# Patient Record
Sex: FEMALE | Race: BLACK OR AFRICAN AMERICAN | NOT HISPANIC OR LATINO | Employment: OTHER | URBAN - METROPOLITAN AREA
[De-identification: names, ages, dates, MRNs, and addresses within clinical notes are randomized per-mention and may not be internally consistent; named-entity substitution may affect disease eponyms.]

---

## 2018-02-02 ENCOUNTER — TELEPHONE (OUTPATIENT)
Dept: MEDICAL GROUP | Facility: LAB | Age: 68
End: 2018-02-02

## 2018-02-06 ENCOUNTER — OFFICE VISIT (OUTPATIENT)
Dept: MEDICAL GROUP | Facility: LAB | Age: 68
End: 2018-02-06
Payer: MEDICARE

## 2018-02-06 VITALS
WEIGHT: 190.2 LBS | RESPIRATION RATE: 14 BRPM | HEIGHT: 64 IN | TEMPERATURE: 98.2 F | HEART RATE: 90 BPM | OXYGEN SATURATION: 96 % | DIASTOLIC BLOOD PRESSURE: 86 MMHG | BODY MASS INDEX: 32.47 KG/M2 | SYSTOLIC BLOOD PRESSURE: 132 MMHG

## 2018-02-06 DIAGNOSIS — H53.9 VISION ABNORMALITIES: ICD-10-CM

## 2018-02-06 DIAGNOSIS — Z12.11 COLON CANCER SCREENING: ICD-10-CM

## 2018-02-06 DIAGNOSIS — E55.9 VITAMIN D DEFICIENCY: ICD-10-CM

## 2018-02-06 DIAGNOSIS — Z00.00 ANNUAL PHYSICAL EXAM: ICD-10-CM

## 2018-02-06 DIAGNOSIS — D64.89 ANEMIA DUE TO OTHER CAUSE, NOT CLASSIFIED: ICD-10-CM

## 2018-02-06 DIAGNOSIS — I69.359 CVA, OLD, HEMIPARESIS (HCC): ICD-10-CM

## 2018-02-06 DIAGNOSIS — N95.0 POST-MENOPAUSE BLEEDING: ICD-10-CM

## 2018-02-06 DIAGNOSIS — Z12.31 ENCOUNTER FOR SCREENING MAMMOGRAM FOR BREAST CANCER: ICD-10-CM

## 2018-02-06 DIAGNOSIS — E66.9 OBESITY (BMI 30-39.9): ICD-10-CM

## 2018-02-06 DIAGNOSIS — Z78.0 MENOPAUSE: ICD-10-CM

## 2018-02-06 DIAGNOSIS — I10 ESSENTIAL HYPERTENSION: ICD-10-CM

## 2018-02-06 PROBLEM — D64.9 ANEMIA: Status: ACTIVE | Noted: 2018-02-06

## 2018-02-06 PROCEDURE — 99204 OFFICE O/P NEW MOD 45 MIN: CPT | Performed by: INTERNAL MEDICINE

## 2018-02-06 RX ORDER — ATORVASTATIN CALCIUM 40 MG/1
40 TABLET, FILM COATED ORAL EVERY EVENING
Qty: 90 TAB | Refills: 2 | Status: SHIPPED | OUTPATIENT
Start: 2018-02-06 | End: 2018-05-07

## 2018-02-06 RX ORDER — FERROUS SULFATE 325(65) MG
325 TABLET ORAL DAILY
COMMUNITY
End: 2018-02-14

## 2018-02-06 RX ORDER — METOPROLOL SUCCINATE 100 MG/1
100 TABLET, EXTENDED RELEASE ORAL DAILY
Qty: 90 TAB | Refills: 2 | Status: SHIPPED | OUTPATIENT
Start: 2018-02-06 | End: 2018-06-07 | Stop reason: SDUPTHER

## 2018-02-06 RX ORDER — AMLODIPINE BESYLATE 10 MG/1
5 TABLET ORAL DAILY
Qty: 90 TAB | Refills: 2 | Status: SHIPPED | OUTPATIENT
Start: 2018-02-06 | End: 2018-05-07

## 2018-02-06 RX ORDER — VALSARTAN 160 MG/1
160 TABLET ORAL 2 TIMES DAILY
Qty: 180 TAB | Refills: 2 | Status: SHIPPED | OUTPATIENT
Start: 2018-02-06 | End: 2018-06-07 | Stop reason: SDUPTHER

## 2018-02-06 ASSESSMENT — PATIENT HEALTH QUESTIONNAIRE - PHQ9: CLINICAL INTERPRETATION OF PHQ2 SCORE: 0

## 2018-02-06 NOTE — LETTER
[facility]  Aliya Condon M.D.  [address]  [place] [address]  Fax: [phone]   Authorization for Release/Disclosure of   Protected Health Information   Name: JAIRO RIVAS : 1950 SSN: [id number]   Address: [address] [place] [address]  [place] [address] Phone:    [phone] (home)    I authorize the entity listed below to release/disclose the PHI below to:   [facility]/Aliya Condon M.D. and Aliya Condon M.D.   Provider or Entity Name:     Address   City, State, Zip   Phone:      Fax:     Reason for request: continuity of care   Information to be released:    [  ] LAST COLONOSCOPY,  including any PATH REPORT and follow-up  [  ] LAST FIT/COLOGUARD RESULT [  ] LAST DEXA  [  ] LAST MAMMOGRAM  [  ] LAST PAP  [  ] LAST LABS [  ] RETINA EXAM REPORT  [  ] IMMUNIZATION RECORDS  [  ] Release all info      [  ] Check here and initial the line next to each item to release ALL health information INCLUDING  _____ Care and treatment for drug and / or alcohol abuse  _____ HIV testing, infection status, or AIDS  _____ Genetic Testing    DATES OF SERVICE OR TIME PERIOD TO BE DISCLOSED: _____________  I understand and acknowledge that:  * This Authorization may be revoked at any time by you in writing, except if your health information has already been used or disclosed.  * Your health information that will be used or disclosed as a result of you signing this authorization could be re-disclosed by the recipient. If this occurs, your re-disclosed health information may no longer be protected by State or Federal laws.  * You may refuse to sign this Authorization. Your refusal will not affect your ability to obtain treatment.  * This Authorization becomes effective upon signing and will  on (date) __________.      If no date is indicated, this Authorization will  one (1) year from the signature date.    Name: Jairo Rivas    Signature:   Date:     2018       PLEASE FAX  REQUESTED RECORDS BACK TO: [phone]

## 2018-02-06 NOTE — ASSESSMENT & PLAN NOTE
This is a new problem to me. This patient reported that she has been taking ergocalciferol 50,000 international units weekly for 4 years! She is currently taking vitamin D3 over-the-counter supplements. We will be checking her vitamin D level.

## 2018-02-06 NOTE — ASSESSMENT & PLAN NOTE
This is a pleasant 77-year-old lady who is here today to establish care with a new primary care provider in [place]. She has been living in [place] and has left the last one year and [place] with her son. She now moved to [place] with her daughter.  She is not up-to-date on her most of her health maintenance exams.  Mammograms: Normal before. Due for one. [facility].  Colon cancer screening: Never had colonoscopy. Willing to do FIT testing.  Osteoporosis screening:  Never had one. Willing to have one.  Immunizations: Declined all vaccines.

## 2018-02-06 NOTE — ASSESSMENT & PLAN NOTE
Patient has hypertension for a long time. He has been on amlodipine 5 mg and metoprolol  mg once daily and valsartan 160 mg bid. He stated that recently she was tried to taper down on metoprolol down to 50 mg bid started to have problems with high blood pressure have to go back on 100 mg. She has been followed by a cardiologist in [place] and she will bring her records for her next visit. For hypertension. Pt reports compliance with medication. BP is at goal per [facility] 8 critieria today. Patient is taking his medication regularly and  is  taking baby aspirin. Patient is monitoring his BP at home and it has been quite fluctuating 130-150s/80s but may go up to 160s at a time.     She reported symptoms  Of low BP: light-headed occasionally but no tunnel-vision, unusual fatigue. Denies symptoms high BP: pounding headache, visual changes, palpitations, flushed face. Denies medicine side effects: unusual fatigue, slow heartbeat, foot/leg swelling, cough.

## 2018-02-06 NOTE — ASSESSMENT & PLAN NOTE
This is a new problem to me. This patient complained about the long-term right eye vision issues. She also noted a mole that is growing and getting near her cornea area. She would like to establish with an ophthalmologist in [place].

## 2018-02-06 NOTE — ASSESSMENT & PLAN NOTE
This is a chronic uncontrolled problem.   This patient reported that she was diagnosed with iron deficiency anemia due to chronic blood loss from her possible menopausal bleeding and she is currently taking iron supplements.

## 2018-02-06 NOTE — ASSESSMENT & PLAN NOTE
This is a chronic uncontrolled problem.   This patient has a BMI of 32 and it is difficult for her to lose weight because she is not able to exercise with her right-sided hemiparesis. We will discuss about different medications.

## 2018-02-06 NOTE — PROGRESS NOTES
Chief Complaint   Patient presents with   • Referral Needed     Eye Care, eye pressure and sometimes blurry   • Medication Refill   • Orders Needed     mammo       Subjective:     History of Present Illness: Patient is a 67 y.o. female. This pleasant patient is here today to establish care with a new primary care provider and address medical problems listed below. Was accompanied by her daughter to this visit. Recently moved to [facility].    CVA, old, hemiparesis (CMS-HCC)  This is a new problem to me. This patient had an ischemic stroke in 2010 when she was in [place] that resulted in a right sided residual weakness as well as dysarthria. She was evaluated locally [place] and she was told that she has an ischemic stroke. She lived in [place] after that and she had a cardiology workup and stated that she will bring her records to her next visit. She believes that it was no etiology identified for her stroke. She was advised to stay on simvastatin therapy. She is to be on a combination of Plavix and aspirin therapy but has recently been downgraded to aspirin only due to bleeding problems.   She still has residual right-sided weakness, she does need help with most of her ADLs including dressing, feeding herself and bathing. He lives with her daughter who takes care of her an accompanying her to her visit today. They are interested in establishing with local physical therapist in [place] to improve her strength. She denied any problems with swallowing.      Essential hypertension  Patient has hypertension for a long time. He has been on amlodipine 5 mg and metoprolol  mg once daily and valsartan 160 mg bid. He stated that recently she was tried to taper down on metoprolol down to 50 mg bid started to have problems with high blood pressure have to go back on 100 mg. She has been followed by a cardiologist in [place] and she will bring her records for her next visit. For hypertension. Pt reports compliance with  medication. BP is at goal per [facility] 8 critieria today. Patient is taking his medication regularly and  is  taking baby aspirin. Patient is monitoring his BP at home and it has been quite fluctuating 130-150s/80s but may go up to 160s at a time.     She reported symptoms  Of low BP: light-headed occasionally but no tunnel-vision, unusual fatigue. Denies symptoms high BP: pounding headache, visual changes, palpitations, flushed face. Denies medicine side effects: unusual fatigue, slow heartbeat, foot/leg swelling, cough.      Annual physical exam  This is a pleasant 77-year-old lady who is here today to establish care with a new primary care provider in [place]. She has been living in [place] and has left the last one year and [place] with her son. She now moved to [place] with her daughter.  She is not up-to-date on her most of her health maintenance exams.  Mammograms: Normal before. Due for one. PAP smears: Normal end of 2017 in [facility].  Colon cancer screening: Never had colonoscopy. Willing to do FIT testing.  Osteoporosis screening:  Never had one. Willing to have one.  Immunizations: Declined all vaccines.    Vision abnormalities  This is a new problem to me. This patient complained about the long-term right eye vision issues. She also noted a mole that is growing and getting near her cornea area. She would like to establish with an ophthalmologist in [place].    Vitamin D deficiency  This is a new problem to me. This patient reported that she has been taking ergocalciferol 50,000 international units weekly for 4 years! She is currently taking vitamin D3 over-the-counter supplements. We will be checking her vitamin D level.    Post-menopause bleeding  This is a new problem to me. This has been uncontrolled. This patient went into menopause in 2010 around the time of her stroke and she started to have erratic vaginal bleeding starting in 2012. She continued to have irregular continuous bleeding and she was evaluated by a  gynecologist in [place]. She stated that there was a plan for a D&C but she ended up moving to [place]. She'll like to establish with a local gynecologist. She also has iron deficiency anemia secondary to that.    Anemia  This is a chronic uncontrolled problem.   This patient reported that she was diagnosed with iron deficiency anemia due to chronic blood loss from her possible menopausal bleeding and she is currently taking iron supplements.    Obesity (BMI 30-39.9)  This is a chronic uncontrolled problem.   This patient has a BMI of 32 and it is difficult for her to lose weight because she is not able to exercise with her right-sided hemiparesis. We will discuss about different medications.      Allergies: Penicillins; Hydrocodone; Levaquin; and Tramadol    Current Outpatient Prescriptions Ordered in [facility]   Medication Sig Dispense Refill   • Cholecalciferol (VITAMIN D3 PO) Take  by mouth.     • Coenzyme Q10 (CO Q 10 PO) Take  by mouth.     • Multiple Vitamin (ONE-A-DAY 55 PLUS PO) Take  by mouth.     • ferrous sulfate 325 (65 Fe) MG tablet Take 325 mg by mouth every day.     • amLODIPine (NORVASC) 10 MG Tab Take 0.5 Tabs by mouth every day for 90 days. 90 Tab 2   • metoprolol SR (TOPROL XL) 100 MG TABLET SR 24 HR Take 1 Tab by mouth every day for 90 days. 90 Tab 2   • valsartan (DIOVAN) 160 MG Tab Take 1 Tab by mouth 2 times a day for 90 days. 180 Tab 2   • atorvastatin (LIPITOR) 40 MG Tab Take 1 Tab by mouth every evening for 90 days. 90 Tab 2   • aspirin (ASA) 81 MG Chew Tab chewable tablet Take 81 mg by mouth every day.       No current [facility]-ordered facility-administered medications on file.        Past Medical History:   Diagnosis Date   • Anemia 2/6/2018    Iron def, post-menopausal bleeding.   • CVA, old, hemiparesis (CMS-HCC) 2/6/2018    Residual left sided weakness   • Essential hypertension 2/6/2018    Well controlled on amlodipine 10 mg and metoprolol 100 mg bid.   • Post-menopause bleeding 2/6/2018     "Menopause in 2010 and bleeding 2012 started to have irregular continuous bleeding.    • Vision abnormalities 2/6/2018   • Vitamin D deficiency 2/6/2018    Taking 50,000 IU once weekly for 4 yrs.       History reviewed. No pertinent surgical history.    Social History   Substance Use Topics   • Smoking status: Never Smoker   • Smokeless tobacco: Never Used   • Alcohol use No       Family History   Problem Relation Age of Onset   • Hypertension Mother    • No Known Problems Father    • No Known Problems Brother        ROS:     - Constitutional: Negative for fever, chills, unexpected weight change, and fatigue/generalized weakness.     - HEENT: Negative for headaches, vision changes, hearing changes, ear pain, ear discharge, sinus congestion, sore throat, and neck pain.      - Respiratory: Negative for cough, sputum production, dyspnea and wheezing.    - Cardiovascular: Negative for chest pain, palpitations, orthopnea, and bilateral lower extremity edema.     - Gastrointestinal: Negative for heartburn, nausea, vomiting, abdominal pain, hematochezia, melena, diarrhea, constipation, and greasy/foul-smelling stools.     - Genitourinary: Negative for dysuria, polyuria, hematuria, pyuria, urinary urgency, and urinary incontinence.    - Musculoskeletal: Negative for myalgias, back pain, and joint pain.     - Skin: Negative for rash, itching, cyanotic skin color change.     - Neurological: Negative for dizziness, tingling, tremors, positive for right-sided hemiparesis and dysarthria.  - Endo/Heme/Allergies: Does not bruise/bleed easily. Positive for postmenopausal bleeding.    - Psychiatric/Behavioral: Negative for depression, suicidal/homicidal ideation and memory loss.        - NOTE: All other systems reviewed and are negative, except as in HPI.       Physical Exam:     Blood pressure 132/86, pulse 90, temperature 36.8 °C (98.2 °F), resp. rate 14, height 1.626 m (5' 4\"), weight 86.3 kg (190 lb 3.2 oz), SpO2 96 %, not " currently breastfeeding. Body mass index is 32.65 kg/m².   General: Normal appearing. No distress.  HEENT: Normocephalic. Eyes conjunctiva clear lids without ptosis, pupils equal and reactive to light accommodation, ears normal shape and contour, canals are clear bilaterally, tympanic membranes are benign,  oropharynx is without erythema, edema or exudates.    Right eye exam pigmented spot on the right upper sclera approaching her cornea  Neck: Supple without JVD or bruit. Thyroid is not enlarged.  Pulmonary: Clear to ausculation.  Normal effort. No rales, ronchi, or wheezing.  Cardiovascular: Regular rate and rhythm without murmur. Radial pulses are intact, regular and symmetrical bilaterally.  Abdomen: Soft, nontender, nondistended. Normal bowel sounds. Liver and spleen are not palpable.  Neurologic: Dysarthria. Right upper and lower extremity weakness with strength 2/5. Hyperreflexia. Uses walker, abnormal gait..  Lymph: No cervical, occipital or supraclavicular lymph nodes are palpable  Skin: Warm and dry.  No obvious lesions.  Musculoskeletal: No extremity cyanosis, clubbing, or edema.  Psych: Normal mood and affect. Alert and oriented x3. Judgment and insight is normal.    Data:   Requested previous cardiology and gynecologist record. Requested previous mammograms and Pap smears.    Assessment and Plan:     1. Annual physical exam  As discussed in history of present illness, this patient never had a colonoscopy but is willing to start annual for testing. We ordered a new mammogram. Ordered a new DEXA scan. We'll obtain her Pap smear records. She declined all the vaccinations.  - COMP METABOLIC PANEL; Future    2. Vision abnormalities  New uncontrolled. Rt sided impaired vision.   - REFERRAL TO OPHTHALMOLOGY    3. CVA, old, hemiparesis (CMS-HCC)  New to me. Chronic uncontrolled. S/P ischemic CVA 2010 with residual rt sided weakness. Unable to perform ADLs.  Currently on simvastatin 20 mg, well tolerated,  upgraded to a higher intensity statin with atorvastatin 40 mg. May consider going up to 80 in the future.  Used to be on a Plavix and aspirin recently downgraded to aspirin only due to bleeding problems. Continue on aspirin only.  Referral to physical therapy today for evaluation improvement of muscle strength.  Like to obtain her cardiology workup to see if she has a history of A. fib or not.  - REFERRAL TO PHYSICAL THERAPY Reason for Therapy: Eval/Treat/Report  - atorvastatin (LIPITOR) 40 MG Tab; Take 1 Tab by mouth every evening for 90 days.  Dispense: 90 Tab; Refill: 2    4. Essential hypertension  New to me. Chronic uncontrolled. Reported fluctuation of her blood pressure at home. She is currently on amlodipine 5 mg, metoprolol  mg daily, valsartan 160 mg twice a day. Not quite sure why she requires a high dose beta blockers unless she has a cardiac history. She used to see a cardiologist but she is not quite sure about her cardiology history.  She seems to have fluctuation of her blood pressure at home, I will have her maintain a blood pressure log for the next month with her blood pressure and heart rate readings. I will also obtain her previous cardiology notes and reevaluate her blood pressure in one month. May consider increasing amlodipine and cutting back on the Toprol if appropriate.  - amLODIPine (NORVASC) 10 MG Tab; Take 0.5 Tabs by mouth every day for 90 days.  Dispense: 90 Tab; Refill: 2  - metoprolol SR (TOPROL XL) 100 MG TABLET SR 24 HR; Take 1 Tab by mouth every day for 90 days.  Dispense: 90 Tab; Refill: 2  - valsartan (DIOVAN) 160 MG Tab; Take 1 Tab by mouth 2 times a day for 90 days.  Dispense: 180 Tab; Refill: 2    5. Vitamin D deficiency  News to me. Chronic uncontrolled. She stated that she finished a high dose vitamin D replacement and she is currently on over-the-counter dose. She believes that she took the high-dose replacement for about 4 years. I will be checking her vitamin D  level.  - VITAMIN D,25 HYDROXY; Future    6. Post-menopause bleeding  New to me. Chronic uncontrolled. When into menopause in 2010 and has been experiencing continuous irregular possibly menopausal bleeding since 2012. Her previous gynecologist was planning for a D&C she left [place] before that procedure was done. I will refer her to a local gynecologist for evaluation.  - REFERRAL TO GYNECOLOGY    7. Anemia due to other cause, not classified  New to me. Chronic and uncontrolled. This is likely to be secondary to her chronic blood loss from postmenopausal bleeding. I will be checking her CBC and iron panel. She continued to take over-the-counter iron supplements.  - CBC WITHOUT DIFFERENTIAL; Future  - IRON/TOTAL IRON BIND; Future  - FERRITIN; Future    8. Encounter for screening mammogram for breast cancer  She reported having a normal mammogram about a year or 2 ago. She is due for repeat mammogram.    9. Colon cancer screening  This patient never had a colon cancer screening and she is refusing colonoscopy. She agreed to proceed with annual fit testing.  - OCCULT BLOOD FECES IMMUNOASSAY (FIT); Future    10. Obesity (BMI 30-39.9)  This is a chronic uncontrolled problem.   Difficult for her to exercise due to her right-sided hemiparesis. We discussed the importance of healthy diet.  - Patient identified as having weight management issue.  Appropriate orders and counseling given.    11. Menopause  This patient never had DEXA scan before. We'll proceed with one.  - DS-BONE DENSITY STUDY (DEXA); Future      Health Maintenance:       Health Maintenance Due   Topic   • Annual Wellness Visit . Needs to be scheduled   • PAP SMEAR . Obtain records   • MAMMOGRAM . Ordered   • COLON CANCER SCREENING ANNUAL FIT . Ordered   • BONE DENSITY . Ordered       Follow Up:      Return in about 4 weeks (around 3/6/2018) for BP logs, Anemia discuss labs.    Please note that this dictation was created using voice recognition software. I  have made every reasonable attempt to correct obvious errors, but I expect that there are errors of grammar and possibly content that I did not discover before finalizing the note.    Signed by: Aliya Condon M.D.

## 2018-02-06 NOTE — ASSESSMENT & PLAN NOTE
This is a new problem to me. This patient had an ischemic stroke in 2010 when she was in [place] that resulted in a right sided residual weakness as well as dysarthria. She was evaluated [place] in [place] and she was told that she has an ischemic stroke. She lived in [place] after that and she had a cardiology workup and stated that she will bring her records to her next visit. She believes that it was no etiology identified for her stroke. She was advised to stay on simvastatin therapy. She is to be on a combination of Plavix and aspirin therapy but has recently been downgraded to aspirin only due to bleeding problems.   She still has residual right-sided weakness, she does need help with most of her ADLs including dressing, feeding herself and bathing. He lives with her daughter who takes care of her an accompanying her to her visit today. They are interested in establishing with local physical therapist in [place] to improve her strength. She denied any problems with swallowing.

## 2018-02-07 ENCOUNTER — TELEPHONE (OUTPATIENT)
Dept: MEDICAL GROUP | Facility: LAB | Age: 68
End: 2018-02-07

## 2018-02-14 DIAGNOSIS — N95.0 POST-MENOPAUSE BLEEDING: ICD-10-CM

## 2018-02-14 RX ORDER — FERROUS SULFATE 325(65) MG
325 TABLET ORAL DAILY
Qty: 30 TAB | Refills: 11 | Status: SHIPPED | OUTPATIENT
Start: 2018-02-14 | End: 2018-03-20 | Stop reason: SDUPTHER

## 2018-02-24 ENCOUNTER — HOSPITAL ENCOUNTER (OUTPATIENT)
Dept: LAB | Facility: MEDICAL CENTER | Age: 68
End: 2018-02-24
Attending: INTERNAL MEDICINE
Payer: MEDICARE

## 2018-02-24 DIAGNOSIS — E55.9 VITAMIN D DEFICIENCY: ICD-10-CM

## 2018-02-24 DIAGNOSIS — D64.89 ANEMIA DUE TO OTHER CAUSE, NOT CLASSIFIED: ICD-10-CM

## 2018-02-24 DIAGNOSIS — Z00.00 ANNUAL PHYSICAL EXAM: ICD-10-CM

## 2018-02-24 LAB
25(OH)D3 SERPL-MCNC: 37 NG/ML (ref 30–100)
ALBUMIN SERPL BCP-MCNC: 4.1 G/DL (ref 3.2–4.9)
ALBUMIN/GLOB SERPL: 1.2 G/DL
ALP SERPL-CCNC: 79 U/L (ref 30–99)
ALT SERPL-CCNC: 18 U/L (ref 2–50)
ANION GAP SERPL CALC-SCNC: 5 MMOL/L (ref 0–11.9)
AST SERPL-CCNC: 22 U/L (ref 12–45)
BILIRUB SERPL-MCNC: 0.4 MG/DL (ref 0.1–1.5)
BUN SERPL-MCNC: 13 MG/DL (ref 8–22)
CALCIUM SERPL-MCNC: 9.6 MG/DL (ref 8.5–10.5)
CHLORIDE SERPL-SCNC: 104 MMOL/L (ref 96–112)
CO2 SERPL-SCNC: 28 MMOL/L (ref 20–33)
CREAT SERPL-MCNC: 0.85 MG/DL (ref 0.5–1.4)
ERYTHROCYTE [DISTWIDTH] IN BLOOD BY AUTOMATED COUNT: 42.9 FL (ref 35.9–50)
FERRITIN SERPL-MCNC: 10.7 NG/ML (ref 10–291)
GLOBULIN SER CALC-MCNC: 3.3 G/DL (ref 1.9–3.5)
GLUCOSE SERPL-MCNC: 97 MG/DL (ref 65–99)
HCT VFR BLD AUTO: 37 % (ref 37–47)
HGB BLD-MCNC: 11.7 G/DL (ref 12–16)
IRON SATN MFR SERPL: 9 % (ref 15–55)
IRON SERPL-MCNC: 35 UG/DL (ref 40–170)
MCH RBC QN AUTO: 30.1 PG (ref 27–33)
MCHC RBC AUTO-ENTMCNC: 31.6 G/DL (ref 33.6–35)
MCV RBC AUTO: 95.1 FL (ref 81.4–97.8)
PLATELET # BLD AUTO: 304 K/UL (ref 164–446)
PMV BLD AUTO: 10.9 FL (ref 9–12.9)
POTASSIUM SERPL-SCNC: 3.7 MMOL/L (ref 3.6–5.5)
PROT SERPL-MCNC: 7.4 G/DL (ref 6–8.2)
RBC # BLD AUTO: 3.89 M/UL (ref 4.2–5.4)
SODIUM SERPL-SCNC: 137 MMOL/L (ref 135–145)
TIBC SERPL-MCNC: 393 UG/DL (ref 250–450)
WBC # BLD AUTO: 5.6 K/UL (ref 4.8–10.8)

## 2018-02-24 PROCEDURE — 36415 COLL VENOUS BLD VENIPUNCTURE: CPT

## 2018-02-24 PROCEDURE — 83540 ASSAY OF IRON: CPT | Mod: GA

## 2018-02-24 PROCEDURE — 85027 COMPLETE CBC AUTOMATED: CPT

## 2018-02-24 PROCEDURE — 82728 ASSAY OF FERRITIN: CPT | Mod: GA

## 2018-02-24 PROCEDURE — 82306 VITAMIN D 25 HYDROXY: CPT

## 2018-02-24 PROCEDURE — 80053 COMPREHEN METABOLIC PANEL: CPT

## 2018-02-24 PROCEDURE — 83550 IRON BINDING TEST: CPT | Mod: GA

## 2018-03-06 ENCOUNTER — OFFICE VISIT (OUTPATIENT)
Dept: URGENT CARE | Facility: CLINIC | Age: 68
End: 2018-03-06
Payer: MEDICARE

## 2018-03-06 ENCOUNTER — HOSPITAL ENCOUNTER (OUTPATIENT)
Facility: MEDICAL CENTER | Age: 68
End: 2018-03-06
Attending: EMERGENCY MEDICINE
Payer: MEDICARE

## 2018-03-06 VITALS
WEIGHT: 190 LBS | HEIGHT: 64 IN | HEART RATE: 84 BPM | TEMPERATURE: 98.2 F | OXYGEN SATURATION: 97 % | RESPIRATION RATE: 14 BRPM | BODY MASS INDEX: 32.44 KG/M2 | DIASTOLIC BLOOD PRESSURE: 90 MMHG | SYSTOLIC BLOOD PRESSURE: 128 MMHG

## 2018-03-06 DIAGNOSIS — R10.816 EPIGASTRIC ABDOMINAL TENDERNESS WITHOUT REBOUND TENDERNESS: ICD-10-CM

## 2018-03-06 DIAGNOSIS — R10.33 PERIUMBILICAL ABDOMINAL PAIN: ICD-10-CM

## 2018-03-06 LAB
APPEARANCE UR: CLEAR
APPEARANCE UR: CLEAR
BACTERIA #/AREA URNS HPF: NEGATIVE /HPF
BILIRUB UR QL STRIP.AUTO: NEGATIVE
BILIRUB UR STRIP-MCNC: NORMAL MG/DL
COLOR UR AUTO: YELLOW
COLOR UR: YELLOW
CULTURE IF INDICATED INDCX: YES UA CULTURE
EPI CELLS #/AREA URNS HPF: NORMAL /HPF
GLUCOSE UR STRIP.AUTO-MCNC: NEGATIVE MG/DL
GLUCOSE UR STRIP.AUTO-MCNC: NORMAL MG/DL
HYALINE CASTS #/AREA URNS LPF: NORMAL /LPF
KETONES UR STRIP.AUTO-MCNC: NEGATIVE MG/DL
KETONES UR STRIP.AUTO-MCNC: NORMAL MG/DL
LEUKOCYTE ESTERASE UR QL STRIP.AUTO: ABNORMAL
LEUKOCYTE ESTERASE UR QL STRIP.AUTO: NORMAL
MICRO URNS: ABNORMAL
NITRITE UR QL STRIP.AUTO: NEGATIVE
NITRITE UR QL STRIP.AUTO: NORMAL
PH UR STRIP.AUTO: 6 [PH]
PH UR STRIP.AUTO: 6 [PH] (ref 5–8)
PROT UR QL STRIP: NEGATIVE MG/DL
PROT UR QL STRIP: NORMAL MG/DL
RBC # URNS HPF: NORMAL /HPF
RBC UR QL AUTO: ABNORMAL
RBC UR QL AUTO: NORMAL
SP GR UR STRIP.AUTO: 1
SP GR UR STRIP.AUTO: 1.01
UROBILINOGEN UR STRIP-MCNC: NORMAL MG/DL
UROBILINOGEN UR STRIP.AUTO-MCNC: 0.2 MG/DL
WBC #/AREA URNS HPF: NORMAL /HPF

## 2018-03-06 PROCEDURE — 99203 OFFICE O/P NEW LOW 30 MIN: CPT | Mod: 25 | Performed by: EMERGENCY MEDICINE

## 2018-03-06 PROCEDURE — 87086 URINE CULTURE/COLONY COUNT: CPT

## 2018-03-06 PROCEDURE — 99000 SPECIMEN HANDLING OFFICE-LAB: CPT | Performed by: EMERGENCY MEDICINE

## 2018-03-06 PROCEDURE — 81001 URINALYSIS AUTO W/SCOPE: CPT

## 2018-03-06 PROCEDURE — 81002 URINALYSIS NONAUTO W/O SCOPE: CPT | Performed by: EMERGENCY MEDICINE

## 2018-03-06 ASSESSMENT — ENCOUNTER SYMPTOMS
PALPITATIONS: 0
VOMITING: 0
ANOREXIA: 0
CONSTIPATION: 0
BLOOD IN STOOL: 0
DIAPHORESIS: 0
HEMATOCHEZIA: 0
FEVER: 0
BELCHING: 1
SHORTNESS OF BREATH: 0
DIARRHEA: 0
NAUSEA: 1
DIZZINESS: 0
COUGH: 0
FLANK PAIN: 0
CHILLS: 0
ABDOMINAL PAIN: 1

## 2018-03-07 ENCOUNTER — HOSPITAL ENCOUNTER (OUTPATIENT)
Dept: RADIOLOGY | Facility: MEDICAL CENTER | Age: 68
End: 2018-03-07
Attending: INTERNAL MEDICINE
Payer: MEDICARE

## 2018-03-07 DIAGNOSIS — Z12.31 ENCOUNTER FOR SCREENING MAMMOGRAM FOR BREAST CANCER: ICD-10-CM

## 2018-03-07 DIAGNOSIS — Z78.0 MENOPAUSE: ICD-10-CM

## 2018-03-07 PROCEDURE — 77067 SCR MAMMO BI INCL CAD: CPT

## 2018-03-07 PROCEDURE — 77080 DXA BONE DENSITY AXIAL: CPT

## 2018-03-07 NOTE — PROGRESS NOTES
Subjective:      Jairo Rivas is a 67 y.o. female who presents with Abdominal Pain            Abdominal Pain   This is a new problem. Episode onset: 3 weeks. The onset quality is undetermined. The problem occurs intermittently. The problem has been waxing and waning. The pain is located in the periumbilical region. Pain severity now: unchanged. The abdominal pain does not radiate. Associated symptoms include belching and nausea. Pertinent negatives include no anorexia, constipation, diarrhea, dysuria, fever, frequency, hematochezia, hematuria, melena or vomiting. Nothing aggravates the pain. The pain is relieved by bowel movements and eating. She has tried acetaminophen for the symptoms. The treatment provided no relief. There is no history of abdominal surgery.   No trauma.  PCP apparently unavailable today.    Review of Systems   Constitutional: Negative for chills, diaphoresis and fever.   Respiratory: Negative for cough and shortness of breath.    Cardiovascular: Negative for chest pain and palpitations.   Gastrointestinal: Positive for abdominal pain and nausea. Negative for anorexia, blood in stool, constipation, diarrhea, hematochezia, melena and vomiting.   Genitourinary: Negative for dysuria, flank pain, frequency, hematuria and urgency.        No vaginal discharge or bleeding.   Skin: Negative for rash.   Neurological: Negative for dizziness.     PMH:  has a past medical history of Anemia (2/6/2018); CVA, old, hemiparesis (CMS-HCC) (2/6/2018); Essential hypertension (2/6/2018); Post-menopause bleeding (2/6/2018); Vision abnormalities (2/6/2018); and Vitamin D deficiency (2/6/2018).  MEDS:   Current Outpatient Prescriptions:   •  Cholecalciferol (VITAMIN D3 PO), Take  by mouth., Disp: , Rfl:   •  Coenzyme Q10 (CO Q 10 PO), Take  by mouth., Disp: , Rfl:   •  aspirin (ASA) 81 MG Chew Tab chewable tablet, Take 81 mg by mouth every day., Disp: , Rfl:   •  ferrous sulfate 325 (65 Fe) MG tablet, Take 1  "Tab by mouth every day for 30 days., Disp: 30 Tab, Rfl: 11  •  Multiple Vitamin (ONE-A-DAY 55 PLUS PO), Take  by mouth., Disp: , Rfl:   •  amLODIPine (NORVASC) 10 MG Tab, Take 0.5 Tabs by mouth every day for 90 days., Disp: 90 Tab, Rfl: 2  •  metoprolol SR (TOPROL XL) 100 MG TABLET SR 24 HR, Take 1 Tab by mouth every day for 90 days., Disp: 90 Tab, Rfl: 2  •  valsartan (DIOVAN) 160 MG Tab, Take 1 Tab by mouth 2 times a day for 90 days., Disp: 180 Tab, Rfl: 2  •  atorvastatin (LIPITOR) 40 MG Tab, Take 1 Tab by mouth every evening for 90 days., Disp: 90 Tab, Rfl: 2  ALLERGIES:   Allergies   Allergen Reactions   • Penicillins Itching     Makes her feel weak    • Hydrocodone Itching     Swelling of the tongue  Face swelling   • Levaquin Itching     Swelling of the tongue  Facial swelling   • Tramadol Hives     Swelling     SURGHX: History reviewed. No pertinent surgical history.  SOCHX:  reports that she has never smoked. She has never used smokeless tobacco. She reports that she does not drink alcohol or use drugs.  FH: family history includes Hypertension in her mother; No Known Problems in her brother and father.       Objective:     /90   Pulse 84   Temp 36.8 °C (98.2 °F)   Resp 14   Ht 1.626 m (5' 4\")   Wt 86.2 kg (190 lb)   SpO2 97%   BMI 32.61 kg/m²      Physical Exam   Constitutional: She is oriented to person, place, and time. She appears well-developed and well-nourished. She is cooperative.  Non-toxic appearance. She does not appear ill. No distress.   HENT:   Head: Normocephalic.   Mouth/Throat: Mucous membranes are normal.   Eyes: Conjunctivae are normal. No scleral icterus.   Neck: Phonation normal. Neck supple.   Cardiovascular: Normal rate, regular rhythm and normal heart sounds.    No murmur heard.  Pulmonary/Chest: Effort normal and breath sounds normal.   Abdominal: Soft. Bowel sounds are normal. She exhibits no distension. There is no hepatosplenomegaly. There is tenderness in the " epigastric area. There is no rigidity, no rebound, no guarding, no CVA tenderness, no tenderness at McBurney's point and negative Thorpe's sign. No hernia.   Neurological: She is alert and oriented to person, place, and time.   Skin: Skin is warm and dry. No rash noted.   Psychiatric: She has a normal mood and affect.               Assessment/Plan:     1. Periumbilical abdominal pain  OTC H2 blocker  Positive LE, blood- POCT Urinalysis  - URINALYSIS,CULTURE IF INDICATED; Future    2. Epigastric abdominal tenderness without rebound tenderness  Advised need for PCP follow up ASAP; ED if any worsening.  - CBC WITH DIFFERENTIAL; Future  - COMP METABOLIC PANEL; Future  - LIPASE; Future  - H. PYLORI BREATH TEST  - US-GALLBLADDER; Future

## 2018-03-07 NOTE — PATIENT INSTRUCTIONS
Use over-the-counter famotidine (Pepcid AC), ranitidine (Zantac) or cimetidine (Tagamet) as needed for relief of symptoms; follow the package instructions for dosing.  You should contact a primary care provider for follow-up as soon as available.  Go to the nearest hospital Emergency Department, or call 911, if any worsening condition.  Abdominal Pain, Adult  Many things can cause belly (abdominal) pain. Most times, belly pain is not dangerous. Many cases of belly pain can be watched and treated at home. Sometimes belly pain is serious, though. Your doctor will try to find the cause of your belly pain.  Follow these instructions at home:  · Take over-the-counter and prescription medicines only as told by your doctor. Do not take medicines that help you poop (laxatives) unless told to by your doctor.  · Drink enough fluid to keep your pee (urine) clear or pale yellow.  · Watch your belly pain for any changes.  · Keep all follow-up visits as told by your doctor. This is important.  Contact a doctor if:  · Your belly pain changes or gets worse.  · You are not hungry, or you lose weight without trying.  · You are having trouble pooping (constipated) or have watery poop (diarrhea) for more than 2-3 days.  · You have pain when you pee or poop.  · Your belly pain wakes you up at night.  · Your pain gets worse with meals, after eating, or with certain foods.  · You are throwing up and cannot keep anything down.  · You have a fever.  Get help right away if:  · Your pain does not go away as soon as your doctor says it should.  · You cannot stop throwing up.  · Your pain is only in areas of your belly, such as the right side or the left lower part of the belly.  · You have bloody or black poop, or poop that looks like tar.  · You have very bad pain, cramping, or bloating in your belly.  · You have signs of not having enough fluid or water in your body (dehydration), such as:  ¨ Dark pee, very little pee, or no pee.  ¨ Cracked  lips.  ¨ Dry mouth.  ¨ Sunken eyes.  ¨ Sleepiness.  ¨ Weakness.  This information is not intended to replace advice given to you by your health care provider. Make sure you discuss any questions you have with your health care provider.  Document Released: 06/05/2009 Document Revised: 07/07/2017 Document Reviewed: 05/31/2017  [employer] Interactive Patient Education © 2017 [employer]

## 2018-03-09 LAB
BACTERIA UR CULT: NORMAL
SIGNIFICANT IND 70042: NORMAL
SITE SITE: NORMAL
SOURCE SOURCE: NORMAL

## 2018-03-12 ENCOUNTER — PHYSICAL THERAPY (OUTPATIENT)
Dept: PHYSICAL THERAPY | Facility: REHABILITATION | Age: 68
End: 2018-03-12
Attending: INTERNAL MEDICINE
Payer: MEDICARE

## 2018-03-12 DIAGNOSIS — I69.351 HEMIPLEGIA AND HEMIPARESIS FOLLOWING CEREBRAL INFARCTION AFFECTING RIGHT DOMINANT SIDE (HCC): ICD-10-CM

## 2018-03-12 PROCEDURE — 97110 THERAPEUTIC EXERCISES: CPT

## 2018-03-12 PROCEDURE — G8980 MOBILITY D/C STATUS: HCPCS | Mod: CJ

## 2018-03-12 PROCEDURE — G8979 MOBILITY GOAL STATUS: HCPCS | Mod: CJ

## 2018-03-12 PROCEDURE — 97163 PT EVAL HIGH COMPLEX 45 MIN: CPT

## 2018-03-12 PROCEDURE — G8978 MOBILITY CURRENT STATUS: HCPCS | Mod: CK

## 2018-03-12 ASSESSMENT — ENCOUNTER SYMPTOMS
PAIN SCALE AT LOWEST: 0
EXACERBATED BY: STAIR CLIMBING
ALLEVIATING FACTORS: LYING DOWN
EXACERBATED BY: DRESSING
PAIN SCALE: 7
QUALITY: ACHING
PAIN TIMING: WHEN ACTIVE
EXACERBATED BY: WALKING
PAIN LOCATION: POSTERIOR R KNEE
ALLEVIATING FACTORS: HEAT APPLICATION
EXACERBATED BY: SITTING
PAIN SCALE AT HIGHEST: 8
PAIN TIMING: ON AWAKENING

## 2018-03-12 NOTE — OP THERAPY EVALUATION
Outpatient Physical Therapy  INITIAL NEUROLOGICAL EVALUATION    [facility] Physical Therapy [address]  [address]  [place] [address]  Phone:  [phone]  Fax:  [phone]    Date of Evaluation: 2018    Patient: Jairo Rivas  YOB: 1950  MRN: [id number]     Referring Provider: Aliya Condon M.D.  [id number] [facility] [address]  [place] [address]   Referring Diagnosis CVA, old, hemiparesis (CMS-HCC) [I69.359]     Time Calculation  Start time: 1500  Stop time: 1600 Time Calculation (min): 60 minutes     Physical Therapy Occurrence Codes    Date of onset of impairment:  12/15/10   Date physical therapy care plan established or reviewed:  3/12/18          Chief Complaint: Weakness and Knee Pain    Visit Diagnoses     ICD-10-CM   1. 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 Pt rec'd inpatient and outpatient PT for the first few years. Pt reports significant pain and weakness R knee progressing over the last 6 months. R knee katalina occasionally. Also w/onset of bilateral ankle/foot as well as R FT numbness.   Prior level of function:  Independent w/ADLs and working () prior to CVA, Post-CVA requires min-mod A w/dressing, bathing, meal prep  Sleep disturbance:  Non-restful sleep  Pain:     Current pain ratin    At best pain ratin    At worst pain ratin    Location:  Posterior R knee    Quality:  Aching    Pain timing:  On awakening and when active    Relieving factors:  Lying down and heat application    Aggravating factors:  Sitting, walking, stair climbing and dressing  Social Support:     Lives in:  Apartment (2nd floor)  Hand dominance:  Right      Past Medical History:   Diagnosis Date   • Anemia 2018    Iron def, post-menopausal bleeding.   • CVA, old, hemiparesis  (CMS-[facility]) 2/6/2018    Residual left sided weakness   • Essential hypertension 2/6/2018    Well controlled on amlodipine 10 mg and metoprolol 100 mg bid.   • Post-menopause bleeding 2/6/2018    Menopause in 2010 and bleeding 2012 started to have irregular continuous bleeding.    • Vision abnormalities 2/6/2018   • Vitamin D deficiency 2/6/2018    Taking 50,000 IU once weekly for 4 yrs.     No past surgical history on file.  Social History   Substance Use Topics   • Smoking status: Never Smoker   • Smokeless tobacco: Never Used   • Alcohol use No     Family and Occupational History     Social History   • Marital status: Single     Spouse name: N/A   • Number of children: N/A   • Years of education: N/A       Objective:   Active Range of Motion:   Upper extremity (left):     All left upper extremity active range of motion: All within functional limits  Upper extremity (right):     Shoulder flexion: Below functional limits (20 degrees)    Shoulder abduction: Below functional limits (20 degrees)    Shoulder external rotation: Below functional limits (-40 degrees)    Shoulder internal rotation: Within functional limits    Elbow flexion: Below functional limits (100 degrees)    Elbow extension: Below functional limits (-60 degrees)  Lower extremity (left):     All left lower extremity active range of motion: All within functional limits  Lower extremity (right):     Hip flexion: Below functional limits (90 degrees)    Knee flexion: Below functional limits (90 degrees)    Knee extension: Below functional limits (-30 degrees)      Strength:     Left upper extremity strength within functional limits.      Left lower extremity strength within functional limits.    Upper extremity strength (right):    Shoulder flexion: 2-    Shoulder abduction: 2-    Shoulder external rotation: 0    Shoulder internal rotation: 3+    Elbow flexion: 2-    Elbow extension: 2-  Lower extremity (right):     Hip flexion: 2+    Hip abduction: 3-     Knee flexion: 3+    Knee extension: 3+    Ankle dorsiflexion: 2+        Therapeutic Exercises (CPT 38262):     1. HEP, Seated march, LAQ, heel raises, toe raises, x5 every hour      Time-based treatments/modalities:  Therapeutic exercise minutes (CPT 04034): 10 minutes       Assessment, Response and Plan:   Assessment details:  67 yr old female w/declining function and increased pain related to hx of CVA in 2010 resulting in R hemiparesis. Pt will benefit from skilled PT to address the following goals.  Goals:   Short Term Goals:   Pt able to dress w/less than 50% assistance  Pt able to perform sit to stand w/50% decreased c/o knee pain  Pt ambulating > 200 ft w/SPC daily w/SBA  Short term goal time span:  2-4 weeks      Long Term Goals:    Pt able to dress w/less than 75% assistance  Pt able to perform sit to stand w/75% decreased c/o knee pain  Pt ambulating > 400 ft w/SPC daily w/SBA  Pt performing daily HEP  Long term goal time span:  2-4 months    Plan:   Therapy options:  Physical therapy treatment to continue  Planned therapy interventions:  Gait Training (CPT 02369), Manual Therapy (CPT 75051), Neuromuscular Re-education (CPT 69851), Therapeutic Activities (CPT 75220) and Therapeutic Exercise (CPT 45785)  Frequency:  2x week  Duration in weeks:  12  Plan details:  Cont skilled PT to address decreased AROM, limited functional mobility, and increase independence w/ADLs.      Functional Limitation G-Codes and Severity Modifiers      Current: Mobility: Current (): CK   Goal: Mobility: Goal (): CJ       Referring provider co-signature:  I have reviewed this plan of care and my co-signature certifies the need for services.    Certification Dates:   From 3/12/18  To 6/12/18    Physician Signature: ________________________________ Date: ______________

## 2018-03-13 DIAGNOSIS — I69.359 CVA, OLD, HEMIPARESIS (HCC): ICD-10-CM

## 2018-03-14 ENCOUNTER — APPOINTMENT (OUTPATIENT)
Dept: PHYSICAL THERAPY | Facility: REHABILITATION | Age: 68
End: 2018-03-14
Attending: INTERNAL MEDICINE
Payer: MEDICARE

## 2018-03-15 ENCOUNTER — PHYSICAL THERAPY (OUTPATIENT)
Dept: PHYSICAL THERAPY | Facility: REHABILITATION | Age: 68
End: 2018-03-15
Attending: INTERNAL MEDICINE
Payer: MEDICARE

## 2018-03-15 DIAGNOSIS — I69.351 HEMIPLEGIA AND HEMIPARESIS FOLLOWING CEREBRAL INFARCTION AFFECTING RIGHT DOMINANT SIDE (HCC): ICD-10-CM

## 2018-03-15 PROCEDURE — 97110 THERAPEUTIC EXERCISES: CPT

## 2018-03-16 NOTE — OP THERAPY DAILY TREATMENT
Outpatient Physical Therapy  DAILY TREATMENT     [facility] Physical [address].  [address]  [place] [address]  Phone:  [phone]  Fax:  [phone]    Date: 03/15/2018    Patient: Jairo Rivas  YOB: 1950  MRN: [id number]     Time Calculation  Start time: 1600  Stop time: 1630 Time Calculation (min): 30 minutes     Chief Complaint: Weakness; Difficulty Walking; and Shoulder Pain    Visit #: 2    SUBJECTIVE:  Pt cont to report increased pain throughout R shoulder    OBJECTIVE:    Therapeutic Exercises (CPT 40617):     1. Nu Step, x10 min, w/R UE strapped    2. Seated midline posture re-ed    3. Ball curls, x30      Time-based treatments/modalities:  Therapeutic exercise minutes (CPT 06796): 30 minutes       Pain rating before treatment: 8  Pain rating after treatment: 8    ASSESSMENT:   Pt able to perform ball curls independently overpowering tone.    PLAN/RECOMMENDATIONS:   Plan for treatment: therapy treatment to continue next visit.  Planned interventions for next visit: gait training (CPT 83197), manual therapy (CPT 96705), neuromuscular re-education (CPT 97789), therapeutic activities (CPT 88683) and therapeutic exercise (CPT 59977). Progressing w/LE functional mobility and balance. Pt scheduled w/physiatrist to address spasticity.

## 2018-03-19 ENCOUNTER — PHYSICAL THERAPY (OUTPATIENT)
Dept: PHYSICAL THERAPY | Facility: REHABILITATION | Age: 68
End: 2018-03-19
Attending: INTERNAL MEDICINE
Payer: MEDICARE

## 2018-03-19 DIAGNOSIS — I69.351 HEMIPLEGIA AND HEMIPARESIS FOLLOWING CEREBRAL INFARCTION AFFECTING RIGHT DOMINANT SIDE (HCC): ICD-10-CM

## 2018-03-19 PROCEDURE — 97110 THERAPEUTIC EXERCISES: CPT

## 2018-03-19 PROCEDURE — 97140 MANUAL THERAPY 1/> REGIONS: CPT

## 2018-03-19 NOTE — OP THERAPY DAILY TREATMENT
Outpatient Physical Therapy  DAILY TREATMENT     [facility] Physical Therapy [address].  [address]  [place] [address]  Phone:  [phone]  Fax:  [phone]    Date: 03/19/2018    Patient: Jairo Rivas  YOB: 1950  MRN: [id number]     Time Calculation  Start time: 1530  Stop time: 1600 Time Calculation (min): 30 minutes     Chief Complaint: Weakness and Shoulder Pain    Visit #: 3    SUBJECTIVE:  Pt cont to report tightness and pain in R shoulder and difficulty using R leg functionally.    OBJECTIVE:    Therapeutic Exercises (CPT 30778):     1. Nu Step, x10 min, w/R UE strapped    2. Bridging, x10 , w/mild A for proper LE alignment    3. Supine abd , x10     Therapeutic Treatments and Modalities:     1. Manual Therapy (CPT 56546), R heel slide w/AA to reach EOR, PNF hip flex/add    Time-based treatments/modalities:  Manual therapy minutes (CPT 28335): 10 minutes  Therapeutic exercise minutes (CPT 44491): 20 minutes       Pain rating before treatment: 8  Pain rating after treatment: 8    ASSESSMENT:   Pt req'd minimum A to manage ther ex, R elbow tone greatest UE limiter.     PLAN/RECOMMENDATIONS:   Plan for treatment: therapy treatment to continue next visit.  Planned interventions for next visit: gait training (CPT 14544), manual therapy (CPT 41259), neuromuscular re-education (CPT 51970), therapeutic activities (CPT 24850) and therapeutic exercise (CPT 30273). Progressing w/LE functional mobility and balance, adding standing toe taps, wt shift, 1/4 squats, lunge w/R foot on stool.

## 2018-03-20 ENCOUNTER — HOSPITAL ENCOUNTER (OUTPATIENT)
Facility: MEDICAL CENTER | Age: 68
End: 2018-03-20
Attending: INTERNAL MEDICINE
Payer: MEDICARE

## 2018-03-20 ENCOUNTER — HOSPITAL ENCOUNTER (OUTPATIENT)
Dept: RADIOLOGY | Facility: MEDICAL CENTER | Age: 68
End: 2018-03-20
Attending: EMERGENCY MEDICINE
Payer: MEDICARE

## 2018-03-20 ENCOUNTER — OFFICE VISIT (OUTPATIENT)
Dept: MEDICAL GROUP | Facility: LAB | Age: 68
End: 2018-03-20
Payer: MEDICARE

## 2018-03-20 VITALS
HEART RATE: 90 BPM | OXYGEN SATURATION: 96 % | HEIGHT: 64 IN | TEMPERATURE: 98.3 F | BODY MASS INDEX: 32.44 KG/M2 | SYSTOLIC BLOOD PRESSURE: 122 MMHG | DIASTOLIC BLOOD PRESSURE: 78 MMHG | RESPIRATION RATE: 12 BRPM | WEIGHT: 190 LBS

## 2018-03-20 DIAGNOSIS — K29.70 HELICOBACTER PYLORI GASTRITIS: ICD-10-CM

## 2018-03-20 DIAGNOSIS — D50.0 IRON DEFICIENCY ANEMIA DUE TO CHRONIC BLOOD LOSS: ICD-10-CM

## 2018-03-20 DIAGNOSIS — N95.0 POST-MENOPAUSE BLEEDING: ICD-10-CM

## 2018-03-20 DIAGNOSIS — B96.81 HELICOBACTER PYLORI GASTRITIS: ICD-10-CM

## 2018-03-20 DIAGNOSIS — R10.816 EPIGASTRIC ABDOMINAL TENDERNESS WITHOUT REBOUND TENDERNESS: ICD-10-CM

## 2018-03-20 DIAGNOSIS — Z12.31 ENCOUNTER FOR SCREENING MAMMOGRAM FOR BREAST CANCER: ICD-10-CM

## 2018-03-20 LAB — HEMOCCULT STL QL IA: NEGATIVE

## 2018-03-20 PROCEDURE — 99214 OFFICE O/P EST MOD 30 MIN: CPT | Performed by: NURSE PRACTITIONER

## 2018-03-20 PROCEDURE — 82274 ASSAY TEST FOR BLOOD FECAL: CPT

## 2018-03-20 PROCEDURE — 76705 ECHO EXAM OF ABDOMEN: CPT

## 2018-03-20 RX ORDER — METRONIDAZOLE 500 MG/1
500 TABLET ORAL 3 TIMES DAILY
Qty: 30 TAB | Refills: 0 | Status: SHIPPED | OUTPATIENT
Start: 2018-03-20 | End: 2018-03-30

## 2018-03-20 RX ORDER — CLARITHROMYCIN 500 MG/1
500 TABLET, COATED ORAL 2 TIMES DAILY
Qty: 20 TAB | Refills: 0 | Status: SHIPPED | OUTPATIENT
Start: 2018-03-20 | End: 2018-03-30

## 2018-03-20 RX ORDER — OMEPRAZOLE 20 MG/1
20 CAPSULE, DELAYED RELEASE ORAL 2 TIMES DAILY
Qty: 20 CAP | Refills: 0 | Status: SHIPPED | OUTPATIENT
Start: 2018-03-20 | End: 2018-03-30

## 2018-03-20 RX ORDER — FERROUS SULFATE 325(65) MG
325 TABLET ORAL DAILY
Qty: 30 TAB | Refills: 11
Start: 2018-03-20 | End: 2018-04-19

## 2018-03-20 NOTE — PROGRESS NOTES
Chief Complaint   Patient presents with   • Hypertension     F/V on HTN, lab results    • Abdominal Pain     F/V on abdominal pain, pt went to , US results        HPI  67-year-old established female here with complaint of one month of upper abdominal pain - new issue to me today, previously seen at urgent care for this.  No vomiting but has occasional nausea.  Eating helps the pain.  Pain is an aching sensation.  Hx of h.pylori years ago in [place] and this feels very similar.  No constipation, diarrhea, black or bloody stools.  Not taking nsaids but takes occasional tylenol for HA.  No hx of DM.  Has taken pepcid AC x 2 weeks and stomach is still causing her pain although she feels slight improvement.  Also added probiotic a few weeks ago and this helps too.      #2-HTN:  Taking amlodipine, metoprolol and valsartan daily - no issues with these medications.  Denies having any problems with chest pain or trouble breathing.    #3-Iron deficiency:  This was appreciated on lab work in February. Started iron once daily one week ago and is not having any trouble with this. She is tired frequently. She does not feel short of breath at rest.     #4-Post menopausal bleeding:  Present on and off x the past 3-4 years.  Saw gyn in [place] regarding this and had a uterine biopsy, told she has benign fibroids.  Also told that her uterine lining is thick - this was a gyn in [place] over a year ago but she just moved here to [place] and has not est care with gyn here in [place].        Labs:   Component      Latest Ref Rng & Units 2/24/2018 2/24/2018 2/24/2018 2/24/2018           7:26 AM  7:26 AM  7:26 AM  7:26 AM   WBC      4.8 - 10.8 K/uL 5.6      RBC      4.20 - 5.40 M/uL 3.89 (L)      Hemoglobin      12.0 - 16.0 g/dL 11.7 (L)      Hematocrit      37.0 - 47.0 % 37.0      MCV      81.4 - 97.8 fL 95.1      MCH      27.0 - 33.0 pg 30.1      MCHC      33.6 - 35.0 g/dL 31.6 (L)      RDW      35.9 - 50.0 fL 42.9      Platelet Count      164 -  "446 K/uL 304      MPV      9.0 - 12.9 fL 10.9      Iron      40 - 170 ug/dL   35 (L)    Total Iron Binding      250 - 450 ug/dL   393    % Saturation      15 - 55 %   9 (L)    GFR If African American      >60 mL/min/1.73 m 2    >60   GFR If Non African American      >60 mL/min/1.73 m 2    >60   25-Hydroxy   Vitamin D 25      30 - 100 ng/mL       Ferritin      10.0 - 291.0 ng/mL         25-Hydroxy   Vitamin D 25      30 - 100 ng/mL 37    Ferritin      10.0 - 291.0 ng/mL  10.7     Component      Latest Ref Rng & Units 2/24/2018           7:26 AM   Sodium      135 - 145 mmol/L 137   Potassium      3.6 - 5.5 mmol/L 3.7   Chloride      96 - 112 mmol/L 104   Co2      20 - 33 mmol/L 28   Anion Gap      0.0 - 11.9 5.0   Glucose      65 - 99 mg/dL 97   Bun      8 - 22 mg/dL 13   Creatinine      0.50 - 1.40 mg/dL 0.85   Calcium      8.5 - 10.5 mg/dL 9.6   AST(SGOT)      12 - 45 U/L 22   ALT(SGPT)      2 - 50 U/L 18   Alkaline Phosphatase      30 - 99 U/L 79   Total Bilirubin      0.1 - 1.5 mg/dL 0.4   Albumin      3.2 - 4.9 g/dL 4.1   Total Protein      6.0 - 8.2 g/dL 7.4   Globulin      1.9 - 3.5 g/dL 3.3   A-G Ratio      g/dL 1.2     Past medical, surgical, family, and social history is reviewed and updated in Epic chart by me today.   Medications and allergies reviewed and updated in Epic chart by me today.     ROS:   As documented in history of present illness above    Exam:  Blood pressure 122/78, pulse 90, temperature 36.8 °C (98.3 °F), resp. rate 12, height 1.626 m (5' 4\"), weight 86.2 kg (190 lb), SpO2 96 %.  Constitutional: Alert, no distress, plus 3 vital signs  Skin:  Warm, dry, no rashes invisible areas  Eye: Equal, round and reactive, conjunctiva clear  ENMT: Lips without lesions  Neck: Trachea midline, no masses, no thyromegaly  Respiratory: Unlabored respiration, lungs clear to auscultation.  Cardiovascular: Normal rate.  Abdomen: Soft with epigastric tenderness. No masses felt. No suprapubic or lower abdominal " tenderness.  Psych: Alert, pleasant, well-groomed, normal affect    A/P:  1. Post-menopause bleeding  -She'll continue with iron replacement and if tolerated well, encouraged her to try taking 2 iron pills per day. She was referred to gynecology to establish care and to take care of her postmenopausal bleeding.  - ferrous sulfate 325 (65 Fe) MG tablet; Take 1 Tab by mouth every day for 30 days.  Dispense: 30 Tab; Refill: 11  - REFERRAL TO GYNECOLOGY    2. Iron deficiency anemia due to chronic blood loss  -As above. Recommend a recheck of her iron studies in 4 weeks. Discussed not taking iron with calcium.  - IRON/TOTAL IRON BIND; Future  - FERRITIN; Future    3. Helicobacter pylori gastritis  -Suspect that she does have H. pylori. She has been on Pepcid as of yesterday and we are unable to do her H. pylori breath test. She prefers treatment and if symptoms do not improve she is agreeable to seeing gastroenterology. She is not taking NSAIDs. I encouraged her to avoid spicy foods. If she begins to have black or bloody stools she is agreeable to going to the emergency department.  - omeprazole (PRILOSEC) 20 MG delayed-release capsule; Take 1 Cap by mouth 2 times a day for 10 days.  Dispense: 20 Cap; Refill: 0  - clarithromycin (BIAXIN) 500 MG Tab; Take 1 Tab by mouth 2 times a day for 10 days.  Dispense: 20 Tab; Refill: 0  - metroNIDAZOLE (FLAGYL) 500 MG Tab; Take 1 Tab by mouth 3 times a day for 10 days.  Dispense: 30 Tab; Refill: 0    Discussed potential side effects of all above medication. She was encouraged to stop her atorvastatin while taking clarithromycin.

## 2018-03-20 NOTE — LETTER
March 20, 2018       Patient: Jairo Rivas   YOB: 1950   Date of Visit: 3/20/2018         To Whom It May Concern:    It is my medical opinion that Jairo Rivas needs a caretaker - her daughter Carolyn is her primary caretaker.  Carolyn may need to miss work as needed for medical appointments, problems and physical therapy sessions.      If you have any questions or concerns, please don't hesitate to call [phone]          Sincerely,          TERESA Lyons.  Aliya Condon MD

## 2018-03-21 ENCOUNTER — APPOINTMENT (OUTPATIENT)
Dept: PHYSICAL THERAPY | Facility: REHABILITATION | Age: 68
End: 2018-03-21
Attending: INTERNAL MEDICINE
Payer: MEDICARE

## 2018-03-21 ENCOUNTER — TELEPHONE (OUTPATIENT)
Dept: URGENT CARE | Facility: CLINIC | Age: 68
End: 2018-03-21

## 2018-03-22 ENCOUNTER — APPOINTMENT (OUTPATIENT)
Dept: PHYSICAL THERAPY | Facility: REHABILITATION | Age: 68
End: 2018-03-22
Attending: INTERNAL MEDICINE
Payer: MEDICARE

## 2018-03-26 ENCOUNTER — APPOINTMENT (OUTPATIENT)
Dept: PHYSICAL THERAPY | Facility: REHABILITATION | Age: 68
End: 2018-03-26
Attending: INTERNAL MEDICINE
Payer: MEDICARE

## 2018-03-26 ENCOUNTER — OFFICE VISIT (OUTPATIENT)
Dept: PHYSICAL MEDICINE AND REHAB | Facility: REHABILITATION | Age: 68
End: 2018-03-26
Payer: MEDICARE

## 2018-03-26 VITALS
TEMPERATURE: 98.2 F | BODY MASS INDEX: 32.44 KG/M2 | DIASTOLIC BLOOD PRESSURE: 70 MMHG | WEIGHT: 190 LBS | HEART RATE: 83 BPM | HEIGHT: 64 IN | OXYGEN SATURATION: 96 % | SYSTOLIC BLOOD PRESSURE: 117 MMHG

## 2018-03-26 DIAGNOSIS — I69.359 CVA, OLD, HEMIPARESIS (HCC): ICD-10-CM

## 2018-03-26 DIAGNOSIS — R25.2 SPASTICITY: ICD-10-CM

## 2018-03-26 PROCEDURE — 99205 OFFICE O/P NEW HI 60 MIN: CPT | Performed by: PHYSICAL MEDICINE & REHABILITATION

## 2018-03-26 RX ORDER — TIZANIDINE HYDROCHLORIDE 2 MG/1
CAPSULE, GELATIN COATED ORAL
Qty: 60 CAP | Refills: 0 | Status: SHIPPED | OUTPATIENT
Start: 2018-03-26 | End: 2018-06-07

## 2018-03-26 NOTE — PROGRESS NOTES
REHABILITATION MEDICINE CLINIC NEW PATIENT NOTE  3/26/2018      HPI: Jairo Rivas is a 67 y.o. female who presents as a new patient to clinic, with a history of left sided brain stroke with residual right sided spasticity and hemiparesis.    Patient had left sided brain stroke on [place] in 2010 while visiting family in [facility]. She was initially flaccid on the right side, with abnormal sensation, facial droop, and swallowing issues. She did some rehab in [facility], then returned to [place] in March of 2011. She had resolution of her swallowing issues, and had return of strength to her right leg more than arm.     She just moved to [place] from [place] in Novemeber 2017 and has been establishing care here. She reports she was tried on baclofen pretty soon after her stroke for spasticity. She is unsure of her dose but thinks it was up to 20 mg several times a day. The medication was discontinued as her doctor thought it was causing leg and arm swelling, which has now resolved. She stopped the baclofen 8 months ago. She cannot remember being tried on any other medications for her spasticity. She also complains of pain in the right shoulder and right knee. She does think her mobility has worsened since stopping the baclofen. She denies any falls. She walks with a cane. She has to go up a flight of stairs to their apartment, and uses the hand rails. She has heard of Botox and is reluctant because she read about the possible side effects. 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 She is no longer driving. She gets help from her daughter for her ADLs and iADLs.    Please see intake form completed by patient and scanned into the computer.    Outpatient clinic notes from her PCP noted, where she is being treated for possible H Pylori infection in her stomach. Blood pressure controlled with oral medications. She's been referred to GYN for post-menopausal bleeding and iron  "deficiency anemia.     ROS:  no F/C, N/V, HA, has \"floaters\" in her vision, no dizziness, CP/SOB, constipation, diarrhea, dysuria/frequency/urgency, bowel/bladder incontinence, calf pain/swelling, swelling, anxiety/depression/mood changes.    PMH:  Past Medical History:   Diagnosis Date   • Anemia 2/6/2018    Iron def, post-menopausal bleeding.   • CVA, old, hemiparesis (CMS-HCC) 2/6/2018    Residual left sided weakness   • Essential hypertension 2/6/2018    Well controlled on amlodipine 10 mg and metoprolol 100 mg bid.   • Post-menopause bleeding 2/6/2018    Menopause in 2010 and bleeding 2012 started to have irregular continuous bleeding.    • Vision abnormalities 2/6/2018   • Vitamin D deficiency 2/6/2018    Taking 50,000 IU once weekly for 4 yrs.       PSH:  History reviewed. No pertinent surgical history.    Medications:  Current Outpatient Prescriptions   Medication   • tizanidine (ZANAFLEX) 2 MG capsule   • ferrous sulfate 325 (65 Fe) MG tablet   • omeprazole (PRILOSEC) 20 MG delayed-release capsule   • clarithromycin (BIAXIN) 500 MG Tab   • Coenzyme Q10 (CO Q 10 PO)   • Multiple Vitamin (ONE-A-DAY 55 PLUS PO)   • amLODIPine (NORVASC) 10 MG Tab   • metoprolol SR (TOPROL XL) 100 MG TABLET SR 24 HR   • valsartan (DIOVAN) 160 MG Tab   • atorvastatin (LIPITOR) 40 MG Tab   • aspirin (ASA) 81 MG Chew Tab chewable tablet   • metroNIDAZOLE (FLAGYL) 500 MG Tab   • Cholecalciferol (VITAMIN D3 PO)     No current facility-administered medications for this visit.        Allergies:  Allergies   Allergen Reactions   • Penicillins Itching     Makes her feel weak    • Hydrocodone Itching     Swelling of the tongue  Face swelling   • Levaquin Itching     Swelling of the tongue  Facial swelling   • Tramadol Hives     Swelling       Social Hx:  PLOF: Independent prior to her stroke in 2010, was working as a . , has 7 children.   CLOF:gets assistance with ADLs and iADLs, ambulates with cane  No tobacco, " "alcohol, or drugs    Physical Exam:  Vitals: Blood pressure 117/70, pulse 83, temperature 36.8 °C (98.2 °F), height 1.626 m (5' 4\"), weight 86.2 kg (190 lb), SpO2 96 %.  Gen: alert, no apparent distress  CV: regular rate and rhythm, no murmurs, no peripheral edema  Resp: clear to ascultation bilaterally, normal respiratory effort  GI: soft, non-tender abdomen, bowel sounds present    Neuro:  Mental status:  A&Ox4 (person, place, date, situation) answers questions appropriately follows commands  Speech: fluent, no aphasia or dysarthria    CRANIAL NERVES:  2,3: visual acuity grossly intact, PERRL  3,4,6: EOMI bilaterally, no nystagmus or diplopia  5: sensation intact to light touch bilaterally and symmetric  7: no facial asymmetry  8: hearing grossly intact  9,10: symmetric palate elevation  11: SCM/Trapezius strength 5/5 bilaterally  12: tongue protrudes midline    Motor:  Exam limited by severe spasticity on the right side    Shoulder flexors:  Right -  2/5, Left -  5/5  Elbow flexors:  Right -  2/5, Left -  5/5  Wrist extensors:  Right -  0/5, Left -  5/5  Elbow extensors:  Right -  1/5, Left -  5/5  Finger flexors:  Right -  2/5, Left -  5/5  Finger abductors:  Right -  0/5, Left -  5/5  Hip flexors:  Right -  4/5, Left -  5/5  Knee ext:  Right -  4/5, Left -  5/5  Dorsiflexors:  Right -  2/5, Left -  5/5  EHL:  Right -  3/5, Left -  5/5  Plantar flexors:  Right -  4/5, Left -  5/5     Gait: ambulates with right arm in flexion, closed fist, right ankle with inversion, right knee with genu recurvatum    Sensory:   intact to light touch through out  Negative double simultaneous touch bilaterally UE and LE    DTRs: 3+ in right UE and LE including +pectoralis, _ crossed adductor, 2+ on left UE and LE  No clonus, clawed fingers so unable to test Caldera's, ticklish unable to test Babinski    Tone: MAS 3-4 RUE at shoulder, elbow, wrist, fingers, MAS 2-3 right knee, ankle    ASSESSMENT/PLAN:    67 yr old with history of " left brain stroke in 2010 with resultant right sided hemiparesis and severe spasticity.     Meds for spasticity: failed baclofen due to edema. Start trial of Zanaflex, nightly for one week, then BID. Side effects of sedation, drowsiness, dizziness, discussed. Follow up in one month. If unsuccessful can also consider Valium. Already discussed toxin injection, patient hesitant due to concerns about side effects. We discussed the pros/cons of using toxin to control her spasticity. Family has already discussed with patient as well.    Therapies: continue PT and OT. May benefit from casting or orthotics, but will trial medications first.    Follow Up: 4 weeks    Vianney Au MD  Physical Medicine and Rehabilitation    Total time:  62 minutes.  I spent greater than 50% of the time for direct face to face patient care, counseling, and coordination on this date, including clinic time with review of records/pertinent lab data and studies, as well as discussing with patient and her family diagnostic evaluation/work up, planned therapeutic interventions, and future disposition of care, as per the subjective and the assessment and plan as noted above.

## 2018-03-28 ENCOUNTER — APPOINTMENT (OUTPATIENT)
Dept: PHYSICAL THERAPY | Facility: REHABILITATION | Age: 68
End: 2018-03-28
Attending: INTERNAL MEDICINE
Payer: MEDICARE

## 2018-03-29 ENCOUNTER — TELEPHONE (OUTPATIENT)
Dept: MEDICAL GROUP | Facility: LAB | Age: 68
End: 2018-03-29

## 2018-04-09 ENCOUNTER — APPOINTMENT (OUTPATIENT)
Dept: PHYSICAL THERAPY | Facility: REHABILITATION | Age: 68
End: 2018-04-09
Attending: INTERNAL MEDICINE
Payer: MEDICARE

## 2018-04-16 ENCOUNTER — APPOINTMENT (OUTPATIENT)
Dept: PHYSICAL THERAPY | Facility: REHABILITATION | Age: 68
End: 2018-04-16
Attending: INTERNAL MEDICINE
Payer: MEDICARE

## 2018-04-16 ENCOUNTER — APPOINTMENT (OUTPATIENT)
Dept: OCCUPATIONAL THERAPY | Facility: REHABILITATION | Age: 68
End: 2018-04-16
Payer: MEDICARE

## 2018-04-17 ENCOUNTER — TELEPHONE (OUTPATIENT)
Dept: MEDICAL GROUP | Facility: LAB | Age: 68
End: 2018-04-17

## 2018-04-17 NOTE — TELEPHONE ENCOUNTER
Faxed FMLA paperwork per patient to [employer], with confirmation fax, left copy at  for patient to picked up, to be scanned into EPIC

## 2018-04-19 ENCOUNTER — APPOINTMENT (OUTPATIENT)
Dept: PHYSICAL THERAPY | Facility: REHABILITATION | Age: 68
End: 2018-04-19
Attending: INTERNAL MEDICINE
Payer: MEDICARE

## 2018-04-23 ENCOUNTER — APPOINTMENT (OUTPATIENT)
Dept: PHYSICAL THERAPY | Facility: REHABILITATION | Age: 68
End: 2018-04-23
Attending: INTERNAL MEDICINE
Payer: MEDICARE

## 2018-04-26 ENCOUNTER — APPOINTMENT (OUTPATIENT)
Dept: PHYSICAL THERAPY | Facility: REHABILITATION | Age: 68
End: 2018-04-26
Attending: INTERNAL MEDICINE
Payer: MEDICARE

## 2018-04-30 ENCOUNTER — APPOINTMENT (OUTPATIENT)
Dept: PHYSICAL MEDICINE AND REHAB | Facility: REHABILITATION | Age: 68
End: 2018-04-30
Payer: MEDICARE

## 2018-05-09 ENCOUNTER — TELEPHONE (OUTPATIENT)
Dept: MEDICAL GROUP | Facility: LAB | Age: 68
End: 2018-05-09

## 2018-05-09 DIAGNOSIS — H53.9 VISION ABNORMALITIES: ICD-10-CM

## 2018-05-09 NOTE — TELEPHONE ENCOUNTER
From: Jairo Wylie Evelyn  Sent: 5/9/2018 1:14 PM PDT  Subject: Referral Request    Jairo Rivas would like to request a referral.  Reason: Ophthalmology   Requested provider: Kathya Scanlon  Comment:  Ma'am,   This is Carolyn rivas , Jairo's daughter . I am requesting a referral for ophthalmology, we need a facility that takes Medicare . Our last appointment was cancelled because of her insurance. Thank you for your help in advance.       - Carolyn Rivas   [phone]

## 2018-05-09 NOTE — TELEPHONE ENCOUNTER
----- Message from Jairo Rivas sent at 5/9/2018  1:18 PM PDT -----  Regarding: Prescription Question  Contact: [phone]   I am sending a duplicate message as i am not sure it went through.    Ma'am, This is Carolyn Rivas, consulate Evelyn's daughter.  We are requesting and ophthalmology referral; The last facility we were referred to cancelled her appointment due to her insurance. Thank you for your help in advance.      Carolyn Evelyn  [phone]

## 2018-05-11 ENCOUNTER — TELEPHONE (OUTPATIENT)
Dept: ENDOCRINOLOGY | Facility: MEDICAL CENTER | Age: 68
End: 2018-05-11

## 2018-05-11 NOTE — TELEPHONE ENCOUNTER
Called [facility] pharmacy and had prescription of Tizanidine 2MG tablets refilled, and requested 2 refill per Dr. Au.

## 2018-05-14 ENCOUNTER — TELEPHONE (OUTPATIENT)
Dept: PHYSICAL MEDICINE AND REHAB | Facility: REHABILITATION | Age: 68
End: 2018-05-14

## 2018-05-14 NOTE — TELEPHONE ENCOUNTER
----- Message from Lenora Palmer, Med Ass't sent at 5/14/2018  7:30 AM PDT -----      ----- Message -----  From: Vianney Au M.D.  Sent: 5/11/2018   8:22 AM  To: Lenora Palmer, Med Ass't, #    Can one of you please call this patient's pharmacy with a refill for tizanidine 2 mg BID, 2 more refills.    I am unable to send this myself electronically as there is an EPIC issue.    Thanks so much.

## 2018-05-31 ENCOUNTER — APPOINTMENT (OUTPATIENT)
Dept: MEDICAL GROUP | Facility: LAB | Age: 68
End: 2018-05-31
Payer: MEDICARE

## 2018-06-07 ENCOUNTER — OFFICE VISIT (OUTPATIENT)
Dept: MEDICAL GROUP | Facility: LAB | Age: 68
End: 2018-06-07
Payer: MEDICARE

## 2018-06-07 VITALS
TEMPERATURE: 98.2 F | WEIGHT: 188 LBS | RESPIRATION RATE: 12 BRPM | OXYGEN SATURATION: 96 % | HEIGHT: 64 IN | BODY MASS INDEX: 32.1 KG/M2 | HEART RATE: 85 BPM | SYSTOLIC BLOOD PRESSURE: 130 MMHG | DIASTOLIC BLOOD PRESSURE: 82 MMHG

## 2018-06-07 DIAGNOSIS — M62.838 MUSCLE SPASTICITY: ICD-10-CM

## 2018-06-07 DIAGNOSIS — I10 ESSENTIAL HYPERTENSION: ICD-10-CM

## 2018-06-07 DIAGNOSIS — D64.89 ANEMIA DUE TO OTHER CAUSE, NOT CLASSIFIED: ICD-10-CM

## 2018-06-07 DIAGNOSIS — I69.951 HEMIPARESIS OF RIGHT DOMINANT SIDE AS LATE EFFECT OF CEREBROVASCULAR DISEASE, UNSPECIFIED CEREBROVASCULAR DISEASE TYPE (HCC): ICD-10-CM

## 2018-06-07 DIAGNOSIS — B37.2 CANDIDAL INTERTRIGO: ICD-10-CM

## 2018-06-07 DIAGNOSIS — I69.359 CVA, OLD, HEMIPARESIS (HCC): ICD-10-CM

## 2018-06-07 PROCEDURE — 99214 OFFICE O/P EST MOD 30 MIN: CPT | Performed by: NURSE PRACTITIONER

## 2018-06-07 RX ORDER — METOPROLOL SUCCINATE 100 MG/1
100 TABLET, EXTENDED RELEASE ORAL DAILY
Qty: 90 TAB | Refills: 2
Start: 2018-06-07 | End: 2018-09-05

## 2018-06-07 RX ORDER — BACLOFEN 10 MG/1
10 TABLET ORAL 3 TIMES DAILY PRN
Qty: 90 TAB | Refills: 5 | Status: SHIPPED | OUTPATIENT
Start: 2018-06-07 | End: 2019-03-15 | Stop reason: SDUPTHER

## 2018-06-07 RX ORDER — NYSTATIN 100000 [USP'U]/G
POWDER TOPICAL
Qty: 60 G | Refills: 4 | Status: SHIPPED | OUTPATIENT
Start: 2018-06-07 | End: 2018-10-02

## 2018-06-07 RX ORDER — VALSARTAN 160 MG/1
160 TABLET ORAL 2 TIMES DAILY
Qty: 180 TAB | Refills: 2
Start: 2018-06-07 | End: 2018-07-16 | Stop reason: RX

## 2018-06-07 RX ORDER — FERROUS SULFATE 325(65) MG
325 TABLET ORAL DAILY
Qty: 30 TAB | Refills: 11 | Status: SHIPPED | OUTPATIENT
Start: 2018-06-07 | End: 2019-09-13 | Stop reason: SDUPTHER

## 2018-06-07 NOTE — PROGRESS NOTES
"Chief Complaint   Patient presents with   • Extremity Weakness     pt states she has general weakness    • Pain     pt states she is also having pain that radiates from the right side of her head down her whole body, onset 5 days       HPI  Consulate is a 67-year-old established female here with complaint of worsening right-sided body pain, weakness and elevated blood pressure.  Current blood pressure medication includes amlodipine 5 mg at night, metoprolol  mg AM, Diovan 160 mg twice daily.  BP readings at home have been \"high,\" lately - monitoring 2-3 x daily at home - 163/99 - 125/82.    Hx of stroke 2010.  She has been experiecing pain along most of right side of body x the past 8 years - intensity of pain is dramtically worsening x the past 1-2 weeks.   States baclofen use to really help, this was changed to tizanidine and tizanidine made her feel that she would fall down.  She would like to go back to baclofen.  She also feels that she needs some sort of support to her right upper extremity.  She denies any recent falls or injuries.  She has good support at home through family and a friend that comes by 3-4 times per week.  She uses a walker at all times.  Denies any problems with her bowels or bladder.  No nausea or vomiting.  Abdominal pain resolved with H pylori treatment at the end of March.  Denies chest pain or trouble breathing.    Also requesting a refill of nystatin powder, this is helpful when she gets recurrent yeast infections below her breast tissue.    Vaginal bleeding: This is been a chronic issue for her over the past year.  She has had this investigated by gynecology and uterine biopsy came back negative.  Her bleeding has dramatically slowed down over the past few months but continues.  She takes iron to replace chronic anemia of blood loss and needs a refill.  Denies constipation associated with taking her iron pills.    Past medical, surgical, family, and social history is reviewed " "and updated in Epic chart by me today.   Medications and allergies reviewed and updated in Epic chart by me today.     ROS:   As documented in history of present illness above    Exam:  Blood pressure 130/82, pulse 85, temperature 36.8 °C (98.2 °F), resp. rate 12, height 1.626 m (5' 4\"), weight 85.3 kg (188 lb), SpO2 96 %.  Constitutional: Alert, no distress, plus 3 vital signs  Skin:  Warm, dry, no rashes invisible areas  Eye: Equal, round and reactive, conjunctiva clear  ENMT: Lips without lesions.  Neck: Trachea midline  Respiratory: Unlabored respiration, lungs clear to auscultation, no wheezes, no rhonchi  Cardiovascular: Normal rate and rhythm  Neuro/musculoskeletal: She has right-sided hemiparesis, mainly upper extremity, with chronically clenched right hand.  Psych: Alert, pleasant, well-groomed, normal affect    A/P:  1. Essential hypertension  -Stable.  She will continue on metoprolol daily, valsartan twice daily and amlodipine in the morning.  Suspect her intermittent elevated blood pressures are related to discomfort and this was discussed with her which she agrees with.  Hopefully when baclofen is added back on her pain level will go down dramatically and blood pressure will respond accordingly.  She is willing to contact us if her blood pressure is consistently greater than 140/90 over the next few weeks.  - metoprolol SR (TOPROL XL) 100 MG TABLET SR 24 HR; Take 1 Tab by mouth every day for 90 days.  Dispense: 90 Tab; Refill: 2  - valsartan (DIOVAN) 160 MG Tab; Take 1 Tab by mouth 2 times a day for 90 days.  Dispense: 180 Tab; Refill: 2    2. Muscle spasticity  -Restarted baclofen, she tells me that she does not think baclofen was the cause of swelling when it was discontinued by her previous physician in [place].  If she begins to swell with reinitiation of baclofen she will immediately discontinue and notify us  - baclofen (LIORESAL) 10 MG Tab; Take 1 Tab by mouth 3 times a day as needed.  Dispense: " 90 Tab; Refill: 5    3. CVA, old, hemiparesis (HCC)  -Prescription was written for the patient to take to  orthotics for a right upper extremity support    4. Anemia of chronic blood loss  -Refilled iron.  Discussed stool softeners along with iron and avoiding taking iron with calcium.  - ferrous sulfate 325 (65 Fe) MG tablet; Take 1 Tab by mouth every day for 30 days.  Dispense: 30 Tab; Refill: 11    5. Hemiparesis of right dominant side as late effect of cerebrovascular disease, unspecified cerebrovascular disease type (HCC)  - NON SPECIFIED; Right arm support / right hand support.  Dx:  s/p CVA with hemiparesis  Dispense: 1 Each; Refill: 0    6. Candidal intertrigo  -Refilled her nystatin.  - nystatin (MYCOSTATIN) powder; AAA up to twice daily.  Dispense: 60 g; Refill: 4

## 2018-07-16 ENCOUNTER — TELEPHONE (OUTPATIENT)
Dept: MEDICAL GROUP | Facility: LAB | Age: 68
End: 2018-07-16

## 2018-07-16 DIAGNOSIS — I10 ESSENTIAL HYPERTENSION: ICD-10-CM

## 2018-07-16 RX ORDER — LOSARTAN POTASSIUM 100 MG/1
100 TABLET ORAL DAILY
Qty: 30 TAB | Refills: 6 | Status: SHIPPED | OUTPATIENT
Start: 2018-07-16 | End: 2018-10-02 | Stop reason: SDUPTHER

## 2018-07-16 NOTE — TELEPHONE ENCOUNTER
[facility]- valsartan (DIOVAN) 160 MG Tab  This medication is unavailable due to nationwide recall. Please change to another ARB

## 2018-07-30 ENCOUNTER — HOSPITAL ENCOUNTER (OUTPATIENT)
Dept: LAB | Facility: MEDICAL CENTER | Age: 68
End: 2018-07-30
Attending: INTERNAL MEDICINE
Payer: MEDICARE

## 2018-07-30 ENCOUNTER — OFFICE VISIT (OUTPATIENT)
Dept: MEDICAL GROUP | Facility: LAB | Age: 68
End: 2018-07-30
Payer: MEDICARE

## 2018-07-30 VITALS
SYSTOLIC BLOOD PRESSURE: 152 MMHG | TEMPERATURE: 98.5 F | DIASTOLIC BLOOD PRESSURE: 84 MMHG | RESPIRATION RATE: 16 BRPM | HEIGHT: 64 IN | HEART RATE: 88 BPM | OXYGEN SATURATION: 97 % | WEIGHT: 185 LBS | BODY MASS INDEX: 31.58 KG/M2

## 2018-07-30 DIAGNOSIS — E55.9 VITAMIN D DEFICIENCY: ICD-10-CM

## 2018-07-30 DIAGNOSIS — N95.0 POST-MENOPAUSE BLEEDING: ICD-10-CM

## 2018-07-30 DIAGNOSIS — I10 ESSENTIAL HYPERTENSION: ICD-10-CM

## 2018-07-30 DIAGNOSIS — D50.0 IRON DEFICIENCY ANEMIA DUE TO CHRONIC BLOOD LOSS: ICD-10-CM

## 2018-07-30 DIAGNOSIS — I69.359 CVA, OLD, HEMIPARESIS (HCC): ICD-10-CM

## 2018-07-30 DIAGNOSIS — D64.89 ANEMIA DUE TO OTHER CAUSE, NOT CLASSIFIED: ICD-10-CM

## 2018-07-30 PROBLEM — D50.9 IRON DEFICIENCY ANEMIA: Status: ACTIVE | Noted: 2018-02-06

## 2018-07-30 PROBLEM — I69.951 HEMIPARESIS OF RIGHT DOMINANT SIDE AS LATE EFFECT OF CEREBROVASCULAR DISEASE (HCC): Status: RESOLVED | Noted: 2018-06-07 | Resolved: 2018-07-30

## 2018-07-30 LAB
25(OH)D3 SERPL-MCNC: 49 NG/ML (ref 30–100)
ALBUMIN SERPL BCP-MCNC: 4.4 G/DL (ref 3.2–4.9)
ALBUMIN/GLOB SERPL: 1.4 G/DL
ALP SERPL-CCNC: 88 U/L (ref 30–99)
ALT SERPL-CCNC: 23 U/L (ref 2–50)
ANION GAP SERPL CALC-SCNC: 10 MMOL/L (ref 0–11.9)
AST SERPL-CCNC: 23 U/L (ref 12–45)
BASOPHILS # BLD AUTO: 0.7 % (ref 0–1.8)
BASOPHILS # BLD: 0.04 K/UL (ref 0–0.12)
BILIRUB SERPL-MCNC: 0.4 MG/DL (ref 0.1–1.5)
BUN SERPL-MCNC: 11 MG/DL (ref 8–22)
CALCIUM SERPL-MCNC: 9.9 MG/DL (ref 8.5–10.5)
CHLORIDE SERPL-SCNC: 104 MMOL/L (ref 96–112)
CO2 SERPL-SCNC: 26 MMOL/L (ref 20–33)
CREAT SERPL-MCNC: 0.75 MG/DL (ref 0.5–1.4)
EOSINOPHIL # BLD AUTO: 0.06 K/UL (ref 0–0.51)
EOSINOPHIL NFR BLD: 1 % (ref 0–6.9)
ERYTHROCYTE [DISTWIDTH] IN BLOOD BY AUTOMATED COUNT: 45.9 FL (ref 35.9–50)
FERRITIN SERPL-MCNC: 33 NG/ML (ref 10–291)
GLOBULIN SER CALC-MCNC: 3.2 G/DL (ref 1.9–3.5)
GLUCOSE SERPL-MCNC: 87 MG/DL (ref 65–99)
HCT VFR BLD AUTO: 40.1 % (ref 37–47)
HGB BLD-MCNC: 13.1 G/DL (ref 12–16)
IMM GRANULOCYTES # BLD AUTO: 0.01 K/UL (ref 0–0.11)
IMM GRANULOCYTES NFR BLD AUTO: 0.2 % (ref 0–0.9)
IRON SATN MFR SERPL: 12 % (ref 15–55)
IRON SERPL-MCNC: 44 UG/DL (ref 40–170)
LYMPHOCYTES # BLD AUTO: 2.24 K/UL (ref 1–4.8)
LYMPHOCYTES NFR BLD: 39.1 % (ref 22–41)
MCH RBC QN AUTO: 31 PG (ref 27–33)
MCHC RBC AUTO-ENTMCNC: 32.7 G/DL (ref 33.6–35)
MCV RBC AUTO: 95 FL (ref 81.4–97.8)
MONOCYTES # BLD AUTO: 0.43 K/UL (ref 0–0.85)
MONOCYTES NFR BLD AUTO: 7.5 % (ref 0–13.4)
NEUTROPHILS # BLD AUTO: 2.95 K/UL (ref 2–7.15)
NEUTROPHILS NFR BLD: 51.5 % (ref 44–72)
NRBC # BLD AUTO: 0 K/UL
NRBC BLD-RTO: 0 /100 WBC
PLATELET # BLD AUTO: 294 K/UL (ref 164–446)
PMV BLD AUTO: 10.7 FL (ref 9–12.9)
POTASSIUM SERPL-SCNC: 3.5 MMOL/L (ref 3.6–5.5)
PROT SERPL-MCNC: 7.6 G/DL (ref 6–8.2)
RBC # BLD AUTO: 4.22 M/UL (ref 4.2–5.4)
SODIUM SERPL-SCNC: 140 MMOL/L (ref 135–145)
TIBC SERPL-MCNC: 356 UG/DL (ref 250–450)
WBC # BLD AUTO: 5.7 K/UL (ref 4.8–10.8)

## 2018-07-30 PROCEDURE — 83540 ASSAY OF IRON: CPT

## 2018-07-30 PROCEDURE — 85025 COMPLETE CBC W/AUTO DIFF WBC: CPT

## 2018-07-30 PROCEDURE — 82306 VITAMIN D 25 HYDROXY: CPT

## 2018-07-30 PROCEDURE — 82728 ASSAY OF FERRITIN: CPT

## 2018-07-30 PROCEDURE — 80053 COMPREHEN METABOLIC PANEL: CPT

## 2018-07-30 PROCEDURE — 99214 OFFICE O/P EST MOD 30 MIN: CPT | Performed by: INTERNAL MEDICINE

## 2018-07-30 PROCEDURE — 83550 IRON BINDING TEST: CPT

## 2018-07-30 PROCEDURE — 36415 COLL VENOUS BLD VENIPUNCTURE: CPT

## 2018-07-30 RX ORDER — AMLODIPINE BESYLATE 5 MG/1
TABLET ORAL
COMMUNITY
End: 2018-10-02

## 2018-07-30 RX ORDER — DOCUSATE SODIUM 100 MG/1
100 CAPSULE, LIQUID FILLED ORAL 2 TIMES DAILY
Qty: 60 CAP | Refills: 6 | Status: SHIPPED | OUTPATIENT
Start: 2018-07-30 | End: 2019-08-08 | Stop reason: SDUPTHER

## 2018-07-30 RX ORDER — LANOLIN ALCOHOL/MO/W.PET/CERES
325 CREAM (GRAM) TOPICAL
Qty: 90 TAB | Refills: 11 | Status: SHIPPED | OUTPATIENT
Start: 2018-07-30 | End: 2018-08-29

## 2018-07-30 NOTE — ASSESSMENT & PLAN NOTE
This is a chronic controlled problem.   Her blood pressure continued to be well controlled on amlodipine 5 mg and metoprolol 100 mg.  Valsartan was recently switched to losartan 100 mg to used to product recall.  She continue to monitor her blood pressure at home, she brought her blood pressure logs and its average is 110/140s/60s-70s.  Denied any symptoms of high blood pressure such as headache, chest pain or lower extremity edema.  Denies any dizziness but she had fatigue that she attributed to her anemia.

## 2018-07-30 NOTE — ASSESSMENT & PLAN NOTE
This is a chronic controlled problem.   She has a residual right-sided weakness status post stroke in 2010.  She continued to be on aspirin and statin, no new symptoms.  Done with physical therapy in [place] and now mobilizing with a walker.  She was seen by Kathya Scanlon couple of months ago and she was complaining about spasticity.  Kathya restarted her baclofen that she was on couple of years ago.  Today, she stated that baclofen helped with her specificity pain tremendously.  No side effects.  She would like to continue on it.

## 2018-07-30 NOTE — ASSESSMENT & PLAN NOTE
This is a chronic uncontrolled problem.   This patient has history of chronic anemia due to chronic blood loss from postmenopausal bleeding.  She was getting worked up for past menopausal bleeding in [place] before she was throwing up with her care with interrupted.  She continued to have symptoms of fatigue, that is mainly exertional, she feels fatigued especially after lunch time or if she walks for long distance.  She denied dizziness or passing out at this time.  Her most recent hemoglobin was 11 in January 2018, she has lost to follow-up on her repeated labs those were ordered.  She stated that she stopped taking iron pills for the last 2 weeks because of constipation but she has not tried taking any stool softeners.  I referred her to gynecology in January which has not made an appointment, they recently called her and she made an appointment for tomorrow.  Denied any GI blood loss at this time.  Occult blood testing was negative.

## 2018-07-31 NOTE — PROGRESS NOTES
Chief Complaint   Patient presents with   • Fatigue            Subjective:     HPI:   Jairo presents today with the following.    Iron deficiency anemia  This is a chronic uncontrolled problem.   This patient has history of chronic anemia due to chronic blood loss from postmenopausal bleeding.  She was getting worked up for past menopausal bleeding in [place] before she was throwing up with her care with interrupted.  She continued to have symptoms of fatigue, that is mainly exertional, she feels fatigued especially after lunch time or if she walks for long distance.  She denied dizziness or passing out at this time.  Her most recent hemoglobin was 11 in January 2018, she has lost to follow-up on her repeated labs those were ordered.  She stated that she stopped taking iron pills for the last 2 weeks because of constipation but she has not tried taking any stool softeners.  I referred her to gynecology in January which has not made an appointment, they recently called her and she made an appointment for tomorrow.  Denied any GI blood loss at this time.  Occult blood testing was negative.    CVA, old, hemiparesis (HCC)  This is a chronic controlled problem.   She has a residual right-sided weakness status post stroke in 2010.  She continued to be on aspirin and statin, no new symptoms.  Done with physical therapy in [place] and now mobilizing with a walker.  She was seen by Kathya Scanlon couple of months ago and she was complaining about spasticity.  Kathya restarted her baclofen that she was on couple of years ago.  Today, she stated that baclofen helped with her specificity pain tremendously.  No side effects.  She would like to continue on it.          Essential hypertension  This is a chronic controlled problem.   Her blood pressure continued to be well controlled on amlodipine 5 mg and metoprolol 100 mg.  Valsartan was recently switched to losartan 100 mg to used to product recall.  She continue  to monitor her blood pressure at home, she brought her blood pressure logs and its average is 110/140s/60s-70s.  Denied any symptoms of high blood pressure such as headache, chest pain or lower extremity edema.  Denies any dizziness but she had fatigue that she attributed to her anemia.      Patient Active Problem List    Diagnosis Date Noted   • Annual physical exam 02/06/2018   • Vision abnormalities 02/06/2018   • CVA, old, hemiparesis (HCC) 02/06/2018   • Essential hypertension 02/06/2018   • Vitamin D deficiency 02/06/2018   • Post-menopause bleeding 02/06/2018   • Iron deficiency anemia 02/06/2018   • Obesity (BMI 30-39.9) 02/06/2018       Current Outpatient Prescriptions   Medication Sig Dispense Refill   • amLODIPine (NORVASC) 5 MG Tab Take  by mouth.     • ferrous sulfate 325 (65 Fe) MG EC tablet Take 1 Tab by mouth 3 times a day, with meals for 30 days. 90 Tab 11   • docusate sodium (COLACE) 100 MG Cap Take 1 Cap by mouth 2 times a day for 30 days. 60 Cap 6   • losartan (COZAAR) 100 MG Tab Take 1 Tab by mouth every day. 30 Tab 6   • metoprolol SR (TOPROL XL) 100 MG TABLET SR 24 HR Take 1 Tab by mouth every day for 90 days. 90 Tab 2   • baclofen (LIORESAL) 10 MG Tab Take 1 Tab by mouth 3 times a day as needed. 90 Tab 5   • Cholecalciferol (VITAMIN D3 PO) Take  by mouth.     • Coenzyme Q10 (CO Q 10 PO) Take  by mouth.     • Multiple Vitamin (ONE-A-DAY 55 PLUS PO) Take  by mouth.     • aspirin (ASA) 81 MG Chew Tab chewable tablet Take 81 mg by mouth every day.     • NON SPECIFIED Right arm support / right hand support.  Dx:  s/p CVA with hemiparesis 1 Each 0   • nystatin (MYCOSTATIN) powder AAA up to twice daily. 60 g 4     No current facility-administered medications for this visit.        Allergies as of 07/30/2018 - Reviewed 07/30/2018   Allergen Reaction Noted   • Penicillins Itching 02/06/2018   • Hydrocodone Itching 12/18/2016   • Levaquin Itching 12/18/2016   • Tramadol Hives 12/18/2016        Past  "Medical History:   Diagnosis Date   • Anemia 2/6/2018    Iron def, post-menopausal bleeding.   • CVA, old, hemiparesis (HCC) 2/6/2018    Residual left sided weakness   • Essential hypertension 2/6/2018    Well controlled on amlodipine 10 mg and metoprolol 100 mg bid.   • Post-menopause bleeding 2/6/2018    Menopause in 2010 and bleeding 2012 started to have irregular continuous bleeding.    • Vision abnormalities 2/6/2018   • Vitamin D deficiency 2/6/2018    Taking 50,000 IU once weekly for 4 yrs.       History reviewed. No pertinent surgical history.    Social History   Substance Use Topics   • Smoking status: Never Smoker   • Smokeless tobacco: Never Used   • Alcohol use No       Family History   Problem Relation Age of Onset   • Hypertension Mother    • No Known Problems Father    • No Known Problems Brother        ROS:     - Constitutional: + Fatigue. Negative for fever, chills, unexpected weight change.    - HEENT: Negative for headaches, vision changes, hearing changes, ear pain, ear discharge, sinus congestion, or sore throat.     - Respiratory: Negative for cough, sputum production, dyspnea and wheezing.    - Cardiovascular: Negative for chest pain or palpitations.      - Gastrointestinal: Negative for heartburn, nausea, vomiting, abdominal pain, diarrhea or constipation.     - Genitourinary: Negative for dysuria, polyuria or urinary urgency.    - Musculoskeletal: Negative for myalgias, back pain, and joint pain.     - Skin: Negative for rash, itching, cyanotic skin color change.     - Psychiatric/Behavioral: Negative for depression or suicidal/homicidal ideation.       Physical Exam:     Blood pressure 152/84, pulse 88, temperature 36.9 °C (98.5 °F), resp. rate 16, height 1.626 m (5' 4\"), weight 83.9 kg (185 lb), SpO2 97 %. Body mass index is 31.76 kg/m².   Gen:         Alert and oriented, No apparent distress.  Neck:        No Lymphadenopathy or Bruits.  Lungs:     Clear to auscultation bilaterally  CV:    "       Regular rate and rhythm. No murmurs, rubs or gallops.               Ext:          No clubbing, cyanosis, edema.    Data:     LABS: 2017: Results reviewed and discussed with the patient, questions answered.      Assessment and Plan:     67 y.o. female with the following issues.    1. CVA, old, hemiparesis (HCC)  Chronic controlled.   She needs to be ambulatory with a walker, especially since the pain is doing better with baclofen.  - COMP METABOLIC PANEL; Future    2. Essential hypertension  Chronic controlled.  Continue on amlodipine 5 mg, losartan 100 mg metoprolol 100.  Home blood pressure log was reviewed and at goal.  - COMP METABOLIC PANEL; Future    3. Post-menopause bleeding  Chronic uncontrolled.  Continue to have heavy postmenopausal bleeding with secondary iron deficiency anemia as discussed below.  I placed a GYN referral in January 2018, she lost to follow-up and referral was replaced again by Kathya and now scheduled for tomorrow.  Discussed with her it is important to keep this appointment for formal evaluation, she may or may not need hysterectomy.      4. Iron deficiency anemia due to chronic blood loss  Chronic uncontrolled.  This is secondary to #3, fecal occult blood testing was negative.  Most recent iron study was indicative of iron deficiency in January 2018.  I placed her on iron replacement but she stopped taking it about a few weeks ago because of constipation.  I encouraged her to restart 3 times a day as below with adjunct stool softener on scheduled basis as below.  - CBC WITH DIFFERENTIAL; Future  - IRON/TOTAL IRON BIND; Future  - FERRITIN; Future  - ferrous sulfate 325 (65 Fe) MG EC tablet; Take 1 Tab by mouth 3 times a day, with meals for 30 days.  Dispense: 90 Tab; Refill: 11  - docusate sodium (COLACE) 100 MG Cap; Take 1 Cap by mouth 2 times a day for 30 days.  Dispense: 60 Cap; Refill: 6      5. Vitamin D deficiency  Chronic controlled.  Finished treatment with high-dose  replacement, switch to over-the-counter cholecalciferol 1000 IUs daily.  We will recheck her levels.  - VITAMIN D,25 HYDROXY; Future       Health Maintenance:   Needs to be discussed.    Health Maintenance Due   Topic   • Annual Wellness Visit    • PAP SMEAR    • IMM ZOSTER VACCINES (1 of 2)       Follow Up:      Return in about 2 months (around 9/30/2018) for Iron Def Anemia.      Please note that this dictation was created using voice recognition software. I have made every reasonable attempt to correct obvious errors, but I expect that there are errors of grammar and possibly content that I did not discover before finalizing the note.    Signed by: Aliya Condon M.D.

## 2018-10-02 ENCOUNTER — OFFICE VISIT (OUTPATIENT)
Dept: MEDICAL GROUP | Facility: LAB | Age: 68
End: 2018-10-02
Payer: MEDICARE

## 2018-10-02 ENCOUNTER — HOSPITAL ENCOUNTER (OUTPATIENT)
Dept: LAB | Facility: MEDICAL CENTER | Age: 68
End: 2018-10-02
Attending: INTERNAL MEDICINE
Payer: MEDICARE

## 2018-10-02 VITALS
BODY MASS INDEX: 31.89 KG/M2 | TEMPERATURE: 98.1 F | RESPIRATION RATE: 18 BRPM | DIASTOLIC BLOOD PRESSURE: 86 MMHG | HEIGHT: 64 IN | OXYGEN SATURATION: 97 % | SYSTOLIC BLOOD PRESSURE: 126 MMHG | HEART RATE: 96 BPM | WEIGHT: 186.8 LBS

## 2018-10-02 DIAGNOSIS — N95.0 POST-MENOPAUSE BLEEDING: ICD-10-CM

## 2018-10-02 DIAGNOSIS — D50.0 IRON DEFICIENCY ANEMIA DUE TO CHRONIC BLOOD LOSS: ICD-10-CM

## 2018-10-02 DIAGNOSIS — I10 ESSENTIAL HYPERTENSION: ICD-10-CM

## 2018-10-02 DIAGNOSIS — E55.9 VITAMIN D DEFICIENCY: ICD-10-CM

## 2018-10-02 DIAGNOSIS — E87.6 HYPOKALEMIA: ICD-10-CM

## 2018-10-02 LAB
ALBUMIN SERPL BCP-MCNC: 4.2 G/DL (ref 3.2–4.9)
ALBUMIN/GLOB SERPL: 1.2 G/DL
ALP SERPL-CCNC: 86 U/L (ref 30–99)
ALT SERPL-CCNC: 85 U/L (ref 2–50)
ANION GAP SERPL CALC-SCNC: 9 MMOL/L (ref 0–11.9)
AST SERPL-CCNC: 44 U/L (ref 12–45)
BASOPHILS # BLD AUTO: 0.8 % (ref 0–1.8)
BASOPHILS # BLD: 0.06 K/UL (ref 0–0.12)
BILIRUB SERPL-MCNC: 0.3 MG/DL (ref 0.1–1.5)
BUN SERPL-MCNC: 8 MG/DL (ref 8–22)
CALCIUM SERPL-MCNC: 9.7 MG/DL (ref 8.5–10.5)
CHLORIDE SERPL-SCNC: 107 MMOL/L (ref 96–112)
CO2 SERPL-SCNC: 24 MMOL/L (ref 20–33)
CREAT SERPL-MCNC: 0.87 MG/DL (ref 0.5–1.4)
EOSINOPHIL # BLD AUTO: 0.07 K/UL (ref 0–0.51)
EOSINOPHIL NFR BLD: 0.9 % (ref 0–6.9)
ERYTHROCYTE [DISTWIDTH] IN BLOOD BY AUTOMATED COUNT: 50.4 FL (ref 35.9–50)
GLOBULIN SER CALC-MCNC: 3.6 G/DL (ref 1.9–3.5)
GLUCOSE SERPL-MCNC: 77 MG/DL (ref 65–99)
HCT VFR BLD AUTO: 34.4 % (ref 37–47)
HGB BLD-MCNC: 11.1 G/DL (ref 12–16)
IMM GRANULOCYTES # BLD AUTO: 0.02 K/UL (ref 0–0.11)
IMM GRANULOCYTES NFR BLD AUTO: 0.3 % (ref 0–0.9)
IRON SATN MFR SERPL: 7 % (ref 15–55)
IRON SERPL-MCNC: 29 UG/DL (ref 40–170)
LYMPHOCYTES # BLD AUTO: 1.9 K/UL (ref 1–4.8)
LYMPHOCYTES NFR BLD: 24.8 % (ref 22–41)
MCH RBC QN AUTO: 31.3 PG (ref 27–33)
MCHC RBC AUTO-ENTMCNC: 32.3 G/DL (ref 33.6–35)
MCV RBC AUTO: 96.9 FL (ref 81.4–97.8)
MONOCYTES # BLD AUTO: 0.64 K/UL (ref 0–0.85)
MONOCYTES NFR BLD AUTO: 8.4 % (ref 0–13.4)
NEUTROPHILS # BLD AUTO: 4.96 K/UL (ref 2–7.15)
NEUTROPHILS NFR BLD: 64.8 % (ref 44–72)
NRBC # BLD AUTO: 0 K/UL
NRBC BLD-RTO: 0 /100 WBC
PLATELET # BLD AUTO: 449 K/UL (ref 164–446)
PMV BLD AUTO: 10.9 FL (ref 9–12.9)
POTASSIUM SERPL-SCNC: 3.6 MMOL/L (ref 3.6–5.5)
PROT SERPL-MCNC: 7.8 G/DL (ref 6–8.2)
RBC # BLD AUTO: 3.55 M/UL (ref 4.2–5.4)
SODIUM SERPL-SCNC: 140 MMOL/L (ref 135–145)
TIBC SERPL-MCNC: 392 UG/DL (ref 250–450)
WBC # BLD AUTO: 7.7 K/UL (ref 4.8–10.8)

## 2018-10-02 PROCEDURE — 85025 COMPLETE CBC W/AUTO DIFF WBC: CPT

## 2018-10-02 PROCEDURE — 80053 COMPREHEN METABOLIC PANEL: CPT

## 2018-10-02 PROCEDURE — 83550 IRON BINDING TEST: CPT

## 2018-10-02 PROCEDURE — 36415 COLL VENOUS BLD VENIPUNCTURE: CPT

## 2018-10-02 PROCEDURE — 82607 VITAMIN B-12: CPT

## 2018-10-02 PROCEDURE — 99214 OFFICE O/P EST MOD 30 MIN: CPT | Performed by: INTERNAL MEDICINE

## 2018-10-02 PROCEDURE — 82728 ASSAY OF FERRITIN: CPT

## 2018-10-02 PROCEDURE — 83540 ASSAY OF IRON: CPT

## 2018-10-02 RX ORDER — FERROUS SULFATE 325(65) MG
325 TABLET ORAL 2 TIMES DAILY
Qty: 30 TAB | Refills: 0 | Status: SHIPPED | OUTPATIENT
Start: 2018-10-02 | End: 2018-11-01

## 2018-10-02 RX ORDER — LOSARTAN POTASSIUM 100 MG/1
100 TABLET ORAL DAILY
Qty: 90 TAB | Refills: 2 | Status: SHIPPED | OUTPATIENT
Start: 2018-10-02 | End: 2019-04-22 | Stop reason: SDUPTHER

## 2018-10-02 RX ORDER — AMLODIPINE BESYLATE 5 MG/1
5 TABLET ORAL DAILY
Qty: 90 TAB | Refills: 2 | Status: SHIPPED | OUTPATIENT
Start: 2018-10-02 | End: 2018-12-31

## 2018-10-02 NOTE — PROGRESS NOTES
Chief Complaint   Patient presents with   • Results     Lab results   • Anemia     and low iron       Subjective:     HPI:   Jairo presents today with the following.    Post-menopause bleeding  This is a chronic controlled problem.   Bleeding in 2012 with irregular continuous bleeding.  She has a secondary iron deficiency anemia.  She is established with a local gynecologist Dr. Dunn in August 2018.  She told me that she has an endometrial biopsy which is not aware of the results.  She has a pending ultrasound that should be scheduled soon.  She was a started on hormones or birth control pills that she could not recall the name.  She stated that her bleeding has improved since she started taking this medication for the last month.         Iron deficiency anemia  This is a chronic controlled problem.   This is likely secondary to postmenopausal bleeding.  She establish with a gynecologist, discussed separately.  She continued to take oral iron with 325 mg daily.  She sometimes take 1-2 pills/day.  Has not started taking as needed.  Nurse for constipation.  Denies any GI bleed.  Fit test has been negative.          Hypokalemia  This is a new problem.  Her potassium level was around 3.5 in July 2018.  Not medication induced, she is not currently on any diuretics medications.  She is not taking over-the-counter potassium supplements and has not got about increasing her potassium intake.  I will recheck her level.          Essential hypertension  This is a chronic controlled problem.   Her blood pressure continued to be well controlled on amlodipine 5 mg, metoprolol 100 mg twice daily and losartan 100 mg.  She is not currently monitoring her blood pressure at home but she denied any symptoms of high or low blood pressures such as headache, chest pain, lower extremity edema.  Her office reading was within normal limits.      Vitamin D deficiency  This is a chronic controlled problem.   Finished high-dose  replacement, her most recent level was 49 in July 2018.  Switch to over-the-counter vitamin D with 2000 international units daily.      Patient Active Problem List    Diagnosis Date Noted   • Hypokalemia 10/02/2018   • Annual physical exam 02/06/2018   • Vision abnormalities 02/06/2018   • CVA, old, hemiparesis (HCC) 02/06/2018   • Essential hypertension 02/06/2018   • Vitamin D deficiency 02/06/2018   • Post-menopause bleeding 02/06/2018   • Iron deficiency anemia 02/06/2018   • Obesity (BMI 30-39.9) 02/06/2018       Current Outpatient Prescriptions   Medication Sig Dispense Refill   • ferrous sulfate 325 (65 Fe) MG tablet Take 1 Tab by mouth 2 Times a Day for 30 days. 30 Tab 0   • amLODIPine (NORVASC) 5 MG Tab Take 1 Tab by mouth every day for 90 days. 90 Tab 2   • losartan (COZAAR) 100 MG Tab Take 1 Tab by mouth every day for 90 days. 90 Tab 2   • baclofen (LIORESAL) 10 MG Tab Take 1 Tab by mouth 3 times a day as needed. 90 Tab 5   • Cholecalciferol (VITAMIN D3 PO) Take  by mouth.     • Coenzyme Q10 (CO Q 10 PO) Take  by mouth.     • Multiple Vitamin (ONE-A-DAY 55 PLUS PO) Take  by mouth.     • aspirin (ASA) 81 MG Chew Tab chewable tablet Take 81 mg by mouth every day.       No current facility-administered medications for this visit.        Allergies as of 10/02/2018 - Reviewed 10/02/2018   Allergen Reaction Noted   • Penicillins Itching 02/06/2018   • Hydrocodone Itching 12/18/2016   • Levaquin Itching 12/18/2016   • Tramadol Hives 12/18/2016        Past Medical History:   Diagnosis Date   • Anemia 2/6/2018    Iron def, post-menopausal bleeding.   • CVA, old, hemiparesis (HCC) 2/6/2018    Residual left sided weakness   • Essential hypertension 2/6/2018    Well controlled on amlodipine 10 mg and metoprolol 100 mg bid.   • Post-menopause bleeding 2/6/2018    Menopause in 2010 and bleeding 2012 started to have irregular continuous bleeding.    • Vision abnormalities 2/6/2018   • Vitamin D deficiency 2/6/2018     "Taking 50,000 IU once weekly for 4 yrs.       No past surgical history on file.    Social History   Substance Use Topics   • Smoking status: Never Smoker   • Smokeless tobacco: Never Used   • Alcohol use No       Family History   Problem Relation Age of Onset   • Hypertension Mother    • No Known Problems Father    • No Known Problems Brother        ROS:     - Constitutional: Negative for fever, chills, unexpected weight change, and fatigue/generalized weakness.     - HEENT: Negative for headaches, vision changes, hearing changes, ear pain, ear discharge, sinus congestion, or sore throat.     - Respiratory: Negative for cough, sputum production, dyspnea and wheezing.    - Cardiovascular: Negative for chest pain or palpitations.      - Gastrointestinal: Negative for heartburn, nausea, vomiting, abdominal pain, diarrhea or constipation.     - Genitourinary: Negative for dysuria, polyuria or urinary urgency.    - Musculoskeletal: Negative for myalgias, back pain, and joint pain.     - Skin: Negative for rash, itching, cyanotic skin color change.     -  Neuro:      Rt sided hemiparesis and dysphasia.         - Psychiatric/Behavioral: Negative for depression or suicidal/homicidal ideation.       Physical Exam:     Blood pressure 126/86, pulse 96, temperature 36.7 °C (98.1 °F), temperature source Temporal, resp. rate 18, height 1.626 m (5' 4\"), weight 84.7 kg (186 lb 12.8 oz), SpO2 97 %, not currently breastfeeding. Body mass index is 32.06 kg/m².   Gen:         Alert and oriented, No apparent distress.  Neck:        No Lymphadenopathy or Bruits.  Lungs:     Clear to auscultation bilaterally  CV:          Regular rate and rhythm. No murmurs, rubs or gallops.      Neuro:      Rt sided hemiparesis and dysphasia.         Ext:          No clubbing, cyanosis, edema.    Data:     LABS: July 2018: Results reviewed and discussed with the patient, questions answered.      Assessment and Plan:     68 y.o. female with the following " issues.    1. Iron deficiency anemia due to chronic blood loss  This is a chronic controlled problem.   Secondary to postmenopausal bleeding discussed under #2.  Started to improve with oral iron supplements.  Most recent iron saturation was a still low in July.  Continue on iron supplements 325 mg twice daily with a stool softeners.  We will continue to monitor hemoglobin and iron.  Patient would like to have her B12 checked as well.  - ferrous sulfate 325 (65 Fe) MG tablet; Take 1 Tab by mouth 2 Times a Day for 30 days.  Dispense: 30 Tab; Refill: 0  - CBC WITH DIFFERENTIAL; Future  - VITAMIN B12; Future  - IRON/TOTAL IRON BIND; Future  - FERRITIN; Future    2. Post-menopause bleeding  This is a chronic uncontrolled problem.   Recently established with Dr. Dunn gynecologist in August.  Undergoing endometrial biopsy/cytology workup.  We will be obtaining her records.  Per patient she did well with birth control or hormones for the last month.    3. Hypokalemia  This is a new problem.  Likely secondary to a low intake.  Not on medications.  Will recheck her level.  - COMP METABOLIC PANEL; Future    4. Vitamin D deficiency  This is a chronic controlled problem.   Improved after high-dose replacement.  Continue with over-the-counter vitamin D 2000 IUs daily.    5. Essential hypertension  This is a chronic controlled problem.   Continue on current medications.  Proceed with a refill.  - amLODIPine (NORVASC) 5 MG Tab; Take 1 Tab by mouth every day for 90 days.  Dispense: 90 Tab; Refill: 2  - losartan (COZAAR) 100 MG Tab; Take 1 Tab by mouth every day for 90 days.  Dispense: 90 Tab; Refill: 2           Health Maintenance:   Completed    Health Maintenance Due   Topic   • Annual Wellness Visit , scheduled.    • PAP SMEAR . GYN   • IMM ZOSTER VACCINES (1 of 2), Unavailable        Follow Up:      Return in about 2 months (around 12/2/2018) for AWV.      Please note that this dictation was created using voice recognition  software. I have made every reasonable attempt to correct obvious errors, but I expect that there are errors of grammar and possibly content that I did not discover before finalizing the note.    Signed by: Aliya Condon M.D.

## 2018-10-02 NOTE — ASSESSMENT & PLAN NOTE
This is a chronic controlled problem.   Her blood pressure continued to be well controlled on amlodipine 5 mg, metoprolol 100 mg twice daily and losartan 100 mg.  She is not currently monitoring her blood pressure at home but she denied any symptoms of high or low blood pressures such as headache, chest pain, lower extremity edema.  Her office reading was within normal limits.

## 2018-10-02 NOTE — ASSESSMENT & PLAN NOTE
This is a new problem.  Her potassium level was around 3.5 in July 2018.  Not medication induced, she is not currently on any diuretics medications.  She is not taking over-the-counter potassium supplements and has not got about increasing her potassium intake.  I will recheck her level.

## 2018-10-02 NOTE — ASSESSMENT & PLAN NOTE
This is a chronic controlled problem.   Bleeding in 2012 with irregular continuous bleeding.  She has a secondary iron deficiency anemia.  She is established with a local gynecologist Dr. Dunn in August 2018.  She told me that she has an endometrial biopsy which is not aware of the results.  She has a pending ultrasound that should be scheduled soon.  She was a started on hormones or birth control pills that she could not recall the name.  She stated that her bleeding has improved since she started taking this medication for the last month.

## 2018-10-02 NOTE — ASSESSMENT & PLAN NOTE
This is a chronic controlled problem.   Finished high-dose replacement, her most recent level was 49 in July 2018.  Switch to over-the-counter vitamin D with 2000 international units daily.

## 2018-10-02 NOTE — ASSESSMENT & PLAN NOTE
This is a chronic controlled problem.   This is likely secondary to postmenopausal bleeding.  She establish with a gynecologist, discussed separately.  She continued to take oral iron with 325 mg daily.  She sometimes take 1-2 pills/day.  Has not started taking as needed.  Nurse for constipation.  Denies any GI bleed.  Fit test has been negative.

## 2018-10-03 LAB
FERRITIN SERPL-MCNC: 18.5 NG/ML (ref 10–291)
VIT B12 SERPL-MCNC: 937 PG/ML (ref 211–911)

## 2018-10-08 ENCOUNTER — TELEPHONE (OUTPATIENT)
Dept: MEDICAL GROUP | Facility: LAB | Age: 68
End: 2018-10-08

## 2018-10-08 NOTE — TELEPHONE ENCOUNTER
Phone Number Called: [phone] (home)     Message: I left a voicemail to please check Mychart regarding your labs results and if she can call us back regarding scheduling her an appointment to discuss lab results.    Left Message for patient to call back: yes

## 2018-10-08 NOTE — TELEPHONE ENCOUNTER
----- Message from Aliya Condon M.D. sent at 10/5/2018  6:25 PM PDT -----  Hello Consolate,  I reviewed the results of your recent labs. Your vitamin B12 came back high normal.   You still have a very low iron level. I want you to take your iron pills 3 times daily.  Also, you have a liver enzyme that is slightly elevated. We will need to meet again in the office to discuss this.  I will have someone call you from my office to make an appointment.     Thank you,  Aliya Condon M.D.

## 2018-10-12 ENCOUNTER — TELEPHONE (OUTPATIENT)
Dept: PHYSICAL THERAPY | Facility: REHABILITATION | Age: 68
End: 2018-10-12

## 2018-10-12 NOTE — LETTER
October 12, 2018    Aliya Condon M.D.  [address] [facility]   [place] [address]      Re:  Jairo Rivas          Dear Dr. Condon,    It was a pleasure seeing your patient, Jairo Rivas, on 10/12/2018.     Please find enclosed a copy of this visit encounter which includes the history I obtained from Ms. Rivas, my physical examination findings, my impression and recommendations.      Once again, it was a pleasure participating in your patient's care.  Please feel free to contact me if you have any questions or if I can be of any further assistance to your patients.        Sincerely,          Zunilda Duran, PT, DPT    No Recipients                       Outpatient Physical Therapy  DISCHARGE SUMMARY NOTE      [facility] [address]  [address]  [place] [address]  Phone:  [phone]  Fax:  [phone]    Date of Visit: 10/12/2018    Patient: Jairo Rivas  YOB: 1950  MRN: [id number]     Referring Provider: Aliya Condon M.D.   Referring Diagnosis CVA, old, hemiparesis (CMS-HCC) [I69.359]     Physical Therapy Occurrence Codes    Date of onset of impairment:  12/15/10   Date physical therapy care plan established or reviewed:  3/12/18            Your patient is being discharged from Physical Therapy with the following comments:   · Goals partially met  · Patient has failed to schedule or reschedule follow-up visits    Comments:  Pt seen only for 2 visits and has not returned. Assisted pt in addressing spasticity w/physiatrist.        Recommendations:  D/C    Zunilda Duran, PT, DPT    Date: 10/12/2018

## 2018-10-12 NOTE — OP THERAPY DISCHARGE SUMMARY
Outpatient Physical Therapy  DISCHARGE SUMMARY NOTE      [facility] [address]  [address]  [place] [address]  Phone:  [phone]  Fax:  [phone]    Date of Visit: 10/12/2018    Patient: Jairo Rivas  YOB: 1950  MRN: [id number]     Referring Provider: Aliya Condon M.D.   Referring Diagnosis CVA, old, hemiparesis (CMS-[facility]) [I69.359]     Physical Therapy Occurrence Codes    Date of onset of impairment:  12/15/10   Date physical therapy care plan established or reviewed:  3/12/18            Your patient is being discharged from Physical Therapy with the following comments:   · Goals partially met  · Patient has failed to schedule or reschedule follow-up visits    Comments:  Pt seen only for 2 visits and has not returned. Assisted pt in addressing spasticity w/physiatrist.        Recommendations:  D/C    Zunilda Duran, PT, DPT    Date: 10/12/2018

## 2018-11-27 ENCOUNTER — TELEPHONE (OUTPATIENT)
Dept: MEDICAL GROUP | Facility: LAB | Age: 68
End: 2018-11-27

## 2018-11-27 NOTE — TELEPHONE ENCOUNTER
PVP WITH OUTREACH  Future Appointments       Provider Department Center    12/4/2018 11:00 AM Aliya Condon M.D.; [facility]  [facility]           ANNUAL WELLNESS VISIT PRE-VISIT PLANNING     1.  Immunizations were updated in Epic using WebIZ?: No WebIZ record       •  WebIZ Recommendations:        •  Is patient due for Tdap? NO       •  Is patient due for Shingles? NO     2.  Specialty Comments was updated with diagnosis information provided by [facility]: N\A [facility]

## 2018-12-04 ENCOUNTER — OFFICE VISIT (OUTPATIENT)
Dept: MEDICAL GROUP | Facility: LAB | Age: 68
End: 2018-12-04
Payer: MEDICARE

## 2018-12-04 VITALS
WEIGHT: 190.8 LBS | HEART RATE: 74 BPM | DIASTOLIC BLOOD PRESSURE: 80 MMHG | RESPIRATION RATE: 14 BRPM | SYSTOLIC BLOOD PRESSURE: 128 MMHG | HEIGHT: 64 IN | BODY MASS INDEX: 32.58 KG/M2 | TEMPERATURE: 97.4 F | OXYGEN SATURATION: 96 %

## 2018-12-04 DIAGNOSIS — E55.9 VITAMIN D DEFICIENCY: ICD-10-CM

## 2018-12-04 DIAGNOSIS — E66.9 OBESITY (BMI 30-39.9): ICD-10-CM

## 2018-12-04 DIAGNOSIS — H53.9 VISION ABNORMALITIES: ICD-10-CM

## 2018-12-04 DIAGNOSIS — E87.6 HYPOKALEMIA: ICD-10-CM

## 2018-12-04 DIAGNOSIS — I69.359 CVA, OLD, HEMIPARESIS (HCC): ICD-10-CM

## 2018-12-04 DIAGNOSIS — I10 ESSENTIAL HYPERTENSION: ICD-10-CM

## 2018-12-04 DIAGNOSIS — Z00.00 ANNUAL PHYSICAL EXAM: ICD-10-CM

## 2018-12-04 DIAGNOSIS — N95.0 POST-MENOPAUSE BLEEDING: ICD-10-CM

## 2018-12-04 DIAGNOSIS — D50.0 IRON DEFICIENCY ANEMIA DUE TO CHRONIC BLOOD LOSS: ICD-10-CM

## 2018-12-04 PROCEDURE — G0438 PPPS, INITIAL VISIT: HCPCS | Performed by: INTERNAL MEDICINE

## 2018-12-04 RX ORDER — FERROUS SULFATE 325(65) MG
325 TABLET ORAL DAILY
Qty: 30 TAB | Refills: 0
Start: 2018-12-04 | End: 2021-02-18 | Stop reason: SDUPTHER

## 2018-12-04 RX ORDER — METOPROLOL TARTRATE 100 MG/1
100 TABLET ORAL 2 TIMES DAILY
COMMUNITY
End: 2018-12-04

## 2018-12-04 RX ORDER — ATORVASTATIN CALCIUM 20 MG/1
20 TABLET, FILM COATED ORAL DAILY
Qty: 30 TAB | Refills: 0 | Status: SHIPPED | OUTPATIENT
Start: 2018-12-04 | End: 2019-02-17 | Stop reason: SDUPTHER

## 2018-12-04 RX ORDER — METOPROLOL SUCCINATE 100 MG/1
100 TABLET, EXTENDED RELEASE ORAL DAILY
Qty: 90 TAB | Refills: 2 | Status: SHIPPED | OUTPATIENT
Start: 2018-12-04 | End: 2019-05-06 | Stop reason: SDUPTHER

## 2018-12-04 ASSESSMENT — ACTIVITIES OF DAILY LIVING (ADL): BATHING_REQUIRES_ASSISTANCE: 1

## 2018-12-04 ASSESSMENT — ENCOUNTER SYMPTOMS: GENERAL WELL-BEING: GOOD

## 2018-12-04 ASSESSMENT — PATIENT HEALTH QUESTIONNAIRE - PHQ9: CLINICAL INTERPRETATION OF PHQ2 SCORE: 0

## 2018-12-04 NOTE — PROGRESS NOTES
Chief Complaint   Patient presents with   • Annual Wellness Visit         HPI:  Jairo is a 68 y.o. here for Medicare Annual Wellness Visit    - Iron deficiency anemia: Has been followed by Dr. Chicas for post-menopausal bleeding.  Told me that her ultrasound showed a thickening of her endometrial lining.  She was given an oral medication trial and should be following after [place], they may consider hysterectomy if no improvement of her symptoms.  She cannot remember the name of the medication she was given.  -Hypertension: She has been taking metoprolol Exar 100 once daily, losartan 100 and amlodipine 5 mg.  Blood pressure at home has been stable.  She would like to have a refill for her metoprolol today.  -CVA with right-sided hemiparesis: Continue to do okay, residual right-sided weakness and she needs some extra help at home.  She is currently living with her daughter who helps her with her ADLs/IADLs.  She is currently taking aspirin and atorvastatin, well-tolerated.  She would like me to give her a letter stating that she lives with her daughter who is taking care of her to help with her immigration status.    Patient Active Problem List    Diagnosis Date Noted   • Annual physical exam 02/06/2018   • Vision abnormalities 02/06/2018   • CVA, old, hemiparesis (HCC) 02/06/2018   • Essential hypertension 02/06/2018   • Vitamin D deficiency 02/06/2018   • Post-menopause bleeding 02/06/2018   • Iron deficiency anemia 02/06/2018   • Obesity (BMI 30-39.9) 02/06/2018       Current Outpatient Prescriptions   Medication Sig Dispense Refill   • ferrous sulfate 325 (65 Fe) MG tablet Take 1 Tab by mouth every day. 30 Tab 0   • atorvastatin (LIPITOR) 20 MG Tab Take 1 Tab by mouth every day. 30 Tab 0   • metoprolol SR (TOPROL XL) 100 MG TABLET SR 24 HR Take 1 Tab by mouth every day for 90 days. 90 Tab 2   • amLODIPine (NORVASC) 5 MG Tab Take 1 Tab by mouth every day for 90 days. 90 Tab 2   • losartan (COZAAR) 100 MG Tab  Take 1 Tab by mouth every day for 90 days. 90 Tab 2   • baclofen (LIORESAL) 10 MG Tab Take 1 Tab by mouth 3 times a day as needed. 90 Tab 5   • Cholecalciferol (VITAMIN D3 PO) Take  by mouth.     • Coenzyme Q10 (CO Q 10 PO) Take  by mouth.     • Multiple Vitamin (ONE-A-DAY 55 PLUS PO) Take  by mouth.     • aspirin (ASA) 81 MG Chew Tab chewable tablet Take 81 mg by mouth every day.       No current facility-administered medications for this visit.         Patient is taking medications as noted in medication list.  Current supplements as per medication list.     Allergies: Penicillins; Hydrocodone; Levaquin; and Tramadol    Current social contact/activities: shopping, listen to music     Is patient current with immunizations? No, due for SHINGRIX (Shingles). Patient is interested in receiving NONE today.    She  reports that she has never smoked. She has never used smokeless tobacco. She reports that she does not drink alcohol or use drugs.  Counseling given: Yes        DPA/Advanced directive: Patient has Advanced Directive, but it is not on file. Instructed to bring in a copy to scan into their chart.    ROS:    Gait: Uses a walker/use a can   Ostomy: No   Other tubes: No   Amputations: No   Chronic oxygen use No   Last eye exam 2018   Wears hearing aids: No   : Reports urinary leakage during the last 6 months that has not interfered at all with their daily activities or sleep.    Depression Screening    Little interest or pleasure in doing things?  0 - not at all  Feeling down, depressed, or hopeless? 0 - not at all  Patient Health Questionnaire Score: 0    If depressive symptoms identified deferred to follow up visit unless specifically addressed in assessment and plan.    Interpretation of PHQ-9 Total Score   Score Severity   1-4 No Depression   5-9 Mild Depression   10-14 Moderate Depression   15-19 Moderately Severe Depression   20-27 Severe Depression    Screening for Cognitive Impairment    Three Minute  Recall (leader, season, table)  1/3    Leonides clock face with all 12 numbers and set the hands to show 10 past 11.  Yes 5/5  If cognitive concerns identified, deferred for follow up unless specifically addressed in assessment and plan.    Fall Risk Assessment    Has the patient had two or more falls in the last year or any fall with injury in the last year?  No  If fall risk identified, deferred for follow up unless specifically addressed in assessment and plan.    Safety Assessment    Throw rugs on floor.  No  Handrails on all stairs.  Yes  Good lighting in all hallways.  Yes  Difficulty hearing.  No  Patient counseled about all safety risks that were identified.    Functional Assessment ADLs    Are there any barriers preventing you from cooking for yourself or meeting nutritional needs?  No. Has someone helps with cooking.  Are there any barriers preventing you from driving safely or obtaining transportation?  No. Has a caregiver for transportation.  Are there any barriers preventing you from using a telephone or calling for help?  No.    Are there any barriers preventing you from shopping?  No.    Are there any barriers preventing you from taking care of your own finances?  No.    Are there any barriers preventing you from managing your medications?  No.    Are there any barriers preventing you from showering, bathing or dressing yourself?  Yes. Right hand and right leg.  Are you currently engaging in any exercise or physical activity?  Yes.  Limited rides stationary bike.  What is your perception of your health?  Good.    Health Maintenance Summary                Annual Wellness Visit Overdue 1950     PAP SMEAR Overdue 8/26/1973     IMM ZOSTER VACCINES Overdue 8/26/2002     IMM DTaP/Tdap/Td Vaccine Postponed 2/3/2020 Originally 8/26/1971. Patient Refused    IMM PNEUMOCOCCAL 65+ (ADULT) LOW/MEDIUM RISK SERIES Postponed 2/4/2020 Originally 8/26/2017. Patient Refused    IMM INFLUENZA Postponed 2/10/2020  "Originally 9/1/2018. Patient Refused    MAMMOGRAM Next Due 3/7/2019      Done 3/7/2018 MA-MAMMO SCREENING BILAT W/TOMOSYNTHESIS W/CAD    COLON CANCER SCREENING ANNUAL FIT Next Due 3/20/2019      Done 3/20/2018 OCCULT BLOOD FECES IMMUNOASSAY    BONE DENSITY Next Due 3/7/2023      Done 3/7/2018 DS-BONE DENSITY STUDY (DEXA)          Patient Care Team:  Aliya Condon M.D. as PCP - General (Internal Medicine)    Social History   Substance Use Topics   • Smoking status: Never Smoker   • Smokeless tobacco: Never Used   • Alcohol use No     Family History   Problem Relation Age of Onset   • Hypertension Mother    • No Known Problems Father    • No Known Problems Brother      She  has a past medical history of Anemia (2/6/2018); CVA, old, hemiparesis (HCC) (2/6/2018); Essential hypertension (2/6/2018); Post-menopause bleeding (2/6/2018); Vision abnormalities (2/6/2018); and Vitamin D deficiency (2/6/2018).   History reviewed. No pertinent surgical history.        Exam:     Blood pressure 128/80, pulse 74, temperature 36.3 °C (97.4 °F), temperature source Temporal, resp. rate 14, height 1.626 m (5' 4\"), weight 86.5 kg (190 lb 12.8 oz), SpO2 96 %, not currently breastfeeding. Body mass index is 32.75 kg/m².    Hearing good.    Dentition fair  Alert, oriented in no acute distress.  Eye contact is good, speech goal directed, affect calm      Assessment and Plan. The following treatment and monitoring plan is recommended:      1. Annual physical exam  Mammograms: Normal 3/2018.  PAP smears: [facility] end of 2017 in [facility]. Pending records.   Colon cancer screening: Never had colonoscopy, declined. Neg FIT test 3/2018 alternative.  Osteoporosis screening:  Never had one. Willing to have one.  Immunizations: Declining all vaccinations.  - Initial Wellness Visit - Includes PPPS ()    2. CVA, old, hemiparesis (HCC)  This is a chronic controlled problem.   Continue on aspirin and atorvastatin for secondary prophylaxis.  Then was " physical therapy, patient needs some help with ADLs/IADLs her daughter at home.  She uses baclofen for occasional spasticity.  - atorvastatin (LIPITOR) 20 MG Tab; Take 1 Tab by mouth every day.  Dispense: 30 Tab; Refill: 0  - Initial Wellness Visit - Includes PPPS ()    3. Vitamin D deficiency  This is a chronic controlled problem.   With over-the-counter supplements with 2000 IUs daily.  No need for high dose prescription.  - Initial Wellness Visit - Includes PPPS ()    4. Hypokalemia  This is a chronic controlled problem.   Resolved on repeat lab.  - Initial Wellness Visit - Includes PPPS ()    5. Iron deficiency anemia due to chronic blood loss  This is a chronic uncontrolled problem.   Discussed her most recent lab from October, recommended continuing on iron supplements.  She will follow-up with Dr. Chicas after [place] for plan for postmenopausal bleeding.  Most recent with testing was negative.  - ferrous sulfate 325 (65 Fe) MG tablet; Take 1 Tab by mouth every day.  Dispense: 30 Tab; Refill: 0  - Initial Wellness Visit - Includes PPPS ()    6. Obesity (BMI 30-39.9)  This is a chronic controlled problem.   Discussed healthy lifestyle modifications.  - Initial Wellness Visit - Includes PPPS ()    7. Post-menopause bleeding  This is a chronic uncontrolled problem.   As discussed in HPI and under #5.  Follow-up GYN.  - Initial Wellness Visit - Includes PPPS ()    8. Essential hypertension  This is a chronic controlled problem.   Continue on metoprolol 100, losartan 100 and amlodipine 5 mg.  - metoprolol SR (TOPROL XL) 100 MG TABLET SR 24 HR; Take 1 Tab by mouth every day for 90 days.  Dispense: 90 Tab; Refill: 2  - Initial Wellness Visit - Includes PPPS ()    9. Vision abnormalities  This is a chronic controlled problem.   Has a benign mole, recommended annual follow-up with her eye doctor.  - Initial Wellness Visit - Includes PPPS ()      Services suggested: No services  needed at this time  Health Care Screening recommendations as per orders if indicated.  Referrals offered: PT/OT/Nutrition counseling/Behavioral Health/Smoking cessation as per orders if indicated.    Discussion today about general wellness and lifestyle habits:    · Prevent falls and reduce trip hazards; Cautioned about securing or removing rugs.  · Have a working fire alarm and carbon monoxide detector;   · Engage in regular physical activity and social activities       Follow-up: Return in about 5 months (around 4/25/2019) for Chronic problems/re-est.

## 2019-02-17 DIAGNOSIS — I69.359 CVA, OLD, HEMIPARESIS (HCC): ICD-10-CM

## 2019-02-19 RX ORDER — ATORVASTATIN CALCIUM 20 MG/1
TABLET, FILM COATED ORAL
Qty: 30 TAB | Refills: 9 | Status: SHIPPED | OUTPATIENT
Start: 2019-02-19 | End: 2019-04-02 | Stop reason: SDUPTHER

## 2019-02-19 NOTE — TELEPHONE ENCOUNTER
Was the patient seen in the last year in this department? Yes 12/04/18    Does patient have an active prescription for medications requested? No     Received Request Via: Pharmacy

## 2019-04-02 DIAGNOSIS — I69.359 CVA, OLD, HEMIPARESIS (HCC): ICD-10-CM

## 2019-04-02 RX ORDER — ATORVASTATIN CALCIUM 20 MG/1
20 TABLET, FILM COATED ORAL
Qty: 100 TAB | Refills: 0 | Status: ON HOLD | OUTPATIENT
Start: 2019-04-02 | End: 2019-06-13

## 2019-04-02 NOTE — TELEPHONE ENCOUNTER
Was the patient seen in the last year in this department? Yes 12-4-2018    Does patient have an active prescription for medications requested? No     Received Request Via: Pharmacy

## 2019-04-22 DIAGNOSIS — I10 ESSENTIAL HYPERTENSION: ICD-10-CM

## 2019-04-22 RX ORDER — LOSARTAN POTASSIUM 100 MG/1
100 TABLET ORAL DAILY
Qty: 100 TAB | Refills: 2 | Status: SHIPPED | OUTPATIENT
Start: 2019-04-22 | End: 2019-07-21

## 2019-04-22 NOTE — TELEPHONE ENCOUNTER
Was the patient seen in the last year in this department? Yes  12/4/18  Does patient have an active prescription for medications requested? No     Received Request Via: Pharmacy 100 day

## 2019-05-02 ENCOUNTER — TELEPHONE (OUTPATIENT)
Dept: MEDICAL GROUP | Facility: LAB | Age: 69
End: 2019-05-02

## 2019-05-02 NOTE — TELEPHONE ENCOUNTER
ESTABLISHED PATIENT PRE-VISIT PLANNING     Patient was NOT contacted to complete PVP.     Note: Patient will not be contacted if there is no indication to call.     1.  Reviewed notes from the last few office visits within the medical group: Yes    2.  If any orders were placed at last visit or intended to be done for this visit (i.e. 6 mos follow-up), do we have Results/Consult Notes?        •  Labs - Labs ordered, completed on 10/2/18 and results are in chart.       •  Imaging - Imaging was not ordered at last office visit.       •  Referrals - Referral ordered, patient was seen and consult notes are in chart. Care Teams updated  YES.    3. Is this appointment scheduled as a Hospital Follow-Up? No    4.  Immunizations were updated in Epic using WebIZ?: No WebIZ record       •  Web Iz Recommendations:     5.  Patient is due for the following Health Maintenance Topics:   Health Maintenance Due   Topic Date Due   • PAP SMEAR  08/26/1973   • IMM ZOSTER VACCINES (1 of 2) 08/26/2002   • MAMMOGRAM  03/07/2019   • COLON CANCER SCREENING ANNUAL FIT  03/20/2019           6. Orders for overdue Health Maintenance topics pended in Pre-Charting? N\A    7.  AHA (MDX) form printed for Provider? YES    8.  Patient was NOT informed to arrive 15 min prior to their scheduled appointment and bring in their medication bottles.

## 2019-05-06 ENCOUNTER — OFFICE VISIT (OUTPATIENT)
Dept: MEDICAL GROUP | Facility: LAB | Age: 69
End: 2019-05-06
Payer: MEDICARE

## 2019-05-06 VITALS
OXYGEN SATURATION: 98 % | BODY MASS INDEX: 32.1 KG/M2 | HEART RATE: 90 BPM | WEIGHT: 188 LBS | SYSTOLIC BLOOD PRESSURE: 138 MMHG | TEMPERATURE: 98.1 F | DIASTOLIC BLOOD PRESSURE: 76 MMHG | HEIGHT: 64 IN | RESPIRATION RATE: 14 BRPM

## 2019-05-06 DIAGNOSIS — K14.6 TONGUE BURNING SENSATION: ICD-10-CM

## 2019-05-06 DIAGNOSIS — I69.359 CVA, OLD, HEMIPARESIS (HCC): ICD-10-CM

## 2019-05-06 DIAGNOSIS — G89.29 CHRONIC PAIN OF RIGHT UPPER EXTREMITY: ICD-10-CM

## 2019-05-06 DIAGNOSIS — R29.898 WEAKNESS OF BOTH LOWER EXTREMITIES: ICD-10-CM

## 2019-05-06 DIAGNOSIS — E55.9 VITAMIN D DEFICIENCY: ICD-10-CM

## 2019-05-06 DIAGNOSIS — R42 DIZZINESS: ICD-10-CM

## 2019-05-06 DIAGNOSIS — E78.49 OTHER HYPERLIPIDEMIA: ICD-10-CM

## 2019-05-06 DIAGNOSIS — M79.601 PARESTHESIA AND PAIN OF BOTH UPPER EXTREMITIES: ICD-10-CM

## 2019-05-06 DIAGNOSIS — R20.2 PARESTHESIA AND PAIN OF BOTH UPPER EXTREMITIES: ICD-10-CM

## 2019-05-06 DIAGNOSIS — M79.602 PARESTHESIA AND PAIN OF BOTH UPPER EXTREMITIES: ICD-10-CM

## 2019-05-06 DIAGNOSIS — M79.601 CHRONIC PAIN OF RIGHT UPPER EXTREMITY: ICD-10-CM

## 2019-05-06 DIAGNOSIS — I10 ESSENTIAL HYPERTENSION: ICD-10-CM

## 2019-05-06 PROBLEM — Z00.00 ANNUAL PHYSICAL EXAM: Status: RESOLVED | Noted: 2018-02-06 | Resolved: 2019-05-06

## 2019-05-06 PROCEDURE — 99214 OFFICE O/P EST MOD 30 MIN: CPT | Performed by: NURSE PRACTITIONER

## 2019-05-06 RX ORDER — METOPROLOL SUCCINATE 100 MG/1
100 TABLET, EXTENDED RELEASE ORAL DAILY
Qty: 90 TAB | Refills: 2
Start: 2019-05-06 | End: 2019-08-05 | Stop reason: SDUPTHER

## 2019-05-06 RX ORDER — AMLODIPINE BESYLATE 5 MG/1
5 TABLET ORAL DAILY
Qty: 30 TAB | Refills: 5 | Status: ON HOLD
Start: 2019-05-06 | End: 2019-06-19

## 2019-05-06 RX ORDER — AMITRIPTYLINE HYDROCHLORIDE 25 MG/1
25 TABLET, FILM COATED ORAL EVERY EVENING
Qty: 30 TAB | Refills: 5 | Status: SHIPPED | OUTPATIENT
Start: 2019-05-06 | End: 2019-09-04 | Stop reason: SDUPTHER

## 2019-05-06 NOTE — PROGRESS NOTES
Chief Complaint   Patient presents with   • Blood Sugar Problem   • Pain     neck and back   • Dizziness       HPI  68-year-old established female here with a list of complaints and concerns, most of which applied to right-sided body discomfort, difficulty with numbness and tingling of her upper and lower extremities, weakness, dizziness and tongue discomfort.  Past medical history of hypertension, CVA and vitamin D deficiency.  She lives with her daughter who is here with her today.      #1-tingling and numbness sensation to both arms and legs for the past month - same, not worsening over the past month.   - symptoms are worse at night.  Feet feel cold frequently.  Also getting cramps in feet and calves x one month.  No other associated symptoms.    #2-burping more than normal - nonpainful and more so in mid afternoon and not always related to food.  Water can help.  No heart burn.  Denies n/v.      #3-requesting heavy metal lab work: She is also worried about heavy metals - tastes metallic taste and feels icy feeling to tongue - 6 mo. tongue often burns.  Drinks bottled water.  House is from the 1980's.  Not working.  Eats seafood but not daily.  Tongue feels swollen and burns but not everyday.      #4-leg swelling  - new issue x one month.  Walks around the house for exercise.  Feeling dizzy easily.  Hx of CVA 2010.  Occasional SOB with lying flat.  Denies any previous diagnoses of congestive heart failure that she is aware of.  Has an established diagnosis of hypertension, takes amlodipine, metoprolol and losartan.  Blood pressure has been very well controlled.    Only OTC is Vit D and iron (one per day)     Past medical, surgical, family, and social history is reviewed and updated in Epic chart by me today.   Medications and allergies reviewed and updated in Epic chart by me today.     ROS:   Positive difficulty falling and staying asleep, chronic.    Exam:  /76 (BP Location: Left arm, Patient Position:  "Sitting, BP Cuff Size: Large adult)   Pulse 90   Temp 36.7 °C (98.1 °F)   Resp 14   Ht 1.626 m (5' 4\")   Wt 85.3 kg (188 lb)   SpO2 98%   Constitutional: Alert, no distress, plus 3 vital signs  Skin:  Warm, dry, no rashes invisible areas  Eye: Equal, round and reactive, conjunctiva clear  ENMT: Lips without lesions, good dentition, her tongue is without a white coating or any lesions.  Unable to scrape a coating off of her tongue with tongue blade.  No other oral abnormalities such as ulcers or growths.  Neck: Trachea midline.  Respiratory: Unlabored respiration, lungs clear to auscultation, no wheezes, no rhonchi  Cardiovascular: Normal rate and rhythm.  No pitting edema to her lower extremities appreciated today.  She does have full sensation with soft touch of both lower extremities.  Neuro exam: Chronic hemiparesis of the right side.  Unable to extend her right arm.  Needs assistance in moving her right leg. Alert and oriented x 3. EOMs intact, no tongue deviation, PERRL, symmetric palate.  She walks with the assistance of a walker.    Psych: Alert, pleasant, well-groomed, normal affect    Assessment / Plan / Medical Decision makin. Essential hypertension  -Blood pressure is stable.  Continue current medications.  Recommend updated lab work.  I would like for her to have an echocardiogram, her daughter would like for her to wait on the results of lab work prior to having an echocardiogram.  I will follow-up with this patient and her daughter via telephone when labs return, then order echocardiogram if they are agreeable.  - metoprolol SR (TOPROL XL) 100 MG TABLET SR 24 HR; Take 1 Tab by mouth every day for 90 days.  Dispense: 90 Tab; Refill: 2  - amLODIPine (NORVASC) 5 MG Tab; Take 1 Tab by mouth every day.  Dispense: 30 Tab; Refill: 5  - HEAVY METALS BLOOD; Future  - Comp Metabolic Panel; Future  - CBC WITH DIFFERENTIAL; Future  - TSH; Future  - FREE THYROXINE; Future    2. Tongue burning " sensation  -No sign of thrush.  No recent antibiotics or steroids.  Recommend the above lab work to include a B12.  Trial of amitriptyline at night which will help with her sleep, right-sided pain and possibly tongue burning.  - amitriptyline (ELAVIL) 25 MG Tab; Take 1 Tab by mouth every evening.  Dispense: 30 Tab; Refill: 5    3. CVA, old, hemiparesis (HCC)  -Suspect that her pain and stiffness would benefit from home physical therapy/range of motion exercises.  She does not drive and has difficulty finding rides anywhere.  Requested home health physical therapy for range of motion of her right side.  - REFERRAL TO HOME HEALTH    4. Weakness of both lower extremities  - REFERRAL TO HOME HEALTH    5. Dizziness  -Recommend updated lab work, increased physical activity through physical therapy and possibly an echocardiogram.  Vital signs are stable today.  She is alert and oriented x3.  Encouraged her to go from sitting to standing slowly.  - REFERRAL TO HOME HEALTH    6. Chronic pain of right upper extremity  - REFERRAL TO HOME HEALTH    7. Paresthesia and pain of both upper extremities  - HEAVY METALS BLOOD; Future  - VITAMIN B12; Future  - amitriptyline (ELAVIL) 25 MG Tab; Take 1 Tab by mouth every evening.  Dispense: 30 Tab; Refill: 5    8. Other hyperlipidemia  - Lipid Profile; Future    9. Vitamin D deficiency  - VITAMIN D,25 HYDROXY; Future      Recommend the patient follow-up here in 6 weeks.  I will follow-up with her and her family after labs return and to see how she is doing on amitriptyline.  In terms of burping, we discussed this is a byproduct of food intake.  Discussed the avoidance of gas producing foods such as beans, onions, garlic, essentially a FODMAP diet if the burping bothers her.

## 2019-05-07 ENCOUNTER — HOME HEALTH ADMISSION (OUTPATIENT)
Dept: HOME HEALTH SERVICES | Facility: HOME HEALTHCARE | Age: 69
End: 2019-05-07
Payer: MEDICARE

## 2019-05-13 ENCOUNTER — HOME CARE VISIT (OUTPATIENT)
Dept: HOME HEALTH SERVICES | Facility: HOME HEALTHCARE | Age: 69
End: 2019-05-13
Payer: MEDICARE

## 2019-05-13 VITALS
BODY MASS INDEX: 32.1 KG/M2 | HEIGHT: 64 IN | HEART RATE: 87 BPM | SYSTOLIC BLOOD PRESSURE: 130 MMHG | TEMPERATURE: 98.7 F | WEIGHT: 188 LBS | OXYGEN SATURATION: 98 % | DIASTOLIC BLOOD PRESSURE: 78 MMHG | RESPIRATION RATE: 16 BRPM

## 2019-05-13 PROCEDURE — G0151 HHCP-SERV OF PT,EA 15 MIN: HCPCS

## 2019-05-13 PROCEDURE — G0493 RN CARE EA 15 MIN HH/HOSPICE: HCPCS

## 2019-05-13 PROCEDURE — 665001 SOC-HOME HEALTH

## 2019-05-13 ASSESSMENT — ACTIVITIES OF DAILY LIVING (ADL)
OASIS_M1830: 03
TRANSPORTATION COMMENTS: FATIGUES EASILY

## 2019-05-13 ASSESSMENT — ENCOUNTER SYMPTOMS
SHORTNESS OF BREATH: T
VOMITING: DENIES

## 2019-05-13 ASSESSMENT — PATIENT HEALTH QUESTIONNAIRE - PHQ9
5. POOR APPETITE OR OVEREATING: 0 - NOT AT ALL
1. LITTLE INTEREST OR PLEASURE IN DOING THINGS: 01
2. FEELING DOWN, DEPRESSED, IRRITABLE, OR HOPELESS: 01

## 2019-05-14 VITALS
OXYGEN SATURATION: 98 % | TEMPERATURE: 98.7 F | DIASTOLIC BLOOD PRESSURE: 78 MMHG | HEART RATE: 87 BPM | SYSTOLIC BLOOD PRESSURE: 130 MMHG

## 2019-05-14 ASSESSMENT — ACTIVITIES OF DAILY LIVING (ADL)
MEAL_PREP_ASSISTANCE: 6
GROOMING_ASSISTANCE: 5
TELEPHONE_ASSISTANCE: 0
BATHING_ASSISTANCE: 5
EATING_ASSISTANCE: 5
DRESSING_LB_ASSISTANCE: 5
TOILETING_ASSISTANCE: 0
SHOPPING_ASSISTANCE: 6
DRESSING_UB_ASSISTANCE: 5
GROOMING_ASSISTANCE: 0
HOUSEKEEPING_ASSISTANCE: 6
TRANSPORTATION_ASSISTANCE: 6
LAUNDRY_ASSISTANCE: 6
ORAL_CARE_ASSISTANCE: 0

## 2019-05-15 ENCOUNTER — HOSPITAL ENCOUNTER (OUTPATIENT)
Dept: LAB | Facility: MEDICAL CENTER | Age: 69
End: 2019-05-15
Attending: NURSE PRACTITIONER
Payer: MEDICARE

## 2019-05-15 DIAGNOSIS — E78.49 OTHER HYPERLIPIDEMIA: ICD-10-CM

## 2019-05-15 DIAGNOSIS — I10 ESSENTIAL HYPERTENSION: ICD-10-CM

## 2019-05-15 DIAGNOSIS — M79.602 PARESTHESIA AND PAIN OF BOTH UPPER EXTREMITIES: ICD-10-CM

## 2019-05-15 DIAGNOSIS — E55.9 VITAMIN D DEFICIENCY: ICD-10-CM

## 2019-05-15 DIAGNOSIS — M79.601 PARESTHESIA AND PAIN OF BOTH UPPER EXTREMITIES: ICD-10-CM

## 2019-05-15 DIAGNOSIS — R20.2 PARESTHESIA AND PAIN OF BOTH UPPER EXTREMITIES: ICD-10-CM

## 2019-05-15 LAB
25(OH)D3 SERPL-MCNC: 41 NG/ML (ref 30–100)
ALBUMIN SERPL BCP-MCNC: 4.3 G/DL (ref 3.2–4.9)
ALBUMIN/GLOB SERPL: 1.3 G/DL
ALP SERPL-CCNC: 86 U/L (ref 30–99)
ALT SERPL-CCNC: 20 U/L (ref 2–50)
ANION GAP SERPL CALC-SCNC: 7 MMOL/L (ref 0–11.9)
AST SERPL-CCNC: 29 U/L (ref 12–45)
BILIRUB SERPL-MCNC: 0.6 MG/DL (ref 0.1–1.5)
BUN SERPL-MCNC: 10 MG/DL (ref 8–22)
CALCIUM SERPL-MCNC: 9.5 MG/DL (ref 8.5–10.5)
CHLORIDE SERPL-SCNC: 109 MMOL/L (ref 96–112)
CHOLEST SERPL-MCNC: 154 MG/DL (ref 100–199)
CO2 SERPL-SCNC: 27 MMOL/L (ref 20–33)
CREAT SERPL-MCNC: 0.83 MG/DL (ref 0.5–1.4)
FASTING STATUS PATIENT QL REPORTED: NORMAL
GLOBULIN SER CALC-MCNC: 3.4 G/DL (ref 1.9–3.5)
GLUCOSE SERPL-MCNC: 85 MG/DL (ref 65–99)
HDLC SERPL-MCNC: 55 MG/DL
LDLC SERPL CALC-MCNC: 86 MG/DL
POTASSIUM SERPL-SCNC: 3.6 MMOL/L (ref 3.6–5.5)
PROT SERPL-MCNC: 7.7 G/DL (ref 6–8.2)
SODIUM SERPL-SCNC: 143 MMOL/L (ref 135–145)
T4 FREE SERPL-MCNC: 0.92 NG/DL (ref 0.53–1.43)
TRIGL SERPL-MCNC: 66 MG/DL (ref 0–149)
TSH SERPL DL<=0.005 MIU/L-ACNC: 1.23 UIU/ML (ref 0.38–5.33)
VIT B12 SERPL-MCNC: 714 PG/ML (ref 211–911)

## 2019-05-15 PROCEDURE — 82306 VITAMIN D 25 HYDROXY: CPT

## 2019-05-15 PROCEDURE — 80053 COMPREHEN METABOLIC PANEL: CPT

## 2019-05-15 PROCEDURE — 82300 ASSAY OF CADMIUM: CPT

## 2019-05-15 PROCEDURE — 84443 ASSAY THYROID STIM HORMONE: CPT

## 2019-05-15 PROCEDURE — 83655 ASSAY OF LEAD: CPT

## 2019-05-15 PROCEDURE — 82607 VITAMIN B-12: CPT

## 2019-05-15 PROCEDURE — 36415 COLL VENOUS BLD VENIPUNCTURE: CPT

## 2019-05-15 PROCEDURE — 83825 ASSAY OF MERCURY: CPT

## 2019-05-15 PROCEDURE — 82175 ASSAY OF ARSENIC: CPT

## 2019-05-15 PROCEDURE — 80061 LIPID PANEL: CPT

## 2019-05-15 PROCEDURE — 84439 ASSAY OF FREE THYROXINE: CPT

## 2019-05-15 ASSESSMENT — ENCOUNTER SYMPTOMS
DEBILITATING PAIN: 1
POOR JUDGMENT: 1

## 2019-05-16 ENCOUNTER — HOME CARE VISIT (OUTPATIENT)
Dept: HOME HEALTH SERVICES | Facility: HOME HEALTHCARE | Age: 69
End: 2019-05-16
Payer: MEDICARE

## 2019-05-16 VITALS
HEART RATE: 77 BPM | DIASTOLIC BLOOD PRESSURE: 70 MMHG | RESPIRATION RATE: 17 BRPM | TEMPERATURE: 98.2 F | OXYGEN SATURATION: 96 % | SYSTOLIC BLOOD PRESSURE: 120 MMHG

## 2019-05-16 LAB
ARSENIC BLD-MCNC: <10 UG/L (ref 0–12)
CADMIUM BLD-MCNC: <1 UG/L (ref 0–5)
LEAD BLDV-MCNC: <2 UG/DL (ref 0–4.9)
MERCURY BLD-MCNC: <2.5 UG/L (ref 0–10)

## 2019-05-16 PROCEDURE — G0299 HHS/HOSPICE OF RN EA 15 MIN: HCPCS

## 2019-05-20 ENCOUNTER — HOME CARE VISIT (OUTPATIENT)
Dept: HOME HEALTH SERVICES | Facility: HOME HEALTHCARE | Age: 69
End: 2019-05-20
Payer: MEDICARE

## 2019-05-21 ENCOUNTER — HOME CARE VISIT (OUTPATIENT)
Dept: HOME HEALTH SERVICES | Facility: HOME HEALTHCARE | Age: 69
End: 2019-05-21
Payer: MEDICARE

## 2019-05-21 ENCOUNTER — TELEPHONE (OUTPATIENT)
Dept: VASCULAR LAB | Facility: MEDICAL CENTER | Age: 69
End: 2019-05-21

## 2019-05-21 ASSESSMENT — PATIENT HEALTH QUESTIONNAIRE - PHQ9
SUM OF ALL RESPONSES TO PHQ QUESTIONS 1-9: 4
CLINICAL INTERPRETATION OF PHQ2 SCORE: 2

## 2019-05-21 NOTE — TELEPHONE ENCOUNTER
Medications reviewed  No clinically significant interactions          Bree Garcia, Clinical Pharmacist

## 2019-05-22 ENCOUNTER — HOME CARE VISIT (OUTPATIENT)
Dept: HOME HEALTH SERVICES | Facility: HOME HEALTHCARE | Age: 69
End: 2019-05-22
Payer: MEDICARE

## 2019-05-22 PROCEDURE — G0180 MD CERTIFICATION HHA PATIENT: HCPCS | Performed by: FAMILY MEDICINE

## 2019-05-24 ENCOUNTER — HOME CARE VISIT (OUTPATIENT)
Dept: HOME HEALTH SERVICES | Facility: HOME HEALTHCARE | Age: 69
End: 2019-05-24
Payer: MEDICARE

## 2019-05-27 ENCOUNTER — HOME CARE VISIT (OUTPATIENT)
Dept: HOME HEALTH SERVICES | Facility: HOME HEALTHCARE | Age: 69
End: 2019-05-27
Payer: MEDICARE

## 2019-05-29 ENCOUNTER — HOME CARE VISIT (OUTPATIENT)
Dept: HOME HEALTH SERVICES | Facility: HOME HEALTHCARE | Age: 69
End: 2019-05-29
Payer: MEDICARE

## 2019-05-30 ENCOUNTER — HOME CARE VISIT (OUTPATIENT)
Dept: HOME HEALTH SERVICES | Facility: HOME HEALTHCARE | Age: 69
End: 2019-05-30
Payer: MEDICARE

## 2019-06-01 ENCOUNTER — HOSPITAL ENCOUNTER (OUTPATIENT)
Dept: LAB | Facility: MEDICAL CENTER | Age: 69
End: 2019-06-01
Attending: NURSE PRACTITIONER
Payer: MEDICARE

## 2019-06-01 LAB
BASOPHILS # BLD AUTO: 0.7 % (ref 0–1.8)
BASOPHILS # BLD: 0.04 K/UL (ref 0–0.12)
EOSINOPHIL # BLD AUTO: 0.06 K/UL (ref 0–0.51)
EOSINOPHIL NFR BLD: 1.1 % (ref 0–6.9)
ERYTHROCYTE [DISTWIDTH] IN BLOOD BY AUTOMATED COUNT: 48.5 FL (ref 35.9–50)
HCT VFR BLD AUTO: 39.6 % (ref 37–47)
HGB BLD-MCNC: 12.6 G/DL (ref 12–16)
IMM GRANULOCYTES # BLD AUTO: 0.01 K/UL (ref 0–0.11)
IMM GRANULOCYTES NFR BLD AUTO: 0.2 % (ref 0–0.9)
LYMPHOCYTES # BLD AUTO: 1.73 K/UL (ref 1–4.8)
LYMPHOCYTES NFR BLD: 32.1 % (ref 22–41)
MCH RBC QN AUTO: 31.5 PG (ref 27–33)
MCHC RBC AUTO-ENTMCNC: 31.8 G/DL (ref 33.6–35)
MCV RBC AUTO: 99 FL (ref 81.4–97.8)
MONOCYTES # BLD AUTO: 0.35 K/UL (ref 0–0.85)
MONOCYTES NFR BLD AUTO: 6.5 % (ref 0–13.4)
NEUTROPHILS # BLD AUTO: 3.2 K/UL (ref 2–7.15)
NEUTROPHILS NFR BLD: 59.4 % (ref 44–72)
NRBC # BLD AUTO: 0 K/UL
NRBC BLD-RTO: 0 /100 WBC
PLATELET # BLD AUTO: 332 K/UL (ref 164–446)
PMV BLD AUTO: 11 FL (ref 9–12.9)
RBC # BLD AUTO: 4 M/UL (ref 4.2–5.4)
WBC # BLD AUTO: 5.4 K/UL (ref 4.8–10.8)

## 2019-06-01 PROCEDURE — 85025 COMPLETE CBC W/AUTO DIFF WBC: CPT

## 2019-06-03 ENCOUNTER — HOME CARE VISIT (OUTPATIENT)
Dept: HOME HEALTH SERVICES | Facility: HOME HEALTHCARE | Age: 69
End: 2019-06-03
Payer: MEDICARE

## 2019-06-05 ENCOUNTER — HOME CARE VISIT (OUTPATIENT)
Dept: HOME HEALTH SERVICES | Facility: HOME HEALTHCARE | Age: 69
End: 2019-06-05
Payer: MEDICARE

## 2019-06-06 ENCOUNTER — TELEPHONE (OUTPATIENT)
Dept: MEDICAL GROUP | Facility: LAB | Age: 69
End: 2019-06-06

## 2019-06-06 NOTE — TELEPHONE ENCOUNTER
ESTABLISHED PATIENT PRE-VISIT PLANNING     Patient was NOT contacted to complete PVP.     Note: Patient will not be contacted if there is no indication to call.     1.  Reviewed notes from the last few office visits within the medical group: Yes    2.  If any orders were placed at last visit or intended to be done for this visit (i.e. 6 mos follow-up), do we have Results/Consult Notes?        •  Labs - Labs ordered, completed on 5/15/19 and results are in chart.   Note: If patient appointment is for lab review and patient did not complete labs, check with provider if OK to reschedule patient until labs completed.       •  Imaging - Imaging was not ordered at last office visit.       •  Referrals - Referral ordered, patient has NOT been seen.    3. Is this appointment scheduled as a Hospital Follow-Up? No    4.  Immunizations were updated in Epic using WebIZ?: No WebIZ record    5.  Patient is due for the following Health Maintenance Topics:   Health Maintenance Due   Topic Date Due   • HEPATITIS C SCREENING  1950   • PAP SMEAR  08/26/1973   • IMM ZOSTER VACCINES (1 of 2) 08/26/2002   • MAMMOGRAM  03/07/2019   • COLON CANCER SCREENING ANNUAL FIT  03/20/2019           6. Orders for overdue Health Maintenance topics pended in Pre-Charting? N\A    7.  AHA (MDX) form printed for Provider? YES    8.  Patient was NOT informed to arrive 15 min prior to their scheduled appointment and bring in their medication bottles.

## 2019-06-10 ENCOUNTER — HOME CARE VISIT (OUTPATIENT)
Dept: HOME HEALTH SERVICES | Facility: HOME HEALTHCARE | Age: 69
End: 2019-06-10
Payer: MEDICARE

## 2019-06-10 ASSESSMENT — ACTIVITIES OF DAILY LIVING (ADL)
OASIS_M1830: 03
HOME_HEALTH_OASIS: 01

## 2019-06-10 ASSESSMENT — PATIENT HEALTH QUESTIONNAIRE - PHQ9: CLINICAL INTERPRETATION OF PHQ2 SCORE: 0

## 2019-06-11 ENCOUNTER — APPOINTMENT (OUTPATIENT)
Dept: RADIOLOGY | Facility: MEDICAL CENTER | Age: 69
End: 2019-06-11
Attending: EMERGENCY MEDICINE
Payer: MEDICARE

## 2019-06-11 ENCOUNTER — HOSPITAL ENCOUNTER (OUTPATIENT)
Facility: MEDICAL CENTER | Age: 69
End: 2019-06-13
Attending: EMERGENCY MEDICINE | Admitting: HOSPITALIST
Payer: MEDICARE

## 2019-06-11 DIAGNOSIS — R55 SYNCOPE, UNSPECIFIED SYNCOPE TYPE: ICD-10-CM

## 2019-06-11 DIAGNOSIS — I69.359 CVA, OLD, HEMIPARESIS (HCC): ICD-10-CM

## 2019-06-11 DIAGNOSIS — D64.9 ANEMIA, UNSPECIFIED TYPE: ICD-10-CM

## 2019-06-11 DIAGNOSIS — N93.8 DYSFUNCTIONAL UTERINE BLEEDING: ICD-10-CM

## 2019-06-11 LAB
ABO + RH BLD: NORMAL
ABO GROUP BLD: NORMAL
ALBUMIN SERPL BCP-MCNC: 3.7 G/DL (ref 3.2–4.9)
ALBUMIN/GLOB SERPL: 1.2 G/DL
ALP SERPL-CCNC: 69 U/L (ref 30–99)
ALT SERPL-CCNC: 19 U/L (ref 2–50)
ANION GAP SERPL CALC-SCNC: 8 MMOL/L (ref 0–11.9)
APTT PPP: 22.5 SEC (ref 24.7–36)
AST SERPL-CCNC: 21 U/L (ref 12–45)
BASOPHILS # BLD AUTO: 0.3 % (ref 0–1.8)
BASOPHILS # BLD: 0.03 K/UL (ref 0–0.12)
BILIRUB SERPL-MCNC: 0.5 MG/DL (ref 0.1–1.5)
BLD GP AB SCN SERPL QL: NORMAL
BUN SERPL-MCNC: 9 MG/DL (ref 8–22)
CALCIUM SERPL-MCNC: 8.9 MG/DL (ref 8.5–10.5)
CHLORIDE SERPL-SCNC: 109 MMOL/L (ref 96–112)
CO2 SERPL-SCNC: 25 MMOL/L (ref 20–33)
CREAT SERPL-MCNC: 0.82 MG/DL (ref 0.5–1.4)
EOSINOPHIL # BLD AUTO: 0.01 K/UL (ref 0–0.51)
EOSINOPHIL NFR BLD: 0.1 % (ref 0–6.9)
ERYTHROCYTE [DISTWIDTH] IN BLOOD BY AUTOMATED COUNT: 49.2 FL (ref 35.9–50)
GLOBULIN SER CALC-MCNC: 3 G/DL (ref 1.9–3.5)
GLUCOSE SERPL-MCNC: 117 MG/DL (ref 65–99)
HCT VFR BLD AUTO: 29.9 % (ref 37–47)
HGB BLD-MCNC: 9.3 G/DL (ref 12–16)
IMM GRANULOCYTES # BLD AUTO: 0.03 K/UL (ref 0–0.11)
IMM GRANULOCYTES NFR BLD AUTO: 0.3 % (ref 0–0.9)
INR PPP: 0.98 (ref 0.87–1.13)
LYMPHOCYTES # BLD AUTO: 1.36 K/UL (ref 1–4.8)
LYMPHOCYTES NFR BLD: 14.3 % (ref 22–41)
MCH RBC QN AUTO: 31.5 PG (ref 27–33)
MCHC RBC AUTO-ENTMCNC: 31.1 G/DL (ref 33.6–35)
MCV RBC AUTO: 101.4 FL (ref 81.4–97.8)
MONOCYTES # BLD AUTO: 0.38 K/UL (ref 0–0.85)
MONOCYTES NFR BLD AUTO: 4 % (ref 0–13.4)
NEUTROPHILS # BLD AUTO: 7.69 K/UL (ref 2–7.15)
NEUTROPHILS NFR BLD: 81 % (ref 44–72)
NRBC # BLD AUTO: 0 K/UL
NRBC BLD-RTO: 0 /100 WBC
PLATELET # BLD AUTO: 261 K/UL (ref 164–446)
PMV BLD AUTO: 10.5 FL (ref 9–12.9)
POTASSIUM SERPL-SCNC: 3.2 MMOL/L (ref 3.6–5.5)
PROT SERPL-MCNC: 6.7 G/DL (ref 6–8.2)
PROTHROMBIN TIME: 13.2 SEC (ref 12–14.6)
RBC # BLD AUTO: 2.95 M/UL (ref 4.2–5.4)
RH BLD: NORMAL
SODIUM SERPL-SCNC: 142 MMOL/L (ref 135–145)
TROPONIN I SERPL-MCNC: <0.01 NG/ML (ref 0–0.04)
WBC # BLD AUTO: 9.5 K/UL (ref 4.8–10.8)

## 2019-06-11 PROCEDURE — 99220 PR INITIAL OBSERVATION CARE,LEVL III: CPT | Mod: GC | Performed by: HOSPITALIST

## 2019-06-11 PROCEDURE — G0378 HOSPITAL OBSERVATION PER HR: HCPCS

## 2019-06-11 PROCEDURE — 86900 BLOOD TYPING SEROLOGIC ABO: CPT

## 2019-06-11 PROCEDURE — 93005 ELECTROCARDIOGRAM TRACING: CPT | Performed by: EMERGENCY MEDICINE

## 2019-06-11 PROCEDURE — 93005 ELECTROCARDIOGRAM TRACING: CPT

## 2019-06-11 PROCEDURE — 86901 BLOOD TYPING SEROLOGIC RH(D): CPT

## 2019-06-11 PROCEDURE — 85610 PROTHROMBIN TIME: CPT

## 2019-06-11 PROCEDURE — 86850 RBC ANTIBODY SCREEN: CPT

## 2019-06-11 PROCEDURE — 80053 COMPREHEN METABOLIC PANEL: CPT

## 2019-06-11 PROCEDURE — 84484 ASSAY OF TROPONIN QUANT: CPT

## 2019-06-11 PROCEDURE — 85730 THROMBOPLASTIN TIME PARTIAL: CPT

## 2019-06-11 PROCEDURE — 36415 COLL VENOUS BLD VENIPUNCTURE: CPT

## 2019-06-11 PROCEDURE — 99285 EMERGENCY DEPT VISIT HI MDM: CPT

## 2019-06-11 PROCEDURE — 85025 COMPLETE CBC W/AUTO DIFF WBC: CPT

## 2019-06-11 PROCEDURE — 700105 HCHG RX REV CODE 258: Performed by: STUDENT IN AN ORGANIZED HEALTH CARE EDUCATION/TRAINING PROGRAM

## 2019-06-11 PROCEDURE — 94760 N-INVAS EAR/PLS OXIMETRY 1: CPT

## 2019-06-11 PROCEDURE — 76856 US EXAM PELVIC COMPLETE: CPT

## 2019-06-11 RX ORDER — POLYETHYLENE GLYCOL 3350 17 G/17G
1 POWDER, FOR SOLUTION ORAL
Status: DISCONTINUED | OUTPATIENT
Start: 2019-06-11 | End: 2019-06-13 | Stop reason: HOSPADM

## 2019-06-11 RX ORDER — ACETAMINOPHEN 325 MG/1
650 TABLET ORAL EVERY 6 HOURS PRN
Status: DISCONTINUED | OUTPATIENT
Start: 2019-06-11 | End: 2019-06-13 | Stop reason: HOSPADM

## 2019-06-11 RX ORDER — AMOXICILLIN 250 MG
2 CAPSULE ORAL 2 TIMES DAILY
Status: DISCONTINUED | OUTPATIENT
Start: 2019-06-11 | End: 2019-06-13 | Stop reason: HOSPADM

## 2019-06-11 RX ORDER — BISACODYL 10 MG
10 SUPPOSITORY, RECTAL RECTAL
Status: DISCONTINUED | OUTPATIENT
Start: 2019-06-11 | End: 2019-06-13 | Stop reason: HOSPADM

## 2019-06-11 RX ORDER — ONDANSETRON 4 MG/1
4 TABLET, ORALLY DISINTEGRATING ORAL EVERY 4 HOURS PRN
Status: DISCONTINUED | OUTPATIENT
Start: 2019-06-11 | End: 2019-06-13 | Stop reason: HOSPADM

## 2019-06-11 RX ORDER — ONDANSETRON 2 MG/ML
4 INJECTION INTRAMUSCULAR; INTRAVENOUS EVERY 4 HOURS PRN
Status: DISCONTINUED | OUTPATIENT
Start: 2019-06-11 | End: 2019-06-13 | Stop reason: HOSPADM

## 2019-06-11 RX ORDER — LABETALOL HYDROCHLORIDE 5 MG/ML
10 INJECTION, SOLUTION INTRAVENOUS EVERY 4 HOURS PRN
Status: DISCONTINUED | OUTPATIENT
Start: 2019-06-11 | End: 2019-06-13 | Stop reason: HOSPADM

## 2019-06-11 RX ORDER — SODIUM CHLORIDE 9 MG/ML
INJECTION, SOLUTION INTRAVENOUS CONTINUOUS
Status: DISCONTINUED | OUTPATIENT
Start: 2019-06-11 | End: 2019-06-13 | Stop reason: HOSPADM

## 2019-06-11 RX ADMIN — SODIUM CHLORIDE: 9 INJECTION, SOLUTION INTRAVENOUS at 23:45

## 2019-06-12 PROBLEM — D62 ACUTE ON CHRONIC BLOOD LOSS ANEMIA: Status: ACTIVE | Noted: 2019-06-12

## 2019-06-12 LAB
ALBUMIN SERPL BCP-MCNC: 3.1 G/DL (ref 3.2–4.9)
ALBUMIN/GLOB SERPL: 1.2 G/DL
ALP SERPL-CCNC: 58 U/L (ref 30–99)
ALT SERPL-CCNC: 14 U/L (ref 2–50)
ANION GAP SERPL CALC-SCNC: 6 MMOL/L (ref 0–11.9)
AST SERPL-CCNC: 16 U/L (ref 12–45)
BASOPHILS # BLD AUTO: 0.4 % (ref 0–1.8)
BASOPHILS # BLD AUTO: 0.5 % (ref 0–1.8)
BASOPHILS # BLD: 0.03 K/UL (ref 0–0.12)
BASOPHILS # BLD: 0.04 K/UL (ref 0–0.12)
BILIRUB SERPL-MCNC: 0.5 MG/DL (ref 0.1–1.5)
BUN SERPL-MCNC: 8 MG/DL (ref 8–22)
CALCIUM SERPL-MCNC: 8.6 MG/DL (ref 8.5–10.5)
CHLORIDE SERPL-SCNC: 111 MMOL/L (ref 96–112)
CO2 SERPL-SCNC: 26 MMOL/L (ref 20–33)
CREAT SERPL-MCNC: 0.78 MG/DL (ref 0.5–1.4)
EKG IMPRESSION: NORMAL
EOSINOPHIL # BLD AUTO: 0.02 K/UL (ref 0–0.51)
EOSINOPHIL # BLD AUTO: 0.02 K/UL (ref 0–0.51)
EOSINOPHIL NFR BLD: 0.3 % (ref 0–6.9)
EOSINOPHIL NFR BLD: 0.3 % (ref 0–6.9)
ERYTHROCYTE [DISTWIDTH] IN BLOOD BY AUTOMATED COUNT: 48.9 FL (ref 35.9–50)
ERYTHROCYTE [DISTWIDTH] IN BLOOD BY AUTOMATED COUNT: 49.2 FL (ref 35.9–50)
FERRITIN SERPL-MCNC: 18.5 NG/ML (ref 10–291)
FOLATE SERPL-MCNC: >23.8 NG/ML
GLOBULIN SER CALC-MCNC: 2.5 G/DL (ref 1.9–3.5)
GLUCOSE SERPL-MCNC: 142 MG/DL (ref 65–99)
HCT VFR BLD AUTO: 24.3 % (ref 37–47)
HCT VFR BLD AUTO: 25 % (ref 37–47)
HCT VFR BLD AUTO: 30.8 % (ref 37–47)
HEMOCCULT STL QL: NEGATIVE
HGB BLD-MCNC: 7.8 G/DL (ref 12–16)
HGB BLD-MCNC: 8.1 G/DL (ref 12–16)
HGB BLD-MCNC: 9.6 G/DL (ref 12–16)
HGB RETIC QN AUTO: 33 PG/CELL (ref 29–35)
IMM GRANULOCYTES # BLD AUTO: 0.01 K/UL (ref 0–0.11)
IMM GRANULOCYTES # BLD AUTO: 0.03 K/UL (ref 0–0.11)
IMM GRANULOCYTES NFR BLD AUTO: 0.1 % (ref 0–0.9)
IMM GRANULOCYTES NFR BLD AUTO: 0.4 % (ref 0–0.9)
IMM RETICS NFR: 22.9 % (ref 9.3–17.4)
IRON SATN MFR SERPL: 13 % (ref 15–55)
IRON SERPL-MCNC: 35 UG/DL (ref 40–170)
LYMPHOCYTES # BLD AUTO: 1.61 K/UL (ref 1–4.8)
LYMPHOCYTES # BLD AUTO: 1.7 K/UL (ref 1–4.8)
LYMPHOCYTES NFR BLD: 21.6 % (ref 22–41)
LYMPHOCYTES NFR BLD: 25.3 % (ref 22–41)
MAGNESIUM SERPL-MCNC: 1.8 MG/DL (ref 1.5–2.5)
MCH RBC QN AUTO: 31.4 PG (ref 27–33)
MCH RBC QN AUTO: 33.1 PG (ref 27–33)
MCHC RBC AUTO-ENTMCNC: 31.2 G/DL (ref 33.6–35)
MCHC RBC AUTO-ENTMCNC: 32.4 G/DL (ref 33.6–35)
MCV RBC AUTO: 100.7 FL (ref 81.4–97.8)
MCV RBC AUTO: 102 FL (ref 81.4–97.8)
MONOCYTES # BLD AUTO: 0.35 K/UL (ref 0–0.85)
MONOCYTES # BLD AUTO: 0.42 K/UL (ref 0–0.85)
MONOCYTES NFR BLD AUTO: 4.7 % (ref 0–13.4)
MONOCYTES NFR BLD AUTO: 6.3 % (ref 0–13.4)
NEUTROPHILS # BLD AUTO: 4.53 K/UL (ref 2–7.15)
NEUTROPHILS # BLD AUTO: 5.4 K/UL (ref 2–7.15)
NEUTROPHILS NFR BLD: 67.6 % (ref 44–72)
NEUTROPHILS NFR BLD: 72.5 % (ref 44–72)
NRBC # BLD AUTO: 0 K/UL
NRBC # BLD AUTO: 0 K/UL
NRBC BLD-RTO: 0 /100 WBC
NRBC BLD-RTO: 0 /100 WBC
PLATELET # BLD AUTO: 214 K/UL (ref 164–446)
PLATELET # BLD AUTO: 264 K/UL (ref 164–446)
PMV BLD AUTO: 10.9 FL (ref 9–12.9)
PMV BLD AUTO: 10.9 FL (ref 9–12.9)
POTASSIUM SERPL-SCNC: 3.4 MMOL/L (ref 3.6–5.5)
PROT SERPL-MCNC: 5.6 G/DL (ref 6–8.2)
RBC # BLD AUTO: 2.45 M/UL (ref 4.2–5.4)
RBC # BLD AUTO: 3.06 M/UL (ref 4.2–5.4)
RETICS # AUTO: 0.08 M/UL (ref 0.04–0.06)
RETICS/RBC NFR: 3.4 % (ref 0.8–2.1)
SODIUM SERPL-SCNC: 143 MMOL/L (ref 135–145)
TIBC SERPL-MCNC: 272 UG/DL (ref 250–450)
WBC # BLD AUTO: 6.7 K/UL (ref 4.8–10.8)
WBC # BLD AUTO: 7.5 K/UL (ref 4.8–10.8)

## 2019-06-12 PROCEDURE — A9270 NON-COVERED ITEM OR SERVICE: HCPCS | Performed by: STUDENT IN AN ORGANIZED HEALTH CARE EDUCATION/TRAINING PROGRAM

## 2019-06-12 PROCEDURE — 700102 HCHG RX REV CODE 250 W/ 637 OVERRIDE(OP): Performed by: STUDENT IN AN ORGANIZED HEALTH CARE EDUCATION/TRAINING PROGRAM

## 2019-06-12 PROCEDURE — 85025 COMPLETE CBC W/AUTO DIFF WBC: CPT | Mod: 91

## 2019-06-12 PROCEDURE — 700102 HCHG RX REV CODE 250 W/ 637 OVERRIDE(OP): Performed by: EMERGENCY MEDICINE

## 2019-06-12 PROCEDURE — 700105 HCHG RX REV CODE 258: Performed by: STUDENT IN AN ORGANIZED HEALTH CARE EDUCATION/TRAINING PROGRAM

## 2019-06-12 PROCEDURE — 85018 HEMOGLOBIN: CPT | Mod: XU

## 2019-06-12 PROCEDURE — 99225 PR SUBSEQUENT OBSERVATION CARE,LEVEL II: CPT | Mod: GC | Performed by: HOSPITALIST

## 2019-06-12 PROCEDURE — 85046 RETICYTE/HGB CONCENTRATE: CPT

## 2019-06-12 PROCEDURE — 36415 COLL VENOUS BLD VENIPUNCTURE: CPT

## 2019-06-12 PROCEDURE — G0378 HOSPITAL OBSERVATION PER HR: HCPCS

## 2019-06-12 PROCEDURE — 85014 HEMATOCRIT: CPT

## 2019-06-12 PROCEDURE — 80053 COMPREHEN METABOLIC PANEL: CPT

## 2019-06-12 PROCEDURE — 82728 ASSAY OF FERRITIN: CPT

## 2019-06-12 PROCEDURE — A9270 NON-COVERED ITEM OR SERVICE: HCPCS | Performed by: EMERGENCY MEDICINE

## 2019-06-12 PROCEDURE — 83735 ASSAY OF MAGNESIUM: CPT

## 2019-06-12 PROCEDURE — 82272 OCCULT BLD FECES 1-3 TESTS: CPT

## 2019-06-12 PROCEDURE — 83550 IRON BINDING TEST: CPT

## 2019-06-12 PROCEDURE — 83540 ASSAY OF IRON: CPT

## 2019-06-12 PROCEDURE — 82746 ASSAY OF FOLIC ACID SERUM: CPT

## 2019-06-12 RX ORDER — METOPROLOL SUCCINATE 50 MG/1
100 TABLET, EXTENDED RELEASE ORAL DAILY
Status: DISCONTINUED | OUTPATIENT
Start: 2019-06-12 | End: 2019-06-13 | Stop reason: HOSPADM

## 2019-06-12 RX ORDER — FERROUS SULFATE 325(65) MG
325 TABLET ORAL DAILY
Status: DISCONTINUED | OUTPATIENT
Start: 2019-06-12 | End: 2019-06-13 | Stop reason: HOSPADM

## 2019-06-12 RX ORDER — ATORVASTATIN CALCIUM 20 MG/1
20 TABLET, FILM COATED ORAL
Status: DISCONTINUED | OUTPATIENT
Start: 2019-06-12 | End: 2019-06-12

## 2019-06-12 RX ORDER — LOSARTAN POTASSIUM 25 MG/1
TABLET ORAL
Status: DISCONTINUED
Start: 2019-06-12 | End: 2019-06-12

## 2019-06-12 RX ORDER — LOSARTAN POTASSIUM 50 MG/1
TABLET ORAL
Status: ACTIVE
Start: 2019-06-12 | End: 2019-06-12

## 2019-06-12 RX ORDER — FERROUS SULFATE 325(65) MG
TABLET ORAL
Status: ACTIVE
Start: 2019-06-12 | End: 2019-06-12

## 2019-06-12 RX ORDER — AMLODIPINE BESYLATE 5 MG/1
TABLET ORAL
Status: ACTIVE
Start: 2019-06-12 | End: 2019-06-12

## 2019-06-12 RX ORDER — LOSARTAN POTASSIUM 50 MG/1
100 TABLET ORAL DAILY
Status: DISCONTINUED | OUTPATIENT
Start: 2019-06-12 | End: 2019-06-13 | Stop reason: HOSPADM

## 2019-06-12 RX ORDER — ATORVASTATIN CALCIUM 40 MG/1
40 TABLET, FILM COATED ORAL
Status: DISCONTINUED | OUTPATIENT
Start: 2019-06-12 | End: 2019-06-13 | Stop reason: HOSPADM

## 2019-06-12 RX ORDER — AMLODIPINE BESYLATE 5 MG/1
5 TABLET ORAL DAILY
Status: DISCONTINUED | OUTPATIENT
Start: 2019-06-12 | End: 2019-06-13 | Stop reason: HOSPADM

## 2019-06-12 RX ORDER — BACLOFEN 10 MG/1
10 TABLET ORAL 3 TIMES DAILY
COMMUNITY
End: 2019-09-03 | Stop reason: SDUPTHER

## 2019-06-12 RX ORDER — ASPIRIN 81 MG/1
81 TABLET, CHEWABLE ORAL DAILY
Status: DISCONTINUED | OUTPATIENT
Start: 2019-06-12 | End: 2019-06-12

## 2019-06-12 RX ORDER — POTASSIUM CHLORIDE 20 MEQ/1
40 TABLET, EXTENDED RELEASE ORAL DAILY
Status: DISCONTINUED | OUTPATIENT
Start: 2019-06-12 | End: 2019-06-13 | Stop reason: HOSPADM

## 2019-06-12 RX ORDER — BACLOFEN 10 MG/1
10 TABLET ORAL 3 TIMES DAILY PRN
Status: DISCONTINUED | OUTPATIENT
Start: 2019-06-12 | End: 2019-06-13 | Stop reason: HOSPADM

## 2019-06-12 RX ORDER — METOPROLOL SUCCINATE 50 MG/1
TABLET, EXTENDED RELEASE ORAL
Status: ACTIVE
Start: 2019-06-12 | End: 2019-06-12

## 2019-06-12 RX ORDER — AMITRIPTYLINE HYDROCHLORIDE 10 MG/1
25 TABLET, FILM COATED ORAL EVERY EVENING
Status: DISCONTINUED | OUTPATIENT
Start: 2019-06-12 | End: 2019-06-13 | Stop reason: HOSPADM

## 2019-06-12 RX ORDER — VITAMIN B COMPLEX
100 TABLET ORAL EVERY EVENING
COMMUNITY

## 2019-06-12 RX ADMIN — NORETHINDRONE ACETATE 5 MG: 5 TABLET ORAL at 00:59

## 2019-06-12 RX ADMIN — FERROUS SULFATE TAB 325 MG (65 MG ELEMENTAL FE) 325 MG: 325 (65 FE) TAB at 08:11

## 2019-06-12 RX ADMIN — BACLOFEN 10 MG: 10 TABLET ORAL at 13:34

## 2019-06-12 RX ADMIN — SENNOSIDES,DOCUSATE SODIUM 2 TABLET: 8.6; 5 TABLET, FILM COATED ORAL at 20:37

## 2019-06-12 RX ADMIN — LOSARTAN POTASSIUM 100 MG: 50 TABLET ORAL at 13:33

## 2019-06-12 RX ADMIN — BACLOFEN 10 MG: 10 TABLET ORAL at 08:14

## 2019-06-12 RX ADMIN — POTASSIUM CHLORIDE 40 MEQ: 1500 TABLET, EXTENDED RELEASE ORAL at 20:37

## 2019-06-12 RX ADMIN — POTASSIUM CHLORIDE 40 MEQ: 1500 TABLET, EXTENDED RELEASE ORAL at 00:59

## 2019-06-12 RX ADMIN — METOPROLOL SUCCINATE 100 MG: 50 TABLET, EXTENDED RELEASE ORAL at 08:08

## 2019-06-12 RX ADMIN — SODIUM CHLORIDE: 9 INJECTION, SOLUTION INTRAVENOUS at 12:16

## 2019-06-12 RX ADMIN — ATORVASTATIN CALCIUM 40 MG: 40 TABLET, FILM COATED ORAL at 20:36

## 2019-06-12 RX ADMIN — AMLODIPINE BESYLATE 5 MG: 5 TABLET ORAL at 20:36

## 2019-06-12 RX ADMIN — NORETHINDRONE ACETATE 5 MG: 5 TABLET ORAL at 17:48

## 2019-06-12 ASSESSMENT — COGNITIVE AND FUNCTIONAL STATUS - GENERAL
MOBILITY SCORE: 15
PERSONAL GROOMING: A LITTLE
MOVING TO AND FROM BED TO CHAIR: A LITTLE
MOVING FROM LYING ON BACK TO SITTING ON SIDE OF FLAT BED: A LOT
DAILY ACTIVITIY SCORE: 14
DRESSING REGULAR UPPER BODY CLOTHING: A LOT
WALKING IN HOSPITAL ROOM: A LOT
TOILETING: A LOT
EATING MEALS: A LITTLE
DRESSING REGULAR LOWER BODY CLOTHING: A LOT
CLIMB 3 TO 5 STEPS WITH RAILING: A LOT
SUGGESTED CMS G CODE MODIFIER DAILY ACTIVITY: CK
STANDING UP FROM CHAIR USING ARMS: A LITTLE
TURNING FROM BACK TO SIDE WHILE IN FLAT BAD: A LITTLE
SUGGESTED CMS G CODE MODIFIER MOBILITY: CK
HELP NEEDED FOR BATHING: A LOT

## 2019-06-12 ASSESSMENT — ENCOUNTER SYMPTOMS
DIZZINESS: 1
SHORTNESS OF BREATH: 1
FEVER: 0
ABDOMINAL PAIN: 1
TINGLING: 0
PALPITATIONS: 1
NAUSEA: 0
NECK PAIN: 0
CONSTIPATION: 0
FLANK PAIN: 0
WHEEZING: 0
HEADACHES: 0
HEMOPTYSIS: 0
CHILLS: 0
WEIGHT LOSS: 0
DIARRHEA: 0
BRUISES/BLEEDS EASILY: 0
WEAKNESS: 1
DOUBLE VISION: 0
SPUTUM PRODUCTION: 0
HEADACHES: 1
BLURRED VISION: 0
VOMITING: 0
COUGH: 0
BLOOD IN STOOL: 0
ORTHOPNEA: 0
PND: 0
DEPRESSION: 0
MYALGIAS: 0
PALPITATIONS: 0
SPEECH CHANGE: 0
HEARTBURN: 0
NERVOUS/ANXIOUS: 0

## 2019-06-12 ASSESSMENT — PATIENT HEALTH QUESTIONNAIRE - PHQ9
2. FEELING DOWN, DEPRESSED, IRRITABLE, OR HOPELESS: NOT AT ALL
SUM OF ALL RESPONSES TO PHQ9 QUESTIONS 1 AND 2: 0
1. LITTLE INTEREST OR PLEASURE IN DOING THINGS: NOT AT ALL

## 2019-06-12 ASSESSMENT — LIFESTYLE VARIABLES: ALCOHOL_USE: NO

## 2019-06-12 NOTE — PROGRESS NOTES
"       Internal Medicine Interval Note  Note Author: Emmett Ramos M.D.     Name Jairo Rivas 1950   Age/Sex 68 y.o. female   MRN [id number]   Code Status Full Code     After 5PM or if no immediate response to page, please call for cross-coverage  Attending/Team: Dr. Fernandes/Juaquin See Patient List for primary contact information  Call [phone] to page    1st Call - Day Intern (R1):   Dr. Ramos 2nd Call - Day Sr. Resident (R2/R3):   Dr. Finn         Reason for interval visit  (Principal Problem)     Vaginal bleeding and dizziness significantly worsened in the last 3 days    Interval Problem Daily Status Update  (24 hours, problem oriented, brief subjective history, new lab/imaging data pertinent to that problem)   Patient was seen resting comfortably in bed with daughter at bedside.  She continues to have dizziness.    -Pelvic ultrasound showed \" thickened endometrial stripe, given age concern for endometrial pathology.  No introitus or simply will be recommended to exclude endometrial carcinoma.\"  -Gynecology consulted by EDP.  Appreciate recommendations  - EDP spoke with Dr. Chicas who recommended dose of progesterone.  Notes suggest Dr. Chicas will see the patient today      Review of Systems   Constitutional: Positive for malaise/fatigue. Negative for chills and fever.   HENT: Negative for hearing loss and tinnitus.    Eyes: Negative for blurred vision and double vision.   Respiratory: Negative for cough and hemoptysis.    Cardiovascular: Positive for palpitations. Negative for chest pain and orthopnea.   Gastrointestinal: Negative for heartburn and nausea.   Genitourinary: Negative for dysuria and urgency.   Musculoskeletal: Negative for myalgias and neck pain.   Neurological: Positive for dizziness, weakness and headaches.   Psychiatric/Behavioral: Negative for depression and suicidal ideas.       Disposition/Barriers to discharge:   Vaginal " bleeding    Consultants/Specialty  OB/GYN    PCP: ZULEYKA Lyons      Quality Measures  Quality-Core Measures   Reviewed items::  Medications reviewed, Radiology images reviewed, Labs reviewed and EKG reviewed  Bonilla catheter::  No Bonilla  DVT prophylaxis - mechanical:  SCDs          Physical Exam       Vitals:    19 2330 19 2350 19 0413 19 0800   BP:  134/74 127/71 136/80   Pulse:  84 90 94   Resp:  18 18 18   Temp:  36.5 °C (97.7 °F) 36.4 °C (97.5 °F) 36.9 °C (98.5 °F)   TempSrc:  Temporal Temporal Temporal   SpO2: 94% 94% 91% 92%   Weight:  84.7 kg (186 lb 11.7 oz)     Height:         Body mass index is 32.05 kg/m². Weight: 84.7 kg (186 lb 11.7 oz)  Oxygen Therapy:  Pulse Oximetry: 92 %, O2 (LPM): 0, O2 Delivery: None (Room Air)    Physical Exam   Constitutional: She is oriented to person, place, and time and well-developed, well-nourished, and in no distress.   HENT:   Head: Normocephalic and atraumatic.   Eyes: Pupils are equal, round, and reactive to light. EOM are normal.   Neck: Normal range of motion. Neck supple. No thyromegaly present.   Cardiovascular: Normal rate, regular rhythm, normal heart sounds and intact distal pulses.    No murmur heard.  Pulmonary/Chest: Effort normal and breath sounds normal. No respiratory distress.   Abdominal: Soft. Bowel sounds are normal. She exhibits no distension.   Musculoskeletal: Normal range of motion. She exhibits no edema.   3/5 right upper and lower extremity weakness  Right arm/hand contracted   Neurological: She is alert and oriented to person, place, and time. No cranial nerve deficit.   Skin: Skin is warm and dry. She is not diaphoretic.   Psychiatric: Mood, memory, affect and judgment normal.             Assessment/Plan     * Post-menopause bleeding- (present on admission)   Assessment & Plan    Patient is a 68-year-old  female admitted for vaginal bleeding status post menopause that has gotten significantly worse  "within the last 3 days  - She has been having intermittent irregular menstruation for the last 7 years, however, it has gotten much worse recently  -Patient was evaluated by Dr. Chicas sometime in August 2018.  Unclear about the recommendations as the patient does not know.  -Pelvic ultrasound showed \" thickened endometrial stripe, given age concern for endometrial pathology.  No introitus or simply will be recommended to exclude endometrial carcinoma.\"  -Patient's hemoglobin improved to 9.6 from 8.1 overnight    Plan  Gynecology consulted.  Appreciate recommendations  EDP spoke with Dr. Chicas who recommended dose of progesterone  Will get in touch with Dr. Chicas for further recommendations             Acute on chronic blood loss anemia (HCC)   Assessment & Plan    Worsening vaginal bleeding for the last 3 days.  Soaking up to 8 pads per day  Continues to feel dizzy     .7 with normal B12 levels and TSH  Pending folate levels  Iron panel consistent with blood loss/iron deficiency anemia  No indication for transfusion at this time  Continue ferrous sulfate     Hypokalemia- (present on admission)   Assessment & Plan    Replete as necessary     Essential hypertension- (present on admission)   Assessment & Plan    BP well controlled  On home amlodipine, losartan and metoprolol  Continue home meds     CVA, old, hemiparesis (HCC)- (present on admission)   Assessment & Plan    Stable  3/right upper and lower extremity weakness  Right arm/hand contracted         "

## 2019-06-12 NOTE — ED TRIAGE NOTES
"Pt arrived via EMS stating she had a syncopal episode today. Pt relays she has vaginal bleeding x1 week and has seen ob/gyn for this issue. Patient is to get an ablasion in the future for post menopausal bleeding. Pt states she felt a \"gush\" of blood today, then passed out. Daughter caught patient and no injuries were relayed. Pt c/o intermittent abd cramping. Pt has hx of anemia. Pt has hx of stroke with right side defect.   Pt was given 500cc normal saline and 4mg zofran IV.   Pt is alert, oriented x3. Daughter is at bedside.   "

## 2019-06-12 NOTE — PROGRESS NOTES
Received bedside report from night nurse.  Pt is resting and does not appear to be in distress.  Call light within reach.  Fall precautions in place.

## 2019-06-12 NOTE — ASSESSMENT & PLAN NOTE
Worsening vaginal bleeding for the last 3 days.  Soaking up to 8 pads per day  Continues to feel dizzy     .7 with normal B12 levels and TSH  Pending folate levels  Iron panel consistent with blood loss/iron deficiency anemia  No indication for transfusion at this time  Continue ferrous sulfate

## 2019-06-12 NOTE — PROGRESS NOTES
Assumed care of patient from ED. Pt arrived to the unit via gurney escorted by on ZOLL. VSS. Assessment complete. A&O x 3. No signs of distress noted at this time. Slight expressive apashia noted as well as R sided deficits from past CVA. Lower R side droop and R arm contracture present. Tele monitor in place, monitor room notified. SR 83 on monitor. Pt denies pain. Pt is oriented to the unit, call light, phone system. Daughter at bedside. Fall precautions in place and appropriate signs in place. Call light within reach. Bed is locked and in the lowest position. Pt is educated regarding fall precautions and importance of calling the stuff for assistance. Pt denies any additional needs at this time. Will continue to monitor.

## 2019-06-12 NOTE — PROGRESS NOTES
2 RN skin check complete.   Devices in place N/A.  Skin assessed under devices: N/A, CDI.  Confirmed pressure ulcers found on N/A.  New potential pressure ulcers noted on N/A. Wound consult placed N/A.  The following interventions in place: frequent repositioning, pillows used for support, washcloths used in R hand contracture. BL feet dry, pink, intact.

## 2019-06-12 NOTE — SENIOR ADMIT NOTE
SENIOR ADMIT NOTE:    Ms. Rivas is a 68F with PMHx of CVA with R-sided chronic deficits, well-controlled HTN, abnormal post-menopausal vaginal bleeding. 1 year PTA she saw Gynecology for this latter problem and was told she had a thickened endometrium; she is unsure whether she had a bx and has not had f/u since. Over the last week she has experienced heavy bleeding, soaking through 6-8 pads per day up until the last 3 days, when this has increased to 8-10 pads/day. Bleeding is accompanied by lower abdominal pain; some SOB while lying down, not with exertion; and an episode of presyncope while going to the bathroom. She reports chills but denies fever, n/v, SOB, CP, or other sources of bleeding including hematemesis, hemoptysis, BRBPR, and melena.     P/E: notable for middle-aged woman in NAD lying in hospital bed with LLQ/RLQ tenderness without rebound/guarding.    A/P:   # Abnormal vaginal post-menopausal bleeding  # Presyncope   -presyncope likely secondary to progressive anemia, though we should attempt to r/o other causes   -admitted to tele; placed RN order for orthostatics. Fall risk precautions. Cont pulse ox, O2 PRN.   -trended H&H overnight: Hgb decreased from 9.3 in ED to 8.1 on recheck. Ordered repeat H&H for 0900 (6 hours after last); day team to f/u   -hold DVT ppx and ASA; SCDs instead   -gentle IVF   -COD/ABO and FOBT ordered   -day team to continue trending H&H and transfuse for Hgb <7   -day team to consider Gyn consult; pt likely requires endometrial bx if none prior, and may need ablation if bleeding proceeds unabated   -not holding anti-hypertensives for now as patient is maintaining pressures well; however, day team to closely monitor and hold amlodipine, metoprolol, losartan if BPs begin dropping

## 2019-06-12 NOTE — ASSESSMENT & PLAN NOTE
"Patient is a 68-year-old  female admitted for vaginal bleeding status post menopause that has gotten significantly worse within the last 3 days  - She has been having intermittent irregular menstruation for the last 7 years, however, it has gotten much worse recently  -Patient was evaluated by Dr. Chicas sometime in 2018.  Unclear about the recommendations as the patient does not know.  -Pelvic ultrasound showed \" thickened endometrial stripe, given age concern for endometrial pathology.  No introitus or simply will be recommended to exclude endometrial carcinoma.\"  -Patient's hemoglobin improved to 9.6 from 8.1 overnight    Plan  Gynecology consulted.  Appreciate recommendations  EDP spoke with Dr. Chicas who recommended dose of progesterone  Will get in touch with Dr. Chicas for further recommendations          "

## 2019-06-12 NOTE — PROGRESS NOTES
Patient is not bleeding very much now after taking the Aygestin last night, but still feels a little weak. HGB=9.5 today. U/S today shows the uterus to be enlarged 12x7x6 cm and the lining is just over 1 cm. Patient needs to have a hysteroscopy D&C to determine path. May need to consider hysterectomy as well. Discussed with the patient's daughter and they would like to do the hysteroscopy as an outpatient. I will have Holly in my office schedule her for this. The patient should go home on Aygestin 5mg q d #30 RF= x1 year. If she goes off of this, she will bleed again. I think the patient should be able to go home tomorrow as long as she is feeling better and not bleeding. She can followup as an outpatient with me. Office phone is [phone].

## 2019-06-12 NOTE — CARE PLAN
Problem: Knowledge Deficit  Goal: Knowledge of disease process/condition, treatment plan, diagnostic tests, and medications will improve    Intervention: Assess knowledge level of disease process/condition, treatment plan, diagnostic tests, and medications  RN will assess knowledge of disease process and provide education as appropriate.

## 2019-06-12 NOTE — H&P
Internal Medicine Admitting History and Physical    Note Author: Lu Benjamin M.D.       Name Jairo Rivas     1950   Age/Sex 68 y.o. female   MRN [id number]   Code Status Full     After 5PM or if no immediate response to page, please call for cross-coverage  Attending/Team: Dr. Fernandes/Juaquin See Patient List for primary contact information  Call [phone] to page    1st Call - Day Intern (R1):   Dr. Emmett Ramos  2nd Call - Day Sr. Resident (R2/R3):   Dr. Finn     Chief Complaint:   Increased vaginal bleeding     HPI:  Jairo Rivas is a 68-year-old female with a past medical history of stroke with residual right-sided weakness, hypertension, dyslipidemia, chronic blood loss anemia secondary to vaginal bleeding who presents with increased vaginal bleeding and dizziness over the last few days.  Patient states that she has had on and off bleeding over the last year with increased vaginal bleeding over the last week consuming about 6 pads/day, with increased vaginal bleeding over the last 3 days to consume about 8-10 pads per day.  Patient also states she is more dizzy even while lying down, shortness of breath not on exertion but while lying down as well, fatigue, lower abdominal pressure.  Denies chest pain, fever/chills, flank pain, urinary symptoms, hematochezia/melena, bleeding from other sites, pica.  Denies any trauma to the area, is on aspirin for previous stroke indications, no other anticoagulant use.  She also states that she was seen by a gynecologist over a year ago who at the time told her that her uterine lining was very thick and recommended some kind of sampling however she is on sure if she received this procedure or not.  She has since been lost to follow-up.  She recently reestablished with a PCP but has not seen a gynecologist since last year.    Emergency room, patient was hemodynamically stable, CBC showed WBC: 9.5, H&H 9.3/29.9, Na: 142, K: 3.2,  creatinine 0.82, troponin negative, EKG shows sinus rhythm.  Transabdominal pelvic ultrasound also showed thickened endometrial stripe.  Patient was given norethindrone, and admitted for observation.  Review of Systems   Constitutional: Positive for malaise/fatigue. Negative for chills, fever and weight loss.   HENT: Negative for hearing loss and tinnitus.    Eyes: Negative for blurred vision and double vision.   Respiratory: Positive for shortness of breath. Negative for cough, hemoptysis, sputum production and wheezing.    Cardiovascular: Negative for chest pain, palpitations, orthopnea, leg swelling and PND.   Gastrointestinal: Positive for abdominal pain. Negative for blood in stool, constipation, diarrhea, heartburn, melena, nausea and vomiting.   Genitourinary: Negative for dysuria, flank pain, frequency, hematuria and urgency.   Musculoskeletal: Negative for joint pain and myalgias.   Skin: Negative for itching and rash.   Neurological: Positive for dizziness and weakness. Negative for tingling, speech change and headaches.   Endo/Heme/Allergies: Negative for environmental allergies. Does not bruise/bleed easily.   Psychiatric/Behavioral: Negative for depression. The patient is not nervous/anxious.          Past Medical History (Chronic medical problem, known complications and current treatment)    Past Medical History:   Diagnosis Date   • Anemia 2/6/2018    Iron def, post-menopausal bleeding.   • CVA, old, hemiparesis (HCC) 2/6/2018    Residual left sided weakness   • Essential hypertension 2/6/2018    Well controlled on amlodipine 10 mg and metoprolol 100 mg bid.   • Post-menopause bleeding 2/6/2018    Menopause in 2010 and bleeding 2012 started to have irregular continuous bleeding.    • Stroke (HCC)    • Vision abnormalities 2/6/2018   • Vitamin D deficiency 2/6/2018    Taking 50,000 IU once weekly for 4 yrs.     Past Surgical History:  History reviewed. No pertinent surgical history.    Current  Outpatient Medications:  Home Medications     Reviewed by Kavita Hatfield (Pharmacy Tech) on 06/12/19 at 0002  Med List Status: Complete   Medication Last Dose Status   acetaminophen (TYLENOL) 500 MG Tab 6/11/2019 Active   amitriptyline (ELAVIL) 25 MG Tab 6/10/2019 Active   amLODIPine (NORVASC) 5 MG Tab 6/11/2019 Active   aspirin (ASA) 81 MG Chew Tab chewable tablet 6/11/2019 Active   atorvastatin (LIPITOR) 20 MG Tab 6/11/2019 Active   B Complex-C (SUPER B COMPLEX PO) 6/11/2019 Active   baclofen (LIORESAL) 10 MG Tab 6/11/2019 Active   Cholecalciferol (VITAMIN D3 PO) 6/10/2019 Active   Coenzyme Q10 (COQ10) 100 MG Cap 6/11/2019 Active   ferrous sulfate 325 (65 Fe) MG tablet 6/11/2019 Active   losartan (COZAAR) 100 MG Tab 6/11/2019 Active   metoprolol SR (TOPROL XL) 100 MG TABLET SR 24 HR 6/11/2019 Active   multivitamin (THERAGRAN) Tab 6/11/2019 Active                Medication Allergy/Sensitivities:  Allergies   Allergen Reactions   • Penicillins Itching     Makes her feel weak    • Hydrocodone Itching     Swelling of the tongue  Face swelling   • Levaquin Itching     Swelling of the tongue  Facial swelling   • Tramadol Hives     Swelling         Family History (mandatory)   Family History   Problem Relation Age of Onset   • Hypertension Mother    • No Known Problems Father    • No Known Problems Brother        Social History (mandatory)   Social History     Social History   • Marital status: Single     Spouse name: N/A   • Number of children: N/A   • Years of education: N/A     Occupational History   • Not on file.     Social History Main Topics   • Smoking status: Never Smoker   • Smokeless tobacco: Never Used   • Alcohol use No   • Drug use: No   • Sexual activity: No      Comment: , have 7 kids.     Other Topics Concern   •  Service No   • Blood Transfusions No   • Caffeine Concern No   • Occupational Exposure No   • Hobby Hazards No   • Sleep Concern Yes   • Stress Concern No   • Weight Concern  "Yes   • Special Diet No   • Back Care Yes   • Exercise Yes   • Bike Helmet No     Does Not Ride Bike   • Seat Belt Yes   • Self-Exams Yes     Social History Narrative   • No narrative on file     Living situation: Lives with daughter and grandkids in [place]   PCP : ZULEYKA Lyons    Physical Exam     Vitals:    06/11/19 2130 06/11/19 2230 06/11/19 2330 06/11/19 2350   BP:    134/74   Pulse:    84   Resp:    18   Temp:    36.5 °C (97.7 °F)   TempSrc:    Temporal   SpO2: 94% 95% 94% 94%   Weight:    84.7 kg (186 lb 11.7 oz)   Height:         Body mass index is 32.05 kg/m².  /74   Pulse 84   Temp 36.5 °C (97.7 °F) (Temporal)   Resp 18   Ht 1.626 m (5' 4\")   Wt 84.7 kg (186 lb 11.7 oz)   SpO2 94%   Breastfeeding? No   BMI 32.05 kg/m²   O2 therapy: Pulse Oximetry: 94 %, O2 Delivery: None (Room Air)    Physical Exam   Constitutional: She is oriented to person, place, and time and well-developed, well-nourished, and in no distress. No distress.   HENT:   Head: Normocephalic.   Mouth/Throat: Oropharynx is clear and moist. No oropharyngeal exudate.   Eyes: Pupils are equal, round, and reactive to light.   Pale palpebral conjunctiva   Neck: Normal range of motion. No JVD present.   Cardiovascular: Normal rate, regular rhythm, normal heart sounds and intact distal pulses.  Exam reveals no gallop and no friction rub.    No murmur heard.  Pulmonary/Chest: Effort normal and breath sounds normal. No respiratory distress. She has no wheezes. She has no rales.   Abdominal: Soft. Bowel sounds are normal. She exhibits no distension. There is no tenderness. There is no rebound and no guarding.   Musculoskeletal: Normal range of motion. She exhibits no edema.   Contracture of the right upper extremity, 2/5 motor strength  Left lower extremity: 3/5 motor strength   Lymphadenopathy:     She has no cervical adenopathy.   Neurological: She is alert and oriented to person, place, and time. No cranial nerve deficit. "   Skin: Skin is warm and dry. She is not diaphoretic.   Pale nailbeds   Psychiatric: Mood, memory, affect and judgment normal.   Vitals reviewed.    Data Review       Old Records Request:   Deferred  Current Records review/summary: Completed    Lab Data Review:  Recent Results (from the past 24 hour(s))   EKG    Collection Time: 19  7:10 PM   Result Value Ref Range    Report       [facility] Emergency Dept.    Test Date:  2019  Pt Name:    FABI BEASLEY            Department: ER  MRN:        [id number]                      Room:       [facility]  Gender:     Female                       Technician: [id number]  :        1950                   Requested By:ER TRIAGE PROTOCOL  Order #:    [id number]                    Reading MD: ROB ALEXANDRE MD    Measurements  Intervals                                Axis  Rate:       79                           P:          67  HI:         192                          QRS:        62  QRSD:       74                           T:          39  QT:         360  QTc:        413    Interpretive Statements  SINUS RHYTHM  No previous ECG available for comparison  No evidence of ischemia or arrhythmia  Electronically Signed On 2019 2:27:51 PDT by ROB ALEXANDRE MD     COMP METABOLIC PANEL    Collection Time: 19  7:39 PM   Result Value Ref Range    Sodium 142 135 - 145 mmol/L    Potassium 3.2 (L) 3.6 - 5.5 mmol/L    Chloride 109 96 - 112 mmol/L    Co2 25 20 - 33 mmol/L    Anion Gap 8.0 0.0 - 11.9    Glucose 117 (H) 65 - 99 mg/dL    Bun 9 8 - 22 mg/dL    Creatinine 0.82 0.50 - 1.40 mg/dL    Calcium 8.9 8.5 - 10.5 mg/dL    AST(SGOT) 21 12 - 45 U/L    ALT(SGPT) 19 2 - 50 U/L    Alkaline Phosphatase 69 30 - 99 U/L    Total Bilirubin 0.5 0.1 - 1.5 mg/dL    Albumin 3.7 3.2 - 4.9 g/dL    Total Protein 6.7 6.0 - 8.2 g/dL    Globulin 3.0 1.9 - 3.5 g/dL    A-G Ratio 1.2 g/dL   TROPONIN    Collection Time: 19  7:39 PM   Result Value Ref Range     Troponin I <0.01 0.00 - 0.04 ng/mL   COD (ADULT)    Collection Time: 06/11/19  7:39 PM   Result Value Ref Range    ABO Grouping Only AB     Rh Grouping Only POS     Antibody Screen-Cod NEG    ESTIMATED GFR    Collection Time: 06/11/19  7:39 PM   Result Value Ref Range    GFR If African American >60 >60 mL/min/1.73 m 2    GFR If Non African American >60 >60 mL/min/1.73 m 2   PROTHROMBIN TIME (INR)    Collection Time: 06/11/19  8:13 PM   Result Value Ref Range    PT 13.2 12.0 - 14.6 sec    INR 0.98 0.87 - 1.13   APTT    Collection Time: 06/11/19  8:13 PM   Result Value Ref Range    APTT 22.5 (L) 24.7 - 36.0 sec   ABO Rh Confirm    Collection Time: 06/11/19  8:13 PM   Result Value Ref Range    ABO Rh Confirm AB POS    CBC WITH DIFFERENTIAL    Collection Time: 06/11/19  8:13 PM   Result Value Ref Range    WBC 9.5 4.8 - 10.8 K/uL    RBC 2.95 (L) 4.20 - 5.40 M/uL    Hemoglobin 9.3 (L) 12.0 - 16.0 g/dL    Hematocrit 29.9 (L) 37.0 - 47.0 %    .4 (H) 81.4 - 97.8 fL    MCH 31.5 27.0 - 33.0 pg    MCHC 31.1 (L) 33.6 - 35.0 g/dL    RDW 49.2 35.9 - 50.0 fL    Platelet Count 261 164 - 446 K/uL    MPV 10.5 9.0 - 12.9 fL    Neutrophils-Polys 81.00 (H) 44.00 - 72.00 %    Lymphocytes 14.30 (L) 22.00 - 41.00 %    Monocytes 4.00 0.00 - 13.40 %    Eosinophils 0.10 0.00 - 6.90 %    Basophils 0.30 0.00 - 1.80 %    Immature Granulocytes 0.30 0.00 - 0.90 %    Nucleated RBC 0.00 /100 WBC    Neutrophils (Absolute) 7.69 (H) 2.00 - 7.15 K/uL    Lymphs (Absolute) 1.36 1.00 - 4.80 K/uL    Monos (Absolute) 0.38 0.00 - 0.85 K/uL    Eos (Absolute) 0.01 0.00 - 0.51 K/uL    Baso (Absolute) 0.03 0.00 - 0.12 K/uL    Immature Granulocytes (abs) 0.03 0.00 - 0.11 K/uL    NRBC (Absolute) 0.00 K/uL     Imaging/Procedures Review:    Independant Imaging Review: Completed     US-PELVIC COMPLETE (TRANSABDOMINAL/TRANSVAGINAL) (COMBO)   Final Result         1.  Heterogeneous uterine lesion, likely fibroid.   2.  Thickened endometrial stripe, given age  concerning for endometrial pathology. Endometrial tissue sampling would be recommended to exclude endometrial carcinoma.   3.  Small free fluid collection in the pelvis.   4.  Nabothian cysts        EKG:   EKG Independent Review: Completed  QTc:413, HR: 80, Normal Sinus Rhythm, no ST/T changes    Records reviewed and summarized in current documentation :  Yes         Assessment/Plan     * Post-menopause bleeding- (present on admission)   Assessment & Plan    Patient unsure when menopause was but according to previous notes 2010, after her stroke.  Then in 2012 started having irregular bleeding with secondary deficiency anemia.  Dr. Chicas of gynecology saw her in August 2018 with a thickened endometrial lining, given a trial of medication.  Patient lost to follow-up, she was unsure if biopsy had been done at the time.  Secondary to thickened endometrium, possible endometrial cancer, uterine fibroid  -Increased heavy bleeding in the last week  -Ultrasound during this admission showed: Thickened endometrial stripe  -Hold pharmacologic DVT prophylaxis for now, SCDs  -Will need endometrial biopsy     Acute on chronic blood loss anemia (HCC)   Assessment & Plan    Secondary to chronic vaginal bleeding, recent increase in vaginal bleeding  -Recent B12 and folate levels adequate  -We will recheck iron levels, will obviously be low  -Primary team to initiate iron therapy if warranted  -Monitor CBC, transfuse if < 7, or <8 with symptoms/signs of end-organ damage     Hypokalemia- (present on admission)   Assessment & Plan    Replete as necessary     Essential hypertension- (present on admission)   Assessment & Plan    BP well controlled  -Continue home medications     CVA, old, hemiparesis (HCC)- (present on admission)   Assessment & Plan    Stable       Anticipated Hospital stay: Observation admit    Quality Measures  Quality-Core Measures   Reviewed items::  Labs reviewed, Medications reviewed, Radiology images reviewed and  EKG reviewed  Bonilla catheter::  No Bonilla  DVT prophylaxis pharmacological::  Contraindicated - High bleeding risk  DVT prophylaxis - mechanical:  SCDs  Ulcer Prophylaxis::  Not indicated    PCP: ZULEYKA Lyons

## 2019-06-12 NOTE — ED PROVIDER NOTES
"ED Provider Note    CHIEF COMPLAINT  Chief Complaint   Patient presents with   • Syncope       HPI  Jairo Rivas is a 68 y.o. female who presents after syncopal event.  She was walking to the bathroom, became lightheaded and called to her daughter.  Her daughter was able to catch her fall.  The daughter believes her mother was unconscious for approximately 10 minutes.  She states she has been looking more pale.  Patient states she went through menopause approximately 5 years ago when she had had a stroke.  In the past several months has developed a vaginal bleeding, seen at the office of Dr. Chicas according to the patient's daughter.  Patient in the past 3 days is developed more heavy vaginal bleeding, soaking 8 pads daily.  Daughter states they were told that there may need to be a uterine ablation.  She does not take any medication to stop the bleeding at this time, no hormonal replacement.  Patient takes daily aspirin since her stroke    REVIEW OF SYSTEMS    Constitutional: Weakness  Respiratory: No shortness of breath  Cardiac: Syncope today.  Denies chest pain  Gastrointestinal: No abdominal pain  Musculoskeletal: No acute neck or back pain  Neurologic: Previous stroke, right arm weakness, she is able to walk but states \"the right leg does not work well\"  Genitourinary: Vaginal bleeding       All other systems are negative.       PAST MEDICAL HISTORY  Past Medical History:   Diagnosis Date   • Anemia 2/6/2018    Iron def, post-menopausal bleeding.   • CVA, old, hemiparesis (HCC) 2/6/2018    Residual left sided weakness   • Essential hypertension 2/6/2018    Well controlled on amlodipine 10 mg and metoprolol 100 mg bid.   • Post-menopause bleeding 2/6/2018    Menopause in 2010 and bleeding 2012 started to have irregular continuous bleeding.    • Stroke (HCC)    • Vision abnormalities 2/6/2018   • Vitamin D deficiency 2/6/2018    Taking 50,000 IU once weekly for 4 yrs.       FAMILY HISTORY  Family " History   Problem Relation Age of Onset   • Hypertension Mother    • No Known Problems Father    • No Known Problems Brother        SOCIAL HISTORY  Social History     Social History   • Marital status: Single     Spouse name: N/A   • Number of children: N/A   • Years of education: N/A     Social History Main Topics   • Smoking status: Never Smoker   • Smokeless tobacco: Never Used   • Alcohol use No   • Drug use: No   • Sexual activity: No      Comment: , have 7 kids.     Other Topics Concern   •  Service No   • Blood Transfusions No   • Caffeine Concern No   • Occupational Exposure No   • Hobby Hazards No   • Sleep Concern Yes   • Stress Concern No   • Weight Concern Yes   • Special Diet No   • Back Care Yes   • Exercise Yes   • Bike Helmet No     Does Not Ride Bike   • Seat Belt Yes   • Self-Exams Yes     Social History Narrative   • No narrative on file       SURGICAL HISTORY  History reviewed. No pertinent surgical history.    CURRENT MEDICATIONS  Home Medications     Reviewed by Kavita Hatfield (Pharmacy Tech) on 06/12/19 at 0002  Med List Status: Complete   Medication Last Dose Status   acetaminophen (TYLENOL) 500 MG Tab 6/11/2019 Active   amitriptyline (ELAVIL) 25 MG Tab 6/10/2019 Active   amLODIPine (NORVASC) 5 MG Tab 6/11/2019 Active   aspirin (ASA) 81 MG Chew Tab chewable tablet 6/11/2019 Active   atorvastatin (LIPITOR) 20 MG Tab 6/11/2019 Active   B Complex-C (SUPER B COMPLEX PO) 6/11/2019 Active   baclofen (LIORESAL) 10 MG Tab 6/11/2019 Active   Cholecalciferol (VITAMIN D3 PO) 6/10/2019 Active   Coenzyme Q10 (COQ10) 100 MG Cap 6/11/2019 Active   ferrous sulfate 325 (65 Fe) MG tablet 6/11/2019 Active   losartan (COZAAR) 100 MG Tab 6/11/2019 Active   metoprolol SR (TOPROL XL) 100 MG TABLET SR 24 HR 6/11/2019 Active   multivitamin (THERAGRAN) Tab 6/11/2019 Active                ALLERGIES  Allergies   Allergen Reactions   • Penicillins Itching     Makes her feel weak    • Hydrocodone  "Itching     Swelling of the tongue  Face swelling   • Levaquin Itching     Swelling of the tongue  Facial swelling   • Tramadol Hives     Swelling       PHYSICAL EXAM  VITAL SIGNS: /74   Pulse 84   Temp 36.5 °C (97.7 °F) (Temporal)   Resp 18   Ht 1.626 m (5' 4\")   Wt 84.7 kg (186 lb 11.7 oz)   SpO2 94%   Breastfeeding? No   BMI 32.05 kg/m²   Constitutional:  Non-toxic appearance.   HENT: No facial swelling  Eyes: Anicteric, no conjunctivitis.   Pale conjunctiva  Cardiovascular: Normal heart rate, Normal rhythm  Pulmonary:  No wheezing, No rales.   Gastrointestinal: Soft, No tenderness, No masses  Skin: Warm, Dry, No cyanosis.  Genitourinary: Deferred  Neurologic: Speech is clear, follows commands, facial expression is symmetrical.  Weakness right arm, weakness right leg  Psychiatric: Affect normal,  Mood normal.  Patient is calm and cooperative  Musculoskeletal: Contracture right arm.    EKG/Labs  Results for orders placed or performed during the hospital encounter of 06/11/19   COMP METABOLIC PANEL   Result Value Ref Range    Sodium 142 135 - 145 mmol/L    Potassium 3.2 (L) 3.6 - 5.5 mmol/L    Chloride 109 96 - 112 mmol/L    Co2 25 20 - 33 mmol/L    Anion Gap 8.0 0.0 - 11.9    Glucose 117 (H) 65 - 99 mg/dL    Bun 9 8 - 22 mg/dL    Creatinine 0.82 0.50 - 1.40 mg/dL    Calcium 8.9 8.5 - 10.5 mg/dL    AST(SGOT) 21 12 - 45 U/L    ALT(SGPT) 19 2 - 50 U/L    Alkaline Phosphatase 69 30 - 99 U/L    Total Bilirubin 0.5 0.1 - 1.5 mg/dL    Albumin 3.7 3.2 - 4.9 g/dL    Total Protein 6.7 6.0 - 8.2 g/dL    Globulin 3.0 1.9 - 3.5 g/dL    A-G Ratio 1.2 g/dL   TROPONIN   Result Value Ref Range    Troponin I <0.01 0.00 - 0.04 ng/mL   PROTHROMBIN TIME (INR)   Result Value Ref Range    PT 13.2 12.0 - 14.6 sec    INR 0.98 0.87 - 1.13   APTT   Result Value Ref Range    APTT 22.5 (L) 24.7 - 36.0 sec   COD (ADULT)   Result Value Ref Range    ABO Grouping Only AB     Rh Grouping Only POS     Antibody Screen-Cod NEG    ABO Rh " Confirm   Result Value Ref Range    ABO Rh Confirm AB POS    ESTIMATED GFR   Result Value Ref Range    GFR If African American >60 >60 mL/min/1.73 m 2    GFR If Non African American >60 >60 mL/min/1.73 m 2   CBC WITH DIFFERENTIAL   Result Value Ref Range    WBC 9.5 4.8 - 10.8 K/uL    RBC 2.95 (L) 4.20 - 5.40 M/uL    Hemoglobin 9.3 (L) 12.0 - 16.0 g/dL    Hematocrit 29.9 (L) 37.0 - 47.0 %    .4 (H) 81.4 - 97.8 fL    MCH 31.5 27.0 - 33.0 pg    MCHC 31.1 (L) 33.6 - 35.0 g/dL    RDW 49.2 35.9 - 50.0 fL    Platelet Count 261 164 - 446 K/uL    MPV 10.5 9.0 - 12.9 fL    Neutrophils-Polys 81.00 (H) 44.00 - 72.00 %    Lymphocytes 14.30 (L) 22.00 - 41.00 %    Monocytes 4.00 0.00 - 13.40 %    Eosinophils 0.10 0.00 - 6.90 %    Basophils 0.30 0.00 - 1.80 %    Immature Granulocytes 0.30 0.00 - 0.90 %    Nucleated RBC 0.00 /100 WBC    Neutrophils (Absolute) 7.69 (H) 2.00 - 7.15 K/uL    Lymphs (Absolute) 1.36 1.00 - 4.80 K/uL    Monos (Absolute) 0.38 0.00 - 0.85 K/uL    Eos (Absolute) 0.01 0.00 - 0.51 K/uL    Baso (Absolute) 0.03 0.00 - 0.12 K/uL    Immature Granulocytes (abs) 0.03 0.00 - 0.11 K/uL    NRBC (Absolute) 0.00 K/uL   EKG   Result Value Ref Range    Report       [facility] Emergency Dept.    Test Date:  2019  Pt Name:    FABI BEASLEY            Department: ER  MRN:        [id number]                      Room:       T717  Gender:     Female                       Technician: [id number]  :        1950                   Requested By:ER TRIAGE PROTOCOL  Order #:    [id number]                    Reading MD: ROB ALEXANDRE MD    Measurements  Intervals                                Axis  Rate:       79                           P:          67  OH:         192                          QRS:        62  QRSD:       74                           T:          39  QT:         360  QTc:        413    Interpretive Statements  SINUS RHYTHM  No previous ECG available for comparison  No evidence of  ischemia or arrhythmia  Electronically Signed On 6- 2:27:51 PDT by EVERT ALEXANDRE MD           RADIOLOGY/PROCEDURES  US-PELVIC COMPLETE (TRANSABDOMINAL/TRANSVAGINAL) (COMBO)   Final Result         1.  Heterogeneous uterine lesion, likely fibroid.   2.  Thickened endometrial stripe, given age concerning for endometrial pathology. Endometrial tissue sampling would be recommended to exclude endometrial carcinoma.   3.  Small free fluid collection in the pelvis.   4.  Nabothian cysts            COURSE & MEDICAL DECISION MAKING  Pertinent Labs & Imaging studies reviewed. (See chart for details)  Patient has dropped her hemoglobin 3 points from previous value of 12.6 on June 1.  Ultrasound demonstrates possible fibroid, thickened endometrial stripe.  I discussed the case with Dr. Chicas who has recommended progesterone daily, states she we will follow the patient up.  Given the significant drop of hemoglobin, symptomatic weakness and syncopal event today, patient is admitted to medicine for ongoing work-up and stabilization.    FINAL IMPRESSION     1. Syncope, unspecified syncope type    2. Dysfunctional uterine bleeding    3. Anemia, unspecified type                    Electronically signed by: Evert Alexandre, 6/12/2019 2:27 AM

## 2019-06-12 NOTE — PROGRESS NOTES
Pt A&Ox3. Dizziness at times.  Right arm contracted. RSW. RFD.  Pills whole.  Up 1-2PA to bathroom.  BM this am.  Guaic stool sample negative.  Gyno consult today.  VSS.

## 2019-06-12 NOTE — DISCHARGE SUMMARY
Internal Medicine Discharge Summary  Note Author: Emmett Ramos M.D.       Name Jairo Rivas 1950   Age/Sex 68 y.o. female   MRN [id number]         Admit Date:  2019       Discharge Date:   2019    Service:   [facility] Internal Medicine Purple team  Attending Physician(s):   Dr. Fernandes  Senior Resident(s):   Dr. Finn  Alphonso Resident(s):   Dr. Ramos  PCP: ZULEYKA Lyons      Primary Diagnosis:   Vaginal bleeding    Secondary Diagnoses:                Principal Problem:    Post-menopause bleeding POA: Yes      Overview: Menopause in  and bleeding  started to have irregular continuous       bleeding.       Secondary iron def anemia.      Seen by Dr. Chicas Aug 2018. Thick uterine lining on US.      Trial of medication therapy, if no improvement will go back to surgery.   Active Problems:    Acute on chronic blood loss anemia (HCC) POA: Unknown    Hypokalemia POA: Yes      Overview: Not medication induced. Taking OTC K supplements.     CVA, old, hemiparesis (HCC) POA: Yes      Overview: Residual right sided weakness s/p stroke .      Used to be on Plavix, down graded to aspirin only because of bleeding.      Simvastatin 20 mg daily.      Done with PT in [place].       Spasticity on Baclofen.     Essential hypertension POA: Yes      Overview: Well controlled on amlodipine 5 mg and metoprolol 100 mg bid. Valsartan       160 mg switched to losartan 100 mg 2/2 nation wide shortage.  Resolved Problems:    * No resolved hospital problems. *      Hospital Summary (Brief Narrative):       Patient is a 68-year-old female admitted for worsening vaginal bleeding 3 days prior to admission.  Past medical history significant for CVA in 2010 with subsequent right sided hemiplegia, iron deficiency anemia and hypertension.  EDP consulted Dr. Chicas(gynecology) who recommended initiation of norethindrone.  Patient's bleeding improved. Spoke with Dr. Chicas.  Recommended discharging patient with norethindrone. Planning on dilation and curettage with potential hysterectomy in the outpatient setting. Her office will call patient to set up appointment. Hemoglobin stabilized. Patient was ambulate without significant limitations. Back to baseline. Presenting symptoms of dizziness/lightheaded subsided.     Consultants:     GYN    Procedures:        None    Imaging/ Testing:      US-PELVIC COMPLETE (TRANSABDOMINAL/TRANSVAGINAL) (COMBO)   Final Result         1.  Heterogeneous uterine lesion, likely fibroid.   2.  Thickened endometrial stripe, given age concerning for endometrial pathology. Endometrial tissue sampling would be recommended to exclude endometrial carcinoma.   3.  Small free fluid collection in the pelvis.   4.  Nabothian cysts            Discharge Medications:         Medication Reconciliation: Completed       Medication List      START taking these medications      Instructions   ascorbic acid 500 MG tablet  Start taking on:  6/14/2019  Commonly known as:  VITAMIN C   Take 1 Tab by mouth every day.  Dose:  500 mg     norethindrone 5 MG tablet  Start taking on:  6/14/2019  Commonly known as:  AYGESTIN   Take 1 Tab by mouth every day.  Dose:  5 mg        CHANGE how you take these medications      Instructions   atorvastatin 20 MG Tabs  What changed:  how much to take  Commonly known as:  LIPITOR   Doctor's comments:  I69.359  Take 2 Tabs by mouth every day. I69.359  Dose:  40 mg        CONTINUE taking these medications      Instructions   acetaminophen 500 MG Tabs  Commonly known as:  TYLENOL   Take 500-1,000 mg by mouth every 6 hours as needed for Moderate Pain.  Dose:  500-1000 mg     amitriptyline 25 MG Tabs  Commonly known as:  ELAVIL   Take 1 Tab by mouth every evening.  Dose:  25 mg     amLODIPine 5 MG Tabs  Commonly known as:  NORVASC   Take 1 Tab by mouth every day.  Dose:  5 mg     aspirin 81 MG Chew chewable tablet  Commonly known as:  ASA   Take 81 mg by  mouth every day.  Dose:  81 mg     baclofen 10 MG Tabs  Commonly known as:  LIORESAL   Take 10 mg by mouth 3 times a day as needed.  Dose:  10 mg     CoQ10 100 MG Caps   Take 100 mg by mouth every day.  Dose:  100 mg     ferrous sulfate 325 (65 Fe) MG tablet   Take 1 Tab by mouth every day.  Dose:  325 mg     losartan 100 MG Tabs  Commonly known as:  COZAAR   Take 1 Tab by mouth every day for 90 days.  Dose:  100 mg     metoprolol  MG Tb24  Commonly known as:  TOPROL XL   Take 1 Tab by mouth every day for 90 days.  Dose:  100 mg     multivitamin Tabs   Take 1 Tab by mouth every day.  Dose:  1 Tab     SUPER B COMPLEX PO   Take 1 Dose by mouth every day at 6 PM.  Dose:  1 Dose     VITAMIN D3 PO   Take 2,000 Units by mouth every morning.  Dose:  2000 Units            Can use .DISCHARGEMEDSLIST if going to another facility         Disposition:   Home    Diet:   Healthy    Activity:   As tolerated    Instructions:      Please follow up with Dr. Chicas  Please follow up with PCP   The patient was instructed to return to the ER in the event of worsening symptoms. I have counseled the patient on the importance of compliance and the patient has agreed to proceed with all medical recommendations and follow up plan indicated above.   The patient understands that all medications come with benefits and risks. Risks may include permanent injury or death and these risks can be minimized with close reassessment and monitoring.        Primary Care Provider:    Needs to Establish  Discharge summary faxed to primary care provider:  Deferred  Copy of discharge summary given to the patient: Deferred      Follow up appointment details :      Future Appointments  Date Time Provider Department Center   6/17/2019 7:00 AM ZULEYKA Lyons [facility] None     No follow-up provider specified.      Pending Studies:        None    Time spent on discharge day patient visit, preparing discharge paperwork and arranging for patient follow  up.    Summary of follow up issues:   Please follow up with Dr. Chicas for D&C and potential hysterectomy     Discharge Time (Minutes) :    35 Minutes  Hospital Course Type: Observation Stay      Condition on Discharge    ______________________________________________________________________    Interval history/exam for day of discharge:     Pt was seen resting comfortably. She reports being to back to baseline    Physical Exam   Constitutional: She appears well-developed and well-nourished.   HENT:   Head: Normocephalic and atraumatic.   Mouth/Throat: No oropharyngeal exudate.   Eyes: Pupils are equal, round, and reactive to light. EOM are normal. Right eye exhibits no discharge.   Neck: Normal range of motion. No tracheal deviation present.   Cardiovascular: Normal rate and normal heart sounds.    No murmur heard.  Pulmonary/Chest: Effort normal and breath sounds normal. No respiratory distress.   Abdominal: Soft. Bowel sounds are normal. She exhibits no distension. There is no tenderness. There is no guarding.   Musculoskeletal: She exhibits no edema or tenderness.   3/5 right upper and lower extremity weakness  Right arm/hand contracted    Neurological: She is alert. No cranial nerve deficit.   Skin: Skin is warm and dry.   Psychiatric: She has a normal mood and affect. Her behavior is normal. Thought content normal.       Most Recent Labs:    Lab Results   Component Value Date/Time    WBC 6.1 06/13/2019 01:59 AM    RBC 2.54 (L) 06/13/2019 01:59 AM    HEMOGLOBIN 8.1 (L) 06/13/2019 01:59 AM    HEMATOCRIT 26.4 (L) 06/13/2019 01:59 AM    .9 (H) 06/13/2019 01:59 AM    MCH 31.9 06/13/2019 01:59 AM    MCHC 30.7 (L) 06/13/2019 01:59 AM    MPV 10.6 06/13/2019 01:59 AM    NEUTSPOLYS 72.50 (H) 06/12/2019 07:50 AM    LYMPHOCYTES 21.60 (L) 06/12/2019 07:50 AM    MONOCYTES 4.70 06/12/2019 07:50 AM    EOSINOPHILS 0.30 06/12/2019 07:50 AM    BASOPHILS 0.50 06/12/2019 07:50 AM      Lab Results   Component Value Date/Time     SODIUM 146 (H) 06/13/2019 01:59 AM    POTASSIUM 3.9 06/13/2019 01:59 AM    CHLORIDE 109 06/13/2019 01:59 AM    CO2 25 06/13/2019 01:59 AM    GLUCOSE 94 06/13/2019 01:59 AM    BUN 10 06/13/2019 01:59 AM    CREATININE 0.74 06/13/2019 01:59 AM      Lab Results   Component Value Date/Time    ALTSGPT 14 06/12/2019 03:16 AM    ASTSGOT 16 06/12/2019 03:16 AM    ALKPHOSPHAT 58 06/12/2019 03:16 AM    TBILIRUBIN 0.5 06/12/2019 03:16 AM    ALBUMIN 3.1 (L) 06/12/2019 03:16 AM    GLOBULIN 2.5 06/12/2019 03:16 AM    INR 0.98 06/11/2019 08:13 PM     Lab Results   Component Value Date/Time    PROTHROMBTM 13.2 06/11/2019 08:13 PM    INR 0.98 06/11/2019 08:13 PM

## 2019-06-12 NOTE — ED NOTES
Med rec updated and complete . Allergies reviewed. No oral antibiotic use in last 30 days at home.

## 2019-06-13 ENCOUNTER — PATIENT OUTREACH (OUTPATIENT)
Dept: HEALTH INFORMATION MANAGEMENT | Facility: OTHER | Age: 69
End: 2019-06-13

## 2019-06-13 VITALS
BODY MASS INDEX: 33.65 KG/M2 | WEIGHT: 197.09 LBS | HEART RATE: 87 BPM | TEMPERATURE: 98.5 F | DIASTOLIC BLOOD PRESSURE: 70 MMHG | HEIGHT: 64 IN | RESPIRATION RATE: 1 BRPM | SYSTOLIC BLOOD PRESSURE: 118 MMHG | OXYGEN SATURATION: 94 %

## 2019-06-13 LAB
ANION GAP SERPL CALC-SCNC: 12 MMOL/L (ref 0–11.9)
BUN SERPL-MCNC: 10 MG/DL (ref 8–22)
CALCIUM SERPL-MCNC: 8.6 MG/DL (ref 8.5–10.5)
CHLORIDE SERPL-SCNC: 109 MMOL/L (ref 96–112)
CO2 SERPL-SCNC: 25 MMOL/L (ref 20–33)
CREAT SERPL-MCNC: 0.74 MG/DL (ref 0.5–1.4)
ERYTHROCYTE [DISTWIDTH] IN BLOOD BY AUTOMATED COUNT: 51.4 FL (ref 35.9–50)
GLUCOSE SERPL-MCNC: 94 MG/DL (ref 65–99)
HCT VFR BLD AUTO: 26.4 % (ref 37–47)
HGB BLD-MCNC: 8.1 G/DL (ref 12–16)
MCH RBC QN AUTO: 31.9 PG (ref 27–33)
MCHC RBC AUTO-ENTMCNC: 30.7 G/DL (ref 33.6–35)
MCV RBC AUTO: 103.9 FL (ref 81.4–97.8)
PLATELET # BLD AUTO: 216 K/UL (ref 164–446)
PMV BLD AUTO: 10.6 FL (ref 9–12.9)
POTASSIUM SERPL-SCNC: 3.9 MMOL/L (ref 3.6–5.5)
RBC # BLD AUTO: 2.54 M/UL (ref 4.2–5.4)
SODIUM SERPL-SCNC: 146 MMOL/L (ref 135–145)
WBC # BLD AUTO: 6.1 K/UL (ref 4.8–10.8)

## 2019-06-13 PROCEDURE — 36415 COLL VENOUS BLD VENIPUNCTURE: CPT

## 2019-06-13 PROCEDURE — A9270 NON-COVERED ITEM OR SERVICE: HCPCS | Performed by: STUDENT IN AN ORGANIZED HEALTH CARE EDUCATION/TRAINING PROGRAM

## 2019-06-13 PROCEDURE — 80048 BASIC METABOLIC PNL TOTAL CA: CPT

## 2019-06-13 PROCEDURE — 700102 HCHG RX REV CODE 250 W/ 637 OVERRIDE(OP): Performed by: STUDENT IN AN ORGANIZED HEALTH CARE EDUCATION/TRAINING PROGRAM

## 2019-06-13 PROCEDURE — G0378 HOSPITAL OBSERVATION PER HR: HCPCS

## 2019-06-13 PROCEDURE — 85027 COMPLETE CBC AUTOMATED: CPT

## 2019-06-13 PROCEDURE — 700105 HCHG RX REV CODE 258: Performed by: STUDENT IN AN ORGANIZED HEALTH CARE EDUCATION/TRAINING PROGRAM

## 2019-06-13 PROCEDURE — 99217 PR OBSERVATION CARE DISCHARGE: CPT | Mod: GC | Performed by: HOSPITALIST

## 2019-06-13 RX ORDER — ASCORBIC ACID 500 MG
500 TABLET ORAL DAILY
Qty: 30 TAB | Refills: 3 | Status: ON HOLD | OUTPATIENT
Start: 2019-06-14 | End: 2019-06-19

## 2019-06-13 RX ORDER — ASCORBIC ACID 500 MG
500 TABLET ORAL DAILY
Status: DISCONTINUED | OUTPATIENT
Start: 2019-06-13 | End: 2019-06-13 | Stop reason: HOSPADM

## 2019-06-13 RX ORDER — ATORVASTATIN CALCIUM 20 MG/1
40 TABLET, FILM COATED ORAL
Qty: 100 TAB | Refills: 0 | Status: ON HOLD | OUTPATIENT
Start: 2019-06-13 | End: 2019-06-19

## 2019-06-13 RX ADMIN — OXYCODONE HYDROCHLORIDE AND ACETAMINOPHEN 500 MG: 500 TABLET ORAL at 11:23

## 2019-06-13 RX ADMIN — LOSARTAN POTASSIUM 100 MG: 50 TABLET ORAL at 13:39

## 2019-06-13 RX ADMIN — BACLOFEN 10 MG: 10 TABLET ORAL at 08:26

## 2019-06-13 RX ADMIN — METOPROLOL SUCCINATE 100 MG: 50 TABLET, EXTENDED RELEASE ORAL at 08:14

## 2019-06-13 RX ADMIN — NORETHINDRONE ACETATE 5 MG: 5 TABLET ORAL at 08:13

## 2019-06-13 RX ADMIN — ACETAMINOPHEN 650 MG: 325 TABLET, FILM COATED ORAL at 11:23

## 2019-06-13 RX ADMIN — FERROUS SULFATE TAB 325 MG (65 MG ELEMENTAL FE) 325 MG: 325 (65 FE) TAB at 08:13

## 2019-06-13 RX ADMIN — SODIUM CHLORIDE: 9 INJECTION, SOLUTION INTRAVENOUS at 00:29

## 2019-06-13 NOTE — PROGRESS NOTES
Pt resting comfortably, seen by MD. Dr Ramos updated that pt's daughter will be available after 4pm today to help transfer pt to home upon discharge.

## 2019-06-13 NOTE — DISCHARGE INSTRUCTIONS
Norethindrone acetate (hormone replacement)  What is this medicine?  NORETHINDRONE ACETATE (nor eth IN drone AS e sim) is a female hormone. This medicine is used to treat endometriosis, uterine bleeding caused by abnormal hormone levels, and secondary amenorrhea. Secondary amenorrhea is when a woman stops getting menstrual periods due to low levels of certain female hormones.  This medicine may be used for other purposes; ask your health care provider or pharmacist if you have questions.  COMMON BRAND NAME(S): Aygestin  What should I tell my health care provider before I take this medicine?  They need to know if you have any of these conditions:  -blood vessel disease or blood clots  -breast, cervical, or vaginal cancer  -diabetes  -heart disease  -kidney disease  -liver disease  -mental depression  -migraine  -seizures  -stroke  -vaginal bleeding  -an unusual or allergic reaction to norethindrone, other medicines, foods, dyes, or preservatives  -pregnant or trying to get pregnant  -breast-feeding  How should I use this medicine?  Take this medicine by mouth with a glass of water. You may take this medicine with or without food. Follow the directions on the prescription label. Take this medicine at the same time each day. Do not take your medicine more often than directed.  A patient information sheet will be given with each prescription and refill. Read this sheet carefully each time. The sheet may change frequently.  Talk to your pediatrician regarding the use of this medicine in children. Special care may be needed.  Overdosage: If you think you have taken too much of this medicine contact a poison control center or emergency room at once.  NOTE: This medicine is only for you. Do not share this medicine with others.  What if I miss a dose?  If you miss a dose, take it as soon as you can. If it is almost time for your next dose, take only that dose. Do not take double or extra doses.  What may interact with this  medicine?  Do not take this medicine with any of the following medications:  -amprenavir or fosamprenavir  -bosentan  This medicine may also interact with the following medications:  -antibiotics or medicines for infections, especially rifampin, rifabutin, rifapentine, and griseofulvin, and possibly penicillins or tetracyclines  -aprepitant  -barbiturate medicines, such as phenobarbital  -carbamazepine  -felbamate  -modafinil  -oxcarbazepine  -phenytoin  -ritonavir or other medicines for HIV infection or AIDS  -Mark's wort  -topiramate  This list may not describe all possible interactions. Give your health care provider a list of all the medicines, herbs, non-prescription drugs, or dietary supplements you use. Also tell them if you smoke, drink alcohol, or use illegal drugs. Some items may interact with your medicine.  What should I watch for while using this medicine?  Visit your doctor or health care professional for regular checks on your progress. You will need a regular breast and pelvic exam and Pap smear while on this medicine.  If you have any reason to think you are pregnant, stop taking this medicine right away and contact your doctor or health care professional.  If you are taking this medicine for hormone related problems, it may take several cycles of use to see improvement in your condition.  What side effects may I notice from receiving this medicine?  Side effects that you should report to your doctor or health care professional as soon as possible:  -breast tenderness or discharge  -pain in the abdomen, chest, groin or leg  -severe headache  -skin rash, itching, or hives  -sudden shortness of breath  -unusually weak or tired  -vision or speech problems  -yellowing of skin or eyes  Side effects that usually do not require medical attention (report to your doctor or health care professional if they continue or are bothersome):  -changes in sexual desire  -change in menstrual flow  -facial hair  growth  -fluid retention and swelling  -headache  -irritability  -nausea  -weight gain or loss  This list may not describe all possible side effects. Call your doctor for medical advice about side effects. You may report side effects to FDA at 9-088-EWA-4676.  Where should I keep my medicine?  Keep out of the reach of children.  Store at room temperature between 15 and 30 degrees C (59 and 86 degrees F). Throw away any unused medicine after the expiration date.  NOTE: This sheet is a summary. It may not cover all possible information. If you have questions about this medicine, talk to your doctor, pharmacist, or health care provider.  © 2018 [employer]/Gold Standard (2009-07-13 14:38:36)      Anemia, Nonspecific  Anemia is a condition in which the concentration of red blood cells or hemoglobin in the blood is below normal. Hemoglobin is a substance in red blood cells that carries oxygen to the tissues of the body. Anemia results in not enough oxygen reaching these tissues.  What are the causes?  Common causes of anemia include:  · Excessive bleeding. Bleeding may be internal or external. This includes excessive bleeding from periods (in women) or from the intestine.  · Poor nutrition.  · Chronic kidney, thyroid, and liver disease.  · Bone marrow disorders that decrease red blood cell production.  · Cancer and treatments for cancer.  · HIV, AIDS, and their treatments.  · Spleen problems that increase red blood cell destruction.  · Blood disorders.  · Excess destruction of red blood cells due to infection, medicines, and autoimmune disorders.  What are the signs or symptoms?  · Minor weakness.  · Dizziness.  · Headache.  · Palpitations.  · Shortness of breath, especially with exercise.  · Paleness.  · Cold sensitivity.  · Indigestion.  · Nausea.  · Difficulty sleeping.  · Difficulty concentrating.  Symptoms may occur suddenly or they may develop slowly.  How is this diagnosed?  Additional blood tests are often needed.  These help your health care provider determine the best treatment. Your health care provider will check your stool for blood and look for other causes of blood loss.  How is this treated?  Treatment varies depending on the cause of the anemia. Treatment can include:  · Supplements of iron, vitamin B12, or folic acid.  · Hormone medicines.  · A blood transfusion. This may be needed if blood loss is severe.  · Hospitalization. This may be needed if there is significant continual blood loss.  · Dietary changes.  · Spleen removal.  Follow these instructions at home:  Keep all follow-up appointments. It often takes many weeks to correct anemia, and having your health care provider check on your condition and your response to treatment is very important.  Get help right away if:  · You develop extreme weakness, shortness of breath, or chest pain.  · You become dizzy or have trouble concentrating.  · You develop heavy vaginal bleeding.  · You develop a rash.  · You have bloody or black, tarry stools.  · You faint.  · You vomit up blood.  · You vomit repeatedly.  · You have abdominal pain.  · You have a fever or persistent symptoms for more than 2-3 days.  · You have a fever and your symptoms suddenly get worse.  · You are dehydrated.  This information is not intended to replace advice given to you by your health care provider. Make sure you discuss any questions you have with your health care provider.  Document Released: 01/25/2006 Document Revised: 05/31/2017 Document Reviewed: 06/13/2014  [employer] Patient Education © 2017 [employer]      Discharge Instructions    Discharged to home by car with relative. Discharged via wheelchair, hospital escort: Yes.  Special equipment needed: Walker    Be sure to schedule a follow-up appointment with your primary care doctor or any specialists as instructed.     Discharge Plan:   Pneumococcal Vaccine Administered/Refused: Not given - Patient refused pneumococcal  vaccine  Influenza Vaccine Indication: Patient Refuses    I understand that a diet low in cholesterol, fat, and sodium is recommended for good health. Unless I have been given specific instructions below for another diet, I accept this instruction as my diet prescription.   Other diet: Regular    Special Instructions: None    · Is patient discharged on Warfarin / Coumadin?   No     Depression / Suicide Risk    As you are discharged from this [facility] facility, it is important to learn how to keep safe from harming yourself.    Recognize the warning signs:  · Abrupt changes in personality, positive or negative- including increase in energy   · Giving away possessions  · Change in eating patterns- significant weight changes-  positive or negative  · Change in sleeping patterns- unable to sleep or sleeping all the time   · Unwillingness or inability to communicate  · Depression  · Unusual sadness, discouragement and loneliness  · Talk of wanting to die  · Neglect of personal appearance   · Rebelliousness- reckless behavior  · Withdrawal from people/activities they love  · Confusion- inability to concentrate     If you or a loved one observes any of these behaviors or has concerns about self-harm, here's what you can do:  · Talk about it- your feelings and reasons for harming yourself  · Remove any means that you might use to hurt yourself (examples: pills, rope, extension cords, firearm)  · Get professional help from the community (Mental Health, Substance Abuse, psychological counseling)  · Do not be alone:Call your Safe Contact- someone whom you trust who will be there for you.  · Call your local CRISIS HOTLINE 867-9106 or 971-848-1590  · Call your local Children's Mobile Crisis Response Team [place] (793) 055-6842 or www.Groopt  · Call the toll free National Suicide Prevention Hotlines   · National Suicide Prevention Lifeline 365-057-IFEP (6009)  · National Hope Line Network 800-SUICIDE  (915-2823)

## 2019-06-13 NOTE — PROGRESS NOTES
Bedside report received from day RN. Assumed care of pt at 1900. Pt is on RA . Pt has tele box in place. No s/s of pain or discomfort. Educated to call light. Bed is in low and locked position. Will continue to monitor.

## 2019-06-14 ENCOUNTER — PATIENT OUTREACH (OUTPATIENT)
Dept: HEALTH INFORMATION MANAGEMENT | Facility: OTHER | Age: 69
End: 2019-06-14

## 2019-06-17 ENCOUNTER — OFFICE VISIT (OUTPATIENT)
Dept: MEDICAL GROUP | Facility: LAB | Age: 69
End: 2019-06-17
Payer: MEDICARE

## 2019-06-17 VITALS
HEART RATE: 110 BPM | OXYGEN SATURATION: 96 % | TEMPERATURE: 98.7 F | HEIGHT: 64 IN | DIASTOLIC BLOOD PRESSURE: 68 MMHG | SYSTOLIC BLOOD PRESSURE: 122 MMHG | WEIGHT: 187 LBS | RESPIRATION RATE: 16 BRPM | BODY MASS INDEX: 31.92 KG/M2

## 2019-06-17 DIAGNOSIS — D62 ACUTE ON CHRONIC BLOOD LOSS ANEMIA: ICD-10-CM

## 2019-06-17 DIAGNOSIS — I69.359 CVA, OLD, HEMIPARESIS (HCC): ICD-10-CM

## 2019-06-17 DIAGNOSIS — D50.0 IRON DEFICIENCY ANEMIA DUE TO CHRONIC BLOOD LOSS: ICD-10-CM

## 2019-06-17 DIAGNOSIS — N95.0 POST-MENOPAUSE BLEEDING: ICD-10-CM

## 2019-06-17 PROCEDURE — 99214 OFFICE O/P EST MOD 30 MIN: CPT | Performed by: NURSE PRACTITIONER

## 2019-06-17 NOTE — PROGRESS NOTES
Hospital f/u    HPI  68-year-old established female here for hospital follow-up.  Presented to the hospital b/c of two weeks of heavy vaginal bleeding.  GYN was consulted and norethindrone was initiated.  An outpatient hysteroscopy is planned for pathology.  No vaginal bleeding in several days.  She is feeling very weak and occasionally short of breath.  Also complains of lower abdominal cramping, describes it as similar to menstrual period discomfort.  She is taking 1-2 iron pills per day and denies constipation or GI upset related to this.  Appetite is been good.  Denies problems with bowels or bladder.  No nausea or vomiting.  Fortunately she has an appointment tomorrow with Dr. Chicas.  She lives with her daughter who is taking good care of her.  She does have HTN and hx of cva  - BP has been well controlled.      US:   1.  Heterogeneous uterine lesion, likely fibroid.  2.  Thickened endometrial stripe, given age concerning for endometrial pathology. Endometrial tissue sampling would be recommended to exclude endometrial carcinoma.  3.  Small free fluid collection in the pelvis.  4.  Nabothian cysts    Cbc:  Component      Latest Ref Rng & Units 6/12/2019 6/12/2019 6/12/2019 6/12/2019           3:16 AM  3:16 AM  3:16 AM  3:16 AM   RBC      4.20 - 5.40 M/uL 2.45 (L)      Hemoglobin      12.0 - 16.0 g/dL 8.1 (L)      Hematocrit      37.0 - 47.0 % 25.0 (L)      MCV      81.4 - 97.8 fL 102.0 (H)        Component      Latest Ref Rng & Units 6/12/2019 6/12/2019 6/12/2019 6/12/2019           3:16 AM  3:16 AM  3:16 AM  3:16 AM   RBC      4.20 - 5.40 M/uL       Hemoglobin      12.0 - 16.0 g/dL       Hematocrit      37.0 - 47.0 %       MCV      81.4 - 97.8 fL         Component      Latest Ref Rng & Units 6/12/2019 6/12/2019 6/12/2019 6/13/2019           7:50 AM  8:45 AM  7:54 PM  1:59 AM   RBC      4.20 - 5.40 M/uL 3.06 (L)   2.54 (L)   Hemoglobin      12.0 - 16.0 g/dL 9.6 (L)  7.8 (L) 8.1 (L)   Hematocrit      37.0 -  "47.0 % 30.8 (L)  24.3 (L) 26.4 (L)   MCV      81.4 - 97.8 fL 100.7 (H)   103.9 (H)       Past medical, surgical, family, and social history is reviewed and updated in Epic chart by me today.   Medications and allergies reviewed and updated in Epic chart by me today.     ROS:   Denies n/v/d or constipation.  Denies weight loss or poor appetite.  Denies lower extremity pain.  Sleeping fine.     Exam:  /68 (BP Location: Left arm, Patient Position: Sitting, BP Cuff Size: Large adult)   Pulse (!) 110   Temp 37.1 °C (98.7 °F)   Resp 16   Ht 1.626 m (5' 4\")   Wt 84.8 kg (187 lb)   SpO2 96%   Constitutional: Alert, no distress, plus 3 vital signs  Skin:  Warm, dry, no rashes invisible areas  Eye: Equal, round and reactive, conjunctiva clear  ENMT: Lips without lesions  Respiratory: Unlabored respiration, lungs clear to auscultation, no wheezes, no rhonchi  Cardiovascular: Normal rate and rhythm  M/s:  Contracted right hand from previous stroke.  Radial pulses +2 bilaterally.  No wounds to right hand.  Psych: Alert, pleasant, well-groomed, normal affect    Assessment / Plan / Medical Decision makin. Post-menopause bleeding  -Fortunately has an appointment with GYN tomorrow.  Not actively bleeding.  Tells me that her pain is not significantly keeping her awake or preventing her from eating.  Has a good caretaker through her daughter.    2. Acute on chronic blood loss anemia (HCC)  -Doing well with iron therapy.  We did discuss that this should start to gradually improve after GYN procedure and she verbalized understanding.  Discussed the prevention of constipation while taking iron therapy.    3. Iron deficiency anemia due to chronic blood loss  -as above    4. CVA, old, hemiparesis (HCC)  -She did request a splint for her contracted right hand and we will contact  orthotics to see if they can supply this for the patient.      "

## 2019-06-18 ENCOUNTER — TELEPHONE (OUTPATIENT)
Dept: MEDICAL GROUP | Facility: LAB | Age: 69
End: 2019-06-18

## 2019-06-18 NOTE — TELEPHONE ENCOUNTER
FAXED [facility] FOR JEWELS BEASLEY [phone]/ pt daughter notified ready for

## 2019-06-19 ENCOUNTER — ANESTHESIA EVENT (OUTPATIENT)
Dept: SURGERY | Facility: MEDICAL CENTER | Age: 69
End: 2019-06-19
Payer: MEDICARE

## 2019-06-19 ENCOUNTER — HOSPITAL ENCOUNTER (OUTPATIENT)
Facility: MEDICAL CENTER | Age: 69
End: 2019-06-19
Attending: OBSTETRICS & GYNECOLOGY | Admitting: OBSTETRICS & GYNECOLOGY
Payer: MEDICARE

## 2019-06-19 ENCOUNTER — ANESTHESIA (OUTPATIENT)
Dept: SURGERY | Facility: MEDICAL CENTER | Age: 69
End: 2019-06-19
Payer: MEDICARE

## 2019-06-19 VITALS
TEMPERATURE: 97.5 F | RESPIRATION RATE: 16 BRPM | HEIGHT: 64 IN | WEIGHT: 185.85 LBS | SYSTOLIC BLOOD PRESSURE: 146 MMHG | DIASTOLIC BLOOD PRESSURE: 79 MMHG | BODY MASS INDEX: 31.73 KG/M2 | OXYGEN SATURATION: 95 % | HEART RATE: 84 BPM

## 2019-06-19 LAB — PATHOLOGY CONSULT NOTE: NORMAL

## 2019-06-19 PROCEDURE — 160036 HCHG PACU - EA ADDL 30 MINS PHASE I: Performed by: OBSTETRICS & GYNECOLOGY

## 2019-06-19 PROCEDURE — 160025 RECOVERY II MINUTES (STATS): Performed by: OBSTETRICS & GYNECOLOGY

## 2019-06-19 PROCEDURE — 160035 HCHG PACU - 1ST 60 MINS PHASE I: Performed by: OBSTETRICS & GYNECOLOGY

## 2019-06-19 PROCEDURE — 160041 HCHG SURGERY MINUTES - EA ADDL 1 MIN LEVEL 4: Performed by: OBSTETRICS & GYNECOLOGY

## 2019-06-19 PROCEDURE — 88305 TISSUE EXAM BY PATHOLOGIST: CPT

## 2019-06-19 PROCEDURE — 700101 HCHG RX REV CODE 250: Performed by: ANESTHESIOLOGY

## 2019-06-19 PROCEDURE — 700101 HCHG RX REV CODE 250: Performed by: OBSTETRICS & GYNECOLOGY

## 2019-06-19 PROCEDURE — A9270 NON-COVERED ITEM OR SERVICE: HCPCS | Performed by: ANESTHESIOLOGY

## 2019-06-19 PROCEDURE — 501838 HCHG SUTURE GENERAL: Performed by: OBSTETRICS & GYNECOLOGY

## 2019-06-19 PROCEDURE — 160048 HCHG OR STATISTICAL LEVEL 1-5: Performed by: OBSTETRICS & GYNECOLOGY

## 2019-06-19 PROCEDURE — 700105 HCHG RX REV CODE 258: Performed by: OBSTETRICS & GYNECOLOGY

## 2019-06-19 PROCEDURE — 160046 HCHG PACU - 1ST 60 MINS PHASE II: Performed by: OBSTETRICS & GYNECOLOGY

## 2019-06-19 PROCEDURE — 160002 HCHG RECOVERY MINUTES (STAT): Performed by: OBSTETRICS & GYNECOLOGY

## 2019-06-19 PROCEDURE — 160009 HCHG ANES TIME/MIN: Performed by: OBSTETRICS & GYNECOLOGY

## 2019-06-19 PROCEDURE — 160029 HCHG SURGERY MINUTES - 1ST 30 MINS LEVEL 4: Performed by: OBSTETRICS & GYNECOLOGY

## 2019-06-19 PROCEDURE — A6404 STERILE GAUZE > 48 SQ IN: HCPCS | Performed by: OBSTETRICS & GYNECOLOGY

## 2019-06-19 PROCEDURE — 700102 HCHG RX REV CODE 250 W/ 637 OVERRIDE(OP): Performed by: ANESTHESIOLOGY

## 2019-06-19 PROCEDURE — 700111 HCHG RX REV CODE 636 W/ 250 OVERRIDE (IP): Performed by: ANESTHESIOLOGY

## 2019-06-19 PROCEDURE — 502587 HCHG PACK, D&C: Performed by: OBSTETRICS & GYNECOLOGY

## 2019-06-19 RX ORDER — HYDROMORPHONE HYDROCHLORIDE 1 MG/ML
0.2 INJECTION, SOLUTION INTRAMUSCULAR; INTRAVENOUS; SUBCUTANEOUS
Status: DISCONTINUED | OUTPATIENT
Start: 2019-06-19 | End: 2019-06-19

## 2019-06-19 RX ORDER — ACETAMINOPHEN 160 MG
2000 TABLET,DISINTEGRATING ORAL DAILY
COMMUNITY

## 2019-06-19 RX ORDER — HALOPERIDOL 5 MG/ML
0.5 INJECTION INTRAMUSCULAR
Status: DISCONTINUED | OUTPATIENT
Start: 2019-06-19 | End: 2019-06-19 | Stop reason: HOSPADM

## 2019-06-19 RX ORDER — CEFAZOLIN SODIUM 1 G/3ML
INJECTION, POWDER, FOR SOLUTION INTRAMUSCULAR; INTRAVENOUS PRN
Status: DISCONTINUED | OUTPATIENT
Start: 2019-06-19 | End: 2019-06-19 | Stop reason: SURG

## 2019-06-19 RX ORDER — ONDANSETRON 2 MG/ML
4 INJECTION INTRAMUSCULAR; INTRAVENOUS
Status: DISCONTINUED | OUTPATIENT
Start: 2019-06-19 | End: 2019-06-19 | Stop reason: HOSPADM

## 2019-06-19 RX ORDER — HYDROMORPHONE HYDROCHLORIDE 1 MG/ML
0.4 INJECTION, SOLUTION INTRAMUSCULAR; INTRAVENOUS; SUBCUTANEOUS
Status: DISCONTINUED | OUTPATIENT
Start: 2019-06-19 | End: 2019-06-19

## 2019-06-19 RX ORDER — HYDROMORPHONE HYDROCHLORIDE 1 MG/ML
0.1 INJECTION, SOLUTION INTRAMUSCULAR; INTRAVENOUS; SUBCUTANEOUS
Status: DISCONTINUED | OUTPATIENT
Start: 2019-06-19 | End: 2019-06-19

## 2019-06-19 RX ORDER — AMLODIPINE BESYLATE 5 MG/1
5 TABLET ORAL
COMMUNITY
End: 2019-07-09 | Stop reason: SDUPTHER

## 2019-06-19 RX ORDER — OXYCODONE HCL 5 MG/5 ML
5 SOLUTION, ORAL ORAL
Status: DISCONTINUED | OUTPATIENT
Start: 2019-06-19 | End: 2019-06-19

## 2019-06-19 RX ORDER — ATORVASTATIN CALCIUM 20 MG/1
20 TABLET, FILM COATED ORAL EVERY EVENING
COMMUNITY
End: 2019-11-01 | Stop reason: SDUPTHER

## 2019-06-19 RX ORDER — ONDANSETRON 2 MG/ML
INJECTION INTRAMUSCULAR; INTRAVENOUS PRN
Status: DISCONTINUED | OUTPATIENT
Start: 2019-06-19 | End: 2019-06-19 | Stop reason: SURG

## 2019-06-19 RX ORDER — OXYCODONE HCL 5 MG/5 ML
10 SOLUTION, ORAL ORAL
Status: DISCONTINUED | OUTPATIENT
Start: 2019-06-19 | End: 2019-06-19

## 2019-06-19 RX ORDER — ASCORBIC ACID 500 MG
500 TABLET ORAL EVERY EVENING
COMMUNITY

## 2019-06-19 RX ORDER — ACETAMINOPHEN 500 MG
1000 TABLET ORAL ONCE
Status: COMPLETED | OUTPATIENT
Start: 2019-06-19 | End: 2019-06-19

## 2019-06-19 RX ORDER — SODIUM CHLORIDE, SODIUM GLUCONATE, SODIUM ACETATE, POTASSIUM CHLORIDE AND MAGNESIUM CHLORIDE 526; 502; 368; 37; 30 MG/100ML; MG/100ML; MG/100ML; MG/100ML; MG/100ML
INJECTION, SOLUTION INTRAVENOUS CONTINUOUS
Status: DISCONTINUED | OUTPATIENT
Start: 2019-06-19 | End: 2019-06-19 | Stop reason: HOSPADM

## 2019-06-19 RX ORDER — SODIUM CHLORIDE, SODIUM LACTATE, POTASSIUM CHLORIDE, CALCIUM CHLORIDE 600; 310; 30; 20 MG/100ML; MG/100ML; MG/100ML; MG/100ML
INJECTION, SOLUTION INTRAVENOUS CONTINUOUS
Status: DISCONTINUED | OUTPATIENT
Start: 2019-06-19 | End: 2019-06-19 | Stop reason: HOSPADM

## 2019-06-19 RX ORDER — HYDRALAZINE HYDROCHLORIDE 20 MG/ML
5 INJECTION INTRAMUSCULAR; INTRAVENOUS
Status: DISCONTINUED | OUTPATIENT
Start: 2019-06-19 | End: 2019-06-19 | Stop reason: HOSPADM

## 2019-06-19 RX ORDER — DEXAMETHASONE SODIUM PHOSPHATE 4 MG/ML
INJECTION, SOLUTION INTRA-ARTICULAR; INTRALESIONAL; INTRAMUSCULAR; INTRAVENOUS; SOFT TISSUE PRN
Status: DISCONTINUED | OUTPATIENT
Start: 2019-06-19 | End: 2019-06-19 | Stop reason: SURG

## 2019-06-19 RX ORDER — METOPROLOL TARTRATE 1 MG/ML
1 INJECTION, SOLUTION INTRAVENOUS
Status: DISCONTINUED | OUTPATIENT
Start: 2019-06-19 | End: 2019-06-19 | Stop reason: HOSPADM

## 2019-06-19 RX ORDER — LABETALOL HYDROCHLORIDE 5 MG/ML
5 INJECTION, SOLUTION INTRAVENOUS
Status: DISCONTINUED | OUTPATIENT
Start: 2019-06-19 | End: 2019-06-19 | Stop reason: HOSPADM

## 2019-06-19 RX ORDER — KETOROLAC TROMETHAMINE 30 MG/ML
INJECTION, SOLUTION INTRAMUSCULAR; INTRAVENOUS PRN
Status: DISCONTINUED | OUTPATIENT
Start: 2019-06-19 | End: 2019-06-19 | Stop reason: SURG

## 2019-06-19 RX ORDER — BUPIVACAINE HYDROCHLORIDE AND EPINEPHRINE 2.5; 5 MG/ML; UG/ML
INJECTION, SOLUTION EPIDURAL; INFILTRATION; INTRACAUDAL; PERINEURAL
Status: DISCONTINUED | OUTPATIENT
Start: 2019-06-19 | End: 2019-06-19 | Stop reason: HOSPADM

## 2019-06-19 RX ADMIN — PROPOFOL 140 MG: 10 INJECTION, EMULSION INTRAVENOUS at 14:53

## 2019-06-19 RX ADMIN — LIDOCAINE HYDROCHLORIDE 0.5 ML: 10 INJECTION, SOLUTION EPIDURAL; INFILTRATION; INTRACAUDAL at 13:46

## 2019-06-19 RX ADMIN — SUGAMMADEX 40 MG: 100 INJECTION, SOLUTION INTRAVENOUS at 15:08

## 2019-06-19 RX ADMIN — FENTANYL CITRATE 100 MCG: 50 INJECTION, SOLUTION INTRAMUSCULAR; INTRAVENOUS at 14:52

## 2019-06-19 RX ADMIN — SODIUM CHLORIDE, POTASSIUM CHLORIDE, SODIUM LACTATE AND CALCIUM CHLORIDE 1000 ML: 600; 310; 30; 20 INJECTION, SOLUTION INTRAVENOUS at 13:47

## 2019-06-19 RX ADMIN — DEXAMETHASONE SODIUM PHOSPHATE 8 MG: 4 INJECTION, SOLUTION INTRA-ARTICULAR; INTRALESIONAL; INTRAMUSCULAR; INTRAVENOUS; SOFT TISSUE at 14:55

## 2019-06-19 RX ADMIN — CEFAZOLIN 2 G: 330 INJECTION, POWDER, FOR SOLUTION INTRAMUSCULAR; INTRAVENOUS at 14:55

## 2019-06-19 RX ADMIN — ONDANSETRON 4 MG: 2 INJECTION INTRAMUSCULAR; INTRAVENOUS at 15:11

## 2019-06-19 RX ADMIN — ROCURONIUM BROMIDE 20 MG: 10 INJECTION, SOLUTION INTRAVENOUS at 14:53

## 2019-06-19 RX ADMIN — ACETAMINOPHEN 1000 MG: 500 TABLET ORAL at 14:00

## 2019-06-19 RX ADMIN — KETOROLAC TROMETHAMINE 30 MG: 30 INJECTION, SOLUTION INTRAMUSCULAR at 15:14

## 2019-06-19 ASSESSMENT — PAIN SCALES - GENERAL: PAIN_LEVEL: 3

## 2019-06-19 NOTE — OP REPORT
DATE OF SERVICE:  06/19/2019    PREOPERATIVE DIAGNOSES:  Enlarged uterus, postmenopausal bleeding, anemia and   thickened endometrium.    POSTOPERATIVE DIAGNOSES:  Enlarged uterus, postmenopausal bleeding, anemia and   thickened endometrium with probable cancer.    PROCEDURE:  Hysteroscopy, dilation and curettage.    SURGEON:  Anel Chicas MD    ASSISTANT:  Idris Stroud RN    ANESTHESIOLOGIST:  Raad Alvarado MD    ANESTHESIA:  General LMA.    ESTIMATED BLOOD LOSS:  Minimal.    FINDINGS AT THE TIME OF SURGERY:  Exam under anesthesia revealed an enlarged   approximately 16-week size uterus, which was quite large and globular and   sounds to approximately 14 cm.  On D and C, there was lots of irregular tissue   suspicious for cancer.  There was a dark bleb on the ectocervix, which was   removed and sent to pathology.    COMPLICATIONS:  None.    TECHNIQUE:  The patient was taken to the operating room where general   anesthesia was placed.  She was then placed in the [facility] in excellent   positioning, prepped and draped in the normal sterile fashion.  A Graves   speculum was then introduced and 0.25% Marcaine with epinephrine was then   injected into the anterior lip of the cervix and grasped with a single tooth   tenaculum.  The cervix was then dilated to 8 mm and the diagnostic scope was   placed.  The above findings were noted.  The diagnostic scope was removed.  A   fractional dilation and curettage was then performed using Kevorkian curette   for the cervix and a curved sharp curette for the endometrium.  Lots of tissue   was retrieved and this is suspicious for cancer, not a lot of bleeding and   that bleb on the outside of the cervix was grasped with a sponge forceps and   then cauterized off.  I sent this to pathology as well.  The patient tolerated   the procedure very well.  The instruments were removed from the vagina.    Silver nitrate was placed at the tenaculum sites and at the  ectocervix.  She   was taken to recovery room in stable condition.  I did discuss with her   daughter the findings.       ____________________________________     MD VADIM Oakes / DYLAN    DD:  06/19/2019 15:19:57  DT:  06/19/2019 16:02:03    D#:  [id number]  Job#:  [id number]

## 2019-06-19 NOTE — PROGRESS NOTES
Med rec updated and complete  Allergies reviewed  Interviewed pt with daughter at bedside with permission from pt.  Pt reports no antibiotics in the last 2 weeks

## 2019-06-19 NOTE — ANESTHESIA PREPROCEDURE EVALUATION
Relevant Problems   (+) CVA, old, hemiparesis (HCC)   (+) Essential hypertension   (+) Iron deficiency anemia   (+) Obesity (BMI 30-39.9)   (+) Post-menopause bleeding       Physical Exam    Airway   Mallampati: I  TM distance: >3 FB  Neck ROM: full       Cardiovascular - normal exam  Rhythm: regular  Rate: normal  (-) murmur     Dental - normal exam         Pulmonary - normal exam  Breath sounds clear to auscultation     Abdominal    Neurological - normal exam    Comments: A&Ox4. Right sided weakness from previous CVA.             Anesthesia Plan    ASA 2       Plan - general       Airway plan will be ETT      Plan Factors:   Instructed to abstain from smoking on day of procedure: non smoker.  Patient did not smoke on day of procedure.    Induction: intravenous    Postoperative Plan: Postoperative administration of opioids is intended.    Pertinent diagnostic labs and testing reviewed    Informed Consent:    Anesthetic plan and risks discussed with patient.    Use of blood products discussed with: patient whom consented to blood products.

## 2019-06-19 NOTE — H&P
DATE OF OPERATION:  2019    CHIEF COMPLAINT:  1.  Postmenopausal bleeding.  2.  Anemia.  3.  Fatigue.  4.  Enlarged uterus.    HISTORY OF PRESENT ILLNESS:  The patient is a 68-year-old G8, P7, who was   initially seen in our office over a year ago for a well-woman exam.  The   patient at that time had a history of anemia, was not complaining of bleeding.    She then presented to the emergency room and was admitted to the hospitalist   service for anemia, enlarged uterus and postmenopausal bleeding.  When I saw   the patient, her bleeding had stopped.  She was placed on Aygestin and she was   still feeling quite weak from the anemia.  I offered to do hysteroscopy, D   and C to further evaluate the lining of her uterus, but she wanted to wait and   do it as an outpatient.  She was seen in the office on 2019 for a   preoperative evaluation for hysteroscopy, D and C.  The patient is still   feeling weak, but not bleeding on the Aygestin, and is willing to go forward   with the hysteroscopy, D and C.  Risks, benefits and alternatives of the   procedure were discussed with the patient in detail and she agrees to proceed.    PAST MEDICAL HISTORY:  Anemia, kidney stones, arthritis, high blood pressure,   stroke, migraine headaches, depression, and anxiety.    PAST SURGICAL HISTORY:  None.    MENSTRUAL HISTORY:  The patient underwent menarche at age 15 and menopause at   age 52.    PAST OBSTETRICAL HISTORY:  She has had 8 pregnancies, 7 babies born alive, 1   miscarriage.  No  sections.  The last delivery was in .    MEDICATIONS:  Metoprolol, aspirin, losartan, atorvastatin, amlodipine, and   Aygestin.    FAMILY HISTORY:  Noncontributory.    SOCIAL HISTORY:  The patient does not smoke, drink or do drugs.    ALLERGIES:  TO PENICILLIN, LISINOPRIL, HYDROCODONE, TRAMADOL, AND QUININE.    REVIEW OF SYSTEMS:  Noncontributory.    PHYSICAL EXAMINATION:  VITAL SIGNS:  The patient's blood pressure is 150/76,  repeat was 149/82,   weight is 191, and height is 5 feet 4 inches.  HEENT:  Within normal limits.  NECK:  Supple, without lymphadenopathy or thyromegaly.  CARDIOVASCULAR:  Regular rate and rhythm.  LUNGS:  Clear to auscultation.  BACK:  No CVA tenderness.  ABDOMEN:  Soft and nontender, and nondistended.  No masses are palpable.  PELVIC:  EGBUS appears normal.  Vagina appears normal.  Cervix appears normal.    Bimanual exam reveals an enlarged approximately 15-week size uterus, which   is globular in contour and her cervix is open.  It is tender to palpation.  No   adnexal masses are appreciated.  EXTREMITIES:  Benign.    ASSESSMENT:  1.  This is a 68-year-old  AB 1, postmenopausal female.  2.  Postmenopausal bleeding causing anemia.  3.  Fatigue.  4.  Enlarged uterus measuring 14x7x7 cm.  5.  High blood pressure.  6.  High cholesterol.  7.  Patient does not want a hysterectomy.    PLAN:  The patient is scheduled for hysteroscopy, dilation and curettage.    Risks, benefits, and alternatives of the procedure were discussed with the   patient in detail and she agrees to proceed.  Preoperative labs will be   obtained.  Preoperative antibiotics will be administered.  If she has any   problems or questions, she can contact my office.       ____________________________________     MD VADIM Oakes / DYLAN    DD:  2019 22:32:58  DT:  2019 23:02:44    D#:  [id number]  Job#:  [id number]

## 2019-06-19 NOTE — ANESTHESIA PROCEDURE NOTES
Airway  Date/Time: 6/19/2019 2:54 PM  Performed by: VIVIANA PAUL  Authorized by: VIVIANA PAUL     Location:  OR  Urgency:  Elective  Difficult Airway: No    Indications for Airway Management:  Anesthesia  Spontaneous Ventilation: absent    Sedation Level:  Deep  Preoxygenated: Yes    Patient Position:  Sniffing  Mask Difficulty Assessment:  0 - not attempted  Final Airway Type:  Endotracheal airway  Final Endotracheal Airway:  ETT  Cuffed: Yes    Technique Used for Successful ETT Placement:  Direct laryngoscopy  Devices/Methods Used in Placement:  Intubating stylet  Insertion Site:  Oral  Blade Type:  Richa  Laryngoscope Blade/Videolaryngoscope Blade Size:  3  ETT Size (mm):  7.0  Measured from:  Lips  ETT to Lips (cm):  22  Placement Verified by: capnometry    Cormack-Lehane Classification:  Grade I - full view of glottis  Number of Attempts at Approach:  1  Ventilation Between Attempts:  None  Number of Other Approaches Attempted:  0

## 2019-06-19 NOTE — ANESTHESIA POSTPROCEDURE EVALUATION
Patient: Jairo Rivas    Procedure Summary     Date:  06/19/19 Room / Location:  [place] [address] / SURGERY [facility]    Anesthesia Start:  1446 Anesthesia Stop:  1525    Procedures:       HYSTEROSCOPY, DIAGNOSTIC      DILATION AND CURETTAGE Diagnosis:  (ABDOMINAL UTERINE BLEEDING)    Surgeon:  Anel Chicas M.D. Responsible Provider:  Raad Alvarado M.D.    Anesthesia Type:  general ASA Status:  2          Final Anesthesia Type: general  Last vitals  BP   Blood Pressure : 146/79, NIBP: 127/71    Temp   36.7 °C (98 °F)    Pulse   Pulse: 85, Heart Rate (Monitored): 83   Resp   (!) 22    SpO2   100 %      Anesthesia Post Evaluation    Patient location during evaluation: PACU  Patient participation: complete - patient participated  Level of consciousness: awake and alert, sleepy but conscious and responsive to verbal stimuli  Pain score: 3    Airway patency: patent  Anesthetic complications: no  Cardiovascular status: hemodynamically stable  Respiratory status: acceptable  Hydration status: euvolemic    PONV: none           Nurse Pain Score: 3 (NPRS)

## 2019-06-19 NOTE — ANESTHESIA TIME REPORT
Anesthesia Start and Stop Event Times     Date Time Event    6/19/2019 1446 Anesthesia Start     1525 Anesthesia Stop        Responsible Staff  06/19/19    Name Role Begin End    Raad Alvarado M.D. Anesth 1446 1525        Preop Diagnosis (Free Text):  Pre-op Diagnosis     ABDOMINAL UTERINE BLEEDING        Preop Diagnosis (Codes):  Diagnosis Information     Diagnosis Code(s):         Post op Diagnosis  Abnormal uterine bleeding      Premium Reason  A. 3PM - 7AM    Comments:

## 2019-06-19 NOTE — OR NURSING
Pt arrived in Phase 2, will keep pt in bed, pt uses walker for transfers at home, awake, complains of aching back pain, minimal bloody vaginal drainage present on anna pad, VS stable, drinking sips of water, waiitng for daughter william arrive with personal belongings.

## 2019-06-19 NOTE — DISCHARGE INSTRUCTIONS
ACTIVITY: Rest and take it easy for the first 24 hours.  A responsible adult is recommended to remain with you during that time.  It is normal to feel sleepy.  We encourage you to not do anything that requires balance, judgment or coordination.    MILD FLU-LIKE SYMPTOMS ARE NORMAL. YOU MAY EXPERIENCE GENERALIZED MUSCLE ACHES, THROAT IRRITATION, HEADACHE AND/OR SOME NAUSEA.    FOR 24 HOURS DO NOT:  Drive, operate machinery or run household appliances.  Drink beer or alcoholic beverages.   Make important decisions or sign legal documents.    SPECIAL INSTRUCTIONS: Follow and sprecial Instructions from your MD, nothing in your vagina until approved by your MD    DIET: To avoid nausea, slowly advance diet as tolerated, avoiding spicy or greasy foods for the first day.  Add more substantial food to your diet according to your physician's instructions.  INCREASE FLUIDS AND FIBER TO AVOID CONSTIPATION.    SURGICAL DRESSING/BATHING: you may shower, no baths, hot tubs or swimming pools until OK by your MD    FOLLOW-UP APPOINTMENT:  A follow-up appointment should be arranged with your doctor in 1-2 weeks; call to schedule.    You should CALL YOUR PHYSICIAN if you develop:  Fever greater than 101 degrees F.  Pain not relieved by medication, or persistent nausea or vomiting.  Excessive bleeding (blood soaking through dressing) or unexpected drainage from the wound.  Extreme redness or swelling around the incision site, drainage of pus or foul smelling drainage.  Inability to urinate or empty your bladder within 8 hours.  Problems with breathing or chest pain.    You should call 911 if you develop problems with breathing or chest pain.  If you are unable to contact your doctor or surgical center, you should go to the nearest emergency room or urgent care center.  Physician's telephone #: [phone]    If any questions arise, call your doctor.  If your doctor is not available, please feel free to call the Surgical Center at  [phone].  The Center is open Monday through Friday from 7AM to 7PM.  You can also call the HEALTH HOTLINE open 24 hours/day, 7 days/week and speak to a nurse at [phone], or toll free at [phone].    A registered nurse may call you a few days after your surgery to see how you are doing after your procedure.    MEDICATIONS: Resume taking daily medication.  Take prescribed pain medication with food.  If no medication is prescribed, you may take non-aspirin pain medication if needed.  PAIN MEDICATION CAN BE VERY CONSTIPATING.  Take a stool softener or laxative such as senokot, pericolace, or milk of magnesia if needed.    Prescription given for none.  Last pain medication given at Tylenol at 2:00pm today.    If your physician has prescribed pain medication that includes Acetaminophen (Tylenol), do not take additional Acetaminophen (Tylenol) while taking the prescribed medication.    Depression / Suicide Risk    As you are discharged from this [facility] facility, it is important to learn how to keep safe from harming yourself.    Recognize the warning signs:  · Abrupt changes in personality, positive or negative- including increase in energy   · Giving away possessions  · Change in eating patterns- significant weight changes-  positive or negative  · Change in sleeping patterns- unable to sleep or sleeping all the time   · Unwillingness or inability to communicate  · Depression  · Unusual sadness, discouragement and loneliness  · Talk of wanting to die  · Neglect of personal appearance   · Rebelliousness- reckless behavior  · Withdrawal from people/activities they love  · Confusion- inability to concentrate     If you or a loved one observes any of these behaviors or has concerns about self-harm, here's what you can do:  · Talk about it- your feelings and reasons for harming yourself  · Remove any means that you might use to hurt yourself (examples: pills, rope, extension cords, firearm)  · Get  professional help from the community (Mental Health, Substance Abuse, psychological counseling)  · Do not be alone:Call your Safe Contact- someone whom you trust who will be there for you.  · Call your local CRISIS HOTLINE 964-0023 or 091-611-2867  · Call your local Children's Mobile Crisis Response Team [place] (872) 041-6334 or www.TalkTo  · Call the toll free National Suicide Prevention Hotlines   · National Suicide Prevention Lifeline 858-272-YELV (7859)  · National Hope Line Network 800-SUICIDE (627-3055)

## 2019-06-19 NOTE — OR NURSING
Pre-op complete. POC reviewed with pt and family. Call light within reach, educated to call for assistance if needed. Tylenol given with sip of water per Dr. Alvarado's order.

## 2019-06-19 NOTE — OR SURGEON
Immediate Post OP Note    PreOp Diagnosis: Enlarged uterus PMB, anemia, thickened endometrium    PostOp Diagnosis: Same with probable cancer    Procedure(s):  HYSTEROSCOPY, DIAGNOSTIC  DILATION AND CURETTAGE    Surgeon(s):  Anel Chicas M.D.    Anesthesiologist/Type of Anesthesia:  Anesthesiologist: Raad Alvarado M.D./* No anesthesia type entered *    Surgical Staff:  * No surgical staff found *    Specimens removed if any:  ECC, EMC    Estimated Blood Loss: min    Findings: Uterus is 16 weeks size and globular, sounds to 14 cm, lots of irregular tissue suspicious for cancer, dark bleb on the ectocervix removed and sent to path    Complications: None        6/19/2019 3:14 PM Anel Chicas M.D.

## 2019-06-19 NOTE — PROGRESS NOTES
Patient is AO x 4, RASS -1, and denies pain/discomfort/nausea.  PIV verified and safety protocols in place.  Perineal pad replaced after transfer from OR - no additional bleeding observed.  POC reviewed and daughter Maria G updated. VS stable and spO2 91-94% on RA.  Patient is tolerating PO fluids and had 4 oz of juice in PACU.

## 2019-06-20 ENCOUNTER — TELEPHONE (OUTPATIENT)
Dept: MEDICAL GROUP | Facility: LAB | Age: 69
End: 2019-06-20

## 2019-06-20 ENCOUNTER — PATIENT OUTREACH (OUTPATIENT)
Dept: HEALTH INFORMATION MANAGEMENT | Facility: OTHER | Age: 69
End: 2019-06-20

## 2019-06-20 DIAGNOSIS — D62 ACUTE ON CHRONIC BLOOD LOSS ANEMIA: ICD-10-CM

## 2019-06-20 DIAGNOSIS — I69.359 CVA, OLD, HEMIPARESIS (HCC): ICD-10-CM

## 2019-06-20 DIAGNOSIS — Z09 S/P GYNECOLOGICAL SURGERY, FOLLOW-UP EXAM: ICD-10-CM

## 2019-06-20 NOTE — OR NURSING
Pt dressed with assistance of nursing staff, transferred to w/c at bedside, d/c instructions reviewed with daughter, she verbalized understanding of instructions, PIV reoved, tip intact, discharged via w/c to home with daughter.

## 2019-06-20 NOTE — TELEPHONE ENCOUNTER
1. Caller Name: [facility]                                         Call Back Number: [phone]      Patient approves a detailed voicemail message: yes    DME ORDERS   BEDSIDE COMMODE  4 WHEEL WALKER WITH SEAT  FAX TO [facility]  [phone]

## 2019-07-09 RX ORDER — AMLODIPINE BESYLATE 5 MG/1
5 TABLET ORAL
Qty: 90 TAB | Refills: 2 | Status: SHIPPED | OUTPATIENT
Start: 2019-07-09 | End: 2019-10-10 | Stop reason: SDUPTHER

## 2019-07-09 NOTE — TELEPHONE ENCOUNTER
Was the patient seen in the last year in this department? Yes lov 6/17/19    Does patient have an active prescription for medications requested? No     Received Request Via: Pharmacy

## 2019-07-13 ENCOUNTER — PATIENT OUTREACH (OUTPATIENT)
Dept: HEALTH INFORMATION MANAGEMENT | Facility: OTHER | Age: 69
End: 2019-07-13

## 2019-07-17 ENCOUNTER — HOSPITAL ENCOUNTER (OUTPATIENT)
Dept: RADIOLOGY | Facility: MEDICAL CENTER | Age: 69
End: 2019-07-17
Attending: SPECIALIST
Payer: MEDICARE

## 2019-07-24 ENCOUNTER — HOSPITAL ENCOUNTER (OUTPATIENT)
Dept: LAB | Facility: MEDICAL CENTER | Age: 69
End: 2019-07-24
Attending: SPECIALIST
Payer: MEDICARE

## 2019-07-24 LAB
ANION GAP SERPL CALC-SCNC: 8 MMOL/L (ref 0–11.9)
BUN SERPL-MCNC: 7 MG/DL (ref 8–22)
CALCIUM SERPL-MCNC: 9.2 MG/DL (ref 8.5–10.5)
CHLORIDE SERPL-SCNC: 108 MMOL/L (ref 96–112)
CO2 SERPL-SCNC: 26 MMOL/L (ref 20–33)
CREAT SERPL-MCNC: 0.94 MG/DL (ref 0.5–1.4)
GLUCOSE SERPL-MCNC: 92 MG/DL (ref 65–99)
POTASSIUM SERPL-SCNC: 3.6 MMOL/L (ref 3.6–5.5)
SODIUM SERPL-SCNC: 142 MMOL/L (ref 135–145)

## 2019-07-24 PROCEDURE — 80048 BASIC METABOLIC PNL TOTAL CA: CPT

## 2019-07-24 PROCEDURE — 36415 COLL VENOUS BLD VENIPUNCTURE: CPT

## 2019-07-25 NOTE — PROGRESS NOTES
A 68-year-old female was an emergent admission to [facility] from 6/11/2019 to 6/13/2019 to treat Postmenopausal bleeding. IHD visited the patient bedside. The patient was discharged Home. The patient was not under clinical case management.     The Patient was discharged with the following medications: Atorvastatin, Norethindrone, and Ascorbic Acid. The patient successfully filled all medications.     The patient was ordered to follow-up with PCP,  and Specialist.  The patient was scheduled to follow-up with:     1) 6/17/2019 @ 7:00 Kathya Scanlon, general/family practice - CONFIRMED AS KEPT     2) 6/18/2019 @ 12:30 Anel Chicas gynecological oncology - CONFIRMED AS KEPT     3) 6/19/2019 @ 12:00 Anel Chicas gynecological oncology - CONFIRMED AS KEPT  The patient has no future appointments scheduled.    IHD identified that the patient had Support Services & Groups barriers. Information Provided to Patient for Personal Care Assistance with [facility] in Service  for future assistance. IHD identified that the patient had Healthcare Access barriers. Coordinated Services for DME, walker and bedside commode was coordinated with [facility] which was delivered on 6/25/19.     IHD followed the patient for a total of 30 days and Patient became non-responsive to IHD outreaches. Patient was discharged with a high lace score  , therefore, a PPS screening was conducted where the patient was scored at a 70%.

## 2019-07-26 ENCOUNTER — HOSPITAL ENCOUNTER (OUTPATIENT)
Dept: RADIOLOGY | Facility: MEDICAL CENTER | Age: 69
End: 2019-07-26
Attending: SPECIALIST
Payer: MEDICARE

## 2019-07-26 DIAGNOSIS — C54.1 MALIGNANT NEOPLASM OF ENDOMETRIUM (HCC): ICD-10-CM

## 2019-07-26 PROCEDURE — 700117 HCHG RX CONTRAST REV CODE 255: Performed by: SPECIALIST

## 2019-07-26 PROCEDURE — 74177 CT ABD & PELVIS W/CONTRAST: CPT

## 2019-07-26 RX ADMIN — IOHEXOL 100 ML: 350 INJECTION, SOLUTION INTRAVENOUS at 12:30

## 2019-07-26 RX ADMIN — IOHEXOL 25 ML: 240 INJECTION, SOLUTION INTRATHECAL; INTRAVASCULAR; INTRAVENOUS; ORAL at 12:30

## 2019-07-31 ENCOUNTER — HOSPITAL ENCOUNTER (OUTPATIENT)
Dept: RADIOLOGY | Facility: MEDICAL CENTER | Age: 69
End: 2019-07-31
Attending: SPECIALIST
Payer: MEDICARE

## 2019-07-31 DIAGNOSIS — Z01.812 PRE-OPERATIVE LABORATORY EXAMINATION: ICD-10-CM

## 2019-07-31 DIAGNOSIS — Z01.818 PRE-OP EXAM: ICD-10-CM

## 2019-07-31 LAB
ABO GROUP BLD: NORMAL
ALBUMIN SERPL BCP-MCNC: 3.8 G/DL (ref 3.2–4.9)
ALBUMIN/GLOB SERPL: 1.3 G/DL
ALP SERPL-CCNC: 54 U/L (ref 30–99)
ALT SERPL-CCNC: 27 U/L (ref 2–50)
ANION GAP SERPL CALC-SCNC: 10 MMOL/L (ref 0–11.9)
APTT PPP: 23.4 SEC (ref 24.7–36)
AST SERPL-CCNC: 26 U/L (ref 12–45)
BASOPHILS # BLD AUTO: 0.7 % (ref 0–1.8)
BASOPHILS # BLD: 0.04 K/UL (ref 0–0.12)
BILIRUB SERPL-MCNC: 0.3 MG/DL (ref 0.1–1.5)
BLD GP AB SCN SERPL QL: NORMAL
BUN SERPL-MCNC: 7 MG/DL (ref 8–22)
CALCIUM SERPL-MCNC: 9.2 MG/DL (ref 8.5–10.5)
CANCER AG125 SERPL-ACNC: 84.9 U/ML (ref 0–35)
CHLORIDE SERPL-SCNC: 107 MMOL/L (ref 96–112)
CO2 SERPL-SCNC: 20 MMOL/L (ref 20–33)
CREAT SERPL-MCNC: 0.84 MG/DL (ref 0.5–1.4)
EOSINOPHIL # BLD AUTO: 0.22 K/UL (ref 0–0.51)
EOSINOPHIL NFR BLD: 4.1 % (ref 0–6.9)
ERYTHROCYTE [DISTWIDTH] IN BLOOD BY AUTOMATED COUNT: 50.9 FL (ref 35.9–50)
GLOBULIN SER CALC-MCNC: 3 G/DL (ref 1.9–3.5)
GLUCOSE SERPL-MCNC: 119 MG/DL (ref 65–99)
HCT VFR BLD AUTO: 37.2 % (ref 37–47)
HGB BLD-MCNC: 11.4 G/DL (ref 12–16)
IMM GRANULOCYTES # BLD AUTO: 0.01 K/UL (ref 0–0.11)
IMM GRANULOCYTES NFR BLD AUTO: 0.2 % (ref 0–0.9)
INR PPP: 0.97 (ref 0.87–1.13)
LYMPHOCYTES # BLD AUTO: 1.62 K/UL (ref 1–4.8)
LYMPHOCYTES NFR BLD: 30.2 % (ref 22–41)
MCH RBC QN AUTO: 30.1 PG (ref 27–33)
MCHC RBC AUTO-ENTMCNC: 30.6 G/DL (ref 33.6–35)
MCV RBC AUTO: 98.2 FL (ref 81.4–97.8)
MONOCYTES # BLD AUTO: 0.55 K/UL (ref 0–0.85)
MONOCYTES NFR BLD AUTO: 10.3 % (ref 0–13.4)
NEUTROPHILS # BLD AUTO: 2.92 K/UL (ref 2–7.15)
NEUTROPHILS NFR BLD: 54.5 % (ref 44–72)
NRBC # BLD AUTO: 0 K/UL
NRBC BLD-RTO: 0 /100 WBC
PLATELET # BLD AUTO: 366 K/UL (ref 164–446)
PMV BLD AUTO: 10.5 FL (ref 9–12.9)
POTASSIUM SERPL-SCNC: 3.6 MMOL/L (ref 3.6–5.5)
PROT SERPL-MCNC: 6.8 G/DL (ref 6–8.2)
PROTHROMBIN TIME: 13 SEC (ref 12–14.6)
RBC # BLD AUTO: 3.79 M/UL (ref 4.2–5.4)
RH BLD: NORMAL
SODIUM SERPL-SCNC: 137 MMOL/L (ref 135–145)
WBC # BLD AUTO: 5.4 K/UL (ref 4.8–10.8)

## 2019-07-31 PROCEDURE — 80053 COMPREHEN METABOLIC PANEL: CPT

## 2019-07-31 PROCEDURE — 86901 BLOOD TYPING SEROLOGIC RH(D): CPT

## 2019-07-31 PROCEDURE — 85730 THROMBOPLASTIN TIME PARTIAL: CPT

## 2019-07-31 PROCEDURE — 86304 IMMUNOASSAY TUMOR CA 125: CPT

## 2019-07-31 PROCEDURE — 71045 X-RAY EXAM CHEST 1 VIEW: CPT

## 2019-07-31 PROCEDURE — 85610 PROTHROMBIN TIME: CPT

## 2019-07-31 PROCEDURE — 36415 COLL VENOUS BLD VENIPUNCTURE: CPT

## 2019-07-31 PROCEDURE — 85025 COMPLETE CBC W/AUTO DIFF WBC: CPT

## 2019-07-31 PROCEDURE — 86900 BLOOD TYPING SEROLOGIC ABO: CPT

## 2019-07-31 PROCEDURE — 86850 RBC ANTIBODY SCREEN: CPT

## 2019-07-31 RX ORDER — LOSARTAN POTASSIUM 100 MG/1
100 TABLET ORAL DAILY
COMMUNITY
End: 2020-05-11

## 2019-08-05 DIAGNOSIS — I10 ESSENTIAL HYPERTENSION: ICD-10-CM

## 2019-08-05 RX ORDER — METOPROLOL SUCCINATE 100 MG/1
100 TABLET, EXTENDED RELEASE ORAL DAILY
Qty: 90 TAB | Refills: 2 | Status: SHIPPED | OUTPATIENT
Start: 2019-08-05 | End: 2019-08-12 | Stop reason: SDUPTHER

## 2019-08-08 ENCOUNTER — HOSPITAL ENCOUNTER (OUTPATIENT)
Facility: MEDICAL CENTER | Age: 69
End: 2019-08-08
Attending: SPECIALIST | Admitting: SPECIALIST
Payer: MEDICARE

## 2019-08-08 ENCOUNTER — ANESTHESIA EVENT (OUTPATIENT)
Dept: SURGERY | Facility: MEDICAL CENTER | Age: 69
End: 2019-08-08
Payer: MEDICARE

## 2019-08-08 ENCOUNTER — APPOINTMENT (OUTPATIENT)
Dept: RADIOLOGY | Facility: MEDICAL CENTER | Age: 69
End: 2019-08-08
Attending: SPECIALIST
Payer: MEDICARE

## 2019-08-08 ENCOUNTER — ANESTHESIA (OUTPATIENT)
Dept: SURGERY | Facility: MEDICAL CENTER | Age: 69
End: 2019-08-08
Payer: MEDICARE

## 2019-08-08 VITALS
HEIGHT: 65 IN | RESPIRATION RATE: 18 BRPM | WEIGHT: 188.05 LBS | DIASTOLIC BLOOD PRESSURE: 75 MMHG | HEART RATE: 86 BPM | BODY MASS INDEX: 31.33 KG/M2 | TEMPERATURE: 97.6 F | OXYGEN SATURATION: 95 % | SYSTOLIC BLOOD PRESSURE: 126 MMHG

## 2019-08-08 DIAGNOSIS — G89.18 POST-OP PAIN: ICD-10-CM

## 2019-08-08 DIAGNOSIS — D50.0 IRON DEFICIENCY ANEMIA DUE TO CHRONIC BLOOD LOSS: ICD-10-CM

## 2019-08-08 LAB
ABO GROUP BLD: NORMAL
BLD GP AB SCN SERPL QL: NORMAL
RH BLD: NORMAL

## 2019-08-08 PROCEDURE — 88331 PATH CONSLTJ SURG 1 BLK 1SPC: CPT

## 2019-08-08 PROCEDURE — 160048 HCHG OR STATISTICAL LEVEL 1-5: Performed by: SPECIALIST

## 2019-08-08 PROCEDURE — 160046 HCHG PACU - 1ST 60 MINS PHASE II: Performed by: SPECIALIST

## 2019-08-08 PROCEDURE — 500854 HCHG NEEDLE, INSUFFLATION FOR STEP: Performed by: SPECIALIST

## 2019-08-08 PROCEDURE — 501582 HCHG TROCAR, THRD BLADED: Performed by: SPECIALIST

## 2019-08-08 PROCEDURE — 501838 HCHG SUTURE GENERAL: Performed by: SPECIALIST

## 2019-08-08 PROCEDURE — 700105 HCHG RX REV CODE 258: Performed by: SPECIALIST

## 2019-08-08 PROCEDURE — 86900 BLOOD TYPING SEROLOGIC ABO: CPT

## 2019-08-08 PROCEDURE — 502779 HCHG SUTURE, QUILL: Performed by: SPECIALIST

## 2019-08-08 PROCEDURE — 160035 HCHG PACU - 1ST 60 MINS PHASE I: Performed by: SPECIALIST

## 2019-08-08 PROCEDURE — 88307 TISSUE EXAM BY PATHOLOGIST: CPT

## 2019-08-08 PROCEDURE — 86850 RBC ANTIBODY SCREEN: CPT

## 2019-08-08 PROCEDURE — 700104 HCHG RX REV CODE 254: Performed by: SPECIALIST

## 2019-08-08 PROCEDURE — 74018 RADEX ABDOMEN 1 VIEW: CPT

## 2019-08-08 PROCEDURE — 700101 HCHG RX REV CODE 250: Performed by: SPECIALIST

## 2019-08-08 PROCEDURE — 88342 IMHCHEM/IMCYTCHM 1ST ANTB: CPT

## 2019-08-08 PROCEDURE — 88341 IMHCHEM/IMCYTCHM EA ADD ANTB: CPT

## 2019-08-08 PROCEDURE — 160031 HCHG SURGERY MINUTES - 1ST 30 MINS LEVEL 5: Performed by: SPECIALIST

## 2019-08-08 PROCEDURE — C1769 GUIDE WIRE: HCPCS | Performed by: SPECIALIST

## 2019-08-08 PROCEDURE — 700111 HCHG RX REV CODE 636 W/ 250 OVERRIDE (IP): Performed by: ANESTHESIOLOGY

## 2019-08-08 PROCEDURE — C2617 STENT, NON-COR, TEM W/O DEL: HCPCS | Performed by: SPECIALIST

## 2019-08-08 PROCEDURE — 160042 HCHG SURGERY MINUTES - EA ADDL 1 MIN LEVEL 5: Performed by: SPECIALIST

## 2019-08-08 PROCEDURE — 502714 HCHG ROBOTIC SURGERY SERVICES: Performed by: SPECIALIST

## 2019-08-08 PROCEDURE — 160036 HCHG PACU - EA ADDL 30 MINS PHASE I: Performed by: SPECIALIST

## 2019-08-08 PROCEDURE — 502240 HCHG MISC OR SUPPLY RC 0272: Performed by: SPECIALIST

## 2019-08-08 PROCEDURE — 500864 HCHG NEEDLE, SPINAL 18G: Performed by: SPECIALIST

## 2019-08-08 PROCEDURE — 86901 BLOOD TYPING SEROLOGIC RH(D): CPT

## 2019-08-08 PROCEDURE — 700101 HCHG RX REV CODE 250: Performed by: ANESTHESIOLOGY

## 2019-08-08 PROCEDURE — A4338 INDWELLING CATHETER LATEX: HCPCS | Performed by: SPECIALIST

## 2019-08-08 PROCEDURE — 160009 HCHG ANES TIME/MIN: Performed by: SPECIALIST

## 2019-08-08 PROCEDURE — 700102 HCHG RX REV CODE 250 W/ 637 OVERRIDE(OP): Performed by: ANESTHESIOLOGY

## 2019-08-08 PROCEDURE — 501399 HCHG SPECIMAN BAG, ENDO CATC: Performed by: SPECIALIST

## 2019-08-08 PROCEDURE — 88360 TUMOR IMMUNOHISTOCHEM/MANUAL: CPT

## 2019-08-08 PROCEDURE — 501330 HCHG SET, CYSTO IRRIG TUBING: Performed by: SPECIALIST

## 2019-08-08 PROCEDURE — 160025 RECOVERY II MINUTES (STATS): Performed by: SPECIALIST

## 2019-08-08 PROCEDURE — 88305 TISSUE EXAM BY PATHOLOGIST: CPT

## 2019-08-08 PROCEDURE — 88309 TISSUE EXAM BY PATHOLOGIST: CPT

## 2019-08-08 PROCEDURE — 160002 HCHG RECOVERY MINUTES (STAT): Performed by: SPECIALIST

## 2019-08-08 PROCEDURE — A9270 NON-COVERED ITEM OR SERVICE: HCPCS | Performed by: ANESTHESIOLOGY

## 2019-08-08 DEVICE — STENT UROLOGICAL POLARIS 6X22  ULTRA: Type: IMPLANTABLE DEVICE | Status: FUNCTIONAL

## 2019-08-08 RX ORDER — LIDOCAINE HYDROCHLORIDE 20 MG/ML
INJECTION, SOLUTION EPIDURAL; INFILTRATION; INTRACAUDAL; PERINEURAL PRN
Status: DISCONTINUED | OUTPATIENT
Start: 2019-08-08 | End: 2019-08-08 | Stop reason: SURG

## 2019-08-08 RX ORDER — SODIUM CHLORIDE, SODIUM LACTATE, POTASSIUM CHLORIDE, CALCIUM CHLORIDE 600; 310; 30; 20 MG/100ML; MG/100ML; MG/100ML; MG/100ML
INJECTION, SOLUTION INTRAVENOUS CONTINUOUS
Status: DISCONTINUED | OUTPATIENT
Start: 2019-08-08 | End: 2019-08-08 | Stop reason: HOSPADM

## 2019-08-08 RX ORDER — DIPHENHYDRAMINE HYDROCHLORIDE 50 MG/ML
12.5 INJECTION INTRAMUSCULAR; INTRAVENOUS
Status: DISCONTINUED | OUTPATIENT
Start: 2019-08-08 | End: 2019-08-08 | Stop reason: HOSPADM

## 2019-08-08 RX ORDER — MEPERIDINE HYDROCHLORIDE 25 MG/ML
12.5 INJECTION INTRAMUSCULAR; INTRAVENOUS; SUBCUTANEOUS
Status: DISCONTINUED | OUTPATIENT
Start: 2019-08-08 | End: 2019-08-08 | Stop reason: HOSPADM

## 2019-08-08 RX ORDER — OXYCODONE HYDROCHLORIDE AND ACETAMINOPHEN 5; 325 MG/1; MG/1
1 TABLET ORAL EVERY 6 HOURS PRN
Qty: 28 TAB | Refills: 0 | Status: SHIPPED | OUTPATIENT
Start: 2019-08-08 | End: 2019-08-15

## 2019-08-08 RX ORDER — ONDANSETRON 2 MG/ML
4 INJECTION INTRAMUSCULAR; INTRAVENOUS
Status: DISCONTINUED | OUTPATIENT
Start: 2019-08-08 | End: 2019-08-08 | Stop reason: HOSPADM

## 2019-08-08 RX ORDER — INDOCYANINE GREEN AND WATER 25 MG
KIT INJECTION
Status: DISCONTINUED | OUTPATIENT
Start: 2019-08-08 | End: 2019-08-08 | Stop reason: HOSPADM

## 2019-08-08 RX ORDER — OXYCODONE HCL 5 MG/5 ML
5 SOLUTION, ORAL ORAL
Status: COMPLETED | OUTPATIENT
Start: 2019-08-08 | End: 2019-08-08

## 2019-08-08 RX ORDER — HYDROMORPHONE HYDROCHLORIDE 1 MG/ML
0.1 INJECTION, SOLUTION INTRAMUSCULAR; INTRAVENOUS; SUBCUTANEOUS
Status: DISCONTINUED | OUTPATIENT
Start: 2019-08-08 | End: 2019-08-08 | Stop reason: HOSPADM

## 2019-08-08 RX ORDER — HYDROMORPHONE HYDROCHLORIDE 1 MG/ML
0.2 INJECTION, SOLUTION INTRAMUSCULAR; INTRAVENOUS; SUBCUTANEOUS
Status: DISCONTINUED | OUTPATIENT
Start: 2019-08-08 | End: 2019-08-08 | Stop reason: HOSPADM

## 2019-08-08 RX ORDER — ONDANSETRON 2 MG/ML
INJECTION INTRAMUSCULAR; INTRAVENOUS PRN
Status: DISCONTINUED | OUTPATIENT
Start: 2019-08-08 | End: 2019-08-08 | Stop reason: SURG

## 2019-08-08 RX ORDER — DOCUSATE SODIUM 100 MG/1
100 CAPSULE, LIQUID FILLED ORAL 2 TIMES DAILY
Qty: 60 CAP | Refills: 6 | Status: SHIPPED | OUTPATIENT
Start: 2019-08-08 | End: 2019-09-07

## 2019-08-08 RX ORDER — KETOROLAC TROMETHAMINE 30 MG/ML
INJECTION, SOLUTION INTRAMUSCULAR; INTRAVENOUS PRN
Status: DISCONTINUED | OUTPATIENT
Start: 2019-08-08 | End: 2019-08-08 | Stop reason: SURG

## 2019-08-08 RX ORDER — ONDANSETRON 4 MG/1
4 TABLET, FILM COATED ORAL EVERY 6 HOURS PRN
Qty: 30 TAB | Refills: 0 | Status: SHIPPED | OUTPATIENT
Start: 2019-08-08 | End: 2019-10-02 | Stop reason: SDUPTHER

## 2019-08-08 RX ORDER — SODIUM CHLORIDE, SODIUM LACTATE, POTASSIUM CHLORIDE, CALCIUM CHLORIDE 600; 310; 30; 20 MG/100ML; MG/100ML; MG/100ML; MG/100ML
INJECTION, SOLUTION INTRAVENOUS
Status: COMPLETED | OUTPATIENT
Start: 2019-08-08 | End: 2019-08-08

## 2019-08-08 RX ORDER — HYDRALAZINE HYDROCHLORIDE 20 MG/ML
5 INJECTION INTRAMUSCULAR; INTRAVENOUS
Status: DISCONTINUED | OUTPATIENT
Start: 2019-08-08 | End: 2019-08-08 | Stop reason: HOSPADM

## 2019-08-08 RX ORDER — LABETALOL HYDROCHLORIDE 5 MG/ML
5 INJECTION, SOLUTION INTRAVENOUS
Status: DISCONTINUED | OUTPATIENT
Start: 2019-08-08 | End: 2019-08-08 | Stop reason: HOSPADM

## 2019-08-08 RX ORDER — OXYCODONE HCL 5 MG/5 ML
10 SOLUTION, ORAL ORAL
Status: COMPLETED | OUTPATIENT
Start: 2019-08-08 | End: 2019-08-08

## 2019-08-08 RX ORDER — HALOPERIDOL 5 MG/ML
1 INJECTION INTRAMUSCULAR
Status: DISCONTINUED | OUTPATIENT
Start: 2019-08-08 | End: 2019-08-08 | Stop reason: HOSPADM

## 2019-08-08 RX ORDER — MIDAZOLAM HYDROCHLORIDE 1 MG/ML
1 INJECTION INTRAMUSCULAR; INTRAVENOUS
Status: DISCONTINUED | OUTPATIENT
Start: 2019-08-08 | End: 2019-08-08 | Stop reason: HOSPADM

## 2019-08-08 RX ORDER — BUPIVACAINE HYDROCHLORIDE AND EPINEPHRINE 2.5; 5 MG/ML; UG/ML
INJECTION, SOLUTION EPIDURAL; INFILTRATION; INTRACAUDAL; PERINEURAL
Status: DISCONTINUED | OUTPATIENT
Start: 2019-08-08 | End: 2019-08-08 | Stop reason: HOSPADM

## 2019-08-08 RX ORDER — HYDROMORPHONE HYDROCHLORIDE 1 MG/ML
0.4 INJECTION, SOLUTION INTRAMUSCULAR; INTRAVENOUS; SUBCUTANEOUS
Status: DISCONTINUED | OUTPATIENT
Start: 2019-08-08 | End: 2019-08-08 | Stop reason: HOSPADM

## 2019-08-08 RX ORDER — PHENYLEPHRINE HYDROCHLORIDE 10 MG/ML
INJECTION, SOLUTION INTRAMUSCULAR; INTRAVENOUS; SUBCUTANEOUS PRN
Status: DISCONTINUED | OUTPATIENT
Start: 2019-08-08 | End: 2019-08-08 | Stop reason: SURG

## 2019-08-08 RX ORDER — DEXAMETHASONE SODIUM PHOSPHATE 4 MG/ML
INJECTION, SOLUTION INTRA-ARTICULAR; INTRALESIONAL; INTRAMUSCULAR; INTRAVENOUS; SOFT TISSUE PRN
Status: DISCONTINUED | OUTPATIENT
Start: 2019-08-08 | End: 2019-08-08 | Stop reason: SURG

## 2019-08-08 RX ORDER — MAGNESIUM HYDROXIDE 1200 MG/15ML
LIQUID ORAL
Status: DISCONTINUED | OUTPATIENT
Start: 2019-08-08 | End: 2019-08-08 | Stop reason: HOSPADM

## 2019-08-08 RX ADMIN — ONDANSETRON 4 MG: 2 INJECTION INTRAMUSCULAR; INTRAVENOUS at 18:39

## 2019-08-08 RX ADMIN — ROCURONIUM BROMIDE 50 MG: 10 INJECTION, SOLUTION INTRAVENOUS at 15:56

## 2019-08-08 RX ADMIN — ROCURONIUM BROMIDE 20 MG: 10 INJECTION, SOLUTION INTRAVENOUS at 17:20

## 2019-08-08 RX ADMIN — DEXAMETHASONE SODIUM PHOSPHATE 4 MG: 4 INJECTION, SOLUTION INTRA-ARTICULAR; INTRALESIONAL; INTRAMUSCULAR; INTRAVENOUS; SOFT TISSUE at 16:01

## 2019-08-08 RX ADMIN — PROPOFOL 200 MG: 10 INJECTION, EMULSION INTRAVENOUS at 15:56

## 2019-08-08 RX ADMIN — FENTANYL CITRATE 50 MCG: 50 INJECTION, SOLUTION INTRAMUSCULAR; INTRAVENOUS at 15:52

## 2019-08-08 RX ADMIN — FENTANYL CITRATE 50 MCG: 50 INJECTION, SOLUTION INTRAMUSCULAR; INTRAVENOUS at 18:42

## 2019-08-08 RX ADMIN — SODIUM CHLORIDE, POTASSIUM CHLORIDE, SODIUM LACTATE AND CALCIUM CHLORIDE: 600; 310; 30; 20 INJECTION, SOLUTION INTRAVENOUS at 12:17

## 2019-08-08 RX ADMIN — SUGAMMADEX 200 MG: 100 INJECTION, SOLUTION INTRAVENOUS at 18:41

## 2019-08-08 RX ADMIN — FENTANYL CITRATE 50 MCG: 50 INJECTION, SOLUTION INTRAMUSCULAR; INTRAVENOUS at 16:32

## 2019-08-08 RX ADMIN — CEFOTETAN DISODIUM 2 G: 2 INJECTION, POWDER, FOR SOLUTION INTRAMUSCULAR; INTRAVENOUS at 16:17

## 2019-08-08 RX ADMIN — KETOROLAC TROMETHAMINE 30 MG: 30 INJECTION, SOLUTION INTRAMUSCULAR at 18:39

## 2019-08-08 RX ADMIN — LIDOCAINE HYDROCHLORIDE 5 ML: 20 INJECTION, SOLUTION EPIDURAL; INFILTRATION; INTRACAUDAL at 15:56

## 2019-08-08 RX ADMIN — OXYCODONE HYDROCHLORIDE 5 MG: 5 SOLUTION ORAL at 20:35

## 2019-08-08 RX ADMIN — PHENYLEPHRINE HYDROCHLORIDE 100 MCG: 10 INJECTION INTRAVENOUS at 16:07

## 2019-08-08 RX ADMIN — MIDAZOLAM HYDROCHLORIDE 2 MG: 1 INJECTION, SOLUTION INTRAMUSCULAR; INTRAVENOUS at 15:49

## 2019-08-08 ASSESSMENT — PAIN SCALES - GENERAL: PAIN_LEVEL: 0

## 2019-08-08 NOTE — ANESTHESIA PROCEDURE NOTES
Airway  Date/Time: 8/8/2019 3:57 PM  Performed by: Hany Bennett M.D.  Authorized by: Hany Bennett M.D.     Location:  OR  Urgency:  Elective  Indications for Airway Management:  Anesthesia  Spontaneous Ventilation: absent    Sedation Level:  Deep  Preoxygenated: Yes    Patient Position:  Sniffing  Final Airway Type:  Endotracheal airway  Final Endotracheal Airway:  ETT  Cuffed: Yes    Technique Used for Successful ETT Placement:  Direct laryngoscopy  Insertion Site:  Oral  Blade Type:  Rihca  Laryngoscope Blade/Videolaryngoscope Blade Size:  3  ETT Size (mm):  7.5  Measured from:  Lips  ETT to Lips (cm):  22  Placement Verified by: auscultation and capnometry    Cormack-Lehane Classification:  Grade IIa - partial view of glottis  Number of Attempts at Approach:  1

## 2019-08-08 NOTE — PROGRESS NOTES
Patient Pre op checklist done with family at bedside. They were also notified of delay estimated about 2 hour. Call light within reach and education provided on fall risk. No complaints at this time.

## 2019-08-08 NOTE — ANESTHESIA PREPROCEDURE EVALUATION
Relevant Problems   CARDIAC   (+) Essential hypertension      Other   (+) Acute on chronic blood loss anemia   (+) CVA, old, hemiparesis (HCC)   (+) Obesity (BMI 30-39.9)   (+) Vision abnormalities   endometrial cancer    Physical Exam    Airway   Mallampati: II  TM distance: >3 FB  Neck ROM: full       Cardiovascular - normal exam  Rhythm: regular  Rate: normal  (-) murmur     Dental - normal exam         Pulmonary - normal exam  Breath sounds clear to auscultation     Abdominal    Neurological - normal exam                 Anesthesia Plan    ASA 3 (see relevant problem list)       Plan - general       Airway plan will be ETT        Induction: intravenous    Postoperative Plan: Postoperative administration of opioids is intended.    Pertinent diagnostic labs and testing reviewed    Informed Consent:    Anesthetic plan and risks discussed with patient.    Use of blood products discussed with: patient whom consented to blood products.

## 2019-08-08 NOTE — TELEPHONE ENCOUNTER
Was the patient seen in the last year in this department? Yes  5/6/19  Does patient have an active prescription for medications requested? No     Received Request Via: Pharmacy

## 2019-08-09 LAB — PATHOLOGY CONSULT NOTE: NORMAL

## 2019-08-09 NOTE — OR NURSING
Pt bladder scan 20 mls per report from PACU RNJasiel. Pt attempted to void, unsuccessful. Educated pt and family on importance of voiding within 8 hrs, they verbalized understanding.    Discharge information reviewed with patient and responsible adult. No questions or concerns at this time.     IV discontinued.     See vital sign flowsheets  for discharge details.

## 2019-08-09 NOTE — OR NURSING
Assumed care of patient at approx 2020.  Patient alert and oriented x 4. See flowsheets for VS.  Pain is rated 0/10.     Call light and personal belongings within reach. Gurney in lowest position. Monitor alarms set appropriately.    Dressing is CDI . See flowsheets for detailed wound documentation.

## 2019-08-09 NOTE — ANESTHESIA TIME REPORT
Anesthesia Start and Stop Event Times     Date Time Event    8/8/2019 1533 Ready for Procedure     1546 Anesthesia Start     1907 Anesthesia Stop        Responsible Staff  08/08/19    Name Role Begin End    Hany Bennett M.D. Anesth 1546 1907        Preop Diagnosis (Free Text):  Pre-op Diagnosis     ENDOMETRIAL CANCER         Preop Diagnosis (Codes):    Post op Diagnosis  Endometrial cancer (HCC)      Premium Reason  A. 3PM - 7AM    Comments:

## 2019-08-09 NOTE — DISCHARGE INSTRUCTIONS
ACTIVITY: Rest and take it easy for the first 24 hours.  A responsible adult is recommended to remain with you during that time.  It is normal to feel sleepy.  We encourage you to not do anything that requires balance, judgment or coordination.    MILD FLU-LIKE SYMPTOMS ARE NORMAL. YOU MAY EXPERIENCE GENERALIZED MUSCLE ACHES, THROAT IRRITATION, HEADACHE AND/OR SOME NAUSEA.    FOR 24 HOURS DO NOT:  Drive, operate machinery or run household appliances.  Drink beer or alcoholic beverages.   Make important decisions or sign legal documents.    SPECIAL INSTRUCTIONS:   1. No heavy lifting greater than 10 pounds for minimum 6 weeks.  2. Nothing in vagina (ie no tampons, douching, intercourse) for minimum of 6 weeks.  3. No driving while taking narcotics.   4. Return to our office as directed and call to confirm appointment.  Call our office [phone] if you develop any fevers, chills, nausea/vomiting, heavy vaginal bleeding, or redness, tenderness, and/or drainage from your wound, if you have persistent watery discharge while ambulating or stool draining from the vagina.  5. You have had a left ureteral stent placed, please make sure that you have an appointment for a stent removal in six weeks in the office after surgery. Please drink lots of fluids. You may have some blood in the urine and discomfort from your stents. Please call [phone] if you have any questions or concerns.  6. Showering is ok after shower make sure wound is dry.   7. You may keep the wound dressing and change everyday. After 2 weeks from surgery you may keep the wound dressing off.   8. You may have vaginal spotting or light bleeding which is normal.  9. If you have not had a bowel movement for 2 days, please take over the counter Milk of Magnesium, 1 tablespoon every 4 hours. After 4 doses and if you still have not had a bowel movement, please call your doctor.   10. You may eat soft diet, such as soup, liquid, for day #1 and if tolerating you  may resume your regular diet.    DIET: To avoid nausea, slowly advance diet as tolerated, avoiding spicy or greasy foods for the first day.  Add more substantial food to your diet according to your physician's instructions. INCREASE FLUIDS AND FIBER TO AVOID CONSTIPATION.    FOLLOW-UP APPOINTMENT:  A follow-up appointment should be arranged with your doctor; call to schedule.    You should CALL YOUR PHYSICIAN if you develop:  Fever greater than 101 degrees F.  Pain not relieved by medication, or persistent nausea or vomiting.  Excessive bleeding (blood soaking through dressing) or unexpected drainage from the wound.  Extreme redness or swelling around the incision site, drainage of pus or foul smelling drainage.  Inability to urinate or empty your bladder within 8 hours.  Problems with breathing or chest pain.    You should call 911 if you develop problems with breathing or chest pain.  If you are unable to contact your doctor or surgical center, you should go to the nearest emergency room or urgent care center.  Physician's telephone #: Dr. Garcia [phone]    If any questions arise, call your doctor.  If your doctor is not available, please feel free to call the Surgical Center at [phone].  The Center is open Monday through Friday from 7AM to 7PM.  You can also call the [facility] HOTLINE open 24 hours/day, 7 days/week and speak to a nurse at [phone], or toll free at [phone].    A registered nurse may call you a few days after your surgery to see how you are doing after your procedure.    MEDICATIONS: Resume taking daily medication.  Take prescribed pain medication with food.  If no medication is prescribed, you may take non-aspirin pain medication if needed.  PAIN MEDICATION CAN BE VERY CONSTIPATING.  Take a stool softener or laxative such as senokot, pericolace, or milk of magnesia if needed.    Prescription given for colace (stool softener), zofran (for nausea), percocet (pain medication).  Last  pain medication given: none given in recovery.    If your physician has prescribed pain medication that includes Acetaminophen (Tylenol), do not take additional Acetaminophen (Tylenol) while taking the prescribed medication.    Depression / Suicide Risk    As you are discharged from this [facility] facility, it is important to learn how to keep safe from harming yourself.    Recognize the warning signs:  · Abrupt changes in personality, positive or negative- including increase in energy   · Giving away possessions  · Change in eating patterns- significant weight changes-  positive or negative  · Change in sleeping patterns- unable to sleep or sleeping all the time   · Unwillingness or inability to communicate  · Depression  · Unusual sadness, discouragement and loneliness  · Talk of wanting to die  · Neglect of personal appearance   · Rebelliousness- reckless behavior  · Withdrawal from people/activities they love  · Confusion- inability to concentrate     If you or a loved one observes any of these behaviors or has concerns about self-harm, here's what you can do:  · Talk about it- your feelings and reasons for harming yourself  · Remove any means that you might use to hurt yourself (examples: pills, rope, extension cords, firearm)  · Get professional help from the community (Mental Health, Substance Abuse, psychological counseling)  · Do not be alone:Call your Safe Contact- someone whom you trust who will be there for you.  · Call your local CRISIS HOTLINE 431-7191 or 578-956-3097  · Call your local Children's Mobile Crisis Response Team [place] (158) 250-7392 or www.[employer]Tree  · Call the toll free National Suicide Prevention Hotlines   · National Suicide Prevention Lifeline 353-996-ZGDP (4592)  · National Hope Line Network 800-SUICIDE (113-3818)

## 2019-08-09 NOTE — ANESTHESIA QCDR
2019 [facility] Clinical Data Registry (for Quality Improvement)     Postoperative nausea/vomiting risk protocol (Adult = 18 yrs and Pediatric 3-17 yrs)- (430 and 463)  General inhalation anesthetic (NOT TIVA) with PONV risk factors: Yes  Provision of anti-emetic therapy with at least 2 different classes of agents: Yes   Patient DID NOT receive anti-emetic therapy and reason is documented in Medical Record:  N/A    Multimodal Pain Management- (AQI59)  Patient undergoing Elective Surgery (i.e. Outpatient, or ASC, or Prescheduled Surgery prior to Hospital Admission): Yes  Use of Multimodal Pain Management, two or more drugs and/or interventions, NOT including systemic opioids: Yes   Exception: Documented allergy to multiple classes of analgesics:  N/A    PACU assessment of acute postoperative pain prior to Anesthesia Care End- Applies to Patients Age = 18- (ABG7)  Initial PACU pain score is which of the following: < 7/10  Patient unable to report pain score: N/A    Post-anesthetic transfer of care checklist/protocol to PACU/ICU- (426 and 427)  Upon conclusion of case, patient transferred to which of the following locations: PACU/Non-ICU  Use of transfer checklist/protocol: Yes  Exclusion: Service Performed in Patient Hospital Room (and thus did not require transfer): N/A    PACU Reintubation- (AQI31)  General anesthesia requiring endotracheal intubation (ETT) along with subsequent extubation in OR or PACU: Yes  Required reintubation in the PACU: No   Extubation was a planned trial documented in the medical record prior to removal of the original airway device:  N/A    Unplanned admission to ICU related to anesthesia service up through end of PACU care- (MD51)  Unplanned admission to ICU (not initially anticipated at anesthesia start time): No

## 2019-08-09 NOTE — OR NURSING
Pt AA/Ox4. VSS. Dressing to abdomen, CDI. Pt denies pain at this time. Ice pack applied to abdomen. Pt denies being nauseated. Pt has hx of right hemiparesis. Pt needs two to three person assistance for transfers.     Report given to JOB Wong. Pt's daughters and son updated.    Pt via linnea was transferred to PACU2 at 2013.

## 2019-08-09 NOTE — OR SURGEON
Immediate Post OP Note    PreOp Diagnosis: Grade 1 endometrial HILDA, fibroid uterus, 14 gestational week size uterus, hydroureter     PostOp Diagnosis: As above    Procedure(s):  HYSTERECTOMY, ROBOT-ASSISTED, USING DA RILEY XI - Wound Class: Clean Contaminated  SALPINGO-OOPHORECTOMY - Wound Class: Clean Contaminated  BIOPSY, LYMPH NODE, SENTINEL - Wound Class: Clean Contaminated  CYSTOSCOPY, WITH URETERAL STENT INSERTION - Wound Class: Clean Contaminated    Surgeon(s):  Jann Garcia M.D.    Anesthesiologist/Type of Anesthesia:  Anesthesiologist: Hany Bennett M.D./General    Surgical Staff:  Assistant: PANCHITO Juarez  Circulator: Jonelle Mistry R.N.  Relief Circulator: Uri Koch R.N.  Scrub Person: Aracelis Schroeder; Miriam [place]    Specimens removed if any:  ID Type Source Tests Collected by Time Destination   A : left external iliac sentinal node Tissue Lymph Node PATHOLOGY SPECIMEN Jann Garcia M.D. 8/8/2019  4:41 PM    B : left parametrial tissue Other Other PATHOLOGY SPECIMEN Jann Garcia M.D. 8/8/2019  5:57 PM    C : uterus, cervix, bilateral tubes and ovaries Tissue Uterus PATHOLOGY SPECIMEN Jann Garcia M.D. 8/8/2019  6:32 PM    D : cul-de-sac nodule Other Other PATHOLOGY SPECIMEN Jann Garcia M.D. 8/8/2019  6:34 PM        Estimated Blood Loss: 50mL    Findings: Normal external genitalia/vagina/cervix and bilateral tubes/ovaries. Uterus 14 weeks gestation in size. Irregular contour of uterus with tumor extension into left parametrium engulfing the ureter at the ureter tunnel with identified hydroureter. [place] LNs identified and excised. [id number] cul-de-sac nodule identified and also excised. Transected ureter repaired with primary anastomosis and stent placement.    Complications: None      8/8/2019 7:05 PM Jann Garcia M.D.

## 2019-08-09 NOTE — ANESTHESIA POSTPROCEDURE EVALUATION
Patient: Jairo Rivas    Procedure Summary     Date:  08/08/19 Room / Location:  [place] [address] / SURGERY [facility]    Anesthesia Start:  1546 Anesthesia Stop:  1907    Procedures:       HYSTERECTOMY, ROBOT-ASSISTED, USING DA RILEY XI (N/A Abdomen)      SALPINGO-OOPHORECTOMY (Bilateral Abdomen)      BIOPSY, LYMPH NODE, SENTINEL (Abdomen)      CYSTOSCOPY, WITH URETERAL STENT INSERTION (Left Ureter) Diagnosis:  (ENDOMETRIAL CANCER)    Surgeon:  Jann Garcia M.D. Responsible Provider:  Hany Bennett M.D.    Anesthesia Type:  general ASA Status:  3          Final Anesthesia Type: general  Last vitals  BP   Blood Pressure : 126/75, NIBP: 143/81    Temp   36.4 °C (97.6 °F)    Pulse   Pulse: 86   Resp   18    SpO2   95 %      Anesthesia Post Evaluation    Patient location during evaluation: PACU  Patient participation: complete - patient participated  Level of consciousness: awake and alert  Pain score: 0    Airway patency: patent  Anesthetic complications: no  Cardiovascular status: hemodynamically stable  Respiratory status: acceptable  Hydration status: euvolemic    PONV: none           Nurse Pain Score: 0 (NPRS)

## 2019-08-12 DIAGNOSIS — I10 ESSENTIAL HYPERTENSION: ICD-10-CM

## 2019-08-12 RX ORDER — METOPROLOL SUCCINATE 100 MG/1
100 TABLET, EXTENDED RELEASE ORAL DAILY
Qty: 90 TAB | Refills: 2 | Status: SHIPPED | OUTPATIENT
Start: 2019-08-12 | End: 2019-11-10

## 2019-08-12 NOTE — TELEPHONE ENCOUNTER
Was the patient seen in the last year in this department? Yes 6/17/2019    Does patient have an active prescription for medications requested? Yes    Received Request Via: Pharmacy     [facility] is requesting Rx.

## 2019-08-28 ENCOUNTER — HOSPITAL ENCOUNTER (OUTPATIENT)
Dept: LAB | Facility: MEDICAL CENTER | Age: 69
End: 2019-08-28
Attending: NURSE PRACTITIONER
Payer: MEDICARE

## 2019-08-28 LAB
ALBUMIN SERPL BCP-MCNC: 3.9 G/DL (ref 3.2–4.9)
ALBUMIN/GLOB SERPL: 1.2 G/DL
ALP SERPL-CCNC: 51 U/L (ref 30–99)
ALT SERPL-CCNC: 132 U/L (ref 2–50)
ANION GAP SERPL CALC-SCNC: 9 MMOL/L (ref 0–11.9)
AST SERPL-CCNC: 60 U/L (ref 12–45)
BASOPHILS # BLD AUTO: 1 % (ref 0–1.8)
BASOPHILS # BLD: 0.05 K/UL (ref 0–0.12)
BILIRUB SERPL-MCNC: 0.3 MG/DL (ref 0.1–1.5)
BUN SERPL-MCNC: 9 MG/DL (ref 8–22)
CALCIUM SERPL-MCNC: 9.3 MG/DL (ref 8.5–10.5)
CHLORIDE SERPL-SCNC: 107 MMOL/L (ref 96–112)
CO2 SERPL-SCNC: 25 MMOL/L (ref 20–33)
CREAT SERPL-MCNC: 0.82 MG/DL (ref 0.5–1.4)
EOSINOPHIL # BLD AUTO: 0.15 K/UL (ref 0–0.51)
EOSINOPHIL NFR BLD: 3 % (ref 0–6.9)
ERYTHROCYTE [DISTWIDTH] IN BLOOD BY AUTOMATED COUNT: 48.8 FL (ref 35.9–50)
GLOBULIN SER CALC-MCNC: 3.3 G/DL (ref 1.9–3.5)
GLUCOSE SERPL-MCNC: 96 MG/DL (ref 65–99)
HCT VFR BLD AUTO: 38.1 % (ref 37–47)
HGB BLD-MCNC: 11.5 G/DL (ref 12–16)
IMM GRANULOCYTES # BLD AUTO: 0.01 K/UL (ref 0–0.11)
IMM GRANULOCYTES NFR BLD AUTO: 0.2 % (ref 0–0.9)
LYMPHOCYTES # BLD AUTO: 1.45 K/UL (ref 1–4.8)
LYMPHOCYTES NFR BLD: 28.7 % (ref 22–41)
MCH RBC QN AUTO: 28.2 PG (ref 27–33)
MCHC RBC AUTO-ENTMCNC: 30.2 G/DL (ref 33.6–35)
MCV RBC AUTO: 93.4 FL (ref 81.4–97.8)
MONOCYTES # BLD AUTO: 0.42 K/UL (ref 0–0.85)
MONOCYTES NFR BLD AUTO: 8.3 % (ref 0–13.4)
NEUTROPHILS # BLD AUTO: 2.97 K/UL (ref 2–7.15)
NEUTROPHILS NFR BLD: 58.8 % (ref 44–72)
NRBC # BLD AUTO: 0 K/UL
NRBC BLD-RTO: 0 /100 WBC
PLATELET # BLD AUTO: 452 K/UL (ref 164–446)
PMV BLD AUTO: 10.6 FL (ref 9–12.9)
POTASSIUM SERPL-SCNC: 3.3 MMOL/L (ref 3.6–5.5)
PROT SERPL-MCNC: 7.2 G/DL (ref 6–8.2)
RBC # BLD AUTO: 4.08 M/UL (ref 4.2–5.4)
SODIUM SERPL-SCNC: 141 MMOL/L (ref 135–145)
WBC # BLD AUTO: 5.1 K/UL (ref 4.8–10.8)

## 2019-08-28 PROCEDURE — 80053 COMPREHEN METABOLIC PANEL: CPT

## 2019-08-28 PROCEDURE — 36415 COLL VENOUS BLD VENIPUNCTURE: CPT

## 2019-08-28 PROCEDURE — 85025 COMPLETE CBC W/AUTO DIFF WBC: CPT

## 2019-08-30 ENCOUNTER — PATIENT OUTREACH (OUTPATIENT)
Dept: OTHER | Facility: MEDICAL CENTER | Age: 69
End: 2019-08-30

## 2019-08-30 NOTE — PROGRESS NOTES
Pt was given cancer diagnosis by Dr. Garcia's team and will be referring her to radiation oncology.  Telephone call to discuss navigation and barriers to care.  No answer, left message requesting a return call.

## 2019-09-03 RX ORDER — BACLOFEN 10 MG/1
10 TABLET ORAL 3 TIMES DAILY
Qty: 90 TAB | Refills: 0 | Status: SHIPPED | OUTPATIENT
Start: 2019-09-03 | End: 2019-10-15 | Stop reason: SDUPTHER

## 2019-09-04 ENCOUNTER — HOSPITAL ENCOUNTER (OUTPATIENT)
Dept: RADIOLOGY | Facility: MEDICAL CENTER | Age: 69
End: 2019-09-04
Attending: SPECIALIST
Payer: MEDICARE

## 2019-09-04 DIAGNOSIS — R60.0 LOCALIZED EDEMA: ICD-10-CM

## 2019-09-04 DIAGNOSIS — R20.2 PARESTHESIA AND PAIN OF BOTH UPPER EXTREMITIES: ICD-10-CM

## 2019-09-04 DIAGNOSIS — M79.601 PARESTHESIA AND PAIN OF BOTH UPPER EXTREMITIES: ICD-10-CM

## 2019-09-04 DIAGNOSIS — M79.602 PARESTHESIA AND PAIN OF BOTH UPPER EXTREMITIES: ICD-10-CM

## 2019-09-04 DIAGNOSIS — K14.6 TONGUE BURNING SENSATION: ICD-10-CM

## 2019-09-04 PROCEDURE — 93971 EXTREMITY STUDY: CPT | Mod: RT

## 2019-09-04 PROCEDURE — 93971 EXTREMITY STUDY: CPT | Mod: 26 | Performed by: INTERNAL MEDICINE

## 2019-09-04 RX ORDER — AMITRIPTYLINE HYDROCHLORIDE 25 MG/1
TABLET, FILM COATED ORAL
Qty: 90 TAB | Refills: 1 | Status: SHIPPED | OUTPATIENT
Start: 2019-09-04 | End: 2020-03-11 | Stop reason: SDUPTHER

## 2019-09-04 NOTE — TELEPHONE ENCOUNTER
Was the patient seen in the last year in this department? Yes  6/17/19  Does patient have an active prescription for medications requested? No     Received Request Via: Pharmacy

## 2019-09-13 ENCOUNTER — HOSPITAL ENCOUNTER (OUTPATIENT)
Dept: RADIATION ONCOLOGY | Facility: MEDICAL CENTER | Age: 69
End: 2019-09-30
Attending: RADIOLOGY
Payer: MEDICARE

## 2019-09-13 ENCOUNTER — HOSPITAL ENCOUNTER (OUTPATIENT)
Dept: RADIOLOGY | Facility: MEDICAL CENTER | Age: 69
End: 2019-09-13
Attending: SPECIALIST
Payer: MEDICARE

## 2019-09-13 ENCOUNTER — PATIENT OUTREACH (OUTPATIENT)
Dept: OTHER | Facility: MEDICAL CENTER | Age: 69
End: 2019-09-13

## 2019-09-13 ENCOUNTER — HOSPITAL ENCOUNTER (OUTPATIENT)
Dept: LAB | Facility: MEDICAL CENTER | Age: 69
End: 2019-09-13
Attending: SPECIALIST
Payer: MEDICARE

## 2019-09-13 VITALS
DIASTOLIC BLOOD PRESSURE: 80 MMHG | HEIGHT: 64 IN | HEART RATE: 82 BPM | TEMPERATURE: 98.4 F | WEIGHT: 188 LBS | BODY MASS INDEX: 32.1 KG/M2 | SYSTOLIC BLOOD PRESSURE: 148 MMHG

## 2019-09-13 DIAGNOSIS — D64.89 ANEMIA DUE TO OTHER CAUSE, NOT CLASSIFIED: ICD-10-CM

## 2019-09-13 DIAGNOSIS — C54.1 ENDOMETRIAL CANCER (HCC): ICD-10-CM

## 2019-09-13 DIAGNOSIS — R60.0 LOCALIZED EDEMA: ICD-10-CM

## 2019-09-13 LAB
ALBUMIN SERPL BCP-MCNC: 4 G/DL (ref 3.2–4.9)
ALBUMIN/GLOB SERPL: 1.1 G/DL
ALP SERPL-CCNC: 60 U/L (ref 30–99)
ALT SERPL-CCNC: 94 U/L (ref 2–50)
ANION GAP SERPL CALC-SCNC: 10 MMOL/L (ref 0–11.9)
AST SERPL-CCNC: 41 U/L (ref 12–45)
BASOPHILS # BLD AUTO: 1.2 % (ref 0–1.8)
BASOPHILS # BLD: 0.06 K/UL (ref 0–0.12)
BILIRUB SERPL-MCNC: 0.4 MG/DL (ref 0.1–1.5)
BUN SERPL-MCNC: 10 MG/DL (ref 8–22)
BURR CELLS BLD QL SMEAR: NORMAL
CALCIUM SERPL-MCNC: 9.9 MG/DL (ref 8.5–10.5)
CANCER AG125 SERPL-ACNC: 8 U/ML (ref 0–35)
CHLORIDE SERPL-SCNC: 105 MMOL/L (ref 96–112)
CO2 SERPL-SCNC: 25 MMOL/L (ref 20–33)
COMMENT 1642: NORMAL
CREAT SERPL-MCNC: 0.91 MG/DL (ref 0.5–1.4)
EOSINOPHIL # BLD AUTO: 0.11 K/UL (ref 0–0.51)
EOSINOPHIL NFR BLD: 2.1 % (ref 0–6.9)
ERYTHROCYTE [DISTWIDTH] IN BLOOD BY AUTOMATED COUNT: 53 FL (ref 35.9–50)
GLOBULIN SER CALC-MCNC: 3.5 G/DL (ref 1.9–3.5)
GLUCOSE SERPL-MCNC: 88 MG/DL (ref 65–99)
HCT VFR BLD AUTO: 42.2 % (ref 37–47)
HGB BLD-MCNC: 12.6 G/DL (ref 12–16)
IMM GRANULOCYTES # BLD AUTO: 0.01 K/UL (ref 0–0.11)
IMM GRANULOCYTES NFR BLD AUTO: 0.2 % (ref 0–0.9)
LYMPHOCYTES # BLD AUTO: 1.52 K/UL (ref 1–4.8)
LYMPHOCYTES NFR BLD: 29.2 % (ref 22–41)
MCH RBC QN AUTO: 28.3 PG (ref 27–33)
MCHC RBC AUTO-ENTMCNC: 29.9 G/DL (ref 33.6–35)
MCV RBC AUTO: 94.6 FL (ref 81.4–97.8)
MONOCYTES # BLD AUTO: 0.53 K/UL (ref 0–0.85)
MONOCYTES NFR BLD AUTO: 10.2 % (ref 0–13.4)
MORPHOLOGY BLD-IMP: NORMAL
NEUTROPHILS # BLD AUTO: 2.98 K/UL (ref 2–7.15)
NEUTROPHILS NFR BLD: 57.1 % (ref 44–72)
NRBC # BLD AUTO: 0 K/UL
NRBC BLD-RTO: 0 /100 WBC
OVALOCYTES BLD QL SMEAR: NORMAL
PLATELET # BLD AUTO: 315 K/UL (ref 164–446)
PLATELET BLD QL SMEAR: NORMAL
PMV BLD AUTO: 11.5 FL (ref 9–12.9)
POIKILOCYTOSIS BLD QL SMEAR: NORMAL
POTASSIUM SERPL-SCNC: 3.8 MMOL/L (ref 3.6–5.5)
PROT SERPL-MCNC: 7.5 G/DL (ref 6–8.2)
RBC # BLD AUTO: 4.46 M/UL (ref 4.2–5.4)
RBC BLD AUTO: PRESENT
SODIUM SERPL-SCNC: 140 MMOL/L (ref 135–145)
WBC # BLD AUTO: 5.2 K/UL (ref 4.8–10.8)

## 2019-09-13 PROCEDURE — 99214 OFFICE O/P EST MOD 30 MIN: CPT | Mod: 25 | Performed by: RADIOLOGY

## 2019-09-13 PROCEDURE — 49411 INS MARK ABD/PEL FOR RT PERQ: CPT | Performed by: RADIOLOGY

## 2019-09-13 PROCEDURE — 99205 OFFICE O/P NEW HI 60 MIN: CPT | Mod: 25 | Performed by: RADIOLOGY

## 2019-09-13 PROCEDURE — 85025 COMPLETE CBC W/AUTO DIFF WBC: CPT

## 2019-09-13 PROCEDURE — 36415 COLL VENOUS BLD VENIPUNCTURE: CPT

## 2019-09-13 PROCEDURE — A4648 IMPLANTABLE TISSUE MARKER: HCPCS | Performed by: RADIOLOGY

## 2019-09-13 PROCEDURE — 80053 COMPREHEN METABOLIC PANEL: CPT

## 2019-09-13 PROCEDURE — 93971 EXTREMITY STUDY: CPT | Mod: RT

## 2019-09-13 PROCEDURE — 93971 EXTREMITY STUDY: CPT | Mod: 26 | Performed by: INTERNAL MEDICINE

## 2019-09-13 PROCEDURE — 86304 IMMUNOASSAY TUMOR CA 125: CPT

## 2019-09-13 RX ORDER — FERROUS SULFATE 325(65) MG
325 TABLET ORAL DAILY
Qty: 90 TAB | Refills: 3 | Status: ON HOLD | OUTPATIENT
Start: 2019-09-13 | End: 2019-10-03

## 2019-09-13 ASSESSMENT — PAIN SCALES - GENERAL: PAINLEVEL: 2=MINIMAL-SLIGHT

## 2019-09-13 NOTE — CONSULTS
RADIATION ONCOLOGY CONSULT    DATE OF SERVICE: 9/13/2019    IDENTIFICATION: A 69 y.o. female with  Visit Diagnoses     ICD-10-CM   1. Endometrial cancer (HCC) C54.1      Endometrial cancer (HCC)  Staging form: Corpus Uteri - Carcinoma and Carcinosarcoma, AJCC 8th Edition  - Pathologic stage from 9/13/2019: FIGO Stage IIIB (pT3b, pN0, cM0) - Signed by Jayant EDOUARD M.D. on 9/13/2019  Histologic grade (G): G2  Histologic grading system: 3 grade system  Lymph-vascular invasion (LVI): LVI not present (absent)/not identified  Residual tumor (R): R1 - Microscopic  Histopathologic type: Endometrioid adenocarcinoma, NOS  Diagnostic confirmation: Positive histology  Specimen type: Excision  Staged by: Managing physician     She is here at the kind request of Dr. Garcia to discuss combined modality therapy.    HISTORY OF PRESENT ILLNESS: 69-year-old G7, P7 Ab1 black female originally from [facility].  Been living in the [place] greater than [place] years.  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  Right upper extremity is flexed at 90 degrees and she has only residual weak  strength..      She presented with heavy postmenopausal bleeding.  She had transvaginal ultrasound that demonstrated a thickened endometrial stripe of 1.2 cm.  She was evaluated by Dr. Chicas and underwent hysteroscopy and D&C 6/19/2019 that demonstrated grade 1 endometrioid adenocarcinoma.    She was referred to Dr. Garcia and underwent robotic assisted hysterectomy.  [place] node sampling bilateral salpingo-oophorectomy, cystoscopy with left ureteral stent insertion.    Pathology demonstrated extensive grade 2 endometrioid adenocarcinoma involving the uterus tumor discontinuously invaded the full-thickness of the myometrium extending into the parametria and adnexa on the left.  There was also endometrioid adenocarcinoma involving a cul-de-sac nodule measuring 1.8 cm.  The left  parametrial margin was positive.  One sentinel lymph node in the left external iliac region was negative.  Mismatch repair proteins showed non-expression of MLH1 and PMS 2.  Methylation was noted ruling out Hubbard syndrome.    Postoperatively she reports some mild pelvic discomfort.  She reports normal urinary and bowel function.  She denies fevers or night sweats.  She denies vaginal spotting.    PAST MEDICAL HISTORY:   Past Medical History:   Diagnosis Date   • Anemia 2/6/2018    Iron def, post-menopausal bleeding.   • Cancer (HCC) 2019    uterine   • CVA, old, hemiparesis (HCC) 2/6/2018    Residual left sided weakness   • Essential hypertension 2/6/2018    Well controlled on amlodipine 10 mg and metoprolol 100 mg bid.   • Hyperlipidemia    • Post-menopause bleeding 2/6/2018    Menopause in 2010 and bleeding 2012 started to have irregular continuous bleeding.    • Stroke ([facility])     right sided weakness   • Vision abnormalities 2/6/2018   • Vitamin D deficiency 2/6/2018    Taking 50,000 IU once weekly for 4 yrs.       PAST SURGICAL HISTORY:  Past Surgical History:   Procedure Laterality Date   • PB CYSTOSCOPY,INSERT URETERAL STENT Left 8/8/2019    Procedure: CYSTOSCOPY, WITH URETERAL STENT INSERTION;  Surgeon: Jann Garcia M.D.;  Location: [facility];  Service: Gynecology   • HYSTERECTOMY ROBOTIC XI N/A 8/8/2019    Procedure: HYSTERECTOMY, ROBOT-ASSISTED, USING DA RILEY XI;  Surgeon: Jann Garcia M.D.;  Location: [facility];  Service: Gynecology   • SALPINGO OOPHORECTOMY Bilateral 8/8/2019    Procedure: SALPINGO-OOPHORECTOMY;  Surgeon: Jann Garcia M.D.;  Location: [facility];  Service: Gynecology   • NODE BIOPSY SENTINEL  8/8/2019    Procedure: BIOPSY, LYMPH NODE, SENTINEL;  Surgeon: Jann Garcia M.D.;  Location: [facility];  Service: Gynecology   • PB HYSTEROSCOPY,DX,SEP PROC  6/19/2019    Procedure: HYSTEROSCOPY, DIAGNOSTIC;  Surgeon: Anel Chicas M.D.;   Location: [facility];  Service: Gynecology   • DILATION AND CURETTAGE  6/19/2019    Procedure: DILATION AND CURETTAGE;  Surgeon: Anel Chicas M.D.;  Location: SURGERY [facility];  Service: Gynecology       CURRENT MEDICATIONS:  Current Outpatient Medications   Medication Sig Dispense Refill   • amitriptyline (ELAVIL) 25 MG Tab TAKE 1 TABLET BY MOUTH EVERY DAY IN THE EVENING 90 Tab 1   • baclofen (LIORESAL) 10 MG Tab Take 1 Tab by mouth 3 times a day. Indications: Muscle Spasticity 90 Tab 0   • metoprolol SR (TOPROL XL) 100 MG TABLET SR 24 HR Take 1 Tab by mouth every day for 90 days. 90 Tab 2   • ondansetron (ZOFRAN) 4 MG Tab tablet Take 1 Tab by mouth every 6 hours as needed. 30 Tab 0   • losartan (COZAAR) 100 MG Tab Take 100 mg by mouth every day.     • amLODIPine (NORVASC) 5 MG Tab Take 1 Tab by mouth every bedtime. 90 Tab 2   • ascorbic acid (ASCORBIC ACID) 500 MG Tab Take 500 mg by mouth every evening.     • atorvastatin (LIPITOR) 20 MG Tab Take 20 mg by mouth every evening.     • Cholecalciferol (VITAMIN D3) 2000 UNIT Cap Take 2,000 Units by mouth every day.     • Coenzyme Q10 (COQ10) 100 MG Cap Take 100 mg by mouth every evening.     • multivitamin (THERAGRAN) Tab Take 1 Tab by mouth every day.     • acetaminophen (TYLENOL) 500 MG Tab Take 1,000 mg by mouth every 6 hours as needed for Moderate Pain.     • B Complex-C (SUPER B COMPLEX PO) Take 1 Tab by mouth every evening.     • ferrous sulfate 325 (65 Fe) MG tablet Take 1 Tab by mouth every day. 30 Tab 0   • aspirin (ASA) 81 MG Chew Tab chewable tablet Take 81 mg by mouth every day.     • ferrous sulfate 325 (65 Fe) MG tablet TAKE 1 TAB BY MOUTH EVERY DAY FOR 30 DAYS. 90 Tab 3     No current facility-administered medications for this encounter.        ALLERGIES:    Penicillins; Gluten meal; Hydrocodone; Iodine; Levaquin; and Tramadol    FAMILY HISTORY:    No family cancer history.     SOCIAL HISTORY:     reports that she has never  "smoked. She has never used smokeless tobacco. She reports that she does not drink alcohol or use drugs.   Patient is divoced, lives in [place] with her daughter and has 7 children. Patient is retired (used to work in a lab)    REVIEW OF SYSTEMS:  Review of systems for today's date of service was reviewed and uploaded into the electronic medical record.     PAIN SCALE: 0-10  Pain Assessement: 2/10  Pain Location, Orientation and Scale: rt side pelvic pain  What makes the pain better: tylenol prn  What makes the pain worse: nothing    GYNECOLOGICAL STATUS:  : 7, Para: 7 and Number of Interrupted Pregnancies: 0    HORMONE USE:  Contraceptive hormone use 0 years and Post-menopause use 0 years    PHYSICAL EXAM:   PERFORMANCE STATUS:  70, Cares for self; unable to carry on normal activity or to do active work (ECOG equivalent 1)  /80   Pulse 82   Temp 36.9 °C (98.4 °F)   Ht 1.626 m (5' 4\")   Wt 85.3 kg (188 lb)   BMI 32.27 kg/m²   Physical Exam   Constitutional: She is oriented to person, place, and time. She appears well-developed and well-nourished. No distress.   HENT:   Head: Normocephalic.   Eyes: Pupils are equal, round, and reactive to light. Conjunctivae and EOM are normal.   Neck: Normal range of motion. Neck supple.   Cardiovascular: Normal rate, regular rhythm and normal heart sounds.   Pulmonary/Chest: Effort normal and breath sounds normal.   Abdominal: Soft. Bowel sounds are normal.   Genitourinary:   Genitourinary Comments: Normal introitus vaginal mucosa normal appearing.  Vaginal cuff is intact.  Mild postoperative changes at the vaginal cuff.  3 gold fiducial markers inserted into the vaginal cuff to aid in  the placement of radiotherapy fields.   Musculoskeletal: Normal range of motion. She exhibits no edema, tenderness or deformity.   Lymphadenopathy:     She has no cervical adenopathy.   Neurological: She is alert and oriented to person, place, and time. No cranial nerve deficit. " Coordination normal.   Skin: Skin is warm and dry. No erythema.   Psychiatric: She has a normal mood and affect. Her behavior is normal. Judgment and thought content normal.   Nursing note and vitals reviewed.      LABORATORY DATA:   Lab Results   Component Value Date/Time    WBC 5.1 08/28/2019 1522    WBC 5.4 07/31/2019 1525    WBC 6.1 06/13/2019 0159    HEMOGLOBIN 11.5 (L) 08/28/2019 1522    HEMOGLOBIN 11.4 (L) 07/31/2019 1525    HEMOGLOBIN 8.1 (L) 06/13/2019 0159    HEMATOCRIT 38.1 08/28/2019 1522    HEMATOCRIT 37.2 07/31/2019 1525    HEMATOCRIT 26.4 (L) 06/13/2019 0159    MCV 93.4 08/28/2019 1522    MCV 98.2 (H) 07/31/2019 1525    .9 (H) 06/13/2019 0159    PLATELETCT 452 (H) 08/28/2019 1522    PLATELETCT 366 07/31/2019 1525    PLATELETCT 216 06/13/2019 0159    NEUTS 2.97 08/28/2019 1522    NEUTS 2.92 07/31/2019 1525    NEUTS 5.40 06/12/2019 0750      Lab Results   Component Value Date/Time    SODIUM 141 08/28/2019 1522    SODIUM 137 07/31/2019 1525    SODIUM 142 07/24/2019 0657    POTASSIUM 3.3 (L) 08/28/2019 1522    POTASSIUM 3.6 07/31/2019 1525    POTASSIUM 3.6 07/24/2019 0657    BUN 9 08/28/2019 1522    BUN 7 (L) 07/31/2019 1525    BUN 7 (L) 07/24/2019 0657    CREATININE 0.82 08/28/2019 1522    CREATININE 0.84 07/31/2019 1525    CREATININE 0.94 07/24/2019 0657    CALCIUM 9.3 08/28/2019 1522    CALCIUM 9.2 07/31/2019 1525    CALCIUM 9.2 07/24/2019 0657    ALBUMIN 3.9 08/28/2019 1522    ALBUMIN 3.8 07/31/2019 1525    ALBUMIN 3.1 (L) 06/12/2019 0316    ASTSGOT 60 (H) 08/28/2019 1522    ASTSGOT 26 07/31/2019 1525    ASTSGOT 16 06/12/2019 0316    ALKPHOSPHAT 51 08/28/2019 1522    ALKPHOSPHAT 54 07/31/2019 1525    ALKPHOSPHAT 58 06/12/2019 0316    IFNOTAFR >60 08/28/2019 1522    IFNOTAFR >60 07/31/2019 1525    IFNOTAFR 59 (A) 07/24/2019 0657       RADIOLOGY DATA:  Us-extremity Venous Lower Unilat Right    Result Date: 9/4/2019   Vascular Laboratory  CONCLUSIONS  No prior study is available for comparison.   Normal right lower extremity superficial and deep venous examination.  FABI BEASLEY  Exam Date:     2019 09:15  Room #:     Out Patient  Priority:     Routine  Ht (in):             Wt (lb):  Ordering Physician:        COREEN RODRIGUEZ  Referring Physician:       [id number] Atkins  Sonographer:               Keisha James RVT, RDCS  Study Type:                Complete Unilateral  Technical Quality:         Good  Age:    69    Gender:     F  MRN:    [id number]  :    1950      BSA:  Indications:     Edema, Pain in Limb  CPT Codes:       18547  ICD Codes:       782.3  729.5  History:         Intermittent bilateral foot edema, worse on the right;                   patient also complains of right lower extremity pain x 1                   month  Limitations:  PROCEDURES:  Right lower extremity venous duplex imaging.  The following venous structures were evaluated: common femoral, profunda  femoral, greater saphenous, femoral, popliteal , peroneal and posterior  tibial veins.  Serial compression, augmentation maneuvers,  color and spectral Doppler  flow evaluations were performed.  FINDINGS:  Right lower extremity -  Complete color filling and compressibility with normal venous flow dynamics  including spontaneous flow, response to augmentation maneuvers, and  respiratory phasicity.  No superficial or deep venous thrombosis.  Flow was evaluated in the contralateral common femoral vein and normal  venous flow dynamics including spontaneous flow, respiratory phasic  variation and augmentation were demonstrated.  Pb Nam MD  (Electronically Signed)  Final Date:      2019                   17:16         IMPRESSION:    A 69 y.o. with  Visit Diagnoses     ICD-10-CM   1. Endometrial cancer (HCC) C54.1     Endometrial cancer (HCC)  Staging form: Corpus Uteri - Carcinoma and Carcinosarcoma, AJCC 8th Edition  - Pathologic stage from 2019: FIGO Stage IIIB (pT3b,  pN0, cM0) - Signed by Jayant EDOUARD M.D. on 9/13/2019  Histologic grade (G): G2  Histologic grading system: 3 grade system  Lymph-vascular invasion (LVI): LVI not present (absent)/not identified  Residual tumor (R): R1 - Microscopic  Histopathologic type: Endometrioid adenocarcinoma, NOS  Diagnostic confirmation: Positive histology  Specimen type: Excision  Staged by: Managing physician        RECOMMENDATIONS:   Reviewed with patient imaging and pathology findings.  Her imaging studies are somewhat worrisome because there is evidence of small pelvic as well as para-aortic lymph nodes.  It is unclear whether these harbor disease.  I did recommend PET CT scan to better evaluate the small lymph nodes.    Her current path indicates that she has stage IIIb disease.  I reviewed with her the PORT 3 data which showed an improvement in progression free survival and significant decrease in local regional disease recurrence with use of combined modality therapy consisting of cisplatin week 1 and 4 with concurrent radiotherapy delivering 4860 cGy.  She will receive further chemotherapy outback.    The technical aspects benefits risks associate with pelvic radiotherapy were reviewed with the patient.  She understands and would like to proceed.  She will return for simulation on September 20.  She will have PET/CT staging on September 19.  Anticipate start of therapy concurrently with chemotherapy on September 30.  We will coordinate with Dr. Garcia.    Thank you for the opportunity to participate in her care.  If any questions or comments, please do not hesitate in calling.    Orders Placed This Encounter   • [id number] BASE TO MID-THIGH

## 2019-09-13 NOTE — NON-PROVIDER
"Patient was seen today in clinic with Dr. Live for consultation.  Vitals signs and weight were obtained and pain assessment was completed.  Allergies and medications were reviewed with the patient.  Review of systems completed.     Vitals/Pain:  Vitals:    09/13/19 1012   BP: 148/80   Pulse: 82   Temp: 36.9 °C (98.4 °F)   Weight: 85.3 kg (188 lb)   Height: 1.626 m (5' 4\")   Pain Score: 2=Minimal-Slight        Allergies:   Penicillins; Gluten meal; Hydrocodone; Iodine; Levaquin; and Tramadol    Current Medications:  Current Outpatient Medications   Medication Sig Dispense Refill   • amitriptyline (ELAVIL) 25 MG Tab TAKE 1 TABLET BY MOUTH EVERY DAY IN THE EVENING 90 Tab 1   • baclofen (LIORESAL) 10 MG Tab Take 1 Tab by mouth 3 times a day. Indications: Muscle Spasticity 90 Tab 0   • metoprolol SR (TOPROL XL) 100 MG TABLET SR 24 HR Take 1 Tab by mouth every day for 90 days. 90 Tab 2   • ondansetron (ZOFRAN) 4 MG Tab tablet Take 1 Tab by mouth every 6 hours as needed. 30 Tab 0   • losartan (COZAAR) 100 MG Tab Take 100 mg by mouth every day.     • amLODIPine (NORVASC) 5 MG Tab Take 1 Tab by mouth every bedtime. 90 Tab 2   • ascorbic acid (ASCORBIC ACID) 500 MG Tab Take 500 mg by mouth every evening.     • atorvastatin (LIPITOR) 20 MG Tab Take 20 mg by mouth every evening.     • Cholecalciferol (VITAMIN D3) 2000 UNIT Cap Take 2,000 Units by mouth every day.     • Coenzyme Q10 (COQ10) 100 MG Cap Take 100 mg by mouth every evening.     • multivitamin (THERAGRAN) Tab Take 1 Tab by mouth every day.     • acetaminophen (TYLENOL) 500 MG Tab Take 1,000 mg by mouth every 6 hours as needed for Moderate Pain.     • B Complex-C (SUPER B COMPLEX PO) Take 1 Tab by mouth every evening.     • ferrous sulfate 325 (65 Fe) MG tablet Take 1 Tab by mouth every day. 30 Tab 0   • aspirin (ASA) 81 MG Chew Tab chewable tablet Take 81 mg by mouth every day.       No current facility-administered medications for this encounter.  "         PCP:  Capo Anand R.N.

## 2019-09-13 NOTE — PROGRESS NOTES
Met with pt and her daughter in radiation today.  Introduced myself and the NN role and provided contact information. Support and services offered. Pt expressed she is a little scared. Emotional support provided. Daughter stated her mother was seen by Dr. Garcia yesterday and he ordered an ultrasound, but his office scheduled the appointment for two weeks out.  Daughter was concerned because Dr. Garcia stated he wanted to make sure she didn't have a blood clot in her leg.  Notified JOB Wilson at Dr. Garcia's office who agreed that the scan should be done sooner and that the pt could call and reschedule the appointment herself.  Contacted imaging scheduling and changed appointment to today at 1415. Printed appointment information and provided directions to the daughter.  Enquired about the daughter caring for her mother and working.  Daughter stated she is working from home and will need to take FMLA time when necessary.  Encouraged her to obtain FMLA paperwork soon, to expedite the process.  She stated she would work on it.  Encouraged to call with questions or needs.

## 2019-09-13 NOTE — PROCEDURES
Date of Service:  09/13/19    Referring Physician: Jann Garcia M.D.    DIAGNOSIS:    Endometrial cancer (HCC)  Staging form: Corpus Uteri - Carcinoma and Carcinosarcoma, AJCC 8th Edition  - Pathologic stage from 9/13/2019: FIGO Stage IIIB (pT3b, pN0, cM0) - Signed by Jayant EDOUARD M.D. on 9/13/2019  Histologic grade (G): G2  Histologic grading system: 3 grade system  Lymph-vascular invasion (LVI): LVI not present (absent)/not identified  Residual tumor (R): R1 - Microscopic  Histopathologic type: Endometrioid adenocarcinoma, NOS  Diagnostic confirmation: Positive histology  Specimen type: Excision  Staged by: Managing physician       PROCEDURE: Placement of Gold fiducial marker    Patient placed in stirrups and speculum exam performed to visualize cervix/vaginal cuff.  One pack of three preloaded gold fiducial markers was opened. Three preloaded gold marker needles were modified with sterile technique to create flange 0.5cm below tip with sterile micro pore tape that was wrapped around needle multiple time.    Needle inserted into cervix/cuff right, middle, and left side with entry controlled by flange.  Gold markers inserted uneventfully.

## 2019-09-17 ENCOUNTER — HOSPITAL ENCOUNTER (OUTPATIENT)
Facility: MEDICAL CENTER | Age: 69
End: 2019-09-17
Attending: SPECIALIST | Admitting: SPECIALIST
Payer: MEDICARE

## 2019-09-17 DIAGNOSIS — Z51.11 ADMISSION FOR CHEMOTHERAPY: ICD-10-CM

## 2019-09-17 RX ORDER — SODIUM CHLORIDE 9 MG/ML
2000 INJECTION, SOLUTION INTRAVENOUS ONCE
Status: CANCELLED | OUTPATIENT
Start: 2019-10-03

## 2019-09-17 RX ORDER — 0.9 % SODIUM CHLORIDE 0.9 %
VIAL (ML) INJECTION PRN
Status: CANCELLED | OUTPATIENT
Start: 2019-10-03

## 2019-09-17 RX ORDER — PROCHLORPERAZINE MALEATE 10 MG
10 TABLET ORAL EVERY 6 HOURS PRN
Status: CANCELLED | OUTPATIENT
Start: 2019-10-03

## 2019-09-17 RX ORDER — 0.9 % SODIUM CHLORIDE 0.9 %
5 VIAL (ML) INJECTION PRN
Status: CANCELLED | OUTPATIENT
Start: 2019-10-03

## 2019-09-17 RX ORDER — 0.9 % SODIUM CHLORIDE 0.9 %
VIAL (ML) INJECTION PRN
Status: CANCELLED | OUTPATIENT
Start: 2019-09-29

## 2019-09-17 RX ORDER — ONDANSETRON 8 MG/1
8 TABLET, ORALLY DISINTEGRATING ORAL PRN
Status: CANCELLED | OUTPATIENT
Start: 2019-10-03

## 2019-09-17 RX ORDER — ONDANSETRON 2 MG/ML
4 INJECTION INTRAMUSCULAR; INTRAVENOUS PRN
Status: CANCELLED | OUTPATIENT
Start: 2019-10-03

## 2019-09-17 RX ORDER — 0.9 % SODIUM CHLORIDE 0.9 %
5 VIAL (ML) INJECTION PRN
Status: CANCELLED | OUTPATIENT
Start: 2019-09-29

## 2019-09-17 RX ORDER — SODIUM CHLORIDE 9 MG/ML
INJECTION, SOLUTION INTRAVENOUS CONTINUOUS
Status: CANCELLED | OUTPATIENT
Start: 2019-10-03

## 2019-09-17 NOTE — PROGRESS NOTES
Future direct admit for 9/30/19 @ 0900 by Dr. Garcia for Chemotherapy/Endometrial Cancer.  ADT signed & held; needs to be released upon patient arrival.  Written orders received & scanned to Media tab.

## 2019-09-18 ENCOUNTER — HOSPITAL ENCOUNTER (OUTPATIENT)
Dept: RADIOLOGY | Facility: MEDICAL CENTER | Age: 69
End: 2019-09-18
Attending: RADIOLOGY
Payer: MEDICARE

## 2019-09-18 DIAGNOSIS — C54.1 ENDOMETRIAL CANCER (HCC): ICD-10-CM

## 2019-09-18 PROCEDURE — A9552 F18 FDG: HCPCS

## 2019-09-20 ENCOUNTER — HOSPITAL ENCOUNTER (OUTPATIENT)
Dept: RADIATION ONCOLOGY | Facility: MEDICAL CENTER | Age: 69
End: 2019-09-20

## 2019-09-20 DIAGNOSIS — C54.1 ENDOMETRIAL CANCER (HCC): ICD-10-CM

## 2019-09-20 PROCEDURE — 77290 THER RAD SIMULAJ FIELD CPLX: CPT | Performed by: RADIOLOGY

## 2019-09-20 PROCEDURE — 77263 THER RADIOLOGY TX PLNG CPLX: CPT | Performed by: RADIOLOGY

## 2019-09-20 PROCEDURE — 77334 RADIATION TREATMENT AID(S): CPT | Performed by: RADIOLOGY

## 2019-09-20 PROCEDURE — 77334 RADIATION TREATMENT AID(S): CPT | Mod: 26 | Performed by: RADIOLOGY

## 2019-09-20 PROCEDURE — 77470 SPECIAL RADIATION TREATMENT: CPT | Performed by: RADIOLOGY

## 2019-09-20 NOTE — CT SIMULATION
Treatment Planning CT Simulation      Order Questions     Question Answer Comment    Is this for a new course of treatment? Yes     Is this an Addendum? No     Implanted Device/Pacemaker No     Simulation Status Initial     Planned Start Date 9/30/2019     Treatment Site Pelvis     Laterality None     Treatment Technique IMRT     Treatment Pattern/Frequency Daily     Concurrent Chemotherapy Yes     Ordering Provider COREEN RODRIGUEZ     CT Technique 3D     Slice Thickness 3mm     Scan Extent Pelvis PA&Pelvis    Treatment Device(s) Vac Ru     Patient Attire Gown     Patient Position Supine     Bladder Full     Treatment Image Guidance CBCT     Frequency (CBCT) Daily     Image Guidance Match PTV - Soft Tissue      Bone     Other Orders Special Tx Procedure

## 2019-09-20 NOTE — RADIATION PLANNING NOTES
Date of Service:  09/20/19    Referring Physician: No ref. provider found    DIAGNOSIS:    Endometrial cancer (HCC)  Staging form: Corpus Uteri - Carcinoma and Carcinosarcoma, AJCC 8th Edition  - Pathologic stage from 9/13/2019: FIGO Stage IIIB (pT3b, pN0, cM0) - Signed by Jayant EDOUARD M.D. on 9/13/2019  Histologic grade (G): G2  Histologic grading system: 3 grade system  Lymph-vascular invasion (LVI): LVI not present (absent)/not identified  Residual tumor (R): R1 - Microscopic  Histopathologic type: Endometrioid adenocarcinoma, NOS  Diagnostic confirmation: Positive histology  Specimen type: Excision  Staged by: Managing physician       TYPE OF SIMULATION: Pelvis    GOAL OF TREATMENT:   [x] Curative  [] Palliative  [] Oligometastatic    CONTRAST:    [] IV Contrast  [x] Oral Contrast  [] Rectal  [] Urethral              POSITION:    [x]  Supine  [] Prone     COMPLEX:  [] Complex Blocking   [x]Arcs  [] Custom Blocks  [] >3 Sites    PROCEDURE: Patient positioned on CT table in Vac-Ru immobilization device. CT acquired thorough the entire volume of interest.  Images reviewed and exported to treatment planning system.    I have personally reviewed the relevant data, performed the target localization, and determined all relevant factors for this patient’s simulation.

## 2019-09-20 NOTE — RADIATION PLANNING NOTES
Clinical Treatment Planning Note    Date of Service: 09/20/19    DIAGNOSIS:    Endometrial cancer (HCC)  Staging form: Corpus Uteri - Carcinoma and Carcinosarcoma, AJCC 8th Edition  - Pathologic stage from 9/13/2019: FIGO Stage IIIB (pT3b, pN0, cM0) - Signed by Jayant EDOUARD M.D. on 9/13/2019  Histologic grade (G): G2  Histologic grading system: 3 grade system  Lymph-vascular invasion (LVI): LVI not present (absent)/not identified  Residual tumor (R): R1 - Microscopic  Histopathologic type: Endometrioid adenocarcinoma, NOS  Diagnostic confirmation: Positive histology  Specimen type: Excision  Staged by: Managing physician         IMAGING REVIEWED:    [x] CT     [] MRI     [] PET/CT     [] BONE SCAN     [] MAMMO     [] OTHER      TREATMENT INTENT:    []  CONTROL     [x] CURATIVE     [] MAINTENANCE     []  PALLIATIVE      []  SUPPORTIVE     []  PROPHYLACTIC     [] BENIGN     []  CONSOLIDATIVE      [] DEFINITIVE   []  OLOGIMETASTATIC      LINE OF TREATMENT:    [x] ADJUVANT   [] DEFINITIVE   [] NEOADJUVANT   [] RE-TREATMENT      TECHNIQUE PLANNED:    [x] IMRT   [] 3D   [] SBRT   [] SRS/SRT   [] HDR   [] ELECTRON       IMRT JUSTIFICATION:    []   An immediately adjacent area has been previously irradiated and abutting portals must be established with high precision.    []  Dose escalation is planned to deliver radiation doses in excess of those commonly utilized for similar tumors with conventional treatment.    []  The target volume is concave or convex, and the critical normal tissues are within or around that convexity or concavity.    [x]  The target volume is in close proximity to critical structures that must be protected.    [x]  The volume of interest must be covered with narrow margins to adequately protect  immediately adjacent structures.    FIELDS & BLOCKING:    [] COMPLEX BLOCKS     []  = 3 TX AREAS     [x]  ARCS     []  CUSTOM SHEILD        []  SIMPLE BLOCK      CHEMOTHERAPY:    []  CONCURRENT     []   INDUCTION     [] SEQUENTIAL     []  <30 DAYS FROM XRT      NOTES:    OAR CONSTRAINTS: (GUIDELINES ONLY NOT ABSOLUTE)   Target Prescribed Coverage   PTV 95% of PTV covered by 100% (cGy) of RX Dose     PTV 99% of PTV covered by 93% (cGy) of RX Dose       HAYDEN Goal   Rectum/Sigmoid V40Gy < 60%   Bladder V45Gy < 35%   R Femoral Head V30Gy < 15%   L Femoral Head V30Gy < 15%   Bowel (small & large) V40Gy < 30%   Individual Small Bowel Loops V15Gy < 120cc   Entire Cavity Small Bowel V45Gy < 195cc   *RTOG 0921, QUANTEC

## 2019-09-24 NOTE — OP REPORT
DATE OF SERVICE:  08/08/2019    PREOPERATIVE DIAGNOSES:  1.  Grade I endometrial adenocarcinoma.  2.  A 14-week size leiomyomatous uterus.  3.  Left hydroureter.    POSTOPERATIVE DIAGNOSES:  1.  Stage III endometrial cancer.  2.  A 14-week size leiomyomatous uterus.    PROCEDURES PERFORMED:  1.  [place] lymph node injection.  2.  Left external iliac sentinel lymph node sampling.  3.  Robotic hysterectomy with bilateral salpingo-oophorectomy with left   ureterolysis.  4.  Cystoscopy with left double-J ureteral stent placement.    SURGEON:  Jann Garcia MD    ASSISTANT:  Anel Fernández PA-C    ANESTHESIA:  General.    ANESTHESIOLOGIST:  Hany Bennett MD    ESTIMATED BLOOD LOSS:  50 mL    FLUIDS:  Per Dr. Bennett.    URINE OUTPUT:  As per Dr. Bennett.    COMPLICATIONS:  None.    COUNTS:  Final sponge and needle counts correct.    INDICATIONS FOR SURGERY:  The patient is a very pleasant 69-year-old    American female who is referred to me because of an enlarged uterus with grade   I endometrial adenocarcinoma.  The patient was seen by me for consultation.    The patient was advised to undergo definitive surgical therapy.  Risks,   benefits, and rationale for procedures were reviewed with the patient in   detail.  The patient is understanding of these risks and wished to proceed   with the surgery as planned.    The patient was noted to have locally advanced cancer at the time of surgery   that was not expected.  The tumor had extended out to the left parametria   engulfing the left ureter, thus necessitating a left ureterolysis and   placement of a left ureteral stent.  We did not do a full surgical staging,   mainly because of the locally advanced disease.  The patient will need primary   concurrent chemoradiation therapy along with systemic chemotherapy.    INTRAOPERATIVE FINDINGS:  1.  No evidence of ascites.  2.  Normal right and left diaphragm.  Liver capsule smooth.  Stomach appeared   grossly  normal.  Abdominoperitoneal surfaces were unremarkable.  Omentum   appeared grossly normal.  3.  In the pelvis, uterus was quite enlarged.  There appears to be tumor   extension out to the left parametria engulfing the left ureter.  There was no   seeding along the bladder, pelvic, and cul-de-sac peritoneum.  The sigmoid   colon was attached to this left parametrial complex along where the tumor   extension.  There was a cul-de-sac nodule, which was ultimately biopsied.    [place] lymph node mapping revealed the left external iliac sentinel lymph   node to map.  We did not harvest the right external iliac lymph node, as this   did not map.    PROCEDURE NOTE:  The patient was given IV antibiotics prior to procedure.  The   patient was prepped and draped and placed in modified dorsal lithotomy   position.  We initially began with a sentinel lymph node injection.    A heavy weighted speculum was placed in the posterior vagina.  Villalobos retractor   was placed in the anterior vagina.  I then injected 2 mL of indocyanine green   at 3 and 9 o'clock position.  Following completion of this, we then proceeded   on with the robotic portion of the procedure.    A 1 cm supraumbilical incision was made.  A Veress needle was inserted without   difficulty.  Pneumoperitoneum was achieved to the abdominal pressure of 15   mmHg.  A 12 mm trocar was inserted without difficulty.  After completion of   this, a 12 mm trocar was placed in the left lower quadrant two fingerbreadths   medial to the anterior superior iliac spine under direct laparoscopic   visualization.  After completion of this, a laparoscope was then placed in the   left lower quadrant port to assist in the placement of the remainder of the   da Dyana ports.  Two 8 mm ports were placed in the right upper quadrant 8 cm   apart while one 8 mm port was placed in the left upper quadrant 8 cm apart.    After completion of this, the patient was placed in steep Trendelenburg    position.  The robotic system was then docked and after docking the robotic   system, the instrumentation was inserted under direct laparoscopic   visualization to insure that there was no injury to the abdominal contents.    Once this was completed, the robotic camera was then docked.  We then   proceeded with our da Dyana portion of the procedure.    Initial focus was turned towards the sentinel lymph node mapping.    The left and right posterior broad leaf ligament was incised.  Under FireFly,   sentinel lymph node was identified.  We noted the external iliac lymph node   and left external sentinel lymph node.  This was skeletonized, isolated, and   resected.  This was then placed in an Endobag and removed.  The right did not   map.  Thus, we proceeded on with the hysterectomy.    At this point in time, we biopsied the cul-de-sac nodule biopsy.  In addition,   after opening up the right and left posterior broad leaf ligament, the ureter   was identified.  The ovarian vessels were skeletonized and isolated.  Bipolar   cautery was used to cauterize the ovarian vessels and subsequently divided.    The medial leaf of the peritoneum was incised down to the level of the   uterosacral ligament.  The right ureter was noted.  The left ureter, however,   ureterolysis had to be undertaken because of the adhesions and tumor   extension.  On the left side, the left ureter was dissected off the medial   leaf of the peritoneum attachment.  The Okabayashi space was developed.  I   then skeletonized the left hypogastric anterior division, skeletonizing the   superior vesicle artery along with the left uterine artery and deep uterine   artery and vein.  After skeletonizing this, the ureter was dissected off the   medial leaf of the peritoneal attachment.  The course of the ureter was   followed into the ureteric tunnel.  The uterine artery and vein were   subsequently skeletonized and isolated.  Hem-o-Ru clips were applied  lateral   to the ureter.  The course of the ureter was followed into the anterior   ureterovesical ligament.  The anterior uterovesical ligament was skeletonized   and isolated and subsequently divided.  This afforded me to dissect the left   ureter laterally.  The deep uterine artery and vein were subsequently taken   down with the LigaSure lateral to where the ureter course was.  The bladder   peritoneum was subsequently taken down.  On the right side, the right ovarian   vessel was skeletonized and isolated.  Bipolar cautery was used to cauterize   the right ovarian vessels.  The right medial leaf of the peritoneum was   incised down to level of the uterosacral ligament.  The ureter was dissected   laterally.  The uterine artery and vein was cauterized juxtaposition to the   fundus of the uterus and subsequently taken down.  The uterosacral ligaments   were then independently transected.  I then dissected the bladder away from   the paracervical fascia.  After assuring that the bladder was well away from   the cervix, an anterior colpotomy was made.  Vagina was circumferentially   incised to complete the hysterectomy with BSO.  The specimen was removed   through the vagina without any difficulty.  The vaginal cuff was then closed   with a 0 Quill PDS suture in a 2-layer standard closure fashion.  After   completion of this, we then copiously irrigated pelvis with water, hemostasis   established.  Once this was established, we counted for sponges, needles, and   instrument counts.  Once this was accounted for, I elected to place a left   ureteral stent, mainly because of the extensive ureteral dissection that was   performed on the left side.    At this point in time, the 30-degree cystoscope was inserted transurethrally.    With the robotic camera monitoring intraperitoneally and concurrently with   the cystoscope camera, a left ureteral guidewire was placed without   perforating the ureter.  The 6x22 double-J  ureteral stent was placed in good   position.  After completion of this, we then withdrew the cystoscope.  The   bladder was emptied.  After assuring that the final sponge and needle counts   were correct intraperitoneally, we irrigated pelvis with water.  Pelvic   washings were obtained.  We then counted for sponges, needles, and instrument   counts.  Once this was accounted for, robotic instrumentation was removed.    Robotic system was then de-docked.  Pneumoperitoneum was allowed to escape   through the 8 mm port.  Subcutaneous fat was copiously irrigated with water.    The skin was reapproximated with 3-0 Monocryl sutures.    The patient tolerated the procedure well without any difficulties and was   subsequently transferred to the PACU in stable condition, extubated.       ____________________________________     MD CORNELIA CLOUD / DYLAN    DD:  09/24/2019 13:03:15  DT:  09/24/2019 14:58:12    D#:  [id number]  Job#:  [id number]

## 2019-09-25 PROCEDURE — 77338 DESIGN MLC DEVICE FOR IMRT: CPT | Performed by: RADIOLOGY

## 2019-09-25 PROCEDURE — 77338 DESIGN MLC DEVICE FOR IMRT: CPT | Mod: 26 | Performed by: RADIOLOGY

## 2019-09-25 PROCEDURE — 77301 RADIOTHERAPY DOSE PLAN IMRT: CPT | Performed by: RADIOLOGY

## 2019-09-25 PROCEDURE — 77301 RADIOTHERAPY DOSE PLAN IMRT: CPT | Mod: 26 | Performed by: RADIOLOGY

## 2019-09-25 PROCEDURE — 77300 RADIATION THERAPY DOSE PLAN: CPT | Performed by: RADIOLOGY

## 2019-09-25 PROCEDURE — 77300 RADIATION THERAPY DOSE PLAN: CPT | Mod: 26 | Performed by: RADIOLOGY

## 2019-09-27 ENCOUNTER — HOSPITAL ENCOUNTER (OUTPATIENT)
Dept: RADIOLOGY | Facility: MEDICAL CENTER | Age: 69
End: 2019-09-27
Attending: SPECIALIST
Payer: MEDICARE

## 2019-09-27 DIAGNOSIS — C54.1 MALIGNANT NEOPLASM OF ENDOMETRIUM (HCC): ICD-10-CM

## 2019-09-27 PROCEDURE — 74177 CT ABD & PELVIS W/CONTRAST: CPT

## 2019-09-27 PROCEDURE — 700117 HCHG RX CONTRAST REV CODE 255: Performed by: SPECIALIST

## 2019-09-27 RX ADMIN — IOHEXOL 100 ML: 350 INJECTION, SOLUTION INTRAVENOUS at 15:20

## 2019-09-27 RX ADMIN — IOHEXOL 25 ML: 240 INJECTION, SOLUTION INTRATHECAL; INTRAVASCULAR; INTRAVENOUS; ORAL at 15:20

## 2019-09-30 ENCOUNTER — HOSPITAL ENCOUNTER (OUTPATIENT)
Dept: RADIATION ONCOLOGY | Facility: MEDICAL CENTER | Age: 69
End: 2019-09-30

## 2019-09-30 LAB
CHEMOTHERAPY INFUSION START DATE: NORMAL
CHEMOTHERAPY RECORDS: 1.8
CHEMOTHERAPY RECORDS: 4860
CHEMOTHERAPY RECORDS: NORMAL
CHEMOTHERAPY RX CANCER: NORMAL
RAD ONC ARIA COURSE TREATMENT ELAPSED DAYS: NORMAL
RAD ONC ARIA PLAN TREATMENT DATES: NORMAL
RAD ONC ARIA REFERENCE POINT DOSAGE GIVEN TO DATE: 1.8
RAD ONC ARIA REFERENCE POINT DOSAGE GIVEN TO DATE: 1.82
RAD ONC ARIA REFERENCE POINT ID: NORMAL
RAD ONC ARIA REFERENCE POINT ID: NORMAL
RAD ONC ARIA REFERENCE POINT SESSION DOSAGE GIVEN: 1.8
RAD ONC ARIA REFERENCE POINT SESSION DOSAGE GIVEN: 1.82

## 2019-09-30 PROCEDURE — 77280 THER RAD SIMULAJ FIELD SMPL: CPT | Performed by: RADIOLOGY

## 2019-09-30 PROCEDURE — 77386 HCHG IMRT DELIVERY COMPLEX: CPT | Performed by: RADIOLOGY

## 2019-10-01 ENCOUNTER — HOSPITAL ENCOUNTER (OUTPATIENT)
Dept: LAB | Facility: MEDICAL CENTER | Age: 69
End: 2019-10-01
Attending: SPECIALIST
Payer: MEDICARE

## 2019-10-01 ENCOUNTER — HOSPITAL ENCOUNTER (OUTPATIENT)
Dept: RADIATION ONCOLOGY | Facility: MEDICAL CENTER | Age: 69
End: 2019-10-31
Attending: RADIOLOGY
Payer: MEDICARE

## 2019-10-01 ENCOUNTER — HOSPITAL ENCOUNTER (OUTPATIENT)
Dept: RADIATION ONCOLOGY | Facility: MEDICAL CENTER | Age: 69
End: 2019-10-01

## 2019-10-01 LAB
ALBUMIN SERPL BCP-MCNC: 4.3 G/DL (ref 3.2–4.9)
ALBUMIN/GLOB SERPL: 1.3 G/DL
ALP SERPL-CCNC: 78 U/L (ref 30–99)
ALT SERPL-CCNC: 26 U/L (ref 2–50)
ANION GAP SERPL CALC-SCNC: 7 MMOL/L (ref 0–11.9)
AST SERPL-CCNC: 26 U/L (ref 12–45)
BASOPHILS # BLD AUTO: 0.7 % (ref 0–1.8)
BASOPHILS # BLD: 0.03 K/UL (ref 0–0.12)
BILIRUB SERPL-MCNC: 0.3 MG/DL (ref 0.1–1.5)
BUN SERPL-MCNC: 8 MG/DL (ref 8–22)
CALCIUM SERPL-MCNC: 9.5 MG/DL (ref 8.5–10.5)
CHEMOTHERAPY INFUSION START DATE: NORMAL
CHEMOTHERAPY RECORDS: 1.8
CHEMOTHERAPY RECORDS: 4860
CHEMOTHERAPY RECORDS: NORMAL
CHEMOTHERAPY RX CANCER: NORMAL
CHLORIDE SERPL-SCNC: 106 MMOL/L (ref 96–112)
CO2 SERPL-SCNC: 27 MMOL/L (ref 20–33)
CREAT SERPL-MCNC: 0.79 MG/DL (ref 0.5–1.4)
EOSINOPHIL # BLD AUTO: 0.02 K/UL (ref 0–0.51)
EOSINOPHIL NFR BLD: 0.4 % (ref 0–6.9)
ERYTHROCYTE [DISTWIDTH] IN BLOOD BY AUTOMATED COUNT: 51.8 FL (ref 35.9–50)
GLOBULIN SER CALC-MCNC: 3.3 G/DL (ref 1.9–3.5)
GLUCOSE SERPL-MCNC: 118 MG/DL (ref 65–99)
HCT VFR BLD AUTO: 43.1 % (ref 37–47)
HGB BLD-MCNC: 13.1 G/DL (ref 12–16)
IMM GRANULOCYTES # BLD AUTO: 0.01 K/UL (ref 0–0.11)
IMM GRANULOCYTES NFR BLD AUTO: 0.2 % (ref 0–0.9)
LYMPHOCYTES # BLD AUTO: 1.08 K/UL (ref 1–4.8)
LYMPHOCYTES NFR BLD: 24.3 % (ref 22–41)
MAGNESIUM SERPL-MCNC: 2.1 MG/DL (ref 1.5–2.5)
MCH RBC QN AUTO: 28.6 PG (ref 27–33)
MCHC RBC AUTO-ENTMCNC: 30.4 G/DL (ref 33.6–35)
MCV RBC AUTO: 94.1 FL (ref 81.4–97.8)
MONOCYTES # BLD AUTO: 0.37 K/UL (ref 0–0.85)
MONOCYTES NFR BLD AUTO: 8.3 % (ref 0–13.4)
NEUTROPHILS # BLD AUTO: 2.94 K/UL (ref 2–7.15)
NEUTROPHILS NFR BLD: 66.1 % (ref 44–72)
NRBC # BLD AUTO: 0 K/UL
NRBC BLD-RTO: 0 /100 WBC
PLATELET # BLD AUTO: 335 K/UL (ref 164–446)
PMV BLD AUTO: 11.3 FL (ref 9–12.9)
POTASSIUM SERPL-SCNC: 3.6 MMOL/L (ref 3.6–5.5)
PROT SERPL-MCNC: 7.6 G/DL (ref 6–8.2)
RAD ONC ARIA COURSE TREATMENT ELAPSED DAYS: NORMAL
RAD ONC ARIA PLAN TREATMENT DATES: NORMAL
RAD ONC ARIA REFERENCE POINT DOSAGE GIVEN TO DATE: 3.6
RAD ONC ARIA REFERENCE POINT DOSAGE GIVEN TO DATE: 3.63
RAD ONC ARIA REFERENCE POINT ID: NORMAL
RAD ONC ARIA REFERENCE POINT ID: NORMAL
RAD ONC ARIA REFERENCE POINT SESSION DOSAGE GIVEN: 1.8
RAD ONC ARIA REFERENCE POINT SESSION DOSAGE GIVEN: 1.82
RBC # BLD AUTO: 4.58 M/UL (ref 4.2–5.4)
SODIUM SERPL-SCNC: 140 MMOL/L (ref 135–145)
WBC # BLD AUTO: 4.5 K/UL (ref 4.8–10.8)

## 2019-10-01 PROCEDURE — 77014 PR CT GUIDANCE PLACEMENT RAD THERAPY FIELDS: CPT | Mod: 26 | Performed by: RADIOLOGY

## 2019-10-01 PROCEDURE — 36415 COLL VENOUS BLD VENIPUNCTURE: CPT

## 2019-10-01 PROCEDURE — 77386 HCHG IMRT DELIVERY COMPLEX: CPT | Performed by: RADIOLOGY

## 2019-10-01 PROCEDURE — 80053 COMPREHEN METABOLIC PANEL: CPT

## 2019-10-01 PROCEDURE — 83735 ASSAY OF MAGNESIUM: CPT

## 2019-10-01 PROCEDURE — 85025 COMPLETE CBC W/AUTO DIFF WBC: CPT

## 2019-10-02 VITALS
DIASTOLIC BLOOD PRESSURE: 83 MMHG | TEMPERATURE: 98.8 F | HEART RATE: 96 BPM | OXYGEN SATURATION: 96 % | SYSTOLIC BLOOD PRESSURE: 163 MMHG

## 2019-10-02 DIAGNOSIS — R11.0 NAUSEA: ICD-10-CM

## 2019-10-02 DIAGNOSIS — C54.1 ENDOMETRIAL CANCER (HCC): ICD-10-CM

## 2019-10-02 LAB
CHEMOTHERAPY INFUSION START DATE: NORMAL
CHEMOTHERAPY RECORDS: 1.8
CHEMOTHERAPY RECORDS: 4860
CHEMOTHERAPY RECORDS: NORMAL
CHEMOTHERAPY RX CANCER: NORMAL
RAD ONC ARIA COURSE TREATMENT ELAPSED DAYS: NORMAL
RAD ONC ARIA PLAN TREATMENT DATES: NORMAL
RAD ONC ARIA REFERENCE POINT DOSAGE GIVEN TO DATE: 5.4
RAD ONC ARIA REFERENCE POINT DOSAGE GIVEN TO DATE: 5.45
RAD ONC ARIA REFERENCE POINT ID: NORMAL
RAD ONC ARIA REFERENCE POINT ID: NORMAL
RAD ONC ARIA REFERENCE POINT SESSION DOSAGE GIVEN: 1.8
RAD ONC ARIA REFERENCE POINT SESSION DOSAGE GIVEN: 1.82

## 2019-10-02 PROCEDURE — 77386 HCHG IMRT DELIVERY COMPLEX: CPT | Performed by: RADIOLOGY

## 2019-10-02 PROCEDURE — 77014 PR CT GUIDANCE PLACEMENT RAD THERAPY FIELDS: CPT | Mod: 26 | Performed by: RADIOLOGY

## 2019-10-02 PROCEDURE — 77336 RADIATION PHYSICS CONSULT: CPT | Performed by: RADIOLOGY

## 2019-10-02 RX ORDER — ONDANSETRON 4 MG/1
4 TABLET, FILM COATED ORAL EVERY 6 HOURS PRN
Qty: 30 TAB | Refills: 0 | Status: SHIPPED | OUTPATIENT
Start: 2019-10-02 | End: 2019-10-09

## 2019-10-02 ASSESSMENT — PAIN SCALES - GENERAL: PAINLEVEL: 8=MODERATE-SEVERE PAIN

## 2019-10-02 NOTE — PROGRESS NOTES
ON TREATMENT  NOTE  RADIATION ONCOLOGY DEPARTMENT    Patient name:  Jairo Rivas    Primary Physician:  ZULEYKA Lyons MRN: [id number]  CSN: [id number]   Referring physician:  Jann Garcia M.D. : 1950, 69 y.o.     ENCOUNTER DATE:  10/02/19    DIAGNOSIS:  Visit Diagnoses     ICD-10-CM   1. Nausea R11.0   2. Endometrial cancer (HCC) C54.1     Endometrial cancer (HCC)  Staging form: Corpus Uteri - Carcinoma and Carcinosarcoma, AJCC 8th Edition  - Pathologic stage from 2019: FIGO Stage IIIB (pT3b, pN0, cM0) - Signed by Jayant EDOUARD M.D. on 2019  Histologic grade (G): G2  Histologic grading system: 3 grade system  Lymph-vascular invasion (LVI): LVI not present (absent)/not identified  Residual tumor (R): R1 - Microscopic  Histopathologic type: Endometrioid adenocarcinoma, NOS  Diagnostic confirmation: Positive histology  Specimen type: Excision  Staged by: Managing physician      TREATMENT SUMMARY:  Radiation Therapy Episodes     Radiation Therapy: IMRT (2019)      Radiation Treatments     Plan Most Recent Treatment Date Elapsed Course Treatment Days Fractions Treated Prescribed Fraction Dose (cGy) Prescribed Total Dose (cGy)    Pelvis 10/2/2019 2 @ [id number] 3 of 27 180 4,860       Reference Point Most Recent Treatment Date Elapsed Course Treatment Days Most Recent Session Dose (cGy) Total Dose (cGy)    Pelvis 10/2/2019 2 @ [id number] 180 540    PelvisCP 10/2/2019 2 @ [id number] 182 545          SUBJECTIVE:  Complains of nausea and fatigue. Nausea started about 5 days ago. No vomiting.       VITAL SIGNS:  KPS: 50, Requires considerable assistance and frequent medical care (ECOG equivalent 2)  Encounter Vitals  Temperature: 37.1 °C (98.8 °F)  Blood Pressure : (!) 163/83  Pulse: 96  Pulse Oximetry: 96 %  Pain Score: 8=Moderate-Severe Pain  Pain Assessment 10/2/2019 2019   Pain Score 8 2   Pain Loc Generalized Pelvic   What increases pain? Nothing -    What decreases pain? Tylenol -   Some recent data might be hidden          PHYSICAL EXAM:  Physical Exam   Constitutional: She is oriented to person, place, and time. She appears well-developed and well-nourished.   HENT:   Head: Normocephalic.   Neurological: She is alert and oriented to person, place, and time.   Skin: Skin is warm and dry. No erythema.   Psychiatric: She has a normal mood and affect. Her behavior is normal. Judgment and thought content normal.   Nursing note and vitals reviewed.       Toxicity Assessment 10/2/2019   Toxicity Assessment Female Pelvis   Fatigue (lethargy, malaise, asthenia) Increased fatigue over baseline, but not altering normal activities   Radiation Dermatitis None   Anorexia Loss of appetite   Colitis None   Constipation Requiring stool softener or dietary modification   Dehydration None   Diarrhea w/o Colostomy None   Flatulence None   Nausea Able to eat   Proctitis None   Vomiting None   RT - Pain due to RT None   Tumor Pain (onset or exacerbation of tumor pain due to treatment) None   Dysuria (painful urination) None   Urinary Frequency Normal   Urinary Urgency None   Bladder Spasms Absent   Incontinence None   Urinary Retention Normal   Vaginitis (not due to infection) None   Vaginal Bleeding None   Vaginal Dryness Normal       CURRENT MEDICATIONS:    Current Outpatient Medications:   •  ondansetron (ZOFRAN) 4 MG Tab tablet, Take 1 Tab by mouth every 6 hours as needed for up to 7 days., Disp: 30 Tab, Rfl: 0  •  ferrous sulfate 325 (65 Fe) MG tablet, TAKE 1 TAB BY MOUTH EVERY DAY FOR 30 DAYS., Disp: 90 Tab, Rfl: 3  •  amitriptyline (ELAVIL) 25 MG Tab, TAKE 1 TABLET BY MOUTH EVERY DAY IN THE EVENING, Disp: 90 Tab, Rfl: 1  •  baclofen (LIORESAL) 10 MG Tab, Take 1 Tab by mouth 3 times a day. Indications: Muscle Spasticity, Disp: 90 Tab, Rfl: 0  •  metoprolol SR (TOPROL XL) 100 MG TABLET SR 24 HR, Take 1 Tab by mouth every day for 90 days., Disp: 90 Tab, Rfl: 2  •  losartan  (COZAAR) 100 MG Tab, Take 100 mg by mouth every day., Disp: , Rfl:   •  amLODIPine (NORVASC) 5 MG Tab, Take 1 Tab by mouth every bedtime., Disp: 90 Tab, Rfl: 2  •  ascorbic acid (ASCORBIC ACID) 500 MG Tab, Take 500 mg by mouth every evening., Disp: , Rfl:   •  atorvastatin (LIPITOR) 20 MG Tab, Take 20 mg by mouth every evening., Disp: , Rfl:   •  Cholecalciferol (VITAMIN D3) 2000 UNIT Cap, Take 2,000 Units by mouth every day., Disp: , Rfl:   •  Coenzyme Q10 (COQ10) 100 MG Cap, Take 100 mg by mouth every evening., Disp: , Rfl:   •  multivitamin (THERAGRAN) Tab, Take 1 Tab by mouth every day., Disp: , Rfl:   •  acetaminophen (TYLENOL) 500 MG Tab, Take 1,000 mg by mouth every 6 hours as needed for Moderate Pain., Disp: , Rfl:   •  B Complex-C (SUPER B COMPLEX PO), Take 1 Tab by mouth every evening., Disp: , Rfl:   •  ferrous sulfate 325 (65 Fe) MG tablet, Take 1 Tab by mouth every day., Disp: 30 Tab, Rfl: 0  •  aspirin (ASA) 81 MG Chew Tab chewable tablet, Take 81 mg by mouth every day., Disp: , Rfl:     LABORATORY DATA:   Lab Results   Component Value Date/Time    SODIUM 140 10/01/2019 01:35 PM    POTASSIUM 3.6 10/01/2019 01:35 PM    CHLORIDE 106 10/01/2019 01:35 PM    CO2 27 10/01/2019 01:35 PM    GLUCOSE 118 (H) 10/01/2019 01:35 PM    BUN 8 10/01/2019 01:35 PM    CREATININE 0.79 10/01/2019 01:35 PM       Lab Results   Component Value Date/Time    WBC 4.5 (L) 10/01/2019 01:35 PM    HEMOGLOBIN 13.1 10/01/2019 01:35 PM    HEMATOCRIT 43.1 10/01/2019 01:35 PM    MCV 94.1 10/01/2019 01:35 PM    PLATELETCT 335 10/01/2019 01:35 PM        RADIOLOGY DATA:  Ct-abdomen-pelvis With    Result Date: 9/30/2019 9/27/2019 3:18 PM HISTORY/REASON FOR EXAM:  Endometrial carcinoma restaging TECHNIQUE/EXAM DESCRIPTION: CT of the abdomen and pelvis with contrast. Contrast-enhanced helical scanning was obtained from the diaphragmatic domes through the pubic symphysis following the bolus administration of 100 mL of nonionic contrast  (Omnipaque 350) without complication. Low dose optimization technique was utilized for this CT exam including automated exposure control and adjustment of the mA and/or kV according to patient size. COMPARISON: September 18 PET/CT. FINDINGS: The patient was unable to tolerate scanning with arms above the scan and this causes some artifact over the abdomen/pelvis The visualized lung bases show no consolidation. CT Abdomen: Ureters are mildly dilated left greater than the right. No calculus is seen. No bladder mass is noted. There is mild fat stranding the pelvis adjacent to the distal ureters but no abrupt caliber change is seen. No asymmetric enhancement of the kidneys. Hepatic too small to characterize hypodensities in both lobes No lymphadenopathy The liver, spleen, pancreas, gallbladder, adrenal glands enhance normally. There is no abnormal bowel dilatation or inflammatory change. There is no aneurysmal change of the aorta. CT Pelvis: No adenopathy is detected Uterus and ovaries are surgically absent. There is some operative bed fat stranding but it is mild. No fluid collection is seen. Bladder contours are within normal limits No osteoblastic or lytic metastasis is seen throughout the study There is no free air or free fluid.     1.  Mild ureteral dilatation is nonspecific and could be due to highly hydrated state of patient. Partial obstruction is not excluded and correlation with ultrasound may prove helpful to evaluate ureteral jets. 2.  Hysterectomy, and oophorectomies and there is no evidence of metastasis 3.  Far too small to characterize hepatic hypodensities statistically most likely represent cysts          Na-uapxc-sncso Base To Mid-thigh    Result Date: 9/18/2019 9/18/2019 7:44 AM HISTORY/REASON FOR EXAM:  Endometrial cancer status post hysterectomy, staging TECHNIQUE/EXAM DESCRIPTION AND NUMBER OF VIEWS: PET body imaging. Initially, 14 mCi F-18 FDG was administered intravenously under standardized  conditions. Approximately 45 minutes after FDG administration, the patient was placed in the supine position on the PET CT table. Blood glucose level was 91 mg/dL. Low dose spiral CT imaging was performed from the skull base to the mid thighs. PET imaging was then performed from the skull base to the mid thighs. CT images, PET images, and PET/CT fused images were reviewed on a PACS 3D workstation. The limited non-contrast CT data are used primarily for attenuation correction and anatomic correlation.  Evaluation of solid organs and bowel are especially limited utilizing this technique. A low dose CT was obtained of the same area without IV contrast for attenuation correction and coregistration, not for a diagnostic scan. COMPARISON: CT dated 7/26/2019 FINDINGS: VISUALIZED BRAIN: Normal metabolic activity. HEAD AND NECK: Hypodense thyroid nodule centered the thyroid isthmus measures 2.9 cm. It is minimally hypermetabolic, with an SUV max of 3.45. CHEST: Lungs show no hypermetabolic pulmonary nodules. There is no hypermetabolic hilar, mediastinal, or axillary lymphadenopathy. Coronary calcifications. ABDOMEN AND PELVIS: There is normal, uniform metabolic activity in the liver, spleen, adrenal glands, kidneys. There is no hypermetabolic mesenteric, retroperitoneal, iliac, or inguinal lymphadenopathy. Nonspecific physiologic activity in the colon and intestine is noted. Atherosclerosis. Hysterectomy. Moderate hypermetabolism at the vaginal cuff, with an SUV max of 8.86. VISUALIZED MUSCULOSKELETAL SYSTEM: No hypermetabolic osseous lesions.     1. Prior hysterectomy with hypermetabolism of the vaginal cuff, which may be postoperative in nature. 2. No FDG avid lymphadenopathy or metastatic disease. 3. Incidental 2.9 cm mildly hypermetabolic nodule in the thyroid isthmus. It may represent thyroid malignancy. Recommend further evaluation with thyroid ultrasound.      IMPRESSION:  Cancer Staging  Endometrial cancer  (HCC)  Staging form: Corpus Uteri - Carcinoma and Carcinosarcoma, AJCC 8th Edition  - Pathologic stage from 9/13/2019: FIGO Stage IIIB (pT3b, pN0, cM0) - Signed by Jayant EDOUARD M.D. on 9/13/2019      PLAN:  No change in treatment plan    Disposition:  Treatment plan reviewed. Questions answered. Continue therapy outlined.     Jayant EDOUARD M.D.    Orders Placed This Encounter   • Rad Onc Aria Session Summary   • ondansetron (ZOFRAN) 4 MG Tab tablet

## 2019-10-03 ENCOUNTER — HOSPITAL ENCOUNTER (OUTPATIENT)
Dept: RADIATION ONCOLOGY | Facility: MEDICAL CENTER | Age: 69
End: 2019-10-03

## 2019-10-03 ENCOUNTER — HOSPITAL ENCOUNTER (OUTPATIENT)
Facility: MEDICAL CENTER | Age: 69
End: 2019-10-04
Attending: SPECIALIST | Admitting: SPECIALIST
Payer: MEDICARE

## 2019-10-03 ENCOUNTER — APPOINTMENT (OUTPATIENT)
Dept: RADIOLOGY | Facility: MEDICAL CENTER | Age: 69
End: 2019-10-03
Attending: SPECIALIST
Payer: MEDICARE

## 2019-10-03 DIAGNOSIS — C54.1 ENDOMETRIAL CANCER (HCC): ICD-10-CM

## 2019-10-03 DIAGNOSIS — Z51.11 ADMISSION FOR CHEMOTHERAPY: ICD-10-CM

## 2019-10-03 LAB
CHEMOTHERAPY INFUSION START DATE: NORMAL
CHEMOTHERAPY RECORDS: 1.8
CHEMOTHERAPY RECORDS: 4860
CHEMOTHERAPY RECORDS: NORMAL
CHEMOTHERAPY RX CANCER: NORMAL
RAD ONC ARIA COURSE TREATMENT ELAPSED DAYS: NORMAL
RAD ONC ARIA PLAN TREATMENT DATES: NORMAL
RAD ONC ARIA REFERENCE POINT DOSAGE GIVEN TO DATE: 7.2
RAD ONC ARIA REFERENCE POINT DOSAGE GIVEN TO DATE: 7.27
RAD ONC ARIA REFERENCE POINT ID: NORMAL
RAD ONC ARIA REFERENCE POINT ID: NORMAL
RAD ONC ARIA REFERENCE POINT SESSION DOSAGE GIVEN: 1.8
RAD ONC ARIA REFERENCE POINT SESSION DOSAGE GIVEN: 1.82

## 2019-10-03 PROCEDURE — 700111 HCHG RX REV CODE 636 W/ 250 OVERRIDE (IP): Performed by: SPECIALIST

## 2019-10-03 PROCEDURE — 700105 HCHG RX REV CODE 258: Performed by: SPECIALIST

## 2019-10-03 PROCEDURE — 96376 TX/PRO/DX INJ SAME DRUG ADON: CPT

## 2019-10-03 PROCEDURE — 96413 CHEMO IV INFUSION 1 HR: CPT

## 2019-10-03 PROCEDURE — 700102 HCHG RX REV CODE 250 W/ 637 OVERRIDE(OP): Performed by: SPECIALIST

## 2019-10-03 PROCEDURE — 96375 TX/PRO/DX INJ NEW DRUG ADDON: CPT

## 2019-10-03 PROCEDURE — 96367 TX/PROPH/DG ADDL SEQ IV INF: CPT

## 2019-10-03 PROCEDURE — 77386 HCHG IMRT DELIVERY COMPLEX: CPT | Performed by: RADIOLOGY

## 2019-10-03 PROCEDURE — A9270 NON-COVERED ITEM OR SERVICE: HCPCS | Performed by: SPECIALIST

## 2019-10-03 PROCEDURE — 77014 PR CT GUIDANCE PLACEMENT RAD THERAPY FIELDS: CPT | Mod: 26 | Performed by: RADIOLOGY

## 2019-10-03 PROCEDURE — G0378 HOSPITAL OBSERVATION PER HR: HCPCS

## 2019-10-03 RX ORDER — ONDANSETRON 4 MG/1
4 TABLET, ORALLY DISINTEGRATING ORAL EVERY 6 HOURS PRN
Status: DISCONTINUED | OUTPATIENT
Start: 2019-10-03 | End: 2019-10-04

## 2019-10-03 RX ORDER — BACLOFEN 10 MG/1
10 TABLET ORAL 3 TIMES DAILY
Status: DISCONTINUED | OUTPATIENT
Start: 2019-10-03 | End: 2019-10-04 | Stop reason: HOSPADM

## 2019-10-03 RX ORDER — DEXAMETHASONE SODIUM PHOSPHATE 4 MG/ML
12 INJECTION, SOLUTION INTRA-ARTICULAR; INTRALESIONAL; INTRAMUSCULAR; INTRAVENOUS; SOFT TISSUE ONCE
Status: COMPLETED | OUTPATIENT
Start: 2019-10-03 | End: 2019-10-03

## 2019-10-03 RX ORDER — METOPROLOL SUCCINATE 50 MG/1
100 TABLET, EXTENDED RELEASE ORAL DAILY
Status: DISCONTINUED | OUTPATIENT
Start: 2019-10-04 | End: 2019-10-04 | Stop reason: HOSPADM

## 2019-10-03 RX ORDER — ASCORBIC ACID 500 MG
500 TABLET ORAL EVERY EVENING
Status: DISCONTINUED | OUTPATIENT
Start: 2019-10-03 | End: 2019-10-04 | Stop reason: HOSPADM

## 2019-10-03 RX ORDER — AMOXICILLIN 250 MG
1 CAPSULE ORAL 2 TIMES DAILY PRN
COMMUNITY
End: 2023-08-24

## 2019-10-03 RX ORDER — ATORVASTATIN CALCIUM 20 MG/1
20 TABLET, FILM COATED ORAL EVERY EVENING
Status: DISCONTINUED | OUTPATIENT
Start: 2019-10-03 | End: 2019-10-04 | Stop reason: HOSPADM

## 2019-10-03 RX ORDER — DEXAMETHASONE SODIUM PHOSPHATE 4 MG/ML
12 INJECTION, SOLUTION INTRA-ARTICULAR; INTRALESIONAL; INTRAMUSCULAR; INTRAVENOUS; SOFT TISSUE ONCE
Status: DISCONTINUED | OUTPATIENT
Start: 2019-10-03 | End: 2019-10-03

## 2019-10-03 RX ORDER — AMOXICILLIN 250 MG
1 CAPSULE ORAL 2 TIMES DAILY
Status: DISCONTINUED | OUTPATIENT
Start: 2019-10-03 | End: 2019-10-04 | Stop reason: HOSPADM

## 2019-10-03 RX ORDER — AMITRIPTYLINE HYDROCHLORIDE 25 MG/1
25 TABLET, FILM COATED ORAL
Status: DISCONTINUED | OUTPATIENT
Start: 2019-10-03 | End: 2019-10-04 | Stop reason: HOSPADM

## 2019-10-03 RX ORDER — ASPIRIN 81 MG/1
81 TABLET, CHEWABLE ORAL DAILY
Status: DISCONTINUED | OUTPATIENT
Start: 2019-10-03 | End: 2019-10-04 | Stop reason: HOSPADM

## 2019-10-03 RX ORDER — LOSARTAN POTASSIUM 50 MG/1
100 TABLET ORAL DAILY
Status: DISCONTINUED | OUTPATIENT
Start: 2019-10-03 | End: 2019-10-04 | Stop reason: HOSPADM

## 2019-10-03 RX ORDER — AMLODIPINE BESYLATE 5 MG/1
5 TABLET ORAL
Status: DISCONTINUED | OUTPATIENT
Start: 2019-10-03 | End: 2019-10-04 | Stop reason: HOSPADM

## 2019-10-03 RX ORDER — SODIUM CHLORIDE 9 MG/ML
2000 INJECTION, SOLUTION INTRAVENOUS ONCE
Status: COMPLETED | OUTPATIENT
Start: 2019-10-03 | End: 2019-10-03

## 2019-10-03 RX ORDER — ACETAMINOPHEN 500 MG
1000 TABLET ORAL EVERY 6 HOURS PRN
Status: DISCONTINUED | OUTPATIENT
Start: 2019-10-03 | End: 2019-10-04 | Stop reason: HOSPADM

## 2019-10-03 RX ORDER — ONDANSETRON 4 MG/1
4 TABLET, FILM COATED ORAL EVERY 6 HOURS PRN
Status: DISCONTINUED | OUTPATIENT
Start: 2019-10-03 | End: 2019-10-03

## 2019-10-03 RX ADMIN — BACLOFEN 10 MG: 10 TABLET ORAL at 13:35

## 2019-10-03 RX ADMIN — AMLODIPINE BESYLATE 5 MG: 5 TABLET ORAL at 20:38

## 2019-10-03 RX ADMIN — LOSARTAN POTASSIUM 100 MG: 50 TABLET ORAL at 13:35

## 2019-10-03 RX ADMIN — BACLOFEN 10 MG: 10 TABLET ORAL at 17:38

## 2019-10-03 RX ADMIN — CISPLATIN 57 MG: 1 INJECTION, SOLUTION INTRAVENOUS at 17:22

## 2019-10-03 RX ADMIN — AMITRIPTYLINE HYDROCHLORIDE 25 MG: 25 TABLET, FILM COATED ORAL at 13:36

## 2019-10-03 RX ADMIN — OXYCODONE HYDROCHLORIDE AND ACETAMINOPHEN 500 MG: 500 TABLET ORAL at 17:38

## 2019-10-03 RX ADMIN — ACETAMINOPHEN 1000 MG: 500 TABLET ORAL at 20:43

## 2019-10-03 RX ADMIN — SODIUM CHLORIDE 2000 ML: 9 INJECTION, SOLUTION INTRAVENOUS at 13:29

## 2019-10-03 RX ADMIN — MANNITOL: 12.5 INJECTION, SOLUTION INTRAVENOUS at 23:00

## 2019-10-03 RX ADMIN — ONDANSETRON HYDROCHLORIDE 16 MG: 2 SOLUTION INTRAMUSCULAR; INTRAVENOUS at 15:49

## 2019-10-03 RX ADMIN — ATORVASTATIN CALCIUM 20 MG: 20 TABLET, FILM COATED ORAL at 17:38

## 2019-10-03 RX ADMIN — SODIUM CHLORIDE 150 MG: 9 INJECTION, SOLUTION INTRAVENOUS at 16:27

## 2019-10-03 RX ADMIN — DEXAMETHASONE SODIUM PHOSPHATE 12 MG: 4 INJECTION, SOLUTION INTRA-ARTICULAR; INTRALESIONAL; INTRAMUSCULAR; INTRAVENOUS; SOFT TISSUE at 15:49

## 2019-10-03 RX ADMIN — MANNITOL: 12.5 INJECTION, SOLUTION INTRAVENOUS at 18:55

## 2019-10-03 ASSESSMENT — COPD QUESTIONNAIRES
COPD SCREENING SCORE: 2
DO YOU EVER COUGH UP ANY MUCUS OR PHLEGM?: NO/ONLY WITH OCCASIONAL COLDS OR INFECTIONS
HAVE YOU SMOKED AT LEAST 100 CIGARETTES IN YOUR ENTIRE LIFE: NO/DON'T KNOW
IN THE PAST 12 MONTHS DO YOU DO LESS THAN YOU USED TO BECAUSE OF YOUR BREATHING PROBLEMS: DISAGREE/UNSURE
DURING THE PAST 4 WEEKS HOW MUCH DID YOU FEEL SHORT OF BREATH: NONE/LITTLE OF THE TIME

## 2019-10-03 ASSESSMENT — LIFESTYLE VARIABLES
HAVE YOU EVER FELT YOU SHOULD CUT DOWN ON YOUR DRINKING: NO
TOTAL SCORE: 0
CONSUMPTION TOTAL: INCOMPLETE
HAVE PEOPLE ANNOYED YOU BY CRITICIZING YOUR DRINKING: NO
EVER FELT BAD OR GUILTY ABOUT YOUR DRINKING: NO
ALCOHOL_USE: NO
EVER_SMOKED: NEVER
TOTAL SCORE: 0
EVER HAD A DRINK FIRST THING IN THE MORNING TO STEADY YOUR NERVES TO GET RID OF A HANGOVER: NO
TOTAL SCORE: 0

## 2019-10-03 ASSESSMENT — PATIENT HEALTH QUESTIONNAIRE - PHQ9
6. FEELING BAD ABOUT YOURSELF - OR THAT YOU ARE A FAILURE OR HAVE LET YOURSELF OR YOUR FAMILY DOWN: NOT AL ALL
5. POOR APPETITE OR OVEREATING: SEVERAL DAYS
SUM OF ALL RESPONSES TO PHQ9 QUESTIONS 1 AND 2: 2
8. MOVING OR SPEAKING SO SLOWLY THAT OTHER PEOPLE COULD HAVE NOTICED. OR THE OPPOSITE, BEING SO FIGETY OR RESTLESS THAT YOU HAVE BEEN MOVING AROUND A LOT MORE THAN USUAL: NOT AT ALL
SUM OF ALL RESPONSES TO PHQ QUESTIONS 1-9: 5
7. TROUBLE CONCENTRATING ON THINGS, SUCH AS READING THE NEWSPAPER OR WATCHING TELEVISION: NOT AT ALL
3. TROUBLE FALLING OR STAYING ASLEEP OR SLEEPING TOO MUCH: SEVERAL DAYS
4. FEELING TIRED OR HAVING LITTLE ENERGY: SEVERAL DAYS
1. LITTLE INTEREST OR PLEASURE IN DOING THINGS: SEVERAL DAYS
9. THOUGHTS THAT YOU WOULD BE BETTER OFF DEAD, OR OF HURTING YOURSELF: NOT AT ALL
2. FEELING DOWN, DEPRESSED, IRRITABLE, OR HOPELESS: SEVERAL DAYS

## 2019-10-03 NOTE — PROGRESS NOTES
Patient admitted to floor from Dr. Garcia office for start of chemotherapy. Assumed care. Pt is A&Ox4. Pt denies pain at this time. Pt was updated on the plan of care for the day. All questions answered.   Sabina Fernández with Dr. Garcia notified of admit and home medications ordered. Patient ordered a regular diet.   Pt has call light within reach and bed is in lowest position.  All fall precautions in place. Pt had no other needs at this time, hourly rounding in place.

## 2019-10-03 NOTE — PROGRESS NOTES
"Patient Name: Jairo Rivas   Dx: Endometrial Cancer          Protocol: CISplatin/XRT followed by CARBOplatin/PACLItaxel      *Dosing Reference*  CISplatin 50 mg/m2 on Day 1   -10/3/19 Dose reduced to 30 mg/m2 for age per TREVON GERMAN.   Repeat Q21 days x 2 cycles in combination with XRT  ~followed by~   CARBOplatin AUC 5 IV over 30 min on Day  1   PACLItaxel 175 mg/m2 IV over 3 hours on Day 1   Repeat 27 days x 4 cycles   Joe Santiago S, et al. Adjuvant chemoradiotherapy versus radiotherapy alone for women with high-risk endometrial cancer (PORTEC-3): final results of an international, open-label, multicentre, randomised, phase 3 trial. Lancet Oncol. 2018 Mar;19(3):295-309.     Allergies:  Penicillins; Gluten meal; Hydrocodone; Iodine; Levaquin; and Tramadol     /72   Pulse 89   Temp 36.8 °C (98.2 °F) (Temporal)   Resp 17   Ht 1.626 m (5' 4\")   Wt 83.7 kg (184 lb 8.4 oz)   SpO2 97%   BMI 31.67 kg/m²  Body surface area is 1.94 meters squared.    Labs 10/1/19:  ANC~ 2940 Plt = 335k   Hgb = 13.1     SCr = 0.79mg/dL CrCl ~ 91 mL/min   AST/ALT/AP = WNL TBili = 0.3  Mag = 2.1  K+ = 3.6      Drug Order   (Drug name, dose, route, IV Fluid & volume, frequency, number of doses) Cycle: 1      Previous treatment: N/A     Medication = CISplatin  Base Dose= 30 mg/m2  Calc Dose: Base Dose x 1.94 m2 = 58.2 mg  Final Dose = 57 mg   Route = IV  Fluid & Volume =  mL  Admin Duration = Over 60 min           < 10 % difference, ok to treat with final written dose       By my signature below, I confirm this process was performed independently with the BSA and all final chemotherapy dosing calculations congruent. I have reviewed the above chemotherapy order and that my calculation of the final dose and BSA (when applicable) corroborate those calculations of the  pharmacist. Discrepancies of 5% or greater in the written dose have been addressed and documented within the EPIC Progress " notes.    Signature: Anusha Mercer, PharmD, BCOP

## 2019-10-03 NOTE — PROGRESS NOTES
"Pharmacy Chemotherapy Verification  Patient Name: Jairo Rivas  Dx: Endometrial CA  Cycle: 1 (Previous treatment = NA)  Regimen and Dosing Reference  CISplatin 50 mg/m2 IV on Day 1   -Dose reduced to 30 mg/m2 for age and anticipated tolerance  Q21 days x 2 cycles with XRT  Followed by   CARBOplatin AUC 5 IV over 30 minutes on Day 1  PACLitaxel 175 mg/m2 IV over 3 hours on Day 1  Q28d x 4 cycles  emma CRUZ et al. Adjuvant chemoradiotherapy versus radiotherapy alone for women with high-risk endometrial cancer (PORTEC-3): final results of an international, open-label, multicentre, randomised, phase 3 trial. Lancet Oncol. 2018 Mar;19(3):295-309.    Allergies:Penicillins; Gluten meal; Hydrocodone; Iodine; Levaquin; and Tramadol  /72   Pulse 89   Temp 36.8 °C (98.2 °F) (Temporal)   Resp 17   Ht 1.626 m (5' 4\")   Wt 83.7 kg (184 lb 8.4 oz)   SpO2 97%   BMI 31.67 kg/m²   Body surface area is 1.94 meters squared.    Labs 10/1/19  ANC 2940 Hgb 13.1 Plt 335k  SCr 0.79 CrCl 91.2 mL/min   AST/ALT/AP = 26/26/78 Tbili = 0.3  Mg = 2.1 K = 3.6    CISplatin 30 mg/m2 x 1.94 m2 = 58.2 mg   <10% difference, OK to treat with final dose = 57 mg IV    Chiquita Oliveros, PharmD, BCOP      "

## 2019-10-03 NOTE — PROGRESS NOTES
Chemotherapy Verification - SECONDARY RN       Height = 162.6cm  Weight = 83.7kg  BSA = 1.94m^2       Medication: Cisplatin  Dose: 30mg/m^2  Calculated Dose: 58.2mg (ordered dose: 57mg)                            (In mg/m2, AUC, mg/kg)    I confirm that this process was performed independently.

## 2019-10-03 NOTE — PROGRESS NOTES
Patient transported to our department via RN for radiation therapy treatment number 4 of 27 to her pelvis.

## 2019-10-04 ENCOUNTER — HOSPITAL ENCOUNTER (OUTPATIENT)
Dept: RADIATION ONCOLOGY | Facility: MEDICAL CENTER | Age: 69
End: 2019-10-04

## 2019-10-04 VITALS
HEART RATE: 88 BPM | RESPIRATION RATE: 16 BRPM | BODY MASS INDEX: 31.5 KG/M2 | SYSTOLIC BLOOD PRESSURE: 133 MMHG | TEMPERATURE: 98.4 F | OXYGEN SATURATION: 91 % | HEIGHT: 64 IN | DIASTOLIC BLOOD PRESSURE: 66 MMHG | WEIGHT: 184.53 LBS

## 2019-10-04 LAB
BASOPHILS # BLD AUTO: 0 % (ref 0–1.8)
BASOPHILS # BLD: 0 K/UL (ref 0–0.12)
CANCER AG125 SERPL-ACNC: 6.2 U/ML (ref 0–35)
CHEMOTHERAPY INFUSION START DATE: NORMAL
CHEMOTHERAPY RECORDS: 1.8
CHEMOTHERAPY RECORDS: 4860
CHEMOTHERAPY RECORDS: NORMAL
CHEMOTHERAPY RX CANCER: NORMAL
EOSINOPHIL # BLD AUTO: 0 K/UL (ref 0–0.51)
EOSINOPHIL NFR BLD: 0 % (ref 0–6.9)
ERYTHROCYTE [DISTWIDTH] IN BLOOD BY AUTOMATED COUNT: 50.2 FL (ref 35.9–50)
HCT VFR BLD AUTO: 39.9 % (ref 37–47)
HGB BLD-MCNC: 12.6 G/DL (ref 12–16)
IMM GRANULOCYTES # BLD AUTO: 0.02 K/UL (ref 0–0.11)
IMM GRANULOCYTES NFR BLD AUTO: 0.4 % (ref 0–0.9)
LYMPHOCYTES # BLD AUTO: 0.42 K/UL (ref 1–4.8)
LYMPHOCYTES NFR BLD: 8.6 % (ref 22–41)
MCH RBC QN AUTO: 29.6 PG (ref 27–33)
MCHC RBC AUTO-ENTMCNC: 31.6 G/DL (ref 33.6–35)
MCV RBC AUTO: 93.7 FL (ref 81.4–97.8)
MONOCYTES # BLD AUTO: 0.02 K/UL (ref 0–0.85)
MONOCYTES NFR BLD AUTO: 0.4 % (ref 0–13.4)
NEUTROPHILS # BLD AUTO: 4.43 K/UL (ref 2–7.15)
NEUTROPHILS NFR BLD: 90.6 % (ref 44–72)
NRBC # BLD AUTO: 0 K/UL
NRBC BLD-RTO: 0 /100 WBC
PLATELET # BLD AUTO: 262 K/UL (ref 164–446)
PMV BLD AUTO: 10.5 FL (ref 9–12.9)
RAD ONC ARIA COURSE TREATMENT ELAPSED DAYS: NORMAL
RAD ONC ARIA PLAN TREATMENT DATES: NORMAL
RAD ONC ARIA REFERENCE POINT DOSAGE GIVEN TO DATE: 9
RAD ONC ARIA REFERENCE POINT DOSAGE GIVEN TO DATE: 9.09
RAD ONC ARIA REFERENCE POINT ID: NORMAL
RAD ONC ARIA REFERENCE POINT ID: NORMAL
RAD ONC ARIA REFERENCE POINT SESSION DOSAGE GIVEN: 1.8
RAD ONC ARIA REFERENCE POINT SESSION DOSAGE GIVEN: 1.82
RBC # BLD AUTO: 4.26 M/UL (ref 4.2–5.4)
WBC # BLD AUTO: 4.9 K/UL (ref 4.8–10.8)

## 2019-10-04 PROCEDURE — 77014 PR CT GUIDANCE PLACEMENT RAD THERAPY FIELDS: CPT | Mod: 26 | Performed by: RADIOLOGY

## 2019-10-04 PROCEDURE — 85025 COMPLETE CBC W/AUTO DIFF WBC: CPT

## 2019-10-04 PROCEDURE — 700102 HCHG RX REV CODE 250 W/ 637 OVERRIDE(OP): Performed by: SPECIALIST

## 2019-10-04 PROCEDURE — A9270 NON-COVERED ITEM OR SERVICE: HCPCS | Performed by: SPECIALIST

## 2019-10-04 PROCEDURE — 77427 RADIATION TX MANAGEMENT X5: CPT | Performed by: RADIOLOGY

## 2019-10-04 PROCEDURE — 86304 IMMUNOASSAY TUMOR CA 125: CPT

## 2019-10-04 PROCEDURE — 700105 HCHG RX REV CODE 258: Performed by: SPECIALIST

## 2019-10-04 PROCEDURE — 77386 HCHG IMRT DELIVERY COMPLEX: CPT | Performed by: RADIOLOGY

## 2019-10-04 PROCEDURE — 36415 COLL VENOUS BLD VENIPUNCTURE: CPT

## 2019-10-04 PROCEDURE — G0378 HOSPITAL OBSERVATION PER HR: HCPCS

## 2019-10-04 PROCEDURE — 700111 HCHG RX REV CODE 636 W/ 250 OVERRIDE (IP): Performed by: SPECIALIST

## 2019-10-04 RX ORDER — SODIUM CHLORIDE 9 MG/ML
INJECTION, SOLUTION INTRAVENOUS CONTINUOUS
Status: DISCONTINUED | OUTPATIENT
Start: 2019-10-04 | End: 2019-10-04 | Stop reason: HOSPADM

## 2019-10-04 RX ORDER — DEXAMETHASONE 4 MG/1
4 TABLET ORAL EVERY 6 HOURS
Qty: 30 TAB | Refills: 0 | Status: SHIPPED | OUTPATIENT
Start: 2019-10-04 | End: 2020-08-17

## 2019-10-04 RX ORDER — DEXAMETHASONE 4 MG/1
4 TABLET ORAL EVERY 6 HOURS
Qty: 10 TAB | Refills: 0 | Status: ON HOLD | OUTPATIENT
Start: 2019-10-04 | End: 2019-10-22

## 2019-10-04 RX ORDER — ONDANSETRON 4 MG/1
4 TABLET, FILM COATED ORAL EVERY 6 HOURS PRN
Qty: 30 TAB | Refills: 0 | Status: ON HOLD | OUTPATIENT
Start: 2019-10-04 | End: 2019-10-22 | Stop reason: SDUPTHER

## 2019-10-04 RX ORDER — DEXAMETHASONE 4 MG/1
4 TABLET ORAL EVERY 6 HOURS
Status: DISCONTINUED | OUTPATIENT
Start: 2019-10-04 | End: 2019-10-04 | Stop reason: HOSPADM

## 2019-10-04 RX ORDER — ONDANSETRON 4 MG/1
4 TABLET, ORALLY DISINTEGRATING ORAL EVERY 6 HOURS
Status: DISCONTINUED | OUTPATIENT
Start: 2019-10-04 | End: 2019-10-04 | Stop reason: HOSPADM

## 2019-10-04 RX ORDER — ONDANSETRON 2 MG/ML
4 INJECTION INTRAMUSCULAR; INTRAVENOUS PRN
Status: COMPLETED | OUTPATIENT
Start: 2019-10-04 | End: 2019-10-04

## 2019-10-04 RX ORDER — PROCHLORPERAZINE MALEATE 10 MG
10 TABLET ORAL EVERY 6 HOURS PRN
Status: DISCONTINUED | OUTPATIENT
Start: 2019-10-04 | End: 2019-10-04 | Stop reason: HOSPADM

## 2019-10-04 RX ADMIN — BACLOFEN 10 MG: 10 TABLET ORAL at 12:10

## 2019-10-04 RX ADMIN — DEXAMETHASONE 4 MG: 4 TABLET ORAL at 14:01

## 2019-10-04 RX ADMIN — ASPIRIN 81 MG 81 MG: 81 TABLET ORAL at 06:05

## 2019-10-04 RX ADMIN — ACETAMINOPHEN 1000 MG: 500 TABLET ORAL at 14:07

## 2019-10-04 RX ADMIN — LOSARTAN POTASSIUM 100 MG: 50 TABLET ORAL at 12:10

## 2019-10-04 RX ADMIN — ONDANSETRON 4 MG: 2 INJECTION INTRAMUSCULAR; INTRAVENOUS at 00:46

## 2019-10-04 RX ADMIN — ONDANSETRON 4 MG: 4 TABLET, ORALLY DISINTEGRATING ORAL at 12:11

## 2019-10-04 RX ADMIN — BACLOFEN 10 MG: 10 TABLET ORAL at 06:05

## 2019-10-04 RX ADMIN — SODIUM CHLORIDE: 9 INJECTION, SOLUTION INTRAVENOUS at 03:03

## 2019-10-04 RX ADMIN — AMITRIPTYLINE HYDROCHLORIDE 25 MG: 25 TABLET, FILM COATED ORAL at 14:02

## 2019-10-04 RX ADMIN — METOPROLOL SUCCINATE 100 MG: 50 TABLET, EXTENDED RELEASE ORAL at 06:05

## 2019-10-04 RX ADMIN — SENNOSIDES, DOCUSATE SODIUM 1 TABLET: 50; 8.6 TABLET, FILM COATED ORAL at 08:47

## 2019-10-04 ASSESSMENT — ENCOUNTER SYMPTOMS
VOMITING: 0
NAUSEA: 1

## 2019-10-04 NOTE — H&P
DATE OF ADMISSION:  10/03/2019    ADMITTING DIAGNOSIS:  Patient presents on day #4 for cisplatinum for PORTEC-3   protocol.    HISTORY OF PRESENT ILLNESS:  The patient is a pleasant 69-year-old  8,   para 7, AB1 -American female whose last menstrual period was unknown.    She has a past medical history significant for history of CVA with subsequent   right-sided hemiplegia, iron deficiency anemia, and hypertension.  She   presented to ER on 2019 secondary to 3 days of heavy postmenopausal   bleeding.  She was admitted for anemia with a hemoglobin of 8 to 9 during her   hospital stay.  She did not require blood transfusion.  Transvaginal   ultrasound completed at [facility] during her stay showed an enlarged uterus   measuring 6.2x12.1x7.7 cm with a heterogeneous lesion measuring 1.4x0.8 cm and   endometrial stripe of 1.2 cm.  She was seen by Dr. Dunn and was taken to   surgery on 2019 for hysteroscopy and D and C.  Intraoperatively, she was   found to have a 16-week size uterus.  Final pathology results showed   fragments of grade I endometrioid adenocarcinoma and complex hyperplasia with   atypia.  Based on these findings, she was referred to our office for further   evaluation.    She was seen for consultation on 07/10/2019.  She subsequently underwent a   sentinel lymph node injection, robotic sentinel lymph node dissection, robotic   hysterectomy, bilateral salpingo-oophorectomy and cystoscopy with left   ureteral stent insertion.  Pathology result showed stage III B endometrial   cancer, ER is negative and CA is positive.  Hyperventilation is positive.  Her    was noted to be 84.9 on 2019.  It had normalized to 8.0 on   2019.    She was seen on 9/10 and underwent stent removal and Dr. Garcia discussed with   her the role of PORTEC-3 regimen and she was referred to Dr. Live,   Radiation oncology.  She started her treatment for radiation on 10/30.  She   presents today for  day #4 for cisplatinum with a dose cisplatinum at AUC at 30   mg/m2, decreased for age and anticipated tolerance.  She continues to have   chronic right-sided pain following her stroke.  She experiences some nausea   and fatigue.    REVIEW OF SYSTEMS:  SKIN:  The patient denies any rash, skin ulcers, or skin problems.  NEUROLOGY:  Patient reports numbness of fingers and toes secondary to history   of stroke.  Denies seizures or fainting.  PSYCHIATRIC:  Denies any depression or psychiatric problems.  ENDOCRINE:  The patient denies diabetes, thyroid, or hot flashes.  GENITOURINARY:  Denies blood in the urine, pain with urination, vaginal   discharge, or vaginal bleeding.  HEMATOLOGIC AND LYMPHATIC:  Denies any bruising easily or bleeding gums.  ENT:  Denies mouth ulcers or cold symptoms.  CARDIOVASCULAR:  Denies chest pain or shortness of breath.  RESPIRATORY:  Denies any shortness of breath at rest or any wheezing.  GASTROINTESTINAL:  Reports some nausea.  Denies vomiting, diarrhea, or bloody   stools.  MUSCULOSKELETAL:  Reports muscle weakness, arthritis, or painful joints.  CONSTITUTIONAL:  Reports fatigue.  Denies weight change or change of vision.    MEDICATIONS:  Aspirin 81 mg once daily, iron 1 tab p.o. daily, losartan 100 mg   1 tab p.o. daily, amitriptyline 25 mg 1 tab p.o. daily, acetaminophen 500 mg   2 tabs p.o. p.r.n., CoQ10 100 mg 1 tab p.o. daily, Baclofen 10 mg 1 tab p.o.   daily, vitamin D3 one tab p.o. daily, atorvastatin 20 mg 1 tab p.o. daily,   amlodipine 5 mg 1 tab p.o. daily.    ALLERGIES:  PENICILLIN, LEVAQUIN, HYDROCODONE, TRAMADOL ALL CAUSED HIVES.    PAST MEDICAL HISTORY:  Significant for CVA, hypertension, anemia, and   depression.    PAST SURGICAL HISTORY:  Significant for the above stated procedure as well as   also history of D and C.    FAMILY HISTORY:  Negative for cancer.    SOCIAL HISTORY:  , not a smoker, not a drinker, and denies any illicit   drug use.    PHYSICAL  EXAMINATION:  VITAL SIGNS:  Please see chart.  GENERAL:  Alert and oriented individual, in no apparent distress.  LUNGS:  Clear.  HEENT:  Within normal limits.  No scleral icterus.  No pallor conjunctivae.  NECK:  No supraclavicular or cervical lymphadenopathy detected.  LUNGS:  Clear to auscultation bilaterally.  No murmurs, rubs, or wheezes.  CARDIOVASCULAR:  Regular rate and rhythm.  No murmurs, clicks, or gallops.  ABDOMEN:  Soft, nontender, nondistended.  No palpable mass. No   hepatosplenomegaly.  No inguinal lymphadenopathy.  PELVIS:  Deferred.  EXTREMITIES:  Nontender, nonerythematous.  NEUROLOGIC:  Grossly intact.    LABORATORY DATA:  WBC is 4.9, hemoglobin 12.6, hematocrit 39.9, platelets 262.    Sodium 140, potassium 3.6, chloride 106, CO2 of 27, BUN 8, creatinine 0.79,   glucose 118.    ASSESSMENT AND PLAN:  1.  Stage IIIB grade I endometrial cancer status post robotic hysterectomy,   bilateral salpingo-oophorectomy, left ureteral stent placement on 08/08/2019,   status post stent removal on 09/30/2019, status post initiation of radiation   therapy on 09/30.  Plan is to admit the patient for 23-hour observation to   give cisplatinum at 30 mg/m2 following PORTEC-3 protocol.  We discussed   benefits, risks, and rationale for chemotherapy.  The patient would concur   with radiation.  The patient elected to proceed.  2.  History of cerebrovascular accident.  3.  History of hypertension.  4.  History of hypercholesterolemia.    PLAN:  Plan is to admit the patient for the above intended reasons.  We will   also treat any symptoms she may have including but not limited to nausea or   pain.  Dr. Garcia has created this plan.    Thank you for the opportunity for allowing us to participate in her care.  If   we could be of any further assistance, please do not hesitate to contact us.    Sincerely,       ____________________________________     MAXI GAFFNEY / DYLAN    DD:  10/04/2019 09:39:09  DT:   10/04/2019 13:09:27    D#:  [id number]  Job#:  [id number]

## 2019-10-04 NOTE — CARE PLAN
Problem: Safety  Goal: Will remain free from injury  Outcome: PROGRESSING AS EXPECTED  Note:   Patient educated about calling for assistance, bed in low locked position, belongings and call light with in reach.      Problem: Infection  Goal: Will remain free from infection  Outcome: PROGRESSING AS EXPECTED

## 2019-10-04 NOTE — DISCHARGE INSTRUCTIONS
Discharge Instructions    Discharged to home by car with relative. Discharged via wheelchair, hospital escort: Yes.  Special equipment needed: Not Applicable    Be sure to schedule a follow-up appointment with your primary care doctor or any specialists as instructed.     Discharge Plan:   Diet Plan: Discussed  Activity Level: Discussed  Confirmed Follow up Appointment: Patient to Call and Schedule Appointment  Confirmed Symptoms Management: Discussed  Medication Reconciliation Updated: Yes  Influenza Vaccine Indication: Patient Refuses    I understand that a diet low in cholesterol, fat, and sodium is recommended for good health. Unless I have been given specific instructions below for another diet, I accept this instruction as my diet prescription.   Other diet: regular    Special Instructions: None    · Is patient discharged on Warfarin / Coumadin?   No     Depression / Suicide Risk    As [facility] are [facility], it is important to learn how to keep safe from harming yourself.    Recognize the warning signs:  · Abrupt changes in personality, positive or negative- including increase in energy   · Giving away possessions  · Change in eating patterns- significant weight changes-  positive or negative  · Change in sleeping patterns- unable to sleep or sleeping all the time   · Unwillingness or inability to communicate  · Depression  · Unusual sadness, discouragement and loneliness  · Talk of wanting to die  · Neglect of personal appearance   · Rebelliousness- reckless behavior  · Withdrawal from people/activities they love  · Confusion- inability to concentrate     If you or a loved one observes any of these behaviors or has concerns about self-harm, here's what you can do:  · Talk about it- your feelings and reasons for harming yourself  · Remove any means that you might use to hurt yourself (examples: pills, rope, extension cords, firearm)  · Get professional help from the community  (Mental Health, Substance Abuse, psychological counseling)  · Do not be alone:Call your Safe Contact- someone whom you trust who will be there for you.  · Call your local CRISIS HOTLINE 029-8483 or 878-416-6288  · Call your local Children's Mobile Crisis Response Team [place] (214) 692-4119 or www.HealthFleet.com  · Call the toll free National Suicide Prevention Hotlines   · National Suicide Prevention Lifeline 275-643-EXAA (6183)  · National Hope Line Network 800-SUICIDE (380-0850)      1. Patient to take Dexamethasone 4 mg every 3 hours and alternate with Zofran 4 mg every 3 hours, for example take Dexamethasone 4 mg @ 08:00 and then Zofran 4 mg @ 11:00 am then dexamethasone 4 mg  @ 14:00 then Zofran @ 17:00 etc.  You do not need to get up in the middle of the night to take these meds unless you are nauseous.  Take these mediations from day #2- Day #5 post chemotherapy. If you still have nausea and vomiting despite taking this medications please notify us.   2. Return on 10/21 @ 8 AM for second cycle of chemotherapy here at [facility]   3. Call [phone] if any questions or concerns      Chemotherapy  Introduction  Chemotherapy is the use of medicines to stop or slow the growth of cancer cells. Depending on the type and stage of your cancer, you may have chemotherapy to:  · Cure your cancer.  · Slow the progression of your cancer.  · Ease your cancer symptoms.  · Improve the benefits of radiation treatment.  · Shrink a tumor before surgery.  · Rid the body of cancer cells that remain after a tumor is surgically removed.  How is chemotherapy given?  Chemotherapy may be given:  · By mouth in liquid or pill form.  · Through a thin tube that is inserted into a vein or artery.  · By getting a shot.  · By rubbing a cream or ointment on your skin.  · Through liquids that are placed directly into various areas of the body, such as the abdomen, chest, or bladder.  How often is chemotherapy  given?  Chemotherapy may be given continuously over time, or it may be given in cycles. For example, you may take the medicine for one week out of every month.  For how long will I need chemotherapy treatments?  The length of treatment depends on many factors, including:  · The type of cancer.  · Whether the cancer has spread.  · How you respond to the chemotherapy.  · Whether you develop side effects.  Some types of chemotherapy medicine are given only one time. Others are given for months, years, or for life.  What safety precautions must I take while on chemotherapy?  Chemotherapy medicines are very strong. They will be in all of your bodily fluids, including your urine, stool, saliva, sweat, tears, vaginal secretions, and semen. You must carefully follow some safety precautions to prevent harm to others while you are using these medicines. Here are some recommended precautions:  · Make sure that people who help care for you wear disposable gloves if they are going to come into contact with any of your bodily fluids. Women who are pregnant or breastfeeding should not handle any of your bodily fluids.  · Wash any clothes, towels, and linens that may have your bodily fluids on them twice in a washing machine using very hot water.  · Dispose of adult diapers, tampons, and sanitary napkins by first sealing them in a plastic bag.  · Use a condom when having sex for at least 2 weeks after receiving your chemotherapy.  · Do not share beverages or food.  · Keep your chemotherapy medicines in their original bottles. Keep them in a high, safe location, away from children. Do not expose them to heat or moisture. Do not put them in containers with other types of medicines.  · Dispose of all wrappers for your chemotherapy medicines by sealing them in a separate plastic bag.  · Do not throw away extra medicine, and do not flush it down the toilet. Take medicine that you are not going to use to your health care provider's  office where it can be disposed of properly.  · Follow your health care provider’s directions for the proper disposal of needles, IV tubing, and other medical supplies that have come into contact with your chemotherapy medicines.  · If you are issued a hazardous waste container, make sure you understand the directions for using it.  · Wash your hands thoroughly with warm water and soap after using the bathroom. Dry your hands with disposable paper towels.  · When using the toilet:  ¨ Flush it twice after each use, including after vomiting.  ¨ Close the lid of the toilet prior to flushing. This helps to avoid splashing.  ¨ Both men and women should sit to use the toilet. This helps avoid splashing.  What are the side effects of chemotherapy?  Side effects depend on a variety of factors, including:  · The specific type of chemotherapy medicine used.  · The dosage.  · How long the medicine is used for.  · Your overall health.  Some of the side effects you may experience include:  · Fatigue and decreased energy.  · Decreased appetite.  · Changes in your sense of smell or taste.  · Nausea.  · Vomiting.  · Constipation or diarrhea.  · Hair loss.  · Increased susceptibility to infection.  · Easy bleeding.  · Mouth sores.  · Burning or tingling in the hands or feet.  · Memory problems.  This information is not intended to replace advice given to you by your health care provider. Make sure you discuss any questions you have with your health care provider.  Document Released: 10/14/2008 Document Revised: 07/07/2017 Document Reviewed: 05/26/2015  © 2017 [employer]vier

## 2019-10-04 NOTE — PROGRESS NOTES
Gyn Oncology Progress Note               Author: Anel Fernández Date & Time created: 10/4/2019  10:05 AM     Interval History:  Has some nausea. Has not had a bm during hospitalization. Just took her stool softener    Review of Systems:  Review of Systems   Gastrointestinal: Positive for nausea. Negative for vomiting.       Physical Exam:  Physical Exam   Abdominal: Soft. There is no tenderness.   Musculoskeletal: She exhibits no edema or tenderness.   Skin: She is not diaphoretic.       Labs:          Recent Labs     10/01/19  1335   SODIUM 140   POTASSIUM 3.6   CHLORIDE 106   CO2 27   BUN 8   CREATININE 0.79   MAGNESIUM 2.1   CALCIUM 9.5     Recent Labs     10/01/19  1335   ALTSGPT 26   ASTSGOT 26   ALKPHOSPHAT 78   TBILIRUBIN 0.3   GLUCOSE 118*     Recent Labs     10/01/19  1335 10/04/19  0633   RBC 4.58 4.26   HEMOGLOBIN 13.1 12.6   HEMATOCRIT 43.1 39.9   PLATELETCT 335 262     Recent Labs     10/01/19  1335 10/04/19  0633   WBC 4.5* 4.9   NEUTSPOLYS 66.10 90.60*   LYMPHOCYTES 24.30 8.60*   MONOCYTES 8.30 0.40   EOSINOPHILS 0.40 0.00   BASOPHILS 0.70 0.00   ASTSGOT 26  --    ALTSGPT 26  --    ALKPHOSPHAT 78  --    TBILIRUBIN 0.3  --      Recent Labs     10/01/19  1335   SODIUM 140   POTASSIUM 3.6   CHLORIDE 106   CO2 27   GLUCOSE 118*   BUN 8   CREATININE 0.79   CALCIUM 9.5     Hemodynamics:  Temp (24hrs), Av.8 °C (98.2 °F), Min:36.4 °C (97.6 °F), Max:37 °C (98.6 °F)  Temperature: 36.9 °C (98.4 °F)  Pulse  Av.3  Min: 85  Max: 90   Blood Pressure : 133/66     Respiratory:    Respiration: 16, Pulse Oximetry: 91 %           Fluids:    Intake/Output Summary (Last 24 hours) at 10/4/2019 1005  Last data filed at 10/4/2019 0845  Gross per 24 hour   Intake 3073.33 ml   Output --   Net 3073.33 ml        GI/Nutrition:  Orders Placed This Encounter   Procedures   • Diet Order Regular     Standing Status:   Standing     Number of Occurrences:   1     Order Specific Question:   Diet:     Answer:   Regular [1]      Medical Decision Making, by Problem:  There are no active hospital problems to display for this patient.      Plan:  1. Stage IIIB1 endometrial cancer s/p cycle # 1 day # 4 Cisplatin 30 mg/m2 following Portec 3 protocol. Tolerated treatment well. Discussed antiemetic regimen with patient. Will plan for her to return to hospital on 10/21. Discharge instructions d/w pt. She is to continue with radiation.   2. Nausea - will start Zofran Dex comb  3. Constipation - MOM prn  4. Hx of CVA   5. Hypertension -on home meds  6. Hypercholesterolemia - on home meds     Case d/w team    Quality-Core Measures

## 2019-10-04 NOTE — CARE PLAN
Problem: Safety  Goal: Will remain free from falls  Outcome: PROGRESSING AS EXPECTED  Note:   Patient has remained free from falls when ambulating often to the bedside commode and bathroom. Fall precautions in place.     Problem: Knowledge Deficit  Goal: Knowledge of disease process/condition, treatment plan, diagnostic tests, and medications will improve  Outcome: PROGRESSING AS EXPECTED  Note:   Patient is knowledgeable of the disease process and asks appropriate questions during teaching moments.

## 2019-10-04 NOTE — PROGRESS NOTES
Assumed care of pt @0700. Bedside report received. Pt AOX 4. Pt complains about pain ( R lag and hand), declines pain med. Pt complains about nausea. Zofran and Decadron ordered. Dc home today. PIV patent infusing fluids. R side weakness. Last Bm 2 days ago. Senokot given. Pt refuses MOM at this time. Pt up with 1 p assist. Fall precaution in place. POC discussed with pt, all questions answered at this time. Pt makes needs known, call light within reach, hourly rounding in place.

## 2019-10-04 NOTE — PROGRESS NOTES
Chemotherapy Verification - PRIMARY RN  Cycle 1      Height = 162.6cm  Weight = 83.7kg  BSA = 1.94m2      Medication: Cisplatin  Dose: 30mg/m2  Calculated Dose: 58.2mg   (Ordered dose 57mg)                             (In mg/m2, AUC, mg/kg)           I confirm this process was performed independently with the BSA and all final chemotherapy dosing calculations congruent.  Any discrepancies of 10% or greater have been addressed with the chemotherapy pharmacist. The resolution of the discrepancy has been documented in the EPIC progress notes.

## 2019-10-04 NOTE — PROGRESS NOTES
Pt discharged to home with daughter. PIV removed, discharge instructions, prescriptions given( how to take meds, when to call MD).

## 2019-10-04 NOTE — PROGRESS NOTES
Cisplatin chemo teaching done with patient including side effects and when to call the doctor. Daughter also given the teaching when here from work.

## 2019-10-04 NOTE — DISCHARGE SUMMARY
DATE OF ADMISSION:  10/03/2019    DATE OF DISCHARGE:  10/04/2019    ADMITTING DIAGNOSES:  1.  Stage IIIB1 endometrial cancer, currently undergoing PORTEC-3 protocol,   being admitted for day #4 of 23-hour of cisplatin to be given at 30 mg/m2.  2.  Stage IIIB1 endometrial cancer, currently undergoing radiation therapy.  3.  History of cerebrovascular accident.  4.  Hypercholesterolemia.  5.  Hypertension.    DISCHARGE DIAGNOSES:  1.  Status post cycle #1, day #4 of cisplatin at 30 mg/m2 following PORTEC-3   protocol for endometrial cancer.  2.  Stage IIIB1 endometrial cancer.  3.  History of stroke.  4.  History of hypercholesterolemia.  5.  History of hypertension.    THERAPEUTIC INTERVENTIONS:  IV chemotherapy with concurrent radiation therapy.    LABORATORY DATA:  WBC is 4.9, hemoglobin 12.6, hematocrit 39.9, platelets 262.    HOSPITAL COURSE:  The patient was admitted to the hospital for the above   intended reasons.  She underwent her 2 liters prehydration followed by   cisplatin 30 mg/m2 followed by post-hydration.  The patient tolerated   chemotherapy well without any sequelae.  She also continued to undergo   concurrent radiation therapy during this time.  We have reviewed with her the   antiemetic regimen for her chemotherapy.  She is aware if she has any   questions to notify our office.    DISCHARGE INSTRUCTIONS:  1.  To continue with chemotherapy orders as instructed.  2.  The patient is to take Zofran and dexamethasone alternating q. 3 hours,   and if nausea unrelieved with these medications, she is to call [phone].  3.  To return to our office.  4.  Call [phone] if any questions or concerns.    ACTIVITY:  As tolerable.    DIET:  As tolerable.    DISCHARGE MEDICATIONS:  The patient was given prescription for Zofran 4 mg 1   tab p.o. q. 6 hours p.r.n. nausea, #30, no refills as well as also   dexamethasone 4 mg 1 tab p.o. q. 6 hours p.r.n. nausea, alternating stagger q.   3 hours with Zofran, #30, no  marta.       ____________________________________     MAXI GAFFNEY    DD:  10/04/2019 09:42:59  DT:  10/04/2019 13:08:52    D#:  [id number]  Job#:  [id number]

## 2019-10-04 NOTE — CARE PLAN
Problem: Communication  Goal: The ability to communicate needs accurately and effectively will improve  Outcome: PROGRESSING AS EXPECTED  Intervention: Educate patient and significant other/support system about the plan of care, procedures, treatments, medications and allow for questions  Note:   Pt updated about POC- radiation today at 14:20 and DC home home after.      Problem: Safety  Goal: Will remain free from falls  Outcome: PROGRESSING AS EXPECTED  Intervention: Implement fall precautions  Flowsheets  Taken 10/4/2019 0845  Environmental Precautions: Personal Belongings, Wastebasket, Call Bell etc. in Easy Reach;Treaded Slipper Socks on Patient;Transferred to Stronger Side;Report Given to Other Health Care Providers Regarding Fall Risk;Communication Sign for Patients & Families;Bed in Low Position;Mobility Assessed & Appropriate Sign Placed  Taken 10/4/2019 1007  Chair/Bed Strip Alarm: Yes - Alarm On  Note:   Bed alarm on, A&O x4, slipper socks, bed locked and in low position, call light and personal belongings with reach, report given to CNA, appropriate signs outside door, hourly rounding in place.

## 2019-10-04 NOTE — PROGRESS NOTES
Received report and assumed care of patient at 1900. Reviewed chart, meds, and orders. Patient is A&Ox4 and able to make needs known. Patient has right sided weakness from CVA, up with 1-2 assist. Bedside commode in room. Patient experienced some nausea this evening, Zofran administered per MAR with a positive response. Patient received Cisplatin and Mannitol this evening per MD order. All needs met at this time, will continue to monitor.

## 2019-10-07 ENCOUNTER — PATIENT OUTREACH (OUTPATIENT)
Dept: HEALTH INFORMATION MANAGEMENT | Facility: OTHER | Age: 69
End: 2019-10-07

## 2019-10-07 ENCOUNTER — HOSPITAL ENCOUNTER (OUTPATIENT)
Dept: RADIATION ONCOLOGY | Facility: MEDICAL CENTER | Age: 69
End: 2019-10-07

## 2019-10-07 LAB
CHEMOTHERAPY INFUSION START DATE: NORMAL
CHEMOTHERAPY RECORDS: 1.8
CHEMOTHERAPY RECORDS: 4860
CHEMOTHERAPY RECORDS: NORMAL
CHEMOTHERAPY RX CANCER: NORMAL
RAD ONC ARIA COURSE TREATMENT ELAPSED DAYS: NORMAL
RAD ONC ARIA PLAN TREATMENT DATES: NORMAL
RAD ONC ARIA REFERENCE POINT DOSAGE GIVEN TO DATE: 10.8
RAD ONC ARIA REFERENCE POINT DOSAGE GIVEN TO DATE: 10.9
RAD ONC ARIA REFERENCE POINT ID: NORMAL
RAD ONC ARIA REFERENCE POINT ID: NORMAL
RAD ONC ARIA REFERENCE POINT SESSION DOSAGE GIVEN: 1.8
RAD ONC ARIA REFERENCE POINT SESSION DOSAGE GIVEN: 1.82

## 2019-10-07 PROCEDURE — 77386 HCHG IMRT DELIVERY COMPLEX: CPT | Performed by: RADIOLOGY

## 2019-10-07 PROCEDURE — 77014 PR CT GUIDANCE PLACEMENT RAD THERAPY FIELDS: CPT | Mod: 26 | Performed by: RADIOLOGY

## 2019-10-08 ENCOUNTER — PATIENT OUTREACH (OUTPATIENT)
Dept: HEALTH INFORMATION MANAGEMENT | Facility: OTHER | Age: 69
End: 2019-10-08

## 2019-10-08 ENCOUNTER — HOSPITAL ENCOUNTER (OUTPATIENT)
Dept: RADIATION ONCOLOGY | Facility: MEDICAL CENTER | Age: 69
End: 2019-10-08

## 2019-10-08 LAB
CHEMOTHERAPY INFUSION START DATE: NORMAL
CHEMOTHERAPY RECORDS: 1.8
CHEMOTHERAPY RECORDS: 4860
CHEMOTHERAPY RECORDS: NORMAL
CHEMOTHERAPY RX CANCER: NORMAL
RAD ONC ARIA COURSE TREATMENT ELAPSED DAYS: NORMAL
RAD ONC ARIA PLAN TREATMENT DATES: NORMAL
RAD ONC ARIA REFERENCE POINT DOSAGE GIVEN TO DATE: 12.6
RAD ONC ARIA REFERENCE POINT DOSAGE GIVEN TO DATE: 12.72
RAD ONC ARIA REFERENCE POINT ID: NORMAL
RAD ONC ARIA REFERENCE POINT ID: NORMAL
RAD ONC ARIA REFERENCE POINT SESSION DOSAGE GIVEN: 1.8
RAD ONC ARIA REFERENCE POINT SESSION DOSAGE GIVEN: 1.82

## 2019-10-08 PROCEDURE — 77386 HCHG IMRT DELIVERY COMPLEX: CPT | Performed by: RADIOLOGY

## 2019-10-08 PROCEDURE — 77014 PR CT GUIDANCE PLACEMENT RAD THERAPY FIELDS: CPT | Mod: 26 | Performed by: RADIOLOGY

## 2019-10-09 ENCOUNTER — HOSPITAL ENCOUNTER (OUTPATIENT)
Dept: RADIOLOGY | Facility: MEDICAL CENTER | Age: 69
End: 2019-10-09
Attending: RADIOLOGY
Payer: MEDICARE

## 2019-10-09 VITALS
HEART RATE: 77 BPM | BODY MASS INDEX: 32.17 KG/M2 | SYSTOLIC BLOOD PRESSURE: 151 MMHG | OXYGEN SATURATION: 97 % | TEMPERATURE: 97.5 F | DIASTOLIC BLOOD PRESSURE: 90 MMHG | WEIGHT: 187.4 LBS

## 2019-10-09 DIAGNOSIS — R07.81 PLEURITIC PAIN: ICD-10-CM

## 2019-10-09 DIAGNOSIS — C54.1 ENDOMETRIAL CANCER (HCC): ICD-10-CM

## 2019-10-09 LAB
CHEMOTHERAPY INFUSION START DATE: NORMAL
CHEMOTHERAPY RECORDS: 1.8
CHEMOTHERAPY RECORDS: 4860
CHEMOTHERAPY RECORDS: NORMAL
CHEMOTHERAPY RX CANCER: NORMAL
RAD ONC ARIA COURSE TREATMENT ELAPSED DAYS: NORMAL
RAD ONC ARIA PLAN TREATMENT DATES: NORMAL
RAD ONC ARIA REFERENCE POINT DOSAGE GIVEN TO DATE: 14.4
RAD ONC ARIA REFERENCE POINT DOSAGE GIVEN TO DATE: 14.54
RAD ONC ARIA REFERENCE POINT ID: NORMAL
RAD ONC ARIA REFERENCE POINT ID: NORMAL
RAD ONC ARIA REFERENCE POINT SESSION DOSAGE GIVEN: 1.8
RAD ONC ARIA REFERENCE POINT SESSION DOSAGE GIVEN: 1.82

## 2019-10-09 PROCEDURE — 77014 PR CT GUIDANCE PLACEMENT RAD THERAPY FIELDS: CPT | Mod: 26 | Performed by: RADIOLOGY

## 2019-10-09 PROCEDURE — 77336 RADIATION PHYSICS CONSULT: CPT | Performed by: RADIOLOGY

## 2019-10-09 PROCEDURE — 77386 HCHG IMRT DELIVERY COMPLEX: CPT | Performed by: RADIOLOGY

## 2019-10-09 PROCEDURE — 71046 X-RAY EXAM CHEST 2 VIEWS: CPT

## 2019-10-09 ASSESSMENT — PAIN SCALES - GENERAL: PAINLEVEL: 8=MODERATE-SEVERE PAIN

## 2019-10-09 NOTE — PROGRESS NOTES
ON TREATMENT  NOTE  RADIATION ONCOLOGY DEPARTMENT    Patient name:  Jairo Rivas    Primary Physician:  ZULEYKA Lyons MRN: [id number]  [facility]: [id number]   Referring physician:  Jann Garcia M.D. : 1950, 69 y.o.     ENCOUNTER DATE:  10/09/19    DIAGNOSIS:  Visit Diagnoses     ICD-10-CM   1. Endometrial cancer (HCC) C54.1   2. Pleuritic pain R07.81     Endometrial cancer (HCC)  Staging form: Corpus Uteri - Carcinoma and Carcinosarcoma, AJCC 8th Edition  - Pathologic stage from 2019: FIGO Stage IIIB (pT3b, pN0, cM0) - Signed by Jayant EDOUARD M.D. on 2019  Histologic grade (G): G2  Histologic grading system: 3 grade system  Lymph-vascular invasion (LVI): LVI not present (absent)/not identified  Residual tumor (R): R1 - Microscopic  Histopathologic type: Endometrioid adenocarcinoma, NOS  Diagnostic confirmation: Positive histology  Specimen type: Excision  Staged by: Managing physician      TREATMENT SUMMARY:  Radiation Therapy Episodes     Radiation Therapy: IMRT (2019)      Radiation Treatments     Plan Most Recent Treatment Date Elapsed Course Treatment Days Fractions Treated Prescribed Fraction Dose (cGy) Prescribed Total Dose (cGy)    Pelvis 10/9/2019 9 @ [id number] 8 of 27 180 4,860       Reference Point Most Recent Treatment Date Elapsed Course Treatment Days Most Recent Session Dose (cGy) Total Dose (cGy)    Pelvis 10/9/2019 9 @ [id number] 180 1,440    PelvisCP 10/9/2019 9 @ [id number] 182 1,454          SUBJECTIVE:  Fatigue, dizziness, feel like she is falling, pleuritic chest pain on right. Some dyspnea. Occasional cough.       VITAL SIGNS:  KPS: 60, Requires occasional assistance, but is able to care for most of his personal needs (ECOG equivalent 2)  Encounter Vitals  Temperature: 36.4 °C (97.5 °F)  Blood Pressure : 151/90  Pulse: 77  Pulse Oximetry: 97 %  Weight: 85 kg (187 lb 6.4 oz)  Pain Score: 8=Moderate-Severe Pain  Pain Assessment 10/9/2019  10/2/2019   Pain Assessment Acute Pain -   Pain Score 8 8   Pain Loc Back Generalized   What increases pain? upper back pain - nothing makes it worse Nothing   What decreases pain? tylenol Tylenol   Some recent data might be hidden          PHYSICAL EXAM:  Physical Exam   Constitutional: She is oriented to person, place, and time. She appears well-developed and well-nourished.   HENT:   Head: Normocephalic.   Cardiovascular: Normal heart sounds. Tachycardia present.   Pulmonary/Chest: Effort normal and breath sounds normal.   Neurological: She is alert and oriented to person, place, and time.   Skin: Skin is warm and dry. No erythema.   Psychiatric: She has a normal mood and affect. Her behavior is normal. Judgment and thought content normal.   Nursing note and vitals reviewed.       Toxicity Assessment 10/9/2019 10/2/2019   Toxicity Assessment Female Pelvis Female Pelvis   Fatigue (lethargy, malaise, asthenia) Increased fatigue over baseline, but not altering normal activities Increased fatigue over baseline, but not altering normal activities   Radiation Dermatitis None None   Anorexia Loss of appetite Loss of appetite   Colitis - None   Constipation - Requiring stool softener or dietary modification   Dehydration None None   Diarrhea w/o Colostomy None None   Flatulence None None   Nausea Able to eat Able to eat   Proctitis - None   Vomiting None None   RT - Pain due to RT None None   Tumor Pain (onset or exacerbation of tumor pain due to treatment) None None   Dysuria (painful urination) None None   Urinary Frequency Increase in frequency up to 2x normal Normal   Urinary Urgency Present None   Bladder Spasms - Absent   Incontinence - None   Urinary Retention - Normal   Vaginitis (not due to infection) - None   Vaginal Bleeding - None   Vaginal Dryness - Normal       CURRENT MEDICATIONS:    Current Outpatient Medications:   •  dexamethasone (DECADRON) 4 MG Tab, Take 1 Tab by mouth every 6 hours., Disp: 30 Tab,  Rfl: 0  •  ondansetron (ZOFRAN) 4 MG Tab tablet, Take 1 Tab by mouth every 6 hours as needed. Alternate q 3 h with Dex four four days after chemotherapy and longer if needed, Disp: 30 Tab, Rfl: 0  •  dexamethasone (DECADRON) 4 MG Tab, Take 1 Tab by mouth every 6 hours., Disp: 10 Tab, Rfl: 0  •  senna-docusate (PERICOLACE OR SENOKOT S) 8.6-50 MG Tab, Take 1 Tab by mouth 2 times a day., Disp: , Rfl:   •  ondansetron (ZOFRAN) 4 MG Tab tablet, Take 1 Tab by mouth every 6 hours as needed for up to 7 days., Disp: 30 Tab, Rfl: 0  •  amitriptyline (ELAVIL) 25 MG Tab, TAKE 1 TABLET BY MOUTH EVERY DAY IN THE EVENING, Disp: 90 Tab, Rfl: 1  •  baclofen (LIORESAL) 10 MG Tab, Take 1 Tab by mouth 3 times a day. Indications: Muscle Spasticity, Disp: 90 Tab, Rfl: 0  •  metoprolol SR (TOPROL XL) 100 MG TABLET SR 24 HR, Take 1 Tab by mouth every day for 90 days., Disp: 90 Tab, Rfl: 2  •  losartan (COZAAR) 100 MG Tab, Take 100 mg by mouth every day., Disp: , Rfl:   •  amLODIPine (NORVASC) 5 MG Tab, Take 1 Tab by mouth every bedtime., Disp: 90 Tab, Rfl: 2  •  ascorbic acid (ASCORBIC ACID) 500 MG Tab, Take 500 mg by mouth every evening., Disp: , Rfl:   •  atorvastatin (LIPITOR) 20 MG Tab, Take 20 mg by mouth every evening., Disp: , Rfl:   •  Cholecalciferol (VITAMIN D3) 2000 UNIT Cap, Take 2,000 Units by mouth every day., Disp: , Rfl:   •  Coenzyme Q10 (COQ10) 100 MG Cap, Take 100 mg by mouth every evening., Disp: , Rfl:   •  multivitamin (THERAGRAN) Tab, Take 1 Tab by mouth every day., Disp: , Rfl:   •  acetaminophen (TYLENOL) 500 MG Tab, Take 1,000 mg by mouth every 6 hours as needed for Moderate Pain., Disp: , Rfl:   •  B Complex-C (SUPER B COMPLEX PO), Take 1 Tab by mouth every evening., Disp: , Rfl:   •  ferrous sulfate 325 (65 Fe) MG tablet, Take 1 Tab by mouth every day., Disp: 30 Tab, Rfl: 0  •  aspirin (ASA) 81 MG Chew Tab chewable tablet, Take 81 mg by mouth every day., Disp: , Rfl:     LABORATORY DATA:   Lab Results    Component Value Date/Time    SODIUM 140 10/01/2019 01:35 PM    POTASSIUM 3.6 10/01/2019 01:35 PM    CHLORIDE 106 10/01/2019 01:35 PM    CO2 27 10/01/2019 01:35 PM    GLUCOSE 118 (H) 10/01/2019 01:35 PM    BUN 8 10/01/2019 01:35 PM    CREATININE 0.79 10/01/2019 01:35 PM       Lab Results   Component Value Date/Time    WBC 4.9 10/04/2019 06:33 AM    HEMOGLOBIN 12.6 10/04/2019 06:33 AM    HEMATOCRIT 39.9 10/04/2019 06:33 AM    MCV 93.7 10/04/2019 06:33 AM    PLATELETCT 262 10/04/2019 06:33 AM        RADIOLOGY DATA:  Ct-abdomen-pelvis With    Result Date: 9/30/2019 9/27/2019 3:18 PM HISTORY/REASON FOR EXAM:  Endometrial carcinoma restaging TECHNIQUE/EXAM DESCRIPTION: CT of the abdomen and pelvis with contrast. Contrast-enhanced helical scanning was obtained from the diaphragmatic domes through the pubic symphysis following the bolus administration of 100 mL of nonionic contrast (Omnipaque 350) without complication. Low dose optimization technique was utilized for this CT exam including automated exposure control and adjustment of the mA and/or kV according to patient size. COMPARISON: September 18 PET/CT. FINDINGS: The patient was unable to tolerate scanning with arms above the scan and this causes some artifact over the abdomen/pelvis The visualized lung bases show no consolidation. CT Abdomen: Ureters are mildly dilated left greater than the right. No calculus is seen. No bladder mass is noted. There is mild fat stranding the pelvis adjacent to the distal ureters but no abrupt caliber change is seen. No asymmetric enhancement of the kidneys. Hepatic too small to characterize hypodensities in both lobes No lymphadenopathy The liver, spleen, pancreas, gallbladder, adrenal glands enhance normally. There is no abnormal bowel dilatation or inflammatory change. There is no aneurysmal change of the aorta. CT Pelvis: No adenopathy is detected Uterus and ovaries are surgically absent. There is some operative bed fat  stranding but it is mild. No fluid collection is seen. Bladder contours are within normal limits No osteoblastic or lytic metastasis is seen throughout the study There is no free air or free fluid.     1.  Mild ureteral dilatation is nonspecific and could be due to highly hydrated state of patient. Partial obstruction is not excluded and correlation with ultrasound may prove helpful to evaluate ureteral jets. 2.  Hysterectomy, and oophorectomies and there is no evidence of metastasis 3.  Far too small to characterize hepatic hypodensities statistically most likely represent cysts    Ir-us Guided Piv    Result Date: 10/3/2019  EXAMINATION:                                                                    HISTORY/REASON FOR EXAM:  Ultrasound Guided PIV.  TECHNIQUE/EXAM DESCRIPTION AND NUMBER OF VIEWS:  Peripheral IV insertion with ultrasound guidance.  The procedure was prepared using maximal sterile barrier technique including sterile gown, mask, cap, and donning of sterile gloves following appropriate hand hygiene and/or sterile scrub. Patient skin site was prepped with 2% Chlorhexidine solution.   FINDINGS: Peripheral IV insertion with Ultrasound Guidance was performed by qualified imaging nursing staff without the assistance of a Radiologist.      Ultrasound-guided PERIPHERAL IV INSERTION performed by qualified nursing staff as above.     Us-extremity Venous Lower Unilat Right    Result Date: 9/13/2019   Vascular Laboratory  CONCLUSIONS  No prior study available for comparison.  Right lower extremity venous duplex imaging with serial compressions and  color and spectral Doppler flow evaluations.  No superficial or deep venous thrombosis.  FABI BEASLEY  Exam Date:     09/13/2019 14:15  Room #:     Out Patient  Priority:     Routine  Ht (in):             Wt (lb):  Ordering Physician:        COREEN RODRIGUEZ  Referring Physician:       [id number] Atkins  Sonographer:               ALISTAIR Cassidy,                              [facility]  Study Type:                Complete Unilateral  Technical Quality:         Adequate  Age:    69    Gender:     F  MRN:    [id number]  :    1950      BSA:  Indications:     Localized edema, Localized swelling, mass and lump,                   unspecified lower limb  CPT Codes:       76135  ICD Codes:       R60.0  R22.40  History:         Edema and swelling of bilateral lower extremities.  Limitations:     Pain, body habitus  PROCEDURES:  Right lower extremity venous duplex imaging with serial compressions and  color and spectral Doppler flow evaluations.  The following venous structures were evaluated: common femoral, profunda  femoral, greater saphenous, femoral, popliteal , peroneal and posterior  tibial veins.  FINDINGS:  Right lower extremity -.  Complete color filling and compressibility with normal venous flow dynamics  including spontaneous flow, response to augmentation maneuvers, and  respiratory phasicity.  No superficial or deep venous thrombosis.  Flow was evaluated in the contralateral common femoral vein and normal  venous flow dynamics including spontaneous flow, respiratory phasic  variation and augmentation were demonstrated.  Harris Yuan  (Electronically Signed)  Final Date:      2019                   16:31    On-gygrg-qzgdk Base To Mid-thigh    Result Date: 2019 7:44 AM HISTORY/REASON FOR EXAM:  Endometrial cancer status post hysterectomy, staging TECHNIQUE/EXAM DESCRIPTION AND NUMBER OF VIEWS: PET body imaging. Initially, 14 mCi F-18 FDG was administered intravenously under standardized conditions. Approximately 45 minutes after FDG administration, the patient was placed in the supine position on the PET CT table. Blood glucose level was 91 mg/dL. Low dose spiral CT imaging was performed from the skull base to the mid thighs. PET imaging was then performed from the skull base to the mid thighs. CT images, PET images, and PET/CT fused images  were reviewed on a PACS 3D workstation. The limited non-contrast CT data are used primarily for attenuation correction and anatomic correlation.  Evaluation of solid organs and bowel are especially limited utilizing this technique. A low dose CT was obtained of the same area without IV contrast for attenuation correction and coregistration, not for a diagnostic scan. COMPARISON: CT dated 7/26/2019 FINDINGS: VISUALIZED BRAIN: Normal metabolic activity. HEAD AND NECK: Hypodense thyroid nodule centered the thyroid isthmus measures 2.9 cm. It is minimally hypermetabolic, with an SUV max of 3.45. CHEST: Lungs show no hypermetabolic pulmonary nodules. There is no hypermetabolic hilar, mediastinal, or axillary lymphadenopathy. Coronary calcifications. ABDOMEN AND PELVIS: There is normal, uniform metabolic activity in the liver, spleen, adrenal glands, kidneys. There is no hypermetabolic mesenteric, retroperitoneal, iliac, or inguinal lymphadenopathy. Nonspecific physiologic activity in the colon and intestine is noted. Atherosclerosis. Hysterectomy. Moderate hypermetabolism at the vaginal cuff, with an SUV max of 8.86. VISUALIZED MUSCULOSKELETAL SYSTEM: No hypermetabolic osseous lesions.     1. Prior hysterectomy with hypermetabolism of the vaginal cuff, which may be postoperative in nature. 2. No FDG avid lymphadenopathy or metastatic disease. 3. Incidental 2.9 cm mildly hypermetabolic nodule in the thyroid isthmus. It may represent thyroid malignancy. Recommend further evaluation with thyroid ultrasound.      IMPRESSION:  Cancer Staging  Endometrial cancer (HCC)  Staging form: Corpus Uteri - Carcinoma and Carcinosarcoma, AJCC 8th Edition  - Pathologic stage from 9/13/2019: FIGO Stage IIIB (pT3b, pN0, cM0) - Signed by Jayant EDOUARD M.D. on 9/13/2019      PLAN:  Symptomatic - prescription given and/or additional testing required   Check CXR    Disposition:  Treatment plan reviewed. Questions answered. Continue  therapy outlined.     Jayant EDOUARD M.D.    Orders Placed This Encounter   • Rad Onc Aria Session Summary   • DX-CHEST-2 VIEWS

## 2019-10-10 ENCOUNTER — OFFICE VISIT (OUTPATIENT)
Dept: MEDICAL GROUP | Facility: LAB | Age: 69
End: 2019-10-10
Payer: MEDICARE

## 2019-10-10 ENCOUNTER — HOSPITAL ENCOUNTER (OUTPATIENT)
Dept: RADIATION ONCOLOGY | Facility: MEDICAL CENTER | Age: 69
End: 2019-10-10

## 2019-10-10 VITALS
RESPIRATION RATE: 16 BRPM | SYSTOLIC BLOOD PRESSURE: 140 MMHG | BODY MASS INDEX: 31.41 KG/M2 | HEIGHT: 64 IN | HEART RATE: 88 BPM | DIASTOLIC BLOOD PRESSURE: 72 MMHG | OXYGEN SATURATION: 98 % | TEMPERATURE: 97.8 F | WEIGHT: 184 LBS

## 2019-10-10 DIAGNOSIS — G89.3 CHRONIC PAIN DUE TO NEOPLASM: ICD-10-CM

## 2019-10-10 DIAGNOSIS — R53.1 GENERAL WEAKNESS: ICD-10-CM

## 2019-10-10 DIAGNOSIS — C54.1 ENDOMETRIAL CANCER (HCC): ICD-10-CM

## 2019-10-10 DIAGNOSIS — E04.1 THYROID NODULE: ICD-10-CM

## 2019-10-10 DIAGNOSIS — I69.359 CVA, OLD, HEMIPARESIS (HCC): ICD-10-CM

## 2019-10-10 DIAGNOSIS — I10 ESSENTIAL HYPERTENSION: ICD-10-CM

## 2019-10-10 LAB
CHEMOTHERAPY INFUSION START DATE: NORMAL
CHEMOTHERAPY RECORDS: 1.8
CHEMOTHERAPY RECORDS: 4860
CHEMOTHERAPY RECORDS: NORMAL
CHEMOTHERAPY RX CANCER: NORMAL
RAD ONC ARIA COURSE TREATMENT ELAPSED DAYS: NORMAL
RAD ONC ARIA PLAN TREATMENT DATES: NORMAL
RAD ONC ARIA REFERENCE POINT DOSAGE GIVEN TO DATE: 16.2
RAD ONC ARIA REFERENCE POINT DOSAGE GIVEN TO DATE: 16.35
RAD ONC ARIA REFERENCE POINT ID: NORMAL
RAD ONC ARIA REFERENCE POINT ID: NORMAL
RAD ONC ARIA REFERENCE POINT SESSION DOSAGE GIVEN: 1.8
RAD ONC ARIA REFERENCE POINT SESSION DOSAGE GIVEN: 1.82

## 2019-10-10 PROCEDURE — 99214 OFFICE O/P EST MOD 30 MIN: CPT | Performed by: NURSE PRACTITIONER

## 2019-10-10 PROCEDURE — 77386 HCHG IMRT DELIVERY COMPLEX: CPT | Performed by: RADIOLOGY

## 2019-10-10 PROCEDURE — 77014 PR CT GUIDANCE PLACEMENT RAD THERAPY FIELDS: CPT | Mod: 26 | Performed by: RADIOLOGY

## 2019-10-10 RX ORDER — AMLODIPINE BESYLATE 5 MG/1
7.5 TABLET ORAL
Qty: 135 TAB | Refills: 2 | Status: SHIPPED | OUTPATIENT
Start: 2019-10-10 | End: 2020-10-05

## 2019-10-10 NOTE — ASSESSMENT & PLAN NOTE
Chronic issue.  BP has been high around 150/70-80 at visits during radiation / Dr. Garcia.  Home BP readings 140/70's.  Occasional trouble breathing at rest.  No ankle swelling.

## 2019-10-10 NOTE — LETTER
BLOOD PRESSURE LOG FOR: Jairo Rivas    DATE/TIME  AM WEIGHT BLOOD  PRESSURE PULSE TIME  PM BLOOD  PRESSURE   PULSE                                                                                                                                                                                                                                        Current Blood Pressure Medications: (dose and frequency)    MEDICATION DOSE FREQUENCY                         Please yovana any medication change (increase/decrease/new med) with a *.

## 2019-10-10 NOTE — PROGRESS NOTES
"Chief Complaint   Patient presents with   • Hypertension   • Pain       HPI   69-year-old established female here with her daughter.  She is here with concern regarding elevated blood pressures and chronic pain.  Unfortunately she is going through radiation and chemo for endometrial cancer.  She tells me that her whole body hurts.  She is allergic to hydrocodone, tramadol and cannot tolerate Percocet.  She does not want to take morphine.  Her daughter would like to have a discussion regarding medical marijuana.    Essential hypertension  Chronic issue.  History of CVA.  BP has been high around 150/70-80 at visits during radiation / Dr. Garcia.  Home BP readings 140/70's.  Occasional trouble breathing at rest.  No ankle swelling.  She is taking 100 mg of metoprolol, 100 mg of losartan and 5 mg of amlodipine daily.  She denies chest pain.    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  She would also like to see physical therapy for proper placement of a splint on the right side and improvement in her generalized weakness.      Past medical, surgical, family, and social history is reviewed and updated in Epic chart by me today.   Medications and allergies reviewed and updated in Epic chart by me today.     ROS:   As documented in history of present illness above    Exam:  /72 (BP Location: Left arm, Patient Position: Sitting, BP Cuff Size: Large adult)   Pulse 88   Temp 36.6 °C (97.8 °F)   Resp 16   Ht 1.626 m (5' 4\")   Wt 83.5 kg (184 lb)   SpO2 98%   Constitutional: Elderly female, seated comfortably on the exam table.  Alert, no distress, plus 3 vital signs  Skin:  Warm, dry, no rashes invisible areas  Eye: Equal, round and reactive, conjunctiva clear  ENMT: Lips without lesions, good dentition, oropharynx clear    Neck: Trachea midline  Respiratory: Unlabored " respiration, lungs clear to auscultation, no wheezes, no rhonchi  Cardiovascular: Normal rate and rhythm.  Musculoskeletal: Heema paresis of her right upper and lower extremity, chronic contraction of her right upper extremity.  Psych: Alert, pleasant, well-groomed, normal affect    Assessment / Plan / Medical Decision makin. Essential hypertension  -Increased amlodipine to 7.5 mg daily.  Continue metoprolol and losartan at 100 mg daily.  Her daughter will email me a blood pressure log within a week.    2. Thyroid nodule  -2.9 cm thyroid nodule was seen on PET scan.  Recommend soft tissue ultrasound of the neck for further investigation.  - US-SOFT TISSUES OF HEAD - NECK; Future    3. Chronic pain due to neoplasm  -She is unable to take any prescription opiate therapy for her chronic pain.  I encouraged her to try acupuncture.  She declined a trial of gabapentin, stating it made her hallucinate in the past.  We discussed topical creams, ointments and patches.  Briefly discussed legal medical marijuana, which she is very opposed to.  We discussed CBD oils.  Her daughter will follow-up with me via email in the next few weeks if none of this is helpful and we could consult a pain management specialist.  - REFERRAL FOR ACUPUNCTURE    4. General weakness  - REFERRAL TO PHYSICAL THERAPY Reason for Therapy: Eval/Treat/Report    5. CVA, old, hemiparesis (HCC)  - REFERRAL TO PHYSICAL THERAPY Reason for Therapy: Eval/Treat/Report    6. Endometrial cancer (HCC)  -followed by oncology and radiation oncology.

## 2019-10-11 ENCOUNTER — HOSPITAL ENCOUNTER (OUTPATIENT)
Dept: RADIATION ONCOLOGY | Facility: MEDICAL CENTER | Age: 69
End: 2019-10-11

## 2019-10-11 LAB
CHEMOTHERAPY INFUSION START DATE: NORMAL
CHEMOTHERAPY RECORDS: 1.8
CHEMOTHERAPY RECORDS: 4860
CHEMOTHERAPY RECORDS: NORMAL
CHEMOTHERAPY RX CANCER: NORMAL
RAD ONC ARIA COURSE TREATMENT ELAPSED DAYS: NORMAL
RAD ONC ARIA PLAN TREATMENT DATES: NORMAL
RAD ONC ARIA REFERENCE POINT DOSAGE GIVEN TO DATE: 18
RAD ONC ARIA REFERENCE POINT DOSAGE GIVEN TO DATE: 18.17
RAD ONC ARIA REFERENCE POINT ID: NORMAL
RAD ONC ARIA REFERENCE POINT ID: NORMAL
RAD ONC ARIA REFERENCE POINT SESSION DOSAGE GIVEN: 1.8
RAD ONC ARIA REFERENCE POINT SESSION DOSAGE GIVEN: 1.82

## 2019-10-11 PROCEDURE — 77427 RADIATION TX MANAGEMENT X5: CPT | Performed by: RADIOLOGY

## 2019-10-11 PROCEDURE — 77014 PR CT GUIDANCE PLACEMENT RAD THERAPY FIELDS: CPT | Mod: 26 | Performed by: RADIOLOGY

## 2019-10-11 PROCEDURE — 77386 HCHG IMRT DELIVERY COMPLEX: CPT | Performed by: RADIOLOGY

## 2019-10-14 ENCOUNTER — HOSPITAL ENCOUNTER (OUTPATIENT)
Dept: RADIATION ONCOLOGY | Facility: MEDICAL CENTER | Age: 69
End: 2019-10-14

## 2019-10-14 LAB
CHEMOTHERAPY INFUSION START DATE: NORMAL
CHEMOTHERAPY RECORDS: 1.8
CHEMOTHERAPY RECORDS: 4860
CHEMOTHERAPY RECORDS: NORMAL
CHEMOTHERAPY RX CANCER: NORMAL
RAD ONC ARIA COURSE TREATMENT ELAPSED DAYS: NORMAL
RAD ONC ARIA PLAN TREATMENT DATES: NORMAL
RAD ONC ARIA REFERENCE POINT DOSAGE GIVEN TO DATE: 19.8
RAD ONC ARIA REFERENCE POINT DOSAGE GIVEN TO DATE: 19.99
RAD ONC ARIA REFERENCE POINT ID: NORMAL
RAD ONC ARIA REFERENCE POINT ID: NORMAL
RAD ONC ARIA REFERENCE POINT SESSION DOSAGE GIVEN: 1.8
RAD ONC ARIA REFERENCE POINT SESSION DOSAGE GIVEN: 1.82

## 2019-10-14 PROCEDURE — 77386 HCHG IMRT DELIVERY COMPLEX: CPT | Performed by: RADIOLOGY

## 2019-10-14 PROCEDURE — 77014 PR CT GUIDANCE PLACEMENT RAD THERAPY FIELDS: CPT | Mod: 26 | Performed by: RADIOLOGY

## 2019-10-15 ENCOUNTER — HOSPITAL ENCOUNTER (OUTPATIENT)
Dept: RADIATION ONCOLOGY | Facility: MEDICAL CENTER | Age: 69
End: 2019-10-15
Payer: MEDICARE

## 2019-10-15 LAB
CHEMOTHERAPY INFUSION START DATE: NORMAL
CHEMOTHERAPY RECORDS: 1.8
CHEMOTHERAPY RECORDS: 4860
CHEMOTHERAPY RECORDS: NORMAL
CHEMOTHERAPY RX CANCER: NORMAL
RAD ONC ARIA COURSE TREATMENT ELAPSED DAYS: NORMAL
RAD ONC ARIA PLAN TREATMENT DATES: NORMAL
RAD ONC ARIA REFERENCE POINT DOSAGE GIVEN TO DATE: 21.6
RAD ONC ARIA REFERENCE POINT DOSAGE GIVEN TO DATE: 21.81
RAD ONC ARIA REFERENCE POINT ID: NORMAL
RAD ONC ARIA REFERENCE POINT ID: NORMAL
RAD ONC ARIA REFERENCE POINT SESSION DOSAGE GIVEN: 1.8
RAD ONC ARIA REFERENCE POINT SESSION DOSAGE GIVEN: 1.82

## 2019-10-15 PROCEDURE — 77386 HCHG IMRT DELIVERY COMPLEX: CPT | Performed by: RADIOLOGY

## 2019-10-15 PROCEDURE — 77014 PR CT GUIDANCE PLACEMENT RAD THERAPY FIELDS: CPT | Mod: 26 | Performed by: RADIOLOGY

## 2019-10-16 ENCOUNTER — HOSPITAL ENCOUNTER (OUTPATIENT)
Dept: RADIOLOGY | Facility: MEDICAL CENTER | Age: 69
End: 2019-10-16
Attending: NURSE PRACTITIONER
Payer: MEDICARE

## 2019-10-16 VITALS
TEMPERATURE: 98.6 F | OXYGEN SATURATION: 97 % | WEIGHT: 186.6 LBS | SYSTOLIC BLOOD PRESSURE: 161 MMHG | BODY MASS INDEX: 32.03 KG/M2 | HEART RATE: 89 BPM | DIASTOLIC BLOOD PRESSURE: 97 MMHG

## 2019-10-16 DIAGNOSIS — E04.1 THYROID NODULE: ICD-10-CM

## 2019-10-16 DIAGNOSIS — C54.1 ENDOMETRIAL CANCER (HCC): ICD-10-CM

## 2019-10-16 LAB
CHEMOTHERAPY INFUSION START DATE: NORMAL
CHEMOTHERAPY RECORDS: 1.8
CHEMOTHERAPY RECORDS: 4860
CHEMOTHERAPY RECORDS: NORMAL
CHEMOTHERAPY RX CANCER: NORMAL
DATE 1ST CHEMO CANCER: NORMAL
RAD ONC ARIA COURSE LAST TREATMENT DATE: NORMAL
RAD ONC ARIA COURSE TREATMENT ELAPSED DAYS: NORMAL
RAD ONC ARIA REFERENCE POINT DOSAGE GIVEN TO DATE: 23.4
RAD ONC ARIA REFERENCE POINT DOSAGE GIVEN TO DATE: 23.62
RAD ONC ARIA REFERENCE POINT ID: NORMAL
RAD ONC ARIA REFERENCE POINT ID: NORMAL
RAD ONC ARIA REFERENCE POINT SESSION DOSAGE GIVEN: 1.8
RAD ONC ARIA REFERENCE POINT SESSION DOSAGE GIVEN: 1.82

## 2019-10-16 PROCEDURE — 77386 HCHG IMRT DELIVERY COMPLEX: CPT | Performed by: RADIOLOGY

## 2019-10-16 PROCEDURE — 76536 US EXAM OF HEAD AND NECK: CPT

## 2019-10-16 PROCEDURE — 77336 RADIATION PHYSICS CONSULT: CPT | Performed by: RADIOLOGY

## 2019-10-16 PROCEDURE — 77014 PR CT GUIDANCE PLACEMENT RAD THERAPY FIELDS: CPT | Mod: 26 | Performed by: RADIOLOGY

## 2019-10-16 RX ORDER — BACLOFEN 10 MG/1
10 TABLET ORAL 3 TIMES DAILY
Qty: 90 TAB | Refills: 0 | Status: SHIPPED | OUTPATIENT
Start: 2019-10-16 | End: 2019-11-08 | Stop reason: SDUPTHER

## 2019-10-16 ASSESSMENT — PAIN SCALES - GENERAL: PAINLEVEL: 7=MODERATE-SEVERE PAIN

## 2019-10-16 NOTE — PROGRESS NOTES
ON TREATMENT  NOTE  RADIATION ONCOLOGY DEPARTMENT    Patient name:  Jairo Rivas    Primary Physician:  ZULEYKA Lyons MRN: [id number]  CSN: [id number]   Referring physician:  Jann Garcia M.D. : 1950, 69 y.o.     ENCOUNTER DATE:  10/16/19    DIAGNOSIS:  Visit Diagnoses     ICD-10-CM   1. Endometrial cancer (HCC) C54.1     Endometrial cancer (HCC)  Staging form: Corpus Uteri - Carcinoma and Carcinosarcoma, AJCC 8th Edition  - Pathologic stage from 2019: FIGO Stage IIIB (pT3b, pN0, cM0) - Signed by Jayant EDOUARD M.D. on 2019  Histologic grade (G): G2  Histologic grading system: 3 grade system  Lymph-vascular invasion (LVI): LVI not present (absent)/not identified  Residual tumor (R): R1 - Microscopic  Histopathologic type: Endometrioid adenocarcinoma, NOS  Diagnostic confirmation: Positive histology  Specimen type: Excision  Staged by: Managing physician      TREATMENT SUMMARY:  Radiation Therapy Episodes     Radiation Therapy: IMRT (2019)      Radiation Treatments     Plan Most Recent Treatment Date Elapsed Course Treatment Days Fractions Treated Prescribed Fraction Dose (cGy) Prescribed Total Dose (cGy)    Pelvis 10/16/2019 16 @ [id number] 13  180 4,860       Reference Point Most Recent Treatment Date Elapsed Course Treatment Days Most Recent Session Dose (cGy) Total Dose (cGy)    Pelvis 10/16/2019 16 @ [id number] 180 2,340    PelvisCP 10/16/2019 16 @ [id number] 182 2,362          SUBJECTIVE:  Multiple aches and pain. Nonspecific. No diarrhea.      VITAL SIGNS:  KPS: 90, Able to carry on normal activity; minor signs or symptoms of disease (ECOG equivalent 0)  Encounter Vitals  Temperature: 37 °C (98.6 °F)  Blood Pressure : (!) 161/97  Pulse: 89  Pulse Oximetry: 97 %  Weight: 84.6 kg (186 lb 9.6 oz)  Pain Score: 7=Moderate-Severe Pain  Pain Assessment 10/16/2019 10/9/2019   Pain Assessment - Acute Pain   Pain Score 7 8   Pain Loc Generalized Back    What increases pain? - upper back pain - nothing makes it worse   What decreases pain? - tylenol   Some recent data might be hidden          PHYSICAL EXAM:  Physical Exam   Constitutional: She is oriented to person, place, and time. She appears well-developed and well-nourished.   HENT:   Head: Normocephalic.   Neurological: She is alert and oriented to person, place, and time.   Skin: Skin is warm and dry. No erythema.   Psychiatric: She has a normal mood and affect. Her behavior is normal. Judgment and thought content normal.   Nursing note and vitals reviewed.       Toxicity Assessment 10/16/2019 10/9/2019 10/2/2019   Toxicity Assessment Female Pelvis Female Pelvis Female Pelvis   Fatigue (lethargy, malaise, asthenia) Moderate (e.g., decrease in performance status by 1 ECOG level or 20% Karnofsky) or causing difficulty performing some activities Increased fatigue over baseline, but not altering normal activities Increased fatigue over baseline, but not altering normal activities   Radiation Dermatitis None None None   Anorexia Loss of appetite Loss of appetite Loss of appetite   Colitis - - None   Constipation Requiring stool softener or dietary modification - Requiring stool softener or dietary modification   Dehydration None None None   Diarrhea w/o Colostomy Increase of <4 stools/day over pre-treatment None None   Flatulence - None None   Nausea Able to eat Able to eat Able to eat   Proctitis - - None   Vomiting None None None   RT - Pain due to RT Moderate pain, pain or analgesics interfering with function, but not interfering with activities of daily living None None   Tumor Pain (onset or exacerbation of tumor pain due to treatment) None None None   Dysuria (painful urination) None None None   Urinary Frequency Normal Increase in frequency up to 2x normal Normal   Urinary Urgency None Present None   Bladder Spasms Absent - Absent   Incontinence None - None   Urinary Retention Normal - Normal   Vaginitis  (not due to infection) - - None   Vaginal Bleeding None - None   Vaginal Dryness - - Normal       CURRENT MEDICATIONS:    Current Outpatient Medications:   •  baclofen (LIORESAL) 10 MG Tab, Take 1 Tab by mouth 3 times a day. Indications: Muscle Spasticity, Disp: 90 Tab, Rfl: 0  •  amLODIPine (NORVASC) 5 MG Tab, Take 1.5 Tabs by mouth every bedtime., Disp: 135 Tab, Rfl: 2  •  dexamethasone (DECADRON) 4 MG Tab, Take 1 Tab by mouth every 6 hours., Disp: 30 Tab, Rfl: 0  •  ondansetron (ZOFRAN) 4 MG Tab tablet, Take 1 Tab by mouth every 6 hours as needed. Alternate q 3 h with Dex four four days after chemotherapy and longer if needed, Disp: 30 Tab, Rfl: 0  •  dexamethasone (DECADRON) 4 MG Tab, Take 1 Tab by mouth every 6 hours., Disp: 10 Tab, Rfl: 0  •  senna-docusate (PERICOLACE OR SENOKOT S) 8.6-50 MG Tab, Take 1 Tab by mouth 2 times a day., Disp: , Rfl:   •  amitriptyline (ELAVIL) 25 MG Tab, TAKE 1 TABLET BY MOUTH EVERY DAY IN THE EVENING, Disp: 90 Tab, Rfl: 1  •  metoprolol SR (TOPROL XL) 100 MG TABLET SR 24 HR, Take 1 Tab by mouth every day for 90 days., Disp: 90 Tab, Rfl: 2  •  losartan (COZAAR) 100 MG Tab, Take 100 mg by mouth every day., Disp: , Rfl:   •  ascorbic acid (ASCORBIC ACID) 500 MG Tab, Take 500 mg by mouth every evening., Disp: , Rfl:   •  atorvastatin (LIPITOR) 20 MG Tab, Take 20 mg by mouth every evening., Disp: , Rfl:   •  Cholecalciferol (VITAMIN D3) 2000 UNIT Cap, Take 2,000 Units by mouth every day., Disp: , Rfl:   •  Coenzyme Q10 (COQ10) 100 MG Cap, Take 100 mg by mouth every evening., Disp: , Rfl:   •  multivitamin (THERAGRAN) Tab, Take 1 Tab by mouth every day., Disp: , Rfl:   •  acetaminophen (TYLENOL) 500 MG Tab, Take 1,000 mg by mouth every 6 hours as needed for Moderate Pain., Disp: , Rfl:   •  B Complex-C (SUPER B COMPLEX PO), Take 1 Tab by mouth every evening., Disp: , Rfl:   •  ferrous sulfate 325 (65 Fe) MG tablet, Take 1 Tab by mouth every day., Disp: 30 Tab, Rfl: 0  •  aspirin (ASA)  81 MG Chew Tab chewable tablet, Take 81 mg by mouth every day., Disp: , Rfl:     LABORATORY DATA:   Lab Results   Component Value Date/Time    SODIUM 140 10/01/2019 01:35 PM    POTASSIUM 3.6 10/01/2019 01:35 PM    CHLORIDE 106 10/01/2019 01:35 PM    CO2 27 10/01/2019 01:35 PM    GLUCOSE 118 (H) 10/01/2019 01:35 PM    BUN 8 10/01/2019 01:35 PM    CREATININE 0.79 10/01/2019 01:35 PM       Lab Results   Component Value Date/Time    WBC 4.9 10/04/2019 06:33 AM    HEMOGLOBIN 12.6 10/04/2019 06:33 AM    HEMATOCRIT 39.9 10/04/2019 06:33 AM    MCV 93.7 10/04/2019 06:33 AM    PLATELETCT 262 10/04/2019 06:33 AM        RADIOLOGY DATA:  Ct-abdomen-pelvis With    Result Date: 9/30/2019 9/27/2019 3:18 PM HISTORY/REASON FOR EXAM:  Endometrial carcinoma restaging TECHNIQUE/EXAM DESCRIPTION: CT of the abdomen and pelvis with contrast. Contrast-enhanced helical scanning was obtained from the diaphragmatic domes through the pubic symphysis following the bolus administration of 100 mL of nonionic contrast (Omnipaque 350) without complication. Low dose optimization technique was utilized for this CT exam including automated exposure control and adjustment of the mA and/or kV according to patient size. COMPARISON: September 18 PET/CT. FINDINGS: The patient was unable to tolerate scanning with arms above the scan and this causes some artifact over the abdomen/pelvis The visualized lung bases show no consolidation. CT Abdomen: Ureters are mildly dilated left greater than the right. No calculus is seen. No bladder mass is noted. There is mild fat stranding the pelvis adjacent to the distal ureters but no abrupt caliber change is seen. No asymmetric enhancement of the kidneys. Hepatic too small to characterize hypodensities in both lobes No lymphadenopathy The liver, spleen, pancreas, gallbladder, adrenal glands enhance normally. There is no abnormal bowel dilatation or inflammatory change. There is no aneurysmal change of the aorta. CT  Pelvis: No adenopathy is detected Uterus and ovaries are surgically absent. There is some operative bed fat stranding but it is mild. No fluid collection is seen. Bladder contours are within normal limits No osteoblastic or lytic metastasis is seen throughout the study There is no free air or free fluid.     1.  Mild ureteral dilatation is nonspecific and could be due to highly hydrated state of patient. Partial obstruction is not excluded and correlation with ultrasound may prove helpful to evaluate ureteral jets. 2.  Hysterectomy, and oophorectomies and there is no evidence of metastasis 3.  Far too small to characterize hepatic hypodensities statistically most likely represent cysts    Dx-chest-2 Views    Result Date: 10/10/2019  10/9/2019 5:08 PM HISTORY/REASON FOR EXAM:  Chest Pain; right sided pleuritic chest pain. TECHNIQUE/EXAM DESCRIPTION AND NUMBER OF VIEWS: Two views of the chest. COMPARISON:  7/31/2019 FINDINGS: The heart is normal in size. No pulmonary infiltrates or consolidations are noted. No pleural effusions are appreciated. There is a mild thoracic dextroscoliosis. Spurs are present in the midthoracic spine.     No active disease.    Ir-us Guided Piv    Result Date: 10/3/2019  EXAMINATION:                                                                    HISTORY/REASON FOR EXAM:  Ultrasound Guided PIV.  TECHNIQUE/EXAM DESCRIPTION AND NUMBER OF VIEWS:  Peripheral IV insertion with ultrasound guidance.  The procedure was prepared using maximal sterile barrier technique including sterile gown, mask, cap, and donning of sterile gloves following appropriate hand hygiene and/or sterile scrub. Patient skin site was prepped with 2% Chlorhexidine solution.   FINDINGS: Peripheral IV insertion with Ultrasound Guidance was performed by qualified imaging nursing staff without the assistance of a Radiologist.      Ultrasound-guided PERIPHERAL IV INSERTION performed by qualified nursing staff as above.      [id number] Base To Mid-thigh    Result Date: 9/18/2019 9/18/2019 7:44 AM HISTORY/REASON FOR EXAM:  Endometrial cancer status post hysterectomy, staging TECHNIQUE/EXAM DESCRIPTION AND NUMBER OF VIEWS: PET body imaging. Initially, 14 mCi F-18 FDG was administered intravenously under standardized conditions. Approximately 45 minutes after FDG administration, the patient was placed in the supine position on the PET CT table. Blood glucose level was 91 mg/dL. Low dose spiral CT imaging was performed from the skull base to the mid thighs. PET imaging was then performed from the skull base to the mid thighs. CT images, PET images, and PET/CT fused images were reviewed on a PACS 3D workstation. The limited non-contrast CT data are used primarily for attenuation correction and anatomic correlation.  Evaluation of solid organs and bowel are especially limited utilizing this technique. A low dose CT was obtained of the same area without IV contrast for attenuation correction and coregistration, not for a diagnostic scan. COMPARISON: CT dated 7/26/2019 FINDINGS: VISUALIZED BRAIN: Normal metabolic activity. HEAD AND NECK: Hypodense thyroid nodule centered the thyroid isthmus measures 2.9 cm. It is minimally hypermetabolic, with an SUV max of 3.45. CHEST: Lungs show no hypermetabolic pulmonary nodules. There is no hypermetabolic hilar, mediastinal, or axillary lymphadenopathy. Coronary calcifications. ABDOMEN AND PELVIS: There is normal, uniform metabolic activity in the liver, spleen, adrenal glands, kidneys. There is no hypermetabolic mesenteric, retroperitoneal, iliac, or inguinal lymphadenopathy. Nonspecific physiologic activity in the colon and intestine is noted. Atherosclerosis. Hysterectomy. Moderate hypermetabolism at the vaginal cuff, with an SUV max of 8.86. VISUALIZED MUSCULOSKELETAL SYSTEM: No hypermetabolic osseous lesions.     1. Prior hysterectomy with hypermetabolism of the vaginal cuff, which may be  postoperative in nature. 2. No FDG avid lymphadenopathy or metastatic disease. 3. Incidental 2.9 cm mildly hypermetabolic nodule in the thyroid isthmus. It may represent thyroid malignancy. Recommend further evaluation with thyroid ultrasound.      IMPRESSION:  Cancer Staging  Endometrial cancer (HCC)  Staging form: Corpus Uteri - Carcinoma and Carcinosarcoma, AJCC 8th Edition  - Pathologic stage from 9/13/2019: FIGO Stage IIIB (pT3b, pN0, cM0) - Signed by Jayant EDOUARD M.D. on 9/13/2019      PLAN:  No change in treatment plan    Disposition:  Treatment plan reviewed. Questions answered. Continue therapy outlined.     Jayant EDOUARD M.D.    Orders Placed This Encounter   • Rad Onc Aria Session Summary

## 2019-10-16 NOTE — TELEPHONE ENCOUNTER
Was the patient seen in the last year in this department? Yes  10/10/19  Does patient have an active prescription for medications requested? No     Received Request Via: Pharmacy

## 2019-10-17 ENCOUNTER — HOSPITAL ENCOUNTER (OUTPATIENT)
Dept: RADIATION ONCOLOGY | Facility: MEDICAL CENTER | Age: 69
End: 2019-10-17

## 2019-10-17 LAB
CHEMOTHERAPY INFUSION START DATE: NORMAL
CHEMOTHERAPY RECORDS: 1.8
CHEMOTHERAPY RECORDS: 4860
CHEMOTHERAPY RECORDS: NORMAL
CHEMOTHERAPY RX CANCER: NORMAL
DATE 1ST CHEMO CANCER: NORMAL
RAD ONC ARIA COURSE LAST TREATMENT DATE: NORMAL
RAD ONC ARIA COURSE TREATMENT ELAPSED DAYS: NORMAL
RAD ONC ARIA REFERENCE POINT DOSAGE GIVEN TO DATE: 25.2
RAD ONC ARIA REFERENCE POINT DOSAGE GIVEN TO DATE: 25.44
RAD ONC ARIA REFERENCE POINT ID: NORMAL
RAD ONC ARIA REFERENCE POINT ID: NORMAL
RAD ONC ARIA REFERENCE POINT SESSION DOSAGE GIVEN: 1.8
RAD ONC ARIA REFERENCE POINT SESSION DOSAGE GIVEN: 1.82

## 2019-10-17 PROCEDURE — 77014 PR CT GUIDANCE PLACEMENT RAD THERAPY FIELDS: CPT | Mod: 26 | Performed by: RADIOLOGY

## 2019-10-17 PROCEDURE — 77386 HCHG IMRT DELIVERY COMPLEX: CPT | Performed by: RADIOLOGY

## 2019-10-18 ENCOUNTER — HOSPITAL ENCOUNTER (OUTPATIENT)
Dept: RADIATION ONCOLOGY | Facility: MEDICAL CENTER | Age: 69
End: 2019-10-18

## 2019-10-18 DIAGNOSIS — E04.1 THYROID NODULE: ICD-10-CM

## 2019-10-18 LAB
CHEMOTHERAPY INFUSION START DATE: NORMAL
CHEMOTHERAPY RECORDS: 1.8
CHEMOTHERAPY RECORDS: 4860
CHEMOTHERAPY RECORDS: NORMAL
CHEMOTHERAPY RX CANCER: NORMAL
DATE 1ST CHEMO CANCER: NORMAL
RAD ONC ARIA COURSE LAST TREATMENT DATE: NORMAL
RAD ONC ARIA COURSE TREATMENT ELAPSED DAYS: NORMAL
RAD ONC ARIA REFERENCE POINT DOSAGE GIVEN TO DATE: 27
RAD ONC ARIA REFERENCE POINT DOSAGE GIVEN TO DATE: 27.26
RAD ONC ARIA REFERENCE POINT ID: NORMAL
RAD ONC ARIA REFERENCE POINT ID: NORMAL
RAD ONC ARIA REFERENCE POINT SESSION DOSAGE GIVEN: 1.8
RAD ONC ARIA REFERENCE POINT SESSION DOSAGE GIVEN: 1.82

## 2019-10-18 PROCEDURE — 77386 HCHG IMRT DELIVERY COMPLEX: CPT | Performed by: RADIOLOGY

## 2019-10-18 PROCEDURE — 77427 RADIATION TX MANAGEMENT X5: CPT | Performed by: RADIOLOGY

## 2019-10-18 PROCEDURE — 77014 PR CT GUIDANCE PLACEMENT RAD THERAPY FIELDS: CPT | Mod: 26 | Performed by: RADIOLOGY

## 2019-10-18 RX ORDER — 0.9 % SODIUM CHLORIDE 0.9 %
VIAL (ML) INJECTION PRN
Status: CANCELLED | OUTPATIENT
Start: 2019-10-20

## 2019-10-18 RX ORDER — 0.9 % SODIUM CHLORIDE 0.9 %
5 VIAL (ML) INJECTION PRN
Status: CANCELLED | OUTPATIENT
Start: 2019-10-20

## 2019-10-18 RX ORDER — 0.9 % SODIUM CHLORIDE 0.9 %
VIAL (ML) INJECTION PRN
Status: CANCELLED | OUTPATIENT
Start: 2019-10-21

## 2019-10-18 RX ORDER — SODIUM CHLORIDE 9 MG/ML
2000 INJECTION, SOLUTION INTRAVENOUS ONCE
Status: CANCELLED | OUTPATIENT
Start: 2019-10-21

## 2019-10-18 RX ORDER — 0.9 % SODIUM CHLORIDE 0.9 %
5 VIAL (ML) INJECTION PRN
Status: CANCELLED | OUTPATIENT
Start: 2019-10-21

## 2019-10-18 RX ORDER — PROCHLORPERAZINE MALEATE 10 MG
10 TABLET ORAL EVERY 6 HOURS PRN
Status: CANCELLED | OUTPATIENT
Start: 2019-10-21

## 2019-10-18 RX ORDER — ONDANSETRON 2 MG/ML
4 INJECTION INTRAMUSCULAR; INTRAVENOUS PRN
Status: CANCELLED | OUTPATIENT
Start: 2019-10-21

## 2019-10-18 RX ORDER — SODIUM CHLORIDE 9 MG/ML
INJECTION, SOLUTION INTRAVENOUS CONTINUOUS
Status: CANCELLED | OUTPATIENT
Start: 2019-10-21

## 2019-10-18 RX ORDER — ONDANSETRON 8 MG/1
8 TABLET, ORALLY DISINTEGRATING ORAL PRN
Status: CANCELLED | OUTPATIENT
Start: 2019-10-21

## 2019-10-21 ENCOUNTER — HOSPITAL ENCOUNTER (OUTPATIENT)
Facility: MEDICAL CENTER | Age: 69
End: 2019-10-21
Admitting: SPECIALIST
Payer: MEDICARE

## 2019-10-21 ENCOUNTER — HOSPITAL ENCOUNTER (OUTPATIENT)
Facility: MEDICAL CENTER | Age: 69
End: 2019-10-22
Attending: SPECIALIST | Admitting: SPECIALIST
Payer: MEDICARE

## 2019-10-21 ENCOUNTER — APPOINTMENT (OUTPATIENT)
Dept: RADIOLOGY | Facility: MEDICAL CENTER | Age: 69
End: 2019-10-21
Attending: SPECIALIST
Payer: MEDICARE

## 2019-10-21 ENCOUNTER — HOSPITAL ENCOUNTER (OUTPATIENT)
Dept: RADIATION ONCOLOGY | Facility: MEDICAL CENTER | Age: 69
End: 2019-10-21

## 2019-10-21 DIAGNOSIS — C54.1 ENDOMETRIAL CANCER (HCC): ICD-10-CM

## 2019-10-21 DIAGNOSIS — Z51.11 ADMISSION FOR CHEMOTHERAPY: ICD-10-CM

## 2019-10-21 LAB
ALBUMIN SERPL BCP-MCNC: 3.7 G/DL (ref 3.2–4.9)
ALBUMIN/GLOB SERPL: 1.2 G/DL
ALP SERPL-CCNC: 70 U/L (ref 30–99)
ALT SERPL-CCNC: 34 U/L (ref 2–50)
ANION GAP SERPL CALC-SCNC: 6 MMOL/L (ref 0–11.9)
AST SERPL-CCNC: 21 U/L (ref 12–45)
BASOPHILS # BLD AUTO: 0.7 % (ref 0–1.8)
BASOPHILS # BLD: 0.02 K/UL (ref 0–0.12)
BILIRUB SERPL-MCNC: 0.3 MG/DL (ref 0.1–1.5)
BUN SERPL-MCNC: 11 MG/DL (ref 8–22)
CALCIUM SERPL-MCNC: 9.2 MG/DL (ref 8.5–10.5)
CANCER AG125 SERPL-ACNC: 5.2 U/ML (ref 0–35)
CHEMOTHERAPY INFUSION START DATE: NORMAL
CHEMOTHERAPY RECORDS: 1.8
CHEMOTHERAPY RECORDS: 4860
CHEMOTHERAPY RECORDS: NORMAL
CHEMOTHERAPY RX CANCER: NORMAL
CHLORIDE SERPL-SCNC: 108 MMOL/L (ref 96–112)
CO2 SERPL-SCNC: 30 MMOL/L (ref 20–33)
CREAT SERPL-MCNC: 0.69 MG/DL (ref 0.5–1.4)
DATE 1ST CHEMO CANCER: NORMAL
EOSINOPHIL # BLD AUTO: 0.1 K/UL (ref 0–0.51)
EOSINOPHIL NFR BLD: 3.5 % (ref 0–6.9)
ERYTHROCYTE [DISTWIDTH] IN BLOOD BY AUTOMATED COUNT: 53.6 FL (ref 35.9–50)
GLOBULIN SER CALC-MCNC: 3 G/DL (ref 1.9–3.5)
GLUCOSE SERPL-MCNC: 96 MG/DL (ref 65–99)
HCT VFR BLD AUTO: 38 % (ref 37–47)
HGB BLD-MCNC: 11.9 G/DL (ref 12–16)
IMM GRANULOCYTES # BLD AUTO: 0.01 K/UL (ref 0–0.11)
IMM GRANULOCYTES NFR BLD AUTO: 0.4 % (ref 0–0.9)
LYMPHOCYTES # BLD AUTO: 0.28 K/UL (ref 1–4.8)
LYMPHOCYTES NFR BLD: 9.8 % (ref 22–41)
MAGNESIUM SERPL-MCNC: 1.8 MG/DL (ref 1.5–2.5)
MCH RBC QN AUTO: 29.5 PG (ref 27–33)
MCHC RBC AUTO-ENTMCNC: 31.3 G/DL (ref 33.6–35)
MCV RBC AUTO: 94.3 FL (ref 81.4–97.8)
MONOCYTES # BLD AUTO: 0.3 K/UL (ref 0–0.85)
MONOCYTES NFR BLD AUTO: 10.5 % (ref 0–13.4)
NEUTROPHILS # BLD AUTO: 2.14 K/UL (ref 2–7.15)
NEUTROPHILS NFR BLD: 75.1 % (ref 44–72)
NRBC # BLD AUTO: 0 K/UL
NRBC BLD-RTO: 0 /100 WBC
PLATELET # BLD AUTO: 162 K/UL (ref 164–446)
PMV BLD AUTO: 10.2 FL (ref 9–12.9)
POTASSIUM SERPL-SCNC: 3.3 MMOL/L (ref 3.6–5.5)
PROT SERPL-MCNC: 6.7 G/DL (ref 6–8.2)
RAD ONC ARIA COURSE LAST TREATMENT DATE: NORMAL
RAD ONC ARIA COURSE TREATMENT ELAPSED DAYS: NORMAL
RAD ONC ARIA REFERENCE POINT DOSAGE GIVEN TO DATE: 28.8
RAD ONC ARIA REFERENCE POINT DOSAGE GIVEN TO DATE: 29.08
RAD ONC ARIA REFERENCE POINT ID: NORMAL
RAD ONC ARIA REFERENCE POINT ID: NORMAL
RAD ONC ARIA REFERENCE POINT SESSION DOSAGE GIVEN: 1.8
RAD ONC ARIA REFERENCE POINT SESSION DOSAGE GIVEN: 1.82
RBC # BLD AUTO: 4.03 M/UL (ref 4.2–5.4)
SODIUM SERPL-SCNC: 144 MMOL/L (ref 135–145)
WBC # BLD AUTO: 2.9 K/UL (ref 4.8–10.8)

## 2019-10-21 PROCEDURE — 96367 TX/PROPH/DG ADDL SEQ IV INF: CPT

## 2019-10-21 PROCEDURE — 36415 COLL VENOUS BLD VENIPUNCTURE: CPT

## 2019-10-21 PROCEDURE — 96366 THER/PROPH/DIAG IV INF ADDON: CPT

## 2019-10-21 PROCEDURE — 700102 HCHG RX REV CODE 250 W/ 637 OVERRIDE(OP): Mod: JG | Performed by: NURSE PRACTITIONER

## 2019-10-21 PROCEDURE — 700102 HCHG RX REV CODE 250 W/ 637 OVERRIDE(OP): Performed by: SPECIALIST

## 2019-10-21 PROCEDURE — A9270 NON-COVERED ITEM OR SERVICE: HCPCS | Performed by: SPECIALIST

## 2019-10-21 PROCEDURE — 83735 ASSAY OF MAGNESIUM: CPT

## 2019-10-21 PROCEDURE — 80053 COMPREHEN METABOLIC PANEL: CPT

## 2019-10-21 PROCEDURE — 96375 TX/PRO/DX INJ NEW DRUG ADDON: CPT

## 2019-10-21 PROCEDURE — 700105 HCHG RX REV CODE 258: Performed by: SPECIALIST

## 2019-10-21 PROCEDURE — 77386 HCHG IMRT DELIVERY COMPLEX: CPT | Performed by: RADIOLOGY

## 2019-10-21 PROCEDURE — A9270 NON-COVERED ITEM OR SERVICE: HCPCS | Mod: JG | Performed by: NURSE PRACTITIONER

## 2019-10-21 PROCEDURE — 96413 CHEMO IV INFUSION 1 HR: CPT

## 2019-10-21 PROCEDURE — G0378 HOSPITAL OBSERVATION PER HR: HCPCS

## 2019-10-21 PROCEDURE — 700111 HCHG RX REV CODE 636 W/ 250 OVERRIDE (IP): Performed by: SPECIALIST

## 2019-10-21 PROCEDURE — 86304 IMMUNOASSAY TUMOR CA 125: CPT

## 2019-10-21 PROCEDURE — 85025 COMPLETE CBC W/AUTO DIFF WBC: CPT

## 2019-10-21 PROCEDURE — 77014 PR CT GUIDANCE PLACEMENT RAD THERAPY FIELDS: CPT | Mod: 26 | Performed by: RADIOLOGY

## 2019-10-21 RX ORDER — SODIUM CHLORIDE 9 MG/ML
2000 INJECTION, SOLUTION INTRAVENOUS ONCE
Status: COMPLETED | OUTPATIENT
Start: 2019-10-21 | End: 2019-10-21

## 2019-10-21 RX ORDER — ACETAMINOPHEN 325 MG/1
325 TABLET ORAL EVERY 6 HOURS PRN
Status: DISCONTINUED | OUTPATIENT
Start: 2019-10-21 | End: 2019-10-22 | Stop reason: HOSPADM

## 2019-10-21 RX ORDER — ATORVASTATIN CALCIUM 20 MG/1
20 TABLET, FILM COATED ORAL EVERY EVENING
Status: DISCONTINUED | OUTPATIENT
Start: 2019-10-21 | End: 2019-10-22 | Stop reason: HOSPADM

## 2019-10-21 RX ORDER — PROCHLORPERAZINE MALEATE 10 MG
10 TABLET ORAL EVERY 6 HOURS PRN
Status: DISCONTINUED | OUTPATIENT
Start: 2019-10-21 | End: 2019-10-22 | Stop reason: HOSPADM

## 2019-10-21 RX ORDER — METOPROLOL SUCCINATE 50 MG/1
100 TABLET, EXTENDED RELEASE ORAL DAILY
Status: DISCONTINUED | OUTPATIENT
Start: 2019-10-22 | End: 2019-10-22 | Stop reason: HOSPADM

## 2019-10-21 RX ORDER — ASPIRIN 81 MG/1
81 TABLET, CHEWABLE ORAL DAILY
Status: DISCONTINUED | OUTPATIENT
Start: 2019-10-22 | End: 2019-10-22 | Stop reason: HOSPADM

## 2019-10-21 RX ORDER — AMITRIPTYLINE HYDROCHLORIDE 25 MG/1
25 TABLET, FILM COATED ORAL EVERY EVENING
Status: DISCONTINUED | OUTPATIENT
Start: 2019-10-21 | End: 2019-10-22 | Stop reason: HOSPADM

## 2019-10-21 RX ORDER — LOSARTAN POTASSIUM 50 MG/1
100 TABLET ORAL
Status: DISCONTINUED | OUTPATIENT
Start: 2019-10-21 | End: 2019-10-22 | Stop reason: HOSPADM

## 2019-10-21 RX ORDER — BACLOFEN 10 MG/1
10 TABLET ORAL DAILY
Status: DISCONTINUED | OUTPATIENT
Start: 2019-10-21 | End: 2019-10-21

## 2019-10-21 RX ORDER — AMLODIPINE BESYLATE 5 MG/1
5 TABLET ORAL
Status: DISCONTINUED | OUTPATIENT
Start: 2019-10-21 | End: 2019-10-21

## 2019-10-21 RX ORDER — BACLOFEN 10 MG/1
10 TABLET ORAL 3 TIMES DAILY
Status: DISCONTINUED | OUTPATIENT
Start: 2019-10-21 | End: 2019-10-22 | Stop reason: HOSPADM

## 2019-10-21 RX ORDER — AMITRIPTYLINE HYDROCHLORIDE 25 MG/1
25 TABLET, FILM COATED ORAL
Status: DISCONTINUED | OUTPATIENT
Start: 2019-10-21 | End: 2019-10-21

## 2019-10-21 RX ORDER — DEXAMETHASONE SODIUM PHOSPHATE 4 MG/ML
12 INJECTION, SOLUTION INTRA-ARTICULAR; INTRALESIONAL; INTRAMUSCULAR; INTRAVENOUS; SOFT TISSUE ONCE
Status: COMPLETED | OUTPATIENT
Start: 2019-10-21 | End: 2019-10-21

## 2019-10-21 RX ORDER — POTASSIUM CHLORIDE 20 MEQ/1
40 TABLET, EXTENDED RELEASE ORAL DAILY
Status: DISCONTINUED | OUTPATIENT
Start: 2019-10-21 | End: 2019-10-22 | Stop reason: HOSPADM

## 2019-10-21 RX ADMIN — CISPLATIN 57 MG: 1 INJECTION, SOLUTION INTRAVENOUS at 18:09

## 2019-10-21 RX ADMIN — DEXAMETHASONE SODIUM PHOSPHATE 12 MG: 4 INJECTION, SOLUTION INTRA-ARTICULAR; INTRALESIONAL; INTRAMUSCULAR; INTRAVENOUS; SOFT TISSUE at 17:23

## 2019-10-21 RX ADMIN — ONDANSETRON HYDROCHLORIDE 16 MG: 2 SOLUTION INTRAMUSCULAR; INTRAVENOUS at 16:39

## 2019-10-21 RX ADMIN — SODIUM CHLORIDE 150 MG: 9 INJECTION, SOLUTION INTRAVENOUS at 17:23

## 2019-10-21 RX ADMIN — BACLOFEN 10 MG: 10 TABLET ORAL at 17:26

## 2019-10-21 RX ADMIN — ATORVASTATIN CALCIUM 20 MG: 20 TABLET, FILM COATED ORAL at 17:26

## 2019-10-21 RX ADMIN — LOSARTAN POTASSIUM 100 MG: 50 TABLET ORAL at 11:52

## 2019-10-21 RX ADMIN — SODIUM CHLORIDE 2000 ML: 9 INJECTION, SOLUTION INTRAVENOUS at 13:24

## 2019-10-21 RX ADMIN — AMITRIPTYLINE HYDROCHLORIDE 25 MG: 25 TABLET, FILM COATED ORAL at 17:26

## 2019-10-21 RX ADMIN — POTASSIUM CHLORIDE 40 MEQ: 1500 TABLET, EXTENDED RELEASE ORAL at 16:39

## 2019-10-21 RX ADMIN — MANNITOL: 12.5 INJECTION, SOLUTION INTRAVENOUS at 19:42

## 2019-10-21 RX ADMIN — ACETAMINOPHEN 325 MG: 325 TABLET, FILM COATED ORAL at 11:51

## 2019-10-21 RX ADMIN — MANNITOL: 12.5 INJECTION, SOLUTION INTRAVENOUS at 23:59

## 2019-10-21 RX ADMIN — BACLOFEN 10 MG: 10 TABLET ORAL at 11:51

## 2019-10-21 RX ADMIN — AMLODIPINE BESYLATE 7.5 MG: 5 TABLET ORAL at 19:41

## 2019-10-21 ASSESSMENT — PATIENT HEALTH QUESTIONNAIRE - PHQ9
2. FEELING DOWN, DEPRESSED, IRRITABLE, OR HOPELESS: NOT AT ALL
6. FEELING BAD ABOUT YOURSELF - OR THAT YOU ARE A FAILURE OR HAVE LET YOURSELF OR YOUR FAMILY DOWN: NOT AL ALL
5. POOR APPETITE OR OVEREATING: NOT AT ALL
1. LITTLE INTEREST OR PLEASURE IN DOING THINGS: NOT AT ALL
7. TROUBLE CONCENTRATING ON THINGS, SUCH AS READING THE NEWSPAPER OR WATCHING TELEVISION: NOT AT ALL
3. TROUBLE FALLING OR STAYING ASLEEP OR SLEEPING TOO MUCH: NOT AT ALL
9. THOUGHTS THAT YOU WOULD BE BETTER OFF DEAD, OR OF HURTING YOURSELF: NOT AT ALL
SUM OF ALL RESPONSES TO PHQ9 QUESTIONS 1 AND 2: 0
4. FEELING TIRED OR HAVING LITTLE ENERGY: NOT AT ALL
SUM OF ALL RESPONSES TO PHQ QUESTIONS 1-9: 0
8. MOVING OR SPEAKING SO SLOWLY THAT OTHER PEOPLE COULD HAVE NOTICED. OR THE OPPOSITE, BEING SO FIGETY OR RESTLESS THAT YOU HAVE BEEN MOVING AROUND A LOT MORE THAN USUAL: NOT AT ALL

## 2019-10-21 ASSESSMENT — COGNITIVE AND FUNCTIONAL STATUS - GENERAL
STANDING UP FROM CHAIR USING ARMS: A LITTLE
CLIMB 3 TO 5 STEPS WITH RAILING: A LITTLE
DRESSING REGULAR LOWER BODY CLOTHING: A LITTLE
MOVING FROM LYING ON BACK TO SITTING ON SIDE OF FLAT BED: A LITTLE
SUGGESTED CMS G CODE MODIFIER MOBILITY: CK
TOILETING: A LITTLE
PERSONAL GROOMING: A LITTLE
TURNING FROM BACK TO SIDE WHILE IN FLAT BAD: A LITTLE
SUGGESTED CMS G CODE MODIFIER DAILY ACTIVITY: CK
MOVING TO AND FROM BED TO CHAIR: A LITTLE
HELP NEEDED FOR BATHING: A LITTLE
EATING MEALS: A LITTLE
WALKING IN HOSPITAL ROOM: A LITTLE
MOBILITY SCORE: 18
DAILY ACTIVITIY SCORE: 18
DRESSING REGULAR UPPER BODY CLOTHING: A LITTLE

## 2019-10-21 ASSESSMENT — LIFESTYLE VARIABLES
HOW MANY TIMES IN THE PAST YEAR HAVE YOU HAD 5 OR MORE DRINKS IN A DAY: 0
HAVE PEOPLE ANNOYED YOU BY CRITICIZING YOUR DRINKING: NO
TOTAL SCORE: 0
EVER HAD A DRINK FIRST THING IN THE MORNING TO STEADY YOUR NERVES TO GET RID OF A HANGOVER: NO
TOTAL SCORE: 0
EVER FELT BAD OR GUILTY ABOUT YOUR DRINKING: NO
TOTAL SCORE: 0
ON A TYPICAL DAY WHEN YOU DRINK ALCOHOL HOW MANY DRINKS DO YOU HAVE: 0
HAVE YOU EVER FELT YOU SHOULD CUT DOWN ON YOUR DRINKING: NO
CONSUMPTION TOTAL: NEGATIVE
ALCOHOL_USE: NO
AVERAGE NUMBER OF DAYS PER WEEK YOU HAVE A DRINK CONTAINING ALCOHOL: 0
EVER_SMOKED: NEVER

## 2019-10-21 NOTE — PROGRESS NOTES
Pt AOX 4. Pt rates pain 2/10. Tylenol given. Pt is direct admit for chemotherapy. Pt awaiting placement US guided PIV. Radiation today. R sided weakness after stroke. 2 RN's skin check done. Skin intact. Last Bm today. Pt up with 1 p assist. Bed alarm on. Waffle overlay in place. Fall precaution in place. POC discussed with pt, all questions answered at this time. Pt makes needs known, call light within reach, hourly rounding in place.

## 2019-10-21 NOTE — PROGRESS NOTES
Patient transported to our department for radiation therapy treatment number 16 of 27 to her pelvis. We will treat her again tomorrow.

## 2019-10-21 NOTE — PROGRESS NOTES
Chemotherapy Verification - SECONDARY RN  Cycle 2 Day 1       Height = 162.6 cm  Weight = 84.4 kg  BSA = 1.95 m2       Medication: Cisplatin  Dose: 30 mg/m2  Calculated Dose: 58.5 mg (57 mg ordered)                             (In mg/m2, AUC, mg/kg)       I confirm that this process was performed independently.

## 2019-10-21 NOTE — PROGRESS NOTES
Chemotherapy Verification - PRIMARY RN    Cycle 2 Day 1    Height = 162.6 cm  Weight = 84.4 kg  BSA = 1.95 m2       Medication: Cisplatin  Dose: 30 mg/m2  Calculated Dose: 58.5 mg Ordered dose: 57 mg < 10 % difference                             (In mg/m2, AUC, mg/kg)           I confirm this process was performed independently with the BSA and all final chemotherapy dosing calculations congruent.  Any discrepancies of 10% or greater have been addressed with the chemotherapy pharmacist. The resolution of the discrepancy has been documented in the EPIC progress notes.

## 2019-10-21 NOTE — H&P
CHIEF COMPLAINT:  Mrs. Rivas presents for inpatient chemotherapy    HISTORY:  Mrs. Rivas is a very pleasant 69 year old [id number]  female whose LMP was unknown. She has a past medical history significant for CVA with subsequent right sided hemiplegia, iron deficiency anemia, and HTN. She presented to the ER in 2019 secondary to 3 days of heavy PMB. She was admitted for anemia with hemoglobin of 8-9 during her hospital stay. She did not require blood transfusion. TVUS completed at [facility] during her stay showed enlarged uterus measuring 6.2 x 12.1 x 7.7 cm with a heterogeneous lesion measuring 1.4 x 0.8 cm and endometrial stripe of 1.2 cm. She was seen by Dr. Chicas, and was taken to surgery on 19 for hysteroscopy and D&C. Intraoperatively, she was found to have a 16 week size uterus. Final pathology showed fragments of FIGO grade 1 endometrioid bob and ROXANA. Based on these findings, she was referred to us for further evaluation.     She was seen for consultation on 7/10/19. She subsequently underwent a sentinel lymph node injection, robotic sentinel lymph node dissection, robotic hysterectomy with bilateral salpingo-oophorectomy and cystoscopy with left ureteral stent insertion. Pathology results showed stage IIIB endometrial cancer. ER is negative and [place] is positive. Hypermethylation is positive. Her CA-125 was 84.9 on 19.     It was recommended that she undergo PORTEC 3 regimen of concurrent chemotherapy/radiation. She began radiation on 19 and was admitted for cisplatin on 10/03/19. Her CT scan from 10/01/19 showed no evidence of metastatic disease.     She was seen and examined at bedside. She tolerated cycle #1 well. Her nausea was well controlled with antinausea regimen. She denies any shortness of breath, tinnitus, vaginal bleeding, problems with bowel or bladder, or any new numbness or tingling. She did have some left sided abdominal pain that quickly resolved. She reports  chronic right-sided pain and numbness of foot following her stroke.    ROS:  Skin: Patient denies rashes, skin ulcers and skin problems.   Neurology: Patient reports numbness of fingers or toes; denies seizures and fainting.   Psychiatry: Patient denies depression and psychiatric problems. Endocrinology: Patient denies diabetes, thyroid problems and hot flashes.   Genitourinary: Patient denies blood in urine, pain with urination, vaginal discharge, vaginal bleeding, painful intercourse and loss of urine; especially w/coughing.   Hematology/Lymphatic: Patient denies bruising easily and bleeding gums.   ENT: Patient denies mouth ulcers and cold symptoms (cough, runny nose, sore throat).   Cardiovascular: Patient denies chest pain, shortness of breath while lying flat and shortness of breath while walking or climbing stairs. Respiratory: Patient denies shortness of breath at rest and any wheezing (difficulty breathing).   Gastrointestinal: Patient reports nausea; denies vomiting, diarrhea and bloody stools.   Musculoskeletal: Patient reports muscle weakness and arthritis/painful joints.   Constitutional: Patient reports fatigue; denies weight change and change in vision.    MEDS/ALLERGIES:  MEDICATIONS: Aspirin 81 mg tablet 1 Tablet PO once a day Iron 100 250 mg-100 mg tablet 1 Tablet PO once a day losartan 100 mg tablet 1 Tablet PO once a day amitriptyline 25 mg tablet 1 Tablet PO once a day Acetaminophen 500 mg tablet 2 Tablet PO PRN Co Q-10 100 mg capsule 1 Caplet PO once a day Baclofen 10 mg tablet 1 Tablet PO once a day Norethindrone 5 mg tablet 1 Tablet PO once a day Vitamin D3 2000 intl units tablet 1 Tablet PO once a day atorvastatin 20 mg tablet 1 Tablet PO once a day Ascorbic Acid 500 mg tablet 1 Tablet PO once a day AmLODIPine 5 mg tablet 1 Tablet PO once a day    ALLERGIES: penicillin -- HIVES Levaquin -- HIVES HYDROcodone -- HIVES traMADol -- HIVES    PROBLEM LIST:   07/08/2019 I63.9(10) Cerebral  infarction, unspecified   07/08/2019 C54.1(10) Malignant neoplasm of endometrium   07/08/2019 N85.00(10) Endometrial hyperplasia, unspecified 07/08/2019 D64.9(10) Anemia, unspecified   07/08/2019 M13.80(10) Other specified arthritis, unspecified site 07/08/2019 R03.0(10) Elevated blood-pressure reading, w/o diagnosis of htn   07/08/2019 G43.909(10) Migraine, unsp, not intractable, without status migrainosus   07/08/2019 F32.9(10) Major depressive disorder, single episode, unspecified   07/08/2019 F41.9(10) Anxiety disorder, unspecified     SURGICAL HISTORY:  08/08/2019 TLH W/T/O UTERUS OVER 250 G, ROBOTIC LSC HYS/BSO OVER 250 G   08/08/2019 LAPAROSCOPY LYMPH NODE BIOP   08/08/2019 IO MAP OF SENT LYMPH NODE [Bilateral] 08/08/2019 CYSTOSCOPY AND TREATMENT   08/08/2019 FUSION OF URETERS (lsc)   06/19/2019 D&C    FAMILY HISTORY:  Positive For Hypertension Negative For Cancer    SOCIAL HISTORY:  She is . Denies any alcohol, tobacco, or recreational drug use.        EXAM:  VITALS: temp 36.9, /85, HR 94, Resp. 18, pulse ox 95% RA  GENERAL - Well developed, well nourished female in no obvious acute distress. Wheelchair bound.   HEENT - Within normal limits, no sclera icterus, no pallor of conjunctiva.   NECK - Supple, no thyroid masses noted, no supraclavicular or cervical adenopathy.   SKIN - No lesions, rashes, or tenting.   CARDIAC - regular rate and rhythm, no murmur, clicks, or gallops. LUNGS - Clear to auscultation bilaterally, no rales, rhonci, or wheezes. ABDOMEN - soft, non-tender, non-distended, no palpable masses, no hepatosplenomegaly, no inguinal lymphadenopathy.   PELVIS - Deferred.   HERNIAS - No evidence of hernias.   EXTREMITIES - No clubbing, cyanosis, edema, nontender, 2+ pedal pulses.  MUSCLE - normal tone.   NEURO - grossly intact.    ASSESSMENT:    1. Stage IIIB grade 1 endometrial cancer s/p RSLND/RH/BSO/Left ureteral stent placement on 8/8/19 CA-125 84.9   2. History of CVA, stable  3.  History of HTN, stable, continue home meds  4. History of anxiety, stable    PLAN:    Patient to be admitted today for Day #22 Cisplatin. Lab work reviewed. Plan to proceed with chemotherapy today. Home medications have all been continued as necessary.     Thank you again for the opportunity for allowing me to participate in her care, if you should have any questions or if I could be of any help to you in the future, please do not hesitate to call. Sincerely yours, Jann Garcia M.D.

## 2019-10-21 NOTE — PROGRESS NOTES
"Pharmacy Chemotherapy Verification:     Patient Name: Jairo Rivas   Dx: Endometrial Cancer          Protocol: CISplatin/XRT followed by CARBOplatin/PACLItaxel        *Dosing Reference*  CISplatin 50 mg/m2 on Day 1    -10/3/19 Dose reduced to 30 mg/m2 for age per TREVON GERMAN.   Repeat Q21 days x 2 cycles in combination with XRT  ~followed by~   CARBOplatin AUC 5 IV over 30 min on Day  1   PACLItaxel 175 mg/m2 IV over 3 hours on Day 1   Repeat 27 days x 4 cycles   Joe SMITH, et al. Adjuvant chemoradiotherapy versus radiotherapy alone for women with high-risk endometrial cancer (PORTEC-3): final results of an international, open-label, multicentre, randomised, phase 3 trial. Lancet Oncol. 2018 Mar;19(3):295-309.     Allergies:  Penicillins; Gluten meal; Hydrocodone; Iodine; Levaquin; and Tramadol       /85   Pulse 94   Temp 36.9 °C (98.4 °F) (Temporal)   Resp 18   Ht 1.626 m (5' 4\")   Wt 84.4 kg (186 lb 1.1 oz)   SpO2 95%   BMI 31.94 kg/m²  Body surface area is 1.95 meters squared.    Labs 10/21/19:  ANC~ 2100 Plt = 162k   Hgb = 11.9 SCr = 0.69mg/dL CrCl ~ 104 mL/min (min SCr 0.7 mg/dL) LFTs/Tbili = WNL/0.3  Mag = 1.8  K+ = 3.3 (replacement per MD)      Drug Order   (Drug name, dose, route, IV Fluid & volume, frequency, number of doses) Cycle 2      Previous treatment: C1 = 10/03/19     Medication = CISplatin (Platinol)  Base Dose= 30 mg/m2  Calc Dose: Base Dose x 1.94 m2 = 58.2 mg  Final Dose = 57 mg   Route = IV  Fluid & Volume =  mL  Admin Duration = Over 60 min           < 10 % difference, ok to treat with final dose       By my signature below, I confirm this process was performed independently with the BSA and all final chemotherapy dosing calculations congruent. I have reviewed the above chemotherapy order and that my calculation of the final dose and BSA (when applicable) corroborate those calculations of the  pharmacist. Discrepancies of 10% or greater in the " written dose have been addressed and documented within the EPIC Progress notes.    Erick Corona, PharmD, BCOP

## 2019-10-21 NOTE — PROGRESS NOTES
"Pharmacy Chemotherapy Verification    Patient Name: Jairo Rivas  Dx: Endometrial CA    Cycle: 2   Previous treatment = C1 = 10/3/19    Regimen and Dosing Reference  CISplatin 50 mg/m2 IV on Day 1   -Dose reduced to 30 mg/m2 for age and anticipated tolerance  Q21 days x 2 cycles with XRT  Followed by   CARBOplatin AUC 5 IV over 30 minutes on Day 1  PACLitaxel 175 mg/m2 IV over 3 hours on Day 1  Q28d x 4 cycles  emma CRUZ et al. Adjuvant chemoradiotherapy versus radiotherapy alone for women with high-risk endometrial cancer (PORTEC-3): final results of an international, open-label, multicentre, randomised, phase 3 trial. Lancet Oncol. 2018 Mar;19(3):295-309.    Allergies:Penicillins; Gluten meal; Hydrocodone; Iodine; Levaquin; and Tramadol  /85   Pulse 94   Temp 36.9 °C (98.4 °F) (Temporal)   Resp 18   Ht 1.626 m (5' 4\")   Wt 84.4 kg (186 lb 1.1 oz)   SpO2 95%   BMI 31.94 kg/m²   Body surface area is 1.95 meters squared.    All lab results 10/21/19 within treatment parameters. RN to call MD for potassium replacement orders.      CISplatin 30 mg/m2 x 1.95m2 = 58.5mg   <10% difference, OK to treat with final dose = 57 mg IV    RACHEL Downs Pharm.D.      "

## 2019-10-22 ENCOUNTER — HOSPITAL ENCOUNTER (OUTPATIENT)
Dept: RADIATION ONCOLOGY | Facility: MEDICAL CENTER | Age: 69
End: 2019-10-22

## 2019-10-22 VITALS
RESPIRATION RATE: 20 BRPM | SYSTOLIC BLOOD PRESSURE: 141 MMHG | OXYGEN SATURATION: 93 % | BODY MASS INDEX: 31.77 KG/M2 | HEART RATE: 93 BPM | DIASTOLIC BLOOD PRESSURE: 77 MMHG | WEIGHT: 186.07 LBS | HEIGHT: 64 IN | TEMPERATURE: 98 F

## 2019-10-22 LAB
ANION GAP SERPL CALC-SCNC: 9 MMOL/L (ref 0–11.9)
BASOPHILS # BLD AUTO: 0.2 % (ref 0–1.8)
BASOPHILS # BLD: 0.01 K/UL (ref 0–0.12)
BUN SERPL-MCNC: 11 MG/DL (ref 8–22)
CALCIUM SERPL-MCNC: 8.6 MG/DL (ref 8.5–10.5)
CHEMOTHERAPY INFUSION START DATE: NORMAL
CHEMOTHERAPY RECORDS: 1.8
CHEMOTHERAPY RECORDS: 4860
CHEMOTHERAPY RECORDS: NORMAL
CHEMOTHERAPY RX CANCER: NORMAL
CHLORIDE SERPL-SCNC: 110 MMOL/L (ref 96–112)
CO2 SERPL-SCNC: 22 MMOL/L (ref 20–33)
CREAT SERPL-MCNC: 0.66 MG/DL (ref 0.5–1.4)
DATE 1ST CHEMO CANCER: NORMAL
EOSINOPHIL # BLD AUTO: 0 K/UL (ref 0–0.51)
EOSINOPHIL NFR BLD: 0 % (ref 0–6.9)
ERYTHROCYTE [DISTWIDTH] IN BLOOD BY AUTOMATED COUNT: 53.6 FL (ref 35.9–50)
GLUCOSE SERPL-MCNC: 120 MG/DL (ref 65–99)
HCT VFR BLD AUTO: 36.1 % (ref 37–47)
HGB BLD-MCNC: 11.8 G/DL (ref 12–16)
IMM GRANULOCYTES # BLD AUTO: 0.04 K/UL (ref 0–0.11)
IMM GRANULOCYTES NFR BLD AUTO: 0.7 % (ref 0–0.9)
LYMPHOCYTES # BLD AUTO: 0.11 K/UL (ref 1–4.8)
LYMPHOCYTES NFR BLD: 2 % (ref 22–41)
MCH RBC QN AUTO: 30.3 PG (ref 27–33)
MCHC RBC AUTO-ENTMCNC: 32.7 G/DL (ref 33.6–35)
MCV RBC AUTO: 92.8 FL (ref 81.4–97.8)
MONOCYTES # BLD AUTO: 0.2 K/UL (ref 0–0.85)
MONOCYTES NFR BLD AUTO: 3.6 % (ref 0–13.4)
NEUTROPHILS # BLD AUTO: 5.25 K/UL (ref 2–7.15)
NEUTROPHILS NFR BLD: 93.5 % (ref 44–72)
NRBC # BLD AUTO: 0 K/UL
NRBC BLD-RTO: 0 /100 WBC
PLATELET # BLD AUTO: 163 K/UL (ref 164–446)
PMV BLD AUTO: 9.7 FL (ref 9–12.9)
POTASSIUM SERPL-SCNC: 3.6 MMOL/L (ref 3.6–5.5)
RAD ONC ARIA COURSE LAST TREATMENT DATE: NORMAL
RAD ONC ARIA COURSE TREATMENT ELAPSED DAYS: NORMAL
RAD ONC ARIA REFERENCE POINT DOSAGE GIVEN TO DATE: 30.6
RAD ONC ARIA REFERENCE POINT DOSAGE GIVEN TO DATE: 30.89
RAD ONC ARIA REFERENCE POINT ID: NORMAL
RAD ONC ARIA REFERENCE POINT ID: NORMAL
RAD ONC ARIA REFERENCE POINT SESSION DOSAGE GIVEN: 1.8
RAD ONC ARIA REFERENCE POINT SESSION DOSAGE GIVEN: 1.82
RBC # BLD AUTO: 3.89 M/UL (ref 4.2–5.4)
SODIUM SERPL-SCNC: 141 MMOL/L (ref 135–145)
WBC # BLD AUTO: 5.6 K/UL (ref 4.8–10.8)

## 2019-10-22 PROCEDURE — 80048 BASIC METABOLIC PNL TOTAL CA: CPT

## 2019-10-22 PROCEDURE — 700102 HCHG RX REV CODE 250 W/ 637 OVERRIDE(OP): Performed by: SPECIALIST

## 2019-10-22 PROCEDURE — A9270 NON-COVERED ITEM OR SERVICE: HCPCS | Performed by: SPECIALIST

## 2019-10-22 PROCEDURE — A9270 NON-COVERED ITEM OR SERVICE: HCPCS | Mod: JG | Performed by: NURSE PRACTITIONER

## 2019-10-22 PROCEDURE — G0378 HOSPITAL OBSERVATION PER HR: HCPCS

## 2019-10-22 PROCEDURE — 96366 THER/PROPH/DIAG IV INF ADDON: CPT

## 2019-10-22 PROCEDURE — 77386 HCHG IMRT DELIVERY COMPLEX: CPT | Performed by: RADIOLOGY

## 2019-10-22 PROCEDURE — 85025 COMPLETE CBC W/AUTO DIFF WBC: CPT

## 2019-10-22 PROCEDURE — 700102 HCHG RX REV CODE 250 W/ 637 OVERRIDE(OP): Mod: JG | Performed by: NURSE PRACTITIONER

## 2019-10-22 PROCEDURE — 77014 PR CT GUIDANCE PLACEMENT RAD THERAPY FIELDS: CPT | Mod: 26 | Performed by: RADIOLOGY

## 2019-10-22 PROCEDURE — 36415 COLL VENOUS BLD VENIPUNCTURE: CPT

## 2019-10-22 RX ORDER — ONDANSETRON 4 MG/1
4 TABLET, FILM COATED ORAL EVERY 6 HOURS PRN
Qty: 30 TAB | Refills: 0 | Status: SHIPPED | OUTPATIENT
Start: 2019-10-22 | End: 2021-07-29

## 2019-10-22 RX ADMIN — ASPIRIN 81 MG 81 MG: 81 TABLET ORAL at 05:50

## 2019-10-22 RX ADMIN — POTASSIUM CHLORIDE 40 MEQ: 1500 TABLET, EXTENDED RELEASE ORAL at 12:11

## 2019-10-22 RX ADMIN — ACETAMINOPHEN 325 MG: 325 TABLET, FILM COATED ORAL at 00:03

## 2019-10-22 RX ADMIN — PROCHLORPERAZINE MALEATE 10 MG: 10 TABLET ORAL at 05:50

## 2019-10-22 RX ADMIN — BACLOFEN 10 MG: 10 TABLET ORAL at 12:11

## 2019-10-22 RX ADMIN — METOPROLOL SUCCINATE 100 MG: 50 TABLET, EXTENDED RELEASE ORAL at 05:44

## 2019-10-22 RX ADMIN — ACETAMINOPHEN 325 MG: 325 TABLET, FILM COATED ORAL at 08:26

## 2019-10-22 RX ADMIN — BACLOFEN 10 MG: 10 TABLET ORAL at 05:45

## 2019-10-22 ASSESSMENT — ENCOUNTER SYMPTOMS
NAUSEA: 0
FEVER: 0
VOMITING: 0
COUGH: 0
CHILLS: 0
SHORTNESS OF BREATH: 0
ABDOMINAL PAIN: 0

## 2019-10-22 NOTE — DISCHARGE INSTRUCTIONS
Discharge Instructions    Discharged to home by car with relative. Discharged via wheelchair, hospital escort: Yes.  Special equipment needed: Not Applicable    Be sure to schedule a follow-up appointment with your primary care doctor or any specialists as instructed.     Discharge Plan:   Diet Plan: Discussed  Activity Level: Discussed  Confirmed Follow up Appointment: Patient to Call and Schedule Appointment  Confirmed Symptoms Management: Discussed  Medication Reconciliation Updated: Yes  Influenza Vaccine Indication: Patient Refuses    I understand that a diet low in cholesterol, fat, and sodium is recommended for good health. Unless I have been given specific instructions below for another diet, I accept this instruction as my diet prescription.   Other diet: regular    Special Instructions: None    · Is patient discharged on Warfarin / Coumadin?   No     Depression / Suicide Risk    As [facility] are [facility], it is important to learn how to keep safe from harming yourself.    Recognize the warning signs:  · Abrupt changes in personality, positive or negative- including increase in energy   · Giving away possessions  · Change in eating patterns- significant weight changes-  positive or negative  · Change in sleeping patterns- unable to sleep or sleeping all the time   · Unwillingness or inability to communicate  · Depression  · Unusual sadness, discouragement and loneliness  · Talk of wanting to die  · Neglect of personal appearance   · Rebelliousness- reckless behavior  · Withdrawal from people/activities they love  · Confusion- inability to concentrate     If you or a loved one observes any of these behaviors or has concerns about self-harm, here's what you can do:  · Talk about it- your feelings and reasons for harming yourself  · Remove any means that you might use to hurt yourself (examples: pills, rope, extension cords, firearm)  · Get professional help from the community  (Mental Health, Substance Abuse, psychological counseling)  · Do not be alone:Call your Safe Contact- someone whom you trust who will be there for you.  · Call your local CRISIS HOTLINE 964-3181 or 591-071-7954  · Call your local Children's Mobile Crisis Response Team [place] (513) 377-9895 or www.[place]  · Call the toll free National Suicide Prevention Hotlines   · National Suicide Prevention Lifeline 460-568-UTLX (7361)  · National Hope Line Network 800-SUICIDE (374-9238)    1. Patient to take Dexamethasone 4 mg every 3 hours and alternate with Zofran 4 mg every 3 hours, for example take Dexamethasone 4 mg @ 08:00 and then Zofran 4 mg @ 11:00 am then dexamethasone 4 mg  @ 14:00 then Zofran @ 17:00 etc.  You do not need to get up in the middle of the night to take these meds unless you are nauseous.  Take these mediations from day #2- Day #5 post chemotherapy. If you still have nausea and vomiting despite taking this medications please notify us.   2. Return to Dr. Garcia office on 11/19/19 at 8 am for treatment planning of systemic chemotherapy      Infection Control in the Home  If you have an infection or you are taking care of someone who has an infection, it is important to know how to keep the infection from spreading. Follow these guidelines to help stop the spread of infection, and talk to your health care provider.  HOW ARE INFECTIONS SPREAD?  In order for an infection to spread, the following must be present:  · A germ. This may be a virus, bacteria, fungus, or parasite.  · A place for the germ to live. This may be:  ¨ On or in a person, animal, plant, or food.  ¨ In soil or water.  ¨ On surfaces, such as a door handle.  · A susceptible host. This is a person or animal who does not have resistance (immunity) to the germ.  · A way for the germ to enter the host. This may occur by:  ¨ Direct contact. This may happen by making contact--such as shaking hands or hugging--with an infected person or  animal. Some germs can also travel through the air and spread to you if an infected person coughs or sneezes on you or near you.  ¨ Indirect contact. This is when the germ enters the host through contact with an infected object. Examples include eating contaminated food, drinking contaminated water, or touching a contaminated surface with your hands and then touching your face, nose, or mouth soon after that.  HOW CAN I HELP TO PREVENT INFECTION FROM SPREADING?  There are several things that you can do to help prevent infection from spreading.  Hand Washing  It is very important to wash your hands correctly, following these steps:  2. Wet your hands with clean, running water.  3. Apply soap to your hands. Liquid soap is better than bar soap.  4. Rub your hands together quickly to create lather.  5. Keep rubbing your hands together for at least 20 seconds. Thoroughly scrub all parts of your hands, including under your fingernails and between your fingers.  6. Rinse your hands with clean, running water until all of the soap is gone.  7. Dry your hands with an air dryer or a clean paper or cloth towel, or let your hands air-dry. Do not use your clothing or a soiled towel to dry your hands.  8. If you are in a public restroom, use your towel to turn off the water faucet and to open the bathroom door.  Make sure to wash your hands:  · Before:  ¨ Visiting a baby or anyone with a weakened or lowered defense (immune) system.  ¨ Putting in and taking out any contact lenses.  · After:  ¨ Working or playing outside.  ¨ Touching an animal or its toys or leash.  ¨ Handling livestock.  ¨ Using the bathroom or helping a child or adult to use the bathroom.  ¨ Using household  or toxic chemicals.  ¨ Touching or taking out the garbage.  ¨ Touching anything dirty around your home.  ¨ Handling soiled clothes or rags.  ¨ Taking care of a sick child. This includes touching used tissues, toys, and clothes.  ¨ Sneezing, coughing,  or blowing your nose.  ¨ Using public transportation.  ¨ Shaking hands.  ¨ Using a phone, including your mobile phone.  ¨ Touching money.  · Before and after:  ¨ Preparing food.  ¨ Preparing a bottle for a baby.  ¨ Feeding a baby or a young child.  ¨ Eating.  ¨ Visiting or taking care of someone who is sick.  ¨ Changing a diaper.  ¨ Changing a bandage (dressing) or taking care of an injury or wound.  ¨ Giving or taking medicine.  Taking Care of Your Home  · Make sure that you have enough cleaning supplies at all times. These include:  ¨ Disinfectants.  ¨ Reusable cleaning cloths. Wash these after each use.  ¨ Paper towels.  ¨ Utility gloves. Replace your gloves if they are cracked or torn or if they start to peel.  · Use bleach safely. Never mix it with other cleaning products, especially those that contain ammonia. This mixture can create a dangerous gas that may be deadly.  · Take care of your cleaning supplies. Toilet brushes, mops, and sponges can breed germs. Soak them in bleach and water for 5 minutes after each use.  · Do not pour used mop water down the sink. Pour it down the toilet instead.  · Maintain proper ventilation in your home.  · If you have a pet, ensure that your pet stays clean. Do not let people with weak immune systems touch bird droppings, fish tank water, or a litter box.  ¨ If you have a cat, be sure to change the litter every day.  · In the bathroom, make sure you:  ¨ Provide liquid soap.  ¨ Change towels and washcloths frequently. Avoid sharing towels and washcloths.  ¨ Change toothbrushes often and store them separately in a clean, dry place.  ¨ Disinfect the toilet.  ¨ Clean the tub, shower, and sink with standard cleaning products.    ¨ Mop the floor with a standard .  ¨ Do not share personal items, such as razors, toothbrushes, drinking glasses, deodorant, valenzuela, brushes, towels, and washcloths.    · In the kitchen, make sure you:  ¨ Store food carefully.  ¨ Refrigerate  leftovers promptly in covered containers.  ¨ Throw out stale or spoiled food.  ¨ Clean the inside of your refrigerator each week.  ¨ Keep your refrigerator set at 40°F (4°C) or less, and set your freezer at 0°F (-18°C) or less.  ¨ Thaw foods in the refrigerator or microwave, not at room temperature.  ¨ Serve foods at the proper temperature. Do not eat raw meat. Make sure it is cooked to the appropriate temperature. Cook eggs until they are firm.  ¨ Wash fruits and vegetables under running water.  ¨ Use separate cutting boards, plates, and utensils for raw foods and cooked foods.  ¨ Keep work surfaces clean.  ¨ Use a clean spoon each time you sample food while cooking.  ¨ Wash your dishes in hot, soapy water. Air-dry your dishes or use a .  ¨ Do not share forks, cups, or spoons during meals.  · Wear gloves if laundry is visibly soiled.  · Change linens each week or whenever they are soiled.  · Do not shake soiled linens. Doing that may send germs into the air. Put dressings, sanitary or incontinence pads, diapers, and gloves in plastic garbage bags for disposal.     This information is not intended to replace advice given to you by your health care provider. Make sure you discuss any questions you have with your health care provider.     Document Released: 09/26/2009 Document Revised: 01/08/2016 Document Reviewed: 08/20/2015  [employer] Interactive Patient Education ©2016 [employer]        Chemotherapy  Introduction  Chemotherapy is the use of medicines to stop or slow the growth of cancer cells. Depending on the type and stage of your cancer, you may have chemotherapy to:  · Cure your cancer.  · Slow the progression of your cancer.  · Ease your cancer symptoms.  · Improve the benefits of radiation treatment.  · Shrink a tumor before surgery.  · Rid the body of cancer cells that remain after a tumor is surgically removed.  How is chemotherapy given?  Chemotherapy may be given:  · By mouth in liquid or pill  form.  · Through a thin tube that is inserted into a vein or artery.  · By getting a shot.  · By rubbing a cream or ointment on your skin.  · Through liquids that are placed directly into various areas of the body, such as the abdomen, chest, or bladder.  How often is chemotherapy given?  Chemotherapy may be given continuously over time, or it may be given in cycles. For example, you may take the medicine for one week out of every month.  For how long will I need chemotherapy treatments?  The length of treatment depends on many factors, including:  · The type of cancer.  · Whether the cancer has spread.  · How you respond to the chemotherapy.  · Whether you develop side effects.  Some types of chemotherapy medicine are given only one time. Others are given for months, years, or for life.  What safety precautions must I take while on chemotherapy?  Chemotherapy medicines are very strong. They will be in all of your bodily fluids, including your urine, stool, saliva, sweat, tears, vaginal secretions, and semen. You must carefully follow some safety precautions to prevent harm to others while you are using these medicines. Here are some recommended precautions:  · Make sure that people who help care for you wear disposable gloves if they are going to come into contact with any of your bodily fluids. Women who are pregnant or breastfeeding should not handle any of your bodily fluids.  · Wash any clothes, towels, and linens that may have your bodily fluids on them twice in a washing machine using very hot water.  · Dispose of adult diapers, tampons, and sanitary napkins by first sealing them in a plastic bag.  · Use a condom when having sex for at least 2 weeks after receiving your chemotherapy.  · Do not share beverages or food.  · Keep your chemotherapy medicines in their original bottles. Keep them in a high, safe location, away from children. Do not expose them to heat or moisture. Do not put them in containers with  other types of medicines.  · Dispose of all wrappers for your chemotherapy medicines by sealing them in a separate plastic bag.  · Do not throw away extra medicine, and do not flush it down the toilet. Take medicine that you are not going to use to your health care provider's office where it can be disposed of properly.  · Follow your health care provider’s directions for the proper disposal of needles, IV tubing, and other medical supplies that have come into contact with your chemotherapy medicines.  · If you are issued a hazardous waste container, make sure you understand the directions for using it.  · Wash your hands thoroughly with warm water and soap after using the bathroom. Dry your hands with disposable paper towels.  · When using the toilet:  ¨ Flush it twice after each use, including after vomiting.  ¨ Close the lid of the toilet prior to flushing. This helps to avoid splashing.  ¨ Both men and women should sit to use the toilet. This helps avoid splashing.  What are the side effects of chemotherapy?  Side effects depend on a variety of factors, including:  · The specific type of chemotherapy medicine used.  · The dosage.  · How long the medicine is used for.  · Your overall health.  Some of the side effects you may experience include:  · Fatigue and decreased energy.  · Decreased appetite.  · Changes in your sense of smell or taste.  · Nausea.  · Vomiting.  · Constipation or diarrhea.  · Hair loss.  · Increased susceptibility to infection.  · Easy bleeding.  · Mouth sores.  · Burning or tingling in the hands or feet.  · Memory problems.  This information is not intended to replace advice given to you by your health care provider. Make sure you discuss any questions you have with your health care provider.  Document Released: 10/14/2008 Document Revised: 07/07/2017 Document Reviewed: 05/26/2015  © 2017 [employer]

## 2019-10-22 NOTE — PROGRESS NOTES
GYN/Oncology Progress Note               Author: Ivy SUBRAMANIAN Ivan Date & Time created: 10/22/2019  9:13 AM     Interval History:  Patient denies any N/V, feeling a little weak and having baseline pain on right side (stroke affected side)    Review of Systems:  Review of Systems   Constitutional: Negative for chills and fever.   Respiratory: Negative for cough and shortness of breath.    Cardiovascular: Negative for chest pain and leg swelling.   Gastrointestinal: Negative for abdominal pain, nausea and vomiting.   Genitourinary: Negative for dysuria, frequency and urgency.       Physical Exam:  Physical Exam   Constitutional: She is oriented to person, place, and time. She appears well-developed and well-nourished. No distress.   Pulmonary/Chest: Effort normal.   Abdominal: Soft.   Musculoskeletal: She exhibits no edema.   Neurological: She is alert and oriented to person, place, and time.   Baseline right sided weakness     Skin: Skin is warm and dry.       Labs:          Recent Labs     10/21/19  1021   SODIUM 144   POTASSIUM 3.3*   CHLORIDE 108   CO2 30   BUN 11   CREATININE 0.69   MAGNESIUM 1.8   CALCIUM 9.2     Recent Labs     10/21/19  1021   ALTSGPT 34   ASTSGOT 21   ALKPHOSPHAT 70   TBILIRUBIN 0.3   GLUCOSE 96     Recent Labs     10/21/19  1021   RBC 4.03*   HEMOGLOBIN 11.9*   HEMATOCRIT 38.0   PLATELETCT 162*     Recent Labs     10/21/19  1021   WBC 2.9*   NEUTSPOLYS 75.10*   LYMPHOCYTES 9.80*   MONOCYTES 10.50   EOSINOPHILS 3.50   BASOPHILS 0.70   ASTSGOT 21   ALTSGPT 34   ALKPHOSPHAT 70   TBILIRUBIN 0.3     Recent Labs     10/21/19  1021   SODIUM 144   POTASSIUM 3.3*   CHLORIDE 108   CO2 30   GLUCOSE 96   BUN 11   CREATININE 0.69   CALCIUM 9.2     Hemodynamics:  Temp (24hrs), Av.8 °C (98.2 °F), Min:36.5 °C (97.7 °F), Max:37.2 °C (98.9 °F)  Temperature: 36.7 °C (98 °F)  Pulse  Av.3  Min: 85  Max: 98   Blood Pressure : 141/77     Respiratory:    Respiration: 20, Pulse Oximetry: 93 %            Fluids:    Intake/Output Summary (Last 24 hours) at 10/22/2019 0913  Last data filed at 10/22/2019 0825  Gross per 24 hour   Intake 2960 ml   Output 3630 ml   Net -670 ml     Weight: 84.4 kg (186 lb 1.1 oz)  GI/Nutrition:  Orders Placed This Encounter   Procedures   • Diet Order Regular     Standing Status:   Standing     Number of Occurrences:   1     Order Specific Question:   Diet:     Answer:   Regular [1]     Medical Decision Making, by Problem:  There are no active hospital problems to display for this patient.      Plan:  HD #2    1. Stage IIIB grade 1 endometrial cancer s/p RSLND/RH/BSO/Left ureteral stent placement on 8/8/19 CA-125 84.9,  now 5.2, currently undergoing concurrent chemo/radiation. S/p day #22 cisplatin, tolerated treatment well.  2. Hypokalemia- K 3.3, replaced with oral, repeat 3.6, resolved  3. History of CVA, stable  4. History of HTN, stable, continue home meds  5. History of anxiety, stable  6. Discharge planning- d/c home today. RTO 11/19 for treatment discussion of systemic chemotherapy    Case discussed with Dr. Leos     Quality-Core Measures

## 2019-10-22 NOTE — PROGRESS NOTES
Patient was transported to our department for radiation therapy treatment number 17 of 27 to her pelvis.

## 2019-10-22 NOTE — PROGRESS NOTES
Pt discharged home with son and daughter. Discharged instructions given( chemo precautions, when to call, next Md michel), PIV removed.

## 2019-10-22 NOTE — PROGRESS NOTES
Pt complaint about being dizzy after administration of Decadron and walk to the bathroom. /72, HR 96, 96% on RA. NPTiffany  notified. Per Ivy ok to proceed with Cisplatin if pt BP down and pt not feeling dizzy. Pt /72 and pt not dizzy before administration of Cisplatin.

## 2019-10-22 NOTE — PROGRESS NOTES
Pt A&Ox4. Up to the commode with assistance of one. Pt son at bedside. Denies any pain or nausea. Blood pressure stable. No dizziness. Right sided weakness with numbness to toes on right foot. Finished cisplatin infusion, tolerated well. Mannitol infusing per MAR, left PIV patent. Pt calls appropriately for needs. All needs met at this moment.

## 2019-10-22 NOTE — CARE PLAN
Problem: Communication  Goal: The ability to communicate needs accurately and effectively will improve  Outcome: PROGRESSING AS EXPECTED  Intervention: Educate patient and significant other/support system about the plan of care, procedures, treatments, medications and allow for questions  Note:   Pt updated about POC- DC home today after radiation.      Problem: Safety  Goal: Will remain free from falls  Outcome: PROGRESSING AS EXPECTED  Intervention: Implement fall precautions  Flowsheets  Taken 10/22/2019 0825  Environmental Precautions: Treaded Slipper Socks on Patient;Personal Belongings, Wastebasket, Call Bell etc. in Easy Reach;Transferred to Stronger Side;Report Given to Other Health Care Providers Regarding Fall Risk;Bed in Low Position;Mobility Assessed & Appropriate Sign Placed;Communication Sign for Patients & Families  Taken 10/22/2019 0902  Chair/Bed Strip Alarm: Yes - Alarm On  Note:   Bed alarm on, A&O x4, slipper socks, bed locked and in low position, call light and personal belongings with reach, report given to CNA, appropriate signs outside door, hourly rounding in place.       Problem: Pain Management  Goal: Pain level will decrease to patient's comfort goal  Outcome: PROGRESSING AS EXPECTED  Intervention: Follow pain managment plan developed in collaboration with patient and Interdisciplinary Team  Note:   Pt rates pain 2/10. Tylenol given.

## 2019-10-22 NOTE — PROGRESS NOTES
Assumed care of pt @0700. Bedside report received. Pt AOX 4. Pt rates pain 2/10. Tylenol given. Pt denies nausea and SOB. Dc home today. PIV patent SL. R sided weakness. Frequent urination. Last BM 10/21. Pt up with 1 p assist. Fall precaution in place. POC discussed with pt, all questions answered at this time. Pt makes needs known, call light within reach, hourly rounding in place.

## 2019-10-23 ENCOUNTER — HOSPITAL ENCOUNTER (OUTPATIENT)
Dept: RADIATION ONCOLOGY | Facility: MEDICAL CENTER | Age: 69
End: 2019-10-23

## 2019-10-23 VITALS
DIASTOLIC BLOOD PRESSURE: 85 MMHG | BODY MASS INDEX: 33.04 KG/M2 | OXYGEN SATURATION: 94 % | WEIGHT: 192.5 LBS | HEART RATE: 83 BPM | SYSTOLIC BLOOD PRESSURE: 151 MMHG | TEMPERATURE: 97.9 F

## 2019-10-23 DIAGNOSIS — C54.1 ENDOMETRIAL CANCER (HCC): ICD-10-CM

## 2019-10-23 LAB
CHEMOTHERAPY INFUSION START DATE: NORMAL
CHEMOTHERAPY RECORDS: 1.8
CHEMOTHERAPY RECORDS: 4860
CHEMOTHERAPY RECORDS: NORMAL
CHEMOTHERAPY RX CANCER: NORMAL
DATE 1ST CHEMO CANCER: NORMAL
RAD ONC ARIA COURSE LAST TREATMENT DATE: NORMAL
RAD ONC ARIA COURSE TREATMENT ELAPSED DAYS: NORMAL
RAD ONC ARIA REFERENCE POINT DOSAGE GIVEN TO DATE: 32.4
RAD ONC ARIA REFERENCE POINT DOSAGE GIVEN TO DATE: 32.71
RAD ONC ARIA REFERENCE POINT ID: NORMAL
RAD ONC ARIA REFERENCE POINT ID: NORMAL
RAD ONC ARIA REFERENCE POINT SESSION DOSAGE GIVEN: 1.8
RAD ONC ARIA REFERENCE POINT SESSION DOSAGE GIVEN: 1.82

## 2019-10-23 PROCEDURE — 77014 PR CT GUIDANCE PLACEMENT RAD THERAPY FIELDS: CPT | Mod: 26 | Performed by: RADIOLOGY

## 2019-10-23 PROCEDURE — 77386 HCHG IMRT DELIVERY COMPLEX: CPT | Performed by: RADIOLOGY

## 2019-10-23 PROCEDURE — 77336 RADIATION PHYSICS CONSULT: CPT | Performed by: RADIOLOGY

## 2019-10-23 ASSESSMENT — PAIN SCALES - GENERAL: PAINLEVEL: 7=MODERATE-SEVERE PAIN

## 2019-10-23 NOTE — PROGRESS NOTES
ON TREATMENT  NOTE  RADIATION ONCOLOGY DEPARTMENT    Patient name:  Jairo Rivas    Primary Physician:  ZULEYKA Lyons MRN: [id number]  CSN: [id number]   Referring physician:  Jann Garcia M.D. : 1950, 69 y.o.     ENCOUNTER DATE:  10/23/19    DIAGNOSIS:  Visit Diagnoses     ICD-10-CM   1. Endometrial cancer (HCC) C54.1     Endometrial cancer (HCC)  Staging form: Corpus Uteri - Carcinoma and Carcinosarcoma, AJCC 8th Edition  - Pathologic stage from 2019: FIGO Stage IIIB (pT3b, pN0, cM0) - Signed by Jayant EDOUARD M.D. on 2019  Histologic grade (G): G2  Histologic grading system: 3 grade system  Lymph-vascular invasion (LVI): LVI not present (absent)/not identified  Residual tumor (R): R1 - Microscopic  Histopathologic type: Endometrioid adenocarcinoma, NOS  Diagnostic confirmation: Positive histology  Specimen type: Excision  Staged by: Managing physician      TREATMENT SUMMARY:  Radiation Therapy Episodes     Radiation Therapy: IMRT (2019)      Radiation Treatments     Plan Most Recent Treatment Date Elapsed Course Treatment Days Fractions Treated Prescribed Fraction Dose (cGy) Prescribed Total Dose (cGy)    Pelvis 10/23/2019 23 @ [id number] 18  180 4,860       Reference Point Most Recent Treatment Date Elapsed Course Treatment Days Most Recent Session Dose (cGy) Total Dose (cGy)    Pelvis 10/23/2019 23 @ [id number] 180 3,240    PelvisCP 10/23/2019 23 @ [id number] 182 3,271          SUBJECTIVE:  Fatigue, No diarrhea. Primary adjusted HTN med.      VITAL SIGNS:  KPS: 60, Requires occasional assistance, but is able to care for most of his personal needs (ECOG equivalent 2)  Encounter Vitals  Temperature: 36.6 °C (97.9 °F)  Blood Pressure : 151/85  Pulse: 83  Pulse Oximetry: 94 %  Weight: 87.3 kg (192 lb 8 oz)  Pain Score: 7=Moderate-Severe Pain  Pain Assessment 10/23/2019 10/16/2019 10/9/2019   Pain Assessment - - Acute Pain   Pain Score 7 7 8   Pain  Loc Generalized Generalized Back   What increases pain? - - upper back pain - nothing makes it worse   What decreases pain? - - tylenol   Some recent data might be hidden          PHYSICAL EXAM:  Physical Exam     Toxicity Assessment 10/23/2019 10/16/2019 10/9/2019 10/2/2019   Toxicity Assessment Female Pelvis Female Pelvis Female Pelvis Female Pelvis   Fatigue (lethargy, malaise, asthenia) Severe (e.g., decrease in performance status by 2 or more ECOG levels or 40% Karnofsky) or loss of ability to perform some activities Moderate (e.g., decrease in performance status by 1 ECOG level or 20% Karnofsky) or causing difficulty performing some activities Increased fatigue over baseline, but not altering normal activities Increased fatigue over baseline, but not altering normal activities   Radiation Dermatitis None None None None   Anorexia Oral intake significantly decreased Loss of appetite Loss of appetite Loss of appetite   Colitis None - - None   Constipation Requiring stool softener or dietary modification Requiring stool softener or dietary modification - Requiring stool softener or dietary modification   Dehydration None None None None   Diarrhea w/o Colostomy None Increase of <4 stools/day over pre-treatment None None   Flatulence - - None None   Nausea Oral intake significantly decreased Able to eat Able to eat Able to eat   Proctitis None - - None   Vomiting None None None None   RT - Pain due to RT None Moderate pain, pain or analgesics interfering with function, but not interfering with activities of daily living None None   Tumor Pain (onset or exacerbation of tumor pain due to treatment) None None None None   Dysuria (painful urination) None None None None   Urinary Frequency Increase in frequency up to 2x normal Normal Increase in frequency up to 2x normal Normal   Urinary Urgency None None Present None   Bladder Spasms Absent Absent - Absent   Incontinence None None - None   Urinary Retention Normal  Normal - Normal   Vaginitis (not due to infection) None - - None   Vaginal Bleeding None None - None   Vaginal Dryness - - - Normal       CURRENT MEDICATIONS:    Current Outpatient Medications:   •  ondansetron (ZOFRAN) 4 MG Tab tablet, Take 1 Tab by mouth every 6 hours as needed. Alternate q 3 h with Dex four four days after chemotherapy and longer if needed, Disp: 30 Tab, Rfl: 0  •  baclofen (LIORESAL) 10 MG Tab, Take 1 Tab by mouth 3 times a day. Indications: Muscle Spasticity, Disp: 90 Tab, Rfl: 0  •  amLODIPine (NORVASC) 5 MG Tab, Take 1.5 Tabs by mouth every bedtime., Disp: 135 Tab, Rfl: 2  •  dexamethasone (DECADRON) 4 MG Tab, Take 1 Tab by mouth every 6 hours., Disp: 30 Tab, Rfl: 0  •  senna-docusate (PERICOLACE OR SENOKOT S) 8.6-50 MG Tab, Take 1 Tab by mouth 2 times a day., Disp: , Rfl:   •  amitriptyline (ELAVIL) 25 MG Tab, TAKE 1 TABLET BY MOUTH EVERY DAY IN THE EVENING, Disp: 90 Tab, Rfl: 1  •  metoprolol SR (TOPROL XL) 100 MG TABLET SR 24 HR, Take 1 Tab by mouth every day for 90 days., Disp: 90 Tab, Rfl: 2  •  losartan (COZAAR) 100 MG Tab, Take 100 mg by mouth every day., Disp: , Rfl:   •  ascorbic acid (ASCORBIC ACID) 500 MG Tab, Take 500 mg by mouth every evening., Disp: , Rfl:   •  atorvastatin (LIPITOR) 20 MG Tab, Take 20 mg by mouth every evening., Disp: , Rfl:   •  Cholecalciferol (VITAMIN D3) 2000 UNIT Cap, Take 2,000 Units by mouth every day., Disp: , Rfl:   •  Coenzyme Q10 (COQ10) 100 MG Cap, Take 100 mg by mouth every evening., Disp: , Rfl:   •  multivitamin (THERAGRAN) Tab, Take 1 Tab by mouth every day., Disp: , Rfl:   •  acetaminophen (TYLENOL) 500 MG Tab, Take 1,000 mg by mouth every 6 hours as needed for Moderate Pain., Disp: , Rfl:   •  B Complex-C (SUPER B COMPLEX PO), Take 1 Tab by mouth every evening., Disp: , Rfl:   •  ferrous sulfate 325 (65 Fe) MG tablet, Take 1 Tab by mouth every day., Disp: 30 Tab, Rfl: 0  •  aspirin (ASA) 81 MG Chew Tab chewable tablet, Take 81 mg by mouth  every day., Disp: , Rfl:     LABORATORY DATA:   Lab Results   Component Value Date/Time    SODIUM 141 10/22/2019 11:23 AM    POTASSIUM 3.6 10/22/2019 11:23 AM    CHLORIDE 110 10/22/2019 11:23 AM    CO2 22 10/22/2019 11:23 AM    GLUCOSE 120 (H) 10/22/2019 11:23 AM    BUN 11 10/22/2019 11:23 AM    CREATININE 0.66 10/22/2019 11:23 AM       Lab Results   Component Value Date/Time    WBC 5.6 10/22/2019 11:23 AM    HEMOGLOBIN 11.8 (L) 10/22/2019 11:23 AM    HEMATOCRIT 36.1 (L) 10/22/2019 11:23 AM    MCV 92.8 10/22/2019 11:23 AM    PLATELETCT 163 (L) 10/22/2019 11:23 AM        RADIOLOGY DATA:  Ct-abdomen-pelvis With    Result Date: 9/30/2019 9/27/2019 3:18 PM HISTORY/REASON FOR EXAM:  Endometrial carcinoma restaging TECHNIQUE/EXAM DESCRIPTION: CT of the abdomen and pelvis with contrast. Contrast-enhanced helical scanning was obtained from the diaphragmatic domes through the pubic symphysis following the bolus administration of 100 mL of nonionic contrast (Omnipaque 350) without complication. Low dose optimization technique was utilized for this CT exam including automated exposure control and adjustment of the mA and/or kV according to patient size. COMPARISON: September 18 PET/CT. FINDINGS: The patient was unable to tolerate scanning with arms above the scan and this causes some artifact over the abdomen/pelvis The visualized lung bases show no consolidation. CT Abdomen: Ureters are mildly dilated left greater than the right. No calculus is seen. No bladder mass is noted. There is mild fat stranding the pelvis adjacent to the distal ureters but no abrupt caliber change is seen. No asymmetric enhancement of the kidneys. Hepatic too small to characterize hypodensities in both lobes No lymphadenopathy The liver, spleen, pancreas, gallbladder, adrenal glands enhance normally. There is no abnormal bowel dilatation or inflammatory change. There is no aneurysmal change of the aorta. CT Pelvis: No adenopathy is detected Uterus  and ovaries are surgically absent. There is some operative bed fat stranding but it is mild. No fluid collection is seen. Bladder contours are within normal limits No osteoblastic or lytic metastasis is seen throughout the study There is no free air or free fluid.     1.  Mild ureteral dilatation is nonspecific and could be due to highly hydrated state of patient. Partial obstruction is not excluded and correlation with ultrasound may prove helpful to evaluate ureteral jets. 2.  Hysterectomy, and oophorectomies and there is no evidence of metastasis 3.  Far too small to characterize hepatic hypodensities statistically most likely represent cysts    Dx-chest-2 Views    Result Date: 10/10/2019  10/9/2019 5:08 PM HISTORY/REASON FOR EXAM:  Chest Pain; right sided pleuritic chest pain. TECHNIQUE/EXAM DESCRIPTION AND NUMBER OF VIEWS: Two views of the chest. COMPARISON:  7/31/2019 FINDINGS: The heart is normal in size. No pulmonary infiltrates or consolidations are noted. No pleural effusions are appreciated. There is a mild thoracic dextroscoliosis. Spurs are present in the midthoracic spine.     No active disease.    Ir-us Guided Piv    Result Date: 10/21/2019  EXAMINATION:                                                                    HISTORY/REASON FOR EXAM:  Ultrasound Guided PIV.  TECHNIQUE/EXAM DESCRIPTION AND NUMBER OF VIEWS:  Peripheral IV insertion with ultrasound guidance.  The procedure was prepared using maximal sterile barrier technique including sterile gown, mask, cap, and donning of sterile gloves following appropriate hand hygiene and/or sterile scrub. Patient skin site was prepped with 2% Chlorhexidine solution.   FINDINGS: Peripheral IV insertion with Ultrasound Guidance was performed by qualified imaging nursing staff without the assistance of a Radiologist.      Ultrasound-guided PERIPHERAL IV INSERTION performed by qualified nursing staff as above.     Ir-us Guided Piv    Result Date:  10/3/2019  EXAMINATION:                                                                    HISTORY/REASON FOR EXAM:  Ultrasound Guided PIV.  TECHNIQUE/EXAM DESCRIPTION AND NUMBER OF VIEWS:  Peripheral IV insertion with ultrasound guidance.  The procedure was prepared using maximal sterile barrier technique including sterile gown, mask, cap, and donning of sterile gloves following appropriate hand hygiene and/or sterile scrub. Patient skin site was prepped with 2% Chlorhexidine solution.   FINDINGS: Peripheral IV insertion with Ultrasound Guidance was performed by qualified imaging nursing staff without the assistance of a Radiologist.      Ultrasound-guided PERIPHERAL IV INSERTION performed by qualified nursing staff as above.     Us-soft Tissues Of Head - Neck    Result Date: 10/17/2019  10/16/2019 4:48 PM HISTORY/REASON FOR EXAM:  Evaluate 2.9 cm thyroid nodule seen on PET scan TECHNIQUE/EXAM DESCRIPTION: Ultrasound of the soft tissues of the head and neck. COMPARISON:  PET CT 9/18/2019 FINDINGS: The thyroid gland is heterogeneous. Vascularity is normal. The right lobe of the thyroid gland measures 2.17 cm x 3.98 cm x 1.37 cm. The left lobe of the thyroid gland measures 1.50 cm x 5.43 cm x 1.41 cm. The isthmus measures 0.60 cm. Nodules >= 1cm: 6 - 10 Nodule #1 Location: Right inferior Size:  1.72 x 1.4 x 1.24 cm Composition:  Solid-2 Echogenicity:  Isoechoic-1 Shape:  Wider than tall-0 Margins:  Smooth-0 Echogenic Foci:  Microscopic-3 ACR TIRADS points/category:  6 - TR4 - Moderately Suspicious Nodule #2 Location:  Right  mid Size:  1.0 x 0.83 x 0.98 cm Composition:  Solid-2 Echogenicity:  Isoechoic-1 Shape:  Taller than wide-3 Margins:  Smooth-0 Echogenic Foci:  Microscopic-3 ACR TIRADS points/category:  9 - TR5 - Highly Suspicious Nodule #3 Location:  Right  mid Size:  1.14 x 1.26 x 1.06 cm Composition:  Solid-2 Echogenicity:  Isoechoic-1 Shape:  Wider than tall-0 Margins:  Smooth-0 Echogenic Foci:  None-0 ACR  TIRADS points/category:  3 - TR3 - Mildly Suspicious Nodule #4 Location:  Left  inferior Size:  2.52 x 2.43 x 2.54 cm Composition:  Solid-2 Echogenicity:  Hypoechoic-2 Shape:  Taller than wide-3 Margins:  Ill defined-0 Echogenic Foci:  None-0 ACR TIRADS points/category:  7 - TR5 - Highly Suspicious     Multinodular goiter. Largest nodule is in the inferior left lobe of the thyroid measuring 2.5 cm. Ultrasound-guided FNA is recommended ACR TI-RADS Recommendations TR5 - FNA recommended Recommendations based on the American College of Radiology Thyroid imaging, reporting and Data System (TI-RADS) 2017. INTERPRETING LOCATION: [address] [place] [address] [place], [id number]      IMPRESSION:  Cancer Staging  Endometrial cancer (HCC)  Staging form: Corpus Uteri - Carcinoma and Carcinosarcoma, AJCC 8th Edition  - Pathologic stage from 9/13/2019: FIGO Stage IIIB (pT3b, pN0, cM0) - Signed by Jayant EDOUARD M.D. on 9/13/2019      PLAN:  No change in treatment plan    Disposition:  Treatment plan reviewed. Questions answered. Continue therapy outlined.     Jayant EDOUARD M.D.    No orders of the defined types were placed in this encounter.

## 2019-10-23 NOTE — DISCHARGE SUMMARY
DATE OF ADMISSION:  10/21/2019    DATE OF DISCHARGE:  10/22/2019    ADMITTING DIAGNOSES:  1.  Stage IIIB grade I endometrial cancer.  2.  History of cerebrovascular accident.  3.  History of hypertension.  4.  History of anxiety.    DISCHARGE DIAGNOSES:  1.  Stage IIIB grade I endometrial cancer, currently undergoing concurrent   chemoradiation status post day #22 of inpatient cisplatin.  2.  Hypokalemia, resolved.  3.  History of cerebrovascular accident.  4.  History of hypertension.  5.  History of anxiety.    LABORATORY DATA:  White blood cell 5.6, hemoglobin 11.8, hematocrit 36.1, and   platelets 163.  Sodium 141, potassium 3.6, chloride 110, CO2 of 22, glucose   120, BUN 11, and creatinine 0.66.    THERAPEUTIC INTERVENTIONS:  IV chemotherapy of cisplatin.    HOSPITAL COURSE:  This patient was admitted on the above date for inpatient   chemotherapy due to multiple comorbidities.  She was admitted for pre- and   post-fluid hydration.  She tolerated treatment well.  She is to be discharged   home with close outpatient followup.    DISCHARGE MEDICATIONS:  1.  Okay to resume all home medications.  2.  Refill of Zofran 4 mg 1 tab p.o. q. 6 hours alternating with dexamethasone   was sent to pharmacy.    ACTIVITY:  As tolerated.    DIET:  As tolerated.       ____________________________________     JANEEN Hensley / DYLAN    DD:  10/22/2019 16:53:06  DT:  10/22/2019 17:23:17    D#:  [id number]  Job#:  [id number]

## 2019-10-24 ENCOUNTER — HOSPITAL ENCOUNTER (OUTPATIENT)
Dept: RADIATION ONCOLOGY | Facility: MEDICAL CENTER | Age: 69
End: 2019-10-24

## 2019-10-24 LAB
CHEMOTHERAPY INFUSION START DATE: NORMAL
CHEMOTHERAPY RECORDS: 1.8
CHEMOTHERAPY RECORDS: 4860
CHEMOTHERAPY RECORDS: NORMAL
CHEMOTHERAPY RX CANCER: NORMAL
DATE 1ST CHEMO CANCER: NORMAL
RAD ONC ARIA COURSE LAST TREATMENT DATE: NORMAL
RAD ONC ARIA COURSE TREATMENT ELAPSED DAYS: NORMAL
RAD ONC ARIA REFERENCE POINT DOSAGE GIVEN TO DATE: 34.2
RAD ONC ARIA REFERENCE POINT DOSAGE GIVEN TO DATE: 34.53
RAD ONC ARIA REFERENCE POINT ID: NORMAL
RAD ONC ARIA REFERENCE POINT ID: NORMAL
RAD ONC ARIA REFERENCE POINT SESSION DOSAGE GIVEN: 1.8
RAD ONC ARIA REFERENCE POINT SESSION DOSAGE GIVEN: 1.82

## 2019-10-24 PROCEDURE — 77386 HCHG IMRT DELIVERY COMPLEX: CPT | Performed by: RADIOLOGY

## 2019-10-24 PROCEDURE — 77014 PR CT GUIDANCE PLACEMENT RAD THERAPY FIELDS: CPT | Mod: 26 | Performed by: RADIOLOGY

## 2019-10-28 ENCOUNTER — HOSPITAL ENCOUNTER (OUTPATIENT)
Dept: RADIATION ONCOLOGY | Facility: MEDICAL CENTER | Age: 69
End: 2019-10-28

## 2019-10-28 LAB
CHEMOTHERAPY INFUSION START DATE: NORMAL
CHEMOTHERAPY RECORDS: 1.8
CHEMOTHERAPY RECORDS: 4860
CHEMOTHERAPY RECORDS: NORMAL
CHEMOTHERAPY RX CANCER: NORMAL
DATE 1ST CHEMO CANCER: NORMAL
RAD ONC ARIA COURSE LAST TREATMENT DATE: NORMAL
RAD ONC ARIA COURSE TREATMENT ELAPSED DAYS: NORMAL
RAD ONC ARIA REFERENCE POINT DOSAGE GIVEN TO DATE: 36
RAD ONC ARIA REFERENCE POINT DOSAGE GIVEN TO DATE: 36.34
RAD ONC ARIA REFERENCE POINT ID: NORMAL
RAD ONC ARIA REFERENCE POINT ID: NORMAL
RAD ONC ARIA REFERENCE POINT SESSION DOSAGE GIVEN: 1.8
RAD ONC ARIA REFERENCE POINT SESSION DOSAGE GIVEN: 1.82

## 2019-10-28 PROCEDURE — 77014 PR CT GUIDANCE PLACEMENT RAD THERAPY FIELDS: CPT | Mod: 26 | Performed by: RADIOLOGY

## 2019-10-28 PROCEDURE — 77386 HCHG IMRT DELIVERY COMPLEX: CPT | Performed by: RADIOLOGY

## 2019-10-28 PROCEDURE — 77427 RADIATION TX MANAGEMENT X5: CPT | Performed by: RADIOLOGY

## 2019-10-29 ENCOUNTER — HOSPITAL ENCOUNTER (OUTPATIENT)
Dept: RADIATION ONCOLOGY | Facility: MEDICAL CENTER | Age: 69
End: 2019-10-29

## 2019-10-29 LAB
CHEMOTHERAPY INFUSION START DATE: NORMAL
CHEMOTHERAPY RECORDS: 1.8
CHEMOTHERAPY RECORDS: 4860
CHEMOTHERAPY RECORDS: NORMAL
CHEMOTHERAPY RX CANCER: NORMAL
DATE 1ST CHEMO CANCER: NORMAL
RAD ONC ARIA COURSE LAST TREATMENT DATE: NORMAL
RAD ONC ARIA COURSE TREATMENT ELAPSED DAYS: NORMAL
RAD ONC ARIA REFERENCE POINT DOSAGE GIVEN TO DATE: 37.8
RAD ONC ARIA REFERENCE POINT DOSAGE GIVEN TO DATE: 38.16
RAD ONC ARIA REFERENCE POINT ID: NORMAL
RAD ONC ARIA REFERENCE POINT ID: NORMAL
RAD ONC ARIA REFERENCE POINT SESSION DOSAGE GIVEN: 1.8
RAD ONC ARIA REFERENCE POINT SESSION DOSAGE GIVEN: 1.82

## 2019-10-29 PROCEDURE — 77386 HCHG IMRT DELIVERY COMPLEX: CPT | Performed by: RADIOLOGY

## 2019-10-29 PROCEDURE — 77014 PR CT GUIDANCE PLACEMENT RAD THERAPY FIELDS: CPT | Mod: 26 | Performed by: RADIOLOGY

## 2019-10-30 ENCOUNTER — APPOINTMENT (OUTPATIENT)
Dept: RADIOLOGY | Facility: MEDICAL CENTER | Age: 69
End: 2019-10-30
Attending: EMERGENCY MEDICINE
Payer: MEDICARE

## 2019-10-30 ENCOUNTER — HOSPITAL ENCOUNTER (EMERGENCY)
Facility: MEDICAL CENTER | Age: 69
End: 2019-10-30
Attending: EMERGENCY MEDICINE
Payer: MEDICARE

## 2019-10-30 VITALS
HEIGHT: 64 IN | DIASTOLIC BLOOD PRESSURE: 84 MMHG | TEMPERATURE: 97.5 F | BODY MASS INDEX: 31.93 KG/M2 | SYSTOLIC BLOOD PRESSURE: 144 MMHG | HEART RATE: 86 BPM | OXYGEN SATURATION: 96 % | RESPIRATION RATE: 22 BRPM

## 2019-10-30 VITALS
BODY MASS INDEX: 31.93 KG/M2 | WEIGHT: 186 LBS | TEMPERATURE: 98 F | SYSTOLIC BLOOD PRESSURE: 149 MMHG | DIASTOLIC BLOOD PRESSURE: 83 MMHG | HEART RATE: 95 BPM | OXYGEN SATURATION: 97 %

## 2019-10-30 DIAGNOSIS — R07.89 CHEST WALL PAIN: ICD-10-CM

## 2019-10-30 DIAGNOSIS — C54.1 ENDOMETRIAL CANCER (HCC): ICD-10-CM

## 2019-10-30 LAB
ALBUMIN SERPL BCP-MCNC: 3.9 G/DL (ref 3.2–4.9)
ALBUMIN/GLOB SERPL: 1.3 G/DL
ALP SERPL-CCNC: 86 U/L (ref 30–99)
ALT SERPL-CCNC: 24 U/L (ref 2–50)
ANION GAP SERPL CALC-SCNC: 9 MMOL/L (ref 0–11.9)
AST SERPL-CCNC: 22 U/L (ref 12–45)
BASOPHILS # BLD AUTO: 0.4 % (ref 0–1.8)
BASOPHILS # BLD: 0.01 K/UL (ref 0–0.12)
BILIRUB SERPL-MCNC: 0.3 MG/DL (ref 0.1–1.5)
BUN SERPL-MCNC: 8 MG/DL (ref 8–22)
CALCIUM SERPL-MCNC: 9.2 MG/DL (ref 8.5–10.5)
CHEMOTHERAPY INFUSION START DATE: NORMAL
CHEMOTHERAPY RECORDS: 1.8
CHEMOTHERAPY RECORDS: 4860
CHEMOTHERAPY RECORDS: NORMAL
CHEMOTHERAPY RX CANCER: NORMAL
CHLORIDE SERPL-SCNC: 104 MMOL/L (ref 96–112)
CO2 SERPL-SCNC: 28 MMOL/L (ref 20–33)
CREAT SERPL-MCNC: 0.82 MG/DL (ref 0.5–1.4)
DATE 1ST CHEMO CANCER: NORMAL
EKG IMPRESSION: NORMAL
EOSINOPHIL # BLD AUTO: 0.03 K/UL (ref 0–0.51)
EOSINOPHIL NFR BLD: 1.3 % (ref 0–6.9)
ERYTHROCYTE [DISTWIDTH] IN BLOOD BY AUTOMATED COUNT: 55.2 FL (ref 35.9–50)
GLOBULIN SER CALC-MCNC: 3.1 G/DL (ref 1.9–3.5)
GLUCOSE SERPL-MCNC: 99 MG/DL (ref 65–99)
HCT VFR BLD AUTO: 38.1 % (ref 37–47)
HGB BLD-MCNC: 12.7 G/DL (ref 12–16)
IMM GRANULOCYTES # BLD AUTO: 0.02 K/UL (ref 0–0.11)
IMM GRANULOCYTES NFR BLD AUTO: 0.9 % (ref 0–0.9)
LYMPHOCYTES # BLD AUTO: 0.22 K/UL (ref 1–4.8)
LYMPHOCYTES NFR BLD: 9.4 % (ref 22–41)
MCH RBC QN AUTO: 31.1 PG (ref 27–33)
MCHC RBC AUTO-ENTMCNC: 33.3 G/DL (ref 33.6–35)
MCV RBC AUTO: 93.2 FL (ref 81.4–97.8)
MONOCYTES # BLD AUTO: 0.42 K/UL (ref 0–0.85)
MONOCYTES NFR BLD AUTO: 18 % (ref 0–13.4)
NEUTROPHILS # BLD AUTO: 1.63 K/UL (ref 2–7.15)
NEUTROPHILS NFR BLD: 70 % (ref 44–72)
NRBC # BLD AUTO: 0 K/UL
NRBC BLD-RTO: 0 /100 WBC
PLATELET # BLD AUTO: 210 K/UL (ref 164–446)
PMV BLD AUTO: 9.7 FL (ref 9–12.9)
POTASSIUM SERPL-SCNC: 3.2 MMOL/L (ref 3.6–5.5)
PROT SERPL-MCNC: 7 G/DL (ref 6–8.2)
RAD ONC ARIA COURSE LAST TREATMENT DATE: NORMAL
RAD ONC ARIA COURSE TREATMENT ELAPSED DAYS: NORMAL
RAD ONC ARIA REFERENCE POINT DOSAGE GIVEN TO DATE: 39.6
RAD ONC ARIA REFERENCE POINT DOSAGE GIVEN TO DATE: 39.98
RAD ONC ARIA REFERENCE POINT ID: NORMAL
RAD ONC ARIA REFERENCE POINT ID: NORMAL
RAD ONC ARIA REFERENCE POINT SESSION DOSAGE GIVEN: 1.8
RAD ONC ARIA REFERENCE POINT SESSION DOSAGE GIVEN: 1.82
RBC # BLD AUTO: 4.09 M/UL (ref 4.2–5.4)
SODIUM SERPL-SCNC: 141 MMOL/L (ref 135–145)
TROPONIN T SERPL-MCNC: 13 NG/L (ref 6–19)
WBC # BLD AUTO: 2.3 K/UL (ref 4.8–10.8)

## 2019-10-30 PROCEDURE — 93005 ELECTROCARDIOGRAM TRACING: CPT

## 2019-10-30 PROCEDURE — 36415 COLL VENOUS BLD VENIPUNCTURE: CPT

## 2019-10-30 PROCEDURE — 85025 COMPLETE CBC W/AUTO DIFF WBC: CPT

## 2019-10-30 PROCEDURE — 84484 ASSAY OF TROPONIN QUANT: CPT

## 2019-10-30 PROCEDURE — 700117 HCHG RX CONTRAST REV CODE 255: Performed by: EMERGENCY MEDICINE

## 2019-10-30 PROCEDURE — 700111 HCHG RX REV CODE 636 W/ 250 OVERRIDE (IP): Performed by: EMERGENCY MEDICINE

## 2019-10-30 PROCEDURE — 93005 ELECTROCARDIOGRAM TRACING: CPT | Performed by: EMERGENCY MEDICINE

## 2019-10-30 PROCEDURE — 71045 X-RAY EXAM CHEST 1 VIEW: CPT

## 2019-10-30 PROCEDURE — 71275 CT ANGIOGRAPHY CHEST: CPT

## 2019-10-30 PROCEDURE — 80053 COMPREHEN METABOLIC PANEL: CPT

## 2019-10-30 PROCEDURE — 99285 EMERGENCY DEPT VISIT HI MDM: CPT

## 2019-10-30 PROCEDURE — 96374 THER/PROPH/DIAG INJ IV PUSH: CPT | Mod: XU

## 2019-10-30 PROCEDURE — 77386 HCHG IMRT DELIVERY COMPLEX: CPT | Performed by: RADIOLOGY

## 2019-10-30 PROCEDURE — 77014 PR CT GUIDANCE PLACEMENT RAD THERAPY FIELDS: CPT | Mod: 26 | Performed by: RADIOLOGY

## 2019-10-30 RX ORDER — KETOROLAC TROMETHAMINE 30 MG/ML
15 INJECTION, SOLUTION INTRAMUSCULAR; INTRAVENOUS ONCE
Status: COMPLETED | OUTPATIENT
Start: 2019-10-30 | End: 2019-10-30

## 2019-10-30 RX ADMIN — IOHEXOL 50 ML: 350 INJECTION, SOLUTION INTRAVENOUS at 18:36

## 2019-10-30 RX ADMIN — KETOROLAC TROMETHAMINE 15 MG: 30 INJECTION, SOLUTION INTRAMUSCULAR; INTRAVENOUS at 18:42

## 2019-10-30 ASSESSMENT — PAIN SCALES - GENERAL: PAINLEVEL: 6=MODERATE PAIN

## 2019-10-30 ASSESSMENT — LIFESTYLE VARIABLES: DO YOU DRINK ALCOHOL: NO

## 2019-10-30 NOTE — ED TRIAGE NOTES
Amb to triage w/ c/o substernal chest pain, increasing w/ palpation.  States pain radiates to her back.  Pt c/o a dry cough & mild sob.  Pt has a hx of endometrial CA, last chemo was 10/3.  Mask applied. No distress noted at this time.

## 2019-10-30 NOTE — PROGRESS NOTES
ON TREATMENT  NOTE  RADIATION ONCOLOGY DEPARTMENT    Patient name:  Jairo Rivas    Primary Physician:  ZULEYKA Lyons MRN: [id number]  CSN: [id number]   Referring physician:  Jann Garcia M.D. : 1950, 69 y.o.     ENCOUNTER DATE:  10/30/19    DIAGNOSIS:  Visit Diagnoses     ICD-10-CM   1. Endometrial cancer (HCC) C54.1     Endometrial cancer (HCC)  Staging form: Corpus Uteri - Carcinoma and Carcinosarcoma, AJCC 8th Edition  - Pathologic stage from 2019: FIGO Stage IIIB (pT3b, pN0, cM0) - Signed by Jayant EDOUARD M.D. on 2019  Histologic grade (G): G2  Histologic grading system: 3 grade system  Lymph-vascular invasion (LVI): LVI not present (absent)/not identified  Residual tumor (R): R1 - Microscopic  Histopathologic type: Endometrioid adenocarcinoma, NOS  Diagnostic confirmation: Positive histology  Specimen type: Excision  Staged by: Managing physician      TREATMENT SUMMARY:  Radiation Therapy Episodes     Radiation Therapy: IMRT (2019)      Radiation Treatments     Plan Most Recent Treatment Date Elapsed Course Treatment Days Fractions Treated Prescribed Fraction Dose (cGy) Prescribed Total Dose (cGy)    Pelvis 10/30/2019 30 @ [id number] 22 of 27 180 4,860       Reference Point Most Recent Treatment Date Elapsed Course Treatment Days Most Recent Session Dose (cGy) Total Dose (cGy)    Pelvis 10/30/2019 30 @ [id number] 180 3,960    PelvisCP 10/30/2019 30 @ [id number] 182 3,998          SUBJECTIVE:  C/o chest pain and dysnea. Intermittent over 3 days. Some nausea and diaphoresis with exertion. Nausea present prior to chest pain could be related to XRT pelvis.      VITAL SIGNS:  KPS: 60, Requires occasional assistance, but is able to care for most of his personal needs (ECOG equivalent 2)  Encounter Vitals  Temperature: 36.7 °C (98 °F)  Blood Pressure : 149/83  Pulse: 95  Pulse Oximetry: 97 %  Weight: 84.4 kg (186 lb)  Pain Score: 6=Moderate Pain  Pain  Assessment 10/30/2019 10/23/2019 10/16/2019   Pain Score 6 7 7   Pain Loc Generalized Generalized Generalized   Some recent data might be hidden          PHYSICAL EXAM:  Physical Exam   Constitutional: She is oriented to person, place, and time. She appears well-developed and well-nourished.   HENT:   Head: Normocephalic.   Cardiovascular: Normal rate.   Pulmonary/Chest: Effort normal.   Neurological: She is alert and oriented to person, place, and time.   Skin: Skin is warm and dry. No erythema.   Psychiatric: She has a normal mood and affect. Her behavior is normal. Judgment and thought content normal.   Nursing note and vitals reviewed.       Toxicity Assessment 10/30/2019 10/23/2019 10/16/2019 10/9/2019 10/2/2019   Toxicity Assessment Female Pelvis Female Pelvis Female Pelvis Female Pelvis Female Pelvis   Fatigue (lethargy, malaise, asthenia) Severe (e.g., decrease in performance status by 2 or more ECOG levels or 40% Karn[place]sky) or loss of ability to perform some activities Severe (e.g., decrease in performance status by 2 or more ECOG levels or 40% Karn[place]sky) or loss of ability to perform some activities Moderate (e.g., decrease in performance status by 1 ECOG level or 20% Karn[place]sky) or causing difficulty performing some activities Increased fatigue over baseline, but not altering normal activities Increased fatigue over baseline, but not altering normal activities   Radiation Dermatitis Faint erythema or dry desquamation None None None None   Anorexia Loss of appetite Oral intake significantly decreased Loss of appetite Loss of appetite Loss of appetite   Colitis None None - - None   Constipation None Requiring stool softener or dietary modification Requiring stool softener or dietary modification - Requiring stool softener or dietary modification   Dehydration None None None None None   Diarrhea w/o Colostomy Increase of <4 stools/day over pre-treatment None Increase of <4 stools/day over pre-treatment None  None   Flatulence - - - None None   Nausea Able to eat Oral intake significantly decreased Able to eat Able to eat Able to eat   Proctitis None None - - None   Vomiting None None None None None   RT - Pain due to RT Mild pain not interfering with function None Moderate pain, pain or analgesics interfering with function, but not interfering with activities of daily living None None   Tumor Pain (onset or exacerbation of tumor pain due to treatment) Mild pain not interfering with function None None None None   Dysuria (painful urination) Mild symptoms requiring no intervention None None None None   Urinary Frequency Normal Increase in frequency up to 2x normal Normal Increase in frequency up to 2x normal Normal   Urinary Urgency None None None Present None   Bladder Spasms - Absent Absent - Absent   Incontinence None None None - None   Urinary Retention Normal Normal Normal - Normal   Vaginitis (not due to infection) - None - - None   Vaginal Bleeding None None None - None   Vaginal Dryness - - - - Normal       CURRENT MEDICATIONS:    Current Outpatient Medications:   •  ondansetron (ZOFRAN) 4 MG Tab tablet, Take 1 Tab by mouth every 6 hours as needed. Alternate q 3 h with Dex four four days after chemotherapy and longer if needed, Disp: 30 Tab, Rfl: 0  •  baclofen (LIORESAL) 10 MG Tab, Take 1 Tab by mouth 3 times a day. Indications: Muscle Spasticity, Disp: 90 Tab, Rfl: 0  •  amLODIPine (NORVASC) 5 MG Tab, Take 1.5 Tabs by mouth every bedtime., Disp: 135 Tab, Rfl: 2  •  dexamethasone (DECADRON) 4 MG Tab, Take 1 Tab by mouth every 6 hours., Disp: 30 Tab, Rfl: 0  •  senna-docusate (PERICOLACE OR SENOKOT S) 8.6-50 MG Tab, Take 1 Tab by mouth 2 times a day., Disp: , Rfl:   •  amitriptyline (ELAVIL) 25 MG Tab, TAKE 1 TABLET BY MOUTH EVERY DAY IN THE EVENING, Disp: 90 Tab, Rfl: 1  •  metoprolol SR (TOPROL XL) 100 MG TABLET SR 24 HR, Take 1 Tab by mouth every day for 90 days., Disp: 90 Tab, Rfl: 2  •  losartan (COZAAR) 100  MG Tab, Take 100 mg by mouth every day., Disp: , Rfl:   •  ascorbic acid (ASCORBIC ACID) 500 MG Tab, Take 500 mg by mouth every evening., Disp: , Rfl:   •  atorvastatin (LIPITOR) 20 MG Tab, Take 20 mg by mouth every evening., Disp: , Rfl:   •  Cholecalciferol (VITAMIN D3) 2000 UNIT Cap, Take 2,000 Units by mouth every day., Disp: , Rfl:   •  Coenzyme Q10 (COQ10) 100 MG Cap, Take 100 mg by mouth every evening., Disp: , Rfl:   •  multivitamin (THERAGRAN) Tab, Take 1 Tab by mouth every day., Disp: , Rfl:   •  acetaminophen (TYLENOL) 500 MG Tab, Take 1,000 mg by mouth every 6 hours as needed for Moderate Pain., Disp: , Rfl:   •  B Complex-C (SUPER B COMPLEX PO), Take 1 Tab by mouth every evening., Disp: , Rfl:   •  ferrous sulfate 325 (65 Fe) MG tablet, Take 1 Tab by mouth every day., Disp: 30 Tab, Rfl: 0  •  aspirin (ASA) 81 MG Chew Tab chewable tablet, Take 81 mg by mouth every day., Disp: , Rfl:     LABORATORY DATA:   Lab Results   Component Value Date/Time    SODIUM 141 10/22/2019 11:23 AM    POTASSIUM 3.6 10/22/2019 11:23 AM    CHLORIDE 110 10/22/2019 11:23 AM    CO2 22 10/22/2019 11:23 AM    GLUCOSE 120 (H) 10/22/2019 11:23 AM    BUN 11 10/22/2019 11:23 AM    CREATININE 0.66 10/22/2019 11:23 AM       Lab Results   Component Value Date/Time    WBC 5.6 10/22/2019 11:23 AM    HEMOGLOBIN 11.8 (L) 10/22/2019 11:23 AM    HEMATOCRIT 36.1 (L) 10/22/2019 11:23 AM    MCV 92.8 10/22/2019 11:23 AM    PLATELETCT 163 (L) 10/22/2019 11:23 AM        RADIOLOGY DATA:  Dx-chest-2 Views    Result Date: 10/10/2019  10/9/2019 5:08 PM HISTORY/REASON FOR EXAM:  Chest Pain; right sided pleuritic chest pain. TECHNIQUE/EXAM DESCRIPTION AND NUMBER OF VIEWS: Two views of the chest. COMPARISON:  7/31/2019 FINDINGS: The heart is normal in size. No pulmonary infiltrates or consolidations are noted. No pleural effusions are appreciated. There is a mild thoracic dextroscoliosis. Spurs are present in the midthoracic spine.     No active  disease.    Ir-us Guided Piv    Result Date: 10/21/2019  EXAMINATION:                                                                    HISTORY/REASON FOR EXAM:  Ultrasound Guided PIV.  TECHNIQUE/EXAM DESCRIPTION AND NUMBER OF VIEWS:  Peripheral IV insertion with ultrasound guidance.  The procedure was prepared using maximal sterile barrier technique including sterile gown, mask, cap, and donning of sterile gloves following appropriate hand hygiene and/or sterile scrub. Patient skin site was prepped with 2% Chlorhexidine solution.   FINDINGS: Peripheral IV insertion with Ultrasound Guidance was performed by qualified imaging nursing staff without the assistance of a Radiologist.      Ultrasound-guided PERIPHERAL IV INSERTION performed by qualified nursing staff as above.     Ir-us Guided Piv    Result Date: 10/3/2019  EXAMINATION:                                                                    HISTORY/REASON FOR EXAM:  Ultrasound Guided PIV.  TECHNIQUE/EXAM DESCRIPTION AND NUMBER OF VIEWS:  Peripheral IV insertion with ultrasound guidance.  The procedure was prepared using maximal sterile barrier technique including sterile gown, mask, cap, and donning of sterile gloves following appropriate hand hygiene and/or sterile scrub. Patient skin site was prepped with 2% Chlorhexidine solution.   FINDINGS: Peripheral IV insertion with Ultrasound Guidance was performed by qualified imaging nursing staff without the assistance of a Radiologist.      Ultrasound-guided PERIPHERAL IV INSERTION performed by qualified nursing staff as above.     Us-soft Tissues Of Head - Neck    Result Date: 10/17/2019  10/16/2019 4:48 PM HISTORY/REASON FOR EXAM:  Evaluate 2.9 cm thyroid nodule seen on PET scan TECHNIQUE/EXAM DESCRIPTION: Ultrasound of the soft tissues of the head and neck. COMPARISON:  PET CT 9/18/2019 FINDINGS: The thyroid gland is heterogeneous. Vascularity is normal. The right lobe of the thyroid gland measures 2.17 cm x  3.98 cm x 1.37 cm. The left lobe of the thyroid gland measures 1.50 cm x 5.43 cm x 1.41 cm. The isthmus measures 0.60 cm. Nodules >= 1cm: 6 - 10 Nodule #1 Location: Right inferior Size:  1.72 x 1.4 x 1.24 cm Composition:  Solid-2 Echogenicity:  Isoechoic-1 Shape:  Wider than tall-0 Margins:  Smooth-0 Echogenic Foci:  Microscopic-3 ACR TIRADS points/category:  6 - TR4 - Moderately Suspicious Nodule #2 Location:  Right  mid Size:  1.0 x 0.83 x 0.98 cm Composition:  Solid-2 Echogenicity:  Isoechoic-1 Shape:  Taller than wide-3 Margins:  Smooth-0 Echogenic Foci:  Microscopic-3 ACR TIRADS points/category:  9 - TR5 - Highly Suspicious Nodule #3 Location:  Right  mid Size:  1.14 x 1.26 x 1.06 cm Composition:  Solid-2 Echogenicity:  Isoechoic-1 Shape:  Wider than tall-0 Margins:  Smooth-0 Echogenic Foci:  None-0 ACR TIRADS points/category:  3 - TR3 - Mildly Suspicious Nodule #4 Location:  Left  inferior Size:  2.52 x 2.43 x 2.54 cm Composition:  Solid-2 Echogenicity:  Hypoechoic-2 Shape:  Taller than wide-3 Margins:  Ill defined-0 Echogenic Foci:  None-0 ACR TIRADS points/category:  7 - TR5 - Highly Suspicious     Multinodular goiter. Largest nodule is in the inferior left lobe of the thyroid measuring 2.5 cm. Ultrasound-guided FNA is recommended ACR TI-RADS Recommendations TR5 - FNA recommended Recommendations based on the American College of Radiology Thyroid imaging, reporting and Data System (TI-RADS) 2017. INTERPRETING LOCATION: [address] [place] [address], [id number]      IMPRESSION:  Cancer Staging  Endometrial cancer (HCC)  Staging form: Corpus Uteri - Carcinoma and Carcinosarcoma, AJCC 8th Edition  - Pathologic stage from 9/13/2019: FIGO Stage IIIB (pT3b, pN0, cM0) - Signed by Jayant EDOUARD M.D. on 9/13/2019      PLAN:  Symptomatic - prescription given and/or additional testing required     Referral to ER. Nurse to take patient directly.    Disposition:  Treatment plan reviewed. Questions answered. Continue therapy  outlined.     Jayant EDOUARD M.D.    Orders Placed This Encounter   • Rad Onc Aria Session Summary

## 2019-10-31 ENCOUNTER — HOSPITAL ENCOUNTER (OUTPATIENT)
Dept: RADIATION ONCOLOGY | Facility: MEDICAL CENTER | Age: 69
End: 2019-10-31

## 2019-10-31 DIAGNOSIS — R06.02 SOB (SHORTNESS OF BREATH): ICD-10-CM

## 2019-10-31 DIAGNOSIS — I10 ESSENTIAL HYPERTENSION: ICD-10-CM

## 2019-10-31 LAB
CHEMOTHERAPY INFUSION START DATE: NORMAL
CHEMOTHERAPY RECORDS: 1.8
CHEMOTHERAPY RECORDS: 4860
CHEMOTHERAPY RECORDS: NORMAL
CHEMOTHERAPY RX CANCER: NORMAL
DATE 1ST CHEMO CANCER: NORMAL
RAD ONC ARIA COURSE LAST TREATMENT DATE: NORMAL
RAD ONC ARIA COURSE TREATMENT ELAPSED DAYS: NORMAL
RAD ONC ARIA REFERENCE POINT DOSAGE GIVEN TO DATE: 41.4
RAD ONC ARIA REFERENCE POINT DOSAGE GIVEN TO DATE: 41.8
RAD ONC ARIA REFERENCE POINT ID: NORMAL
RAD ONC ARIA REFERENCE POINT ID: NORMAL
RAD ONC ARIA REFERENCE POINT SESSION DOSAGE GIVEN: 1.8
RAD ONC ARIA REFERENCE POINT SESSION DOSAGE GIVEN: 1.82

## 2019-10-31 PROCEDURE — 77386 HCHG IMRT DELIVERY COMPLEX: CPT | Performed by: RADIOLOGY

## 2019-10-31 PROCEDURE — 77336 RADIATION PHYSICS CONSULT: CPT | Performed by: RADIOLOGY

## 2019-10-31 PROCEDURE — 77014 PR CT GUIDANCE PLACEMENT RAD THERAPY FIELDS: CPT | Mod: 26 | Performed by: RADIOLOGY

## 2019-10-31 NOTE — ED PROVIDER NOTES
ED Provider Note    Scribed for Jesus Manuel Schmitt M.D. by Yves Rodgers. 10/30/2019  5:32 PM    Primary care provider: ZULEYKA Lyons  Means of arrival: Walk in   History obtained from: Patient   History limited by: None    CHIEF COMPLAINT  Chief Complaint   Patient presents with   • Chest Pain   • Cough   • Shortness of Breath       HPI  Jairo Rivas is a 69 y.o. female who presents to the Emergency Department for evaluation of substernal chest pain onset 3 day ago. Patient reports that her chest pain increases palpation and is intermittent. She adds her pain radiates to her right back. She denies any injury. Patient states her chest pain exacerbates while laying down. Presents associated symptoms of subjective fever, medial abdominal pain, non productive cough, bilateral leg pain, and shortness of breath. Denies pain with inspiration. Patient has taken Tylenol for mild alleviation for chest pain. Last chemotherapy was 10/03/19 due to history of endometrial cancer.     REVIEW OF SYSTEMS  Pertinent positives include substernal chest pain,  medial abdominal pain, bilateral leg pain,right sided back pain,non productive cough, shortness of breath, and subjective fever . Pertinent negatives include no pain with inspiration.  All other systems reviewed and negative.    PAST MEDICAL HISTORY   has a past medical history of Anemia (2/6/2018), Cancer (HCC) (2019), CVA, old, hemiparesis (HCC) (2/6/2018), Essential hypertension (2/6/2018), Hyperlipidemia, Post-menopause bleeding (2/6/2018), Stroke (HCC), Vision abnormalities (2/6/2018), and Vitamin D deficiency (2/6/2018).    SURGICAL HISTORY   has a past surgical history that includes hysteroscopy,dx,sep proc (6/19/2019); dilation and curettage (6/19/2019); cystoscopy,insert ureteral stent (Left, 8/8/2019); hysterectomy robotic xi (N/A, 8/8/2019); salpingo oophorectomy (Bilateral, 8/8/2019); and node biopsy sentinel (8/8/2019).    SOCIAL  HISTORY  Social History     Tobacco Use   • Smoking status: Never Smoker   • Smokeless tobacco: Never Used   Substance Use Topics   • Alcohol use: No   • Drug use: No      Social History     Substance and Sexual Activity   Drug Use No       FAMILY HISTORY  Family History   Problem Relation Age of Onset   • Hypertension Mother    • No Known Problems Father    • No Known Problems Brother        CURRENT MEDICATIONS  No current facility-administered medications on file prior to encounter.      Current Outpatient Medications on File Prior to Encounter   Medication Sig Dispense Refill   • ondansetron (ZOFRAN) 4 MG Tab tablet Take 1 Tab by mouth every 6 hours as needed. Alternate q 3 h with Dex four four days after chemotherapy and longer if needed 30 Tab 0   • baclofen (LIORESAL) 10 MG Tab Take 1 Tab by mouth 3 times a day. Indications: Muscle Spasticity 90 Tab 0   • amLODIPine (NORVASC) 5 MG Tab Take 1.5 Tabs by mouth every bedtime. 135 Tab 2   • dexamethasone (DECADRON) 4 MG Tab Take 1 Tab by mouth every 6 hours. 30 Tab 0   • senna-docusate (PERICOLACE OR SENOKOT S) 8.6-50 MG Tab Take 1 Tab by mouth 2 times a day.     • amitriptyline (ELAVIL) 25 MG Tab TAKE 1 TABLET BY MOUTH EVERY DAY IN THE EVENING 90 Tab 1   • metoprolol SR (TOPROL XL) 100 MG TABLET SR 24 HR Take 1 Tab by mouth every day for 90 days. 90 Tab 2   • losartan (COZAAR) 100 MG Tab Take 100 mg by mouth every day.     • ascorbic acid (ASCORBIC ACID) 500 MG Tab Take 500 mg by mouth every evening.     • atorvastatin (LIPITOR) 20 MG Tab Take 20 mg by mouth every evening.     • Cholecalciferol (VITAMIN D3) 2000 UNIT Cap Take 2,000 Units by mouth every day.     • Coenzyme Q10 (COQ10) 100 MG Cap Take 100 mg by mouth every evening.     • multivitamin (THERAGRAN) Tab Take 1 Tab by mouth every day.     • acetaminophen (TYLENOL) 500 MG Tab Take 1,000 mg by mouth every 6 hours as needed for Moderate Pain.     • B Complex-C (SUPER B COMPLEX PO) Take 1 Tab by mouth every  "evening.     • ferrous sulfate 325 (65 Fe) MG tablet Take 1 Tab by mouth every day. 30 Tab 0   • aspirin (ASA) 81 MG Chew Tab chewable tablet Take 81 mg by mouth every day.           ALLERGIES  Allergies   Allergen Reactions   • Penicillins Itching     Makes her feel weak    • Gluten Meal    • Hydrocodone Itching     Swelling of the tongue  Face swelling   • Iodine      Weak and dizzy   • Levaquin Itching     Swelling of the tongue  Facial swelling   • Tramadol Hives     Swelling       PHYSICAL EXAM  VITAL SIGNS: /85   Pulse 83   Temp 36.4 °C (97.5 °F) (Temporal)   Resp 14   Ht 1.626 m (5' 4\")   SpO2 97%   BMI 31.93 kg/m²     Constitutional: Mild distress  HENT: Normocephalic, Atraumatic, Bilateral external ears normal, Oropharynx moist, No oral exudates.   Eyes: PERRLA, EOMI, Conjunctiva normal, No discharge.   Neck: No tenderness, Supple, No stridor.   Lymphatic: No lymphadenopathy noted.   Cardiovascular: Normal heart rate, Normal rhythm. Mild tenderness to palpation to the chest wall   Thorax & Lungs: Clear to auscultation bilaterally, No respiratory distress, No wheezing, No crackles.   Abdomen: Soft, No tenderness, No masses, No pulsatile masses.   Skin: Warm, Dry, No erythema, No rash.   Extremities:, No edema No cyanosis.   Musculoskeletal: No tenderness to palpation or major deformities noted.  Intact distal pulses  Neurologic: Awake, alert. Moves all extremities spontaneously.  Psychiatric: Affect normal, Judgment normal, Mood normal.     LABS  Results for orders placed or performed during the hospital encounter of 10/30/19   CBC WITH DIFFERENTIAL   Result Value Ref Range    WBC 2.3 (LL) 4.8 - 10.8 K/uL    RBC 4.09 (L) 4.20 - 5.40 M/uL    Hemoglobin 12.7 12.0 - 16.0 g/dL    Hematocrit 38.1 37.0 - 47.0 %    MCV 93.2 81.4 - 97.8 fL    MCH 31.1 27.0 - 33.0 pg    MCHC 33.3 (L) 33.6 - 35.0 g/dL    RDW 55.2 (H) 35.9 - 50.0 fL    Platelet Count 210 164 - 446 K/uL    MPV 9.7 9.0 - 12.9 fL    " Neutrophils-Polys 70.00 44.00 - 72.00 %    Lymphocytes 9.40 (L) 22.00 - 41.00 %    Monocytes 18.00 (H) 0.00 - 13.40 %    Eosinophils 1.30 0.00 - 6.90 %    Basophils 0.40 0.00 - 1.80 %    Immature Granulocytes 0.90 0.00 - 0.90 %    Nucleated RBC 0.00 /100 WBC    Neutrophils (Absolute) 1.63 (L) 2.00 - 7.15 K/uL    Lymphs (Absolute) 0.22 (L) 1.00 - 4.80 K/uL    Monos (Absolute) 0.42 0.00 - 0.85 K/uL    Eos (Absolute) 0.03 0.00 - 0.51 K/uL    Baso (Absolute) 0.01 0.00 - 0.12 K/uL    Immature Granulocytes (abs) 0.02 0.00 - 0.11 K/uL    NRBC (Absolute) 0.00 K/uL   COMP METABOLIC PANEL   Result Value Ref Range    Sodium 141 135 - 145 mmol/L    Potassium 3.2 (L) 3.6 - 5.5 mmol/L    Chloride 104 96 - 112 mmol/L    Co2 28 20 - 33 mmol/L    Anion Gap 9.0 0.0 - 11.9    Glucose 99 65 - 99 mg/dL    Bun 8 8 - 22 mg/dL    Creatinine 0.82 0.50 - 1.40 mg/dL    Calcium 9.2 8.5 - 10.5 mg/dL    AST(SGOT) 22 12 - 45 U/L    ALT(SGPT) 24 2 - 50 U/L    Alkaline Phosphatase 86 30 - 99 U/L    Total Bilirubin 0.3 0.1 - 1.5 mg/dL    Albumin 3.9 3.2 - 4.9 g/dL    Total Protein 7.0 6.0 - 8.2 g/dL    Globulin 3.1 1.9 - 3.5 g/dL    A-G Ratio 1.3 g/dL   Troponin   Result Value Ref Range    Troponin T 13 6 - 19 ng/L   ESTIMATED GFR   Result Value Ref Range    GFR If African American >60 >60 mL/min/1.73 m 2    GFR If Non African American >60 >60 mL/min/1.73 m 2   EKG (NOW)   Result Value Ref Range    Report       [facility] Emergency Dept.    Test Date:  2019-10-30  Pt Name:    FABI BEASLEY            Department: ER  MRN:        [id number]                      Room:  Gender:     Female                       Technician: [id number]  :        1950                   Requested By:ER TRIAGE PROTOCOL  Order #:    [id number]                    Reading MD: COSMO RASHID MD    Measurements  Intervals                                Axis  Rate:       92                           P:          58  MN:         220                           QRS:        57  QRSD:       78                           T:          25  QT:         344  QTc:        426    Interpretive Statements  SINUS RHYTHM  FIRST DEGREE AV BLOCK  Compared to ECG 06/11/2019 19:10:20  First degree AV block now present  Electronically Signed On 10- 19:06:04 PDT by COSMO RASHID MD       All labs reviewed by me.    RADIOLOGY  CT-CTA CHEST PULMONARY ARTERY W/ RECONS   Final Result      No evidence of pulmonary embolus.      Dependent atelectasis.            DX-CHEST-PORTABLE (1 VIEW)   Final Result      No acute cardiac or pulmonary abnormalities are identified.        The radiologist's interpretation of all radiological studies have been reviewed by me.      COURSE & MEDICAL DECISION MAKING  Pertinent Labs & Imaging studies reviewed. (See chart for details)    I reviewed the patient's medical records which showed patient was discharged 8 days ago. She was admitted the 21st of October and discharged the 22nd She was diagnosed with endometrial cancer. Admitted for chemotherapy.    5:32 PM - Patient seen and examined at bedside. Patient will be treated with Toradol 15 mg. Ordered CT-CTA Chest Pulmonary Artery, DX-chest, estimated GFR, CBC with diff., CMP, troponin, and EKG to evaluate her symptoms. The differential diagnoses include but are not limited to: Chest wall pain, PE, ACS    7:06 PM - Re-examined; The patient is resting in bed. I discussed her above findings and plans for discharge with prescription for Tylenol and Ibuprofen. She was given a referral to PCP and instructed to return to the ED if her symptoms worsen. Patient understands and agrees.    Decision Making:  Patient with chest wall pain, recent chemotherapy.  The patient does have a high risk for pulmonary embolism, has sharp pleuritic pain, troponins negative, EKG is unremarkable.  I do not believe this represents ACS, got a CT PE study, this was negative for pulmonary embolism.  I believe the patient to be  discharged home, she will take anti-inflammatories for her chest pain, she will follow-up with a primary care physician, return with any other concerns.  Otherwise I do not see any pneumonia any significant neutropenia.       The patient will return for new or worsening symptoms and is stable at the time of discharge.    The patient is referred to a primary physician for blood pressure management, diabetic screening, and for all other preventative health concerns.      DISPOSITION:  Patient will be discharged home in stable condition.    FOLLOW UP:  [facility], Emergency Dept  [address] [place] [id number] [phone]        OUTPATIENT MEDICATIONS:  Discharge Medication List as of 10/30/2019  7:23 PM          FINAL IMPRESSION  1. Chest wall pain          IYves (Scribe), am scribing for, and in the presence of, Jesus Manuel Schmitt M.D..    Electronically signed by: Yves Rodgers (Rezae), 10/30/2019    IJesus Manuel M.D. personally performed the services described in this documentation, as scribed by Yves Rodgers in my presence, and it is both accurate and complete.    C    The note accurately reflects work and decisions made by me.  Jesus Manuel Schmitt  10/30/2019  9:47 PM

## 2019-11-01 ENCOUNTER — HOSPITAL ENCOUNTER (OUTPATIENT)
Dept: RADIATION ONCOLOGY | Facility: MEDICAL CENTER | Age: 69
End: 2019-11-30
Attending: RADIOLOGY
Payer: MEDICARE

## 2019-11-01 ENCOUNTER — HOSPITAL ENCOUNTER (OUTPATIENT)
Dept: RADIATION ONCOLOGY | Facility: MEDICAL CENTER | Age: 69
End: 2019-11-01

## 2019-11-01 LAB
CHEMOTHERAPY INFUSION START DATE: NORMAL
CHEMOTHERAPY RECORDS: 1.8
CHEMOTHERAPY RECORDS: 4860
CHEMOTHERAPY RECORDS: NORMAL
CHEMOTHERAPY RX CANCER: NORMAL
DATE 1ST CHEMO CANCER: NORMAL
RAD ONC ARIA COURSE LAST TREATMENT DATE: NORMAL
RAD ONC ARIA COURSE TREATMENT ELAPSED DAYS: NORMAL
RAD ONC ARIA REFERENCE POINT DOSAGE GIVEN TO DATE: 43.2
RAD ONC ARIA REFERENCE POINT DOSAGE GIVEN TO DATE: 43.61
RAD ONC ARIA REFERENCE POINT ID: NORMAL
RAD ONC ARIA REFERENCE POINT ID: NORMAL
RAD ONC ARIA REFERENCE POINT SESSION DOSAGE GIVEN: 1.8
RAD ONC ARIA REFERENCE POINT SESSION DOSAGE GIVEN: 1.82

## 2019-11-01 PROCEDURE — 77014 PR CT GUIDANCE PLACEMENT RAD THERAPY FIELDS: CPT | Mod: 26 | Performed by: RADIOLOGY

## 2019-11-01 PROCEDURE — 77386 HCHG IMRT DELIVERY COMPLEX: CPT | Performed by: RADIOLOGY

## 2019-11-02 NOTE — TELEPHONE ENCOUNTER
Was the patient seen in the last year in this department? Yes 10-    Does patient have an active prescription for medications requested? No     Received Request Via: Pharmacy

## 2019-11-03 ENCOUNTER — PATIENT OUTREACH (OUTPATIENT)
Dept: HEALTH INFORMATION MANAGEMENT | Facility: OTHER | Age: 69
End: 2019-11-03

## 2019-11-03 RX ORDER — ATORVASTATIN CALCIUM 20 MG/1
20 TABLET, FILM COATED ORAL EVERY EVENING
Qty: 100 TAB | Refills: 4 | Status: SHIPPED | OUTPATIENT
Start: 2019-11-03 | End: 2020-12-18 | Stop reason: SDUPTHER

## 2019-11-04 ENCOUNTER — HOSPITAL ENCOUNTER (OUTPATIENT)
Dept: RADIATION ONCOLOGY | Facility: MEDICAL CENTER | Age: 69
End: 2019-11-04

## 2019-11-04 LAB
CHEMOTHERAPY INFUSION START DATE: NORMAL
CHEMOTHERAPY RECORDS: 1.8
CHEMOTHERAPY RECORDS: 4860
CHEMOTHERAPY RECORDS: NORMAL
CHEMOTHERAPY RX CANCER: NORMAL
DATE 1ST CHEMO CANCER: NORMAL
RAD ONC ARIA COURSE LAST TREATMENT DATE: NORMAL
RAD ONC ARIA COURSE TREATMENT ELAPSED DAYS: NORMAL
RAD ONC ARIA REFERENCE POINT DOSAGE GIVEN TO DATE: 45
RAD ONC ARIA REFERENCE POINT DOSAGE GIVEN TO DATE: 45.43
RAD ONC ARIA REFERENCE POINT ID: NORMAL
RAD ONC ARIA REFERENCE POINT ID: NORMAL
RAD ONC ARIA REFERENCE POINT SESSION DOSAGE GIVEN: 1.8
RAD ONC ARIA REFERENCE POINT SESSION DOSAGE GIVEN: 1.82

## 2019-11-04 PROCEDURE — 77427 RADIATION TX MANAGEMENT X5: CPT | Performed by: RADIOLOGY

## 2019-11-04 PROCEDURE — 77014 PR CT GUIDANCE PLACEMENT RAD THERAPY FIELDS: CPT | Mod: 26 | Performed by: RADIOLOGY

## 2019-11-04 PROCEDURE — 77386 HCHG IMRT DELIVERY COMPLEX: CPT | Performed by: RADIOLOGY

## 2019-11-05 ENCOUNTER — HOSPITAL ENCOUNTER (OUTPATIENT)
Dept: RADIATION ONCOLOGY | Facility: MEDICAL CENTER | Age: 69
End: 2019-11-05

## 2019-11-05 LAB
CHEMOTHERAPY INFUSION START DATE: NORMAL
CHEMOTHERAPY RECORDS: 1.8
CHEMOTHERAPY RECORDS: 4860
CHEMOTHERAPY RECORDS: NORMAL
CHEMOTHERAPY RX CANCER: NORMAL
DATE 1ST CHEMO CANCER: NORMAL
RAD ONC ARIA COURSE LAST TREATMENT DATE: NORMAL
RAD ONC ARIA COURSE TREATMENT ELAPSED DAYS: NORMAL
RAD ONC ARIA REFERENCE POINT DOSAGE GIVEN TO DATE: 46.8
RAD ONC ARIA REFERENCE POINT DOSAGE GIVEN TO DATE: 47.25
RAD ONC ARIA REFERENCE POINT ID: NORMAL
RAD ONC ARIA REFERENCE POINT ID: NORMAL
RAD ONC ARIA REFERENCE POINT SESSION DOSAGE GIVEN: 1.8
RAD ONC ARIA REFERENCE POINT SESSION DOSAGE GIVEN: 1.82

## 2019-11-05 PROCEDURE — 77014 PR CT GUIDANCE PLACEMENT RAD THERAPY FIELDS: CPT | Mod: 26 | Performed by: RADIOLOGY

## 2019-11-05 PROCEDURE — 77386 HCHG IMRT DELIVERY COMPLEX: CPT | Performed by: RADIOLOGY

## 2019-11-06 VITALS
TEMPERATURE: 98.3 F | OXYGEN SATURATION: 97 % | HEART RATE: 86 BPM | BODY MASS INDEX: 31.88 KG/M2 | WEIGHT: 185.7 LBS | DIASTOLIC BLOOD PRESSURE: 94 MMHG | SYSTOLIC BLOOD PRESSURE: 128 MMHG

## 2019-11-06 DIAGNOSIS — C54.1 ENDOMETRIAL CANCER (HCC): ICD-10-CM

## 2019-11-06 LAB
CHEMOTHERAPY INFUSION START DATE: NORMAL
CHEMOTHERAPY RECORDS: 1.8
CHEMOTHERAPY RECORDS: 4860
CHEMOTHERAPY RECORDS: NORMAL
CHEMOTHERAPY RX CANCER: NORMAL
DATE 1ST CHEMO CANCER: NORMAL
RAD ONC ARIA COURSE LAST TREATMENT DATE: NORMAL
RAD ONC ARIA COURSE TREATMENT ELAPSED DAYS: NORMAL
RAD ONC ARIA REFERENCE POINT DOSAGE GIVEN TO DATE: 48.6
RAD ONC ARIA REFERENCE POINT DOSAGE GIVEN TO DATE: 49.06
RAD ONC ARIA REFERENCE POINT ID: NORMAL
RAD ONC ARIA REFERENCE POINT ID: NORMAL
RAD ONC ARIA REFERENCE POINT SESSION DOSAGE GIVEN: 1.8
RAD ONC ARIA REFERENCE POINT SESSION DOSAGE GIVEN: 1.82

## 2019-11-06 PROCEDURE — 77014 PR CT GUIDANCE PLACEMENT RAD THERAPY FIELDS: CPT | Mod: 26 | Performed by: RADIOLOGY

## 2019-11-06 PROCEDURE — 77386 HCHG IMRT DELIVERY COMPLEX: CPT | Performed by: RADIOLOGY

## 2019-11-06 ASSESSMENT — PAIN SCALES - GENERAL: PAINLEVEL: 6=MODERATE PAIN

## 2019-11-06 NOTE — ON TREATMENT VISIT
ON TREATMENT  NOTE  RADIATION ONCOLOGY DEPARTMENT    Patient name:  Jairo Rivas    Primary Physician:  ZULEYKA Lyons MRN: [id number]  CSN: [id number]   Referring physician:  Jann Garcia M.D. : 1950, 69 y.o.     ENCOUNTER DATE:  19    DIAGNOSIS:  Visit Diagnoses     ICD-10-CM   1. Endometrial cancer (HCC) C54.1     Endometrial cancer (HCC)  Staging form: Corpus Uteri - Carcinoma and Carcinosarcoma, AJCC 8th Edition  - Pathologic stage from 2019: FIGO Stage IIIB (pT3b, pN0, cM0) - Signed by Jayant EDOUARD M.D. on 2019  Histologic grade (G): G2  Histologic grading system: 3 grade system  Lymph-vascular invasion (LVI): LVI not present (absent)/not identified  Residual tumor (R): R1 - Microscopic  Histopathologic type: Endometrioid adenocarcinoma, NOS  Diagnostic confirmation: Positive histology  Specimen type: Excision  Staged by: Managing physician      TREATMENT SUMMARY:  [facility] Treatment Information        Some values may be hidden. Unless noted otherwise, only the newest values recorded on each date are displayed.         [facility] Treatment Summary 10/30/19 10/31/19 11/1/19 11/4/19 11/5/19 11/6/19   Course First Treatment Date 2019   Course Last Treatment Date 10/30/2019 10/31/2019 2019 2019 2019 2019   Pelvis Plan from Course C1_Pelvis   Fraction 22 of 27 23 of 27 24 of 27 25 of 27 26 of 27 27 of    Elapsed Course Days 30 @ [id number] 31 @ [id number] 32 @ [id number] 35 @ [id number] 36 @ [id number] 37 @ [id number]   Prescribed Fraction Dose 180 cGy 180 cGy 180 cGy 180 cGy 180 cGy 180 cGy   Prescribed Total Dose 4,860 cGy 4,860 cGy 4,860 cGy 4,860 cGy 4,860 cGy 4,860 cGy   Pelvis Reference Point from Course C1_Pelvis   Elapsed Course Days 30 @ [id number] 31 @ [id number] 32 @ [id number] 35 @ [id number] 36 @ [id number] 37 @ [id number]   Session Dose 180 cGy  180 cGy 180 cGy 180 cGy 180 cGy 180 cGy   Total Dose 3,960 cGy 4,140 cGy 4,320 cGy 4,500 cGy 4,680 cGy 4,860 cGy   PelvisCP Reference Point from Course C1_Pelvis   Elapsed Course Days 30 @ [id number] 31 @ [id number] 32 @ [id number] 35 @ [id number] 36 @ [id number] 37 @ [id number]   Session Dose 182 cGy 182 cGy 182 cGy 182 cGy 182 cGy 182 cGy   Total Dose 3,998 cGy 4,180 cGy 4,361 cGy 4,543 cGy 4,725 cGy 4,906 cGy             SUBJECTIVE:  Generalized discomfort abdomen at robotic port sites. Mild nausea.      VITAL SIGNS:  KPS: 60, Requires occasional assistance, but is able to care for most of his personal needs (ECOG equivalent 2)  Encounter Vitals  Temperature: 36.8 °C (98.3 °F)  Blood Pressure : 128/94  Pulse: 86  Pulse Oximetry: 97 %  Weight: 84.2 kg (185 lb 11.2 oz)  Pain Score: 6=Moderate Pain  Pain Assessment 11/6/2019 10/30/2019   Pain Score 6 6   Pain Loc Generalized Generalized   What increases pain? stomach pain: touch and pressure -   Describe pain Constant -   Some recent data might be hidden          PHYSICAL EXAM:  Physical Exam  Vitals signs and nursing note reviewed.   Constitutional:       Appearance: She is well-developed.   HENT:      Head: Normocephalic.   Abdominal:      General: Abdomen is flat. Bowel sounds are normal.      Palpations: Abdomen is soft.   Skin:     General: Skin is warm and dry.      Findings: No erythema.   Neurological:      Mental Status: She is alert and oriented to person, place, and time.   Psychiatric:         Behavior: Behavior normal.         Thought Content: Thought content normal.         Judgment: Judgment normal.          Toxicity Assessment 11/6/2019 10/30/2019 10/23/2019 10/16/2019 10/9/2019 10/2/2019   Toxicity Assessment Female Pelvis Female Pelvis Female Pelvis Female Pelvis Female Pelvis Female Pelvis   Fatigue (lethargy, malaise, asthenia) Moderate (e.g., decrease in performance status by 1 ECOG level or 20% Karn[place]) or causing difficulty  performing some activities Severe (e.g., decrease in performance status by 2 or more ECOG levels or 40% Karn[place]sky) or loss of ability to perform some activities Severe (e.g., decrease in performance status by 2 or more ECOG levels or 40% [place]sky) or loss of ability to perform some activities Moderate (e.g., decrease in performance status by 1 ECOG level or 20% [place]) or causing difficulty performing some activities Increased fatigue over baseline, but not altering normal activities Increased fatigue over baseline, but not altering normal activities   Radiation Dermatitis Faint erythema or dry desquamation Faint erythema or dry desquamation None None None None   Anorexia Loss of appetite Loss of appetite Oral intake significantly decreased Loss of appetite Loss of appetite Loss of appetite   Colitis None None None - - None   Constipation None None Requiring stool softener or dietary modification Requiring stool softener or dietary modification - Requiring stool softener or dietary modification   Dehydration None None None None None None   Diarrhea w/o Colostomy Increase of <4 stools/day over pre-treatment Increase of <4 stools/day over pre-treatment None Increase of <4 stools/day over pre-treatment None None   Flatulence None - - - None None   Nausea Able to eat Able to eat Oral intake significantly decreased Able to eat Able to eat Able to eat   Proctitis - None None - - None   Vomiting None None None None None None   RT - Pain due to RT Moderate pain, pain or analgesics interfering with function, but not interfering with activities of daily living Mild pain not interfering with function None Moderate pain, pain or analgesics interfering with function, but not interfering with activities of daily living None None   Tumor Pain (onset or exacerbation of tumor pain due to treatment) None Mild pain not interfering with function None None None None   Dysuria (painful urination) Mild symptoms requiring no  intervention Mild symptoms requiring no intervention None None None None   Urinary Frequency Increase in frequency up to 2x normal Normal Increase in frequency up to 2x normal Normal Increase in frequency up to 2x normal Normal   Urinary Urgency - None None None Present None   Bladder Spasms - - Absent Absent - Absent   Incontinence None None None None - None   Urinary Retention - Normal Normal Normal - Normal   Vaginitis (not due to infection) - - None - - None   Vaginal Bleeding None None None None - None   Vaginal Dryness - - - - - Normal       CURRENT MEDICATIONS:    Current Outpatient Medications:   •  atorvastatin (LIPITOR) 20 MG Tab, Take 1 Tab by mouth every evening., Disp: 100 Tab, Rfl: 4  •  ondansetron (ZOFRAN) 4 MG Tab tablet, Take 1 Tab by mouth every 6 hours as needed. Alternate q 3 h with Dex four four days after chemotherapy and longer if needed, Disp: 30 Tab, Rfl: 0  •  baclofen (LIORESAL) 10 MG Tab, Take 1 Tab by mouth 3 times a day. Indications: Muscle Spasticity, Disp: 90 Tab, Rfl: 0  •  amLODIPine (NORVASC) 5 MG Tab, Take 1.5 Tabs by mouth every bedtime., Disp: 135 Tab, Rfl: 2  •  dexamethasone (DECADRON) 4 MG Tab, Take 1 Tab by mouth every 6 hours., Disp: 30 Tab, Rfl: 0  •  senna-docusate (PERICOLACE OR SENOKOT S) 8.6-50 MG Tab, Take 1 Tab by mouth 2 times a day., Disp: , Rfl:   •  amitriptyline (ELAVIL) 25 MG Tab, TAKE 1 TABLET BY MOUTH EVERY DAY IN THE EVENING, Disp: 90 Tab, Rfl: 1  •  metoprolol SR (TOPROL XL) 100 MG TABLET SR 24 HR, Take 1 Tab by mouth every day for 90 days., Disp: 90 Tab, Rfl: 2  •  losartan (COZAAR) 100 MG Tab, Take 100 mg by mouth every day., Disp: , Rfl:   •  ascorbic acid (ASCORBIC ACID) 500 MG Tab, Take 500 mg by mouth every evening., Disp: , Rfl:   •  Cholecalciferol (VITAMIN D3) 2000 UNIT Cap, Take 2,000 Units by mouth every day., Disp: , Rfl:   •  Coenzyme Q10 (COQ10) 100 MG Cap, Take 100 mg by mouth every evening., Disp: , Rfl:   •  multivitamin (THERAGRAN) Tab,  Take 1 Tab by mouth every day., Disp: , Rfl:   •  acetaminophen (TYLENOL) 500 MG Tab, Take 1,000 mg by mouth every 6 hours as needed for Moderate Pain., Disp: , Rfl:   •  B Complex-C (SUPER B COMPLEX PO), Take 1 Tab by mouth every evening., Disp: , Rfl:   •  ferrous sulfate 325 (65 Fe) MG tablet, Take 1 Tab by mouth every day., Disp: 30 Tab, Rfl: 0  •  aspirin (ASA) 81 MG Chew Tab chewable tablet, Take 81 mg by mouth every day., Disp: , Rfl:     LABORATORY DATA:   Lab Results   Component Value Date/Time    SODIUM 141 10/30/2019 03:48 PM    POTASSIUM 3.2 (L) 10/30/2019 03:48 PM    CHLORIDE 104 10/30/2019 03:48 PM    CO2 28 10/30/2019 03:48 PM    GLUCOSE 99 10/30/2019 03:48 PM    BUN 8 10/30/2019 03:48 PM    CREATININE 0.82 10/30/2019 03:48 PM       Lab Results   Component Value Date/Time    WBC 2.3 (LL) 10/30/2019 03:48 PM    HEMOGLOBIN 12.7 10/30/2019 03:48 PM    HEMATOCRIT 38.1 10/30/2019 03:48 PM    MCV 93.2 10/30/2019 03:48 PM    PLATELETCT 210 10/30/2019 03:48 PM        RADIOLOGY DATA:  Dx-chest-2 Views    Result Date: 10/10/2019  10/9/2019 5:08 PM HISTORY/REASON FOR EXAM:  Chest Pain; right sided pleuritic chest pain. TECHNIQUE/EXAM DESCRIPTION AND NUMBER OF VIEWS: Two views of the chest. COMPARISON:  7/31/2019 FINDINGS: The heart is normal in size. No pulmonary infiltrates or consolidations are noted. No pleural effusions are appreciated. There is a mild thoracic dextroscoliosis. Spurs are present in the midthoracic spine.     No active disease.    Dx-chest-portable (1 View)    Result Date: 10/30/2019  10/30/2019 5:44 PM HISTORY/REASON FOR EXAM:  Chest Pain. TECHNIQUE/EXAM DESCRIPTION AND NUMBER OF VIEWS: Single portable view of the chest. COMPARISON: October 9, 2019 FINDINGS: Heart size is within normal limits. No pulmonary infiltrates or consolidations are noted. No pleural abnormalities are noted.     No acute cardiac or pulmonary abnormalities are identified.    Ir-us Guided Piv    Result Date:  10/21/2019  EXAMINATION:                                                                    HISTORY/REASON FOR EXAM:  Ultrasound Guided PIV.  TECHNIQUE/EXAM DESCRIPTION AND NUMBER OF VIEWS:  Peripheral IV insertion with ultrasound guidance.  The procedure was prepared using maximal sterile barrier technique including sterile gown, mask, cap, and donning of sterile gloves following appropriate hand hygiene and/or sterile scrub. Patient skin site was prepped with 2% Chlorhexidine solution.   FINDINGS: Peripheral IV insertion with Ultrasound Guidance was performed by qualified imaging nursing staff without the assistance of a Radiologist.      Ultrasound-guided PERIPHERAL IV INSERTION performed by qualified nursing staff as above.     Us-soft Tissues Of Head - Neck    Result Date: 10/17/2019  10/16/2019 4:48 PM HISTORY/REASON FOR EXAM:  Evaluate 2.9 cm thyroid nodule seen on PET scan TECHNIQUE/EXAM DESCRIPTION: Ultrasound of the soft tissues of the head and neck. COMPARISON:  PET CT 9/18/2019 FINDINGS: The thyroid gland is heterogeneous. Vascularity is normal. The right lobe of the thyroid gland measures 2.17 cm x 3.98 cm x 1.37 cm. The left lobe of the thyroid gland measures 1.50 cm x 5.43 cm x 1.41 cm. The isthmus measures 0.60 cm. Nodules >= 1cm: 6 - 10 Nodule #1 Location: Right inferior Size:  1.72 x 1.4 x 1.24 cm Composition:  Solid-2 Echogenicity:  Isoechoic-1 Shape:  Wider than tall-0 Margins:  Smooth-0 Echogenic Foci:  Microscopic-3 ACR TIRADS points/category:  6 - TR4 - Moderately Suspicious Nodule #2 Location:  Right  mid Size:  1.0 x 0.83 x 0.98 cm Composition:  Solid-2 Echogenicity:  Isoechoic-1 Shape:  Taller than wide-3 Margins:  Smooth-0 Echogenic Foci:  Microscopic-3 ACR TIRADS points/category:  9 - TR5 - Highly Suspicious Nodule #3 Location:  Right  mid Size:  1.14 x 1.26 x 1.06 cm Composition:  Solid-2 Echogenicity:  Isoechoic-1 Shape:  Wider than tall-0 Margins:  Smooth-0 Echogenic Foci:  None-0 ACR  TIRADS points/category:  3 - TR3 - Mildly Suspicious Nodule #4 Location:  Left  inferior Size:  2.52 x 2.43 x 2.54 cm Composition:  Solid-2 Echogenicity:  Hypoechoic-2 Shape:  Taller than wide-3 Margins:  Ill defined-0 Echogenic Foci:  None-0 ACR TIRADS points/category:  7 - TR5 - Highly Suspicious     Multinodular goiter. Largest nodule is in the inferior left lobe of the thyroid measuring 2.5 cm. Ultrasound-guided FNA is recommended ACR TI-RADS Recommendations TR5 - FNA recommended Recommendations based on the American College of Radiology Thyroid imaging, reporting and Data System (TI-RADS) 2017. INTERPRETING LOCATION:  [facility], [id number]    Ct-cta Chest Pulmonary Artery W/ Recons    Result Date: 10/30/2019  10/30/2019 6:15 PM HISTORY/REASON FOR EXAM:  PE suspected, high pretest prob Chest pain. Shortness of breath. History of endometrial cancer. TECHNIQUE/EXAM DESCRIPTION: CT angiogram scan for pulmonary embolism with contrast, with reconstructions. 1.25 mm helical sections were obtained from the lung apices through the lung bases following the rapid bolus administration of 100 mL of Omnipaque 350 nonionic contrast. Thin-section overlapping reconstruction interval was utilized. Coronal reconstructions were generated from the axial data. MIP post processing was performed and utilized for the interpretation. Low dose optimization technique was utilized for this CT exam including automated exposure control and adjustment of the mA and/or kV according to patient size. COMPARISON: PET/CT September 18, 2019 FINDINGS: Pulmonary Embolism: No. Main Pulmonary Arteries: No. Segmental branches: No. Subsegmental branches: No. Only if positive for PE: RV diameter: cm. LV diameter: cm. RV/LV ratio: . (Greater than 0.9 is abnormal.) Additional Comments: None. Lungs: No suspicious nodules or air space process. There is dependent atelectasis. Pleura: No pleural effusion. Nodes: No enlarged lymph nodes. Additional  findings: .     No evidence of pulmonary embolus. Dependent atelectasis.       IMPRESSION:  Cancer Staging  Endometrial cancer (HCC)  Staging form: Corpus Uteri - Carcinoma and Carcinosarcoma, AJCC 8th Edition  - Pathologic stage from 9/13/2019: FIGO Stage IIIB (pT3b, pN0, cM0) - Signed by Jayant EDOUARD M.D. on 9/13/2019      PLAN:  No change in treatment plan   RTC 8 weeks    Disposition:  Treatment plan reviewed. Questions answered. Continue therapy outlined.     Jayant EDOUARD M.D.    Orders Placed This Encounter   • Rad Onc [facility] Session Summary

## 2019-11-08 RX ORDER — BACLOFEN 10 MG/1
10 TABLET ORAL 3 TIMES DAILY
Qty: 90 TAB | Refills: 0 | Status: SHIPPED | OUTPATIENT
Start: 2019-11-08 | End: 2019-11-19 | Stop reason: SDUPTHER

## 2019-11-08 NOTE — TELEPHONE ENCOUNTER
Was the patient seen in the last year in this department? YesLOV 10/10/19    Does patient have an active prescription for medications requested? Yes    Received Request Via: Pharmacy   BACLOFEN 10 MG TABLET

## 2019-11-12 ENCOUNTER — PATIENT OUTREACH (OUTPATIENT)
Dept: HEALTH INFORMATION MANAGEMENT | Facility: OTHER | Age: 69
End: 2019-11-12

## 2019-11-12 LAB
CHEMOTHERAPY INFUSION START DATE: NORMAL
CHEMOTHERAPY INFUSION STOP DATE: NORMAL
CHEMOTHERAPY RECORDS: 1.8
CHEMOTHERAPY RECORDS: 4860
CHEMOTHERAPY RECORDS: NORMAL
CHEMOTHERAPY RX CANCER: NORMAL
DATE 1ST CHEMO CANCER: NORMAL
RAD ONC ARIA COURSE LAST TREATMENT DATE: NORMAL
RAD ONC ARIA COURSE TREATMENT ELAPSED DAYS: NORMAL
RAD ONC ARIA REFERENCE POINT DOSAGE GIVEN TO DATE: 48.6
RAD ONC ARIA REFERENCE POINT DOSAGE GIVEN TO DATE: 49.06
RAD ONC ARIA REFERENCE POINT ID: NORMAL
RAD ONC ARIA REFERENCE POINT ID: NORMAL

## 2019-11-12 NOTE — PROGRESS NOTES
Outcome: Voicemail Not Set Up - Unassigned PA October [facility] Call List     Please transfer to Patient Outreach Team at [phone] when patient returns call.    [facility] Verified: yes    Attempt # 1

## 2019-11-19 RX ORDER — BACLOFEN 10 MG/1
10 TABLET ORAL 3 TIMES DAILY
Qty: 90 TAB | Refills: 4 | Status: SHIPPED | OUTPATIENT
Start: 2019-11-19 | End: 2019-12-21

## 2019-11-19 NOTE — TELEPHONE ENCOUNTER
Was the patient seen in the last year in this department? Yes 10/10/2019    Does patient have an active prescription for medications requested? No     Received Request Via: Pharmacy

## 2019-11-19 NOTE — PROGRESS NOTES
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 [facility] visited the patient bedside. The patient was discharged home.     The patient was ordered to start/continue to take the following medications: Ondansetron (Zofran) and Dexamethasone. The patient successfully filled all medications.     Per discharge orders, patient was instructed to see her oncologist and to resume out-patient radiation therapy. Patient saw her PCP on 10/10/19, and will see her oncologist on 11/19/19. Patient resumed out-patient radiation therapy on 10/8/19 and attends daily.     [facility] patient advocate was able to successfully engage with patient's daughter, Carolyn, once post-discharge. Throughout the entire case, all additional outreach calls to patient and her daughter were sent to voicemail. When advocate did speak to Carolyn, she expressed interest in respite services. Advocate emailed Carolyn an application for the [facility], provided a list of care giving agencies for hire, and information about [facility].

## 2019-11-26 ENCOUNTER — HOSPITAL ENCOUNTER (OUTPATIENT)
Dept: RADIOLOGY | Facility: MEDICAL CENTER | Age: 69
End: 2019-11-26
Attending: NURSE PRACTITIONER
Payer: MEDICARE

## 2019-11-26 DIAGNOSIS — E04.1 THYROID NODULE: ICD-10-CM

## 2019-11-26 LAB — CYTOLOGY REG CYTOL: NORMAL

## 2019-11-26 PROCEDURE — 10005 FNA BX W/US GDN 1ST LES: CPT

## 2019-11-26 PROCEDURE — 88112 CYTOPATH CELL ENHANCE TECH: CPT

## 2019-11-26 NOTE — PROGRESS NOTES
"Patient given [facility] \"Preventing the Spread of Infection\" brochure upon arrival.   Pt present to OP IR Clinic with previous stroke from 2015 in wheel chair escorted by Daughter, Right side weakness, assisted to OP IR Candido with RN   Procedure JOB Adrian    US guided Left thyroid nodule fine needle aspiration done by Dr. Mathur; Left anterior aspect of neck access site; 1 jar of cytolyt obtained and sent to pathology lab; pt tolerated the procedure well; pt hemodynamically stable pre/intra/post procedure; all questions and concerns answered prior to being d/c; patient provided with appropriate education for procedure; pt in waiting room waiting for daughter to d/c home.  "

## 2019-12-03 ENCOUNTER — HOSPITAL ENCOUNTER (OUTPATIENT)
Dept: LAB | Facility: MEDICAL CENTER | Age: 69
End: 2019-12-03
Attending: NURSE PRACTITIONER
Payer: MEDICARE

## 2019-12-03 ENCOUNTER — OFFICE VISIT (OUTPATIENT)
Dept: MEDICAL GROUP | Facility: LAB | Age: 69
End: 2019-12-03
Payer: MEDICARE

## 2019-12-03 VITALS
TEMPERATURE: 97.8 F | RESPIRATION RATE: 16 BRPM | SYSTOLIC BLOOD PRESSURE: 128 MMHG | DIASTOLIC BLOOD PRESSURE: 80 MMHG | HEART RATE: 84 BPM | BODY MASS INDEX: 31.41 KG/M2 | OXYGEN SATURATION: 98 % | HEIGHT: 64 IN | WEIGHT: 184 LBS

## 2019-12-03 DIAGNOSIS — E04.1 THYROID NODULE: ICD-10-CM

## 2019-12-03 DIAGNOSIS — R42 DIZZINESS: ICD-10-CM

## 2019-12-03 DIAGNOSIS — C54.1 ENDOMETRIAL CANCER (HCC): ICD-10-CM

## 2019-12-03 LAB
ALBUMIN SERPL BCP-MCNC: 4.3 G/DL (ref 3.2–4.9)
ALBUMIN/GLOB SERPL: 1.2 G/DL
ALP SERPL-CCNC: 78 U/L (ref 30–99)
ALT SERPL-CCNC: 31 U/L (ref 2–50)
ANION GAP SERPL CALC-SCNC: 8 MMOL/L (ref 0–11.9)
AST SERPL-CCNC: 26 U/L (ref 12–45)
BASOPHILS # BLD AUTO: 0.6 % (ref 0–1.8)
BASOPHILS # BLD: 0.02 K/UL (ref 0–0.12)
BILIRUB SERPL-MCNC: 0.4 MG/DL (ref 0.1–1.5)
BUN SERPL-MCNC: 11 MG/DL (ref 8–22)
CALCIUM SERPL-MCNC: 10.4 MG/DL (ref 8.5–10.5)
CANCER AG125 SERPL-ACNC: 4.6 U/ML (ref 0–35)
CHLORIDE SERPL-SCNC: 103 MMOL/L (ref 96–112)
CO2 SERPL-SCNC: 30 MMOL/L (ref 20–33)
CREAT SERPL-MCNC: 0.89 MG/DL (ref 0.5–1.4)
EOSINOPHIL # BLD AUTO: 0.03 K/UL (ref 0–0.51)
EOSINOPHIL NFR BLD: 0.9 % (ref 0–6.9)
ERYTHROCYTE [DISTWIDTH] IN BLOOD BY AUTOMATED COUNT: 58 FL (ref 35.9–50)
GLOBULIN SER CALC-MCNC: 3.6 G/DL (ref 1.9–3.5)
GLUCOSE SERPL-MCNC: 92 MG/DL (ref 65–99)
HCT VFR BLD AUTO: 40.2 % (ref 37–47)
HGB BLD-MCNC: 13 G/DL (ref 12–16)
IMM GRANULOCYTES # BLD AUTO: 0.01 K/UL (ref 0–0.11)
IMM GRANULOCYTES NFR BLD AUTO: 0.3 % (ref 0–0.9)
LYMPHOCYTES # BLD AUTO: 1.13 K/UL (ref 1–4.8)
LYMPHOCYTES NFR BLD: 32.8 % (ref 22–41)
MAGNESIUM SERPL-MCNC: 2 MG/DL (ref 1.5–2.5)
MCH RBC QN AUTO: 31.3 PG (ref 27–33)
MCHC RBC AUTO-ENTMCNC: 32.3 G/DL (ref 33.6–35)
MCV RBC AUTO: 96.6 FL (ref 81.4–97.8)
MONOCYTES # BLD AUTO: 0.39 K/UL (ref 0–0.85)
MONOCYTES NFR BLD AUTO: 11.3 % (ref 0–13.4)
NEUTROPHILS # BLD AUTO: 1.87 K/UL (ref 2–7.15)
NEUTROPHILS NFR BLD: 54.1 % (ref 44–72)
NRBC # BLD AUTO: 0 K/UL
NRBC BLD-RTO: 0 /100 WBC
PLATELET # BLD AUTO: 239 K/UL (ref 164–446)
PMV BLD AUTO: 10.7 FL (ref 9–12.9)
POTASSIUM SERPL-SCNC: 3.3 MMOL/L (ref 3.6–5.5)
PROT SERPL-MCNC: 7.9 G/DL (ref 6–8.2)
RBC # BLD AUTO: 4.16 M/UL (ref 4.2–5.4)
SODIUM SERPL-SCNC: 141 MMOL/L (ref 135–145)
WBC # BLD AUTO: 3.5 K/UL (ref 4.8–10.8)

## 2019-12-03 PROCEDURE — 99214 OFFICE O/P EST MOD 30 MIN: CPT | Performed by: NURSE PRACTITIONER

## 2019-12-03 PROCEDURE — 80053 COMPREHEN METABOLIC PANEL: CPT

## 2019-12-03 PROCEDURE — 36415 COLL VENOUS BLD VENIPUNCTURE: CPT

## 2019-12-03 PROCEDURE — 83735 ASSAY OF MAGNESIUM: CPT

## 2019-12-03 PROCEDURE — 86304 IMMUNOASSAY TUMOR CA 125: CPT

## 2019-12-03 PROCEDURE — 85025 COMPLETE CBC W/AUTO DIFF WBC: CPT

## 2019-12-03 RX ORDER — MECLIZINE HCL 12.5 MG/1
12.5-25 TABLET ORAL 3 TIMES DAILY PRN
Qty: 30 TAB | Refills: 0 | Status: SHIPPED | OUTPATIENT
Start: 2019-12-03 | End: 2020-01-14 | Stop reason: SDUPTHER

## 2019-12-03 NOTE — PROGRESS NOTES
No chief complaint on file.      HPI  69-year-old established female here to follow-up on a couple of issues:  1.-Thyroid biopsy: This was done on November 26.  Fortunately pathology came back benign.  She does have some residual pain to the left side of her neck but overall is happy to hear that pathology came back benign.  2-endometrial cancer: Followed by Dr. Garcia and radiation oncology.  Currently going through radiation.  Further decisions on chemotherapy will be made in a few weeks, per patient's daughter she still very achy and in pain.  Taking Tylenol and amitriptyline for pain.  Really does not find amitriptyline very helpful.  3-dizziness: This is a new issue for the past week.  She describes a throbbing periodic left-sided headache, ear fullness, postnasal drainage, sore throat.  Dizziness occurs when she changes positions or turns her head to the left.  No other associated symptoms such as purulent mucus production, ear drainage, fevers or chills.  Denies head injuries or traumas.    Past medical, surgical, family, and social history is reviewed and updated in Epic chart by me today.   Medications and allergies reviewed and updated in Epic chart by me today.     ROS:   As documented in history of present illness above    Exam:    Vitals:    12/03/19 1300   BP: 128/80   Pulse: 84   Resp: 16   Temp: 36.6 °C (97.8 °F)   SpO2: 98%     Constitutional: Alert, no distress, plus 3 vital signs  Skin:  Warm, dry, no rashes invisible areas  Eye: Equal, round and reactive, conjunctiva clear  ENMT: Lips without lesions, good dentition, oropharynx is mildly erythematous without exudate or swelling.  Bilateral tympanic members are translucent.  Neck: Trachea midline.  No tenderness or erythema to biopsy site of neck.  She does not have any significantly enlarged cervical lymphadenopathy.  She does have tenderness to her left lateral cervical lymph nodes.  Respiratory: Unlabored respiration, lungs clear to  auscultation  Neurologic: Alert and oriented. PERRL but this induces dizziness.   Extraocular movement exam causes dizziness.  Chronic right-sided hemiplegia  Cardiovascular: Normal rate and rhythm.  Psych: Alert, pleasant, well-groomed, normal affect    Assessment / Plan / Medical Decision makin. Dizziness  -New issue.  Suspect vertigo.  Discussed slow position changes, staying well-hydrated and rest.  Discussed potential etiology of vertigo.  Trial of meclizine 12.5 to 25 mg every 6-8 hours for symptomatic treatment.  Her daughter will email me within 4 to 5 days regarding the patient's symptoms.  I encouraged her daughter to call 911 or bring the patient to the ER if the patient has the worst headache of her life or begins to vomit with vertigo symptoms.  - meclizine (ANTIVERT) 12.5 MG Tab; Take 1-2 Tabs by mouth 3 times a day as needed for Dizziness.  Dispense: 30 Tab; Refill: 0    2. Endometrial cancer (HCC)  -Cared for closely by Dr. Garcia and her radiation oncologist.    3.  Hemorrhagic cyst of the thyroid region: Reviewed ultrasound and pathology with the patient and her daughter and they verbalized understanding of the results.

## 2019-12-11 ENCOUNTER — TELEPHONE (OUTPATIENT)
Dept: CARDIOLOGY | Facility: MEDICAL CENTER | Age: 69
End: 2019-12-11

## 2019-12-16 ENCOUNTER — OFFICE VISIT (OUTPATIENT)
Dept: CARDIOLOGY | Facility: MEDICAL CENTER | Age: 69
End: 2019-12-16
Payer: MEDICARE

## 2019-12-16 VITALS
BODY MASS INDEX: 31.24 KG/M2 | HEART RATE: 90 BPM | WEIGHT: 183 LBS | DIASTOLIC BLOOD PRESSURE: 80 MMHG | HEIGHT: 64 IN | SYSTOLIC BLOOD PRESSURE: 154 MMHG | OXYGEN SATURATION: 94 %

## 2019-12-16 DIAGNOSIS — R06.02 SHORTNESS OF BREATH: ICD-10-CM

## 2019-12-16 DIAGNOSIS — I10 HYPERTENSION, UNCONTROLLED: ICD-10-CM

## 2019-12-16 DIAGNOSIS — R07.2 PRECORDIAL PAIN: ICD-10-CM

## 2019-12-16 PROCEDURE — 99214 OFFICE O/P EST MOD 30 MIN: CPT | Performed by: INTERNAL MEDICINE

## 2019-12-16 RX ORDER — METOPROLOL SUCCINATE 100 MG/1
100 TABLET, EXTENDED RELEASE ORAL 2 TIMES DAILY
Qty: 60 TAB | Refills: 3
Start: 2019-12-16 | End: 2019-12-19 | Stop reason: SDUPTHER

## 2019-12-16 ASSESSMENT — ENCOUNTER SYMPTOMS
DIZZINESS: 1
BACK PAIN: 1
CONSTIPATION: 1
MEMORY LOSS: 1
NERVOUS/ANXIOUS: 1
LOSS OF CONSCIOUSNESS: 0
PALPITATIONS: 1
SHORTNESS OF BREATH: 1
SPUTUM PRODUCTION: 0
NECK PAIN: 1
HEADACHES: 1
NAUSEA: 1
WEIGHT LOSS: 0
HEMOPTYSIS: 0

## 2019-12-16 NOTE — PROGRESS NOTES
Chief Complaint   Patient presents with   • Hypertension   • Shortness of Breath   • Chest Pain       Subjective:   Jairo Rivas is a 69 y.o. female who presents today for evaluation of above issues.    She is seen in consultation at the request of SHEREE Stock for above issue    She has long standing HTN and hypercholesterolemia.  She had two previous stroke 2011 in 2015 resulted in partial right hemiparesis.  She uses a wheel chair but still able to walk with a cane.    Over the last 2 months, she has been noticing short of breath and discomfort in lower anterior chest radiating to her back. They occur intermittently but separately, lasting a few minutes at the time.   She also has been experiencing palpitations and dizziness.  She reported labile BP but uses wrist BP at home.   She thinks her BP most of the time runs in the 140-150 systolic with HR in the 80 to low 90  She also has chronic right sided pain since her stroke.    She denies recent change in medication except up-titration of her amlodipine from 5 to 7.5 mg/d on October 10 th.   She stated that she is still taking metoprolol 100 mg/d and losartan as prescribed.  She denies non compliance with salt and fluid intake.      Past Medical History:   Diagnosis Date   • Anemia 2/6/2018    Iron def, post-menopausal bleeding.   • Cancer (HCC) 2019    uterine & endometrial   • CVA, old, hemiparesis (HCC) 2/6/2018    Residual left sided weakness   • Essential hypertension 2/6/2018    Well controlled on amlodipine 10 mg and metoprolol 100 mg bid.   • Hyperlipidemia    • Post-menopause bleeding 2/6/2018    Menopause in 2010 and bleeding 2012 started to have irregular continuous bleeding.    • Stroke (HCC)     right sided weakness   • Vision abnormalities 2/6/2018   • Vitamin D deficiency 2/6/2018    Taking 50,000 IU once weekly for 4 yrs.     Past Surgical History:   Procedure Laterality Date   • PB CYSTOSCOPY,INSERT URETERAL STENT Left  8/8/2019    Procedure: CYSTOSCOPY, WITH URETERAL STENT INSERTION;  Surgeon: Jann Garcia M.D.;  Location: SUR[facility]ERY [facility];  Service: Gynecology   • HYSTERECTOMY ROBOTIC XI N/A 8/8/2019    Procedure: HYSTERECTOMY, ROBOT-ASSISTED, USING DA RILEY XI;  Surgeon: Jann Garcia M.D.;  Location: SURGERY [facility];  Service: Gynecology   • SALPINGO OOPHORECTOMY Bilateral 8/8/2019    Procedure: SALPINGO-OOPHORECTOMY;  Surgeon: Jann Garcia M.D.;  Location: SURGERY [facility];  Service: Gynecology   • NODE BIOPSY SENTINEL  8/8/2019    Procedure: BIOPSY, LYMPH NODE, SENTINEL;  Surgeon: Jann Garcia M.D.;  Location: SURGERY [facility];  Service: Gynecology   • PB HYSTEROSCOPY,DX,SEP PROC  6/19/2019    Procedure: HYSTEROSCOPY, DIAGNOSTIC;  Surgeon: Anel Chicas M.D.;  Location: SURGERY [facility];  Service: Gynecology   • DILATION AND CURETTAGE  6/19/2019    Procedure: DILATION AND CURETTAGE;  Surgeon: Anel Chicas M.D.;  Location: SUR[facility]ERY [facility];  Service: Gynecology     Family History   Problem Relation Age of Onset   • Hypertension Mother    • No Known Problems Father    • No Known Problems Brother      Social History     Socioeconomic History   • Marital status: Single     Spouse name: Not on file   • Number of children: Not on file   • Years of education: Not on file   • Highest education level: Not on file   Occupational History   • Not on file   Social Needs   • Financial resource strain: Not on file   • Food insecurity:     Worry: Not on file     Inability: Not on file   • Transportation needs:     Medical: Not on file     Non-medical: Not on file   Tobacco Use   • Smoking status: Never Smoker   • Smokeless tobacco: Never Used   Substance and Sexual Activity   • Alcohol use: No   • Drug use: No   • Sexual activity: Never     Comment: , have 7 kids.   Lifestyle   • Physical activity:     Days per week: Not on file     Minutes per session: Not on file   • Stress:  Not on file   Relationships   • Social connections:     Talks on phone: Not on file     Gets together: Not on file     Attends Jainism service: Not on file     Active member of club or organization: Not on file     Attends meetings of clubs or organizations: Not on file     Relationship status: Not on file   • Intimate partner violence:     Fear of current or ex partner: Not on file     Emotionally abused: Not on file     Physically abused: Not on file     Forced sexual activity: Not on file   Other Topics Concern   •  Service No   • Blood Transfusions No   • Caffeine Concern No   • Occupational Exposure No   • Hobby Hazards No   • Sleep Concern Yes   • Stress Concern No   • Weight Concern Yes   • Special Diet No   • Back Care Yes   • Exercise Yes   • Bike Helmet No     Comment: Does Not Ride Bike   • Seat Belt Yes   • Self-Exams Yes   Social History Narrative   • Not on file     Allergies   Allergen Reactions   • Penicillins Itching     Makes her feel weak    • Gluten Meal    • Hydrocodone Itching     Swelling of the tongue  Face swelling   • Iodine      Weak and dizzy   • Levaquin Itching     Swelling of the tongue  Facial swelling   • Tramadol Hives     Swelling     Outpatient Encounter Medications as of 12/16/2019   Medication Sig Dispense Refill   • meclizine (ANTIVERT) 12.5 MG Tab Take 1-2 Tabs by mouth 3 times a day as needed for Dizziness. 30 Tab 0   • baclofen (LIORESAL) 10 MG Tab Take 1 Tab by mouth 3 times a day for 30 days. Indications: Muscle Spasticity 90 Tab 4   • atorvastatin (LIPITOR) 20 MG Tab Take 1 Tab by mouth every evening. 100 Tab 4   • ondansetron (ZOFRAN) 4 MG Tab tablet Take 1 Tab by mouth every 6 hours as needed. Alternate q 3 h with Dex four four days after chemotherapy and longer if needed 30 Tab 0   • amLODIPine (NORVASC) 5 MG Tab Take 1.5 Tabs by mouth every bedtime. 135 Tab 2   • dexamethasone (DECADRON) 4 MG Tab Take 1 Tab by mouth every 6 hours. 30 Tab 0   • senna-docusate  "(PERICOLACE OR SENOKOT S) 8.6-50 MG Tab Take 1 Tab by mouth 2 times a day.     • amitriptyline (ELAVIL) 25 MG Tab TAKE 1 TABLET BY MOUTH EVERY DAY IN THE EVENING 90 Tab 1   • losartan (COZAAR) 100 MG Tab Take 100 mg by mouth every day.     • ascorbic acid (ASCORBIC ACID) 500 MG Tab Take 500 mg by mouth every evening.     • Cholecalciferol (VITAMIN D3) 2000 UNIT Cap Take 2,000 Units by mouth every day.     • Coenzyme Q10 (COQ10) 100 MG Cap Take 100 mg by mouth every evening.     • multivitamin (THERAGRAN) Tab Take 1 Tab by mouth every day.     • acetaminophen (TYLENOL) 500 MG Tab Take 1,000 mg by mouth every 6 hours as needed for Moderate Pain.     • B Complex-C (SUPER B COMPLEX PO) Take 1 Tab by mouth every evening.     • aspirin (ASA) 81 MG Chew Tab chewable tablet Take 81 mg by mouth every day.     • ferrous sulfate 325 (65 Fe) MG tablet Take 1 Tab by mouth every day. (Patient not taking: Reported on 12/16/2019) 30 Tab 0     No facility-administered encounter medications on file as of 12/16/2019.      Review of Systems   Constitutional: Negative for malaise/fatigue and weight loss.   Respiratory: Positive for shortness of breath. Negative for hemoptysis and sputum production.    Cardiovascular: Positive for chest pain and palpitations. Negative for leg swelling.   Gastrointestinal: Positive for constipation and nausea.   Musculoskeletal: Positive for back pain, joint pain and neck pain.   Neurological: Positive for dizziness and headaches. Negative for loss of consciousness.   Psychiatric/Behavioral: Positive for memory loss. The patient is nervous/anxious.    All other systems reviewed and are negative.       Objective:   /80 (BP Location: Left arm, Patient Position: Sitting, BP Cuff Size: Adult)   Pulse 90   Ht 1.626 m (5' 4\")   Wt 83 kg (183 lb)   SpO2 94%   BMI 31.41 kg/m²     Physical Exam   Constitutional: She is oriented to person, place, and time. She appears well-nourished. No distress.   HENT: "   Head: Atraumatic.   Eyes: Left eye exhibits no discharge.   Neck: No JVD present. No thyromegaly present.   Cardiovascular: Normal rate and regular rhythm. Exam reveals no gallop.   No murmur heard.  Pulmonary/Chest: Effort normal and breath sounds normal.   Abdominal: Soft.   Musculoskeletal:         General: No edema.   Neurological: She is alert and oriented to person, place, and time.   Right hemiparesis   Skin: Skin is warm. No erythema.   Psychiatric: She has a normal mood and affect. Her behavior is normal.     EKG 10/2019 by my review showed sinus rhythm, first degree AV block    Labs 12/3 CMP NL except K+ 3.3    Lipid 5/2019 LDL 86    Assessment:     1. Shortness of breath  EC-ECHOCARDIOGRAM COMPLETE W/ CONT    NM-CARDIAC STRESS TEST   2. Precordial pain  EC-ECHOCARDIOGRAM COMPLETE W/ CONT    NM-CARDIAC STRESS TEST   3. Hypertension, uncontrolled         Medical Decision Making:  Today's Assessment / Status / Plan:     For her chest discomfort, will arrange for stress MPI.  Given her shortness of breath and HTN, will arrange for echocardiography.  Her BP is not well controlled, will increase metoprolol XL to 200 mg/d.  She is to continue other medications and send us BP log in 1-2 weeks.  RTC 1 month.  We will keep you posted about our findings and further recommendations as they become available. Please also do not hesitate to call for any questions.  Thank you kindly for allowing me to participate in the care of this patient.

## 2019-12-19 DIAGNOSIS — I10 HYPERTENSION, UNCONTROLLED: Primary | ICD-10-CM

## 2019-12-20 RX ORDER — METOPROLOL SUCCINATE 100 MG/1
100 TABLET, EXTENDED RELEASE ORAL 2 TIMES DAILY
Qty: 200 TAB | Refills: 3 | Status: SHIPPED | OUTPATIENT
Start: 2019-12-20 | End: 2020-01-14

## 2019-12-23 RX ORDER — BACLOFEN 10 MG/1
10 TABLET ORAL 3 TIMES DAILY
Qty: 270 TAB | Refills: 1 | Status: SHIPPED | OUTPATIENT
Start: 2019-12-23 | End: 2020-01-22

## 2020-01-01 NOTE — TELEPHONE ENCOUNTER
Was the patient seen in the last year in this department? Yes  6/17/19  Does patient have an active prescription for medications requested? No     Received Request Via: Pharmacy   3

## 2020-01-02 ENCOUNTER — HOSPITAL ENCOUNTER (OUTPATIENT)
Dept: RADIOLOGY | Facility: MEDICAL CENTER | Age: 70
End: 2020-01-02
Attending: SPECIALIST
Payer: MEDICARE

## 2020-01-02 ENCOUNTER — HOSPITAL ENCOUNTER (OUTPATIENT)
Dept: RADIATION ONCOLOGY | Facility: MEDICAL CENTER | Age: 70
End: 2020-01-31
Attending: RADIOLOGY
Payer: MEDICARE

## 2020-01-02 VITALS
DIASTOLIC BLOOD PRESSURE: 86 MMHG | OXYGEN SATURATION: 96 % | SYSTOLIC BLOOD PRESSURE: 143 MMHG | HEART RATE: 85 BPM | BODY MASS INDEX: 32.41 KG/M2 | WEIGHT: 188.8 LBS | TEMPERATURE: 97.7 F

## 2020-01-02 DIAGNOSIS — N85.00 ENDOMETRIAL HYPERPLASIA, UNSPECIFIED: ICD-10-CM

## 2020-01-02 DIAGNOSIS — C54.1 ENDOMETRIAL SARCOMA (HCC): ICD-10-CM

## 2020-01-02 DIAGNOSIS — Z51.11 ENCOUNTER FOR ANTINEOPLASTIC CHEMOTHERAPY: ICD-10-CM

## 2020-01-02 DIAGNOSIS — C54.1 ENDOMETRIAL CANCER (HCC): ICD-10-CM

## 2020-01-02 PROCEDURE — 71260 CT THORAX DX C+: CPT

## 2020-01-02 PROCEDURE — 700117 HCHG RX CONTRAST REV CODE 255: Performed by: SPECIALIST

## 2020-01-02 PROCEDURE — 99212 OFFICE O/P EST SF 10 MIN: CPT | Performed by: RADIOLOGY

## 2020-01-02 RX ADMIN — IOHEXOL 25 ML: 240 INJECTION, SOLUTION INTRATHECAL; INTRAVASCULAR; INTRAVENOUS; ORAL at 11:18

## 2020-01-02 RX ADMIN — IOHEXOL 100 ML: 350 INJECTION, SOLUTION INTRAVENOUS at 11:18

## 2020-01-02 ASSESSMENT — PAIN SCALES - GENERAL: PAINLEVEL: 7=MODERATE-SEVERE PAIN

## 2020-01-02 NOTE — PROGRESS NOTES
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  She is status post adjuvant pelvic radiotherapy.    Visit Diagnoses     ICD-10-CM   1. Endometrial cancer (HCC) C54.1     Endometrial cancer (HCC)  Staging form: Corpus Uteri - Carcinoma and Carcinosarcoma, AJCC 8th Edition  - Pathologic stage from 9/13/2019: FIGO Stage IIIB (pT3b, pN0, cM0) - Signed by Jayant EDOUARD M.D. on 9/13/2019  Histologic grade (G): G2  Histologic grading system: 3 grade system  Lymph-vascular invasion (LVI): LVI not present (absent)/not identified  Residual tumor (R): R1 - Microscopic  Histopathologic type: Endometrioid adenocarcinoma, NOS  Diagnostic confirmation: Positive histology  Specimen type: Excision  Staged by: Managing physician      RADIATION SUMMARY:  Aria Treatment Information        Some values may be hidden. Unless noted otherwise, only the newest values recorded on each date are displayed.         Aria Treatment Summary 11/12/19   Course First Treatment Date 09/30/2019   Course Last Treatment Date 11/06/2019   Pelvis Plan from Course C1_Pelvis   Fraction 27 of 27   Elapsed Course Days 37 @ [id number]   Prescribed Fraction Dose 180 cGy   Prescribed Total Dose 4,860 cGy   Pelvis Reference Point from Course C1_Pelvis   Elapsed Course Days 37 @ [id number]   Session Dose -   Total Dose 4,860 cGy   PelvisCP Reference Point from Course C1_Pelvis   Elapsed Course Days 37 @ [id number]   Session Dose -   Total Dose 4,906 cGy             HISTORY OF PRESENT ILLNESS:   Initial follow-up visit post completion of radiation.  Overall she is doing well.  Denies any vaginal bleeding or discharge.  Reports normal bowel and bladder function.  Her only complaint is some mild discomfort at the robotic port site      PROBLEM LIST:  Patient Active Problem List   Diagnosis   • Vision abnormalities   • CVA, old, hemiparesis (HCC)   • Hypertension, uncontrolled    • Vitamin D deficiency   • Endometrial cancer (HCC)   • Iron deficiency anemia   • Obesity (BMI 30-39.9)   • Hypokalemia   • Acute on chronic blood loss anemia   • Admission for chemotherapy   • Nausea   • Thyroid nodule - benign       CURRENT MEDICATIONS:  Current Outpatient Medications   Medication Sig Dispense Refill   • baclofen (LIORESAL) 10 MG Tab TAKE 1 TAB BY MOUTH 3 TIMES A DAY FOR 30 DAYS. INDICATIONS: MUSCLE SPASTICITY 270 Tab 1   • metoprolol SR (TOPROL XL) 100 MG TABLET SR 24 HR Take 1 Tab by mouth 2 Times a Day. 200 Tab 3   • meclizine (ANTIVERT) 12.5 MG Tab Take 1-2 Tabs by mouth 3 times a day as needed for Dizziness. 30 Tab 0   • atorvastatin (LIPITOR) 20 MG Tab Take 1 Tab by mouth every evening. 100 Tab 4   • ondansetron (ZOFRAN) 4 MG Tab tablet Take 1 Tab by mouth every 6 hours as needed. Alternate q 3 h with Dex four four days after chemotherapy and longer if needed 30 Tab 0   • amLODIPine (NORVASC) 5 MG Tab Take 1.5 Tabs by mouth every bedtime. 135 Tab 2   • dexamethasone (DECADRON) 4 MG Tab Take 1 Tab by mouth every 6 hours. 30 Tab 0   • senna-docusate (PERICOLACE OR SENOKOT S) 8.6-50 MG Tab Take 1 Tab by mouth 2 times a day.     • amitriptyline (ELAVIL) 25 MG Tab TAKE 1 TABLET BY MOUTH EVERY DAY IN THE EVENING 90 Tab 1   • losartan (COZAAR) 100 MG Tab Take 100 mg by mouth every day.     • ascorbic acid (ASCORBIC ACID) 500 MG Tab Take 500 mg by mouth every evening.     • Cholecalciferol (VITAMIN D3) 2000 UNIT Cap Take 2,000 Units by mouth every day.     • Coenzyme Q10 (COQ10) 100 MG Cap Take 100 mg by mouth every evening.     • multivitamin (THERAGRAN) Tab Take 1 Tab by mouth every day.     • acetaminophen (TYLENOL) 500 MG Tab Take 1,000 mg by mouth every 6 hours as needed for Moderate Pain.     • B Complex-C (SUPER B COMPLEX PO) Take 1 Tab by mouth every evening.     • ferrous sulfate 325 (65 Fe) MG tablet Take 1 Tab by mouth every day. (Patient not taking: Reported on 12/16/2019) 30 Tab 0    • aspirin (ASA) 81 MG Chew Tab chewable tablet Take 81 mg by mouth every day.       No current facility-administered medications for this encounter.        ALLERGIES:  Penicillins; Gluten meal; Hydrocodone; Iodine; Levaquin; and Tramadol    REVIEW OF SYSTEMS:  A review of systems for today's date of service was reviewed and uploaded into the electronic medical record.    PHYSICAL EXAM:  PERFORMANCE STATUS:  ECOG Performance Review 1/2/2020   ECOG Performance Status Capable of only limited selfcare, confined to bed or chair more than 50% of waking hours   Some recent data might be hidden     Karnofsky Score 1/2/2020   Karnofsky Score 40   Some recent data might be hidden     /86   Pulse 85   Temp 36.5 °C (97.7 °F)   Wt 85.6 kg (188 lb 12.8 oz)   SpO2 96%   BMI 32.41 kg/m²   Physical Exam  Vitals signs and nursing note reviewed.   Constitutional:       General: She is not in acute distress.     Appearance: She is well-developed.   HENT:      Head: Normocephalic.   Eyes:      Conjunctiva/sclera: Conjunctivae normal.      Pupils: Pupils are equal, round, and reactive to light.   Neck:      Musculoskeletal: Normal range of motion and neck supple.   Cardiovascular:      Rate and Rhythm: Normal rate and regular rhythm.      Heart sounds: Normal heart sounds.   Pulmonary:      Effort: Pulmonary effort is normal.      Breath sounds: Normal breath sounds.   Abdominal:      General: Bowel sounds are normal.      Palpations: Abdomen is soft.   Genitourinary:     Vagina: Normal.      Comments: Cuff intact.  No palpable abnormality.  Musculoskeletal: Normal range of motion.         General: No tenderness or deformity.   Lymphadenopathy:      Cervical: No cervical adenopathy.   Skin:     General: Skin is warm and dry.      Findings: No erythema.   Neurological:      Mental Status: She is alert and oriented to person, place, and time.      Cranial Nerves: No cranial nerve deficit.      Motor: Weakness ( Residual  weakness right side after CVA) present.      Coordination: Coordination abnormal.   Psychiatric:         Behavior: Behavior normal.         Thought Content: Thought content normal.         Judgment: Judgment normal.         LABORATORY DATA:   Lab Results   Component Value Date/Time    SODIUM 141 12/03/2019 01:40 PM    POTASSIUM 3.3 (L) 12/03/2019 01:40 PM    CHLORIDE 103 12/03/2019 01:40 PM    CO2 30 12/03/2019 01:40 PM    GLUCOSE 92 12/03/2019 01:40 PM    BUN 11 12/03/2019 01:40 PM    CREATININE 0.89 12/03/2019 01:40 PM      Lab Results   Component Value Date/Time    WBC 3.5 (L) 12/03/2019 01:40 PM    RBC 4.16 (L) 12/03/2019 01:40 PM    HEMOGLOBIN 13.0 12/03/2019 01:40 PM    HEMATOCRIT 40.2 12/03/2019 01:40 PM    MCV 96.6 12/03/2019 01:40 PM    MCH 31.3 12/03/2019 01:40 PM    MCHC 32.3 (L) 12/03/2019 01:40 PM    MPV 10.7 12/03/2019 01:40 PM    NEUTSPOLYS 54.10 12/03/2019 01:40 PM    LYMPHOCYTES 32.80 12/03/2019 01:40 PM    MONOCYTES 11.30 12/03/2019 01:40 PM    EOSINOPHILS 0.90 12/03/2019 01:40 PM    BASOPHILS 0.60 12/03/2019 01:40 PM        RADIOLOGY DATA:  Ct-chest,abdomen,pelvis With    Result Date: 1/2/2020 1/2/2020 10:23 AM HISTORY/REASON FOR EXAM:  Endometrial carcinoma follow-up. TECHNIQUE/EXAM DESCRIPTION: CT scan of the chest, abdomen and pelvis with contrast. Thin-section helical scanning was obtained with intravenous contrast from the lung apices through the pubic symphysis to include the chest, abdomen and pelvis. 100 mL of Omnipaque 350 nonionic contrast was administered intravenously without complication.  Oral contrast was administered. Low dose optimization technique was utilized for this CT exam including automated exposure control and adjustment of the mA and/or kV according to patient size. COMPARISON: CT thorax 10/30/2019. CT AP 9/27/2019. PET/CT 9/18/2019. CT AP 7/26/2019 FINDINGS: CT Chest: The visualized thyroid gland is unchanged and is enlarged with heterogeneous. The heart is normal in  size and configuration. No mediastinal, hilar, or axillary adenopathy is present. The lung parenchyma is clear. No pleural effusion is present. The bony thorax is intact. CT Abdomen: The liver, biliary tree, pancreas, spleen, and stomach appear normal. The gallbladder appears normal. The aorta and IVC are normal in caliber. No adrenal abnormality is present. Both kidneys function without obstruction or mass. The bowel is without obstruction. The appendix appears normal. No free air or free fluid is present. CT Pelvis: The bladder appears normal. Surgical clips are present in the pelvis. A vaginal cuff appears to be present. No residual ovarian tissue is present. The fluid or inflammatory change is present. No pelvic sidewall or inguinal adenopathy is present.     1.  No evidence of pulmonary, abdominal, or pelvic metastasis. 2.  No acute inflammatory or obstructive process. 3.  Surgical absence of the majority of the uterus and surgical absence of the ovaries ovaries.      IMPRESSION:    A 69 y.o. with   Visit Diagnoses     ICD-10-CM   1. Endometrial cancer (HCC) C54.1     Endometrial cancer (HCC)  Staging form: Corpus Uteri - Carcinoma and Carcinosarcoma, AJCC 8th Edition  - Pathologic stage from 9/13/2019: FIGO Stage IIIB (pT3b, pN0, cM0) - Signed by Jayant EDOUARD M.D. on 9/13/2019  Histologic grade (G): G2  Histologic grading system: 3 grade system  Lymph-vascular invasion (LVI): LVI not present (absent)/not identified  Residual tumor (R): R1 - Microscopic  Histopathologic type: Endometrioid adenocarcinoma, NOS  Diagnostic confirmation: Positive histology  Specimen type: Excision  Staged by: Managing physician      CANCER STATUS:  No Evidence of Disease    RECOMMENDATIONS:   Reviewed physical exam findings with patient as well as recent CT body.  Reassured her.  At this point I will turn over routine follow-up care to Dr. Garcia and see her back on an as-needed basis.    Thank you for the opportunity to  participate in her care.  If any questions or comments, please do not hesitate in calling.    No orders of the defined types were placed in this encounter.

## 2020-01-03 ENCOUNTER — HOSPITAL ENCOUNTER (OUTPATIENT)
Dept: CARDIOLOGY | Facility: MEDICAL CENTER | Age: 70
End: 2020-01-03
Attending: INTERNAL MEDICINE
Payer: MEDICARE

## 2020-01-03 ENCOUNTER — HOSPITAL ENCOUNTER (OUTPATIENT)
Dept: RADIOLOGY | Facility: MEDICAL CENTER | Age: 70
End: 2020-01-03
Attending: INTERNAL MEDICINE
Payer: MEDICARE

## 2020-01-03 DIAGNOSIS — R07.2 PRECORDIAL PAIN: ICD-10-CM

## 2020-01-03 DIAGNOSIS — R06.02 SHORTNESS OF BREATH: ICD-10-CM

## 2020-01-03 PROCEDURE — 78452 HT MUSCLE IMAGE SPECT MULT: CPT | Mod: 26 | Performed by: INTERNAL MEDICINE

## 2020-01-03 PROCEDURE — 93018 CV STRESS TEST I&R ONLY: CPT | Performed by: INTERNAL MEDICINE

## 2020-01-03 PROCEDURE — 93306 TTE W/DOPPLER COMPLETE: CPT

## 2020-01-03 PROCEDURE — 700111 HCHG RX REV CODE 636 W/ 250 OVERRIDE (IP)

## 2020-01-03 PROCEDURE — A9502 TC99M TETROFOSMIN: HCPCS

## 2020-01-03 RX ORDER — REGADENOSON 0.08 MG/ML
INJECTION, SOLUTION INTRAVENOUS
Status: COMPLETED
Start: 2020-01-03 | End: 2020-01-03

## 2020-01-03 RX ORDER — REGADENOSON 0.08 MG/ML
0.4 INJECTION, SOLUTION INTRAVENOUS ONCE
Status: COMPLETED | OUTPATIENT
Start: 2020-01-03 | End: 2020-01-03

## 2020-01-03 RX ADMIN — REGADENOSON 0.4 MG: 0.08 INJECTION, SOLUTION INTRAVENOUS at 12:12

## 2020-01-06 LAB
LV EJECT FRACT  99904: 65
LV EJECT FRACT MOD 2C 99903: 68.28
LV EJECT FRACT MOD 4C 99902: 64.71
LV EJECT FRACT MOD BP 99901: 66.54

## 2020-01-06 PROCEDURE — 93306 TTE W/DOPPLER COMPLETE: CPT | Mod: 26 | Performed by: INTERNAL MEDICINE

## 2020-01-14 ENCOUNTER — OFFICE VISIT (OUTPATIENT)
Dept: MEDICAL GROUP | Facility: LAB | Age: 70
End: 2020-01-14
Payer: MEDICARE

## 2020-01-14 VITALS
OXYGEN SATURATION: 96 % | HEART RATE: 86 BPM | SYSTOLIC BLOOD PRESSURE: 138 MMHG | RESPIRATION RATE: 16 BRPM | BODY MASS INDEX: 32.41 KG/M2 | TEMPERATURE: 97.1 F | HEIGHT: 64 IN | DIASTOLIC BLOOD PRESSURE: 80 MMHG

## 2020-01-14 DIAGNOSIS — M54.6 THORACIC SPINE PAIN: ICD-10-CM

## 2020-01-14 DIAGNOSIS — I69.359 CVA, OLD, HEMIPARESIS (HCC): ICD-10-CM

## 2020-01-14 DIAGNOSIS — I10 HYPERTENSION, UNCONTROLLED: ICD-10-CM

## 2020-01-14 DIAGNOSIS — R42 DIZZINESS: ICD-10-CM

## 2020-01-14 DIAGNOSIS — Z12.31 ENCOUNTER FOR SCREENING MAMMOGRAM FOR BREAST CANCER: ICD-10-CM

## 2020-01-14 DIAGNOSIS — C54.1 ENDOMETRIAL CANCER (HCC): ICD-10-CM

## 2020-01-14 PROCEDURE — 8041 PR SCP AHA: Performed by: NURSE PRACTITIONER

## 2020-01-14 PROCEDURE — 99214 OFFICE O/P EST MOD 30 MIN: CPT | Performed by: NURSE PRACTITIONER

## 2020-01-14 RX ORDER — METOPROLOL SUCCINATE 100 MG/1
100 TABLET, EXTENDED RELEASE ORAL DAILY
Qty: 100 TAB | Refills: 1
Start: 2020-01-14 | End: 2020-05-21

## 2020-01-14 RX ORDER — MECLIZINE HCL 12.5 MG/1
12.5-25 TABLET ORAL 3 TIMES DAILY PRN
Qty: 90 TAB | Refills: 3 | Status: SHIPPED | OUTPATIENT
Start: 2020-01-14 | End: 2021-03-09 | Stop reason: SDUPTHER

## 2020-01-14 ASSESSMENT — PATIENT HEALTH QUESTIONNAIRE - PHQ9: CLINICAL INTERPRETATION OF PHQ2 SCORE: 0

## 2020-01-14 NOTE — PROGRESS NOTES
Subjective:     Jairo Rivas is a 69 y.o. female here today below issues and Annual Health Assessment.    #1- new onset upper back pain x 1-2 months.  No falls.  Pain is a sharp / stabbing pain that comes and goes.  No hx of the same.  Neg cardiac w/u with DR SUBRAMANIAN 12/16/2019.  Requesting x-ray.  Pain occurs a few times per month, not daily.  Pain is relieved with time.  Not taking anything for the pain.    #2- endometrial cancer: Very happy to report that most recent CT scan of chest, abdomen and pelvis showed lack of CA 1/2/2020.   Finished with chemo and radiation.  Follows up with Dr. Garcia in the next few weeks.    #3- HTN: Chronic issue.  Saw cardiology and metoprolol was increased to 200 mg XL daily but this caused leg shaking as well as feeling poorly.  Now down to 100 mg xl metoprolol daily and feeling better.  -140/ 80's at home over the past few weeks, per her daughter who accompanies her today and takes care of her at home.        Health Maintenance Summary                HEPATITIS C SCREENING Overdue 1950     PAP SMEAR Overdue 8/26/1973     IMM ZOSTER VACCINES Overdue 8/26/2002     IMM PNEUMOCOCCAL VACCINE: 65+ Years Overdue 8/26/2017     MAMMOGRAM Overdue 3/7/2019      Done 3/7/2018 MA-MAMMO SCREENING BILAT W/TOMOSYNTHESIS W/CAD    COLON CANCER SCREENING ANNUAL FIT Overdue 3/20/2019      Done 3/20/2018 OCCULT BLOOD FECES IMMUNOASSAY    IMM INFLUENZA Overdue 9/1/2019     Annual Wellness Visit Overdue 12/5/2019      Done 12/4/2018     IMM DTaP/Tdap/Td Vaccine Postponed 2/3/2020 Originally 8/26/1961. Patient Refused    BONE DENSITY Next Due 3/7/2023      Done 3/7/2018 DS-BONE DENSITY STUDY (DEXA)           Annual Health Assessment Questions:     1.  Are you currently engaging in any exercise or physical activity?yes    2.  How would you describe your mood or emotional well-being today? good    3.  Have you had any falls in the last year? No    4.  Have you noticed any problems with  your balance or had difficulty walking? Yes    5.  In the last six months have you experienced any leakage of urine? No    6. DPA/Advanced Directive: Patient does not have an Advanced Directive.  A packet and workshop information was given on Advanced Directives.    Current medicines (including changes today)  Current Outpatient Medications   Medication Sig Dispense Refill   • baclofen (LIORESAL) 10 MG Tab TAKE 1 TAB BY MOUTH 3 TIMES A DAY FOR 30 DAYS. INDICATIONS: MUSCLE SPASTICITY 270 Tab 1   • metoprolol SR (TOPROL XL) 100 MG TABLET SR 24 HR Take 1 Tab by mouth 2 Times a Day. 200 Tab 3   • meclizine (ANTIVERT) 12.5 MG Tab Take 1-2 Tabs by mouth 3 times a day as needed for Dizziness. 30 Tab 0   • atorvastatin (LIPITOR) 20 MG Tab Take 1 Tab by mouth every evening. 100 Tab 4   • ondansetron (ZOFRAN) 4 MG Tab tablet Take 1 Tab by mouth every 6 hours as needed. Alternate q 3 h with Dex four four days after chemotherapy and longer if needed 30 Tab 0   • amLODIPine (NORVASC) 5 MG Tab Take 1.5 Tabs by mouth every bedtime. 135 Tab 2   • dexamethasone (DECADRON) 4 MG Tab Take 1 Tab by mouth every 6 hours. 30 Tab 0   • senna-docusate (PERICOLACE OR SENOKOT S) 8.6-50 MG Tab Take 1 Tab by mouth 2 times a day.     • amitriptyline (ELAVIL) 25 MG Tab TAKE 1 TABLET BY MOUTH EVERY DAY IN THE EVENING 90 Tab 1   • losartan (COZAAR) 100 MG Tab Take 100 mg by mouth every day.     • ascorbic acid (ASCORBIC ACID) 500 MG Tab Take 500 mg by mouth every evening.     • Cholecalciferol (VITAMIN D3) 2000 UNIT Cap Take 2,000 Units by mouth every day.     • Coenzyme Q10 (COQ10) 100 MG Cap Take 100 mg by mouth every evening.     • multivitamin (THERAGRAN) Tab Take 1 Tab by mouth every day.     • acetaminophen (TYLENOL) 500 MG Tab Take 1,000 mg by mouth every 6 hours as needed for Moderate Pain.     • B Complex-C (SUPER B COMPLEX PO) Take 1 Tab by mouth every evening.     • ferrous sulfate 325 (65 Fe) MG tablet Take 1 Tab by mouth every day.  (Patient not taking: Reported on 12/16/2019) 30 Tab 0   • aspirin (ASA) 81 MG Chew Tab chewable tablet Take 81 mg by mouth every day.       No current facility-administered medications for this visit.        She  has a past medical history of Anemia (2/6/2018), Cancer ([facility]) (2019), CVA, old, hemiparesis ([facility]) (2/6/2018), Essential hypertension (2/6/2018), Hyperlipidemia, Post-menopause bleeding (2/6/2018), Stroke ([facility]), Vision abnormalities (2/6/2018), and Vitamin D deficiency (2/6/2018).    Penicillins; Gluten meal; Hydrocodone; Iodine; Levaquin; and Tramadol    She  reports that she has never smoked. She has never used smokeless tobacco. She reports that she does not drink alcohol or use drugs.  Counseling given: Not Answered      ROS  No chest pain, no shortness of breath, no abdominal pain.     Objective:     Physical Exam:  Vitals:    01/14/20 1439   BP: 138/80   Pulse: 86   Resp: 16   Temp: 36.2 °C (97.1 °F)   SpO2: 96%       Constitutional: Alert, no distress.  Skin: Warm, dry, good turgor, no rashes in visible areas.  Eye: Equal, round and reactive, conjunctiva clear, lids normal.  ENMT: Lips without lesions, good dentition, oropharynx clear.  Neck: Trachea midline, no masses, no thyromegaly. No cervical or supraclavicular lymphadenopathy.  Respiratory: Unlabored respiratory effort, lungs clear to auscultation, no wheezes, no rhonchi.  Cardiovascular: Regular rate and rhythm  Abdomen: Soft, non-tender  Musculoskeletal: She does have tenderness to her bilateral upper thoracic paraspinal muscles but there is no bony tenderness, step-offs, deformity or bruising.  Psych: Alert and oriented x3, normal affect and mood.    Assessment and Plan:     1. Thoracic spine pain  -Recommend x-ray.  Suspect inflammation.  Encouraged topical anti-inflammatories and heat pads.  If x-ray is negative and pain persists, recommend MRI.  - DX-THORACIC SPINE-2 VIEWS; Future    2. Hypertension, uncontrolled  -Controlled with 100 mg  of metoprolol, losartan and amlodipine.  Continue same.  - metoprolol SR (TOPROL XL) 100 MG TABLET SR 24 HR; Take 1 Tab by mouth every day.  Dispense: 100 Tab; Refill: 1    3. Dizziness  -Stable with as needed use of meclizine.  - meclizine (ANTIVERT) 12.5 MG Tab; Take 1-2 Tabs by mouth 3 times a day as needed for Dizziness.  Dispense: 90 Tab; Refill: 3    4. Encounter for screening mammogram for breast cancer  -Recommend updated mammogram.  - MA-SCREENING MAMMO BILAT W/TOMOSYNTHESIS W/CAD; Future    5. Endometrial cancer (HCC)  -Fortunately most recent CT scan shows lack of malignancy.  Patient is status post hysterectomy, radiation and chemotherapy.  Follows up with Dr. Garcia closely.  Feeling well overall.    6. CVA, old, hemiparesis (HCC)  -In chronic therapy for this and very diligent about home exercises.    Discussion today about general wellness and lifestyle habits:    · Engage in regular physical activity and social activities.  · Prevent falls and reduce trip hazards; using ambulatory aides, hearing and vision testing if appropriate.  · Steps to improve urinary incontinence.  · Advanced care planning.    Follow-Up: No follow-ups on file.         PLEASE NOTE: This dictation was created using voice recognition software. I have made every reasonable attempt to correct obvious errors, but I expect that there are errors of grammar and possibly content that I did not discover before finalizing the note.

## 2020-01-17 ENCOUNTER — OFFICE VISIT (OUTPATIENT)
Dept: CARDIOLOGY | Facility: MEDICAL CENTER | Age: 70
End: 2020-01-17
Payer: MEDICARE

## 2020-01-17 VITALS
BODY MASS INDEX: 31.58 KG/M2 | WEIGHT: 185 LBS | SYSTOLIC BLOOD PRESSURE: 146 MMHG | OXYGEN SATURATION: 93 % | HEIGHT: 64 IN | DIASTOLIC BLOOD PRESSURE: 68 MMHG | HEART RATE: 86 BPM

## 2020-01-17 DIAGNOSIS — E78.5 DYSLIPIDEMIA: ICD-10-CM

## 2020-01-17 DIAGNOSIS — R07.89 OTHER CHEST PAIN: ICD-10-CM

## 2020-01-17 DIAGNOSIS — I10 HYPERTENSION, UNCONTROLLED: ICD-10-CM

## 2020-01-17 PROCEDURE — 99213 OFFICE O/P EST LOW 20 MIN: CPT | Performed by: PHYSICIAN ASSISTANT

## 2020-01-17 ASSESSMENT — ENCOUNTER SYMPTOMS
DECREASED APPETITE: 0
ORTHOPNEA: 0
BLURRED VISION: 0
SNORING: 0
NEAR-SYNCOPE: 0
MYALGIAS: 0
BLOATING: 0
FEVER: 0
SYNCOPE: 0
ABDOMINAL PAIN: 0
DIAPHORESIS: 0
SHORTNESS OF BREATH: 0
VOMITING: 0
DIZZINESS: 0
NAUSEA: 0
BRUISES/BLEEDS EASILY: 0
WEAKNESS: 0
DYSPNEA ON EXERTION: 0
BACK PAIN: 1
PALPITATIONS: 0

## 2020-01-17 NOTE — PROGRESS NOTES
Cardiology Clinic Follow-up Note    Date of note:    1/17/2020    Primary Care Provider: ZULEYKA Lyons    Name:             Jairo Rivas  YOB: 1950  MRN:               [id number]    CC: chest pain, review recent testing    Primary Cardiologist: Dr. Minor    Patient HPI:   Jairo Rivas is a 69 y.o. female with PMH including HTN, HLD, hx of CVA with residual right hemiparesis, endometrial adenoma s/p chemo, radiation.       Interim History:  Ms. Rivas was last seen in this cardiology office by Dr. Minor on 12/16/19 with c/o chest pain.  She was ordered echo, MPI stress and Toprol XL was increased to 200 mg daily.   She is here to follow up on testing results.    She was having LE shaking and feeling overall poorly, she reduced Toprol XL back to 100 daily and has been feeling better.   She notes her CP is gone, now she has some thoracic back pain, her PCP is working up.    Review of Systems   Constitution: Negative for decreased appetite, diaphoresis, fever and malaise/fatigue.   Eyes: Negative for blurred vision.   Cardiovascular: Negative for chest pain, dyspnea on exertion, leg swelling, near-syncope, orthopnea, palpitations and syncope.   Respiratory: Negative for shortness of breath and snoring.    Hematologic/Lymphatic: Negative for bleeding problem. Does not bruise/bleed easily.   Skin: Negative for rash.   Musculoskeletal: Positive for back pain. Negative for myalgias.   Gastrointestinal: Negative for bloating, abdominal pain, nausea and vomiting.   Genitourinary: Negative for frequency.   Neurological: Negative for dizziness and weakness.         Past Medical History:   Diagnosis Date   • Anemia 2/6/2018    Iron def, post-menopausal bleeding.   • Cancer (HCC) 2019    uterine & endometrial   • CVA, old, hemiparesis (HCC) 2/6/2018    Residual Right sided weakness   • Essential hypertension 2/6/2018    Well controlled on amlodipine 10 mg and  metoprolol 100 mg bid.   • Hyperlipidemia    • Post-menopause bleeding 2/6/2018    Menopause in 2010 and bleeding 2012 started to have irregular continuous bleeding.    • Stroke (HCC)     right sided weakness   • Vision abnormalities 2/6/2018   • Vitamin D deficiency 2/6/2018    Taking 50,000 IU once weekly for 4 yrs.       Family History   Problem Relation Age of Onset   • Hypertension Mother    • No Known Problems Father    • No Known Problems Brother        Social History     Socioeconomic History   • Marital status: Single   Tobacco Use   • Smoking status: Never Smoker   • Smokeless tobacco: Never Used   Substance and Sexual Activity   • Alcohol use: No   • Drug use: No       Current Outpatient Medications   Medication Sig Dispense Refill   • metoprolol SR (TOPROL XL) 100 MG TABLET SR 24 HR Take 1 Tab by mouth every day. 100 Tab 1   • meclizine (ANTIVERT) 12.5 MG Tab Take 1-2 Tabs by mouth 3 times a day as needed for Dizziness. 90 Tab 3   • baclofen (LIORESAL) 10 MG Tab TAKE 1 TAB BY MOUTH 3 TIMES A DAY FOR 30 DAYS. INDICATIONS: MUSCLE SPASTICITY 270 Tab 1   • atorvastatin (LIPITOR) 20 MG Tab Take 1 Tab by mouth every evening. 100 Tab 4   • ondansetron (ZOFRAN) 4 MG Tab tablet Take 1 Tab by mouth every 6 hours as needed. Alternate q 3 h with Dex four four days after chemotherapy and longer if needed 30 Tab 0   • amLODIPine (NORVASC) 5 MG Tab Take 1.5 Tabs by mouth every bedtime. 135 Tab 2   • senna-docusate (PERICOLACE OR SENOKOT S) 8.6-50 MG Tab Take 1 Tab by mouth 2 times a day.     • amitriptyline (ELAVIL) 25 MG Tab TAKE 1 TABLET BY MOUTH EVERY DAY IN THE EVENING 90 Tab 1   • losartan (COZAAR) 100 MG Tab Take 100 mg by mouth every day.     • ascorbic acid (ASCORBIC ACID) 500 MG Tab Take 500 mg by mouth every evening.     • Cholecalciferol (VITAMIN D3) 2000 UNIT Cap Take 2,000 Units by mouth every day.     • Coenzyme Q10 (COQ10) 100 MG Cap Take 100 mg by mouth every evening.     • multivitamin (THERAGRAN) Tab  "Take 1 Tab by mouth every day.     • acetaminophen (TYLENOL) 500 MG Tab Take 1,000 mg by mouth every 6 hours as needed for Moderate Pain.     • B Complex-C (SUPER B COMPLEX PO) Take 1 Tab by mouth every evening.     • aspirin (ASA) 81 MG Chew Tab chewable tablet Take 81 mg by mouth every day.     • dexamethasone (DECADRON) 4 MG Tab Take 1 Tab by mouth every 6 hours. (Patient not taking: Reported on 1/17/2020) 30 Tab 0   • ferrous sulfate 325 (65 Fe) MG tablet Take 1 Tab by mouth every day. (Patient not taking: Reported on 12/16/2019) 30 Tab 0     No current facility-administered medications for this visit.        Allergies   Allergen Reactions   • Penicillins Itching     Makes her feel weak    • Gluten Meal    • Hydrocodone Itching     Swelling of the tongue  Face swelling   • Iodine      Weak and dizzy   • Levaquin Itching     Swelling of the tongue  Facial swelling   • Tramadol Hives     Swelling         Physical Exam:  Ambulatory Vitals  /68 (BP Location: Left arm, Patient Position: Sitting)   Pulse 86   Ht 1.626 m (5' 4\")   Wt 83.9 kg (185 lb)   SpO2 93%    BP Readings from Last 4 Encounters:   01/17/20 146/68   01/14/20 138/80   01/02/20 143/86   12/16/19 154/80       Weight/BMI: Body mass index is 31.76 kg/m².  Wt Readings from Last 4 Encounters:   01/17/20 83.9 kg (185 lb)   01/02/20 85.6 kg (188 lb 12.8 oz)   12/16/19 83 kg (183 lb)   12/03/19 83.5 kg (184 lb)       General: no apparent distress  Eyes: normal conjunctiva  ENT: OP clear  Neck: no JVD   Lungs: normal respiratory effort, clear without crackles, no wheezing or rhonchi.  Heart: normal rate, regular rhythm, no murmurs, no rubs or gallops,   Ext:  no edema bilateral lower extremities. + bilateral pedal pulses. no cyanosis.  Abdomen: soft, non tender, non distended,  Neurological: slightly slurred speech, R sided hemiplegia.  Psychiatric: Appropriate affect, alert and oriented x 3.   Skin: Warm extremities, no rash.      Lab Data " Review:  Lab Results   Component Value Date/Time    CHOLSTRLTOT 154 05/15/2019 06:17 AM    LDL 86 05/15/2019 06:17 AM    HDL 55 05/15/2019 06:17 AM    TRIGLYCERIDE 66 05/15/2019 06:17 AM       Lab Results   Component Value Date/Time    SODIUM 141 2019 01:40 PM    POTASSIUM 3.3 (L) 2019 01:40 PM    CHLORIDE 103 2019 01:40 PM    CO2 30 2019 01:40 PM    GLUCOSE 92 2019 01:40 PM    BUN 11 2019 01:40 PM    CREATININE 0.89 2019 01:40 PM     Lab Results   Component Value Date/Time    ALKPHOSPHAT 78 2019 01:40 PM    ASTSGOT 26 2019 01:40 PM    ALTSGPT 31 2019 01:40 PM    TBILIRUBIN 0.4 2019 01:40 PM      Lab Results   Component Value Date/Time    WBC 3.5 (L) 2019 01:40 PM         Cardiac Imaging and Procedures Review:      Echo 1/3/2020:  CONCLUSIONS  No prior study is available for comparison.   Left ventricular ejection fraction is visually estimated to be 65%.  Mild mitral regurgitation.  Estimated right ventricular systolic pressure  is 36 mmHg.    Stress Test 1/3/2020:  Myocardial Perfusion     NUCLEAR IMAGING INTERPRETATION   No evidence of significant jeopardized viable myocardium or prior myocardial    infarction.  Normal left ventricular size, ejection fraction, and wall motion.     ECG INTERPRETATION   Negative stress ECG for ischemia.        Assessment and Clinical Decision Makin.  chest pain. Resolved.   Echo and MPI reviewed with patient and her daughter who is with her.  Both completely normal.      2.  Essential hypertension.  Stable continue Toprol , losartan 100 and amlodipine 7.5 mg. She did not tolerate 200 mg Toprol XL.  If BP needs further control, would advance amlodipine.     3.  Lipid review. LDL 86, continue Lipitor 20 mg    4.  Hx of CVA in ,  with residual R hemiparesis.     5.  Endometrial adenocarcinoma s/p chemo and rad,  Follows with Dr. Live.  Most recent CT 2020 without active cancer.    Doing  well overall.  May follow up on annual or as needed basis.      Alayna Del Valle PA-C  1/17/2020    [facility] [address]  [address] [address]  Phone: [phone]  Fax: [phone]    Collaborating Physician: Dr. Minor.

## 2020-03-11 DIAGNOSIS — M79.602 PARESTHESIA AND PAIN OF BOTH UPPER EXTREMITIES: ICD-10-CM

## 2020-03-11 DIAGNOSIS — M79.601 PARESTHESIA AND PAIN OF BOTH UPPER EXTREMITIES: ICD-10-CM

## 2020-03-11 DIAGNOSIS — R20.2 PARESTHESIA AND PAIN OF BOTH UPPER EXTREMITIES: ICD-10-CM

## 2020-03-11 DIAGNOSIS — K14.6 TONGUE BURNING SENSATION: ICD-10-CM

## 2020-03-11 RX ORDER — AMITRIPTYLINE HYDROCHLORIDE 25 MG/1
25 TABLET, FILM COATED ORAL
Qty: 90 TAB | Refills: 1 | Status: SHIPPED | OUTPATIENT
Start: 2020-03-11 | End: 2020-03-31 | Stop reason: SDUPTHER

## 2020-03-31 ENCOUNTER — OFFICE VISIT (OUTPATIENT)
Dept: MEDICAL GROUP | Facility: LAB | Age: 70
End: 2020-03-31
Payer: MEDICARE

## 2020-03-31 VITALS — HEIGHT: 64 IN | TEMPERATURE: 98.8 F | BODY MASS INDEX: 31.58 KG/M2 | WEIGHT: 185 LBS

## 2020-03-31 DIAGNOSIS — J06.9 ACUTE URI: ICD-10-CM

## 2020-03-31 DIAGNOSIS — M79.601 PARESTHESIA AND PAIN OF BOTH UPPER EXTREMITIES: ICD-10-CM

## 2020-03-31 DIAGNOSIS — G62.9 NEUROPATHY: ICD-10-CM

## 2020-03-31 DIAGNOSIS — M79.602 PARESTHESIA AND PAIN OF BOTH UPPER EXTREMITIES: ICD-10-CM

## 2020-03-31 DIAGNOSIS — R20.2 PARESTHESIA AND PAIN OF BOTH UPPER EXTREMITIES: ICD-10-CM

## 2020-03-31 DIAGNOSIS — B37.0 THRUSH: ICD-10-CM

## 2020-03-31 DIAGNOSIS — K14.6 TONGUE BURNING SENSATION: ICD-10-CM

## 2020-03-31 PROCEDURE — 99214 OFFICE O/P EST MOD 30 MIN: CPT | Mod: 95,CR | Performed by: NURSE PRACTITIONER

## 2020-03-31 RX ORDER — AMITRIPTYLINE HYDROCHLORIDE 50 MG/1
50 TABLET, FILM COATED ORAL
Qty: 90 TAB | Refills: 2 | Status: SHIPPED | OUTPATIENT
Start: 2020-03-31 | End: 2021-01-18

## 2020-03-31 RX ORDER — AZITHROMYCIN 250 MG/1
TABLET, FILM COATED ORAL
Qty: 6 TAB | Refills: 0 | Status: SHIPPED | OUTPATIENT
Start: 2020-03-31 | End: 2020-04-30

## 2020-03-31 RX ORDER — BACLOFEN 10 MG/1
TABLET ORAL
COMMUNITY
Start: 2020-01-30 | End: 2020-05-18 | Stop reason: SDUPTHER

## 2020-03-31 ASSESSMENT — FIBROSIS 4 INDEX: FIB4 SCORE: 1.31

## 2020-03-31 NOTE — PROGRESS NOTES
Telemedicine Visit: Established Patient     This encounter was conducted via Zoom .   Verbal consent was obtained. Patient's identity was verified.    Subjective:   CC:   Jairo Rivas is a 69 y.o. female presenting for evaluation and management of:  Bilateral foot pain, starting about 2 weeks ago.  She describes her foot pain as a hot sensation with burning, worse when she walks around the house.  She has not been wearing shoes in the house and has been homebound for several weeks.  She has carpet in her bedroom and hardwoods elsewhere.  She also describes the foot pain is periodically stabbing.  IcyHot and a heating pad seems to make a small improvement in her foot pain.  She is not having pain when she is in bed with the sheet on her feet but does occasionally when she is in her recliner.  She is not diabetic.  She has been treated with chemotherapy for uterine cancer.  Currently taking amitriptyline 25 mg at night - frequently wakes up at 2 or 4 am.      Also describes chills x 2 weeks across chest and head.  + cough in the morning - dry.  Denies ST, ear pain, headache, SOB unless she exerting herself.   Overall feeling more tired than normal.  Taking tylenol which helps with chills.  Daughter was sick and exposed to RSV - given zpak and she felt better.  Daughter had a negative flu / strep test -they do live together    Her final concern is that her tongue feels like she has flakes of cotton on it.  Her tongue has felt abnormal for several months.  She drinks mainly water.  She is not diabetic.  She is not on any inhaled steroids.  She denies tongue pain or burning.    ROS   Denies any recent remarkable fever -highest has been 99 at home.  No nausea or vomiting. No chest pains.  Denies constipation, diarrhea or dysuria.    Allergies   Allergen Reactions   • Penicillins Itching     Makes her feel weak    • Gluten Meal    • Hydrocodone Itching     Swelling of the tongue  Face swelling   • Iodine       Weak and dizzy   • Levaquin Itching     Swelling of the tongue  Facial swelling   • Tramadol Hives     Swelling       Current medicines (including changes today)  Current Outpatient Medications   Medication Sig Dispense Refill   • amitriptyline (ELAVIL) 25 MG Tab Take 1 Tab by mouth every day. N THE EVENING 90 Tab 1   • metoprolol SR (TOPROL XL) 100 MG TABLET SR 24 HR Take 1 Tab by mouth every day. 100 Tab 1   • meclizine (ANTIVERT) 12.5 MG Tab Take 1-2 Tabs by mouth 3 times a day as needed for Dizziness. 90 Tab 3   • atorvastatin (LIPITOR) 20 MG Tab Take 1 Tab by mouth every evening. 100 Tab 4   • ondansetron (ZOFRAN) 4 MG Tab tablet Take 1 Tab by mouth every 6 hours as needed. Alternate q 3 h with Dex four four days after chemotherapy and longer if needed 30 Tab 0   • amLODIPine (NORVASC) 5 MG Tab Take 1.5 Tabs by mouth every bedtime. 135 Tab 2   • dexamethasone (DECADRON) 4 MG Tab Take 1 Tab by mouth every 6 hours. (Patient not taking: Reported on 1/17/2020) 30 Tab 0   • senna-docusate (PERICOLACE OR SENOKOT S) 8.6-50 MG Tab Take 1 Tab by mouth 2 times a day.     • losartan (COZAAR) 100 MG Tab Take 100 mg by mouth every day.     • ascorbic acid (ASCORBIC ACID) 500 MG Tab Take 500 mg by mouth every evening.     • Cholecalciferol (VITAMIN D3) 2000 UNIT Cap Take 2,000 Units by mouth every day.     • Coenzyme Q10 (COQ10) 100 MG Cap Take 100 mg by mouth every evening.     • multivitamin (THERAGRAN) Tab Take 1 Tab by mouth every day.     • acetaminophen (TYLENOL) 500 MG Tab Take 1,000 mg by mouth every 6 hours as needed for Moderate Pain.     • B Complex-C (SUPER B COMPLEX PO) Take 1 Tab by mouth every evening.     • ferrous sulfate 325 (65 Fe) MG tablet Take 1 Tab by mouth every day. (Patient not taking: Reported on 12/16/2019) 30 Tab 0   • aspirin (ASA) 81 MG Chew Tab chewable tablet Take 81 mg by mouth every day.       No current facility-administered medications for this visit.        Patient Active Problem List  "   Diagnosis Date Noted   • Acute on chronic blood loss anemia 06/12/2019     Priority: High   • Endometrial cancer (HCC) 02/06/2018     Priority: High   • Obesity (BMI 30-39.9) 02/06/2018     Priority: High   • Hypokalemia 10/02/2018     Priority: Medium   • Iron deficiency anemia 02/06/2018     Priority: Medium   • CVA, old, hemiparesis (HCC) 02/06/2018     Priority: Low   • Hypertension, uncontrolled 02/06/2018     Priority: Low   • Other chest pain 01/17/2020   • Thyroid nodule - benign 12/03/2019   • Nausea 10/02/2019   • Admission for chemotherapy 09/17/2019   • Vision abnormalities 02/06/2018   • Vitamin D deficiency 02/06/2018       Family History   Problem Relation Age of Onset   • Hypertension Mother    • No Known Problems Father    • No Known Problems Brother        She  has a past medical history of Anemia (2/6/2018), Cancer (HCC) (2019), CVA, old, hemiparesis (HCC) (2/6/2018), Essential hypertension (2/6/2018), Hyperlipidemia, Post-menopause bleeding (2/6/2018), Stroke (HCC), Vision abnormalities (2/6/2018), and Vitamin D deficiency (2/6/2018).  She  has a past surgical history that includes pr hysteroscopy,dx,sep proc (6/19/2019); dilation and curettage (6/19/2019); pr cystoscopy,insert ureteral stent (Left, 8/8/2019); hysterectomy robotic xi (N/A, 8/8/2019); salpingo oophorectomy (Bilateral, 8/8/2019); and node biopsy sentinel (8/8/2019).       Objective:   Vitals obtained by patient:  Temp 37.1 °C (98.8 °F)   Ht 1.626 m (5' 4\")   Wt 83.9 kg (185 lb)     Physical Exam:  Constitutional: Alert, no distress, well-groomed.  Skin: No rashes in visible areas, particularly on her feet  Eye: Round. Conjunctiva clear, lids normal. No icterus.   ENMT: Lips pink without lesions, good dentition, moist mucous membranes. Phonation normal.  Her tongue does appear to have a white coating that she cannot easily scratch off with her fingernail.  Neck: No masses, no thyromegaly. Moves freely without pain.  CV: Pulse " as reported by patient  Respiratory: Unlabored respiratory effort, no cough or audible wheeze as she is able to take a deep breath as I watched her, this does not cause her to cough.  Psych: Alert and oriented x3, normal affect and mood.       Assessment and Plan:   The following treatment plan was discussed:   1. Thrush  -We have discussed this in the past and she is on nystatin as a trial to help with the burning sensation although now I suspect that she may have thrush.  Uncertain etiology of her thrush which we discussed.  Trial of nystatin suspension for 1 week as well as avoiding sugar and staying very well-hydrated.  Her daughter will email me regarding efficacy of nystatin suspension over the next week.  - nystatin (MYCOSTATIN) 833499 UNIT/ML Suspension; Take 5 mL by mouth 4 times a day.  Dispense: 140 mL; Refill: 1    2. Tongue burning sensation  -Increase amitriptyline to 50 mg prior to bedtime with the capabilities of going up to 70 1:05 week if tolerated.  - amitriptyline (ELAVIL) 50 MG Tab; Take 1 Tab by mouth every day. Before bed. May increase to 75 mg after one week if needed  Dispense: 90 Tab; Refill: 2  - nystatin (MYCOSTATIN) 941789 UNIT/ML Suspension; Take 5 mL by mouth 4 times a day.  Dispense: 140 mL; Refill: 1    3. Neuropathy  -Suspect that she is also having neuropathy of her feet, likely related to her history of chemo as well as a stroke and her weight.  I encouraged her to wear padded slippers around the house.  Increased her amitriptyline to 50 mg prior to bedtime with capabilities to increase it to 75 mg if tolerated.  Her daughter will email me regarding how she is doing and 1 week.  I also encouraged her or her daughter to rub her feet with emollient moisturizers.  - amitriptyline (ELAVIL) 50 MG Tab; Take 1 Tab by mouth every day. Before bed. May increase to 75 mg after one week if needed  Dispense: 90 Tab; Refill: 2    4. Acute URI  -Concern regarding her low-grade fever, chills and  persistent symptoms after 2 weeks.  Treated with a Z-Chito.  Her daughter will email me regarding how she is feeling in 1 week.  - azithromycin (ZITHROMAX Z-CHITO) 250 MG Tab; 500 mg day one, 250 mg day 2-5  Dispense: 6 Tab; Refill: 0    5. Paresthesia and pain of both upper extremities  -As above.  - amitriptyline (ELAVIL) 50 MG Tab; Take 1 Tab by mouth every day. Before bed. May increase to 75 mg after one week if needed  Dispense: 90 Tab; Refill: 2        Follow-up: one week via email by pt's daughter

## 2020-04-24 ENCOUNTER — HOSPITAL ENCOUNTER (OUTPATIENT)
Dept: LAB | Facility: MEDICAL CENTER | Age: 70
End: 2020-04-24
Attending: NURSE PRACTITIONER
Payer: MEDICARE

## 2020-04-24 LAB
ALBUMIN SERPL BCP-MCNC: 4.4 G/DL (ref 3.2–4.9)
ALBUMIN/GLOB SERPL: 1.3 G/DL
ALP SERPL-CCNC: 100 U/L (ref 30–99)
ALT SERPL-CCNC: 26 U/L (ref 2–50)
ANION GAP SERPL CALC-SCNC: 12 MMOL/L (ref 7–16)
AST SERPL-CCNC: 28 U/L (ref 12–45)
BASOPHILS # BLD AUTO: 0.6 % (ref 0–1.8)
BASOPHILS # BLD: 0.02 K/UL (ref 0–0.12)
BILIRUB SERPL-MCNC: 0.4 MG/DL (ref 0.1–1.5)
BUN SERPL-MCNC: 10 MG/DL (ref 8–22)
CALCIUM SERPL-MCNC: 10 MG/DL (ref 8.5–10.5)
CANCER AG125 SERPL-ACNC: 6.7 U/ML (ref 0–35)
CHLORIDE SERPL-SCNC: 103 MMOL/L (ref 96–112)
CO2 SERPL-SCNC: 26 MMOL/L (ref 20–33)
CREAT SERPL-MCNC: 0.77 MG/DL (ref 0.5–1.4)
EOSINOPHIL # BLD AUTO: 0.05 K/UL (ref 0–0.51)
EOSINOPHIL NFR BLD: 1.4 % (ref 0–6.9)
ERYTHROCYTE [DISTWIDTH] IN BLOOD BY AUTOMATED COUNT: 47.2 FL (ref 35.9–50)
GLOBULIN SER CALC-MCNC: 3.5 G/DL (ref 1.9–3.5)
GLUCOSE SERPL-MCNC: 85 MG/DL (ref 65–99)
HCT VFR BLD AUTO: 42.6 % (ref 37–47)
HGB BLD-MCNC: 14.2 G/DL (ref 12–16)
IMM GRANULOCYTES # BLD AUTO: 0.01 K/UL (ref 0–0.11)
IMM GRANULOCYTES NFR BLD AUTO: 0.3 % (ref 0–0.9)
LYMPHOCYTES # BLD AUTO: 1.18 K/UL (ref 1–4.8)
LYMPHOCYTES NFR BLD: 33.8 % (ref 22–41)
MCH RBC QN AUTO: 31.9 PG (ref 27–33)
MCHC RBC AUTO-ENTMCNC: 33.3 G/DL (ref 33.6–35)
MCV RBC AUTO: 95.7 FL (ref 81.4–97.8)
MONOCYTES # BLD AUTO: 0.4 K/UL (ref 0–0.85)
MONOCYTES NFR BLD AUTO: 11.5 % (ref 0–13.4)
NEUTROPHILS # BLD AUTO: 1.83 K/UL (ref 2–7.15)
NEUTROPHILS NFR BLD: 52.4 % (ref 44–72)
NRBC # BLD AUTO: 0 K/UL
NRBC BLD-RTO: 0 /100 WBC
PLATELET # BLD AUTO: 264 K/UL (ref 164–446)
PMV BLD AUTO: 10.2 FL (ref 9–12.9)
POTASSIUM SERPL-SCNC: 3.4 MMOL/L (ref 3.6–5.5)
PROT SERPL-MCNC: 7.9 G/DL (ref 6–8.2)
RBC # BLD AUTO: 4.45 M/UL (ref 4.2–5.4)
SODIUM SERPL-SCNC: 141 MMOL/L (ref 135–145)
WBC # BLD AUTO: 3.5 K/UL (ref 4.8–10.8)

## 2020-04-24 PROCEDURE — 86304 IMMUNOASSAY TUMOR CA 125: CPT

## 2020-04-24 PROCEDURE — 85025 COMPLETE CBC W/AUTO DIFF WBC: CPT

## 2020-04-24 PROCEDURE — 80053 COMPREHEN METABOLIC PANEL: CPT

## 2020-04-24 PROCEDURE — 36415 COLL VENOUS BLD VENIPUNCTURE: CPT

## 2020-04-30 ENCOUNTER — TELEMEDICINE (OUTPATIENT)
Dept: MEDICAL GROUP | Facility: LAB | Age: 70
End: 2020-04-30
Payer: MEDICARE

## 2020-04-30 VITALS — BODY MASS INDEX: 31.76 KG/M2 | HEIGHT: 64 IN

## 2020-04-30 DIAGNOSIS — I69.359 CVA, OLD, HEMIPARESIS (HCC): ICD-10-CM

## 2020-04-30 DIAGNOSIS — K14.6 TONGUE BURNING SENSATION: ICD-10-CM

## 2020-04-30 DIAGNOSIS — J06.9 ACUTE URI: ICD-10-CM

## 2020-04-30 DIAGNOSIS — I10 HYPERTENSION, UNCONTROLLED: ICD-10-CM

## 2020-04-30 PROCEDURE — 99214 OFFICE O/P EST MOD 30 MIN: CPT | Mod: 95,CR | Performed by: NURSE PRACTITIONER

## 2020-04-30 RX ORDER — AZITHROMYCIN 250 MG/1
TABLET, FILM COATED ORAL
Qty: 6 TAB | Refills: 0 | Status: SHIPPED | OUTPATIENT
Start: 2020-04-30 | End: 2020-08-17

## 2020-04-30 NOTE — PROGRESS NOTES
"Telemedicine Visit: Established Patient     This encounter was conducted via Zoom .   Verbal consent was obtained. Patient's identity was verified.    Subjective:   CC:   Jairo Rivas is a 69 y.o. female presenting for evaluation and management of:    #1- tongue discomfort:  tx for possible thrush 3/31 with nystatin swish / swallow - this was not helpful - still feels snow flakes in mouth.  Not taking elavil for uncomfortable tongue sensation b/c she does not want to take an \"antidepressant.\"  Has had an uncomfortable tongue sensation x 2 years, prior to chemotherapy.      #2- acute URI :  Started in mid march.  Given zpak for URI symptoms 3/31/2020: still has ear pain, HA and chills.  Daughter is monitoring temp  - 98.  She tells me that she would like to take another zpak as she felt almost 100% better for a week and now symptoms have returned.  Tells me that she does poorly on most other antibiotics.  C/o ear pain, nasal congestion and slight cough that \"just won't resolve.\"  Denies SOB / wheezing.        #3- HTN:  Chronic issue.  Pt and daughter are concerned b/c BP is high.  BP was 174/111 and HR 89 today - as low as 140/85 and high was previously mentioned in the past few weeks.  Taking metoprolol 100 xl, losartan 100 mg and 5 mg amlodipine.  In pain on right side from previous stroke.  Denies HA or any neurological changes.        ROS  Denies any recent fevers or chills. No nausea or vomiting. No chest pains or shortness of breath.     Allergies   Allergen Reactions   • Penicillins Itching     Makes her feel weak    • Gluten Meal    • Hydrocodone Itching     Swelling of the tongue  Face swelling   • Iodine      Weak and dizzy   • Levaquin Itching     Swelling of the tongue  Facial swelling   • Tramadol Hives     Swelling       Current medicines (including changes today)  Current Outpatient Medications   Medication Sig Dispense Refill   • amitriptyline (ELAVIL) 50 MG Tab Take 1 Tab by mouth " every day. Before bed. May increase to 75 mg after one week if needed 90 Tab 2   • azithromycin (ZITHROMAX Z-CHITO) 250 MG Tab 500 mg day one, 250 mg day 2-5 6 Tab 0   • nystatin (MYCOSTATIN) 840786 UNIT/ML Suspension Take 5 mL by mouth 4 times a day. 140 mL 1   • baclofen (LIORESAL) 10 MG Tab      • metoprolol SR (TOPROL XL) 100 MG TABLET SR 24 HR Take 1 Tab by mouth every day. 100 Tab 1   • meclizine (ANTIVERT) 12.5 MG Tab Take 1-2 Tabs by mouth 3 times a day as needed for Dizziness. 90 Tab 3   • atorvastatin (LIPITOR) 20 MG Tab Take 1 Tab by mouth every evening. 100 Tab 4   • ondansetron (ZOFRAN) 4 MG Tab tablet Take 1 Tab by mouth every 6 hours as needed. Alternate q 3 h with Dex four four days after chemotherapy and longer if needed 30 Tab 0   • amLODIPine (NORVASC) 5 MG Tab Take 1.5 Tabs by mouth every bedtime. 135 Tab 2   • dexamethasone (DECADRON) 4 MG Tab Take 1 Tab by mouth every 6 hours. (Patient not taking: Reported on 1/17/2020) 30 Tab 0   • senna-docusate (PERICOLACE OR SENOKOT S) 8.6-50 MG Tab Take 1 Tab by mouth 2 times a day.     • losartan (COZAAR) 100 MG Tab Take 100 mg by mouth every day.     • ascorbic acid (ASCORBIC ACID) 500 MG Tab Take 500 mg by mouth every evening.     • Cholecalciferol (VITAMIN D3) 2000 UNIT Cap Take 2,000 Units by mouth every day.     • Coenzyme Q10 (COQ10) 100 MG Cap Take 100 mg by mouth every evening.     • multivitamin (THERAGRAN) Tab Take 1 Tab by mouth every day.     • acetaminophen (TYLENOL) 500 MG Tab Take 1,000 mg by mouth every 6 hours as needed for Moderate Pain.     • B Complex-C (SUPER B COMPLEX PO) Take 1 Tab by mouth every evening.     • ferrous sulfate 325 (65 Fe) MG tablet Take 1 Tab by mouth every day. (Patient not taking: Reported on 12/16/2019) 30 Tab 0   • aspirin (ASA) 81 MG Chew Tab chewable tablet Take 81 mg by mouth every day.       No current facility-administered medications for this visit.        Patient Active Problem List    Diagnosis Date Noted  "  • Acute on chronic blood loss anemia 06/12/2019     Priority: High   • Endometrial cancer (HCC) 02/06/2018     Priority: High   • Obesity (BMI 30-39.9) 02/06/2018     Priority: High   • Hypokalemia 10/02/2018     Priority: Medium   • Iron deficiency anemia 02/06/2018     Priority: Medium   • CVA, old, hemiparesis (HCC) 02/06/2018     Priority: Low   • Hypertension, uncontrolled 02/06/2018     Priority: Low   • Tongue burning sensation 03/31/2020   • Neuropathy 03/31/2020   • Other chest pain 01/17/2020   • Thyroid nodule - benign 12/03/2019   • Nausea 10/02/2019   • Admission for chemotherapy 09/17/2019   • Vision abnormalities 02/06/2018   • Vitamin D deficiency 02/06/2018       Family History   Problem Relation Age of Onset   • Hypertension Mother    • No Known Problems Father    • No Known Problems Brother        She  has a past medical history of Anemia (2/6/2018), Cancer (HCC) (2019), CVA, old, hemiparesis (HCC) (2/6/2018), Essential hypertension (2/6/2018), Hyperlipidemia, Post-menopause bleeding (2/6/2018), Stroke (HCC), Vision abnormalities (2/6/2018), and Vitamin D deficiency (2/6/2018).  She  has a past surgical history that includes pr hysteroscopy,dx,sep proc (6/19/2019); dilation and curettage (6/19/2019); pr cystoscopy,insert ureteral stent (Left, 8/8/2019); hysterectomy robotic xi (N/A, 8/8/2019); salpingo oophorectomy (Bilateral, 8/8/2019); and node biopsy sentinel (8/8/2019).       Objective:   Vitals obtained by patient:  Ht 1.626 m (5' 4\")       Physical Exam:  Constitutional: Alert, no distress, well-groomed.  Skin: No rashes in visible areas.  Eye: Round. Conjunctiva clear, lids normal. No icterus.   ENMT: Lips pink without lesions, good dentition, moist mucous membranes. Phonation normal.  Neck: No masses, no thyromegaly. Moves freely without pain.  CV: Pulse as reported by patient  Respiratory: Unlabored respiratory effort, no cough or audible wheeze  Psych: Alert and oriented x3, normal " affect and mood.       Assessment and Plan:   The following treatment plan was discussed:   1. CVA, old, hemiparesis (HCC)    2. Hypertension, uncontrolled  -discussed importance of tight bp control to prevent another stroke.  She did not increase amlodipine after last visit to 7.5 but will do this for one week, then up to 10 mg if BP is consisently greater than 140/90 at home.  Her daughter will email me BP readings weekly.      3. Tongue burning sensation  -we had a good discussion about increasing / taking elavil to help with her pain which will directly lower her BP as well in relationship to lowering her pain level.  I would also like her to see ENT and referral was placed.      4. Acute URI  Zpak.  F/u 1 week if not improving. Added on generic loratidine as well.          Follow-up: No follow-ups on file.

## 2020-05-11 RX ORDER — LOSARTAN POTASSIUM 100 MG/1
TABLET ORAL
Qty: 100 TAB | Refills: 3 | Status: SHIPPED | OUTPATIENT
Start: 2020-05-11 | End: 2021-06-22

## 2020-05-11 NOTE — TELEPHONE ENCOUNTER
Received request via: Patient    Was the patient seen in the last year in this department? Yes  3/31/2020  Does the patient have an active prescription (recently filled or refills available) for medication(s) requested? No

## 2020-05-18 RX ORDER — BACLOFEN 10 MG/1
10 TABLET ORAL 3 TIMES DAILY
Qty: 90 TAB | Refills: 0 | Status: SHIPPED | OUTPATIENT
Start: 2020-05-18 | End: 2020-08-13 | Stop reason: SDUPTHER

## 2020-05-18 NOTE — TELEPHONE ENCOUNTER
Received request via: Pharmacy    Was the patient seen in the last year in this department? Yes  3/31/20  Does the patient have an active prescription (recently filled or refills available) for medication(s) requested? No

## 2020-05-20 ENCOUNTER — TELEPHONE (OUTPATIENT)
Dept: MEDICAL GROUP | Facility: LAB | Age: 70
End: 2020-05-20

## 2020-05-20 NOTE — TELEPHONE ENCOUNTER
----- Message from RENITA LyonsPCONY sent at 5/20/2020 12:22 PM PDT -----  Regarding: FW: Prescription Question  Contact: [phone]      ----- Message -----  From: Tyesha Sanchez, Med Ass't  Sent: 5/20/2020  10:45 AM PDT  To: ZULEYKA Lyons  Subject: FW: Prescription Question                          ----- Message -----  From: Jairo Rivas  Sent: 5/20/2020  10:42 AM PDT  To: Fredi Mojicaa Mg Ma  Subject: RE: Prescription Question                        Torrey Dominguez,    Good Morning  and thank you for your quick reply.i'm very  sorry to bother you again , My mother has asked me to tell you she understands that they are all the same medication, but  she wants you to please write her script as  Metoporol ER 100mg (ING) because the pharamcy gives her SR, as it is written in the system.     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    Respectfully,  Carolyn rivas for [employer].

## 2020-05-21 DIAGNOSIS — I10 HYPERTENSION, UNCONTROLLED: ICD-10-CM

## 2020-05-21 RX ORDER — METOPROLOL SUCCINATE 100 MG/1
TABLET, EXTENDED RELEASE ORAL
Qty: 90 TAB | Refills: 0 | Status: SHIPPED | OUTPATIENT
Start: 2020-05-21 | End: 2020-07-30

## 2020-05-21 NOTE — TELEPHONE ENCOUNTER
Received request via: Pharmacy    Was the patient seen in the last year in this department? Yes3/31/20    Does the patient have an active prescription (recently filled or refills available) for medication(s) requested? No

## 2020-06-29 ENCOUNTER — HOSPITAL ENCOUNTER (OUTPATIENT)
Dept: LAB | Facility: MEDICAL CENTER | Age: 70
End: 2020-06-29
Attending: SPECIALIST
Payer: MEDICARE

## 2020-06-29 LAB
ALBUMIN SERPL BCP-MCNC: 4.1 G/DL (ref 3.2–4.9)
ALBUMIN/GLOB SERPL: 1.3 G/DL
ALP SERPL-CCNC: 116 U/L (ref 30–99)
ALT SERPL-CCNC: 19 U/L (ref 2–50)
ANION GAP SERPL CALC-SCNC: 13 MMOL/L (ref 7–16)
AST SERPL-CCNC: 24 U/L (ref 12–45)
BASOPHILS # BLD AUTO: 0.7 % (ref 0–1.8)
BASOPHILS # BLD: 0.03 K/UL (ref 0–0.12)
BILIRUB SERPL-MCNC: 0.5 MG/DL (ref 0.1–1.5)
BUN SERPL-MCNC: 9 MG/DL (ref 8–22)
CALCIUM SERPL-MCNC: 9.4 MG/DL (ref 8.5–10.5)
CANCER AG125 SERPL-ACNC: 7 U/ML (ref 0–35)
CHLORIDE SERPL-SCNC: 104 MMOL/L (ref 96–112)
CO2 SERPL-SCNC: 25 MMOL/L (ref 20–33)
CREAT SERPL-MCNC: 0.73 MG/DL (ref 0.5–1.4)
EOSINOPHIL # BLD AUTO: 0.09 K/UL (ref 0–0.51)
EOSINOPHIL NFR BLD: 2 % (ref 0–6.9)
ERYTHROCYTE [DISTWIDTH] IN BLOOD BY AUTOMATED COUNT: 48.4 FL (ref 35.9–50)
FASTING STATUS PATIENT QL REPORTED: NORMAL
GLOBULIN SER CALC-MCNC: 3.2 G/DL (ref 1.9–3.5)
GLUCOSE SERPL-MCNC: 96 MG/DL (ref 65–99)
HCT VFR BLD AUTO: 43.7 % (ref 37–47)
HGB BLD-MCNC: 13.9 G/DL (ref 12–16)
IMM GRANULOCYTES # BLD AUTO: 0.02 K/UL (ref 0–0.11)
IMM GRANULOCYTES NFR BLD AUTO: 0.4 % (ref 0–0.9)
LYMPHOCYTES # BLD AUTO: 1.51 K/UL (ref 1–4.8)
LYMPHOCYTES NFR BLD: 33.4 % (ref 22–41)
MAGNESIUM SERPL-MCNC: 2.1 MG/DL (ref 1.5–2.5)
MCH RBC QN AUTO: 31.7 PG (ref 27–33)
MCHC RBC AUTO-ENTMCNC: 31.8 G/DL (ref 33.6–35)
MCV RBC AUTO: 99.5 FL (ref 81.4–97.8)
MONOCYTES # BLD AUTO: 0.52 K/UL (ref 0–0.85)
MONOCYTES NFR BLD AUTO: 11.5 % (ref 0–13.4)
NEUTROPHILS # BLD AUTO: 2.35 K/UL (ref 2–7.15)
NEUTROPHILS NFR BLD: 52 % (ref 44–72)
NRBC # BLD AUTO: 0 K/UL
NRBC BLD-RTO: 0 /100 WBC
PLATELET # BLD AUTO: 248 K/UL (ref 164–446)
PMV BLD AUTO: 10.3 FL (ref 9–12.9)
POTASSIUM SERPL-SCNC: 3.6 MMOL/L (ref 3.6–5.5)
PROT SERPL-MCNC: 7.3 G/DL (ref 6–8.2)
RBC # BLD AUTO: 4.39 M/UL (ref 4.2–5.4)
SODIUM SERPL-SCNC: 142 MMOL/L (ref 135–145)
WBC # BLD AUTO: 4.5 K/UL (ref 4.8–10.8)

## 2020-06-29 PROCEDURE — 83735 ASSAY OF MAGNESIUM: CPT

## 2020-06-29 PROCEDURE — 80053 COMPREHEN METABOLIC PANEL: CPT

## 2020-06-29 PROCEDURE — 86304 IMMUNOASSAY TUMOR CA 125: CPT

## 2020-06-29 PROCEDURE — 36415 COLL VENOUS BLD VENIPUNCTURE: CPT

## 2020-06-29 PROCEDURE — 85025 COMPLETE CBC W/AUTO DIFF WBC: CPT

## 2020-07-01 ENCOUNTER — APPOINTMENT (OUTPATIENT)
Dept: RADIOLOGY | Facility: MEDICAL CENTER | Age: 70
End: 2020-07-01
Attending: OTOLARYNGOLOGY
Payer: MEDICARE

## 2020-07-01 DIAGNOSIS — I69.018 OTHER SYMPTOMS AND SIGNS INVOLVING COGNITIVE FUNCTIONS FOLLOWING NONTRAUMATIC SUBARACHNOID HEMORRHAGE: ICD-10-CM

## 2020-07-01 DIAGNOSIS — K14.9 DISEASE OF TONGUE: ICD-10-CM

## 2020-07-01 PROCEDURE — A9576 INJ PROHANCE MULTIPACK: HCPCS | Performed by: OTOLARYNGOLOGY

## 2020-07-01 PROCEDURE — 70553 MRI BRAIN STEM W/O & W/DYE: CPT

## 2020-07-01 PROCEDURE — 70543 MRI ORBT/FAC/NCK W/O &W/DYE: CPT

## 2020-07-01 PROCEDURE — 700117 HCHG RX CONTRAST REV CODE 255: Performed by: OTOLARYNGOLOGY

## 2020-07-01 RX ADMIN — GADOTERIDOL 18 ML: 279.3 INJECTION, SOLUTION INTRAVENOUS at 14:49

## 2020-07-22 ENCOUNTER — HOSPITAL ENCOUNTER (OUTPATIENT)
Dept: RADIOLOGY | Facility: MEDICAL CENTER | Age: 70
End: 2020-07-22
Attending: SPECIALIST
Payer: MEDICARE

## 2020-07-22 PROCEDURE — 71260 CT THORAX DX C+: CPT

## 2020-07-22 PROCEDURE — 700117 HCHG RX CONTRAST REV CODE 255: Performed by: SPECIALIST

## 2020-07-22 RX ADMIN — IOHEXOL 100 ML: 350 INJECTION, SOLUTION INTRAVENOUS at 15:15

## 2020-07-30 DIAGNOSIS — I10 HYPERTENSION, UNCONTROLLED: ICD-10-CM

## 2020-07-30 RX ORDER — METOPROLOL SUCCINATE 100 MG/1
TABLET, EXTENDED RELEASE ORAL
Qty: 90 TAB | Refills: 0 | Status: SHIPPED | OUTPATIENT
Start: 2020-07-30 | End: 2020-11-02

## 2020-08-08 DIAGNOSIS — I10 HYPERTENSION, UNCONTROLLED: ICD-10-CM

## 2020-08-13 RX ORDER — BACLOFEN 10 MG/1
10 TABLET ORAL 3 TIMES DAILY
Qty: 90 TAB | Refills: 0 | Status: SHIPPED | OUTPATIENT
Start: 2020-08-13 | End: 2020-10-09 | Stop reason: SDUPTHER

## 2020-08-17 ENCOUNTER — TELEMEDICINE (OUTPATIENT)
Dept: MEDICAL GROUP | Facility: LAB | Age: 70
End: 2020-08-17
Payer: MEDICARE

## 2020-08-17 VITALS — SYSTOLIC BLOOD PRESSURE: 138 MMHG | DIASTOLIC BLOOD PRESSURE: 80 MMHG

## 2020-08-17 DIAGNOSIS — K14.6 TONGUE BURNING SENSATION: ICD-10-CM

## 2020-08-17 DIAGNOSIS — R91.8 PULMONARY NODULES: ICD-10-CM

## 2020-08-17 DIAGNOSIS — I10 ESSENTIAL HYPERTENSION: ICD-10-CM

## 2020-08-17 PROBLEM — D62 ACUTE ON CHRONIC BLOOD LOSS ANEMIA: Status: RESOLVED | Noted: 2019-06-12 | Resolved: 2020-08-17

## 2020-08-17 PROCEDURE — 99214 OFFICE O/P EST MOD 30 MIN: CPT | Mod: 95,CR | Performed by: NURSE PRACTITIONER

## 2020-08-17 NOTE — PROGRESS NOTES
Telemedicine Visit: Established Patient     This evaluation was conducted via Zoom, using secure and encrypted videoconferencing technology.  The patient was physical located at Home in [place], [place] and the physician was located in [facility].  The patient was presented by self, at home - daughter present also.  The patient's identity was confirmed and verbal consent for the telemedicine encounter was obtained.      Subjective:   CC:   Carlene is a 69 y.o. female presenting for evaluation and management of:    Recent CT scan chest, abdomen, pelvis which showed new small pulmonary nodules, 5 mm right upper lobe, 6 mm left upper lobe and 10 mm right lower lobe which had grown from 8 mm.  History of endometrial cancer status post total hysterectomy with Dr. Garcia.  She would like to talk to a pulmonologist about her lung nodules as she was frustrated with Dr. Garcia's office when she was told that the CT scan does not need to be rechecked for 6 months.  No weight loss, chronic cough, hemoptysis or night sweats.  Nonsmoker.  Only trouble breathing is when she is flat on her back but this has been present for at least 2 years.  Able to move around the house without any problems.      Hypertension: Chronic issue.  History of CVA.  Fortunately blood pressure has been well controlled at home, typically around 130/80.  Denies headaches or chest pain.  Compliant with losartan and metoprolol.    Tongue burning with film: Chronic issue.  She did see ENT and MRI of the brain as well as neck obtained, neither showing any reason for her tongue or mouth burning.  Has a white film to her tongue and states that it does not always burn.  She was given a mouthwash which she did not find helpful.  She is not on an inhaled steroid.  She does have amitriptyline but does not take it every day.    ROS   Denies any recent fevers or chills. No nausea or vomiting. No chest pains or shortness of breath.     Allergies   Allergen Reactions   •  Penicillins Itching     Makes her feel weak    • Gluten Meal    • Hydrocodone Itching     Swelling of the tongue  Face swelling   • Iodine      Weak and dizzy  7/2120 per patient allergy is to topical iodine ba   • Levaquin Itching     Swelling of the tongue  Facial swelling   • Tramadol Hives     Swelling       Current medicines (including changes today)  Current Outpatient Medications   Medication Sig Dispense Refill   • baclofen (LIORESAL) 10 MG Tab Take 1 Tab by mouth 3 times a day. 90 Tab 0   • metoprolol SR (TOPROL XL) 100 MG TABLET SR 24 HR Take 1 tablet by mouth once daily for 90 days 90 Tab 0   • losartan (COZAAR) 100 MG Tab TAKE 1 TAB BY MOUTH EVERY DAY FOR 90 DAYS. 100 Tab 3   • azithromycin (ZITHROMAX Z-CHITO) 250 MG Tab 500 mg day one, 250 mg day 2-5 6 Tab 0   • amitriptyline (ELAVIL) 50 MG Tab Take 1 Tab by mouth every day. Before bed. May increase to 75 mg after one week if needed 90 Tab 2   • nystatin (MYCOSTATIN) 417743 UNIT/ML Suspension Take 5 mL by mouth 4 times a day. 140 mL 1   • meclizine (ANTIVERT) 12.5 MG Tab Take 1-2 Tabs by mouth 3 times a day as needed for Dizziness. 90 Tab 3   • atorvastatin (LIPITOR) 20 MG Tab Take 1 Tab by mouth every evening. 100 Tab 4   • ondansetron (ZOFRAN) 4 MG Tab tablet Take 1 Tab by mouth every 6 hours as needed. Alternate q 3 h with Dex four four days after chemotherapy and longer if needed 30 Tab 0   • amLODIPine (NORVASC) 5 MG Tab Take 1.5 Tabs by mouth every bedtime. 135 Tab 2   • dexamethasone (DECADRON) 4 MG Tab Take 1 Tab by mouth every 6 hours. (Patient not taking: Reported on 1/17/2020) 30 Tab 0   • senna-docusate (PERICOLACE OR SENOKOT S) 8.6-50 MG Tab Take 1 Tab by mouth 2 times a day.     • ascorbic acid (ASCORBIC ACID) 500 MG Tab Take 500 mg by mouth every evening.     • Cholecalciferol (VITAMIN D3) 2000 UNIT Cap Take 2,000 Units by mouth every day.     • Coenzyme Q10 (COQ10) 100 MG Cap Take 100 mg by mouth every evening.     • multivitamin  (THERAGRAN) Tab Take 1 Tab by mouth every day.     • acetaminophen (TYLENOL) 500 MG Tab Take 1,000 mg by mouth every 6 hours as needed for Moderate Pain.     • B Complex-C (SUPER B COMPLEX PO) Take 1 Tab by mouth every evening.     • ferrous sulfate 325 (65 Fe) MG tablet Take 1 Tab by mouth every day. (Patient not taking: Reported on 12/16/2019) 30 Tab 0   • aspirin (ASA) 81 MG Chew Tab chewable tablet Take 81 mg by mouth every day.       No current facility-administered medications for this visit.        Patient Active Problem List    Diagnosis Date Noted   • Acute on chronic blood loss anemia 06/12/2019     Priority: High   • Endometrial cancer (HCC) 02/06/2018     Priority: High   • Obesity (BMI 30-39.9) 02/06/2018     Priority: High   • Hypokalemia 10/02/2018     Priority: Medium   • Iron deficiency anemia 02/06/2018     Priority: Medium   • CVA, old, hemiparesis (HCC) 02/06/2018     Priority: Low   • Hypertension, uncontrolled 02/06/2018     Priority: Low   • Tongue burning sensation 03/31/2020   • Neuropathy 03/31/2020   • Other chest pain 01/17/2020   • Thyroid nodule - benign 12/03/2019   • Nausea 10/02/2019   • Admission for chemotherapy 09/17/2019   • Vision abnormalities 02/06/2018   • Vitamin D deficiency 02/06/2018       Family History   Problem Relation Age of Onset   • Hypertension Mother    • No Known Problems Father    • No Known Problems Brother        She  has a past medical history of Anemia (2/6/2018), Cancer (HCC) (2019), CVA, old, hemiparesis (HCC) (2/6/2018), Essential hypertension (2/6/2018), Hyperlipidemia, Post-menopause bleeding (2/6/2018), Stroke (HCC), Vision abnormalities (2/6/2018), and Vitamin D deficiency (2/6/2018).  She  has a past surgical history that includes pr hysteroscopy,dx,sep proc (6/19/2019); dilation and curettage (6/19/2019); pr cystoscopy,insert ureteral stent (Left, 8/8/2019); hysterectomy robotic xi (N/A, 8/8/2019); salpingo oophorectomy (Bilateral, 8/8/2019); and  node biopsy sentinel (8/8/2019).       Objective:     Vitals:    08/17/20 0711   BP: 138/80       Physical Exam:  Constitutional: Alert, no distress, well-groomed.  Skin: No rashes in visible areas.  Eye: Round. Conjunctiva clear, lids normal. No icterus.   ENMT: Lips pink without lesions, good dentition, moist mucous membranes. Phonation normal.  Neck: No masses, no thyromegaly. Moves freely without pain.  CV: Pulse as reported by patient  Respiratory: Unlabored respiratory effort, no cough or audible wheeze  Psych: Alert and oriented x3, normal affect and mood.       Assessment and Plan:   The following treatment plan was discussed:   1. Pulmonary nodules  -Discussed recommendations of lung nodule follow-up/diagnoses/differential diagnoses.  Also reiterated Dr. Garcia's recommendation that 6 months is the likely recommendation.  Referred to our lung nodule clinic for second opinion, per patient preference.  Fortunately asymptomatic of metastatic lung cancer.  - REFERRAL TO PULMONARY AND SLEEP MEDICINE Lung Nodule Clinic; > 8 mm (> 250 mm3); Solid; Multiple    2. Tongue burning sensation  -Followed now by ENT.  I encouraged her to try amitriptyline every evening instead of only as needed.  She does have a follow-up coming with ENT.    3. Essential hypertension  -Stable with home readings consistently around 130/80.  I encouraged the patient and/or her daughter to call me if readings at home are consistently greater than 140/90.  Reiterated the importance of a low-salt diet.        Follow-up: 3 months.

## 2020-08-19 ENCOUNTER — PATIENT OUTREACH (OUTPATIENT)
Dept: HEALTH INFORMATION MANAGEMENT | Facility: OTHER | Age: 70
End: 2020-08-19

## 2020-08-19 NOTE — PROGRESS NOTES
Called and spoke to her daughter. She is going to call me back later today when her mom is around so we can chat about what I am here for.

## 2020-08-27 ENCOUNTER — APPOINTMENT (OUTPATIENT)
Dept: RADIOLOGY | Facility: MEDICAL CENTER | Age: 70
End: 2020-08-27
Attending: NURSE PRACTITIONER
Payer: MEDICARE

## 2020-09-09 ENCOUNTER — HOSPITAL ENCOUNTER (OUTPATIENT)
Dept: RADIOLOGY | Facility: MEDICAL CENTER | Age: 70
End: 2020-09-09
Attending: NURSE PRACTITIONER
Payer: MEDICARE

## 2020-09-09 DIAGNOSIS — Z12.31 ENCOUNTER FOR SCREENING MAMMOGRAM FOR BREAST CANCER: ICD-10-CM

## 2020-09-09 PROCEDURE — 77067 SCR MAMMO BI INCL CAD: CPT

## 2020-09-22 ENCOUNTER — TELEMEDICINE (OUTPATIENT)
Dept: MEDICAL GROUP | Facility: LAB | Age: 70
End: 2020-09-22
Payer: MEDICARE

## 2020-09-22 VITALS — BODY MASS INDEX: 31.58 KG/M2 | WEIGHT: 185 LBS | HEIGHT: 64 IN

## 2020-09-22 DIAGNOSIS — M79.671 BILATERAL FOOT PAIN: ICD-10-CM

## 2020-09-22 DIAGNOSIS — M79.672 BILATERAL FOOT PAIN: ICD-10-CM

## 2020-09-22 DIAGNOSIS — G62.9 NEUROPATHY: ICD-10-CM

## 2020-09-22 DIAGNOSIS — R06.02 SOB (SHORTNESS OF BREATH): ICD-10-CM

## 2020-09-22 DIAGNOSIS — R82.90 FOUL SMELLING URINE: ICD-10-CM

## 2020-09-22 DIAGNOSIS — R50.9 FEVER, UNSPECIFIED FEVER CAUSE: ICD-10-CM

## 2020-09-22 PROCEDURE — 99214 OFFICE O/P EST MOD 30 MIN: CPT | Mod: 95,CR | Performed by: NURSE PRACTITIONER

## 2020-09-22 RX ORDER — GABAPENTIN 100 MG/1
100 CAPSULE ORAL 3 TIMES DAILY PRN
Qty: 90 CAP | Refills: 3 | Status: SHIPPED | OUTPATIENT
Start: 2020-09-22 | End: 2021-07-29

## 2020-09-22 ASSESSMENT — FIBROSIS 4 INDEX: FIB4 SCORE: 1.509703539019180653

## 2020-09-22 NOTE — PROGRESS NOTES
Telemedicine: Established Patient   This evaluation was conducted via Zoom using secure and encrypted videoconferencing technology. The patient was in a private location in the state of [place].    The patient's identity was confirmed and verbal consent was obtained for this virtual visit.    Subjective:   CC:   Chief Complaint   Patient presents with   • Body Aches       Jairo Rivas is a 70 y.o. female presenting for evaluation and management of:    C/o pain all over body, burning / pain in feet with walking / rest (needle feeling), urine is cloudy / yellow / smells bad x 2 weeks.  Denies dysuria or hematuria.  Drinking a lot of water.  Feeling weak and more tired than normal.  SOB with lying down and tired easily, not SOB with sitting still.  Rare cough.  + temp up to 98 orally.   Mentions pain to left side of throat x one week - hurts periodically to swallow.  Appetite is good.  Denies new headaches.  Feels heart palpitations.  + dizziness - comes and goes.  Denies fall.    Mainly watching TV all day b/c of the pandemic.   No other associated symptoms.  She does have a history of endometrial cancer and has completed chemotherapy.    ROS  Denies chest pain.  Denies sick contacts that she is aware of.  Non-smoker.  Denies vomiting.    Allergies   Allergen Reactions   • Penicillins Itching     Makes her feel weak    • Gluten Meal    • Hydrocodone Itching     Swelling of the tongue  Face swelling   • Iodine      Weak and dizzy  7/2120 per patient allergy is to topical iodine ba   • Levaquin Itching     Swelling of the tongue  Facial swelling   • Tramadol Hives     Swelling       Current medicines (including changes today)  Current Outpatient Medications   Medication Sig Dispense Refill   • baclofen (LIORESAL) 10 MG Tab Take 1 Tab by mouth 3 times a day. 90 Tab 0   • metoprolol SR (TOPROL XL) 100 MG TABLET SR 24 HR Take 1 tablet by mouth once daily for 90 days 90 Tab 0   • losartan (COZAAR) 100 MG Tab  TAKE 1 TAB BY MOUTH EVERY DAY FOR 90 DAYS. 100 Tab 3   • amitriptyline (ELAVIL) 50 MG Tab Take 1 Tab by mouth every day. Before bed. May increase to 75 mg after one week if needed 90 Tab 2   • meclizine (ANTIVERT) 12.5 MG Tab Take 1-2 Tabs by mouth 3 times a day as needed for Dizziness. 90 Tab 3   • atorvastatin (LIPITOR) 20 MG Tab Take 1 Tab by mouth every evening. 100 Tab 4   • ondansetron (ZOFRAN) 4 MG Tab tablet Take 1 Tab by mouth every 6 hours as needed. Alternate q 3 h with Dex four four days after chemotherapy and longer if needed 30 Tab 0   • amLODIPine (NORVASC) 5 MG Tab Take 1.5 Tabs by mouth every bedtime. 135 Tab 2   • senna-docusate (PERICOLACE OR SENOKOT S) 8.6-50 MG Tab Take 1 Tab by mouth 2 times a day.     • ascorbic acid (ASCORBIC ACID) 500 MG Tab Take 500 mg by mouth every evening.     • Cholecalciferol (VITAMIN D3) 2000 UNIT Cap Take 2,000 Units by mouth every day.     • Coenzyme Q10 (COQ10) 100 MG Cap Take 100 mg by mouth every evening.     • multivitamin (THERAGRAN) Tab Take 1 Tab by mouth every day.     • acetaminophen (TYLENOL) 500 MG Tab Take 1,000 mg by mouth every 6 hours as needed for Moderate Pain.     • B Complex-C (SUPER B COMPLEX PO) Take 1 Tab by mouth every evening.     • ferrous sulfate 325 (65 Fe) MG tablet Take 1 Tab by mouth every day. (Patient not taking: Reported on 12/16/2019) 30 Tab 0   • aspirin (ASA) 81 MG Chew Tab chewable tablet Take 81 mg by mouth every day.       No current facility-administered medications for this visit.        Patient Active Problem List    Diagnosis Date Noted   • Endometrial cancer (HCC) 02/06/2018     Priority: High   • Obesity (BMI 30-39.9) 02/06/2018     Priority: High   • Hypokalemia 10/02/2018     Priority: Medium   • Iron deficiency anemia 02/06/2018     Priority: Medium   • CVA, old, hemiparesis (HCC) 02/06/2018     Priority: Low   • Essential hypertension 02/06/2018     Priority: Low   • Multiple lung nodules - growth from 8 mm - 10 mm  "1/2020 - 8/2020 08/17/2020   • Tongue burning sensation 03/31/2020   • Neuropathy 03/31/2020   • Other chest pain 01/17/2020   • Thyroid nodule - benign 12/03/2019   • Nausea 10/02/2019   • Admission for chemotherapy 09/17/2019   • Vision abnormalities 02/06/2018   • Vitamin D deficiency 02/06/2018       Family History   Problem Relation Age of Onset   • Hypertension Mother    • No Known Problems Father    • No Known Problems Brother        She  has a past medical history of Anemia (2/6/2018), Cancer (HCC) (2019), CVA, old, hemiparesis (HCC) (2/6/2018), Essential hypertension (2/6/2018), Hyperlipidemia, Post-menopause bleeding (2/6/2018), Stroke (HCC), Vision abnormalities (2/6/2018), and Vitamin D deficiency (2/6/2018).  She  has a past surgical history that includes pr hysteroscopy,dx,sep proc (6/19/2019); dilation and curettage (6/19/2019); pr cystoscopy,insert ureteral stent (Left, 8/8/2019); hysterectomy robotic xi (N/A, 8/8/2019); salpingo oophorectomy (Bilateral, 8/8/2019); and node biopsy sentinel (8/8/2019).       Objective:   Ht 1.626 m (5' 4\")   Wt 83.9 kg (185 lb)   BMI 31.76 kg/m²     Physical Exam  HR:  Daughter feels pulse and sounds this out to me - sounds regular without skipped beats.  Respiratory: No labored breathing.  She is able to speak in full sentences.  She is seated comfortably in a chair in her house.  Assessment and Plan:   The following treatment plan was discussed:     \"  1. Neuropathy  VITAMIN B12    gabapentin (NEURONTIN) 100 MG Cap   2. Bilateral foot pain  VITAMIN B12    gabapentin (NEURONTIN) 100 MG Cap   3. Foul smelling urine  URINALYSIS,CULTURE IF INDICATED   4. Fever, unspecified fever cause  CBC WITH DIFFERENTIAL    Comp Metabolic Panel   5. SOB (shortness of breath)  DX-CHEST-2 VIEWS   \"  Discussed all of her symptoms with her today.  Likely neuropathy in her feet related to her history of chemotherapy.  Recommend a B12, CBC and CMP.  Trial of gabapentin low dosed at 100 " mg up to 3 times daily with a discussion to gradually increase this if it is helpful and tolerated.  Because she is short of breath, recommend a chest x-ray although she did not appear short of breath at rest on zoom today.  I will follow-up with her regarding urinalysis, chest x-ray and lab results as soon as they return, we did discuss these of the emergency medical system should her symptoms worsen, she begins to have a fever greater than 102 or onset chest pain.  Side effects of all medications prescribed today were discussed with the patient including how to take the medications and proper dosage. Discussed repercussions of not taking the medications as prescribed. Instructed to call the office should she have any negative side effects or problems with the medications.  We did discuss the possibility of urgent care or ER today which she declines.  We discussed the possibility of an undiagnosed heart arrhythmia or abnormal lung sounds as this visit was conducted via zoom and she verbalized understanding.      Follow-up: No follow-ups on file.

## 2020-09-25 ENCOUNTER — HOSPITAL ENCOUNTER (OUTPATIENT)
Dept: LAB | Facility: MEDICAL CENTER | Age: 70
End: 2020-09-25
Attending: NURSE PRACTITIONER
Payer: MEDICARE

## 2020-09-25 DIAGNOSIS — M79.672 BILATERAL FOOT PAIN: ICD-10-CM

## 2020-09-25 DIAGNOSIS — R50.9 FEVER, UNSPECIFIED FEVER CAUSE: ICD-10-CM

## 2020-09-25 DIAGNOSIS — M79.671 BILATERAL FOOT PAIN: ICD-10-CM

## 2020-09-25 DIAGNOSIS — R82.90 FOUL SMELLING URINE: ICD-10-CM

## 2020-09-25 DIAGNOSIS — G62.9 NEUROPATHY: ICD-10-CM

## 2020-09-25 LAB
ALBUMIN SERPL BCP-MCNC: 4.5 G/DL (ref 3.2–4.9)
ALBUMIN/GLOB SERPL: 1.3 G/DL
ALP SERPL-CCNC: 134 U/L (ref 30–99)
ALT SERPL-CCNC: 21 U/L (ref 2–50)
ANION GAP SERPL CALC-SCNC: 13 MMOL/L (ref 7–16)
APPEARANCE UR: CLEAR
AST SERPL-CCNC: 26 U/L (ref 12–45)
BACTERIA #/AREA URNS HPF: ABNORMAL /HPF
BASOPHILS # BLD AUTO: 0.8 % (ref 0–1.8)
BASOPHILS # BLD: 0.03 K/UL (ref 0–0.12)
BILIRUB SERPL-MCNC: 0.6 MG/DL (ref 0.1–1.5)
BILIRUB UR QL STRIP.AUTO: NEGATIVE
BUN SERPL-MCNC: 11 MG/DL (ref 8–22)
CALCIUM SERPL-MCNC: 10.1 MG/DL (ref 8.5–10.5)
CHLORIDE SERPL-SCNC: 103 MMOL/L (ref 96–112)
CO2 SERPL-SCNC: 23 MMOL/L (ref 20–33)
COLOR UR: YELLOW
CREAT SERPL-MCNC: 0.74 MG/DL (ref 0.5–1.4)
EOSINOPHIL # BLD AUTO: 0.03 K/UL (ref 0–0.51)
EOSINOPHIL NFR BLD: 0.8 % (ref 0–6.9)
EPI CELLS #/AREA URNS HPF: NEGATIVE /HPF
ERYTHROCYTE [DISTWIDTH] IN BLOOD BY AUTOMATED COUNT: 48.7 FL (ref 35.9–50)
GLOBULIN SER CALC-MCNC: 3.4 G/DL (ref 1.9–3.5)
GLUCOSE SERPL-MCNC: 102 MG/DL (ref 65–99)
GLUCOSE UR STRIP.AUTO-MCNC: NEGATIVE MG/DL
HCT VFR BLD AUTO: 46.2 % (ref 37–47)
HGB BLD-MCNC: 15 G/DL (ref 12–16)
HYALINE CASTS #/AREA URNS LPF: ABNORMAL /LPF
IMM GRANULOCYTES # BLD AUTO: 0.02 K/UL (ref 0–0.11)
IMM GRANULOCYTES NFR BLD AUTO: 0.5 % (ref 0–0.9)
KETONES UR STRIP.AUTO-MCNC: NEGATIVE MG/DL
LEUKOCYTE ESTERASE UR QL STRIP.AUTO: ABNORMAL
LYMPHOCYTES # BLD AUTO: 1.05 K/UL (ref 1–4.8)
LYMPHOCYTES NFR BLD: 27.8 % (ref 22–41)
MCH RBC QN AUTO: 31.6 PG (ref 27–33)
MCHC RBC AUTO-ENTMCNC: 32.5 G/DL (ref 33.6–35)
MCV RBC AUTO: 97.5 FL (ref 81.4–97.8)
MICRO URNS: ABNORMAL
MONOCYTES # BLD AUTO: 0.35 K/UL (ref 0–0.85)
MONOCYTES NFR BLD AUTO: 9.3 % (ref 0–13.4)
NEUTROPHILS # BLD AUTO: 2.3 K/UL (ref 2–7.15)
NEUTROPHILS NFR BLD: 60.8 % (ref 44–72)
NITRITE UR QL STRIP.AUTO: NEGATIVE
NRBC # BLD AUTO: 0 K/UL
NRBC BLD-RTO: 0 /100 WBC
PH UR STRIP.AUTO: 6 [PH] (ref 5–8)
PLATELET # BLD AUTO: 232 K/UL (ref 164–446)
PMV BLD AUTO: 11 FL (ref 9–12.9)
POTASSIUM SERPL-SCNC: 3.8 MMOL/L (ref 3.6–5.5)
PROT SERPL-MCNC: 7.9 G/DL (ref 6–8.2)
PROT UR QL STRIP: NEGATIVE MG/DL
RBC # BLD AUTO: 4.74 M/UL (ref 4.2–5.4)
RBC # URNS HPF: ABNORMAL /HPF
RBC UR QL AUTO: NEGATIVE
SODIUM SERPL-SCNC: 139 MMOL/L (ref 135–145)
SP GR UR STRIP.AUTO: 1.02
UROBILINOGEN UR STRIP.AUTO-MCNC: 0.2 MG/DL
VIT B12 SERPL-MCNC: 984 PG/ML (ref 211–911)
WBC # BLD AUTO: 3.8 K/UL (ref 4.8–10.8)
WBC #/AREA URNS HPF: ABNORMAL /HPF

## 2020-09-25 PROCEDURE — 81001 URINALYSIS AUTO W/SCOPE: CPT

## 2020-09-25 PROCEDURE — 82607 VITAMIN B-12: CPT

## 2020-09-25 PROCEDURE — 87086 URINE CULTURE/COLONY COUNT: CPT

## 2020-09-25 PROCEDURE — 36415 COLL VENOUS BLD VENIPUNCTURE: CPT

## 2020-09-25 PROCEDURE — 80053 COMPREHEN METABOLIC PANEL: CPT

## 2020-09-25 PROCEDURE — 85025 COMPLETE CBC W/AUTO DIFF WBC: CPT

## 2020-09-27 LAB
BACTERIA UR CULT: NORMAL
SIGNIFICANT IND 70042: NORMAL
SITE SITE: NORMAL
SOURCE SOURCE: NORMAL

## 2020-10-05 RX ORDER — AMLODIPINE BESYLATE 5 MG/1
TABLET ORAL
Qty: 90 TAB | Refills: 2 | Status: SHIPPED | OUTPATIENT
Start: 2020-10-05 | End: 2021-07-15

## 2020-10-05 NOTE — TELEPHONE ENCOUNTER
Received request via: Patient    Was the patient seen in the last year in this department? Yes  9/22/2020  Does the patient have an active prescription (recently filled or refills available) for medication(s) requested? No

## 2020-10-12 RX ORDER — BACLOFEN 10 MG/1
10 TABLET ORAL 3 TIMES DAILY
Qty: 90 TAB | Refills: 0 | Status: SHIPPED | OUTPATIENT
Start: 2020-10-12 | End: 2020-12-18

## 2020-10-15 ENCOUNTER — OFFICE VISIT (OUTPATIENT)
Dept: PULMONOLOGY | Facility: HOSPICE | Age: 70
End: 2020-10-15
Payer: MEDICARE

## 2020-10-15 ENCOUNTER — HOSPITAL ENCOUNTER (OUTPATIENT)
Dept: RADIOLOGY | Facility: MEDICAL CENTER | Age: 70
End: 2020-10-15
Payer: MEDICARE

## 2020-10-15 VITALS
RESPIRATION RATE: 16 BRPM | SYSTOLIC BLOOD PRESSURE: 130 MMHG | HEART RATE: 91 BPM | OXYGEN SATURATION: 95 % | HEIGHT: 64 IN | DIASTOLIC BLOOD PRESSURE: 70 MMHG | WEIGHT: 192 LBS | BODY MASS INDEX: 32.78 KG/M2

## 2020-10-15 DIAGNOSIS — R91.8 PULMONARY NODULES: ICD-10-CM

## 2020-10-15 DIAGNOSIS — C54.1 ENDOMETRIAL CANCER (HCC): ICD-10-CM

## 2020-10-15 DIAGNOSIS — Z78.9 NONSMOKER: ICD-10-CM

## 2020-10-15 PROCEDURE — 99204 OFFICE O/P NEW MOD 45 MIN: CPT | Performed by: INTERNAL MEDICINE

## 2020-10-15 ASSESSMENT — ENCOUNTER SYMPTOMS
CHEST TIGHTNESS: 1
WHEEZING: 0
SHORTNESS OF BREATH: 1
HEMOPTYSIS: 0
DYSPNEA AT REST: 0
RESPIRATORY SYMPTOMS COMMENTS: YES

## 2020-10-15 ASSESSMENT — FIBROSIS 4 INDEX: FIB4 SCORE: 1.66

## 2020-10-15 NOTE — NON-PROVIDER
Chest CT completed on 10/14/20 radiology Report scanned into media , Pending Images to be pushed.     Pt completed at [facility]

## 2020-10-19 NOTE — PROGRESS NOTES
Chief Complaint   Patient presents with   • Establish Care     Ref by KHUSHBU Scanlon Dx: Pulmonary nodules       HPI: This patient is a 70 y.o. female is referred for pulmonary nodules.  She is a lifelong non-smoker.  She is undergoing treatment for endometrial cancer with Dr. Garcia.  Chest  CAT scan from July 2020 showed new,few millimeter pulmonary nodules bilaterally and interval growth of a right lower lobe nodule from 8 to 10 mm.  The patient denies any change in respiratory status.  Denies dyspnea, cough, wheezing, hemoptysis.  She has a history of CVA with residual weakness and is consequently less ambulatory.  She states she had an updated chest CAT scan yesterday at an outside imaging center.      Past Medical History:   Diagnosis Date   • Anemia 2/6/2018    Iron def, post-menopausal bleeding.   • Back pain    • Back pain    • Cancer (HCC) 2019    uterine & endometrial   • Cough    • CVA, old, hemiparesis (HCC) 2/6/2018    Residual left sided weakness   • Essential hypertension 2/6/2018    Well controlled on amlodipine 10 mg and metoprolol 100 mg bid.   • Eye drainage    • Fatigue    • Frequent headaches    • Frequent urination    • Hyperlipidemia    • Irregular periods    • Painful joint    • Palpitations    • Post-menopause bleeding 2/6/2018    Menopause in 2010 and bleeding 2012 started to have irregular continuous bleeding.    • Pulmonary nodule    • Sore muscles    • Stroke (HCC)     right sided weakness   • Swelling of lower extremity    • Vision abnormalities 2/6/2018   • Vitamin D deficiency 2/6/2018    Taking 50,000 IU once weekly for 4 yrs.   • Weakness    • Wears glasses        Social History     Socioeconomic History   • Marital status: Single     Spouse name: Not on file   • Number of children: Not on file   • Years of education: Not on file   • Highest education level: Not on file   Occupational History   • Not on file   Social Needs   • Financial resource strain: Not on file   • Food insecurity      Worry: Not on file     Inability: Not on file   • Transportation needs     Medical: Not on file     Non-medical: Not on file   Tobacco Use   • Smoking status: Never Smoker   • Smokeless tobacco: Never Used   • Tobacco comment: n/a   Substance and Sexual Activity   • Alcohol use: No   • Drug use: No   • Sexual activity: Never     Comment: , have 7 kids.   Lifestyle   • Physical activity     Days per week: Not on file     Minutes per session: Not on file   • Stress: Not on file   Relationships   • Social connections     Talks on phone: Not on file     Gets together: Not on file     Attends Druze service: Not on file     Active member of club or organization: Not on file     Attends meetings of clubs or organizations: Not on file     Relationship status: Not on file   • Intimate partner violence     Fear of current or ex partner: Not on file     Emotionally abused: Not on file     Physically abused: Not on file     Forced sexual activity: Not on file   Other Topics Concern   •  Service No   • Blood Transfusions No   • Caffeine Concern No   • Occupational Exposure No   • Hobby Hazards No   • Sleep Concern Yes   • Stress Concern No   • Weight Concern Yes   • Special Diet No   • Back Care Yes   • Exercise Yes   • Bike Helmet No     Comment: Does Not Ride Bike   • Seat Belt Yes   • Self-Exams Yes   Social History Narrative   • Not on file       Family History   Problem Relation Age of Onset   • Hypertension Mother    • No Known Problems Father    • No Known Problems Brother        Current Outpatient Medications on File Prior to Visit   Medication Sig Dispense Refill   • baclofen (LIORESAL) 10 MG Tab Take 1 Tab by mouth 3 times a day. 90 Tab 0   • amLODIPine (NORVASC) 5 MG Tab TAKE 1 TABLET BY MOUTH EVERYDAY AT BEDTIME 90 Tab 2   • metoprolol SR (TOPROL XL) 100 MG TABLET SR 24 HR Take 1 tablet by mouth once daily for 90 days 90 Tab 0   • losartan (COZAAR) 100 MG Tab TAKE 1 TAB BY MOUTH EVERY DAY FOR 90  DAYS. 100 Tab 3   • meclizine (ANTIVERT) 12.5 MG Tab Take 1-2 Tabs by mouth 3 times a day as needed for Dizziness. 90 Tab 3   • atorvastatin (LIPITOR) 20 MG Tab Take 1 Tab by mouth every evening. 100 Tab 4   • ascorbic acid (ASCORBIC ACID) 500 MG Tab Take 500 mg by mouth every evening.     • Cholecalciferol (VITAMIN D3) 2000 UNIT Cap Take 2,000 Units by mouth every day.     • Coenzyme Q10 (COQ10) 100 MG Cap Take 100 mg by mouth every evening.     • multivitamin (THERAGRAN) Tab Take 1 Tab by mouth every day.     • acetaminophen (TYLENOL) 500 MG Tab Take 1,000 mg by mouth every 6 hours as needed for Moderate Pain.     • aspirin (ASA) 81 MG Chew Tab chewable tablet Take 81 mg by mouth every day.     • gabapentin (NEURONTIN) 100 MG Cap Take 1 Cap by mouth 3 times a day as needed. For foot pain / burning (Patient not taking: Reported on 10/15/2020) 90 Cap 3   • amitriptyline (ELAVIL) 50 MG Tab Take 1 Tab by mouth every day. Before bed. May increase to 75 mg after one week if needed (Patient not taking: Reported on 10/15/2020) 90 Tab 2   • ondansetron (ZOFRAN) 4 MG Tab tablet Take 1 Tab by mouth every 6 hours as needed. Alternate q 3 h with Dex four four days after chemotherapy and longer if needed (Patient not taking: Reported on 10/15/2020) 30 Tab 0   • senna-docusate (PERICOLACE OR SENOKOT S) 8.6-50 MG Tab Take 1 Tab by mouth 2 times a day.     • B Complex-C (SUPER B COMPLEX PO) Take 1 Tab by mouth every evening.     • ferrous sulfate 325 (65 Fe) MG tablet Take 1 Tab by mouth every day. (Patient not taking: Reported on 12/16/2019) 30 Tab 0     No current facility-administered medications on file prior to visit.        Allergies: Penicillins, Gluten meal, Hydrocodone, Iodine, Levaquin, and Tramadol    ROS:   Constitutional: Denies fevers, chills, night sweats, fatigue or weight loss  Eyes: Denies vision loss, pain, drainage, double vision  Ears, Nose, Throat: Denies earache, difficulty hearing, tinnitus, nasal  "congestion, hoarseness  Cardiovascular: Denies chest pain, tightness, palpitations, orthopnea or edema  Respiratory: Denies shortness of breath, cough, wheezing, hemoptysis  Sleep: Denies daytime sleepiness, snoring, apneas, insomnia, morning headaches  GI: Denies heartburn, dysphagia, nausea, abdominal pain, diarrhea or constipation  : Denies frequent urination, hematuria, discharge or painful urination  Musculoskeletal: Denies back pain, painful joints, sore muscles  Neurological: Denies weakness or headaches  Skin: No rashes    /70 (BP Location: Left arm, Patient Position: Sitting, BP Cuff Size: Adult)   Pulse 91   Resp 16   Ht 1.626 m (5' 4\")   Wt 87.1 kg (192 lb)   SpO2 95%     Physical Exam:  Appearance: Well-nourished, well-developed, in no acute distress  HEENT: Normocephalic, atraumatic, white sclera, PERRLA, masked  Neck: No adenopathy or masses  Respiratory: no intercostal retractions or accessory muscle use  Lungs auscultation: Clear to auscultation bilaterally  Cardiovascular: Regular rate rhythm. No murmurs, rubs or gallops.  No LE edema  Abdomen: soft, nondistended  Gait: In wheelchair  Digits: No clubbing, cyanosis  Motor: No tremors  Orientation: Oriented to time, person and place    Diagnosis:  1. Pulmonary nodules     2. Endometrial cancer (HCC)     3. Nonsmoker         Plan:  The patient has endometrial cancer, undergoing treatment with incidental finding of pulmonary nodules noted on chest imaging.  Discussed the nodules may be postinflammatory however can represent metastatic disease.    We will request updated imaging for review with further recommendations to follow.  Further diagnostic testing with PET scan or biopsy may be indicated.  Return in about 3 months (around 1/15/2021).      "

## 2020-10-21 ENCOUNTER — PATIENT MESSAGE (OUTPATIENT)
Dept: PULMONOLOGY | Facility: HOSPICE | Age: 70
End: 2020-10-21

## 2020-10-21 DIAGNOSIS — R91.8 PULMONARY NODULES: ICD-10-CM

## 2020-10-21 NOTE — TELEPHONE ENCOUNTER
From: Mulugeta Rivas  To: Keisha Platt M.D.  Sent: 10/21/2020 1:28 PM PDT  Subject: Test Result Question    Hi Doctor,  Just wanted to follow up with you regarding my CT scan nodules. The results were emailed to your assistant on 10/15/20 after our visit. Did you get a chance to review them? i would like to schedule any futher testing like the PET scan you mentioned.    respectfully  mulugeta rivas

## 2020-10-26 ENCOUNTER — HOSPITAL ENCOUNTER (OUTPATIENT)
Dept: RADIOLOGY | Facility: MEDICAL CENTER | Age: 70
End: 2020-10-26
Attending: INTERNAL MEDICINE
Payer: MEDICARE

## 2020-10-26 DIAGNOSIS — R91.8 PULMONARY NODULES: ICD-10-CM

## 2020-10-26 PROCEDURE — A9552 F18 FDG: HCPCS

## 2020-10-29 RX ORDER — SODIUM CHLORIDE 9 MG/ML
INJECTION, SOLUTION INTRAVENOUS CONTINUOUS
Status: CANCELLED | OUTPATIENT
Start: 2020-11-10

## 2020-11-01 DIAGNOSIS — I10 HYPERTENSION, UNCONTROLLED: ICD-10-CM

## 2020-11-02 ENCOUNTER — APPOINTMENT (OUTPATIENT)
Dept: ADMISSIONS | Facility: MEDICAL CENTER | Age: 70
End: 2020-11-02
Payer: MEDICARE

## 2020-11-02 DIAGNOSIS — Z01.812 PRE-OPERATIVE LABORATORY EXAMINATION: ICD-10-CM

## 2020-11-02 RX ORDER — METOPROLOL SUCCINATE 100 MG/1
TABLET, EXTENDED RELEASE ORAL
Qty: 90 TAB | Refills: 0 | Status: SHIPPED | OUTPATIENT
Start: 2020-11-02 | End: 2021-01-15

## 2020-11-02 NOTE — TELEPHONE ENCOUNTER
Received request via: Pharmacy    Was the patient seen in the last year in this department? Yes Telemedicine 9/22/2020    Does the patient have an active prescription (recently filled or refills available) for medication(s) requested? No

## 2020-11-07 ENCOUNTER — PRE-ADMISSION TESTING (OUTPATIENT)
Dept: ADMISSIONS | Facility: MEDICAL CENTER | Age: 70
End: 2020-11-07
Attending: SPECIALIST
Payer: MEDICARE

## 2020-11-07 ENCOUNTER — HOSPITAL ENCOUNTER (OUTPATIENT)
Dept: LAB | Facility: MEDICAL CENTER | Age: 70
End: 2020-11-07
Attending: NURSE PRACTITIONER
Payer: MEDICARE

## 2020-11-07 PROCEDURE — C9803 HOPD COVID-19 SPEC COLLECT: HCPCS

## 2020-11-07 PROCEDURE — U0003 INFECTIOUS AGENT DETECTION BY NUCLEIC ACID (DNA OR RNA); SEVERE ACUTE RESPIRATORY SYNDROME CORONAVIRUS 2 (SARS-COV-2) (CORONAVIRUS DISEASE [COVID-19]), AMPLIFIED PROBE TECHNIQUE, MAKING USE OF HIGH THROUGHPUT TECHNOLOGIES AS DESCRIBED BY CMS-2020-01-R: HCPCS

## 2020-11-08 LAB
COVID ORDER STATUS COVID19: NORMAL
SARS-COV-2 RNA RESP QL NAA+PROBE: NOTDETECTED
SPECIMEN SOURCE: NORMAL

## 2020-11-09 ENCOUNTER — APPOINTMENT (OUTPATIENT)
Dept: ADMISSIONS | Facility: MEDICAL CENTER | Age: 70
End: 2020-11-09
Attending: SPECIALIST
Payer: MEDICARE

## 2020-11-10 ENCOUNTER — APPOINTMENT (OUTPATIENT)
Dept: RADIOLOGY | Facility: MEDICAL CENTER | Age: 70
End: 2020-11-10
Attending: RADIOLOGY
Payer: MEDICARE

## 2020-11-10 ENCOUNTER — HOSPITAL ENCOUNTER (OUTPATIENT)
Facility: MEDICAL CENTER | Age: 70
End: 2020-11-10
Attending: SPECIALIST | Admitting: SPECIALIST
Payer: MEDICARE

## 2020-11-10 ENCOUNTER — APPOINTMENT (OUTPATIENT)
Dept: RADIOLOGY | Facility: MEDICAL CENTER | Age: 70
End: 2020-11-10
Attending: SPECIALIST
Payer: MEDICARE

## 2020-11-10 VITALS
OXYGEN SATURATION: 93 % | TEMPERATURE: 97.6 F | BODY MASS INDEX: 33.87 KG/M2 | DIASTOLIC BLOOD PRESSURE: 83 MMHG | HEART RATE: 76 BPM | HEIGHT: 64 IN | WEIGHT: 198.41 LBS | SYSTOLIC BLOOD PRESSURE: 157 MMHG | RESPIRATION RATE: 16 BRPM

## 2020-11-10 DIAGNOSIS — N85.00 ENDOMETRIAL HYPERPLASIA, UNSPECIFIED: ICD-10-CM

## 2020-11-10 DIAGNOSIS — Z12.31 BREAST CANCER SCREENING BY MAMMOGRAM: ICD-10-CM

## 2020-11-10 DIAGNOSIS — Z51.11 ENCOUNTER FOR ANTINEOPLASTIC CHEMOTHERAPY: ICD-10-CM

## 2020-11-10 DIAGNOSIS — C54.1 ENDOMETRIAL CANCER (HCC): ICD-10-CM

## 2020-11-10 DIAGNOSIS — R79.89 HYPOURICEMIA: ICD-10-CM

## 2020-11-10 LAB
ERYTHROCYTE [DISTWIDTH] IN BLOOD BY AUTOMATED COUNT: 46.9 FL (ref 35.9–50)
HCT VFR BLD AUTO: 43.8 % (ref 37–47)
HGB BLD-MCNC: 14.2 G/DL (ref 12–16)
INR PPP: 0.89 (ref 0.87–1.13)
MCH RBC QN AUTO: 31.4 PG (ref 27–33)
MCHC RBC AUTO-ENTMCNC: 32.4 G/DL (ref 33.6–35)
MCV RBC AUTO: 96.9 FL (ref 81.4–97.8)
PATHOLOGY CONSULT NOTE: NORMAL
PLATELET # BLD AUTO: 253 K/UL (ref 164–446)
PMV BLD AUTO: 9.8 FL (ref 9–12.9)
PROTHROMBIN TIME: 12.4 SEC (ref 12–14.6)
RBC # BLD AUTO: 4.52 M/UL (ref 4.2–5.4)
WBC # BLD AUTO: 3.6 K/UL (ref 4.8–10.8)

## 2020-11-10 PROCEDURE — 32405 CT-BIOPSY-LUNG/MEDIASTINUM: CPT | Mod: RT

## 2020-11-10 PROCEDURE — 71045 X-RAY EXAM CHEST 1 VIEW: CPT | Mod: XU

## 2020-11-10 PROCEDURE — 700111 HCHG RX REV CODE 636 W/ 250 OVERRIDE (IP)

## 2020-11-10 PROCEDURE — 88360 TUMOR IMMUNOHISTOCHEM/MANUAL: CPT

## 2020-11-10 PROCEDURE — 88305 TISSUE EXAM BY PATHOLOGIST: CPT

## 2020-11-10 PROCEDURE — 85610 PROTHROMBIN TIME: CPT

## 2020-11-10 PROCEDURE — 160002 HCHG RECOVERY MINUTES (STAT)

## 2020-11-10 PROCEDURE — 88341 IMHCHEM/IMCYTCHM EA ADD ANTB: CPT

## 2020-11-10 PROCEDURE — 85027 COMPLETE CBC AUTOMATED: CPT

## 2020-11-10 PROCEDURE — 88342 IMHCHEM/IMCYTCHM 1ST ANTB: CPT

## 2020-11-10 PROCEDURE — A9270 NON-COVERED ITEM OR SERVICE: HCPCS | Performed by: RADIOLOGY

## 2020-11-10 PROCEDURE — 71045 X-RAY EXAM CHEST 1 VIEW: CPT

## 2020-11-10 PROCEDURE — 700102 HCHG RX REV CODE 250 W/ 637 OVERRIDE(OP): Performed by: RADIOLOGY

## 2020-11-10 RX ORDER — OXYCODONE HYDROCHLORIDE 5 MG/1
5 TABLET ORAL
Status: DISCONTINUED | OUTPATIENT
Start: 2020-11-10 | End: 2020-11-10 | Stop reason: HOSPADM

## 2020-11-10 RX ORDER — SODIUM CHLORIDE 9 MG/ML
INJECTION, SOLUTION INTRAVENOUS CONTINUOUS
Status: DISCONTINUED | OUTPATIENT
Start: 2020-11-10 | End: 2020-11-10 | Stop reason: HOSPADM

## 2020-11-10 RX ORDER — NALOXONE HYDROCHLORIDE 0.4 MG/ML
INJECTION, SOLUTION INTRAMUSCULAR; INTRAVENOUS; SUBCUTANEOUS
Status: COMPLETED
Start: 2020-11-10 | End: 2020-11-10

## 2020-11-10 RX ORDER — MIDAZOLAM HYDROCHLORIDE 1 MG/ML
.5-2 INJECTION INTRAMUSCULAR; INTRAVENOUS PRN
Status: ACTIVE | OUTPATIENT
Start: 2020-11-10 | End: 2020-11-10

## 2020-11-10 RX ORDER — ONDANSETRON 2 MG/ML
4 INJECTION INTRAMUSCULAR; INTRAVENOUS PRN
Status: ACTIVE | OUTPATIENT
Start: 2020-11-10 | End: 2020-11-10

## 2020-11-10 RX ORDER — ONDANSETRON 2 MG/ML
4 INJECTION INTRAMUSCULAR; INTRAVENOUS EVERY 8 HOURS PRN
Status: DISCONTINUED | OUTPATIENT
Start: 2020-11-10 | End: 2020-11-10 | Stop reason: HOSPADM

## 2020-11-10 RX ORDER — SODIUM CHLORIDE 9 MG/ML
500 INJECTION, SOLUTION INTRAVENOUS
Status: ACTIVE | OUTPATIENT
Start: 2020-11-10 | End: 2020-11-10

## 2020-11-10 RX ORDER — MIDAZOLAM HYDROCHLORIDE 1 MG/ML
INJECTION INTRAMUSCULAR; INTRAVENOUS
Status: COMPLETED
Start: 2020-11-10 | End: 2020-11-10

## 2020-11-10 RX ORDER — ACETAMINOPHEN 500 MG
1000 TABLET ORAL EVERY 6 HOURS
Status: DISCONTINUED | OUTPATIENT
Start: 2020-11-10 | End: 2020-11-10 | Stop reason: HOSPADM

## 2020-11-10 RX ORDER — OXYCODONE HYDROCHLORIDE 10 MG/1
10 TABLET ORAL
Status: DISCONTINUED | OUTPATIENT
Start: 2020-11-10 | End: 2020-11-10 | Stop reason: HOSPADM

## 2020-11-10 RX ADMIN — FENTANYL CITRATE 50 MCG: 50 INJECTION, SOLUTION INTRAMUSCULAR; INTRAVENOUS at 08:47

## 2020-11-10 RX ADMIN — MIDAZOLAM HYDROCHLORIDE 1 MG: 1 INJECTION, SOLUTION INTRAMUSCULAR; INTRAVENOUS at 08:47

## 2020-11-10 RX ADMIN — ACETAMINOPHEN 1000 MG: 500 TABLET ORAL at 12:37

## 2020-11-10 RX ADMIN — MIDAZOLAM HYDROCHLORIDE 1 MG: 1 INJECTION INTRAMUSCULAR; INTRAVENOUS at 08:47

## 2020-11-10 ASSESSMENT — FIBROSIS 4 INDEX: FIB4 SCORE: 1.66

## 2020-11-10 NOTE — PROGRESS NOTES
Patient underwent a right lung nodule biopsy by Dr. Gardner, assisted by Sveta WILLS. Procedure site was marked by MD and verified using imaging guidance. Patient was placed in a supine position. Vitals were taken every 5 minutes and remained stable during procedure (see doc flow sheet for results). CO2 waveform capnography was monitored and remained 25-35 throughout procedure. Patient appeared to tolerate procedure well, all sedation medication given at discretion of MD Gardenr. A tegaderm and gauze dressing was placed over right upper chest wall surgical site. Report called to Juanito [facility]. Pt transported by linnea with this ACLS RN to [facility] PACU. Core Labs X 3 in formalin hand delivered to lab by Sveta WILLS     [facility] "[employer] T[employer]al[employer] System   Ref# [id number] Lot# [id number] Exp Date 07/31/2022

## 2020-11-10 NOTE — OR SURGEON
Immediate Post- Operative Note        PostOp Diagnosis: Lung Nodules      Procedure(s): CT BX of RML Lung Nodule    Estimated Blood Loss: Less than 5 ml        Complications:Small Right Pneuomthorax            11/10/2020     8:57 AM     Giuliano Gardner M.D.

## 2020-11-10 NOTE — OR NURSING
0925 Patient arrived from IR s/p right lung nodule biopsy. Patient fully awake denies pain, denies nausea. Anterior approach right upper chest dressing clean dry intact. Vss.   1019 1st post cxr complete.   1110 small apical pneumothorax notified Dr regalado ok to feed patient clear liquids. Patient asymptomatic.   1136 Patient tolerating po intake.   1224  2nd post cxr complete   1311 patient cleared for discharge by Dr regalado  1318 Discharge instructions given to patient, patient verbalize understanding of the orders, reviewed activity, worsening symptoms, follow up, medications, dressing care. Patient knows to return tomorrow for follow up cxr follow up.   1330 Patient escorted via w/c with all her personal belongings.

## 2020-11-10 NOTE — DISCHARGE INSTRUCTIONS
ACTIVITY: Rest and take it easy for the first 24 hours.  A responsible adult is recommended to remain with you during that time.  It is normal to feel sleepy.  We encourage you to not do anything that requires balance, judgment or coordination.    MILD FLU-LIKE SYMPTOMS ARE NORMAL. YOU MAY EXPERIENCE GENERALIZED MUSCLE ACHES, THROAT IRRITATION, HEADACHE AND/OR SOME NAUSEA.    FOR 24 HOURS DO NOT:  Drive, operate machinery or run household appliances.  Drink beer or alcoholic beverages.   Make important decisions or sign legal documents.    SPECIAL INSTRUCTIONS: follow up with your doctor call find out when to follow up.   Return for cxr tomorrow at 0900 check in [facility].    DIET: To avoid nausea, slowly advance diet as tolerated, avoiding spicy or greasy foods for the first day.  Add more substantial food to your diet according to your physician's instructions.  Babies can be fed formula or breast milk as soon as they are hungry.  INCREASE FLUIDS AND FIBER TO AVOID CONSTIPATION.    SURGICAL DRESSING/BATHING: Keep dressing clean dry intact for 24 hours. Remove dressing after 24 hours. Ok to shower after 24 hours.     FOLLOW-UP APPOINTMENT:  A follow-up appointment should be arranged with your doctor in [phone]; call to schedule.    You should CALL YOUR PHYSICIAN if you develop:  Fever greater than 101 degrees F.  Pain not relieved by medication, or persistent nausea or vomiting.  Excessive bleeding (blood soaking through dressing) or unexpected drainage from the wound.  Extreme redness or swelling around the incision site, drainage of pus or foul smelling drainage.  Inability to urinate or empty your bladder within 8 hours.  Problems with breathing or chest pain.    You should call 911 if you develop problems with breathing or chest pain.  If you are unable to contact your doctor or surgical center, you should go to the nearest emergency room or urgent care center.   Physician's telephone #: [phone]    If any questions arise, call your doctor.  If your doctor is not available, please feel free to call the Surgical Center at [phone]. The Contact Center is open Monday through Friday 7AM to 5PM and may speak to a nurse at [phone], or toll free at [phone].     A registered nurse may call you a few days after your surgery to see how you are doing after your procedure.    MEDICATIONS: Resume taking daily medication.  Take prescribed pain medication with food.  If no medication is prescribed, you may take non-aspirin pain medication if needed.  PAIN MEDICATION CAN BE VERY CONSTIPATING.  Take a stool softener or laxative such as senokot, pericolace, or milk of magnesia if needed.  Lung Biopsy, Care After  This sheet gives you information about how to care for yourself after your procedure. Your health care provider may also give you more specific instructions depending on the type of biopsy you had. If you have problems or questions, contact your health care provider.  What can I expect after the procedure?  After the procedure, it is common to have:  · A cough.  · A sore throat.  · Pain where a needle, bronchoscope, or incision was used to collect a biopsy sample (biopsy site).  Follow these instructions at home:  Medicines  · Take over-the-counter and prescription medicines only as told by your health care provider.  · Do not drink alcohol if your health care provider tells you not to drink.  · Ask your health care provider if the medicine prescribed to you:  ? Requires you to avoid driving or using heavy machinery.  ? Can cause constipation. You may need to take these actions to prevent or treat constipation:  § Drink enough fluid to keep your urine pale yellow.  § Take over-the-counter or prescription medicines.  § Eat foods that are high in fiber, such as beans, whole grains, and fresh fruits and vegetables.  § Limit foods that are high in fat and processed  sugars, such as fried or sweet foods.  · Do not drive for 24 hours if you were given a sedative.  Biopsy site care    · Follow instructions from your health care provider about how to take care of your biopsy site. Make sure you:  ? Wash your hands with soap and water before and after you change your bandage (dressing). If soap and water are not available, use hand .  ? Change your dressing as told by your health care provider.  ? Leave stitches (sutures), skin glue, or adhesive strips in place. These skin closures may need to stay in place for 2 weeks or longer. If adhesive strip edges start to loosen and curl up, you may trim the loose edges. Do not remove adhesive strips completely unless your health care provider tells you to do that.  · Do not take baths, swim, or use a hot tub until your health care provider approves. Ask your health care provider if you may take showers. You may only be allowed to take sponge baths.  · Check your biopsy site every day for signs of infection. Check for:  ? Redness, swelling, or more pain.  ? Fluid or blood.  ? Warmth.  ? Pus or a bad smell.  General instructions  · Return to your normal activities as told by your health care provider. Ask your health care provider what activities are safe for you.  · It is up to you to get the results of your procedure. Ask your health care provider, or the department that is doing the procedure, when your results will be ready.  · Keep all follow-up visits as told by your health care provider. This is important.  Contact a health care provider if:  · You have a fever.  · You have redness, swelling, or more pain around your biopsy site.  · You have fluid or blood coming from your biopsy site.  · Your biopsy site feels warm to the touch.  · You have pus or a bad smell coming from your biopsy site.  · You have pain that does not get better with medicine.  Get help right away if:  · You cough up blood.  · You have trouble  breathing.  · You have chest pain.  · You lose consciousness.  Summary  · After the procedure, it is common to have a sore throat and a cough.  · Return to your normal activities as told by your health care provider. Ask your health care provider what activities are safe for you.  · Take over-the-counter and prescription medicines only as told by your health care provider.  · Report any unusual symptoms to your health care provider.  This information is not intended to replace advice given to you by your health care provider. Make sure you discuss any questions you have with your health care provider.  Document Released: 01/16/2018 Document Revised: 01/22/2020 Document Reviewed: 01/16/2018  [employer] Patient Education © 2020 [employer]      If your physician has prescribed pain medication that includes Acetaminophen (Tylenol), do not take additional Acetaminophen (Tylenol) while taking the prescribed medication.    Depression / Suicide Risk    As you are discharged from this [facility] facility, it is important to learn how to keep safe from harming yourself.    Recognize the warning signs:  · Abrupt changes in personality, positive or negative- including increase in energy   · Giving away possessions  · Change in eating patterns- significant weight changes-  positive or negative  · Change in sleeping patterns- unable to sleep or sleeping all the time   · Unwillingness or inability to communicate  · Depression  · Unusual sadness, discouragement and loneliness  · Talk of wanting to die  · Neglect of personal appearance   · Rebelliousness- reckless behavior  · Withdrawal from people/activities they love  · Confusion- inability to concentrate     If you or a loved one observes any of these behaviors or has concerns about self-harm, here's what you can do:  · Talk about it- your feelings and reasons for harming yourself  · Remove any means that you might use to hurt yourself (examples: pills, rope, extension cords,  firearm)  · Get professional help from the community (Mental Health, Substance Abuse, psychological counseling)  · Do not be alone:Call your Safe Contact- someone whom you trust who will be there for you.  · Call your local CRISIS HOTLINE 243-7987 or 292-276-7823  · Call your local Children's Mobile Crisis Response Team [place] (779) 629-7787 or www.[employer]  · Call the toll free National Suicide Prevention Hotlines   · National Suicide Prevention Lifeline 718-563-KXSI (2027)  · National Hope Line Network 800-SUICIDE (387-9142)

## 2020-11-11 ENCOUNTER — HOSPITAL ENCOUNTER (OUTPATIENT)
Dept: RADIOLOGY | Facility: MEDICAL CENTER | Age: 70
End: 2020-11-11
Attending: NURSE PRACTITIONER
Payer: MEDICARE

## 2020-11-11 DIAGNOSIS — J95.811 PNEUMOTHORAX OF RIGHT LUNG AFTER BIOPSY: ICD-10-CM

## 2020-11-11 PROCEDURE — 71045 X-RAY EXAM CHEST 1 VIEW: CPT

## 2020-11-24 RX ORDER — METOPROLOL SUCCINATE 100 MG/1
TABLET, EXTENDED RELEASE ORAL
Qty: 90 TAB | Refills: 0 | OUTPATIENT
Start: 2020-11-24

## 2020-12-05 ENCOUNTER — HOSPITAL ENCOUNTER (OUTPATIENT)
Dept: LAB | Facility: MEDICAL CENTER | Age: 70
End: 2020-12-05
Attending: SPECIALIST
Payer: MEDICARE

## 2020-12-05 LAB
ALBUMIN SERPL BCP-MCNC: 4.2 G/DL (ref 3.2–4.9)
ALBUMIN/GLOB SERPL: 1.2 G/DL
ALP SERPL-CCNC: 112 U/L (ref 30–99)
ALT SERPL-CCNC: 15 U/L (ref 2–50)
ANION GAP SERPL CALC-SCNC: 11 MMOL/L (ref 7–16)
AST SERPL-CCNC: 22 U/L (ref 12–45)
BASOPHILS # BLD AUTO: 0.8 % (ref 0–1.8)
BASOPHILS # BLD: 0.03 K/UL (ref 0–0.12)
BILIRUB SERPL-MCNC: 0.6 MG/DL (ref 0.1–1.5)
BUN SERPL-MCNC: 9 MG/DL (ref 8–22)
CALCIUM SERPL-MCNC: 10 MG/DL (ref 8.5–10.5)
CANCER AG125 SERPL-ACNC: 7.1 U/ML (ref 0–35)
CHLORIDE SERPL-SCNC: 105 MMOL/L (ref 96–112)
CO2 SERPL-SCNC: 28 MMOL/L (ref 20–33)
CREAT SERPL-MCNC: 0.77 MG/DL (ref 0.5–1.4)
EOSINOPHIL # BLD AUTO: 0.03 K/UL (ref 0–0.51)
EOSINOPHIL NFR BLD: 0.8 % (ref 0–6.9)
ERYTHROCYTE [DISTWIDTH] IN BLOOD BY AUTOMATED COUNT: 49.1 FL (ref 35.9–50)
FASTING STATUS PATIENT QL REPORTED: NORMAL
GLOBULIN SER CALC-MCNC: 3.4 G/DL (ref 1.9–3.5)
GLUCOSE SERPL-MCNC: 107 MG/DL (ref 65–99)
HCT VFR BLD AUTO: 42.8 % (ref 37–47)
HGB BLD-MCNC: 14.1 G/DL (ref 12–16)
IMM GRANULOCYTES # BLD AUTO: 0.01 K/UL (ref 0–0.11)
IMM GRANULOCYTES NFR BLD AUTO: 0.3 % (ref 0–0.9)
LYMPHOCYTES # BLD AUTO: 1.14 K/UL (ref 1–4.8)
LYMPHOCYTES NFR BLD: 30.2 % (ref 22–41)
MCH RBC QN AUTO: 32.5 PG (ref 27–33)
MCHC RBC AUTO-ENTMCNC: 32.9 G/DL (ref 33.6–35)
MCV RBC AUTO: 98.6 FL (ref 81.4–97.8)
MONOCYTES # BLD AUTO: 0.37 K/UL (ref 0–0.85)
MONOCYTES NFR BLD AUTO: 9.8 % (ref 0–13.4)
NEUTROPHILS # BLD AUTO: 2.19 K/UL (ref 2–7.15)
NEUTROPHILS NFR BLD: 58.1 % (ref 44–72)
NRBC # BLD AUTO: 0 K/UL
NRBC BLD-RTO: 0 /100 WBC
PLATELET # BLD AUTO: 240 K/UL (ref 164–446)
PMV BLD AUTO: 10.4 FL (ref 9–12.9)
POTASSIUM SERPL-SCNC: 3.3 MMOL/L (ref 3.6–5.5)
PROT SERPL-MCNC: 7.6 G/DL (ref 6–8.2)
RBC # BLD AUTO: 4.34 M/UL (ref 4.2–5.4)
SODIUM SERPL-SCNC: 144 MMOL/L (ref 135–145)
WBC # BLD AUTO: 3.8 K/UL (ref 4.8–10.8)

## 2020-12-05 PROCEDURE — 36415 COLL VENOUS BLD VENIPUNCTURE: CPT

## 2020-12-05 PROCEDURE — 85025 COMPLETE CBC W/AUTO DIFF WBC: CPT

## 2020-12-05 PROCEDURE — 80053 COMPREHEN METABOLIC PANEL: CPT

## 2020-12-05 PROCEDURE — 86304 IMMUNOASSAY TUMOR CA 125: CPT

## 2020-12-18 RX ORDER — BACLOFEN 10 MG/1
TABLET ORAL
Qty: 90 TAB | Refills: 0 | Status: SHIPPED | OUTPATIENT
Start: 2020-12-18 | End: 2021-01-15

## 2020-12-18 NOTE — TELEPHONE ENCOUNTER
Received request via: Pharmacy    Was the patient seen in the last year in this department? Yes  9/22/2020  Does the patient have an active prescription (recently filled or refills available) for medication(s) requested? No

## 2020-12-20 RX ORDER — ATORVASTATIN CALCIUM 20 MG/1
20 TABLET, FILM COATED ORAL EVERY EVENING
Qty: 100 TAB | Refills: 4 | Status: SHIPPED | OUTPATIENT
Start: 2020-12-20 | End: 2022-11-08 | Stop reason: SDUPTHER

## 2020-12-29 ENCOUNTER — HOSPITAL ENCOUNTER (OUTPATIENT)
Dept: LAB | Facility: MEDICAL CENTER | Age: 70
End: 2020-12-29
Attending: SPECIALIST
Payer: MEDICARE

## 2020-12-29 LAB
ALBUMIN SERPL BCP-MCNC: 3.7 G/DL (ref 3.2–4.9)
ALBUMIN/GLOB SERPL: 1.5 G/DL
ALP SERPL-CCNC: 80 U/L (ref 30–99)
ALT SERPL-CCNC: 25 U/L (ref 2–50)
ANION GAP SERPL CALC-SCNC: 11 MMOL/L (ref 7–16)
AST SERPL-CCNC: 16 U/L (ref 12–45)
BASOPHILS # BLD AUTO: 0 % (ref 0–1.8)
BASOPHILS # BLD: 0 K/UL (ref 0–0.12)
BILIRUB SERPL-MCNC: 0.6 MG/DL (ref 0.1–1.5)
BUN SERPL-MCNC: 14 MG/DL (ref 8–22)
CALCIUM SERPL-MCNC: 9 MG/DL (ref 8.5–10.5)
CANCER AG125 SERPL-ACNC: 6.3 U/ML (ref 0–35)
CHLORIDE SERPL-SCNC: 102 MMOL/L (ref 96–112)
CO2 SERPL-SCNC: 26 MMOL/L (ref 20–33)
CREAT SERPL-MCNC: 0.79 MG/DL (ref 0.5–1.4)
EOSINOPHIL # BLD AUTO: 0 K/UL (ref 0–0.51)
EOSINOPHIL NFR BLD: 0 % (ref 0–6.9)
ERYTHROCYTE [DISTWIDTH] IN BLOOD BY AUTOMATED COUNT: 50.6 FL (ref 35.9–50)
GLOBULIN SER CALC-MCNC: 2.5 G/DL (ref 1.9–3.5)
GLUCOSE SERPL-MCNC: 125 MG/DL (ref 65–99)
HCT VFR BLD AUTO: 35.9 % (ref 37–47)
HGB BLD-MCNC: 11.5 G/DL (ref 12–16)
IMM GRANULOCYTES # BLD AUTO: 0.03 K/UL (ref 0–0.11)
IMM GRANULOCYTES NFR BLD AUTO: 0.5 % (ref 0–0.9)
LYMPHOCYTES # BLD AUTO: 0.75 K/UL (ref 1–4.8)
LYMPHOCYTES NFR BLD: 13.4 % (ref 22–41)
MCH RBC QN AUTO: 31.4 PG (ref 27–33)
MCHC RBC AUTO-ENTMCNC: 32 G/DL (ref 33.6–35)
MCV RBC AUTO: 98.1 FL (ref 81.4–97.8)
MONOCYTES # BLD AUTO: 0.45 K/UL (ref 0–0.85)
MONOCYTES NFR BLD AUTO: 8.1 % (ref 0–13.4)
NEUTROPHILS # BLD AUTO: 4.36 K/UL (ref 2–7.15)
NEUTROPHILS NFR BLD: 78 % (ref 44–72)
NRBC # BLD AUTO: 0 K/UL
NRBC BLD-RTO: 0 /100 WBC
PLATELET # BLD AUTO: 184 K/UL (ref 164–446)
PMV BLD AUTO: 10.1 FL (ref 9–12.9)
POTASSIUM SERPL-SCNC: 3.3 MMOL/L (ref 3.6–5.5)
PROT SERPL-MCNC: 6.2 G/DL (ref 6–8.2)
RBC # BLD AUTO: 3.66 M/UL (ref 4.2–5.4)
SODIUM SERPL-SCNC: 139 MMOL/L (ref 135–145)
WBC # BLD AUTO: 5.6 K/UL (ref 4.8–10.8)

## 2020-12-29 PROCEDURE — 36415 COLL VENOUS BLD VENIPUNCTURE: CPT

## 2020-12-29 PROCEDURE — 80053 COMPREHEN METABOLIC PANEL: CPT

## 2020-12-29 PROCEDURE — 86304 IMMUNOASSAY TUMOR CA 125: CPT

## 2020-12-29 PROCEDURE — 85025 COMPLETE CBC W/AUTO DIFF WBC: CPT

## 2020-12-30 ENCOUNTER — TELEMEDICINE (OUTPATIENT)
Dept: MEDICAL GROUP | Facility: LAB | Age: 70
End: 2020-12-30
Payer: MEDICARE

## 2020-12-30 ENCOUNTER — TELEPHONE (OUTPATIENT)
Dept: MEDICAL GROUP | Facility: LAB | Age: 70
End: 2020-12-30

## 2020-12-30 DIAGNOSIS — I69.359 CVA, OLD, HEMIPARESIS (HCC): ICD-10-CM

## 2020-12-30 DIAGNOSIS — C54.1 ENDOMETRIAL CANCER (HCC): ICD-10-CM

## 2020-12-30 DIAGNOSIS — R53.81 DEBILITY: ICD-10-CM

## 2020-12-30 DIAGNOSIS — G81.91 RIGHT HEMIPLEGIA (HCC): ICD-10-CM

## 2020-12-30 PROCEDURE — 99214 OFFICE O/P EST MOD 30 MIN: CPT | Mod: 95,CR | Performed by: NURSE PRACTITIONER

## 2020-12-30 NOTE — TELEPHONE ENCOUNTER
FAXED ORDER/ OV NOTES AND DEMOS TO [facility] [phone]    #1- Bedside toilet     #2- wheelchair - pt request electric and manual.  Electric for around the home and manual for ease of daughter putting this in the car.

## 2020-12-30 NOTE — PROGRESS NOTES
Telemedicine: Established Patient   This evaluation was conducted via Zoom using secure and encrypted videoconferencing technology. The patient was in a private location in the [facility] [place].    The patient's identity was confirmed and verbal consent was obtained for this virtual visit.    Subjective:   CC:   f/u    Jairo Rivas is a 70 y.o. female presenting for evaluation and management of:  Two issues:   #1- pain: Unfortunately her endometrial cancer has returned and she is back on chemotherapy.  Current chemotherapy regimen is very painful for her.  Originally scheduled this appointment to talk about pain and below request for DME.  She got in touch with her oncologist yesterday and now pain will be controlled with Dr. Garcia - will be taking percocet and lower the chemo a little bit to reduce her pain.  She is in pain all over her body since restarting chemotherapy in early December.  #2- wheelchair / bedside toilet need - current wheelchair is too big, wheels won't roll.  Current bedside toilet is broken.  Currently very weak b/c of chemotherapy.  Has a chronic diagnoses of right side hemiplegia from previous stroke - able to use left hand to operate controls.  Has a walker by the bed but is too weak to use this.  Weakness has been present since beginning of Dec since restarting chemo - will receive chemo every 3 weeks until March.  Would like to have a manual wheelchair and electric wheelchair.  Would like a manual wheelchair when her daughter needs to quickly load a wheelchair to get her out of the house.  She would like an electric wheelchair for freedom around the house so that she does not have to rely on her daughter to push her everywhere.  Patient is confined to 1 level of the home environment and she is unable to get to the toilet facility in the home because of weakness related to chemotherapy and right-sided hemoplegia related to previous stroke.  In terms of a wheelchair, patient  definitely has a mobility low limitation from previous stroke that significantly impairs her ability to participate in 1 or more mobility related activities of daily living including toileting, feeding, dressing, grooming and bathing in customary locations in the home.  Her mobility limitation cannot be sufficiently resolved by the use of a cane or walker because of her hemiplegia and generalized weakness related to stroke/chemotherapy.  She is able to safely use a wheelchair.  Her mobility limitation cannot be resolved by the use of a wheelchair.    ROS  Denies fevers or chills.  Positive for weakness, nausea, low appetite and pain.    Allergies   Allergen Reactions   • Penicillins Itching     Makes her feel weak    • Gluten Meal    • Hydrocodone Itching     Swelling of the tongue  Face swelling   • Iodine      Weak and dizzy  7/2120 per patient allergy is to topical iodine ba   • Levaquin Itching     Swelling of the tongue  Facial swelling   • Tramadol Hives     Swelling       Current medicines (including changes today)  Current Outpatient Medications   Medication Sig Dispense Refill   • atorvastatin (LIPITOR) 20 MG Tab Take 1 Tab by mouth every evening. 100 Tab 4   • baclofen (LIORESAL) 10 MG Tab TAKE 1 TABLET BY MOUTH THREE TIMES A DAY 90 Tab 0   • metoprolol SR (TOPROL XL) 100 MG TABLET SR 24 HR Take 1 tablet by mouth once daily for 90 days 90 Tab 0   • amLODIPine (NORVASC) 5 MG Tab TAKE 1 TABLET BY MOUTH EVERYDAY AT BEDTIME (Patient taking differently: Take 5 mg by mouth every day.) 90 Tab 2   • gabapentin (NEURONTIN) 100 MG Cap Take 1 Cap by mouth 3 times a day as needed. For foot pain / burning 90 Cap 3   • losartan (COZAAR) 100 MG Tab TAKE 1 TAB BY MOUTH EVERY DAY FOR 90 DAYS. 100 Tab 3   • amitriptyline (ELAVIL) 50 MG Tab Take 1 Tab by mouth every day. Before bed. May increase to 75 mg after one week if needed 90 Tab 2   • meclizine (ANTIVERT) 12.5 MG Tab Take 1-2 Tabs by mouth 3 times a day as needed for  Dizziness. 90 Tab 3   • ondansetron (ZOFRAN) 4 MG Tab tablet Take 1 Tab by mouth every 6 hours as needed. Alternate q 3 h with Dex four four days after chemotherapy and longer if needed (Patient not taking: Reported on 10/15/2020) 30 Tab 0   • senna-docusate (PERICOLACE OR SENOKOT S) 8.6-50 MG Tab Take 1 Tab by mouth 2 times a day as needed.     • ascorbic acid (ASCORBIC ACID) 500 MG Tab Take 500 mg by mouth every evening.     • Cholecalciferol (VITAMIN D3) 2000 UNIT Cap Take 2,000 Units by mouth every day.     • Coenzyme Q10 (COQ10) 100 MG Cap Take 100 mg by mouth every evening.     • multivitamin (THERAGRAN) Tab Take 1 Tab by mouth every day.     • acetaminophen (TYLENOL) 500 MG Tab Take 1,000 mg by mouth every 6 hours as needed for Moderate Pain.     • B Complex-C (SUPER B COMPLEX PO) Take 1 Tab by mouth every evening.     • aspirin (ASA) 81 MG Chew Tab chewable tablet Take 81 mg by mouth every day.       No current facility-administered medications for this visit.        Patient Active Problem List    Diagnosis Date Noted   • Endometrial cancer (HCC) 02/06/2018     Priority: High   • Obesity (BMI 30-39.9) 02/06/2018     Priority: High   • Hypokalemia 10/02/2018     Priority: Medium   • Iron deficiency anemia 02/06/2018     Priority: Medium   • CVA, old, hemiparesis (HCC) 02/06/2018     Priority: Low   • Essential hypertension 02/06/2018     Priority: Low   • Multiple lung nodules - growth from 8 mm - 10 mm 1/2020 - 8/2020 08/17/2020   • Tongue burning sensation 03/31/2020   • Neuropathy 03/31/2020   • Other chest pain 01/17/2020   • Thyroid nodule - benign 12/03/2019   • Nausea 10/02/2019   • Admission for chemotherapy 09/17/2019   • Vision abnormalities 02/06/2018   • Vitamin D deficiency 02/06/2018       Family History   Problem Relation Age of Onset   • Hypertension Mother    • No Known Problems Father    • No Known Problems Brother        She  has a past medical history of Anemia (2/6/2018), Back pain, Back  "pain, Cancer (HCC) (2019), Cough, CVA, old, hemiparesis (HCC) (2/6/2018), Essential hypertension (2/6/2018), Eye drainage, Fatigue, Frequent headaches, Frequent urination, Hyperlipidemia, Irregular periods, Painful joint, Palpitations, Post-menopause bleeding (2/6/2018), Pulmonary nodule, Sore muscles, Stroke (HCC), Swelling of lower extremity, Vision abnormalities (2/6/2018), Vitamin D deficiency (2/6/2018), Weakness, and Wears glasses.  She  has a past surgical history that includes pr hysteroscopy,dx,sep proc (6/19/2019); dilation and curettage (6/19/2019); pr cystoscopy,insert ureteral stent (Left, 8/8/2019); hysterectomy robotic xi (N/A, 8/8/2019); salpingo oophorectomy (Bilateral, 8/8/2019); node biopsy sentinel (8/8/2019); and hysterectomy laparoscopy.       Objective:       Physical Exam  Gen. patient is lying in bed and appears weak.  She is pleasant and interactive but her daughter does most of the talking for her as she is a bit difficult to understand.  Skin appropriate for sex and age   Neck trachea is midline  Lungs unlabored breathing  Heart regular rate  Neuro gait and station normal   Psych appropriate, calm, interactive, well-groomed  Assessment and Plan:   The following treatment plan was discussed:   \"  1. CVA, old, hemiparesis (HCC)  NON SPECIFIED   2. Endometrial cancer (HCC)  NON SPECIFIED   3. Right hemiplegia (HCC)  NON SPECIFIED   4. Debility     \"  Fortunately she feels reassured that Dr. Garcia will be taking care of her pain and decreasing the intensity of her chemo temporarily.  I did fax an order over for a bedside toilet, manual and electric wheelchair.  She is unable to get to physical therapy for a full wheelchair evaluation because of her generalized weakness.  She would certainly benefit from a bedside toilet as she is unable to make it to the closest toilet facility in her house because of her generalized weakness and hemiplegia.  Patient's quality of life would also be improved by " an electric wheelchair as it was give her a bit more freedom around the house, right now she is relying on her daughter to wheel her anywhere that she would like to go within the home.

## 2021-01-15 DIAGNOSIS — I10 HYPERTENSION, UNCONTROLLED: ICD-10-CM

## 2021-01-15 RX ORDER — METOPROLOL SUCCINATE 100 MG/1
TABLET, EXTENDED RELEASE ORAL
Qty: 90 TAB | Refills: 3 | Status: SHIPPED | OUTPATIENT
Start: 2021-01-15 | End: 2021-11-20 | Stop reason: SDUPTHER

## 2021-01-15 RX ORDER — BACLOFEN 10 MG/1
TABLET ORAL
Qty: 90 TAB | Refills: 0 | Status: SHIPPED | OUTPATIENT
Start: 2021-01-15 | End: 2021-02-08

## 2021-01-15 NOTE — TELEPHONE ENCOUNTER
Received request via: Pharmacy    Was the patient seen in the last year in this department? Yes  LOV 12/30/2020  Does the patient have an active prescription (recently filled or refills available) for medication(s) requested? No

## 2021-01-15 NOTE — TELEPHONE ENCOUNTER
Received request via: Pharmacy    Was the patient seen in the last year in this department? Yes  12/30/2020  Does the patient have an active prescription (recently filled or refills available) for medication(s) requested? No

## 2021-01-16 ENCOUNTER — HOSPITAL ENCOUNTER (OUTPATIENT)
Dept: LAB | Facility: MEDICAL CENTER | Age: 71
End: 2021-01-16
Attending: SPECIALIST
Payer: MEDICARE

## 2021-01-16 LAB
ALBUMIN SERPL BCP-MCNC: 4 G/DL (ref 3.2–4.9)
ALBUMIN/GLOB SERPL: 1.6 G/DL
ALP SERPL-CCNC: 100 U/L (ref 30–99)
ALT SERPL-CCNC: 32 U/L (ref 2–50)
ANION GAP SERPL CALC-SCNC: 10 MMOL/L (ref 7–16)
AST SERPL-CCNC: 25 U/L (ref 12–45)
BASOPHILS # BLD AUTO: 1 % (ref 0–1.8)
BASOPHILS # BLD: 0.05 K/UL (ref 0–0.12)
BILIRUB SERPL-MCNC: 0.4 MG/DL (ref 0.1–1.5)
BUN SERPL-MCNC: 6 MG/DL (ref 8–22)
CALCIUM SERPL-MCNC: 9.5 MG/DL (ref 8.5–10.5)
CANCER AG125 SERPL-ACNC: 11.3 U/ML (ref 0–35)
CHLORIDE SERPL-SCNC: 105 MMOL/L (ref 96–112)
CO2 SERPL-SCNC: 28 MMOL/L (ref 20–33)
CREAT SERPL-MCNC: 0.72 MG/DL (ref 0.5–1.4)
EOSINOPHIL # BLD AUTO: 0 K/UL (ref 0–0.51)
EOSINOPHIL NFR BLD: 0 % (ref 0–6.9)
ERYTHROCYTE [DISTWIDTH] IN BLOOD BY AUTOMATED COUNT: 57 FL (ref 35.9–50)
GLOBULIN SER CALC-MCNC: 2.5 G/DL (ref 1.9–3.5)
GLUCOSE SERPL-MCNC: 119 MG/DL (ref 65–99)
HCT VFR BLD AUTO: 31.8 % (ref 37–47)
HGB BLD-MCNC: 10.2 G/DL (ref 12–16)
IMM GRANULOCYTES # BLD AUTO: 0.17 K/UL (ref 0–0.11)
IMM GRANULOCYTES NFR BLD AUTO: 3.5 % (ref 0–0.9)
LYMPHOCYTES # BLD AUTO: 0.94 K/UL (ref 1–4.8)
LYMPHOCYTES NFR BLD: 19.6 % (ref 22–41)
MCH RBC QN AUTO: 32.4 PG (ref 27–33)
MCHC RBC AUTO-ENTMCNC: 32.1 G/DL (ref 33.6–35)
MCV RBC AUTO: 101 FL (ref 81.4–97.8)
MONOCYTES # BLD AUTO: 0.49 K/UL (ref 0–0.85)
MONOCYTES NFR BLD AUTO: 10.2 % (ref 0–13.4)
NEUTROPHILS # BLD AUTO: 3.14 K/UL (ref 2–7.15)
NEUTROPHILS NFR BLD: 65.7 % (ref 44–72)
NRBC # BLD AUTO: 0.05 K/UL
NRBC BLD-RTO: 1 /100 WBC
PLATELET # BLD AUTO: 255 K/UL (ref 164–446)
PMV BLD AUTO: 10.7 FL (ref 9–12.9)
POTASSIUM SERPL-SCNC: 3 MMOL/L (ref 3.6–5.5)
PROT SERPL-MCNC: 6.5 G/DL (ref 6–8.2)
RBC # BLD AUTO: 3.15 M/UL (ref 4.2–5.4)
SODIUM SERPL-SCNC: 143 MMOL/L (ref 135–145)
WBC # BLD AUTO: 4.8 K/UL (ref 4.8–10.8)

## 2021-01-16 PROCEDURE — 85025 COMPLETE CBC W/AUTO DIFF WBC: CPT

## 2021-01-16 PROCEDURE — 86304 IMMUNOASSAY TUMOR CA 125: CPT

## 2021-01-16 PROCEDURE — 36415 COLL VENOUS BLD VENIPUNCTURE: CPT

## 2021-01-16 PROCEDURE — 80053 COMPREHEN METABOLIC PANEL: CPT

## 2021-01-17 DIAGNOSIS — R20.2 PARESTHESIA AND PAIN OF BOTH UPPER EXTREMITIES: ICD-10-CM

## 2021-01-17 DIAGNOSIS — M79.601 PARESTHESIA AND PAIN OF BOTH UPPER EXTREMITIES: ICD-10-CM

## 2021-01-17 DIAGNOSIS — K14.6 TONGUE BURNING SENSATION: ICD-10-CM

## 2021-01-17 DIAGNOSIS — G62.9 NEUROPATHY: ICD-10-CM

## 2021-01-17 DIAGNOSIS — M79.602 PARESTHESIA AND PAIN OF BOTH UPPER EXTREMITIES: ICD-10-CM

## 2021-01-18 RX ORDER — AMITRIPTYLINE HYDROCHLORIDE 50 MG/1
50 TABLET, FILM COATED ORAL
Qty: 90 TAB | Refills: 2 | Status: SHIPPED | OUTPATIENT
Start: 2021-01-18 | End: 2021-07-29

## 2021-01-18 NOTE — TELEPHONE ENCOUNTER
Received request via: Pharmacy    Was the patient seen in the last year in this department? Yes    Does the patient have an active prescription (recently filled or refills available) for medication(s) requested? No     AMITRIPTYLINE HCL 50 MG TAB

## 2021-02-03 ENCOUNTER — TELEPHONE (OUTPATIENT)
Dept: MEDICAL GROUP | Facility: LAB | Age: 71
End: 2021-02-03

## 2021-02-03 DIAGNOSIS — I69.359 CVA, OLD, HEMIPARESIS (HCC): ICD-10-CM

## 2021-02-03 DIAGNOSIS — G81.91 RIGHT HEMIPLEGIA (HCC): ICD-10-CM

## 2021-02-03 DIAGNOSIS — C54.1 ENDOMETRIAL CANCER (HCC): ICD-10-CM

## 2021-02-05 ENCOUNTER — HOSPITAL ENCOUNTER (OUTPATIENT)
Dept: LAB | Facility: MEDICAL CENTER | Age: 71
End: 2021-02-05
Attending: STUDENT IN AN ORGANIZED HEALTH CARE EDUCATION/TRAINING PROGRAM
Payer: MEDICARE

## 2021-02-05 LAB
ALBUMIN SERPL BCP-MCNC: 3.7 G/DL (ref 3.2–4.9)
ALBUMIN/GLOB SERPL: 1.2 G/DL
ALP SERPL-CCNC: 119 U/L (ref 30–99)
ALT SERPL-CCNC: 19 U/L (ref 2–50)
ANION GAP SERPL CALC-SCNC: 9 MMOL/L (ref 7–16)
ANISOCYTOSIS BLD QL SMEAR: ABNORMAL
AST SERPL-CCNC: 22 U/L (ref 12–45)
BASOPHILS # BLD AUTO: 0.6 % (ref 0–1.8)
BASOPHILS # BLD: 0.04 K/UL (ref 0–0.12)
BILIRUB SERPL-MCNC: 0.4 MG/DL (ref 0.1–1.5)
BUN SERPL-MCNC: 6 MG/DL (ref 8–22)
CALCIUM SERPL-MCNC: 9.7 MG/DL (ref 8.5–10.5)
CHLORIDE SERPL-SCNC: 105 MMOL/L (ref 96–112)
CO2 SERPL-SCNC: 26 MMOL/L (ref 20–33)
COMMENT 1642: NORMAL
CREAT SERPL-MCNC: 0.66 MG/DL (ref 0.5–1.4)
DACRYOCYTES BLD QL SMEAR: NORMAL
EOSINOPHIL # BLD AUTO: 0 K/UL (ref 0–0.51)
EOSINOPHIL NFR BLD: 0 % (ref 0–6.9)
ERYTHROCYTE [DISTWIDTH] IN BLOOD BY AUTOMATED COUNT: 66.5 FL (ref 35.9–50)
GLOBULIN SER CALC-MCNC: 3 G/DL (ref 1.9–3.5)
GLUCOSE SERPL-MCNC: 113 MG/DL (ref 65–99)
HCT VFR BLD AUTO: 30.6 % (ref 37–47)
HGB BLD-MCNC: 9.5 G/DL (ref 12–16)
IMM GRANULOCYTES # BLD AUTO: 0.05 K/UL (ref 0–0.11)
IMM GRANULOCYTES NFR BLD AUTO: 0.8 % (ref 0–0.9)
LYMPHOCYTES # BLD AUTO: 1.04 K/UL (ref 1–4.8)
LYMPHOCYTES NFR BLD: 16.1 % (ref 22–41)
MACROCYTES BLD QL SMEAR: ABNORMAL
MCH RBC QN AUTO: 33.1 PG (ref 27–33)
MCHC RBC AUTO-ENTMCNC: 31 G/DL (ref 33.6–35)
MCV RBC AUTO: 106.6 FL (ref 81.4–97.8)
MICROCYTES BLD QL SMEAR: ABNORMAL
MONOCYTES # BLD AUTO: 0.43 K/UL (ref 0–0.85)
MONOCYTES NFR BLD AUTO: 6.7 % (ref 0–13.4)
MORPHOLOGY BLD-IMP: NORMAL
NEUTROPHILS # BLD AUTO: 4.89 K/UL (ref 2–7.15)
NEUTROPHILS NFR BLD: 75.8 % (ref 44–72)
NRBC # BLD AUTO: 0.06 K/UL
NRBC BLD-RTO: 0.9 /100 WBC
OVALOCYTES BLD QL SMEAR: NORMAL
PLATELET # BLD AUTO: 131 K/UL (ref 164–446)
PLATELET BLD QL SMEAR: NORMAL
PMV BLD AUTO: 11.8 FL (ref 9–12.9)
POIKILOCYTOSIS BLD QL SMEAR: NORMAL
POLYCHROMASIA BLD QL SMEAR: NORMAL
POTASSIUM SERPL-SCNC: 3.6 MMOL/L (ref 3.6–5.5)
PROT SERPL-MCNC: 6.7 G/DL (ref 6–8.2)
RBC # BLD AUTO: 2.87 M/UL (ref 4.2–5.4)
RBC BLD AUTO: PRESENT
SODIUM SERPL-SCNC: 140 MMOL/L (ref 135–145)
TOXIC GRANULES BLD QL SMEAR: SLIGHT
WBC # BLD AUTO: 6.5 K/UL (ref 4.8–10.8)

## 2021-02-05 PROCEDURE — 85025 COMPLETE CBC W/AUTO DIFF WBC: CPT

## 2021-02-05 PROCEDURE — 80053 COMPREHEN METABOLIC PANEL: CPT

## 2021-02-05 PROCEDURE — 86304 IMMUNOASSAY TUMOR CA 125: CPT

## 2021-02-05 PROCEDURE — 36415 COLL VENOUS BLD VENIPUNCTURE: CPT

## 2021-02-06 LAB — CANCER AG125 SERPL-ACNC: 12.6 U/ML (ref 0–35)

## 2021-02-08 RX ORDER — BACLOFEN 10 MG/1
TABLET ORAL
Qty: 90 TAB | Refills: 0 | Status: SHIPPED | OUTPATIENT
Start: 2021-02-08 | End: 2021-03-22

## 2021-02-08 NOTE — TELEPHONE ENCOUNTER
Received request via: Pharmacy    Was the patient seen in the last year in this department? Yes  12/30/20  Does the patient have an active prescription (recently filled or refills available) for medication(s) requested? No

## 2021-02-18 ENCOUNTER — HOSPITAL ENCOUNTER (OUTPATIENT)
Dept: LAB | Facility: MEDICAL CENTER | Age: 71
End: 2021-02-18
Attending: SPECIALIST
Payer: MEDICARE

## 2021-02-18 DIAGNOSIS — D50.0 IRON DEFICIENCY ANEMIA DUE TO CHRONIC BLOOD LOSS: ICD-10-CM

## 2021-02-18 LAB
ALBUMIN SERPL BCP-MCNC: 3.5 G/DL (ref 3.2–4.9)
ALBUMIN/GLOB SERPL: 1.2 G/DL
ALP SERPL-CCNC: 110 U/L (ref 30–99)
ALT SERPL-CCNC: 17 U/L (ref 2–50)
ANION GAP SERPL CALC-SCNC: 6 MMOL/L (ref 7–16)
ANISOCYTOSIS BLD QL SMEAR: ABNORMAL
AST SERPL-CCNC: 16 U/L (ref 12–45)
BASOPHILS # BLD AUTO: 0 % (ref 0–1.8)
BASOPHILS # BLD: 0 K/UL (ref 0–0.12)
BILIRUB SERPL-MCNC: 0.6 MG/DL (ref 0.1–1.5)
BUN SERPL-MCNC: 12 MG/DL (ref 8–22)
CALCIUM SERPL-MCNC: 10 MG/DL (ref 8.5–10.5)
CHLORIDE SERPL-SCNC: 98 MMOL/L (ref 96–112)
CO2 SERPL-SCNC: 28 MMOL/L (ref 20–33)
CREAT SERPL-MCNC: 0.57 MG/DL (ref 0.5–1.4)
EOSINOPHIL # BLD AUTO: 0 K/UL (ref 0–0.51)
EOSINOPHIL NFR BLD: 0 % (ref 0–6.9)
ERYTHROCYTE [DISTWIDTH] IN BLOOD BY AUTOMATED COUNT: 70.6 FL (ref 35.9–50)
GLOBULIN SER CALC-MCNC: 2.9 G/DL (ref 1.9–3.5)
GLUCOSE SERPL-MCNC: 103 MG/DL (ref 65–99)
HCT VFR BLD AUTO: 27.2 % (ref 37–47)
HGB BLD-MCNC: 8.8 G/DL (ref 12–16)
LYMPHOCYTES # BLD AUTO: 0.95 K/UL (ref 1–4.8)
LYMPHOCYTES NFR BLD: 21.1 % (ref 22–41)
MACROCYTES BLD QL SMEAR: ABNORMAL
MANUAL DIFF BLD: NORMAL
MCH RBC QN AUTO: 33.8 PG (ref 27–33)
MCHC RBC AUTO-ENTMCNC: 32.4 G/DL (ref 33.6–35)
MCV RBC AUTO: 104.6 FL (ref 81.4–97.8)
MONOCYTES # BLD AUTO: 0.27 K/UL (ref 0–0.85)
MONOCYTES NFR BLD AUTO: 6.1 % (ref 0–13.4)
MORPHOLOGY BLD-IMP: NORMAL
MYELOCYTES NFR BLD MANUAL: 0.9 %
NEUTROPHILS # BLD AUTO: 3.24 K/UL (ref 2–7.15)
NEUTROPHILS NFR BLD: 64 % (ref 44–72)
NEUTS BAND NFR BLD MANUAL: 7.9 % (ref 0–10)
NRBC # BLD AUTO: 0.11 K/UL
NRBC BLD-RTO: 2.4 /100 WBC
PLATELET # BLD AUTO: 72 K/UL (ref 164–446)
PLATELET BLD QL SMEAR: NORMAL
PMV BLD AUTO: 13.1 FL (ref 9–12.9)
POTASSIUM SERPL-SCNC: 3.3 MMOL/L (ref 3.6–5.5)
PROT SERPL-MCNC: 6.4 G/DL (ref 6–8.2)
RBC # BLD AUTO: 2.6 M/UL (ref 4.2–5.4)
RBC BLD AUTO: PRESENT
SODIUM SERPL-SCNC: 132 MMOL/L (ref 135–145)
WBC # BLD AUTO: 4.5 K/UL (ref 4.8–10.8)

## 2021-02-18 PROCEDURE — 85027 COMPLETE CBC AUTOMATED: CPT

## 2021-02-18 PROCEDURE — 85007 BL SMEAR W/DIFF WBC COUNT: CPT

## 2021-02-18 PROCEDURE — 36415 COLL VENOUS BLD VENIPUNCTURE: CPT

## 2021-02-18 PROCEDURE — 80053 COMPREHEN METABOLIC PANEL: CPT

## 2021-02-19 RX ORDER — FERROUS SULFATE 325(65) MG
325 TABLET ORAL DAILY
Qty: 90 TABLET | Refills: 3 | Status: SHIPPED | OUTPATIENT
Start: 2021-02-19

## 2021-02-23 ENCOUNTER — HOSPITAL ENCOUNTER (OUTPATIENT)
Dept: RADIOLOGY | Facility: MEDICAL CENTER | Age: 71
End: 2021-02-23
Attending: SPECIALIST
Payer: MEDICARE

## 2021-02-23 DIAGNOSIS — E87.6 HYPOKALEMIA: ICD-10-CM

## 2021-02-23 DIAGNOSIS — N95.0 POSTMENOPAUSAL BLEEDING: ICD-10-CM

## 2021-02-23 DIAGNOSIS — R79.89 OTHER SPECIFIED ABNORMAL FINDINGS OF BLOOD CHEMISTRY: ICD-10-CM

## 2021-02-23 DIAGNOSIS — N85.00 ENDOMETRIAL HYPERPLASIA, UNSPECIFIED: ICD-10-CM

## 2021-02-23 DIAGNOSIS — R91.8 OTHER NONSPECIFIC ABNORMAL FINDING OF LUNG FIELD: ICD-10-CM

## 2021-02-23 DIAGNOSIS — R97.1 ELEVATED CANCER ANTIGEN 125 (CA 125): ICD-10-CM

## 2021-02-23 DIAGNOSIS — Z12.31 ENCOUNTER FOR SCREENING MAMMOGRAM FOR MALIGNANT NEOPLASM OF BREAST: ICD-10-CM

## 2021-02-23 DIAGNOSIS — C78.00 MALIGNANT NEOPLASM METASTATIC TO LUNG, UNSPECIFIED LATERALITY (HCC): ICD-10-CM

## 2021-02-23 DIAGNOSIS — C54.1 MALIGNANT NEOPLASM OF ENDOMETRIUM (HCC): ICD-10-CM

## 2021-02-23 DIAGNOSIS — G89.3 NEOPLASM RELATED PAIN (ACUTE) (CHRONIC): ICD-10-CM

## 2021-02-23 DIAGNOSIS — Z51.11 ENCOUNTER FOR ANTINEOPLASTIC CHEMOTHERAPY: ICD-10-CM

## 2021-02-24 ENCOUNTER — APPOINTMENT (OUTPATIENT)
Dept: RADIOLOGY | Facility: MEDICAL CENTER | Age: 71
End: 2021-02-24
Attending: SPECIALIST
Payer: MEDICARE

## 2021-02-26 ENCOUNTER — HOSPITAL ENCOUNTER (OUTPATIENT)
Dept: LAB | Facility: MEDICAL CENTER | Age: 71
End: 2021-02-26
Attending: SPECIALIST
Payer: MEDICARE

## 2021-02-26 LAB
ALBUMIN SERPL BCP-MCNC: 3.8 G/DL (ref 3.2–4.9)
ALBUMIN/GLOB SERPL: 1.3 G/DL
ALP SERPL-CCNC: 121 U/L (ref 30–99)
ALT SERPL-CCNC: 18 U/L (ref 2–50)
ANION GAP SERPL CALC-SCNC: 10 MMOL/L (ref 7–16)
ANISOCYTOSIS BLD QL SMEAR: ABNORMAL
AST SERPL-CCNC: 21 U/L (ref 12–45)
BASOPHILS # BLD AUTO: 0 % (ref 0–1.8)
BASOPHILS # BLD: 0 K/UL (ref 0–0.12)
BILIRUB SERPL-MCNC: 0.3 MG/DL (ref 0.1–1.5)
BUN SERPL-MCNC: 5 MG/DL (ref 8–22)
CALCIUM SERPL-MCNC: 9.8 MG/DL (ref 8.5–10.5)
CANCER AG125 SERPL-ACNC: 11.6 U/ML (ref 0–35)
CHLORIDE SERPL-SCNC: 106 MMOL/L (ref 96–112)
CO2 SERPL-SCNC: 27 MMOL/L (ref 20–33)
CREAT SERPL-MCNC: 0.61 MG/DL (ref 0.5–1.4)
EOSINOPHIL # BLD AUTO: 0 K/UL (ref 0–0.51)
EOSINOPHIL NFR BLD: 0 % (ref 0–6.9)
ERYTHROCYTE [DISTWIDTH] IN BLOOD BY AUTOMATED COUNT: 79.8 FL (ref 35.9–50)
GLOBULIN SER CALC-MCNC: 2.9 G/DL (ref 1.9–3.5)
GLUCOSE SERPL-MCNC: 141 MG/DL (ref 65–99)
HCT VFR BLD AUTO: 26.4 % (ref 37–47)
HGB BLD-MCNC: 8.4 G/DL (ref 12–16)
HYPOCHROMIA BLD QL SMEAR: ABNORMAL
LYMPHOCYTES # BLD AUTO: 0.68 K/UL (ref 1–4.8)
LYMPHOCYTES NFR BLD: 13.5 % (ref 22–41)
MACROCYTES BLD QL SMEAR: ABNORMAL
MANUAL DIFF BLD: NORMAL
MCH RBC QN AUTO: 35.1 PG (ref 27–33)
MCHC RBC AUTO-ENTMCNC: 31.8 G/DL (ref 33.6–35)
MCV RBC AUTO: 110.5 FL (ref 81.4–97.8)
MICROCYTES BLD QL SMEAR: ABNORMAL
MONOCYTES # BLD AUTO: 0.23 K/UL (ref 0–0.85)
MONOCYTES NFR BLD AUTO: 4.5 % (ref 0–13.4)
MORPHOLOGY BLD-IMP: NORMAL
NEUTROPHILS # BLD AUTO: 4.1 K/UL (ref 2–7.15)
NEUTROPHILS NFR BLD: 82 % (ref 44–72)
NRBC # BLD AUTO: 0.07 K/UL
NRBC BLD-RTO: 1.4 /100 WBC
OVALOCYTES BLD QL SMEAR: NORMAL
PLATELET # BLD AUTO: 194 K/UL (ref 164–446)
PLATELET BLD QL SMEAR: NORMAL
PMV BLD AUTO: 11.2 FL (ref 9–12.9)
POLYCHROMASIA BLD QL SMEAR: NORMAL
POTASSIUM SERPL-SCNC: 3.4 MMOL/L (ref 3.6–5.5)
PROT SERPL-MCNC: 6.7 G/DL (ref 6–8.2)
RBC # BLD AUTO: 2.39 M/UL (ref 4.2–5.4)
RBC BLD AUTO: PRESENT
SODIUM SERPL-SCNC: 143 MMOL/L (ref 135–145)
WBC # BLD AUTO: 5 K/UL (ref 4.8–10.8)

## 2021-02-26 PROCEDURE — 85007 BL SMEAR W/DIFF WBC COUNT: CPT

## 2021-02-26 PROCEDURE — 86304 IMMUNOASSAY TUMOR CA 125: CPT

## 2021-02-26 PROCEDURE — 85027 COMPLETE CBC AUTOMATED: CPT

## 2021-02-26 PROCEDURE — 36415 COLL VENOUS BLD VENIPUNCTURE: CPT

## 2021-02-26 PROCEDURE — 80053 COMPREHEN METABOLIC PANEL: CPT

## 2021-03-01 ENCOUNTER — HOSPITAL ENCOUNTER (OUTPATIENT)
Dept: RADIOLOGY | Facility: MEDICAL CENTER | Age: 71
End: 2021-03-01
Attending: SPECIALIST
Payer: MEDICARE

## 2021-03-01 DIAGNOSIS — R97.1 ELEVATED CANCER ANTIGEN 125 (CA 125): ICD-10-CM

## 2021-03-01 DIAGNOSIS — C78.00 MALIGNANT NEOPLASM METASTATIC TO LUNG, UNSPECIFIED LATERALITY (HCC): ICD-10-CM

## 2021-03-01 DIAGNOSIS — G89.3 NEOPLASM RELATED PAIN (ACUTE) (CHRONIC): ICD-10-CM

## 2021-03-01 DIAGNOSIS — N85.00 ENDOMETRIAL HYPERPLASIA, UNSPECIFIED: ICD-10-CM

## 2021-03-01 DIAGNOSIS — E87.6 HYPOKALEMIA: ICD-10-CM

## 2021-03-01 DIAGNOSIS — R79.89 OTHER SPECIFIED ABNORMAL FINDINGS OF BLOOD CHEMISTRY: ICD-10-CM

## 2021-03-01 DIAGNOSIS — Z12.31 ENCOUNTER FOR SCREENING MAMMOGRAM FOR MALIGNANT NEOPLASM OF BREAST: ICD-10-CM

## 2021-03-01 DIAGNOSIS — C54.1 MALIGNANT NEOPLASM OF ENDOMETRIUM (HCC): ICD-10-CM

## 2021-03-01 DIAGNOSIS — Z51.11 ENCOUNTER FOR ANTINEOPLASTIC CHEMOTHERAPY: ICD-10-CM

## 2021-03-01 DIAGNOSIS — R91.8 OTHER NONSPECIFIC ABNORMAL FINDING OF LUNG FIELD: ICD-10-CM

## 2021-03-01 DIAGNOSIS — N95.0 POSTMENOPAUSAL BLEEDING: ICD-10-CM

## 2021-03-01 PROCEDURE — 71260 CT THORAX DX C+: CPT | Mod: MH

## 2021-03-01 PROCEDURE — 700117 HCHG RX CONTRAST REV CODE 255: Performed by: SPECIALIST

## 2021-03-01 RX ADMIN — IOHEXOL 25 ML: 240 INJECTION, SOLUTION INTRATHECAL; INTRAVASCULAR; INTRAVENOUS; ORAL at 16:39

## 2021-03-01 RX ADMIN — IOHEXOL 100 ML: 350 INJECTION, SOLUTION INTRAVENOUS at 16:39

## 2021-03-03 DIAGNOSIS — Z23 NEED FOR VACCINATION: ICD-10-CM

## 2021-03-09 DIAGNOSIS — R42 DIZZINESS: ICD-10-CM

## 2021-03-09 RX ORDER — MECLIZINE HCL 12.5 MG/1
12.5-25 TABLET ORAL 3 TIMES DAILY PRN
Qty: 90 TABLET | Refills: 3 | Status: SHIPPED | OUTPATIENT
Start: 2021-03-09 | End: 2023-08-24 | Stop reason: SDUPTHER

## 2021-03-12 ENCOUNTER — HOSPITAL ENCOUNTER (OUTPATIENT)
Dept: LAB | Facility: MEDICAL CENTER | Age: 71
End: 2021-03-12
Attending: SPECIALIST
Payer: MEDICARE

## 2021-03-12 LAB
ALBUMIN SERPL BCP-MCNC: 3.9 G/DL (ref 3.2–4.9)
ALBUMIN/GLOB SERPL: 1.2 G/DL
ALP SERPL-CCNC: 107 U/L (ref 30–99)
ALT SERPL-CCNC: 12 U/L (ref 2–50)
ANION GAP SERPL CALC-SCNC: 13 MMOL/L (ref 7–16)
ANISOCYTOSIS BLD QL SMEAR: ABNORMAL
AST SERPL-CCNC: 20 U/L (ref 12–45)
BASOPHILS # BLD AUTO: 0 % (ref 0–1.8)
BASOPHILS # BLD: 0 K/UL (ref 0–0.12)
BILIRUB SERPL-MCNC: 0.3 MG/DL (ref 0.1–1.5)
BUN SERPL-MCNC: 8 MG/DL (ref 8–22)
CALCIUM SERPL-MCNC: 9.8 MG/DL (ref 8.5–10.5)
CANCER AG125 SERPL-ACNC: 12.5 U/ML (ref 0–35)
CHLORIDE SERPL-SCNC: 104 MMOL/L (ref 96–112)
CO2 SERPL-SCNC: 25 MMOL/L (ref 20–33)
CREAT SERPL-MCNC: 0.63 MG/DL (ref 0.5–1.4)
EOSINOPHIL # BLD AUTO: 0 K/UL (ref 0–0.51)
EOSINOPHIL NFR BLD: 0 % (ref 0–6.9)
ERYTHROCYTE [DISTWIDTH] IN BLOOD BY AUTOMATED COUNT: 75.3 FL (ref 35.9–50)
GLOBULIN SER CALC-MCNC: 3.2 G/DL (ref 1.9–3.5)
GLUCOSE SERPL-MCNC: 124 MG/DL (ref 65–99)
HCT VFR BLD AUTO: 33.7 % (ref 37–47)
HGB BLD-MCNC: 10.3 G/DL (ref 12–16)
LYMPHOCYTES # BLD AUTO: 0.94 K/UL (ref 1–4.8)
LYMPHOCYTES NFR BLD: 15.9 % (ref 22–41)
MACROCYTES BLD QL SMEAR: ABNORMAL
MANUAL DIFF BLD: NORMAL
MCH RBC QN AUTO: 34.7 PG (ref 27–33)
MCHC RBC AUTO-ENTMCNC: 31.2 G/DL (ref 33.6–35)
MCV RBC AUTO: 111 FL (ref 81.4–97.8)
MICROCYTES BLD QL SMEAR: ABNORMAL
MONOCYTES # BLD AUTO: 0.94 K/UL (ref 0–0.85)
MONOCYTES NFR BLD AUTO: 15.9 % (ref 0–13.4)
MORPHOLOGY BLD-IMP: NORMAL
NEUTROPHILS # BLD AUTO: 4.02 K/UL (ref 2–7.15)
NEUTROPHILS NFR BLD: 68.1 % (ref 44–72)
NRBC # BLD AUTO: 0 K/UL
NRBC BLD-RTO: 0 /100 WBC
OVALOCYTES BLD QL SMEAR: NORMAL
PLATELET # BLD AUTO: 322 K/UL (ref 164–446)
PLATELET BLD QL SMEAR: NORMAL
PMV BLD AUTO: 10.7 FL (ref 9–12.9)
POLYCHROMASIA BLD QL SMEAR: NORMAL
POTASSIUM SERPL-SCNC: 3.6 MMOL/L (ref 3.6–5.5)
PROT SERPL-MCNC: 7.1 G/DL (ref 6–8.2)
RBC # BLD AUTO: 3 M/UL (ref 4.2–5.4)
RBC BLD AUTO: PRESENT
SODIUM SERPL-SCNC: 142 MMOL/L (ref 135–145)
WBC # BLD AUTO: 5.9 K/UL (ref 4.8–10.8)

## 2021-03-12 PROCEDURE — 85007 BL SMEAR W/DIFF WBC COUNT: CPT

## 2021-03-12 PROCEDURE — 86304 IMMUNOASSAY TUMOR CA 125: CPT

## 2021-03-12 PROCEDURE — 85027 COMPLETE CBC AUTOMATED: CPT

## 2021-03-12 PROCEDURE — 36415 COLL VENOUS BLD VENIPUNCTURE: CPT

## 2021-03-12 PROCEDURE — 80053 COMPREHEN METABOLIC PANEL: CPT

## 2021-03-16 ENCOUNTER — PHYSICAL THERAPY (OUTPATIENT)
Dept: PHYSICAL THERAPY | Facility: REHABILITATION | Age: 71
End: 2021-03-16
Attending: NURSE PRACTITIONER
Payer: MEDICARE

## 2021-03-16 DIAGNOSIS — G81.91 HEMIPLEGIA AFFECTING RIGHT DOMINANT SIDE, UNSPECIFIED ETIOLOGY, UNSPECIFIED HEMIPLEGIA TYPE (HCC): ICD-10-CM

## 2021-03-16 DIAGNOSIS — C54.1 MALIGNANT NEOPLASM OF ENDOMETRIUM (HCC): ICD-10-CM

## 2021-03-16 DIAGNOSIS — I69.359 HEMIPLEGIA AND HEMIPARESIS FOLLOWING CEREBRAL INFARCTION AFFECTING UNSPECIFIED SIDE (HCC): ICD-10-CM

## 2021-03-16 PROCEDURE — 97542 WHEELCHAIR MNGMENT TRAINING: CPT

## 2021-03-16 SDOH — ECONOMIC STABILITY: HOUSING INSECURITY: ELEVATOR PRESENT: 0

## 2021-03-16 ASSESSMENT — ACTIVITIES OF DAILY LIVING (ADL)
AMBULATION_WITHOUT_ASSISTIVE_DEVICE: UNABLE
AMBULATION_WITH_ASSISTIVE_DEVICE: UNABLE

## 2021-03-16 ASSESSMENT — BALANCE ASSESSMENTS
BALANCE - STANDING DYNAMIC: DEPENDENT
BALANCE - SITTING DYNAMIC: TRACE +
WEIGHT SHIFT SITTING: ABSENT
BALANCE - STANDING STATIC: TRACE +
WEIGHT SHIFT STANDING: POOR
BALANCE - SITTING STATIC: POOR

## 2021-03-16 ASSESSMENT — ENCOUNTER SYMPTOMS
PRECIPITATING FACTORS - STAIRS: 0
LOWER EXTREMITY EDEMA: 1
PAIN SCALE: 9
PAIN SCALE AT LOWEST: 8

## 2021-03-16 NOTE — OP THERAPY EVALUATION
"  Outpatient Physical Therapy  WHEELCHAIR EVALUATION    [facility] [address]  [address]  [place] [address]  Phone:  [phone]  Fax:  [phone]    Date of Evaluation: 03/16/2021    Patient Name: Jairo Rivas  YOB: 1950  MRN: [id number]   Height: 1.626 m (5' 4\")   Weight: 90 kg (198 lb 6.6 oz)       Referring Provider: ZULEYKA Lyons  [address]  [place] [address]   Referring Diagnosis Hemiplegia and hemiparesis following cerebral infarction affecting unspecified side [I69.359]     Time Calculation    Start time: 1135  Stop time: 1225 Time Calculation (min): 50 minutes     Date PT POC Established 3/16/2021  Date PT Treatment Started 3/16/2021      Pertinent medical history and general limitations: The patient has pmh notable for a chronic diagnoses of right side hemiplegia from stroke in 2010, as well as more recent TIA.  She is currently undergoing chemo for metastatic endometrial cancer. Since onset of chemo in 2019 she has become much more weak. She can no longer stand unassisted and now spends most of the day in her bed. She has been unable to walk with a walker or other assistive device since summer 2020.     Movement is limited by pain in bilateral shoulders as well as pain in  R arm/hand/leg/hip, neuropathy, R side weakness, fatigue,shortness of breath.    The patient recently did have one pressure sore on her buttock, which her daughter treated by using pillows to change the patient's positioning and by using Bacitracin at home. The patient has difficulty shifting her weight in bed and while sitting.  The patient has a documented history of lower leg edema.     The patient spends 90% of her time in bed, currently.    Current level of functional mobility / ADLs: The patient is unable to walk with any AD and is pushed by her daughter in her rented manual WC for all ambulation. She is unable to propel the WC " herself. The patient needs assistance with rolling/repositioning in bed, dressing, bathing, using the bedside commode. Due to fatigue and weakness from chemo, she has been having a lot of difficulty getting legs over bathtub edge to get onto shower chair, so her daughter is now assisting her by doing bed baths.      Lower extremity edema and pressure ulcers     Positive for lower extremity edema and pressure ulcers    Pressure ulcer(s) timing: historical (first pressure ulcer last month per patient and her daughter)        Historical pressure ulcer location(s): buttock        Pressure ulcer treatment received: treatment at home by daugther - repositioning with pillows, Bacitracin    Pain     Current pain ratin    At best pain ratin    Living situation     L[facility]    Lives in: apartment    Access to home environment: Direct - no step or ramp - can wheel in    Dwelling has stairs: no    Dwelling has a ramp: no    Dwelling has an elevator: no    Environment equipment will be used in: Home and in the community    Transportation options: Daughter owns a car    Caregiver assistance needed:  The patient's daughter is her only caregiver in the home and is performing all IADLs including cooking, laundry, cleaning. The patient participates in some ADLs seated including brushing teeth, washing face. The patient's daughter assists her with dressing, bathing, bed mobility, transfers. The patient's daughter is providing >40 hours per week of assistance.    Hours/week of caregiver assistance: 40 (daughter is primary caregiver and works from home Provides over 40 hours per week of caregiving assistance. ) hours per week    Assistive device history:    Current assistive device(s) used: wheelchair    Other assistive devices tried: Was previously using a walker (since ) Got a new rollator walker a couple of years ago, paid for by insurance,  but cannot use it currently due to  weakness/unsteadiness.     Assistive device history comments: Currently in rented transport  since February 2021. Paid for by insurance.    Spending 90% of the day in bed. 10% of day in WC.      Fall history:    Recent fall: no recent fall    Fall history details: Reports that her knees often buckle when she tries to stand, but her daughter provides assistance to help her to stay upright.       Active Range of Motion:   Upper extremity (left):     All left upper extremity active range of motion: All within functional limits  Upper extremity (right):     All right upper extremity active range of motion: All below functional limits  Lower extremity (left):     All left lower extremity active range of motion: All within functional limits  Lower extremity (right):     Hip flexion: Below functional limits    Hip abduction:Below functional limits    Hip external rotation: Below functional limits    Hip internal rotation: Below functional limits    Knee flexion: Within functional limits    Knee extension: Below functional limits    Ankle dorsiflexion: Below functional limits    Strength:   Upper extremity strength (left):     Shoulder flexion: 3+    Shoulder extension:  3+    Shoulder abduction: 3+    Shoulder adduction: 3+    Shoulder external rotation:  3+    Shoulder internal rotation: 3+    Elbow flexion: 4-    Elbow extension: 3+  Upper extremity strength (right):    Shoulder flexion: 1    Shoulder extension: 1    Shoulder abduction: 1    Shoulder external rotation: 1    Shoulder internal rotation: 2    Elbow flexion: 1    Elbow extension: 2  Lower extremity (left):     Hip flexion: 3+    Hip extension: 3+    Hip abduction: 3+    Hip adduction: 3+    Hip external rotation: 3    Hip internal rotation: 3    Knee flexion: 4    Knee extension: 3+    Ankle dorsiflexion: 4-    Ankle plantar flexion: 2  Lower extremity (right):     Hip flexion: 2-    Hip extension: 2-    Hip abduction: 2-    Hip adduction: 2-    Hip  external rotation: 1    Hip internal rotation: 1    Knee flexion: 2    Knee extension: 2-    Ankle dorsiflexion: 2-    Ankle plantar flexion: 2-  /Prehension (left):      strength: Within functional limits  /Prehension (right):      strength: Impaired    Tone:     Left upper extremity muscle tone: Normal    Right upper extremity muscle tone: Spastic    Left lower extremity muscle tone: Normal    Right lower extremity muscle tone: Spastic    Sensation   Upper extremity (left):     Light touch: Intact  Upper extremity (right):     Light touch: Impaired  Lower extremity (left):     Light touch: Intact  Lower extremity (right):     Light touch: Impaired      Postural Control (Balance)     Sitting (static): Poor    Sitting (dynamic): Trace +    Standing (static): Trace +    Standing (dynamic): Dependent    Weight shift (sitting): Absent    Weight shift (standing): Poor    Additional balance comments: Patient requires ModA for trunk and leg assistance to transfer Sit<>Supine  Patient requires ModA for STS  Patient requires Boy/CGA to pivot and sit in WC once standing  Patient requires Boy to remains standing in order to pivot transfer from Chair<>WC    Ambulation     Ambulation with assistive device: unable    Ambulation without assistive device: unable    Additional ambulation details: Unable to propel manual WC >1-2 feet due to UE and LE weakness, ROM restriction and abnormal tone, as well as fatigue.     Equipment recommendations   Type/Model     Recommendation: power wheelchair    Power wheelchair type: Group 3    Justification: Ms Rivas requires a Group 3 power chair with a rehab seating system as indicated below to allow her mobility throughout the home. Without this chair, she will be at risk for skin breakdown and additional orthopedic deformities.  Ms. Rivas and her adult daughter report that Ms. Rivas currently spends 90% of her time in bed due to her inability to ambulate with any  assistive device or self-propel a manual wheelchair. Ms Rivas requires a wheelchair with rehab seating system to enable proper positioning and pressure relief that she is unable to provide for herself due to R side hemiparesis as well as weakness, fatigue and shortness of breath that has progressively worsened due to her metastatic endometrial cancer and chemotherapy treatments.   She is unable to ambulate using any AD due to poor balance in sitting and standing and upper extremity, lower extremity and trunk weakness as well as fatigue. The patient currently requires ModA assistance to transfer from sitting<>standing and supine<>sitting and Boy to stand statically. She is unable to self propel a standard or optimally configured lightweight chair due to upper extremity, lower extremity and trunk weakness as well as pain and fatigue. She unable use a scooter due to her inability to weight shift independently for pressure relief and her impaired sitting and standing balance.    The patient has been determined to be independent and safe with the above equipment. It is my recommendation that they receive a power wheelchair with the above specifications for use in their home.  The use of this equipment will improve their independence and safety for mobility and ADLs in the home. The use of this equipment is considered a required lifetime medical need and will contribute to cost containment in the future.      The therapist performing the evaluation has no financial relationship with the vendor involved in the evaluation. Thank you in advance for the consideration for the medical needs of this patient. If you have and questions regarding this equipment please do not hesitate to call me at [phone].        Seating System:    Recommendation(s) for power wheelchair: power recline, positioning back support, power tilt and pressure relieving/positioning seat cushion    Justification: POSITIONING SEAT CUSHION: Ms.  Evelyn requires a positioning seat cushion since she demonstrates trunk weakness and requires proper trunk positioning to prevent contractures.    POWER RECLINE, POWER TILT, PRESSURE RELIEF SEAT CUSHION: Ms. Rivas requires power recline, power tilt and pressure relieving seat cushion as she is unable to independently shift her weight when sitting to relieve pressure due to upper extremity, lower extremity and trunk weakness and has history of both lower extremity edema and recent pressure ulcer. She is at increased risk of increased LE edema and additional pressure ulcers without these features.   Arm Rests:    Recommendation: flip back height-adjustable arm rests    Justification: FLIP BACK HEIGHT ADJUSTABLE ARMRESTS: the patient needs height adjustable armrests to provide proper trunk support to help decrease pain and enable prolonged sitting in the chair, as she is unable to transfer out of a chair without assistance.  She needs flip back armrests to enable her to pull the power wheelchair up to a table and to facilitate safe transfers.        Foot Rests/Foot Plates:    Recommendation: swing away removable leg rests    Justification: SWING AWAY REMOVABLE LEG RESTS: Ms. Rivas requires swing away removable leg rests to facilitate safe transfers from bed<>WC and WC<>commode due to her impaired balance.   Electronic Components:    Recommendation: battery pack and swing away joystick with mounting hardware    Justification: SWING AWAY JOYSTICK WITH MOUNTING HARDWARE:  A swing away joystick will enable increased safety with transfers. Without the swing away joystick. Ms. Rivaswill be at additional risk of falling while transferring. Mounting hardware is required for the joystick to be removed/ added for transferring.         BATTERY PACK: A battery pack is needed to power the wheelchair.                                                                                                  Electronically signed by  Arlen Coburn, PT, DPT on 3/18/2021    Referring provider co-signature:  I have reviewed this evaluation and recommendation(s) and my co-signature certifies my agreement with the contents.    Physician Signature: ________________________________ Date: ______________

## 2021-03-22 RX ORDER — BACLOFEN 10 MG/1
TABLET ORAL
Qty: 90 TABLET | Refills: 3 | Status: SHIPPED | OUTPATIENT
Start: 2021-03-22 | End: 2021-07-29

## 2021-03-22 NOTE — TELEPHONE ENCOUNTER
Received request via: Pharmacy    Was the patient seen in the last year in this department? Yes12/30/20    Does the patient have an active prescription (recently filled or refills available) for medication(s) requested? No

## 2021-03-30 ENCOUNTER — HOSPITAL ENCOUNTER (OUTPATIENT)
Dept: LAB | Facility: MEDICAL CENTER | Age: 71
End: 2021-03-30
Attending: OBSTETRICS & GYNECOLOGY
Payer: MEDICARE

## 2021-03-30 LAB
ALBUMIN SERPL BCP-MCNC: 3.8 G/DL (ref 3.2–4.9)
ALBUMIN/GLOB SERPL: 1.2 G/DL
ALP SERPL-CCNC: 112 U/L (ref 30–99)
ALT SERPL-CCNC: 16 U/L (ref 2–50)
ANION GAP SERPL CALC-SCNC: 8 MMOL/L (ref 7–16)
AST SERPL-CCNC: 21 U/L (ref 12–45)
BASOPHILS # BLD AUTO: 0.3 % (ref 0–1.8)
BASOPHILS # BLD: 0.01 K/UL (ref 0–0.12)
BILIRUB SERPL-MCNC: 0.3 MG/DL (ref 0.1–1.5)
BUN SERPL-MCNC: 7 MG/DL (ref 8–22)
CALCIUM SERPL-MCNC: 10.7 MG/DL (ref 8.5–10.5)
CANCER AG125 SERPL-ACNC: 12 U/ML (ref 0–35)
CHLORIDE SERPL-SCNC: 103 MMOL/L (ref 96–112)
CO2 SERPL-SCNC: 27 MMOL/L (ref 20–33)
CREAT SERPL-MCNC: 0.6 MG/DL (ref 0.5–1.4)
EOSINOPHIL # BLD AUTO: 0.01 K/UL (ref 0–0.51)
EOSINOPHIL NFR BLD: 0.3 % (ref 0–6.9)
ERYTHROCYTE [DISTWIDTH] IN BLOOD BY AUTOMATED COUNT: 61.6 FL (ref 35.9–50)
GLOBULIN SER CALC-MCNC: 3.1 G/DL (ref 1.9–3.5)
GLUCOSE SERPL-MCNC: 98 MG/DL (ref 65–99)
HCT VFR BLD AUTO: 34 % (ref 37–47)
HGB BLD-MCNC: 10.6 G/DL (ref 12–16)
IMM GRANULOCYTES # BLD AUTO: 0.04 K/UL (ref 0–0.11)
IMM GRANULOCYTES NFR BLD AUTO: 1 % (ref 0–0.9)
LYMPHOCYTES # BLD AUTO: 0.77 K/UL (ref 1–4.8)
LYMPHOCYTES NFR BLD: 20.1 % (ref 22–41)
MCH RBC QN AUTO: 34.1 PG (ref 27–33)
MCHC RBC AUTO-ENTMCNC: 31.2 G/DL (ref 33.6–35)
MCV RBC AUTO: 109.3 FL (ref 81.4–97.8)
MONOCYTES # BLD AUTO: 0.38 K/UL (ref 0–0.85)
MONOCYTES NFR BLD AUTO: 9.9 % (ref 0–13.4)
NEUTROPHILS # BLD AUTO: 2.63 K/UL (ref 2–7.15)
NEUTROPHILS NFR BLD: 68.4 % (ref 44–72)
NRBC # BLD AUTO: 0 K/UL
NRBC BLD-RTO: 0 /100 WBC
PLATELET # BLD AUTO: 124 K/UL (ref 164–446)
PMV BLD AUTO: 12.3 FL (ref 9–12.9)
POTASSIUM SERPL-SCNC: 3.5 MMOL/L (ref 3.6–5.5)
PROT SERPL-MCNC: 6.9 G/DL (ref 6–8.2)
RBC # BLD AUTO: 3.11 M/UL (ref 4.2–5.4)
SODIUM SERPL-SCNC: 138 MMOL/L (ref 135–145)
WBC # BLD AUTO: 3.8 K/UL (ref 4.8–10.8)

## 2021-03-30 PROCEDURE — 86304 IMMUNOASSAY TUMOR CA 125: CPT

## 2021-03-30 PROCEDURE — 36415 COLL VENOUS BLD VENIPUNCTURE: CPT

## 2021-03-30 PROCEDURE — 80053 COMPREHEN METABOLIC PANEL: CPT

## 2021-03-30 PROCEDURE — 85025 COMPLETE CBC W/AUTO DIFF WBC: CPT

## 2021-04-23 ENCOUNTER — HOSPITAL ENCOUNTER (OUTPATIENT)
Dept: RADIOLOGY | Facility: MEDICAL CENTER | Age: 71
End: 2021-04-23
Attending: NURSE PRACTITIONER
Payer: MEDICARE

## 2021-04-23 DIAGNOSIS — N85.00 ENDOMETRIAL HYPERPLASIA, UNSPECIFIED: ICD-10-CM

## 2021-04-23 DIAGNOSIS — E87.6 HYPOKALEMIA: ICD-10-CM

## 2021-04-23 DIAGNOSIS — N95.0 POSTMENOPAUSAL BLEEDING: ICD-10-CM

## 2021-04-23 DIAGNOSIS — Z51.11 ENCOUNTER FOR ANTINEOPLASTIC CHEMOTHERAPY: ICD-10-CM

## 2021-04-23 DIAGNOSIS — C54.1 ENDOMETRIAL SARCOMA (HCC): ICD-10-CM

## 2021-04-23 DIAGNOSIS — R97.1 ELEVATED CANCER ANTIGEN 125 (CA 125): ICD-10-CM

## 2021-04-23 PROCEDURE — 700117 HCHG RX CONTRAST REV CODE 255: Performed by: NURSE PRACTITIONER

## 2021-04-23 PROCEDURE — 71260 CT THORAX DX C+: CPT | Mod: MH

## 2021-04-23 RX ADMIN — IOHEXOL 25 ML: 240 INJECTION, SOLUTION INTRATHECAL; INTRAVASCULAR; INTRAVENOUS; ORAL at 15:41

## 2021-04-23 RX ADMIN — IOHEXOL 100 ML: 350 INJECTION, SOLUTION INTRAVENOUS at 15:35

## 2021-04-29 ENCOUNTER — HOSPITAL ENCOUNTER (OUTPATIENT)
Dept: LAB | Facility: MEDICAL CENTER | Age: 71
End: 2021-04-29
Attending: NURSE PRACTITIONER
Payer: MEDICARE

## 2021-04-29 LAB
ALBUMIN SERPL BCP-MCNC: 3.5 G/DL (ref 3.2–4.9)
ALBUMIN/GLOB SERPL: 1 G/DL
ALP SERPL-CCNC: 102 U/L (ref 30–99)
ALT SERPL-CCNC: 19 U/L (ref 2–50)
ANION GAP SERPL CALC-SCNC: 9 MMOL/L (ref 7–16)
AST SERPL-CCNC: 23 U/L (ref 12–45)
BASOPHILS # BLD AUTO: 0.3 % (ref 0–1.8)
BASOPHILS # BLD: 0.01 K/UL (ref 0–0.12)
BILIRUB SERPL-MCNC: 0.3 MG/DL (ref 0.1–1.5)
BUN SERPL-MCNC: 6 MG/DL (ref 8–22)
CALCIUM SERPL-MCNC: 9.4 MG/DL (ref 8.5–10.5)
CANCER AG125 SERPL-ACNC: 11.7 U/ML (ref 0–35)
CHLORIDE SERPL-SCNC: 109 MMOL/L (ref 96–112)
CO2 SERPL-SCNC: 24 MMOL/L (ref 20–33)
CREAT SERPL-MCNC: 0.68 MG/DL (ref 0.5–1.4)
EOSINOPHIL # BLD AUTO: 0.02 K/UL (ref 0–0.51)
EOSINOPHIL NFR BLD: 0.5 % (ref 0–6.9)
ERYTHROCYTE [DISTWIDTH] IN BLOOD BY AUTOMATED COUNT: 59.7 FL (ref 35.9–50)
GLOBULIN SER CALC-MCNC: 3.4 G/DL (ref 1.9–3.5)
GLUCOSE SERPL-MCNC: 105 MG/DL (ref 65–99)
HCT VFR BLD AUTO: 32.1 % (ref 37–47)
HGB BLD-MCNC: 10.4 G/DL (ref 12–16)
IMM GRANULOCYTES # BLD AUTO: 0.01 K/UL (ref 0–0.11)
IMM GRANULOCYTES NFR BLD AUTO: 0.3 % (ref 0–0.9)
LYMPHOCYTES # BLD AUTO: 0.94 K/UL (ref 1–4.8)
LYMPHOCYTES NFR BLD: 24.9 % (ref 22–41)
MCH RBC QN AUTO: 34.1 PG (ref 27–33)
MCHC RBC AUTO-ENTMCNC: 32.4 G/DL (ref 33.6–35)
MCV RBC AUTO: 105.2 FL (ref 81.4–97.8)
MONOCYTES # BLD AUTO: 0.68 K/UL (ref 0–0.85)
MONOCYTES NFR BLD AUTO: 18 % (ref 0–13.4)
NEUTROPHILS # BLD AUTO: 2.12 K/UL (ref 2–7.15)
NEUTROPHILS NFR BLD: 56 % (ref 44–72)
NRBC # BLD AUTO: 0 K/UL
NRBC BLD-RTO: 0 /100 WBC
PLATELET # BLD AUTO: 193 K/UL (ref 164–446)
PMV BLD AUTO: 10.8 FL (ref 9–12.9)
POTASSIUM SERPL-SCNC: 3.4 MMOL/L (ref 3.6–5.5)
PROT SERPL-MCNC: 6.9 G/DL (ref 6–8.2)
RBC # BLD AUTO: 3.05 M/UL (ref 4.2–5.4)
SODIUM SERPL-SCNC: 142 MMOL/L (ref 135–145)
WBC # BLD AUTO: 3.8 K/UL (ref 4.8–10.8)

## 2021-04-29 PROCEDURE — 85025 COMPLETE CBC W/AUTO DIFF WBC: CPT

## 2021-04-29 PROCEDURE — 80053 COMPREHEN METABOLIC PANEL: CPT

## 2021-04-29 PROCEDURE — 36415 COLL VENOUS BLD VENIPUNCTURE: CPT

## 2021-04-29 PROCEDURE — 86304 IMMUNOASSAY TUMOR CA 125: CPT

## 2021-05-19 ENCOUNTER — TELEMEDICINE (OUTPATIENT)
Dept: MEDICAL GROUP | Facility: LAB | Age: 71
End: 2021-05-19
Payer: MEDICARE

## 2021-05-19 VITALS — WEIGHT: 192 LBS | BODY MASS INDEX: 32.78 KG/M2 | HEIGHT: 64 IN

## 2021-05-19 DIAGNOSIS — C54.1 ENDOMETRIAL CANCER (HCC): ICD-10-CM

## 2021-05-19 DIAGNOSIS — G62.9 NEUROPATHY: ICD-10-CM

## 2021-05-19 PROCEDURE — 99213 OFFICE O/P EST LOW 20 MIN: CPT | Mod: 95 | Performed by: NURSE PRACTITIONER

## 2021-05-19 RX ORDER — MEGESTROL ACETATE 40 MG/1
40 TABLET ORAL DAILY
Qty: 30 TABLET | Refills: 4 | Status: SHIPPED | DISCHARGE
Start: 2021-05-19 | End: 2021-07-29

## 2021-05-19 RX ORDER — PREGABALIN 50 MG/1
50 CAPSULE ORAL 3 TIMES DAILY
Qty: 90 CAPSULE | Refills: 2 | Status: SHIPPED | OUTPATIENT
Start: 2021-05-19 | End: 2021-06-18

## 2021-05-19 RX ORDER — TAMOXIFEN CITRATE 20 MG/1
20 TABLET ORAL 2 TIMES DAILY
Qty: 60 TABLET | Refills: 3 | Status: SHIPPED | DISCHARGE
Start: 2021-05-19 | End: 2021-07-29

## 2021-05-19 ASSESSMENT — PATIENT HEALTH QUESTIONNAIRE - PHQ9: CLINICAL INTERPRETATION OF PHQ2 SCORE: 0

## 2021-05-19 ASSESSMENT — FIBROSIS 4 INDEX: FIB4 SCORE: 1.86

## 2021-05-19 NOTE — PROGRESS NOTES
Telemedicine: Established Patient   This evaluation was conducted via Zoom using secure and encrypted videoconferencing technology. The patient was in a private location in the state of [place].    The patient's identity was confirmed and verbal consent was obtained for this virtual visit.    Subjective:   CC:   Chief Complaint   Patient presents with   • Nerve Pain     referral requested       Carlene is a 70 y.o. female presenting for evaluation and management of:    Foot neuropathy: Chronic issue.  C/o worsening bilateral foot burning and pain day / night - keeping her up at night.   Now receiving immunotherapy and done with chemo.  On tamoxifen and megace.  Appetite is now fine.   She did do a trial of gabapentin last year but it caused her to hallucinate.  She would also like to try acupuncture for neuropathy in her feet.  She is not taking any chronic pain medication although she is followed by Dr. Garcia for her uterine cancer history.    ROS  Otherwise feeling pretty well, denies constipation or diarrhea.    Allergies   Allergen Reactions   • Penicillins Itching     Makes her feel weak    • Gluten Meal    • Hydrocodone Itching     Swelling of the tongue  Face swelling   • Iodine      Weak and dizzy     • Levaquin Itching     Swelling of the tongue  Facial swelling   • Tramadol Hives     Swelling       Current medicines (including changes today)  Current Outpatient Medications   Medication Sig Dispense Refill   • baclofen (LIORESAL) 10 MG Tab TAKE 1 TABLET BY MOUTH THREE TIMES A DAY 90 tablet 3   • meclizine (ANTIVERT) 12.5 MG Tab Take 1-2 Tablets by mouth 3 times a day as needed for Dizziness. 90 tablet 3   • ferrous sulfate 325 (65 Fe) MG tablet Take 1 tablet by mouth every day. 90 tablet 3   • NON SPECIFIED #1- 3:1 COMMODE    #2- wheelchair - pt request electric and manual.  Electric for around the home and manual for ease of daughter putting this in the car. 2 Each 1   • amitriptyline (ELAVIL) 50 MG Tab TAKE 1  TAB BY MOUTH EVERY DAY. BEFORE BED. MAY INCREASE TO 75 MG AFTER ONE WEEK IF NEEDED 90 Tab 2   • metoprolol SR (TOPROL XL) 100 MG TABLET SR 24 HR Take 1 tablet by mouth once daily 90 Tab 3   • atorvastatin (LIPITOR) 20 MG Tab Take 1 Tab by mouth every evening. 100 Tab 4   • amLODIPine (NORVASC) 5 MG Tab TAKE 1 TABLET BY MOUTH EVERYDAY AT BEDTIME (Patient taking differently: Take 5 mg by mouth every day.) 90 Tab 2   • gabapentin (NEURONTIN) 100 MG Cap Take 1 Cap by mouth 3 times a day as needed. For foot pain / burning 90 Cap 3   • losartan (COZAAR) 100 MG Tab TAKE 1 TAB BY MOUTH EVERY DAY FOR 90 DAYS. 100 Tab 3   • ondansetron (ZOFRAN) 4 MG Tab tablet Take 1 Tab by mouth every 6 hours as needed. Alternate q 3 h with Dex four four days after chemotherapy and longer if needed (Patient not taking: Reported on 10/15/2020) 30 Tab 0   • senna-docusate (PERICOLACE OR SENOKOT S) 8.6-50 MG Tab Take 1 Tab by mouth 2 times a day as needed.     • ascorbic acid (ASCORBIC ACID) 500 MG Tab Take 500 mg by mouth every evening.     • Cholecalciferol (VITAMIN D3) 2000 UNIT Cap Take 2,000 Units by mouth every day.     • Coenzyme Q10 (COQ10) 100 MG Cap Take 100 mg by mouth every evening.     • multivitamin (THERAGRAN) Tab Take 1 Tab by mouth every day.     • acetaminophen (TYLENOL) 500 MG Tab Take 1,000 mg by mouth every 6 hours as needed for Moderate Pain.     • B Complex-C (SUPER B COMPLEX PO) Take 1 Tab by mouth every evening.     • aspirin (ASA) 81 MG Chew Tab chewable tablet Take 81 mg by mouth every day.       No current facility-administered medications for this visit.       Patient Active Problem List    Diagnosis Date Noted   • Multiple lung nodules - growth from 8 mm - 10 mm 1/2020 - 8/2020 08/17/2020   • Tongue burning sensation 03/31/2020   • Neuropathy 03/31/2020   • Other chest pain 01/17/2020   • Thyroid nodule - benign 12/03/2019   • Nausea 10/02/2019   • Admission for chemotherapy 09/17/2019   • Hypokalemia 10/02/2018  "  • Vision abnormalities 02/06/2018   • CVA, old, hemiparesis ([facility]) 02/06/2018   • Essential hypertension 02/06/2018   • Vitamin D deficiency 02/06/2018   • Endometrial cancer ([facility]) 02/06/2018   • Iron deficiency anemia 02/06/2018   • Obesity (BMI 30-39.9) 02/06/2018       Family History   Problem Relation Age of Onset   • Hypertension Mother    • No Known Problems Father    • No Known Problems Brother        She  has a past medical history of Anemia (2/6/2018), Back pain, Back pain, Cancer ([facility]) (2019), Cough, CVA, old, hemiparesis (HCC) (2/6/2018), Essential hypertension (2/6/2018), Eye drainage, Fatigue, Frequent headaches, Frequent urination, Hyperlipidemia, Irregular periods, Painful joint, Palpitations, Post-menopause bleeding (2/6/2018), Pulmonary nodule, Sore muscles, Stroke ([facility]), Swelling of lower extremity, Vision abnormalities (2/6/2018), Vitamin D deficiency (2/6/2018), Weakness, and Wears glasses.  She  has a past surgical history that includes pr hysteroscopy,dx,sep proc (6/19/2019); dilation and curettage (6/19/2019); pr cystoscopy,insert ureteral stent (Left, 8/8/2019); hysterectomy robotic xi (N/A, 8/8/2019); salpingo oophorectomy (Bilateral, 8/8/2019); node biopsy sentinel (8/8/2019); and hysterectomy laparoscopy.       Objective:   Ht 1.626 m (5' 4\")   Wt 87.1 kg (192 lb)   BMI 32.96 kg/m²     Physical Exam  Gen: NAD  Resp: nonlabored.  Able to speak in full sentences  Psy: pleasant / cooperative.   Neuro:  Alert and oriented x 3  Assessment and Plan:   The following treatment plan was discussed:   \"  1. Neuropathy  REFERRAL FOR ACUPUNCTURE    pregabalin (LYRICA) 50 MG capsule   2. Endometrial cancer ([facility])  megestrol (MEGACE) 40 MG Tab    tamoxifen (NOLVADEX) 20 MG tablet   \"  Neuropathy is likely chemo induced which she understands.  Referred to acupuncture. Reacted negatively to gabapentin.  Trial of Lyrica 50 mg up to 3 times daily.  Discussed potential for fatigue and dizziness as well as " weight gain with Lyrica.  Fortunately we discussed that Lyrica will help with her appetite in addition to megestrol.  Her daughter will email me over the next week regarding any problems at the pharmacy picking up Lyrica or complications with the patient taking Lyrica/efficacy.  She does see Dr. Garcia's office frequently and I encouraged her to notify them that we are adding Lyrica to her medication regimen.    F/u one week or sooner prn negative rxn to lyrica.

## 2021-05-24 ENCOUNTER — PATIENT MESSAGE (OUTPATIENT)
Dept: HEALTH INFORMATION MANAGEMENT | Facility: OTHER | Age: 71
End: 2021-05-24

## 2021-06-14 ENCOUNTER — HOSPITAL ENCOUNTER (OUTPATIENT)
Dept: LAB | Facility: MEDICAL CENTER | Age: 71
End: 2021-06-14
Attending: SPECIALIST
Payer: MEDICARE

## 2021-06-14 LAB
ALBUMIN SERPL BCP-MCNC: 3.9 G/DL (ref 3.2–4.9)
ALBUMIN/GLOB SERPL: 1.2 G/DL
ALP SERPL-CCNC: 73 U/L (ref 30–99)
ALT SERPL-CCNC: 16 U/L (ref 2–50)
ANION GAP SERPL CALC-SCNC: 11 MMOL/L (ref 7–16)
AST SERPL-CCNC: 27 U/L (ref 12–45)
BASOPHILS # BLD AUTO: 0.4 % (ref 0–1.8)
BASOPHILS # BLD: 0.02 K/UL (ref 0–0.12)
BILIRUB SERPL-MCNC: 0.4 MG/DL (ref 0.1–1.5)
BUN SERPL-MCNC: 9 MG/DL (ref 8–22)
CALCIUM SERPL-MCNC: 9.5 MG/DL (ref 8.5–10.5)
CANCER AG125 SERPL-ACNC: 9 U/ML (ref 0–35)
CHLORIDE SERPL-SCNC: 111 MMOL/L (ref 96–112)
CO2 SERPL-SCNC: 22 MMOL/L (ref 20–33)
CREAT SERPL-MCNC: 0.73 MG/DL (ref 0.5–1.4)
EOSINOPHIL # BLD AUTO: 0.03 K/UL (ref 0–0.51)
EOSINOPHIL NFR BLD: 0.6 % (ref 0–6.9)
ERYTHROCYTE [DISTWIDTH] IN BLOOD BY AUTOMATED COUNT: 53.7 FL (ref 35.9–50)
GLOBULIN SER CALC-MCNC: 3.2 G/DL (ref 1.9–3.5)
GLUCOSE SERPL-MCNC: 77 MG/DL (ref 65–99)
HCT VFR BLD AUTO: 37.6 % (ref 37–47)
HGB BLD-MCNC: 12.1 G/DL (ref 12–16)
IMM GRANULOCYTES # BLD AUTO: 0.01 K/UL (ref 0–0.11)
IMM GRANULOCYTES NFR BLD AUTO: 0.2 % (ref 0–0.9)
LYMPHOCYTES # BLD AUTO: 1.37 K/UL (ref 1–4.8)
LYMPHOCYTES NFR BLD: 29.7 % (ref 22–41)
MCH RBC QN AUTO: 32.2 PG (ref 27–33)
MCHC RBC AUTO-ENTMCNC: 32.2 G/DL (ref 33.6–35)
MCV RBC AUTO: 100 FL (ref 81.4–97.8)
MONOCYTES # BLD AUTO: 0.5 K/UL (ref 0–0.85)
MONOCYTES NFR BLD AUTO: 10.8 % (ref 0–13.4)
NEUTROPHILS # BLD AUTO: 2.69 K/UL (ref 2–7.15)
NEUTROPHILS NFR BLD: 58.3 % (ref 44–72)
NRBC # BLD AUTO: 0 K/UL
NRBC BLD-RTO: 0 /100 WBC
PLATELET # BLD AUTO: 209 K/UL (ref 164–446)
PMV BLD AUTO: 10.5 FL (ref 9–12.9)
POTASSIUM SERPL-SCNC: 3.5 MMOL/L (ref 3.6–5.5)
PROT SERPL-MCNC: 7.1 G/DL (ref 6–8.2)
RBC # BLD AUTO: 3.76 M/UL (ref 4.2–5.4)
SODIUM SERPL-SCNC: 144 MMOL/L (ref 135–145)
WBC # BLD AUTO: 4.6 K/UL (ref 4.8–10.8)

## 2021-06-14 PROCEDURE — 80053 COMPREHEN METABOLIC PANEL: CPT

## 2021-06-14 PROCEDURE — 86304 IMMUNOASSAY TUMOR CA 125: CPT

## 2021-06-14 PROCEDURE — 85025 COMPLETE CBC W/AUTO DIFF WBC: CPT

## 2021-06-14 PROCEDURE — 36415 COLL VENOUS BLD VENIPUNCTURE: CPT

## 2021-06-22 RX ORDER — LOSARTAN POTASSIUM 100 MG/1
TABLET ORAL
Qty: 100 TABLET | Refills: 3 | Status: SHIPPED | OUTPATIENT
Start: 2021-06-22 | End: 2022-09-26

## 2021-06-22 NOTE — TELEPHONE ENCOUNTER
Received request via: Pharmacy    Was the patient seen in the last year in this department? Yes  LOV:5/19/2021  Does the patient have an active prescription (recently filled or refills available) for medication(s) requested? No

## 2021-07-07 ENCOUNTER — PATIENT OUTREACH (OUTPATIENT)
Dept: HEALTH INFORMATION MANAGEMENT | Facility: OTHER | Age: 71
End: 2021-07-07

## 2021-07-07 NOTE — NON-PROVIDER
Called pt to schedule ROXANA. Verified HIPAA with daughter. I then transferred to [facility] for in home assessment scheduling.     Attempt # 2

## 2021-07-15 RX ORDER — AMLODIPINE BESYLATE 5 MG/1
TABLET ORAL
Qty: 90 TABLET | Refills: 2 | Status: ON HOLD | OUTPATIENT
Start: 2021-07-15 | End: 2021-08-18

## 2021-07-15 NOTE — TELEPHONE ENCOUNTER
Received request via: Pharmacy    Was the patient seen in the last year in this department? Yes    Does the patient have an active prescription (recently filled or refills available) for medication(s) requested? No  
: Yes

## 2021-07-19 ENCOUNTER — APPOINTMENT (OUTPATIENT)
Dept: RADIOLOGY | Facility: MEDICAL CENTER | Age: 71
End: 2021-07-19
Attending: SPECIALIST
Payer: MEDICARE

## 2021-07-25 ENCOUNTER — HOSPITAL ENCOUNTER (OUTPATIENT)
Dept: RADIOLOGY | Facility: MEDICAL CENTER | Age: 71
End: 2021-07-25
Attending: SPECIALIST
Payer: MEDICARE

## 2021-07-25 DIAGNOSIS — C78.00 MALIGNANT NEOPLASM METASTATIC TO LUNG, UNSPECIFIED LATERALITY (HCC): ICD-10-CM

## 2021-07-25 DIAGNOSIS — R91.8 OTHER NONSPECIFIC ABNORMAL FINDING OF LUNG FIELD: ICD-10-CM

## 2021-07-25 DIAGNOSIS — E87.6 HYPOKALEMIA: ICD-10-CM

## 2021-07-25 DIAGNOSIS — R97.1 ELEVATED CANCER ANTIGEN 125 (CA 125): ICD-10-CM

## 2021-07-25 DIAGNOSIS — N85.00 ENDOMETRIAL HYPERPLASIA, UNSPECIFIED: ICD-10-CM

## 2021-07-25 DIAGNOSIS — R79.89 OTHER SPECIFIED ABNORMAL FINDINGS OF BLOOD CHEMISTRY: ICD-10-CM

## 2021-07-25 DIAGNOSIS — Z12.31 ENCOUNTER FOR SCREENING MAMMOGRAM FOR MALIGNANT NEOPLASM OF BREAST: ICD-10-CM

## 2021-07-25 DIAGNOSIS — Z51.11 ENCOUNTER FOR ANTINEOPLASTIC CHEMOTHERAPY: ICD-10-CM

## 2021-07-25 DIAGNOSIS — N95.0 POSTMENOPAUSAL BLEEDING: ICD-10-CM

## 2021-07-25 DIAGNOSIS — C54.1 MALIGNANT NEOPLASM OF ENDOMETRIUM (HCC): ICD-10-CM

## 2021-07-25 DIAGNOSIS — G89.3 NEOPLASM RELATED PAIN: ICD-10-CM

## 2021-07-25 NOTE — PROGRESS NOTES
Attempted to obtain an IV for an hour with no success. Informed daughter who brought the patient here for her appointment. Daughter wanted me to attempted again which I did on her other arm but still no success. ED RN attempted with US as well. By this point the patient no longer wanted to be stuck and wants to reschedule. Patient is not allergic to IV contrast iodine. She is allergic to the topical iodine, no need for premedication treatment prior to CT scan.

## 2021-07-29 ENCOUNTER — HOSPITAL ENCOUNTER (INPATIENT)
Facility: MEDICAL CENTER | Age: 71
LOS: 20 days | DRG: 163 | End: 2021-08-18
Attending: EMERGENCY MEDICINE | Admitting: INTERNAL MEDICINE
Payer: MEDICARE

## 2021-07-29 ENCOUNTER — APPOINTMENT (OUTPATIENT)
Dept: RADIOLOGY | Facility: MEDICAL CENTER | Age: 71
DRG: 163 | End: 2021-07-29
Attending: INTERNAL MEDICINE
Payer: MEDICARE

## 2021-07-29 ENCOUNTER — APPOINTMENT (OUTPATIENT)
Dept: RADIOLOGY | Facility: MEDICAL CENTER | Age: 71
DRG: 163 | End: 2021-07-29
Attending: EMERGENCY MEDICINE
Payer: MEDICARE

## 2021-07-29 DIAGNOSIS — C79.9 METASTATIC MALIGNANT NEOPLASM, UNSPECIFIED SITE (HCC): ICD-10-CM

## 2021-07-29 DIAGNOSIS — J90 PLEURAL EFFUSION: ICD-10-CM

## 2021-07-29 DIAGNOSIS — R00.0 TACHYCARDIA: ICD-10-CM

## 2021-07-29 DIAGNOSIS — R09.02 HYPOXIA: ICD-10-CM

## 2021-07-29 DIAGNOSIS — J96.01 ACUTE RESPIRATORY FAILURE WITH HYPOXIA (HCC): ICD-10-CM

## 2021-07-29 DIAGNOSIS — J94.2 HEMOTHORAX ON LEFT: ICD-10-CM

## 2021-07-29 DIAGNOSIS — C54.1 ENDOMETRIAL CANCER (HCC): ICD-10-CM

## 2021-07-29 PROBLEM — I26.99 PULMONARY EMBOLISM (HCC): Status: ACTIVE | Noted: 2021-07-29

## 2021-07-29 LAB
ALBUMIN SERPL BCP-MCNC: 3.7 G/DL (ref 3.2–4.9)
ALBUMIN/GLOB SERPL: 1 G/DL
ALP SERPL-CCNC: 67 U/L (ref 30–99)
ALT SERPL-CCNC: 18 U/L (ref 2–50)
AMYLASE FLD-CCNC: 60 U/L
ANION GAP SERPL CALC-SCNC: 10 MMOL/L (ref 7–16)
APPEARANCE FLD: NORMAL
APTT PPP: 26.5 SEC (ref 24.7–36)
AST SERPL-CCNC: 33 U/L (ref 12–45)
BASOPHILS # BLD AUTO: 0.5 % (ref 0–1.8)
BASOPHILS # BLD: 0.04 K/UL (ref 0–0.12)
BASOPHILS NFR FLD: 1 %
BILIRUB SERPL-MCNC: 0.3 MG/DL (ref 0.1–1.5)
BODY FLD TYPE: NORMAL
BODY FLD TYPE: NORMAL
BUN SERPL-MCNC: 8 MG/DL (ref 8–22)
CALCIUM SERPL-MCNC: 9.3 MG/DL (ref 8.5–10.5)
CHLORIDE SERPL-SCNC: 107 MMOL/L (ref 96–112)
CO2 SERPL-SCNC: 21 MMOL/L (ref 20–33)
COLOR FLD: NORMAL
CREAT SERPL-MCNC: 0.7 MG/DL (ref 0.5–1.4)
CSF COMMENTS 1658: NORMAL
CYTOLOGY REG CYTOL: NORMAL
EKG IMPRESSION: NORMAL
EOSINOPHIL # BLD AUTO: 0.03 K/UL (ref 0–0.51)
EOSINOPHIL NFR BLD: 0.4 % (ref 0–6.9)
EOSINOPHIL NFR FLD: 1 %
ERYTHROCYTE [DISTWIDTH] IN BLOOD BY AUTOMATED COUNT: 52.5 FL (ref 35.9–50)
FLUAV RNA SPEC QL NAA+PROBE: NEGATIVE
FLUBV RNA SPEC QL NAA+PROBE: NEGATIVE
GLOBULIN SER CALC-MCNC: 3.7 G/DL (ref 1.9–3.5)
GLUCOSE FLD-MCNC: 110 MG/DL
GLUCOSE SERPL-MCNC: 114 MG/DL (ref 65–99)
HCT VFR BLD AUTO: 39.4 % (ref 37–47)
HGB BLD-MCNC: 12.7 G/DL (ref 12–16)
HISTIOCYTES NFR FLD: 12 %
IMM GRANULOCYTES # BLD AUTO: 0.04 K/UL (ref 0–0.11)
IMM GRANULOCYTES NFR BLD AUTO: 0.5 % (ref 0–0.9)
INR PPP: 1.09 (ref 0.87–1.13)
LACTATE BLD-SCNC: 1.7 MMOL/L (ref 0.5–2)
LDH FLD L TO P-CCNC: 520 U/L
LYMPHOCYTES # BLD AUTO: 1.24 K/UL (ref 1–4.8)
LYMPHOCYTES NFR BLD: 15.1 % (ref 22–41)
LYMPHOCYTES NFR FLD: 23 %
MAGNESIUM SERPL-MCNC: 2.1 MG/DL (ref 1.5–2.5)
MCH RBC QN AUTO: 31.7 PG (ref 27–33)
MCHC RBC AUTO-ENTMCNC: 32.2 G/DL (ref 33.6–35)
MCV RBC AUTO: 98.3 FL (ref 81.4–97.8)
MONOCYTES # BLD AUTO: 0.46 K/UL (ref 0–0.85)
MONOCYTES NFR BLD AUTO: 5.6 % (ref 0–13.4)
MONONUC CELLS NFR FLD: 18 %
NEUTROPHILS # BLD AUTO: 6.41 K/UL (ref 2–7.15)
NEUTROPHILS NFR BLD: 77.9 % (ref 44–72)
NEUTROPHILS NFR FLD: 45 %
NRBC # BLD AUTO: 0 K/UL
NRBC BLD-RTO: 0 /100 WBC
NT-PROBNP SERPL IA-MCNC: 18 PG/ML (ref 0–125)
PH FLD: 8 [PH]
PLATELET # BLD AUTO: 304 K/UL (ref 164–446)
PMV BLD AUTO: 10.3 FL (ref 9–12.9)
POTASSIUM SERPL-SCNC: 3.9 MMOL/L (ref 3.6–5.5)
PROT SERPL-MCNC: 7.4 G/DL (ref 6–8.2)
PROTHROMBIN TIME: 13.8 SEC (ref 12–14.6)
RBC # BLD AUTO: 4.01 M/UL (ref 4.2–5.4)
RBC # FLD: NORMAL CELLS/UL
RSV RNA SPEC QL NAA+PROBE: NEGATIVE
SARS-COV-2 RNA RESP QL NAA+PROBE: NOTDETECTED
SODIUM SERPL-SCNC: 138 MMOL/L (ref 135–145)
SPECIMEN SOURCE: NORMAL
TROPONIN T SERPL-MCNC: 14 NG/L (ref 6–19)
UFH PPP CHRO-ACNC: <0.1 IU/ML
WBC # BLD AUTO: 8.2 K/UL (ref 4.8–10.8)
WBC # FLD: 629 CELLS/UL

## 2021-07-29 PROCEDURE — 87070 CULTURE OTHR SPECIMN AEROBIC: CPT

## 2021-07-29 PROCEDURE — 4410569 US-THORACENTESIS PUNCTURE

## 2021-07-29 PROCEDURE — 87205 SMEAR GRAM STAIN: CPT | Mod: 91

## 2021-07-29 PROCEDURE — A9270 NON-COVERED ITEM OR SERVICE: HCPCS | Performed by: EMERGENCY MEDICINE

## 2021-07-29 PROCEDURE — 84157 ASSAY OF PROTEIN OTHER: CPT

## 2021-07-29 PROCEDURE — 83880 ASSAY OF NATRIURETIC PEPTIDE: CPT

## 2021-07-29 PROCEDURE — 87015 SPECIMEN INFECT AGNT CONCNTJ: CPT | Mod: 91

## 2021-07-29 PROCEDURE — 83615 LACTATE (LD) (LDH) ENZYME: CPT

## 2021-07-29 PROCEDURE — 700117 HCHG RX CONTRAST REV CODE 255: Performed by: EMERGENCY MEDICINE

## 2021-07-29 PROCEDURE — 87102 FUNGUS ISOLATION CULTURE: CPT

## 2021-07-29 PROCEDURE — 83986 ASSAY PH BODY FLUID NOS: CPT

## 2021-07-29 PROCEDURE — 85520 HEPARIN ASSAY: CPT

## 2021-07-29 PROCEDURE — 82150 ASSAY OF AMYLASE: CPT

## 2021-07-29 PROCEDURE — 99285 EMERGENCY DEPT VISIT HI MDM: CPT

## 2021-07-29 PROCEDURE — 88112 CYTOPATH CELL ENHANCE TECH: CPT

## 2021-07-29 PROCEDURE — 93005 ELECTROCARDIOGRAM TRACING: CPT | Performed by: EMERGENCY MEDICINE

## 2021-07-29 PROCEDURE — 96365 THER/PROPH/DIAG IV INF INIT: CPT

## 2021-07-29 PROCEDURE — 87040 BLOOD CULTURE FOR BACTERIA: CPT

## 2021-07-29 PROCEDURE — A9270 NON-COVERED ITEM OR SERVICE: HCPCS | Performed by: STUDENT IN AN ORGANIZED HEALTH CARE EDUCATION/TRAINING PROGRAM

## 2021-07-29 PROCEDURE — 85610 PROTHROMBIN TIME: CPT

## 2021-07-29 PROCEDURE — 88305 TISSUE EXAM BY PATHOLOGIST: CPT

## 2021-07-29 PROCEDURE — 84484 ASSAY OF TROPONIN QUANT: CPT

## 2021-07-29 PROCEDURE — 0241U HCHG SARS-COV-2 COVID-19 NFCT DS RESP RNA 4 TRGT MIC: CPT

## 2021-07-29 PROCEDURE — 83735 ASSAY OF MAGNESIUM: CPT

## 2021-07-29 PROCEDURE — 700111 HCHG RX REV CODE 636 W/ 250 OVERRIDE (IP): Performed by: EMERGENCY MEDICINE

## 2021-07-29 PROCEDURE — 700102 HCHG RX REV CODE 250 W/ 637 OVERRIDE(OP): Performed by: STUDENT IN AN ORGANIZED HEALTH CARE EDUCATION/TRAINING PROGRAM

## 2021-07-29 PROCEDURE — 770020 HCHG ROOM/CARE - TELE (206)

## 2021-07-29 PROCEDURE — 71045 X-RAY EXAM CHEST 1 VIEW: CPT

## 2021-07-29 PROCEDURE — 0W9B3ZX DRAINAGE OF LEFT PLEURAL CAVITY, PERCUTANEOUS APPROACH, DIAGNOSTIC: ICD-10-PCS | Performed by: RADIOLOGY

## 2021-07-29 PROCEDURE — 700102 HCHG RX REV CODE 250 W/ 637 OVERRIDE(OP): Performed by: EMERGENCY MEDICINE

## 2021-07-29 PROCEDURE — 71275 CT ANGIOGRAPHY CHEST: CPT | Mod: ME

## 2021-07-29 PROCEDURE — 83605 ASSAY OF LACTIC ACID: CPT

## 2021-07-29 PROCEDURE — 85025 COMPLETE CBC W/AUTO DIFF WBC: CPT

## 2021-07-29 PROCEDURE — 80053 COMPREHEN METABOLIC PANEL: CPT

## 2021-07-29 PROCEDURE — 700111 HCHG RX REV CODE 636 W/ 250 OVERRIDE (IP): Performed by: STUDENT IN AN ORGANIZED HEALTH CARE EDUCATION/TRAINING PROGRAM

## 2021-07-29 PROCEDURE — C9803 HOPD COVID-19 SPEC COLLECT: HCPCS | Performed by: EMERGENCY MEDICINE

## 2021-07-29 PROCEDURE — 99223 1ST HOSP IP/OBS HIGH 75: CPT | Performed by: INTERNAL MEDICINE

## 2021-07-29 PROCEDURE — 89051 BODY FLUID CELL COUNT: CPT

## 2021-07-29 PROCEDURE — 87116 MYCOBACTERIA CULTURE: CPT

## 2021-07-29 PROCEDURE — 96375 TX/PRO/DX INJ NEW DRUG ADDON: CPT

## 2021-07-29 PROCEDURE — 87206 SMEAR FLUORESCENT/ACID STAI: CPT

## 2021-07-29 PROCEDURE — 82945 GLUCOSE OTHER FLUID: CPT

## 2021-07-29 PROCEDURE — 93005 ELECTROCARDIOGRAM TRACING: CPT

## 2021-07-29 PROCEDURE — 85730 THROMBOPLASTIN TIME PARTIAL: CPT

## 2021-07-29 RX ORDER — ASPIRIN 81 MG/1
81 TABLET, CHEWABLE ORAL DAILY
Status: DISCONTINUED | OUTPATIENT
Start: 2021-07-30 | End: 2021-08-18 | Stop reason: HOSPADM

## 2021-07-29 RX ORDER — METOPROLOL SUCCINATE 100 MG/1
100 TABLET, EXTENDED RELEASE ORAL
Status: DISCONTINUED | OUTPATIENT
Start: 2021-07-30 | End: 2021-08-18 | Stop reason: HOSPADM

## 2021-07-29 RX ORDER — AMOXICILLIN 250 MG
1 CAPSULE ORAL 2 TIMES DAILY PRN
Status: DISCONTINUED | OUTPATIENT
Start: 2021-07-29 | End: 2021-08-18 | Stop reason: HOSPADM

## 2021-07-29 RX ORDER — AMLODIPINE BESYLATE 5 MG/1
5 TABLET ORAL
Status: DISCONTINUED | OUTPATIENT
Start: 2021-07-29 | End: 2021-07-30

## 2021-07-29 RX ORDER — LOSARTAN POTASSIUM 50 MG/1
100 TABLET ORAL DAILY
Status: DISCONTINUED | OUTPATIENT
Start: 2021-07-29 | End: 2021-08-11

## 2021-07-29 RX ORDER — TAMOXIFEN CITRATE 20 MG/1
20 TABLET ORAL 2 TIMES DAILY
Status: ON HOLD | COMMUNITY
End: 2021-08-18

## 2021-07-29 RX ORDER — MEGESTROL ACETATE 40 MG/1
40 TABLET ORAL DAILY
Status: ON HOLD | COMMUNITY
End: 2021-08-18

## 2021-07-29 RX ORDER — AMOXICILLIN 250 MG
2 CAPSULE ORAL 2 TIMES DAILY
Status: DISCONTINUED | OUTPATIENT
Start: 2021-07-29 | End: 2021-08-18 | Stop reason: HOSPADM

## 2021-07-29 RX ORDER — ACETAMINOPHEN 500 MG
1000 TABLET ORAL EVERY 6 HOURS PRN
Status: DISCONTINUED | OUTPATIENT
Start: 2021-07-29 | End: 2021-07-30

## 2021-07-29 RX ORDER — POLYETHYLENE GLYCOL 3350 17 G/17G
1 POWDER, FOR SOLUTION ORAL
Status: DISCONTINUED | OUTPATIENT
Start: 2021-07-29 | End: 2021-08-18 | Stop reason: HOSPADM

## 2021-07-29 RX ORDER — AMITRIPTYLINE HYDROCHLORIDE 75 MG/1
75 TABLET ORAL NIGHTLY
Status: DISCONTINUED | OUTPATIENT
Start: 2021-07-29 | End: 2021-08-18 | Stop reason: HOSPADM

## 2021-07-29 RX ORDER — ACETAMINOPHEN 325 MG/1
650 TABLET ORAL ONCE
Status: COMPLETED | OUTPATIENT
Start: 2021-07-29 | End: 2021-07-29

## 2021-07-29 RX ORDER — ASCORBIC ACID 500 MG
500 TABLET ORAL EVERY EVENING
Status: DISCONTINUED | OUTPATIENT
Start: 2021-07-29 | End: 2021-08-18 | Stop reason: HOSPADM

## 2021-07-29 RX ORDER — HEPARIN SODIUM 1000 [USP'U]/ML
80 INJECTION, SOLUTION INTRAVENOUS; SUBCUTANEOUS ONCE
Status: COMPLETED | OUTPATIENT
Start: 2021-07-29 | End: 2021-07-29

## 2021-07-29 RX ORDER — HEPARIN SODIUM 1000 [USP'U]/ML
40 INJECTION, SOLUTION INTRAVENOUS; SUBCUTANEOUS PRN
Status: DISCONTINUED | OUTPATIENT
Start: 2021-07-29 | End: 2021-07-29

## 2021-07-29 RX ORDER — OXYCODONE HYDROCHLORIDE 5 MG/1
5 TABLET ORAL EVERY 4 HOURS PRN
Status: DISCONTINUED | OUTPATIENT
Start: 2021-07-29 | End: 2021-07-30

## 2021-07-29 RX ORDER — ATORVASTATIN CALCIUM 20 MG/1
20 TABLET, FILM COATED ORAL EVERY EVENING
Status: DISCONTINUED | OUTPATIENT
Start: 2021-07-29 | End: 2021-08-18 | Stop reason: HOSPADM

## 2021-07-29 RX ORDER — HEPARIN SODIUM 5000 [USP'U]/100ML
0-30 INJECTION, SOLUTION INTRAVENOUS CONTINUOUS
Status: DISCONTINUED | OUTPATIENT
Start: 2021-07-29 | End: 2021-07-29

## 2021-07-29 RX ORDER — FERROUS SULFATE 325(65) MG
325 TABLET ORAL EVERY EVENING
Status: DISCONTINUED | OUTPATIENT
Start: 2021-07-29 | End: 2021-08-18 | Stop reason: HOSPADM

## 2021-07-29 RX ORDER — BISACODYL 10 MG
10 SUPPOSITORY, RECTAL RECTAL
Status: DISCONTINUED | OUTPATIENT
Start: 2021-07-29 | End: 2021-08-18 | Stop reason: HOSPADM

## 2021-07-29 RX ORDER — AMITRIPTYLINE HYDROCHLORIDE 50 MG/1
75 TABLET, FILM COATED ORAL NIGHTLY
COMMUNITY
End: 2023-08-24 | Stop reason: SDUPTHER

## 2021-07-29 RX ORDER — BACLOFEN 10 MG/1
10 TABLET ORAL 3 TIMES DAILY PRN
COMMUNITY

## 2021-07-29 RX ORDER — VITAMIN B COMPLEX
2000 TABLET ORAL EVERY EVENING
Status: DISCONTINUED | OUTPATIENT
Start: 2021-07-29 | End: 2021-08-18 | Stop reason: HOSPADM

## 2021-07-29 RX ADMIN — Medication 500 MG: at 18:47

## 2021-07-29 RX ADMIN — ACETAMINOPHEN 1000 MG: 500 TABLET, FILM COATED ORAL at 18:53

## 2021-07-29 RX ADMIN — FERROUS SULFATE TAB 325 MG (65 MG ELEMENTAL FE) 325 MG: 325 (65 FE) TAB at 18:48

## 2021-07-29 RX ADMIN — IOHEXOL 50 ML: 350 INJECTION, SOLUTION INTRAVENOUS at 09:45

## 2021-07-29 RX ADMIN — HEPARIN SODIUM 5600 UNITS: 1000 INJECTION, SOLUTION INTRAVENOUS; SUBCUTANEOUS at 12:22

## 2021-07-29 RX ADMIN — ATORVASTATIN CALCIUM 20 MG: 20 TABLET, FILM COATED ORAL at 18:47

## 2021-07-29 RX ADMIN — Medication 2000 UNITS: at 18:47

## 2021-07-29 RX ADMIN — ACETAMINOPHEN 650 MG: 325 TABLET, FILM COATED ORAL at 10:08

## 2021-07-29 RX ADMIN — HEPARIN SODIUM 18 UNITS/KG/HR: 5000 INJECTION, SOLUTION INTRAVENOUS at 12:22

## 2021-07-29 RX ADMIN — ENOXAPARIN SODIUM 80 MG: 80 INJECTION SUBCUTANEOUS at 18:46

## 2021-07-29 ASSESSMENT — COGNITIVE AND FUNCTIONAL STATUS - GENERAL
SUGGESTED CMS G CODE MODIFIER DAILY ACTIVITY: CK
PERSONAL GROOMING: A LITTLE
MOVING TO AND FROM BED TO CHAIR: UNABLE
HELP NEEDED FOR BATHING: A LOT
MOVING FROM LYING ON BACK TO SITTING ON SIDE OF FLAT BED: A LOT
TOILETING: A LOT
DRESSING REGULAR UPPER BODY CLOTHING: A LOT
SUGGESTED CMS G CODE MODIFIER MOBILITY: CM
DAILY ACTIVITIY SCORE: 14
CLIMB 3 TO 5 STEPS WITH RAILING: TOTAL
EATING MEALS: A LITTLE
DRESSING REGULAR LOWER BODY CLOTHING: A LOT
TURNING FROM BACK TO SIDE WHILE IN FLAT BAD: A LOT
STANDING UP FROM CHAIR USING ARMS: TOTAL
MOBILITY SCORE: 8
WALKING IN HOSPITAL ROOM: TOTAL

## 2021-07-29 ASSESSMENT — PATIENT HEALTH QUESTIONNAIRE - PHQ9
1. LITTLE INTEREST OR PLEASURE IN DOING THINGS: NOT AT ALL
SUM OF ALL RESPONSES TO PHQ9 QUESTIONS 1 AND 2: 0
2. FEELING DOWN, DEPRESSED, IRRITABLE, OR HOPELESS: NOT AT ALL

## 2021-07-29 ASSESSMENT — LIFESTYLE VARIABLES
ON A TYPICAL DAY WHEN YOU DRINK ALCOHOL HOW MANY DRINKS DO YOU HAVE: 0
AVERAGE NUMBER OF DAYS PER WEEK YOU HAVE A DRINK CONTAINING ALCOHOL: 0
TOTAL SCORE: 0
EVER FELT BAD OR GUILTY ABOUT YOUR DRINKING: NO
HAVE PEOPLE ANNOYED YOU BY CRITICIZING YOUR DRINKING: NO
CONSUMPTION TOTAL: NEGATIVE
TOTAL SCORE: 0
HOW MANY TIMES IN THE PAST YEAR HAVE YOU HAD 5 OR MORE DRINKS IN A DAY: 0
HAVE YOU EVER FELT YOU SHOULD CUT DOWN ON YOUR DRINKING: NO
EVER HAD A DRINK FIRST THING IN THE MORNING TO STEADY YOUR NERVES TO GET RID OF A HANGOVER: NO
DOES PATIENT WANT TO STOP DRINKING: NO
ALCOHOL_USE: NO
TOTAL SCORE: 0

## 2021-07-29 ASSESSMENT — ENCOUNTER SYMPTOMS
FEVER: 0
NAUSEA: 1
SHORTNESS OF BREATH: 1
VOMITING: 1
CHILLS: 0
TINGLING: 1

## 2021-07-29 ASSESSMENT — FIBROSIS 4 INDEX: FIB4 SCORE: 2.2

## 2021-07-29 ASSESSMENT — PAIN DESCRIPTION - PAIN TYPE
TYPE: ACUTE PAIN

## 2021-07-29 NOTE — ED TRIAGE NOTES
Jairo Rivas  70 y.o. F  Chief Complaint   Patient presents with   • Shortness of Breath     Since monday   • Chest Wall Pain     Left sided stabbing pain that radiates to the back, pain increases with movement and breathing. Patient thought is was muscle pain and was using icy hot but the pain has been getting worse.      Patient is undergoing treatment for lung cancer. Patient recently finished chemotherapy infusions and is now taking oral chemo.    Vitals:    07/29/21 0511   BP: (Abnormal) 167/93   Pulse: (Abnormal) 124   Resp: 15   Temp: 36.7 °C (98 °F)   SpO2: 90%     Patient and family to Triage 2

## 2021-07-29 NOTE — H&P
History & Physical Note    Date of Admission: 7/29/2021  Admission Status: Inpatient  [facility] Team: Admitting team  Attending: LUIS FERNANDO Mccrary  Senior Resident: Dr. Toño Lama  Intern: Dr. Jam Fernandez  Contact Number: [phone]    Chief Complaint: Shortness of breath with chest pain     History of Present Illness (HPI):   Ms. Rivas is a 70 y.o. female with endometrial cancer with history of hypertension, prior stroke, endometrial cancer with lung metastasis (finished chemotherapy infusion, now on oral chemo) who presented on 7/29/2021 with shortness of breath and chest pain. She has been experiencing upper thigh pain in her left leg for the past week and shortness of breath for 3 days. She has also been experiencing some left sided stabbing chest pain that radiates to the back, pain increases with movement and breathing. Patient initially thought the pain was musculoskeletal, though symptoms have not improved with Icy Hot. Her chemotherapy regimen of Tamoxifen is for 3 weeks on, 3 weeks off. She notes her last dose for the 3 weeks on was 07/18. Her oncologist is Dr. Jann Garcia. She does experience some nausea and vomiting, but no chills. She notes some hand numbness.   In the ED she was found to be tachycardic, hypertensive and hypoxic to 86%. There was a very large left pleural effusion with associated compressive atelectasis on chest x-ray and non-occlusive pulmonary embolism in the right pulmonary artery (lobar and segmental branches) with no right heart strain on CTA chest pulmonary artery. She was started on heparin and underwent thoracentesis.     Review of Systems:  Review of Systems   Constitutional: Negative for chills and fever.   Respiratory: Positive for shortness of breath.    Cardiovascular: Positive for chest pain.   Gastrointestinal: Positive for nausea and vomiting.   Musculoskeletal:        Left upper thigh pain   Neurological: Positive for tingling (Hand).     Past Medical History:    Past Medical History was reviewed with patient.   has a past medical history of Anemia (2/6/2018), Back pain, Back pain, Cancer (HCC) (2019), Cough, CVA, old, hemiparesis (HCC) (2/6/2018), Essential hypertension (2/6/2018), Eye drainage, Fatigue, Frequent headaches, Frequent urination, Hyperlipidemia, Irregular periods, Painful joint, Palpitations, Post-menopause bleeding (2/6/2018), Pulmonary nodule, Sore muscles, Stroke (HCC), Swelling of lower extremity, Vision abnormalities (2/6/2018), Vitamin D deficiency (2/6/2018), Weakness, and Wears glasses.    Past Surgical History: Past Surgical History was reviewed with patient.   has a past surgical history that includes pr hysteroscopy,dx,sep proc (6/19/2019); dilation and curettage (6/19/2019); pr cystoscopy,insert ureteral stent (Left, 8/8/2019); hysterectomy robotic xi (N/A, 8/8/2019); salpingo oophorectomy (Bilateral, 8/8/2019); node biopsy sentinel (8/8/2019); and hysterectomy laparoscopy.    Medications: Medications have been reviewed with patient.  Prior to Admission Medications   Prescriptions Last Dose Informant Patient Reported? Taking?   B Complex-C (SUPER B COMPLEX PO) 7/28/2021 at PM Family Member Yes No   Sig: Take 1 Tab by mouth every evening.   Cholecalciferol (VITAMIN D3) 2000 UNIT Cap 7/28/2021 at PM Family Member Yes No   Sig: Take 2,000 Units by mouth every day.   Coenzyme Q10 (COQ10) 100 MG Cap 7/28/2021 at PM Family Member Yes No   Sig: Take 100 mg by mouth every evening.   acetaminophen (TYLENOL) 500 MG Tab 7/28/2021 at PRN Family Member Yes No   Sig: Take 1,000 mg by mouth every 6 hours as needed for Moderate Pain.   amLODIPine (NORVASC) 5 MG Tab 7/28/2021 at AFTERNOON Family Member No No   Sig: TAKE 1 TABLET BY MOUTH EVERYDAY AT BEDTIME   amitriptyline (ELAVIL) 50 MG Tab 7/28/2021 at PM Family Member Yes Yes   Sig: Take 75 mg by mouth every evening.   ascorbic acid (ASCORBIC ACID) 500 MG Tab 7/28/2021 at PM Family Member Yes No   Sig: Take  500 mg by mouth every evening.   aspirin (ASA) 81 MG Chew Tab chewable tablet 7/29/2021 at AM Family Member Yes No   Sig: Take 81 mg by mouth every day.   atorvastatin (LIPITOR) 20 MG Tab 7/28/2021 at PM Family Member No No   Sig: Take 1 Tab by mouth every evening.   baclofen (LIORESAL) 10 MG Tab 7/28/2021 at AM Family Member Yes Yes   Sig: Take 10 mg by mouth 3 times a day as needed.   ferrous sulfate 325 (65 Fe) MG tablet 7/28/2021 at PM Family Member No No   Sig: Take 1 tablet by mouth every day.   losartan (COZAAR) 100 MG Tab 7/28/2021 at AFTERNOON Family Member No No   Sig: TAKE 1 TABLET BY MOUTH EVERY DAY   meclizine (ANTIVERT) 12.5 MG Tab PRN at PRN Family Member No No   Sig: Take 1-2 Tablets by mouth 3 times a day as needed for Dizziness.   megestrol (MEGACE) 40 MG Tab 7/28/2021 at AFTERNOON Family Member Yes Yes   Sig: Take 40 mg by mouth every day. On for 3 weeks   Off for 3 weeks   Repeat   metoprolol SR (TOPROL XL) 100 MG TABLET SR 24 HR 7/29/2021 at AM Family Member No No   Sig: Take 1 tablet by mouth once daily   multivitamin (THERAGRAN) Tab 7/28/2021 at PM Family Member Yes No   Sig: Take 1 Tab by mouth every day.   senna-docusate (PERICOLACE OR SENOKOT S) 8.6-50 MG Tab PRN at PRN Family Member Yes No   Sig: Take 1 Tab by mouth 2 times a day as needed.   tamoxifen (NOLVADEX) 20 MG tablet ABOUT 2 WEEKS AGO at UNK Family Member Yes Yes   Sig: Take 20 mg by mouth 2 times a day. On for 3 weeks   Off for 3 weeks   Repeat      Facility-Administered Medications: None        Allergies: Allergies have been reviewed with patient.  Allergies   Allergen Reactions   • Penicillins Itching     Makes her feel weak    • Gluten Meal    • Hydrocodone Itching     Swelling of the tongue  Face swelling   • Iodine      Weak and dizzy     • Levaquin Itching     Swelling of the tongue  Facial swelling   • Tramadol Hives     Swelling       Family History:  family history includes Hypertension in her mother; No Known Problems  in her brother and father.     Social History:   Tobacco: None.   Alcohol: None.   Recreational drugs (illegal and prescription):  None.    Activity Level: Wheel chair bound.  Living situation: Lives in [place] with daughter.  Primary Care Provider: ZULEYKA Abebe  Physical Exam:   Vitals:  Temp:  [36.4 °C (97.6 °F)-36.7 °C (98 °F)] 36.4 °C (97.6 °F)  Pulse:  [105-126] 114  Resp:  [15-18] 18  BP: (144-184)/(74-93) 151/74  SpO2:  [86 %-98 %] 98 %    Physical Exam  HENT:      Head: Normocephalic and atraumatic.   Cardiovascular:      Rate and Rhythm: Regular rhythm. Tachycardia present.   Pulmonary:      Effort: Pulmonary effort is normal.      Comments: Coarse crackles in left mid lung.  Neurological:      Mental Status: She is alert.      Comments: Right hemiplegia       Labs:   Recent Labs     07/29/21  0737   WBC 8.2   RBC 4.01*   HEMOGLOBIN 12.7   HEMATOCRIT 39.4   MCV 98.3*   MCH 31.7   RDW 52.5*   PLATELETCT 304   MPV 10.3   NEUTSPOLYS 77.90*   LYMPHOCYTES 15.10*   MONOCYTES 5.60   EOSINOPHILS 0.40   BASOPHILS 0.50      Recent Labs     07/29/21  0737   SODIUM 138   POTASSIUM 3.9   CHLORIDE 107   CO2 21   GLUCOSE 114*   BUN 8     Recent Labs     07/29/21  0737   ALBUMIN 3.7   TBILIRUBIN 0.3   ALKPHOSPHAT 67   TOTPROTEIN 7.4   ALTSGPT 18   ASTSGOT 33   CREATININE 0.70     Trop T 14  BNP 18  COVID PCR negative  Lactic acid 1.7  INR 1.09    BCx x 2 collected.     EKG: Per my read, sinus tachcyardia, hr 123     Imaging:     CTA chest/pulmonary artery 07/29/2021:  FINDINGS:  Pulmonary Embolism: Nonocclusive pulmonary emboli are seen in the right pulmonary artery, its lobar and segmental branches. No left pulmonary emboli.    Chest X-ray 07/29/2021:   IMPRESSION:   Very large left pleural effusion with associated compressive atelectasis.    Previous Data Review: reviewed      Problem Representation:   Jairo is a 70 y.o. female with hypertension, prior stroke, endometrial cancer with lung  metastasis (finished chemotherapy infusion, now on oral chemo) who presented on 7/29/2021 with shortness of breath and chest pain. Found to have lobar and segmental right pulmonary artery embolism on CTA and large left pleural effusion with total collapse of left lower lobe.     * Pulmonary embolism (HCC)  Assessment & Plan  Patient was having left upper thigh pain for several days preceding her shortness of breath. Presented with elevated heart rate and hypoxia to the ER. No hypotension. Wells criteria > 4 based on symptoms, heart rate > 100 and malignancy. Non-occlusive right pulmonary artery embolism (lobar and segmental branches) on CTA. No evidence of right heart strain on CTA. Was started on heparin drip.   - Consider switch to LMWH after thoracentesis   - Monitoring on telemetry  - Monitor vitals for hemodynamic stability    Pleural effusion  Assessment & Plan  Shortness of breath with coarse crackles on left mid lung. Found to have large left sided pleural effusion with total collapse of left lower lobe and partial atelectasis of upper left lobe on CTA. Thoracentesis was done removing 950 cc of fluid. Pleural fluid studies sent. Most likely exudative secondary to malignancy vs. Pulmonary embolism.   - Follow-up pleural fluid studies.     Endometrial cancer (HCC)- (present on admission)  Assessment & Plan  Hx of endometrial cancer that has metastasized to the lungs. Follows-up with Dr. Jann Garcia. Finished chemotherapy infusion. Currently on tamoxifen (3 weeks on, 3 weeks off), last tamoxifen 07/18, would restart August 9th. Found to have 3 new pulmonary nodules up to 2.6 cm consistent with pulmonary metastasis.         DVT ppx: SCDs and heparin

## 2021-07-29 NOTE — ED PROVIDER NOTES
ED Provider Note    CHIEF COMPLAINT  Chief Complaint   Patient presents with   • Shortness of Breath     Since monday   • Chest Wall Pain     Left sided stabbing pain that radiates to the back, pain increases with movement and breathing. Patient thought is was muscle pain and was using icy hot but the pain has been getting worse.        OTTO Rivas is a 70 y.o. female who presents complaining of chest pain shortness of breath.  She is had this since Monday.  She was supposed to get a CT scan on Sunday to evaluate her cancer.  She suspects that the positioning and trying to get moved around is what made that worse.  Hurts to breathe.  Hurts to move certain directions.  Nothing really makes it better.  She is associated shortness of breath.  It is a stabbing, sharp pain in the left of center which radiates through towards her back.  He denies any weakness or numbness in the legs.  She hurts a little bit in her calves, nothing unusual however.  She has had a cough.  No fever.  There is no belly pain.  No change in bowel or bladder.  There is no other complaint.  Patient is a history of what sounds to be endometrial cancer cancer which is metastatic to the lungs.  There is no other complaint.    PAST MEDICAL HISTORY  Past Medical History:   Diagnosis Date   • Anemia 2/6/2018    Iron def, post-menopausal bleeding.   • Back pain    • Back pain    • Cancer (HCC) 2019    uterine & endometrial   • Cough    • CVA, old, hemiparesis (HCC) 2/6/2018    Residual left sided weakness   • Essential hypertension 2/6/2018    Well controlled on amlodipine 10 mg and metoprolol 100 mg bid.   • Eye drainage    • Fatigue    • Frequent headaches    • Frequent urination    • Hyperlipidemia    • Irregular periods    • Painful joint    • Palpitations    • Post-menopause bleeding 2/6/2018    Menopause in 2010 and bleeding 2012 started to have irregular continuous bleeding.    • Pulmonary nodule    • Sore muscles    • Stroke  (HCC)     right sided weakness   • Swelling of lower extremity    • Vision abnormalities 2/6/2018   • Vitamin D deficiency 2/6/2018    Taking 50,000 IU once weekly for 4 yrs.   • Weakness    • Wears glasses        FAMILY HISTORY  Family History   Problem Relation Age of Onset   • Hypertension Mother    • No Known Problems Father    • No Known Problems Brother        SOCIAL HISTORY  Social History     Tobacco Use   • Smoking status: Never Smoker   • Smokeless tobacco: Never Used   • Tobacco comment: n/a   Vaping Use   • Vaping Use: Never used   Substance Use Topics   • Alcohol use: No   • Drug use: No     He is here with her daughter and granddaughter.    SURGICAL HISTORY  Past Surgical History:   Procedure Laterality Date   • PB CYSTOSCOPY,INSERT URETERAL STENT Left 8/8/2019    Procedure: CYSTOSCOPY, WITH URETERAL STENT INSERTION;  Surgeon: Jann Garcia M.D.;  Location: [facility];  Service: Gynecology   • HYSTERECTOMY ROBOTIC XI N/A 8/8/2019    Procedure: HYSTERECTOMY, ROBOT-ASSISTED, USING DA RILEY XI;  Surgeon: Jann Garcia M.D.;  Location: [facility];  Service: Gynecology   • SALPINGO OOPHORECTOMY Bilateral 8/8/2019    Procedure: SALPINGO-OOPHORECTOMY;  Surgeon: Jann Garcia M.D.;  Location: [facility];  Service: Gynecology   • NODE BIOPSY SENTINEL  8/8/2019    Procedure: BIOPSY, LYMPH NODE, SENTINEL;  Surgeon: Jann Garcia M.D.;  Location: [facility];  Service: Gynecology   • PB HYSTEROSCOPY,DX,SEP PROC  6/19/2019    Procedure: HYSTEROSCOPY, DIAGNOSTIC;  Surgeon: Anel Chicas M.D.;  Location: [facility];  Service: Gynecology   • DILATION AND CURETTAGE  6/19/2019    Procedure: DILATION AND CURETTAGE;  Surgeon: Anel Chicas M.D.;  Location: [facility];  Service: Gynecology   • HYSTERECTOMY LAPAROSCOPY         CURRENT MEDICATIONS  Home Medications     Reviewed by April Bhatti R.N. (Registered Nurse) on 07/29/21 at  "0536  Med List Status: Not Addressed   Medication Last Dose Status   acetaminophen (TYLENOL) 500 MG Tab  Active   amitriptyline (ELAVIL) 50 MG Tab  Active   amLODIPine (NORVASC) 5 MG Tab  Active   ascorbic acid (ASCORBIC ACID) 500 MG Tab  Active   aspirin (ASA) 81 MG Chew Tab chewable tablet  Active   atorvastatin (LIPITOR) 20 MG Tab  Active   B Complex-C (SUPER B COMPLEX PO)  Active   baclofen (LIORESAL) 10 MG Tab  Active   Cholecalciferol (VITAMIN D3) 2000 UNIT Cap  Active   Coenzyme Q10 (COQ10) 100 MG Cap  Active   ferrous sulfate 325 (65 Fe) MG tablet  Active   gabapentin (NEURONTIN) 100 MG Cap  Active   losartan (COZAAR) 100 MG Tab  Active   meclizine (ANTIVERT) 12.5 MG Tab  Active   megestrol (MEGACE) 40 MG Tab  Active   metoprolol SR (TOPROL XL) 100 MG TABLET SR 24 HR  Active   multivitamin (THERAGRAN) Tab  Active   NON SPECIFIED  Active   ondansetron (ZOFRAN) 4 MG Tab tablet  Active   senna-docusate (PERICOLACE OR SENOKOT S) 8.6-50 MG Tab  Active   tamoxifen (NOLVADEX) 20 MG tablet  Active                I have reviewed the nurses notes and/or the list brought with the patient.    ALLERGIES  Allergies   Allergen Reactions   • Penicillins Itching     Makes her feel weak    • Gluten Meal    • Hydrocodone Itching     Swelling of the tongue  Face swelling   • Iodine      Weak and dizzy     • Levaquin Itching     Swelling of the tongue  Facial swelling   • Tramadol Hives     Swelling       REVIEW OF SYSTEMS  See HPI for further details. Review of systems as above, otherwise all other systems are negative.     PHYSICAL EXAM  VITAL SIGNS: BP (!) 167/93   Pulse (!) 121   Temp 36.7 °C (98 °F) (Temporal)   Resp 15   Ht 1.651 m (5' 5\")   Wt 88 kg (194 lb)   SpO2 92%   BMI 32.28 kg/m²     Constitutional: Tired. Appears uncomfortable but not grossly toxic.  HENT: Mucus membranes moist.  Oropharynx is clear.  Eyes: Pupils equally round.  No scleral icterus.   Neck: Full nontender range of motion.  Lymphatic: No " "cervical lymphadenopathy noted.   Cardiovascular: Tachycardic.  No murmurs, rubs, nor gallop appreciated.   Thorax & Lungs: Chest is nontender.  Lungs sounds are diminished at the bases left worse than right.  Abdomen: Soft, with no tenderness, rebound nor guarding.  No mass, pulsatile mass, nor hepatosplenomegaly appreciated.  Skin: No purpura nor petechia noted.  Extremities/Musculoskeletal: No sign of trauma.  Calves are nontender with no cords nor edema.  No Shilo's sign.  Pulses are intact all around.   Neurologic: Alert & oriented.  Strength and sensation is intact all around.  Gait is normal.  Psychiatric: Normal affect appropriate for the clinical situation.    EKG  I interpreted this EKG myself.  This is a 12-lead study.  The rhythm is sinus tachycardia with a rate of 123.  There are no ST segment nor T wave abnormalities.  Interpretation: No ST segment elevation myocardial infarction.    LABS  Labs Reviewed   CBC WITH DIFFERENTIAL - Abnormal; Notable for the following components:       Result Value    RBC 4.01 (*)     MCV 98.3 (*)     MCHC 32.2 (*)     RDW 52.5 (*)     Neutrophils-Polys 77.90 (*)     Lymphocytes 15.10 (*)     All other components within normal limits   COMP METABOLIC PANEL - Abnormal; Notable for the following components:    Glucose 114 (*)     Globulin 3.7 (*)     All other components within normal limits   LACTIC ACID   BLOOD CULTURE    Narrative:     Per Hospital Policy: Only change Specimen Src: to \"Line\" if  specified by physician order.   COV-2, FLU A/B, AND RSV BY PCR    Narrative:     Have you been in close contact with a person who is suspected  or known to be positive for COVID-19 within the last 30 days  (e.g. last seen that person < 30 days ago)->Unknown   TROPONIN   PROBRAIN NATRIURETIC PEPTIDE, NT   ESTIMATED GFR   FLUID TOTAL PROTEIN   LACTIC ACID   LACTIC ACID   BLOOD CULTURE   APTT   PROTHROMBIN TIME   HEPARIN XA (UNFRACTIONATED)   FLUID PH   FLUID LDH   FLUID GLUCOSE "   FLUID CELL COUNT   FLUID AMYLASE   CYTOLOGY   FLUID CULTURE W/GRAM STAIN   AFB CULTURE   FUNGAL CULTURE         RADIOLOGY/PROCEDURES  I have reviewed the patient's film interpretations myself, and they are read out by the radiologist as:   CT-CTA CHEST PULMONARY ARTERY W/ RECONS   Final Result      1.  Nonocclusive pulmonary emboli in the right pulmonary artery, its lobar and segmental branches. No right heart strain.   2.  Large left pleural effusion, with total collapse of the left lower lobe and partial atelectasis of the left upper lobe.   3.  At least 3 new pulmonary nodules measuring up to 2.6 cm, consistent with pulmonary metastases.   4.  Unchanged heterogeneous thyroid gland with multiple thyroid nodules.      Findings were discussed with KRISTOPHER COOK on 7/29/2021 9:38 AM.         DX-CHEST-PORTABLE (1 VIEW)   Final Result      Very large left pleural effusion with associated compressive atelectasis.      US-THORACENTESIS PUNCTURE LEFT    (Results Pending)     .    MEDICAL RECORD  I have reviewed patient's medical record and pertinent results are listed above.    COURSE & MEDICAL DECISION MAKING  I have reviewed any medical record information, laboratory studies and radiographic results as noted above.  This is a an oncology patient who presents with sharp, pleuritic chest pain with associated tachycardia and hypoxia.  I have a high pretest probability for pulmonary embolism.  Consideration for sepsis as well. She has mild hypoxia which corrects nicely with nasal cannula.    Chest x-ray shows a new left-sided pleural effusion, large. This is likely the cause of her symptoms. However, I would like to do a CT scan to evaluate for underlying pathology here.    I got a call from Dr. Herrera, radiology, who found the patient has that pleural effusion, likely pulmonary metastases, and pulmonary emboli.  I discussed the patient's case with the pharmacist, Hood, we will go ahead and start her on  anticoagulation.  I initially ordered unfractionated heparin, as I suspect that would be preferred by interventional.  I did call and speak up with Dr. GUERRIER, and from her standpoint either unfractionated or molecular weight heparin would be acceptable.  She is called back and was going to try to take her right now for that thoracentesis for therapeutic as well as diagnostic purposes.  I initially spoke with the [place] senior resident who will be admitting her.  I will talk with them again about this subsequent course if that would affect their choice of anticoagulation in-house.    I discussed all this with the patient and her daughter at the bedside.      FINAL IMPRESSION  1. Pleural effusion    2. Hypoxia    3. Tachycardia    4. Metastatic malignant neoplasm, unspecified site (HCC)      Critical care time, 35 minutes, which includes obtaining history from patient and/or family historians, examination of patient, reevaluation of patient, direction of nursing staff, and discussion with admitting and consulting physicians. It does not include any procedures.       This dictation was created using voice recognition software.    Electronically signed by: Drew Lerma M.D., 7/29/2021 7:03 AM

## 2021-07-29 NOTE — ED NOTES
Med rec completed per pt's family and med list (returned)  Allergies reviewed  No PO antibiotics in the last 30 days    Patient takes Megace and Tamoxifen   Patient alternates these medications every 3 weeks   On for 3 weeks   Off for 3 weeks   Repeat   Patient is currently on Megace and has been for about 2 weeks per family

## 2021-07-29 NOTE — ASSESSMENT & PLAN NOTE
Left thoracoscopy with evacuation of hemothorax and decortication. 8/6/2021  Chest tube removed 8/9/2021

## 2021-07-29 NOTE — CARE PLAN
Problem: Pain - Standard  Goal: Alleviation of pain or a reduction in pain to the patient’s comfort goal  Outcome: Progressing     Problem: Knowledge Deficit - Standard  Goal: Patient and family/care givers will demonstrate understanding of plan of care, disease process/condition, diagnostic tests and medications  Outcome: Progressing     Problem: Fall Risk  Goal: Patient will remain free from falls  Outcome: Progressing   The patient is Watcher - Medium risk of patient condition declining or worsening         Progress made toward(s) clinical / shift goals:  Pt is actively participating in care.     Patient is not progressing towards the following goals:

## 2021-07-29 NOTE — SENIOR ADMIT NOTE
"SENIOR ADMIT NOTE:    Toño Lama M.D.  Date & Time note created:    7/29/2021   3:10 PM     Patient: Jairo Rivas.  MRN: [id number].     Chief Complaint:  Shortness of breath  Chest wall pain    History of Present Illness:    70 years old female patient with past medical history of hypertension, history of prior stroke, history of endometrial cancer with lung metastasis presented today to the ER with symptoms of gradually worsening shortness of breath for the past couple days associated with left-sided pleuritic chest pain.  Upon presentation to the hospital, the patient was hypoxic and needed 4 L of nasal cannula to maintain oxygen saturation above 90%, hypertensive with blood pressure of 167/93, and tachycardic of 124.  Initial blood work-up was unremarkable.  Chest x-ray showed very large left pleural effusion with associated compressive atelectasis.  CTA of the pulmonary arteries showed nonocclusive pulmonary emboli in the right pulmonary artery with no right heart strain.      Physical Exam:  Weight/BMI: Body mass index is 32.28 kg/m².  /83   Pulse (!) 105   Temp 36.7 °C (98 °F) (Temporal)   Resp 15   Ht 1.651 m (5' 5\")   Wt 88 kg (194 lb)   SpO2 96%   Vitals:    07/29/21 1010 07/29/21 1029 07/29/21 1229 07/29/21 1329   BP: 157/85 (!) 172/85 151/88 144/83   Pulse: (!) 116 (!) 116 (!) 111 (!) 105   Resp:       Temp:       TempSrc:       SpO2: 94% 95% 94% 96%   Weight:       Height:         Oxygen Therapy:  Pulse Oximetry: 96 %, O2 (LPM): 4, O2 Delivery Device: Nasal Cannula    Physical exam:  Constitutional:  No acute distress. A&O x3  HENMT:  Normocephalic, Atraumatic  Eyes:  PERRLA, EOMI, Conjunctiva normal  Neck:  Supple, Full range of motion, No stridor  Cardiovascular:  Regular rate and rhythm, No murmurs, No rubs, No gallops.   Lungs: Respiratory effort is normal, no crackles, no wheezing.  Extremities: Good pedal pulses b/l, no edema, 5/5 strength.  Abdomen: Bowel sounds x4, Soft, " Non-tender, Non-distended, No guarding, No rebound, No masses.  Neurologic: Good sensations, Cranial nerves II through XII grossly intact. No focal deficits noted.    Imaging  CT-CTA CHEST PULMONARY ARTERY W/ RECONS   Final Result      1.  Nonocclusive pulmonary emboli in the right pulmonary artery, its lobar and segmental branches. No right heart strain.   2.  Large left pleural effusion, with total collapse of the left lower lobe and partial atelectasis of the left upper lobe.   3.  At least 3 new pulmonary nodules measuring up to 2.6 cm, consistent with pulmonary metastases.   4.  Unchanged heterogeneous thyroid gland with multiple thyroid nodules.      Findings were discussed with KRISTOPHER COOK on 7/29/2021 9:38 AM.         DX-CHEST-PORTABLE (1 VIEW)   Final Result      Very large left pleural effusion with associated compressive atelectasis.      US-THORACENTESIS PUNCTURE LEFT    (Results Pending)   DX-CHEST-PORTABLE (1 VIEW)    (Results Pending)         ASSESSMENT/PLAN:  #Acute PE  -Nonocclusive pulmonary emboli and a pulmonary artery  -No right heart strain  -Stable hemodynamics, not candidate   - started on heparin drip briefly, got thoracentesis by IR.  - will stop heparin drip for now, and start the patient on weight-based Lovenox  - Day team to consider switching to oral form of anticoagulation.  However, DOAC's have not been yet FDA approved for treatment of cancer associated thromboembolism.      #Large left pleural effusion:  -S/p thoracentesis by the IR  -Pleural fluid sent for analysis  -Highly suspicious for malignancy in the setting of lung metastasis        Admit: Patient. To telemetry  Continuous Cardiac Monitoring: Yes  DVT prophylaxis: Heparin  Code status: Full      For full details please refer to H&P done by Dr. Rebecca Lama M.D.

## 2021-07-29 NOTE — ED NOTES
Pt to green 27. Agree with triage note. Pt placed on 4L O2 due to desaturation. Family at bedside.

## 2021-07-29 NOTE — PROGRESS NOTES
Pt transferred to T823-1. Assumed care of pt. Pt sitting up in bed, no complaints of pain, 4L O2 nasal cannula, A/Ox4. Fall and safety precautions in place, strip alarm in place. Discussed POC with pt, pt verbalizes understanding. No further needs at this time.

## 2021-07-29 NOTE — ED NOTES
Attempted to call report to floor-- pt was assigned to a male shared room. Tele8 charge is contacting bed control

## 2021-07-30 ENCOUNTER — APPOINTMENT (OUTPATIENT)
Dept: RADIOLOGY | Facility: MEDICAL CENTER | Age: 71
DRG: 163 | End: 2021-07-30
Attending: INTERNAL MEDICINE
Payer: MEDICARE

## 2021-07-30 LAB
ALBUMIN SERPL BCP-MCNC: 3.4 G/DL (ref 3.2–4.9)
ALBUMIN/GLOB SERPL: 1.2 G/DL
ALP SERPL-CCNC: 54 U/L (ref 30–99)
ALT SERPL-CCNC: 16 U/L (ref 2–50)
ANION GAP SERPL CALC-SCNC: 11 MMOL/L (ref 7–16)
AST SERPL-CCNC: 19 U/L (ref 12–45)
BASOPHILS # BLD AUTO: 0.5 % (ref 0–1.8)
BASOPHILS # BLD: 0.03 K/UL (ref 0–0.12)
BILIRUB SERPL-MCNC: 0.4 MG/DL (ref 0.1–1.5)
BODY FLD TYPE: NORMAL
BUN SERPL-MCNC: 8 MG/DL (ref 8–22)
CALCIUM SERPL-MCNC: 8.8 MG/DL (ref 8.5–10.5)
CHLORIDE SERPL-SCNC: 108 MMOL/L (ref 96–112)
CO2 SERPL-SCNC: 23 MMOL/L (ref 20–33)
CREAT SERPL-MCNC: 0.65 MG/DL (ref 0.5–1.4)
EOSINOPHIL # BLD AUTO: 0.07 K/UL (ref 0–0.51)
EOSINOPHIL NFR BLD: 1.1 % (ref 0–6.9)
ERYTHROCYTE [DISTWIDTH] IN BLOOD BY AUTOMATED COUNT: 53.1 FL (ref 35.9–50)
FUNGUS SPEC FUNGUS STN: NORMAL
GLOBULIN SER CALC-MCNC: 2.8 G/DL (ref 1.9–3.5)
GLUCOSE SERPL-MCNC: 110 MG/DL (ref 65–99)
HCT VFR BLD AUTO: 34.4 % (ref 37–47)
HGB BLD-MCNC: 11.1 G/DL (ref 12–16)
IMM GRANULOCYTES # BLD AUTO: 0.02 K/UL (ref 0–0.11)
IMM GRANULOCYTES NFR BLD AUTO: 0.3 % (ref 0–0.9)
LDH SERPL L TO P-CCNC: 239 U/L (ref 107–266)
LYMPHOCYTES # BLD AUTO: 1.43 K/UL (ref 1–4.8)
LYMPHOCYTES NFR BLD: 21.5 % (ref 22–41)
MCH RBC QN AUTO: 31.6 PG (ref 27–33)
MCHC RBC AUTO-ENTMCNC: 32.3 G/DL (ref 33.6–35)
MCV RBC AUTO: 98 FL (ref 81.4–97.8)
MONOCYTES # BLD AUTO: 0.58 K/UL (ref 0–0.85)
MONOCYTES NFR BLD AUTO: 8.7 % (ref 0–13.4)
NEUTROPHILS # BLD AUTO: 4.52 K/UL (ref 2–7.15)
NEUTROPHILS NFR BLD: 67.9 % (ref 44–72)
NRBC # BLD AUTO: 0 K/UL
NRBC BLD-RTO: 0 /100 WBC
PLATELET # BLD AUTO: 273 K/UL (ref 164–446)
PMV BLD AUTO: 9.9 FL (ref 9–12.9)
POTASSIUM SERPL-SCNC: 3.5 MMOL/L (ref 3.6–5.5)
PROT FLD-MCNC: 4.3 G/DL
PROT SERPL-MCNC: 6.2 G/DL (ref 6–8.2)
RBC # BLD AUTO: 3.51 M/UL (ref 4.2–5.4)
RHODAMINE-AURAMINE STN SPEC: NORMAL
SIGNIFICANT IND 70042: NORMAL
SIGNIFICANT IND 70042: NORMAL
SITE SITE: NORMAL
SITE SITE: NORMAL
SODIUM SERPL-SCNC: 142 MMOL/L (ref 135–145)
SOURCE SOURCE: NORMAL
SOURCE SOURCE: NORMAL
WBC # BLD AUTO: 6.7 K/UL (ref 4.8–10.8)

## 2021-07-30 PROCEDURE — 97162 PT EVAL MOD COMPLEX 30 MIN: CPT

## 2021-07-30 PROCEDURE — 700102 HCHG RX REV CODE 250 W/ 637 OVERRIDE(OP): Performed by: STUDENT IN AN ORGANIZED HEALTH CARE EDUCATION/TRAINING PROGRAM

## 2021-07-30 PROCEDURE — 770020 HCHG ROOM/CARE - TELE (206)

## 2021-07-30 PROCEDURE — 99232 SBSQ HOSP IP/OBS MODERATE 35: CPT | Performed by: INTERNAL MEDICINE

## 2021-07-30 PROCEDURE — A9270 NON-COVERED ITEM OR SERVICE: HCPCS | Performed by: STUDENT IN AN ORGANIZED HEALTH CARE EDUCATION/TRAINING PROGRAM

## 2021-07-30 PROCEDURE — A9270 NON-COVERED ITEM OR SERVICE: HCPCS | Performed by: INTERNAL MEDICINE

## 2021-07-30 PROCEDURE — 71045 X-RAY EXAM CHEST 1 VIEW: CPT

## 2021-07-30 PROCEDURE — 700102 HCHG RX REV CODE 250 W/ 637 OVERRIDE(OP): Performed by: INTERNAL MEDICINE

## 2021-07-30 PROCEDURE — 85025 COMPLETE CBC W/AUTO DIFF WBC: CPT

## 2021-07-30 PROCEDURE — 36415 COLL VENOUS BLD VENIPUNCTURE: CPT

## 2021-07-30 PROCEDURE — 83615 LACTATE (LD) (LDH) ENZYME: CPT

## 2021-07-30 PROCEDURE — 700111 HCHG RX REV CODE 636 W/ 250 OVERRIDE (IP): Performed by: STUDENT IN AN ORGANIZED HEALTH CARE EDUCATION/TRAINING PROGRAM

## 2021-07-30 PROCEDURE — 80053 COMPREHEN METABOLIC PANEL: CPT

## 2021-07-30 PROCEDURE — 97166 OT EVAL MOD COMPLEX 45 MIN: CPT

## 2021-07-30 RX ORDER — ACETAMINOPHEN 500 MG
500 TABLET ORAL EVERY 6 HOURS PRN
Status: DISCONTINUED | OUTPATIENT
Start: 2021-07-30 | End: 2021-08-18 | Stop reason: HOSPADM

## 2021-07-30 RX ORDER — OXYCODONE HYDROCHLORIDE 5 MG/1
5 TABLET ORAL EVERY 4 HOURS PRN
Status: DISCONTINUED | OUTPATIENT
Start: 2021-07-30 | End: 2021-08-18 | Stop reason: HOSPADM

## 2021-07-30 RX ORDER — OXYCODONE HYDROCHLORIDE AND ACETAMINOPHEN 5; 325 MG/1; MG/1
1 TABLET ORAL EVERY 4 HOURS PRN
Status: DISCONTINUED | OUTPATIENT
Start: 2021-07-30 | End: 2021-07-30

## 2021-07-30 RX ORDER — AMLODIPINE BESYLATE 5 MG/1
5 TABLET ORAL
Status: DISCONTINUED | OUTPATIENT
Start: 2021-07-30 | End: 2021-08-11

## 2021-07-30 RX ORDER — POTASSIUM CHLORIDE 20 MEQ/1
40 TABLET, EXTENDED RELEASE ORAL ONCE
Status: COMPLETED | OUTPATIENT
Start: 2021-07-30 | End: 2021-07-30

## 2021-07-30 RX ADMIN — THERA TABS 1 TABLET: TAB at 05:34

## 2021-07-30 RX ADMIN — ENOXAPARIN SODIUM 80 MG: 80 INJECTION SUBCUTANEOUS at 05:36

## 2021-07-30 RX ADMIN — OXYCODONE 5 MG: 5 TABLET ORAL at 10:35

## 2021-07-30 RX ADMIN — METOPROLOL SUCCINATE 100 MG: 100 TABLET, EXTENDED RELEASE ORAL at 05:33

## 2021-07-30 RX ADMIN — Medication 2000 UNITS: at 17:51

## 2021-07-30 RX ADMIN — DOCUSATE SODIUM 50 MG AND SENNOSIDES 8.6 MG 2 TABLET: 8.6; 5 TABLET, FILM COATED ORAL at 17:50

## 2021-07-30 RX ADMIN — POTASSIUM CHLORIDE 40 MEQ: 1500 TABLET, EXTENDED RELEASE ORAL at 09:04

## 2021-07-30 RX ADMIN — AMLODIPINE BESYLATE 5 MG: 5 TABLET ORAL at 00:33

## 2021-07-30 RX ADMIN — LOSARTAN POTASSIUM 100 MG: 50 TABLET, FILM COATED ORAL at 13:08

## 2021-07-30 RX ADMIN — OXYCODONE 5 MG: 5 TABLET ORAL at 17:50

## 2021-07-30 RX ADMIN — ENOXAPARIN SODIUM 80 MG: 80 INJECTION SUBCUTANEOUS at 17:52

## 2021-07-30 RX ADMIN — ACETAMINOPHEN 500 MG: 500 TABLET, FILM COATED ORAL at 21:10

## 2021-07-30 RX ADMIN — Medication 500 MG: at 17:51

## 2021-07-30 RX ADMIN — FERROUS SULFATE TAB 325 MG (65 MG ELEMENTAL FE) 325 MG: 325 (65 FE) TAB at 17:51

## 2021-07-30 RX ADMIN — ASPIRIN 81 MG: 81 TABLET, CHEWABLE ORAL at 05:33

## 2021-07-30 RX ADMIN — OXYCODONE 5 MG: 5 TABLET ORAL at 05:34

## 2021-07-30 RX ADMIN — ATORVASTATIN CALCIUM 20 MG: 20 TABLET, FILM COATED ORAL at 17:51

## 2021-07-30 RX ADMIN — AMLODIPINE BESYLATE 5 MG: 5 TABLET ORAL at 21:10

## 2021-07-30 ASSESSMENT — COGNITIVE AND FUNCTIONAL STATUS - GENERAL
SUGGESTED CMS G CODE MODIFIER MOBILITY: CN
STANDING UP FROM CHAIR USING ARMS: TOTAL
WALKING IN HOSPITAL ROOM: TOTAL
HELP NEEDED FOR BATHING: A LOT
CLIMB 3 TO 5 STEPS WITH RAILING: TOTAL
SUGGESTED CMS G CODE MODIFIER DAILY ACTIVITY: CK
MOVING FROM LYING ON BACK TO SITTING ON SIDE OF FLAT BED: UNABLE
DRESSING REGULAR UPPER BODY CLOTHING: A LOT
MOVING TO AND FROM BED TO CHAIR: UNABLE
PERSONAL GROOMING: A LITTLE
EATING MEALS: A LITTLE
DAILY ACTIVITIY SCORE: 14
DRESSING REGULAR LOWER BODY CLOTHING: A LOT
MOBILITY SCORE: 6
TURNING FROM BACK TO SIDE WHILE IN FLAT BAD: UNABLE
TOILETING: A LOT

## 2021-07-30 ASSESSMENT — ENCOUNTER SYMPTOMS
CHILLS: 0
DEPRESSION: 0
DIZZINESS: 0
ORTHOPNEA: 1
HEADACHES: 0
COUGH: 1
VOMITING: 0
MYALGIAS: 0
DOUBLE VISION: 0
NAUSEA: 0
HEMOPTYSIS: 0
SORE THROAT: 0
FEVER: 0
BLURRED VISION: 0

## 2021-07-30 ASSESSMENT — FIBROSIS 4 INDEX: FIB4 SCORE: 1.18

## 2021-07-30 ASSESSMENT — GAIT ASSESSMENTS: GAIT LEVEL OF ASSIST: UNABLE TO PARTICIPATE

## 2021-07-30 ASSESSMENT — ACTIVITIES OF DAILY LIVING (ADL): TOILETING: REQUIRES ASSIST

## 2021-07-30 ASSESSMENT — PAIN DESCRIPTION - PAIN TYPE
TYPE: ACUTE PAIN

## 2021-07-30 NOTE — PROGRESS NOTES
Monitor Summary  Rhythm: ST  Rate: 111-114  Ectopy: None  0.14 / 0.07 / 0.31    Monitor Strip reviewed

## 2021-07-30 NOTE — CARE PLAN
The patient is Watcher - Medium risk of patient condition declining or worsening    Shift Goals  Clinical Goals: Monitor resp fx, pain control  Patient Goals: Pain control, rest    Progress made toward(s) clinical / shift goals:  Progressing      Problem: Pain - Standard  Goal: Alleviation of pain or a reduction in pain to the patient’s comfort goal  Outcome: Progressing     Problem: Knowledge Deficit - Standard  Goal: Patient and family/care givers will demonstrate understanding of plan of care, disease process/condition, diagnostic tests and medications  Outcome: Progressing     Problem: Fall Risk  Goal: Patient will remain free from falls  Outcome: Progressing     Problem: Skin Integrity  Goal: Skin integrity is maintained or improved  Outcome: Progressing

## 2021-07-30 NOTE — PROGRESS NOTES
Assumed care of pt. Bedside report received from JOB Moran. Pt was updated on plan of care. Call light, phone and personal belongings within reach. Bed locked in lowest position, 2 side rails up, bed alarm on and working appropriately.

## 2021-07-30 NOTE — PROGRESS NOTES
Daily Progress Note:     Date of Service: 7/30/2021  Primary Team: [facility] Blue Team   Attending: Hugo Chang MD   Senior Resident: Dr. Henley  Intern: Dr. Clements  Contact:  [phone]    Chief Complaint:   Chest pain   Shortness of breath    ID:  [id number] PMH Stage IV endometrial carcinoma with lung metastasis(s/p chemoradiation w/ cisplatin, now on PO tamoxifen, follows with Dr. Garcia), CVA(2011, 2015, w/ residual right hemiparesis) who presents with left sided pleuritic chest pain and shortness of breath for 4 days.    Subjective:   -Continues to have significant chest pain.   -Oxygen requirement has gone down to 2.5L.     Interval updates:  -Thora fluid: 465 polys, , No malignant cells noted. Negative fungal smear, AFB negative, NGTD.  -BCX NGTD,     Consultants/Specialty:  N/A    Review of Systems:    Review of Systems   Constitutional: Negative for chills and fever.   HENT: Negative for sore throat.    Eyes: Negative for blurred vision and double vision.   Respiratory: Positive for cough. Negative for hemoptysis.    Cardiovascular: Positive for chest pain and orthopnea. Negative for leg swelling.   Gastrointestinal: Negative for nausea and vomiting.   Genitourinary: Negative for dysuria and urgency.   Musculoskeletal: Negative for myalgias.   Neurological: Negative for dizziness and headaches.   Psychiatric/Behavioral: Negative for depression and suicidal ideas.       Objective Data:   Physical Exam:   Vitals:   Temp:  [36.2 °C (97.1 °F)-36.7 °C (98 °F)] 36.3 °C (97.3 °F)  Pulse:  [100-116] 107  Resp:  [16-18] 18  BP: (110-130)/(65-85) 123/76  SpO2:  [93 %-96 %] 93 %    Physical Exam  Constitutional:       General: She is not in acute distress.  HENT:      Head: Normocephalic and atraumatic.      Nose: Nose normal.      Mouth/Throat:      Mouth: Mucous membranes are moist.   Eyes:      Extraocular Movements: Extraocular movements intact.   Cardiovascular:      Rate and Rhythm: Normal rate and regular  rhythm.      Heart sounds: No murmur heard.     Pulmonary:      Comments: Reduced breath sounds in left mid and lower lung fields.  Clear breath sounds in all fields on right.     Abdominal:      General: Bowel sounds are normal.      Palpations: Abdomen is soft.   Musculoskeletal:      Cervical back: Neck supple.      Right lower leg: No edema.      Left lower leg: No edema.   Skin:     General: Skin is warm and dry.      Capillary Refill: Capillary refill takes less than 2 seconds.   Neurological:      General: No focal deficit present.      Mental Status: She is alert and oriented to person, place, and time.   Psychiatric:         Mood and Affect: Mood normal.         Behavior: Behavior normal.         Quality Measures  Quality-Core Measures   Bonilla catheter::  No Bonilla  DVT prophylaxis pharmacological::  Enoxaparin (Lovenox)  Ulcer Prophylaxis::  Not indicated    Labs:   Recent Labs     07/29/21  0737 07/29/21  1430 07/30/21  0341   WBC 8.2  --  6.7   RBC 4.01*  --  3.51*   HEMOGLOBIN 12.7  --  11.1*   HEMATOCRIT 39.4  --  34.4*   MCV 98.3*  --  98.0*   MCH 31.7  --  31.6   RDW 52.5*  --  53.1*   PLATELETCT 304  --  273   MPV 10.3  --  9.9   NEUTSPOLYS 77.90*  --  67.90   LYMPHOCYTES 15.10*  --  21.50*   MONOCYTES 5.60  --  8.70   EOSINOPHILS 0.40 1 1.10   BASOPHILS 0.50  --  0.50      Recent Labs     07/29/21  0737 07/30/21  0341   SODIUM 138 142   POTASSIUM 3.9 3.5*   CHLORIDE 107 108   CO2 21 23   GLUCOSE 114* 110*   BUN 8 8      Recent Labs     07/29/21  0737 07/30/21  0341   ALBUMIN 3.7 3.4   TBILIRUBIN 0.3 0.4   ALKPHOSPHAT 67 54   TOTPROTEIN 7.4 6.2   ALTSGPT 18 16   ASTSGOT 33 19   CREATININE 0.70 0.65      Lab Results   Component Value Date/Time    PROTHROMBTM 13.8 07/29/2021 12:08 PM    INR 1.09 07/29/2021 12:08 PM         Imaging:   DX-CHEST-LIMITED (1 VIEW)   Final Result      1.  No significant interval change.      DX-CHEST-PORTABLE (1 VIEW)   Final Result      1.  Interval decrease in the left  pleural effusion following thoracentesis.   2.  There is no pneumothorax visualized.      US-THORACENTESIS PUNCTURE LEFT   Final Result      1. Ultrasound guided left sided diagnostic thoracentesis.      2. 950 mL of fluid withdrawn.      CT-CTA CHEST PULMONARY ARTERY W/ RECONS   Final Result      1.  Nonocclusive pulmonary emboli in the right pulmonary artery, its lobar and segmental branches. No right heart strain.   2.  Large left pleural effusion, with total collapse of the left lower lobe and partial atelectasis of the left upper lobe.   3.  At least 3 new pulmonary nodules measuring up to 2.6 cm, consistent with pulmonary metastases.   4.  Unchanged heterogeneous thyroid gland with multiple thyroid nodules.      Findings were discussed with KRISTOPHER COOK on 7/29/2021 9:38 AM.         DX-CHEST-PORTABLE (1 VIEW)   Final Result      Very large left pleural effusion with associated compressive atelectasis.           * Pulmonary embolism (HCC)  Assessment & Plan  Patient was having left upper thigh pain for several days preceding her shortness of breath. Presented with elevated heart rate and hypoxia to the ER. No hypotension. Wells criteria > 4 based on symptoms, heart rate > 100 and malignancy. Non-occlusive right pulmonary artery embolism (lobar and segmental branches) on CTA. No evidence of right heart strain on CTA. Was started on heparin drip.     - On therapeutic lovenox  - Monitoring on telemetry  - Monitor vitals for hemodynamic stability    Pleural effusion  Assessment & Plan  Shortness of breath with coarse crackles on left mid lung. Found to have large left sided pleural effusion with total collapse of left lower lobe and partial atelectasis of upper left lobe on CTA. Thoracentesis was done removing 950 cc of fluid. Pleural fluid studies sent. Most likely exudative secondary to malignancy vs. Pulmonary embolism.   Studies so far show exudative effusion without malignant cells.     - Follow-up pending  pleural fluid studies.   -CXR today unchanged from post thora X ray yesterday, will CTM, may need repeat thora for worsening.     Endometrial cancer (HCC)- (present on admission)  Assessment & Plan  Hx of endometrial cancer that has metastasized to the lungs. Follows-up with Dr. Jann Garcia. Finished chemotherapy infusion. Currently on tamoxifen (3 weeks on, 3 weeks off), last tamoxifen 07/18, would restart August 9th. Found to have 3 new pulmonary nodules up to 2.6 cm consistent with pulmonary metastasis.          Code Status: FC  Diet:  Regular  Lines/Tubes/Drains: PIV  IVF: N/A  Prophylaxis:  DVT: Therapeutic lovenox  GI: Bowel regimen  Disposition:Inpatient for ongoing medical management    Please note that this dictation was created using voice recognition software. I have made every reasonable attempt to correct obvious errors, but there may be errors of grammar and possibly content that I did not discover before finalizing the note.

## 2021-07-30 NOTE — PROGRESS NOTES
Assumed care of patient, bedside report received from JOB carter. Updated on POC, call light within reach and fall precautions in place. Bed locked and in lowest position. Patient instructed to call for assistance before getting out of bed. All questions answered, no other needs at this time.

## 2021-07-30 NOTE — CARE PLAN
The patient is Watcher - Medium risk of patient condition declining or worsening    Shift Goals  Clinical Goals: titrate oxygen, pain control  Patient Goals: Pain control, rest    Progress made toward(s) clinical / shift goals: discussed plan for mobility/ turns. Patient reluctant, but accepting of education on importance. Patient agrees to try a waffle overlay for bed and attempt mobilization with PT. Patient unable to do mobilization with pt.     Patient is not progressing towards the following goals:na    Problem: Knowledge Deficit - Standard  Goal: Patient and family/care givers will demonstrate understanding of plan of care, disease process/condition, diagnostic tests and medications  Outcome: Progressing     Problem: Skin Integrity  Goal: Skin integrity is maintained or improved  Outcome: Progressing

## 2021-07-30 NOTE — DIETARY
Nutrition services: Day 1 of admit.  Jairo Rivas is a 70 y.o. female with admitting DX of pulmonary embolism. PMHx includes endometrial cancer with lung metastasis on oral chemotherapy.    Consult received for tube feed. Pt currently on regular diet, no intake documented yet. Per MD via Voalte text, tube feed consult not needed at this time. Consult completed and pt screened per department policy.    Negative malnutrition admit screen score; no weight loss per chart review. No wounds per flowsheets.    RD available PRN.

## 2021-07-30 NOTE — THERAPY
"Occupational Therapy   Initial Evaluation     Patient Name: Jairo Rivas  Age:  70 y.o., Sex:  female  Medical Record #: [id number]  Today's Date: 7/30/2021     Precautions  Precautions: (P) Fall Risk  Comments: (P) Chronic CVA with R sided weakness    Assessment  Patient is 70 y.o. female admitted for pulmonary embolism, pleural effusion, history of endometrial cancer with mets to the lungs. Pt states living in a GL apartment with daughter, apartment is wheelchair accessible as patient has history of CVA with chronic right sided weakness, daughter assists with all care at baseline. Attempted to transfer to edge of bed with total assist, pt unable due to severe pain in lung. WIll continue to see for skilled therapy and recommend potential post-acute placement pending true level of assist available from family.    Plan    Recommend Occupational Therapy 3 times per week until therapy goals are met for the following treatments:  Adaptive Equipment, Self Care/Activities of Daily Living, Therapeutic Activities and Therapeutic Exercises.    DC Equipment Recommendations: (P) Unable to determine at this time  Discharge Recommendations: (P) Recommend post-acute placement for additional occupational therapy services prior to discharge home (Pending level of assist available from family)     Subjective    \"Come back in a few days I should feel better\"     Objective       07/30/21 1435   Prior Living Situation   Prior Services Continuous (24 Hour) Care Giving Family   Housing / Facility 1 Story Apartment / Condo   Steps Into Home 0   Steps In Home 0   Bathroom Set up Bathtub / Shower Combination;Tub Transfer Bench;Grab Bars   Equipment Owned Wheelchair;Tub Transfer Bench;Grab Bar(s) In Tub / Shower   Lives with - Patient's Self Care Capacity Adult Children   Comments Daughter works from home and assists patient with all care   Prior Level of ADL Function   Self Feeding Independent   Grooming / Hygiene Independent "   Bathing Requires Assist   Dressing Requires Assist   Toileting Requires Assist   Prior Level of IADL Function   Medication Management Requires Assist   Laundry Requires Assist   Kitchen Mobility Requires Assist   Finances Requires Assist   Home Management Requires Assist   Shopping Requires Assist   Prior Level Of Mobility Uses Wheel Chair for in Home Mobility   Driving / Transportation Relatives / Others Provide Transportation   Occupation (Pre-Hospital Vocational) Retired Due To Age   History of Falls   History of Falls No   Precautions   Precautions Fall Risk   Comments Chronic CVA with R sided weakness   Pain 0 - 10 Group   Therapist Pain Assessment Post Activity Pain Same as Prior to Activity   Cognition    Cognition / Consciousness X   Speech/ Communication Dysarthric   Level of Consciousness Alert   Active ROM Upper Body   Active ROM Upper Body  X   Comments RUE chronic flaccid, LUE unable to move due to pain in rib/lung area   Sensation Upper Body   Upper Extremity Sensation  X   Comments chronic RUE    Balance Assessment   Sitting Balance (Static) Dependent   Bed Mobility    Supine to Sit Total Assist  (Attempted)   Sit to Supine Total Assist  (Attempted)   ADL Assessment   Eating Supervision   Upper Body Dressing Total Assist   Lower Body Dressing Total Assist   How much help from another person does the patient currently need...   Putting on and taking off regular lower body clothing? 2   Bathing (including washing, rinsing, and drying)? 2   Toileting, which includes using a toilet, bedpan, or urinal? 2   Putting on and taking off regular upper body clothing? 2   Taking care of personal grooming such as brushing teeth? 3   Eating meals? 3   6 Clicks Daily Activity Score 14   Functional Mobility   Sit to Stand Unable to Participate   Bed, Chair, Wheelchair Transfer Unable to Participate   Toilet Transfers Unable to Participate   Mobility Attempted bed mobility, returned to supine   Comments limited by  sharp back/rib pain   Patient / Family Goals   Patient / Family Goal #1 None stated   Short Term Goals   Short Term Goal # 1 Pt will complete ADL transfers with modA   Short Term Goal # 2 Pt will complete seated G/H with set up   Short Term Goal # 3 Pt will complete toileting with modA   Education Group   Education Provided Role of Occupational Therapist   Role of Occupational Therapist Patient Response Patient;Acceptance;Explanation;Reinforcement Needed   Problem List   Problem List Decreased Active Daily Living Skills;Decreased Homemaking Skills;Decreased Upper Extremity Strength Right;Decreased Upper Extremity Strength Left;Decreased Functional Mobility;Decreased Activity Tolerance;Impaired Postural Control / Balance   Interdisciplinary Plan of Care Collaboration   IDT Collaboration with  Nursing   Patient Position at End of Therapy In Bed;Bed Alarm On;Call Light within Reach;Tray Table within Reach;Phone within Reach   Collaboration Comments RN updated

## 2021-07-31 LAB
ANION GAP SERPL CALC-SCNC: 12 MMOL/L (ref 7–16)
BUN SERPL-MCNC: 10 MG/DL (ref 8–22)
CALCIUM SERPL-MCNC: 8.7 MG/DL (ref 8.5–10.5)
CHLORIDE SERPL-SCNC: 104 MMOL/L (ref 96–112)
CO2 SERPL-SCNC: 21 MMOL/L (ref 20–33)
CREAT SERPL-MCNC: 0.97 MG/DL (ref 0.5–1.4)
ERYTHROCYTE [DISTWIDTH] IN BLOOD BY AUTOMATED COUNT: 53.3 FL (ref 35.9–50)
GLUCOSE SERPL-MCNC: 118 MG/DL (ref 65–99)
GRAM STN SPEC: NORMAL
HCT VFR BLD AUTO: 34.7 % (ref 37–47)
HGB BLD-MCNC: 11 G/DL (ref 12–16)
MCH RBC QN AUTO: 31.8 PG (ref 27–33)
MCHC RBC AUTO-ENTMCNC: 31.7 G/DL (ref 33.6–35)
MCV RBC AUTO: 100.3 FL (ref 81.4–97.8)
PLATELET # BLD AUTO: 280 K/UL (ref 164–446)
PMV BLD AUTO: 10.2 FL (ref 9–12.9)
POTASSIUM SERPL-SCNC: 4 MMOL/L (ref 3.6–5.5)
RBC # BLD AUTO: 3.46 M/UL (ref 4.2–5.4)
SIGNIFICANT IND 70042: NORMAL
SITE SITE: NORMAL
SODIUM SERPL-SCNC: 137 MMOL/L (ref 135–145)
SOURCE SOURCE: NORMAL
WBC # BLD AUTO: 8.6 K/UL (ref 4.8–10.8)

## 2021-07-31 PROCEDURE — 700102 HCHG RX REV CODE 250 W/ 637 OVERRIDE(OP): Performed by: STUDENT IN AN ORGANIZED HEALTH CARE EDUCATION/TRAINING PROGRAM

## 2021-07-31 PROCEDURE — 700102 HCHG RX REV CODE 250 W/ 637 OVERRIDE(OP): Performed by: INTERNAL MEDICINE

## 2021-07-31 PROCEDURE — 85027 COMPLETE CBC AUTOMATED: CPT

## 2021-07-31 PROCEDURE — A9270 NON-COVERED ITEM OR SERVICE: HCPCS | Performed by: STUDENT IN AN ORGANIZED HEALTH CARE EDUCATION/TRAINING PROGRAM

## 2021-07-31 PROCEDURE — 700111 HCHG RX REV CODE 636 W/ 250 OVERRIDE (IP): Performed by: STUDENT IN AN ORGANIZED HEALTH CARE EDUCATION/TRAINING PROGRAM

## 2021-07-31 PROCEDURE — 302152 K-PAD 12X17: Performed by: STUDENT IN AN ORGANIZED HEALTH CARE EDUCATION/TRAINING PROGRAM

## 2021-07-31 PROCEDURE — A9270 NON-COVERED ITEM OR SERVICE: HCPCS | Performed by: INTERNAL MEDICINE

## 2021-07-31 PROCEDURE — 99232 SBSQ HOSP IP/OBS MODERATE 35: CPT | Performed by: INTERNAL MEDICINE

## 2021-07-31 PROCEDURE — 770020 HCHG ROOM/CARE - TELE (206)

## 2021-07-31 PROCEDURE — 36415 COLL VENOUS BLD VENIPUNCTURE: CPT

## 2021-07-31 PROCEDURE — 80048 BASIC METABOLIC PNL TOTAL CA: CPT

## 2021-07-31 RX ORDER — GUAIFENESIN 600 MG/1
600 TABLET, EXTENDED RELEASE ORAL EVERY 12 HOURS
Status: DISCONTINUED | OUTPATIENT
Start: 2021-07-31 | End: 2021-08-18 | Stop reason: HOSPADM

## 2021-07-31 RX ORDER — MENTHOL AND METHYL SALICYLATE 10; 30 G/100G; G/100G
CREAM TOPICAL PRN
Status: DISCONTINUED | OUTPATIENT
Start: 2021-07-31 | End: 2021-08-18 | Stop reason: HOSPADM

## 2021-07-31 RX ORDER — FUROSEMIDE 40 MG/1
40 TABLET ORAL
Status: DISCONTINUED | OUTPATIENT
Start: 2021-07-31 | End: 2021-07-31

## 2021-07-31 RX ORDER — FUROSEMIDE 10 MG/ML
20 INJECTION INTRAMUSCULAR; INTRAVENOUS
Status: DISCONTINUED | OUTPATIENT
Start: 2021-07-31 | End: 2021-08-01

## 2021-07-31 RX ADMIN — AMLODIPINE BESYLATE 5 MG: 5 TABLET ORAL at 21:08

## 2021-07-31 RX ADMIN — METOPROLOL SUCCINATE 100 MG: 100 TABLET, EXTENDED RELEASE ORAL at 05:02

## 2021-07-31 RX ADMIN — ASPIRIN 81 MG: 81 TABLET, CHEWABLE ORAL at 05:02

## 2021-07-31 RX ADMIN — ATORVASTATIN CALCIUM 20 MG: 20 TABLET, FILM COATED ORAL at 17:49

## 2021-07-31 RX ADMIN — LOSARTAN POTASSIUM 100 MG: 50 TABLET, FILM COATED ORAL at 13:22

## 2021-07-31 RX ADMIN — GUAIFENESIN 600 MG: 600 TABLET, EXTENDED RELEASE ORAL at 17:49

## 2021-07-31 RX ADMIN — ENOXAPARIN SODIUM 80 MG: 80 INJECTION SUBCUTANEOUS at 05:03

## 2021-07-31 RX ADMIN — AMITRIPTYLINE HYDROCHLORIDE 75 MG: 75 TABLET, FILM COATED ORAL at 21:08

## 2021-07-31 RX ADMIN — FERROUS SULFATE TAB 325 MG (65 MG ELEMENTAL FE) 325 MG: 325 (65 FE) TAB at 17:48

## 2021-07-31 RX ADMIN — ENOXAPARIN SODIUM 80 MG: 80 INJECTION SUBCUTANEOUS at 17:49

## 2021-07-31 RX ADMIN — THERA TABS 1 TABLET: TAB at 05:03

## 2021-07-31 RX ADMIN — DOCUSATE SODIUM 50 MG AND SENNOSIDES 8.6 MG 2 TABLET: 8.6; 5 TABLET, FILM COATED ORAL at 17:49

## 2021-07-31 RX ADMIN — OXYCODONE 5 MG: 5 TABLET ORAL at 05:03

## 2021-07-31 RX ADMIN — GUAIFENESIN 600 MG: 600 TABLET, EXTENDED RELEASE ORAL at 13:22

## 2021-07-31 RX ADMIN — Medication 500 MG: at 17:49

## 2021-07-31 RX ADMIN — OXYCODONE 5 MG: 5 TABLET ORAL at 21:08

## 2021-07-31 RX ADMIN — DOCUSATE SODIUM 50 MG AND SENNOSIDES 8.6 MG 2 TABLET: 8.6; 5 TABLET, FILM COATED ORAL at 05:02

## 2021-07-31 RX ADMIN — OXYCODONE 5 MG: 5 TABLET ORAL at 13:22

## 2021-07-31 RX ADMIN — FUROSEMIDE 20 MG: 10 INJECTION, SOLUTION INTRAMUSCULAR; INTRAVENOUS at 13:22

## 2021-07-31 RX ADMIN — Medication 2000 UNITS: at 17:48

## 2021-07-31 ASSESSMENT — ENCOUNTER SYMPTOMS
ABDOMINAL PAIN: 0
FEVER: 0
VOMITING: 0
FOCAL WEAKNESS: 1
PND: 0
FALLS: 0
COUGH: 1
ORTHOPNEA: 0
DIARRHEA: 0
PALPITATIONS: 0
HEADACHES: 0
NAUSEA: 0
SHORTNESS OF BREATH: 1
CHILLS: 0
BACK PAIN: 1

## 2021-07-31 ASSESSMENT — PAIN DESCRIPTION - PAIN TYPE
TYPE: ACUTE PAIN

## 2021-07-31 NOTE — DISCHARGE PLANNING
Received Choice form at 1438  Agency/Facility Name: [facility]  Referral not sent    No HH order at this time

## 2021-07-31 NOTE — PROGRESS NOTES
4 Eyes Skin Assessment Completed by JOB sEtes and JOB Nichols.     Head WDL  Ears WDL  Nose WDL  Mouth WDL  Neck WDL  Breast/Chest WDL  Shoulder Blades WDL  Spine WDL  (R) Arm/Elbow/Hand WDL  (L) Arm/Elbow/Hand WDL  Abdomen Redness on L side where thoracentesis completed.   Groin WDL  Scrotum/Coccyx/Buttocks redness, blanching   (R) Leg WDL  (L) Leg WDL  (R) Heel/Foot/Toe WDL  (L) Heel/Foot/Toe WDL              Devices In Places Tele Box and Pulse Ox        Interventions In Place NC W/Ear Foams, Waffle Overlay, Pillows and Pressure Redistribution Mattress     Possible Skin Injury No     Pictures Uploaded Into Epic N/A  Wound Consult Placed N/A  RN Wound Prevention Protocol Ordered No

## 2021-07-31 NOTE — CARE PLAN
The patient is Watcher - Medium risk of patient condition declining or worsening    Shift Goals  Clinical Goals: Monitor resp fx, pain control  Patient Goals: Pain control, rest    Progress made toward(s) clinical / shift goals:    Problem: Fall Risk  Goal: Patient will remain free from falls  Outcome: Progressing     Problem: Skin Integrity  Goal: Skin integrity is maintained or improved  Outcome: Progressing       Patient is not progressing towards the following goals:      Problem: Pain - Standard  Goal: Alleviation of pain or a reduction in pain to the patient’s comfort goal  Outcome: Not Progressing

## 2021-07-31 NOTE — PROGRESS NOTES
1400 patient did not eat lunch tray. RN offering to call and order new meal. patient refusing, stating she has other food coming. Patient not permitting RN to remove meal tray. RN left tray incase patient would like to eat meal.   1600  Discussed medications with patient. retimed medications per patient request. Discussed pain medications. Patient requesting to have medications changed back to oxycodone. MD updated new orders received.   patient requesting boost. Boost ordered.   1800 discussed dinner. Informed patient that if she wanted a substitution for her meal that this RN would order it. Patient verbalizes understanding and denies.

## 2021-07-31 NOTE — PROGRESS NOTES
Daily Progress Note:     Date of Service: 7/31/2021  Primary Team: [facility] Blue Team   Attending: ARLETH Mccrary   Senior Resident: Dr. Henley  Intern: Dr. Clements  Contact:  [phone]    Chief Complaint:   CP, SOB    Subjective:  No acute events overnight.  Pt reports chest pain is improving.  Still having consistent SOB, but this has improved following the thoracentesis.  Repeat CXR s/p thoracentesis shows large volume pleural effusion still present.  She notices that it is harder for her to breath while she is ambulating.  She is also having some back pain this AM, which she has had on and off in the past.  She is usually able to control this w/ heating pads, icy hot and tylenol.      Consultants/Specialty:  None.    Review of Systems:    Review of Systems   Constitutional: Negative for chills and fever.   Respiratory: Positive for cough and shortness of breath.    Cardiovascular: Positive for chest pain. Negative for palpitations, orthopnea, leg swelling and PND.   Gastrointestinal: Negative for abdominal pain, diarrhea, nausea and vomiting.   Genitourinary: Negative for dysuria and urgency.   Musculoskeletal: Positive for back pain. Negative for falls and joint pain.   Neurological: Positive for focal weakness (right sided hemiparesis 2/2 CVA). Negative for headaches.       Objective Data:   Physical Exam:   Vitals:   Temp:  [36 °C (96.8 °F)-37.1 °C (98.8 °F)] 36 °C (96.8 °F)  Pulse:  [101-115] 111  Resp:  [18] 18  BP: (116-144)/(65-90) 120/69  SpO2:  [93 %-95 %] 95 %    Physical Exam  Constitutional:       General: She is not in acute distress.     Appearance: She is not toxic-appearing.   HENT:      Head: Normocephalic and atraumatic.      Mouth/Throat:      Mouth: Mucous membranes are moist.      Pharynx: Oropharynx is clear.   Eyes:      General:         Right eye: No discharge.         Left eye: No discharge.      Conjunctiva/sclera: Conjunctivae normal.   Cardiovascular:      Rate and Rhythm:  Regular rhythm. Tachycardia present.      Pulses: Normal pulses.      Heart sounds: Normal heart sounds. No murmur heard.   No friction rub. No gallop.    Pulmonary:      Effort: Pulmonary effort is normal.      Comments: Breath sounds difficult to auscultate.  Decreased on left side.  No wheezes, rales or rhonchi noted.  Abdominal:      General: Abdomen is flat. Bowel sounds are normal. There is no distension.      Palpations: Abdomen is soft.      Tenderness: There is no abdominal tenderness.   Musculoskeletal:      Right lower leg: No edema.      Left lower leg: No edema.   Skin:     General: Skin is warm and dry.   Neurological:      Mental Status: She is alert.      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   Psychiatric:         Mood and Affect: Mood normal.         Behavior: Behavior normal.           Labs:   HEMATOLOGY/ ONCOLOGY/ID:            Recent Labs     07/29/21  0737 07/29/21  1430 07/30/21  0341 07/31/21  0306   WBC 8.2  --  6.7 8.6   RBC 4.01*  --  3.51* 3.46*   HEMOGLOBIN 12.7  --  11.1* 11.0*   HEMATOCRIT 39.4  --  34.4* 34.7*   MCV 98.3*  --  98.0* 100.3*   MCH 31.7  --  31.6 31.8   RDW 52.5*  --  53.1* 53.3*   PLATELETCT 304  --  273 280   MPV 10.3  --  9.9 10.2   NEUTSPOLYS 77.90*  --  67.90  --    LYMPHOCYTES 15.10*  --  21.50*  --    MONOCYTES 5.60  --  8.70  --    EOSINOPHILS 0.40 1 1.10  --    BASOPHILS 0.50  --  0.50  --      Lab Results   Component Value Date    ALETQOAZ94 984 (H) 09/25/2020    HYQCTMGZ20 714 05/15/2019    LLFMKROB47 937 (H) 10/02/2018    FOLATE >23.8 06/12/2019    FERRITIN 18.5 06/12/2019    FERRITIN 18.5 10/02/2018    FERRITIN 33.0 07/30/2018    IRON 35 (L) 06/12/2019    IRON 29 (L) 10/02/2018    IRON 44 07/30/2018    TOTIRONBC 272 06/12/2019    TOTIRONBC 392 10/02/2018    [facility] 356 07/30/2018       RENAL:        Estimated GFR/CRCL = Estimated Creatinine Clearance: 65.7 mL/min (by C-G formula based on SCr of 0.97 mg/dL).  Recent  Labs     07/29/21  0737 07/30/21  0341 07/31/21  0306   SODIUM 138 142 137   POTASSIUM 3.9 3.5* 4.0   CHLORIDE 107 108 104   CO2 21 23 21   GLUCOSE 114* 110* 118*   BUN 8 8 10   CREATININE 0.70 0.65 0.97   CALCIUM 9.3 8.8 8.7   MAGNESIUM 2.1  --   --    ALBUMIN 3.7 3.4  --        GASTROINTESTINAL/ HEPATIC:          Recent Labs     07/29/21  0737 07/29/21  1208 07/30/21  0341   ALTSGPT 18  --  16   ASTSGOT 33  --  19   ALKPHOSPHAT 67  --  54   TBILIRUBIN 0.3  --  0.4   ALBUMIN 3.7  --  3.4   GLOBULIN 3.7*  --  2.8   INR  --  1.09  --      No results found for: AMMONIA    ENDOCRINE:              Recent Labs     07/29/21  0737 07/30/21  0341 07/31/21  0306   GLUCOSE 114* 110* 118*     Lab Results   Component Value Date    FREET4 0.92 05/15/2019       Imaging:   CXR 7/30:  No significant interval change in pleural effusion, which is still present.    Problem Representation:  Carlene Rivas  is a 70 y.o. y/o woman w/ PMHx Stage IV endometrial cancer who presented on 7/29/2021 w/ worsening shortness of breath.  She was found to have a non-occlusive PE in the right pulmonary artery w/o right heart strain seen on CTA from 7/29.  Large left pleural effusion w/ total collapse of the LLL and partial atelectasis of HANK and 3 new pulmonary nodules present.  Thoracentesis performed on 7/29 removed 950 ml of fluid, + lights criteria.      * Pleural effusion  Assessment & Plan  Shortness of breath with coarse crackles on left mid lung. Found to have large left sided pleural effusion with total collapse of left lower lobe and partial atelectasis of upper left lobe on CTA. Thoracentesis was done removing 950 cc of fluid. Pleural fluid studies sent, positive lights criteria for exudative process.  Pending cytology and Infectious studies.  SOB improved following thoracentesis, repeat CXR shows large volume pleural effusion still present in left lung, which is likely the largest contributing factor to pt ongoing AHRF.  Pt states she  didn't tolerate the procedure well and wouldn't be willing to undergo another thoracentesis at this time and would prefer medical management.    -Follow-up pending pleural fluid studies.   -Lasix 20 mg IV QD  -Consider thoracentesis if poor response to diuresis    Pulmonary embolism (HCC)  Assessment & Plan  Patient was having left upper thigh pain for several days preceding her shortness of breath. Presented with elevated heart rate and hypoxia to the ER. No hypotension. Wells criteria > 4 based on symptoms, heart rate > 100 and malignancy. Non-occlusive right pulmonary artery embolism (lobar and segmental branches) on CTA. No evidence of right heart strain on CTA. Likely only mild contribution to AHRF as the PE is non-occlusive and no evidence of right heart strain seen.    -On therapeutic lovenox, can consider transition to DOAC if no further procedures planned  -Monitoring on telemetry  -Monitor vitals for hemodynamic stability    Endometrial cancer (HCC)- (present on admission)  Assessment & Plan  Hx of endometrial cancer that has metastasized to the lungs. Follows-up with Dr. Jann Garcia. Finished chemotherapy infusion. Currently on tamoxifen (3 weeks on, 3 weeks off), last tamoxifen 07/18, would restart August 9th. Found to have 3 new pulmonary nodules up to 2.6 cm consistent with pulmonary metastasis.    -Pt family planning on moving pt closer to them in [place], inquiring about oncologist recommendations        Code Status: Full code  DVT ppx: Lovenox  Diet: Regular diet  GI: Bowel regimen  T/L/D: pIV right forearm  Disposition: Anticipate >48 hours for monitoring response to diuresis, potential thoracentesis if needed      Julio Clements DO  PGY-1, [facility]    Assessment and plan discussed with senior resident and attending physician.

## 2021-07-31 NOTE — THERAPY
Physical Therapy   Initial Evaluation     Patient Name: Jairo Rivas  Age:  70 y.o., Sex:  female  Medical Record #: [id number]  Today's Date: 7/30/2021     Precautions: Fall Risk; 10 year hx of CVA, right sided deficits    Assessment  Pt presents with impaired activity tolerance, weakness and pain associated with c/c of left sided pleutric chest pain in setting of endometria cancer found to have nonobstructive PE and pleural effusion requiring drainage. Pt is most limited by significant chest pain, resisting EOB and screaming out due to whole side pain; reports at baseline her daughter transfers her to w/c and provides assist for ADLs/IADLs as she works from home; unclear how much assist daughter is able to provide, but pt does report daughter was a medic in the army and is able to lift her; will follow to progress, currently would recommend placement unless daughter confirms she can continue 24/7 support/lifting at home;     Plan    Recommend Physical Therapy 3 times per week until therapy goals are met for the following treatments:  Bed Mobility, Equipment, Manual Therapy, Neuro Re-Education / Balance, Self Care/Home Evaluation, Sensory Integration Techniques, Therapeutic Activities and Therapeutic Exercises    DC Equipment Recommendations: Unable to determine at this time  Discharge Recommendations: Recommend post-acute placement for additional physical therapy services prior to discharge home (unless daughter confirms ability to continue to provide 24/7 support)        Abridged Subjective/Objective     07/30/21 1445   Prior Living Situation   Prior Services Continuous (24 Hour) Care Giving Family   Housing / Facility 1 Story Apartment / Condo   Steps Into Home 0   Steps In Home 0   Bathroom Set up Bathtub / Shower Combination;Tub Transfer Bench;Grab Bars   Equipment Owned Wheelchair;Tub Transfer Bench;Grab Bar(s) In Tub / Shower   Lives with - Patient's Self Care Capacity Adult Children   Comments  dtr works from home and provides 24/7 care; has some support from granddaughter as well;    Prior Level of Functional Mobility   Bed Mobility Independent   Transfer Status Required Assist   Ambulation Required Assist   Assistive Devices Used Wheelchair   Wheelchair Independent   Cognition    Cognition / Consciousness X   Speech/ Communication Dysarthric   Level of Consciousness Alert   Initiation Impaired   Comments tangential on attempt, speaking frequently of chemo meds; difficult to understand at times, appears to have an accent and dysarthria; resisted upright activity and could not articulate mobility goals    Passive ROM Lower Body   Passive ROM Lower Body X   Comments resists ROM of ankle/knee, appears functional for sitting in w/c    Strength Lower Body   Lower Body Strength  X   Comments right toes able to wiggle, otherwise denies movement in right LE, possibly has more than demo'd; left 5/5    Sensation Lower Body   Lower Extremity Sensation   WDL   Balance Assessment   Sitting Balance (Static) Dependent   Comments screaming and resisting EOB, extending into support    Gait Analysis   Gait Level Of Assist Unable to Participate   Bed Mobility    Supine to Sit Total Assist   Sit to Supine Total Assist   Comments achieved 75% of EOB, screaming in pain and requested to stop    Functional Mobility   Sit to Stand Unable to Participate   Patient / Family Goals    Patient / Family Goal #1 to improve left sided pain    Short Term Goals    Short Term Goal # 1 Pt will perform supine<>sit from flat HOB/no railing with min A to reduce caregiver burden within 6 visits.    Short Term Goal # 2 Pt will stand pivot transfer with mod A within 6 visits to ensure progress to PLOF.    Short Term Goal # 3 Pt will self propel w/c x 150ft with left Ue/LE within 6 visits to ensure household mobility.    Education Group   Role of Physical Therapist Patient Response Patient;Acceptance;Explanation;Demonstration;Verbal  Demonstration;Action Demonstration

## 2021-07-31 NOTE — CARE PLAN
The patient is Stable - Low risk of patient condition declining or worsening    Shift Goals  Clinical Goals: Monitor resp fx, pain control  Patient Goals: Pain control, rest    Progress made toward(s) clinical / shift goals:    Problem: Pain - Standard  Goal: Alleviation of pain or a reduction in pain to the patient’s comfort goal  Outcome: Progressing     Problem: Knowledge Deficit - Standard  Goal: Patient and family/care givers will demonstrate understanding of plan of care, disease process/condition, diagnostic tests and medications  Outcome: Progressing     Problem: Fall Risk  Goal: Patient will remain free from falls  Outcome: Progressing     Problem: Skin Integrity  Goal: Skin integrity is maintained or improved  Outcome: Progressing

## 2021-07-31 NOTE — PROGRESS NOTES
Assumed care of pt. Bedside report received from Judy [facility]. Pt was updated on plan of care. Call light, phone and personal belongings in reach. Bed alarm on and working properly, bed in lowest position, and locked.

## 2021-07-31 NOTE — DISCHARGE PLANNING
Anticipated Discharge Disposition: Home with HH    Action: Spoke to pt and daughter at bedside. Both prefer HH, as pt uses a walker and wheelchair already that daughter helps getting pt in and out of. Pt has [facility], choice form for [facility] faxed to DPA. Dr. Henley messaged to put in HH order.    Barriers to Discharge: HH order pending, [facility] acceptance, medical clearance    Plan: f/u with medical team and pt and daughter to discuss dc needs and barriers, [facility] for HH acceptance

## 2021-08-01 ENCOUNTER — APPOINTMENT (OUTPATIENT)
Dept: RADIOLOGY | Facility: MEDICAL CENTER | Age: 71
DRG: 163 | End: 2021-08-01
Attending: STUDENT IN AN ORGANIZED HEALTH CARE EDUCATION/TRAINING PROGRAM
Payer: MEDICARE

## 2021-08-01 LAB
ANION GAP SERPL CALC-SCNC: 12 MMOL/L (ref 7–16)
BACTERIA FLD AEROBE CULT: NORMAL
BUN SERPL-MCNC: 18 MG/DL (ref 8–22)
CALCIUM SERPL-MCNC: 8.9 MG/DL (ref 8.5–10.5)
CHLORIDE SERPL-SCNC: 105 MMOL/L (ref 96–112)
CO2 SERPL-SCNC: 21 MMOL/L (ref 20–33)
CREAT SERPL-MCNC: 1.19 MG/DL (ref 0.5–1.4)
EKG IMPRESSION: NORMAL
ERYTHROCYTE [DISTWIDTH] IN BLOOD BY AUTOMATED COUNT: 52.7 FL (ref 35.9–50)
GLUCOSE SERPL-MCNC: 125 MG/DL (ref 65–99)
GRAM STN SPEC: NORMAL
HCT VFR BLD AUTO: 28.3 % (ref 37–47)
HGB BLD-MCNC: 9 G/DL (ref 12–16)
MCH RBC QN AUTO: 31.8 PG (ref 27–33)
MCHC RBC AUTO-ENTMCNC: 31.8 G/DL (ref 33.6–35)
MCV RBC AUTO: 100 FL (ref 81.4–97.8)
PLATELET # BLD AUTO: 259 K/UL (ref 164–446)
PMV BLD AUTO: 10.2 FL (ref 9–12.9)
POTASSIUM SERPL-SCNC: 4.2 MMOL/L (ref 3.6–5.5)
RBC # BLD AUTO: 2.83 M/UL (ref 4.2–5.4)
SIGNIFICANT IND 70042: NORMAL
SITE SITE: NORMAL
SODIUM SERPL-SCNC: 138 MMOL/L (ref 135–145)
SOURCE SOURCE: NORMAL
TROPONIN T SERPL-MCNC: 17 NG/L (ref 6–19)
WBC # BLD AUTO: 7.8 K/UL (ref 4.8–10.8)

## 2021-08-01 PROCEDURE — 93010 ELECTROCARDIOGRAM REPORT: CPT | Performed by: INTERNAL MEDICINE

## 2021-08-01 PROCEDURE — 80048 BASIC METABOLIC PNL TOTAL CA: CPT

## 2021-08-01 PROCEDURE — 700102 HCHG RX REV CODE 250 W/ 637 OVERRIDE(OP): Performed by: STUDENT IN AN ORGANIZED HEALTH CARE EDUCATION/TRAINING PROGRAM

## 2021-08-01 PROCEDURE — 99233 SBSQ HOSP IP/OBS HIGH 50: CPT | Performed by: INTERNAL MEDICINE

## 2021-08-01 PROCEDURE — 700105 HCHG RX REV CODE 258

## 2021-08-01 PROCEDURE — 700111 HCHG RX REV CODE 636 W/ 250 OVERRIDE (IP): Performed by: STUDENT IN AN ORGANIZED HEALTH CARE EDUCATION/TRAINING PROGRAM

## 2021-08-01 PROCEDURE — 84484 ASSAY OF TROPONIN QUANT: CPT

## 2021-08-01 PROCEDURE — 93005 ELECTROCARDIOGRAM TRACING: CPT | Performed by: STUDENT IN AN ORGANIZED HEALTH CARE EDUCATION/TRAINING PROGRAM

## 2021-08-01 PROCEDURE — A9270 NON-COVERED ITEM OR SERVICE: HCPCS | Performed by: STUDENT IN AN ORGANIZED HEALTH CARE EDUCATION/TRAINING PROGRAM

## 2021-08-01 PROCEDURE — 71045 X-RAY EXAM CHEST 1 VIEW: CPT

## 2021-08-01 PROCEDURE — 770020 HCHG ROOM/CARE - TELE (206)

## 2021-08-01 PROCEDURE — 36415 COLL VENOUS BLD VENIPUNCTURE: CPT

## 2021-08-01 PROCEDURE — 85027 COMPLETE CBC AUTOMATED: CPT

## 2021-08-01 PROCEDURE — 700102 HCHG RX REV CODE 250 W/ 637 OVERRIDE(OP): Performed by: INTERNAL MEDICINE

## 2021-08-01 PROCEDURE — A9270 NON-COVERED ITEM OR SERVICE: HCPCS | Performed by: INTERNAL MEDICINE

## 2021-08-01 RX ORDER — SODIUM CHLORIDE 9 MG/ML
500 INJECTION, SOLUTION INTRAVENOUS ONCE
Status: COMPLETED | OUTPATIENT
Start: 2021-08-01 | End: 2021-08-01

## 2021-08-01 RX ORDER — METOPROLOL TARTRATE 50 MG/1
50 TABLET, FILM COATED ORAL ONCE
Status: COMPLETED | OUTPATIENT
Start: 2021-08-01 | End: 2021-08-01

## 2021-08-01 RX ADMIN — GUAIFENESIN 600 MG: 600 TABLET, EXTENDED RELEASE ORAL at 04:32

## 2021-08-01 RX ADMIN — ENOXAPARIN SODIUM 80 MG: 80 INJECTION SUBCUTANEOUS at 16:35

## 2021-08-01 RX ADMIN — SODIUM CHLORIDE 500 ML: 9 INJECTION, SOLUTION INTRAVENOUS at 04:32

## 2021-08-01 RX ADMIN — ATORVASTATIN CALCIUM 20 MG: 20 TABLET, FILM COATED ORAL at 16:32

## 2021-08-01 RX ADMIN — ENOXAPARIN SODIUM 80 MG: 80 INJECTION SUBCUTANEOUS at 04:32

## 2021-08-01 RX ADMIN — Medication 500 MG: at 16:34

## 2021-08-01 RX ADMIN — ASPIRIN 81 MG: 81 TABLET, CHEWABLE ORAL at 04:32

## 2021-08-01 RX ADMIN — LOSARTAN POTASSIUM 100 MG: 50 TABLET, FILM COATED ORAL at 12:45

## 2021-08-01 RX ADMIN — OXYCODONE 5 MG: 5 TABLET ORAL at 04:33

## 2021-08-01 RX ADMIN — GUAIFENESIN 600 MG: 600 TABLET, EXTENDED RELEASE ORAL at 16:35

## 2021-08-01 RX ADMIN — THERA TABS 1 TABLET: TAB at 04:31

## 2021-08-01 RX ADMIN — FERROUS SULFATE TAB 325 MG (65 MG ELEMENTAL FE) 325 MG: 325 (65 FE) TAB at 16:34

## 2021-08-01 RX ADMIN — Medication 2000 UNITS: at 16:33

## 2021-08-01 RX ADMIN — METOPROLOL TARTRATE 50 MG: 50 TABLET, FILM COATED ORAL at 18:06

## 2021-08-01 RX ADMIN — OXYCODONE 5 MG: 5 TABLET ORAL at 16:19

## 2021-08-01 ASSESSMENT — ENCOUNTER SYMPTOMS
NAUSEA: 0
FEVER: 0
VOMITING: 0
PALPITATIONS: 0
SHORTNESS OF BREATH: 1
HEADACHES: 0
COUGH: 1
FALLS: 0
BACK PAIN: 0
DIARRHEA: 0
ORTHOPNEA: 0
FOCAL WEAKNESS: 1
CHILLS: 0
PND: 0
ABDOMINAL PAIN: 0

## 2021-08-01 ASSESSMENT — PAIN DESCRIPTION - PAIN TYPE
TYPE: ACUTE PAIN

## 2021-08-01 ASSESSMENT — FIBROSIS 4 INDEX: FIB4 SCORE: 1.25

## 2021-08-01 NOTE — PROGRESS NOTES
At 1115 assisted patient to the commode. Changed bedding and provided perineal care. Patient refusing bed bath at this time.     At 1130 assisted patient to the commode.     At 1235 family requesting MD at bedside. Contacted MD Madrigal. MD Madrigal to bedside at 1245.

## 2021-08-01 NOTE — CARE PLAN
The patient is Watcher - Medium risk of patient condition declining or worsening    Shift Goals  Clinical Goals: Monitor resp fx, pain control  Patient Goals: Pain control, rest    Progress made toward(s) clinical / shift goals:    Problem: Pain - Standard  Goal: Alleviation of pain or a reduction in pain to the patient’s comfort goal  Outcome: Progressing     Problem: Fall Risk  Goal: Patient will remain free from falls  Outcome: Progressing     Problem: Skin Integrity  Goal: Skin integrity is maintained or improved  Outcome: Progressing       Patient is not progressing towards the following goals:

## 2021-08-01 NOTE — PROGRESS NOTES
4 Eyes Skin Assessment Completed by JOB Castle and JOB Wang.    Head WDL  Ears WDL  Nose WDL  Mouth WDL  Neck WDL  Breast/Chest WDL  Shoulder Blades WDL  Spine WDL  (R) Arm/Elbow/Hand WDL  (L) Arm/Elbow/Hand WDL  Abdomen Redness and bruising on L side where thoracentesis completed.   Groin WDL  Scrotum/Coccyx/Buttocks Redness, Blanching and Excoriation  (R) Leg WDL  (L) Leg Incision  (R) Heel/Foot/Toe Blanching and Boggy  (L) Heel/Foot/Toe Blanching and Boggy          Devices In Places Tele Box and Pulse Ox      Interventions In Place Gray Ear Foams, Heel Float Boots, Chair Waffle, Waffle Overlay, Pillows, Q2 Turns, Barrier Cream, Heels Loaded W/Pillows and Pressure Redistribution Mattress    Possible Skin Injury No    Pictures Uploaded Into Epic N/A  Wound Consult Placed N/A  RN Wound Prevention Protocol Ordered No

## 2021-08-01 NOTE — PROGRESS NOTES
Assumed care of patient at bedside report from NOC RN. Updated on POC. Patient currently sleeping. Call light within reach. Whiteboard updated. Fall precautions in place. Bed locked and in lowest position. Hourly rounding in place.

## 2021-08-01 NOTE — PROGRESS NOTES
Assumed care of pt. Bedside report received from Alayna [facility]. Pt was updated on plan of care. Call light, phone and personal belongings in reach. Bed alarm on and working properly, bed in lowest position, and locked.

## 2021-08-01 NOTE — PROGRESS NOTES
"CNA notified this RN of pt BP of 87/57. RN went to assess pt, pt stated \"she feels dizzy\" Paged Dr. Summers regarding pt being symptomatic. No new orders at this time, Dr. Summers will review pt chart and determine orders.   "

## 2021-08-01 NOTE — PROGRESS NOTES
4 Eyes Skin Assessment Completed by JOB Estes and JOB Nichols.     Head WDL  Ears WDL  Nose WDL  Mouth WDL  Neck WDL  Breast/Chest WDL  Shoulder Blades WDL  Spine WDL  (R) Arm/Elbow/Hand WDL  (L) Arm/Elbow/Hand WDL  Abdomen Redness on L side where thoracentesis completed.   Groin WDL  Scrotum/Coccyx/Buttocks redness, blanching   (R) Leg WDL  (L) Leg WDL  (R) Heel/Foot/Toe WDL  (L) Heel/Foot/Toe WDL              Devices In Places Tele Box and Pulse Ox        Interventions In Place NC W/Ear Foams, Waffle Overlay, Pillows and Pressure Redistribution Mattress     Possible Skin Injury No     Pictures Uploaded Into Epic N/A  Wound Consult Placed N/A  RN Wound Prevention Protocol Ordered No

## 2021-08-01 NOTE — PROGRESS NOTES
"Pt states \"I have chest pressure and pain. It feels different\". Pt o2 saturation 95% on 2L. Pt .  MD Clements notified. MD Clements to bedside.   "

## 2021-08-02 ENCOUNTER — APPOINTMENT (OUTPATIENT)
Dept: RADIOLOGY | Facility: MEDICAL CENTER | Age: 71
DRG: 163 | End: 2021-08-02
Attending: STUDENT IN AN ORGANIZED HEALTH CARE EDUCATION/TRAINING PROGRAM
Payer: MEDICARE

## 2021-08-02 PROBLEM — D64.9 ANEMIA: Status: ACTIVE | Noted: 2021-08-02

## 2021-08-02 PROBLEM — N17.9 ACUTE KIDNEY INJURY (HCC): Status: ACTIVE | Noted: 2021-08-02

## 2021-08-02 LAB
ANION GAP SERPL CALC-SCNC: 10 MMOL/L (ref 7–16)
ANION GAP SERPL CALC-SCNC: 11 MMOL/L (ref 7–16)
ANION GAP SERPL CALC-SCNC: 12 MMOL/L (ref 7–16)
APPEARANCE UR: CLEAR
BACTERIA #/AREA URNS HPF: NEGATIVE /HPF
BASOPHILS # BLD AUTO: 0.4 % (ref 0–1.8)
BASOPHILS # BLD: 0.03 K/UL (ref 0–0.12)
BILIRUB UR QL STRIP.AUTO: NEGATIVE
BUN SERPL-MCNC: 15 MG/DL (ref 8–22)
BUN SERPL-MCNC: 18 MG/DL (ref 8–22)
BUN SERPL-MCNC: 25 MG/DL (ref 8–22)
CALCIUM SERPL-MCNC: 8.8 MG/DL (ref 8.5–10.5)
CALCIUM SERPL-MCNC: 9 MG/DL (ref 8.5–10.5)
CALCIUM SERPL-MCNC: 9.3 MG/DL (ref 8.5–10.5)
CHLORIDE SERPL-SCNC: 100 MMOL/L (ref 96–112)
CHLORIDE SERPL-SCNC: 103 MMOL/L (ref 96–112)
CHLORIDE SERPL-SCNC: 106 MMOL/L (ref 96–112)
CK SERPL-CCNC: 82 U/L (ref 0–154)
CO2 SERPL-SCNC: 21 MMOL/L (ref 20–33)
CO2 SERPL-SCNC: 22 MMOL/L (ref 20–33)
CO2 SERPL-SCNC: 23 MMOL/L (ref 20–33)
COLOR UR: YELLOW
CREAT SERPL-MCNC: 0.76 MG/DL (ref 0.5–1.4)
CREAT SERPL-MCNC: 0.86 MG/DL (ref 0.5–1.4)
CREAT SERPL-MCNC: 1.28 MG/DL (ref 0.5–1.4)
CREAT UR-MCNC: 138.33 MG/DL
EOSINOPHIL # BLD AUTO: 0.11 K/UL (ref 0–0.51)
EOSINOPHIL NFR BLD: 1.5 % (ref 0–6.9)
EPI CELLS #/AREA URNS HPF: ABNORMAL /HPF
ERYTHROCYTE [DISTWIDTH] IN BLOOD BY AUTOMATED COUNT: 50.5 FL (ref 35.9–50)
ERYTHROCYTE [DISTWIDTH] IN BLOOD BY AUTOMATED COUNT: 52.3 FL (ref 35.9–50)
FERRITIN SERPL-MCNC: 436 NG/ML (ref 10–291)
GLUCOSE SERPL-MCNC: 106 MG/DL (ref 65–99)
GLUCOSE SERPL-MCNC: 114 MG/DL (ref 65–99)
GLUCOSE SERPL-MCNC: 121 MG/DL (ref 65–99)
GLUCOSE UR STRIP.AUTO-MCNC: NEGATIVE MG/DL
HCT VFR BLD AUTO: 24.1 % (ref 37–47)
HCT VFR BLD AUTO: 25.9 % (ref 37–47)
HGB BLD-MCNC: 7.9 G/DL (ref 12–16)
HGB BLD-MCNC: 8.4 G/DL (ref 12–16)
HGB RETIC QN AUTO: 28.9 PG/CELL (ref 29–35)
HYALINE CASTS #/AREA URNS LPF: ABNORMAL /LPF
IMM GRANULOCYTES # BLD AUTO: 0.04 K/UL (ref 0–0.11)
IMM GRANULOCYTES NFR BLD AUTO: 0.5 % (ref 0–0.9)
IMM RETICS NFR: 26.8 % (ref 9.3–17.4)
INR PPP: 1.18 (ref 0.87–1.13)
IRON SATN MFR SERPL: 9 % (ref 15–55)
IRON SERPL-MCNC: 13 UG/DL (ref 40–170)
KETONES UR STRIP.AUTO-MCNC: NEGATIVE MG/DL
LEUKOCYTE ESTERASE UR QL STRIP.AUTO: ABNORMAL
LYMPHOCYTES # BLD AUTO: 1.01 K/UL (ref 1–4.8)
LYMPHOCYTES NFR BLD: 13.7 % (ref 22–41)
MCH RBC QN AUTO: 31.5 PG (ref 27–33)
MCH RBC QN AUTO: 31.9 PG (ref 27–33)
MCHC RBC AUTO-ENTMCNC: 32.4 G/DL (ref 33.6–35)
MCHC RBC AUTO-ENTMCNC: 32.8 G/DL (ref 33.6–35)
MCV RBC AUTO: 96 FL (ref 81.4–97.8)
MCV RBC AUTO: 98.5 FL (ref 81.4–97.8)
MICRO URNS: ABNORMAL
MONOCYTES # BLD AUTO: 0.85 K/UL (ref 0–0.85)
MONOCYTES NFR BLD AUTO: 11.5 % (ref 0–13.4)
NEUTROPHILS # BLD AUTO: 5.34 K/UL (ref 2–7.15)
NEUTROPHILS NFR BLD: 72.4 % (ref 44–72)
NITRITE UR QL STRIP.AUTO: NEGATIVE
NRBC # BLD AUTO: 0 K/UL
NRBC BLD-RTO: 0 /100 WBC
PH UR STRIP.AUTO: 5.5 [PH] (ref 5–8)
PLATELET # BLD AUTO: 267 K/UL (ref 164–446)
PLATELET # BLD AUTO: 279 K/UL (ref 164–446)
PMV BLD AUTO: 10.1 FL (ref 9–12.9)
PMV BLD AUTO: 10.4 FL (ref 9–12.9)
POTASSIUM SERPL-SCNC: 3.7 MMOL/L (ref 3.6–5.5)
PROT UR QL STRIP: NEGATIVE MG/DL
PROTHROMBIN TIME: 14.6 SEC (ref 12–14.6)
RBC # BLD AUTO: 2.51 M/UL (ref 4.2–5.4)
RBC # BLD AUTO: 2.63 M/UL (ref 4.2–5.4)
RBC # URNS HPF: ABNORMAL /HPF
RBC UR QL AUTO: NEGATIVE
RETICS # AUTO: 0.05 M/UL (ref 0.04–0.06)
RETICS/RBC NFR: 2.1 % (ref 0.8–2.1)
SODIUM SERPL-SCNC: 132 MMOL/L (ref 135–145)
SODIUM SERPL-SCNC: 136 MMOL/L (ref 135–145)
SODIUM SERPL-SCNC: 140 MMOL/L (ref 135–145)
SODIUM UR-SCNC: <20 MMOL/L
SP GR UR STRIP.AUTO: 1.02
TIBC SERPL-MCNC: 150 UG/DL (ref 250–450)
UIBC SERPL-MCNC: 137 UG/DL (ref 110–370)
UROBILINOGEN UR STRIP.AUTO-MCNC: 0.2 MG/DL
WBC # BLD AUTO: 6.8 K/UL (ref 4.8–10.8)
WBC # BLD AUTO: 7.4 K/UL (ref 4.8–10.8)
WBC #/AREA URNS HPF: ABNORMAL /HPF

## 2021-08-02 PROCEDURE — 82570 ASSAY OF URINE CREATININE: CPT

## 2021-08-02 PROCEDURE — 99232 SBSQ HOSP IP/OBS MODERATE 35: CPT | Performed by: INTERNAL MEDICINE

## 2021-08-02 PROCEDURE — 80048 BASIC METABOLIC PNL TOTAL CA: CPT | Mod: 91

## 2021-08-02 PROCEDURE — 85046 RETICYTE/HGB CONCENTRATE: CPT

## 2021-08-02 PROCEDURE — 85025 COMPLETE CBC W/AUTO DIFF WBC: CPT

## 2021-08-02 PROCEDURE — 83550 IRON BINDING TEST: CPT

## 2021-08-02 PROCEDURE — 700102 HCHG RX REV CODE 250 W/ 637 OVERRIDE(OP): Performed by: STUDENT IN AN ORGANIZED HEALTH CARE EDUCATION/TRAINING PROGRAM

## 2021-08-02 PROCEDURE — 36415 COLL VENOUS BLD VENIPUNCTURE: CPT

## 2021-08-02 PROCEDURE — 700111 HCHG RX REV CODE 636 W/ 250 OVERRIDE (IP): Performed by: STUDENT IN AN ORGANIZED HEALTH CARE EDUCATION/TRAINING PROGRAM

## 2021-08-02 PROCEDURE — A9270 NON-COVERED ITEM OR SERVICE: HCPCS | Performed by: INTERNAL MEDICINE

## 2021-08-02 PROCEDURE — 85027 COMPLETE CBC AUTOMATED: CPT

## 2021-08-02 PROCEDURE — A9270 NON-COVERED ITEM OR SERVICE: HCPCS | Performed by: STUDENT IN AN ORGANIZED HEALTH CARE EDUCATION/TRAINING PROGRAM

## 2021-08-02 PROCEDURE — 84300 ASSAY OF URINE SODIUM: CPT

## 2021-08-02 PROCEDURE — 700105 HCHG RX REV CODE 258: Performed by: STUDENT IN AN ORGANIZED HEALTH CARE EDUCATION/TRAINING PROGRAM

## 2021-08-02 PROCEDURE — 83010 ASSAY OF HAPTOGLOBIN QUANT: CPT

## 2021-08-02 PROCEDURE — 83540 ASSAY OF IRON: CPT

## 2021-08-02 PROCEDURE — 82728 ASSAY OF FERRITIN: CPT

## 2021-08-02 PROCEDURE — 700102 HCHG RX REV CODE 250 W/ 637 OVERRIDE(OP): Performed by: INTERNAL MEDICINE

## 2021-08-02 PROCEDURE — 81001 URINALYSIS AUTO W/SCOPE: CPT

## 2021-08-02 PROCEDURE — 76604 US EXAM CHEST: CPT

## 2021-08-02 PROCEDURE — 770006 HCHG ROOM/CARE - MED/SURG/GYN SEMI*

## 2021-08-02 PROCEDURE — 85610 PROTHROMBIN TIME: CPT

## 2021-08-02 PROCEDURE — 82550 ASSAY OF CK (CPK): CPT

## 2021-08-02 RX ORDER — LORAZEPAM 2 MG/ML
1 INJECTION INTRAMUSCULAR ONCE
Status: COMPLETED | OUTPATIENT
Start: 2021-08-02 | End: 2021-08-02

## 2021-08-02 RX ORDER — SODIUM CHLORIDE, SODIUM LACTATE, POTASSIUM CHLORIDE, AND CALCIUM CHLORIDE .6; .31; .03; .02 G/100ML; G/100ML; G/100ML; G/100ML
500 INJECTION, SOLUTION INTRAVENOUS ONCE
Status: COMPLETED | OUTPATIENT
Start: 2021-08-02 | End: 2021-08-03

## 2021-08-02 RX ADMIN — ENOXAPARIN SODIUM 80 MG: 80 INJECTION SUBCUTANEOUS at 06:01

## 2021-08-02 RX ADMIN — SODIUM CHLORIDE, POTASSIUM CHLORIDE, SODIUM LACTATE AND CALCIUM CHLORIDE 500 ML: 600; 310; 30; 20 INJECTION, SOLUTION INTRAVENOUS at 08:49

## 2021-08-02 RX ADMIN — Medication 500 MG: at 18:01

## 2021-08-02 RX ADMIN — ASPIRIN 81 MG: 81 TABLET, CHEWABLE ORAL at 06:01

## 2021-08-02 RX ADMIN — ATORVASTATIN CALCIUM 20 MG: 20 TABLET, FILM COATED ORAL at 18:01

## 2021-08-02 RX ADMIN — ENOXAPARIN SODIUM 80 MG: 80 INJECTION SUBCUTANEOUS at 18:01

## 2021-08-02 RX ADMIN — FERROUS SULFATE TAB 325 MG (65 MG ELEMENTAL FE) 325 MG: 325 (65 FE) TAB at 18:01

## 2021-08-02 RX ADMIN — METOPROLOL SUCCINATE 100 MG: 100 TABLET, EXTENDED RELEASE ORAL at 06:00

## 2021-08-02 RX ADMIN — LORAZEPAM 1 MG: 2 INJECTION INTRAMUSCULAR; INTRAVENOUS at 16:03

## 2021-08-02 RX ADMIN — DOCUSATE SODIUM 50 MG AND SENNOSIDES 8.6 MG 2 TABLET: 8.6; 5 TABLET, FILM COATED ORAL at 18:02

## 2021-08-02 RX ADMIN — OXYCODONE 5 MG: 5 TABLET ORAL at 16:03

## 2021-08-02 RX ADMIN — Medication 2000 UNITS: at 18:01

## 2021-08-02 RX ADMIN — THERA TABS 1 TABLET: TAB at 06:01

## 2021-08-02 RX ADMIN — GUAIFENESIN 600 MG: 600 TABLET, EXTENDED RELEASE ORAL at 06:00

## 2021-08-02 RX ADMIN — GUAIFENESIN 600 MG: 600 TABLET, EXTENDED RELEASE ORAL at 18:01

## 2021-08-02 ASSESSMENT — ENCOUNTER SYMPTOMS
PALPITATIONS: 0
NAUSEA: 0
WEAKNESS: 1
ABDOMINAL PAIN: 0
FOCAL WEAKNESS: 1
FALLS: 0
BLOOD IN STOOL: 0
DIARRHEA: 0
PND: 0
SHORTNESS OF BREATH: 1
VOMITING: 0
BACK PAIN: 0
FEVER: 0
HEADACHES: 0
COUGH: 0
CHILLS: 0
ORTHOPNEA: 0

## 2021-08-02 ASSESSMENT — PAIN DESCRIPTION - PAIN TYPE
TYPE: ACUTE PAIN
TYPE: ACUTE PAIN

## 2021-08-02 NOTE — FACE TO FACE
Face to Face Supporting Documentation - Home Health    The encounter with this patient was in whole or in part the primary reason for home health admission.    Date of encounter:   Patient:                    MRN:                       YOB: 2019  Jairo Rivas  [id number]  1950     Home health to see patient for:  Physical Therapy evaluation and treatment and Occupational therapy evaluation and treatment    Skilled need for:  Exacerbation of Chronic Disease State endometrial cancer, pleural effusions    Skilled nursing interventions to include:  Comment: no skilled intervention needed    Homebound status evidenced by:  Need the aid of supportive devices such as crutches, canes, wheelchairs or walkers. Leaving home requires a considerable and taxing effort. There is a normal inability to leave the home.    Community Physician to provide follow up care: ZULEYKA Lyons     Optional Interventions? Yes, Details:  PT, OT, ambulatory assistance      I certify the face to face encounter for this home health care referral meets the CMS requirements and the encounter/clinical assessment with the patient was, in whole, or in part, for the medical condition(s) listed above, which is the primary reason for home health care. Based on my clinical findings: the service(s) are medically necessary, support the need for home health care, and the homebound criteria are met.  I certify that this patient has had a face to face encounter by myself.  Julio Clements D.O. - NPI: [id number]

## 2021-08-02 NOTE — PROGRESS NOTES
Daily Progress Note:     Date of Service: 8/2/2021  Primary Team: [employer] Blue Team   Attending: ARLETH Mccrary   Senior Resident: Dr. Henley  Intern: Dr. Clements  Contact:  [phone]    Chief Complaint:   CP, SOB    Subjective:  No acute events overnight.  This AM she is no longer having any chest pain.  Her SOB at rest is consistent w/ yesterday, but she is having significant SOB w/ ambulation to bedside commode, doesn't have any desaturations but has increased work of breathing.    She has remained tachycardic today, despite fluid bolus and metoprolol, she is scheduled to receive a repeat thoracentesis today.  Discussed plan of care with daughter, Kary, on phone today, # [phone]    Consultants/Specialty:  Oncology    Review of Systems:    Review of Systems   Constitutional: Negative for chills and fever.   Respiratory: Positive for shortness of breath. Negative for cough.    Cardiovascular: Negative for chest pain, palpitations, orthopnea, leg swelling and PND.   Gastrointestinal: Negative for abdominal pain, blood in stool, diarrhea, melena, nausea and vomiting.   Genitourinary: Negative for dysuria, hematuria and urgency.   Musculoskeletal: Negative for back pain, falls and joint pain.   Neurological: Positive for focal weakness (right sided hemiparesis 2/2 CVA) and weakness (generalized weakness noted by nursing). Negative for headaches.       Objective Data:   Physical Exam:   Vitals:   Temp:  [36.1 °C (97 °F)-36.2 °C (97.2 °F)] 36.1 °C (97 °F)  Pulse:  [] 118  Resp:  [16-18] 16  BP: ()/(64-83) 115/83  SpO2:  [95 %-96 %] 96 %    Physical Exam  Constitutional:       General: She is not in acute distress.     Appearance: She is not toxic-appearing.   HENT:      Head: Normocephalic and atraumatic.      Mouth/Throat:      Mouth: Mucous membranes are dry.      Pharynx: Oropharynx is clear.   Eyes:      Conjunctiva/sclera: Conjunctivae normal.   Cardiovascular:      Rate and Rhythm:  Regular rhythm. Tachycardia present.      Pulses: Normal pulses.      Heart sounds: Normal heart sounds. No murmur heard.   No friction rub. No gallop.    Pulmonary:      Effort: Pulmonary effort is normal.      Comments: No acute respiratory distress.  Decreased breath sounds on left side.  Rales present in right base.  Abdominal:      General: Abdomen is flat. Bowel sounds are normal. There is no distension.      Palpations: Abdomen is soft.      Tenderness: There is no abdominal tenderness.   Musculoskeletal:      Right lower leg: No edema.      Left lower leg: No edema.   Skin:     General: Skin is warm and dry.   Neurological:      Mental Status: She is alert.      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   Psychiatric:         Mood and Affect: Mood normal.         Behavior: Behavior normal.           Labs:   HEMATOLOGY/ ONCOLOGY/ID:            Recent Labs     08/01/21  0115 08/02/21  0235 08/02/21  1157   WBC 7.8 7.4 6.8   RBC 2.83* 2.63* 2.51*   HEMOGLOBIN 9.0* 8.4* 7.9*   HEMATOCRIT 28.3* 25.9* 24.1*   .0* 98.5* 96.0   MCH 31.8 31.9 31.5   RDW 52.7* 52.3* 50.5*   PLATELETCT 259 279 267   MPV 10.2 10.4 10.1   NEUTSPOLYS  --  72.40*  --    LYMPHOCYTES  --  13.70*  --    MONOCYTES  --  11.50  --    EOSINOPHILS  --  1.50  --    BASOPHILS  --  0.40  --      Lab Results   Component Value Date    HAMNYWTW06 984 (H) 09/25/2020    RYUZLFYF12 714 05/15/2019    OXLOODCL31 937 (H) 10/02/2018    FOLATE >23.8 06/12/2019    FERRITIN 436.0 (H) 08/02/2021    FERRITIN 18.5 06/12/2019    FERRITIN 18.5 10/02/2018    IRON 13 (L) 08/02/2021    IRON 35 (L) 06/12/2019    IRON 29 (L) 10/02/2018    TOTIRONBC 150 (L) 08/02/2021    TOTIRONBC 272 06/12/2019    TOTIRONBC 392 10/02/2018       RENAL:        Estimated GFR/CRCL = Estimated Creatinine Clearance: 74.1 mL/min (by C-G formula based on SCr of 0.86 mg/dL).  Recent Labs     08/01/21  0115 08/02/21  0235 08/02/21  1157   SODIUM 138 132*  140   POTASSIUM 4.2 3.7 3.7   CHLORIDE 105 100 106   CO2 21 22 23   GLUCOSE 125* 121* 106*   BUN 18 25* 18   CREATININE 1.19 1.28 0.86   CALCIUM 8.9 9.0 8.8       GASTROINTESTINAL/ HEPATIC:          Recent Labs     08/02/21  0235   INR 1.18*     No results found for: AMMONIA    ENDOCRINE:              Recent Labs     08/01/21  0115 08/02/21  0235 08/02/21  1157   GLUCOSE 125* 121* 106*     Lab Results   Component Value Date    FREET4 0.92 05/15/2019       Imaging:   CXR 7/30:  No significant interval change in pleural effusion, which is still present.  CXR 8/1: repeat chest x-ray shows reaccumulation of fluid in pleural effusion.    Problem Representation:  Carlene Rivas  is a 70 y.o. y/o woman w/ PMHx Stage IV endometrial cancer who presented on 7/29/2021 w/ worsening shortness of breath.  She was found to have a non-occlusive PE in the right pulmonary artery w/o right heart strain seen on CTA from 7/29.  Large left pleural effusion, thoracentesis performed on 7/29 removed 950 ml of fluid, + lights criteria.  Cytology not definitive for malignant effusion.  Repeat thoracentesis performed on 8/2, will reassess accumulation of fluid w/ CXR tomorrow AM.      * Pleural effusion  Assessment & Plan  Shortness of breath with coarse crackles on left mid lung. Found to have large left sided pleural effusion with total collapse of left lower lobe and partial atelectasis of upper left lobe on CTA. Thoracentesis performed on 7/30, removing 950 cc of fluid. Pleural fluid studies sent, positive lights criteria for exudative process.  Cytology is inconclusive as there wasn't enough of a cell sample to adequately assess cell types.  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  Repeat thoracentesis on 8/2/21.  Will continue to assess for reaccumulation of fluid.  -Gyn-Onc consult, appreciate assistance  -Follow-up pending cytology  -Repeat  thoracentesis today, use to assess for need of other more long term solutions (pleurodesis, catheter placement) to recurrent pleural effusion as pt has had quick reaccumulation of fluid following first thoracentesis.  -CXR in AM    Acute kidney injury (HCC)  Assessment & Plan  New HEAVEN on 8/1, w/ acute worsening on 8/2.  Cr 48 hours prior to insult was 0.65, has since elevated to 1.28.  FeNa shows a likely pre-renal process.  This is likely 2/2 intravascular depletion in the setting of a large volume pleural effusion and attempted diuresis.  She received a 500cc bolus of LR on the AM of 8/2 and repeat BMP shows improvement in Cr to 0.85.    -AM BMP to monitor further, consider repeat bolus for worsening kidney fx    Pulmonary embolism (HCC)  Assessment & Plan  Patient was having left upper thigh pain for several days preceding her shortness of breath.  Non-occlusive right pulmonary artery embolism (lobar and segmental branches) on CTA. No evidence of right heart strain on CTA. Likely only mild contribution to AHRF as the PE is non-occlusive and no evidence of right heart strain seen.    -On therapeutic lovenox, can consider transition to DOAC if no further procedures planned  -Monitoring on telemetry  -Monitor vitals for hemodynamic stability    Anemia  Assessment & Plan  Pt w/ slowly downtrending hemoglobin since admission.  This is a normocytic anemia.  Iron studies show concern for early anemia of chronic disease, but this would be unlikely to contribute to such quick decline in hemoglobin.  Other considerations would be active, bleeding, but patient at this time has no obvious areas of active bleeding, no melena, no hematemesis, no vaginal bleeding, no external bleeding.  Consideration would be bleeding into thorax s/p thoracentesis performed on admission.  She is on anticoagulation for PE.  -Assess pleural fluid for blood      Endometrial cancer (HCC)- (present on admission)  Assessment & Plan  Hx of endometrial  cancer that has metastasized to the lungs. Follows-up with Dr. Jann Garcia. Finished chemotherapy infusion. Currently on tamoxifen (3 weeks on, 3 weeks off), last tamoxifen 07/18, would restart August 9th. Found to have 3 new pulmonary nodules up to 2.6 cm consistent with pulmonary metastasis.    -Gyn-Onc consulted, appreciate assistance  -Pt family planning on moving pt closer to them in [place], inquiring about oncologist recommendations        Code Status: Full code  DVT ppx: Lovenox  Diet: Regular diet  GI: Bowel regimen  T/L/D: pIV right forearm  Disposition: Anticipate >48 hours for further evaluation of pleural effusion      Julio Clements DO  PGY-1, [facility]    Assessment and plan discussed with senior resident and attending physician.

## 2021-08-02 NOTE — PROGRESS NOTES
Daily Progress Note:     Date of Service: 8/1/2021  Primary Team: [facility] Blue Team   Attending: ARLETH Mccrary   Senior Resident: Dr. Henley  Intern: Dr. Clements  Contact:  [phone]    Chief Complaint:   CP, SOB    Subjective:  Overnight pt had a hypotensive episode w/ associated dizziness, she received a 500cc bolus w/ resolution.  This AM she reports similar shortness of breath as yesterday.  No current chest pain.    Pt re-examined multiple times throughout the day.  She had an episode of chest pain, which was 7/10 in the center of her chest and radiated down to the left side of her chest, this felt different than the chest pain she initially presented with.  EKG at that time was consistent w/ previous EKG, troponin negative.  Likely due to reaccumulation of fluid in pleural effusion vs. Reexpansion pulmonary edema 2/2 thoracentesis  She also had an episode of sustained tachycardia later in the day.  Blood pressure has been stable and oxygen requirement is stable.  She hadn't received her metoprolol this AM due to episode of hypotension, will supplement w/ 50 mg PO metoprolol and reassess.      Consultants/Specialty:  None.    Review of Systems:    Review of Systems   Constitutional: Negative for chills and fever.   Respiratory: Positive for cough and shortness of breath.    Cardiovascular: Positive for chest pain. Negative for palpitations, orthopnea, leg swelling and PND.   Gastrointestinal: Negative for abdominal pain, diarrhea, nausea and vomiting.   Genitourinary: Negative for dysuria and urgency.   Musculoskeletal: Negative for back pain, falls and joint pain.   Neurological: Positive for focal weakness (right sided hemiparesis 2/2 CVA). Negative for headaches.       Objective Data:   Physical Exam:   Vitals:   Temp:  [36.1 °C (96.9 °F)-36.2 °C (97.2 °F)] 36.2 °C (97.1 °F)  Pulse:  [] 136  Resp:  [16-18] 16  BP: ()/() 135/100  SpO2:  [94 %-97 %] 94 %    Physical  Exam  Constitutional:       General: She is not in acute distress.     Appearance: She is not toxic-appearing.   HENT:      Head: Normocephalic and atraumatic.      Mouth/Throat:      Mouth: Mucous membranes are moist.      Pharynx: Oropharynx is clear.   Eyes:      General:         Right eye: No discharge.         Left eye: No discharge.      Conjunctiva/sclera: Conjunctivae normal.   Cardiovascular:      Rate and Rhythm: Regular rhythm. Tachycardia present.      Pulses: Normal pulses.      Heart sounds: Normal heart sounds. No murmur heard.   No friction rub. No gallop.    Pulmonary:      Effort: Pulmonary effort is normal.      Comments: Decreased breath sounds on left side.  Rales present in right base.  Abdominal:      General: Abdomen is flat. Bowel sounds are normal. There is no distension.      Palpations: Abdomen is soft.      Tenderness: There is no abdominal tenderness.   Musculoskeletal:      Right lower leg: No edema.      Left lower leg: No edema.   Skin:     General: Skin is warm and dry.   Neurological:      Mental Status: She is alert.      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   Psychiatric:         Mood and Affect: Mood normal.         Behavior: Behavior normal.           Labs:   HEMATOLOGY/ ONCOLOGY/ID:            Recent Labs     07/30/21  0341 07/31/21  0306 08/01/21  0115   WBC 6.7 8.6 7.8   RBC 3.51* 3.46* 2.83*   HEMOGLOBIN 11.1* 11.0* 9.0*   HEMATOCRIT 34.4* 34.7* 28.3*   MCV 98.0* 100.3* 100.0*   MCH 31.6 31.8 31.8   RDW 53.1* 53.3* 52.7*   PLATELETCT 273 280 259   MPV 9.9 10.2 10.2   NEUTSPOLYS 67.90  --   --    LYMPHOCYTES 21.50*  --   --    MONOCYTES 8.70  --   --    EOSINOPHILS 1.10  --   --    BASOPHILS 0.50  --   --      Lab Results   Component Value Date    DVCETXQQ67 984 (H) 09/25/2020    AJCFCDHW09 714 05/15/2019    KVEKJPQD13 937 (H) 10/02/2018    FOLATE >23.8 06/12/2019    FERRITIN 18.5 06/12/2019    FERRITIN 18.5 10/02/2018    FERRITIN  33.0 07/30/2018    IRON 35 (L) 06/12/2019    IRON 29 (L) 10/02/2018    IRON 44 07/30/2018    [facility] 06/12/2019    [facility] 392 10/02/2018    [facility] 356 07/30/2018       RENAL:        Estimated GFR/CRCL = Estimated Creatinine Clearance: 53.6 mL/min (by C-G formula based on SCr of 1.19 mg/dL).  Recent Labs     07/30/21  0341 07/31/21  0306 08/01/21  0115   SODIUM 142 137 138   POTASSIUM 3.5* 4.0 4.2   CHLORIDE 108 104 105   CO2 23 21 21   GLUCOSE 110* 118* 125*   BUN 8 10 18   CREATININE 0.65 0.97 1.19   CALCIUM 8.8 8.7 8.9   ALBUMIN 3.4  --   --        GASTROINTESTINAL/ HEPATIC:          Recent Labs     07/30/21 0341   ALTSGPT 16   ASTSGOT 19   ALKPHOSPHAT 54   TBILIRUBIN 0.4   ALBUMIN 3.4   GLOBULIN 2.8     No results found for: AMMONIA    ENDOCRINE:              Recent Labs     07/30/21  0341 07/31/21  0306 08/01/21  0115   GLUCOSE 110* 118* 125*     Lab Results   Component Value Date    FREET4 0.92 05/15/2019       Imaging:   CXR 7/30:  No significant interval change in pleural effusion, which is still present.  CXR 8/1: repeat chest x-ray shows reaccumulation of fluid in pleural effusion.    Problem Representation:  Carlene Rivas  is a 70 y.o. y/o woman w/ PMHx Stage IV endometrial cancer who presented on 7/29/2021 w/ worsening shortness of breath.  She was found to have a non-occlusive PE in the right pulmonary artery w/o right heart strain seen on CTA from 7/29.  Large left pleural effusion w/ total collapse of the LLL and partial atelectasis of HANK and 3 new pulmonary nodules present.  Thoracentesis performed on 7/29 removed 950 ml of fluid, + lights criteria.      * Pleural effusion  Assessment & Plan  Shortness of breath with coarse crackles on left mid lung. Found to have large left sided pleural effusion with total collapse of left lower lobe and partial atelectasis of upper left lobe on CTA. Thoracentesis performed on 7/30, removing 950 cc of fluid. Pleural fluid studies sent, positive  lights criteria for exudative process.  Pending cytology and Infectious studies no growth to date.  SOB improved following thoracentesis, repeat CXR shows large volume pleural effusion still present in left lung, which is likely the largest contributing factor to pt ongoing AHRF, repeat CXR on 8/1 shows reaccumulation of fluid.  Pt states she didn't tolerate the procedure well and is hesitant to undergo another thoracentesis.  Pt did not tolerate diuresis, as evidenced by hypotension and HEAVEN, will hold off on diuresis for now.  -Gyn-Onc consult, appreciate assistance  -Follow-up pending cytology  -consider repeat thoracentesis vs other more long term solutions (pleurodesis, catheter placement) to recurrent pleural effusion as pt has had quick reaccumulation of fluid.    Pulmonary embolism (HCC)  Assessment & Plan  Patient was having left upper thigh pain for several days preceding her shortness of breath.  Non-occlusive right pulmonary artery embolism (lobar and segmental branches) on CTA. No evidence of right heart strain on CTA. Likely only mild contribution to AHRF as the PE is non-occlusive and no evidence of right heart strain seen.    -On therapeutic lovenox, can consider transition to DOAC if no further procedures planned  -Monitoring on telemetry  -Monitor vitals for hemodynamic stability    Endometrial cancer (HCC)- (present on admission)  Assessment & Plan  Hx of endometrial cancer that has metastasized to the lungs. Follows-up with Dr. Jann Garcia. Finished chemotherapy infusion. Currently on tamoxifen (3 weeks on, 3 weeks off), last tamoxifen 07/18, would restart August 9th. Found to have 3 new pulmonary nodules up to 2.6 cm consistent with pulmonary metastasis.    -Gyn-Onc consulted, appreciate assistance  -Pt family planning on moving pt closer to them in [place], inquiring about oncologist recommendations        Code Status: Full code  DVT ppx: Lovenox  Diet: Regular diet  GI: Bowel regimen  T/L/D:  pIV right forearm  Disposition: Anticipate >48 hours for further evaluation of pleural effusion      Julio Clements DO  PGY-1, [facility]    Assessment and plan discussed with senior resident and attending physician.

## 2021-08-02 NOTE — PROGRESS NOTES
At 0800 pt assisted to the commode. Perineal care provided. Pt refusing bath at this time.     0930 pt assisted to the commode. Perineal care provided. Pt readjusted in bed and positioned for breakfast.     1200 assisted to bedside commode. Perineal care provided.     [id number] this RN spoke with patients family. Family requesting update from MD Clements. MD Clements contacted.

## 2021-08-02 NOTE — CARE PLAN
The patient is Watcher - Medium risk of patient condition declining or worsening    Shift Goals  Clinical Goals: Monitor patient respirations, pain management, sit at edge of bed.   Patient Goals: pain control, sit at edge of bed for meals     Progress made toward(s) clinical / shift goals:    Problem: Pain - Standard  Goal: Alleviation of pain or a reduction in pain to the patient’s comfort goal  Outcome: Progressing     Problem: Knowledge Deficit - Standard  Goal: Patient and family/care givers will demonstrate understanding of plan of care, disease process/condition, diagnostic tests and medications  Outcome: Progressing     Problem: Fall Risk  Goal: Patient will remain free from falls  Outcome: Progressing     Problem: Skin Integrity  Goal: Skin integrity is maintained or improved  Note: Q2 hour turns, barrier paste, pillows used for positioning, pillows used to float heels. Waffle overlay in use.

## 2021-08-02 NOTE — DISCHARGE PLANNING
Anticipated Discharge Disposition: Home with HH and 24/7 assistance    Action: Previous note in Epic from Afshin NROMAN pt and daughter would like HH upon d/c. PT and OT recommending post acute, unless daughter is able to continue to provide 24/7 assistance.     LSW messaged Dr. Clements via Voalte and requested HH order and F2F for HH referral to be initiated.   Per Dr. Clements pt anticipated to d/c in a few days. Pt currently requiring O2. However, pt pending further management and may not require O2 upon d/c.     Barriers to Discharge: None     Plan: Await HH order and F2F to initiate HH referral, LSW to continue to follow for d/c needs     Care Transition Team Assessment    Information Source  Orientation Level: Oriented X4  Information Given By: Other (Comments)  Who is responsible for making decisions for patient? : Patient    Elopement Risk  Legal Hold: No  Ambulatory or Self Mobile in Wheelchair: Yes  Disoriented: No  Psychiatric Symptoms: None  History of Wandering: No  Elopement this Admit: No  Vocalizing Wanting to Leave: No  Displays Behaviors, Body Language Wanting to Leave: No-Not at Risk for Elopement  Elopement Risk: Not at Risk for Elopement    Interdisciplinary Discharge Planning  Lives with - Patient's Self Care Capacity: Adult Children  Patient or legal guardian wants to designate a caregiver: No  Housing / Facility: 1 Story Apartment / Condo  Prior Services: Continuous (24 Hour) Care Giving Family    Discharge Preparedness  What is your plan after discharge?: Home with help, Home health care  What are your discharge supports?: Child  Prior Functional Level: Needs Assist with Activities of Daily Living, Independent with Medication Management, Uses Walker  Difficulity with ADLs: Walking    Functional Assesment  Prior Functional Level: Needs Assist with Activities of Daily Living, Independent with Medication Management, Uses Walker    Finances  Financial Barriers to Discharge: No  Prescription Coverage:  Yes    Domestic Abuse  Have you ever been the victim of abuse or violence?: No  Physical Abuse or Sexual Abuse: No  Verbal Abuse or Emotional Abuse: No  Possible Abuse/Neglect Reported to:: Not Applicable    Psychological Assessment  History of Substance Abuse: None  History of Psychiatric Problems: No    Discharge Risks or Barriers  Discharge risks or barriers?: No  Patient risk factors: Vulnerable adult    Anticipated Discharge Information  Discharge Disposition: D/T to home under care of home health w/planned hosp IP readmit (45)

## 2021-08-02 NOTE — PROGRESS NOTES
Assumed care at 1915. Bedside report received from stephanie RN. Patient's chart and MAR reviewed. 12 hour chart check complete. Assessment complete, pt 3/10 pain at this time, right hand. Pt is awake in bed. Pt is A & O x 4. Patient was updated on plan of care for the day. Questions answered and concerns addressed.  Pt denies any additional needs at this time. White board updated. Call light, phone and personal belongings within reach. Bed alarm on and working appropriately. Vital signs stable.

## 2021-08-02 NOTE — PROGRESS NOTES
Assumed care of patient at bedside report from NOC RN. Updated on POC. Patient currently A & O x 4; on 3 L; up in bed; no complaints of acute pain. Call light within reach. Whiteboard updated. Fall precautions in place. Bed locked and in lowest position. All questions answered. No other needs indicated at this time. Hourly rounding in place.

## 2021-08-03 ENCOUNTER — APPOINTMENT (OUTPATIENT)
Dept: RADIOLOGY | Facility: MEDICAL CENTER | Age: 71
DRG: 163 | End: 2021-08-03
Attending: STUDENT IN AN ORGANIZED HEALTH CARE EDUCATION/TRAINING PROGRAM
Payer: MEDICARE

## 2021-08-03 ENCOUNTER — HOME HEALTH ADMISSION (OUTPATIENT)
Dept: HOME HEALTH SERVICES | Facility: HOME HEALTHCARE | Age: 71
End: 2021-08-03
Payer: MEDICARE

## 2021-08-03 LAB
ANION GAP SERPL CALC-SCNC: 13 MMOL/L (ref 7–16)
BACTERIA BLD CULT: NORMAL
BACTERIA BLD CULT: NORMAL
BASOPHILS # BLD AUTO: 0.3 % (ref 0–1.8)
BASOPHILS # BLD: 0.02 K/UL (ref 0–0.12)
BUN SERPL-MCNC: 14 MG/DL (ref 8–22)
CALCIUM SERPL-MCNC: 9.5 MG/DL (ref 8.5–10.5)
CHLORIDE SERPL-SCNC: 104 MMOL/L (ref 96–112)
CO2 SERPL-SCNC: 22 MMOL/L (ref 20–33)
CREAT SERPL-MCNC: 0.68 MG/DL (ref 0.5–1.4)
EOSINOPHIL # BLD AUTO: 0.09 K/UL (ref 0–0.51)
EOSINOPHIL NFR BLD: 1.5 % (ref 0–6.9)
ERYTHROCYTE [DISTWIDTH] IN BLOOD BY AUTOMATED COUNT: 51 FL (ref 35.9–50)
ERYTHROCYTE [DISTWIDTH] IN BLOOD BY AUTOMATED COUNT: 52.3 FL (ref 35.9–50)
GLUCOSE SERPL-MCNC: 103 MG/DL (ref 65–99)
HAPTOGLOB SERPL-MCNC: 395 MG/DL (ref 30–200)
HCT VFR BLD AUTO: 23.7 % (ref 37–47)
HCT VFR BLD AUTO: 25.1 % (ref 37–47)
HGB BLD-MCNC: 7.7 G/DL (ref 12–16)
HGB BLD-MCNC: 8 G/DL (ref 12–16)
IMM GRANULOCYTES # BLD AUTO: 0.02 K/UL (ref 0–0.11)
IMM GRANULOCYTES NFR BLD AUTO: 0.3 % (ref 0–0.9)
LYMPHOCYTES # BLD AUTO: 0.92 K/UL (ref 1–4.8)
LYMPHOCYTES NFR BLD: 15.6 % (ref 22–41)
MCH RBC QN AUTO: 31.5 PG (ref 27–33)
MCH RBC QN AUTO: 31.8 PG (ref 27–33)
MCHC RBC AUTO-ENTMCNC: 31.9 G/DL (ref 33.6–35)
MCHC RBC AUTO-ENTMCNC: 32.5 G/DL (ref 33.6–35)
MCV RBC AUTO: 97.9 FL (ref 81.4–97.8)
MCV RBC AUTO: 98.8 FL (ref 81.4–97.8)
MONOCYTES # BLD AUTO: 0.62 K/UL (ref 0–0.85)
MONOCYTES NFR BLD AUTO: 10.5 % (ref 0–13.4)
NEUTROPHILS # BLD AUTO: 4.23 K/UL (ref 2–7.15)
NEUTROPHILS NFR BLD: 71.8 % (ref 44–72)
NRBC # BLD AUTO: 0 K/UL
NRBC BLD-RTO: 0 /100 WBC
PLATELET # BLD AUTO: 259 K/UL (ref 164–446)
PLATELET # BLD AUTO: 292 K/UL (ref 164–446)
PMV BLD AUTO: 10.2 FL (ref 9–12.9)
PMV BLD AUTO: 9.9 FL (ref 9–12.9)
POTASSIUM SERPL-SCNC: 3.7 MMOL/L (ref 3.6–5.5)
RBC # BLD AUTO: 2.42 M/UL (ref 4.2–5.4)
RBC # BLD AUTO: 2.54 M/UL (ref 4.2–5.4)
SIGNIFICANT IND 70042: NORMAL
SIGNIFICANT IND 70042: NORMAL
SITE SITE: NORMAL
SITE SITE: NORMAL
SODIUM SERPL-SCNC: 139 MMOL/L (ref 135–145)
SOURCE SOURCE: NORMAL
SOURCE SOURCE: NORMAL
WBC # BLD AUTO: 5.9 K/UL (ref 4.8–10.8)
WBC # BLD AUTO: 7.2 K/UL (ref 4.8–10.8)

## 2021-08-03 PROCEDURE — 85025 COMPLETE CBC W/AUTO DIFF WBC: CPT

## 2021-08-03 PROCEDURE — 99233 SBSQ HOSP IP/OBS HIGH 50: CPT | Performed by: INTERNAL MEDICINE

## 2021-08-03 PROCEDURE — A9270 NON-COVERED ITEM OR SERVICE: HCPCS | Performed by: STUDENT IN AN ORGANIZED HEALTH CARE EDUCATION/TRAINING PROGRAM

## 2021-08-03 PROCEDURE — 97530 THERAPEUTIC ACTIVITIES: CPT

## 2021-08-03 PROCEDURE — 80048 BASIC METABOLIC PNL TOTAL CA: CPT

## 2021-08-03 PROCEDURE — 700102 HCHG RX REV CODE 250 W/ 637 OVERRIDE(OP): Performed by: INTERNAL MEDICINE

## 2021-08-03 PROCEDURE — 85027 COMPLETE CBC AUTOMATED: CPT

## 2021-08-03 PROCEDURE — 700102 HCHG RX REV CODE 250 W/ 637 OVERRIDE(OP): Performed by: STUDENT IN AN ORGANIZED HEALTH CARE EDUCATION/TRAINING PROGRAM

## 2021-08-03 PROCEDURE — 97535 SELF CARE MNGMENT TRAINING: CPT

## 2021-08-03 PROCEDURE — A9270 NON-COVERED ITEM OR SERVICE: HCPCS | Performed by: INTERNAL MEDICINE

## 2021-08-03 PROCEDURE — 770006 HCHG ROOM/CARE - MED/SURG/GYN SEMI*

## 2021-08-03 PROCEDURE — 93005 ELECTROCARDIOGRAM TRACING: CPT | Performed by: INTERNAL MEDICINE

## 2021-08-03 PROCEDURE — 700111 HCHG RX REV CODE 636 W/ 250 OVERRIDE (IP): Performed by: STUDENT IN AN ORGANIZED HEALTH CARE EDUCATION/TRAINING PROGRAM

## 2021-08-03 PROCEDURE — 71250 CT THORAX DX C-: CPT | Mod: ME

## 2021-08-03 PROCEDURE — 71045 X-RAY EXAM CHEST 1 VIEW: CPT

## 2021-08-03 PROCEDURE — 36415 COLL VENOUS BLD VENIPUNCTURE: CPT

## 2021-08-03 RX ADMIN — THERA TABS 1 TABLET: TAB at 04:22

## 2021-08-03 RX ADMIN — GUAIFENESIN 600 MG: 600 TABLET, EXTENDED RELEASE ORAL at 16:16

## 2021-08-03 RX ADMIN — AMLODIPINE BESYLATE 5 MG: 5 TABLET ORAL at 21:04

## 2021-08-03 RX ADMIN — Medication 500 MG: at 16:16

## 2021-08-03 RX ADMIN — OXYCODONE 5 MG: 5 TABLET ORAL at 23:21

## 2021-08-03 RX ADMIN — METOPROLOL SUCCINATE 100 MG: 100 TABLET, EXTENDED RELEASE ORAL at 04:22

## 2021-08-03 RX ADMIN — Medication 2000 UNITS: at 16:16

## 2021-08-03 RX ADMIN — ATORVASTATIN CALCIUM 20 MG: 20 TABLET, FILM COATED ORAL at 16:16

## 2021-08-03 RX ADMIN — OXYCODONE 5 MG: 5 TABLET ORAL at 13:26

## 2021-08-03 RX ADMIN — FERROUS SULFATE TAB 325 MG (65 MG ELEMENTAL FE) 325 MG: 325 (65 FE) TAB at 16:16

## 2021-08-03 RX ADMIN — GUAIFENESIN 600 MG: 600 TABLET, EXTENDED RELEASE ORAL at 04:22

## 2021-08-03 RX ADMIN — ASPIRIN 81 MG: 81 TABLET, CHEWABLE ORAL at 04:22

## 2021-08-03 RX ADMIN — ENOXAPARIN SODIUM 80 MG: 80 INJECTION SUBCUTANEOUS at 04:21

## 2021-08-03 ASSESSMENT — ENCOUNTER SYMPTOMS
NECK PAIN: 0
CHILLS: 0
PALPITATIONS: 0
FALLS: 0
NAUSEA: 0
SHORTNESS OF BREATH: 1
DIARRHEA: 0
ORTHOPNEA: 0
BACK PAIN: 0
ABDOMINAL PAIN: 0
EYE PAIN: 0
COUGH: 0
MYALGIAS: 0
SPUTUM PRODUCTION: 1
VOMITING: 0
FOCAL WEAKNESS: 1
DIZZINESS: 0
HEADACHES: 0
HEMOPTYSIS: 0
ORTHOPNEA: 1
FEVER: 0
CONSTIPATION: 0

## 2021-08-03 ASSESSMENT — PAIN DESCRIPTION - PAIN TYPE
TYPE: ACUTE PAIN

## 2021-08-03 ASSESSMENT — GAIT ASSESSMENTS
GAIT LEVEL OF ASSIST: MINIMAL ASSIST
DEVIATION: BRADYKINETIC;SHUFFLED GAIT;ATAXIC
ASSISTIVE DEVICE: HAND HELD ASSIST
DISTANCE (FEET): 10

## 2021-08-03 ASSESSMENT — COGNITIVE AND FUNCTIONAL STATUS - GENERAL
TURNING FROM BACK TO SIDE WHILE IN FLAT BAD: A LITTLE
HELP NEEDED FOR BATHING: A LOT
MOVING TO AND FROM BED TO CHAIR: UNABLE
WALKING IN HOSPITAL ROOM: A LITTLE
CLIMB 3 TO 5 STEPS WITH RAILING: TOTAL
SUGGESTED CMS G CODE MODIFIER DAILY ACTIVITY: CK
DRESSING REGULAR LOWER BODY CLOTHING: A LOT
PERSONAL GROOMING: A LITTLE
DAILY ACTIVITIY SCORE: 16
MOVING FROM LYING ON BACK TO SITTING ON SIDE OF FLAT BED: UNABLE
TOILETING: A LITTLE
MOBILITY SCORE: 12
SUGGESTED CMS G CODE MODIFIER MOBILITY: CL
EATING MEALS: A LITTLE
DRESSING REGULAR UPPER BODY CLOTHING: A LITTLE
STANDING UP FROM CHAIR USING ARMS: A LITTLE

## 2021-08-03 NOTE — DISCHARGE PLANNING
Thank you for sending this referral to [facility],    Per [facility] this patient has not been seen  By PCP since 03/30/20  and there are no future appointments scheduled at this time.  Please schedule an appointment to reestablish with PCP.   The referral will be placed on hold.

## 2021-08-03 NOTE — CONSULTS
Gynecologic Oncology Consultation    Date: 8/3/2021    Requesting Physician: Dr. Hugo Chang    Consulting Physician: Christina Leos M.D.     Reason for consultation: Endometrial cancer    CC: Shortness of breath     HPI: Mrs. Rivas is a 70 year old [id number]  female with a past medical history significant for CVA with subsequent right sided hemiplegia, iron deficiency anemia, and HTN. She was diagnosed with FIGO grade 1 endometrioid bob and ROXANA in 2019. She subsequently underwent a sentinel lymph node injection, robotic sentinel lymph node dissection, robotic hysterectomy with bilateral salpingo oophorectomy and cystoscopy with left ureteral stent insertion. Pathology results showed stage IIIB endometrial cancer. ER is negative and DC is positive. Hypermethylation is positive. Her  was 84.9 on 19. Her CT scan from 10/01/19 showed no evidence of metastatic disease. She was treated under the PORTEC 3 protocol with omission of chemotherapy.     In 2020 patient underwent her 6-month CT scan surveillance. CT scan of the chest, abdomen and pelvis on 20 at [facility] with results showing new subcentimeter pulmonary nodules concerning for possible pulmonary metastases  No evidence of metastatic disease in the abdomen or pelvis.    Patient underwent repeat CT scan on 10/14/2020 with interval enlargement of previously seen bilateral pulmonary nodules measuring up to 13 mm in the left upper lobe. She underwent a CT-guided biopsy which unfortunately confirmed recurrent endometrial adenocarcinoma. Based on these findings it was recommended patient proceed with systemic chemotherapy of Taxol 175 mg and Carbo AUC 6, and the patient and her daughter elected to proceed. After cycle #1, she developed G3 -asthenia and she had a level one dose reduction to Taxol 150 and Carbo AUC 5.    Mid-treatment CT scan completed on 3/1/2021 showed resolution of previously demonstrated 4 bilateral  pulmonary nodules consistent with treatment response, stable 8.2 mm pulmonary nodule in right lower lobe, previously demonstrated enhancing nodule posterior margin of the right rectus abdominis muscle no longer identified consistent with treatment response. Cycle # 5 was held for two weeks due to G3 asthenia. She completed 6 cycles of Taxol/Carbo from 12/10/2020 to 4/7/2021. Post chemotherapy CT [place] 4[place] [facility] [place] 5.4 mm nodule and enlarging 5.5 mm nodule left upper lobe concerning for metastasis/disease progression. 8.2 mm nodule right lower lobe unchanged. No evidence metastatic disease within the abdomen and pelvis.     Post chemotherapy CT scan was reviewed with the patient and it was recommended that she be placed on hormone therapy, Tamoxifen 20 mg, 1 p.o BID x 3 weeks followed by Megace 40 mg, 2 p.o BID x 3 weeks.    She presented to the ED on 7/29/21 with shortness of breath and pleuritic left sided chest pain. She was found to have a right sided PE and left pleural effusion with hypoxia. She was initiated on anticoagulation and underwent a thoracentesis with drainage of 950 cc. The patient endorses the above noted symptoms on bedside evaluation this evening. She states that she has been compliant with her megace and tamoxifen. The patient reports improvement in her breathing status post thoracentesis, however indicates that it still feels shallow.     Review of Systems:  Constitutional: Negative for fever, chills, weight loss, + malaise/fatigue and diaphoresis.   HENT: Negative for hearing loss, ear pain, nosebleeds, congestion, sore throat, neck pain, tinnitus and ear discharge.    Eyes: Negative for blurred vision, double vision, photophobia, pain, discharge and redness.   Respiratory: Negative for cough, hemoptysis, sputum production, + shortness of breath, wheezing and stridor.    Cardiovascular: + chest pain, negative for palpitations, orthopnea, claudication, leg swelling and PND.    Gastrointestinal: Negative for heartburn, nausea, vomiting, abdominal pain, diarrhea, constipation, blood in stool and melena.   Genitourinary: Negative for dysuria, urgency, frequency, hematuria and flank pain.   Musculoskeletal: Negative for myalgias, back pain, joint pain and falls.   Skin: Negative for itching and rash.          Past Medical History:   Diagnosis Date   • Anemia 2/6/2018    Iron def, post-menopausal bleeding.   • Back pain    • Back pain    • Cancer ([facility]) 2019    uterine & endometrial   • Cough    • CVA, old, hemiparesis (HCC) 2/6/2018    Residual left sided weakness   • Essential hypertension 2/6/2018    Well controlled on amlodipine 10 mg and metoprolol 100 mg bid.   • Eye drainage    • Fatigue    • Frequent headaches    • Frequent urination    • Hyperlipidemia    • Irregular periods    • Painful joint    • Palpitations    • Post-menopause bleeding 2/6/2018    Menopause in 2010 and bleeding 2012 started to have irregular continuous bleeding.    • Pulmonary nodule    • Sore muscles    • Stroke ([facility])     right sided weakness   • Swelling of lower extremity    • Vision abnormalities 2/6/2018   • Vitamin D deficiency 2/6/2018    Taking 50,000 IU once weekly for 4 yrs.   • Weakness    • Wears glasses        Past Surgical History:   Procedure Laterality Date   • PB CYSTOSCOPY,INSERT URETERAL STENT Left 8/8/2019    Procedure: CYSTOSCOPY, WITH URETERAL STENT INSERTION;  Surgeon: Jann Garcia M.D.;  Location: SURGERY [facility];  Service: Gynecology   • HYSTERECTOMY ROBOTIC XI N/A 8/8/2019    Procedure: HYSTERECTOMY, ROBOT-ASSISTED, USING DA RILEY XI;  Surgeon: Jann Garcia M.D.;  Location: SURGERY [facility];  Service: Gynecology   • SALPINGO OOPHORECTOMY Bilateral 8/8/2019    Procedure: SALPINGO-OOPHORECTOMY;  Surgeon: Jann Garcia M.D.;  Location: [facility];  Service: Gynecology   • NODE BIOPSY SENTINEL  8/8/2019    Procedure: BIOPSY, LYMPH NODE, SENTINEL;  Surgeon: Jann DINH  IVANA Garcia;  Location: SURGERY [facility];  Service: Gynecology   • PB HYSTEROSCOPY,DX,SEP PROC  6/19/2019    Procedure: HYSTEROSCOPY, DIAGNOSTIC;  Surgeon: Anel Chicas M.D.;  Location: SURGERY [facility];  Service: Gynecology   • DILATION AND CURETTAGE  6/19/2019    Procedure: DILATION AND CURETTAGE;  Surgeon: Anel Chicas M.D.;  Location: SURGERY [facility];  Service: Gynecology   • HYSTERECTOMY LAPAROSCOPY             Current Facility-Administered Medications   Medication Dose Route Frequency Provider Last Rate Last Admin   • guaiFENesin ER (MUCINEX) tablet 600 mg  600 mg Oral Q12HRS Julio Clements D.OIraida   600 mg at 08/03/21 0422   • Icy Hot extra-strength 10-30 % topical cream   Topical PRN Julio Clements D.OIraida       • amLODIPine (NORVASC) tablet 5 mg  5 mg Oral Q DAY Toño Lama M.D.   5 mg at 07/31/21 2108   • acetaminophen (TYLENOL) tablet 500 mg  500 mg Oral Q6HRS PRN Jazmin Henley M.D.   500 mg at 07/30/21 2110   • oxyCODONE immediate-release (ROXICODONE) tablet 5 mg  5 mg Oral Q4HRS PRN Jazmin Henley M.D.   5 mg at 08/02/21 1603   • senna-docusate (PERICOLACE or SENOKOT S) 8.6-50 MG per tablet 2 tablet  2 tablet Oral BID Toño Lama M.D.   2 tablet at 08/02/21 1802    And   • polyethylene glycol/lytes (MIRALAX) PACKET 1 Packet  1 Packet Oral QDAY PRN Toño Lama M.D.        And   • magnesium hydroxide (MILK OF MAGNESIA) suspension 30 mL  30 mL Oral QDAY PRN Toño Lama M.D.        And   • bisacodyl (DULCOLAX) suppository 10 mg  10 mg Rectal QDAY PRN Toño Lama M.D.       • ascorbic acid (Vitamin C) tablet 500 mg  500 mg Oral Q EVENING Toño Lama M.D.   500 mg at 08/02/21 1801   • aspirin (ASA) chewable tab 81 mg  81 mg Oral DAILY Toño Lama M.D.   81 mg at 08/03/21 0422   • atorvastatin (LIPITOR) tablet 20 mg  20 mg Oral Q EVENING Toño Lama M.D.   20 mg at 08/02/21 1801   • vitamin D (cholecalciferol) tablet 2,000 Units  2,000 Units Oral Q EVENING Toño Lama  M.D.   2,000 Units at 08/02/21 1801   • ferrous sulfate tablet 325 mg  325 mg Oral Q EVENING Toño Lama M.D.   325 mg at 08/02/21 1801   • [Held by provider] losartan (COZAAR) tablet 100 mg  100 mg Oral DAILY Toño Lama M.D.   100 mg at 08/01/21 1245   • multivitamin (THERAGRAN) tablet 1 tablet  1 tablet Oral DAILY Toño Lama M.D.   1 tablet at 08/03/21 0422   • senna-docusate (PERICOLACE or SENOKOT S) 8.6-50 MG per tablet 1 tablet  1 tablet Oral BID PRN Toño Lama M.D.       • metoprolol SR (TOPROL XL) tablet 100 mg  100 mg Oral Q DAY Toño Lama M.D.   100 mg at 08/03/21 0422   • amitriptyline (ELAVIL) tablet 75 mg  75 mg Oral Nightly Toño Lama M.D.   75 mg at 07/31/21 2108   • enoxaparin (LOVENOX) inj 80 mg  1 mg/kg Subcutaneous Q12HRS Toño Lama M.D.   80 mg at 08/03/21 0421       Allergies:  Penicillins, Gluten meal, Hydrocodone, Iodine, Levaquin, and Tramadol    Social History     Socioeconomic History   • Marital status: Single     Spouse name: Not on file   • Number of children: Not on file   • Years of education: Not on file   • Highest education level: Not on file   Occupational History   • Not on file   Tobacco Use   • Smoking status: Never Smoker   • Smokeless tobacco: Never Used   • Tobacco comment: n/a   Vaping Use   • Vaping Use: Never used   Substance and Sexual Activity   • Alcohol use: No   • Drug use: No   • Sexual activity: Never     Comment: , have 7 kids.   Other Topics Concern   •  Service No   • Blood Transfusions No   • Caffeine Concern No   • Occupational Exposure No   • Hobby Hazards No   • Sleep Concern Yes   • Stress Concern No   • Weight Concern Yes   • Special Diet No   • Back Care Yes   • Exercise Yes   • Bike Helmet No     Comment: Does Not Ride Bike   • Seat Belt Yes   • Self-Exams Yes   Social History Narrative   • Not on file     Social Determinants of Health     Financial Resource Strain:    • Difficulty of Paying Living Expenses:    Food Insecurity:  "   • Worried About Running Out of Food in the Last Year:    • Ran Out of Food in the Last Year:    Transportation Needs:    • Lack of Transportation (Medical):    • Lack of Transportation (Non-Medical):    Physical Activity:    • Days of Exercise per Week:    • Minutes of Exercise per Session:    Stress:    • Feeling of Stress :    Social Connections:    • Frequency of Communication with Friends and Family:    • Frequency of Social Gatherings with Friends and Family:    • Attends [employer] Services:    • Active Member of Clubs or Organizations:    • Attends Club or Organization Meetings:    • Marital Status:    Intimate Partner Violence:    • Fear of Current or Ex-Partner:    • Emotionally Abused:    • Physically Abused:    • Sexually Abused:        Family History   Problem Relation Age of Onset   • Hypertension Mother    • No Known Problems Father    • No Known Problems Brother          Physical Exam:  /62   Pulse 85   Temp 36.3 °C (97.3 °F) (Temporal)   Resp 18   Ht 1.651 m (5' 5\")   Wt 101 kg (223 lb 12.3 oz)   SpO2 96%     Constitutional: she is oriented to person, place, and time.  she appears fatigued. No distress.   Head: Normocephalic and atraumatic.   Neck:  Neck supple. No SC LAD.   Cardiovascular: Tachycardic, regular rhythm, normal heart sounds and intact distal pulses.    Pulmonary/Chest: Effort normal and breath sounds decreased bilateral bases, left < right. No stridor. No respiratory distress.    Abdominal: Soft, non-tender, non-distended.   Musculoskeletal: she exhibits no edema and no tenderness.   Neurological: R hemiplegia   Skin: Skin is warm and dry. No rash noted. she is not diaphoretic.          Labs:  Recent Labs     08/02/21  0235 08/02/21  1157 08/03/21  0345   WBC 7.4 6.8 5.9   RBC 2.63* 2.51* 2.54*   HEMOGLOBIN 8.4* 7.9* 8.0*   HEMATOCRIT 25.9* 24.1* 25.1*   MCV 98.5* 96.0 98.8*   MCH 31.9 31.5 31.5   MCHC 32.4* 32.8* 31.9*   RDW 52.3* 50.5* 52.3*   PLATELETCT 279 267 259 "   MPV 10.4 10.1 10.2     Recent Labs     08/02/21  1157 08/02/21  1823 08/03/21  0345   SODIUM 140 136 139   POTASSIUM 3.7 3.7 3.7   CHLORIDE 106 103 104   CO2 23 21 22   GLUCOSE 106* 114* 103*   BUN 18 15 14   CREATININE 0.86 0.76 0.68   CALCIUM 8.8 9.3 9.5     Recent Labs     08/02/21  0235   INR 1.18*     Recent Labs     08/02/21  0235   INR 1.18*       Radiology:  7/29/21: CTA chest  1.  Nonocclusive pulmonary emboli in the right pulmonary artery, its lobar and segmental branches. No right heart strain.  2.  Large left pleural effusion, with total collapse of the left lower lobe and partial atelectasis of the left upper lobe.  3.  At least 3 new pulmonary nodules measuring up to 2.6 cm, consistent with pulmonary metastases.  4.  Unchanged heterogeneous thyroid gland with multiple thyroid nodules     7/29/21 Thoracentesis:   1. Ultrasound guided left sided diagnostic thoracentesis.  2. 950 mL of fluid withdrawn.    Assessment: This is a 70 y.o. female with h/o metastatic endometrial cancer who presents with new PE and pleural effusion:     Plan:     1. Endometrial cancer: stage IIIB, s/p PORTEC 3 w/ omission of chemotherapy, followed by pulmonary recurrence treated with systemic carbo/taxol and most recently megace, treatment tolerance has been limited, will need to have f/u visit with Dr. Garcia to discuss treatment planning given concern for progression per recent chest CT  2. Pleural effusion: status post thoracentesis, with concern for recurrent effusion 8/2 however no fluid collection on US imaging. Cytology with rare atypical cells, not fully diagnostic of a malignant effusion however this remains in the differential.  CTM CXR and consider pleurodesis if rapid reaccumulation.   3. PE: secondary to malignancy, hold megace/tamoxifen given new thrombosis and concern for progression per chest CT, may be a candidate for DOAC if no procedures are indicated  4. Shortness of breath: secondary to above, now maintaining  sats on 2L NC       Thank you for for allowing us to participate in this patient's care. We will continue to follow. Please feel free to contact us for any questions or if we can be of any further assistance.       Christina Leos MD  Gynecologic Oncology  [facility]

## 2021-08-03 NOTE — PROGRESS NOTES
"Pt refusing turns. Pt educated and verbalizes understanding of importance of turning, mobility and risk of skin breakdown. Pt states \"I will move around when I need to.\" Charge RN aware.  "

## 2021-08-03 NOTE — THERAPY
Physical Therapy   Daily Treatment     Patient Name: Jairo Rivas  Age:  70 y.o., Sex:  female  Medical Record #: [id number]  Today's Date: 8/3/2021     Precautions: Fall Risk    Assessment    Pt with vast improvement compared to initial eval. Able to perform bed mobility with min assist, instructed therapist in how to assist her. Able to stand and ambulate short distance, again with hand held assist from therapist. Reports this is her baseline and her daughter performs hand held assist. Per notes daughter would prefer patient to discharge home with her, based on vast improvements demonstrated this is appropriate. As pt appears to have returned to her baseline, no additional PT needs. Patient will not be actively followed for physical therapy services at this time, however may be seen if requested by physician for 1 more visit within 30 days to address any discharge or equipment needs    Plan    Discharge secondary to goals met.    DC Equipment Recommendations: None  Discharge Recommendations: Recommend home health for continued physical therapy services         Objective       08/03/21 1107   Cognition    Level of Consciousness Alert   Comments agreeable to mobility, followed commands without issue   Balance   Sitting Balance (Static) Fair   Sitting Balance (Dynamic) Fair   Standing Balance (Static) Fair -   Standing Balance (Dynamic) Poor +   Weight Shift Sitting Fair   Weight Shift Standing Fair   Comments HHA   Gait Analysis   Gait Level Of Assist Minimal Assist   Assistive Device Hand Held Assist   Distance (Feet) 10   # of Times Distance was Traveled 2   Deviation Bradykinetic;Shuffled Gait;Ataxic   Weight Bearing Status no restrictions   Bed Mobility    Supine to Sit Minimal Assist   Sit to Supine Minimal Assist   Scooting Minimal Assist   Functional Mobility   Sit to Stand Moderate Assist   Short Term Goals    Short Term Goal # 1 Pt will perform supine<>sit from flat HOB/no railing with min A to  reduce caregiver burden within 6 visits.    Goal Outcome # 1 Goal met   Short Term Goal # 2 Pt will stand pivot transfer with mod A within 6 visits to ensure progress to PLOF.    Goal Outcome # 2 Goal met

## 2021-08-03 NOTE — DISCHARGE PLANNING
Received Choice form at 1430  Agency/Facility Name: [facility]  Referral sent per Choice form @ 1430    LSW informed

## 2021-08-03 NOTE — CARE PLAN
Problem: Knowledge Deficit - Standard  Goal: Patient and family/care givers will demonstrate understanding of plan of care, disease process/condition, diagnostic tests and medications  Outcome: Progressing     Problem: Fall Risk  Goal: Patient will remain free from falls  Outcome: Progressing     The patient is Stable - Low risk of patient condition declining or worsening    Shift Goals  Clinical Goals: Monitor hemodynamics, educate on turning, medication  Patient Goals: pain control, sit at edge of bed for meals     Progress made toward(s) clinical / shift goals:  Fall precautions in place. Bed in lowest position. Non-skid socks in place. Personal possessions within reach. Mobility sign on door. Bed-alarm on. Call light within reach. Pt educated regarding fall prevention and states understanding.   Pt educated regarding plan of care and medications. All questions answered.       Patient is not progressing towards the following goals:

## 2021-08-03 NOTE — PROGRESS NOTES
This RN was notified by family member that pt had a coughing attack and might have gone to the bathroom in bed.     Pt had a complete bed change, as well as new gown.

## 2021-08-03 NOTE — PROGRESS NOTES
Assumed care of pt. Bedside report received from night RN Cat. Pt was updated on plan of care. Call light, phone and personal belongings within reach. Bed locked in lowest position, 2 side rails up, bed alarm on and working appropriately.

## 2021-08-03 NOTE — PROGRESS NOTES
Critical Care/Pulmonary Consultation    Date of Service: 8/3/2021    Date of Admission:  7/29/2021  6:00 AM    Consulting Physician: ARLETH Mccrary    Chief Complaint:  Shortness of Breath (Since monday) and Chest Wall Pain (Left sided stabbing pain that radiates to the back, pain increases with movement and breathing. Patient thought is was muscle pain and was using icy hot but the pain has been getting worse. )      History of Present Illness:     Jairo Rivas is a 70 y.o. female presents with acute chest pain and shortness of breath of 1 week duration; hx of metastatic endometrial cancer (3b, ER-/IN+, s/p bilat salpingoophrectomy) with metastasis to lungs in 10/20 (s/p carboplatin/taxol), CVA/PE; admitted 7/29 for exudative unilateral pleural effusion and PE.    Initially treated for endometrial cancer in 7/19, without initial signs of metastasis. On repeat imaging 10/20 and subsequent biopsy, she was confirmed to have metastatic endometrial carcinoma and was treated with carboplatin/taxol, complicated by difficult tolerance dose, requiring reduction in dosage. Subsequently, she was reevaluated in 3/21 and saw radiologic improvement in pulmonary nodules.    On 7/29 she presented to the ED after 1 week of L thigh pain and 3 days of progressive dyspnea with associated chest pain. She was found to have large L pleural effusion and nonocclusive, submassive PE. Underwent L thoracentesis on day of admission (7/29) with 950ml removed, consistent with exudate by LDH criteria. Has since during admission reaccumulated pleural effusion, now incompassing what appears to be complete L chest.    Primary team consulted gynecology oncology and thoracic surgery for recurrent malignant pleural effusions.    Review of Systems   Constitutional: Negative for chills and fever.   Respiratory: Positive for sputum production. Negative for cough and hemoptysis.    Cardiovascular: Positive for chest pain and  orthopnea. Negative for palpitations and leg swelling.   Gastrointestinal: Negative for abdominal pain, constipation, diarrhea, nausea and vomiting.       Home Medications     Reviewed by Kavita Miller (Pharmacy Tech) on 07/29/21 at 0756  Med List Status: Complete   Medication Last Dose Status   acetaminophen (TYLENOL) 500 MG Tab 7/28/2021 Active   amitriptyline (ELAVIL) 50 MG Tab 7/28/2021 Active   amLODIPine (NORVASC) 5 MG Tab 7/28/2021 Active   ascorbic acid (ASCORBIC ACID) 500 MG Tab 7/28/2021 Active   aspirin (ASA) 81 MG Chew Tab chewable tablet 7/29/2021 Active   atorvastatin (LIPITOR) 20 MG Tab 7/28/2021 Active   B Complex-C (SUPER B COMPLEX PO) 7/28/2021 Active   baclofen (LIORESAL) 10 MG Tab 7/28/2021 Active   Cholecalciferol (VITAMIN D3) 2000 UNIT Cap 7/28/2021 Active   Coenzyme Q10 (COQ10) 100 MG Cap 7/28/2021 Active   ferrous sulfate 325 (65 Fe) MG tablet 7/28/2021 Active   losartan (COZAAR) 100 MG Tab 7/28/2021 Active   meclizine (ANTIVERT) 12.5 MG Tab PRN Active   megestrol (MEGACE) 40 MG Tab 7/28/2021 Active   metoprolol SR (TOPROL XL) 100 MG TABLET SR 24 HR 7/29/2021 Active   multivitamin (THERAGRAN) Tab 7/28/2021 Active   senna-docusate (PERICOLACE OR SENOKOT S) 8.6-50 MG Tab PRN Active   tamoxifen (NOLVADEX) 20 MG tablet ABOUT 2 WEEKS AGO Active                Social History     Tobacco Use   • Smoking status: Never Smoker   • Smokeless tobacco: Never Used   • Tobacco comment: n/a   Vaping Use   • Vaping Use: Never used   Substance Use Topics   • Alcohol use: No   • Drug use: No        Past Medical History:   Diagnosis Date   • Anemia 2/6/2018    Iron def, post-menopausal bleeding.   • Back pain    • Back pain    • Cancer (HCC) 2019    uterine & endometrial   • Cough    • CVA, old, hemiparesis (HCC) 2/6/2018    Residual left sided weakness   • Essential hypertension 2/6/2018    Well controlled on amlodipine 10 mg and metoprolol 100 mg bid.   • Eye drainage    • Fatigue    • Frequent headaches     • Frequent urination    • Hyperlipidemia    • Irregular periods    • Painful joint    • Palpitations    • Post-menopause bleeding 2/6/2018    Menopause in 2010 and bleeding 2012 started to have irregular continuous bleeding.    • Pulmonary nodule    • Sore muscles    • Stroke (HCC)     right sided weakness   • Swelling of lower extremity    • Vision abnormalities 2/6/2018   • Vitamin D deficiency 2/6/2018    Taking 50,000 IU once weekly for 4 yrs.   • Weakness    • Wears glasses        Past Surgical History:   Procedure Laterality Date   • PB CYSTOSCOPY,INSERT URETERAL STENT Left 8/8/2019    Procedure: CYSTOSCOPY, WITH URETERAL STENT INSERTION;  Surgeon: Jann Garcia M.D.;  Location: [facility];  Service: Gynecology   • HYSTERECTOMY ROBOTIC XI N/A 8/8/2019    Procedure: HYSTERECTOMY, ROBOT-ASSISTED, USING DA RILEY XI;  Surgeon: Jann Garcia M.D.;  Location: [facility];  Service: Gynecology   • SALPINGO OOPHORECTOMY Bilateral 8/8/2019    Procedure: SALPINGO-OOPHORECTOMY;  Surgeon: Jann Garcia M.D.;  Location: [facility];  Service: Gynecology   • NODE BIOPSY SENTINEL  8/8/2019    Procedure: BIOPSY, LYMPH NODE, SENTINEL;  Surgeon: Jann Garcia M.D.;  Location: [facility];  Service: Gynecology   • PB HYSTEROSCOPY,DX,SEP PROC  6/19/2019    Procedure: HYSTEROSCOPY, DIAGNOSTIC;  Surgeon: Anel Chicas M.D.;  Location: [facility];  Service: Gynecology   • DILATION AND CURETTAGE  6/19/2019    Procedure: DILATION AND CURETTAGE;  Surgeon: Anel Chicas M.D.;  Location: [facility];  Service: Gynecology   • HYSTERECTOMY LAPAROSCOPY         Allergies: Penicillins, Gluten meal, Hydrocodone, Iodine, Levaquin, and Tramadol    Family History   Problem Relation Age of Onset   • Hypertension Mother    • No Known Problems Father    • No Known Problems Brother        Vitals:    07/29/21 0511 07/29/21 0529 07/29/21 0616 07/29/21 0618   Height:  "1.651 m (5' 5\")      Weight:  88 kg (194 lb)     Weight % change since last entry.:  0 %     BP: (!) 167/93      Pulse: (!) 124  (!) 125 (!) 121   Resp: 15      Temp: 36.7 °C (98 °F)      TempSrc: Temporal       07/29/21 0735 07/29/21 0736 07/29/21 1010 07/29/21 1029   Height:       Weight:       Weight % change since last entry.:       BP: (!) 184/84  157/85 (!) 172/85   Pulse:  (!) 126 (!) 116 (!) 116   Resp:       Temp:       TempSrc:        07/29/21 1229 07/29/21 1329 07/29/21 1515 07/29/21 2000   Height:       Weight:       Weight % change since last entry.:       BP: 151/88 144/83 151/74 110/69   Pulse: (!) 111 (!) 105 (!) 114 (!) 112   Resp:   18 16   Temp:   36.4 °C (97.6 °F) 36.4 °C (97.6 °F)   TempSrc:   Temporal Temporal    07/30/21 0007 07/30/21 0405 07/30/21 0724 07/30/21 0805   Height:       Weight:       Weight % change since last entry.:       BP: 123/81 124/77 117/77    Pulse: (!) 109 (!) 116 (!) 107 100   Resp: 18 17 18 18   Temp: 36.2 °C (97.1 °F) 36.7 °C (98 °F) 36.6 °C (97.9 °F)    TempSrc: Temporal Temporal Temporal     07/30/21 0900 07/30/21 1248 07/30/21 1530 07/30/21 1606   Height:       Weight:       Weight % change since last entry.:       BP:  130/85 129/65 123/76   Pulse:  (!) 105 (!) 115 (!) 107   Resp: 18 18  18   Temp:  36.2 °C (97.1 °F)  36.3 °C (97.3 °F)   TempSrc:  Temporal  Temporal    07/30/21 1931 07/31/21 0005 07/31/21 0441 07/31/21 0849   Height:       Weight: 102 kg (224 lb 13.9 oz)      Weight % change since last entry.: 15.91 %      BP: 130/90 116/69 144/86 120/69   Pulse: (!) 109 (!) 101 (!) 102 (!) 111   Resp: 18 18 18 18   Temp: 37.1 °C (98.8 °F) 36.9 °C (98.5 °F) 36.2 °C (97.1 °F) 36 °C (96.8 °F)   TempSrc: Temporal Temporal Temporal Temporal    07/31/21 0947 07/31/21 1225 07/31/21 1631 07/31/21 1859   Height:       Weight:       Weight % change since last entry.:       BP:  139/74 109/75 110/72   Pulse: (!) 115 (!) 127 99 (!) 111   Resp: 18 18 (!) 123 18   Temp:  37 °C " (98.6 °F) 36.1 °C (96.9 °F) 36.2 °C (97.2 °F)   TempSrc:  Temporal Temporal Temporal    07/31/21 2335 08/01/21 0339 08/01/21 0451 08/01/21 0924   Height:       Weight:       Weight % change since last entry.:       BP: (!) 91/57 (!) 87/57 104/67 149/74   Pulse: (!) 107 (!) 116  99   Resp: 18 18  16   Temp: 36.1 °C (96.9 °F) 36.1 °C (97 °F)  36.2 °C (97.1 °F)   TempSrc: Temporal Temporal  Temporal    08/01/21 1200 08/01/21 1732 08/01/21 1805 08/01/21 2017   Height:       Weight:    101 kg (223 lb 12.3 oz)   Weight % change since last entry.:    -0.49 %   BP: 135/100 100/69 133/76 (!) 96/64   Pulse: (!) 136 99 (!) 138 (!) 121   Resp: 16 18  18   Temp:  36.2 °C (97.2 °F)  36.1 °C (97 °F)   TempSrc:  Temporal  Temporal    08/02/21 0009 08/02/21 0435 08/02/21 1152 08/02/21 1600   Height:       Weight:       Weight % change since last entry.:       BP: 106/71 114/75 115/83 138/80   Pulse: (!) 117 (!) 122 (!) 118 (!) 125   Resp: 16 18 16 18   Temp: 36.1 °C (97 °F) 36.1 °C (97 °F)  36.1 °C (97 °F)   TempSrc: Temporal Temporal  Temporal    08/02/21 2008 08/03/21 0432 08/03/21 0748   Height:      Weight:      Weight % change since last entry.:      BP: 109/69 107/62 110/71   Pulse: 73 85 (!) 112   Resp: 18 18 16   Temp: 36.1 °C (97 °F) 36.3 °C (97.3 °F) 36 °C (96.8 °F)   TempSrc: Temporal Temporal Temporal       Physical Examination  General: well appearing, no acute distress; pleasant, conversant  Neuro/Psych: AOx4, no focal deficits, normal affect  HEENT: AT/NC, EOMI/PERRL, oropharynx clear, moist, no lesions  CVS: tachycardic, no m/g/r  Respiratory: R lungs clear to auscultation; L lung sounds completely absent  Abdomen/: soft, nontender, nondistended  Extremities: shoulder/elbow flexion/extension 5/5 bilaterally and symmetric  Skin: no rash/lesion observed      Intake/Output Summary (Last 24 hours) at 8/3/2021 1226  Last data filed at 8/2/2021 1500  Gross per 24 hour   Intake 120 ml   Output --   Net 120 ml       Recent  Labs     08/02/21  0235 08/02/21  1157 08/03/21  0345   WBC 7.4 6.8 5.9   NEUTSPOLYS 72.40*  --  71.80   LYMPHOCYTES 13.70*  --  15.60*   MONOCYTES 11.50  --  10.50   EOSINOPHILS 1.50  --  1.50   BASOPHILS 0.40  --  0.30     Recent Labs     08/02/21  1157 08/02/21  1823 08/03/21  0345   SODIUM 140 136 139   POTASSIUM 3.7 3.7 3.7   CHLORIDE 106 103 104   CO2 23 21 22   BUN 18 15 14   CREATININE 0.86 0.76 0.68   CALCIUM 8.8 9.3 9.5     Recent Labs     08/02/21  1157 08/02/21  1823 08/03/21  0345   GLUCOSE 106* 114* 103*         DX-CHEST-PORTABLE (1 VIEW)   Final Result      1.  There is complete opacification of the left hemithorax which could represent combination of pleural effusion or airspace process.      US-CHEST   Final Result      1.  Diffuse pneumonitis of left hemithorax.      2.  No evidence of a large left-sided pleural effusion. Thoracentesis deferred.      3.  Recommend CT scan of the thorax for further evaluation.      DX-CHEST-PORTABLE (1 VIEW)   Final Result      Worsening opacification on the left which is now complete, probably related to left pleural effusion and atelectasis.      DX-CHEST-LIMITED (1 VIEW)   Final Result      1.  No significant interval change.      DX-CHEST-PORTABLE (1 VIEW)   Final Result      1.  Interval decrease in the left pleural effusion following thoracentesis.   2.  There is no pneumothorax visualized.      US-THORACENTESIS PUNCTURE LEFT   Final Result      1. Ultrasound guided left sided diagnostic thoracentesis.      2. 950 mL of fluid withdrawn.      CT-CTA CHEST PULMONARY ARTERY W/ RECONS   Final Result      1.  Nonocclusive pulmonary emboli in the right pulmonary artery, its lobar and segmental branches. No right heart strain.   2.  Large left pleural effusion, with total collapse of the left lower lobe and partial atelectasis of the left upper lobe.   3.  At least 3 new pulmonary nodules measuring up to 2.6 cm, consistent with pulmonary metastases.   4.  Unchanged  heterogeneous thyroid gland with multiple thyroid nodules.      Findings were discussed with KRISTOPHER COOK on 7/29/2021 9:38 AM.         DX-CHEST-PORTABLE (1 VIEW)   Final Result      Very large left pleural effusion with associated compressive atelectasis.      CT-CHEST (THORAX) W/O    (Results Pending)       Patient Active Problem List   Diagnosis   • Vision abnormalities   • CVA, old, hemiparesis (HCC)   • Essential hypertension   • Vitamin D deficiency   • Endometrial cancer (HCC)   • Iron deficiency anemia   • Obesity (BMI 30-39.9)   • Hypokalemia   • Admission for chemotherapy   • Nausea   • Thyroid nodule - benign   • Other chest pain   • Tongue burning sensation   • Neuropathy   • Multiple lung nodules - growth from 8 mm - 10 mm 1/2020 - 8/2020   • Pulmonary embolism (HCC)   • Pleural effusion   • Acute kidney injury (HCC)   • Anemia       Assessment and Plan:    Jairo Rivas is a 70 y.o. female presents with acute chest pain and shortness of breath of 1 week duration; hx of metastatic endometrial cancer (3b, ER-/SC+, s/p bilat salpingoophrectomy) with metastasis to lungs in 10/20 (s/p carboplatin/taxol), CVA/PE; admitted 7/29 for recurrent L malignant pleural effusion and PE.    #recurrent malignant pleural effusion  #Pulmonary embolism  #Acute hypoxic respiratory failure  Hx of metastatic endometrial carcinoma with metastases to the lungs. Acute dyspnea/chest pain associated with L pleural effusion, nonocclusive pulmonary embolism. Poor prognosis due to metastases and rapid recurrence of pleural effusions despite previous surgery/chemotherapy.  -titrate oxygen >88%  -Lovenox 100mg SC BID  -Transition to apixaban/xarelto PO as patient preference/insurance coverage   -therapeutic thoracentesis  -Thoracic surgery for decortication/pleurodesis (due to recurrence despite chemotherapy, pleurx risk/benefit favours complications)  -Gyn/Onc evaluation and treatment  -Palliative medicine consultation  for goals of care discussions, consideration of hospice    On evaluation, Ms. Rivas denied that her pleural effusions were re-accumulating despite looking at XR intervals post thoracentesis to this AM. When interventions were discussed as options, she stated that she was not going to have another procedure and was waiting for her CT to be done.  Due to her current declination of interventions/procedures, pulmonology does not have anything additional to add at this time.    Thank you for involving Pulmonary medicine in the care of Ms. Rivas. We will sign off at this time. If there are any additional questions, please feel free to reach out to us directly.    Moody Neely M.D.

## 2021-08-03 NOTE — CARE PLAN
The patient is Watcher - Medium risk of patient condition declining or worsening    Shift Goals  Clinical Goals: Monitor hemodynamics, educate on turning, medication  Patient Goals: pain control, sit at edge of bed for meals     Progress made toward(s) clinical / shift goals:    Problem: Pain - Standard  Goal: Alleviation of pain or a reduction in pain to the patient’s comfort goal  Outcome: Progressing     Problem: Knowledge Deficit - Standard  Goal: Patient and family/care givers will demonstrate understanding of plan of care, disease process/condition, diagnostic tests and medications  Outcome: Progressing     Problem: Fall Risk  Goal: Patient will remain free from falls  Outcome: Progressing     Problem: Skin Integrity  Goal: Skin integrity is maintained or improved  Outcome: Progressing   Q2 turns and repositioning, barrier paste in use, female wick in use for containment, pillows used to float heels.    Patient is not progressing towards the following goals:

## 2021-08-03 NOTE — PROGRESS NOTES
4 Eyes Skin Assessment Completed by JOB Taylor and JOB Pedraza.     Head WDL  Ears WDL  Nose WDL  Mouth WDL  Neck WDL  Breast/Chest WDL  Shoulder Blades WDL  Spine WDL  (R) Arm/Elbow/Hand WDL  (L) Arm/Elbow/Hand WDL  Abdomen Redness and bruising on L side where thoracentesis completed.   Groin WDL  Scrotum/Coccyx/Buttocks Redness, Blanching.   (R) Leg WDL  (L) Leg WDL  (R) Heel/Foot/Toe Blanching and Boggy  (L) Heel/Foot/Toe Blanching and Boggy              Devices In Places Tele Box and Pulse Ox        Interventions In Place Gray Ear Foams, Heel Float Boots, Chair Waffle, Waffle Overlay, Pillows, Q2 Turns, Barrier Cream, Heels Loaded W/Pillows and Pressure Redistribution Mattress     Possible Skin Injury No     Pictures Uploaded Into Epic N/A  Wound Consult Placed N/A  RN Wound Prevention Protocol Ordered No

## 2021-08-03 NOTE — THERAPY
"Occupational Therapy  Daily Treatment     Patient Name: Jairo Rivas  Age:  70 y.o., Sex:  female  Medical Record #: [id number]  Today's Date: 8/3/2021     Precautions  Precautions: (P) Fall Risk  Comments: chronic CVA with right sided weakness     Assessment    Pt seen for follow up OT tx session, pts pain much improved in left lung area and able to complete all mobility and basic ADLs at her functional baseline. Now with pain better controlled pt seems to be at her functional baseline that is 24/7 assist from daughter who is able and willing to assist patient at discharge. Will complete order at this time as patient seems to have no further skilled needs. Patient will not be actively followed for occupational therapy services at this time, however may be seen if requested by physician for 1 more visit within 30 days to address any discharge or equipment needs.       Plan    Discharge secondary to goals met.    DC Equipment Recommendations: (P) None  Discharge Recommendations: (P) Anticipate that the patient will have no further occupational therapy needs after discharge from the hospital    Subjective    \"I can move\"     Objective       08/03/21 1105   Precautions   Precautions Fall Risk   Cognition    Cognition / Consciousness WDL   Speech/ Communication Dysarthric   Level of Consciousness Alert   Other Treatments   Other Treatments Provided Discussed level of assist provided by daughter as well as transfer technique performed by patient and daughter at home   Balance   Sitting Balance (Static) Fair   Sitting Balance (Dynamic) Fair   Standing Balance (Static) Fair   Standing Balance (Dynamic) Fair -   Weight Shift Sitting Fair   Weight Shift Standing Poor   Skilled Intervention Verbal Cuing;Postural Facilitation   Comments w/ HHA   Bed Mobility    Supine to Sit Minimal Assist   Sit to Supine Minimal Assist   Scooting Minimal Assist   Skilled Intervention Verbal Cuing;Postural Facilitation   Activities " of Daily Living   Grooming Supervision;Seated   Upper Body Dressing Minimal Assist   Lower Body Dressing Maximal Assist   Toileting   (NT-refused need)   Skilled Intervention Verbal Cuing   How much help from another person does the patient currently need...   Putting on and taking off regular lower body clothing? 2   Bathing (including washing, rinsing, and drying)? 2   Toileting, which includes using a toilet, bedpan, or urinal? 3   Putting on and taking off regular upper body clothing? 3   Taking care of personal grooming such as brushing teeth? 3   Eating meals? 3   6 Clicks Daily Activity Score 16   Functional Mobility   Sit to Stand Moderate Assist   Bed, Chair, Wheelchair Transfer Refused   Toilet Transfers Refused   Transfer Method Stand Step   Mobility bed mobility, steps in room, back to bed   Skilled Intervention Verbal Cuing   Comments w/ HHA   Activity Tolerance   Sitting Edge of Bed 10   Standing 5   Patient / Family Goals   Patient / Family Goal #1 To go home   Goal #1 Outcome Goal met   Short Term Goals   Short Term Goal # 1 Pt will complete ADL transfers with modA   Goal Outcome # 1 Goal met   Short Term Goal # 2 Pt will complete seated G/H with set up   Goal Outcome # 2 Goal met   Short Term Goal # 3 Pt will complete toileting with modA   Goal Outcome # 3 Goal not met   Education Group   Education Provided Role of Occupational Therapist   Role of Occupational Therapist Patient Response Patient;Acceptance;Explanation   Interdisciplinary Plan of Care Collaboration   IDT Collaboration with  Nursing   Patient Position at End of Therapy In Bed;Bed Alarm On;Call Light within Reach;Tray Table within Reach;Phone within Reach   Collaboration Comments RN updated

## 2021-08-03 NOTE — PROGRESS NOTES
Pt assessed for pain. Pt pain is a 5/10 in her back. Pt medicated for thoracentesis with 1 mg ativan per order.     Pt transported to IR for thoracentesis.

## 2021-08-03 NOTE — PROGRESS NOTES
4 Eyes Skin Assessment Completed by JOB Castle and JOB Yoon.     Head WDL  Ears WDL  Nose WDL  Mouth WDL  Neck WDL  Breast/Chest WDL  Shoulder Blades WDL  Spine WDL  (R) Arm/Elbow/Hand WDL  (L) Arm/Elbow/Hand WDL  Abdomen Redness and bruising on L side where thoracentesis completed.   Groin WDL  Scrotum/Coccyx/Buttocks Redness, Blanching and Excoriation  (R) Leg WDL  (L) Leg Incision  (R) Heel/Foot/Toe Blanching and Boggy  (L) Heel/Foot/Toe Blanching and Boggy              Devices In Places Tele Box and Pulse Ox        Interventions In Place Gray Ear Foams, Heel Float Boots, Chair Waffle, Waffle Overlay, Pillows, Q2 Turns, Barrier Cream, Heels Loaded W/Pillows and Pressure Redistribution Mattress     Possible Skin Injury No     Pictures Uploaded Into Epic N/A  Wound Consult Placed N/A  RN Wound Prevention Protocol Ordered No

## 2021-08-03 NOTE — PROGRESS NOTES
Received report from JOB Castle. Reviewed POC, no questions at this time. Call light is within reach, bed is in lowest/locked position.

## 2021-08-03 NOTE — CARE PLAN
Problem: Knowledge Deficit - Standard  Goal: Patient and family/care givers will demonstrate understanding of plan of care, disease process/condition, diagnostic tests and medications  Outcome: Progressing  Note: Pt educated regarding plan of care and medications. All questions answered.        Problem: Fall Risk  Goal: Patient will remain free from falls  Outcome: Progressing  Note: Fall precautions in place. Bed in lowest position. Non-skid socks in place. Personal possessions within reach. Mobility sign on door. Bed-alarm on. Call light within reach. Pt educated regarding fall prevention and states understanding.      The patient is Stable - Low risk of patient condition declining or worsening    Shift Goals  Clinical Goals: mobility, titrate 02, manage pain  Patient Goals: pain control, mobility, safety    Progress made toward(s) clinical / shift goals:  progressing    Patient is not progressing towards the following goals:

## 2021-08-03 NOTE — DISCHARGE PLANNING
Anticipated Discharge Disposition: Home with home health    Action: LSW received HH order. LSW to have DPA send referral to [facility] per daughter's choice as noted in RNCM note on 7/31/21. Patient will also need price check on xarelto. LSW to call pharmacy when med has been prescribed.    Barriers to Discharge: HH acceptance    Plan: LSW to f/u on referral and meds

## 2021-08-04 ENCOUNTER — APPOINTMENT (OUTPATIENT)
Dept: RADIOLOGY | Facility: MEDICAL CENTER | Age: 71
DRG: 163 | End: 2021-08-04
Attending: STUDENT IN AN ORGANIZED HEALTH CARE EDUCATION/TRAINING PROGRAM
Payer: MEDICARE

## 2021-08-04 ENCOUNTER — APPOINTMENT (OUTPATIENT)
Dept: RADIOLOGY | Facility: MEDICAL CENTER | Age: 71
End: 2021-08-04
Attending: SPECIALIST
Payer: MEDICARE

## 2021-08-04 LAB
ANION GAP SERPL CALC-SCNC: 13 MMOL/L (ref 7–16)
BUN SERPL-MCNC: 9 MG/DL (ref 8–22)
CALCIUM SERPL-MCNC: 9.5 MG/DL (ref 8.5–10.5)
CHLORIDE SERPL-SCNC: 102 MMOL/L (ref 96–112)
CO2 SERPL-SCNC: 22 MMOL/L (ref 20–33)
CREAT SERPL-MCNC: 0.64 MG/DL (ref 0.5–1.4)
EKG IMPRESSION: NORMAL
ERYTHROCYTE [DISTWIDTH] IN BLOOD BY AUTOMATED COUNT: 52.4 FL (ref 35.9–50)
GLUCOSE SERPL-MCNC: 104 MG/DL (ref 65–99)
HCT VFR BLD AUTO: 26.5 % (ref 37–47)
HGB BLD-MCNC: 8.4 G/DL (ref 12–16)
MCH RBC QN AUTO: 31.5 PG (ref 27–33)
MCHC RBC AUTO-ENTMCNC: 31.7 G/DL (ref 33.6–35)
MCV RBC AUTO: 99.3 FL (ref 81.4–97.8)
PLATELET # BLD AUTO: 319 K/UL (ref 164–446)
PMV BLD AUTO: 10 FL (ref 9–12.9)
POTASSIUM SERPL-SCNC: 3.8 MMOL/L (ref 3.6–5.5)
RBC # BLD AUTO: 2.67 M/UL (ref 4.2–5.4)
SODIUM SERPL-SCNC: 137 MMOL/L (ref 135–145)
WBC # BLD AUTO: 7.4 K/UL (ref 4.8–10.8)

## 2021-08-04 PROCEDURE — 36415 COLL VENOUS BLD VENIPUNCTURE: CPT

## 2021-08-04 PROCEDURE — 99233 SBSQ HOSP IP/OBS HIGH 50: CPT | Mod: GC | Performed by: INTERNAL MEDICINE

## 2021-08-04 PROCEDURE — A9270 NON-COVERED ITEM OR SERVICE: HCPCS | Performed by: STUDENT IN AN ORGANIZED HEALTH CARE EDUCATION/TRAINING PROGRAM

## 2021-08-04 PROCEDURE — 85027 COMPLETE CBC AUTOMATED: CPT

## 2021-08-04 PROCEDURE — 700102 HCHG RX REV CODE 250 W/ 637 OVERRIDE(OP): Performed by: STUDENT IN AN ORGANIZED HEALTH CARE EDUCATION/TRAINING PROGRAM

## 2021-08-04 PROCEDURE — 770006 HCHG ROOM/CARE - MED/SURG/GYN SEMI*

## 2021-08-04 PROCEDURE — 80048 BASIC METABOLIC PNL TOTAL CA: CPT

## 2021-08-04 PROCEDURE — 76604 US EXAM CHEST: CPT

## 2021-08-04 PROCEDURE — 700102 HCHG RX REV CODE 250 W/ 637 OVERRIDE(OP): Performed by: INTERNAL MEDICINE

## 2021-08-04 PROCEDURE — 99233 SBSQ HOSP IP/OBS HIGH 50: CPT | Performed by: INTERNAL MEDICINE

## 2021-08-04 PROCEDURE — 93010 ELECTROCARDIOGRAM REPORT: CPT | Performed by: INTERNAL MEDICINE

## 2021-08-04 PROCEDURE — A9270 NON-COVERED ITEM OR SERVICE: HCPCS | Performed by: INTERNAL MEDICINE

## 2021-08-04 RX ORDER — ALBUTEROL SULFATE 90 UG/1
2 AEROSOL, METERED RESPIRATORY (INHALATION)
Status: DISCONTINUED | OUTPATIENT
Start: 2021-08-04 | End: 2021-08-18 | Stop reason: HOSPADM

## 2021-08-04 RX ADMIN — Medication 2000 UNITS: at 17:09

## 2021-08-04 RX ADMIN — FERROUS SULFATE TAB 325 MG (65 MG ELEMENTAL FE) 325 MG: 325 (65 FE) TAB at 17:09

## 2021-08-04 RX ADMIN — ATORVASTATIN CALCIUM 20 MG: 20 TABLET, FILM COATED ORAL at 17:09

## 2021-08-04 RX ADMIN — GUAIFENESIN 600 MG: 600 TABLET, EXTENDED RELEASE ORAL at 17:09

## 2021-08-04 RX ADMIN — GUAIFENESIN 600 MG: 600 TABLET, EXTENDED RELEASE ORAL at 05:36

## 2021-08-04 RX ADMIN — Medication 500 MG: at 17:09

## 2021-08-04 RX ADMIN — ASPIRIN 81 MG: 81 TABLET, CHEWABLE ORAL at 05:36

## 2021-08-04 RX ADMIN — OXYCODONE 5 MG: 5 TABLET ORAL at 17:10

## 2021-08-04 RX ADMIN — THERA TABS 1 TABLET: TAB at 05:36

## 2021-08-04 RX ADMIN — AMLODIPINE BESYLATE 5 MG: 5 TABLET ORAL at 21:21

## 2021-08-04 RX ADMIN — OXYCODONE 5 MG: 5 TABLET ORAL at 22:55

## 2021-08-04 RX ADMIN — METOPROLOL SUCCINATE 100 MG: 100 TABLET, EXTENDED RELEASE ORAL at 05:36

## 2021-08-04 RX ADMIN — OXYCODONE 5 MG: 5 TABLET ORAL at 07:22

## 2021-08-04 ASSESSMENT — ENCOUNTER SYMPTOMS
WEAKNESS: 1
CHILLS: 0
BACK PAIN: 0
HEADACHES: 0
DIZZINESS: 1
FOCAL WEAKNESS: 1
ORTHOPNEA: 0
COUGH: 0
FEVER: 0
DIZZINESS: 0
DIARRHEA: 0
CONSTIPATION: 0
HEMOPTYSIS: 0
FALLS: 0
ABDOMINAL PAIN: 0
FOCAL WEAKNESS: 0
SHORTNESS OF BREATH: 1
VOMITING: 0
MYALGIAS: 0
ORTHOPNEA: 1
COUGH: 1
EYE PAIN: 0
SPUTUM PRODUCTION: 0
PALPITATIONS: 0
NECK PAIN: 0
NAUSEA: 0

## 2021-08-04 ASSESSMENT — PAIN DESCRIPTION - PAIN TYPE
TYPE: ACUTE PAIN

## 2021-08-04 ASSESSMENT — FIBROSIS 4 INDEX: FIB4 SCORE: 1.01

## 2021-08-04 NOTE — PROGRESS NOTES
Gynecology Oncology Progress Note               Author: Rima Kitchen P.A.-C. Date & Time created: 8/3/2021  5:33 PM     Interval History:  No acute overnight events. Patient states no change in shortness of breath. Some pain to thoracentesis site and her abdomen where she is getting Lovenox. No nausea or vomiting. No fever or chills.      Review of Systems:  Review of Systems   Constitutional: Negative for chills and fever.   Eyes: Negative for pain.   Respiratory: Positive for shortness of breath. Negative for cough.    Cardiovascular: Negative for chest pain and palpitations.   Gastrointestinal: Negative for abdominal pain, nausea and vomiting.   Genitourinary: Negative for dysuria, frequency and urgency.   Musculoskeletal: Negative for myalgias and neck pain.   Neurological: Negative for dizziness and headaches.       Physical Exam:  Physical Exam  Constitutional:       Appearance: Normal appearance.   HENT:      Mouth/Throat:      Mouth: Mucous membranes are moist.   Eyes:      Pupils: Pupils are equal, round, and reactive to light.   Cardiovascular:      Rate and Rhythm: Normal rate and regular rhythm.      Pulses: Normal pulses.   Pulmonary:      Comments: Diminished breath  sounds to left lung fields.   Abdominal:      General: Bowel sounds are normal. There is no distension.      Palpations: Abdomen is soft.      Tenderness: There is no abdominal tenderness.   Musculoskeletal:      Right lower leg: No edema.      Left lower leg: No edema.   Skin:     General: Skin is warm and dry.   Neurological:      Mental Status: She is alert.      Motor: Weakness (to right side, secondary to CVA) present.         Labs:      Recent Labs     08/02/21  1157   CPKTOTAL 82     Recent Labs     08/02/21  1157 08/02/21  1823 08/03/21  0345   SODIUM 140 136 139   POTASSIUM 3.7 3.7 3.7   CHLORIDE 106 103 104   CO2 23 21 22   BUN 18 15 14   CREATININE 0.86 0.76 0.68   CALCIUM 8.8 9.3 9.5     Recent Labs     08/02/21  1157  21  0345   GLUCOSE 106* 114* 103*     Recent Labs     21  0345   RBC 2.63* 2.51* 2.54*   HEMOGLOBIN 8.4* 7.9* 8.0*   HEMATOCRIT 25.9* 24.1* 25.1*   PLATELETCT 279 267 259   PROTHROMBTM 14.6  --   --    INR 1.18*  --   --    IRON  --  13*  --    FERRITIN  --  436.0*  --    TOTIRONBC  --  150*  --      Recent Labs     21  0345   WBC 7.4 6.8 5.9   NEUTSPOLYS 72.40*  --  71.80   LYMPHOCYTES 13.70*  --  15.60*   MONOCYTES 11.50  --  10.50   EOSINOPHILS 1.50  --  1.50   BASOPHILS 0.40  --  0.30     Recent Labs     21  0345   SODIUM 140 136 139   POTASSIUM 3.7 3.7 3.7   CHLORIDE 106 103 104   CO2 23 21 22   GLUCOSE 106* 114* 103*   BUN 18 15 14   CREATININE 0.86 0.76 0.68   CALCIUM 8.8 9.3 9.5     Hemodynamics:  Temp (24hrs), Av.2 °C (97.1 °F), Min:36 °C (96.8 °F), Max:36.3 °C (97.4 °F)  Temperature: 36.3 °C (97.4 °F)  Pulse  Av.7  Min: 73  Max: 138   Blood Pressure : 131/86     Respiratory:    Respiration: 18, Pulse Oximetry: 100 %        RUL Breath Sounds: Clear, RML Breath Sounds: Diminished, RLL Breath Sounds: Diminished, HANK Breath Sounds: Clear, LLL Breath Sounds: Diminished  Fluids:  No intake or output data in the 24 hours ending 21 8203     GI/Nutrition:  Orders Placed This Encounter   Procedures   • Diet Order Diet: Regular     Standing Status:   Standing     Number of Occurrences:   1     Order Specific Question:   Diet:     Answer:   Regular [1]     Medical Decision Making, by Problem:  Active Hospital Problems    Diagnosis    • *Pleural effusion [J90]    • Acute kidney injury (HCC) [N17.9]    • Anemia [D64.9]    • Pulmonary embolism (HCC) [I26.99]    • Endometrial cancer (HCC) [C54.1]        Plan:  This is a 70 y.o. female with h/o metastatic endometrial cancer who presents with new PE and pleural effusion:     1. Endometrial cancer: stage IIIB, s/p PORTEC 3 w/  omission of chemotherapy, followed by pulmonary recurrence treated with systemic carbo/taxol and most recently megace, treatment tolerance has been limited, will need to have f/u visit with Dr. Garcia to discuss treatment planning given concern for progression per recent chest CT.   2. Pleural effusion: status post thoracentesis, with concern for recurrent effusion 8/2 however no fluid collection on US imaging. Cytology with rare atypical cells, not fully diagnostic of a malignant effusion however this remains in the differential.  Pulmonary medicine saw patient and discussed options for further treatment, however on their evaluation patient refused any interventions/procedures at this time.    3. PE: secondary to malignancy, hold megace/tamoxifen given new thrombosis and concern for progression per chest CT, may be a candidate for DOAC if no procedures are indicated  4. Shortness of breath: secondary to above, now maintaining sats on 2L NC     Quality-Core Measures

## 2021-08-04 NOTE — PROGRESS NOTES
Received report from JOB Yoon. Reviewed POC, no questions at this time. Call light is within reach, bed is in lowest/locked position.

## 2021-08-04 NOTE — PROGRESS NOTES
Reached out to Dr. Kamilla Black in regards to pt complaining of difficulty breathing and to have tightness in her chest. Ordered EKG to make sure it was not heart related.     VSS as follow:    BP-148/109  HR-127  O2 sats- 95% on 2L.     Waiting for additional orders if needed.    done

## 2021-08-04 NOTE — CARE PLAN
The patient is Watcher - Medium risk of patient condition declining or worsening    Shift Goals  Clinical Goals: Manage pain, titrate O2  Patient Goals: pain control and reat  Family Goals: rest, comfort    Progress made toward(s) clinical / shift goals:  Pt communicates needs and uses call light appropriately. Plan of care discussed with patient with MD at bedside. All safety precautions in place. Hourly rounding in place.       Problem: Pain - Standard  Goal: Alleviation of pain or a reduction in pain to the patient’s comfort goal  Outcome: Progressing     Problem: Knowledge Deficit - Standard  Goal: Patient and family/care givers will demonstrate understanding of plan of care, disease process/condition, diagnostic tests and medications  Outcome: Progressing

## 2021-08-04 NOTE — PROGRESS NOTES
Assumed care at 0700, bedside report received from Annalise KAISER. Pt. Is medical on the monitor. Initial assessment completed, orders reviewed, call light within reach, and hourly rounding in place. POC addressed with patient, no additional questions at this time.

## 2021-08-04 NOTE — DISCHARGE PLANNING
ATTN: Case Management  RE: Referral for Home Health    As of 8/4/21, we have accepted the Home Health referral for the patient listed above.    A [facility] clinician will be out to see the patient within 48 hours. If you have any questions or concerns regarding the patient’s transition to Home Health, please do not hesitate to contact us at [phone].      We look forward to collaborating with you,  [facility] Team

## 2021-08-04 NOTE — PROGRESS NOTES
Daily Progress Note:     Date of Service: 8/3/2021  Primary Team: [employer] Blue Team   Attending: ARLETH Mccrary   Senior Resident: Dr. Henley  Intern: Dr. Clements  Contact:  [phone]    Chief Complaint:   CP, SOB    Subjective:  No acute events overnight.  This AM she is feeling good, stating her breathing is better and she is no longer having any chest pain.    Yesterday she was unable to have a thoracentesis performed.  Discussed plan of care with daughter, Kary, on phone today, [phone]    Consultants/Specialty:  Oncology    Review of Systems:    Review of Systems   Constitutional: Negative for chills and fever.   Respiratory: Positive for shortness of breath. Negative for cough.    Cardiovascular: Negative for chest pain, palpitations and orthopnea.   Gastrointestinal: Negative for abdominal pain, diarrhea, nausea and vomiting.   Genitourinary: Negative for dysuria and urgency.   Musculoskeletal: Negative for back pain, falls and joint pain.   Neurological: Positive for focal weakness (right sided hemiparesis 2/2 CVA). Negative for headaches.       Objective Data:   Physical Exam:   Vitals:   Temp:  [36 °C (96.8 °F)-36.3 °C (97.4 °F)] 36.3 °C (97.4 °F)  Pulse:  [] 94  Resp:  [16-18] 18  BP: (107-131)/(62-86) 131/86  SpO2:  [96 %-100 %] 100 %    Physical Exam  Constitutional:       General: She is not in acute distress.     Appearance: She is not toxic-appearing.   HENT:      Head: Normocephalic and atraumatic.      Mouth/Throat:      Mouth: Mucous membranes are dry.      Pharynx: Oropharynx is clear.   Eyes:      Conjunctiva/sclera: Conjunctivae normal.   Cardiovascular:      Rate and Rhythm: Normal rate and regular rhythm.      Pulses: Normal pulses.      Heart sounds: Normal heart sounds. No murmur heard.   No friction rub. No gallop.    Pulmonary:      Effort: Pulmonary effort is normal.      Comments: No acute respiratory distress.  Decreased breath sounds on left side.  Rales present  in right base.  Abdominal:      General: Abdomen is flat. Bowel sounds are normal. There is no distension.      Palpations: Abdomen is soft.      Tenderness: There is no abdominal tenderness.   Musculoskeletal:      Right lower leg: No edema.      Left lower leg: No edema.   Skin:     General: Skin is warm and dry.   Neurological:      Mental Status: She is alert.      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   Psychiatric:         Mood and Affect: Mood normal.         Behavior: Behavior normal.           Labs:   HEMATOLOGY/ ONCOLOGY/ID:            Recent Labs     08/02/21  0235 08/02/21  1157 08/03/21  0345   WBC 7.4 6.8 5.9   RBC 2.63* 2.51* 2.54*   HEMOGLOBIN 8.4* 7.9* 8.0*   HEMATOCRIT 25.9* 24.1* 25.1*   MCV 98.5* 96.0 98.8*   MCH 31.9 31.5 31.5   RDW 52.3* 50.5* 52.3*   PLATELETCT 279 267 259   MPV 10.4 10.1 10.2   NEUTSPOLYS 72.40*  --  71.80   LYMPHOCYTES 13.70*  --  15.60*   MONOCYTES 11.50  --  10.50   EOSINOPHILS 1.50  --  1.50   BASOPHILS 0.40  --  0.30     Lab Results   Component Value Date    JIEMRGGV71 984 (H) 09/25/2020    GUCBFNRC53 714 05/15/2019    JMXEKIJY01 937 (H) 10/02/2018    FOLATE >23.8 06/12/2019    FERRITIN 436.0 (H) 08/02/2021    FERRITIN 18.5 06/12/2019    FERRITIN 18.5 10/02/2018    IRON 13 (L) 08/02/2021    IRON 35 (L) 06/12/2019    IRON 29 (L) 10/02/2018    TOTIRONBC 150 (L) 08/02/2021    TOTIRONBC 272 06/12/2019    TOTIRONBC 392 10/02/2018       RENAL:        Estimated GFR/CRCL = Estimated Creatinine Clearance: 93.8 mL/min (by C-G formula based on SCr of 0.68 mg/dL).  Recent Labs     08/02/21  1157 08/02/21  1823 08/03/21  0345   SODIUM 140 136 139   POTASSIUM 3.7 3.7 3.7   CHLORIDE 106 103 104   CO2 23 21 22   GLUCOSE 106* 114* 103*   BUN 18 15 14   CREATININE 0.86 0.76 0.68   CALCIUM 8.8 9.3 9.5       GASTROINTESTINAL/ HEPATIC:          Recent Labs     08/02/21  0235   INR 1.18*     No results found for: AMMONIA    ENDOCRINE:               Recent Labs     08/02/21  1157 08/02/21  1823 08/03/21  0345   GLUCOSE 106* 114* 103*     Lab Results   Component Value Date    FREET4 0.92 05/15/2019       Imaging:   CXR 7/30:  No significant interval change in pleural effusion, which is still present.  CXR 8/1: repeat chest x-ray shows reaccumulation of fluid in pleural effusion.  CT chest 8/3:  Very large left pleural effusion which now demonstrates heterogeneous areas of increased density suggesting interval hemorrhage or less likely superimposed infection.    Problem Representation:  Carlene Rivas  is a 70 y.o. y/o woman w/ PMHx Stage IV endometrial cancer who presented on 7/29/2021 w/ worsening shortness of breath.  She was found to have a non-occlusive PE in the right pulmonary artery w/o right heart strain seen on CTA from 7/29.  Large left pleural effusion, thoracentesis performed on 7/29 removed 950 ml of fluid, + lights criteria.  Cytology not definitive for malignant effusion.  CT chest on 8/3 shows likely hemothorax.    * Pleural effusion  Assessment & Plan  Shortness of breath with coarse crackles on left mid lung. Found to have large left sided pleural effusion with total collapse of left lower lobe and partial atelectasis of upper left lobe on CTA. Thoracentesis performed on 7/30, removing 950 cc of fluid. Pleural fluid studies sent, positive lights criteria for exudative process.  Cytology is inconclusive as there wasn't enough of a cell sample to adequately assess cell types.  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  CT of the chest on 8/3 shows likely hemothorax, which in the setting of anticoagulation 2/2 to PE, it could be due to spontaneous hemorrhage from metastases.    -Gyn-Onc consult, appreciate assistance  -Follow-up pending cytology  -Discuss w/ IR about evacuation of hematoma.  -Discuss w/ pulmonology about evacuation of hematoma    Acute  kidney injury (HCC)  Assessment & Plan  New HEAVEN on 8/1, w/ acute worsening on 8/2.  Resolved on 8/3.  Cr 48 hours prior to insult was 0.65, has since elevated to 1.28.  FeNa shows a likely pre-renal process.  This is likely 2/2 intravascular depletion in the setting of a large volume pleural effusion and attempted diuresis.  This was responsive to fluid bolus.    -AM BMP to monitor further, consider repeat bolus for worsening kidney fx    Pulmonary embolism (HCC)  Assessment & Plan  Patient was having left upper thigh pain for several days preceding her shortness of breath.  Non-occlusive right pulmonary artery embolism (lobar and segmental branches) on CTA. No evidence of right heart strain on CTA. Likely only mild contribution to AHRF as the PE is non-occlusive and no evidence of right heart strain seen.    -Holding anticoagulation in the setting of possible hemothorax  -Monitoring on telemetry  -Monitor vitals for hemodynamic stability    Anemia  Assessment & Plan  Pt w/ slowly downtrending hemoglobin since admission.  This is a normocytic anemia.  Iron studies show concern for early anemia of chronic disease, but this would be unlikely to contribute to such quick decline in hemoglobin.  Other considerations would be active, bleeding, but patient at this time has no obvious areas of active bleeding, no melena, no hematemesis, no vaginal bleeding, no external bleeding.  Consideration would be bleeding into thorax s/p thoracentesis performed on admission.  She is on anticoagulation for PE.  CT shows concerns for hemothorax, which would explain acute decline in hemoglobin s/p initial thoracentesis.  -Assessment as above        Endometrial cancer (HCC)- (present on admission)  Assessment & Plan  Hx of endometrial cancer that has metastasized to the lungs. Follows-up with Dr. Jann Garcia. Finished chemotherapy infusion. Currently on tamoxifen (3 weeks on, 3 weeks off), last tamoxifen 07/18, would restart August 9th.  Found to have 3 new pulmonary nodules up to 2.6 cm consistent with pulmonary metastasis.    -Gyn-Onc consulted, appreciate assistance  -Pt family planning on moving pt closer to them in [place], inquiring about oncologist recommendations        Code Status: Full code  DVT ppx: held in setting of possible hemothorax  Diet: Regular diet  GI: Bowel regimen  T/L/D: pIV right forearm  Disposition: Anticipate >48 hours for further evaluation of pleural effusion      Julio Clements DO  PGY-1, [facility]    Assessment and plan discussed with senior resident and attending physician.

## 2021-08-04 NOTE — PROGRESS NOTES
Daily Progress Note:     Date of Service: 8/4/2021  Primary Team: [facility] Blue Team   Attending: ARLETH Mccrary   Senior Resident: Dr. Henley  Intern: Dr. Clements  Contact:  [phone]    Chief Complaint:   CP, SOB    Subjective:  Overnight pt was having some chest pain and SOB, repeat imaging and ECG shows no concern for cardiac event, effusion remains in the left chest.  Pain was controlled with oxycodone.  This AM she is still having some chest pain and SOB, which she thinks is worse from last night.  Discussed plan of care with daughter, Carolyn, on phone today, [phone]    Consultants/Specialty:  Gyn-Oncology    Review of Systems:    Review of Systems   Constitutional: Negative for chills and fever.   Respiratory: Positive for shortness of breath. Negative for cough.    Cardiovascular: Positive for chest pain. Negative for palpitations and orthopnea.   Gastrointestinal: Negative for abdominal pain, diarrhea, nausea and vomiting.   Genitourinary: Negative for dysuria and urgency.   Musculoskeletal: Negative for back pain, falls and joint pain.   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 Negative for headaches.       Objective Data:   Physical Exam:   Vitals:   Temp:  [36.2 °C (97.1 °F)-36.6 °C (97.8 °F)] 36.2 °C (97.2 °F)  Pulse:  [] 124  Resp:  [18] 18  BP: (120-148)/(77-86) 120/77  SpO2:  [95 %-100 %] 96 %    Physical Exam  Constitutional:       General: She is not in acute distress.     Appearance: She is not toxic-appearing.   HENT:      Head: Normocephalic and atraumatic.      Mouth/Throat:      Mouth: Mucous membranes are dry.      Pharynx: Oropharynx is clear.   Eyes:      Conjunctiva/sclera: Conjunctivae normal.   Cardiovascular:      Rate and Rhythm: Normal rate and regular rhythm.      Pulses: Normal pulses.      Heart sounds: Normal heart sounds. No murmur heard.   No friction rub. No gallop.     Pulmonary:      Effort: Pulmonary effort is normal.      Comments: No acute respiratory distress.  Decreased breath sounds on left side.  Rales present in right base.  Abdominal:      General: Abdomen is flat. Bowel sounds are normal. There is no distension.      Palpations: Abdomen is soft.      Tenderness: There is no abdominal tenderness.   Musculoskeletal:      Right lower leg: No edema.      Left lower leg: No edema.   Skin:     General: Skin is warm and dry.   Neurological:      Mental Status: She is alert.      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   Psychiatric:         Mood and Affect: Mood normal.         Behavior: Behavior normal.           Labs:   HEMATOLOGY/ ONCOLOGY/ID:            Recent Labs     08/02/21  0235 08/02/21  1157 08/03/21  0345 08/03/21  1835 08/04/21  0255   WBC 7.4   < > 5.9 7.2 7.4   RBC 2.63*   < > 2.54* 2.42* 2.67*   HEMOGLOBIN 8.4*   < > 8.0* 7.7* 8.4*   HEMATOCRIT 25.9*   < > 25.1* 23.7* 26.5*   MCV 98.5*   < > 98.8* 97.9* 99.3*   MCH 31.9   < > 31.5 31.8 31.5   RDW 52.3*   < > 52.3* 51.0* 52.4*   PLATELETCT 279   < > 259 292 319   MPV 10.4   < > 10.2 9.9 10.0   NEUTSPOLYS 72.40*  --  71.80  --   --    LYMPHOCYTES 13.70*  --  15.60*  --   --    MONOCYTES 11.50  --  10.50  --   --    EOSINOPHILS 1.50  --  1.50  --   --    BASOPHILS 0.40  --  0.30  --   --     < > = values in this interval not displayed.     Lab Results   Component Value Date    WSNLZNAL65 984 (H) 09/25/2020    KIUEYRDP09 714 05/15/2019    WBFJEGUV46 937 (H) 10/02/2018    FOLATE >23.8 06/12/2019    FERRITIN 436.0 (H) 08/02/2021    FERRITIN 18.5 06/12/2019    FERRITIN 18.5 10/02/2018    IRON 13 (L) 08/02/2021    IRON 35 (L) 06/12/2019    IRON 29 (L) 10/02/2018    TOTIRONBC 150 (L) 08/02/2021    TOTIRONBC 272 06/12/2019    TOTIRONBC 392 10/02/2018       RENAL:        Estimated GFR/CRCL = Estimated Creatinine Clearance: 99.6 mL/min (by C-G formula based on SCr of 0.64  mg/dL).  Recent Labs     08/02/21  1823 08/03/21  0345 08/04/21  0255   SODIUM 136 139 137   POTASSIUM 3.7 3.7 3.8   CHLORIDE 103 104 102   CO2 21 22 22   GLUCOSE 114* 103* 104*   BUN 15 14 9   CREATININE 0.76 0.68 0.64   CALCIUM 9.3 9.5 9.5       GASTROINTESTINAL/ HEPATIC:          Recent Labs     08/02/21  0235   INR 1.18*     No results found for: AMMONIA    ENDOCRINE:              Recent Labs     08/02/21  1823 08/03/21  0345 08/04/21  0255   GLUCOSE 114* 103* 104*     Lab Results   Component Value Date    FREET4 0.92 05/15/2019       Imaging:   CXR 7/30:  No significant interval change in pleural effusion, which is still present.  CXR 8/1: repeat chest x-ray shows reaccumulation of fluid in pleural effusion.  CT chest 8/3:  Very large left pleural effusion which now demonstrates heterogeneous areas of increased density suggesting interval hemorrhage or less likely superimposed infection.    Problem Representation:  Carlene Rivas  is a 70 y.o. y/o woman w/ PMHx Stage IV endometrial cancer who presented on 7/29/2021 w/ worsening shortness of breath.  She was found to have a non-occlusive PE in the right pulmonary artery w/o right heart strain seen on CTA from 7/29.  Large left pleural effusion, thoracentesis performed on 7/29 removed 950 ml of fluid, + lights criteria.  Cytology not definitive for malignant effusion.  CT chest on 8/3 shows likely hemothorax.    * Pleural effusion  Assessment & Plan  Shortness of breath with coarse crackles on left mid lung. Found to have large left sided pleural effusion with total collapse of left lower lobe and partial atelectasis of upper left lobe on CTA. Thoracentesis performed on 7/30, removing 950 cc of fluid. Pleural fluid studies sent, positive lights criteria for exudative process.  Cytology is inconclusive as there wasn't enough of a cell sample to adequately assess cell types.  Despite the cytology, malignant pleural effusion is most likely to be present, as there  is no concerns for infection at this time.  CT of the chest on 8/3 shows likely hemothorax, which in the setting of anticoagulation 2/2 to PE, it could be due to spontaneous hemorrhage from metastases, spontaneous hemorrhage 2/2 anticoagulation or s/p thoracentesis.  Surgery does not feel that thoracostomy would be appropriate in this setting.  Discussed case w/ pulmonology, pt may need to undergo hilar LN bx s/p resolution of hemothorax for further investigation of new lung nodules.  -Gyn-Onc consult, appreciate assistance  -Pulmonology consulted, appreciate assistance.    -Discuss w/ IR about evacuation of hematoma via pleurex cath      Acute kidney injury (HCC)  Assessment & Plan  New HEAVEN on 8/1, w/ acute worsening on 8/2.  Resolved on 8/3.  Cr 48 hours prior to insult was 0.65, has since elevated to 1.28.  FeNa shows a likely pre-renal process.  This is likely 2/2 intravascular depletion in the setting of a large volume pleural effusion and attempted diuresis.  This was responsive to fluid bolus.    -AM BMP to monitor further, consider repeat bolus for worsening kidney fx    Pulmonary embolism (HCC)  Assessment & Plan  Patient was having left upper thigh pain for several days preceding her shortness of breath.  Non-occlusive right pulmonary artery embolism (lobar and segmental branches) on CTA. No evidence of right heart strain on CTA. Likely only mild contribution to AHRF as the PE is non-occlusive and no evidence of right heart strain seen.    -Holding anticoagulation in the setting of possible hemothorax  -Monitoring on telemetry  -Monitor vitals for hemodynamic stability    Anemia  Assessment & Plan  Pt w/ slowly downtrending hemoglobin since admission.  This is a normocytic anemia.  Iron studies show concern for early anemia of chronic disease, but this would be unlikely to contribute to such quick decline in hemoglobin.  Other considerations would be active, bleeding, but patient at this time has no  obvious areas of active bleeding, no melena, no hematemesis, no vaginal bleeding, no external bleeding.  Consideration would be bleeding into thorax s/p thoracentesis performed on admission.  She was on anticoagulation for PE.  CT shows concerns for hemothorax, which would explain acute decline in hemoglobin following initiation of anticoagulation.    -Assessment as above        Endometrial cancer (HCC)- (present on admission)  Assessment & Plan  Hx of endometrial cancer that has metastasized to the lungs. Follows-up with Dr. Jann Garcia. Finished chemotherapy infusion. Currently on tamoxifen (3 weeks on, 3 weeks off), last tamoxifen 07/18, would restart August 9th. Found to have 3 new pulmonary nodules up to 2.6 cm consistent with pulmonary metastasis.    -Gyn-Onc consulted, appreciate assistance  -Pt family planning on moving pt closer to them in [place], inquiring about oncologist recommendations  -Discussed case w/ pulmonology, pt may need to undergo hilar LN bx s/p resolution of hemothorax for further investigation of new lung nodules        Code Status: Full code  DVT ppx: held in setting of possible hemothorax  Diet: Regular diet  GI: Bowel regimen  T/L/D: pIV right forearm  Disposition: Anticipate >48 hours for further evaluation of pleural effusion      Julio Clements DO  PGY-1, [facility]    Assessment and plan discussed with senior resident and attending physician.

## 2021-08-04 NOTE — PROGRESS NOTES
Gynecology Oncology Progress Note               Author: Rima Kitchen P.A.-C. Date & Time created: 8/4/2021  12:28 PM     Interval History:  Denies any increased shortness of breath. She denies any abdominal pain, only discomfort where she is getting subq injections. No nausea or vomiting, normal bowel movements.       Review of Systems:  Review of Systems   Constitutional: Negative for chills and fever.   Eyes: Negative for pain.   Respiratory: Positive for shortness of breath. Negative for cough.    Cardiovascular: Negative for chest pain and palpitations.   Gastrointestinal: Negative for abdominal pain, nausea and vomiting.   Genitourinary: Negative for dysuria, frequency and urgency.   Musculoskeletal: Negative for myalgias and neck pain.   Neurological: Negative for dizziness and headaches.       Physical Exam:  Physical Exam  Constitutional:       Appearance: Normal appearance.   HENT:      Mouth/Throat:      Mouth: Mucous membranes are moist.   Eyes:      Pupils: Pupils are equal, round, and reactive to light.   Cardiovascular:      Rate and Rhythm: Normal rate and regular rhythm.      Pulses: Normal pulses.   Pulmonary:      Comments: Increased work of breathing, diminished breath sounds to left lung fields.   Abdominal:      General: Bowel sounds are normal. There is no distension.      Palpations: Abdomen is soft.      Tenderness: There is no abdominal tenderness.   Musculoskeletal:      Right lower leg: No edema.      Left lower leg: No edema.   Skin:     General: Skin is warm and dry.   Neurological:      Mental Status: She is alert.      Motor: Weakness (to right side, secondary to CVA) present.         Labs:      Recent Labs     08/02/21  1157   CPKTOTAL 82     Recent Labs     08/02/21  1823 08/03/21  0345 08/04/21  0255   SODIUM 136 139 137   POTASSIUM 3.7 3.7 3.8   CHLORIDE 103 104 102   CO2 21 22 22   BUN 15 14 9   CREATININE 0.76 0.68 0.64   CALCIUM 9.3 9.5 9.5     Recent Labs      21  025   GLUCOSE 114* 103* 104*     Recent Labs     21  02321  0255   RBC 2.63*   < > 2.51*   < > 2.54* 2.42* 2.67*   HEMOGLOBIN 8.4*   < > 7.9*   < > 8.0* 7.7* 8.4*   HEMATOCRIT 25.9*   < > 24.1*   < > 25.1* 23.7* 26.5*   PLATELETCT 279   < > 267   < > 259 292 319   PROTHROMBTM 14.6  --   --   --   --   --   --    INR 1.18*  --   --   --   --   --   --    IRON  --   --  13*  --   --   --   --    FERRITIN  --   --  436.0*  --   --   --   --    TOTIRONBC  --   --  150*  --   --   --   --     < > = values in this interval not displayed.     Recent Labs     21  025   WBC 7.4   < > 5.9 7.2 7.4   NEUTSPOLYS 72.40*  --  71.80  --   --    LYMPHOCYTES 13.70*  --  15.60*  --   --    MONOCYTES 11.50  --  10.50  --   --    EOSINOPHILS 1.50  --  1.50  --   --    BASOPHILS 0.40  --  0.30  --   --     < > = values in this interval not displayed.     Recent Labs     21  025   SODIUM 136 139 137   POTASSIUM 3.7 3.7 3.8   CHLORIDE 103 104 102   CO2  22   GLUCOSE 114* 103* 104*   BUN 15 14 9   CREATININE 0.76 0.68 0.64   CALCIUM 9.3 9.5 9.5     Hemodynamics:  Temp (24hrs), Av.3 °C (97.4 °F), Min:36.2 °C (97.1 °F), Max:36.6 °C (97.8 °F)  Temperature: 36.2 °C (97.2 °F)  Pulse  Av.5  Min: 73  Max: 138   Blood Pressure : 120/77     Respiratory:    Respiration: 18, Pulse Oximetry: 96 %        RUL Breath Sounds: Clear, RML Breath Sounds: Diminished, RLL Breath Sounds: Diminished, HANK Breath Sounds: Clear, LLL Breath Sounds: Diminished  Fluids:  No intake or output data in the 24 hours ending 21 9573     GI/Nutrition:  Orders Placed This Encounter   Procedures   • Diet NPO     Standing Status:   Standing     Number of Occurrences:   1     Order Specific Question:   Restrict to:     Answer:    Sips with Medications [3]     Medical Decision Making, by Problem:  Active Hospital Problems    Diagnosis    • *Pleural effusion [J90]    • Acute kidney injury (HCC) [N17.9]    • Anemia [D64.9]    • Pulmonary embolism (HCC) [I26.99]    • Endometrial cancer (HCC) [C54.1]        Plan:  This is a 70 y.o. female with h/o metastatic endometrial cancer who presents with new PE and pleural effusion:     1. Endometrial cancer: stage IIIB, s/p PORTEC 3 w/ omission of chemotherapy, followed by pulmonary recurrence treated with systemic carbo/taxol and most recently megace, treatment tolerance has been limited, will need to have f/u visit with Dr. Garcia to discuss treatment planning given concern for progression per recent chest CT.   2. Pleural effusion: CT yesterday showed large L pleural effusion with heterogeneous areas of increased density, concerning for hemothorax. Thoracentesis ordered for today. Lovenox held. Cytology from [facility] on 7/29 with rare atypical cells, not fully diagnostic of a malignant effusion however this remains in the differential.  Pulmonary medicine following with recommendation of chest tube vs pluerex catheter vs pleurodesis.   3. PE: secondary to malignancy, hold megace/tamoxifen given new thrombosis and concern for progression per chest CT, may be a candidate for DOAC if no procedures are indicated. Lovenox currently held secondary for concern for hemothorax.   4. Shortness of breath: secondary to above, now maintaining sats on 2L NC   5. Anemia: down trending since admission, likely secondary to above, with some component of chronic anemia. Continue to monitor.     Quality-Core Measures

## 2021-08-04 NOTE — CARE PLAN
Problem: Knowledge Deficit - Standard  Goal: Patient and family/care givers will demonstrate understanding of plan of care, disease process/condition, diagnostic tests and medications  Outcome: Progressing     Problem: Fall Risk  Goal: Patient will remain free from falls  Outcome: Progressing     The patient is Stable - Low risk of patient condition declining or worsening    Shift Goals  Clinical Goals: mobility, titrate 02, manage pain  Patient Goals: pain control, mobility, safety    Progress made toward(s) clinical / shift goals:  Fall precautions in place. Bed in lowest position. Non-skid socks in place. Personal possessions within reach. Mobility sign on door. Bed-alarm on. Call light within reach. Pt educated regarding fall prevention and states understanding.   Pt educated regarding plan of care and medications. All questions answered.       Patient is not progressing towards the following goals:

## 2021-08-04 NOTE — PROGRESS NOTES
4 Eyes Skin Assessment Completed by JOB Wang and JOB Romano.    Head WDL  Ears WDL  Nose WDL  Mouth WDL  Neck WDL  Breast/Chest WDL  Shoulder Blades WDL  Spine WDL  (R) Arm/Elbow/Hand WDL  (L) Arm/Elbow/Hand WDL  Abdomen Redness and Bruising, where thorencentesis was done  Groin WDL  Scrotum/Coccyx/Buttocks Redness and Blanching  (R) Leg WDL  (L) Leg WDL  (R) Heel/Foot/Toe Blanching and Boggy  (L) Heel/Foot/Toe Blanching and Boggy          Devices In Places Tele Box and Pulse Ox      Interventions In Place Gray Ear Foams, Chair Waffle, Waffle Overlay, Pillows, Q2 Turns, Barrier Cream, Heels Loaded W/Pillows and Pressure Redistribution Mattress    Possible Skin Injury No    Pictures Uploaded Into Epic N/A  Wound Consult Placed N/A  RN Wound Prevention Protocol Ordered Yes

## 2021-08-04 NOTE — PROGRESS NOTES
4 Eyes Skin Assessment Completed by JOB Taylor and JOB Sarabia.     Head WDL  Ears WDL  Nose WDL  Mouth WDL  Neck WDL  Breast/Chest WDL  Shoulder Blades WDL  Spine WDL  (R) Arm/Elbow/Hand WDL  (L) Arm/Elbow/Hand WDL  Abdomen Redness and bruising on L side where thoracentesis completed.   Groin WDL  Scrotum/Coccyx/Buttocks Redness, Blanching.   (R) Leg WDL  (L) Leg WDL  (R) Heel/Foot/Toe Blanching and Boggy  (L) Heel/Foot/Toe Blanching and Boggy              Devices In Places Tele Box and Pulse Ox        Interventions In Place Gray Ear Foams, Chair Waffle, Waffle Overlay, Pillows, Q2 Turns, Barrier Cream, Heels Loaded W/Pillows and Pressure Redistribution Mattress     Possible Skin Injury No     Pictures Uploaded Into Epic N/A  Wound Consult Placed N/A  RN Wound Prevention Protocol Ordered No

## 2021-08-04 NOTE — PROGRESS NOTES
Critical Care/Pulmonary Consultation    Date of Service: 8/4/2021    Date of Admission:  7/29/2021  6:00 AM    Consulting Physician: ARLETH Mccrary    Chief Complaint:  Shortness of Breath (Since monday) and Chest Wall Pain (Left sided stabbing pain that radiates to the back, pain increases with movement and breathing. Patient thought is was muscle pain and was using icy hot but the pain has been getting worse. )      History of Present Illness:     Jairo Rivas is a 70 y.o. female presents with acute chest pain and shortness of breath of 1 week duration; hx of metastatic endometrial cancer (3b, ER-/MS+, s/p bilat salpingoophrectomy) with metastasis to lungs in 10/20 (s/p carboplatin/taxol), CVA/PE; admitted 7/29 for exudative unilateral pleural effusion and PE.     Initially treated for endometrial cancer in 7/19, without initial signs of metastasis. On repeat imaging 10/20 and subsequent biopsy, she was confirmed to have metastatic endometrial carcinoma and was treated with carboplatin/taxol, complicated by difficult tolerance dose, requiring reduction in dosage. Subsequently, she was reevaluated in 3/21 and saw radiologic improvement in pulmonary nodules.     On 7/29 she presented to the ED after 1 week of L thigh pain and 3 days of progressive dyspnea with associated chest pain. She was found to have large L pleural effusion and nonocclusive, submassive PE. Underwent L thoracentesis on day of admission (7/29) with 950ml removed, consistent with exudate by LDH criteria. Has since during admission reaccumulated pleural effusion, now incompassing what appears to be complete L chest.     Primary team consulted gynecology oncology and thoracic surgery for recurrent malignant pleural effusions.    Interval updates:  8/4: Underwent CT thorax which redemonstrated large L pleural effusion with major atelectasis/collapse, now with fluid svedberg units concerning for hemothorax. Primary team  discussed findings with patient and she is now agreeable to possible procedure.     Review of Systems   Constitutional: Negative for chills and fever.   Respiratory: Positive for cough and shortness of breath. Negative for hemoptysis and sputum production.    Cardiovascular: Positive for chest pain and orthopnea. Negative for palpitations and leg swelling.        Chest wall discomfort at site of previous thoracentesis   Gastrointestinal: Negative for abdominal pain, constipation, diarrhea, nausea and vomiting.   Skin: Negative for itching and rash.   Neurological: Positive for dizziness and weakness. Negative for focal weakness and headaches.       Home Medications     Reviewed by Kavita Miller (Pharmacy Tech) on 07/29/21 at 0756  Med List Status: Complete   Medication Last Dose Status   acetaminophen (TYLENOL) 500 MG Tab 7/28/2021 Active   amitriptyline (ELAVIL) 50 MG Tab 7/28/2021 Active   amLODIPine (NORVASC) 5 MG Tab 7/28/2021 Active   ascorbic acid (ASCORBIC ACID) 500 MG Tab 7/28/2021 Active   aspirin (ASA) 81 MG Chew Tab chewable tablet 7/29/2021 Active   atorvastatin (LIPITOR) 20 MG Tab 7/28/2021 Active   B Complex-C (SUPER B COMPLEX PO) 7/28/2021 Active   baclofen (LIORESAL) 10 MG Tab 7/28/2021 Active   Cholecalciferol (VITAMIN D3) 2000 UNIT Cap 7/28/2021 Active   Coenzyme Q10 (COQ10) 100 MG Cap 7/28/2021 Active   ferrous sulfate 325 (65 Fe) MG tablet 7/28/2021 Active   losartan (COZAAR) 100 MG Tab 7/28/2021 Active   meclizine (ANTIVERT) 12.5 MG Tab PRN Active   megestrol (MEGACE) 40 MG Tab 7/28/2021 Active   metoprolol SR (TOPROL XL) 100 MG TABLET SR 24 HR 7/29/2021 Active   multivitamin (THERAGRAN) Tab 7/28/2021 Active   senna-docusate (PERICOLACE OR SENOKOT S) 8.6-50 MG Tab PRN Active   tamoxifen (NOLVADEX) 20 MG tablet ABOUT 2 WEEKS AGO Active                Social History     Tobacco Use   • Smoking status: Never Smoker   • Smokeless tobacco: Never Used   • Tobacco comment: n/a   Vaping Use   •  Vaping Use: Never used   Substance Use Topics   • Alcohol use: No   • Drug use: No        Past Medical History:   Diagnosis Date   • Anemia 2/6/2018    Iron def, post-menopausal bleeding.   • Back pain    • Back pain    • Cancer (HCC) 2019    uterine & endometrial   • Cough    • CVA, old, hemiparesis ([facility]) 2/6/2018    Residual left sided weakness   • Essential hypertension 2/6/2018    Well controlled on amlodipine 10 mg and metoprolol 100 mg bid.   • Eye drainage    • Fatigue    • Frequent headaches    • Frequent urination    • Hyperlipidemia    • Irregular periods    • Painful joint    • Palpitations    • Post-menopause bleeding 2/6/2018    Menopause in 2010 and bleeding 2012 started to have irregular continuous bleeding.    • Pulmonary nodule    • Sore muscles    • Stroke ([facility])     right sided weakness   • Swelling of lower extremity    • Vision abnormalities 2/6/2018   • Vitamin D deficiency 2/6/2018    Taking 50,000 IU once weekly for 4 yrs.   • Weakness    • Wears glasses        Past Surgical History:   Procedure Laterality Date   • PB CYSTOSCOPY,INSERT URETERAL STENT Left 8/8/2019    Procedure: CYSTOSCOPY, WITH URETERAL STENT INSERTION;  Surgeon: Jann Garcia M.D.;  Location: [facility];  Service: Gynecology   • HYSTERECTOMY ROBOTIC XI N/A 8/8/2019    Procedure: HYSTERECTOMY, ROBOT-ASSISTED, USING DA RILEY XI;  Surgeon: Jann Garcia M.D.;  Location: [facility];  Service: Gynecology   • SALPINGO OOPHORECTOMY Bilateral 8/8/2019    Procedure: SALPINGO-OOPHORECTOMY;  Surgeon: Jann Garcia M.D.;  Location: [facility];  Service: Gynecology   • NODE BIOPSY SENTINEL  8/8/2019    Procedure: BIOPSY, LYMPH NODE, SENTINEL;  Surgeon: Jann Garcia M.D.;  Location: [facility];  Service: Gynecology   • PB HYSTEROSCOPY,DX,SEP PROC  6/19/2019    Procedure: HYSTEROSCOPY, DIAGNOSTIC;  Surgeon: Anel Chicas M.D.;  Location: [facility];  Service: Gynecology   •  "DILATION AND CURETTAGE  6/19/2019    Procedure: DILATION AND CURETTAGE;  Surgeon: Anel Chicas M.D.;  [facility];  Service: Gynecology   • HYSTERECTOMY LAPAROSCOPY         Allergies: Penicillins, Gluten meal, Hydrocodone, Iodine, Levaquin, and Tramadol    Family History   Problem Relation Age of Onset   • Hypertension Mother    • No Known Problems Father    • No Known Problems Brother        Vitals:    07/29/21 0511 07/29/21 0529 07/29/21 0616 07/29/21 0618   Height: 1.651 m (5' 5\")      Weight:  88 kg (194 lb)     Weight % change since last entry.:  0 %     BP: (!) 167/93      Pulse: (!) 124  (!) 125 (!) 121   Resp: 15      Temp: 36.7 °C (98 °F)      TempSrc: Temporal       07/29/21 0735 07/29/21 0736 07/29/21 1010 07/29/21 1029   Height:       Weight:       Weight % change since last entry.:       BP: (!) 184/84  157/85 (!) 172/85   Pulse:  (!) 126 (!) 116 (!) 116   Resp:       Temp:       TempSrc:        07/29/21 1229 07/29/21 1329 07/29/21 1515 07/29/21 2000   Height:       Weight:       Weight % change since last entry.:       BP: 151/88 144/83 151/74 110/69   Pulse: (!) 111 (!) 105 (!) 114 (!) 112   Resp:   18 16   Temp:   36.4 °C (97.6 °F) 36.4 °C (97.6 °F)   TempSrc:   Temporal Temporal    07/30/21 0007 07/30/21 0405 07/30/21 0724 07/30/21 0805   Height:       Weight:       Weight % change since last entry.:       BP: 123/81 124/77 117/77    Pulse: (!) 109 (!) 116 (!) 107 100   Resp: 18 17 18 18   Temp: 36.2 °C (97.1 °F) 36.7 °C (98 °F) 36.6 °C (97.9 °F)    TempSrc: Temporal Temporal Temporal     07/30/21 0900 07/30/21 1248 07/30/21 1530 07/30/21 1606   Height:       Weight:       Weight % change since last entry.:       BP:  130/85 129/65 123/76   Pulse:  (!) 105 (!) 115 (!) 107   Resp: 18 18  18   Temp:  36.2 °C (97.1 °F)  36.3 °C (97.3 °F)   TempSrc:  Temporal  Temporal    07/30/21 1931 07/31/21 0005 07/31/21 0441 07/31/21 0849   Height:       Weight: 102 kg (224 lb 13.9 oz)  "     Weight % change since last entry.: 15.91 %      BP: 130/90 116/69 144/86 120/69   Pulse: (!) 109 (!) 101 (!) 102 (!) 111   Resp: 18 18 18 18   Temp: 37.1 °C (98.8 °F) 36.9 °C (98.5 °F) 36.2 °C (97.1 °F) 36 °C (96.8 °F)   TempSrc: Temporal Temporal Temporal Temporal    07/31/21 0947 07/31/21 1225 07/31/21 1631 07/31/21 1859   Height:       Weight:       Weight % change since last entry.:       BP:  139/74 109/75 110/72   Pulse: (!) 115 (!) 127 99 (!) 111   Resp: 18 18 (!) 123 18   Temp:  37 °C (98.6 °F) 36.1 °C (96.9 °F) 36.2 °C (97.2 °F)   TempSrc:  Temporal Temporal Temporal    07/31/21 2335 08/01/21 0339 08/01/21 0451 08/01/21 0924   Height:       Weight:       Weight % change since last entry.:       BP: (!) 91/57 (!) 87/57 104/67 149/74   Pulse: (!) 107 (!) 116  99   Resp: 18 18  16   Temp: 36.1 °C (96.9 °F) 36.1 °C (97 °F)  36.2 °C (97.1 °F)   TempSrc: Temporal Temporal  Temporal    08/01/21 1200 08/01/21 1732 08/01/21 1805 08/01/21 2017   Height:       Weight:    101 kg (223 lb 12.3 oz)   Weight % change since last entry.:    -0.49 %   BP: 135/100 100/69 133/76 (!) 96/64   Pulse: (!) 136 99 (!) 138 (!) 121   Resp: 16 18  18   Temp:  36.2 °C (97.2 °F)  36.1 °C (97 °F)   TempSrc:  Temporal  Temporal    08/02/21 0009 08/02/21 0435 08/02/21 1152 08/02/21 1600   Height:       Weight:       Weight % change since last entry.:       BP: 106/71 114/75 115/83 138/80   Pulse: (!) 117 (!) 122 (!) 118 (!) 125   Resp: 16 18 16 18   Temp: 36.1 °C (97 °F) 36.1 °C (97 °F)  36.1 °C (97 °F)   TempSrc: Temporal Temporal  Temporal    08/02/21 2008 08/03/21 0432 08/03/21 0748 08/03/21 1620   Height:       Weight:       Weight % change since last entry.:       BP: 109/69 107/62 110/71 131/86   Pulse: 73 85 (!) 112 94   Resp: 18 18 16 18   Temp: 36.1 °C (97 °F) 36.3 °C (97.3 °F) 36 °C (96.8 °F) 36.3 °C (97.4 °F)   TempSrc: Temporal Temporal Temporal Temporal    08/03/21 2025 08/04/21 0407 08/04/21 0833   Height:      Weight:       Weight % change since last entry.:      BP: 146/86 148/78 120/77   Pulse: (!) 121 (!) 125 (!) 124   Resp: 18 18 18   Temp: 36.2 °C (97.1 °F) 36.6 °C (97.8 °F) 36.2 °C (97.2 °F)   TempSrc: Temporal Temporal Temporal       Physical Examination  General: well appearing, no acute distress, resting comfortably  Neuro/Psych: AOx4, no focal deficits; normal affect  HEENT: AT/NC, EOMI/PERRL, oropharynx clear, moist, no lesions  CVS: RRR no m/g/r  Respiratory: absent L lung sounds; R lung clear to auscultation  Abdomen/: soft, nontender, nondistended  Extremities: shoulder/elbow flexion/extension 4/5 bilaterally and symmetric  Skin: no rash/lesion    No intake or output data in the 24 hours ending 08/04/21 1219    Recent Labs     08/02/21  0235 08/02/21  1157 08/03/21  0345 08/03/21  1835 08/04/21  0255   WBC 7.4   < > 5.9 7.2 7.4   NEUTSPOLYS 72.40*  --  71.80  --   --    LYMPHOCYTES 13.70*  --  15.60*  --   --    MONOCYTES 11.50  --  10.50  --   --    EOSINOPHILS 1.50  --  1.50  --   --    BASOPHILS 0.40  --  0.30  --   --     < > = values in this interval not displayed.     Recent Labs     08/02/21  1823 08/03/21  0345 08/04/21  0255   SODIUM 136 139 137   POTASSIUM 3.7 3.7 3.8   CHLORIDE 103 104 102   CO2 21 22 22   BUN 15 14 9   CREATININE 0.76 0.68 0.64   CALCIUM 9.3 9.5 9.5     Recent Labs     08/02/21  1823 08/03/21  0345 08/04/21  0255   GLUCOSE 114* 103* 104*         DX-CHEST-PORTABLE (1 VIEW)   Final Result      1.  Unchanged subtotal opacification of the left hemithorax without volume loss.      2.  Persistent mediastinal shift to the right.      3.  No right lung consolidation.      CT-CHEST (THORAX) W/O   Final Result      1.  Very large left pleural effusion which now demonstrates heterogeneous areas of increased density suggesting interval hemorrhage or less likely superimposed infection.   2.  Complete collapse/compressive atelectasis of the left lung which is worsened than prior study.   3.  New mild  groundglass opacity in the right lung apex could represent atelectasis or mild pneumonitis.   4.  Right pulmonary nodules again noted suggesting metastases.      These findings were discussed with JUANITA SERNA on 8/3/2021 3:40 PM.      Fleischner Society pulmonary nodule recommendations:   Not Applicable         DX-CHEST-PORTABLE (1 VIEW)   Final Result      1.  There is complete opacification of the left hemithorax which could represent combination of pleural effusion or airspace process.      US-CHEST   Final Result      1.  Diffuse pneumonitis of left hemithorax.      2.  No evidence of a large left-sided pleural effusion. Thoracentesis deferred.      3.  Recommend CT scan of the thorax for further evaluation.      DX-CHEST-PORTABLE (1 VIEW)   Final Result      Worsening opacification on the left which is now complete, probably related to left pleural effusion and atelectasis.      DX-CHEST-LIMITED (1 VIEW)   Final Result      1.  No significant interval change.      DX-CHEST-PORTABLE (1 VIEW)   Final Result      1.  Interval decrease in the left pleural effusion following thoracentesis.   2.  There is no pneumothorax visualized.      US-THORACENTESIS PUNCTURE LEFT   Final Result      1. Ultrasound guided left sided diagnostic thoracentesis.      2. 950 mL of fluid withdrawn.      CT-CTA CHEST PULMONARY ARTERY W/ RECONS   Final Result      1.  Nonocclusive pulmonary emboli in the right pulmonary artery, its lobar and segmental branches. No right heart strain.   2.  Large left pleural effusion, with total collapse of the left lower lobe and partial atelectasis of the left upper lobe.   3.  At least 3 new pulmonary nodules measuring up to 2.6 cm, consistent with pulmonary metastases.   4.  Unchanged heterogeneous thyroid gland with multiple thyroid nodules.      Findings were discussed with KRISTOPHER COOK on 7/29/2021 9:38 AM.         DX-CHEST-PORTABLE (1 VIEW)   Final Result      Very large left pleural  effusion with associated compressive atelectasis.      US-THORACENTESIS PUNCTURE LEFT    (Results Pending)       Patient Active Problem List   Diagnosis   • Vision abnormalities   • CVA, old, hemiparesis (HCC)   • Essential hypertension   • Vitamin D deficiency   • Endometrial cancer (HCC)   • Iron deficiency anemia   • Obesity (BMI 30-39.9)   • Hypokalemia   • Admission for chemotherapy   • Nausea   • Thyroid nodule - benign   • Other chest pain   • Tongue burning sensation   • Neuropathy   • Multiple lung nodules - growth from 8 mm - 10 mm 1/2020 - 8/2020   • Pulmonary embolism (HCC)   • Pleural effusion   • Acute kidney injury (HCC)   • Anemia       Assessment and Plan:    Jairo Rivas is a 70 y.o. female presents with acute chest pain and shortness of breath of 1 week duration; hx of metastatic endometrial cancer (3b, ER-/MD+, s/p bilat salpingoophrectomy) with metastasis to lungs in 10/20 (s/p carboplatin/taxol), CVA/PE; admitted 7/29 for recurrent L malignant pleural effusion and PE.     #recurrent malignant pleural effusion  #Pulmonary embolism  #Acute hypoxic respiratory failure  Hx of metastatic endometrial carcinoma with metastases to the lungs. Acute dyspnea/chest pain associated with L pleural effusion, nonocclusive pulmonary embolism. Poor prognosis due to metastases and rapid recurrence of pleural effusions despite previous surgery/chemotherapy. XR and CT chest images reviewed, showing near complete L pleural effusion with atelectasis/collapsed lung. Bedside US for potential thoracentesis did not show a safe pocket for bedside procedure.  -titrate oxygen >88%  -Lovenox 100mg SC BID  -Transition to apixaban/xarelto PO as patient preference/insurance coverage   -IR US guided thoracentesis (bedside US did not show safe pocket)  -Thoracic surgery for decortication/pleurodesis (due to recurrence despite chemotherapy, pleurx risk/benefit favours complications)  -Gyn/Onc evaluation and  treatment  -Palliative medicine consultation for goals of care discussions, consideration of hospice    Thank you for involving pulmonary medicine in the care of Ms. Rivas. We will continue to follow peripherally in her care. If there are any additional questions, please feel free to contact us directly.    Moody Neely M.D.

## 2021-08-05 ENCOUNTER — APPOINTMENT (OUTPATIENT)
Dept: RADIOLOGY | Facility: MEDICAL CENTER | Age: 71
DRG: 163 | End: 2021-08-05
Attending: STUDENT IN AN ORGANIZED HEALTH CARE EDUCATION/TRAINING PROGRAM
Payer: MEDICARE

## 2021-08-05 LAB
ANION GAP SERPL CALC-SCNC: 13 MMOL/L (ref 7–16)
BUN SERPL-MCNC: 7 MG/DL (ref 8–22)
CALCIUM SERPL-MCNC: 9.3 MG/DL (ref 8.5–10.5)
CHLORIDE SERPL-SCNC: 103 MMOL/L (ref 96–112)
CO2 SERPL-SCNC: 23 MMOL/L (ref 20–33)
CREAT SERPL-MCNC: 0.6 MG/DL (ref 0.5–1.4)
ERYTHROCYTE [DISTWIDTH] IN BLOOD BY AUTOMATED COUNT: 52.6 FL (ref 35.9–50)
GLUCOSE SERPL-MCNC: 146 MG/DL (ref 65–99)
HCT VFR BLD AUTO: 24.4 % (ref 37–47)
HGB BLD-MCNC: 7.7 G/DL (ref 12–16)
MCH RBC QN AUTO: 31.2 PG (ref 27–33)
MCHC RBC AUTO-ENTMCNC: 31.6 G/DL (ref 33.6–35)
MCV RBC AUTO: 98.8 FL (ref 81.4–97.8)
PLATELET # BLD AUTO: 330 K/UL (ref 164–446)
PMV BLD AUTO: 10.3 FL (ref 9–12.9)
POTASSIUM SERPL-SCNC: 3.6 MMOL/L (ref 3.6–5.5)
RBC # BLD AUTO: 2.47 M/UL (ref 4.2–5.4)
SODIUM SERPL-SCNC: 139 MMOL/L (ref 135–145)
WBC # BLD AUTO: 7.6 K/UL (ref 4.8–10.8)

## 2021-08-05 PROCEDURE — 770006 HCHG ROOM/CARE - MED/SURG/GYN SEMI*

## 2021-08-05 PROCEDURE — A9270 NON-COVERED ITEM OR SERVICE: HCPCS | Performed by: STUDENT IN AN ORGANIZED HEALTH CARE EDUCATION/TRAINING PROGRAM

## 2021-08-05 PROCEDURE — 700102 HCHG RX REV CODE 250 W/ 637 OVERRIDE(OP): Performed by: STUDENT IN AN ORGANIZED HEALTH CARE EDUCATION/TRAINING PROGRAM

## 2021-08-05 PROCEDURE — 80048 BASIC METABOLIC PNL TOTAL CA: CPT

## 2021-08-05 PROCEDURE — 36415 COLL VENOUS BLD VENIPUNCTURE: CPT

## 2021-08-05 PROCEDURE — 71045 X-RAY EXAM CHEST 1 VIEW: CPT

## 2021-08-05 PROCEDURE — 700102 HCHG RX REV CODE 250 W/ 637 OVERRIDE(OP): Performed by: INTERNAL MEDICINE

## 2021-08-05 PROCEDURE — 99233 SBSQ HOSP IP/OBS HIGH 50: CPT | Performed by: INTERNAL MEDICINE

## 2021-08-05 PROCEDURE — A9270 NON-COVERED ITEM OR SERVICE: HCPCS | Performed by: INTERNAL MEDICINE

## 2021-08-05 PROCEDURE — 85027 COMPLETE CBC AUTOMATED: CPT

## 2021-08-05 PROCEDURE — 76604 US EXAM CHEST: CPT

## 2021-08-05 RX ORDER — LORAZEPAM 2 MG/ML
1 INJECTION INTRAMUSCULAR
Status: DISCONTINUED | OUTPATIENT
Start: 2021-08-05 | End: 2021-08-18 | Stop reason: HOSPADM

## 2021-08-05 RX ADMIN — ACETAMINOPHEN 500 MG: 500 TABLET, FILM COATED ORAL at 16:48

## 2021-08-05 RX ADMIN — GUAIFENESIN 600 MG: 600 TABLET, EXTENDED RELEASE ORAL at 16:48

## 2021-08-05 RX ADMIN — ATORVASTATIN CALCIUM 20 MG: 20 TABLET, FILM COATED ORAL at 16:50

## 2021-08-05 RX ADMIN — THERA TABS 1 TABLET: TAB at 04:37

## 2021-08-05 RX ADMIN — Medication 2000 UNITS: at 16:49

## 2021-08-05 RX ADMIN — METOPROLOL SUCCINATE 100 MG: 100 TABLET, EXTENDED RELEASE ORAL at 04:37

## 2021-08-05 RX ADMIN — ASPIRIN 81 MG: 81 TABLET, CHEWABLE ORAL at 04:37

## 2021-08-05 RX ADMIN — AMLODIPINE BESYLATE 5 MG: 5 TABLET ORAL at 20:33

## 2021-08-05 RX ADMIN — Medication 500 MG: at 16:48

## 2021-08-05 RX ADMIN — FERROUS SULFATE TAB 325 MG (65 MG ELEMENTAL FE) 325 MG: 325 (65 FE) TAB at 16:50

## 2021-08-05 RX ADMIN — OXYCODONE 5 MG: 5 TABLET ORAL at 23:36

## 2021-08-05 RX ADMIN — DOCUSATE SODIUM 50 MG AND SENNOSIDES 8.6 MG 2 TABLET: 8.6; 5 TABLET, FILM COATED ORAL at 16:50

## 2021-08-05 RX ADMIN — GUAIFENESIN 600 MG: 600 TABLET, EXTENDED RELEASE ORAL at 04:37

## 2021-08-05 RX ADMIN — OXYCODONE 5 MG: 5 TABLET ORAL at 04:43

## 2021-08-05 ASSESSMENT — PAIN DESCRIPTION - PAIN TYPE
TYPE: ACUTE PAIN

## 2021-08-05 ASSESSMENT — ENCOUNTER SYMPTOMS
BACK PAIN: 0
NAUSEA: 0
NECK PAIN: 0
WEAKNESS: 1
COUGH: 0
MYALGIAS: 0
ORTHOPNEA: 0
FEVER: 0
SHORTNESS OF BREATH: 1
CHILLS: 0
PALPITATIONS: 0
FOCAL WEAKNESS: 1
DIZZINESS: 0
VOMITING: 0
FALLS: 0
EYE PAIN: 0
DIARRHEA: 0
HEADACHES: 0
ABDOMINAL PAIN: 0

## 2021-08-05 ASSESSMENT — FIBROSIS 4 INDEX: FIB4 SCORE: 0.98

## 2021-08-05 NOTE — PROGRESS NOTES
"    DATE: 8/5/2021    Hospital Day 7 malignant pleural effusion.    Interval Events:  Stable overnight.  Remains tachycardic.  Pain similar to yesterday.  Hgb 7.7 this morning    PHYSICAL EXAMINATION:  Vital Signs: /75   Pulse (!) 114   Temp 35.9 °C (96.7 °F) (Temporal)   Resp 16   Ht 1.651 m (5' 5\")   Wt 101 kg (222 lb 10.6 oz)   SpO2 95%     Awake and alert  No appreciable lung sounds on left.     Laboratory Values:   Recent Labs     08/03/21  1835 08/04/21  0255 08/05/21  0136   WBC 7.2 7.4 7.6   RBC 2.42* 2.67* 2.47*   HEMOGLOBIN 7.7* 8.4* 7.7*   HEMATOCRIT 23.7* 26.5* 24.4*   MCV 97.9* 99.3* 98.8*   MCH 31.8 31.5 31.2   MCHC 32.5* 31.7* 31.6*   RDW 51.0* 52.4* 52.6*   PLATELETCT 292 319 330   MPV 9.9 10.0 10.3     Recent Labs     08/03/21  0345 08/04/21  0255 08/05/21  0136   SODIUM 139 137 139   POTASSIUM 3.7 3.8 3.6   CHLORIDE 104 102 103   CO2 22 22 23   GLUCOSE 103* 104* 146*   BUN 14 9 7*   CREATININE 0.68 0.64 0.60   CALCIUM 9.5 9.5 9.3                Imaging:   DX-CHEST-PORTABLE (1 VIEW)   Final Result      Opacification of the left hemithorax is unchanged with mediastinal shift to the right.      US-CHEST   Final Result         Thick echogenic heterogeneous material in the left pleural space. No drainable pocket for thoracentesis.      DX-CHEST-PORTABLE (1 VIEW)   Final Result      1.  Unchanged subtotal opacification of the left hemithorax without volume loss.      2.  Persistent mediastinal shift to the right.      3.  No right lung consolidation.      CT-CHEST (THORAX) W/O   Final Result      1.  Very large left pleural effusion which now demonstrates heterogeneous areas of increased density suggesting interval hemorrhage or less likely superimposed infection.   2.  Complete collapse/compressive atelectasis of the left lung which is worsened than prior study.   3.  New mild groundglass opacity in the right lung apex could represent atelectasis or mild pneumonitis.   4.  Right pulmonary " nodules again noted suggesting metastases.      These findings were discussed with JUANITA SERNA on 8/3/2021 3:40 PM.      Fleischner Society pulmonary nodule recommendations:   Not Applicable         DX-CHEST-PORTABLE (1 VIEW)   Final Result      1.  There is complete opacification of the left hemithorax which could represent combination of pleural effusion or airspace process.      US-CHEST   Final Result      1.  Diffuse pneumonitis of left hemithorax.      2.  No evidence of a large left-sided pleural effusion. Thoracentesis deferred.      3.  Recommend CT scan of the thorax for further evaluation.      DX-CHEST-PORTABLE (1 VIEW)   Final Result      Worsening opacification on the left which is now complete, probably related to left pleural effusion and atelectasis.      DX-CHEST-LIMITED (1 VIEW)   Final Result      1.  No significant interval change.      DX-CHEST-PORTABLE (1 VIEW)   Final Result      1.  Interval decrease in the left pleural effusion following thoracentesis.   2.  There is no pneumothorax visualized.      US-THORACENTESIS PUNCTURE LEFT   Final Result      1. Ultrasound guided left sided diagnostic thoracentesis.      2. 950 mL of fluid withdrawn.      CT-CTA CHEST PULMONARY ARTERY W/ RECONS   Final Result      1.  Nonocclusive pulmonary emboli in the right pulmonary artery, its lobar and segmental branches. No right heart strain.   2.  Large left pleural effusion, with total collapse of the left lower lobe and partial atelectasis of the left upper lobe.   3.  At least 3 new pulmonary nodules measuring up to 2.6 cm, consistent with pulmonary metastases.   4.  Unchanged heterogeneous thyroid gland with multiple thyroid nodules.      Findings were discussed with KRISTOPHER COOK on 7/29/2021 9:38 AM.         DX-CHEST-PORTABLE (1 VIEW)   Final Result      Very large left pleural effusion with associated compressive atelectasis.      IR-INSERT PLEURAL CATH W/ CUFF    (Results Pending)        ASSESSMENT AND PLAN:     * Pleural effusion  Assessment & Plan  Combination of hemothorax and likely malignant pleural effusion.    With this being a malignant effusion, placing a chest tube would result in chronic drainage and the tube would likely always leak.  Discussed this with Dr Tompkins, who is a thoracic surgeon and agrees with this.  Recommend IR pleurex catheter, which is a tunneled catheter that she could keep and use to drain her effusion to help with her symptom relief/shortness of breath.  A large tube would never heal and close in this setting.           ____________________________________     Arlen Samano M.D.    DD: 8/5/2021  10:13 AM

## 2021-08-05 NOTE — DISCHARGE PLANNING
Anticipated Discharge Disposition: Home with HH when clear    Action: Patient discussed during IDT rounds.  Patient may need an IR placed pleurex catheter.  RN CM informed Dr. Clements that the patient's Eliquis prescription needs prior authorization.  Dr. Clements explained that the current Eliquis prescription can be disregarded for now.    Barriers to Discharge: Medical clearance     Plan: Case coordination to continue to follow up with medical team to discuss discharge barriers.

## 2021-08-05 NOTE — PROGRESS NOTES
4 Eyes Skin Assessment Completed by JOB Gao and JOB Wang.    Head WDL  Ears WDL  Nose WDL  Mouth WDL  Neck WDL  Breast/Chest WDL  Shoulder Blades WDL  Spine WDL  (R) Arm/Elbow/Hand WDL  (L) Arm/Elbow/Hand WDL  Abdomen Redness, Blanching and Bruising  Groin WDL  Scrotum/Coccyx/Buttocks Redness and Blanching  (R) Leg WDL  (L) Leg WDL  (R) Heel/Foot/Toe Blanching and Boggy  (L) Heel/Foot/Toe Blanching and Boggy      Devices In Places Tele Box, Blood Pressure Cuff, Pulse Ox and Nasal Cannula      Interventions In Place Gray Ear Foams, Pillows, Q2 Turns, Barrier Cream, Dri-Leeroy Pads and Pressure Redistribution Mattress    Possible Skin Injury No    Pictures Uploaded Into Epic Yes  Wound Consult Placed N/A  RN Wound Prevention Protocol Ordered Yes

## 2021-08-05 NOTE — ASSESSMENT & PLAN NOTE
Combination of hemothorax and likely malignant pleural effusion.    8/6 VATS with evacuation for hemothorax  Surgeon - J Ganser, MD

## 2021-08-05 NOTE — CONSULTS
Radiology Consult  Author: SHANA Quiroz Date & Time created: 8/5/2021  3:42 PM   Date of admission  7/29/2021  Note to reader: this note follows the APSO format rather than the historical SOAP format. Assessment and plan located at the top of the note for ease of use.    Chief Complaint  70 y.o. female admitted 7/29/2021 with SOB    HPI  The patient is a 70 year-old  elderly woman who is in the hospital with a new massive likely malignant pleural effusion.  She presented with pulmonary embolism and massive effusion which was thought to be malignant.  She has known metastatic endometrial cancer with mets to the lungs as well as a pulmonary embolism    Assessment/Plan  Interval History   Principal Problem:    Pleural effusion  Active Problems:    Endometrial cancer (HCC)    Pulmonary embolism (HCC)    Acute kidney injury (HCC)    Anemia      Plan IR  - Discussed with IR MD Gardner and MD Kim, due to left thorax small fluid loculations as seen on US, patient would not be a good candidate for a a tunneld pleurex catheter as it would yield little output once the loculations were drained resulting in multiple manipulations of the catheter to achieve little/ if any drainage.        - Thank you for allowing Interventional Radiology team to participate in the patients care, if any additonal care or requests are needed in the future please do not hesitate call or place IR order       [id number]  IR:            Review of Systems  Physical Exam   ROS   Review of Systems   Constitutional: Negative for chills and fever.   Eyes: Negative for pain.   Respiratory: Positive for shortness of breath. Negative for cough.    Cardiovascular: Negative for chest pain and palpitations.   Gastrointestinal: Negative for abdominal pain, nausea and vomiting.   Genitourinary: Negative for dysuria, frequency and urgency.   Musculoskeletal: Negative for myalgias and neck pain.   Neurological: Negative for dizziness and  headaches.   Vitals:    08/05/21 0848   BP: 128/75   Pulse: (!) 114   Resp: 16   Temp: 35.9 °C (96.7 °F)   SpO2: 95%      Physical Exam   Constitutional:       Appearance: Normal appearance.   HENT:      Mouth/Throat:      Mouth: Mucous membranes are moist.   Eyes:      Pupils: Pupils are equal, round, and reactive to light.   Cardiovascular:      Rate and Rhythm: Normal rate and regular rhythm.      Pulses: Normal pulses.   Pulmonary:      Comments: Absent breath sound to left lung fields   Abdominal:      General: Bowel sounds are normal. There is no distension.      Palpations: Abdomen is soft.      Tenderness: There is no abdominal tenderness.   Musculoskeletal:      Right lower leg: No edema.      Left lower leg: No edema.   Skin:     General: Skin is warm and dry.   Neurological:      Mental Status: She is alert.      Motor: Weakness        Labs    Recent Labs     08/03/21  1835 08/04/21  0255 08/05/21  0136   WBC 7.2 7.4 7.6   RBC 2.42* 2.67* 2.47*   HEMOGLOBIN 7.7* 8.4* 7.7*   HEMATOCRIT 23.7* 26.5* 24.4*   MCV 97.9* 99.3* 98.8*   MCH 31.8 31.5 31.2   MCHC 32.5* 31.7* 31.6*   RDW 51.0* 52.4* 52.6*   PLATELETCT 292 319 330   MPV 9.9 10.0 10.3     Recent Labs     08/03/21  0345 08/04/21  0255 08/05/21  0136   SODIUM 139 137 139   POTASSIUM 3.7 3.8 3.6   CHLORIDE 104 102 103   CO2 22 22 23   GLUCOSE 103* 104* 146*   BUN 14 9 7*   CREATININE 0.68 0.64 0.60   CALCIUM 9.5 9.5 9.3     DX-CHEST-PORTABLE (1 VIEW)   Final Result      Opacification of the left hemithorax is unchanged with mediastinal shift to the right.      US-CHEST   Final Result         Thick echogenic heterogeneous material in the left pleural space. No drainable pocket for thoracentesis.      DX-CHEST-PORTABLE (1 VIEW)   Final Result      1.  Unchanged subtotal opacification of the left hemithorax without volume loss.      2.  Persistent mediastinal shift to the right.      3.  No right lung consolidation.      CT-CHEST (THORAX) W/O   Final Result       1.  Very large left pleural effusion which now demonstrates heterogeneous areas of increased density suggesting interval hemorrhage or less likely superimposed infection.   2.  Complete collapse/compressive atelectasis of the left lung which is worsened than prior study.   3.  New mild groundglass opacity in the right lung apex could represent atelectasis or mild pneumonitis.   4.  Right pulmonary nodules again noted suggesting metastases.      These findings were discussed with JUANITA SERNA on 8/3/2021 3:40 PM.      Fleischner Society pulmonary nodule recommendations:   Not Applicable         DX-CHEST-PORTABLE (1 VIEW)   Final Result      1.  There is complete opacification of the left hemithorax which could represent combination of pleural effusion or airspace process.      US-CHEST   Final Result      1.  Diffuse pneumonitis of left hemithorax.      2.  No evidence of a large left-sided pleural effusion. Thoracentesis deferred.      3.  Recommend CT scan of the thorax for further evaluation.      DX-CHEST-PORTABLE (1 VIEW)   Final Result      Worsening opacification on the left which is now complete, probably related to left pleural effusion and atelectasis.      DX-CHEST-LIMITED (1 VIEW)   Final Result      1.  No significant interval change.      DX-CHEST-PORTABLE (1 VIEW)   Final Result      1.  Interval decrease in the left pleural effusion following thoracentesis.   2.  There is no pneumothorax visualized.      US-THORACENTESIS PUNCTURE LEFT   Final Result      1. Ultrasound guided left sided diagnostic thoracentesis.      2. 950 mL of fluid withdrawn.      CT-CTA CHEST PULMONARY ARTERY W/ RECONS   Final Result      1.  Nonocclusive pulmonary emboli in the right pulmonary artery, its lobar and segmental branches. No right heart strain.   2.  Large left pleural effusion, with total collapse of the left lower lobe and partial atelectasis of the left upper lobe.   3.  At least 3 new pulmonary nodules  measuring up to 2.6 cm, consistent with pulmonary metastases.   4.  Unchanged heterogeneous thyroid gland with multiple thyroid nodules.      Findings were discussed with KRISTOPHER COOK on 7/29/2021 9:38 AM.         DX-CHEST-PORTABLE (1 VIEW)   Final Result      Very large left pleural effusion with associated compressive atelectasis.      US-CHEST    (Results Pending)     Recent Labs     08/03/21  0345 08/04/21  0255 08/05/21  0136   SODIUM 139 137 139   POTASSIUM 3.7 3.8 3.6   CHLORIDE 104 102 103   CO2 22 22 23   GLUCOSE 103* 104* 146*   BUN 14 9 7*     INR   Date Value Ref Range Status   08/02/2021 1.18 (H) 0.87 - 1.13 Final     Comment:     INR - Non-therapeutic Reference Range: 0.87-1.13  INR - Therapeutic Reference Range: 2.0-4.0       No results found for: POCINR   No intake or output data in the 24 hours ending 08/05/21 1542   Labs not explicitly included in this progress note were reviewed by the author. Radiology/imaging not explicitly included in this progress note was reviewed by the author.     Past Medical History:   Diagnosis Date   • Anemia 2/6/2018    Iron def, post-menopausal bleeding.   • Back pain    • Back pain    • Cancer (HCC) 2019    uterine & endometrial   • Cough    • CVA, old, hemiparesis (HCC) 2/6/2018    Residual left sided weakness   • Essential hypertension 2/6/2018    Well controlled on amlodipine 10 mg and metoprolol 100 mg bid.   • Eye drainage    • Fatigue    • Frequent headaches    • Frequent urination    • Hyperlipidemia    • Irregular periods    • Painful joint    • Palpitations    • Post-menopause bleeding 2/6/2018    Menopause in 2010 and bleeding 2012 started to have irregular continuous bleeding.    • Pulmonary nodule    • Sore muscles    • Stroke (HCC)     right sided weakness   • Swelling of lower extremity    • Vision abnormalities 2/6/2018   • Vitamin D deficiency 2/6/2018    Taking 50,000 IU once weekly for 4 yrs.   • Weakness    • Wears glasses         Home  Medications    Medication Sig Taking? Last Dose Authorizing Provider   apixaban (ELIQUIS) 5mg Tab Take 2 Tablets by mouth 2 times a day for 7 days. Yes  Jazmin Henley M.D.   apixaban (ELIQUIS) 5mg Tab Take 1 tablet by mouth 2 times a day for 91 days. Yes  Jazmin Henley M.D.   amitriptyline (ELAVIL) 50 MG Tab Take 75 mg by mouth every evening. Yes 7/28/2021 at PM Physician Outpatient   baclofen (LIORESAL) 10 MG Tab Take 10 mg by mouth 3 times a day as needed. Yes 7/28/2021 at AM Physician Outpatient   megestrol (MEGACE) 40 MG Tab Take 40 mg by mouth every day. On for 3 weeks   Off for 3 weeks   Repeat Yes 7/28/2021 at AFTERNOON Physician Outpatient   tamoxifen (NOLVADEX) 20 MG tablet Take 20 mg by mouth 2 times a day. On for 3 weeks   Off for 3 weeks   Repeat Yes ABOUT 2 WEEKS AGO at [facility]   amLODIPine (NORVASC) 5 MG Tab TAKE 1 TABLET BY MOUTH EVERYDAY AT BEDTIME  7/28/2021 at AFTERNOON Kathya Scanlon, A.P.N.   losartan (COZAAR) 100 MG Tab TAKE 1 TABLET BY MOUTH EVERY DAY  7/28/2021 at AFTERNOON Kathya Scanlon, A.P.N.   meclizine (ANTIVERT) 12.5 MG Tab Take 1-2 Tablets by mouth 3 times a day as needed for Dizziness.  PRN at PRN Kathya Scanlon, A.P.N.   ferrous sulfate 325 (65 Fe) MG tablet Take 1 tablet by mouth every day.  7/28/2021 at PM Kathya Scanlon, A.P.N.   metoprolol SR (TOPROL XL) 100 MG TABLET SR 24 HR Take 1 tablet by mouth once daily  7/29/2021 at AM Aria Shipman P.A.-C.   atorvastatin (LIPITOR) 20 MG Tab Take 1 Tab by mouth every evening.  7/28/2021 at PM Kathya Scanlon, A.P.N.   senna-docusate (PERICOLACE OR SENOKOT S) 8.6-50 MG Tab Take 1 Tab by mouth 2 times a day as needed.  PRN at PRN Physician Outpatient   ascorbic acid (ASCORBIC ACID) 500 MG Tab Take 500 mg by mouth every evening.  7/28/2021 at PM Physician Outpatient   Cholecalciferol (VITAMIN D3) 2000 UNIT Cap Take 2,000 Units by mouth every day.  7/28/2021 at PM Physician Outpatient    Coenzyme Q10 (COQ10) 100 MG Cap Take 100 mg by mouth every evening.  7/28/2021 at PM Physician Outpatient   multivitamin (THERAGRAN) Tab Take 1 Tab by mouth every day.  7/28/2021 at PM Physician Outpatient   acetaminophen (TYLENOL) 500 MG Tab Take 1,000 mg by mouth every 6 hours as needed for Moderate Pain.  7/28/2021 at PRN Physician Outpatient   B Complex-C (SUPER B COMPLEX PO) Take 1 Tab by mouth every evening.  7/28/2021 at PM Physician Outpatient   aspirin (ASA) 81 MG Chew Tab chewable tablet Take 81 mg by mouth every day.  7/29/2021 at AM Physician Outpatient       I have performed a physical exam and reviewed and updated ROS and Plan today (8/5/2021).     15 minutes in directly providing and coordinating care and extensive data review.  No time overlap and excludes procedures.

## 2021-08-05 NOTE — PROGRESS NOTES
Daily Progress Note:     Date of Service: 8/5/2021  Primary Team: [employer] [facility] Blue Team   Attending: ARLETH Mccrary   Senior Resident: Dr. Henley  Intern: Dr. Clements  Contact:  [phone]    Chief Complaint:   CP, SOB    Subjective:  Overnight pt was having some chest pain and SOB, unchanged by albuterol.  This AM she continues to have this same chest pain but feels her breathing is better than last night.      Consultants/Specialty:  Gyn-Oncology    Review of Systems:    Review of Systems   Constitutional: Negative for chills and fever.   Respiratory: Positive for shortness of breath (reports increased work of breathing). Negative for cough.    Cardiovascular: Positive for chest pain. Negative for palpitations and orthopnea.   Gastrointestinal: Negative for abdominal pain, diarrhea, nausea and vomiting.   Genitourinary: Negative for dysuria and urgency.   Musculoskeletal: Negative for back pain, falls and joint pain.   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 Negative for headaches.       Objective Data:   Physical Exam:   Vitals:   Temp:  [35.9 °C (96.7 °F)-36.2 °C (97.1 °F)] 35.9 °C (96.7 °F)  Pulse:  [108-124] 114  Resp:  [16-18] 16  BP: (121-138)/(75-85) 128/75  SpO2:  [95 %-98 %] 95 %    Physical Exam  Constitutional:       General: She is not in acute distress.     Appearance: She is not toxic-appearing.   HENT:      Head: Normocephalic and atraumatic.      Mouth/Throat:      Mouth: Mucous membranes are dry.      Pharynx: Oropharynx is clear.   Eyes:      Conjunctiva/sclera: Conjunctivae normal.   Cardiovascular:      Rate and Rhythm: Normal rate and regular rhythm.      Pulses: Normal pulses.      Heart sounds: Normal heart sounds. No murmur heard.   No friction rub. No gallop.    Pulmonary:      Effort: Pulmonary effort is normal.      Comments: No acute respiratory distress.  Difficult to auscultate BS on left side.   Basilar rales present on right.  Abdominal:      General: Abdomen is flat. Bowel sounds are normal. There is no distension.      Palpations: Abdomen is soft.      Tenderness: There is no abdominal tenderness.   Musculoskeletal:      Right lower leg: No edema.      Left lower leg: No edema.   Skin:     General: Skin is warm and dry.   Neurological:      Mental Status: She is alert.      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   Psychiatric:         Mood and Affect: Mood normal.         Behavior: Behavior normal.           Labs:   HEMATOLOGY/ ONCOLOGY/ID:            Recent Labs     08/03/21  0345 08/03/21  0345 08/03/21  1835 08/04/21  0255 08/05/21  0136   WBC 5.9   < > 7.2 7.4 7.6   RBC 2.54*   < > 2.42* 2.67* 2.47*   HEMOGLOBIN 8.0*   < > 7.7* 8.4* 7.7*   HEMATOCRIT 25.1*   < > 23.7* 26.5* 24.4*   MCV 98.8*   < > 97.9* 99.3* 98.8*   MCH 31.5   < > 31.8 31.5 31.2   RDW 52.3*   < > 51.0* 52.4* 52.6*   PLATELETCT 259   < > 292 319 330   MPV 10.2   < > 9.9 10.0 10.3   NEUTSPOLYS 71.80  --   --   --   --    LYMPHOCYTES 15.60*  --   --   --   --    MONOCYTES 10.50  --   --   --   --    EOSINOPHILS 1.50  --   --   --   --    BASOPHILS 0.30  --   --   --   --     < > = values in this interval not displayed.     Lab Results   Component Value Date    JLIDFVOT61 984 (H) 09/25/2020    IOUEZNSQ36 714 05/15/2019    RFEPKTTY56 937 (H) 10/02/2018    FOLATE >23.8 06/12/2019    FERRITIN 436.0 (H) 08/02/2021    FERRITIN 18.5 06/12/2019    FERRITIN 18.5 10/02/2018    IRON 13 (L) 08/02/2021    IRON 35 (L) 06/12/2019    IRON 29 (L) 10/02/2018    TOTIRONBC 150 (L) 08/02/2021    TOTIRONBC 272 06/12/2019    [facility] 392 10/02/2018       RENAL:        Estimated GFR/CRCL = Estimated Creatinine Clearance: 105.7 mL/min (by C-G formula based on SCr of 0.6 mg/dL).  Recent Labs     08/03/21  0345 08/04/21  0255 08/05/21  0136   SODIUM 139 137 139   POTASSIUM 3.7 3.8 3.6   CHLORIDE 104 102 103   CO2 22 22 23    GLUCOSE 103* 104* 146*   BUN 14 9 7*   CREATININE 0.68 0.64 0.60   CALCIUM 9.5 9.5 9.3       GASTROINTESTINAL/ HEPATIC:          No results for input(s): ALTSGPT, ASTSGOT, ALKPHOSPHAT, TBILIRUBIN, DBILIRUBIN, GAMMAGT, LIPASE, ALBUMIN, GLOBULIN, PREALBUMIN, INR, MACROCYTOSIS in the last 72 hours.  No results found for: AMMONIA    ENDOCRINE:              Recent Labs     08/03/21  0345 08/04/21  0255 08/05/21  0136   GLUCOSE 103* 104* 146*     Lab Results   Component Value Date    FREET4 0.92 05/15/2019       Imaging:   CXR 7/30:  No significant interval change in pleural effusion, which is still present.  CXR 8/1: repeat chest x-ray shows reaccumulation of fluid in pleural effusion.  CT chest 8/3:  Very large left pleural effusion which now demonstrates heterogeneous areas of increased density suggesting interval hemorrhage or less likely superimposed infection.    Problem Representation:  Carlene Rivas  is a 70 y.o. y/o woman w/ PMHx Stage IV endometrial cancer who presented on 7/29/2021 w/ worsening shortness of breath.  She was found to have a non-occlusive PE in the right pulmonary artery w/o right heart strain seen on CTA from 7/29.  Large left pleural effusion, thoracentesis performed on 7/29 removed 950 ml of fluid, + lights criteria.  Cytology not definitive for malignant effusion.  CT chest on 8/3 shows likely hemothorax.    * Pleural effusion  Assessment & Plan  Shortness of breath with coarse crackles on left mid lung. Found to have large left sided pleural effusion with total collapse of left lower lobe and partial atelectasis of upper left lobe on CTA. Thoracentesis performed on 7/30, removing 950 cc of fluid. Pleural fluid studies sent, positive lights criteria for exudative process.  Cytology is inconclusive as there wasn't enough of a cell sample to adequately assess cell types.  Despite the cytology, malignant pleural effusion is most likely to be present, as there is no concerns for infection  at this time.  CT of the chest on 8/3 shows likely hemothorax, which in the setting of anticoagulation 2/2 to PE, it could be due to spontaneous hemorrhage from metastases, spontaneous hemorrhage 2/2 anticoagulation or s/p thoracentesis.  Surgery does not feel that thoracostomy would be appropriate in this setting.  Discussed case w/ pulmonology, pt may need to undergo hilar LN bx s/p resolution of hemothorax for further investigation of new lung nodules.  -Gyn-Onc consult, appreciate assistance  -Pulmonology consulted, appreciate assistance.   -Cardiothoracic surgery consult, appreciate assistance   -Plans for surgery tomorrow at 1500 w/ Dr. Ganser     Acute kidney injury (HCC)  Assessment & Plan  New HEAVEN on 8/1, w/ acute worsening on 8/2.  Resolved on 8/3.  Cr 48 hours prior to insult was 0.65, has since elevated to 1.28.  FeNa shows a likely pre-renal process.  This is likely 2/2 intravascular depletion in the setting of a large volume pleural effusion and attempted diuresis.  This was responsive to fluid bolus.    -AM BMP to monitor further, consider repeat bolus for worsening kidney fx    Pulmonary embolism (HCC)  Assessment & Plan  Patient was having left upper thigh pain for several days preceding her shortness of breath.  Non-occlusive right pulmonary artery embolism (lobar and segmental branches) on CTA. No evidence of right heart strain on CTA. Likely only mild contribution to AHRF as the PE is non-occlusive and no evidence of right heart strain seen.    -Holding anticoagulation in the setting of possible hemothorax  -Monitoring on telemetry  -Monitor vitals for hemodynamic stability    Anemia  Assessment & Plan  Pt w/ slowly downtrending hemoglobin since admission.  This is a normocytic anemia.  Iron studies show concern for early anemia of chronic disease, but this would be unlikely to contribute to such quick decline in hemoglobin.  Other considerations would be active, bleeding, but patient at this  time has no obvious areas of active bleeding, no melena, no hematemesis, no vaginal bleeding, no external bleeding.  Consideration would be bleeding into thorax s/p thoracentesis performed on admission.  She was on anticoagulation for PE.  CT shows concerns for hemothorax, which would explain acute decline in hemoglobin following initiation of anticoagulation.    -Assessment as above        Endometrial cancer (HCC)- (present on admission)  Assessment & Plan  Hx of endometrial cancer that has metastasized to the lungs. Follows-up with Dr. Jnan Garcia. Finished chemotherapy infusion. Currently on tamoxifen (3 weeks on, 3 weeks off), last tamoxifen 07/18, would restart August 9th. Found to have 3 new pulmonary nodules up to 2.6 cm consistent with pulmonary metastasis.    -Gyn-Onc consulted, appreciate assistance   -Hold tamoxifen/megace due to current PE  -Discussed case w/ pulmonology, pt may need to undergo hilar LN bx s/p resolution of hemothorax for further investigation of new lung nodules        Code Status: Full code  DVT ppx: held in setting of possible hemothorax  Diet: Regular diet  GI: Bowel regimen  T/L/D: pIV right forearm  Disposition: Anticipate >48 hours for further evaluation of pleural effusion      Julio Clements DO  PGY-1, [facility]    Assessment and plan discussed with senior resident and attending physician.

## 2021-08-05 NOTE — PROGRESS NOTES
Gynecology Oncology Progress Note               Author: Rima Kitchen P.A.-C. Date & Time created: 8/5/2021  12:56 PM     Interval History:  No increase shortness of breath. States she had some chest pain last night, which has resolved. Denies any other pain. No nausea or vomiting. Thirsty due to NPO for procedure.      Review of Systems:  Review of Systems   Constitutional: Negative for chills and fever.   Eyes: Negative for pain.   Respiratory: Positive for shortness of breath. Negative for cough.    Cardiovascular: Negative for chest pain and palpitations.   Gastrointestinal: Negative for abdominal pain, nausea and vomiting.   Genitourinary: Negative for dysuria, frequency and urgency.   Musculoskeletal: Negative for myalgias and neck pain.   Neurological: Negative for dizziness and headaches.       Physical Exam:  Physical Exam  Constitutional:       Appearance: Normal appearance.   HENT:      Mouth/Throat:      Mouth: Mucous membranes are moist.   Eyes:      Pupils: Pupils are equal, round, and reactive to light.   Cardiovascular:      Rate and Rhythm: Normal rate and regular rhythm.      Pulses: Normal pulses.   Pulmonary:      Comments: Absent breath sound to left lung fields   Abdominal:      General: Bowel sounds are normal. There is no distension.      Palpations: Abdomen is soft.      Tenderness: There is no abdominal tenderness.   Musculoskeletal:      Right lower leg: No edema.      Left lower leg: No edema.   Skin:     General: Skin is warm and dry.   Neurological:      Mental Status: She is alert.      Motor: Weakness (to right side, secondary to CVA) present.         Labs:          Recent Labs     08/03/21  0345 08/04/21  0255 08/05/21  0136   SODIUM 139 137 139   POTASSIUM 3.7 3.8 3.6   CHLORIDE 104 102 103   CO2 22 22 23   BUN 14 9 7*   CREATININE 0.68 0.64 0.60   CALCIUM 9.5 9.5 9.3     Recent Labs     08/03/21  0345 08/04/21  0255 08/05/21  0136   GLUCOSE 103* 104* 146*     Recent Labs      21  0136   RBC 2.42* 2.67* 2.47*   HEMOGLOBIN 7.7* 8.4* 7.7*   HEMATOCRIT 23.7* 26.5* 24.4*   PLATELETCT 292 319 330     Recent Labs     21  03421  02521  0136   WBC 5.9   < > 7.2 7.4 7.6   NEUTSPOLYS 71.80  --   --   --   --    LYMPHOCYTES 15.60*  --   --   --   --    MONOCYTES 10.50  --   --   --   --    EOSINOPHILS 1.50  --   --   --   --    BASOPHILS 0.30  --   --   --   --     < > = values in this interval not displayed.     Recent Labs     21  0136   SODIUM 139 137 139   POTASSIUM 3.7 3.8 3.6   CHLORIDE 104 102 103   CO2 22 22 23   GLUCOSE 103* 104* 146*   BUN 14 9 7*   CREATININE 0.68 0.64 0.60   CALCIUM 9.5 9.5 9.3     Hemodynamics:  Temp (24hrs), Av.1 °C (97 °F), Min:35.9 °C (96.7 °F), Max:36.2 °C (97.1 °F)  Temperature: 35.9 °C (96.7 °F)  Pulse  Av.8  Min: 73  Max: 138   Blood Pressure : 128/75     Respiratory:    Respiration: 16, Pulse Oximetry: 95 %        RUL Breath Sounds: Clear, RML Breath Sounds: Diminished, RLL Breath Sounds: Diminished, HANK Breath Sounds: Clear, LLL Breath Sounds: Diminished  Fluids:  No intake or output data in the 24 hours ending 21 1733  Weight: 101 kg (222 lb 10.6 oz)  GI/Nutrition:  Orders Placed This Encounter   Procedures   • Diet NPO     Standing Status:   Standing     Number of Occurrences:   8     Order Specific Question:   Restrict to:     Answer:   Sips with Medications [3]     Medical Decision Making, by Problem:  Active Hospital Problems    Diagnosis    • *Pleural effusion [J90]    • Acute kidney injury (HCC) [N17.9]    • Anemia [D64.9]    • Pulmonary embolism (HCC) [I26.99]    • Endometrial cancer (HCC) [C54.1]        Plan:  This is a 70 y.o. female with h/o metastatic endometrial cancer who presents with new PE and pleural effusion:     1. Endometrial cancer: stage IIIB, s/p PORTEC 3 w/ omission of chemotherapy, followed by  pulmonary recurrence treated with systemic carbo/taxol and most recently megace, treatment tolerance has been limited, will need to have f/u visit with Dr. Garcia to discuss treatment planning given concern for progression per recent chest CT.   2. Pleural effusion: CT on 8/4 showed large L pleural effusion with heterogeneous areas of increased density, concerning for hemothorax > unable to do thoracentesis due to no drainable pockets. Lovenox held. Plan for pleurex catheter today. Cytology from [facility] on 7/29 with rare atypical cells, not fully diagnostic of a malignant effusion however this remains in the differential.    3. PE: secondary to malignancy, hold megace/tamoxifen given new thrombosis and concern for progression per chest CT, may be a candidate for DOAC if no procedures are indicated. Lovenox currently held secondary for concern for hemothorax.   4. Shortness of breath: secondary to above, now maintaining sats on 2L NC   5. Anemia: stable, 7.7 today, down trending since admission, likely secondary to above, with some component of chronic anemia. Continue to monitor.     Case discussed with Physician     Quality-Core Measures

## 2021-08-05 NOTE — CARE PLAN
The patient is Watcher - Medium risk of patient condition declining or worsening    Shift Goals  Clinical Goals: Manage pain, titrate O2  Patient Goals: pain control and reat  Family Goals: rest, comfort    Problem: Pain - Standard  Goal: Alleviation of pain or a reduction in pain to the patient’s comfort goal  Outcome: Progressing     Problem: Knowledge Deficit - Standard  Goal: Patient and family/care givers will demonstrate understanding of plan of care, disease process/condition, diagnostic tests and medications  Outcome: Progressing     Problem: Fall Risk  Goal: Patient will remain free from falls  Outcome: Progressing     Problem: Skin Integrity  Goal: Skin integrity is maintained or improved  Outcome: Progressing

## 2021-08-05 NOTE — PROGRESS NOTES
Assumed care of patient at bedside report from NOC RN. Updated on POC. Patient currently A & O x 4; on 4 L O2 nasal cannula; up max assist; withou complaints of acute pain. Discussed with patient potential IR procedure today. Patient reports anxiety about procedure. Call light within reach. Whiteboard updated. Fall precautions in place. Bed locked and in lowest position. All questions answered. No other needs indicated at this time.

## 2021-08-05 NOTE — CONSULTS
DATE OF CONSULTATION:  8/4/2021     REFERRING PHYSICIAN:   IVANA Henley     CONSULTING PHYSICIAN:  Arlen Samano M.D.     REASON FOR CONSULTATION:  Hemothorax on presumed malignant effusion.   I have been asked by  to see the patient in surgical consultation for evaluation of large pleural effusion complicated by bleeding following thoracentesis.    HISTORY OF PRESENT ILLNESS: The patient is a 70 year-old  elderly woman who is in the hospital with a new massive likely malignant pleural effusion.  She presented with pulmonary embolism and massive effusion which was thought to be malignant.  She has known metastatic endometrial cancer with mets to the lungs as well as a pulmonary embolism.  She had a thoracentesis while on therapeutic lovenox and dropped her Hgb.  We are consulted for a chest tube.     PAST MEDICAL HISTORY:  has a past medical history of Anemia (2/6/2018), Back pain, Back pain, Cancer (HCC) (2019), Cough, CVA, old, hemiparesis (HCC) (2/6/2018), Essential hypertension (2/6/2018), Eye drainage, Fatigue, Frequent headaches, Frequent urination, Hyperlipidemia, Irregular periods, Painful joint, Palpitations, Post-menopause bleeding (2/6/2018), Pulmonary nodule, Sore muscles, Stroke (HCC), Swelling of lower extremity, Vision abnormalities (2/6/2018), Vitamin D deficiency (2/6/2018), Weakness, and Wears glasses.    PAST SURGICAL HISTORY:  has a past surgical history that includes hysteroscopy,dx,sep proc (6/19/2019); dilation and curettage (6/19/2019); cystoscopy,insert ureteral stent (Left, 8/8/2019); hysterectomy robotic xi (N/A, 8/8/2019); salpingo oophorectomy (Bilateral, 8/8/2019); node biopsy sentinel (8/8/2019); and hysterectomy laparoscopy.     ALLERGIES:   Allergies   Allergen Reactions   • Penicillins Itching     Makes her feel weak    • Gluten Meal    • Hydrocodone Itching     Swelling of the tongue  Face swelling   • Iodine      Weak and dizzy     • Levaquin Itching      Swelling of the tongue  Facial swelling   • Tramadol Hives     Swelling        CURRENT MEDICATIONS:   Home Medications     Reviewed by Kavita Miller (Pharmacy Tech) on 07/29/21 at 0756  Med List Status: Complete   Medication Last Dose Status   acetaminophen (TYLENOL) 500 MG Tab 7/28/2021 Active   amitriptyline (ELAVIL) 50 MG Tab 7/28/2021 Active   amLODIPine (NORVASC) 5 MG Tab 7/28/2021 Active   ascorbic acid (ASCORBIC ACID) 500 MG Tab 7/28/2021 Active   aspirin (ASA) 81 MG Chew Tab chewable tablet 7/29/2021 Active   atorvastatin (LIPITOR) 20 MG Tab 7/28/2021 Active   B Complex-C (SUPER B COMPLEX PO) 7/28/2021 Active   baclofen (LIORESAL) 10 MG Tab 7/28/2021 Active   Cholecalciferol (VITAMIN D3) 2000 UNIT Cap 7/28/2021 Active   Coenzyme Q10 (COQ10) 100 MG Cap 7/28/2021 Active   ferrous sulfate 325 (65 Fe) MG tablet 7/28/2021 Active   losartan (COZAAR) 100 MG Tab 7/28/2021 Active   meclizine (ANTIVERT) 12.5 MG Tab PRN Active   megestrol (MEGACE) 40 MG Tab 7/28/2021 Active   metoprolol SR (TOPROL XL) 100 MG TABLET SR 24 HR 7/29/2021 Active   multivitamin (THERAGRAN) Tab 7/28/2021 Active   senna-docusate (PERICOLACE OR SENOKOT S) 8.6-50 MG Tab PRN Active   tamoxifen (NOLVADEX) 20 MG tablet ABOUT 2 WEEKS AGO Active                FAMILY HISTORY:   Family History   Problem Relation Age of Onset   • Hypertension Mother    • No Known Problems Father    • No Known Problems Brother        SOCIAL HISTORY:   Social History     Tobacco Use   • Smoking status: Never Smoker   • Smokeless tobacco: Never Used   • Tobacco comment: n/a   Vaping Use   • Vaping Use: Never used   Substance and Sexual Activity   • Alcohol use: No   • Drug use: No   • Sexual activity: Never     Comment: , have 7 kids.       REVIEW OF SYSTEMS: Comprehensive review of systems is negative with the exception of the aforementioned HPI, PMH, and PSH bullets in accordance with CMS guidelines.     PHYSICAL EXAMINATION:   General Appearance: The  "patient is a pleasant  elderly woman in no critical distress.  VITAL SIGNS: /77   Pulse (!) 124   Temp 36.2 °C (97.2 °F) (Temporal)   Resp 18   Ht 1.651 m (5' 5\")   Wt 101 kg (223 lb 12.3 oz)   SpO2 96%   HEAD AND NECK: Demonstrates symmetric, reactive pupils. Extraocular muscles   are intact. Nares and oropharynx are clear.   NECK: Supple. No adenopathy.  CHEST:    Inspection: mild labored respirations   Palpation:  The chest is nontender.    Auscultation: no lung sounds left side.  CARDIOVASCULAR:   Inspection: The skin is warm and dry.  Auscultation: Sinus tachycardia.   Peripheral Pulses: Normal.    ABDOMEN:   Inspection: Abdominal inspection reveals no abrasions, contusions, lacerations or penetrating wounds.   Palpation: Palpation is remarkable for no significant tenderness, guarding, or peritoneal findings. No abdominal wall hernias.  EXTREMITIES:   Examination of the upper and lower extremities demonstrates No cyanosis, edema, or clubbing of the nails.  NEUROLOGIC:   Neurologic examination reveals no focal deficits noted.  PSYCHIATRIC:   The patient does not appear depressed or anxious.    LABORATORY VALUES:   Recent Labs     08/03/21  0345 08/03/21  1835 08/04/21  0255   WBC 5.9 7.2 7.4   RBC 2.54* 2.42* 2.67*   HEMOGLOBIN 8.0* 7.7* 8.4*   HEMATOCRIT 25.1* 23.7* 26.5*   MCV 98.8* 97.9* 99.3*   MCH 31.5 31.8 31.5   MCHC 31.9* 32.5* 31.7*   RDW 52.3* 51.0* 52.4*   PLATELETCT 259 292 319   MPV 10.2 9.9 10.0     Recent Labs     08/02/21  1823 08/03/21  0345 08/04/21  0255   SODIUM 136 139 137   POTASSIUM 3.7 3.7 3.8   CHLORIDE 103 104 102   CO2 21 22 22   GLUCOSE 114* 103* 104*   BUN 15 14 9   CREATININE 0.76 0.68 0.64   CALCIUM 9.3 9.5 9.5     Recent Labs     08/02/21  0235   INR 1.18*     Recent Labs     08/02/21  0235   INR 1.18*        IMAGING:   US-CHEST   Final Result         Thick echogenic heterogeneous material in the left pleural space. No drainable pocket for thoracentesis.    "   DX-CHEST-PORTABLE (1 VIEW)   Final Result      1.  Unchanged subtotal opacification of the left hemithorax without volume loss.      2.  Persistent mediastinal shift to the right.      3.  No right lung consolidation.      CT-CHEST (THORAX) W/O   Final Result      1.  Very large left pleural effusion which now demonstrates heterogeneous areas of increased density suggesting interval hemorrhage or less likely superimposed infection.   2.  Complete collapse/compressive atelectasis of the left lung which is worsened than prior study.   3.  New mild groundglass opacity in the right lung apex could represent atelectasis or mild pneumonitis.   4.  Right pulmonary nodules again noted suggesting metastases.      These findings were discussed with JUANITA SERNA on 8/3/2021 3:40 PM.      Fleischner Society pulmonary nodule recommendations:   Not Applicable         DX-CHEST-PORTABLE (1 VIEW)   Final Result      1.  There is complete opacification of the left hemithorax which could represent combination of pleural effusion or airspace process.      US-CHEST   Final Result      1.  Diffuse pneumonitis of left hemithorax.      2.  No evidence of a large left-sided pleural effusion. Thoracentesis deferred.      3.  Recommend CT scan of the thorax for further evaluation.      DX-CHEST-PORTABLE (1 VIEW)   Final Result      Worsening opacification on the left which is now complete, probably related to left pleural effusion and atelectasis.      DX-CHEST-LIMITED (1 VIEW)   Final Result      1.  No significant interval change.      DX-CHEST-PORTABLE (1 VIEW)   Final Result      1.  Interval decrease in the left pleural effusion following thoracentesis.   2.  There is no pneumothorax visualized.      US-THORACENTESIS PUNCTURE LEFT   Final Result      1. Ultrasound guided left sided diagnostic thoracentesis.      2. 950 mL of fluid withdrawn.      CT-CTA CHEST PULMONARY ARTERY W/ RECONS   Final Result      1.  Nonocclusive pulmonary  emboli in the right pulmonary artery, its lobar and segmental branches. No right heart strain.   2.  Large left pleural effusion, with total collapse of the left lower lobe and partial atelectasis of the left upper lobe.   3.  At least 3 new pulmonary nodules measuring up to 2.6 cm, consistent with pulmonary metastases.   4.  Unchanged heterogeneous thyroid gland with multiple thyroid nodules.      Findings were discussed with KRISTOPHER COOK on 7/29/2021 9:38 AM.         DX-CHEST-PORTABLE (1 VIEW)   Final Result      Very large left pleural effusion with associated compressive atelectasis.          IMPRESSION AND PLAN:  * Pleural effusion  Assessment & Plan  Combination of hemothorax and likely malignant pleural effusion.    With this being a malignant effusion, placing a chest tube would result in chronic drainage and the tube would likely always leak.  Discussed this with Dr Tompkins, who is a thoracic surgeon and agrees with this.  Recommend IR pleurex catheter, which is a tunneled catheter that she could keep and use to drain her effusion to help with her symptom relief/shortness of breath.  A large tube would never heal and close in this setting.      ACS NSQIP Surgical Risk Calculator       ____________________________________     Arlen Samano M.D.    DD: 8/4/2021  5:29 PM

## 2021-08-06 ENCOUNTER — APPOINTMENT (OUTPATIENT)
Dept: RADIOLOGY | Facility: MEDICAL CENTER | Age: 71
DRG: 163 | End: 2021-08-06
Attending: SURGERY
Payer: MEDICARE

## 2021-08-06 ENCOUNTER — ANESTHESIA (OUTPATIENT)
Dept: SURGERY | Facility: MEDICAL CENTER | Age: 71
DRG: 163 | End: 2021-08-06
Payer: MEDICARE

## 2021-08-06 ENCOUNTER — APPOINTMENT (OUTPATIENT)
Dept: RADIOLOGY | Facility: MEDICAL CENTER | Age: 71
DRG: 163 | End: 2021-08-06
Attending: ANESTHESIOLOGY
Payer: MEDICARE

## 2021-08-06 ENCOUNTER — ANESTHESIA EVENT (OUTPATIENT)
Dept: SURGERY | Facility: MEDICAL CENTER | Age: 71
DRG: 163 | End: 2021-08-06
Payer: MEDICARE

## 2021-08-06 LAB
ABO GROUP BLD: NORMAL
ANION GAP SERPL CALC-SCNC: 10 MMOL/L (ref 7–16)
BARCODED ABORH UBTYP: 8400
BARCODED ABORH UBTYP: 8400
BARCODED PRD CODE UBPRD: NORMAL
BARCODED PRD CODE UBPRD: NORMAL
BARCODED UNIT NUM UBUNT: NORMAL
BARCODED UNIT NUM UBUNT: NORMAL
BLD GP AB SCN SERPL QL: NORMAL
BUN SERPL-MCNC: 7 MG/DL (ref 8–22)
CALCIUM SERPL-MCNC: 9.5 MG/DL (ref 8.5–10.5)
CHLORIDE SERPL-SCNC: 105 MMOL/L (ref 96–112)
CO2 SERPL-SCNC: 25 MMOL/L (ref 20–33)
COMPONENT R 8504R: NORMAL
COMPONENT R 8504R: NORMAL
CREAT SERPL-MCNC: 0.46 MG/DL (ref 0.5–1.4)
ERYTHROCYTE [DISTWIDTH] IN BLOOD BY AUTOMATED COUNT: 50.1 FL (ref 35.9–50)
ERYTHROCYTE [DISTWIDTH] IN BLOOD BY AUTOMATED COUNT: 51.2 FL (ref 35.9–50)
ERYTHROCYTE [DISTWIDTH] IN BLOOD BY AUTOMATED COUNT: 52.7 FL (ref 35.9–50)
GLUCOSE SERPL-MCNC: 111 MG/DL (ref 65–99)
HCT VFR BLD AUTO: 23.5 % (ref 37–47)
HCT VFR BLD AUTO: 32.3 % (ref 37–47)
HCT VFR BLD AUTO: 33.4 % (ref 37–47)
HGB BLD-MCNC: 10.5 G/DL (ref 12–16)
HGB BLD-MCNC: 10.8 G/DL (ref 12–16)
HGB BLD-MCNC: 7.3 G/DL (ref 12–16)
INR PPP: 1.19 (ref 0.87–1.13)
MCH RBC QN AUTO: 30.7 PG (ref 27–33)
MCH RBC QN AUTO: 30.8 PG (ref 27–33)
MCH RBC QN AUTO: 31.2 PG (ref 27–33)
MCHC RBC AUTO-ENTMCNC: 31.1 G/DL (ref 33.6–35)
MCHC RBC AUTO-ENTMCNC: 32.3 G/DL (ref 33.6–35)
MCHC RBC AUTO-ENTMCNC: 32.5 G/DL (ref 33.6–35)
MCV RBC AUTO: 94.9 FL (ref 81.4–97.8)
MCV RBC AUTO: 95.8 FL (ref 81.4–97.8)
MCV RBC AUTO: 99.2 FL (ref 81.4–97.8)
PATHOLOGY CONSULT NOTE: NORMAL
PLATELET # BLD AUTO: 283 K/UL (ref 164–446)
PLATELET # BLD AUTO: 298 K/UL (ref 164–446)
PLATELET # BLD AUTO: 322 K/UL (ref 164–446)
PMV BLD AUTO: 10.1 FL (ref 9–12.9)
PMV BLD AUTO: 10.3 FL (ref 9–12.9)
PMV BLD AUTO: 10.3 FL (ref 9–12.9)
POTASSIUM SERPL-SCNC: 3.6 MMOL/L (ref 3.6–5.5)
PRODUCT TYPE UPROD: NORMAL
PRODUCT TYPE UPROD: NORMAL
PROTHROMBIN TIME: 14.7 SEC (ref 12–14.6)
RBC # BLD AUTO: 2.37 M/UL (ref 4.2–5.4)
RBC # BLD AUTO: 3.37 M/UL (ref 4.2–5.4)
RBC # BLD AUTO: 3.52 M/UL (ref 4.2–5.4)
RH BLD: NORMAL
SARS-COV+SARS-COV-2 AG RESP QL IA.RAPID: NOTDETECTED
SODIUM SERPL-SCNC: 140 MMOL/L (ref 135–145)
SPECIMEN SOURCE: NORMAL
UNIT STATUS USTAT: NORMAL
UNIT STATUS USTAT: NORMAL
WBC # BLD AUTO: 6.7 K/UL (ref 4.8–10.8)
WBC # BLD AUTO: 8.3 K/UL (ref 4.8–10.8)
WBC # BLD AUTO: 8.4 K/UL (ref 4.8–10.8)

## 2021-08-06 PROCEDURE — 0BNL4ZZ RELEASE LEFT LUNG, PERCUTANEOUS ENDOSCOPIC APPROACH: ICD-10-PCS | Performed by: SURGERY

## 2021-08-06 PROCEDURE — 30233N1 TRANSFUSION OF NONAUTOLOGOUS RED BLOOD CELLS INTO PERIPHERAL VEIN, PERCUTANEOUS APPROACH: ICD-10-PCS | Performed by: ANESTHESIOLOGY

## 2021-08-06 PROCEDURE — 88341 IMHCHEM/IMCYTCHM EA ADD ANTB: CPT | Mod: 91

## 2021-08-06 PROCEDURE — A9270 NON-COVERED ITEM OR SERVICE: HCPCS | Performed by: INTERNAL MEDICINE

## 2021-08-06 PROCEDURE — 86850 RBC ANTIBODY SCREEN: CPT

## 2021-08-06 PROCEDURE — C1729 CATH, DRAINAGE: HCPCS | Performed by: SURGERY

## 2021-08-06 PROCEDURE — P9016 RBC LEUKOCYTES REDUCED: HCPCS | Mod: 91

## 2021-08-06 PROCEDURE — 160036 HCHG PACU - EA ADDL 30 MINS PHASE I: Performed by: SURGERY

## 2021-08-06 PROCEDURE — 88342 IMHCHEM/IMCYTCHM 1ST ANTB: CPT

## 2021-08-06 PROCEDURE — 700102 HCHG RX REV CODE 250 W/ 637 OVERRIDE(OP): Performed by: ANESTHESIOLOGY

## 2021-08-06 PROCEDURE — 88305 TISSUE EXAM BY PATHOLOGIST: CPT

## 2021-08-06 PROCEDURE — 36415 COLL VENOUS BLD VENIPUNCTURE: CPT

## 2021-08-06 PROCEDURE — 87426 SARSCOV CORONAVIRUS AG IA: CPT

## 2021-08-06 PROCEDURE — 501399 HCHG SPECIMAN BAG, ENDO CATC: Performed by: SURGERY

## 2021-08-06 PROCEDURE — 770020 HCHG ROOM/CARE - TELE (206)

## 2021-08-06 PROCEDURE — 0BBL4ZZ EXCISION OF LEFT LUNG, PERCUTANEOUS ENDOSCOPIC APPROACH: ICD-10-PCS | Performed by: SURGERY

## 2021-08-06 PROCEDURE — A6402 STERILE GAUZE <= 16 SQ IN: HCPCS | Performed by: SURGERY

## 2021-08-06 PROCEDURE — A9270 NON-COVERED ITEM OR SERVICE: HCPCS | Performed by: STUDENT IN AN ORGANIZED HEALTH CARE EDUCATION/TRAINING PROGRAM

## 2021-08-06 PROCEDURE — 36430 TRANSFUSION BLD/BLD COMPNT: CPT

## 2021-08-06 PROCEDURE — 700102 HCHG RX REV CODE 250 W/ 637 OVERRIDE(OP): Performed by: STUDENT IN AN ORGANIZED HEALTH CARE EDUCATION/TRAINING PROGRAM

## 2021-08-06 PROCEDURE — 700105 HCHG RX REV CODE 258: Performed by: ANESTHESIOLOGY

## 2021-08-06 PROCEDURE — 71045 X-RAY EXAM CHEST 1 VIEW: CPT

## 2021-08-06 PROCEDURE — 85610 PROTHROMBIN TIME: CPT

## 2021-08-06 PROCEDURE — 0BCL4ZZ EXTIRPATION OF MATTER FROM LEFT LUNG, PERCUTANEOUS ENDOSCOPIC APPROACH: ICD-10-PCS | Performed by: SURGERY

## 2021-08-06 PROCEDURE — 500512 HCHG ENDO PEANUT: Performed by: SURGERY

## 2021-08-06 PROCEDURE — 501838 HCHG SUTURE GENERAL: Performed by: SURGERY

## 2021-08-06 PROCEDURE — 160041 HCHG SURGERY MINUTES - EA ADDL 1 MIN LEVEL 4: Performed by: SURGERY

## 2021-08-06 PROCEDURE — 0BNT4ZZ RELEASE DIAPHRAGM, PERCUTANEOUS ENDOSCOPIC APPROACH: ICD-10-PCS | Performed by: SURGERY

## 2021-08-06 PROCEDURE — 700101 HCHG RX REV CODE 250: Performed by: ANESTHESIOLOGY

## 2021-08-06 PROCEDURE — 700101 HCHG RX REV CODE 250: Performed by: SURGERY

## 2021-08-06 PROCEDURE — 160048 HCHG OR STATISTICAL LEVEL 1-5: Performed by: SURGERY

## 2021-08-06 PROCEDURE — 86923 COMPATIBILITY TEST ELECTRIC: CPT | Mod: 91

## 2021-08-06 PROCEDURE — 86900 BLOOD TYPING SEROLOGIC ABO: CPT

## 2021-08-06 PROCEDURE — 700111 HCHG RX REV CODE 636 W/ 250 OVERRIDE (IP): Performed by: ANESTHESIOLOGY

## 2021-08-06 PROCEDURE — 160009 HCHG ANES TIME/MIN: Performed by: SURGERY

## 2021-08-06 PROCEDURE — 99233 SBSQ HOSP IP/OBS HIGH 50: CPT | Performed by: INTERNAL MEDICINE

## 2021-08-06 PROCEDURE — 700102 HCHG RX REV CODE 250 W/ 637 OVERRIDE(OP): Performed by: INTERNAL MEDICINE

## 2021-08-06 PROCEDURE — 160029 HCHG SURGERY MINUTES - 1ST 30 MINS LEVEL 4: Performed by: SURGERY

## 2021-08-06 PROCEDURE — 160002 HCHG RECOVERY MINUTES (STAT): Performed by: SURGERY

## 2021-08-06 PROCEDURE — A9270 NON-COVERED ITEM OR SERVICE: HCPCS | Performed by: ANESTHESIOLOGY

## 2021-08-06 PROCEDURE — 501581 HCHG TROCAR: Performed by: SURGERY

## 2021-08-06 PROCEDURE — 502571 HCHG PACK, LAP CHOLE: Performed by: SURGERY

## 2021-08-06 PROCEDURE — 160035 HCHG PACU - 1ST 60 MINS PHASE I: Performed by: SURGERY

## 2021-08-06 PROCEDURE — 85027 COMPLETE CBC AUTOMATED: CPT

## 2021-08-06 PROCEDURE — 80048 BASIC METABOLIC PNL TOTAL CA: CPT

## 2021-08-06 PROCEDURE — 86901 BLOOD TYPING SEROLOGIC RH(D): CPT

## 2021-08-06 RX ORDER — METOPROLOL TARTRATE 1 MG/ML
INJECTION, SOLUTION INTRAVENOUS PRN
Status: DISCONTINUED | OUTPATIENT
Start: 2021-08-06 | End: 2021-08-06 | Stop reason: SURG

## 2021-08-06 RX ORDER — DIPHENHYDRAMINE HYDROCHLORIDE 50 MG/ML
12.5 INJECTION INTRAMUSCULAR; INTRAVENOUS
Status: DISCONTINUED | OUTPATIENT
Start: 2021-08-06 | End: 2021-08-06 | Stop reason: HOSPADM

## 2021-08-06 RX ORDER — SODIUM CHLORIDE 9 MG/ML
INJECTION, SOLUTION INTRAVENOUS CONTINUOUS
Status: CANCELLED | OUTPATIENT
Start: 2021-08-06 | End: 2021-08-07

## 2021-08-06 RX ORDER — OXYCODONE HCL 5 MG/5 ML
10 SOLUTION, ORAL ORAL
Status: COMPLETED | OUTPATIENT
Start: 2021-08-06 | End: 2021-08-06

## 2021-08-06 RX ORDER — MIDAZOLAM HYDROCHLORIDE 1 MG/ML
INJECTION INTRAMUSCULAR; INTRAVENOUS PRN
Status: DISCONTINUED | OUTPATIENT
Start: 2021-08-06 | End: 2021-08-06 | Stop reason: SURG

## 2021-08-06 RX ORDER — ROCURONIUM BROMIDE 10 MG/ML
INJECTION, SOLUTION INTRAVENOUS PRN
Status: DISCONTINUED | OUTPATIENT
Start: 2021-08-06 | End: 2021-08-06 | Stop reason: SURG

## 2021-08-06 RX ORDER — OXYCODONE HCL 5 MG/5 ML
5 SOLUTION, ORAL ORAL
Status: COMPLETED | OUTPATIENT
Start: 2021-08-06 | End: 2021-08-06

## 2021-08-06 RX ORDER — MIDAZOLAM HYDROCHLORIDE 1 MG/ML
1 INJECTION INTRAMUSCULAR; INTRAVENOUS
Status: DISCONTINUED | OUTPATIENT
Start: 2021-08-06 | End: 2021-08-06 | Stop reason: HOSPADM

## 2021-08-06 RX ORDER — DEXAMETHASONE SODIUM PHOSPHATE 4 MG/ML
INJECTION, SOLUTION INTRA-ARTICULAR; INTRALESIONAL; INTRAMUSCULAR; INTRAVENOUS; SOFT TISSUE PRN
Status: DISCONTINUED | OUTPATIENT
Start: 2021-08-06 | End: 2021-08-06 | Stop reason: SURG

## 2021-08-06 RX ORDER — HYDROMORPHONE HYDROCHLORIDE 1 MG/ML
0.2 INJECTION, SOLUTION INTRAMUSCULAR; INTRAVENOUS; SUBCUTANEOUS
Status: DISCONTINUED | OUTPATIENT
Start: 2021-08-06 | End: 2021-08-06 | Stop reason: HOSPADM

## 2021-08-06 RX ORDER — CALCIUM CHLORIDE 100 MG/ML
INJECTION INTRAVENOUS; INTRAVENTRICULAR PRN
Status: DISCONTINUED | OUTPATIENT
Start: 2021-08-06 | End: 2021-08-06 | Stop reason: SURG

## 2021-08-06 RX ORDER — ONDANSETRON 2 MG/ML
4 INJECTION INTRAMUSCULAR; INTRAVENOUS
Status: DISCONTINUED | OUTPATIENT
Start: 2021-08-06 | End: 2021-08-06 | Stop reason: HOSPADM

## 2021-08-06 RX ORDER — HYDROMORPHONE HYDROCHLORIDE 1 MG/ML
1 INJECTION, SOLUTION INTRAMUSCULAR; INTRAVENOUS; SUBCUTANEOUS
Status: DISCONTINUED | OUTPATIENT
Start: 2021-08-06 | End: 2021-08-06 | Stop reason: HOSPADM

## 2021-08-06 RX ORDER — BUPIVACAINE HYDROCHLORIDE AND EPINEPHRINE 5; 5 MG/ML; UG/ML
INJECTION, SOLUTION EPIDURAL; INTRACAUDAL; PERINEURAL
Status: DISCONTINUED | OUTPATIENT
Start: 2021-08-06 | End: 2021-08-06 | Stop reason: HOSPADM

## 2021-08-06 RX ORDER — MEPERIDINE HYDROCHLORIDE 25 MG/ML
12.5 INJECTION INTRAMUSCULAR; INTRAVENOUS; SUBCUTANEOUS
Status: DISCONTINUED | OUTPATIENT
Start: 2021-08-06 | End: 2021-08-06 | Stop reason: HOSPADM

## 2021-08-06 RX ORDER — SODIUM CHLORIDE, SODIUM GLUCONATE, SODIUM ACETATE, POTASSIUM CHLORIDE AND MAGNESIUM CHLORIDE 526; 502; 368; 37; 30 MG/100ML; MG/100ML; MG/100ML; MG/100ML; MG/100ML
INJECTION, SOLUTION INTRAVENOUS
Status: DISCONTINUED | OUTPATIENT
Start: 2021-08-06 | End: 2021-08-06 | Stop reason: SURG

## 2021-08-06 RX ORDER — CEFAZOLIN SODIUM 1 G/3ML
INJECTION, POWDER, FOR SOLUTION INTRAMUSCULAR; INTRAVENOUS PRN
Status: DISCONTINUED | OUTPATIENT
Start: 2021-08-06 | End: 2021-08-06 | Stop reason: SURG

## 2021-08-06 RX ORDER — SODIUM CHLORIDE, SODIUM LACTATE, POTASSIUM CHLORIDE, CALCIUM CHLORIDE 600; 310; 30; 20 MG/100ML; MG/100ML; MG/100ML; MG/100ML
INJECTION, SOLUTION INTRAVENOUS
Status: DISCONTINUED | OUTPATIENT
Start: 2021-08-06 | End: 2021-08-06 | Stop reason: SURG

## 2021-08-06 RX ORDER — SODIUM CHLORIDE, SODIUM GLUCONATE, SODIUM ACETATE, POTASSIUM CHLORIDE AND MAGNESIUM CHLORIDE 526; 502; 368; 37; 30 MG/100ML; MG/100ML; MG/100ML; MG/100ML; MG/100ML
500 INJECTION, SOLUTION INTRAVENOUS CONTINUOUS
Status: DISCONTINUED | OUTPATIENT
Start: 2021-08-06 | End: 2021-08-06 | Stop reason: HOSPADM

## 2021-08-06 RX ORDER — HALOPERIDOL 5 MG/ML
1 INJECTION INTRAMUSCULAR
Status: DISCONTINUED | OUTPATIENT
Start: 2021-08-06 | End: 2021-08-06 | Stop reason: HOSPADM

## 2021-08-06 RX ORDER — ONDANSETRON 2 MG/ML
INJECTION INTRAMUSCULAR; INTRAVENOUS PRN
Status: DISCONTINUED | OUTPATIENT
Start: 2021-08-06 | End: 2021-08-06 | Stop reason: SURG

## 2021-08-06 RX ORDER — HYDROMORPHONE HYDROCHLORIDE 1 MG/ML
0.4 INJECTION, SOLUTION INTRAMUSCULAR; INTRAVENOUS; SUBCUTANEOUS
Status: DISCONTINUED | OUTPATIENT
Start: 2021-08-06 | End: 2021-08-06 | Stop reason: HOSPADM

## 2021-08-06 RX ORDER — IPRATROPIUM BROMIDE AND ALBUTEROL SULFATE 2.5; .5 MG/3ML; MG/3ML
3 SOLUTION RESPIRATORY (INHALATION)
Status: DISCONTINUED | OUTPATIENT
Start: 2021-08-06 | End: 2021-08-06 | Stop reason: HOSPADM

## 2021-08-06 RX ORDER — SUCCINYLCHOLINE CHLORIDE 20 MG/ML
INJECTION INTRAMUSCULAR; INTRAVENOUS PRN
Status: DISCONTINUED | OUTPATIENT
Start: 2021-08-06 | End: 2021-08-06 | Stop reason: SURG

## 2021-08-06 RX ORDER — LIDOCAINE HYDROCHLORIDE 20 MG/ML
INJECTION, SOLUTION EPIDURAL; INFILTRATION; INTRACAUDAL; PERINEURAL PRN
Status: DISCONTINUED | OUTPATIENT
Start: 2021-08-06 | End: 2021-08-06 | Stop reason: SURG

## 2021-08-06 RX ADMIN — FERROUS SULFATE TAB 325 MG (65 MG ELEMENTAL FE) 325 MG: 325 (65 FE) TAB at 18:19

## 2021-08-06 RX ADMIN — SUGAMMADEX 200 MG: 100 INJECTION, SOLUTION INTRAVENOUS at 13:25

## 2021-08-06 RX ADMIN — SODIUM CHLORIDE, SODIUM GLUCONATE, SODIUM ACETATE, POTASSIUM CHLORIDE AND MAGNESIUM CHLORIDE: 526; 502; 368; 37; 30 INJECTION, SOLUTION INTRAVENOUS at 12:35

## 2021-08-06 RX ADMIN — MIDAZOLAM HYDROCHLORIDE 2 MG: 1 INJECTION, SOLUTION INTRAMUSCULAR; INTRAVENOUS at 12:10

## 2021-08-06 RX ADMIN — OXYCODONE 5 MG: 5 TABLET ORAL at 18:19

## 2021-08-06 RX ADMIN — CEFAZOLIN 2 G: 330 INJECTION, POWDER, FOR SOLUTION INTRAMUSCULAR; INTRAVENOUS at 12:21

## 2021-08-06 RX ADMIN — ASPIRIN 81 MG: 81 TABLET, CHEWABLE ORAL at 04:36

## 2021-08-06 RX ADMIN — OXYCODONE 5 MG: 5 TABLET ORAL at 22:53

## 2021-08-06 RX ADMIN — OXYCODONE HYDROCHLORIDE 10 MG: 5 SOLUTION ORAL at 14:13

## 2021-08-06 RX ADMIN — SODIUM CHLORIDE, POTASSIUM CHLORIDE, SODIUM LACTATE AND CALCIUM CHLORIDE: 600; 310; 30; 20 INJECTION, SOLUTION INTRAVENOUS at 12:09

## 2021-08-06 RX ADMIN — FENTANYL CITRATE 25 MCG: 50 INJECTION, SOLUTION INTRAMUSCULAR; INTRAVENOUS at 14:19

## 2021-08-06 RX ADMIN — PROPOFOL 120 MG: 10 INJECTION, EMULSION INTRAVENOUS at 12:17

## 2021-08-06 RX ADMIN — FENTANYL CITRATE 25 MCG: 50 INJECTION, SOLUTION INTRAMUSCULAR; INTRAVENOUS at 14:04

## 2021-08-06 RX ADMIN — SODIUM CHLORIDE, SODIUM GLUCONATE, SODIUM ACETATE, POTASSIUM CHLORIDE AND MAGNESIUM CHLORIDE 500 ML: 526; 502; 368; 37; 30 INJECTION, SOLUTION INTRAVENOUS at 14:43

## 2021-08-06 RX ADMIN — FENTANYL CITRATE 25 MCG: 50 INJECTION, SOLUTION INTRAMUSCULAR; INTRAVENOUS at 13:53

## 2021-08-06 RX ADMIN — GUAIFENESIN 600 MG: 600 TABLET, EXTENDED RELEASE ORAL at 18:19

## 2021-08-06 RX ADMIN — LIDOCAINE HYDROCHLORIDE 40 MG: 20 INJECTION, SOLUTION EPIDURAL; INFILTRATION; INTRACAUDAL at 12:17

## 2021-08-06 RX ADMIN — HYDROMORPHONE HYDROCHLORIDE 0.4 MG: 1 INJECTION, SOLUTION INTRAMUSCULAR; INTRAVENOUS; SUBCUTANEOUS at 14:34

## 2021-08-06 RX ADMIN — METOPROLOL SUCCINATE 100 MG: 100 TABLET, EXTENDED RELEASE ORAL at 04:35

## 2021-08-06 RX ADMIN — Medication 2000 UNITS: at 18:19

## 2021-08-06 RX ADMIN — SUCCINYLCHOLINE CHLORIDE 120 MG: 20 INJECTION, SOLUTION INTRAMUSCULAR; INTRAVENOUS; PARENTERAL at 12:17

## 2021-08-06 RX ADMIN — DEXAMETHASONE SODIUM PHOSPHATE 8 MG: 4 INJECTION, SOLUTION INTRA-ARTICULAR; INTRALESIONAL; INTRAMUSCULAR; INTRAVENOUS; SOFT TISSUE at 12:20

## 2021-08-06 RX ADMIN — FENTANYL CITRATE 25 MCG: 50 INJECTION, SOLUTION INTRAMUSCULAR; INTRAVENOUS at 13:58

## 2021-08-06 RX ADMIN — METOPROLOL TARTRATE 5 MG: 5 INJECTION, SOLUTION INTRAVENOUS at 12:38

## 2021-08-06 RX ADMIN — ROCURONIUM BROMIDE 50 MG: 10 INJECTION, SOLUTION INTRAVENOUS at 12:44

## 2021-08-06 RX ADMIN — ATORVASTATIN CALCIUM 20 MG: 20 TABLET, FILM COATED ORAL at 18:19

## 2021-08-06 RX ADMIN — GUAIFENESIN 600 MG: 600 TABLET, EXTENDED RELEASE ORAL at 04:36

## 2021-08-06 RX ADMIN — Medication 500 MG: at 18:19

## 2021-08-06 RX ADMIN — THERA TABS 1 TABLET: TAB at 04:36

## 2021-08-06 RX ADMIN — ONDANSETRON 4 MG: 2 INJECTION INTRAMUSCULAR; INTRAVENOUS at 13:25

## 2021-08-06 RX ADMIN — AMLODIPINE BESYLATE 5 MG: 5 TABLET ORAL at 20:58

## 2021-08-06 RX ADMIN — CALCIUM CHLORIDE 1 G: 100 INJECTION INTRAVENOUS; INTRAVENTRICULAR at 13:18

## 2021-08-06 ASSESSMENT — ENCOUNTER SYMPTOMS
SHORTNESS OF BREATH: 1
HEADACHES: 0
COUGH: 0
FOCAL WEAKNESS: 1
FEVER: 0
CHILLS: 0
FALLS: 0
PALPITATIONS: 0
DIARRHEA: 0
WEAKNESS: 1
ORTHOPNEA: 0
ABDOMINAL PAIN: 0
BACK PAIN: 0
VOMITING: 0
NAUSEA: 0

## 2021-08-06 ASSESSMENT — PAIN DESCRIPTION - PAIN TYPE
TYPE: SURGICAL PAIN
TYPE: ACUTE PAIN
TYPE: SURGICAL PAIN
TYPE: SURGICAL PAIN

## 2021-08-06 ASSESSMENT — FIBROSIS 4 INDEX: FIB4 SCORE: 1.14

## 2021-08-06 ASSESSMENT — PAIN SCALES - GENERAL: PAIN_LEVEL: 9

## 2021-08-06 NOTE — OP REPORT
DATE OF SERVICE:  08/06/2021     PREOPERATIVE DIAGNOSIS:  Left hemothorax with possible trapped lung.     POSTOPERATIVE DIAGNOSIS:  Left hemothorax with trapped lung, underlying   malignant pleural effusion.     PROCEDURE:  Left thoracoscopy with evacuation of hemothorax and decortication.     SURGEON:  John H. Ganser, MD     ASSISTANT:  Peng Healy MS-3     ANESTHESIA:  General.     ANESTHESIOLOGIST:  Arnaldo Castro MD     INDICATIONS:  The patient is a 70-year-old female with metastatic endometrial   carcinoma, who developed a left pleural effusion.  She developed a hemothorax   post-thoracentesis.  Risks, benefits and alternatives to evacuation were   outlined in detail.  Questions answered.  She wished to proceed.     FINDINGS:  There were extensive amount of clot within the chest cavity.  There   was no active bleeding.  There were tumor nodules throughout and portion of   this was sent for permanent histology.  Once the lung was completely freed, it   expanded nicely.     DESCRIPTION OF PROCEDURE:  The patient was identified and general anesthetic   administered with a double lumen endotracheal tube.  Monitoring lines were   placed by anesthesia.  She was placed in the right lateral decubitus position.    Chest prepped and draped in usual sterile fashion.  Local anesthesia of 0.5%   Marcaine with epinephrine was injected prior to the skin incision.  A small   incision was made in midaxillary line at approximately sixth intercostal space   and a 5 mm blunt trocar and 5 mm 30-degree scope inserted.  There was   extensive clot.  Suction cannula was inserted and some fluid removed.  A   second incision was made anteriorly in approximately seventh intercostal space   and blunt dissection used digitally to clear space.  Ring forceps was passed   through this incision and used to remove a significant amount of old clot.    There were some tumor nodules, which were biopsied and sent for permanent   histology as well.   There were adhesions between the lung and the chest wall   and these were freed to allow the lung to expand. Similarly, adhesions to the   diaphragm were taken down.  Clot on the lung surface itself was removed,   decorticating the lung and allowing for expansion.     Once this was all complete, the lung was reexpanded and appeared to expand   nicely.  Chest was irrigated and hemostasis assured.  A 28-Kittitian straight   chest tube and a 32-Kittitian angled chest tube were placed and secured to the   skin with silk.  The lung was again reexpanded.  These were attached to   pleural suction devices.  Sterile dressings were applied.  The patient was   extubated and returned to the recovery room in stable condition.     Sponge and needle counts were correct x2 at the end of procedure.     ESTIMATED BLOOD LOSS:  Approximately 100 mL.        ______________________________  JOHN H. GANSER, MD [facility]    DD:  08/06/2021 13:28  DT:  08/06/2021 13:47    Job#:  [id number]

## 2021-08-06 NOTE — ANESTHESIA PREPROCEDURE EVALUATION
Relevant Problems   CARDIAC   (positive) Essential hypertension   (positive) Pulmonary embolism (HCC)         (positive) Acute kidney injury (HCC)       Physical Exam    Airway   Mallampati: II  TM distance: >3 FB  Neck ROM: full       Cardiovascular - normal exam  Rhythm: regular  Rate: normal  (-) murmur     Dental - normal exam           Pulmonary - normal exam  Breath sounds clear to auscultation     Abdominal    Neurological - normal exam         Other findings: Anemia, hypoxia, dyspnea, metastatic endometrial cancer with left pleural effusion/hemothorax, PE, ill appearing            Anesthesia Plan    ASA 3- EMERGENT       Plan - general       Airway plan will be ETT    ([place], CVP, pRBC transfusion, DL OETT, one lung ventilation, probable post op ventilation)      Induction: intravenous    Postoperative Plan: Postoperative administration of opioids is intended.    Pertinent diagnostic labs and testing reviewed    Informed Consent:    Anesthetic plan and risks discussed with patient.    Use of blood products discussed with: patient whom consented to blood products.

## 2021-08-06 NOTE — ANESTHESIA PROCEDURE NOTES
Airway    Date/Time: 8/6/2021 12:17 PM  Performed by: Arnaldo Castro III, M.D.  Authorized by: Arnaldo Castro III, M.D.     Location:  OR  Urgency:  Elective  Difficult Airway: No    Indications for Airway Management:  Anesthesia      Spontaneous Ventilation: absent    Sedation Level:  Deep  Preoxygenated: Yes    Patient Position:  Sniffing  Final Airway Type:  Endotracheal airway  Final Endotracheal Airway:  ETT - double lumen left with ONE LUNG VENTILATION  Cuffed: Yes    Technique Used for Successful ETT Placement:  Direct laryngoscopy    Insertion Site:  Oral  Blade Type:  Neri  Laryngoscope Blade/Videolaryngoscope Blade Size:  3  ETT Double Lumen (fr):  35  Measured from:  Teeth  ETT to Teeth (cm):  29  Placement Verified by: auscultation and capnometry    Cormack-Lehane Classification:  Grade III - view of epiglottis only  Number of Attempts at Approach:  1

## 2021-08-06 NOTE — ANESTHESIA POSTPROCEDURE EVALUATION
Patient: Jairo Rivas    Procedure Summary     Date: 08/06/21 Room / Location: [place] [address] / SURGERY [facility]    Anesthesia Start: 1209 Anesthesia Stop: 1342    Procedure: THORACOSCOPY - HEMOTHORAX EVACUATION, CHEST TUBE PLACEMENT X 2 (Left Chest) Diagnosis: (Left  Hemothorax)    Surgeons: John H Ganser, M.D. Responsible Provider: Arnaldo Castro III, M.D.    Anesthesia Type: general ASA Status: 3 - Emergent          Final Anesthesia Type: general  Last vitals  BP   Blood Pressure : 137/79, Arterial BP: 160/74    Temp   37.3 °C (99.1 °F)    Pulse   100   Resp   (!) 27    SpO2   91 %      Anesthesia Post Evaluation    Patient location during evaluation: PACU  Patient participation: complete - patient participated  Level of consciousness: awake and alert  Pain score: 9  Acute post op pain being well managed by recovery room staff  Airway patency: patent  Anesthetic complications: no  Cardiovascular status: hemodynamically stable  Respiratory status: acceptable  Hydration status: euvolemic    PONV: none          No complications documented.     Nurse Pain Score: 9 (NPRS)

## 2021-08-06 NOTE — DIETARY
"Nutrition services: Day 8 of admit.  Jairo Rivas is a 70 y.o. female with admitting DX of pleural effusion.  Consult received for poor PO intake per RD screen.    Attempted to visit pt in a.m. and p.m; off floor both times.    Assessment:  Height: 165.1 cm (5' 5\")  Weight: 98.3 kg (216 lb 11.4 oz)  Body mass index is 36.06 kg/m²., BMI classification: Obesity class II.  Diet/Intake: NPO for procedure today. Supplement order already in (7/30) once diet advanced.    Evaluation:   1. PMHx includes: endometrial cancer with lung mestastasis (chemo infusion completed, now on oral chemo: tamoxifen - 3 weeks on, 3 weeks off), HTN, stroke, hemiparesis. Presented with chest pain, shortness of breath for 3 days, and upper left thigh pain for 1 week.  2. Labs: Glu 111, BUN 7, crea 0.46  3. Meds: vitamin C 500 mg QD, plasmalyte-A, ferrous sulfate 325 mg, dilaudid, MVI, oxycodone, pericolace, cholecalciferol 2000 IU  4. Skin: left chest incision  5. Last BM: 8/3  6. Limited PO documentation available for review: Per ADLs, PO intake in past week: 50-75% x 3, 25-50% x 3, <25% x 2.     Malnutrition Risk: Pt with poor PO during stay; at risk without PO improvement.    Recommendations/Plan:  1. Once diet advanced per MD, add Boost plus supplements BID.  2. Encourage intake of meals and supplements.  3. Document intake of all meals and supplements as % taken in ADL's to provide interdisciplinary communication across all shifts.   4. Monitor weight.  5. Nutrition rep will continue to see patient for ongoing meal and snack preferences.     RD following.        "

## 2021-08-06 NOTE — CARE PLAN
The patient is Watcher - Medium risk of patient condition declining or worsening    Shift Goals  Clinical Goals: Manage pain, titrate O2  Patient Goals: pain control and reat  Family Goals: rest, comfort    Progress made toward(s) clinical / shift goals:    Problem: Pain - Standard  Goal: Alleviation of pain or a reduction in pain to the patient’s comfort goal  8/6/2021 0305 by Sandra Arora RIraidaN.  Outcome: Progressing  8/6/2021 0304 by SHY ClarkeN.  Outcome: Progressing     Problem: Knowledge Deficit - Standard  Goal: Patient and family/care givers will demonstrate understanding of plan of care, disease process/condition, diagnostic tests and medications  8/6/2021 0305 by SHY ClarkeN.  Outcome: Progressing  8/6/2021 0304 by Sandra Arora R.N.  Outcome: Progressing     Problem: Fall Risk  Goal: Patient will remain free from falls  8/6/2021 0305 by Sandra Arora RIraidaN.  Outcome: Progressing  8/6/2021 0304 by Sandra Arora R.N.  Outcome: Progressing     Problem: Skin Integrity  Goal: Skin integrity is maintained or improved  8/6/2021 0305 by SHY ClarkeN.  Outcome: Progressing  8/6/2021 0304 by Sandra Arora R.N.  Outcome: Progressing     Problem: Respiratory  Goal: Patient will achieve/maintain optimum respiratory ventilation and gas exchange  Outcome: Progressing

## 2021-08-06 NOTE — CONSULTS
Thoracic Surgery Consult    Date: 8/6/2021    Requesting Physician: Dr. Samano  PCP: ZULEYKA Lyons  Attending Physician: John Ganser M.D.    CC: SOB    HPI: This is a 70 y.o. female who is presenting with a left pleural effusion and PE who was anticoagulated. She had thoracentesis for 950 ml, but has developed what appears to be a large left hemothorax. I was asked to see the patient for management.    Past Medical History:   Diagnosis Date   • Anemia 2/6/2018    Iron def, post-menopausal bleeding.   • Back pain    • Back pain    • Cancer (HCC) 2019    uterine & endometrial   • Cough    • CVA, old, hemiparesis (HCC) 2/6/2018    Residual left sided weakness   • Essential hypertension 2/6/2018    Well controlled on amlodipine 10 mg and metoprolol 100 mg bid.   • Eye drainage    • Fatigue    • Frequent headaches    • Frequent urination    • Hyperlipidemia    • Irregular periods    • Painful joint    • Palpitations    • Post-menopause bleeding 2/6/2018    Menopause in 2010 and bleeding 2012 started to have irregular continuous bleeding.    • Pulmonary nodule    • Sore muscles    • Stroke (HCC)     right sided weakness   • Swelling of lower extremity    • Vision abnormalities 2/6/2018   • Vitamin D deficiency 2/6/2018    Taking 50,000 IU once weekly for 4 yrs.   • Weakness    • Wears glasses        Past Surgical History:   Procedure Laterality Date   • PB CYSTOSCOPY,INSERT URETERAL STENT Left 8/8/2019    Procedure: CYSTOSCOPY, WITH URETERAL STENT INSERTION;  Surgeon: Jann Garcia M.D.;  Location: [facility];  Service: Gynecology   • HYSTERECTOMY ROBOTIC XI N/A 8/8/2019    Procedure: HYSTERECTOMY, ROBOT-ASSISTED, USING DA RILEY XI;  Surgeon: Jann Garcia M.D.;  Location: SURGERY [facility];  Service: Gynecology   • SALPINGO OOPHORECTOMY Bilateral 8/8/2019    Procedure: SALPINGO-OOPHORECTOMY;  Surgeon: Jann Garcia M.D.;  Location: SURGERY [facility];  Service: Gynecology   • NODE  BIOPSY SENTINEL  8/8/2019    Procedure: BIOPSY, LYMPH NODE, SENTINEL;  Surgeon: Jann Garcia M.D.;  Location: SURGERY [facility];  Service: Gynecology   • PB HYSTEROSCOPY,DX,SEP PROC  6/19/2019    Procedure: HYSTEROSCOPY, DIAGNOSTIC;  Surgeon: Anel Chicas M.D.;  Location: SURGERY [facility];  Service: Gynecology   • DILATION AND CURETTAGE  6/19/2019    Procedure: DILATION AND CURETTAGE;  Surgeon: Anel Chicas M.D.;  Location: SURGERY [facility];  Service: Gynecology   • HYSTERECTOMY LAPAROSCOPY         Current Facility-Administered Medications   Medication Dose Route Frequency Provider Last Rate Last Admin   • [MAR Hold] LORazepam (ATIVAN) injection 1 mg  1 mg Intravenous Pre-Op Once PRN Julio Clements D.O.       • [MAR Hold] albuterol inhaler 2 Puff  2 Puff Inhalation Q4H PRN (RT) Kamilla Black D.O.       • [MAR Hold] guaiFENesin ER (MUCINEX) tablet 600 mg  600 mg Oral Q12HRS Julio Clements D.O.   600 mg at 08/06/21 0436   • [MAR Hold] Icy Hot extra-strength 10-30 % topical cream   Topical PRN Julio Clements D.O.       • [MAR Hold] amLODIPine (NORVASC) tablet 5 mg  5 mg Oral Q DAY Toño Lama M.D.   5 mg at 08/05/21 2033   • [MAR Hold] acetaminophen (TYLENOL) tablet 500 mg  500 mg Oral Q6HRS PRN Jazmin Henley M.D.   500 mg at 08/05/21 1648   • [MAR Hold] oxyCODONE immediate-release (ROXICODONE) tablet 5 mg  5 mg Oral Q4HRS PRN Jazmin Henley M.D.   5 mg at 08/05/21 2336   • [MAR Hold] senna-docusate (PERICOLACE or SENOKOT S) 8.6-50 MG per tablet 2 tablet  2 tablet Oral BID Toño Lama M.D.   2 tablet at 08/05/21 1650    And   • [MAR Hold] polyethylene glycol/lytes (MIRALAX) PACKET 1 Packet  1 Packet Oral QDAY PRN Toño Lama M.D.        And   • [MAR Hold] magnesium hydroxide (MILK OF MAGNESIA) suspension 30 mL  30 mL Oral QDAY PRN Toño Lama M.D.        And   • [MAR Hold] bisacodyl (DULCOLAX) suppository 10 mg  10 mg Rectal QDAY PRREINALDO Lama M.D.       • [MAR Hold]  ascorbic acid (Vitamin C) tablet 500 mg  500 mg Oral Q EVENING Toño Lama M.D.   500 mg at 08/05/21 1648   • [MAR Hold] aspirin (ASA) chewable tab 81 mg  81 mg Oral DAILY Toño Lama M.D.   81 mg at 08/06/21 0436   • [MAR Hold] atorvastatin (LIPITOR) tablet 20 mg  20 mg Oral Q EVENING Toño Lama M.D.   20 mg at 08/05/21 1650   • [MAR Hold] vitamin D (cholecalciferol) tablet 2,000 Units  2,000 Units Oral Q EVENING Toño Lama M.D.   2,000 Units at 08/05/21 1649   • [MAR Hold] ferrous sulfate tablet 325 mg  325 mg Oral Q EVENING Toño Lama M.D.   325 mg at 08/05/21 1650   • [Held by provider] losartan (COZAAR) tablet 100 mg  100 mg Oral DAILY Toño Lama M.D.   100 mg at 08/01/21 1245   • [MAR Hold] multivitamin (THERAGRAN) tablet 1 tablet  1 tablet Oral DAILY Toño Lama M.D.   1 tablet at 08/06/21 0436   • [MAR Hold] senna-docusate (PERICOLACE or SENOKOT S) 8.6-50 MG per tablet 1 tablet  1 tablet Oral BID PRN Toño Lama M.D.       • [MAR Hold] metoprolol SR (TOPROL XL) tablet 100 mg  100 mg Oral Q DAY Toño Lama M.D.   100 mg at 08/06/21 0435   • [MAR Hold] amitriptyline (ELAVIL) tablet 75 mg  75 mg Oral Nightly Toño Lama M.D.   75 mg at 07/31/21 2108       Social History     Socioeconomic History   • Marital status: Single     Spouse name: Not on file   • Number of children: Not on file   • Years of education: Not on file   • Highest education level: Not on file   Occupational History   • Not on file   Tobacco Use   • Smoking status: Never Smoker   • Smokeless tobacco: Never Used   • Tobacco comment: n/a   Vaping Use   • Vaping Use: Never used   Substance and Sexual Activity   • Alcohol use: No   • Drug use: No   • Sexual activity: Never     Comment: , have 7 kids.   Other Topics Concern   •  Service No   • Blood Transfusions No   • Caffeine Concern No   • Occupational Exposure No   • Hobby Hazards No   • Sleep Concern Yes   • Stress Concern No   • Weight Concern Yes   • Special  "Diet No   • Back Care Yes   • Exercise Yes   • Bike Helmet No     Comment: Does Not Ride Bike   • Seat Belt Yes   • Self-Exams Yes   Social History Narrative   • Not on file     Social Determinants of Health     Financial Resource Strain:    • Difficulty of Paying Living Expenses:    Food Insecurity:    • Worried About Running Out of Food in the Last Year:    • Ran Out of Food in the Last Year:    Transportation Needs:    • Lack of Transportation (Medical):    • Lack of Transportation (Non-Medical):    Physical Activity:    • Days of Exercise per Week:    • Minutes of Exercise per Session:    Stress:    • Feeling of Stress :    Social Connections:    • Frequency of Communication with Friends and Family:    • Frequency of Social Gatherings with Friends and Family:    • Attends [employer] Services:    • Active Member of Clubs or Organizations:    • Attends Club or Organization Meetings:    • Marital Status:    Intimate Partner Violence:    • Fear of Current or Ex-Partner:    • Emotionally Abused:    • Physically Abused:    • Sexually Abused:        Family History   Problem Relation Age of Onset   • Hypertension Mother    • No Known Problems Father    • No Known Problems Brother        Allergies:  Penicillins, Gluten meal, Hydrocodone, Iodine, Levaquin, and Tramadol    Review of Systems:  Constitutional: Negative for fever, chills, weight loss, malaise/fatigue and diaphoresis.   Respiratory: Positive for shortness of breath      Physical Exam:  /77   Pulse (!) 112   Temp 36.3 °C (97.3 °F) (Temporal)   Resp 16   Ht 1.651 m (5' 5\")   Wt 98.3 kg (216 lb 11.4 oz)   SpO2 97%     Constitutional: she is oriented to person, place, and time.  she appears well-developed and well-nourished. No distress.   Cardiovascular: Rapid, regular  Pulmonary/Chest: Breath sounds absent left         Labs:  Recent Labs     08/04/21  0255 08/05/21  0136 08/06/21  0244   WBC 7.4 7.6 6.7   RBC 2.67* 2.47* 2.37*   HEMOGLOBIN 8.4* 7.7* " 7.3*   HEMATOCRIT 26.5* 24.4* 23.5*   MCV 99.3* 98.8* 99.2*   MCH 31.5 31.2 30.8   MCHC 31.7* 31.6* 31.1*   RDW 52.4* 52.6* 52.7*   PLATELETCT 319 330 322   MPV 10.0 10.3 10.3     Recent Labs     08/04/21  0255 08/05/21  0136 08/06/21  0244   SODIUM 137 139 140   POTASSIUM 3.8 3.6 3.6   CHLORIDE 102 103 105   CO2 22 23 25   GLUCOSE 104* 146* 111*   BUN 9 7* 7*   CREATININE 0.64 0.60 0.46*   CALCIUM 9.5 9.3 9.5     Recent Labs     08/06/21  0244   INR 1.19*     Recent Labs     08/06/21  0244   INR 1.19*       Radiology:  DX-CHEST-PORTABLE (1 VIEW)   Final Result      Opacification of the left hemithorax is unchanged with mediastinal shift to the right.      US-CHEST   Final Result         Thick echogenic heterogeneous material in the left pleural space. No drainable pocket for thoracentesis.      DX-CHEST-PORTABLE (1 VIEW)   Final Result      1.  Unchanged subtotal opacification of the left hemithorax without volume loss.      2.  Persistent mediastinal shift to the right.      3.  No right lung consolidation.      CT-CHEST (THORAX) W/O   Final Result      1.  Very large left pleural effusion which now demonstrates heterogeneous areas of increased density suggesting interval hemorrhage or less likely superimposed infection.   2.  Complete collapse/compressive atelectasis of the left lung which is worsened than prior study.   3.  New mild groundglass opacity in the right lung apex could represent atelectasis or mild pneumonitis.   4.  Right pulmonary nodules again noted suggesting metastases.      These findings were discussed with JUANITA SERNA on 8/3/2021 3:40 PM.      Fleischner Society pulmonary nodule recommendations:   Not Applicable         DX-CHEST-PORTABLE (1 VIEW)   Final Result      1.  There is complete opacification of the left hemithorax which could represent combination of pleural effusion or airspace process.      US-CHEST   Final Result      1.  Diffuse pneumonitis of left hemithorax.      2.  No  evidence of a large left-sided pleural effusion. Thoracentesis deferred.      3.  Recommend CT scan of the thorax for further evaluation.      DX-CHEST-PORTABLE (1 VIEW)   Final Result      Worsening opacification on the left which is now complete, probably related to left pleural effusion and atelectasis.      DX-CHEST-LIMITED (1 VIEW)   Final Result      1.  No significant interval change.      DX-CHEST-PORTABLE (1 VIEW)   Final Result      1.  Interval decrease in the left pleural effusion following thoracentesis.   2.  There is no pneumothorax visualized.      US-THORACENTESIS PUNCTURE LEFT   Final Result      1. Ultrasound guided left sided diagnostic thoracentesis.      2. 950 mL of fluid withdrawn.      CT-CTA CHEST PULMONARY ARTERY W/ RECONS   Final Result      1.  Nonocclusive pulmonary emboli in the right pulmonary artery, its lobar and segmental branches. No right heart strain.   2.  Large left pleural effusion, with total collapse of the left lower lobe and partial atelectasis of the left upper lobe.   3.  At least 3 new pulmonary nodules measuring up to 2.6 cm, consistent with pulmonary metastases.   4.  Unchanged heterogeneous thyroid gland with multiple thyroid nodules.      Findings were discussed with KRISTOPHER COOK on 7/29/2021 9:38 AM.         DX-CHEST-PORTABLE (1 VIEW)   Final Result      Very large left pleural effusion with associated compressive atelectasis.      US-CHEST    (Results Pending)       Assessment: This is a 70 y.o. with a large left hemothorax after thoracentesis      Plan: We discussed options. This will clearly need a surgical evacuation. We discussed risks and alternatives to thoracoscopic evacuation with possible need for decortication. She may have some trapped lung secondary to malignant effusion that may have been there for several months, which may require decortication. Questions answered and she wishes to proceed.    Thank you very much for this consultation.    Charles  Ganser M.D.  [facility]  [phone]

## 2021-08-06 NOTE — OR SURGEON
Immediate Post OP Note    PreOp Diagnosis: Left hemothorax, trappd lung      PostOp Diagnosis: Same      Procedure(s):  LEFT THORACOSCOPY - HEMOTHORAX EVACUATION, DECORTICATION- Wound Class: Clean with Drain    Surgeon(s):  John H Ganser, M.D.    Anesthesiologist/Type of Anesthesia:  Anesthesiologist: Arnaldo Castro III, M.D./General    Surgical Staff:  Circulator: Anusha Manrique R.N.  Relief Circulator: Krystal Hawley R.N.  Scrub Person: Ortega Davidson    Specimens removed if any:  ID Type Source Tests Collected by Time Destination   A : Pleural nodule Tissue Chest PATHOLOGY SPECIMEN John H Ganser, M.D. 8/6/2021 12:59 PM        Estimated Blood Loss: 100ml    Findings: clot and tumor    Complications: 0        8/6/2021 1:23 PM John H Ganser, M.D.

## 2021-08-06 NOTE — ANESTHESIA PROCEDURE NOTES
Central Venous Line  Performed by: Arnaldo Castro III, M.D.  Authorized by: Arnaldo Castro III, M.D.     Start Time:  8/6/2021 12:30 PM  End Time:  8/6/2021 12:35 PM  Patient Location:  OR  Indication: central venous access        provider hand hygiene performed prior to central venous catheter insertion, all 5 sterile barriers used (gloves, gown, cap, mask, large sterile drape) during central venous catheter insertion and skin prep agent completely dried prior to procedure    Medical Reason for Not Performing Maximal Sterile Barrier Technique: No    Patient Position:  Trendelenburg  Laterality:  Left  Site:  Internal jugular  Prep:  Chlorhexidine  Catheter Size:  8 Fr  Catheter Length (cm):  15  Number of Lumens:  Double lumen  target vein identified, needle advanced into vein and blood aspirated and guidewire advanced into vein    Seldinger Technique?: Yes    Ultrasound-Guided: ultrasound-guided  Image captured, interpreted and electronically stored.  Sterile Gel and Probe Cover Used for Ultrasound?: Yes    Intravenous Verification: verified by ultrasound, venous blood return and chest x-ray pending    all ports aspirated, all ports flushed easily, guidewire was removed intact, biopatch was applied, line was sutured in place and dressing was applied    Events: patient tolerated procedure well with no complications    PA Catheter Placed?: No

## 2021-08-06 NOTE — CARE PLAN
Problem: Nutritional:  Goal: Achieve adequate nutritional intake  Description: Patient will consume >50% of meals and supplements.  Outcome: Not Progressing  See RD note.

## 2021-08-06 NOTE — PROGRESS NOTES
Gynecology Oncology Progress Note               Author: Rima Kitchen P.A.-C. Date & Time created: 2021  4:01 PM     Interval History:  Patient in OR for procedure        Labs:          Recent Labs     21  0244   SODIUM 137 139 140   POTASSIUM 3.8 3.6 3.6   CHLORIDE 102 103 105   CO2 22 23 25   BUN 9 7* 7*   CREATININE 0.64 0.60 0.46*   CALCIUM 9.5 9.3 9.5     Recent Labs     21  0244   GLUCOSE 104* 146* 111*     Recent Labs     21  02421  1400   RBC 2.47* 2.37* 3.37*   HEMOGLOBIN 7.7* 7.3* 10.5*   HEMATOCRIT 24.4* 23.5* 32.3*   PLATELETCT 330 322 283   PROTHROMBTM  --  14.7*  --    INR  --  1.19*  --      Recent Labs     21  02421  1400   WBC 7.6 6.7 8.3     Recent Labs     21  0244   SODIUM 137 139 140   POTASSIUM 3.8 3.6 3.6   CHLORIDE 102 103 105   CO2 22 23 25   GLUCOSE 104* 146* 111*   BUN 9 7* 7*   CREATININE 0.64 0.60 0.46*   CALCIUM 9.5 9.3 9.5     Hemodynamics:  Temp (24hrs), Av.8 °C (98.3 °F), Min:36.3 °C (97.3 °F), Max:37.4 °C (99.3 °F)  Temperature: 36.9 °C (98.4 °F)  Pulse  Av.5  Min: 73  Max: 138   Blood Pressure : 141/87, Arterial BP: 160/74     Respiratory:    Respiration: (!) 23, Pulse Oximetry: 93 %        RUL Breath Sounds: Diminished, RML Breath Sounds: Diminished, RLL Breath Sounds: Diminished, HANK Breath Sounds: Diminished, LLL Breath Sounds: Diminished  Fluids:  No intake or output data in the 24 hours ending 21 1733  Weight: 98.3 kg (216 lb 11.4 oz)  GI/Nutrition:  Orders Placed This Encounter   Procedures   • Diet Order Diet: Regular; Miscellaneous modifications: (optional): Gluten Free     Standing Status:   Standing     Number of Occurrences:   1     Order Specific Question:   Diet:     Answer:   Regular [1]     Order Specific Question:   Miscellaneous modifications: (optional)     Answer:   Gluten  Free [9]     Medical Decision Making, by Problem:  Active Hospital Problems    Diagnosis    • *Pleural effusion [J90]    • Acute kidney injury (HCC) [N17.9]    • Anemia [D64.9]    • Pulmonary embolism (HCC) [I26.99]    • Endometrial cancer (HCC) [C54.1]        Plan:  This is a 70 y.o. female with h/o metastatic endometrial cancer who presents with new PE and pleural effusion:     1. Endometrial cancer: stage IIIB, s/p PORTEC 3 w/ omission of chemotherapy, followed by pulmonary recurrence treated with systemic carbo/taxol and most recently megace, treatment tolerance has been limited, will need to have f/u visit with Dr. Garcia to discuss treatment planning given concern for progression per recent chest CT.   2. Pleural effusion: CT on 8/4 showed large L pleural effusion with heterogeneous areas of increased density, concerning for hemothorax > unable to do thoracentesis due to no drainable pockets. Lovenox held. Unable to do pleurex catheter yesterday > going to OR today for thoracoscopic evacuation with possible decortication. Cytology from [facility] on 7/29 with rare atypical cells, not fully diagnostic of a malignant effusion however this remains in the differential.    3. PE: secondary to malignancy, hold megace/tamoxifen given new thrombosis and concern for progression per chest CT, may be a candidate for DOAC if no procedures are indicated. Lovenox currently held secondary for concern for hemothorax.  4. Shortness of breath: secondary to above, now maintaining sats on 2L NC   5. Anemia: stable, 7.3 today > 2 units ordered today, down trending since admission, likely secondary to above, with some component of chronic anemia. Continue to monitor.     Case discussed with Physician     Quality-Core Measures

## 2021-08-06 NOTE — PROGRESS NOTES
Bedside report received from day RN, pt care assumed, . Pt is A&O x4, Patient is medical. Updated on POC, questions answered. Bed in lowest, locked position, treaded socks on, call light and belongings within reach.

## 2021-08-06 NOTE — ANESTHESIA TIME REPORT
Anesthesia Start and Stop Event Times     Date Time Event    8/6/2021 1137 Ready for Procedure     1209 Anesthesia Start     1342 Anesthesia Stop        Responsible Staff  08/06/21    Name Role Begin End    Arnaldo Castro III, M.D. Anesth 1209 1342        Preop Diagnosis (Free Text):  Pre-op Diagnosis     Left  Hemothorax        Preop Diagnosis (Codes):    Post op Diagnosis  Hemothorax on left      Premium Reason  Non-Premium    Comments:

## 2021-08-06 NOTE — PROGRESS NOTES
PACU nurse brought patient back to room 823-1. She is alert and oriented x4 with no complaints of pain. 2 left sided chest tubes hooked up to 20 cm wall suction. Small amount of old drainage at insertion site at chest wall.   Central line in left neck, blankenship catheter draining clear straw color urine.

## 2021-08-06 NOTE — ANESTHESIA PROCEDURE NOTES
Arterial Line  Performed by: Arnaldo Castro III, M.D.  Authorized by: Arnaldo Castro III, M.D.     Start Time:  8/6/2021 12:24 PM  End Time:  8/6/2021 12:26 PM  Localization: ultrasound guidance  Image captured, interpreted and electronically stored.  Patient Location:  OR  Indication: continuous blood pressure monitoring        Catheter Size:  20 G  Seldinger Technique?: Yes    Laterality:  Left  Site:  Radial artery  Line Secured:  Antimicrobial disc, tape and transparent dressing  Events: patient tolerated procedure well with no complications     Done under GA

## 2021-08-06 NOTE — OR NURSING
1338 Pt out from OR. 2 chest tubes in place on Left side to suction. Bonilla in place. Pt sleeping. VSS.   1350 Pt complaining of pain, medicated per MAR  1410 Tolerating orals  1520 Spoke with pt's daughter and updated her on POC  1540 Criteria met, report called to JOB Schneider  1553 Pt up to T823-1, transported by RN and CNA without incident.

## 2021-08-07 ENCOUNTER — APPOINTMENT (OUTPATIENT)
Dept: RADIOLOGY | Facility: MEDICAL CENTER | Age: 71
DRG: 163 | End: 2021-08-07
Attending: SURGERY
Payer: MEDICARE

## 2021-08-07 LAB
ANION GAP SERPL CALC-SCNC: 10 MMOL/L (ref 7–16)
BUN SERPL-MCNC: 9 MG/DL (ref 8–22)
CALCIUM SERPL-MCNC: 9.7 MG/DL (ref 8.5–10.5)
CHLORIDE SERPL-SCNC: 104 MMOL/L (ref 96–112)
CO2 SERPL-SCNC: 27 MMOL/L (ref 20–33)
CREAT SERPL-MCNC: 0.68 MG/DL (ref 0.5–1.4)
GLUCOSE SERPL-MCNC: 130 MG/DL (ref 65–99)
POTASSIUM SERPL-SCNC: 3.9 MMOL/L (ref 3.6–5.5)
SODIUM SERPL-SCNC: 141 MMOL/L (ref 135–145)

## 2021-08-07 PROCEDURE — A9270 NON-COVERED ITEM OR SERVICE: HCPCS | Performed by: STUDENT IN AN ORGANIZED HEALTH CARE EDUCATION/TRAINING PROGRAM

## 2021-08-07 PROCEDURE — 71045 X-RAY EXAM CHEST 1 VIEW: CPT

## 2021-08-07 PROCEDURE — 700102 HCHG RX REV CODE 250 W/ 637 OVERRIDE(OP): Performed by: STUDENT IN AN ORGANIZED HEALTH CARE EDUCATION/TRAINING PROGRAM

## 2021-08-07 PROCEDURE — 99233 SBSQ HOSP IP/OBS HIGH 50: CPT | Performed by: INTERNAL MEDICINE

## 2021-08-07 PROCEDURE — 302146: Performed by: INTERNAL MEDICINE

## 2021-08-07 PROCEDURE — 80048 BASIC METABOLIC PNL TOTAL CA: CPT

## 2021-08-07 PROCEDURE — 700102 HCHG RX REV CODE 250 W/ 637 OVERRIDE(OP): Performed by: INTERNAL MEDICINE

## 2021-08-07 PROCEDURE — 700105 HCHG RX REV CODE 258: Performed by: STUDENT IN AN ORGANIZED HEALTH CARE EDUCATION/TRAINING PROGRAM

## 2021-08-07 PROCEDURE — 770020 HCHG ROOM/CARE - TELE (206)

## 2021-08-07 PROCEDURE — A9270 NON-COVERED ITEM OR SERVICE: HCPCS | Performed by: INTERNAL MEDICINE

## 2021-08-07 RX ORDER — SODIUM CHLORIDE, SODIUM LACTATE, POTASSIUM CHLORIDE, AND CALCIUM CHLORIDE .6; .31; .03; .02 G/100ML; G/100ML; G/100ML; G/100ML
500 INJECTION, SOLUTION INTRAVENOUS ONCE
Status: COMPLETED | OUTPATIENT
Start: 2021-08-07 | End: 2021-08-07

## 2021-08-07 RX ADMIN — GUAIFENESIN 600 MG: 600 TABLET, EXTENDED RELEASE ORAL at 06:18

## 2021-08-07 RX ADMIN — Medication 2000 UNITS: at 18:13

## 2021-08-07 RX ADMIN — ATORVASTATIN CALCIUM 20 MG: 20 TABLET, FILM COATED ORAL at 18:13

## 2021-08-07 RX ADMIN — ASPIRIN 81 MG: 81 TABLET, CHEWABLE ORAL at 06:20

## 2021-08-07 RX ADMIN — THERA TABS 1 TABLET: TAB at 06:19

## 2021-08-07 RX ADMIN — Medication 500 MG: at 18:13

## 2021-08-07 RX ADMIN — LOSARTAN POTASSIUM 100 MG: 50 TABLET, FILM COATED ORAL at 13:07

## 2021-08-07 RX ADMIN — ACETAMINOPHEN 500 MG: 500 TABLET, FILM COATED ORAL at 23:24

## 2021-08-07 RX ADMIN — ACETAMINOPHEN 500 MG: 500 TABLET, FILM COATED ORAL at 09:39

## 2021-08-07 RX ADMIN — AMLODIPINE BESYLATE 5 MG: 5 TABLET ORAL at 20:43

## 2021-08-07 RX ADMIN — GUAIFENESIN 600 MG: 600 TABLET, EXTENDED RELEASE ORAL at 18:13

## 2021-08-07 RX ADMIN — OXYCODONE 5 MG: 5 TABLET ORAL at 04:17

## 2021-08-07 RX ADMIN — SODIUM CHLORIDE, POTASSIUM CHLORIDE, SODIUM LACTATE AND CALCIUM CHLORIDE 500 ML: 600; 310; 30; 20 INJECTION, SOLUTION INTRAVENOUS at 13:05

## 2021-08-07 RX ADMIN — OXYCODONE 5 MG: 5 TABLET ORAL at 20:43

## 2021-08-07 RX ADMIN — DOCUSATE SODIUM 50 MG AND SENNOSIDES 8.6 MG 2 TABLET: 8.6; 5 TABLET, FILM COATED ORAL at 18:13

## 2021-08-07 RX ADMIN — METOPROLOL SUCCINATE 100 MG: 100 TABLET, EXTENDED RELEASE ORAL at 06:18

## 2021-08-07 ASSESSMENT — ENCOUNTER SYMPTOMS
COUGH: 0
NAUSEA: 0
HEADACHES: 0
FOCAL WEAKNESS: 1
ABDOMINAL PAIN: 0
PALPITATIONS: 0
VOMITING: 0
FALLS: 0
CHILLS: 0
SHORTNESS OF BREATH: 0
FEVER: 0
DIARRHEA: 0
ORTHOPNEA: 0
BACK PAIN: 0

## 2021-08-07 ASSESSMENT — PAIN DESCRIPTION - PAIN TYPE
TYPE: SURGICAL PAIN
TYPE: SURGICAL PAIN
TYPE: ACUTE PAIN
TYPE: SURGICAL PAIN

## 2021-08-07 ASSESSMENT — FIBROSIS 4 INDEX: FIB4 SCORE: 1.08

## 2021-08-07 ASSESSMENT — PATIENT HEALTH QUESTIONNAIRE - PHQ9
2. FEELING DOWN, DEPRESSED, IRRITABLE, OR HOPELESS: NOT AT ALL
1. LITTLE INTEREST OR PLEASURE IN DOING THINGS: NOT AT ALL
SUM OF ALL RESPONSES TO PHQ9 QUESTIONS 1 AND 2: 0

## 2021-08-07 NOTE — PROGRESS NOTES
Bedside report received from day RN, pt care assumed. Pt is A&Ox4, pain reported to chest tube site, previous medicated per MAR. Will reassess. Patient is medical. Daughter is at bedside. Updated on POC, questions answered. Bed in lowest, locked position, treaded socks on, call light and belongings within reach. Fall precautions in place.

## 2021-08-07 NOTE — PROGRESS NOTES
Resident paged for order to continue blankenship as pt unable to roll/move due to chest tube. Awaiting orders, Charge Rn advised

## 2021-08-07 NOTE — PROGRESS NOTES
"    DATE: 8/7/2021    Post Operative Day  1 Left video-assisted thoracoscopy.    Interval Events:  Complains of left chest wall pain at tube sites.  Feels less SOB than prior day  Able to take a deep breath  No fever or chills    PHYSICAL EXAMINATION:  Vital Signs: /90   Pulse (!) 120   Temp 36.7 °C (98 °F) (Temporal)   Resp 18   Ht 1.651 m (5' 5\")   Wt 98.1 kg (216 lb 4.3 oz)   SpO2 96%     No air leak present  CT #1: 450 cc  CT#2: 188 cc    Laboratory Values:   Recent Labs     08/06/21  0244 08/06/21  1400 08/06/21  2143   WBC 6.7 8.3 8.4   RBC 2.37* 3.37* 3.52*   HEMOGLOBIN 7.3* 10.5* 10.8*   HEMATOCRIT 23.5* 32.3* 33.4*   MCV 99.2* 95.8 94.9   MCH 30.8 31.2 30.7   MCHC 31.1* 32.5* 32.3*   RDW 52.7* 50.1* 51.2*   PLATELETCT 322 283 298   MPV 10.3 10.3 10.1     Recent Labs     08/05/21  0136 08/06/21  0244 08/07/21  0421   SODIUM 139 140 141   POTASSIUM 3.6 3.6 3.9   CHLORIDE 103 105 104   CO2 23 25 27   GLUCOSE 146* 111* 130*   BUN 7* 7* 9   CREATININE 0.60 0.46* 0.68   CALCIUM 9.3 9.5 9.7     Recent Labs     08/06/21  0244   INR 1.19*     Recent Labs     08/06/21  0244   INR 1.19*        Imaging:   DX-CHEST-PORTABLE (1 VIEW)   Final Result      No significant change      DX-CHEST-PORTABLE (1 VIEW)   Final Result      1.  Interval placement of 2 large bore left chest tubes with decreased left pleural effusion. Small hemopneumothorax.   2.  Left lung opacity likely atelectasis.      DX-CHEST-PORTABLE (1 VIEW)   Final Result      Opacification of the left hemithorax is unchanged with mediastinal shift to the right.      US-CHEST   Final Result         Thick echogenic heterogeneous material in the left pleural space. No drainable pocket for thoracentesis.      DX-CHEST-PORTABLE (1 VIEW)   Final Result      1.  Unchanged subtotal opacification of the left hemithorax without volume loss.      2.  Persistent mediastinal shift to the right.      3.  No right lung consolidation.      CT-CHEST (THORAX) W/O "   Final Result      1.  Very large left pleural effusion which now demonstrates heterogeneous areas of increased density suggesting interval hemorrhage or less likely superimposed infection.   2.  Complete collapse/compressive atelectasis of the left lung which is worsened than prior study.   3.  New mild groundglass opacity in the right lung apex could represent atelectasis or mild pneumonitis.   4.  Right pulmonary nodules again noted suggesting metastases.      These findings were discussed with JUANITA SERNA on 8/3/2021 3:40 PM.      Fleischner Society pulmonary nodule recommendations:   Not Applicable         DX-CHEST-PORTABLE (1 VIEW)   Final Result      1.  There is complete opacification of the left hemithorax which could represent combination of pleural effusion or airspace process.      US-CHEST   Final Result      1.  Diffuse pneumonitis of left hemithorax.      2.  No evidence of a large left-sided pleural effusion. Thoracentesis deferred.      3.  Recommend CT scan of the thorax for further evaluation.      DX-CHEST-PORTABLE (1 VIEW)   Final Result      Worsening opacification on the left which is now complete, probably related to left pleural effusion and atelectasis.      DX-CHEST-LIMITED (1 VIEW)   Final Result      1.  No significant interval change.      DX-CHEST-PORTABLE (1 VIEW)   Final Result      1.  Interval decrease in the left pleural effusion following thoracentesis.   2.  There is no pneumothorax visualized.      US-THORACENTESIS PUNCTURE LEFT   Final Result      1. Ultrasound guided left sided diagnostic thoracentesis.      2. 950 mL of fluid withdrawn.      CT-CTA CHEST PULMONARY ARTERY W/ RECONS   Final Result      1.  Nonocclusive pulmonary emboli in the right pulmonary artery, its lobar and segmental branches. No right heart strain.   2.  Large left pleural effusion, with total collapse of the left lower lobe and partial atelectasis of the left upper lobe.   3.  At least 3 new  pulmonary nodules measuring up to 2.6 cm, consistent with pulmonary metastases.   4.  Unchanged heterogeneous thyroid gland with multiple thyroid nodules.      Findings were discussed with KRISTOPHER COOK on 7/29/2021 9:38 AM.         DX-CHEST-PORTABLE (1 VIEW)   Final Result      Very large left pleural effusion with associated compressive atelectasis.      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    8/6 VATS with evacuation for hemothorax  Surgeon - J Ganser, MD         ____________________________________     Charles Garcia M.D.    DD: 8/7/2021  8:16 AM

## 2021-08-07 NOTE — CARE PLAN
The patient is Watcher - Medium risk of patient condition declining or worsening    Shift Goals  Clinical Goals: Manage pain, titrate O2  Patient Goals: pain control and reat  Family Goals: rest, comfort    Progress made toward(s) clinical / shift goals:    Problem: Pain - Standard  Goal: Alleviation of pain or a reduction in pain to the patient’s comfort goal  Outcome: Progressing     Problem: Knowledge Deficit - Standard  Goal: Patient and family/care givers will demonstrate understanding of plan of care, disease process/condition, diagnostic tests and medications  Outcome: Progressing     Problem: Fall Risk  Goal: Patient will remain free from falls  Outcome: Progressing     Problem: Skin Integrity  Goal: Skin integrity is maintained or improved  Outcome: Progressing     Problem: Respiratory  Goal: Patient will achieve/maintain optimum respiratory ventilation and gas exchange  Outcome: Progressing

## 2021-08-07 NOTE — CARE PLAN
Problem: Pain - Standard  Goal: Alleviation of pain or a reduction in pain to the patient’s comfort goal  Outcome: Progressing     Problem: Knowledge Deficit - Standard  Goal: Patient and family/care givers will demonstrate understanding of plan of care, disease process/condition, diagnostic tests and medications  Outcome: Progressing

## 2021-08-07 NOTE — CARE PLAN
The patient is Watcher - Medium risk of patient condition declining or worsening    Shift Goals  Clinical Goals: Manage pain, titrate O2  Patient Goals: pain management/keep comfortable  Family Goals: rest, comfort    Will continue to monitor pt and keep comfortable on shift

## 2021-08-07 NOTE — PROGRESS NOTES
Daily Progress Note:     Date of Service: 8/6/2021  Primary Team: [facility] Blue Team   Attending: ARLETH Mccrary   Senior Resident: Dr. Henley  Intern: Dr. Clements  Contact:  [phone]    Chief Complaint:   CP, SOB    Subjective:  Chest pain and breathing unchanged from yesterday.   Plan for OR today.     Consultants/Specialty:  Gyn-Oncology  CT surgery    Review of Systems:    Review of Systems   Constitutional: Negative for chills and fever.   Respiratory: Positive for shortness of breath (reports increased work of breathing: unchanged from yesterday). Negative for cough.    Cardiovascular: Positive for chest pain (unchanged from yesterday). Negative for palpitations and orthopnea.   Gastrointestinal: Negative for abdominal pain, diarrhea, nausea and vomiting.   Genitourinary: Negative for dysuria and urgency.   Musculoskeletal: Negative for back pain, falls and joint pain.   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 Negative for headaches.       Objective Data:   Physical Exam:   Vitals:   Temp:  [36.3 °C (97.3 °F)-37.4 °C (99.3 °F)] 37.2 °C (98.9 °F)  Pulse:  [] 104  Resp:  [15-27] 20  BP: (122-149)/(74-98) 130/85  SpO2:  [90 %-98 %] 94 %    Physical Exam  Constitutional:       General: She is not in acute distress.     Appearance: She is not toxic-appearing.   HENT:      Head: Normocephalic and atraumatic.      Mouth/Throat:      Mouth: Mucous membranes are dry.      Pharynx: Oropharynx is clear.   Eyes:      Conjunctiva/sclera: Conjunctivae normal.   Cardiovascular:      Rate and Rhythm: Normal rate and regular rhythm.      Pulses: Normal pulses.      Heart sounds: Normal heart sounds. No murmur heard.   No friction rub. No gallop.    Pulmonary:      Effort: Pulmonary effort is normal.      Comments: No acute respiratory distress.  Difficult to auscultate BS on left side.  Basilar rales present on right.  Abdominal:       General: Abdomen is flat. Bowel sounds are normal. There is no distension.      Palpations: Abdomen is soft.      Tenderness: There is no abdominal tenderness.   Musculoskeletal:      Right lower leg: No edema.      Left lower leg: No edema.   Skin:     General: Skin is warm and dry.   Neurological:      Mental Status: She is alert.      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   Psychiatric:         Mood and Affect: Mood normal.         Behavior: Behavior normal.     Unchanged exam 8/6.      Labs:   HEMATOLOGY/ ONCOLOGY/ID:            Recent Labs     08/05/21  0136 08/06/21  0244 08/06/21  1400   WBC 7.6 6.7 8.3   RBC 2.47* 2.37* 3.37*   HEMOGLOBIN 7.7* 7.3* 10.5*   HEMATOCRIT 24.4* 23.5* 32.3*   MCV 98.8* 99.2* 95.8   MCH 31.2 30.8 31.2   RDW 52.6* 52.7* 50.1*   PLATELETCT 330 322 283   MPV 10.3 10.3 10.3     Lab Results   Component Value Date    TJHRVSYM86 984 (H) 09/25/2020    VXAUQCXJ76 714 05/15/2019    LFWUYXLE33 937 (H) 10/02/2018    FOLATE >23.8 06/12/2019    FERRITIN 436.0 (H) 08/02/2021    FERRITIN 18.5 06/12/2019    FERRITIN 18.5 10/02/2018    IRON 13 (L) 08/02/2021    IRON 35 (L) 06/12/2019    IRON 29 (L) 10/02/2018    TOTIRONBC 150 (L) 08/02/2021    TOTIRONBC 272 06/12/2019    TOTIRONBC 392 10/02/2018       RENAL:        Estimated GFR/CRCL = Estimated Creatinine Clearance: 135.8 mL/min (A) (by C-G formula based on SCr of 0.46 mg/dL (L)).  Recent Labs     08/04/21  0255 08/05/21 0136 08/06/21  0244   SODIUM 137 139 140   POTASSIUM 3.8 3.6 3.6   CHLORIDE 102 103 105   CO2 22 23 25   GLUCOSE 104* 146* 111*   BUN 9 7* 7*   CREATININE 0.64 0.60 0.46*   CALCIUM 9.5 9.3 9.5       GASTROINTESTINAL/ HEPATIC:          Recent Labs     08/06/21  0244   INR 1.19*     No results found for: AMMONIA    ENDOCRINE:              Recent Labs     08/04/21  0255 08/05/21  0136 08/06/21  0244   GLUCOSE 104* 146* 111*     Lab Results   Component Value Date    FREET4 0.92 05/15/2019        Imaging:   CXR 7/30:  No significant interval change in pleural effusion, which is still present.  CXR 8/1: repeat chest x-ray shows reaccumulation of fluid in pleural effusion.  CT chest 8/3:  Very large left pleural effusion which now demonstrates heterogeneous areas of increased density suggesting interval hemorrhage or less likely superimposed infection.    Problem Representation:  Carlene Rivas  is a 70 y.o. y/o woman w/ PMHx Stage IV endometrial cancer who presented on 7/29/2021 w/ worsening shortness of breath.  She was found to have a non-occlusive PE in the right pulmonary artery w/o right heart strain seen on CTA from 7/29.  Large left pleural effusion, thoracentesis performed on 7/29 removed 950 ml of fluid, + lights criteria.  Cytology not definitive for malignant effusion.  CT chest on 8/3 shows likely hemothorax.    * Pleural effusion  Assessment & Plan  Shortness of breath with coarse crackles on left mid lung. Found to have large left sided pleural effusion with total collapse of left lower lobe and partial atelectasis of upper left lobe on CTA. Thoracentesis performed on 7/30, removing 950 cc of fluid. Pleural fluid studies sent, positive lights criteria for exudative process.  Cytology is inconclusive as there wasn't enough of a cell sample to adequately assess cell types.  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  CT of the chest on 8/3 shows likely hemothorax, which in the setting of anticoagulation 2/2 to PE, it could be due to spontaneous hemorrhage from metastases, spontaneous hemorrhage 2/2 anticoagulation or s/p thoracentesis.  Surgery does not feel that thoracostomy would be appropriate in this setting.  Discussed case w/ pulmonology, pt may need to undergo hilar LN bx s/p resolution of hemothorax for further investigation of new lung nodules.  -Gyn-Onc consult, appreciate  assistance  -Pulmonology consulted, appreciate assistance.   -Cardiothoracic surgery consult, appreciate assistance  -Plans for surgery today w/ Dr. Ganser for evacuation and decortication    Pulmonary embolism (HCC)  Assessment & Plan  Patient was having left upper thigh pain for several days preceding her shortness of breath.  Non-occlusive right pulmonary artery embolism (lobar and segmental branches) on CTA. No evidence of right heart strain on CTA. Likely only mild contribution to AHRF as the PE is non-occlusive and no evidence of right heart strain seen.    -Holding anticoagulation in the setting of possible hemothorax  -Monitoring on telemetry  -Monitor vitals for hemodynamic stability    Acute kidney injury (HCC)  Assessment & Plan  New HEAVEN on 8/1, w/ acute worsening on 8/2.  Resolved on 8/3.  Cr 48 hours prior to insult was 0.65, has since elevated to 1.28.  FeNa shows a likely pre-renal process.  This is likely 2/2 intravascular depletion in the setting of a large volume pleural effusion and attempted diuresis.  This was responsive to fluid bolus.    -AM BMP to monitor further, consider repeat bolus for worsening kidney fx    RESOLVED    Anemia  Assessment & Plan  Pt w/ slowly downtrending hemoglobin since admission.  This is a normocytic anemia.  Iron studies show concern for early anemia of chronic disease, but this would be unlikely to contribute to such quick decline in hemoglobin.  Other considerations would be active, bleeding, but patient at this time has no obvious areas of active bleeding, no melena, no hematemesis, no vaginal bleeding, no external bleeding.  Consideration would be bleeding into thorax s/p thoracentesis performed on admission.  She was on anticoagulation for PE.  CT shows concerns for hemothorax, which would explain acute decline in hemoglobin following initiation of anticoagulation.    -Assessment as above        Endometrial cancer (HCC)- (present on admission)  Assessment &  Plan  Hx of endometrial cancer that has metastasized to the lungs. Follows-up with Dr. Jann Garcia. Finished chemotherapy infusion. Currently on tamoxifen (3 weeks on, 3 weeks off), last tamoxifen 07/18, would restart August 9th. Found to have 3 new pulmonary nodules up to 2.6 cm consistent with pulmonary metastasis.    -Gyn-Onc consulted, appreciate assistance   -Hold tamoxifen/megace due to current PE  -Discussed case w/ pulmonology, pt may need to undergo hilar LN bx s/p resolution of hemothorax for further investigation of new lung nodules        Code Status: Full code  DVT ppx: held in setting of possible hemothorax  Diet: Regular diet  GI: Bowel regimen  T/L/D: pIV right forearm  Disposition: Anticipate >48 hours for further evaluation of pleural effusion

## 2021-08-07 NOTE — PROGRESS NOTES
Daily Progress Note:     Date of Service: 8/7/2021  Primary Team: [facility]   Attending: ARLETH Mccrary   Senior Resident: Dr. Henley  Intern: Dr. Clements  Contact:  [phone]    Chief Complaint:   CP, SOB    Subjective:  No acute events overnight.  Pt received hemothorax evacuation and decortication w/ chest tube placement yesterday afternoon.  This morning she is feeling much better and feels she is able to take a deep breath for the first time in a long time.  She is having some pain around the surgery site but she is not having any of the previous chest pain.       Consultants/Specialty:  Gyn-Oncology  Pulmonology:  Mclain  CT surgery:  Ganser    Review of Systems:    Review of Systems   Constitutional: Negative for chills and fever.   Respiratory: Negative for cough and shortness of breath.    Cardiovascular: Positive for chest pain (around surgery site). Negative for palpitations and orthopnea.   Gastrointestinal: Negative for abdominal pain, diarrhea, nausea and vomiting.   Genitourinary: Negative for dysuria and urgency.   Musculoskeletal: Negative for back pain, falls and joint pain.   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 Negative for headaches.       Objective Data:   Physical Exam:   Vitals:   Temp:  [36.1 °C (97 °F)-37.4 °C (99.3 °F)] 36.7 °C (98 °F)  Pulse:  [] 120  Resp:  [16-27] 18  BP: (126-149)/(69-98) 134/90  SpO2:  [90 %-96 %] 96 %    Physical Exam  Constitutional:       General: She is not in acute distress.     Appearance: She is not toxic-appearing.   HENT:      Head: Normocephalic and atraumatic.      Mouth/Throat:      Mouth: Mucous membranes are moist.      Pharynx: Oropharynx is clear.   Eyes:      Conjunctiva/sclera: Conjunctivae normal.   Cardiovascular:      Rate and Rhythm: Normal rate and regular rhythm.      Pulses: Normal pulses.      Heart sounds: Normal heart sounds. No murmur heard.   No friction rub. No  gallop.    Pulmonary:      Effort: Pulmonary effort is normal.      Comments: No acute respiratory distress.  Diffuse rales present in left lung fields.  Right CTA.    Abdominal:      General: Abdomen is flat. Bowel sounds are normal. There is no distension.      Palpations: Abdomen is soft.      Tenderness: There is no abdominal tenderness.   Genitourinary:     Comments: Bonilla catheter in place  Musculoskeletal:      Right lower leg: No edema.      Left lower leg: No edema.   Skin:     General: Skin is warm and dry.   Neurological:      Mental Status: She is alert.      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   Psychiatric:         Mood and Affect: Mood normal.         Behavior: Behavior normal.           Labs:   HEMATOLOGY/ ONCOLOGY/ID:            Recent Labs     08/06/21  0244 08/06/21  1400 08/06/21  2143   WBC 6.7 8.3 8.4   RBC 2.37* 3.37* 3.52*   HEMOGLOBIN 7.3* 10.5* 10.8*   HEMATOCRIT 23.5* 32.3* 33.4*   MCV 99.2* 95.8 94.9   MCH 30.8 31.2 30.7   RDW 52.7* 50.1* 51.2*   PLATELETCT 322 283 298   MPV 10.3 10.3 10.1     Lab Results   Component Value Date    OPETUFFL91 984 (H) 09/25/2020    MTRALXJU74 714 05/15/2019    FOXIHGVX34 937 (H) 10/02/2018    FOLATE >23.8 06/12/2019    FERRITIN 436.0 (H) 08/02/2021    FERRITIN 18.5 06/12/2019    FERRITIN 18.5 10/02/2018    IRON 13 (L) 08/02/2021    IRON 35 (L) 06/12/2019    IRON 29 (L) 10/02/2018    TOTIRONBC 150 (L) 08/02/2021    TOTIRONBC 272 06/12/2019    TOTIRONBC 392 10/02/2018       RENAL:        Estimated GFR/CRCL = Estimated Creatinine Clearance: 91.8 mL/min (by C-G formula based on SCr of 0.68 mg/dL).  Recent Labs     08/05/21  0136 08/06/21  0244 08/07/21  0421   SODIUM 139 140 141   POTASSIUM 3.6 3.6 3.9   CHLORIDE 103 105 104   CO2 23 25 27   GLUCOSE 146* 111* 130*   BUN 7* 7* 9   CREATININE 0.60 0.46* 0.68   CALCIUM 9.3 9.5 9.7       GASTROINTESTINAL/ HEPATIC:          Recent Labs     08/06/21  0244   INR 1.19*     No  results found for: AMMONIA    ENDOCRINE:              Recent Labs     08/05/21  0136 08/06/21  0244 08/07/21  0421   GLUCOSE 146* 111* 130*     Lab Results   Component Value Date    FREET4 0.92 05/15/2019       Imaging:   CXR 8/7:  Left lung w/ diffuse opacity, likely due to pulmonary re-expansion.  No noticeable pneumothoraces or plerual effusions.      Problem Representation:  Carlene Rivas  is a 70 y.o. y/o woman w/ PMHx Stage IV endometrial cancer who presented on 7/29/2021 w/ worsening shortness of breath.  She was found to have a non-occlusive PE in the right pulmonary artery w/o right heart strain seen on CTA from 7/29.  Large left pleural effusion, thoracentesis performed on 7/29 removed 950 ml of fluid, + lights criteria.  Cytology not definitive for malignant effusion.  CT chest on 8/3 shows likely hemothorax.  Hemothorax evacuation and decortication performed on 8/7.    * Pleural effusion  Assessment & Plan  Shortness of breath with coarse crackles on left mid lung. Found to have large left sided pleural effusion with total collapse of left lower lobe and partial atelectasis of upper left lobe on CTA. Thoracentesis performed on 7/30, removing 950 cc of fluid. Pleural fluid studies sent, positive lights criteria for exudative process.  Cytology is inconclusive as there wasn't enough of a cell sample to adequately assess cell types.  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  CT surgery performed hemothorax evacuation and decortication w/ chest tube placement on 8/7, repeat imaging shows clearance of hemothorax and pleural effusion.  Biopsy of lung nodule was performed at that time.  -Gyn-Onc consult, appreciate assistance  -Pulmonology consulted, appreciate assistance.   -Cardiothoracic surgery consult, appreciate assistance  -Continue to monitor chest tube output, this will decide further treatment plan    Acute  kidney injury (HCC)  Assessment & Plan  New HEAVEN on 8/1, w/ acute worsening on 8/2.  Resolved on 8/3.  Cr 48 hours prior to insult was 0.65, has since elevated to 1.28.  FeNa shows a likely pre-renal process.  This is likely 2/2 intravascular depletion in the setting of a large volume pleural effusion and attempted diuresis.  This was responsive to fluid bolus.    -AM BMP to monitor further, consider repeat bolus for worsening kidney fx    RESOLVED    Pulmonary embolism (HCC)  Assessment & Plan  Patient was having left upper thigh pain for several days preceding her shortness of breath.  Non-occlusive right pulmonary artery embolism (lobar and segmental branches) on CTA. No evidence of right heart strain on CTA. Likely only mild contribution to AHRF as the PE is non-occlusive and no evidence of right heart strain seen.    -Holding anticoagulation in the setting of possible hemothorax  -Monitoring on telemetry  -Monitor vitals for hemodynamic stability    Anemia  Assessment & Plan  Pt w/ slowly downtrending hemoglobin since admission.  This is a normocytic anemia.  Iron studies show concern for early anemia of chronic disease, but this would be unlikely to contribute to such quick decline in hemoglobin.  Other considerations would be active, bleeding, but patient at this time has no obvious areas of active bleeding, no melena, no hematemesis, no vaginal bleeding, no external bleeding.  Consideration would be bleeding into thorax s/p thoracentesis performed on admission.  She was on anticoagulation for PE.  CT shows concerns for hemothorax, which would explain acute decline in hemoglobin following initiation of anticoagulation.    -Assessment as above        Endometrial cancer (HCC)- (present on admission)  Assessment & Plan  Hx of endometrial cancer that has metastasized to the lungs. Follows-up with Dr. Jann Garcia. Finished chemotherapy infusion. Currently on tamoxifen (3 weeks on, 3 weeks off), last tamoxifen 07/18,  would restart August 9th. Found to have 3 new pulmonary nodules up to 2.6 cm consistent with pulmonary metastasis.    -Gyn-Onc consulted, appreciate assistance   -Hold tamoxifen/megace due to current PE  -pending pathology from bx specimen obtained during surgery        Code Status: Full code  DVT ppx: held in setting of possible hemothorax  Diet: Regular diet  GI: Bowel regimen  T/L/D: pIV right forearm  Disposition: Anticipate >48 hours for further evaluation of pleural effusion      Julio Clements DO  PGY-1, [facility]    Assessment and plan discussed with senior resident and attending physician.

## 2021-08-07 NOTE — PROGRESS NOTES
Pt verbalized and requested prn oxy, upon returning to bedside with prn med pt then asked if I could hold the pain med for a little while as she changed her mind. Med wasted with 2nd RN.

## 2021-08-08 ENCOUNTER — APPOINTMENT (OUTPATIENT)
Dept: RADIOLOGY | Facility: MEDICAL CENTER | Age: 71
DRG: 163 | End: 2021-08-08
Attending: STUDENT IN AN ORGANIZED HEALTH CARE EDUCATION/TRAINING PROGRAM
Payer: MEDICARE

## 2021-08-08 PROBLEM — R00.0 TACHYCARDIA: Status: ACTIVE | Noted: 2021-08-08

## 2021-08-08 LAB
ANION GAP SERPL CALC-SCNC: 10 MMOL/L (ref 7–16)
BASOPHILS # BLD AUTO: 0.1 % (ref 0–1.8)
BASOPHILS # BLD: 0.01 K/UL (ref 0–0.12)
BUN SERPL-MCNC: 7 MG/DL (ref 8–22)
CALCIUM SERPL-MCNC: 9.2 MG/DL (ref 8.5–10.5)
CHLORIDE SERPL-SCNC: 103 MMOL/L (ref 96–112)
CO2 SERPL-SCNC: 27 MMOL/L (ref 20–33)
CREAT SERPL-MCNC: 0.48 MG/DL (ref 0.5–1.4)
EOSINOPHIL # BLD AUTO: 0.08 K/UL (ref 0–0.51)
EOSINOPHIL NFR BLD: 0.9 % (ref 0–6.9)
ERYTHROCYTE [DISTWIDTH] IN BLOOD BY AUTOMATED COUNT: 51.8 FL (ref 35.9–50)
GLUCOSE SERPL-MCNC: 106 MG/DL (ref 65–99)
HCT VFR BLD AUTO: 32.3 % (ref 37–47)
HGB BLD-MCNC: 10.5 G/DL (ref 12–16)
IMM GRANULOCYTES # BLD AUTO: 0.09 K/UL (ref 0–0.11)
IMM GRANULOCYTES NFR BLD AUTO: 1 % (ref 0–0.9)
LYMPHOCYTES # BLD AUTO: 0.92 K/UL (ref 1–4.8)
LYMPHOCYTES NFR BLD: 10.3 % (ref 22–41)
MAGNESIUM SERPL-MCNC: 1.5 MG/DL (ref 1.5–2.5)
MCH RBC QN AUTO: 31 PG (ref 27–33)
MCHC RBC AUTO-ENTMCNC: 32.5 G/DL (ref 33.6–35)
MCV RBC AUTO: 95.3 FL (ref 81.4–97.8)
MONOCYTES # BLD AUTO: 0.73 K/UL (ref 0–0.85)
MONOCYTES NFR BLD AUTO: 8.1 % (ref 0–13.4)
NEUTROPHILS # BLD AUTO: 7.14 K/UL (ref 2–7.15)
NEUTROPHILS NFR BLD: 79.6 % (ref 44–72)
NRBC # BLD AUTO: 0 K/UL
NRBC BLD-RTO: 0 /100 WBC
PLATELET # BLD AUTO: 285 K/UL (ref 164–446)
PMV BLD AUTO: 10.2 FL (ref 9–12.9)
POTASSIUM SERPL-SCNC: 3.4 MMOL/L (ref 3.6–5.5)
RBC # BLD AUTO: 3.39 M/UL (ref 4.2–5.4)
SODIUM SERPL-SCNC: 140 MMOL/L (ref 135–145)
WBC # BLD AUTO: 9 K/UL (ref 4.8–10.8)

## 2021-08-08 PROCEDURE — 700111 HCHG RX REV CODE 636 W/ 250 OVERRIDE (IP): Performed by: STUDENT IN AN ORGANIZED HEALTH CARE EDUCATION/TRAINING PROGRAM

## 2021-08-08 PROCEDURE — 83735 ASSAY OF MAGNESIUM: CPT

## 2021-08-08 PROCEDURE — A9270 NON-COVERED ITEM OR SERVICE: HCPCS | Performed by: INTERNAL MEDICINE

## 2021-08-08 PROCEDURE — 700102 HCHG RX REV CODE 250 W/ 637 OVERRIDE(OP): Performed by: INTERNAL MEDICINE

## 2021-08-08 PROCEDURE — A9270 NON-COVERED ITEM OR SERVICE: HCPCS | Performed by: STUDENT IN AN ORGANIZED HEALTH CARE EDUCATION/TRAINING PROGRAM

## 2021-08-08 PROCEDURE — 71045 X-RAY EXAM CHEST 1 VIEW: CPT

## 2021-08-08 PROCEDURE — 99233 SBSQ HOSP IP/OBS HIGH 50: CPT | Mod: GC | Performed by: INTERNAL MEDICINE

## 2021-08-08 PROCEDURE — 700102 HCHG RX REV CODE 250 W/ 637 OVERRIDE(OP): Performed by: STUDENT IN AN ORGANIZED HEALTH CARE EDUCATION/TRAINING PROGRAM

## 2021-08-08 PROCEDURE — 99232 SBSQ HOSP IP/OBS MODERATE 35: CPT | Performed by: INTERNAL MEDICINE

## 2021-08-08 PROCEDURE — 80048 BASIC METABOLIC PNL TOTAL CA: CPT

## 2021-08-08 PROCEDURE — 770020 HCHG ROOM/CARE - TELE (206)

## 2021-08-08 PROCEDURE — 85025 COMPLETE CBC W/AUTO DIFF WBC: CPT

## 2021-08-08 RX ORDER — POTASSIUM CHLORIDE 20 MEQ/1
40 TABLET, EXTENDED RELEASE ORAL ONCE
Status: COMPLETED | OUTPATIENT
Start: 2021-08-08 | End: 2021-08-08

## 2021-08-08 RX ORDER — MAGNESIUM SULFATE HEPTAHYDRATE 40 MG/ML
4 INJECTION, SOLUTION INTRAVENOUS ONCE
Status: COMPLETED | OUTPATIENT
Start: 2021-08-08 | End: 2021-08-08

## 2021-08-08 RX ADMIN — THERA TABS 1 TABLET: TAB at 08:17

## 2021-08-08 RX ADMIN — OXYCODONE 5 MG: 5 TABLET ORAL at 00:20

## 2021-08-08 RX ADMIN — DOCUSATE SODIUM 50 MG AND SENNOSIDES 8.6 MG 2 TABLET: 8.6; 5 TABLET, FILM COATED ORAL at 17:35

## 2021-08-08 RX ADMIN — METOPROLOL SUCCINATE 100 MG: 100 TABLET, EXTENDED RELEASE ORAL at 08:19

## 2021-08-08 RX ADMIN — OXYCODONE 5 MG: 5 TABLET ORAL at 22:07

## 2021-08-08 RX ADMIN — GUAIFENESIN 600 MG: 600 TABLET, EXTENDED RELEASE ORAL at 17:35

## 2021-08-08 RX ADMIN — DOCUSATE SODIUM 50 MG AND SENNOSIDES 8.6 MG 2 TABLET: 8.6; 5 TABLET, FILM COATED ORAL at 08:19

## 2021-08-08 RX ADMIN — ATORVASTATIN CALCIUM 20 MG: 20 TABLET, FILM COATED ORAL at 17:35

## 2021-08-08 RX ADMIN — MAGNESIUM SULFATE IN WATER 4 G: 40 INJECTION, SOLUTION INTRAVENOUS at 08:22

## 2021-08-08 RX ADMIN — AMLODIPINE BESYLATE 5 MG: 5 TABLET ORAL at 22:07

## 2021-08-08 RX ADMIN — POTASSIUM CHLORIDE 40 MEQ: 1500 TABLET, EXTENDED RELEASE ORAL at 10:42

## 2021-08-08 RX ADMIN — ACETAMINOPHEN 500 MG: 500 TABLET, FILM COATED ORAL at 17:35

## 2021-08-08 RX ADMIN — ASPIRIN 81 MG: 81 TABLET, CHEWABLE ORAL at 08:21

## 2021-08-08 RX ADMIN — FERROUS SULFATE TAB 325 MG (65 MG ELEMENTAL FE) 325 MG: 325 (65 FE) TAB at 17:35

## 2021-08-08 RX ADMIN — GUAIFENESIN 600 MG: 600 TABLET, EXTENDED RELEASE ORAL at 08:19

## 2021-08-08 RX ADMIN — LOSARTAN POTASSIUM 100 MG: 50 TABLET, FILM COATED ORAL at 13:19

## 2021-08-08 RX ADMIN — Medication 2000 UNITS: at 17:35

## 2021-08-08 RX ADMIN — Medication 500 MG: at 17:35

## 2021-08-08 RX ADMIN — OXYCODONE 5 MG: 5 TABLET ORAL at 14:35

## 2021-08-08 RX ADMIN — POLYETHYLENE GLYCOL 3350 1 PACKET: 17 POWDER, FOR SOLUTION ORAL at 08:21

## 2021-08-08 RX ADMIN — OXYCODONE 5 MG: 5 TABLET ORAL at 10:42

## 2021-08-08 ASSESSMENT — ENCOUNTER SYMPTOMS
FEVER: 0
NAUSEA: 0
ABDOMINAL PAIN: 0
VOMITING: 0
FALLS: 0
SHORTNESS OF BREATH: 1
BACK PAIN: 0
DIZZINESS: 1
WEAKNESS: 1
SPUTUM PRODUCTION: 0
ORTHOPNEA: 0
COUGH: 0
CHILLS: 0
HEMOPTYSIS: 0
FOCAL WEAKNESS: 1
ORTHOPNEA: 1
HEADACHES: 0
FOCAL WEAKNESS: 0
PALPITATIONS: 0
COUGH: 1
CONSTIPATION: 0
DIARRHEA: 0

## 2021-08-08 ASSESSMENT — PATIENT HEALTH QUESTIONNAIRE - PHQ9
SUM OF ALL RESPONSES TO PHQ9 QUESTIONS 1 AND 2: 0
2. FEELING DOWN, DEPRESSED, IRRITABLE, OR HOPELESS: NOT AT ALL
1. LITTLE INTEREST OR PLEASURE IN DOING THINGS: NOT AT ALL

## 2021-08-08 ASSESSMENT — PAIN DESCRIPTION - PAIN TYPE
TYPE: ACUTE PAIN
TYPE: SURGICAL PAIN
TYPE: SURGICAL PAIN

## 2021-08-08 ASSESSMENT — FIBROSIS 4 INDEX: FIB4 SCORE: 1.133333333333333333

## 2021-08-08 NOTE — PROGRESS NOTES
"    DATE: 8/8/2021    Post Operative Day  2 Left video-assisted thoracoscopy..    Interval Events:    Complaining of left chest wall pain-at chest tube sites  No shortness of breath  Breathing much more comfortably than prior to surgery.    PHYSICAL EXAMINATION:  Vital Signs: /84   Pulse 99   Temp 36 °C (96.8 °F) (Temporal)   Resp 18   Ht 1.651 m (5' 5\")   Wt 98 kg (216 lb 0.8 oz)   SpO2 98%     No record of drain output last 24 hr  CT on suction / no air leak present    Laboratory Values:   Recent Labs     08/06/21  1400 08/06/21  2143 08/08/21  0439   WBC 8.3 8.4 9.0   RBC 3.37* 3.52* 3.39*   HEMOGLOBIN 10.5* 10.8* 10.5*   HEMATOCRIT 32.3* 33.4* 32.3*   MCV 95.8 94.9 95.3   MCH 31.2 30.7 31.0   MCHC 32.5* 32.3* 32.5*   RDW 50.1* 51.2* 51.8*   PLATELETCT 283 298 285   MPV 10.3 10.1 10.2     Recent Labs     08/06/21  0244 08/07/21  0421 08/08/21  0439   SODIUM 140 141 140   POTASSIUM 3.6 3.9 3.4*   CHLORIDE 105 104 103   CO2 25 27 27   GLUCOSE 111* 130* 106*   BUN 7* 9 7*   CREATININE 0.46* 0.68 0.48*   CALCIUM 9.5 9.7 9.2     Recent Labs     08/06/21  0244   INR 1.19*     Recent Labs     08/06/21  0244   INR 1.19*        Imaging:   DX-CHEST-PORTABLE (1 VIEW)   Final Result      1.  Again seen 2 left-sided chest tubes present.      2.  Bony loss and consolidation within the left lung.      3.  Stable left pleural effusion      DX-CHEST-PORTABLE (1 VIEW)   Final Result      No significant change      DX-CHEST-PORTABLE (1 VIEW)   Final Result      1.  Interval placement of 2 large bore left chest tubes with decreased left pleural effusion. Small hemopneumothorax.   2.  Left lung opacity likely atelectasis.      DX-CHEST-PORTABLE (1 VIEW)   Final Result      Opacification of the left hemithorax is unchanged with mediastinal shift to the right.      US-CHEST   Final Result         Thick echogenic heterogeneous material in the left pleural space. No drainable pocket for thoracentesis.      DX-CHEST-PORTABLE " (1 VIEW)   Final Result      1.  Unchanged subtotal opacification of the left hemithorax without volume loss.      2.  Persistent mediastinal shift to the right.      3.  No right lung consolidation.      CT-CHEST (THORAX) W/O   Final Result      1.  Very large left pleural effusion which now demonstrates heterogeneous areas of increased density suggesting interval hemorrhage or less likely superimposed infection.   2.  Complete collapse/compressive atelectasis of the left lung which is worsened than prior study.   3.  New mild groundglass opacity in the right lung apex could represent atelectasis or mild pneumonitis.   4.  Right pulmonary nodules again noted suggesting metastases.      These findings were discussed with JUANITA SERNA on 8/3/2021 3:40 PM.      Fleischner Society pulmonary nodule recommendations:   Not Applicable         DX-CHEST-PORTABLE (1 VIEW)   Final Result      1.  There is complete opacification of the left hemithorax which could represent combination of pleural effusion or airspace process.      US-CHEST   Final Result      1.  Diffuse pneumonitis of left hemithorax.      2.  No evidence of a large left-sided pleural effusion. Thoracentesis deferred.      3.  Recommend CT scan of the thorax for further evaluation.      DX-CHEST-PORTABLE (1 VIEW)   Final Result      Worsening opacification on the left which is now complete, probably related to left pleural effusion and atelectasis.      DX-CHEST-LIMITED (1 VIEW)   Final Result      1.  No significant interval change.      DX-CHEST-PORTABLE (1 VIEW)   Final Result      1.  Interval decrease in the left pleural effusion following thoracentesis.   2.  There is no pneumothorax visualized.      US-THORACENTESIS PUNCTURE LEFT   Final Result      1. Ultrasound guided left sided diagnostic thoracentesis.      2. 950 mL of fluid withdrawn.      CT-CTA CHEST PULMONARY ARTERY W/ RECONS   Final Result      1.  Nonocclusive pulmonary emboli in the right  pulmonary artery, its lobar and segmental branches. No right heart strain.   2.  Large left pleural effusion, with total collapse of the left lower lobe and partial atelectasis of the left upper lobe.   3.  At least 3 new pulmonary nodules measuring up to 2.6 cm, consistent with pulmonary metastases.   4.  Unchanged heterogeneous thyroid gland with multiple thyroid nodules.      Findings were discussed with KRISTOPHER COOK on 7/29/2021 9:38 AM.         DX-CHEST-PORTABLE (1 VIEW)   Final Result      Very large left pleural effusion with associated compressive atelectasis.      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    8/6 VATS with evacuation for hemothorax  Surgeon - J Ganser, MD           ____________________________________     Charles Garcia M.D.    DD: 8/8/2021  1:54 PM

## 2021-08-08 NOTE — PROGRESS NOTES
Assumed care at 0700. Bedside report received from Hussain. Patient's chart and MAR reviewed. Pt complains of left flank  pain at this time. Pt is A & O 4. Patient was updated on plan of care for the day. Questions answered and concerns addressed.  Pt denies any additional needs at this time. White board updated. Call light, phone and personal belongings within reach.

## 2021-08-08 NOTE — CARE PLAN
Problem: Pain - Standard  Goal: Alleviation of pain or a reduction in pain to the patient’s comfort goal  Outcome: Progressing     Problem: Knowledge Deficit - Standard  Goal: Patient and family/care givers will demonstrate understanding of plan of care, disease process/condition, diagnostic tests and medications  Outcome: Progressing     Problem: Fall Risk  Goal: Patient will remain free from falls  Outcome: Progressing     Problem: Skin Integrity  Goal: Skin integrity is maintained or improved  Outcome: Progressing     Problem: Respiratory  Goal: Patient will achieve/maintain optimum respiratory ventilation and gas exchange  Outcome: Progressing   The patient is Stable - Low risk of patient condition declining or worsening

## 2021-08-08 NOTE — ASSESSMENT & PLAN NOTE
Pt w/ persistent tachycardia, which did not respond to fluid bolus.  Worsened on 8/10 after RRT, due to pulmonary emboli, now tachycardia within similar range prior to RRT.    -Continue to monitor, if any episodes of sustained SVT occur, consider vasovagal maneuver or metroprolol pushes as BP allows

## 2021-08-08 NOTE — THERAPY
Missed Therapy     Patient Name: Jairo Rivas  Age:  70 y.o., Sex:  female  Medical Record #: [id number]  Today's Date: 8/8/2021 08/08/21 1319   Interdisciplinary Plan of Care Collaboration   Collaboration Comments New PT orderd received s/p surgical intervention. Attempted to see patient but she was in too much pain, 10/10 pain with rolling to get on bed pan.  Will folllow up as appropriate

## 2021-08-08 NOTE — PROGRESS NOTES
bssr given and advised rn of pt and family refusing day rn to change central line dsg. Chest tube dressing observed, pt resting comfortably.

## 2021-08-08 NOTE — PROGRESS NOTES
Critical Care/Pulmonary Consultation    Date of Service: 8/8/2021    Date of Admission:  7/29/2021  6:00 AM    Consulting Physician: ARLETH Mccrary    Chief Complaint:  Shortness of Breath (Since monday) and Chest Wall Pain (Left sided stabbing pain that radiates to the back, pain increases with movement and breathing. Patient thought is was muscle pain and was using icy hot but the pain has been getting worse. )      Patient ID:   [id number] [id number] with PMHx Stage IV endometrial CA (3b, ER-/DE+, s/p bilat salpingoophrectomy) with metastasis to lungs in 10/20 (s/p carboplatin/taxol), CVA/PE, who was admitted on 7/29 for exudative unilateral pleural effusion and PE.     Interval Updates:  Currently on 3L. CT on 8/3 showed hemothorax, was recently evacuated. Reports rib pain and being tired. Currently has chest tube in. Patient is not normally on Oxygen.    CT surgery and Gyn following.      Review of Systems   Constitutional: Negative for chills and fever.   Respiratory: Positive for cough and shortness of breath. Negative for hemoptysis and sputum production.    Cardiovascular: Positive for chest pain and orthopnea. Negative for palpitations and leg swelling.        Chest wall discomfort at site of previous thoracentesis   Gastrointestinal: Negative for abdominal pain, constipation, diarrhea, nausea and vomiting.   Skin: Negative for itching and rash.   Neurological: Positive for dizziness and weakness. Negative for focal weakness and headaches.       Home Medications     Reviewed by Kavita Miller (Pharmacy Tech) on 07/29/21 at 0756  Med List Status: Complete   Medication Last Dose Status   acetaminophen (TYLENOL) 500 MG Tab 7/28/2021 Active   amitriptyline (ELAVIL) 50 MG Tab 7/28/2021 Active   amLODIPine (NORVASC) 5 MG Tab 7/28/2021 Active   ascorbic acid (ASCORBIC ACID) 500 MG Tab 7/28/2021 Active   aspirin (ASA) 81 MG Chew Tab chewable tablet 7/29/2021 Active   atorvastatin (LIPITOR) 20 MG Tab 7/28/2021  Active   B Complex-C (SUPER B COMPLEX PO) 7/28/2021 Active   baclofen (LIORESAL) 10 MG Tab 7/28/2021 Active   Cholecalciferol (VITAMIN D3) 2000 UNIT Cap 7/28/2021 Active   Coenzyme Q10 (COQ10) 100 MG Cap 7/28/2021 Active   ferrous sulfate 325 (65 Fe) MG tablet 7/28/2021 Active   losartan (COZAAR) 100 MG Tab 7/28/2021 Active   meclizine (ANTIVERT) 12.5 MG Tab PRN Active   megestrol (MEGACE) 40 MG Tab 7/28/2021 Active   metoprolol SR (TOPROL XL) 100 MG TABLET SR 24 HR 7/29/2021 Active   multivitamin (THERAGRAN) Tab 7/28/2021 Active   senna-docusate (PERICOLACE OR SENOKOT S) 8.6-50 MG Tab PRN Active   tamoxifen (NOLVADEX) 20 MG tablet ABOUT 2 WEEKS AGO Active                Social History     Tobacco Use   • Smoking status: Never Smoker   • Smokeless tobacco: Never Used   • Tobacco comment: n/a   Vaping Use   • Vaping Use: Never used   Substance Use Topics   • Alcohol use: No   • Drug use: No        Past Medical History:   Diagnosis Date   • Anemia 2/6/2018    Iron def, post-menopausal bleeding.   • Back pain    • Back pain    • Cancer (HCC) 2019    uterine & endometrial   • Cough    • CVA, old, hemiparesis (HCC) 2/6/2018    Residual left sided weakness   • Essential hypertension 2/6/2018    Well controlled on amlodipine 10 mg and metoprolol 100 mg bid.   • Eye drainage    • Fatigue    • Frequent headaches    • Frequent urination    • Hyperlipidemia    • Irregular periods    • Painful joint    • Palpitations    • Post-menopause bleeding 2/6/2018    Menopause in 2010 and bleeding 2012 started to have irregular continuous bleeding.    • Pulmonary nodule    • Sore muscles    • Stroke (HCC)     right sided weakness   • Swelling of lower extremity    • Vision abnormalities 2/6/2018   • Vitamin D deficiency 2/6/2018    Taking 50,000 IU once weekly for 4 yrs.   • Weakness    • Wears glasses        Past Surgical History:   Procedure Laterality Date   • PB THORACOSCOPY,DX NO BX Left 8/6/2021    Procedure: THORACOSCOPY -  HEMOTHORAX EVACUATION, CHEST TUBE PLACEMENT X 2;  Surgeon: John H Ganser, M.D.;  Location: SURGERY [facility];  Service: General   • PB CYSTOSCOPY,INSERT URETERAL STENT Left 8/8/2019    Procedure: CYSTOSCOPY, WITH URETERAL STENT INSERTION;  Surgeon: Jann Garcia M.D.;  Location: [facility];  Service: Gynecology   • HYSTERECTOMY ROBOTIC XI N/A 8/8/2019    Procedure: HYSTERECTOMY, ROBOT-ASSISTED, USING DA RILEY XI;  Surgeon: Jann Garcia M.D.;  Location: [facility];  Service: Gynecology   • SALPINGO OOPHORECTOMY Bilateral 8/8/2019    Procedure: SALPINGO-OOPHORECTOMY;  Surgeon: Jann Garcia M.D.;  Location: [facility];  Service: Gynecology   • NODE BIOPSY SENTINEL  8/8/2019    Procedure: BIOPSY, LYMPH NODE, SENTINEL;  Surgeon: Jann Garcia M.D.;  Location: [facility];  Service: Gynecology   • PB HYSTEROSCOPY,DX,SEP PROC  6/19/2019    Procedure: HYSTEROSCOPY, DIAGNOSTIC;  Surgeon: Anel Chicas M.D.;  Location: [facility];  Service: Gynecology   • DILATION AND CURETTAGE  6/19/2019    Procedure: DILATION AND CURETTAGE;  Surgeon: Anel Chicas M.D.;  Location: [facility];  Service: Gynecology   • HYSTERECTOMY LAPAROSCOPY         Allergies: Penicillins, Gluten meal, Hydrocodone, Iodine, Levaquin, and Tramadol    Family History   Problem Relation Age of Onset   • Hypertension Mother    • No Known Problems Father    • No Known Problems Brother        Temp:  [36 °C (96.8 °F)-36.7 °C (98 °F)] 36 °C (96.8 °F)  Pulse:  [] 99  Resp:  [14-18] 18  BP: (125-148)/() 146/64  SpO2:  [94 %-99 %] 98 %    Physical Examination  Physical Exam  Constitutional:       General: She is not in acute distress.     Appearance: She is not toxic-appearing.   HENT:      Head: Normocephalic and atraumatic.      Mouth/Throat:      Mouth: Mucous membranes are moist.      Pharynx: Oropharynx is clear.   Eyes:      Conjunctiva/sclera: Conjunctivae normal.    Cardiovascular:      Rate and Rhythm: Normal rate and regular rhythm.      Pulses: Normal pulses.      Heart sounds: Normal heart sounds. No murmur heard.   No friction rub. No gallop.    Pulmonary:      Comments: Unable to assess 2/2 R rib pain  Abdominal:      General: Abdomen is flat. Bowel sounds are normal. There is no distension.      Palpations: Abdomen is soft.      Tenderness: There is no abdominal tenderness.   Genitourinary:     Comments: Bonilla catheter in place  Musculoskeletal:      Right lower leg: No edema.      Left lower leg: No edema.      Comments: New chest tube in, R-sided rib pain   Skin:     General: Skin is warm and dry.   Neurological:      General: No focal deficit present.      Mental Status: She is alert and oriented to person, place, and time. Mental status is at baseline.      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   Psychiatric:         Mood and Affect: Mood normal.         Behavior: Behavior normal.           Intake/Output Summary (Last 24 hours) at 8/8/2021 1249  Last data filed at 8/8/2021 0822  Gross per 24 hour   Intake 102 ml   Output 1070 ml   Net -968 ml       Recent Labs     08/06/21  1400 08/06/21  2143 08/08/21  0439   WBC 8.3 8.4 9.0   NEUTSPOLYS  --   --  79.60*   LYMPHOCYTES  --   --  10.30*   MONOCYTES  --   --  8.10   EOSINOPHILS  --   --  0.90   BASOPHILS  --   --  0.10     Recent Labs     08/06/21  0244 08/07/21  0421 08/08/21  0439   SODIUM 140 141 140   POTASSIUM 3.6 3.9 3.4*   CHLORIDE 105 104 103   CO2 25 27 27   BUN 7* 9 7*   CREATININE 0.46* 0.68 0.48*   MAGNESIUM  --   --  1.5   CALCIUM 9.5 9.7 9.2     Recent Labs     08/06/21  0244 08/07/21  0421 08/08/21  0439   GLUCOSE 111* 130* 106*         DX-CHEST-PORTABLE (1 VIEW)   Final Result      1.  Again seen 2 left-sided chest tubes present.      2.  Bony loss and consolidation within the left lung.      3.  Stable left pleural effusion      DX-CHEST-PORTABLE (1 VIEW)    Final Result      No significant change      DX-CHEST-PORTABLE (1 VIEW)   Final Result      1.  Interval placement of 2 large bore left chest tubes with decreased left pleural effusion. Small hemopneumothorax.   2.  Left lung opacity likely atelectasis.      DX-CHEST-PORTABLE (1 VIEW)   Final Result      Opacification of the left hemithorax is unchanged with mediastinal shift to the right.      US-CHEST   Final Result         Thick echogenic heterogeneous material in the left pleural space. No drainable pocket for thoracentesis.      DX-CHEST-PORTABLE (1 VIEW)   Final Result      1.  Unchanged subtotal opacification of the left hemithorax without volume loss.      2.  Persistent mediastinal shift to the right.      3.  No right lung consolidation.      CT-CHEST (THORAX) W/O   Final Result      1.  Very large left pleural effusion which now demonstrates heterogeneous areas of increased density suggesting interval hemorrhage or less likely superimposed infection.   2.  Complete collapse/compressive atelectasis of the left lung which is worsened than prior study.   3.  New mild groundglass opacity in the right lung apex could represent atelectasis or mild pneumonitis.   4.  Right pulmonary nodules again noted suggesting metastases.      These findings were discussed with JUANITA SENRA on 8/3/2021 3:40 PM.      Fleischner Society pulmonary nodule recommendations:   Not Applicable         DX-CHEST-PORTABLE (1 VIEW)   Final Result      1.  There is complete opacification of the left hemithorax which could represent combination of pleural effusion or airspace process.      US-CHEST   Final Result      1.  Diffuse pneumonitis of left hemithorax.      2.  No evidence of a large left-sided pleural effusion. Thoracentesis deferred.      3.  Recommend CT scan of the thorax for further evaluation.      DX-CHEST-PORTABLE (1 VIEW)   Final Result      Worsening opacification on the left which is now complete, probably related  to left pleural effusion and atelectasis.      DX-CHEST-LIMITED (1 VIEW)   Final Result      1.  No significant interval change.      DX-CHEST-PORTABLE (1 VIEW)   Final Result      1.  Interval decrease in the left pleural effusion following thoracentesis.   2.  There is no pneumothorax visualized.      US-THORACENTESIS PUNCTURE LEFT   Final Result      1. Ultrasound guided left sided diagnostic thoracentesis.      2. 950 mL of fluid withdrawn.      CT-CTA CHEST PULMONARY ARTERY W/ RECONS   Final Result      1.  Nonocclusive pulmonary emboli in the right pulmonary artery, its lobar and segmental branches. No right heart strain.   2.  Large left pleural effusion, with total collapse of the left lower lobe and partial atelectasis of the left upper lobe.   3.  At least 3 new pulmonary nodules measuring up to 2.6 cm, consistent with pulmonary metastases.   4.  Unchanged heterogeneous thyroid gland with multiple thyroid nodules.      Findings were discussed with KRISTOPHER COOK on 7/29/2021 9:38 AM.         DX-CHEST-PORTABLE (1 VIEW)   Final Result      Very large left pleural effusion with associated compressive atelectasis.      US-CHEST    (Results Pending)       Patient Active Problem List   Diagnosis   • Vision abnormalities   • CVA, old, hemiparesis (HCC)   • Essential hypertension   • Vitamin D deficiency   • Endometrial cancer (HCC)   • Iron deficiency anemia   • Obesity (BMI 30-39.9)   • Hypokalemia   • Admission for chemotherapy   • Nausea   • Thyroid nodule - benign   • Other chest pain   • Tongue burning sensation   • Neuropathy   • Multiple lung nodules - growth from 8 mm - 10 mm 1/2020 - 8/2020   • Pulmonary embolism (HCC)   • Pleural effusion   • Acute kidney injury (HCC)   • Anemia       Assessment and Plan:  #Recurrent malignant pleural effusion  #Pulmonary embolism  #Acute hypoxic respiratory failure  - History of metastatic endometrial carcinoma (s/p chemo)  - Would likely benefit from Palliative -  however, pt's family opposed to consult  - Poor overall prognosis considering rapid recurrence of pleural effusions  - Titrate oxygen >88%  - Hold AC in setting of hemothorax  - F/u on lung biopsy results  - Pain control with Oxycodone PRN  - CXR 8/9 morning to ensure lungs are stable and no recurrence of effusion  - Gyn following    Pulmonology will continue to follow

## 2021-08-08 NOTE — PROGRESS NOTES
Gynecology Oncology Progress Note               Author: Rima Kitchen P.A.-C. Date & Time created: 8/7/2021  7:59 PM     Interval History:  Left chest pain after procedure, feeling less short of breath and feels she is able to take a deep breath. No abdominal pain, no n/v.     Review of Systems:  Review of Systems   Constitutional: Negative for chills and fever.   Eyes: Negative for pain.   Respiratory: Positive for shortness of breath. Negative for cough.    Cardiovascular: Negative for chest pain and palpitations.   Gastrointestinal: Negative for abdominal pain, nausea and vomiting.   Genitourinary: Negative for dysuria, frequency and urgency.   Musculoskeletal: Negative for myalgias and neck pain.   Neurological: Negative for dizziness and headaches.        Physical Exam  Constitutional:       Appearance: Normal appearance.   HENT:      Mouth/Throat:      Mouth: Mucous membranes are moist.   Eyes:      Pupils: Pupils are equal, round, and reactive to light.   Cardiovascular:      Rate and Rhythm: Normal rate and regular rhythm.      Pulses: Normal pulses.   Pulmonary:      Normal effort, right side CTA, left lung fields with diminished breath sounds  Abdominal:      General: Bowel sounds are normal. There is no distension.      Palpations: Abdomen is soft.      Tenderness: There is no abdominal tenderness.   Musculoskeletal:      Right lower leg: No edema.      Left lower leg: No edema.   Skin:     General: Skin is warm and dry.   Neurological:      Mental Status: She is alert.      Motor: Weakness (to right side, secondary to CVA) present.        Labs:          Recent Labs     08/05/21  0136 08/06/21  0244 08/07/21  0421   SODIUM 139 140 141   POTASSIUM 3.6 3.6 3.9   CHLORIDE 103 105 104   CO2 23 25 27   BUN 7* 7* 9   CREATININE 0.60 0.46* 0.68   CALCIUM 9.3 9.5 9.7     Recent Labs     08/05/21  0136 08/06/21  0244 08/07/21  0421   GLUCOSE 146* 111* 130*     Recent Labs     08/06/21  0244 08/06/21  1400  21  2143   RBC 2.37* 3.37* 3.52*   HEMOGLOBIN 7.3* 10.5* 10.8*   HEMATOCRIT 23.5* 32.3* 33.4*   PLATELETCT 322 283 298   PROTHROMBTM 14.7*  --   --    INR 1.19*  --   --      Recent Labs     21  0244 21  1400 21  2143   WBC 6.7 8.3 8.4     Recent Labs     21  0136 21  0244 21  0421   SODIUM 139 140 141   POTASSIUM 3.6 3.6 3.9   CHLORIDE 103 105 104   CO2 23 25 27   GLUCOSE 146* 111* 130*   BUN 7* 7* 9   CREATININE 0.60 0.46* 0.68   CALCIUM 9.3 9.5 9.7     Hemodynamics:  Temp (24hrs), Av.6 °C (97.9 °F), Min:36.1 °C (97 °F), Max:37 °C (98.6 °F)  Temperature: 36.7 °C (98 °F)  Pulse  Av  Min: 73  Max: 138   Blood Pressure : 148/81     Respiratory:    Respiration: 18, Pulse Oximetry: 99 %        RUL Breath Sounds: Diminished, RML Breath Sounds: Diminished, RLL Breath Sounds: Diminished, HANK Breath Sounds: Diminished, LLL Breath Sounds: Diminished  Fluids:  No intake or output data in the 24 hours ending 21 1733  Weight: 98 kg (216 lb 0.8 oz)  GI/Nutrition:  Orders Placed This Encounter   Procedures   • Diet Order Diet: Regular; Miscellaneous modifications: (optional): Gluten Free     Standing Status:   Standing     Number of Occurrences:   1     Order Specific Question:   Diet:     Answer:   Regular [1]     Order Specific Question:   Miscellaneous modifications: (optional)     Answer:   Gluten Free [9]     Medical Decision Making, by Problem:  Active Hospital Problems    Diagnosis    • *Pleural effusion [J90]    • Acute kidney injury (HCC) [N17.9]    • Anemia [D64.9]    • Pulmonary embolism (HCC) [I26.99]    • Endometrial cancer (HCC) [C54.1]        Plan:  This is a 70 y.o. female with h/o metastatic endometrial cancer who presents with new PE and pleural effusion:     1. Endometrial cancer: stage IIIB, s/p PORTEC 3 w/ omission of chemotherapy, followed by pulmonary recurrence treated with systemic carbo/taxol and most recently megace, treatment tolerance has been  limited, will need to have f/u visit with Dr. Garcia to discuss treatment planning given concern for progression per recent chest CT.   2. Pleural effusion: CT on 8/4 showed large L pleural effusion with heterogeneous areas of increased density, concerning for hemothorax > unable to do thoracentesis due to no drainable pockets. Lovenox held. Now s/p thoraoscopy with hemothorax evacuation and decortication. Chest tubes in place. Biopsy from lung nodule taken at procedure, will follow pathology. Cytology from thora on 7/29 with rare atypical cells, not fully diagnostic of a malignant effusion however this remains in the differential.    3. PE: secondary to malignancy, hold megace/tamoxifen given new thrombosis and concern for progression per chest CT, may be a candidate for DOAC if no procedures are indicated. Lovenox currently held secondary to hemothorax.  4. Shortness of breath: secondary to above, now maintaining sats on nasal canual   5. Anemia: stable, s/p 2 units ordered today, down trending since admission, likely secondary to above. Continue to monitor.     Case discussed with Physician     Quality-Core Measures

## 2021-08-08 NOTE — PROGRESS NOTES
Report received by day shift RN. Pt laying in bed awake alert and oriented x 4 with daughter at bedside. Chest tube assessed and has sanginuous drainage in tubes. Discused POC. Call light in place.

## 2021-08-08 NOTE — PROGRESS NOTES
Advised pt central line dressing needed to be changed due not intact and needed to give fluid bolus, pt verbalized she did not want me to change. Peripheral Iv site not working and unable to continue bolus. Charge RN made aware. Pt has also been refusing adl's, turns, etc. Pt re-educated of importance of these things.

## 2021-08-08 NOTE — CARE PLAN
Problem: Knowledge Deficit - Standard  Goal: Patient and family/care givers will demonstrate understanding of plan of care, disease process/condition, diagnostic tests and medications  Outcome: Progressing     Problem: Respiratory  Goal: Patient will achieve/maintain optimum respiratory ventilation and gas exchange  Outcome: Progressing   The patient is Stable - Low risk of patient condition declining or worsening    Shift Goals  Clinical Goals: Manage pain, titrate O2  Patient Goals: pain management/keep comfortable  Family Goals: rest, comfort

## 2021-08-08 NOTE — PROGRESS NOTES
Daily Progress Note:     Date of Service: 8/8/2021  Primary Team: [employer] [facility] Blue Team   Attending: ARLETH Mccrary   Senior Resident: Dr. Henley  Intern: Dr. Clements  Contact:  [phone]    Chief Complaint:   CP, SOB    Subjective:  No acute events overnight.  This morning she reports feeling well, she feels that her breathing is continuing to improve.  She does have some pain w/ deep breathing as well as some pain when she is being moved around in bed.  Aside from that she feels like her pain is well controlled.      Consultants/Specialty:  Gyn-Oncology  Pulmonology:  [facility]  CT surgery:  [facility]    Review of Systems:    Review of Systems   Constitutional: Negative for chills and fever.   Respiratory: Positive for shortness of breath (improving). Negative for cough.    Cardiovascular: Positive for chest pain (around surgery site, with movement). Negative for palpitations and orthopnea.   Gastrointestinal: Negative for abdominal pain, diarrhea, nausea and vomiting.   Genitourinary: Negative for dysuria and urgency.   Musculoskeletal: Negative for back pain, falls and joint pain.   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 Negative for headaches.       Objective Data:   Physical Exam:   Vitals:   Temp:  [36.6 °C (97.9 °F)-37 °C (98.6 °F)] 36.7 °C (98 °F)  Pulse:  [107-125] 117  Resp:  [14-18] 14  BP: (125-148)/() 146/93  SpO2:  [94 %-99 %] 94 %    Physical Exam  Constitutional:       General: She is not in acute distress.     Appearance: She is not toxic-appearing.   HENT:      Head: Normocephalic and atraumatic.      Mouth/Throat:      Mouth: Mucous membranes are moist.      Pharynx: Oropharynx is clear.   Eyes:      Conjunctiva/sclera: Conjunctivae normal.   Cardiovascular:      Rate and Rhythm: Normal rate and regular rhythm.      Pulses: Normal pulses.      Heart sounds: Normal heart sounds. No murmur heard.   No friction rub. No gallop.    Pulmonary:       Effort: Pulmonary effort is normal.      Comments: No acute respiratory distress.  Two chest tubes in place in left chest, with continued drainage.  Diffuse rales present in left lung fields.  Right CTA.    Abdominal:      General: Abdomen is flat. Bowel sounds are normal. There is no distension.      Palpations: Abdomen is soft.      Tenderness: There is no abdominal tenderness.   Genitourinary:     Comments: Bonilla catheter in place  Musculoskeletal:      Right lower leg: No edema.      Left lower leg: No edema.   Skin:     General: Skin is warm and dry.   Neurological:      Mental Status: She is alert.      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   Psychiatric:         Mood and Affect: Mood normal.         Behavior: Behavior normal.           Labs:   HEMATOLOGY/ ONCOLOGY/ID:            Recent Labs     08/06/21  1400 08/06/21  2143 08/08/21  0439   WBC 8.3 8.4 9.0   RBC 3.37* 3.52* 3.39*   HEMOGLOBIN 10.5* 10.8* 10.5*   HEMATOCRIT 32.3* 33.4* 32.3*   MCV 95.8 94.9 95.3   MCH 31.2 30.7 31.0   RDW 50.1* 51.2* 51.8*   PLATELETCT 283 298 285   MPV 10.3 10.1 10.2   NEUTSPOLYS  --   --  79.60*   LYMPHOCYTES  --   --  10.30*   MONOCYTES  --   --  8.10   EOSINOPHILS  --   --  0.90   BASOPHILS  --   --  0.10     Lab Results   Component Value Date    MARIXCRF95 984 (H) 09/25/2020    TDSLEKWL66 714 05/15/2019    ZGUNEQMO01 937 (H) 10/02/2018    FOLATE >23.8 06/12/2019    FERRITIN 436.0 (H) 08/02/2021    FERRITIN 18.5 06/12/2019    FERRITIN 18.5 10/02/2018    IRON 13 (L) 08/02/2021    IRON 35 (L) 06/12/2019    IRON 29 (L) 10/02/2018    TOTIRONBC 150 (L) 08/02/2021    TOTIRONBC 272 06/12/2019    TOTIRONBC 392 10/02/2018       RENAL:        Estimated GFR/CRCL = Estimated Creatinine Clearance: 130 mL/min (A) (by C-G formula based on SCr of 0.48 mg/dL (L)).  Recent Labs     08/06/21  0244 08/07/21  0421 08/08/21  0439   SODIUM 140 141 140   POTASSIUM 3.6 3.9 3.4*   CHLORIDE 105 104 103   CO2  25 27 27   GLUCOSE 111* 130* 106*   BUN 7* 9 7*   CREATININE 0.46* 0.68 0.48*   CALCIUM 9.5 9.7 9.2   MAGNESIUM  --   --  1.5       GASTROINTESTINAL/ HEPATIC:          Recent Labs     08/06/21  0244   INR 1.19*     No results found for: AMMONIA    ENDOCRINE:              Recent Labs     08/06/21  0244 08/07/21  0421 08/08/21  0439   GLUCOSE 111* 130* 106*     Lab Results   Component Value Date    FREET4 0.92 05/15/2019       Imaging:   CXR 8/:  Imaging slightly improved upon yesterday, stable.        Problem Representation:  Carlene Rivas  is a 70 y.o. y/o woman w/ PMHx Stage IV endometrial cancer who presented on 7/29/2021 w/ worsening shortness of breath.  She was found to have a non-occlusive PE in the right pulmonary artery w/o right heart strain seen on CTA from 7/29.  Large left pleural effusion, thoracentesis performed on 7/29 removed 950 ml of fluid, + lights criteria.  Cytology not definitive for malignant effusion.  CT chest on 8/3 shows likely hemothorax.  Hemothorax evacuation and decortication performed on 8/7.    * Pleural effusion- (present on admission)  Assessment & Plan  Shortness of breath with coarse crackles on left mid lung. Found to have large left sided pleural effusion with total collapse of left lower lobe and partial atelectasis of upper left lobe on CTA. Thoracentesis performed on 7/30, removing 950 cc of fluid. Pleural fluid studies sent, positive lights criteria for exudative process.  Cytology is inconclusive as there wasn't enough of a cell sample to adequately assess cell types.  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  CT surgery performed hemothorax evacuation and decortication w/ chest tube placement on 8/7, repeat imaging shows clearance of hemothorax and pleural effusion.  Biopsy of lung nodule was performed at that time.  -Gyn-Onc consult, appreciate assistance  -Pulmonology  consulted, appreciate assistance.   -Cardiothoracic surgery consult, appreciate assistance  -Continue to monitor chest tube output, this will decide further treatment plan  -Pathology pending    Tachycardia  Assessment & Plan  Pt w/ persistent tachycardia, which did not respond do fluid bolus.  This is likely a compensatory mechanism in the setting of lung re-expansion s/p chest surgery, and pulmonary emboli.  PE is unable to be treated at this time due to hemothorax.  -Continue to monitor, if any episodes of sustained SVT occur, consider vasovagal maneuver or metroprolol pushes as BP allows    Pulmonary embolism (HCC)  Assessment & Plan  Patient was having left upper thigh pain for several days preceding her shortness of breath.  Non-occlusive right pulmonary artery embolism (lobar and segmental branches) on CTA. No evidence of right heart strain on CTA. Likely only mild contribution to AHRF as the PE is non-occlusive and no evidence of right heart strain seen.  -Holding anticoagulation in the setting of hemothorax  -Monitoring on telemetry  -Monitor vitals for hemodynamic stability    Anemia  Assessment & Plan  Pt w/ slowly downtrending hemoglobin since admission.  This is a normocytic anemia.  Iron studies show concern for early anemia of chronic disease, but this would be unlikely to contribute to such quick decline in hemoglobin.  Other considerations would be active, bleeding, but patient at this time has no obvious areas of active bleeding, no melena, no hematemesis, no vaginal bleeding, no external bleeding.  Consideration would be bleeding into thorax s/p thoracentesis performed on admission.  She was on anticoagulation for PE.  CT shows concerns for hemothorax, which would explain acute decline in hemoglobin following initiation of anticoagulation.    -Assessment as above        Acute kidney injury (HCC)  Assessment & Plan  New HEAVEN on 8/1, w/ acute worsening on 8/2.  Resolved on 8/3.  Cr 48 hours prior to  insult was 0.65, has since elevated to 1.28.  FeNa shows a likely pre-renal process.  This is likely 2/2 intravascular depletion in the setting of a large volume pleural effusion and attempted diuresis.  This was responsive to fluid bolus.    -AM BMP to monitor further, consider repeat bolus for worsening kidney fx    RESOLVED    Endometrial cancer (HCC)- (present on admission)  Assessment & Plan  Hx of endometrial cancer that has metastasized to the lungs. Follows-up with Dr. Jann Garcia. Finished chemotherapy infusion. Currently on tamoxifen (3 weeks on, 3 weeks off), last tamoxifen 07/18, would restart August 9th. Found to have 3 new pulmonary nodules up to 2.6 cm consistent with pulmonary metastasis.    -Gyn-Onc consulted, appreciate assistance   -Hold tamoxifen/megace due to current PE  -pending pathology from bx specimen obtained during surgery        Code Status: Full code  DVT ppx: held in setting of hemothorax  Diet: Regular diet  GI: Bowel regimen  T/L/D: CVC in left neck  Disposition: Anticipate >48 hours for further evaluation of pleural effusion      Julio Clements DO  PGY-1, [facility]    Assessment and plan discussed with senior resident and attending physician.

## 2021-08-09 ENCOUNTER — APPOINTMENT (OUTPATIENT)
Dept: RADIOLOGY | Facility: MEDICAL CENTER | Age: 71
DRG: 163 | End: 2021-08-09
Attending: STUDENT IN AN ORGANIZED HEALTH CARE EDUCATION/TRAINING PROGRAM
Payer: MEDICARE

## 2021-08-09 LAB
ANION GAP SERPL CALC-SCNC: 8 MMOL/L (ref 7–16)
BUN SERPL-MCNC: 9 MG/DL (ref 8–22)
CALCIUM SERPL-MCNC: 8.8 MG/DL (ref 8.5–10.5)
CHLORIDE SERPL-SCNC: 102 MMOL/L (ref 96–112)
CO2 SERPL-SCNC: 28 MMOL/L (ref 20–33)
CREAT SERPL-MCNC: 0.59 MG/DL (ref 0.5–1.4)
ERYTHROCYTE [DISTWIDTH] IN BLOOD BY AUTOMATED COUNT: 51.8 FL (ref 35.9–50)
GLUCOSE SERPL-MCNC: 132 MG/DL (ref 65–99)
HCT VFR BLD AUTO: 31.8 % (ref 37–47)
HGB BLD-MCNC: 10.2 G/DL (ref 12–16)
MCH RBC QN AUTO: 31 PG (ref 27–33)
MCHC RBC AUTO-ENTMCNC: 32.1 G/DL (ref 33.6–35)
MCV RBC AUTO: 96.7 FL (ref 81.4–97.8)
PLATELET # BLD AUTO: 277 K/UL (ref 164–446)
PMV BLD AUTO: 10.4 FL (ref 9–12.9)
POTASSIUM SERPL-SCNC: 3.2 MMOL/L (ref 3.6–5.5)
RBC # BLD AUTO: 3.29 M/UL (ref 4.2–5.4)
SODIUM SERPL-SCNC: 138 MMOL/L (ref 135–145)
WBC # BLD AUTO: 7.2 K/UL (ref 4.8–10.8)

## 2021-08-09 PROCEDURE — 700105 HCHG RX REV CODE 258: Performed by: STUDENT IN AN ORGANIZED HEALTH CARE EDUCATION/TRAINING PROGRAM

## 2021-08-09 PROCEDURE — 85027 COMPLETE CBC AUTOMATED: CPT

## 2021-08-09 PROCEDURE — 71045 X-RAY EXAM CHEST 1 VIEW: CPT

## 2021-08-09 PROCEDURE — 80048 BASIC METABOLIC PNL TOTAL CA: CPT

## 2021-08-09 PROCEDURE — 99233 SBSQ HOSP IP/OBS HIGH 50: CPT | Mod: GC | Performed by: HOSPITALIST

## 2021-08-09 PROCEDURE — A9270 NON-COVERED ITEM OR SERVICE: HCPCS | Performed by: STUDENT IN AN ORGANIZED HEALTH CARE EDUCATION/TRAINING PROGRAM

## 2021-08-09 PROCEDURE — 97535 SELF CARE MNGMENT TRAINING: CPT

## 2021-08-09 PROCEDURE — 700102 HCHG RX REV CODE 250 W/ 637 OVERRIDE(OP): Performed by: STUDENT IN AN ORGANIZED HEALTH CARE EDUCATION/TRAINING PROGRAM

## 2021-08-09 PROCEDURE — 93970 EXTREMITY STUDY: CPT

## 2021-08-09 PROCEDURE — 51798 US URINE CAPACITY MEASURE: CPT

## 2021-08-09 PROCEDURE — 93970 EXTREMITY STUDY: CPT | Mod: 26 | Performed by: INTERNAL MEDICINE

## 2021-08-09 PROCEDURE — A9270 NON-COVERED ITEM OR SERVICE: HCPCS | Performed by: INTERNAL MEDICINE

## 2021-08-09 PROCEDURE — C1729 CATH, DRAINAGE: HCPCS

## 2021-08-09 PROCEDURE — 770020 HCHG ROOM/CARE - TELE (206)

## 2021-08-09 PROCEDURE — 700102 HCHG RX REV CODE 250 W/ 637 OVERRIDE(OP): Performed by: INTERNAL MEDICINE

## 2021-08-09 RX ORDER — POTASSIUM CHLORIDE 20 MEQ/1
40 TABLET, EXTENDED RELEASE ORAL ONCE
Status: COMPLETED | OUTPATIENT
Start: 2021-08-09 | End: 2021-08-09

## 2021-08-09 RX ORDER — SODIUM CHLORIDE, SODIUM LACTATE, POTASSIUM CHLORIDE, AND CALCIUM CHLORIDE .6; .31; .03; .02 G/100ML; G/100ML; G/100ML; G/100ML
500 INJECTION, SOLUTION INTRAVENOUS ONCE
Status: COMPLETED | OUTPATIENT
Start: 2021-08-09 | End: 2021-08-09

## 2021-08-09 RX ORDER — POTASSIUM CHLORIDE 20 MEQ/1
40 TABLET, EXTENDED RELEASE ORAL DAILY
Status: DISCONTINUED | OUTPATIENT
Start: 2021-08-09 | End: 2021-08-09

## 2021-08-09 RX ADMIN — POTASSIUM CHLORIDE 40 MEQ: 1500 TABLET, EXTENDED RELEASE ORAL at 08:41

## 2021-08-09 RX ADMIN — ACETAMINOPHEN 500 MG: 500 TABLET, FILM COATED ORAL at 19:26

## 2021-08-09 RX ADMIN — THERA TABS 1 TABLET: TAB at 06:06

## 2021-08-09 RX ADMIN — ASPIRIN 81 MG: 81 TABLET, CHEWABLE ORAL at 06:05

## 2021-08-09 RX ADMIN — DOCUSATE SODIUM 50 MG AND SENNOSIDES 8.6 MG 2 TABLET: 8.6; 5 TABLET, FILM COATED ORAL at 06:05

## 2021-08-09 RX ADMIN — METOPROLOL SUCCINATE 100 MG: 100 TABLET, EXTENDED RELEASE ORAL at 06:05

## 2021-08-09 RX ADMIN — FERROUS SULFATE TAB 325 MG (65 MG ELEMENTAL FE) 325 MG: 325 (65 FE) TAB at 17:07

## 2021-08-09 RX ADMIN — OXYCODONE 5 MG: 5 TABLET ORAL at 20:58

## 2021-08-09 RX ADMIN — Medication 2000 UNITS: at 17:07

## 2021-08-09 RX ADMIN — Medication 500 MG: at 17:06

## 2021-08-09 RX ADMIN — OXYCODONE 5 MG: 5 TABLET ORAL at 14:09

## 2021-08-09 RX ADMIN — AMLODIPINE BESYLATE 5 MG: 5 TABLET ORAL at 20:58

## 2021-08-09 RX ADMIN — GUAIFENESIN 600 MG: 600 TABLET, EXTENDED RELEASE ORAL at 06:05

## 2021-08-09 RX ADMIN — LOSARTAN POTASSIUM 100 MG: 50 TABLET, FILM COATED ORAL at 13:07

## 2021-08-09 RX ADMIN — SODIUM CHLORIDE, POTASSIUM CHLORIDE, SODIUM LACTATE AND CALCIUM CHLORIDE 500 ML: 600; 310; 30; 20 INJECTION, SOLUTION INTRAVENOUS at 17:10

## 2021-08-09 RX ADMIN — POTASSIUM CHLORIDE 40 MEQ: 1500 TABLET, EXTENDED RELEASE ORAL at 17:07

## 2021-08-09 RX ADMIN — OXYCODONE 5 MG: 5 TABLET ORAL at 06:06

## 2021-08-09 RX ADMIN — ATORVASTATIN CALCIUM 20 MG: 20 TABLET, FILM COATED ORAL at 17:06

## 2021-08-09 RX ADMIN — OXYCODONE 5 MG: 5 TABLET ORAL at 02:10

## 2021-08-09 RX ADMIN — GUAIFENESIN 600 MG: 600 TABLET, EXTENDED RELEASE ORAL at 17:07

## 2021-08-09 ASSESSMENT — ENCOUNTER SYMPTOMS
NAUSEA: 0
ORTHOPNEA: 0
DIARRHEA: 0
BACK PAIN: 0
VOMITING: 0
ABDOMINAL PAIN: 0
CHILLS: 0
FALLS: 0
PALPITATIONS: 0
FEVER: 0
HEADACHES: 0
SHORTNESS OF BREATH: 1
FOCAL WEAKNESS: 1
COUGH: 0

## 2021-08-09 ASSESSMENT — COGNITIVE AND FUNCTIONAL STATUS - GENERAL
CLIMB 3 TO 5 STEPS WITH RAILING: TOTAL
EATING MEALS: A LITTLE
TURNING FROM BACK TO SIDE WHILE IN FLAT BAD: UNABLE
MOVING FROM LYING ON BACK TO SITTING ON SIDE OF FLAT BED: UNABLE
SUGGESTED CMS G CODE MODIFIER DAILY ACTIVITY: CL
DRESSING REGULAR LOWER BODY CLOTHING: A LOT
MOVING TO AND FROM BED TO CHAIR: UNABLE
WALKING IN HOSPITAL ROOM: TOTAL
STANDING UP FROM CHAIR USING ARMS: TOTAL
HELP NEEDED FOR BATHING: TOTAL
MOBILITY SCORE: 6
SUGGESTED CMS G CODE MODIFIER MOBILITY: CN
TOILETING: TOTAL
DAILY ACTIVITIY SCORE: 11
DRESSING REGULAR UPPER BODY CLOTHING: TOTAL
PERSONAL GROOMING: A LITTLE

## 2021-08-09 ASSESSMENT — FIBROSIS 4 INDEX: FIB4 SCORE: 1.17

## 2021-08-09 ASSESSMENT — PAIN DESCRIPTION - PAIN TYPE
TYPE: SURGICAL PAIN
TYPE: ACUTE PAIN

## 2021-08-09 NOTE — PROGRESS NOTES
"Ms Rivas is a 71 yo with history of stage IIIB endometrial cancer with mets to lung s/p chemoradiation admitted for hypoxic respiratory failure found to have bilateral PE anticoagulated on admission and Large L exudative pleural effusion s/p thora (7/29) further complicated by hemothorax requiring VATS decortication on 8/6.     #Excudative pleural effusion  S/p thora on 7/29. Path + for atypical cells. Concern for malignant effusion. F/U lung nodule biopsy     #Hemothorax s/p VATS  2 chest tubes in place. Bloody output. Suction. No air bubble notice at rest or with coughing. 200cc output in 24 hours.    #Bilateral PE  Holding off anticoagulation given hemothorax. Lower leg US pending to eval for clot burden in case she is not a candidate for AC and may need filter.     #Stage IIIB endometrial cancer with mets to lung  Follows up with Dr. Garcia. Further treatment pending following with outpatient oncologist.     #Decreased UOP  Concern brought up by nursing staff. In the setting of persistent tachycardia, decrease UOP, plan for 500cc boolus.     Called daughterTemitope to update on plan. All questions answered. Specifically, she wanted to know about \"cardiomegaly\" noted on cxr. Informed her cardiomegaly can be seen in a portable cxr if not taken with inspiration and not best modality to eval heart size. Talked about chest tubes still in place, holding AC due to hemothorax with plan to eventually restart after tubes removed, tachycardia due to PE and pleural fluid, plan for fluid bolus, and pending path report.     Estefani Santos MD  [facility] [employer]      "

## 2021-08-09 NOTE — PROGRESS NOTES
Pt assessed, chest tubes removed by MD. Pt showing no signs of respiratory distress, VSS, no pain or bleeding at sites.

## 2021-08-09 NOTE — CARE PLAN
Problem: Pain - Standard  Goal: Alleviation of pain or a reduction in pain to the patient’s comfort goal  Outcome: Progressing     Problem: Knowledge Deficit - Standard  Goal: Patient and family/care givers will demonstrate understanding of plan of care, disease process/condition, diagnostic tests and medications  Outcome: Progressing     Problem: Fall Risk  Goal: Patient will remain free from falls  Outcome: Progressing   The patient is Watcher - Medium risk of patient condition declining or worsening    Shift Goals  Clinical Goals: pain management  Patient Goals: pain management  Family Goals: Pan management    Progress made toward(s) clinical / shift goals:  Pt was able to void after blankenship was removed.     Patient is not progressing towards the following goals:

## 2021-08-09 NOTE — PROGRESS NOTES
Bedside report received from day RN, pt care assumed. Pt is currently sleeping. SR on the monitor. Bed in lowest, locked position, treaded socks on, call light and belongings within reach. Fall precautions in place.

## 2021-08-09 NOTE — CARE PLAN
The patient is Watcher - Medium risk of patient condition declining or worsening    Shift Goals  Clinical Goals: pain management  Patient Goals: pain management  Family Goals: Pan management    Progress made toward(s) clinical / shift goals:    Problem: Pain - Standard  Goal: Alleviation of pain or a reduction in pain to the patient’s comfort goal  Outcome: Progressing    Patient refuses pain medication until the pain is at its worse. Educated patient about utilizing prn pain medication to control pain.     Problem: Knowledge Deficit - Standard  Goal: Patient and family/care givers will demonstrate understanding of plan of care, disease process/condition, diagnostic tests and medications  Outcome: Progressing     Problem: Fall Risk  Goal: Patient will remain free from falls  Outcome: Progressing     Problem: Skin Integrity  Goal: Skin integrity is maintained or improved  Outcome: Progressing     Problem: Respiratory  Goal: Patient will achieve/maintain optimum respiratory ventilation and gas exchange  Outcome: Progressing       Patient is not progressing towards the following goals:

## 2021-08-09 NOTE — PROGRESS NOTES
Gynecology Oncology Progress Note               Author: Rima Kitchen P.A.-C. Date & Time created: 8/8/2021  5:31 PM     Interval History:  No acute overnight events. Pain to left chest, controlled with medication. Denies f/c, no n/v.    Review of Systems:  Review of Systems   Constitutional: Negative for chills and fever.   Eyes: Negative for pain.   Respiratory: Positive for shortness of breath. Negative for cough.    Cardiovascular: Negative for chest pain and palpitations.   Gastrointestinal: Negative for abdominal pain, nausea and vomiting.   Genitourinary: Negative for dysuria, frequency and urgency.   Musculoskeletal: Negative for myalgias and neck pain.   Neurological: Negative for dizziness and headaches.        Physical Exam  Constitutional:       Appearance: Normal appearance.   HENT:      Mouth/Throat:      Mouth: Mucous membranes are moist.   Eyes:      Pupils: Pupils are equal, round, and reactive to light.   Cardiovascular:      Rate and Rhythm: Normal rate and regular rhythm.      Pulses: Normal pulses.   Pulmonary:      Normal effort, right side CTA, left lung fields with diminished breath sounds  Abdominal:      General: Bowel sounds are normal. There is no distension.      Palpations: Abdomen is soft.      Tenderness: There is no abdominal tenderness.   Musculoskeletal:      Right lower leg: No edema.      Left lower leg: No edema.   Skin:     General: Skin is warm and dry.   Neurological:      Mental Status: She is alert.      Motor: Weakness (to right side, secondary to CVA) present.        Labs:          Recent Labs     08/06/21 0244 08/07/21  0421 08/08/21  0439   SODIUM 140 141 140   POTASSIUM 3.6 3.9 3.4*   CHLORIDE 105 104 103   CO2 25 27 27   BUN 7* 9 7*   CREATININE 0.46* 0.68 0.48*   MAGNESIUM  --   --  1.5   CALCIUM 9.5 9.7 9.2     Recent Labs     08/06/21  0244 08/07/21  0421 08/08/21  0439   GLUCOSE 111* 130* 106*     Recent Labs     08/06/21  0244 08/06/21  0244 08/06/21  1400  219   RBC 2.37*   < > 3.37* 3.52* 3.39*   HEMOGLOBIN 7.3*   < > 10.5* 10.8* 10.5*   HEMATOCRIT 23.5*   < > 32.3* 33.4* 32.3*   PLATELETCT 322   < > 283 298 285   PROTHROMBTM 14.7*  --   --   --   --    INR 1.19*  --   --   --   --     < > = values in this interval not displayed.     Recent Labs     21  1400 21   WBC 8.3 8.4 9.0   NEUTSPOLYS  --   --  79.60*   LYMPHOCYTES  --   --  10.30*   MONOCYTES  --   --  8.10   EOSINOPHILS  --   --  0.90   BASOPHILS  --   --  0.10     Recent Labs     21  0244 21  042219   SODIUM 140 141 140   POTASSIUM 3.6 3.9 3.4*   CHLORIDE 105 104 103   CO2 25 27 27   GLUCOSE 111* 130* 106*   BUN 7* 9 7*   CREATININE 0.46* 0.68 0.48*   CALCIUM 9.5 9.7 9.2     Hemodynamics:  Temp (24hrs), Av.6 °C (97.9 °F), Min:36 °C (96.8 °F), Max:37 °C (98.6 °F)  Temperature: 37 °C (98.6 °F)  Pulse  Av  Min: 73  Max: 138   Blood Pressure : 136/60     Respiratory:    Respiration: 19, Pulse Oximetry: 96 %        RUL Breath Sounds: Diminished, RML Breath Sounds: Diminished, RLL Breath Sounds: Diminished, HANK Breath Sounds: Diminished, LLL Breath Sounds: Diminished  Fluids:  No intake or output data in the 24 hours ending 21 1733  Weight: 98 kg (216 lb 0.8 oz)  GI/Nutrition:  Orders Placed This Encounter   Procedures   • Diet Order Diet: Regular; Miscellaneous modifications: (optional): Gluten Free     Standing Status:   Standing     Number of Occurrences:   1     Order Specific Question:   Diet:     Answer:   Regular [1]     Order Specific Question:   Miscellaneous modifications: (optional)     Answer:   Gluten Free [9]     Medical Decision Making, by Problem:  Active Hospital Problems    Diagnosis    • *Pleural effusion [J90]    • Acute kidney injury (HCC) [N17.9]    • Anemia [D64.9]    • Pulmonary embolism (HCC) [I26.99]    • Endometrial cancer (HCC) [C54.1]        Plan:  This is a 70 y.o. female with h/o  metastatic endometrial cancer who presents with new PE and pleural effusion:     1. Endometrial cancer: stage IIIB, s/p PORTEC 3 w/ omission of chemotherapy, followed by pulmonary recurrence treated with systemic carbo/taxol and most recently megace, treatment tolerance has been limited, will need to have f/u visit with Dr. Garcia to discuss treatment planning given concern for progression per recent chest CT.   2. Pleural effusion: CT on 8/4 showed large L pleural effusion with heterogeneous areas of increased density, concerning for hemothorax > unable to do thoracentesis due to no drainable pockets. Lovenox held. Now s/p thoraoscopy with hemothorax evacuation and decortication. Chest tubes in place. Biopsy from lung nodule taken at procedure, will follow pathology. Cytology from [facility] on 7/29 with rare atypical cells, not fully diagnostic of a malignant effusion however this remains in the differential.    3. PE: secondary to malignancy, hold megace/tamoxifen given new thrombosis and concern for progression per chest CT, may be a candidate for DOAC if no procedures are indicated. Lovenox currently held secondary to hemothorax.  4. Shortness of breath: secondary to above, now maintaining sats on nasal canual   5. Anemia: stable, s/p 2 units ordered today, down trending since admission, likely secondary to above. Continue to monitor.     Case discussed with Physician     Quality-Core Measures

## 2021-08-09 NOTE — PROGRESS NOTES
Assumed care of pt, received bedside report from JOB Flores. Pt sitting up in bed, no complaints of pain, 4L O2 nasal cannula, A/Ox4. Fall and safety precautions in place, strip alarm in place. Discussed POC with pt, pt verbalizes understanding. No further needs at this time.

## 2021-08-09 NOTE — THERAPY
"Missed Therapy     Patient Name: Jairo Rivas  Age:  70 y.o., Sex:  female  Medical Record #: [id number]  Today's Date: 8/9/2021 08/09/21 1446   Interdisciplinary Plan of Care Collaboration   Collaboration Comments PT re-eval order received and attempted. Pt was pre-medicated by RN, however currently refusing due to pain and chest tubes. Pt stating \"my body is tired\" and does not wish to mobilize with chest tubes in place. Provided extensive education on importance of mobility even with chest tube, pt continued to refuse. Did agree to attempt tomorrow at 11am as she did acknowledge post education importance of mobility. Will attempt tomorrow, however if pt continues to refuse will need to complete order at this time.      "

## 2021-08-10 ENCOUNTER — APPOINTMENT (OUTPATIENT)
Dept: RADIOLOGY | Facility: MEDICAL CENTER | Age: 71
DRG: 163 | End: 2021-08-10
Attending: STUDENT IN AN ORGANIZED HEALTH CARE EDUCATION/TRAINING PROGRAM
Payer: MEDICARE

## 2021-08-10 LAB
ALBUMIN SERPL BCP-MCNC: 2.9 G/DL (ref 3.2–4.9)
ALBUMIN/GLOB SERPL: 0.9 G/DL
ALP SERPL-CCNC: 70 U/L (ref 30–99)
ALT SERPL-CCNC: 43 U/L (ref 2–50)
ANION GAP SERPL CALC-SCNC: 11 MMOL/L (ref 7–16)
ANION GAP SERPL CALC-SCNC: 6 MMOL/L (ref 7–16)
APTT PPP: 71.8 SEC (ref 24.7–36)
AST SERPL-CCNC: 28 U/L (ref 12–45)
BASOPHILS # BLD AUTO: 0.3 % (ref 0–1.8)
BASOPHILS # BLD: 0.03 K/UL (ref 0–0.12)
BILIRUB SERPL-MCNC: 0.4 MG/DL (ref 0.1–1.5)
BUN SERPL-MCNC: 7 MG/DL (ref 8–22)
BUN SERPL-MCNC: 9 MG/DL (ref 8–22)
CALCIUM SERPL-MCNC: 9 MG/DL (ref 8.5–10.5)
CALCIUM SERPL-MCNC: 9.2 MG/DL (ref 8.5–10.5)
CHLORIDE SERPL-SCNC: 103 MMOL/L (ref 96–112)
CHLORIDE SERPL-SCNC: 107 MMOL/L (ref 96–112)
CO2 SERPL-SCNC: 25 MMOL/L (ref 20–33)
CO2 SERPL-SCNC: 28 MMOL/L (ref 20–33)
CREAT SERPL-MCNC: 0.51 MG/DL (ref 0.5–1.4)
CREAT SERPL-MCNC: 0.61 MG/DL (ref 0.5–1.4)
EKG IMPRESSION: NORMAL
EOSINOPHIL # BLD AUTO: 0.15 K/UL (ref 0–0.51)
EOSINOPHIL NFR BLD: 1.7 % (ref 0–6.9)
ERYTHROCYTE [DISTWIDTH] IN BLOOD BY AUTOMATED COUNT: 50.5 FL (ref 35.9–50)
ERYTHROCYTE [DISTWIDTH] IN BLOOD BY AUTOMATED COUNT: 52.2 FL (ref 35.9–50)
GLOBULIN SER CALC-MCNC: 3.4 G/DL (ref 1.9–3.5)
GLUCOSE SERPL-MCNC: 136 MG/DL (ref 65–99)
GLUCOSE SERPL-MCNC: 87 MG/DL (ref 65–99)
HCT VFR BLD AUTO: 31.3 % (ref 37–47)
HCT VFR BLD AUTO: 35.8 % (ref 37–47)
HGB BLD-MCNC: 11.6 G/DL (ref 12–16)
HGB BLD-MCNC: 9.8 G/DL (ref 12–16)
IMM GRANULOCYTES # BLD AUTO: 0.05 K/UL (ref 0–0.11)
IMM GRANULOCYTES NFR BLD AUTO: 0.6 % (ref 0–0.9)
INR PPP: 1.2 (ref 0.87–1.13)
LYMPHOCYTES # BLD AUTO: 1 K/UL (ref 1–4.8)
LYMPHOCYTES NFR BLD: 11.1 % (ref 22–41)
MAGNESIUM SERPL-MCNC: 1.7 MG/DL (ref 1.5–2.5)
MCH RBC QN AUTO: 30.8 PG (ref 27–33)
MCH RBC QN AUTO: 31.4 PG (ref 27–33)
MCHC RBC AUTO-ENTMCNC: 31.3 G/DL (ref 33.6–35)
MCHC RBC AUTO-ENTMCNC: 32.4 G/DL (ref 33.6–35)
MCV RBC AUTO: 97 FL (ref 81.4–97.8)
MCV RBC AUTO: 98.4 FL (ref 81.4–97.8)
MONOCYTES # BLD AUTO: 0.52 K/UL (ref 0–0.85)
MONOCYTES NFR BLD AUTO: 5.8 % (ref 0–13.4)
NEUTROPHILS # BLD AUTO: 7.24 K/UL (ref 2–7.15)
NEUTROPHILS NFR BLD: 80.5 % (ref 44–72)
NRBC # BLD AUTO: 0 K/UL
NRBC BLD-RTO: 0 /100 WBC
PHOSPHATE SERPL-MCNC: 1.8 MG/DL (ref 2.5–4.5)
PLATELET # BLD AUTO: 257 K/UL (ref 164–446)
PLATELET # BLD AUTO: 301 K/UL (ref 164–446)
PMV BLD AUTO: 10.5 FL (ref 9–12.9)
PMV BLD AUTO: 10.6 FL (ref 9–12.9)
POTASSIUM SERPL-SCNC: 3.8 MMOL/L (ref 3.6–5.5)
POTASSIUM SERPL-SCNC: 4.3 MMOL/L (ref 3.6–5.5)
PROT SERPL-MCNC: 6.3 G/DL (ref 6–8.2)
PROTHROMBIN TIME: 14.8 SEC (ref 12–14.6)
RBC # BLD AUTO: 3.18 M/UL (ref 4.2–5.4)
RBC # BLD AUTO: 3.69 M/UL (ref 4.2–5.4)
SODIUM SERPL-SCNC: 139 MMOL/L (ref 135–145)
SODIUM SERPL-SCNC: 141 MMOL/L (ref 135–145)
TROPONIN T SERPL-MCNC: 50 NG/L (ref 6–19)
UFH PPP CHRO-ACNC: 0.53 IU/ML
WBC # BLD AUTO: 6.5 K/UL (ref 4.8–10.8)
WBC # BLD AUTO: 9 K/UL (ref 4.8–10.8)

## 2021-08-10 PROCEDURE — 99233 SBSQ HOSP IP/OBS HIGH 50: CPT | Mod: GC | Performed by: INTERNAL MEDICINE

## 2021-08-10 PROCEDURE — A9270 NON-COVERED ITEM OR SERVICE: HCPCS | Performed by: STUDENT IN AN ORGANIZED HEALTH CARE EDUCATION/TRAINING PROGRAM

## 2021-08-10 PROCEDURE — 700111 HCHG RX REV CODE 636 W/ 250 OVERRIDE (IP): Performed by: STUDENT IN AN ORGANIZED HEALTH CARE EDUCATION/TRAINING PROGRAM

## 2021-08-10 PROCEDURE — 85520 HEPARIN ASSAY: CPT

## 2021-08-10 PROCEDURE — 770020 HCHG ROOM/CARE - TELE (206)

## 2021-08-10 PROCEDURE — 84100 ASSAY OF PHOSPHORUS: CPT

## 2021-08-10 PROCEDURE — 71045 X-RAY EXAM CHEST 1 VIEW: CPT

## 2021-08-10 PROCEDURE — 700102 HCHG RX REV CODE 250 W/ 637 OVERRIDE(OP): Performed by: STUDENT IN AN ORGANIZED HEALTH CARE EDUCATION/TRAINING PROGRAM

## 2021-08-10 PROCEDURE — 80048 BASIC METABOLIC PNL TOTAL CA: CPT

## 2021-08-10 PROCEDURE — 93005 ELECTROCARDIOGRAM TRACING: CPT | Performed by: STUDENT IN AN ORGANIZED HEALTH CARE EDUCATION/TRAINING PROGRAM

## 2021-08-10 PROCEDURE — 700101 HCHG RX REV CODE 250: Performed by: STUDENT IN AN ORGANIZED HEALTH CARE EDUCATION/TRAINING PROGRAM

## 2021-08-10 PROCEDURE — 700101 HCHG RX REV CODE 250: Performed by: HOSPITALIST

## 2021-08-10 PROCEDURE — 93010 ELECTROCARDIOGRAM REPORT: CPT | Performed by: INTERNAL MEDICINE

## 2021-08-10 PROCEDURE — 700102 HCHG RX REV CODE 250 W/ 637 OVERRIDE(OP): Performed by: INTERNAL MEDICINE

## 2021-08-10 PROCEDURE — 80053 COMPREHEN METABOLIC PANEL: CPT

## 2021-08-10 PROCEDURE — 83735 ASSAY OF MAGNESIUM: CPT

## 2021-08-10 PROCEDURE — 84484 ASSAY OF TROPONIN QUANT: CPT

## 2021-08-10 PROCEDURE — 85025 COMPLETE CBC W/AUTO DIFF WBC: CPT

## 2021-08-10 PROCEDURE — 85027 COMPLETE CBC AUTOMATED: CPT

## 2021-08-10 PROCEDURE — 97164 PT RE-EVAL EST PLAN CARE: CPT

## 2021-08-10 PROCEDURE — 85730 THROMBOPLASTIN TIME PARTIAL: CPT

## 2021-08-10 PROCEDURE — 99233 SBSQ HOSP IP/OBS HIGH 50: CPT | Mod: GC | Performed by: HOSPITALIST

## 2021-08-10 PROCEDURE — A9270 NON-COVERED ITEM OR SERVICE: HCPCS | Performed by: INTERNAL MEDICINE

## 2021-08-10 PROCEDURE — 85610 PROTHROMBIN TIME: CPT

## 2021-08-10 RX ORDER — HYDROXYZINE HYDROCHLORIDE 25 MG/1
25 TABLET, FILM COATED ORAL 3 TIMES DAILY PRN
Status: DISCONTINUED | OUTPATIENT
Start: 2021-08-10 | End: 2021-08-18 | Stop reason: HOSPADM

## 2021-08-10 RX ORDER — HEPARIN SODIUM 5000 [USP'U]/100ML
0-30 INJECTION, SOLUTION INTRAVENOUS CONTINUOUS
Status: DISCONTINUED | OUTPATIENT
Start: 2021-08-10 | End: 2021-08-12

## 2021-08-10 RX ORDER — HEPARIN SODIUM 1000 [USP'U]/ML
4000 INJECTION, SOLUTION INTRAVENOUS; SUBCUTANEOUS ONCE
Status: COMPLETED | OUTPATIENT
Start: 2021-08-10 | End: 2021-08-10

## 2021-08-10 RX ORDER — FUROSEMIDE 10 MG/ML
40 INJECTION INTRAMUSCULAR; INTRAVENOUS ONCE
Status: COMPLETED | OUTPATIENT
Start: 2021-08-11 | End: 2021-08-11

## 2021-08-10 RX ORDER — LABETALOL HYDROCHLORIDE 5 MG/ML
10 INJECTION, SOLUTION INTRAVENOUS ONCE
Status: COMPLETED | OUTPATIENT
Start: 2021-08-10 | End: 2021-08-10

## 2021-08-10 RX ORDER — HEPARIN SODIUM 1000 [USP'U]/ML
2000 INJECTION, SOLUTION INTRAVENOUS; SUBCUTANEOUS PRN
Status: DISCONTINUED | OUTPATIENT
Start: 2021-08-10 | End: 2021-08-12

## 2021-08-10 RX ORDER — FUROSEMIDE 10 MG/ML
40 INJECTION INTRAMUSCULAR; INTRAVENOUS ONCE
Status: COMPLETED | OUTPATIENT
Start: 2021-08-10 | End: 2021-08-10

## 2021-08-10 RX ORDER — HYDRALAZINE HYDROCHLORIDE 20 MG/ML
INJECTION INTRAMUSCULAR; INTRAVENOUS
Status: ACTIVE
Start: 2021-08-10 | End: 2021-08-10

## 2021-08-10 RX ADMIN — METOPROLOL SUCCINATE 100 MG: 100 TABLET, EXTENDED RELEASE ORAL at 05:30

## 2021-08-10 RX ADMIN — ATORVASTATIN CALCIUM 20 MG: 20 TABLET, FILM COATED ORAL at 17:32

## 2021-08-10 RX ADMIN — HEPARIN SODIUM 12 UNITS/KG/HR: 5000 INJECTION, SOLUTION INTRAVENOUS at 19:08

## 2021-08-10 RX ADMIN — HEPARIN SODIUM 12 UNITS/KG/HR: 5000 INJECTION, SOLUTION INTRAVENOUS at 14:15

## 2021-08-10 RX ADMIN — OXYCODONE 5 MG: 5 TABLET ORAL at 01:06

## 2021-08-10 RX ADMIN — GUAIFENESIN 600 MG: 600 TABLET, EXTENDED RELEASE ORAL at 17:32

## 2021-08-10 RX ADMIN — OXYCODONE 5 MG: 5 TABLET ORAL at 05:37

## 2021-08-10 RX ADMIN — HYDROXYZINE HYDROCHLORIDE 25 MG: 25 TABLET, FILM COATED ORAL at 15:31

## 2021-08-10 RX ADMIN — OXYCODONE 5 MG: 5 TABLET ORAL at 09:48

## 2021-08-10 RX ADMIN — OXYCODONE 5 MG: 5 TABLET ORAL at 20:16

## 2021-08-10 RX ADMIN — HEPARIN SODIUM 4000 UNITS: 1000 INJECTION, SOLUTION INTRAVENOUS; SUBCUTANEOUS at 14:18

## 2021-08-10 RX ADMIN — LABETALOL HYDROCHLORIDE 10 MG: 5 INJECTION, SOLUTION INTRAVENOUS at 12:13

## 2021-08-10 RX ADMIN — FUROSEMIDE 40 MG: 10 INJECTION, SOLUTION INTRAMUSCULAR; INTRAVENOUS at 12:46

## 2021-08-10 RX ADMIN — LABETALOL HYDROCHLORIDE 10 MG: 5 INJECTION, SOLUTION INTRAVENOUS at 12:02

## 2021-08-10 RX ADMIN — FERROUS SULFATE TAB 325 MG (65 MG ELEMENTAL FE) 325 MG: 325 (65 FE) TAB at 17:32

## 2021-08-10 RX ADMIN — ASPIRIN 81 MG: 81 TABLET, CHEWABLE ORAL at 05:30

## 2021-08-10 RX ADMIN — LOSARTAN POTASSIUM 100 MG: 50 TABLET, FILM COATED ORAL at 12:46

## 2021-08-10 RX ADMIN — Medication 500 MG: at 17:32

## 2021-08-10 RX ADMIN — ACETAMINOPHEN 500 MG: 500 TABLET, FILM COATED ORAL at 03:50

## 2021-08-10 RX ADMIN — Medication 2000 UNITS: at 17:32

## 2021-08-10 RX ADMIN — AMLODIPINE BESYLATE 5 MG: 5 TABLET ORAL at 20:51

## 2021-08-10 RX ADMIN — GUAIFENESIN 600 MG: 600 TABLET, EXTENDED RELEASE ORAL at 05:30

## 2021-08-10 RX ADMIN — THERA TABS 1 TABLET: TAB at 05:30

## 2021-08-10 ASSESSMENT — ENCOUNTER SYMPTOMS
CONSTIPATION: 0
FEVER: 0
SPUTUM PRODUCTION: 0
DIZZINESS: 1
CHILLS: 0
DIARRHEA: 0
FOCAL WEAKNESS: 1
FOCAL WEAKNESS: 0
ORTHOPNEA: 0
BACK PAIN: 0
FALLS: 0
ABDOMINAL PAIN: 0
DIZZINESS: 0
NAUSEA: 0
ORTHOPNEA: 1
SHORTNESS OF BREATH: 0
HEADACHES: 0
HEMOPTYSIS: 0
COUGH: 1
PALPITATIONS: 0
WEAKNESS: 1
VOMITING: 0
SHORTNESS OF BREATH: 1
COUGH: 0

## 2021-08-10 ASSESSMENT — COGNITIVE AND FUNCTIONAL STATUS - GENERAL
CLIMB 3 TO 5 STEPS WITH RAILING: TOTAL
MOBILITY SCORE: 7
STANDING UP FROM CHAIR USING ARMS: TOTAL
MOVING TO AND FROM BED TO CHAIR: UNABLE
WALKING IN HOSPITAL ROOM: TOTAL
MOVING FROM LYING ON BACK TO SITTING ON SIDE OF FLAT BED: UNABLE
SUGGESTED CMS G CODE MODIFIER MOBILITY: CM
TURNING FROM BACK TO SIDE WHILE IN FLAT BAD: A LOT

## 2021-08-10 ASSESSMENT — PAIN DESCRIPTION - PAIN TYPE
TYPE: ACUTE PAIN

## 2021-08-10 ASSESSMENT — GAIT ASSESSMENTS: GAIT LEVEL OF ASSIST: UNABLE TO PARTICIPATE

## 2021-08-10 NOTE — CARE PLAN
The patient is Watcher - Medium risk of patient condition declining or worsening    Shift Goals  Clinical Goals: manage pain, monitor chest tube sites, get up wi/ PT/OT  Patient Goals: manage pain, speak with doctors  Family Goals: rest, plan    Progress made toward(s) clinical / shift goals:  Pt communicating needs appropriately and is using call light. Plan of care for today discussed with patient, no further questions at this time. Hourly rounding in place.       Problem: Pain - Standard  Goal: Alleviation of pain or a reduction in pain to the patient’s comfort goal  Outcome: Progressing   Pt stating the needs for pain medication when in significant pain. Pt educated on staying on top of it. Will continue to monitor.   Problem: Knowledge Deficit - Standard  Goal: Patient and family/care givers will demonstrate understanding of plan of care, disease process/condition, diagnostic tests and medications  Outcome: Progressing   Pt educated on plan of care for today with MD at bedside. No further questions at this time.   Problem: Respiratory  Goal: Patient will achieve/maintain optimum respiratory ventilation and gas exchange  Outcome: Progressing   Pt states breathing pattern feels more at ease today. Will continue to monitor.

## 2021-08-10 NOTE — PROGRESS NOTES
Rapid response called around 11:40. MD came to bedside as well as rapid response team. New orders placed.

## 2021-08-10 NOTE — CARE PLAN
Problem: Nutritional:  Goal: Achieve adequate nutritional intake  Description: Patient will consume >50% of meals and supplements.  Outcome: Progressing

## 2021-08-10 NOTE — PROGRESS NOTES
Assumed care at 0700, bedside report received from Sandra [facility]. Pt. Is SR/ST on the monitor. Initial assessment completed, orders reviewed, call light within reach, bed alarm is in use, and hourly rounding in place. POC addressed with patient, no additional questions at this time.

## 2021-08-10 NOTE — CARE PLAN
The patient is Stable - Low risk of patient condition declining or worsening    Shift Goals  Clinical Goals: pain management  Patient Goals: pain management  Family Goals: Pan management    Progress made toward(s) clinical / shift goals:    Problem: Pain - Standard  Goal: Alleviation of pain or a reduction in pain to the patient’s comfort goal  Outcome: Progressing     Problem: Knowledge Deficit - Standard  Goal: Patient and family/care givers will demonstrate understanding of plan of care, disease process/condition, diagnostic tests and medications  Outcome: Progressing     Problem: Fall Risk  Goal: Patient will remain free from falls  Outcome: Progressing     Problem: Skin Integrity  Goal: Skin integrity is maintained or improved  Outcome: Progressing     Problem: Respiratory  Goal: Patient will achieve/maintain optimum respiratory ventilation and gas exchange  Outcome: Progressing       Patient is not progressing towards the following goals:

## 2021-08-10 NOTE — PROGRESS NOTES
Surgical Progress Note    Author: PANCHITO Belcher Date & Time created: 8/10/2021   6:02 AM     Interval Events:  S/p Left thoracoscopy with evacuation of hemothorax and decortication -POD#3, pt stable; pain controlled; edie po; using IS    Review of Systems   Constitutional: Negative for chills and fever.   Respiratory: Negative for shortness of breath.    Gastrointestinal: Negative for nausea.   Skin: Negative for itching and rash.     Hemodynamics:  Temp (24hrs), Av.6 °C (97.9 °F), Min:36 °C (96.8 °F), Max:37.1 °C (98.8 °F)  Temperature: 37.1 °C (98.8 °F)  Pulse  Av.8  Min: 69  Max: 138   Blood Pressure : 117/68     Respiratory:    Respiration: 18, Pulse Oximetry: 94 %        RUL Breath Sounds: Diminished, RML Breath Sounds: Diminished, RLL Breath Sounds: Diminished, HANK Breath Sounds: Diminished, LLL Breath Sounds: Diminished  Neuro:  GCS       Fluids:    Intake/Output Summary (Last 24 hours) at 8/10/2021 0602  Last data filed at 8/10/2021 0300  Gross per 24 hour   Intake --   Output 710 ml   Net -710 ml     Weight: 97.9 kg (215 lb 13.3 oz)  Current Diet Order   Procedures   • Diet Order Diet: Regular; Miscellaneous modifications: (optional): Gluten Free     Physical Exam  Vitals and nursing note reviewed.   Constitutional:       General: She is not in acute distress.  Cardiovascular:      Rate and Rhythm: Tachycardia present.   Pulmonary:      Effort: No respiratory distress.      Comments: Chest tube #1: no air leak, ~150mostly bloody fluid output/24 hrs  Chest tube #2: no air leak, ~40ml serosang output/24 hrs  Wounds clear  Skin:     General: Skin is warm and dry.   Neurological:      Mental Status: She is alert and oriented to person, place, and time.   Psychiatric:         Mood and Affect: Mood normal.       Labs:  Recent Results (from the past 24 hour(s))   CBC WITHOUT DIFFERENTIAL    Collection Time: 08/10/21  3:20 AM   Result Value Ref Range    WBC 6.5 4.8 - 10.8 K/uL    RBC 3.18 (L) 4.20 -  5.40 M/uL    Hemoglobin 9.8 (L) 12.0 - 16.0 g/dL    Hematocrit 31.3 (L) 37.0 - 47.0 %    MCV 98.4 (H) 81.4 - 97.8 fL    MCH 30.8 27.0 - 33.0 pg    MCHC 31.3 (L) 33.6 - 35.0 g/dL    RDW 52.2 (H) 35.9 - 50.0 fL    Platelet Count 257 164 - 446 K/uL    MPV 10.6 9.0 - 12.9 fL   Basic Metabolic Panel    Collection Time: 08/10/21  3:20 AM   Result Value Ref Range    Sodium 141 135 - 145 mmol/L    Potassium 4.3 3.6 - 5.5 mmol/L    Chloride 107 96 - 112 mmol/L    Co2 28 20 - 33 mmol/L    Glucose 87 65 - 99 mg/dL    Bun 9 8 - 22 mg/dL    Creatinine 0.61 0.50 - 1.40 mg/dL    Calcium 9.0 8.5 - 10.5 mg/dL    Anion Gap 6.0 (L) 7.0 - 16.0   ESTIMATED GFR    Collection Time: 08/10/21  3:20 AM   Result Value Ref Range    GFR If African American >60 >60 mL/min/1.73 m 2    GFR If Non African American >60 >60 mL/min/1.73 m 2     Medical Decision Making, by Problem:  Active Hospital Problems    Diagnosis    • Pleural effusion [J90]      Priority: High   • Endometrial cancer (HCC) [C54.1]      Priority: High     Menopause in 2010 and bleeding 2012 started to have irregular continuous bleeding.   Secondary iron def anemia.  Seen by Dr. Chicas Aug 2018. Thick uterine lining on US.  Trial of medication therapy, if no improvement will go back to surgery.      • Tachycardia [R00.0]    • Acute kidney injury (HCC) [N17.9]    • Anemia [D64.9]    • Pulmonary embolism (HCC) [I26.99]      Plan:  Both chest tubes removed, sterile Tegaderm dressing placed over wound. Carlene will need sutures removed in 7-10 days.  Encourage frequent IS  Mobilize  No ongoing surgical issues at this point. Will sign off on case. Please call if questions or problems ([phone])    Quality Measures:  Quality-Core Measures   Reviewed items::  Labs reviewed and Radiology images reviewed  DVT prophylaxis - mechanical:  SCDs      Discussed patient condition with Patient and Dr. Ganser

## 2021-08-10 NOTE — THERAPY
Physical Therapy   Re-Eval     Patient Name: Jairo Rivas  Age:  70 y.o., Sex:  female  Medical Record #: [id number]  Today's Date: 8/10/2021     Precautions: Fall Risk    Assessment    PT re-eval ordered secondary to L hemothorax and malignant PE s/p evacuation of hemothorax and decortication with chest tube placement. Attempted eval yesterday, patient refused. Chest tubes were removed yesterday. In AM planned session with patient and RN for 11am. Per RN mobility highly encouraged as suspecting increased fluid in the lungs. RN reported DVT in LE, not being treated due to recent hemothorax. Therapist inquired if she wished mobility to be held. RN reported that pt needed to mobilize to EOB, only requested for therapist not to ambulate patient. Proceeded with re-eval.    Pt highly anxious and resistant to mobility. Gently reminded her of discussion yesterday and pt's agreement to sit EOB. Pt then did remember this and became participatory. Pt was on 4L O2 and SpO2 was 94%. Used L UE/LE to assist in mobilizing herself to EOB. Once at EOB able to hold balance without assist. Pt then became highly anxious and hyperventilating. SpO2 de-saturated to 78%/ Cues for deep breathing, pt unable to follow and open mouth breathing. Was returned to supine by therapist and placed on oxymask with O2 increased to 6L. Pt recovered to 88%, RN entered room at this time. Pt complaining about oxymask, RN trialed nasal cannula, with immediate de-saturations back to 80%. Assisted in replacing oxymask, and O2 increased to 10L. SpO2 then maintaining at 85-86%. Pt was left in care of multiple RNs and rapid response team at this time.     Pt's mobility highly limited by her medical status. Pt with high anxiety and resistance to mobility. Strongly recommend palliative consult to discuss goals of care. Acute PT to follow from a distance as pt not medically able to tolerate intensive therapies at this time. Plan to increase frequency as  activity tolerance and medical status improves.     Plan    Treatment plan modified to 1 time per week until therapy goals are met for the following treatments:  Bed Mobility, Gait Training, Neuro Re-Education / Balance and Therapeutic Activities.    DC Equipment Recommendations: Unable to determine at this time  Discharge Recommendations: Other - (not medically stable for decision- family prefers )           Objective       08/10/21 1140   Cognition    Level of Consciousness Alert   Comments alert, very anxious    Passive ROM Lower Body   Passive ROM Lower Body WDL   Active ROM Lower Body    Active ROM Lower Body  WDL   Comments R LE limited by weakness not actual ROM deficit   Strength Lower Body   Comments R LE grossly 2/5, L WFL   Sensation Lower Body   Lower Extremity Sensation   WDL   Balance   Sitting Balance (Static) Fair   Sitting Balance (Dynamic) Fair -   Weight Shift Sitting Poor   Gait Analysis   Gait Level Of Assist Unable to Participate   Bed Mobility    Supine to Sit Moderate Assist   Sit to Supine Total Assist   Scooting Minimal Assist   Functional Mobility   Sit to Stand Unable to Participate   Short Term Goals    Short Term Goal # 1 Pt will perform supine <> sit with min assist in 6 visits to get in/out of bed   Short Term Goal # 2 Pt will perform functional transfers with mod assist in 6 visits to return to baseline level of function

## 2021-08-10 NOTE — PROGRESS NOTES
Report received from day RN and patient care assumed. Patient is alert and oriented x 4, SR on the monitor, 4 L O2 via NC and requested tylenol for back pain. Medication administered per MAR.  POC discussed and questions answered. Safety measures in place.

## 2021-08-10 NOTE — PROGRESS NOTES
Critical care Progress note:  I was called to bedside from [facility] medicine team. Patient had done PT and decompensated with hypoxia and required 15 L oxymask.  She has increased work of breathing. She is alert and oriented and full code of which I confirmed with her.  She has hx metastatic endometrial cancer with lung lesions. She had a hemothorax/pleural effusion and required chest tube.  This was pulled out prior day.  As she slowly improved I was able to titrate o2 down to 5 L oxymask and sats remained > 90%. WOrk of breathing improving.  Per my bedside us no pericardial effusion, seems to have adequate ef, IVC collapses because of work of breathing so non relevant, bilateral chest with mixed b lines and a lines.  Left side chest nearly resolved pleural effusion with atelectatic lung visualized, likely some congealed fluid remains present but no distinct loculations.  Reduced lung movement with elevated diaphragm.  I recommended/agreed with continued diuresis.  She has DVT and known PE of which non treated due to hemothorax hx.  Recommended/agreed with starting heparin infusion for trial of anticoagulation.  She does not need ICU currently of which [facility] attending agreed.  Please call back for re-evaluation for critical care management if further clinical deterioration. Pulmonary was also at bedside and following patient.

## 2021-08-10 NOTE — PROGRESS NOTES
Rapid response note      Rapid response was called around 11:48 pm. Patient was short of breath and requiring at least 15 L oxygen. When I arrived, patient was hypertensive with systolic blood pressure in 180s-190s, heart rate in 130s. Crackles heard diffusely in lung. STAT chest x ray and EKG were ordered. Patient was given iv labetalol 10 mg, which brought the systolic blood pressure gradually down to 140s. Patient still remained tachycardic with heart rate in 120s and requiring 15L O2 even after given labetalol. Labs (troponin,CBC,CMP, Mg, Phos) were ordered too. EKG showed sinus tachycardia without ST or Q wave abnormalities. Dr. Simon (ICU attending on call)  was consulted. He examined the patient with bedside ultrasound which showed fluid in left lung (kerley lines), but unlikely recurrence of hemothorax. Per his recommendation, starting low-dose heparin for anticoagulation as a challenge given high suspicion for pulmonary embolism as well as starting lasix for diuresis given lung fluid.  Given the fact that patient has dvt in lower extremity and has ongoing hemothorax, differentials included pulmonary embolism, flash pulmonary edema, recurrence of hemothorax. We called the family and verified the code status. Per family and patient wishes, patient remains full code (okay for all advanced care measures including CPR, intubation, central lines,etc). We are giving patient IV lasix 40 mg once and starting the patient on heparin for anticoagulation.

## 2021-08-10 NOTE — DIETARY
Nutrition Services: Update   Day 12 of admit. Jairo Rivas is a 70 y.o. female with admitting DX of encephalopathy, pulmonary embolism on right.     Pt is currently on regular, gluten free diet and Boost Plus daily. PO 25-50% x 2 meals on 8/8 per flow sheet. RN unsure of PO intake prior since today is her first day working with pt. Prior to admission, pt reports eating normally. Appetite is fair at this time per pt. Pt verbalized eating 60% of breakfast this morning.    Pt appeared well-nourished. Pt stated she is unsure of any changes in weight. Usual body weight is 190 lbs per pt.        Wt 8/9: 97.9 kg (215 lb 13.3 oz) via bed scale - Current weight is increased from pt reported usual body weight.     Malnutrition Risk: Not criteria met at this time.     Recommendations/Plan:   1. Encourage intake of meals/supplement.  2. RD encouraged RN to document intake of all meals/supplement as % taken in ADL's to provide interdisciplinary communication across all shifts.   3. Monitor weight.  4. Nutrition rep will continue to see patient for ongoing meal and snack preferences.    RD following.

## 2021-08-10 NOTE — PROGRESS NOTES
Daily Progress Note:     Date of Service: 8/10/2021  Primary Team: [facility] Blue Team   Attending: Estefani Santos  Senior Resident: Dr. Tenorio  Intern: Dr. Clements  Contact:  [phone]    Chief Complaint:   CP, SOB    Subjective:  No acute events overnight.  Pt examined this AM and had just received her pain medications and was drowsy.  She stated her pain was feeling better and her breathing was consistent with yesterday.  A RRT was called on patient around 1140 this AM, see RRT note and ICU consult note for details.    Re-examination this afternoon shows pt still breathing quickly and tachycardic, having some pain at the surgical site.  Discussed events of the day with daughter, Carolyn and son, Alphonso.    Consultants/Specialty:  Gyn-Oncology  Pulmonology:  [facility]  CT surgery:  [facility]    Review of Systems:    Review of Systems   Constitutional: Negative for chills and fever.   Respiratory: Positive for shortness of breath (consistent today). Negative for cough.    Cardiovascular: Positive for chest pain (around surgery site, with movement). Negative for palpitations and orthopnea.   Gastrointestinal: Negative for abdominal pain, diarrhea, nausea and vomiting.   Genitourinary: Negative for dysuria and urgency.   Musculoskeletal: Negative for back pain, falls and joint pain.   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 Negative for headaches.       Objective Data:   Physical Exam:   Vitals:   Temp:  [36 °C (96.8 °F)-37.1 °C (98.8 °F)] 36 °C (96.8 °F)  Pulse:  [] 125  Resp:  [16-40] 30  BP: (100-190)/() 152/93  SpO2:  [88 %-95 %] 88 %    Physical Exam  Constitutional:       General: She is not in acute distress.     Appearance: She is not toxic-appearing.   HENT:      Head: Normocephalic and atraumatic.      Mouth/Throat:      Mouth: Mucous membranes are moist.      Pharynx: Oropharynx is clear.   Eyes:      Conjunctiva/sclera: Conjunctivae normal.    Cardiovascular:      Rate and Rhythm: Normal rate and regular rhythm.      Pulses: Normal pulses.      Heart sounds: Normal heart sounds. No murmur heard.   No friction rub. No gallop.    Pulmonary:      Effort: Pulmonary effort is normal.      Comments: Decreased BS in the left inferior axillary field.  Diffuse rales throughout left lung.  Right lung CTA in anterior and axillary fields.    Abdominal:      General: Abdomen is flat. Bowel sounds are normal. There is no distension.      Palpations: Abdomen is soft.      Tenderness: There is no abdominal tenderness.   Musculoskeletal:      Right lower leg: No edema.      Left lower leg: No edema.   Skin:     General: Skin is warm and dry.   Neurological:      Mental Status: She is alert.      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   Psychiatric:         Mood and Affect: Mood normal.         Behavior: Behavior normal.           Labs:   HEMATOLOGY/ ONCOLOGY/ID:            Recent Labs     08/08/21  0439 08/08/21  0439 08/09/21  0310 08/10/21  0320 08/10/21  1220   WBC 9.0   < > 7.2 6.5 9.0   RBC 3.39*   < > 3.29* 3.18* 3.69*   HEMOGLOBIN 10.5*   < > 10.2* 9.8* 11.6*   HEMATOCRIT 32.3*   < > 31.8* 31.3* 35.8*   MCV 95.3   < > 96.7 98.4* 97.0   MCH 31.0   < > 31.0 30.8 31.4   RDW 51.8*   < > 51.8* 52.2* 50.5*   PLATELETCT 285   < > 277 257 301   MPV 10.2   < > 10.4 10.6 10.5   NEUTSPOLYS 79.60*  --   --   --  80.50*   LYMPHOCYTES 10.30*  --   --   --  11.10*   MONOCYTES 8.10  --   --   --  5.80   EOSINOPHILS 0.90  --   --   --  1.70   BASOPHILS 0.10  --   --   --  0.30    < > = values in this interval not displayed.     Lab Results   Component Value Date    JNSOULJR33 984 (H) 09/25/2020    IGOAIJDV56 714 05/15/2019    MUXHARZZ20 937 (H) 10/02/2018    FOLATE >23.8 06/12/2019    FERRITIN 436.0 (H) 08/02/2021    FERRITIN 18.5 06/12/2019    FERRITIN 18.5 10/02/2018    IRON 13 (L) 08/02/2021    IRON 35 (L) 06/12/2019    IRON 29 (L)  10/02/2018    [facility] 150 (L) 08/02/2021    [facility] 272 06/12/2019    [facility] 392 10/02/2018       RENAL:        Estimated GFR/CRCL = Estimated Creatinine Clearance: 122.3 mL/min (by C-G formula based on SCr of 0.51 mg/dL).  Recent Labs     08/08/21  0439 08/08/21  0439 08/09/21  0310 08/10/21  0320 08/10/21  1220   SODIUM 140   < > 138 141 139   POTASSIUM 3.4*   < > 3.2* 4.3 3.8   CHLORIDE 103   < > 102 107 103   CO2 27   < > 28 28 25   GLUCOSE 106*   < > 132* 87 136*   BUN 7*   < > 9 9 7*   CREATININE 0.48*   < > 0.59 0.61 0.51   CALCIUM 9.2   < > 8.8 9.0 9.2   MAGNESIUM 1.5  --   --   --  1.7   PHOSPHORUS  --   --   --   --  1.8*   ALBUMIN  --   --   --   --  2.9*    < > = values in this interval not displayed.       GASTROINTESTINAL/ HEPATIC:          Recent Labs     08/10/21  1220   ALTSGPT 43   ASTSGOT 28   ALKPHOSPHAT 70   TBILIRUBIN 0.4   ALBUMIN 2.9*   GLOBULIN 3.4     No results found for: AMMONIA    ENDOCRINE:              Recent Labs     08/09/21  0310 08/10/21  0320 08/10/21  1220   GLUCOSE 132* 87 136*     Lab Results   Component Value Date    FREET4 0.92 05/15/2019       Imaging:     CXR 8/10:  Shows mild left pleural effusion w/ pulmonary edema in the left lung.    Problem Representation:  Carlene Rivas  is a 70 y.o. y/o woman w/ PMHx Stage IV endometrial cancer who presented on 7/29/2021 w/ worsening shortness of breath.  She was found to have a non-occlusive PE in the right pulmonary artery w/o right heart strain seen on CTA from 7/29.  Large left pleural effusion, thoracentesis performed on 7/29 removed 950 ml of fluid, + lights criteria, bloody aspirate.  CT chest on 8/3 shows likely hemothorax.  Hemothorax evacuation and decortication performed on 8/7.  Pathology from lung nodule shows endometrial adenocarcinoma.  RRT called on 8/10 for worsening hypoxia following movement, believed to be PE.    * Pleural effusion- (present on admission)  Assessment & Plan  Shortness of breath with  coarse crackles on left mid lung. Found to have large left sided pleural effusion with total collapse of left lower lobe and partial atelectasis of upper left lobe on initial CTA. Thoracentesis performed on 7/30, removing 950 cc of fluid.  CT surgery performed hemothorax evacuation and decortication w/ chest tube placement on 8/7, repeat imaging shows clearance of hemothorax and pleural effusion.  Biopsy of lung nodule was performed at that time, pathology shows endometrial adenocarcinoma.  This is a malignant pleural effusion w/ concomitant hemothorax, likely source is also malignant as initial thoracentesis had large amount of RBCs.    -Gyn-Onc consult, appreciate assistance  -Pulmonology consulted, appreciate assistance.   -Cardiothoracic surgery consult, appreciate assistance  -Chest tube removed 8/9    Tachycardia  Assessment & Plan  Pt w/ persistent tachycardia, which did not respond do fluid bolus.  This is likely a compensatory mechanism in the setting of lung re-expansion s/p chest surgery, and pulmonary emboli.  PE is unable to be treated at this time due to hemothorax.  -Continue to monitor, if any episodes of sustained SVT occur, consider vasovagal maneuver or metroprolol pushes as BP allows    Pulmonary embolism (HCC)  Assessment & Plan  Patient was having left upper thigh pain for several days preceding her shortness of breath.  Non-occlusive right pulmonary artery embolism (lobar and segmental branches) on CTA. No evidence of right heart strain on CTA. Likely only mild contribution to AHRF as the PE is non-occlusive and no evidence of right heart strain seen.    RRT on 8/10 w/ new hypoxia and tachycardia following moving to side of bed, CXR shows mild pleural effusion and pulmonary edema in the left lung.  Bedside ultrasound at the time shows minimal pleural fluid, no pericardial effusion and right lung without pulmonary edema.  Refer to RRT and ICU note for more detail.  This is believed to be a  worsening of her pulmonary emboli, whether it is a new clot or repositioning of old clot which is now occlusive, as this was a large clot.  -Restarting low dose heparin in the setting of worsening hypoxia with likely PE source, will need to monitor for bleeding.  Risks and benefits discussed with patient and family.  -Monitoring on telemetry  -Monitor vitals for hemodynamic stability    Anemia  Assessment & Plan  Pt w/ slowly downtrending hemoglobin since admission.  This is a normocytic anemia.  Iron studies show concern for early anemia of chronic disease, but this would be unlikely to contribute to such quick decline in hemoglobin.  Other considerations would be active, bleeding, but patient at this time has no obvious areas of active bleeding, no melena, no hematemesis, no vaginal bleeding, no external bleeding.  Consideration would be bleeding into thorax s/p thoracentesis performed on admission.  She was on anticoagulation for PE.  CT shows concerns for hemothorax, which would explain acute decline in hemoglobin following initiation of anticoagulation.    -Resolving following hemothorax evacuation.        Acute kidney injury (HCC)  Assessment & Plan  New HEAVEN on 8/1, w/ acute worsening on 8/2.  Resolved on 8/3.  Cr 48 hours prior to insult was 0.65, has since elevated to 1.28.  FeNa shows a likely pre-renal process.  This is likely 2/2 intravascular depletion in the setting of a large volume pleural effusion and attempted diuresis.  This was responsive to fluid bolus.    -AM BMP to monitor further, consider repeat bolus for worsening kidney fx    RESOLVED    Endometrial cancer (HCC)- (present on admission)  Assessment & Plan  Hx of endometrial cancer that has metastasized to the lungs. Follows-up with Dr. Jann Garcia. Finished chemotherapy infusion. Currently on tamoxifen (3 weeks on, 3 weeks off), last tamoxifen 07/18, would restart August 9th. Found to have 3 new pulmonary nodules up to 2.6 cm consistent with  pulmonary metastasis.  Pathology results show adenocarcinoma with a likely endometrial source.  -Gyn-Onc consulted, appreciate assistance   -Hold tamoxifen/megace due to current PE          Code Status: Full code  DVT ppx: low dose heparin  Diet: Regular diet  GI: Bowel regimen  T/L/D: CVC in left neck  Disposition: Anticipate >48 hours for further assessment of pleural effusion s/p chest tube removal.        Julio Clements DO  PGY-1, [facility]    Assessment and plan discussed with senior resident and attending physician.

## 2021-08-10 NOTE — PROGRESS NOTES
Daily Progress Note:     Date of Service: 8/9/2021  Primary Team: [facility] Blue [facility]   Attending: Estefani Santos  Senior Resident: Dr. Tenorio  Intern: Dr. Clements  Contact:  [phone]    Chief Complaint:   CP, SOB    Subjective:  No acute events overnight.  Pain is improving, she is feeling well this AM.  Still having significant pain when she is moved around.  Her breathing is the best it's felt, but still not quite back to normal.      Consultants/Specialty:  Gyn-Oncology  Pulmonology:  [facility]  [facility]    Review of Systems:    Review of Systems   Constitutional: Negative for chills and fever.   Respiratory: Positive for shortness of breath. Negative for cough.    Cardiovascular: Positive for chest pain (around surgery site, with movement). Negative for palpitations and orthopnea.   Gastrointestinal: Negative for abdominal pain, diarrhea, nausea and vomiting.   Genitourinary: Negative for dysuria and urgency.   Musculoskeletal: Negative for back pain, falls and joint pain.   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 Negative for headaches.       Objective Data:   Physical Exam:   Vitals:   Temp:  [36 °C (96.8 °F)-36.9 °C (98.5 °F)] 36.7 °C (98 °F)  Pulse:  [] 107  Resp:  [16-18] 18  BP: (100-148)/(65-90) 100/65  SpO2:  [92 %-98 %] 94 %    Physical Exam  Constitutional:       General: She is not in acute distress.     Appearance: She is not toxic-appearing.   HENT:      Head: Normocephalic and atraumatic.      Mouth/Throat:      Mouth: Mucous membranes are moist.      Pharynx: Oropharynx is clear.   Eyes:      Conjunctiva/sclera: Conjunctivae normal.   Cardiovascular:      Rate and Rhythm: Normal rate and regular rhythm.      Pulses: Normal pulses.      Heart sounds: Normal heart sounds. No murmur heard.   No friction rub. No gallop.    Pulmonary:      Effort: Pulmonary effort is normal.      Comments: No acute respiratory distress.  Two chest tubes in  place in left chest, with minimal drainage.  Diffuse rales present in left lung fields, improving.  Right CTA.    Abdominal:      General: Abdomen is flat. Bowel sounds are normal. There is no distension.      Palpations: Abdomen is soft.      Tenderness: There is no abdominal tenderness.   Musculoskeletal:      Right lower leg: No edema.      Left lower leg: No edema.   Skin:     General: Skin is warm and dry.   Neurological:      Mental Status: She is alert.      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   Psychiatric:         Mood and Affect: Mood normal.         Behavior: Behavior normal.           Labs:   HEMATOLOGY/ ONCOLOGY/ID:            Recent Labs     08/06/21  2143 08/08/21  0439 08/09/21  0310   WBC 8.4 9.0 7.2   RBC 3.52* 3.39* 3.29*   HEMOGLOBIN 10.8* 10.5* 10.2*   HEMATOCRIT 33.4* 32.3* 31.8*   MCV 94.9 95.3 96.7   MCH 30.7 31.0 31.0   RDW 51.2* 51.8* 51.8*   PLATELETCT 298 285 277   MPV 10.1 10.2 10.4   NEUTSPOLYS  --  79.60*  --    LYMPHOCYTES  --  10.30*  --    MONOCYTES  --  8.10  --    EOSINOPHILS  --  0.90  --    BASOPHILS  --  0.10  --      Lab Results   Component Value Date    [id number] 984 (H) 09/25/2020    ZDBJXODW15 714 05/15/2019    QTHKZJFJ81 937 (H) 10/02/2018    FOLATE >23.8 06/12/2019    FERRITIN 436.0 (H) 08/02/2021    FERRITIN 18.5 06/12/2019    FERRITIN 18.5 10/02/2018    IRON 13 (L) 08/02/2021    IRON 35 (L) 06/12/2019    IRON 29 (L) 10/02/2018    TOTIRONBC 150 (L) 08/02/2021    TOTIRONBC 272 06/12/2019    TOTIRONBC 392 10/02/2018       RENAL:        Estimated GFR/CRCL = Estimated Creatinine Clearance: 105.7 mL/min (by C-G formula based on SCr of 0.59 mg/dL).  Recent Labs     08/07/21  0421 08/08/21  0439 08/09/21  0310   SODIUM 141 140 138   POTASSIUM 3.9 3.4* 3.2*   CHLORIDE 104 103 102   CO2 27 27 28   GLUCOSE 130* 106* 132*   BUN 9 7* 9   CREATININE 0.68 0.48* 0.59   CALCIUM 9.7 9.2 8.8   MAGNESIUM  --  1.5  --        GASTROINTESTINAL/  HEPATIC:          No results for input(s): ALTSGPT, ASTSGOT, ALKPHOSPHAT, TBILIRUBIN, DBILIRUBIN, GAMMAGT, LIPASE, ALBUMIN, GLOBULIN, PREALBUMIN, INR, MACROCYTOSIS in the last 72 hours.  No results found for: AMMONIA    ENDOCRINE:              Recent Labs     08/07/21  0421 08/08/21  0439 08/09/21  0310   GLUCOSE 130* 106* 132*     Lab Results   Component Value Date    FREET4 0.92 05/15/2019       Imaging:   CXR 8/:  Imaging slightly improved upon yesterday, stable.        Problem Representation:  Carlene Rivas  is a 70 y.o. y/o woman w/ PMHx Stage IV endometrial cancer who presented on 7/29/2021 w/ worsening shortness of breath.  She was found to have a non-occlusive PE in the right pulmonary artery w/o right heart strain seen on CTA from 7/29.  Large left pleural effusion, thoracentesis performed on 7/29 removed 950 ml of fluid, + lights criteria.  Cytology not definitive for malignant effusion.  CT chest on 8/3 shows likely hemothorax.  Hemothorax evacuation and decortication performed on 8/7.    * Pleural effusion- (present on admission)  Assessment & Plan  Shortness of breath with coarse crackles on left mid lung. Found to have large left sided pleural effusion with total collapse of left lower lobe and partial atelectasis of upper left lobe on CTA. Thoracentesis performed on 7/30, removing 950 cc of fluid. Pleural fluid studies sent, positive lights criteria for exudative process.  Cytology is inconclusive as there wasn't enough of a cell sample to adequately assess cell types.  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  CT surgery performed hemothorax evacuation and decortication w/ chest tube placement on 8/7, repeat imaging shows clearance of hemothorax and pleural effusion.  Biopsy of lung nodule was performed at that time.  -Gyn-Onc consult, appreciate assistance  -Pulmonology consulted, appreciate assistance.    -Cardiothoracic surgery consult, appreciate assistance  -Chest tube removed 8/9, will monitor w/ repeat CXR tomorrow morning  -Pathology pending    Tachycardia  Assessment & Plan  Pt w/ persistent tachycardia, which did not respond do fluid bolus.  This is likely a compensatory mechanism in the setting of lung re-expansion s/p chest surgery, and pulmonary emboli.  PE is unable to be treated at this time due to hemothorax.  -Continue to monitor, if any episodes of sustained SVT occur, consider vasovagal maneuver or metroprolol pushes as BP allows    Pulmonary embolism (HCC)  Assessment & Plan  Patient was having left upper thigh pain for several days preceding her shortness of breath.  Non-occlusive right pulmonary artery embolism (lobar and segmental branches) on CTA. No evidence of right heart strain on CTA. Likely only mild contribution to AHRF as the PE is non-occlusive and no evidence of right heart strain seen.   -Holding anticoagulation in the setting of hemothorax  -Monitoring on telemetry  -Monitor vitals for hemodynamic stability  -DVT US to assess for DVT, may need IVC filter if she is not candidate for anticoagulation and has clot burden    Anemia  Assessment & Plan  Pt w/ slowly downtrending hemoglobin since admission.  This is a normocytic anemia.  Iron studies show concern for early anemia of chronic disease, but this would be unlikely to contribute to such quick decline in hemoglobin.  Other considerations would be active, bleeding, but patient at this time has no obvious areas of active bleeding, no melena, no hematemesis, no vaginal bleeding, no external bleeding.  Consideration would be bleeding into thorax s/p thoracentesis performed on admission.  She was on anticoagulation for PE.  CT shows concerns for hemothorax, which would explain acute decline in hemoglobin following initiation of anticoagulation.    -Resolving following hemothorax evacuation.        Acute kidney injury (HCC)  Assessment  & Plan  New HEAVEN on 8/1, w/ acute worsening on 8/2.  Resolved on 8/3.  Cr 48 hours prior to insult was 0.65, has since elevated to 1.28.  FeNa shows a likely pre-renal process.  This is likely 2/2 intravascular depletion in the setting of a large volume pleural effusion and attempted diuresis.  This was responsive to fluid bolus.    -AM BMP to monitor further, consider repeat bolus for worsening kidney fx    RESOLVED    Endometrial cancer (HCC)- (present on admission)  Assessment & Plan  Hx of endometrial cancer that has metastasized to the lungs. Follows-up with Dr. Jann Garcia. Finished chemotherapy infusion. Currently on tamoxifen (3 weeks on, 3 weeks off), last tamoxifen 07/18, would restart August 9th. Found to have 3 new pulmonary nodules up to 2.6 cm consistent with pulmonary metastasis.    -Gyn-Onc consulted, appreciate assistance   -Hold tamoxifen/megace due to current PE  -pending pathology from bx specimen obtained during surgery        Code Status: Full code  DVT ppx: held in setting of hemothorax  Diet: Regular diet  GI: Bowel regimen  T/L/D: CVC in left neck  Disposition: Anticipate >48 hours for further assessment of pleural effusion s/p chest tube removal.        Julio Clements DO  PGY-1, [facility]    Assessment and plan discussed with senior resident and attending physician.

## 2021-08-10 NOTE — PROGRESS NOTES
Critical Care/Pulmonary Consultation    Date of Service: 8/10/2021    Date of Admission:  7/29/2021  6:00 AM    Consulting Physician: Estefani Santos M.D.    Chief Complaint:  Shortness of Breath (Since monday) and Chest Wall Pain (Left sided stabbing pain that radiates to the back, pain increases with movement and breathing. Patient thought is was muscle pain and was using icy hot but the pain has been getting worse. )    Patient ID:   [id number] [id number] with PMHx Stage IV endometrial CA (3b, ER-/MI+, s/p bilat salpingoophrectomy) with metastasis to lungs in 10/20 (s/p carboplatin/taxol), CVA/PE, who was admitted on 7/29 for exudative unilateral pleural effusion and PE.      Interval Updates:  Currently on 3L. Chest tube removed yesterday but repeat CXR this morning showed re-developing L pleural effusion. Pathology report of lung nodule was positive for metastatic endometrioid adenocarcinoma. Continues to complain of L-sided rib pain.     Re-evaluated around 11:45 AM, appeared to be hypoxic - was placed on 10L with O2 saturation initially around 85% before improving. However, BP and HR were significantly elevated (190/105 and 140, respectively). RR was called and primary team was notified.     Review of Systems   Constitutional: Negative for chills and fever.   Respiratory: Positive for cough and shortness of breath. Negative for hemoptysis and sputum production.    Cardiovascular: Positive for chest pain and orthopnea. Negative for palpitations and leg swelling.        Chest wall discomfort at site of previous thoracentesis   Gastrointestinal: Negative for abdominal pain, constipation, diarrhea, nausea and vomiting.   Skin: Negative for itching and rash.   Neurological: Positive for dizziness and weakness. Negative for focal weakness and headaches.       Home Medications     Reviewed by Kavita Miller (Pharmacy Tech) on 07/29/21 at 0756  Med List Status: Complete   Medication Last Dose Status   acetaminophen (TYLENOL)  500 MG Tab 7/28/2021 Active   amitriptyline (ELAVIL) 50 MG Tab 7/28/2021 Active   amLODIPine (NORVASC) 5 MG Tab 7/28/2021 Active   ascorbic acid (ASCORBIC ACID) 500 MG Tab 7/28/2021 Active   aspirin (ASA) 81 MG Chew Tab chewable tablet 7/29/2021 Active   atorvastatin (LIPITOR) 20 MG Tab 7/28/2021 Active   B Complex-C (SUPER B COMPLEX PO) 7/28/2021 Active   baclofen (LIORESAL) 10 MG Tab 7/28/2021 Active   Cholecalciferol (VITAMIN D3) 2000 UNIT Cap 7/28/2021 Active   Coenzyme Q10 (COQ10) 100 MG Cap 7/28/2021 Active   ferrous sulfate 325 (65 Fe) MG tablet 7/28/2021 Active   losartan (COZAAR) 100 MG Tab 7/28/2021 Active   meclizine (ANTIVERT) 12.5 MG Tab PRN Active   megestrol (MEGACE) 40 MG Tab 7/28/2021 Active   metoprolol SR (TOPROL XL) 100 MG TABLET SR 24 HR 7/29/2021 Active   multivitamin (THERAGRAN) Tab 7/28/2021 Active   senna-docusate (PERICOLACE OR SENOKOT S) 8.6-50 MG Tab PRN Active   tamoxifen (NOLVADEX) 20 MG tablet ABOUT 2 WEEKS AGO Active                Social History     Tobacco Use   • Smoking status: Never Smoker   • Smokeless tobacco: Never Used   • Tobacco comment: n/a   Vaping Use   • Vaping Use: Never used   Substance Use Topics   • Alcohol use: No   • Drug use: No        Past Medical History:   Diagnosis Date   • Anemia 2/6/2018    Iron def, post-menopausal bleeding.   • Back pain    • Back pain    • Cancer (HCC) 2019    uterine & endometrial   • Cough    • CVA, old, hemiparesis (HCC) 2/6/2018    Residual left sided weakness   • Essential hypertension 2/6/2018    Well controlled on amlodipine 10 mg and metoprolol 100 mg bid.   • Eye drainage    • Fatigue    • Frequent headaches    • Frequent urination    • Hyperlipidemia    • Irregular periods    • Painful joint    • Palpitations    • Post-menopause bleeding 2/6/2018    Menopause in 2010 and bleeding 2012 started to have irregular continuous bleeding.    • Pulmonary nodule    • Sore muscles    • Stroke (HCC)     right sided weakness   • Swelling of  lower extremity    • Vision abnormalities 2/6/2018   • Vitamin D deficiency 2/6/2018    Taking 50,000 IU once weekly for 4 yrs.   • Weakness    • Wears glasses        Past Surgical History:   Procedure Laterality Date   • PB THORACOSCOPY,DX NO BX Left 8/6/2021    Procedure: THORACOSCOPY - HEMOTHORAX EVACUATION, CHEST TUBE PLACEMENT X 2;  Surgeon: John H Ganser, M.D.;  Location: [facility];  Service: General   • PB CYSTOSCOPY,INSERT URETERAL STENT Left 8/8/2019    Procedure: CYSTOSCOPY, WITH URETERAL STENT INSERTION;  Surgeon: Jann Garcia M.D.;  Location: SUR[facility]ERY [facility];  Service: Gynecology   • HYSTERECTOMY ROBOTIC XI N/A 8/8/2019    Procedure: HYSTERECTOMY, ROBOT-ASSISTED, USING DA RILEY XI;  Surgeon: Jann Garcia M.D.;  Location: [facility];  Service: Gynecology   • SALPINGO OOPHORECTOMY Bilateral 8/8/2019    Procedure: SALPINGO-OOPHORECTOMY;  Surgeon: Jann Garcia M.D.;  Location: [facility];  Service: Gynecology   • NODE BIOPSY SENTINEL  8/8/2019    Procedure: BIOPSY, LYMPH NODE, SENTINEL;  Surgeon: Jann Garcia M.D.;  Location: SURGERY [facility];  Service: Gynecology   • PB HYSTEROSCOPY,DX,SEP PROC  6/19/2019    Procedure: HYSTEROSCOPY, DIAGNOSTIC;  Surgeon: Anel Chicas M.D.;  Location: [facility];  Service: Gynecology   • DILATION AND CURETTAGE  6/19/2019    Procedure: DILATION AND CURETTAGE;  Surgeon: Anel Chicas M.D.;  Location: [facility];  Service: Gynecology   • HYSTERECTOMY LAPAROSCOPY         Allergies: Penicillins, Gluten meal, Hydrocodone, Iodine, Levaquin, and Tramadol    Family History   Problem Relation Age of Onset   • Hypertension Mother    • No Known Problems Father    • No Known Problems Brother        Temp:  [36.1 °C (97 °F)-37.1 °C (98.8 °F)] 36.1 °C (97 °F)  Pulse:  [] 98  Resp:  [16-18] 18  BP: (100-148)/(59-90) 103/65  SpO2:  [93 %-96 %] 95 %    Physical Examination  Physical Exam  Vitals  reviewed: Physical exam deferred on patient request.   Constitutional:       General: She is not in acute distress.     Appearance: She is not toxic-appearing.   HENT:      Head: Normocephalic and atraumatic.      Mouth/Throat:      Mouth: Mucous membranes are moist.      Pharynx: Oropharynx is clear.   Eyes:      Conjunctiva/sclera: Conjunctivae normal.   Cardiovascular:      Rate and Rhythm: Normal rate and regular rhythm.      Pulses: Normal pulses.      Heart sounds: Normal heart sounds. No murmur heard.   No friction rub. No gallop.    Pulmonary:      Effort: Pulmonary effort is normal. No respiratory distress.      Breath sounds: Rales (Diffuse on L side) present. No wheezing.      Comments: On 3L  Abdominal:      General: Abdomen is flat. Bowel sounds are normal. There is no distension.      Palpations: Abdomen is soft.      Tenderness: There is no abdominal tenderness.   Musculoskeletal:         General: Tenderness (L sided rib tenderness) present.      Right lower leg: No edema.      Left lower leg: No edema.   Skin:     General: Skin is warm and dry.   Neurological:      Mental Status: She is alert.      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   Psychiatric:         Mood and Affect: Mood normal.         Behavior: Behavior normal.           Intake/Output Summary (Last 24 hours) at 8/10/2021 1000  Last data filed at 8/10/2021 0300  Gross per 24 hour   Intake --   Output 700 ml   Net -700 ml       Recent Labs     08/08/21  0439 08/09/21  0310 08/10/21  0320   WBC 9.0 7.2 6.5   NEUTSPOLYS 79.60*  --   --    LYMPHOCYTES 10.30*  --   --    MONOCYTES 8.10  --   --    EOSINOPHILS 0.90  --   --    BASOPHILS 0.10  --   --      Recent Labs     08/08/21  0439 08/09/21  0310 08/10/21  0320   SODIUM 140 138 141   POTASSIUM 3.4* 3.2* 4.3   CHLORIDE 103 102 107   CO2 27 28 28   BUN 7* 9 9   CREATININE 0.48* 0.59 0.61   MAGNESIUM 1.5  --   --    CALCIUM 9.2 8.8 9.0     Recent Labs      08/08/21  0439 08/09/21  0310 08/10/21  0320   GLUCOSE 106* 132* 87         DX-CHEST-PORTABLE (1 VIEW)   Final Result         1.  Patchy left pulmonary infiltrates.   2.  Layering left pleural effusion   3.  Cardiomegaly      US-EXTREMITY VENOUS LOWER BILAT   Final Result      DX-CHEST-PORTABLE (1 VIEW)   Final Result         1.  Left pulmonary infiltrates, stable   2.  Cardiomegaly      DX-CHEST-PORTABLE (1 VIEW)   Final Result      1.  Again seen 2 left-sided chest tubes present.      2.  Bony loss and consolidation within the left lung.      3.  Stable left pleural effusion      DX-CHEST-PORTABLE (1 VIEW)   Final Result      No significant change      DX-CHEST-PORTABLE (1 VIEW)   Final Result      1.  Interval placement of 2 large bore left chest tubes with decreased left pleural effusion. Small hemopneumothorax.   2.  Left lung opacity likely atelectasis.      US-CHEST   Final Result      1.  Diffusely organized process noted in the left hemithorax. No significant pleural effusion identified.      2.  Thoracentesis deferred      DX-CHEST-PORTABLE (1 VIEW)   Final Result      Opacification of the left hemithorax is unchanged with mediastinal shift to the right.      US-CHEST   Final Result         Thick echogenic heterogeneous material in the left pleural space. No drainable pocket for thoracentesis.      DX-CHEST-PORTABLE (1 VIEW)   Final Result      1.  Unchanged subtotal opacification of the left hemithorax without volume loss.      2.  Persistent mediastinal shift to the right.      3.  No right lung consolidation.      CT-CHEST (THORAX) W/O   Final Result      1.  Very large left pleural effusion which now demonstrates heterogeneous areas of increased density suggesting interval hemorrhage or less likely superimposed infection.   2.  Complete collapse/compressive atelectasis of the left lung which is worsened than prior study.   3.  New mild groundglass opacity in the right lung apex could represent  atelectasis or mild pneumonitis.   4.  Right pulmonary nodules again noted suggesting metastases.      These findings were discussed with JUANITA SERNA on 8/3/2021 3:40 PM.      Fleischner Society pulmonary nodule recommendations:   Not Applicable         DX-CHEST-PORTABLE (1 VIEW)   Final Result      1.  There is complete opacification of the left hemithorax which could represent combination of pleural effusion or airspace process.      US-CHEST   Final Result      1.  Diffuse pneumonitis of left hemithorax.      2.  No evidence of a large left-sided pleural effusion. Thoracentesis deferred.      3.  Recommend CT scan of the thorax for further evaluation.      DX-CHEST-PORTABLE (1 VIEW)   Final Result      Worsening opacification on the left which is now complete, probably related to left pleural effusion and atelectasis.      DX-CHEST-LIMITED (1 VIEW)   Final Result      1.  No significant interval change.      DX-CHEST-PORTABLE (1 VIEW)   Final Result      1.  Interval decrease in the left pleural effusion following thoracentesis.   2.  There is no pneumothorax visualized.      US-THORACENTESIS PUNCTURE LEFT   Final Result      1. Ultrasound guided left sided diagnostic thoracentesis.      2. 950 mL of fluid withdrawn.      CT-CTA CHEST PULMONARY ARTERY W/ RECONS   Final Result      1.  Nonocclusive pulmonary emboli in the right pulmonary artery, its lobar and segmental branches. No right heart strain.   2.  Large left pleural effusion, with total collapse of the left lower lobe and partial atelectasis of the left upper lobe.   3.  At least 3 new pulmonary nodules measuring up to 2.6 cm, consistent with pulmonary metastases.   4.  Unchanged heterogeneous thyroid gland with multiple thyroid nodules.      Findings were discussed with KRISTOPHER COOK on 7/29/2021 9:38 AM.         DX-CHEST-PORTABLE (1 VIEW)   Final Result      Very large left pleural effusion with associated compressive atelectasis.          Patient  Active Problem List   Diagnosis   • Vision abnormalities   • CVA, old, hemiparesis (HCC)   • Essential hypertension   • Vitamin D deficiency   • Endometrial cancer (HCC)   • Iron deficiency anemia   • Obesity (BMI 30-39.9)   • Hypokalemia   • Admission for chemotherapy   • Nausea   • Thyroid nodule - benign   • Other chest pain   • Tongue burning sensation   • Neuropathy   • Multiple lung nodules - growth from 8 mm - 10 mm 1/2020 - 8/2020   • Pulmonary embolism (HCC)   • Pleural effusion   • Acute kidney injury (HCC)   • Anemia   • Tachycardia       Assessment and Plan:  #Recurrent malignant pleural effusion  #Pulmonary embolism  #Acute hypoxic respiratory failure  - History of metastatic endometrial carcinoma (s/p chemo)  - Would likely benefit from Palliative - however, pt's family opposed to consult  - Poor overall prognosis considering rapid recurrence of pleural effusions  - Titrate oxygen > 90%  - Reconsider starting AC for PE if no signs of hemothorax  - Lung biopsy positive for metastatic adenocarcinoma  - Pain control with Oxycodone PRN  - Recommend gentle diuresis - dosing per primary team  - STAT CXR pending from RR - may need transfer to ICU if hypoxia worsens  - Gyn/Onc following    Pulmonology will continue to follow.

## 2021-08-11 ENCOUNTER — PATIENT OUTREACH (OUTPATIENT)
Dept: HEALTH INFORMATION MANAGEMENT | Facility: OTHER | Age: 71
End: 2021-08-11

## 2021-08-11 ENCOUNTER — APPOINTMENT (OUTPATIENT)
Dept: RADIOLOGY | Facility: MEDICAL CENTER | Age: 71
DRG: 163 | End: 2021-08-11
Attending: STUDENT IN AN ORGANIZED HEALTH CARE EDUCATION/TRAINING PROGRAM
Payer: MEDICARE

## 2021-08-11 LAB
ANION GAP SERPL CALC-SCNC: 9 MMOL/L (ref 7–16)
BUN SERPL-MCNC: 8 MG/DL (ref 8–22)
CALCIUM SERPL-MCNC: 8.9 MG/DL (ref 8.5–10.5)
CHLORIDE SERPL-SCNC: 105 MMOL/L (ref 96–112)
CO2 SERPL-SCNC: 27 MMOL/L (ref 20–33)
CREAT SERPL-MCNC: 0.68 MG/DL (ref 0.5–1.4)
EKG IMPRESSION: NORMAL
ERYTHROCYTE [DISTWIDTH] IN BLOOD BY AUTOMATED COUNT: 50.3 FL (ref 35.9–50)
GLUCOSE SERPL-MCNC: 124 MG/DL (ref 65–99)
HCT VFR BLD AUTO: 33.6 % (ref 37–47)
HGB BLD-MCNC: 10.8 G/DL (ref 12–16)
MCH RBC QN AUTO: 31 PG (ref 27–33)
MCHC RBC AUTO-ENTMCNC: 32.1 G/DL (ref 33.6–35)
MCV RBC AUTO: 96.6 FL (ref 81.4–97.8)
PLATELET # BLD AUTO: 318 K/UL (ref 164–446)
PMV BLD AUTO: 10.6 FL (ref 9–12.9)
POTASSIUM SERPL-SCNC: 3.8 MMOL/L (ref 3.6–5.5)
RBC # BLD AUTO: 3.48 M/UL (ref 4.2–5.4)
SODIUM SERPL-SCNC: 141 MMOL/L (ref 135–145)
TROPONIN T SERPL-MCNC: 54 NG/L (ref 6–19)
TROPONIN T SERPL-MCNC: 97 NG/L (ref 6–19)
UFH PPP CHRO-ACNC: 0.48 IU/ML
WBC # BLD AUTO: 10.1 K/UL (ref 4.8–10.8)

## 2021-08-11 PROCEDURE — 71045 X-RAY EXAM CHEST 1 VIEW: CPT

## 2021-08-11 PROCEDURE — 85027 COMPLETE CBC AUTOMATED: CPT

## 2021-08-11 PROCEDURE — 700102 HCHG RX REV CODE 250 W/ 637 OVERRIDE(OP): Performed by: STUDENT IN AN ORGANIZED HEALTH CARE EDUCATION/TRAINING PROGRAM

## 2021-08-11 PROCEDURE — 84484 ASSAY OF TROPONIN QUANT: CPT

## 2021-08-11 PROCEDURE — A9270 NON-COVERED ITEM OR SERVICE: HCPCS | Performed by: STUDENT IN AN ORGANIZED HEALTH CARE EDUCATION/TRAINING PROGRAM

## 2021-08-11 PROCEDURE — 700102 HCHG RX REV CODE 250 W/ 637 OVERRIDE(OP): Performed by: INTERNAL MEDICINE

## 2021-08-11 PROCEDURE — 93010 ELECTROCARDIOGRAM REPORT: CPT | Performed by: INTERNAL MEDICINE

## 2021-08-11 PROCEDURE — 80048 BASIC METABOLIC PNL TOTAL CA: CPT

## 2021-08-11 PROCEDURE — 700111 HCHG RX REV CODE 636 W/ 250 OVERRIDE (IP): Performed by: STUDENT IN AN ORGANIZED HEALTH CARE EDUCATION/TRAINING PROGRAM

## 2021-08-11 PROCEDURE — A9270 NON-COVERED ITEM OR SERVICE: HCPCS | Performed by: INTERNAL MEDICINE

## 2021-08-11 PROCEDURE — 99233 SBSQ HOSP IP/OBS HIGH 50: CPT | Mod: GC | Performed by: HOSPITALIST

## 2021-08-11 PROCEDURE — 99233 SBSQ HOSP IP/OBS HIGH 50: CPT | Mod: GC | Performed by: INTERNAL MEDICINE

## 2021-08-11 PROCEDURE — 85520 HEPARIN ASSAY: CPT

## 2021-08-11 PROCEDURE — 770020 HCHG ROOM/CARE - TELE (206)

## 2021-08-11 PROCEDURE — 93005 ELECTROCARDIOGRAM TRACING: CPT | Performed by: STUDENT IN AN ORGANIZED HEALTH CARE EDUCATION/TRAINING PROGRAM

## 2021-08-11 RX ORDER — LOSARTAN POTASSIUM 50 MG/1
50 TABLET ORAL DAILY
Status: DISCONTINUED | OUTPATIENT
Start: 2021-08-12 | End: 2021-08-18 | Stop reason: HOSPADM

## 2021-08-11 RX ADMIN — THERA TABS 1 TABLET: TAB at 05:26

## 2021-08-11 RX ADMIN — GUAIFENESIN 600 MG: 600 TABLET, EXTENDED RELEASE ORAL at 05:25

## 2021-08-11 RX ADMIN — GUAIFENESIN 600 MG: 600 TABLET, EXTENDED RELEASE ORAL at 16:08

## 2021-08-11 RX ADMIN — ASPIRIN 81 MG: 81 TABLET, CHEWABLE ORAL at 05:25

## 2021-08-11 RX ADMIN — ACETAMINOPHEN 500 MG: 500 TABLET, FILM COATED ORAL at 16:07

## 2021-08-11 RX ADMIN — FUROSEMIDE 40 MG: 10 INJECTION, SOLUTION INTRAMUSCULAR; INTRAVENOUS at 05:24

## 2021-08-11 RX ADMIN — FERROUS SULFATE TAB 325 MG (65 MG ELEMENTAL FE) 325 MG: 325 (65 FE) TAB at 16:08

## 2021-08-11 RX ADMIN — ACETAMINOPHEN 500 MG: 500 TABLET, FILM COATED ORAL at 05:25

## 2021-08-11 RX ADMIN — ACETAMINOPHEN 500 MG: 500 TABLET, FILM COATED ORAL at 22:05

## 2021-08-11 RX ADMIN — Medication 2000 UNITS: at 16:07

## 2021-08-11 RX ADMIN — ATORVASTATIN CALCIUM 20 MG: 20 TABLET, FILM COATED ORAL at 16:09

## 2021-08-11 RX ADMIN — OXYCODONE 5 MG: 5 TABLET ORAL at 19:57

## 2021-08-11 RX ADMIN — LOSARTAN POTASSIUM 100 MG: 50 TABLET, FILM COATED ORAL at 12:38

## 2021-08-11 RX ADMIN — Medication 500 MG: at 16:08

## 2021-08-11 RX ADMIN — DOCUSATE SODIUM 50 MG AND SENNOSIDES 8.6 MG 2 TABLET: 8.6; 5 TABLET, FILM COATED ORAL at 19:57

## 2021-08-11 RX ADMIN — METOPROLOL SUCCINATE 100 MG: 100 TABLET, EXTENDED RELEASE ORAL at 05:25

## 2021-08-11 RX ADMIN — HEPARIN SODIUM 12 UNITS/KG/HR: 5000 INJECTION, SOLUTION INTRAVENOUS at 18:31

## 2021-08-11 ASSESSMENT — ENCOUNTER SYMPTOMS
CHILLS: 0
ORTHOPNEA: 0
COUGH: 1
NAUSEA: 0
SPUTUM PRODUCTION: 0
CONSTIPATION: 0
FALLS: 0
ORTHOPNEA: 1
BACK PAIN: 0
SHORTNESS OF BREATH: 1
FOCAL WEAKNESS: 1
DIZZINESS: 1
HEMOPTYSIS: 0
PALPITATIONS: 0
DIARRHEA: 0
COUGH: 0
FOCAL WEAKNESS: 0
VOMITING: 0
ABDOMINAL PAIN: 0
FEVER: 0
WEAKNESS: 1
HEADACHES: 0

## 2021-08-11 ASSESSMENT — PAIN DESCRIPTION - PAIN TYPE
TYPE: ACUTE PAIN

## 2021-08-11 ASSESSMENT — FIBROSIS 4 INDEX
FIB4 SCORE: 0.91
FIB4 SCORE: 0.96

## 2021-08-11 NOTE — PROGRESS NOTES
Daily Progress Note:     Date of Service: 8/11/2021  Primary Team: [employer] Blue Team   Attending: Estefani Santos  Senior Resident: Dr. Tenorio  Intern: Dr. Clements  Contact:  [phone]    Chief Complaint:   CP, SOB    Subjective:  No acute events overnight.  Today she is in good spirits and feeling better than yesterday.  The chest pain has decreased, her breathing has improved.  She is breathing much more comfortably than she was yesterday afternoon, almost feeling how she was prior to RRT yesterday.    Consultants/Specialty:  Gyn-Oncology  Pulmonology:  Mclain  CT surgery:  Ganser    Review of Systems:    Review of Systems   Constitutional: Negative for chills and fever.   Respiratory: Positive for shortness of breath (Improved upon yesterday). Negative for cough.    Cardiovascular: Positive for chest pain (mild, improving since removal of chest tube). Negative for palpitations and orthopnea.   Gastrointestinal: Negative for abdominal pain, diarrhea, nausea and vomiting.   Genitourinary: Negative for dysuria and urgency.   Musculoskeletal: Negative for back pain, falls and joint pain.   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 Negative for headaches.       Objective Data:   Physical Exam:   Vitals:   Temp:  [35.8 °C (96.5 °F)-36.3 °C (97.4 °F)] 35.8 °C (96.5 °F)  Pulse:  [] 117  Resp:  [16-40] 17  BP: (103-190)/() 105/70  SpO2:  [88 %-95 %] 91 %    Physical Exam  Constitutional:       General: She is not in acute distress.     Appearance: She is not toxic-appearing.   HENT:      Head: Normocephalic and atraumatic.      Mouth/Throat:      Mouth: Mucous membranes are moist.      Pharynx: Oropharynx is clear.   Eyes:      Conjunctiva/sclera: Conjunctivae normal.   Cardiovascular:      Rate and Rhythm: Regular rhythm. Tachycardia present.      Pulses: Normal pulses.      Heart sounds: Normal heart sounds. No murmur heard.   No friction rub. No gallop.     Pulmonary:      Effort: Pulmonary effort is normal.      Comments: Decreased BS in the left inferior axillary field.  Diffuse rales throughout left lung.  Right lung CTA in anterior and axillary fields.  Unchanged from yesterday.  Abdominal:      General: Abdomen is flat. Bowel sounds are normal. There is no distension.      Palpations: Abdomen is soft.      Tenderness: There is no abdominal tenderness.   Musculoskeletal:      Right lower leg: No edema.      Left lower leg: No edema.   Skin:     General: Skin is warm and dry.   Neurological:      Mental Status: She is alert.      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   Psychiatric:         Mood and Affect: Mood normal.         Behavior: Behavior normal.           Labs:   HEMATOLOGY/ ONCOLOGY/ID:            Recent Labs     08/10/21  0320 08/10/21  1220 08/11/21  0319   WBC 6.5 9.0 10.1   RBC 3.18* 3.69* 3.48*   HEMOGLOBIN 9.8* 11.6* 10.8*   HEMATOCRIT 31.3* 35.8* 33.6*   MCV 98.4* 97.0 96.6   MCH 30.8 31.4 31.0   RDW 52.2* 50.5* 50.3*   PLATELETCT 257 301 318   MPV 10.6 10.5 10.6   NEUTSPOLYS  --  80.50*  --    LYMPHOCYTES  --  11.10*  --    MONOCYTES  --  5.80  --    EOSINOPHILS  --  1.70  --    BASOPHILS  --  0.30  --      Lab Results   Component Value Date    DFIXHUES33 984 (H) 09/25/2020    MDMZIVDG04 714 05/15/2019    NZWUJJJL61 937 (H) 10/02/2018    FOLATE >23.8 06/12/2019    FERRITIN 436.0 (H) 08/02/2021    FERRITIN 18.5 06/12/2019    FERRITIN 18.5 10/02/2018    IRON 13 (L) 08/02/2021    IRON 35 (L) 06/12/2019    IRON 29 (L) 10/02/2018    TOTIRONBC 150 (L) 08/02/2021    TOTIRONBC 272 06/12/2019    TOTIRONBC 392 10/02/2018       RENAL:        Estimated GFR/CRCL = Estimated Creatinine Clearance: 93.3 mL/min (by C-G formula based on SCr of 0.68 mg/dL).  Recent Labs     08/10/21  0320 08/10/21  1220 08/11/21  0319   SODIUM 141 139 141   POTASSIUM 4.3 3.8 3.8   CHLORIDE 107 103 105   CO2 28 25 27   GLUCOSE 87 136* 124*    BUN 9 7* 8   CREATININE 0.61 0.51 0.68   CALCIUM 9.0 9.2 8.9   MAGNESIUM  --  1.7  --    PHOSPHORUS  --  1.8*  --    ALBUMIN  --  2.9*  --        GASTROINTESTINAL/ HEPATIC:          Recent Labs     08/10/21  1220 08/10/21  2010   ALTSGPT 43  --    ASTSGOT 28  --    ALKPHOSPHAT 70  --    TBILIRUBIN 0.4  --    ALBUMIN 2.9*  --    GLOBULIN 3.4  --    INR  --  1.20*     No results found for: AMMONIA    ENDOCRINE:              Recent Labs     08/10/21  0320 08/10/21  1220 08/11/21 0319   GLUCOSE 87 136* 124*     Lab Results   Component Value Date    FREET4 0.92 05/15/2019       Imaging:     CXR 8/11:  Shows mild left pleural effusion w/ pulmonary edema in the left lung.  Stable from yesterday    Problem Representation:  Carlene Rivas  is a 70 y.o. y/o woman w/ PMHx Stage IV endometrial cancer who presented on 7/29/2021 w/ worsening shortness of breath.  She was found to have a non-occlusive PE in the right pulmonary artery w/o right heart strain seen on CTA from 7/29.  Large left pleural effusion, thoracentesis performed on 7/29 removed 950 ml of fluid, + lights criteria, bloody aspirate.  CT chest on 8/3 shows likely hemothorax.  Hemothorax evacuation and decortication performed on 8/7.  Pathology from lung nodule shows endometrial adenocarcinoma.  RRT called on 8/10 for worsening hypoxia following movement, believed to be PE.    * Pleural effusion- (present on admission)  Assessment & Plan  Shortness of breath with coarse crackles on left mid lung. Found to have large left sided pleural effusion with total collapse of left lower lobe and partial atelectasis of upper left lobe on initial CTA. Thoracentesis performed on 7/30, removing 950 cc of fluid.  CT surgery performed hemothorax evacuation and decortication w/ chest tube placement on 8/7, repeat imaging shows clearance of hemothorax and pleural effusion.  Biopsy of lung nodule was performed at that time, pathology shows endometrial adenocarcinoma.  This is a  malignant pleural effusion w/ concomitant hemothorax, likely source is also malignant as initial thoracentesis had large amount of RBCs.    -Gyn-Onc consult, appreciate assistance  -Pulmonology consulted, appreciate assistance.   -Cardiothoracic surgery consult, appreciate assistance  -Chest tube removed 8/9  -Fluid doesn't appear to be reaccumulating at this time as imaging is stable.      Tachycardia  Assessment & Plan  Pt w/ persistent tachycardia, which did not respond do fluid bolus.  Worsened on 8/10 after RRT, due to pulmonary emboli.  -Continue to monitor, if any episodes of sustained SVT occur, consider vasovagal maneuver or metroprolol pushes as BP allows    Pulmonary embolism (HCC)  Assessment & Plan  Patient was having left upper thigh pain for several days preceding her shortness of breath.  Non-occlusive right pulmonary artery embolism (lobar and segmental branches) on CTA. No evidence of right heart strain on CTA. Likely only mild contribution to AHRF as the PE is non-occlusive and no evidence of right heart strain seen.    RRT on 8/10 w/ new hypoxia and tachycardia following moving to side of bed, CXR shows mild pleural effusion and pulmonary edema in the left lung.  Bedside ultrasound at the time shows minimal pleural fluid, no pericardial effusion and right lung without pulmonary edema.  Refer to RRT and ICU note for more detail.  This is believed to be a worsening of her pulmonary emboli, whether it is a new clot or repositioning of old clot which is now occlusive, as this was a large clot.  -Clinically pt appears to be stable and improving, tachycardia is decreasing, less oxygen requirement.  Will continue to monitor.  -Restarting low dose heparin in the setting of worsening hypoxia with likely PE source, will need to monitor for bleeding.  Risks and benefits discussed with patient and family.  If patient continues to tolerate heparin well, will consider transitioning to lovenox  -Monitoring on  telemetry  -Monitor vitals for hemodynamic stability    Anemia  Assessment & Plan  Pt w/ slowly downtrending hemoglobin since admission.  This is a normocytic anemia.  Iron studies show concern for early anemia of chronic disease, but this would be unlikely to contribute to such quick decline in hemoglobin.  Other considerations would be active, bleeding, but patient at this time has no obvious areas of active bleeding, no melena, no hematemesis, no vaginal bleeding, no external bleeding.  Consideration would be bleeding into thorax s/p thoracentesis performed on admission.  She was on anticoagulation for PE.  CT shows concerns for hemothorax, which would explain acute decline in hemoglobin following initiation of anticoagulation.    -Resolving following hemothorax evacuation.  -Continue to monitor for evidence of re-bleeding.        Acute kidney injury (HCC)  Assessment & Plan  New HEAVEN on 8/1, w/ acute worsening on 8/2.  Resolved on 8/3.  Cr 48 hours prior to insult was 0.65, has since elevated to 1.28.  FeNa shows a likely pre-renal process.  This is likely 2/2 intravascular depletion in the setting of a large volume pleural effusion and attempted diuresis.  This was responsive to fluid bolus.      RESOLVED    Endometrial cancer (HCC)- (present on admission)  Assessment & Plan  Hx of endometrial cancer that has metastasized to the lungs. Follows-up with Dr. Jann Garcia. Finished chemotherapy infusion. Currently on tamoxifen (3 weeks on, 3 weeks off), last tamoxifen 07/18, would restart August 9th. Found to have 3 new pulmonary nodules up to 2.6 cm consistent with pulmonary metastasis.  Pathology results show adenocarcinoma with a likely endometrial source.  -Gyn-Onc consulted, appreciate assistance   -Hold tamoxifen/megace due to current PE   -Will need follow-up outpatient to determine next step for treatment options          Code Status: Full code  DVT ppx: low dose heparin  Diet: Regular diet  GI: Bowel  regimen  T/L/D: CVC in left neck  Disposition: Anticipate >48 hours for further assessment of pleural effusion s/p chest tube removal.        Julio Clements DO  PGY-1, [facility]    Assessment and plan discussed with senior resident and attending physician.

## 2021-08-11 NOTE — CARE PLAN
The patient is Watcher - Medium risk of patient condition declining or worsening    Shift Goals  Clinical Goals: manage pain, monitor chest tube sites, get up wi/ PT/OT  Patient Goals: manage pain, speak with doctors  Family Goals: rest, plan    Progress made toward(s) clinical / shift goals:    Problem: Pain - Standard  Goal: Alleviation of pain or a reduction in pain to the patient’s comfort goal  Outcome: Progressing     Problem: Knowledge Deficit - Standard  Goal: Patient and family/care givers will demonstrate understanding of plan of care, disease process/condition, diagnostic tests and medications  Outcome: Progressing     Problem: Fall Risk  Goal: Patient will remain free from falls  Outcome: Progressing     Problem: Respiratory  Goal: Patient will achieve/maintain optimum respiratory ventilation and gas exchange  Outcome: Progressing       Patient is not progressing towards the following goals:

## 2021-08-11 NOTE — CARE PLAN
The patient is Watcher - Medium risk of patient condition declining or worsening    Shift Goals  Clinical Goals: pain management, monitor SpO2  Patient Goals: manage pain, speak with doctors  Family Goals: rest, plan      Problem: Pain - Standard  Goal: Alleviation of pain or a reduction in pain to the patient’s comfort goal  Outcome: Progressing     Problem: Knowledge Deficit - Standard  Goal: Patient and family/care givers will demonstrate understanding of plan of care, disease process/condition, diagnostic tests and medications  Outcome: Progressing     Problem: Fall Risk  Goal: Patient will remain free from falls  Outcome: Progressing  Note: Bed in lowest position with bed locked.  Educated patient to use call light.      Problem: Respiratory  Goal: Patient will achieve/maintain optimum respiratory ventilation and gas exchange  Outcome: Progressing  Flowsheets  Taken 8/11/2021 1239 by Willie Agarwal RIraidaNIraida  O2 Delivery Device: Nasal Cannula  Taken 8/11/2021 0900 by Willie Agarwal RCONY  Incentive Spirometer: Not Applicable  Taken 8/11/2021 0900 by Sommer Coughlin R.N.  Deep Breathe and Cough: Performs Correctly  Note: Pt on a continuous pulse ox.      Problem: Skin Integrity  Goal: Skin integrity is maintained or improved  Outcome: Not Progressing  Note: Pt refusing 2 RN skin checks, or turning.        Patient is not progressing towards the following goals:      Problem: Skin Integrity  Goal: Skin integrity is maintained or improved  Outcome: Not Progressing  Note: Pt refusing 2 RN skin checks, or turning.

## 2021-08-11 NOTE — PROGRESS NOTES
GYN/Oncology Progress Note               Author: SHANA Monge Date & Time created: 8/10/2021  9:05 PM     Interval History:  Patient appears comfortable sitting in bed, breathing has improved since earlier today when she had the hypoxic episode.     Review of Systems:  Review of Systems   Constitutional: Negative for chills and fever.   Respiratory: Positive for shortness of breath. Negative for cough.    Cardiovascular: Negative for chest pain.   Gastrointestinal: Negative for abdominal pain, nausea and vomiting.   Neurological: Negative for dizziness.       Physical Exam:  Physical Exam  Constitutional:       General: She is not in acute distress.     Appearance: Normal appearance.   Cardiovascular:      Rate and Rhythm: Tachycardia present.      Pulses: Normal pulses.   Pulmonary:      Comments: 6 L NC  Abdominal:      Palpations: Abdomen is soft.   Skin:     General: Skin is warm.      Capillary Refill: Capillary refill takes 2 to 3 seconds.   Neurological:      Mental Status: She is alert.         Labs:          Recent Labs     08/08/21  0439 08/08/21  0439 08/09/21  0310 08/10/21  0320 08/10/21  1220   SODIUM 140   < > 138 141 139   POTASSIUM 3.4*   < > 3.2* 4.3 3.8   CHLORIDE 103   < > 102 107 103   CO2 27   < > 28 28 25   BUN 7*   < > 9 9 7*   CREATININE 0.48*   < > 0.59 0.61 0.51   MAGNESIUM 1.5  --   --   --  1.7   PHOSPHORUS  --   --   --   --  1.8*   CALCIUM 9.2   < > 8.8 9.0 9.2    < > = values in this interval not displayed.     Recent Labs     08/09/21  0310 08/10/21  0320 08/10/21  1220   ALTSGPT  --   --  43   ASTSGOT  --   --  28   ALKPHOSPHAT  --   --  70   TBILIRUBIN  --   --  0.4   GLUCOSE 132* 87 136*     Recent Labs     08/09/21  0310 08/10/21  0320 08/10/21  1220 08/10/21  2010   RBC 3.29* 3.18* 3.69*  --    HEMOGLOBIN 10.2* 9.8* 11.6*  --    HEMATOCRIT 31.8* 31.3* 35.8*  --    PLATELETCT 277 257 301  --    PROTHROMBTM  --   --   --  14.8*   APTT  --   --   --  71.8*   INR  --    --   --  1.20*     Recent Labs     21  0439 21  0439 08/09/21  0310 08/10/21  0320 08/10/21  1220   WBC 9.0   < > 7.2 6.5 9.0   NEUTSPOLYS 79.60*  --   --   --  80.50*   LYMPHOCYTES 10.30*  --   --   --  11.10*   MONOCYTES 8.10  --   --   --  5.80   EOSINOPHILS 0.90  --   --   --  1.70   BASOPHILS 0.10  --   --   --  0.30   ASTSGOT  --   --   --   --  28   ALTSGPT  --   --   --   --  43   ALKPHOSPHAT  --   --   --   --  70   TBILIRUBIN  --   --   --   --  0.4    < > = values in this interval not displayed.     Recent Labs     08/09/21  0310 08/10/21  0320 08/10/21  1220   SODIUM 138 141 139   POTASSIUM 3.2* 4.3 3.8   CHLORIDE 102 107 103   CO2 28 28 25   GLUCOSE 132* 87 136*   BUN 9 9 7*   CREATININE 0.59 0.61 0.51   CALCIUM 8.8 9.0 9.2     Hemodynamics:  Temp (24hrs), Av.4 °C (97.5 °F), Min:36 °C (96.8 °F), Max:37.1 °C (98.8 °F)  Temperature: 36.1 °C (97 °F)  Pulse  Av.4  Min: 69  Max: 140   Blood Pressure : 120/70     Respiratory:    Respiration: 20, Pulse Oximetry: 93 %        RUL Breath Sounds: Diminished, RML Breath Sounds: Diminished, RLL Breath Sounds: Diminished, HANK Breath Sounds: Diminished, LLL Breath Sounds: Diminished  Fluids:    Intake/Output Summary (Last 24 hours) at 8/10/2021 2105  Last data filed at 8/10/2021 1741  Gross per 24 hour   Intake --   Output 1900 ml   Net -1900 ml        GI/Nutrition:  Orders Placed This Encounter   Procedures   • Diet Order Diet: Regular; Miscellaneous modifications: (optional): Gluten Free     Standing Status:   Standing     Number of Occurrences:   1     Order Specific Question:   Diet:     Answer:   Regular [1]     Order Specific Question:   Miscellaneous modifications: (optional)     Answer:   Gluten Free [9]     Medical Decision Making, by Problem:  Active Hospital Problems    Diagnosis    • *Pleural effusion [J90]    • Tachycardia [R00.0]    • Acute kidney injury (HCC) [N17.9]    • Anemia [D64.9]    • Pulmonary embolism (HCC) [I26.99]    •  Endometrial cancer (HCC) [C54.1]        Plan:  This is a 70 y.o. female with h/o metastatic endometrial cancer who presents with new PE and pleural effusion:      1. Endometrial cancer: stage IIIB, s/p PORTEC 3 w/ omission of chemotherapy, followed by pulmonary recurrence treated with systemic carbo/taxol and most recently megace, treatment tolerance has been limited, will need to have f/u visit with Dr. Garcia to discuss treatment planning given concern for progression per recent chest CT.   2. Pleural effusion: CT on 8/4 showed large L pleural effusion with heterogeneous areas of increased density, concerning for hemothorax > unable to do thoracentesis due to no drainable pockets. Lovenox held. Now s/p thoraoscopy with hemothorax evacuation and decortication. Chest tubes removed 8/9/21. Biopsy from lung nodule taken at procedure, confirms recurrence. Cytology from [facility] on 7/29 with rare atypical cells, not fully diagnostic of a malignant effusion however this remains in the differential.    3. PE: secondary to malignancy, hold megace/tamoxifen given new thrombosis and concern for progression per chest CT, may be a candidate for DOAC if no procedures are indicated. Started on heparin gtt per IM  4. Shortness of breath: secondary to above, now maintaining sats on nasal canual   5. Anemia: stable, s/p 2 units ordered today, down trending since admission, likely secondary to above. Continue to monitor.      Case discussed with Physician     Quality-Core Measures

## 2021-08-11 NOTE — PROGRESS NOTES
Received report from Lissy [facility], at pt bedside.  POC discussed. Call light and belongings within reach. Bed locked and in low position. Alarm on and fall precautions in place.

## 2021-08-11 NOTE — PROGRESS NOTES
Report received from day RN and patient care assumed.  Patient is alert and oriented x 4, 6 L O2 via NC with SpO2 93%, ST on the monitor and pain reported 6/10. Medicated per MAR. Daughter at bedside. POC discussed. Safety measures in place.

## 2021-08-11 NOTE — PROGRESS NOTES
RIKIW Janes attempted to introduce [employer] [facility] [employer] bedside. Patient states that she would like to speak to her nurse. Patient was hard to understand. CHW will attempt to reach the patient again at a more convenient time for her.

## 2021-08-11 NOTE — PROGRESS NOTES
Critical Care/Pulmonary Consultation    Date of Service: 8/11/2021    Date of Admission:  7/29/2021  6:00 AM    Consulting Physician: Estefani Santos M.D.    Chief Complaint:  Shortness of Breath (Since monday) and Chest Wall Pain (Left sided stabbing pain that radiates to the back, pain increases with movement and breathing. Patient thought is was muscle pain and was using icy hot but the pain has been getting worse. )    Patient ID:   [id number] YOF with PMHx Stage IV endometrial CA (3b, ER-/MT+, s/p bilat salpingoophrectomy) with metastasis to lungs in 10/20 (s/p carboplatin/taxol), CVA/PE, who was admitted on 7/29 for exudative unilateral pleural effusion and PE.      Interval Updates:  Currently on 7L. Appears more comfortable today - repeat CXR lat night appeared unchanged from CXR that was done during Rapid Response - significant LLL and LML atelectasis vs PNA, along with L pleural effusion. Continues to report L sided rib pain.     Review of Systems   Constitutional: Negative for chills and fever.   Respiratory: Positive for cough and shortness of breath. Negative for hemoptysis and sputum production.    Cardiovascular: Positive for chest pain and orthopnea. Negative for palpitations and leg swelling.        Chest wall discomfort at site of previous thoracentesis   Gastrointestinal: Negative for abdominal pain, constipation, diarrhea, nausea and vomiting.   Skin: Negative for itching and rash.   Neurological: Positive for dizziness and weakness. Negative for focal weakness and headaches.       Home Medications     Reviewed by Kavita Miller (Pharmacy Tech) on 07/29/21 at 0756  Med List Status: Complete   Medication Last Dose Status   acetaminophen (TYLENOL) 500 MG Tab 7/28/2021 Active   amitriptyline (ELAVIL) 50 MG Tab 7/28/2021 Active   amLODIPine (NORVASC) 5 MG Tab 7/28/2021 Active   ascorbic acid (ASCORBIC ACID) 500 MG Tab 7/28/2021 Active   aspirin (ASA) 81 MG Chew Tab chewable tablet 7/29/2021 Active    atorvastatin (LIPITOR) 20 MG Tab 7/28/2021 Active   B Complex-C (SUPER B COMPLEX PO) 7/28/2021 Active   baclofen (LIORESAL) 10 MG Tab 7/28/2021 Active   Cholecalciferol (VITAMIN D3) 2000 UNIT Cap 7/28/2021 Active   Coenzyme Q10 (COQ10) 100 MG Cap 7/28/2021 Active   ferrous sulfate 325 (65 Fe) MG tablet 7/28/2021 Active   losartan (COZAAR) 100 MG Tab 7/28/2021 Active   meclizine (ANTIVERT) 12.5 MG Tab PRN Active   megestrol (MEGACE) 40 MG Tab 7/28/2021 Active   metoprolol SR (TOPROL XL) 100 MG TABLET SR 24 HR 7/29/2021 Active   multivitamin (THERAGRAN) Tab 7/28/2021 Active   senna-docusate (PERICOLACE OR SENOKOT S) 8.6-50 MG Tab PRN Active   tamoxifen (NOLVADEX) 20 MG tablet ABOUT 2 WEEKS AGO Active                Social History     Tobacco Use   • Smoking status: Never Smoker   • Smokeless tobacco: Never Used   • Tobacco comment: n/a   Vaping Use   • Vaping Use: Never used   Substance Use Topics   • Alcohol use: No   • Drug use: No        Past Medical History:   Diagnosis Date   • Anemia 2/6/2018    Iron def, post-menopausal bleeding.   • Back pain    • Back pain    • Cancer (HCC) 2019    uterine & endometrial   • Cough    • CVA, old, hemiparesis (HCC) 2/6/2018    Residual left sided weakness   • Essential hypertension 2/6/2018    Well controlled on amlodipine 10 mg and metoprolol 100 mg bid.   • Eye drainage    • Fatigue    • Frequent headaches    • Frequent urination    • Hyperlipidemia    • Irregular periods    • Painful joint    • Palpitations    • Post-menopause bleeding 2/6/2018    Menopause in 2010 and bleeding 2012 started to have irregular continuous bleeding.    • Pulmonary nodule    • Sore muscles    • Stroke (HCC)     right sided weakness   • Swelling of lower extremity    • Vision abnormalities 2/6/2018   • Vitamin D deficiency 2/6/2018    Taking 50,000 IU once weekly for 4 yrs.   • Weakness    • Wears glasses        Past Surgical History:   Procedure Laterality Date   • PB THORACOSCOPY,DX NO BX Left  8/6/2021    Procedure: THORACOSCOPY - HEMOTHORAX EVACUATION, CHEST TUBE PLACEMENT X 2;  Surgeon: John H Ganser, M.D.;  Location: SURGERY [facility];  Service: General   • PB CYSTOSCOPY,INSERT URETERAL STENT Left 8/8/2019    Procedure: CYSTOSCOPY, WITH URETERAL STENT INSERTION;  Surgeon: Jann Garcia M.D.;  Location: [facility];  Service: Gynecology   • HYSTERECTOMY ROBOTIC XI N/A 8/8/2019    Procedure: HYSTERECTOMY, ROBOT-ASSISTED, USING DA RILEY XI;  Surgeon: Jann Garcia M.D.;  Location: SURGERY [facility];  Service: Gynecology   • SALPINGO OOPHORECTOMY Bilateral 8/8/2019    Procedure: SALPINGO-OOPHORECTOMY;  Surgeon: Jann Garcia M.D.;  Location: [facility];  Service: Gynecology   • NODE BIOPSY SENTINEL  8/8/2019    Procedure: BIOPSY, LYMPH NODE, SENTINEL;  Surgeon: Jann Garcia M.D.;  Location: [facility];  Service: Gynecology   • PB HYSTEROSCOPY,DX,SEP PROC  6/19/2019    Procedure: HYSTEROSCOPY, DIAGNOSTIC;  Surgeon: Anel Chicas M.D.;  Location: [facility];  Service: Gynecology   • DILATION AND CURETTAGE  6/19/2019    Procedure: DILATION AND CURETTAGE;  Surgeon: Anel Chicas M.D.;  Location: [facility];  Service: Gynecology   • HYSTERECTOMY LAPAROSCOPY         Allergies: Penicillins, Gluten meal, Hydrocodone, Iodine, Levaquin, and Tramadol    Family History   Problem Relation Age of Onset   • Hypertension Mother    • No Known Problems Father    • No Known Problems Brother        Temp:  [35.8 °C (96.5 °F)-36.3 °C (97.4 °F)] 35.8 °C (96.5 °F)  Pulse:  [117-140] 117  Resp:  [16-40] 17  BP: (103-190)/() 105/70  SpO2:  [88 %-95 %] 91 %    Physical Examination  Physical Exam  Vitals reviewed: Physical exam deferred on patient request.   Constitutional:       General: She is not in acute distress.     Appearance: She is not toxic-appearing.   HENT:      Head: Normocephalic and atraumatic.      Mouth/Throat:      Mouth: Mucous  membranes are moist.      Pharynx: Oropharynx is clear.   Eyes:      Conjunctiva/sclera: Conjunctivae normal.   Cardiovascular:      Rate and Rhythm: Normal rate and regular rhythm.      Pulses: Normal pulses.      Heart sounds: Normal heart sounds. No murmur heard.   No friction rub. No gallop.    Pulmonary:      Effort: Pulmonary effort is normal. No respiratory distress.      Comments: On 7L, unable to perform full exam as patient was in too much pain to turn to side or sit up  Abdominal:      General: Abdomen is flat. Bowel sounds are normal. There is no distension.      Palpations: Abdomen is soft.      Tenderness: There is no abdominal tenderness.   Musculoskeletal:         General: Tenderness (L sided rib tenderness) present.      Right lower leg: No edema.      Left lower leg: No edema.   Skin:     General: Skin is warm and dry.   Neurological:      Mental Status: She is alert.      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   Psychiatric:         Mood and Affect: Mood normal.         Behavior: Behavior normal.           Intake/Output Summary (Last 24 hours) at 8/11/2021 1200  Last data filed at 8/11/2021 0410  Gross per 24 hour   Intake --   Output 1650 ml   Net -1650 ml       Recent Labs     08/10/21  0320 08/10/21  1220 08/11/21  0319   WBC 6.5 9.0 10.1   NEUTSPOLYS  --  80.50*  --    LYMPHOCYTES  --  11.10*  --    MONOCYTES  --  5.80  --    EOSINOPHILS  --  1.70  --    BASOPHILS  --  0.30  --    ASTSGOT  --  28  --    ALTSGPT  --  43  --    ALKPHOSPHAT  --  70  --    TBILIRUBIN  --  0.4  --      Recent Labs     08/10/21  0320 08/10/21  1220 08/11/21  0319   SODIUM 141 139 141   POTASSIUM 4.3 3.8 3.8   CHLORIDE 107 103 105   CO2 28 25 27   BUN 9 7* 8   CREATININE 0.61 0.51 0.68   MAGNESIUM  --  1.7  --    PHOSPHORUS  --  1.8*  --    CALCIUM 9.0 9.2 8.9     Recent Labs     08/10/21  0320 08/10/21  1220 08/11/21  0319   ALTSGPT  --  43  --    ASTSGOT  --  28  --     ALKPHOSPHAT  --  70  --    TBILIRUBIN  --  0.4  --    GLUCOSE 87 136* 124*         DX-CHEST-PORTABLE (1 VIEW)   Final Result         1.  Left pulmonary infiltrates and layering left pleural effusion. Similar compared to prior study.      DX-CHEST-LIMITED (1 VIEW)   Final Result      1.  No pneumothorax identified following removal of 2 left chest tubes.      2.  Left lower lobe and left midlung atelectasis, pneumonia, or contusion is present.      3.  Possible small left pleural effusion.      4.  Clear right lung.      DX-CHEST-PORTABLE (1 VIEW)   Final Result         1.  Patchy left pulmonary infiltrates.   2.  Layering left pleural effusion   3.  Cardiomegaly      US-EXTREMITY VENOUS LOWER BILAT   Final Result      DX-CHEST-PORTABLE (1 VIEW)   Final Result         1.  Left pulmonary infiltrates, stable   2.  Cardiomegaly      DX-CHEST-PORTABLE (1 VIEW)   Final Result      1.  Again seen 2 left-sided chest tubes present.      2.  Bony loss and consolidation within the left lung.      3.  Stable left pleural effusion      DX-CHEST-PORTABLE (1 VIEW)   Final Result      No significant change      DX-CHEST-PORTABLE (1 VIEW)   Final Result      1.  Interval placement of 2 large bore left chest tubes with decreased left pleural effusion. Small hemopneumothorax.   2.  Left lung opacity likely atelectasis.      US-CHEST   Final Result      1.  Diffusely organized process noted in the left hemithorax. No significant pleural effusion identified.      2.  Thoracentesis deferred      DX-CHEST-PORTABLE (1 VIEW)   Final Result      Opacification of the left hemithorax is unchanged with mediastinal shift to the right.      US-CHEST   Final Result         Thick echogenic heterogeneous material in the left pleural space. No drainable pocket for thoracentesis.      DX-CHEST-PORTABLE (1 VIEW)   Final Result      1.  Unchanged subtotal opacification of the left hemithorax without volume loss.      2.  Persistent mediastinal shift  to the right.      3.  No right lung consolidation.      CT-CHEST (THORAX) W/O   Final Result      1.  Very large left pleural effusion which now demonstrates heterogeneous areas of increased density suggesting interval hemorrhage or less likely superimposed infection.   2.  Complete collapse/compressive atelectasis of the left lung which is worsened than prior study.   3.  New mild groundglass opacity in the right lung apex could represent atelectasis or mild pneumonitis.   4.  Right pulmonary nodules again noted suggesting metastases.      These findings were discussed with JUANITA SERNA on 8/3/2021 3:40 PM.      Fleischner Society pulmonary nodule recommendations:   Not Applicable         DX-CHEST-PORTABLE (1 VIEW)   Final Result      1.  There is complete opacification of the left hemithorax which could represent combination of pleural effusion or airspace process.      US-CHEST   Final Result      1.  Diffuse pneumonitis of left hemithorax.      2.  No evidence of a large left-sided pleural effusion. Thoracentesis deferred.      3.  Recommend CT scan of the thorax for further evaluation.      DX-CHEST-PORTABLE (1 VIEW)   Final Result      Worsening opacification on the left which is now complete, probably related to left pleural effusion and atelectasis.      DX-CHEST-LIMITED (1 VIEW)   Final Result      1.  No significant interval change.      DX-CHEST-PORTABLE (1 VIEW)   Final Result      1.  Interval decrease in the left pleural effusion following thoracentesis.   2.  There is no pneumothorax visualized.      US-THORACENTESIS PUNCTURE LEFT   Final Result      1. Ultrasound guided left sided diagnostic thoracentesis.      2. 950 mL of fluid withdrawn.      CT-CTA CHEST PULMONARY ARTERY W/ RECONS   Final Result      1.  Nonocclusive pulmonary emboli in the right pulmonary artery, its lobar and segmental branches. No right heart strain.   2.  Large left pleural effusion, with total collapse of the left lower  lobe and partial atelectasis of the left upper lobe.   3.  At least 3 new pulmonary nodules measuring up to 2.6 cm, consistent with pulmonary metastases.   4.  Unchanged heterogeneous thyroid gland with multiple thyroid nodules.      Findings were discussed with KRISTOPHER COOK on 7/29/2021 9:38 AM.         DX-CHEST-PORTABLE (1 VIEW)   Final Result      Very large left pleural effusion with associated compressive atelectasis.      DX-CHEST-PORTABLE (1 VIEW)    (Results Pending)       Patient Active Problem List   Diagnosis   • Vision abnormalities   • CVA, old, hemiparesis (HCC)   • Essential hypertension   • Vitamin D deficiency   • Endometrial cancer (HCC)   • Iron deficiency anemia   • Obesity (BMI 30-39.9)   • Hypokalemia   • Admission for chemotherapy   • Nausea   • Thyroid nodule - benign   • Other chest pain   • Tongue burning sensation   • Neuropathy   • Multiple lung nodules - growth from 8 mm - 10 mm 1/2020 - 8/2020   • Pulmonary embolism (HCC)   • Pleural effusion   • Acute kidney injury (HCC)   • Anemia   • Tachycardia       Assessment and Plan:  #Recurrent malignant pleural effusion  #Pulmonary embolism  #Acute hypoxic respiratory failure  - History of metastatic endometrial carcinoma (s/p chemo)  - Would likely benefit from Palliative - however, pt's family opposed to consult  - Poor overall prognosis considering rapid recurrence of pleural effusions  - Titrate oxygen > 90%  - Started on IV Heparin drip for PE and DVT yesterday - continue drip     - If unable to tolerate, may need IVC filter  - Lung biopsy positive for metastatic adenocarcinoma  - Pain control with Oxycodone PRN  - Recommend gentle diuresis - dosing per primary team  - Recommend Oncology consult for possible PleurX catheter - will defer decision-making to primary team and Oncology    Pulmonology will sign off.

## 2021-08-11 NOTE — PROGRESS NOTES
Pt refusing Q2H turns. Pt educated on the importance of assited turning in bed and the benefits of a waffle mattress. Pt verbalizes understanding but continues to refuse. Charge JOB Dodge notified.

## 2021-08-12 ENCOUNTER — APPOINTMENT (OUTPATIENT)
Dept: RADIOLOGY | Facility: MEDICAL CENTER | Age: 71
DRG: 163 | End: 2021-08-12
Attending: STUDENT IN AN ORGANIZED HEALTH CARE EDUCATION/TRAINING PROGRAM
Payer: MEDICARE

## 2021-08-12 LAB
ANION GAP SERPL CALC-SCNC: 8 MMOL/L (ref 7–16)
BUN SERPL-MCNC: 8 MG/DL (ref 8–22)
CALCIUM SERPL-MCNC: 8.8 MG/DL (ref 8.5–10.5)
CHLORIDE SERPL-SCNC: 105 MMOL/L (ref 96–112)
CO2 SERPL-SCNC: 28 MMOL/L (ref 20–33)
CREAT SERPL-MCNC: 0.7 MG/DL (ref 0.5–1.4)
ERYTHROCYTE [DISTWIDTH] IN BLOOD BY AUTOMATED COUNT: 50.2 FL (ref 35.9–50)
GLUCOSE SERPL-MCNC: 103 MG/DL (ref 65–99)
HCT VFR BLD AUTO: 32.5 % (ref 37–47)
HGB BLD-MCNC: 10.5 G/DL (ref 12–16)
MCH RBC QN AUTO: 31.2 PG (ref 27–33)
MCHC RBC AUTO-ENTMCNC: 32.3 G/DL (ref 33.6–35)
MCV RBC AUTO: 96.4 FL (ref 81.4–97.8)
PLATELET # BLD AUTO: 320 K/UL (ref 164–446)
PMV BLD AUTO: 10.4 FL (ref 9–12.9)
POTASSIUM SERPL-SCNC: 3.5 MMOL/L (ref 3.6–5.5)
RBC # BLD AUTO: 3.37 M/UL (ref 4.2–5.4)
SODIUM SERPL-SCNC: 141 MMOL/L (ref 135–145)
UFH PPP CHRO-ACNC: 0.38 IU/ML
WBC # BLD AUTO: 8.7 K/UL (ref 4.8–10.8)

## 2021-08-12 PROCEDURE — 700111 HCHG RX REV CODE 636 W/ 250 OVERRIDE (IP): Performed by: STUDENT IN AN ORGANIZED HEALTH CARE EDUCATION/TRAINING PROGRAM

## 2021-08-12 PROCEDURE — 99233 SBSQ HOSP IP/OBS HIGH 50: CPT | Mod: GC | Performed by: HOSPITALIST

## 2021-08-12 PROCEDURE — 770020 HCHG ROOM/CARE - TELE (206)

## 2021-08-12 PROCEDURE — 71045 X-RAY EXAM CHEST 1 VIEW: CPT

## 2021-08-12 PROCEDURE — 85027 COMPLETE CBC AUTOMATED: CPT

## 2021-08-12 PROCEDURE — A9270 NON-COVERED ITEM OR SERVICE: HCPCS | Performed by: STUDENT IN AN ORGANIZED HEALTH CARE EDUCATION/TRAINING PROGRAM

## 2021-08-12 PROCEDURE — 700102 HCHG RX REV CODE 250 W/ 637 OVERRIDE(OP): Performed by: STUDENT IN AN ORGANIZED HEALTH CARE EDUCATION/TRAINING PROGRAM

## 2021-08-12 PROCEDURE — 85520 HEPARIN ASSAY: CPT

## 2021-08-12 PROCEDURE — 700102 HCHG RX REV CODE 250 W/ 637 OVERRIDE(OP): Performed by: INTERNAL MEDICINE

## 2021-08-12 PROCEDURE — A9270 NON-COVERED ITEM OR SERVICE: HCPCS | Performed by: INTERNAL MEDICINE

## 2021-08-12 PROCEDURE — 80048 BASIC METABOLIC PNL TOTAL CA: CPT

## 2021-08-12 RX ORDER — FUROSEMIDE 10 MG/ML
40 INJECTION INTRAMUSCULAR; INTRAVENOUS ONCE
Status: COMPLETED | OUTPATIENT
Start: 2021-08-12 | End: 2021-08-12

## 2021-08-12 RX ORDER — POTASSIUM CHLORIDE 20 MEQ/1
40 TABLET, EXTENDED RELEASE ORAL ONCE
Status: ACTIVE | OUTPATIENT
Start: 2021-08-12 | End: 2021-08-13

## 2021-08-12 RX ADMIN — GUAIFENESIN 600 MG: 600 TABLET, EXTENDED RELEASE ORAL at 17:18

## 2021-08-12 RX ADMIN — ACETAMINOPHEN 500 MG: 500 TABLET, FILM COATED ORAL at 12:38

## 2021-08-12 RX ADMIN — ATORVASTATIN CALCIUM 20 MG: 20 TABLET, FILM COATED ORAL at 17:18

## 2021-08-12 RX ADMIN — APIXABAN 10 MG: 5 TABLET, FILM COATED ORAL at 17:18

## 2021-08-12 RX ADMIN — Medication 500 MG: at 17:18

## 2021-08-12 RX ADMIN — Medication 2000 UNITS: at 17:18

## 2021-08-12 RX ADMIN — AMITRIPTYLINE HYDROCHLORIDE 75 MG: 75 TABLET, FILM COATED ORAL at 20:12

## 2021-08-12 RX ADMIN — THERA TABS 1 TABLET: TAB at 05:47

## 2021-08-12 RX ADMIN — ACETAMINOPHEN 500 MG: 500 TABLET, FILM COATED ORAL at 20:14

## 2021-08-12 RX ADMIN — GUAIFENESIN 600 MG: 600 TABLET, EXTENDED RELEASE ORAL at 05:46

## 2021-08-12 RX ADMIN — ASPIRIN 81 MG: 81 TABLET, CHEWABLE ORAL at 05:47

## 2021-08-12 RX ADMIN — METOPROLOL SUCCINATE 100 MG: 100 TABLET, EXTENDED RELEASE ORAL at 05:47

## 2021-08-12 RX ADMIN — LOSARTAN POTASSIUM 50 MG: 50 TABLET, FILM COATED ORAL at 12:34

## 2021-08-12 RX ADMIN — FUROSEMIDE 40 MG: 10 INJECTION, SOLUTION INTRAMUSCULAR; INTRAVENOUS at 10:07

## 2021-08-12 RX ADMIN — FERROUS SULFATE TAB 325 MG (65 MG ELEMENTAL FE) 325 MG: 325 (65 FE) TAB at 17:17

## 2021-08-12 RX ADMIN — DOCUSATE SODIUM 50 MG AND SENNOSIDES 8.6 MG 2 TABLET: 8.6; 5 TABLET, FILM COATED ORAL at 17:18

## 2021-08-12 RX ADMIN — HYDROXYZINE HYDROCHLORIDE 25 MG: 25 TABLET, FILM COATED ORAL at 20:12

## 2021-08-12 RX ADMIN — OXYCODONE 5 MG: 5 TABLET ORAL at 15:13

## 2021-08-12 ASSESSMENT — ENCOUNTER SYMPTOMS
SHORTNESS OF BREATH: 1
NAUSEA: 0
FALLS: 0
CHILLS: 0
DIARRHEA: 0
FOCAL WEAKNESS: 1
ORTHOPNEA: 0
PALPITATIONS: 0
COUGH: 0
BACK PAIN: 0
HEADACHES: 0
ABDOMINAL PAIN: 0
VOMITING: 0
DIZZINESS: 0
FEVER: 0

## 2021-08-12 ASSESSMENT — PATIENT HEALTH QUESTIONNAIRE - PHQ9
1. LITTLE INTEREST OR PLEASURE IN DOING THINGS: NOT AT ALL
2. FEELING DOWN, DEPRESSED, IRRITABLE, OR HOPELESS: NOT AT ALL
SUM OF ALL RESPONSES TO PHQ9 QUESTIONS 1 AND 2: 0

## 2021-08-12 ASSESSMENT — FIBROSIS 4 INDEX: FIB4 SCORE: 0.91

## 2021-08-12 ASSESSMENT — PAIN DESCRIPTION - PAIN TYPE
TYPE: ACUTE PAIN
TYPE: ACUTE PAIN

## 2021-08-12 NOTE — PROGRESS NOTES
Family & charge Rn @ bedside and explained pt's request. Pt still refused RN assessment, assisted with repositioning pt and purwick changed. Per family request MD paged.

## 2021-08-12 NOTE — PROGRESS NOTES
Upon entering room to assess pt, pt stated she does not want my care and refused assessment. Charge notified.

## 2021-08-12 NOTE — PROGRESS NOTES
Daily Progress Note:     Date of Service: 8/12/2021  Primary Team: [facility] Blue Team   Attending: Estefani Santos  Senior Resident: Dr. Tenorio  Intern: Dr. Clements  Contact:  [phone]    Chief Complaint:   CP, SOB    Subjective:  No acute events overnight.  This AM she is tired, she wasn't able to sleep well.  Her breathing feels consistent and her pain is well managed at this time.      Consultants/Specialty:  Gyn-Oncology  Pulmonology:  [facility]ain  CT surgery:  Ganser    Review of Systems:    Review of Systems   Constitutional: Negative for chills and fever.   Respiratory: Positive for shortness of breath (consistent w/ yesterday). Negative for cough.    Cardiovascular: Positive for chest pain (no change from yesterday). Negative for palpitations and orthopnea.   Gastrointestinal: Negative for abdominal pain, diarrhea, nausea and vomiting.   Genitourinary: Negative for dysuria and urgency.   Musculoskeletal: Negative for back pain, falls and joint pain.   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 Negative for headaches.       Objective Data:   Physical Exam:   Vitals:   Temp:  [36.7 °C (98 °F)-36.9 °C (98.5 °F)] 36.7 °C (98 °F)  Pulse:  [] 104  Resp:  [18] 18  BP: (117-135)/(70-87) 117/73  SpO2:  [93 %-98 %] 94 %    Physical Exam  Constitutional:       General: She is not in acute distress.     Appearance: She is not toxic-appearing.   HENT:      Head: Normocephalic and atraumatic.      Mouth/Throat:      Mouth: Mucous membranes are moist.      Pharynx: Oropharynx is clear.   Eyes:      Conjunctiva/sclera: Conjunctivae normal.   Cardiovascular:      Rate and Rhythm: Regular rhythm. Tachycardia present.      Pulses: Normal pulses.      Heart sounds: Normal heart sounds. No murmur heard.   No friction rub. No gallop.    Pulmonary:      Effort: Pulmonary effort is normal. No respiratory distress.      Comments: Decreased BS in the left inferior axillary field.  Diffuse  rales throughout left lung.  Right lung CTA in anterior and axillary fields.  Improved upon yesterday  Abdominal:      General: Abdomen is flat. Bowel sounds are normal.      Palpations: Abdomen is soft.   Musculoskeletal:      Right lower leg: No edema.      Left lower leg: No edema.   Skin:     General: Skin is warm and dry.   Neurological:      Mental Status: She is alert and oriented to person, place, and time.      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   Psychiatric:         Mood and Affect: Mood normal.         Behavior: Behavior normal.         Thought Content: Thought content normal.           Labs:   HEMATOLOGY/ ONCOLOGY/ID:            Recent Labs     08/10/21  1220 08/11/21 0319 08/12/21  0400   WBC 9.0 10.1 8.7   RBC 3.69* 3.48* 3.37*   HEMOGLOBIN 11.6* 10.8* 10.5*   HEMATOCRIT 35.8* 33.6* 32.5*   MCV 97.0 96.6 96.4   MCH 31.4 31.0 31.2   RDW 50.5* 50.3* 50.2*   PLATELETCT 301 318 320   MPV 10.5 10.6 10.4   NEUTSPOLYS 80.50*  --   --    LYMPHOCYTES 11.10*  --   --    MONOCYTES 5.80  --   --    EOSINOPHILS 1.70  --   --    BASOPHILS 0.30  --   --      Lab Results   Component Value Date    AAXYNHBW92 984 (H) 09/25/2020    PQLQRWNI54 714 05/15/2019    OANJQSUW03 937 (H) 10/02/2018    FOLATE >23.8 06/12/2019    FERRITIN 436.0 (H) 08/02/2021    FERRITIN 18.5 06/12/2019    FERRITIN 18.5 10/02/2018    IRON 13 (L) 08/02/2021    IRON 35 (L) 06/12/2019    IRON 29 (L) 10/02/2018    TOTIRONBC 150 (L) 08/02/2021    TOTIRONBC 272 06/12/2019    TOTIRONBC 392 10/02/2018       RENAL:        Estimated GFR/CRCL = Estimated Creatinine Clearance: 90 mL/min (by C-G formula based on SCr of 0.7 mg/dL).  Recent Labs     08/10/21  1220 08/11/21  0319 08/12/21  0400   SODIUM 139 141 141   POTASSIUM 3.8 3.8 3.5*   CHLORIDE 103 105 105   CO2 25 27 28   GLUCOSE 136* 124* 103*   BUN 7* 8 8   CREATININE 0.51 0.68 0.70   CALCIUM 9.2 8.9 8.8   MAGNESIUM 1.7  --   --    PHOSPHORUS 1.8*  --   --     ALBUMIN 2.9*  --   --        GASTROINTESTINAL/ HEPATIC:          Recent Labs     08/10/21  1220 08/10/21  2010   ALTSGPT 43  --    ASTSGOT 28  --    ALKPHOSPHAT 70  --    TBILIRUBIN 0.4  --    ALBUMIN 2.9*  --    GLOBULIN 3.4  --    INR  --  1.20*     No results found for: AMMONIA    ENDOCRINE:              Recent Labs     08/10/21  1220 08/11/21  0319 08/12/21  0400   GLUCOSE 136* 124* 103*     Lab Results   Component Value Date    FREET4 0.92 05/15/2019       Imaging:     CXR 8/11:  Shows mild left pleural effusion w/ pulmonary edema in the left lung.  Stable from yesterday    Problem Representation:  Carlene Rivas  is a 70 y.o. y/o woman w/ PMHx Stage IV endometrial cancer who presented on 7/29/2021 w/ worsening shortness of breath.  She was found to have a non-occlusive PE in the right pulmonary artery w/o right heart strain seen on CTA from 7/29.  Large left pleural effusion, thoracentesis performed on 7/29 removed 950 ml of fluid, + lights criteria, bloody aspirate.  CT chest on 8/3 shows likely hemothorax.  Hemothorax evacuation and decortication performed on 8/7.  Pathology from lung nodule shows endometrial adenocarcinoma.  RRT called on 8/10 for worsening hypoxia following movement, believed to be PE.  Now is improving.      * Pleural effusion- (present on admission)  Assessment & Plan  Shortness of breath with coarse crackles on left mid lung. Found to have large left sided pleural effusion with total collapse of left lower lobe and partial atelectasis of upper left lobe on initial CTA. Thoracentesis performed on 7/30, removing 950 cc of fluid.  CT surgery performed hemothorax evacuation and decortication w/ chest tube placement on 8/7, repeat imaging shows clearance of hemothorax and pleural effusion.  Biopsy of lung nodule was performed at that time, pathology shows endometrial adenocarcinoma.  This is a malignant pleural effusion w/ concomitant hemothorax, likely source is also malignant as  initial thoracentesis had large amount of RBCs.    -Gyn-Onc consult, appreciate assistance   -Not currently requiring pleurex catheter  -Pulmonology consulted, appreciate assistance.   -Cardiothoracic surgery consult, appreciate assistance  -Chest tube removed 8/9  -Fluid doesn't appear to be reaccumulating at this time as imaging is stable.      Tachycardia  Assessment & Plan  Pt w/ persistent tachycardia, which did not respond to fluid bolus.  Worsened on 8/10 after RRT, due to pulmonary emboli, now tachycardia within similar range prior to RRT.    -Continue to monitor, if any episodes of sustained SVT occur, consider vasovagal maneuver or metroprolol pushes as BP allows    Pulmonary embolism (HCC)  Assessment & Plan  Patient was having left upper thigh pain for several days preceding her shortness of breath.  Non-occlusive right pulmonary artery embolism (lobar and segmental branches) on CTA. No evidence of right heart strain on CTA. Likely only mild contribution to AHRF as the PE is non-occlusive and no evidence of right heart strain seen.    RRT on 8/10 w/ new hypoxia and tachycardia following moving to side of bed, CXR shows mild pleural effusion and pulmonary edema in the left lung.  Bedside ultrasound at the time shows minimal pleural fluid, no pericardial effusion and right lung without pulmonary edema.  Refer to RRT and ICU note for more detail.  This is believed to be a worsening of her pulmonary emboli, whether it is a new clot or repositioning of old clot which is now occlusive, as this was a large clot.  -Clinically pt appears to be stable with yesterday, isn't requiring more oxygen, tachycardia is in a lower range than previous day.  -Per oncology recs, transitioning to eliquis, will start w/ loading dose and then maintenance dose.  Will continue to monitor for rebleeding  -Monitoring on telemetry  -Monitor vitals for hemodynamic stability    Anemia  Assessment & Plan  Pt w/ slowly downtrending  hemoglobin since admission.  This is a normocytic anemia.  Iron studies show concern for early anemia of chronic disease, but this would be unlikely to contribute to such quick decline in hemoglobin.  Other considerations would be active, bleeding, but patient at this time has no obvious areas of active bleeding, no melena, no hematemesis, no vaginal bleeding, no external bleeding.  Consideration would be bleeding into thorax s/p thoracentesis performed on admission.  She was on anticoagulation for PE.  CT shows concerns for hemothorax, which would explain acute decline in hemoglobin following initiation of anticoagulation.    -Resolving following hemothorax evacuation.  -Continue to monitor for evidence of re-bleeding.        Acute kidney injury (HCC)  Assessment & Plan  New HEAVEN on 8/1, w/ acute worsening on 8/2.  Resolved on 8/3.  Cr 48 hours prior to insult was 0.65, has since elevated to 1.28.  FeNa shows a likely pre-renal process.  This is likely 2/2 intravascular depletion in the setting of a large volume pleural effusion and attempted diuresis.  This was responsive to fluid bolus.      RESOLVED    Endometrial cancer (HCC)- (present on admission)  Assessment & Plan  Hx of endometrial cancer(2019, treated w/ total hysterectomy, radiation and cisplatin therapy) that has metastasized to the lungs(2020). Follows-up with Dr. Jann Garcia. Finished chemotherapy infusion. Currently on tamoxifen (3 weeks on, 3 weeks off), last tamoxifen 07/18, would restart August 9th. Found to have 3 new pulmonary nodules up to 2.6 cm consistent with pulmonary metastasis.  Pathology results show adenocarcinoma with a likely endometrial source.  -Gyn-Onc consulted, appreciate assistance   -Hold tamoxifen/megace due to current PE   -No pleurex catheter indicated at this time.     -Will need follow-up outpatient to determine next step for treatment options          Code Status: Full code  DVT ppx: transition to DOAC  Diet: Regular  diet  GI: Bowel regimen  T/L/D: CVC in left neck  Disposition: Anticipate >48 hours for further assessment of pleural effusion       Julio Clements DO  PGY-1, [facility]    Assessment and plan discussed with senior resident and attending physician.

## 2021-08-12 NOTE — PROGRESS NOTES
Pt refused Q2h turns, 2 RN-Skin check, waffle mattress and CHG. Provided education.  Charged notified.

## 2021-08-12 NOTE — CARE PLAN
Problem: Knowledge Deficit - Standard  Goal: Patient and family/care givers will demonstrate understanding of plan of care, disease process/condition, diagnostic tests and medications  Outcome: Progressing     Problem: Fall Risk  Goal: Patient will remain free from falls  Outcome: Progressing     The patient is Stable - Low risk of patient condition declining or worsening    Shift Goals  Clinical Goals: pain management, monitor SpO2  Patient Goals: manage pain, speak with doctors  Family Goals: rest, plan    Progress made toward(s) clinical / shift goals:  Pt educated regarding plan of care and medications. All questions answered.   Fall precautions in place. Bed in lowest position. Non-skid socks in place. Personal possessions within reach. Mobility sign on door. Bed-alarm on. Call light within reach. Pt educated regarding fall prevention and states understanding.         Patient is not progressing towards the following goals:

## 2021-08-12 NOTE — PROGRESS NOTES
GYN/Oncology Progress Note               Author: SHANA Monge Date & Time created: 8/12/2021  2:49 PM     Interval History:  Patient appears comfortable sitting in bed, shortness of breath is stable, pain controlled    Review of Systems:  Review of Systems   Constitutional: Negative for chills and fever.   Respiratory: Positive for shortness of breath. Negative for cough.    Cardiovascular: Negative for chest pain.   Gastrointestinal: Negative for abdominal pain, nausea and vomiting.   Neurological: Negative for dizziness.       Physical Exam:  Physical Exam  Constitutional:       General: She is not in acute distress.     Appearance: Normal appearance.   Cardiovascular:      Rate and Rhythm: Tachycardia present.      Pulses: Normal pulses.   Pulmonary:      Comments: 6 L NC  Abdominal:      Palpations: Abdomen is soft.   Skin:     General: Skin is warm.      Capillary Refill: Capillary refill takes 2 to 3 seconds.   Neurological:      Mental Status: She is alert.         Labs:          Recent Labs     08/10/21  1220 08/11/21  0319 08/12/21  0400   SODIUM 139 141 141   POTASSIUM 3.8 3.8 3.5*   CHLORIDE 103 105 105   CO2 25 27 28   BUN 7* 8 8   CREATININE 0.51 0.68 0.70   MAGNESIUM 1.7  --   --    PHOSPHORUS 1.8*  --   --    CALCIUM 9.2 8.9 8.8     Recent Labs     08/10/21  1220 08/11/21  0319 08/12/21  0400   ALTSGPT 43  --   --    ASTSGOT 28  --   --    ALKPHOSPHAT 70  --   --    TBILIRUBIN 0.4  --   --    GLUCOSE 136* 124* 103*     Recent Labs     08/10/21  1220 08/10/21  2010 08/11/21  0319 08/12/21  0400   RBC 3.69*  --  3.48* 3.37*   HEMOGLOBIN 11.6*  --  10.8* 10.5*   HEMATOCRIT 35.8*  --  33.6* 32.5*   PLATELETCT 301  --  318 320   PROTHROMBTM  --  14.8*  --   --    APTT  --  71.8*  --   --    INR  --  1.20*  --   --      Recent Labs     08/10/21  1220 08/11/21  0319 08/12/21  0400   WBC 9.0 10.1 8.7   NEUTSPOLYS 80.50*  --   --    LYMPHOCYTES 11.10*  --   --    MONOCYTES 5.80  --   --     EOSINOPHILS 1.70  --   --    BASOPHILS 0.30  --   --    ASTSGOT 28  --   --    ALTSGPT 43  --   --    ALKPHOSPHAT 70  --   --    TBILIRUBIN 0.4  --   --      Recent Labs     08/10/21  1220 21  0319 21  0400   SODIUM 139 141 141   POTASSIUM 3.8 3.8 3.5*   CHLORIDE 103 105 105   CO2 25 27 28   GLUCOSE 136* 124* 103*   BUN 7* 8 8   CREATININE 0.51 0.68 0.70   CALCIUM 9.2 8.9 8.8     Hemodynamics:  Temp (24hrs), Av.6 °C (97.9 °F), Min:36.2 °C (97.2 °F), Max:36.9 °C (98.5 °F)  Temperature: 36.7 °C (98 °F)  Pulse  Av.7  Min: 69  Max: 140   Blood Pressure : 135/87     Respiratory:    Respiration: 18, Pulse Oximetry: 98 %        RUL Breath Sounds: Diminished, RML Breath Sounds: Diminished, RLL Breath Sounds: Diminished, HANK Breath Sounds: Diminished, LLL Breath Sounds: Diminished  Fluids:    Intake/Output Summary (Last 24 hours) at 8/10/2021 210  Last data filed at 8/10/2021 1741  Gross per 24 hour   Intake --   Output 1900 ml   Net -1900 ml     Weight: 99.8 kg (220 lb 0.3 oz)  GI/Nutrition:  Orders Placed This Encounter   Procedures   • Diet Order Diet: Regular; Miscellaneous modifications: (optional): Gluten Free     Standing Status:   Standing     Number of Occurrences:   1     Order Specific Question:   Diet:     Answer:   Regular [1]     Order Specific Question:   Miscellaneous modifications: (optional)     Answer:   Gluten Free [9]     Medical Decision Making, by Problem:  Active Hospital Problems    Diagnosis    • *Pleural effusion [J90]    • Tachycardia [R00.0]    • Acute kidney injury (HCC) [N17.9]    • Anemia [D64.9]    • Pulmonary embolism (HCC) [I26.99]    • Endometrial cancer (HCC) [C54.1]        Plan:  This is a 70 y.o. female with h/o metastatic endometrial cancer who presents with new PE and pleural effusion:      1. Endometrial cancer: stage IIIB, s/p PORTEC 3 w/ omission of chemotherapy, followed by pulmonary recurrence treated with systemic carbo/taxol and most recently megace,  treatment tolerance has been limited, now with progression of disease. Dr. Garcia discussed options of chemotherapy vs palliative/hospice. He also discussed that chemotherapy would not be curiative at this time and her cancer will recur in the future even if she has response. Attempted to call daughter at bedside, no answer.   2. Pleural effusion: CT on 8/4 showed large L pleural effusion with heterogeneous areas of increased density, concerning for hemothorax > unable to do thoracentesis due to no drainable pockets. Lovenox held. Now s/p thoraoscopy with hemothorax evacuation and decortication. Chest tubes removed 8/9/21. Biopsy from lung nodule taken at procedure, confirms recurrence. Cytology from thora on 7/29 with rare atypical cells, not fully diagnostic of a malignant effusion however this remains in the differential. Dr. Garcia reviewed CXR he doesn't recommend a plurex catheter at this time.   3. PE: secondary to malignancy, hold megace/tamoxifen given new thrombosis and concern for progression per chest CT. Started on heparin gtt per IM, recommend transition to a DOAC  4. Shortness of breath: secondary to above, now maintaining sats on nasal canula, will need   5. Anemia: stable, s/p 2 units, stable. Continue to monitor.      Patient seen with Dr. Garcia    Quality-Core Measures

## 2021-08-12 NOTE — PROGRESS NOTES
Received report from JOB Tapia and JOB Saleh. Reviewed POC, no questions at this time. Call light is within reach, bed is in lowest/locked position.

## 2021-08-13 PROBLEM — R34 LOW URINE OUTPUT: Status: ACTIVE | Noted: 2021-08-13

## 2021-08-13 LAB
ANION GAP SERPL CALC-SCNC: 10 MMOL/L (ref 7–16)
BUN SERPL-MCNC: 11 MG/DL (ref 8–22)
CALCIUM SERPL-MCNC: 9.2 MG/DL (ref 8.5–10.5)
CHLORIDE SERPL-SCNC: 102 MMOL/L (ref 96–112)
CO2 SERPL-SCNC: 27 MMOL/L (ref 20–33)
CREAT SERPL-MCNC: 0.71 MG/DL (ref 0.5–1.4)
ERYTHROCYTE [DISTWIDTH] IN BLOOD BY AUTOMATED COUNT: 50.2 FL (ref 35.9–50)
GLUCOSE SERPL-MCNC: 112 MG/DL (ref 65–99)
HCT VFR BLD AUTO: 33.5 % (ref 37–47)
HGB BLD-MCNC: 10.7 G/DL (ref 12–16)
MCH RBC QN AUTO: 30.8 PG (ref 27–33)
MCHC RBC AUTO-ENTMCNC: 31.9 G/DL (ref 33.6–35)
MCV RBC AUTO: 96.5 FL (ref 81.4–97.8)
PLATELET # BLD AUTO: 343 K/UL (ref 164–446)
PMV BLD AUTO: 10.5 FL (ref 9–12.9)
POTASSIUM SERPL-SCNC: 3.2 MMOL/L (ref 3.6–5.5)
RBC # BLD AUTO: 3.47 M/UL (ref 4.2–5.4)
SODIUM SERPL-SCNC: 139 MMOL/L (ref 135–145)
WBC # BLD AUTO: 8.6 K/UL (ref 4.8–10.8)

## 2021-08-13 PROCEDURE — A9270 NON-COVERED ITEM OR SERVICE: HCPCS | Performed by: STUDENT IN AN ORGANIZED HEALTH CARE EDUCATION/TRAINING PROGRAM

## 2021-08-13 PROCEDURE — 700102 HCHG RX REV CODE 250 W/ 637 OVERRIDE(OP): Performed by: INTERNAL MEDICINE

## 2021-08-13 PROCEDURE — 85027 COMPLETE CBC AUTOMATED: CPT

## 2021-08-13 PROCEDURE — 700102 HCHG RX REV CODE 250 W/ 637 OVERRIDE(OP): Performed by: STUDENT IN AN ORGANIZED HEALTH CARE EDUCATION/TRAINING PROGRAM

## 2021-08-13 PROCEDURE — 700105 HCHG RX REV CODE 258: Performed by: STUDENT IN AN ORGANIZED HEALTH CARE EDUCATION/TRAINING PROGRAM

## 2021-08-13 PROCEDURE — 770006 HCHG ROOM/CARE - MED/SURG/GYN SEMI*

## 2021-08-13 PROCEDURE — 700102 HCHG RX REV CODE 250 W/ 637 OVERRIDE(OP)

## 2021-08-13 PROCEDURE — 80048 BASIC METABOLIC PNL TOTAL CA: CPT

## 2021-08-13 PROCEDURE — 99232 SBSQ HOSP IP/OBS MODERATE 35: CPT | Mod: GC | Performed by: HOSPITALIST

## 2021-08-13 PROCEDURE — 51798 US URINE CAPACITY MEASURE: CPT

## 2021-08-13 PROCEDURE — A9270 NON-COVERED ITEM OR SERVICE: HCPCS | Performed by: INTERNAL MEDICINE

## 2021-08-13 PROCEDURE — A9270 NON-COVERED ITEM OR SERVICE: HCPCS

## 2021-08-13 RX ORDER — POTASSIUM CHLORIDE 20 MEQ/1
40 TABLET, EXTENDED RELEASE ORAL 2 TIMES DAILY
Status: DISCONTINUED | OUTPATIENT
Start: 2021-08-13 | End: 2021-08-16

## 2021-08-13 RX ORDER — ECHINACEA PURPUREA EXTRACT 125 MG
2 TABLET ORAL
Status: DISCONTINUED | OUTPATIENT
Start: 2021-08-13 | End: 2021-08-18 | Stop reason: HOSPADM

## 2021-08-13 RX ORDER — SODIUM CHLORIDE, SODIUM LACTATE, POTASSIUM CHLORIDE, AND CALCIUM CHLORIDE .6; .31; .03; .02 G/100ML; G/100ML; G/100ML; G/100ML
500 INJECTION, SOLUTION INTRAVENOUS ONCE
Status: COMPLETED | OUTPATIENT
Start: 2021-08-13 | End: 2021-08-13

## 2021-08-13 RX ADMIN — ATORVASTATIN CALCIUM 20 MG: 20 TABLET, FILM COATED ORAL at 18:09

## 2021-08-13 RX ADMIN — GUAIFENESIN 600 MG: 600 TABLET, EXTENDED RELEASE ORAL at 04:57

## 2021-08-13 RX ADMIN — OXYCODONE 5 MG: 5 TABLET ORAL at 21:06

## 2021-08-13 RX ADMIN — ASPIRIN 81 MG: 81 TABLET, CHEWABLE ORAL at 04:57

## 2021-08-13 RX ADMIN — OXYCODONE 5 MG: 5 TABLET ORAL at 02:46

## 2021-08-13 RX ADMIN — Medication 2000 UNITS: at 18:09

## 2021-08-13 RX ADMIN — DOCUSATE SODIUM 50 MG AND SENNOSIDES 8.6 MG 2 TABLET: 8.6; 5 TABLET, FILM COATED ORAL at 04:57

## 2021-08-13 RX ADMIN — METOPROLOL SUCCINATE 100 MG: 100 TABLET, EXTENDED RELEASE ORAL at 04:57

## 2021-08-13 RX ADMIN — APIXABAN 10 MG: 5 TABLET, FILM COATED ORAL at 04:58

## 2021-08-13 RX ADMIN — AMITRIPTYLINE HYDROCHLORIDE 75 MG: 75 TABLET, FILM COATED ORAL at 21:06

## 2021-08-13 RX ADMIN — THERA TABS 1 TABLET: TAB at 04:58

## 2021-08-13 RX ADMIN — SODIUM CHLORIDE, POTASSIUM CHLORIDE, SODIUM LACTATE AND CALCIUM CHLORIDE 500 ML: 600; 310; 30; 20 INJECTION, SOLUTION INTRAVENOUS at 18:40

## 2021-08-13 RX ADMIN — FERROUS SULFATE TAB 325 MG (65 MG ELEMENTAL FE) 325 MG: 325 (65 FE) TAB at 18:09

## 2021-08-13 RX ADMIN — DOCUSATE SODIUM 50 MG AND SENNOSIDES 8.6 MG 2 TABLET: 8.6; 5 TABLET, FILM COATED ORAL at 18:09

## 2021-08-13 RX ADMIN — LOSARTAN POTASSIUM 50 MG: 50 TABLET, FILM COATED ORAL at 13:25

## 2021-08-13 RX ADMIN — GUAIFENESIN 600 MG: 600 TABLET, EXTENDED RELEASE ORAL at 18:09

## 2021-08-13 RX ADMIN — Medication 500 MG: at 18:09

## 2021-08-13 RX ADMIN — APIXABAN 10 MG: 5 TABLET, FILM COATED ORAL at 18:09

## 2021-08-13 ASSESSMENT — FIBROSIS 4 INDEX: FIB4 SCORE: 0.85

## 2021-08-13 ASSESSMENT — ENCOUNTER SYMPTOMS
HEADACHES: 0
VOMITING: 0
FEVER: 0
ORTHOPNEA: 0
FOCAL WEAKNESS: 1
CHILLS: 0
BACK PAIN: 0
SHORTNESS OF BREATH: 1
ABDOMINAL PAIN: 0
COUGH: 0
FLANK PAIN: 0
DIARRHEA: 0
PALPITATIONS: 0
FALLS: 0
NAUSEA: 0

## 2021-08-13 ASSESSMENT — PAIN DESCRIPTION - PAIN TYPE
TYPE: ACUTE PAIN
TYPE: ACUTE PAIN

## 2021-08-13 NOTE — CARE PLAN
Problem: Nutritional:  Goal: Achieve adequate nutritional intake  Description: Patient will consume >50% of meals and supplements.  Outcome: Met     PO % for 3 meals per flow sheet since 8/10. Pt reports consuming Boost Plus TID. CNA is unsure if pt is consuming supplements as it is her first day caring for this pt. RD encouraged CNA to record supplements in ADL flow sheet.

## 2021-08-13 NOTE — PROGRESS NOTES
Bladder scanned pt and read 440cc. Straight cath pt 400cc out. MD notified and wants to repeat bladder scan in 6 hours if pt has not voided on her own.

## 2021-08-13 NOTE — PROGRESS NOTES
Pt assessed to have no urine output overnight. Denies need to void at this time. Dr. Black paged for bladder scan order. Pt also requesting saline nasal spray for dryness.

## 2021-08-13 NOTE — PROGRESS NOTES
Assumed care at 0700, bedside report received from JOB Mcdowell. Pt is ST on the telemetry monitor. Patient is AO x 4 and is resting in bed. Initial assessment completed and orders reviewed. POC discussed with patient. Call light within reach and hourly rounding in place. No further questions at this time. Fall precautions in place.

## 2021-08-13 NOTE — CARE PLAN
The patient is Watcher - Medium risk of patient condition declining or worsening    Shift Goals  Clinical Goals: pain management, monitor SpO2  Patient Goals: manage pain, speak with doctors  Family Goals: rest, plan    Progress made toward(s) clinical / shift goals:    Problem: Pain - Standard  Goal: Alleviation of pain or a reduction in pain to the patient’s comfort goal  Outcome: Progressing  Note: Pt assessed for pain regularly and medicated PRN per MAR.       Problem: Knowledge Deficit - Standard  Goal: Patient and family/care givers will demonstrate understanding of plan of care, disease process/condition, diagnostic tests and medications  Outcome: Progressing  Note: White board updated with POC and care team information during bedside report.      Problem: Fall Risk  Goal: Patient will remain free from falls  Outcome: Progressing  Note: Fall precautions in place. Bed in lowest position. Non-skid socks in place. Personal possessions within reach. Mobility sign on door. Bed-alarm on. Call light within reach. Pt educated regarding fall prevention and states understanding.

## 2021-08-13 NOTE — DISCHARGE PLANNING
Anticipated Discharge Disposition: TBD - Home with HH possible     Action: pt pending medical clearance, [facility] has accepted pt. Pt 6 clicks currently 7.     Barriers to Discharge: medical clearance, 6 clicks of 7    Plan: f/u with medical team and pt to discuss dc needs and barriers.

## 2021-08-14 ENCOUNTER — APPOINTMENT (OUTPATIENT)
Dept: RADIOLOGY | Facility: MEDICAL CENTER | Age: 71
DRG: 163 | End: 2021-08-14
Attending: STUDENT IN AN ORGANIZED HEALTH CARE EDUCATION/TRAINING PROGRAM
Payer: MEDICARE

## 2021-08-14 LAB
ANION GAP SERPL CALC-SCNC: 10 MMOL/L (ref 7–16)
BUN SERPL-MCNC: 12 MG/DL (ref 8–22)
CALCIUM SERPL-MCNC: 8.7 MG/DL (ref 8.5–10.5)
CHLORIDE SERPL-SCNC: 100 MMOL/L (ref 96–112)
CO2 SERPL-SCNC: 26 MMOL/L (ref 20–33)
CREAT SERPL-MCNC: 0.71 MG/DL (ref 0.5–1.4)
ERYTHROCYTE [DISTWIDTH] IN BLOOD BY AUTOMATED COUNT: 50.7 FL (ref 35.9–50)
GLUCOSE SERPL-MCNC: 136 MG/DL (ref 65–99)
HCT VFR BLD AUTO: 30.7 % (ref 37–47)
HGB BLD-MCNC: 9.7 G/DL (ref 12–16)
MCH RBC QN AUTO: 31 PG (ref 27–33)
MCHC RBC AUTO-ENTMCNC: 31.6 G/DL (ref 33.6–35)
MCV RBC AUTO: 98.1 FL (ref 81.4–97.8)
PLATELET # BLD AUTO: 325 K/UL (ref 164–446)
PMV BLD AUTO: 10.6 FL (ref 9–12.9)
POTASSIUM SERPL-SCNC: 3.1 MMOL/L (ref 3.6–5.5)
RBC # BLD AUTO: 3.13 M/UL (ref 4.2–5.4)
SODIUM SERPL-SCNC: 136 MMOL/L (ref 135–145)
WBC # BLD AUTO: 7.8 K/UL (ref 4.8–10.8)

## 2021-08-14 PROCEDURE — A9270 NON-COVERED ITEM OR SERVICE: HCPCS | Performed by: INTERNAL MEDICINE

## 2021-08-14 PROCEDURE — A9270 NON-COVERED ITEM OR SERVICE: HCPCS | Performed by: STUDENT IN AN ORGANIZED HEALTH CARE EDUCATION/TRAINING PROGRAM

## 2021-08-14 PROCEDURE — 80048 BASIC METABOLIC PNL TOTAL CA: CPT

## 2021-08-14 PROCEDURE — 99233 SBSQ HOSP IP/OBS HIGH 50: CPT | Performed by: INTERNAL MEDICINE

## 2021-08-14 PROCEDURE — 71045 X-RAY EXAM CHEST 1 VIEW: CPT

## 2021-08-14 PROCEDURE — 700102 HCHG RX REV CODE 250 W/ 637 OVERRIDE(OP): Performed by: STUDENT IN AN ORGANIZED HEALTH CARE EDUCATION/TRAINING PROGRAM

## 2021-08-14 PROCEDURE — 51798 US URINE CAPACITY MEASURE: CPT

## 2021-08-14 PROCEDURE — 85027 COMPLETE CBC AUTOMATED: CPT

## 2021-08-14 PROCEDURE — 700102 HCHG RX REV CODE 250 W/ 637 OVERRIDE(OP): Performed by: INTERNAL MEDICINE

## 2021-08-14 PROCEDURE — 770006 HCHG ROOM/CARE - MED/SURG/GYN SEMI*

## 2021-08-14 RX ADMIN — GUAIFENESIN 600 MG: 600 TABLET, EXTENDED RELEASE ORAL at 16:17

## 2021-08-14 RX ADMIN — GUAIFENESIN 600 MG: 600 TABLET, EXTENDED RELEASE ORAL at 06:38

## 2021-08-14 RX ADMIN — LOSARTAN POTASSIUM 50 MG: 50 TABLET, FILM COATED ORAL at 10:53

## 2021-08-14 RX ADMIN — OXYCODONE 5 MG: 5 TABLET ORAL at 12:53

## 2021-08-14 RX ADMIN — DOCUSATE SODIUM 50 MG AND SENNOSIDES 8.6 MG 2 TABLET: 8.6; 5 TABLET, FILM COATED ORAL at 06:38

## 2021-08-14 RX ADMIN — METOPROLOL SUCCINATE 100 MG: 100 TABLET, EXTENDED RELEASE ORAL at 06:34

## 2021-08-14 RX ADMIN — APIXABAN 10 MG: 5 TABLET, FILM COATED ORAL at 06:34

## 2021-08-14 RX ADMIN — Medication 500 MG: at 16:17

## 2021-08-14 RX ADMIN — POTASSIUM CHLORIDE 40 MEQ: 1500 TABLET, EXTENDED RELEASE ORAL at 06:35

## 2021-08-14 RX ADMIN — Medication 2000 UNITS: at 16:17

## 2021-08-14 RX ADMIN — FERROUS SULFATE TAB 325 MG (65 MG ELEMENTAL FE) 325 MG: 325 (65 FE) TAB at 16:17

## 2021-08-14 RX ADMIN — ATORVASTATIN CALCIUM 20 MG: 20 TABLET, FILM COATED ORAL at 16:18

## 2021-08-14 RX ADMIN — ASPIRIN 81 MG: 81 TABLET, CHEWABLE ORAL at 06:38

## 2021-08-14 RX ADMIN — AMITRIPTYLINE HYDROCHLORIDE 75 MG: 75 TABLET, FILM COATED ORAL at 20:59

## 2021-08-14 RX ADMIN — POTASSIUM CHLORIDE 40 MEQ: 1500 TABLET, EXTENDED RELEASE ORAL at 16:17

## 2021-08-14 RX ADMIN — THERA TABS 1 TABLET: TAB at 06:38

## 2021-08-14 RX ADMIN — APIXABAN 10 MG: 5 TABLET, FILM COATED ORAL at 16:17

## 2021-08-14 ASSESSMENT — ENCOUNTER SYMPTOMS
NERVOUS/ANXIOUS: 0
SHORTNESS OF BREATH: 1
MYALGIAS: 0
BACK PAIN: 0
WEAKNESS: 1
HEADACHES: 0
DIAPHORESIS: 0
NAUSEA: 0
ABDOMINAL PAIN: 0
SPEECH CHANGE: 0
FEVER: 0
MEMORY LOSS: 0
DEPRESSION: 0
DIZZINESS: 0
FOCAL WEAKNESS: 0
FLANK PAIN: 0
COUGH: 0
SENSORY CHANGE: 0
PALPITATIONS: 0
CHILLS: 0
VOMITING: 0

## 2021-08-14 ASSESSMENT — PATIENT HEALTH QUESTIONNAIRE - PHQ9
SUM OF ALL RESPONSES TO PHQ9 QUESTIONS 1 AND 2: 0
1. LITTLE INTEREST OR PLEASURE IN DOING THINGS: NOT AT ALL
2. FEELING DOWN, DEPRESSED, IRRITABLE, OR HOPELESS: NOT AT ALL

## 2021-08-14 ASSESSMENT — PAIN DESCRIPTION - PAIN TYPE
TYPE: ACUTE PAIN
TYPE: ACUTE PAIN

## 2021-08-14 NOTE — PROGRESS NOTES
[facility] Medicine Daily Progress Note    Date of Service  8/14/2021    Chief Complaint  Jairo Rivas is a 70 y.o. female admitted 7/29/2021 with SOB.    Hospital Course  70 y.o. female with endometrial cancer with history of hypertension, prior stroke, endometrial cancer with lung metastasis (finished chemotherapy infusion, now on oral chemo) who presented on  lung metastasis who presented 7/29/2021 with shortness of breath and chest pain. PE found in right lung.  Hemothorax in left lung s/p evacuation and decortication by CT surgery on 8/6.  Chest tubes pulled on 8/9.  Transitioned to [facility] 8/12.      Interval Problem Update  Patient speaking in full sentences, appears comfortable  No new complaints    I have personally seen and examined the patient at bedside. I discussed the plan of care with patient.    Consultants/Specialty  general surgery and orthopedics    Code Status  Full Code    Disposition  Patient is not medically cleared.   Anticipate discharge to to skilled nursing facility.  I have placed the appropriate orders for post-discharge needs.    Review of Systems  Review of Systems   Constitutional: Negative for chills, diaphoresis, fever and malaise/fatigue.   HENT: Negative for congestion and hearing loss.    Respiratory: Positive for shortness of breath. Negative for cough.    Cardiovascular: Negative for chest pain, palpitations and leg swelling.   Gastrointestinal: Negative for abdominal pain, nausea and vomiting.   Genitourinary: Negative for dysuria and flank pain.   Musculoskeletal: Negative for back pain, joint pain and myalgias.   Neurological: Positive for weakness. Negative for dizziness, sensory change, speech change, focal weakness and headaches.   Psychiatric/Behavioral: Negative for depression and memory loss. The patient is not nervous/anxious.         Physical Exam  Temp:  [35.5 °C (95.9 °F)-36.3 °C (97.3 °F)] 36.3 °C (97.3 °F)  Pulse:  [111-137] 116  Resp:  [16-18] 18  BP:  ()/(63-74) 107/74  SpO2:  [96 %-98 %] 96 %    Physical Exam  Vitals and nursing note reviewed.   Constitutional:       General: She is not in acute distress.     Appearance: She is not ill-appearing, toxic-appearing or diaphoretic.   HENT:      Head: Normocephalic and atraumatic.      Nose: Nose normal.   Eyes:      General:         Right eye: No discharge.         Left eye: No discharge.      Pupils: Pupils are equal, round, and reactive to light.   Neck:      Thyroid: No thyromegaly.      Vascular: No JVD.   Cardiovascular:      Rate and Rhythm: Normal rate.      Heart sounds: No murmur heard.     Pulmonary:      Effort: Pulmonary effort is normal. No respiratory distress.      Breath sounds: No stridor. No wheezing or rhonchi.   Abdominal:      General: Bowel sounds are normal. There is no distension.      Palpations: Abdomen is soft.      Tenderness: There is no abdominal tenderness.   Musculoskeletal:         General: No swelling or tenderness.      Cervical back: Neck supple.   Skin:     General: Skin is warm and dry.      Coloration: Skin is not pale.      Findings: No erythema or rash.   Neurological:      Mental Status: She is alert and oriented to person, place, and time.      Cranial Nerves: No cranial nerve deficit.      Motor: Weakness present.   Psychiatric:         Behavior: Behavior normal.         Thought Content: Thought content normal.         Fluids    Intake/Output Summary (Last 24 hours) at 8/14/2021 1138  Last data filed at 8/14/2021 1000  Gross per 24 hour   Intake 710 ml   Output 250 ml   Net 460 ml       Laboratory  Recent Labs     08/12/21  0400 08/13/21  0242 08/14/21  0134   WBC 8.7 8.6 7.8   RBC 3.37* 3.47* 3.13*   HEMOGLOBIN 10.5* 10.7* 9.7*   HEMATOCRIT 32.5* 33.5* 30.7*   MCV 96.4 96.5 98.1*   MCH 31.2 30.8 31.0   MCHC 32.3* 31.9* 31.6*   RDW 50.2* 50.2* 50.7*   PLATELETCT 320 343 325   MPV 10.4 10.5 10.6     Recent Labs     08/12/21  0400 08/13/21  0242 08/14/21  0134   SODIUM  141 139 136   POTASSIUM 3.5* 3.2* 3.1*   CHLORIDE 105 102 100   CO2 28 27 26   GLUCOSE 103* 112* 136*   BUN 8 11 12   CREATININE 0.70 0.71 0.71   CALCIUM 8.8 9.2 8.7                   Imaging  DX-CHEST-PORTABLE (1 VIEW)   Final Result         1.  Hazy left pulmonary infiltrates, similar compared to prior study.   2.  Layering left pleural effusion, stable since prior study.      DX-CHEST-PORTABLE (1 VIEW)   Final Result      Stable chest with left large pleural effusion, pulmonary consolidation and probable atelectasis      DX-CHEST-PORTABLE (1 VIEW)   Final Result      1.  Unchanged LEFT pleural effusion   2.  Unchanged LEFT-sided airspace disease      DX-CHEST-PORTABLE (1 VIEW)   Final Result         1.  Left pulmonary infiltrates and layering left pleural effusion. Similar compared to prior study.      DX-CHEST-LIMITED (1 VIEW)   Final Result      1.  No pneumothorax identified following removal of 2 left chest tubes.      2.  Left lower lobe and left midlung atelectasis, pneumonia, or contusion is present.      3.  Possible small left pleural effusion.      4.  Clear right lung.      DX-CHEST-PORTABLE (1 VIEW)   Final Result         1.  Patchy left pulmonary infiltrates.   2.  Layering left pleural effusion   3.  Cardiomegaly      US-EXTREMITY VENOUS LOWER BILAT   Final Result      DX-CHEST-PORTABLE (1 VIEW)   Final Result         1.  Left pulmonary infiltrates, stable   2.  Cardiomegaly      DX-CHEST-PORTABLE (1 VIEW)   Final Result      1.  Again seen 2 left-sided chest tubes present.      2.  Bony loss and consolidation within the left lung.      3.  Stable left pleural effusion      DX-CHEST-PORTABLE (1 VIEW)   Final Result      No significant change      DX-CHEST-PORTABLE (1 VIEW)   Final Result      1.  Interval placement of 2 large bore left chest tubes with decreased left pleural effusion. Small hemopneumothorax.   2.  Left lung opacity likely atelectasis.      US-CHEST   Final Result      1.  Diffusely  organized process noted in the left hemithorax. No significant pleural effusion identified.      2.  Thoracentesis deferred      DX-CHEST-PORTABLE (1 VIEW)   Final Result      Opacification of the left hemithorax is unchanged with mediastinal shift to the right.      US-CHEST   Final Result         Thick echogenic heterogeneous material in the left pleural space. No drainable pocket for thoracentesis.      DX-CHEST-PORTABLE (1 VIEW)   Final Result      1.  Unchanged subtotal opacification of the left hemithorax without volume loss.      2.  Persistent mediastinal shift to the right.      3.  No right lung consolidation.      CT-CHEST (THORAX) W/O   Final Result      1.  Very large left pleural effusion which now demonstrates heterogeneous areas of increased density suggesting interval hemorrhage or less likely superimposed infection.   2.  Complete collapse/compressive atelectasis of the left lung which is worsened than prior study.   3.  New mild groundglass opacity in the right lung apex could represent atelectasis or mild pneumonitis.   4.  Right pulmonary nodules again noted suggesting metastases.      These findings were discussed with JUANITA SERNA on 8/3/2021 3:40 PM.      Fleischner Society pulmonary nodule recommendations:   Not Applicable         DX-CHEST-PORTABLE (1 VIEW)   Final Result      1.  There is complete opacification of the left hemithorax which could represent combination of pleural effusion or airspace process.      US-CHEST   Final Result      1.  Diffuse pneumonitis of left hemithorax.      2.  No evidence of a large left-sided pleural effusion. Thoracentesis deferred.      3.  Recommend CT scan of the thorax for further evaluation.      DX-CHEST-PORTABLE (1 VIEW)   Final Result      Worsening opacification on the left which is now complete, probably related to left pleural effusion and atelectasis.      DX-CHEST-LIMITED (1 VIEW)   Final Result      1.  No significant interval change.       DX-CHEST-PORTABLE (1 VIEW)   Final Result      1.  Interval decrease in the left pleural effusion following thoracentesis.   2.  There is no pneumothorax visualized.      US-THORACENTESIS PUNCTURE LEFT   Final Result      1. Ultrasound guided left sided diagnostic thoracentesis.      2. 950 mL of fluid withdrawn.      CT-CTA CHEST PULMONARY ARTERY W/ RECONS   Final Result      1.  Nonocclusive pulmonary emboli in the right pulmonary artery, its lobar and segmental branches. No right heart strain.   2.  Large left pleural effusion, with total collapse of the left lower lobe and partial atelectasis of the left upper lobe.   3.  At least 3 new pulmonary nodules measuring up to 2.6 cm, consistent with pulmonary metastases.   4.  Unchanged heterogeneous thyroid gland with multiple thyroid nodules.      Findings were discussed with KRISTOPHER COOK on 7/29/2021 9:38 AM.         DX-CHEST-PORTABLE (1 VIEW)   Final Result      Very large left pleural effusion with associated compressive atelectasis.           Assessment/Plan  * Pleural effusion- (present on admission)  Assessment & Plan  Shortness of breath with coarse crackles on left mid lung. Found to have large left sided pleural effusion with total collapse of left lower lobe and partial atelectasis of upper left lobe on initial CTA. Thoracentesis performed on 7/30, removing 950 cc of fluid.  CT surgery performed hemothorax evacuation and decortication w/ chest tube placement on 8/7, repeat imaging shows clearance of hemothorax and pleural effusion.  Biopsy of lung nodule was performed at that time, pathology shows endometrial adenocarcinoma.  This is a malignant pleural effusion w/ concomitant hemothorax, likely source is also malignant as initial thoracentesis had large amount of RBCs.    -Gyn-Onc consult, appreciate assistance   -Not currently requiring pleurex catheter   -Per oncology, treatment at this time is only therapeutic, and not likely curative  -Pulmonology  consulted, appreciate assistance.   -Cardiothoracic surgery consult, appreciate assistance  -Chest tube removed 8/9  -Fluid doesn't appear to be reaccumulating at this time as imaging is stable.      8/14 respiratory status stable, on 3 L, taper as tolerated repeat chest x-ray with layering left pleural effusion, will monitor  May need repeat thoracentesis or chest tube placement if worsening    Low urine output  Assessment & Plan  Pt w/ low urine output overnight, and low urine production following day.  Per RN she has had pretty poor PO intake, both fluids and food.  Concerns at this time would be dehydration in the setting of poor fluid intake, as seen with the low urine production today seen on bladder scan.  Initial concerns were for obstructive process, this is less likely at this time as she appears to not be making much urine.  -500 ml bolus lactated ringers and assess urine output to assess volume status  -If continues to have evidence of urinary retention, consider CT abdomen pelvis  Renal function stable    Tachycardia  Assessment & Plan  Pt w/ persistent tachycardia, which did not respond to fluid bolus.  Worsened on 8/10 after RRT, due to pulmonary emboli, now tachycardia within similar range prior to RRT.    -Continue to monitor, if any episodes of sustained SVT occur, consider vasovagal maneuver or metroprolol pushes as BP allows    Anemia  Assessment & Plan  Pt w/ slowly downtrending hemoglobin since admission.  This is a normocytic anemia.  Iron studies show concern for early anemia of chronic disease, but this would be unlikely to contribute to such quick decline in hemoglobin.  Other considerations would be active, bleeding, but patient at this time has no obvious areas of active bleeding, no melena, no hematemesis, no vaginal bleeding, no external bleeding.  Consideration would be bleeding into thorax s/p thoracentesis performed on admission.  She was on anticoagulation for PE.  CT shows concerns  for hemothorax, which would explain acute decline in hemoglobin following initiation of anticoagulation.    -Resolving following hemothorax evacuation.  -Continue to monitor for evidence of re-bleeding.    Monitor      Acute kidney injury (HCC)  Assessment & Plan  New HEAVEN on 8/1, w/ acute worsening on 8/2.  Resolved on 8/3.  Cr 48 hours prior to insult was 0.65, has since elevated to 1.28.  FeNa shows a likely pre-renal process.  This is likely 2/2 intravascular depletion in the setting of a large volume pleural effusion and attempted diuresis.  This was responsive to fluid bolus.      RESOLVED    Pulmonary embolism (HCC)  Assessment & Plan  Patient was having left upper thigh pain for several days preceding her shortness of breath.  Non-occlusive right pulmonary artery embolism (lobar and segmental branches) on CTA. No evidence of right heart strain on CTA. Likely only mild contribution to AHRF as the PE is non-occlusive and no evidence of right heart strain seen.    RRT on 8/10 w/ new hypoxia and tachycardia following moving to side of bed, CXR shows mild pleural effusion and pulmonary edema in the left lung.  Bedside ultrasound at the time shows minimal pleural fluid, no pericardial effusion and right lung without pulmonary edema.  Refer to RRT and ICU note for more detail.  This is believed to be a worsening of her pulmonary emboli, whether it is a new clot or repositioning of old clot which is now occlusive, as this was a large clot.  -Clinically pt appears to be stable, isn't requiring more oxygen, tachycardia is in a lower range than previous day.  -on apixaban 10 mg PO BID, x7 days total (day 2) and then transition to 5 mg PO BID.  Will continue to monitor for rebleeding  -Monitoring on telemetry  -Monitor vitals for hemodynamic stability    Endometrial cancer (HCC)- (present on admission)  Assessment & Plan  Hx of endometrial cancer(2019, treated w/ total hysterectomy, radiation and cisplatin therapy) that  has metastasized to the lungs(2020). Follows-up with Dr. Jann Garcia. Finished chemotherapy infusion. Currently on tamoxifen (3 weeks on, 3 weeks off), last tamoxifen 07/18, would restart August 9th. Found to have 3 new pulmonary nodules up to 2.6 cm consistent with pulmonary metastasis.  Pathology results show adenocarcinoma with a likely endometrial source.  -Gyn-Onc consulted, appreciate assistance   -Hold tamoxifen/megace due to current PE   -No pleurex catheter indicated at this time.     -Will need follow-up outpatient to determine next step for treatment options           VTE prophylaxis: therapeutic anticoagulation with eliquis    I have performed a physical exam and reviewed and updated ROS and Plan today (8/14/2021). In review of yesterday's note (8/13/2021), there are no changes except as documented above.

## 2021-08-14 NOTE — ASSESSMENT & PLAN NOTE
Pt w/ low urine output overnight, and low urine production following day.  Per RN she has had pretty poor PO intake, both fluids and food.  Concerns at this time would be dehydration in the setting of poor fluid intake, as seen with the low urine production today seen on bladder scan.  Initial concerns were for obstructive process, this is less likely at this time as she appears to not be making much urine.  -500 ml bolus lactated ringers and assess urine output to assess volume status  -If continues to have evidence of urinary retention, consider CT abdomen pelvis  Renal function stable

## 2021-08-14 NOTE — CARE PLAN
Problem: Pain - Standard  Goal: Alleviation of pain or a reduction in pain to the patient’s comfort goal  Outcome: Progressing     Problem: Fall Risk  Goal: Patient will remain free from falls  Outcome: Progressing   The patient is Watcher - Medium risk of patient condition declining or worsening    Shift Goals  Clinical Goals: monitor output and pain   Patient Goals: sleep comfortably  Family Goals: rest     Progress made toward(s) clinical / shift goals:  PT c/o no pain at this time    Patient is not progressing towards the following goals: PT is very high falls risk, and remains free from falls

## 2021-08-14 NOTE — CARE PLAN
The patient is Watcher - Medium risk of patient condition declining or worsening    Shift Goals  Clinical Goals: monitor output and pain   Patient Goals: sleep comfortably  Family Goals: rest     Progress made toward(s) clinical / shift goals:      Patient engaged in care plan and encouraged to communicate needs. Patient expresses pain levels on 0-10 scale and PRN pain medication administered per order. Patient able to sleep comfortably.    Patient is not progressing towards the following goals:        Problem: Pain - Standard  Goal: Alleviation of pain or a reduction in pain to the patient’s comfort goal  Outcome: Progressing     Problem: Knowledge Deficit - Standard  Goal: Patient and family/care givers will demonstrate understanding of plan of care, disease process/condition, diagnostic tests and medications  Outcome: Progressing     Problem: Fall Risk  Goal: Patient will remain free from falls  Outcome: Progressing     Problem: Skin Integrity  Goal: Skin integrity is maintained or improved  Outcome: Progressing     Problem: Respiratory  Goal: Patient will achieve/maintain optimum respiratory ventilation and gas exchange  Outcome: Progressing

## 2021-08-14 NOTE — PROGRESS NOTES
Pt arrived on unit in stable condition, easily agitated and refusing skin assessment. Belongings at bedside, call button in reach

## 2021-08-14 NOTE — PROGRESS NOTES
Daily Progress Note:     Date of Service: 8/13/2021  Primary Team: [facility] Blue Team   Attending: Estefani Santos  Senior Resident: Dr. Tenorio  Intern: Dr. Clements  Contact:  [phone]    Chief Complaint:   CP, SOB    Subjective:  Overnight she had decreased urine output, this morning bladder scan shows 440 ml, straight catheter drained 400 ml from the bladder.  Aside from this, she is feeling very well.  She is having no abdominal pain.  Her breathing and her pain at the surgery site are consistent w/ yesterday.  Repeat bladder scans this afternoon were 56 ml, she had no known voids at that time.  She did use the commode once after this, but this was not measured.  Discussed w/ daughter, Carolyn, this morning oncology's assessment and plans.    Consultants/Specialty:  Gyn-Oncology  Pulmonology:  [facility]  CT surgery:  [facility]    Review of Systems:    Review of Systems   Constitutional: Negative for chills and fever.   Respiratory: Positive for shortness of breath (consistent w/ yesterday). Negative for cough.    Cardiovascular: Positive for chest pain (no change from yesterday). Negative for palpitations and orthopnea.   Gastrointestinal: Negative for abdominal pain, diarrhea, nausea and vomiting.   Genitourinary: Negative for dysuria, flank pain, frequency and urgency.   Musculoskeletal: Negative for back pain, falls and joint pain.   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 Negative for headaches.       Objective Data:   Physical Exam:   Vitals:   Temp:  [35.5 °C (95.9 °F)-36.9 °C (98.5 °F)] 35.5 °C (95.9 °F)  Pulse:  [101-131] 111  Resp:  [17-20] 17  BP: ()/(66-86) 97/66  SpO2:  [92 %-99 %] 97 %    Physical Exam  Constitutional:       General: She is not in acute distress.     Appearance: She is not toxic-appearing.   HENT:      Head: Normocephalic and atraumatic.      Mouth/Throat:      Mouth: Mucous membranes are moist.      Pharynx: Oropharynx is clear.   Eyes:       Conjunctiva/sclera: Conjunctivae normal.   Cardiovascular:      Rate and Rhythm: Regular rhythm. Tachycardia present.      Pulses: Normal pulses.      Heart sounds: Normal heart sounds. No murmur heard.   No friction rub. No gallop.    Pulmonary:      Effort: Pulmonary effort is normal. No respiratory distress.      Comments: Decreased BS in the left inferior axillary field.  Diffuse rales throughout left lung.  Right lung CTA in anterior and axillary fields.  Unchanged from yesterday  Abdominal:      General: Abdomen is flat. Bowel sounds are normal. There is no distension.      Palpations: Abdomen is soft. There is no mass.      Tenderness: There is no abdominal tenderness. There is no guarding.      Comments: No suprapubic tenderness   Musculoskeletal:      Right lower leg: No edema.      Left lower leg: No edema.   Skin:     General: Skin is warm and dry.   Neurological:      Mental Status: She is alert and oriented to person, place, and time.      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   Psychiatric:         Mood and Affect: Mood normal.         Behavior: Behavior normal.         Thought Content: Thought content normal.           Labs:   HEMATOLOGY/ ONCOLOGY/ID:            Recent Labs     08/11/21  0319 08/12/21  0400 08/13/21  0242   WBC 10.1 8.7 8.6   RBC 3.48* 3.37* 3.47*   HEMOGLOBIN 10.8* 10.5* 10.7*   HEMATOCRIT 33.6* 32.5* 33.5*   MCV 96.6 96.4 96.5   MCH 31.0 31.2 30.8   RDW 50.3* 50.2* 50.2*   PLATELETCT 318 320 343   MPV 10.6 10.4 10.5     Lab Results   Component Value Date    DXGFPUUT81 984 (H) 09/25/2020    UUHLEJBL64 714 05/15/2019    ZTSTVJPE85 937 (H) 10/02/2018    FOLATE >23.8 06/12/2019    FERRITIN 436.0 (H) 08/02/2021    FERRITIN 18.5 06/12/2019    FERRITIN 18.5 10/02/2018    IRON 13 (L) 08/02/2021    IRON 35 (L) 06/12/2019    IRON 29 (L) 10/02/2018    TOTIRONBC 150 (L) 08/02/2021    TOTIRONBC 272 06/12/2019    TOTIRONBC 392 10/02/2018       RENAL:         Estimated GFR/CRCL = Estimated Creatinine Clearance: 88.7 mL/min (by C-G formula based on SCr of 0.71 mg/dL).  Recent Labs     08/11/21 0319 08/12/21  0400 08/13/21  0242   SODIUM 141 141 139   POTASSIUM 3.8 3.5* 3.2*   CHLORIDE 105 105 102   CO2 27 28 27   GLUCOSE 124* 103* 112*   BUN 8 8 11   CREATININE 0.68 0.70 0.71   CALCIUM 8.9 8.8 9.2       GASTROINTESTINAL/ HEPATIC:          Recent Labs     08/10/21  2010   INR 1.20*     No results found for: AMMONIA    ENDOCRINE:              Recent Labs     08/11/21 0319 08/12/21  0400 08/13/21  0242   GLUCOSE 124* 103* 112*     Lab Results   Component Value Date    FREET4 0.92 05/15/2019       Imaging:     CXR 8/12:  Shows mild left pleural effusion w/ pulmonary edema in the left lung.  Edema appears to be improving, pleural effusion is stable.    Problem Representation:  Carlene Rivas  is a 70 y.o. y/o woman w/ PMHx Stage IV endometrial cancer who presented on 7/29/2021 w/ worsening shortness of breath.  She was found to have a non-occlusive PE in the right pulmonary artery w/o right heart strain seen on CTA from 7/29.  Large left pleural effusion, thoracentesis performed on 7/29 removed 950 ml of fluid, + lights criteria, bloody aspirate.  CT chest on 8/3 shows likely hemothorax.  Hemothorax evacuation and decortication performed on 8/7.  Pathology from lung nodule shows endometrial adenocarcinoma.  RRT called on 8/10 for worsening hypoxia following movement, believed to be PE.  Now is improving.  Switched to oral anticoagulation w/ apixaban, tolerating well at this time.    * Pleural effusion- (present on admission)  Assessment & Plan  Shortness of breath with coarse crackles on left mid lung. Found to have large left sided pleural effusion with total collapse of left lower lobe and partial atelectasis of upper left lobe on initial CTA. Thoracentesis performed on 7/30, removing 950 cc of fluid.  CT surgery performed hemothorax evacuation and decortication  w/ chest tube placement on 8/7, repeat imaging shows clearance of hemothorax and pleural effusion.  Biopsy of lung nodule was performed at that time, pathology shows endometrial adenocarcinoma.  This is a malignant pleural effusion w/ concomitant hemothorax, likely source is also malignant as initial thoracentesis had large amount of RBCs.    -Gyn-Onc consult, appreciate assistance   -Not currently requiring pleurex catheter   -Per oncology, treatment at this time is only therapeutic, and not likely curative  -Pulmonology consulted, appreciate assistance.   -Cardiothoracic surgery consult, appreciate assistance  -Chest tube removed 8/9  -Fluid doesn't appear to be reaccumulating at this time as imaging is stable.      Low urine output  Assessment & Plan  Pt w/ low urine output overnight, and low urine production following day.  Per RN she has had pretty poor PO intake, both fluids and food.  Concerns at this time would be dehydration in the setting of poor fluid intake, as seen with the low urine production today seen on bladder scan.  Initial concerns were for obstructive process, this is less likely at this time as she appears to not be making much urine.  -500 ml bolus lactated ringers and assess urine output to assess volume status  -If continues to have evidence of urinary retention, consider CT abdomen pelvis    Tachycardia  Assessment & Plan  Pt w/ persistent tachycardia, which did not respond to fluid bolus.  Worsened on 8/10 after RRT, due to pulmonary emboli, now tachycardia within similar range prior to RRT.    -Continue to monitor, if any episodes of sustained SVT occur, consider vasovagal maneuver or metroprolol pushes as BP allows    Pulmonary embolism (HCC)  Assessment & Plan  Patient was having left upper thigh pain for several days preceding her shortness of breath.  Non-occlusive right pulmonary artery embolism (lobar and segmental branches) on CTA. No evidence of right heart strain on CTA. Likely  only mild contribution to AHRF as the PE is non-occlusive and no evidence of right heart strain seen.    RRT on 8/10 w/ new hypoxia and tachycardia following moving to side of bed, CXR shows mild pleural effusion and pulmonary edema in the left lung.  Bedside ultrasound at the time shows minimal pleural fluid, no pericardial effusion and right lung without pulmonary edema.  Refer to RRT and ICU note for more detail.  This is believed to be a worsening of her pulmonary emboli, whether it is a new clot or repositioning of old clot which is now occlusive, as this was a large clot.  -Clinically pt appears to be stable, isn't requiring more oxygen, tachycardia is in a lower range than previous day.  -on apixaban 10 mg PO BID, x7 days total (day 2) and then transition to 5 mg PO BID.  Will continue to monitor for rebleeding  -Monitoring on telemetry  -Monitor vitals for hemodynamic stability    Anemia  Assessment & Plan  Pt w/ slowly downtrending hemoglobin since admission.  This is a normocytic anemia.  Iron studies show concern for early anemia of chronic disease, but this would be unlikely to contribute to such quick decline in hemoglobin.  Other considerations would be active, bleeding, but patient at this time has no obvious areas of active bleeding, no melena, no hematemesis, no vaginal bleeding, no external bleeding.  Consideration would be bleeding into thorax s/p thoracentesis performed on admission.  She was on anticoagulation for PE.  CT shows concerns for hemothorax, which would explain acute decline in hemoglobin following initiation of anticoagulation.    -Resolving following hemothorax evacuation.  -Continue to monitor for evidence of re-bleeding.        Acute kidney injury (HCC)  Assessment & Plan  New HEAVEN on 8/1, w/ acute worsening on 8/2.  Resolved on 8/3.  Cr 48 hours prior to insult was 0.65, has since elevated to 1.28.  FeNa shows a likely pre-renal process.  This is likely 2/2 intravascular depletion  in the setting of a large volume pleural effusion and attempted diuresis.  This was responsive to fluid bolus.      RESOLVED    Endometrial cancer (HCC)- (present on admission)  Assessment & Plan  Hx of endometrial cancer(2019, treated w/ total hysterectomy, radiation and cisplatin therapy) that has metastasized to the lungs(2020). Follows-up with Dr. Jnan Garcia. Finished chemotherapy infusion. Currently on tamoxifen (3 weeks on, 3 weeks off), last tamoxifen 07/18, would restart August 9th. Found to have 3 new pulmonary nodules up to 2.6 cm consistent with pulmonary metastasis.  Pathology results show adenocarcinoma with a likely endometrial source.  -Gyn-Onc consulted, appreciate assistance   -Hold tamoxifen/megace due to current PE   -No pleurex catheter indicated at this time.     -Will need follow-up outpatient to determine next step for treatment options          Code Status: Full code  DVT ppx: apixaban  Diet: Regular diet  GI: Bowel regimen  T/L/D: CVC in left neck  Disposition: Anticipate >48 hours for further assessment of pleural effusion       Julio Clements DO  PGY-1, [employer] [facility]    Assessment and plan discussed with senior resident and attending physician.

## 2021-08-15 PROCEDURE — 700102 HCHG RX REV CODE 250 W/ 637 OVERRIDE(OP): Performed by: STUDENT IN AN ORGANIZED HEALTH CARE EDUCATION/TRAINING PROGRAM

## 2021-08-15 PROCEDURE — A9270 NON-COVERED ITEM OR SERVICE: HCPCS | Performed by: STUDENT IN AN ORGANIZED HEALTH CARE EDUCATION/TRAINING PROGRAM

## 2021-08-15 PROCEDURE — 700102 HCHG RX REV CODE 250 W/ 637 OVERRIDE(OP): Performed by: INTERNAL MEDICINE

## 2021-08-15 PROCEDURE — A9270 NON-COVERED ITEM OR SERVICE: HCPCS | Performed by: INTERNAL MEDICINE

## 2021-08-15 PROCEDURE — 99232 SBSQ HOSP IP/OBS MODERATE 35: CPT | Performed by: INTERNAL MEDICINE

## 2021-08-15 PROCEDURE — 770006 HCHG ROOM/CARE - MED/SURG/GYN SEMI*

## 2021-08-15 RX ADMIN — ACETAMINOPHEN 500 MG: 500 TABLET, FILM COATED ORAL at 05:24

## 2021-08-15 RX ADMIN — FERROUS SULFATE TAB 325 MG (65 MG ELEMENTAL FE) 325 MG: 325 (65 FE) TAB at 17:42

## 2021-08-15 RX ADMIN — Medication 2000 UNITS: at 17:42

## 2021-08-15 RX ADMIN — Medication 500 MG: at 17:42

## 2021-08-15 RX ADMIN — APIXABAN 10 MG: 5 TABLET, FILM COATED ORAL at 05:09

## 2021-08-15 RX ADMIN — LOSARTAN POTASSIUM 50 MG: 50 TABLET, FILM COATED ORAL at 10:21

## 2021-08-15 RX ADMIN — OXYCODONE 5 MG: 5 TABLET ORAL at 13:29

## 2021-08-15 RX ADMIN — ASPIRIN 81 MG: 81 TABLET, CHEWABLE ORAL at 05:27

## 2021-08-15 RX ADMIN — METOPROLOL SUCCINATE 100 MG: 100 TABLET, EXTENDED RELEASE ORAL at 05:10

## 2021-08-15 RX ADMIN — POTASSIUM CHLORIDE 40 MEQ: 1500 TABLET, EXTENDED RELEASE ORAL at 05:09

## 2021-08-15 RX ADMIN — THERA TABS 1 TABLET: TAB at 05:10

## 2021-08-15 RX ADMIN — GUAIFENESIN 600 MG: 600 TABLET, EXTENDED RELEASE ORAL at 05:10

## 2021-08-15 RX ADMIN — DOCUSATE SODIUM 50 MG AND SENNOSIDES 8.6 MG 2 TABLET: 8.6; 5 TABLET, FILM COATED ORAL at 05:09

## 2021-08-15 RX ADMIN — ATORVASTATIN CALCIUM 20 MG: 20 TABLET, FILM COATED ORAL at 17:42

## 2021-08-15 RX ADMIN — AMITRIPTYLINE HYDROCHLORIDE 75 MG: 75 TABLET, FILM COATED ORAL at 20:36

## 2021-08-15 RX ADMIN — APIXABAN 10 MG: 5 TABLET, FILM COATED ORAL at 17:42

## 2021-08-15 RX ADMIN — GUAIFENESIN 600 MG: 600 TABLET, EXTENDED RELEASE ORAL at 17:42

## 2021-08-15 RX ADMIN — POTASSIUM CHLORIDE 40 MEQ: 1500 TABLET, EXTENDED RELEASE ORAL at 17:42

## 2021-08-15 RX ADMIN — ACETAMINOPHEN 500 MG: 500 TABLET, FILM COATED ORAL at 10:21

## 2021-08-15 ASSESSMENT — ENCOUNTER SYMPTOMS
NAUSEA: 0
MEMORY LOSS: 0
NERVOUS/ANXIOUS: 0
WEAKNESS: 1
SHORTNESS OF BREATH: 1
FEVER: 0
BACK PAIN: 0
PALPITATIONS: 0
FOCAL WEAKNESS: 0
COUGH: 0
FLANK PAIN: 0
MYALGIAS: 0
DIZZINESS: 0
ABDOMINAL PAIN: 0
CHILLS: 0
HEADACHES: 0

## 2021-08-15 ASSESSMENT — PAIN DESCRIPTION - PAIN TYPE
TYPE: ACUTE PAIN

## 2021-08-15 NOTE — PROGRESS NOTES
"Palliative Care   Received care consult for advance care planning 8/13 from T7 resident Dr. Darren Tenorio for \"advanced cancer with bad prognosis.\"  EMR reviewed.  Patient has stage IIIb endometrial cancer previously stage IIIB, now with pulmonary recurrence and status post fluoroscopy with hemothorax evacuation and decortication and PE secondary to malignancy.  Patient is treated by Dr. Garcia at [facility].  Was seen by Dr. Christina Leos today.  Reached out to Dr. Leos via [employer] by her of palliative care consult.  Per Dr. Leos, patient/family deciding between continued treatment versus palliative care/hospice and recommended reaching out to Dr. Garcia tomorrow when he will be back on service. Updated Dr. Gilbert.     Plan: reach out to Dr. Garcia 8/16 to collaborate.    DEON BaezRIraidaN.  Palliative Care Nurse Practitioner  [phone]      "

## 2021-08-15 NOTE — PROGRESS NOTES
[facility] Daily Progress Note    Date of Service  8/15/2021    Chief Complaint  Jairo Rivas is a 70 y.o. female admitted 7/29/2021 with SOB.    Hospital Course  70 y.o. female with endometrial cancer with history of hypertension, prior stroke, endometrial cancer with lung metastasis (finished chemotherapy infusion, now on oral chemo) who presented on  lung metastasis who presented 7/29/2021 with shortness of breath and chest pain. PE found in right lung.  Hemothorax in left lung s/p evacuation and decortication by CT surgery on 8/6.  Chest tubes pulled on 8/9.  Transitioned to [facility] 8/12.      Interval Problem Update  On 3.5L, no new complaints  Pt/ot eval  May need placment    Encourage activity    I have personally seen and examined the patient at bedside. I discussed the plan of care with patient.    Consultants/Specialty  general surgery and orthopedics    Code Status  Full Code    Disposition  Patient is not medically cleared.   Anticipate discharge to to skilled nursing facility.  I have placed the appropriate orders for post-discharge needs.    Review of Systems  Review of Systems   Constitutional: Negative for chills and fever.   HENT: Negative for congestion.    Respiratory: Positive for shortness of breath. Negative for cough.    Cardiovascular: Negative for chest pain, palpitations and leg swelling.   Gastrointestinal: Negative for abdominal pain and nausea.   Genitourinary: Negative for dysuria, flank pain and urgency.   Musculoskeletal: Negative for back pain, joint pain and myalgias.   Neurological: Positive for weakness. Negative for dizziness, focal weakness and headaches.   Psychiatric/Behavioral: Negative for memory loss. The patient is not nervous/anxious.         Physical Exam  Temp:  [35.8 °C (96.5 °F)-36.6 °C (97.9 °F)] 36.6 °C (97.9 °F)  Pulse:  [109-133] 119  Resp:  [18-24] 24  BP: ()/(64-78) 115/72  SpO2:  [93 %-99 %] 98 %    Physical Exam  Vitals and nursing  note reviewed.   Constitutional:       General: She is not in acute distress.     Appearance: She is not ill-appearing.   HENT:      Head: Normocephalic and atraumatic.      Nose: Nose normal.   Eyes:      General:         Right eye: No discharge.         Left eye: No discharge.      Pupils: Pupils are equal, round, and reactive to light.   Neck:      Thyroid: No thyromegaly.      Vascular: No JVD.   Cardiovascular:      Rate and Rhythm: Normal rate.      Heart sounds: No murmur heard.     Pulmonary:      Effort: Pulmonary effort is normal. No respiratory distress.      Breath sounds: No stridor.   Abdominal:      General: Bowel sounds are normal. There is no distension.      Palpations: Abdomen is soft. There is no mass.   Musculoskeletal:         General: No swelling or tenderness.      Cervical back: Neck supple.   Skin:     General: Skin is warm and dry.      Coloration: Skin is not pale.   Neurological:      Mental Status: She is alert and oriented to person, place, and time.      Cranial Nerves: No cranial nerve deficit.      Sensory: No sensory deficit.      Motor: Weakness present.   Psychiatric:         Behavior: Behavior normal.         Thought Content: Thought content normal.         Judgment: Judgment normal.         Fluids    Intake/Output Summary (Last 24 hours) at 8/15/2021 1215  Last data filed at 8/15/2021 0525  Gross per 24 hour   Intake 360 ml   Output 650 ml   Net -290 ml       Laboratory  Recent Labs     08/13/21 0242 08/14/21 0134   WBC 8.6 7.8   RBC 3.47* 3.13*   HEMOGLOBIN 10.7* 9.7*   HEMATOCRIT 33.5* 30.7*   MCV 96.5 98.1*   MCH 30.8 31.0   MCHC 31.9* 31.6*   RDW 50.2* 50.7*   PLATELETCT 343 325   MPV 10.5 10.6     Recent Labs     08/13/21 0242 08/14/21  0134   SODIUM 139 136   POTASSIUM 3.2* 3.1*   CHLORIDE 102 100   CO2 27 26   GLUCOSE 112* 136*   BUN 11 12   CREATININE 0.71 0.71   CALCIUM 9.2 8.7                   Imaging  DX-CHEST-PORTABLE (1 VIEW)   Final Result         1.  Hazy  left pulmonary infiltrates, similar compared to prior study.   2.  Layering left pleural effusion, stable since prior study.      DX-CHEST-PORTABLE (1 VIEW)   Final Result      Stable chest with left large pleural effusion, pulmonary consolidation and probable atelectasis      DX-CHEST-PORTABLE (1 VIEW)   Final Result      1.  Unchanged LEFT pleural effusion   2.  Unchanged LEFT-sided airspace disease      DX-CHEST-PORTABLE (1 VIEW)   Final Result         1.  Left pulmonary infiltrates and layering left pleural effusion. Similar compared to prior study.      DX-CHEST-LIMITED (1 VIEW)   Final Result      1.  No pneumothorax identified following removal of 2 left chest tubes.      2.  Left lower lobe and left midlung atelectasis, pneumonia, or contusion is present.      3.  Possible small left pleural effusion.      4.  Clear right lung.      DX-CHEST-PORTABLE (1 VIEW)   Final Result         1.  Patchy left pulmonary infiltrates.   2.  Layering left pleural effusion   3.  Cardiomegaly      US-EXTREMITY VENOUS LOWER BILAT   Final Result      DX-CHEST-PORTABLE (1 VIEW)   Final Result         1.  Left pulmonary infiltrates, stable   2.  Cardiomegaly      DX-CHEST-PORTABLE (1 VIEW)   Final Result      1.  Again seen 2 left-sided chest tubes present.      2.  Bony loss and consolidation within the left lung.      3.  Stable left pleural effusion      DX-CHEST-PORTABLE (1 VIEW)   Final Result      No significant change      DX-CHEST-PORTABLE (1 VIEW)   Final Result      1.  Interval placement of 2 large bore left chest tubes with decreased left pleural effusion. Small hemopneumothorax.   2.  Left lung opacity likely atelectasis.      US-CHEST   Final Result      1.  Diffusely organized process noted in the left hemithorax. No significant pleural effusion identified.      2.  Thoracentesis deferred      DX-CHEST-PORTABLE (1 VIEW)   Final Result      Opacification of the left hemithorax is unchanged with mediastinal shift to  the right.      US-CHEST   Final Result         Thick echogenic heterogeneous material in the left pleural space. No drainable pocket for thoracentesis.      DX-CHEST-PORTABLE (1 VIEW)   Final Result      1.  Unchanged subtotal opacification of the left hemithorax without volume loss.      2.  Persistent mediastinal shift to the right.      3.  No right lung consolidation.      CT-CHEST (THORAX) W/O   Final Result      1.  Very large left pleural effusion which now demonstrates heterogeneous areas of increased density suggesting interval hemorrhage or less likely superimposed infection.   2.  Complete collapse/compressive atelectasis of the left lung which is worsened than prior study.   3.  New mild groundglass opacity in the right lung apex could represent atelectasis or mild pneumonitis.   4.  Right pulmonary nodules again noted suggesting metastases.      These findings were discussed with JUANITA SERNA on 8/3/2021 3:40 PM.      Fleischner Society pulmonary nodule recommendations:   Not Applicable         DX-CHEST-PORTABLE (1 VIEW)   Final Result      1.  There is complete opacification of the left hemithorax which could represent combination of pleural effusion or airspace process.      US-CHEST   Final Result      1.  Diffuse pneumonitis of left hemithorax.      2.  No evidence of a large left-sided pleural effusion. Thoracentesis deferred.      3.  Recommend CT scan of the thorax for further evaluation.      DX-CHEST-PORTABLE (1 VIEW)   Final Result      Worsening opacification on the left which is now complete, probably related to left pleural effusion and atelectasis.      DX-CHEST-LIMITED (1 VIEW)   Final Result      1.  No significant interval change.      DX-CHEST-PORTABLE (1 VIEW)   Final Result      1.  Interval decrease in the left pleural effusion following thoracentesis.   2.  There is no pneumothorax visualized.      US-THORACENTESIS PUNCTURE LEFT   Final Result      1. Ultrasound guided left  sided diagnostic thoracentesis.      2. 950 mL of fluid withdrawn.      CT-CTA CHEST PULMONARY ARTERY W/ RECONS   Final Result      1.  Nonocclusive pulmonary emboli in the right pulmonary artery, its lobar and segmental branches. No right heart strain.   2.  Large left pleural effusion, with total collapse of the left lower lobe and partial atelectasis of the left upper lobe.   3.  At least 3 new pulmonary nodules measuring up to 2.6 cm, consistent with pulmonary metastases.   4.  Unchanged heterogeneous thyroid gland with multiple thyroid nodules.      Findings were discussed with KRISTOPHER COOK on 7/29/2021 9:38 AM.         DX-CHEST-PORTABLE (1 VIEW)   Final Result      Very large left pleural effusion with associated compressive atelectasis.           Assessment/Plan  * Pleural effusion- (present on admission)  Assessment & Plan  Shortness of breath with coarse crackles on left mid lung. Found to have large left sided pleural effusion with total collapse of left lower lobe and partial atelectasis of upper left lobe on initial CTA. Thoracentesis performed on 7/30, removing 950 cc of fluid.  CT surgery performed hemothorax evacuation and decortication w/ chest tube placement on 8/7, repeat imaging shows clearance of hemothorax and pleural effusion.  Biopsy of lung nodule was performed at that time, pathology shows endometrial adenocarcinoma.  This is a malignant pleural effusion w/ concomitant hemothorax, likely source is also malignant as initial thoracentesis had large amount of RBCs.    -Gyn-Onc consult, appreciate assistance   -Not currently requiring pleurex catheter   -Per oncology, treatment at this time is only therapeutic, and not likely curative  -Pulmonology consulted, appreciate assistance.   -Cardiothoracic surgery consult, appreciate assistance  -Chest tube removed 8/9  -Fluid doesn't appear to be reaccumulating at this time as imaging is stable.      8/14 respiratory status stable, on 3 L, taper as  tolerated repeat chest x-ray with layering left pleural effusion, will monitor  May need repeat thoracentesis or chest tube placement if worsening    8/15 taper oxygen needs as tolerated, encourage activity  ? Placement, pt/ot [place], lives with family  Dc home with home health vs snf    Low urine output  Assessment & Plan  Pt w/ low urine output overnight, and low urine production following day.  Per RN she has had pretty poor PO intake, both fluids and food.  Concerns at this time would be dehydration in the setting of poor fluid intake, as seen with the low urine production today seen on bladder scan.  Initial concerns were for obstructive process, this is less likely at this time as she appears to not be making much urine.  -500 ml bolus lactated ringers and assess urine output to assess volume status  -If continues to have evidence of urinary retention, consider CT abdomen pelvis  Renal function stable    Tachycardia  Assessment & Plan  Pt w/ persistent tachycardia, which did not respond to fluid bolus.  Worsened on 8/10 after RRT, due to pulmonary emboli, now tachycardia within similar range prior to RRT.    -Continue to monitor, if any episodes of sustained SVT occur, consider vasovagal maneuver or metroprolol pushes as BP allows    Anemia  Assessment & Plan  Pt w/ slowly downtrending hemoglobin since admission.  This is a normocytic anemia.  Iron studies show concern for early anemia of chronic disease, but this would be unlikely to contribute to such quick decline in hemoglobin.  Other considerations would be active, bleeding, but patient at this time has no obvious areas of active bleeding, no melena, no hematemesis, no vaginal bleeding, no external bleeding.  Consideration would be bleeding into thorax s/p thoracentesis performed on admission.  She was on anticoagulation for PE.  CT shows concerns for hemothorax, which would explain acute decline in hemoglobin following initiation of anticoagulation.     -Resolving following hemothorax evacuation.  -Continue to monitor for evidence of re-bleeding.    Monitor      Acute kidney injury (HCC)  Assessment & Plan  New HEAVEN on 8/1, w/ acute worsening on 8/2.  Resolved on 8/3.  Cr 48 hours prior to insult was 0.65, has since elevated to 1.28.  FeNa shows a likely pre-renal process.  This is likely 2/2 intravascular depletion in the setting of a large volume pleural effusion and attempted diuresis.  This was responsive to fluid bolus.      RESOLVED    Pulmonary embolism (HCC)  Assessment & Plan  Patient was having left upper thigh pain for several days preceding her shortness of breath.  Non-occlusive right pulmonary artery embolism (lobar and segmental branches) on CTA. No evidence of right heart strain on CTA. Likely only mild contribution to AHRF as the PE is non-occlusive and no evidence of right heart strain seen.    RRT on 8/10 w/ new hypoxia and tachycardia following moving to side of bed, CXR shows mild pleural effusion and pulmonary edema in the left lung.  Bedside ultrasound at the time shows minimal pleural fluid, no pericardial effusion and right lung without pulmonary edema.  Refer to RRT and ICU note for more detail.  This is believed to be a worsening of her pulmonary emboli, whether it is a new clot or repositioning of old clot which is now occlusive, as this was a large clot.  -Clinically pt appears to be stable, isn't requiring more oxygen, tachycardia is in a lower range than previous day.  -on apixaban 10 mg PO BID, x7 days total (day 2) and then transition to 5 mg PO BID.  Will continue to monitor for rebleeding  -Monitoring on telemetry  -Monitor vitals for hemodynamic stability    Endometrial cancer (HCC)- (present on admission)  Assessment & Plan  Hx of endometrial cancer(2019, treated w/ total hysterectomy, radiation and cisplatin therapy) that has metastasized to the lungs(2020). Follows-up with Dr. Jann Garcia. Finished chemotherapy infusion.  Currently on tamoxifen (3 weeks on, 3 weeks off), last tamoxifen 07/18, would restart August 9th. Found to have 3 new pulmonary nodules up to 2.6 cm consistent with pulmonary metastasis.  Pathology results show adenocarcinoma with a likely endometrial source.  -Gyn-Onc consulted, appreciate assistance   -Hold tamoxifen/megace due to current PE   -No pleurex catheter indicated at this time.     -Will need follow-up outpatient to determine next step for treatment options           VTE prophylaxis: therapeutic anticoagulation with eliquis    I have performed a physical exam and reviewed and updated ROS and Plan today (8/15/2021). In review of yesterday's note (8/14/2021), there are no changes except as documented above.

## 2021-08-15 NOTE — PROGRESS NOTES
Surgical Progress Note    Author: Christina Leos M.D. Date & Time created: 8/15/2021   11:06 AM     Interval Events:  Feeling better today, up in bed eating breakfast. Shortness of breath improving, weaning but still O2 dependent. Denies pain. Tolerating PO, appetite fair. Hgb and CXR stable on anticoag.     Hemodynamics:  Temp (24hrs), Av.3 °C (97.4 °F), Min:35.8 °C (96.5 °F), Max:36.6 °C (97.9 °F)  Temperature: 36.6 °C (97.9 °F)  Pulse  Av.5  Min: 69  Max: 140   Blood Pressure : 115/72     Respiratory:    Respiration: (!) 24, Pulse Oximetry: 98 %        RUL Breath Sounds: Clear, RML Breath Sounds: Diminished, RLL Breath Sounds: Diminished, HANK Breath Sounds: Diminished, LLL Breath Sounds: Diminished  Neuro:  GCS       Fluids:    Intake/Output Summary (Last 24 hours) at 8/15/2021 1106  Last data filed at 8/15/2021 0525  Gross per 24 hour   Intake 360 ml   Output 650 ml   Net -290 ml        Current Diet Order   Procedures   • Diet Order Diet: Regular; Miscellaneous modifications: (optional): Gluten Free     Physical Exam  Cardiovascular:      Rate and Rhythm: Tachycardia present.      Comments: Maintaining O2 sats on 3.5L NC  Pulmonary:      Effort: Pulmonary effort is normal.   Abdominal:      Palpations: Abdomen is soft.      Tenderness: There is no abdominal tenderness.   Skin:     General: Skin is warm and dry.   Neurological:      Mental Status: She is alert.       Labs:  No results found for this or any previous visit (from the past 24 hour(s)).  Medical Decision Making, by Problem:  Active Hospital Problems    Diagnosis    • Low urine output [R34]    • Tachycardia [R00.0]    • Acute kidney injury (HCC) [N17.9]    • Anemia [D64.9]    • Pulmonary embolism (HCC) [I26.99]    • Pleural effusion [J90]    • Endometrial cancer (HCC) [C54.1]      Menopause in  and bleeding  started to have irregular continuous bleeding.   Secondary iron def anemia.  Seen by Dr. Chicas Aug 2018. Thick uterine  lining on US.  Trial of medication therapy, if no improvement will go back to surgery.        Plan:  This is a 70 y.o. female with h/o metastatic endometrial cancer admitted with new PE and pleural effusion:      1. Endometrial cancer: stage IIIB, s/p PORTEC 3 w/ omission of chemotherapy, followed by pulmonary recurrence treated with systemic carbo/taxol and most recently megace, treatment tolerance has been limited, now with progression of disease. Dr. Garcia discussed options of chemotherapy vs palliative/hospice, patient made aware that further treatment would be palliative in nature.   2. Pleural effusion: CT on 8/4 showed large L pleural effusion with heterogeneous areas of increased density, concerning for hemothorax > unable to do thoracentesis due to no drainable pockets. Lovenox held. Now s/p thoraoscopy with hemothorax evacuation and decortication. Chest tubes removed 8/9/21. Biopsy from lung nodule taken at procedure, confirms recurrence. Cytology from thora on 7/29 with rare atypical cells. Dr. Garcia reviewed CXR and does not recommend a pleurex catheter at this time. CXR stable.   3. PE: secondary to malignancy, hold megace/tamoxifen given new thrombosis and concern for progression per chest CT. Started on heparin gtt per IM > transitioned to eliquis.  4. Shortness of breath: secondary to above, now maintaining sats on nasal canula, home O2  5. Anemia: d/t hemothorax, now stable, s/p 2 units, stable. Continue to monitor.   6. Dispo: outpatient f/u w/ oncology

## 2021-08-15 NOTE — CARE PLAN
Problem: Pain - Standard  Goal: Alleviation of pain or a reduction in pain to the patient’s comfort goal  Outcome: Progressing     Problem: Knowledge Deficit - Standard  Goal: Patient and family/care givers will demonstrate understanding of plan of care, disease process/condition, diagnostic tests and medications  Outcome: Progressing   The patient is Watcher - Medium risk of patient condition declining or worsening    Shift Goals  Clinical Goals: pain control; O2 saturation  Patient Goals: breathe better and keep O2 maintained  Family Goals: rest     Progress made toward(s) clinical / shift goals:  PT asks appropriate questions    Patient is not progressing towards the following goals:    PT is high falls risk, remains free from falls

## 2021-08-16 LAB
ANION GAP SERPL CALC-SCNC: 9 MMOL/L (ref 7–16)
BUN SERPL-MCNC: 9 MG/DL (ref 8–22)
CALCIUM SERPL-MCNC: 9 MG/DL (ref 8.5–10.5)
CHLORIDE SERPL-SCNC: 104 MMOL/L (ref 96–112)
CO2 SERPL-SCNC: 24 MMOL/L (ref 20–33)
CREAT SERPL-MCNC: 0.72 MG/DL (ref 0.5–1.4)
GLUCOSE SERPL-MCNC: 103 MG/DL (ref 65–99)
POTASSIUM SERPL-SCNC: 4.7 MMOL/L (ref 3.6–5.5)
SODIUM SERPL-SCNC: 137 MMOL/L (ref 135–145)

## 2021-08-16 PROCEDURE — 770006 HCHG ROOM/CARE - MED/SURG/GYN SEMI*

## 2021-08-16 PROCEDURE — 700102 HCHG RX REV CODE 250 W/ 637 OVERRIDE(OP): Performed by: STUDENT IN AN ORGANIZED HEALTH CARE EDUCATION/TRAINING PROGRAM

## 2021-08-16 PROCEDURE — 97530 THERAPEUTIC ACTIVITIES: CPT

## 2021-08-16 PROCEDURE — 80048 BASIC METABOLIC PNL TOTAL CA: CPT

## 2021-08-16 PROCEDURE — A9270 NON-COVERED ITEM OR SERVICE: HCPCS | Performed by: INTERNAL MEDICINE

## 2021-08-16 PROCEDURE — A9270 NON-COVERED ITEM OR SERVICE: HCPCS | Performed by: STUDENT IN AN ORGANIZED HEALTH CARE EDUCATION/TRAINING PROGRAM

## 2021-08-16 PROCEDURE — 99232 SBSQ HOSP IP/OBS MODERATE 35: CPT | Performed by: INTERNAL MEDICINE

## 2021-08-16 PROCEDURE — 700102 HCHG RX REV CODE 250 W/ 637 OVERRIDE(OP): Performed by: INTERNAL MEDICINE

## 2021-08-16 RX ADMIN — METOPROLOL SUCCINATE 100 MG: 100 TABLET, EXTENDED RELEASE ORAL at 05:20

## 2021-08-16 RX ADMIN — POTASSIUM CHLORIDE 40 MEQ: 1500 TABLET, EXTENDED RELEASE ORAL at 05:20

## 2021-08-16 RX ADMIN — ATORVASTATIN CALCIUM 20 MG: 20 TABLET, FILM COATED ORAL at 17:28

## 2021-08-16 RX ADMIN — ASPIRIN 81 MG: 81 TABLET, CHEWABLE ORAL at 05:20

## 2021-08-16 RX ADMIN — AMITRIPTYLINE HYDROCHLORIDE 75 MG: 75 TABLET, FILM COATED ORAL at 20:32

## 2021-08-16 RX ADMIN — GUAIFENESIN 600 MG: 600 TABLET, EXTENDED RELEASE ORAL at 17:28

## 2021-08-16 RX ADMIN — THERA TABS 1 TABLET: TAB at 05:20

## 2021-08-16 RX ADMIN — Medication 500 MG: at 17:29

## 2021-08-16 RX ADMIN — Medication 2000 UNITS: at 17:28

## 2021-08-16 RX ADMIN — DOCUSATE SODIUM 50 MG AND SENNOSIDES 8.6 MG 2 TABLET: 8.6; 5 TABLET, FILM COATED ORAL at 05:20

## 2021-08-16 RX ADMIN — GUAIFENESIN 600 MG: 600 TABLET, EXTENDED RELEASE ORAL at 05:20

## 2021-08-16 RX ADMIN — APIXABAN 10 MG: 5 TABLET, FILM COATED ORAL at 17:28

## 2021-08-16 RX ADMIN — LOSARTAN POTASSIUM 50 MG: 50 TABLET, FILM COATED ORAL at 12:34

## 2021-08-16 RX ADMIN — APIXABAN 10 MG: 5 TABLET, FILM COATED ORAL at 05:20

## 2021-08-16 RX ADMIN — ACETAMINOPHEN 500 MG: 500 TABLET, FILM COATED ORAL at 20:32

## 2021-08-16 RX ADMIN — FERROUS SULFATE TAB 325 MG (65 MG ELEMENTAL FE) 325 MG: 325 (65 FE) TAB at 17:29

## 2021-08-16 ASSESSMENT — COGNITIVE AND FUNCTIONAL STATUS - GENERAL
CLIMB 3 TO 5 STEPS WITH RAILING: TOTAL
MOVING TO AND FROM BED TO CHAIR: UNABLE
TURNING FROM BACK TO SIDE WHILE IN FLAT BAD: A LOT
STANDING UP FROM CHAIR USING ARMS: A LITTLE
WALKING IN HOSPITAL ROOM: A LITTLE
SUGGESTED CMS G CODE MODIFIER MOBILITY: CL
MOBILITY SCORE: 11
MOVING FROM LYING ON BACK TO SITTING ON SIDE OF FLAT BED: UNABLE

## 2021-08-16 ASSESSMENT — ENCOUNTER SYMPTOMS
NAUSEA: 0
ABDOMINAL PAIN: 0
DIZZINESS: 0
HEARTBURN: 0
HEADACHES: 0
WEAKNESS: 1
VOMITING: 0
FLANK PAIN: 0
BLURRED VISION: 0
CONSTIPATION: 0
PALPITATIONS: 0
NERVOUS/ANXIOUS: 0
MYALGIAS: 0
SHORTNESS OF BREATH: 1
COUGH: 0
FEVER: 0
DIAPHORESIS: 0
CHILLS: 0
FOCAL WEAKNESS: 0
BACK PAIN: 0
DIARRHEA: 0
DEPRESSION: 0

## 2021-08-16 ASSESSMENT — GAIT ASSESSMENTS
DISTANCE (FEET): 2
ASSISTIVE DEVICE: HAND HELD ASSIST
GAIT LEVEL OF ASSIST: MINIMAL ASSIST
DEVIATION: BRADYKINETIC;DECREASED HEEL STRIKE;DECREASED TOE OFF

## 2021-08-16 ASSESSMENT — PAIN DESCRIPTION - PAIN TYPE: TYPE: ACUTE PAIN

## 2021-08-16 NOTE — CARE PLAN
The patient is Stable - Low risk of patient condition declining or worsening    Shift Goals  Clinical Goals: pain control; O2 saturation  Patient Goals: breathe better and keep O2 maintained  Family Goals: rest     Progress made toward(s) clinical / shift goals:    Problem: Pain - Standard  Goal: Alleviation of pain or a reduction in pain to the patient’s comfort goal  Outcome: Progressing     Problem: Knowledge Deficit - Standard  Goal: Patient and family/care givers will demonstrate understanding of plan of care, disease process/condition, diagnostic tests and medications  Outcome: Progressing     Problem: Fall Risk  Goal: Patient will remain free from falls  Outcome: Progressing     Problem: Skin Integrity  Goal: Skin integrity is maintained or improved  Outcome: Progressing       Patient is not progressing towards the following goals:

## 2021-08-16 NOTE — PROGRESS NOTES
Received report from previous shift RN  Assessment complete.  A&O x 4. Patient calls appropriately.  Patient ambulates with max assist.   Patient has 0/10 pain.   Denies N&V. Tolerating regul/ar, gluten free diet.  + void via Purewick, + flatus, 8/14 BM.  Patient denies SOB.    Review plan with of care with patient. Call light and personal belongings with in reach. Hourly rounding in place. All needs met at this time.

## 2021-08-16 NOTE — THERAPY
Physical Therapy   Daily Treatment     Patient Name: Jairo Rivas  Age:  70 y.o., Sex:  female  Medical Record #: [id number]  Today's Date: 8/16/2021     Precautions: Fall Risk (hx of CVA with R-sided deficits/contractures)    Assessment    Pt with significant improvement in mobility and activity tolerance since last visit. She was able to negotiate bed mob requiring mod A primarily for RLE negotiation and sequencing. Good strength to stand and transfer into her WC and she was able to sequence this on her own. Stable vitals throughout bed mob and transfer. She remained up in the chair after session for breakfast. She is approaching her fxnl baseline that she was at prior to this hospital admission. Recommend returning home with family support and HHPT.     Plan    Treatment plan modified to 2 times per week until therapy goals are met for the following treatments:  Bed Mobility.    DC Equipment Recommendations: None (has personal WC in room)  Discharge Recommendations: Recommend home health for continued physical therapy services     Objective     08/16/21 0908   Vitals   Vitals Comments Stable SpO2 with bed mob/transfer >90%   Cognition    Speech/ Communication Dysarthric   Level of Consciousness Alert   Comments pleasant and cooperative, decreased anxiety than previously documented   Passive ROM Lower Body   Comments decreased R ankle DF from hx of CVA deficits   Strength Lower Body   Comments RLE weakness   Balance   Sitting Balance (Static) Fair   Sitting Balance (Dynamic) Fair   Standing Balance (Static) Fair -   Standing Balance (Dynamic) Poor +   Gait Analysis   Gait Level Of Assist Minimal Assist   Assistive Device Hand Held Assist   Distance (Feet) 2   Deviation Bradykinetic;Decreased Heel Strike;Decreased Toe Off   Weight Bearing Status no restrictions   Comments for transfer   Bed Mobility    Supine to Sit Moderate Assist   Sit to Supine (Up in chair post session)   Functional Mobility   Bed,  Chair, Wheelchair Transfer Minimal Assist   Short Term Goals    Short Term Goal # 1 Pt will perform supine <> sit with min assist in 6 visits to get in/out of bed   Goal Outcome # 1 goal not met   Short Term Goal # 2 Pt will perform functional transfers with mod assist in 6 visits to return to baseline level of function   Goal Outcome # 2 Goal met

## 2021-08-16 NOTE — PROGRESS NOTES
Palliative Care   Spoke with Dr. Garcia and will cancel palliative care order as he will be talking with the patient/family regarding advance care planning.  Requested he consult us when/if needed.  Updated Dr. Gilbert.    NATHAN Baez.  Palliative Care Nurse Practitioner  [phone]

## 2021-08-16 NOTE — PROGRESS NOTES
Hospital Medicine Daily Progress Note    Date of Service  8/16/2021    Chief Complaint  Jairo Rivas is a 70 y.o. female admitted 7/29/2021 with SOB.    Hospital Course  70 y.o. female with endometrial cancer with history of hypertension, prior stroke, endometrial cancer with lung metastasis (finished chemotherapy infusion, now on oral chemo) who presented on  lung metastasis who presented 7/29/2021 with shortness of breath and chest pain. PE found in right lung.  Hemothorax in left lung s/p evacuation and decortication by CT surgery on 8/6.  Chest tubes pulled on 8/9.  Transitioned to [facility] 8/12.      Interval Problem Update    Worked with PT today, requiring 1-2 person assist  Still on oxygen, plan for home oxygen  Discussed with patient's family regarding discharge plans  Patient's daughter would like to take patient home with her to [place], discussed with   We will work on home oxygen and home health, may be difficult due to moved to [place], will need PCP arrangements    Family would also like to complete follow-up with oncology, Dr. Garcia to confirm plan of care, staff to notify Dr. Garcia  Encourage activity, patient reports minimal shortness of breath, pain controlled    Palliative care on consult    I have personally seen and examined the patient at bedside. I discussed the plan of care with patient.    Consultants/Specialty  general surgery and orthopedics    Code Status  Full Code    Disposition  Patient is not medically cleared.   Anticipate discharge to to skilled nursing facility.  I have placed the appropriate orders for post-discharge needs.    Review of Systems  Review of Systems   Constitutional: Negative for chills, diaphoresis, fever and malaise/fatigue.   HENT: Negative for congestion.    Eyes: Negative for blurred vision.   Respiratory: Positive for shortness of breath. Negative for cough.    Cardiovascular: Negative for palpitations and leg swelling.    Gastrointestinal: Negative for abdominal pain, heartburn and nausea.   Genitourinary: Negative for dysuria, flank pain and urgency.   Musculoskeletal: Negative for back pain and myalgias.   Neurological: Positive for weakness. Negative for dizziness, focal weakness and headaches.   Psychiatric/Behavioral: Negative for depression. The patient is not nervous/anxious.         Physical Exam  Temp:  [36.5 °C (97.7 °F)-36.7 °C (98.1 °F)] 36.7 °C (98.1 °F)  Pulse:  [105-112] 108  Resp:  [18-22] 22  BP: (102-134)/(64-77) 134/77  SpO2:  [95 %-98 %] 95 %    Physical Exam  Vitals and nursing note reviewed.   Constitutional:       Appearance: She is not ill-appearing or diaphoretic.   HENT:      Head: Normocephalic and atraumatic.      Nose: Nose normal.   Eyes:      Extraocular Movements: Extraocular movements intact.      Pupils: Pupils are equal, round, and reactive to light.   Neck:      Thyroid: No thyromegaly.      Vascular: No JVD.   Cardiovascular:      Rate and Rhythm: Normal rate.      Heart sounds: No murmur heard.     Pulmonary:      Effort: Pulmonary effort is normal. No respiratory distress.      Breath sounds: No stridor.   Abdominal:      General: Bowel sounds are normal. There is no distension.      Palpations: Abdomen is soft. There is no mass.   Musculoskeletal:         General: No tenderness.      Cervical back: Neck supple.   Skin:     General: Skin is warm and dry.      Coloration: Skin is not pale.      Findings: No erythema.   Neurological:      Mental Status: She is alert and oriented to person, place, and time.      Cranial Nerves: No cranial nerve deficit.      Sensory: No sensory deficit.      Motor: Weakness present.      Coordination: Coordination normal.   Psychiatric:         Behavior: Behavior normal.         Thought Content: Thought content normal.         Fluids    Intake/Output Summary (Last 24 hours) at 8/16/2021 1208  Last data filed at 8/16/2021 0815  Gross per 24 hour   Intake 440 ml    Output 400 ml   Net 40 ml       Laboratory  Recent Labs     08/14/21  0134   WBC 7.8   RBC 3.13*   HEMOGLOBIN 9.7*   HEMATOCRIT 30.7*   MCV 98.1*   MCH 31.0   MCHC 31.6*   RDW 50.7*   PLATELETCT 325   MPV 10.6     Recent Labs     08/14/21  0134 08/16/21  0230   SODIUM 136 137   POTASSIUM 3.1* 4.7   CHLORIDE 100 104   CO2 26 24   GLUCOSE 136* 103*   BUN 12 9   CREATININE 0.71 0.72   CALCIUM 8.7 9.0                   Imaging  DX-CHEST-PORTABLE (1 VIEW)   Final Result         1.  Hazy left pulmonary infiltrates, similar compared to prior study.   2.  Layering left pleural effusion, stable since prior study.      DX-CHEST-PORTABLE (1 VIEW)   Final Result      Stable chest with left large pleural effusion, pulmonary consolidation and probable atelectasis      DX-CHEST-PORTABLE (1 VIEW)   Final Result      1.  Unchanged LEFT pleural effusion   2.  Unchanged LEFT-sided airspace disease      DX-CHEST-PORTABLE (1 VIEW)   Final Result         1.  Left pulmonary infiltrates and layering left pleural effusion. Similar compared to prior study.      DX-CHEST-LIMITED (1 VIEW)   Final Result      1.  No pneumothorax identified following removal of 2 left chest tubes.      2.  Left lower lobe and left midlung atelectasis, pneumonia, or contusion is present.      3.  Possible small left pleural effusion.      4.  Clear right lung.      DX-CHEST-PORTABLE (1 VIEW)   Final Result         1.  Patchy left pulmonary infiltrates.   2.  Layering left pleural effusion   3.  Cardiomegaly      US-EXTREMITY VENOUS LOWER BILAT   Final Result      DX-CHEST-PORTABLE (1 VIEW)   Final Result         1.  Left pulmonary infiltrates, stable   2.  Cardiomegaly      DX-CHEST-PORTABLE (1 VIEW)   Final Result      1.  Again seen 2 left-sided chest tubes present.      2.  Bony loss and consolidation within the left lung.      3.  Stable left pleural effusion      DX-CHEST-PORTABLE (1 VIEW)   Final Result      No significant change      DX-CHEST-PORTABLE (1  VIEW)   Final Result      1.  Interval placement of 2 large bore left chest tubes with decreased left pleural effusion. Small hemopneumothorax.   2.  Left lung opacity likely atelectasis.      US-CHEST   Final Result      1.  Diffusely organized process noted in the left hemithorax. No significant pleural effusion identified.      2.  Thoracentesis deferred      DX-CHEST-PORTABLE (1 VIEW)   Final Result      Opacification of the left hemithorax is unchanged with mediastinal shift to the right.      US-CHEST   Final Result         Thick echogenic heterogeneous material in the left pleural space. No drainable pocket for thoracentesis.      DX-CHEST-PORTABLE (1 VIEW)   Final Result      1.  Unchanged subtotal opacification of the left hemithorax without volume loss.      2.  Persistent mediastinal shift to the right.      3.  No right lung consolidation.      CT-CHEST (THORAX) W/O   Final Result      1.  Very large left pleural effusion which now demonstrates heterogeneous areas of increased density suggesting interval hemorrhage or less likely superimposed infection.   2.  Complete collapse/compressive atelectasis of the left lung which is worsened than prior study.   3.  New mild groundglass opacity in the right lung apex could represent atelectasis or mild pneumonitis.   4.  Right pulmonary nodules again noted suggesting metastases.      These findings were discussed with JUANITA SERNA on 8/3/2021 3:40 PM.      Fleischner Society pulmonary nodule recommendations:   Not Applicable         DX-CHEST-PORTABLE (1 VIEW)   Final Result      1.  There is complete opacification of the left hemithorax which could represent combination of pleural effusion or airspace process.      US-CHEST   Final Result      1.  Diffuse pneumonitis of left hemithorax.      2.  No evidence of a large left-sided pleural effusion. Thoracentesis deferred.      3.  Recommend CT scan of the thorax for further evaluation.      DX-CHEST-PORTABLE (1  VIEW)   Final Result      Worsening opacification on the left which is now complete, probably related to left pleural effusion and atelectasis.      DX-CHEST-LIMITED (1 VIEW)   Final Result      1.  No significant interval change.      DX-CHEST-PORTABLE (1 VIEW)   Final Result      1.  Interval decrease in the left pleural effusion following thoracentesis.   2.  There is no pneumothorax visualized.      US-THORACENTESIS PUNCTURE LEFT   Final Result      1. Ultrasound guided left sided diagnostic thoracentesis.      2. 950 mL of fluid withdrawn.      CT-CTA CHEST PULMONARY ARTERY W/ RECONS   Final Result      1.  Nonocclusive pulmonary emboli in the right pulmonary artery, its lobar and segmental branches. No right heart strain.   2.  Large left pleural effusion, with total collapse of the left lower lobe and partial atelectasis of the left upper lobe.   3.  At least 3 new pulmonary nodules measuring up to 2.6 cm, consistent with pulmonary metastases.   4.  Unchanged heterogeneous thyroid gland with multiple thyroid nodules.      Findings were discussed with KRISTOPHER COOK on 7/29/2021 9:38 AM.         DX-CHEST-PORTABLE (1 VIEW)   Final Result      Very large left pleural effusion with associated compressive atelectasis.           Assessment/Plan  * Pleural effusion- (present on admission)  Assessment & Plan  Shortness of breath with coarse crackles on left mid lung. Found to have large left sided pleural effusion with total collapse of left lower lobe and partial atelectasis of upper left lobe on initial CTA. Thoracentesis performed on 7/30, removing 950 cc of fluid.  CT surgery performed hemothorax evacuation and decortication w/ chest tube placement on 8/7, repeat imaging shows clearance of hemothorax and pleural effusion.  Biopsy of lung nodule was performed at that time, pathology shows endometrial adenocarcinoma.  This is a malignant pleural effusion w/ concomitant hemothorax, likely source is also malignant as  initial thoracentesis had large amount of RBCs.    -Gyn-Onc consult, appreciate assistance   -Not currently requiring pleurex catheter   -Per oncology, treatment at this time is only therapeutic, and not likely curative  -Pulmonology consulted, appreciate assistance.   -Cardiothoracic surgery consult, appreciate assistance  -Chest tube removed 8/9  -Fluid doesn't appear to be reaccumulating at this time as imaging is stable.      8/14 respiratory status stable, on 3 L, taper as tolerated repeat chest x-ray with layering left pleural effusion, will monitor  May need repeat thoracentesis or chest tube placement if worsening    8/15 taper oxygen needs as tolerated, encourage activity  ? Placement, pt/ot eval, lives with family  Dc home with home health vs snf    8/16 document home oxygen needs, family planning to discharge to [place],  advised  Follow-up PT OT recs    Low urine output  Assessment & Plan  Pt w/ low urine output overnight, and low urine production following day.  Per RN she has had pretty poor PO intake, both fluids and food.  Concerns at this time would be dehydration in the setting of poor fluid intake, as seen with the low urine production today seen on bladder scan.  Initial concerns were for obstructive process, this is less likely at this time as she appears to not be making much urine.  -500 ml bolus lactated ringers and assess urine output to assess volume status  -If continues to have evidence of urinary retention, consider CT abdomen pelvis  Renal function stable    Tachycardia  Assessment & Plan  Pt w/ persistent tachycardia, which did not respond to fluid bolus.  Worsened on 8/10 after RRT, due to pulmonary emboli, now tachycardia within similar range prior to RRT.    -Continue to monitor, if any episodes of sustained SVT occur, consider vasovagal maneuver or metroprolol pushes as BP allows    Anemia  Assessment & Plan  Pt w/ slowly downtrending hemoglobin since admission.   This is a normocytic anemia.  Iron studies show concern for early anemia of chronic disease, but this would be unlikely to contribute to such quick decline in hemoglobin.  Other considerations would be active, bleeding, but patient at this time has no obvious areas of active bleeding, no melena, no hematemesis, no vaginal bleeding, no external bleeding.  Consideration would be bleeding into thorax s/p thoracentesis performed on admission.  She was on anticoagulation for PE.  CT shows concerns for hemothorax, which would explain acute decline in hemoglobin following initiation of anticoagulation.    -Resolving following hemothorax evacuation.  -Continue to monitor for evidence of re-bleeding.    Monitor      Acute kidney injury (HCC)  Assessment & Plan  New HEAVEN on 8/1, w/ acute worsening on 8/2.  Resolved on 8/3.  Cr 48 hours prior to insult was 0.65, has since elevated to 1.28.  FeNa shows a likely pre-renal process.  This is likely 2/2 intravascular depletion in the setting of a large volume pleural effusion and attempted diuresis.  This was responsive to fluid bolus.      RESOLVED    Pulmonary embolism (HCC)  Assessment & Plan  Patient was having left upper thigh pain for several days preceding her shortness of breath.  Non-occlusive right pulmonary artery embolism (lobar and segmental branches) on CTA. No evidence of right heart strain on CTA. Likely only mild contribution to AHRF as the PE is non-occlusive and no evidence of right heart strain seen.    RRT on 8/10 w/ new hypoxia and tachycardia following moving to side of bed, CXR shows mild pleural effusion and pulmonary edema in the left lung.  Bedside ultrasound at the time shows minimal pleural fluid, no pericardial effusion and right lung without pulmonary edema.  Refer to RRT and ICU note for more detail.  This is believed to be a worsening of her pulmonary emboli, whether it is a new clot or repositioning of old clot which is now occlusive, as this was a  large clot.  -Clinically pt appears to be stable, isn't requiring more oxygen, tachycardia is in a lower range than previous day.  -on apixaban 10 mg PO BID, x7 days total (day 2) and then transition to 5 mg PO BID.  Will continue to monitor for rebleeding  -Monitoring on telemetry  -Monitor vitals for hemodynamic stability    Endometrial cancer (HCC)- (present on admission)  Assessment & Plan  Hx of endometrial cancer(2019, treated w/ total hysterectomy, radiation and cisplatin therapy) that has metastasized to the lungs(2020). Follows-up with Dr. Jann Garcia. Finished chemotherapy infusion. Currently on tamoxifen (3 weeks on, 3 weeks off), last tamoxifen 07/18, would restart August 9th. Found to have 3 new pulmonary nodules up to 2.6 cm consistent with pulmonary metastasis.  Pathology results show adenocarcinoma with a likely endometrial source.  -Gyn-Onc consulted, appreciate assistance   -Hold tamoxifen/megace due to current PE   -No pleurex catheter indicated at this time.     -Will need follow-up outpatient to determine next step for treatment options    8/16 family would like to discuss plan of care with oncology, per Dr. Leos, Dr. Garcia follow-up  Palliative care assisting, appreciate input           VTE prophylaxis: therapeutic anticoagulation with eliquis    I have performed a physical exam and reviewed and updated ROS and Plan today (8/16/2021). In review of yesterday's note (8/15/2021), there are no changes except as documented above.

## 2021-08-17 ENCOUNTER — APPOINTMENT (OUTPATIENT)
Dept: CARDIOLOGY | Facility: MEDICAL CENTER | Age: 71
DRG: 163 | End: 2021-08-17
Attending: FAMILY MEDICINE
Payer: MEDICARE

## 2021-08-17 PROBLEM — J96.01 ACUTE RESPIRATORY FAILURE WITH HYPOXIA (HCC): Status: ACTIVE | Noted: 2021-08-17

## 2021-08-17 PROBLEM — Z86.73 HISTORY OF CVA (CEREBROVASCULAR ACCIDENT): Status: ACTIVE | Noted: 2018-02-06

## 2021-08-17 PROBLEM — I82.409 DVT (DEEP VENOUS THROMBOSIS) (HCC): Status: ACTIVE | Noted: 2021-08-17

## 2021-08-17 PROBLEM — J94.2 HEMOTHORAX ON LEFT: Status: ACTIVE | Noted: 2021-07-29

## 2021-08-17 PROBLEM — E83.42 HYPOMAGNESEMIA: Status: ACTIVE | Noted: 2021-08-17

## 2021-08-17 LAB
ANION GAP SERPL CALC-SCNC: 9 MMOL/L (ref 7–16)
BASOPHILS # BLD AUTO: 0.4 % (ref 0–1.8)
BASOPHILS # BLD: 0.03 K/UL (ref 0–0.12)
BUN SERPL-MCNC: 10 MG/DL (ref 8–22)
CALCIUM SERPL-MCNC: 8.8 MG/DL (ref 8.5–10.5)
CHLORIDE SERPL-SCNC: 103 MMOL/L (ref 96–112)
CO2 SERPL-SCNC: 25 MMOL/L (ref 20–33)
CREAT SERPL-MCNC: 0.61 MG/DL (ref 0.5–1.4)
EOSINOPHIL # BLD AUTO: 0.11 K/UL (ref 0–0.51)
EOSINOPHIL NFR BLD: 1.5 % (ref 0–6.9)
ERYTHROCYTE [DISTWIDTH] IN BLOOD BY AUTOMATED COUNT: 51 FL (ref 35.9–50)
GLUCOSE SERPL-MCNC: 119 MG/DL (ref 65–99)
HCT VFR BLD AUTO: 28.5 % (ref 37–47)
HGB BLD-MCNC: 9 G/DL (ref 12–16)
IMM GRANULOCYTES # BLD AUTO: 0.04 K/UL (ref 0–0.11)
IMM GRANULOCYTES NFR BLD AUTO: 0.6 % (ref 0–0.9)
LV EJECT FRACT  99904: 60
LV EJECT FRACT MOD 2C 99903: 62.18
LYMPHOCYTES # BLD AUTO: 0.98 K/UL (ref 1–4.8)
LYMPHOCYTES NFR BLD: 13.7 % (ref 22–41)
MAGNESIUM SERPL-MCNC: 1.9 MG/DL (ref 1.5–2.5)
MCH RBC QN AUTO: 31.1 PG (ref 27–33)
MCHC RBC AUTO-ENTMCNC: 31.6 G/DL (ref 33.6–35)
MCV RBC AUTO: 98.6 FL (ref 81.4–97.8)
MONOCYTES # BLD AUTO: 0.5 K/UL (ref 0–0.85)
MONOCYTES NFR BLD AUTO: 7 % (ref 0–13.4)
NEUTROPHILS # BLD AUTO: 5.49 K/UL (ref 2–7.15)
NEUTROPHILS NFR BLD: 76.8 % (ref 44–72)
NRBC # BLD AUTO: 0 K/UL
NRBC BLD-RTO: 0 /100 WBC
PHOSPHATE SERPL-MCNC: 2.6 MG/DL (ref 2.5–4.5)
PLATELET # BLD AUTO: 346 K/UL (ref 164–446)
PMV BLD AUTO: 10.1 FL (ref 9–12.9)
POTASSIUM SERPL-SCNC: 3.8 MMOL/L (ref 3.6–5.5)
RBC # BLD AUTO: 2.89 M/UL (ref 4.2–5.4)
SODIUM SERPL-SCNC: 137 MMOL/L (ref 135–145)
TSH SERPL DL<=0.005 MIU/L-ACNC: 1.13 UIU/ML (ref 0.38–5.33)
WBC # BLD AUTO: 7.2 K/UL (ref 4.8–10.8)

## 2021-08-17 PROCEDURE — A9270 NON-COVERED ITEM OR SERVICE: HCPCS | Performed by: FAMILY MEDICINE

## 2021-08-17 PROCEDURE — 85025 COMPLETE CBC W/AUTO DIFF WBC: CPT

## 2021-08-17 PROCEDURE — 99233 SBSQ HOSP IP/OBS HIGH 50: CPT | Performed by: FAMILY MEDICINE

## 2021-08-17 PROCEDURE — A9270 NON-COVERED ITEM OR SERVICE: HCPCS | Performed by: STUDENT IN AN ORGANIZED HEALTH CARE EDUCATION/TRAINING PROGRAM

## 2021-08-17 PROCEDURE — 84443 ASSAY THYROID STIM HORMONE: CPT

## 2021-08-17 PROCEDURE — 700102 HCHG RX REV CODE 250 W/ 637 OVERRIDE(OP): Performed by: INTERNAL MEDICINE

## 2021-08-17 PROCEDURE — 302146: Performed by: FAMILY MEDICINE

## 2021-08-17 PROCEDURE — 770006 HCHG ROOM/CARE - MED/SURG/GYN SEMI*

## 2021-08-17 PROCEDURE — A9270 NON-COVERED ITEM OR SERVICE: HCPCS | Performed by: INTERNAL MEDICINE

## 2021-08-17 PROCEDURE — 93306 TTE W/DOPPLER COMPLETE: CPT

## 2021-08-17 PROCEDURE — 80048 BASIC METABOLIC PNL TOTAL CA: CPT

## 2021-08-17 PROCEDURE — 84100 ASSAY OF PHOSPHORUS: CPT

## 2021-08-17 PROCEDURE — 83735 ASSAY OF MAGNESIUM: CPT

## 2021-08-17 PROCEDURE — 700102 HCHG RX REV CODE 250 W/ 637 OVERRIDE(OP): Performed by: STUDENT IN AN ORGANIZED HEALTH CARE EDUCATION/TRAINING PROGRAM

## 2021-08-17 PROCEDURE — 700102 HCHG RX REV CODE 250 W/ 637 OVERRIDE(OP): Performed by: FAMILY MEDICINE

## 2021-08-17 PROCEDURE — 93306 TTE W/DOPPLER COMPLETE: CPT | Mod: 26 | Performed by: INTERNAL MEDICINE

## 2021-08-17 RX ADMIN — GUAIFENESIN 600 MG: 600 TABLET, EXTENDED RELEASE ORAL at 17:04

## 2021-08-17 RX ADMIN — LOSARTAN POTASSIUM 50 MG: 50 TABLET, FILM COATED ORAL at 11:56

## 2021-08-17 RX ADMIN — ASPIRIN 81 MG: 81 TABLET, CHEWABLE ORAL at 04:50

## 2021-08-17 RX ADMIN — GUAIFENESIN 600 MG: 600 TABLET, EXTENDED RELEASE ORAL at 04:49

## 2021-08-17 RX ADMIN — Medication 2000 UNITS: at 17:04

## 2021-08-17 RX ADMIN — ACETAMINOPHEN 500 MG: 500 TABLET, FILM COATED ORAL at 22:05

## 2021-08-17 RX ADMIN — FERROUS SULFATE TAB 325 MG (65 MG ELEMENTAL FE) 325 MG: 325 (65 FE) TAB at 17:04

## 2021-08-17 RX ADMIN — APIXABAN 10 MG: 5 TABLET, FILM COATED ORAL at 04:50

## 2021-08-17 RX ADMIN — APIXABAN 10 MG: 5 TABLET, FILM COATED ORAL at 17:04

## 2021-08-17 RX ADMIN — METOPROLOL SUCCINATE 100 MG: 100 TABLET, EXTENDED RELEASE ORAL at 04:49

## 2021-08-17 RX ADMIN — THERA TABS 1 TABLET: TAB at 04:50

## 2021-08-17 RX ADMIN — ACETAMINOPHEN 500 MG: 500 TABLET, FILM COATED ORAL at 13:01

## 2021-08-17 RX ADMIN — ATORVASTATIN CALCIUM 20 MG: 20 TABLET, FILM COATED ORAL at 17:04

## 2021-08-17 RX ADMIN — Medication 500 MG: at 17:04

## 2021-08-17 RX ADMIN — MAGNESIUM OXIDE 400 MG (241.3 MG MAGNESIUM) TABLET 400 MG: TABLET at 17:04

## 2021-08-17 ASSESSMENT — ENCOUNTER SYMPTOMS
CONSTIPATION: 0
ABDOMINAL PAIN: 0
PALPITATIONS: 0
SORE THROAT: 0
BACK PAIN: 0
WEAKNESS: 1
CHILLS: 0
SHORTNESS OF BREATH: 1
FOCAL WEAKNESS: 1
DIZZINESS: 0
FLANK PAIN: 0
SPEECH CHANGE: 0
NAUSEA: 0
DIARRHEA: 0
FEVER: 0
COUGH: 0
NERVOUS/ANXIOUS: 0
SENSORY CHANGE: 0
SHORTNESS OF BREATH: 0
HEADACHES: 0
DIAPHORESIS: 0
HEARTBURN: 0
WHEEZING: 0
MYALGIAS: 0
NECK PAIN: 0
VOMITING: 0
BLURRED VISION: 0

## 2021-08-17 ASSESSMENT — PAIN DESCRIPTION - PAIN TYPE
TYPE: ACUTE PAIN

## 2021-08-17 NOTE — PROGRESS NOTES
Report received. Assumed care. Pt in bed awake. A/O x 4. VSS. Responds appropriately. Denies pain, SOB. Assessment complete. Discussed POC, pt verbalizes understanding. Explained importance of calling before getting OOB. Call light and belongings within reach. Bed in lowest position. Treaded socks in place. Hourly rounding in progress. Will continue to monitor.

## 2021-08-17 NOTE — THERAPY
Missed Therapy     Patient Name: Jairo Rivas  Age:  70 y.o., Sex:  female  Medical Record #: [id number]  Today's Date: 8/17/2021    Discussed missed therapy with RN        08/17/21 4480   Treatment Variance   Reason For Missed Therapy Non-Medical - Other (Please Comment)   Interdisciplinary Plan of Care Collaboration   Collaboration Comments OT tx attempted family at bedside, per RN waiting to meet w/MD regarding POC. OT will follow up tomorrow as appropriate    Session Information   Date / Session Number  8/17 attempted   (8/3 #2, goals met )   Priority 3

## 2021-08-17 NOTE — PROGRESS NOTES
Received report from previous shift RN  Assessment complete.  A&O x 4. Patient calls appropriately.  Patient ambulates with max assist.    Patient has 3/10 pain. Pain managed with prescribed medications.  Denies N&V. Tolerating regular gluten free diet.  + void via Purewick, + flatus, 8/16 BM.  Patient denies SOB.    Review plan with of care with patient. Call light and personal belongings with in reach. Hourly rounding in place. All needs met at this time.

## 2021-08-17 NOTE — PROGRESS NOTES
Hospital Medicine Daily Progress Note    Date of Service  8/17/2021    Chief Complaint  Jairo Rivas is a 70 y.o. female admitted 7/29/2021 with hemothorax.    Hospital Course  Admitted with left hemothorax, also noted to have pulmonary embolism, respiratory failure with hypoxia.  She was started on IV heparin drip for anticoagulation.  Surgery was consulted on the case, initial recommendation was IR Pleurx catheter.  Interventional radiology was consulted however patient did appear to be a good candidate.  Thoracic surgery was then consulted, patient underwent Left thoracoscopy with evacuation of hemothorax and decortication on 8/6/2021. Chest tubes x2 were removed on 8/9/2021.  Anticoagulation has been transitioned to Eliquis. Pulmonary medicine was also consulted on the case, she has known history of endometrial cancer with metastases to the lung.  Gynecology oncology was also consulted on the case.    Interval Problem Update  Hemothorax-denies shortness of breath  PE -mild chest pain  Resp failure - O2 2 lpm NC  HTN - sbp 110-120  HEAVEN -creatinine trended down to 0.6  Low magnesium    Updates given and plan of care discussed with patient's daughter.    I have personally seen and examined the patient at bedside. I discussed the plan of care with patient, family, bedside RN, charge RN and .    Consultants/Specialty  general surgery, oncology, palliative care and pulmonary    Code Status  Full Code    Disposition  Patient is not medically cleared.   Anticipate discharge to to home with organized home healthcare and close outpatient follow-up.  I have placed the appropriate orders for post-discharge needs.    Review of Systems  Review of Systems   Constitutional: Positive for malaise/fatigue. Negative for chills, diaphoresis and fever.   HENT: Negative for congestion, hearing loss and sore throat.    Eyes: Negative for blurred vision.   Respiratory: Negative for cough, shortness of breath  and wheezing.    Cardiovascular: Positive for chest pain. Negative for palpitations and leg swelling.   Gastrointestinal: Negative for abdominal pain, diarrhea, heartburn, nausea and vomiting.   Genitourinary: Negative for dysuria, flank pain and hematuria.   Musculoskeletal: Negative for back pain, joint pain, myalgias and neck pain.   Skin: Negative for rash.   Neurological: Positive for focal weakness and weakness. Negative for dizziness, sensory change, speech change and headaches.   Psychiatric/Behavioral: The patient is not nervous/anxious.         Physical Exam  Temp:  [36.5 °C (97.7 °F)-36.8 °C (98.2 °F)] 36.8 °C (98.2 °F)  Pulse:  [] 99  Resp:  [16-18] 16  BP: (108-125)/(69-77) 125/77  SpO2:  [92 %-97 %] 96 %    Physical Exam  Vitals and nursing note reviewed.   Constitutional:       Appearance: She is obese.   HENT:      Head: Normocephalic and atraumatic.      Nose: No congestion.      Mouth/Throat:      Mouth: Mucous membranes are moist.   Eyes:      Extraocular Movements: Extraocular movements intact.      Conjunctiva/sclera: Conjunctivae normal.   Cardiovascular:      Rate and Rhythm: Normal rate and regular rhythm.   Pulmonary:      Effort: Pulmonary effort is normal.      Breath sounds: Examination of the left-lower field reveals decreased breath sounds. Decreased breath sounds present.   Abdominal:      General: There is no distension.      Tenderness: There is no abdominal tenderness. There is no guarding or rebound.   Musculoskeletal:      Cervical back: No tenderness.      Right lower leg: No edema.      Left lower leg: No edema.   Skin:     General: Skin is warm and dry.   Neurological:      Mental Status: She is alert and oriented to person, place, and time.      Cranial Nerves: No cranial nerve deficit.      Motor: Weakness (MMT RUE 1-2/5, RLE 3-4/5, LUE and LLE 4+/5) present.         Fluids    Intake/Output Summary (Last 24 hours) at 8/17/2021 9856  Last data filed at 8/17/2021  0301  Gross per 24 hour   Intake 240 ml   Output --   Net 240 ml       Laboratory  Recent Labs     08/17/21  1331   WBC 7.2   RBC 2.89*   HEMOGLOBIN 9.0*   HEMATOCRIT 28.5*   MCV 98.6*   MCH 31.1   MCHC 31.6*   RDW 51.0*   PLATELETCT 346   MPV 10.1     Recent Labs     08/16/21  0230 08/17/21  1331   SODIUM 137 137   POTASSIUM 4.7 3.8   CHLORIDE 104 103   CO2 24 25   GLUCOSE 103* 119*   BUN 9 10   CREATININE 0.72 0.61   CALCIUM 9.0 8.8                   Imaging  EC-ECHOCARDIOGRAM COMPLETE W/O CONT   Final Result      DX-CHEST-PORTABLE (1 VIEW)   Final Result         1.  Hazy left pulmonary infiltrates, similar compared to prior study.   2.  Layering left pleural effusion, stable since prior study.      DX-CHEST-PORTABLE (1 VIEW)   Final Result      Stable chest with left large pleural effusion, pulmonary consolidation and probable atelectasis      DX-CHEST-PORTABLE (1 VIEW)   Final Result      1.  Unchanged LEFT pleural effusion   2.  Unchanged LEFT-sided airspace disease      DX-CHEST-PORTABLE (1 VIEW)   Final Result         1.  Left pulmonary infiltrates and layering left pleural effusion. Similar compared to prior study.      DX-CHEST-LIMITED (1 VIEW)   Final Result      1.  No pneumothorax identified following removal of 2 left chest tubes.      2.  Left lower lobe and left midlung atelectasis, pneumonia, or contusion is present.      3.  Possible small left pleural effusion.      4.  Clear right lung.      DX-CHEST-PORTABLE (1 VIEW)   Final Result         1.  Patchy left pulmonary infiltrates.   2.  Layering left pleural effusion   3.  Cardiomegaly      US-EXTREMITY VENOUS LOWER BILAT   Final Result      DX-CHEST-PORTABLE (1 VIEW)   Final Result         1.  Left pulmonary infiltrates, stable   2.  Cardiomegaly      DX-CHEST-PORTABLE (1 VIEW)   Final Result      1.  Again seen 2 left-sided chest tubes present.      2.  Bony loss and consolidation within the left lung.      3.  Stable left pleural effusion       DX-CHEST-PORTABLE (1 VIEW)   Final Result      No significant change      DX-CHEST-PORTABLE (1 VIEW)   Final Result      1.  Interval placement of 2 large bore left chest tubes with decreased left pleural effusion. Small hemopneumothorax.   2.  Left lung opacity likely atelectasis.      US-CHEST   Final Result      1.  Diffusely organized process noted in the left hemithorax. No significant pleural effusion identified.      2.  Thoracentesis deferred      DX-CHEST-PORTABLE (1 VIEW)   Final Result      Opacification of the left hemithorax is unchanged with mediastinal shift to the right.      US-CHEST   Final Result         Thick echogenic heterogeneous material in the left pleural space. No drainable pocket for thoracentesis.      DX-CHEST-PORTABLE (1 VIEW)   Final Result      1.  Unchanged subtotal opacification of the left hemithorax without volume loss.      2.  Persistent mediastinal shift to the right.      3.  No right lung consolidation.      CT-CHEST (THORAX) W/O   Final Result      1.  Very large left pleural effusion which now demonstrates heterogeneous areas of increased density suggesting interval hemorrhage or less likely superimposed infection.   2.  Complete collapse/compressive atelectasis of the left lung which is worsened than prior study.   3.  New mild groundglass opacity in the right lung apex could represent atelectasis or mild pneumonitis.   4.  Right pulmonary nodules again noted suggesting metastases.      These findings were discussed with JUANITA SERNA on 8/3/2021 3:40 PM.      Fleischner Society pulmonary nodule recommendations:   Not Applicable         DX-CHEST-PORTABLE (1 VIEW)   Final Result      1.  There is complete opacification of the left hemithorax which could represent combination of pleural effusion or airspace process.      US-CHEST   Final Result      1.  Diffuse pneumonitis of left hemithorax.      2.  No evidence of a large left-sided pleural effusion. Thoracentesis  deferred.      3.  Recommend CT scan of the thorax for further evaluation.      DX-CHEST-PORTABLE (1 VIEW)   Final Result      Worsening opacification on the left which is now complete, probably related to left pleural effusion and atelectasis.      DX-CHEST-LIMITED (1 VIEW)   Final Result      1.  No significant interval change.      DX-CHEST-PORTABLE (1 VIEW)   Final Result      1.  Interval decrease in the left pleural effusion following thoracentesis.   2.  There is no pneumothorax visualized.      US-THORACENTESIS PUNCTURE LEFT   Final Result      1. Ultrasound guided left sided diagnostic thoracentesis.      2. 950 mL of fluid withdrawn.      CT-CTA CHEST PULMONARY ARTERY W/ RECONS   Final Result      1.  Nonocclusive pulmonary emboli in the right pulmonary artery, its lobar and segmental branches. No right heart strain.   2.  Large left pleural effusion, with total collapse of the left lower lobe and partial atelectasis of the left upper lobe.   3.  At least 3 new pulmonary nodules measuring up to 2.6 cm, consistent with pulmonary metastases.   4.  Unchanged heterogeneous thyroid gland with multiple thyroid nodules.      Findings were discussed with KRISTOPHER COOK on 7/29/2021 9:38 AM.         DX-CHEST-PORTABLE (1 VIEW)   Final Result      Very large left pleural effusion with associated compressive atelectasis.           Assessment/Plan  * Hemothorax on left- (present on admission)  Assessment & Plan  Left thoracoscopy with evacuation of hemothorax and decortication. 8/6/2021  Chest tube removed 8/9/2021    Acute respiratory failure with hypoxia (HCC)- (present on admission)  Assessment & Plan  RT protocol    Pulmonary embolism (HCC)- (present on admission)  Assessment & Plan  Eliquis    Endometrial cancer (HCC)- (present on admission)  Assessment & Plan  With metastases to lung  Hold Tamoxifen  Palliative care  Gynecology oncology to discuss prognosis      Hypomagnesemia- (present on admission)  Assessment  & Plan  Start oral Mg    Anemia- (present on admission)  Assessment & Plan  Transfused PRBC  Follow cbc      Acute kidney injury (HCC)- (present on admission)  Assessment & Plan  Follow bmp, check PTH    Hypokalemia- (present on admission)  Assessment & Plan  Follow bmp    Essential hypertension- (present on admission)  Assessment & Plan  Toprol, Losartan    History of CVA (cerebrovascular accident)- (present on admission)  Assessment & Plan  ASA, Lipitor  PT and OT       VTE prophylaxis: therapeutic anticoagulation with Eliquis    I have performed a physical exam and reviewed and updated ROS and Plan today (8/17/2021). In review of yesterday's note (8/16/2021), there are no changes except as documented above.

## 2021-08-17 NOTE — PROGRESS NOTES
Report received. Assumed care. Pt in bed awake. A/O x4. VSS. Responds appropriately. Denies pain. Assessment complete. Discussed POC pt verbalizes understanding. Explained importance of calling before getting OOB. Call light and belongings within reach. Bed alarm on. Bed in the lowest position. Treaded socks in place. Hourly rounding in progress. Will continue to monitor .

## 2021-08-17 NOTE — CARE PLAN
The patient is Stable - Low risk of patient condition declining or worsening    Shift Goals  Clinical Goals: mobility  Patient Goals: pain control, breathe better  Family Goals: rest     Progress made toward(s) clinical / shift goals:    Problem: Pain - Standard  Goal: Alleviation of pain or a reduction in pain to the patient’s comfort goal  Outcome: Progressing     Problem: Fall Risk  Goal: Patient will remain free from falls  Outcome: Progressing     Problem: Skin Integrity  Goal: Skin integrity is maintained or improved  Outcome: Progressing       Patient is not progressing towards the following goals:

## 2021-08-18 VITALS
TEMPERATURE: 98.5 F | WEIGHT: 220.02 LBS | HEIGHT: 65 IN | OXYGEN SATURATION: 95 % | RESPIRATION RATE: 18 BRPM | HEART RATE: 99 BPM | DIASTOLIC BLOOD PRESSURE: 86 MMHG | BODY MASS INDEX: 36.66 KG/M2 | SYSTOLIC BLOOD PRESSURE: 125 MMHG

## 2021-08-18 PROBLEM — R30.0 DYSURIA: Status: ACTIVE | Noted: 2021-08-18

## 2021-08-18 PROBLEM — B37.0 ORAL THRUSH: Status: ACTIVE | Noted: 2021-08-18

## 2021-08-18 LAB
ANION GAP SERPL CALC-SCNC: 8 MMOL/L (ref 7–16)
APPEARANCE UR: CLEAR
BACTERIA #/AREA URNS HPF: ABNORMAL /HPF
BASOPHILS # BLD AUTO: 0.6 % (ref 0–1.8)
BASOPHILS # BLD: 0.04 K/UL (ref 0–0.12)
BILIRUB UR QL STRIP.AUTO: NEGATIVE
BUN SERPL-MCNC: 9 MG/DL (ref 8–22)
CALCIUM SERPL-MCNC: 8.9 MG/DL (ref 8.5–10.5)
CHLORIDE SERPL-SCNC: 103 MMOL/L (ref 96–112)
CO2 SERPL-SCNC: 25 MMOL/L (ref 20–33)
COLOR UR: YELLOW
CREAT SERPL-MCNC: 0.72 MG/DL (ref 0.5–1.4)
EOSINOPHIL # BLD AUTO: 0.09 K/UL (ref 0–0.51)
EOSINOPHIL NFR BLD: 1.4 % (ref 0–6.9)
EPI CELLS #/AREA URNS HPF: ABNORMAL /HPF
ERYTHROCYTE [DISTWIDTH] IN BLOOD BY AUTOMATED COUNT: 50.3 FL (ref 35.9–50)
GLUCOSE SERPL-MCNC: 117 MG/DL (ref 65–99)
GLUCOSE UR STRIP.AUTO-MCNC: NEGATIVE MG/DL
HCT VFR BLD AUTO: 27.5 % (ref 37–47)
HGB BLD-MCNC: 8.7 G/DL (ref 12–16)
IMM GRANULOCYTES # BLD AUTO: 0.09 K/UL (ref 0–0.11)
IMM GRANULOCYTES NFR BLD AUTO: 1.4 % (ref 0–0.9)
KETONES UR STRIP.AUTO-MCNC: NEGATIVE MG/DL
LEUKOCYTE ESTERASE UR QL STRIP.AUTO: ABNORMAL
LYMPHOCYTES # BLD AUTO: 0.88 K/UL (ref 1–4.8)
LYMPHOCYTES NFR BLD: 13.8 % (ref 22–41)
MCH RBC QN AUTO: 31 PG (ref 27–33)
MCHC RBC AUTO-ENTMCNC: 31.6 G/DL (ref 33.6–35)
MCV RBC AUTO: 97.9 FL (ref 81.4–97.8)
MICRO URNS: ABNORMAL
MONOCYTES # BLD AUTO: 0.54 K/UL (ref 0–0.85)
MONOCYTES NFR BLD AUTO: 8.4 % (ref 0–13.4)
NEUTROPHILS # BLD AUTO: 4.76 K/UL (ref 2–7.15)
NEUTROPHILS NFR BLD: 74.4 % (ref 44–72)
NITRITE UR QL STRIP.AUTO: NEGATIVE
NRBC # BLD AUTO: 0 K/UL
NRBC BLD-RTO: 0 /100 WBC
PH UR STRIP.AUTO: 7 [PH] (ref 5–8)
PLATELET # BLD AUTO: 346 K/UL (ref 164–446)
PMV BLD AUTO: 10.1 FL (ref 9–12.9)
POTASSIUM SERPL-SCNC: 3.6 MMOL/L (ref 3.6–5.5)
PROT UR QL STRIP: 30 MG/DL
PTH-INTACT SERPL-MCNC: 77.5 PG/ML (ref 14–72)
RBC # BLD AUTO: 2.81 M/UL (ref 4.2–5.4)
RBC # URNS HPF: ABNORMAL /HPF
RBC UR QL AUTO: ABNORMAL
RENAL EPI CELLS #/AREA URNS HPF: ABNORMAL /HPF
SODIUM SERPL-SCNC: 136 MMOL/L (ref 135–145)
SP GR UR STRIP.AUTO: 1
UROBILINOGEN UR STRIP.AUTO-MCNC: 0.2 MG/DL
WBC # BLD AUTO: 6.4 K/UL (ref 4.8–10.8)
WBC #/AREA URNS HPF: ABNORMAL /HPF

## 2021-08-18 PROCEDURE — A9270 NON-COVERED ITEM OR SERVICE: HCPCS | Performed by: STUDENT IN AN ORGANIZED HEALTH CARE EDUCATION/TRAINING PROGRAM

## 2021-08-18 PROCEDURE — 700102 HCHG RX REV CODE 250 W/ 637 OVERRIDE(OP): Performed by: INTERNAL MEDICINE

## 2021-08-18 PROCEDURE — 80048 BASIC METABOLIC PNL TOTAL CA: CPT

## 2021-08-18 PROCEDURE — 700102 HCHG RX REV CODE 250 W/ 637 OVERRIDE(OP): Performed by: STUDENT IN AN ORGANIZED HEALTH CARE EDUCATION/TRAINING PROGRAM

## 2021-08-18 PROCEDURE — 85025 COMPLETE CBC W/AUTO DIFF WBC: CPT

## 2021-08-18 PROCEDURE — 97535 SELF CARE MNGMENT TRAINING: CPT

## 2021-08-18 PROCEDURE — A9270 NON-COVERED ITEM OR SERVICE: HCPCS | Performed by: FAMILY MEDICINE

## 2021-08-18 PROCEDURE — A9270 NON-COVERED ITEM OR SERVICE: HCPCS | Performed by: INTERNAL MEDICINE

## 2021-08-18 PROCEDURE — 81001 URINALYSIS AUTO W/SCOPE: CPT

## 2021-08-18 PROCEDURE — 83970 ASSAY OF PARATHORMONE: CPT

## 2021-08-18 PROCEDURE — 99239 HOSP IP/OBS DSCHRG MGMT >30: CPT | Performed by: FAMILY MEDICINE

## 2021-08-18 PROCEDURE — 700102 HCHG RX REV CODE 250 W/ 637 OVERRIDE(OP): Performed by: FAMILY MEDICINE

## 2021-08-18 RX ORDER — OXYCODONE HYDROCHLORIDE 5 MG/1
5 TABLET ORAL EVERY 6 HOURS PRN
Qty: 20 TABLET | Refills: 0 | Status: SHIPPED | OUTPATIENT
Start: 2021-08-18 | End: 2021-08-23

## 2021-08-18 RX ADMIN — NYSTATIN 500000 UNITS: 100000 SUSPENSION ORAL at 13:56

## 2021-08-18 RX ADMIN — DOCUSATE SODIUM 50 MG AND SENNOSIDES 8.6 MG 2 TABLET: 8.6; 5 TABLET, FILM COATED ORAL at 05:19

## 2021-08-18 RX ADMIN — LOSARTAN POTASSIUM 50 MG: 50 TABLET, FILM COATED ORAL at 12:01

## 2021-08-18 RX ADMIN — GUAIFENESIN 600 MG: 600 TABLET, EXTENDED RELEASE ORAL at 05:20

## 2021-08-18 RX ADMIN — MAGNESIUM OXIDE 400 MG (241.3 MG MAGNESIUM) TABLET 400 MG: TABLET at 05:20

## 2021-08-18 RX ADMIN — MAGNESIUM OXIDE 400 MG (241.3 MG MAGNESIUM) TABLET 400 MG: TABLET at 16:41

## 2021-08-18 RX ADMIN — APIXABAN 10 MG: 5 TABLET, FILM COATED ORAL at 16:41

## 2021-08-18 RX ADMIN — ACETAMINOPHEN 500 MG: 500 TABLET, FILM COATED ORAL at 11:58

## 2021-08-18 RX ADMIN — FERROUS SULFATE TAB 325 MG (65 MG ELEMENTAL FE) 325 MG: 325 (65 FE) TAB at 16:41

## 2021-08-18 RX ADMIN — ATORVASTATIN CALCIUM 20 MG: 20 TABLET, FILM COATED ORAL at 16:44

## 2021-08-18 RX ADMIN — APIXABAN 10 MG: 5 TABLET, FILM COATED ORAL at 05:20

## 2021-08-18 RX ADMIN — GUAIFENESIN 600 MG: 600 TABLET, EXTENDED RELEASE ORAL at 16:41

## 2021-08-18 RX ADMIN — Medication 2000 UNITS: at 16:41

## 2021-08-18 RX ADMIN — METOPROLOL SUCCINATE 100 MG: 100 TABLET, EXTENDED RELEASE ORAL at 05:20

## 2021-08-18 RX ADMIN — ASPIRIN 81 MG: 81 TABLET, CHEWABLE ORAL at 05:20

## 2021-08-18 RX ADMIN — Medication 500 MG: at 16:41

## 2021-08-18 RX ADMIN — THERA TABS 1 TABLET: TAB at 05:20

## 2021-08-18 ASSESSMENT — ENCOUNTER SYMPTOMS
DIAPHORESIS: 0
PALPITATIONS: 0
BACK PAIN: 0
FOCAL WEAKNESS: 1
MYALGIAS: 0
SHORTNESS OF BREATH: 0
WEAKNESS: 1
COUGH: 0
SENSORY CHANGE: 0
BLURRED VISION: 0
SPEECH CHANGE: 0
FEVER: 0
CHILLS: 0
SORE THROAT: 0
DIZZINESS: 0
HEARTBURN: 0
HEADACHES: 0
NECK PAIN: 0
WHEEZING: 0
FLANK PAIN: 0
VOMITING: 0
NAUSEA: 0
NERVOUS/ANXIOUS: 0
DIARRHEA: 0
ABDOMINAL PAIN: 0

## 2021-08-18 ASSESSMENT — PAIN DESCRIPTION - PAIN TYPE
TYPE: ACUTE PAIN
TYPE: ACUTE PAIN

## 2021-08-18 ASSESSMENT — COGNITIVE AND FUNCTIONAL STATUS - GENERAL
SUGGESTED CMS G CODE MODIFIER DAILY ACTIVITY: CK
DRESSING REGULAR LOWER BODY CLOTHING: A LOT
TOILETING: A LITTLE
HELP NEEDED FOR BATHING: A LOT
DRESSING REGULAR UPPER BODY CLOTHING: A LITTLE
DAILY ACTIVITIY SCORE: 17
PERSONAL GROOMING: A LITTLE

## 2021-08-18 NOTE — DISCHARGE PLANNING
Anticipated Discharge Disposition:   Home with Home Health    Action:   This RN CM is following the case. Met with Pt at bedside who agreed with Home Health. Pt has [facility] and her choices are [facility] and [facility].  Choice was faxed to [facility].    For oxygen, Pt's DME choices are [facility] and [facility].  Per Pt her daughter can provide transport to home once she is medically clear.     Pt has been accepted by [facility] for DME .  Pt's portable oxygen tank and concentrator were delivered to bedside.     Spoke with Carolyn, Pt's daughter who asked  if Pt can discharge today. Informed JOB Quevedo who will ask Dr Moseley if Pt is medically clear today.     Barriers to Discharge:   Pending medical clearance    Plan:   CM to continue to assist Pt with discharge as needed

## 2021-08-18 NOTE — PROGRESS NOTES
Hospital Medicine Daily Progress Note    Date of Service  8/18/2021    Chief Complaint  Jairo Rivas is a 70 y.o. female admitted 7/29/2021 with hemothorax.    Hospital Course  Admitted with left hemothorax, also noted to have pulmonary embolism, respiratory failure with hypoxia.  She was started on IV heparin drip for anticoagulation.  Work-up also showed right common femoral vein deep venous thrombosis. Surgery was consulted on the case, initial recommendation was IR Pleurx catheter.  Interventional radiology was consulted however patient did not appear to be a good candidate for the procedure.  Thoracic surgery was then consulted, patient underwent Left thoracoscopy with evacuation of hemothorax and decortication on 8/6/2021. Chest tubes x2 were removed on 8/9/2021.  Biopsy of the lung nodule showed Metastatic poorly differentiated adenocarcinoma. Anticoagulation has been transitioned to Eliquis. Pulmonary medicine was also consulted on the case. Gynecology oncology was also consulted on the case, and has discussed prognosis with the family.  Family has chosen to continue chemotherapy which will be done as outpatient    Interval Problem Update  Hemothorax- denies shortness of breath  PE - mild chest pain, echocardiogram with no right heart strain  Resp failure - O2 2 lpm NC  HTN - sbp 110-130  HEAVEN - creatinine trended down to 0.7, PTH elevated  Thrush - noted today  Dysuria - noted today    Updates given and plan of care discussed with patient's daughter who was at bedside.    I have personally seen and examined the patient at bedside. I discussed the plan of care with patient, family, bedside RN, charge RN and .    Consultants/Specialty  general surgery, oncology, palliative care and pulmonary    Code Status  Full Code    Disposition  Patient is medically cleared.   Anticipate discharge to to home with organized home healthcare and close outpatient follow-up.  I have placed the  appropriate orders for post-discharge needs.    Review of Systems  Review of Systems   Constitutional: Positive for malaise/fatigue. Negative for chills, diaphoresis and fever.   HENT: Negative for congestion, hearing loss and sore throat.    Eyes: Negative for blurred vision.   Respiratory: Negative for cough, shortness of breath and wheezing.    Cardiovascular: Positive for chest pain. Negative for palpitations and leg swelling.   Gastrointestinal: Negative for abdominal pain, diarrhea, heartburn, nausea and vomiting.   Genitourinary: Positive for dysuria. Negative for flank pain and hematuria.   Musculoskeletal: Negative for back pain, joint pain, myalgias and neck pain.   Skin: Negative for rash.   Neurological: Positive for focal weakness and weakness. Negative for dizziness, sensory change, speech change and headaches.   Psychiatric/Behavioral: The patient is not nervous/anxious.         Physical Exam  Temp:  [36.8 °C (98.2 °F)-36.9 °C (98.4 °F)] 36.8 °C (98.2 °F)  Pulse:  [100-105] 105  Resp:  [16-18] 16  BP: (116-132)/(67-77) 117/77  SpO2:  [92 %-95 %] 95 %    Physical Exam  Vitals and nursing note reviewed.   Constitutional:       Appearance: She is obese.   HENT:      Head: Normocephalic and atraumatic.      Nose: No congestion.      Mouth/Throat:      Mouth: Mucous membranes are moist.      Pharynx: Oropharyngeal exudate (tongue) present.   Eyes:      Extraocular Movements: Extraocular movements intact.      Conjunctiva/sclera: Conjunctivae normal.   Cardiovascular:      Rate and Rhythm: Normal rate and regular rhythm.   Pulmonary:      Effort: Pulmonary effort is normal.      Breath sounds: Examination of the left-lower field reveals decreased breath sounds. Decreased breath sounds present.   Abdominal:      General: There is no distension.      Tenderness: There is no abdominal tenderness. There is no guarding or rebound.   Musculoskeletal:      Cervical back: No tenderness.      Right lower leg: No  edema.      Left lower leg: No edema.   Skin:     General: Skin is warm and dry.   Neurological:      Mental Status: She is alert and oriented to person, place, and time.      Cranial Nerves: No cranial nerve deficit.      Motor: Weakness (MMT RUE 1-2/5, RLE 3-4/5, LUE and LLE 4+/5) present.         Fluids    Intake/Output Summary (Last 24 hours) at 8/18/2021 1220  Last data filed at 8/18/2021 0800  Gross per 24 hour   Intake 120 ml   Output 650 ml   Net -530 ml       Laboratory  Recent Labs     08/17/21  1331 08/18/21  0159   WBC 7.2 6.4   RBC 2.89* 2.81*   HEMOGLOBIN 9.0* 8.7*   HEMATOCRIT 28.5* 27.5*   MCV 98.6* 97.9*   MCH 31.1 31.0   MCHC 31.6* 31.6*   RDW 51.0* 50.3*   PLATELETCT 346 346   MPV 10.1 10.1     Recent Labs     08/16/21  0230 08/17/21  1331 08/18/21  0159   SODIUM 137 137 136   POTASSIUM 4.7 3.8 3.6   CHLORIDE 104 103 103   CO2 24 25 25   GLUCOSE 103* 119* 117*   BUN 9 10 9   CREATININE 0.72 0.61 0.72   CALCIUM 9.0 8.8 8.9                   Imaging  EC-ECHOCARDIOGRAM COMPLETE W/O CONT   Final Result      DX-CHEST-PORTABLE (1 VIEW)   Final Result         1.  Hazy left pulmonary infiltrates, similar compared to prior study.   2.  Layering left pleural effusion, stable since prior study.      DX-CHEST-PORTABLE (1 VIEW)   Final Result      Stable chest with left large pleural effusion, pulmonary consolidation and probable atelectasis      DX-CHEST-PORTABLE (1 VIEW)   Final Result      1.  Unchanged LEFT pleural effusion   2.  Unchanged LEFT-sided airspace disease      DX-CHEST-PORTABLE (1 VIEW)   Final Result         1.  Left pulmonary infiltrates and layering left pleural effusion. Similar compared to prior study.      DX-CHEST-LIMITED (1 VIEW)   Final Result      1.  No pneumothorax identified following removal of 2 left chest tubes.      2.  Left lower lobe and left midlung atelectasis, pneumonia, or contusion is present.      3.  Possible small left pleural effusion.      4.  Clear right lung.       DX-CHEST-PORTABLE (1 VIEW)   Final Result         1.  Patchy left pulmonary infiltrates.   2.  Layering left pleural effusion   3.  Cardiomegaly      US-EXTREMITY VENOUS LOWER BILAT   Final Result      DX-CHEST-PORTABLE (1 VIEW)   Final Result         1.  Left pulmonary infiltrates, stable   2.  Cardiomegaly      DX-CHEST-PORTABLE (1 VIEW)   Final Result      1.  Again seen 2 left-sided chest tubes present.      2.  Bony loss and consolidation within the left lung.      3.  Stable left pleural effusion      DX-CHEST-PORTABLE (1 VIEW)   Final Result      No significant change      DX-CHEST-PORTABLE (1 VIEW)   Final Result      1.  Interval placement of 2 large bore left chest tubes with decreased left pleural effusion. Small hemopneumothorax.   2.  Left lung opacity likely atelectasis.      US-CHEST   Final Result      1.  Diffusely organized process noted in the left hemithorax. No significant pleural effusion identified.      2.  Thoracentesis deferred      DX-CHEST-PORTABLE (1 VIEW)   Final Result      Opacification of the left hemithorax is unchanged with mediastinal shift to the right.      US-CHEST   Final Result         Thick echogenic heterogeneous material in the left pleural space. No drainable pocket for thoracentesis.      DX-CHEST-PORTABLE (1 VIEW)   Final Result      1.  Unchanged subtotal opacification of the left hemithorax without volume loss.      2.  Persistent mediastinal shift to the right.      3.  No right lung consolidation.      CT-CHEST (THORAX) W/O   Final Result      1.  Very large left pleural effusion which now demonstrates heterogeneous areas of increased density suggesting interval hemorrhage or less likely superimposed infection.   2.  Complete collapse/compressive atelectasis of the left lung which is worsened than prior study.   3.  New mild groundglass opacity in the right lung apex could represent atelectasis or mild pneumonitis.   4.  Right pulmonary nodules again noted  suggesting metastases.      These findings were discussed with JUANITA SERNA on 8/3/2021 3:40 PM.      Fleischner Society pulmonary nodule recommendations:   Not Applicable         DX-CHEST-PORTABLE (1 VIEW)   Final Result      1.  There is complete opacification of the left hemithorax which could represent combination of pleural effusion or airspace process.      US-CHEST   Final Result      1.  Diffuse pneumonitis of left hemithorax.      2.  No evidence of a large left-sided pleural effusion. Thoracentesis deferred.      3.  Recommend CT scan of the thorax for further evaluation.      DX-CHEST-PORTABLE (1 VIEW)   Final Result      Worsening opacification on the left which is now complete, probably related to left pleural effusion and atelectasis.      DX-CHEST-LIMITED (1 VIEW)   Final Result      1.  No significant interval change.      DX-CHEST-PORTABLE (1 VIEW)   Final Result      1.  Interval decrease in the left pleural effusion following thoracentesis.   2.  There is no pneumothorax visualized.      US-THORACENTESIS PUNCTURE LEFT   Final Result      1. Ultrasound guided left sided diagnostic thoracentesis.      2. 950 mL of fluid withdrawn.      CT-CTA CHEST PULMONARY ARTERY W/ RECONS   Final Result      1.  Nonocclusive pulmonary emboli in the right pulmonary artery, its lobar and segmental branches. No right heart strain.   2.  Large left pleural effusion, with total collapse of the left lower lobe and partial atelectasis of the left upper lobe.   3.  At least 3 new pulmonary nodules measuring up to 2.6 cm, consistent with pulmonary metastases.   4.  Unchanged heterogeneous thyroid gland with multiple thyroid nodules.      Findings were discussed with KRISTOPHER COOK on 7/29/2021 9:38 AM.         DX-CHEST-PORTABLE (1 VIEW)   Final Result      Very large left pleural effusion with associated compressive atelectasis.           Assessment/Plan  * Hemothorax on left- (present on admission)  Assessment &  Plan  Left thoracoscopy with evacuation of hemothorax and decortication. 8/6/2021  Chest tube removed 8/9/2021    Acute respiratory failure with hypoxia (HCC)- (present on admission)  Assessment & Plan  RT protocol    Pulmonary embolism (HCC)- (present on admission)  Assessment & Plan  Eliquis    Endometrial cancer (HCC)- (present on admission)  Assessment & Plan  With metastases to lung  Hold Tamoxifen  Palliative care  Gynecology oncology following      Dysuria  Assessment & Plan  Check UA    Oral thrush  Assessment & Plan  Start Nystatin    DVT (deep venous thrombosis) (HCC)- (present on admission)  Assessment & Plan  Eliquis    Hypomagnesemia- (present on admission)  Assessment & Plan  oral Mg    Anemia- (present on admission)  Assessment & Plan  Transfused PRBC  Follow cbc      Acute kidney injury (HCC)- (present on admission)  Assessment & Plan  Probable CKD 3  Follow bmp    Hypokalemia- (present on admission)  Assessment & Plan  Follow bmp    Essential hypertension- (present on admission)  Assessment & Plan  Toprol, Losartan    History of CVA (cerebrovascular accident)- (present on admission)  Assessment & Plan  ASA, Lipitor  PT and OT       VTE prophylaxis: therapeutic anticoagulation with Eliquis    I have performed a physical exam and reviewed and updated ROS and Plan today (8/18/2021). In review of yesterday's note (8/17/2021), there are no changes except as documented above.

## 2021-08-18 NOTE — PROGRESS NOTES
Gynecology OncologyProgress Note               Author: Rima Kitchen P.A.-C. Date & Time created: 2021  10:22 PM     Interval History:  Doing well. Sats stable on NC. No nausea or vomiting, no abdominal pain, tolerating PO.     Review of Systems:  Review of Systems   Constitutional: Negative for chills and fever.   Respiratory: Positive for shortness of breath. Negative for cough.    Cardiovascular: Negative for chest pain and palpitations.   Gastrointestinal: Negative for abdominal pain, constipation, diarrhea, nausea and vomiting.   Genitourinary: Negative for dysuria, frequency and urgency.       Physical Exam:  Physical Exam  Constitutional:       General: She is not in acute distress.  Cardiovascular:      Rate and Rhythm: Tachycardia present.   Pulmonary:      Effort: Pulmonary effort is normal.      Comments: Stable sats on NC  Abdominal:      General: Abdomen is flat.      Palpations: Abdomen is soft.   Musculoskeletal:         General: No swelling.   Skin:     General: Skin is warm and dry.   Neurological:      Mental Status: She is alert.         Labs:          Recent Labs     21  0230 21  1331   SODIUM 137 137   POTASSIUM 4.7 3.8   CHLORIDE 104 103   CO2 24 25   BUN 9 10   CREATININE 0.72 0.61   MAGNESIUM  --  1.9   PHOSPHORUS  --  2.6   CALCIUM 9.0 8.8     Recent Labs     21  0230 21  1331   GLUCOSE 103* 119*     Recent Labs     21  1331   RBC 2.89*   HEMOGLOBIN 9.0*   HEMATOCRIT 28.5*   PLATELETCT 346     Recent Labs     21  1331   WBC 7.2   NEUTSPOLYS 76.80*   LYMPHOCYTES 13.70*   MONOCYTES 7.00   EOSINOPHILS 1.50   BASOPHILS 0.40     Recent Labs     21  0230 21  1331   SODIUM 137 137   POTASSIUM 4.7 3.8   CHLORIDE 104 103   CO2 24 25   GLUCOSE 103* 119*   BUN 9 10   CREATININE 0.72 0.61   CALCIUM 9.0 8.8     Hemodynamics:  Temp (24hrs), Av.7 °C (98.1 °F), Min:36.5 °C (97.7 °F), Max:36.9 °C (98.4 °F)  Temperature: 36.9 °C (98.4 °F)  Pulse   Av  Min: 69  Max: 140   Blood Pressure : 116/67     Respiratory:    Respiration: 16, Pulse Oximetry: 92 %        RUL Breath Sounds: Clear, RML Breath Sounds: Diminished, RLL Breath Sounds: Diminished, HANK Breath Sounds: Clear, LLL Breath Sounds: Diminished  Fluids:    Intake/Output Summary (Last 24 hours) at 2021  Last data filed at 2021  Gross per 24 hour   Intake 600 ml   Output 400 ml   Net 200 ml        GI/Nutrition:  Orders Placed This Encounter   Procedures   • Diet Order Diet: Regular; Miscellaneous modifications: (optional): Gluten Free     Standing Status:   Standing     Number of Occurrences:   1     Order Specific Question:   Diet:     Answer:   Regular [1]     Order Specific Question:   Miscellaneous modifications: (optional)     Answer:   Gluten Free [9]     Medical Decision Making, by Problem:  Active Hospital Problems    Diagnosis    • *Pleural effusion [J90]    • Low urine output [R34]    • Tachycardia [R00.0]    • Acute kidney injury (HCC) [N17.9]    • Anemia [D64.9]    • Pulmonary embolism (HCC) [I26.99]    • Endometrial cancer (HCC) [C54.1]        Plan:  This is a 70 y.o. female with h/o metastatic endometrial cancer admitted with new PE and pleural effusion:      1. Endometrial cancer: stage IIIB, s/p PORTEC 3 w/ omission of chemotherapy, followed by pulmonary recurrence treated with systemic carbo/taxol and most recently tamoxifen/megace, treatment tolerance has been limited, now with progression of disease. Dr. Garcia discussed treatment options with patient and family, they would like to proceed with treatment and will follow up outpatient,we will arrange an appointment    2. Pleural effusion: CT on  showed large L pleural effusion with heterogeneous areas of increased density, concerning for hemothorax > unable to do thoracentesis due to no drainable pockets. Lovenox held. Now s/p thoraoscopy with hemothorax evacuation and decortication. Chest tubes removed 21.  Biopsy from lung nodule taken at procedure, confirms recurrence. Cytology from thora on 7/29 with rare atypical cells. Dr. Garcia reviewed CXR and does not recommend a pleurex catheter at this time. CXR on 8/14 stable, maintaining sats on NC, home with O2  3. PE: secondary to malignancy, hold megace/tamoxifen given new thrombosis and concern for progression per chest CT. Started on heparin gtt per IM > transitioned to eliquis.  4. Shortness of breath: secondary to above, now maintaining sats on nasal canula, home O2  5. Anemia: d/t hemothorax, now stable, s/p 2 units, stable. Continue to monitor.   6. Dispo: outpatient f/u w/ oncology       Quality-Core Measures

## 2021-08-18 NOTE — PROGRESS NOTES
Report received. Assumed care. Pt in bed awake. A/O x 4. VSS. 3 L of oxygen via nasal cannula. Responds appropriately. Denies pain, SOB. Assessment complete. Discussed POC, pt verbalizes understanding. Explained importance of calling before getting OOB. Call light and belongings within reach. Bed in lowest position. Bed alarm in place. Hourly rounding in progress. Will continue to monitor.

## 2021-08-18 NOTE — CARE PLAN
The patient is Stable - Low risk of patient condition declining or worsening    Shift Goals  Clinical Goals: mobility  Patient Goals: rest    Progress made toward(s) clinical / shift goals:  pt transferred to bedside commode. Pt was able to get some sleep.     Patient is not progressing towards the following goals:

## 2021-08-18 NOTE — CARE PLAN
The patient is Stable - Low risk of patient condition declining or worsening    Shift Goals  Clinical Goals: mobility  Patient Goals: breathe better, rest  Family Goals: rest     Progress made toward(s) clinical / shift goals: Pt resting comfortably in bed, ambulated to the commode and wheelchair  Problem: Pain - Standard  Goal: Alleviation of pain or a reduction in pain to the patient’s comfort goal  Outcome: Progressing  Note: Medicated with Tylenol with adequate pain control, hourly rounding in progress     Problem: Knowledge Deficit - Standard  Goal: Patient and family/care givers will demonstrate understanding of plan of care, disease process/condition, diagnostic tests and medications  Outcome: Progressing  Note: Educated on POC, all questions answered, hourly rounding in progress       Patient is not progressing towards the following goals:

## 2021-08-18 NOTE — CARE PLAN
The patient is Stable - Low risk of patient condition declining or worsening    Shift Goals  Clinical Goals: mobility  Patient Goals: rest, shower  Family Goals: rest     Progress made toward(s) clinical / shift goals:  pt up to commode, pt received shower  Problem: Knowledge Deficit - Standard  Goal: Patient and family/care givers will demonstrate understanding of plan of care, disease process/condition, diagnostic tests and medications  Outcome: Progressing  Note: Discussed POC, pt verbalized understanding, all questions answered.      Problem: Fall Risk  Goal: Patient will remain free from falls  Outcome: Progressing  Note: Pt calls appropriately, bed alarm on, bed in the lowest position, pt belongings within reach.        Patient is not progressing towards the following goals:

## 2021-08-18 NOTE — DISCHARGE PLANNING
Received Choice form at 0956  Agency/Facility Name: [facility]   Referral sent per Choice form @ [phone] -[phone]  Received Choice form at [phone]  Agency/Facility Name: [facility]   Referral sent per Choice form @ [phone] -[phone]  Agency/Facility Name: [facility]  Spoke To: Yoselin  Outcome: Per Yoselin, DME has been processed and will arrive now

## 2021-08-18 NOTE — THERAPY
"Occupational Therapy  Daily Treatment     Patient Name: Jairo Rivas  Age:  70 y.o., Sex:  female  Medical Record #: [id number]  Today's Date: 8/18/2021     Precautions: Fall Risk  Comments: chronic CVA with right sided weakness    Assessment    Pt was seen for OT tx at request to re-evaluate patient for d/c recommendations. Pt was last seen 8/3 and had met acute OT goals. Since that time pt was dx w/a hemothorax s/p thoracoscopy and decortication, as well as acute respiratory failure. At this time pt continues to require moderate assistance for mobility and most ADL's. While she did have a change in medical status, she appears to be at/near her previous functional baseline, which is primarily limited by residual deficits from a CVA. Chart review indicates family is able to assist at d/c. At this time anticipate no further acute OT needs.     Plan  Discharge secondary to goals met. OT is not actively following this patient.     DC Equipment Recommendations: None  Discharge Recommendations: Recommend home health for continued occupational therapy services    Subjective    \"I need to pee in there because they need a sample\"      Objective     08/18/21 1150   Cognition    Cognition / Consciousness X   Speech/ Communication Dysarthric   Comments very pleasant and coopeartive    Active ROM Upper Body   Active ROM Upper Body  X   Comments RUE chronic/residual flexon contracture    Other Treatments   Other Treatments Provided Pt was seen for OT tx upon request of care team for d/c recommendations, Pt was last seen on 8/3 and determined to be at/near her functional baseline; pt participated in use of BSC and self feeding and grooming this session    Balance   Sitting Balance (Static) Fair   Sitting Balance (Dynamic) Fair   Standing Balance (Static) Fair -   Standing Balance (Dynamic) Poor +   Weight Shift Sitting Fair   Weight Shift Standing Fair   Skilled Intervention Verbal Cuing   Comments w/HHA    Bed " Mobility    Supine to Sit Moderate Assist   Sit to Supine Maximal Assist   Skilled Intervention Compensatory Strategies;Verbal Cuing   Activities of Daily Living   Eating Supervision   Grooming Supervision;Seated   Upper Body Dressing Minimal Assist   Lower Body Dressing Moderate Assist   Toileting   (able to complete anna care w/standing support )   Skilled Intervention Verbal Cuing   How much help from another person does the patient currently need...   6 Clicks Daily Activity Score 17   Functional Mobility   Sit to Stand Moderate Assist   Bed, Chair, Wheelchair Transfer Moderate Assist   Toilet Transfers Moderate Assist   Mobility EOB>BSC>BTB    Skilled Intervention Verbal Cuing;Facilitation;Compensatory Strategies   Comments w/HHA    Activity Tolerance   Comments no overt c/o pain or fatigue    Patient / Family Goals   Patient / Family Goal #1 To go home   Goal #1 Outcome Progressing slower than expected   Short Term Goals   Short Term Goal # 1 Pt will complete ADL transfers with modA   Goal Outcome # 1 Goal met   Short Term Goal # 2 Pt will complete seated G/H with set up   Goal Outcome # 2 Goal met   Short Term Goal # 3 Pt will complete toileting with modA   Goal Outcome # 3 Goal met   Education Group   Role of Occupational Therapist Patient Response Patient;Acceptance;Explanation   Anticipated Discharge Equipment and Recommendations   DC Equipment Recommendations None   Discharge Recommendations Recommend home health for continued occupational therapy services   Interdisciplinary Plan of Care Collaboration   IDT Collaboration with  Nursing   Patient Position at End of Therapy In Bed;Call Light within Reach;Tray Table within Reach;Phone within Reach   Collaboration Comments RN aware of OT tx/follow up and pts efforts    Session Information   Date / Session Number  8/18 #3 (tx only for d/c needs)    Priority 0  (no current acute OT needs )

## 2021-08-18 NOTE — PROGRESS NOTES
Assume care of pt at 1900. Report received from day RN. Pt is A/O x4. Pain is 3/10, medicated per MAR. Pt has right sided weakness. Calls appropriately to use bed pan. Remains on 3L of oxygen via NC. Pt is resting in bed. Bed in lowest and locked position, call light in reach, hourly rounding in place. Bed alarm in place. Labs reviewed. Communication board updated. Will continue to monitor.

## 2021-08-18 NOTE — DISCHARGE SUMMARY
Discharge Summary    CHIEF COMPLAINT ON ADMISSION  Chief Complaint   Patient presents with   • Shortness of Breath     Since monday   • Chest Wall Pain     Left sided stabbing pain that radiates to the back, pain increases with movement and breathing. Patient thought is was muscle pain and was using icy hot but the pain has been getting worse.        Reason for Admission  Trouble Breathing     Admission Date  7/29/2021    CODE STATUS  Full Code    HPI & HOSPITAL COURSE  This is a 70 y.o. female here with left hemothorax, also noted to have pulmonary embolism, respiratory failure with hypoxia.  She was started on IV heparin drip for anticoagulation.  Work-up also showed right common femoral vein deep venous thrombosis. Surgery was consulted on the case, initial recommendation was IR Pleurx catheter.  Interventional radiology was consulted however patient did not appear to be a good candidate for the procedure.  Thoracic surgery was then consulted, patient underwent Left thoracoscopy with evacuation of hemothorax and decortication on 8/6/2021. Chest tubes x 2 were removed on 8/9/2021.  Biopsy of the lung nodule showed Metastatic poorly differentiated adenocarcinoma. Anticoagulation has been transitioned to Eliquis. Pulmonary medicine was also consulted on the case. Gynecology oncology was also consulted on the case, and has discussed prognosis with the family.  Family has chosen to continue chemotherapy which will be done as outpatient.  Home health was arranged for her as well as home oxygen.  Plan of care and discharge plan was discussed with the patient's daughter.    Therefore, she is discharged in good and stable condition to home with organized home healthcare and close outpatient follow-up.    The patient met 2-midnight criteria for an inpatient stay at the time of discharge.    Discharge Date  8/18/2021    FOLLOW UP ITEMS POST DISCHARGE  Follow-up with Gynecology oncology    DISCHARGE DIAGNOSES  Principal  Problem:    Hemothorax on left POA: Yes  Active Problems:    Endometrial cancer (HCC) POA: Yes      Overview: Menopause in 2010 and bleeding 2012 started to have irregular continuous       bleeding.       Secondary iron def anemia.      Seen by Dr. Chicas Aug 2018. Thick uterine lining on US.      Trial of medication therapy, if no improvement will go back to surgery.     Pulmonary embolism (HCC) POA: Yes    Acute respiratory failure with hypoxia (HCC) POA: Yes    History of CVA (cerebrovascular accident) POA: Yes      Overview: Residual right sided weakness s/p stroke 2010.      Used to be on Plavix, down graded to aspirin only because of bleeding.      Simvastatin 20 mg daily.      Done with PT in [place].       Spasticity on Baclofen.     Essential hypertension POA: Yes      Overview: Well controlled on amlodipine 5 mg and metoprolol 100 mg bid. Valsartan       160 mg switched to losartan 100 mg 2/2 nation wide shortage.    Hypokalemia POA: Yes      Overview: Not medication induced. Taking OTC K supplements.     Acute kidney injury (HCC) POA: Yes    Anemia POA: Yes    Hypomagnesemia POA: Yes    DVT (deep venous thrombosis) (HCC) POA: Yes    Oral thrush POA: No    Dysuria POA: No  Resolved Problems:    * No resolved hospital problems. *      FOLLOW UP  No future appointments.  [facility]  [address]  [place] [facility] [place] [id number] [phone]        Kathya Scanlon, A.P.N.  [address]  [place] [address] [phone]    Schedule an appointment as soon as possible for a visit        MEDICATIONS ON DISCHARGE     Medication List      START taking these medications      Instructions   * apixaban 5mg Tabs  Commonly known as: ELIQUIS   Take 1 tablet by mouth 2 times a day for 91 days.  Dose: 5 mg     * apixaban 5mg Tabs  Start taking on: August 13, 2021  Commonly known as: ELIQUIS   Take 2 Tablets by mouth 2 times a day for 7 days, THEN 1 Tablet 2 times a day for 83 days.  Indications: DVT/PE     nystatin 606095 UNIT/ML Susp  Commonly known as: MYCOSTATIN   Take 5 mL by mouth 4 times a day for 7 days.  Dose: 5 mL     oxyCODONE immediate-release 5 MG Tabs  Commonly known as: ROXICODONE   Take 1 Tablet by mouth every 6 hours as needed for Severe Pain for up to 5 days.  Dose: 5 mg         * This list has 2 medication(s) that are the same as other medications prescribed for you. Read the directions carefully, and ask your doctor or other care provider to review them with you.            CONTINUE taking these medications      Instructions   acetaminophen 500 MG Tabs  Commonly known as: TYLENOL   Take 1,000 mg by mouth every 6 hours as needed for Moderate Pain.  Dose: 1,000 mg     amitriptyline 50 MG Tabs  Commonly known as: ELAVIL   Take 75 mg by mouth every evening.  Dose: 75 mg     ascorbic acid 500 MG Tabs  Commonly known as: ascorbic acid   Take 500 mg by mouth every evening.  Dose: 500 mg     aspirin 81 MG Chew chewable tablet  Commonly known as: ASA   Take 81 mg by mouth every day.  Dose: 81 mg     atorvastatin 20 MG Tabs  Commonly known as: LIPITOR   Take 1 Tab by mouth every evening.  Dose: 20 mg     baclofen 10 MG Tabs  Commonly known as: LIORESAL   Take 10 mg by mouth 3 times a day as needed.  Dose: 10 mg     CoQ10 100 MG Caps   Take 100 mg by mouth every evening.  Dose: 100 mg     ferrous sulfate 325 (65 Fe) MG tablet   Take 1 tablet by mouth every day.  Dose: 325 mg     losartan 100 MG Tabs  Commonly known as: COZAAR   TAKE 1 TABLET BY MOUTH EVERY DAY     meclizine 12.5 MG Tabs  Commonly known as: ANTIVERT   Take 1-2 Tablets by mouth 3 times a day as needed for Dizziness.  Dose: 12.5-25 mg     metoprolol  MG Tb24  Commonly known as: TOPROL XL   Take 1 tablet by mouth once daily     multivitamin Tabs   Take 1 Tab by mouth every day.  Dose: 1 Tablet     senna-docusate 8.6-50 MG Tabs  Commonly known as: PERICOLACE or SENOKOT S   Take 1 Tab by mouth 2 times a day as  needed.  Dose: 1 Tablet     SUPER B COMPLEX PO   Take 1 Tab by mouth every evening.  Dose: 1 Tablet     Vitamin D3 2000 UNIT Caps   Take 2,000 Units by mouth every day.  Dose: 2,000 Units        STOP taking these medications    amLODIPine 5 MG Tabs  Commonly known as: NORVASC     megestrol 40 MG Tabs  Commonly known as: MEGACE     tamoxifen 20 MG tablet  Commonly known as: NOLVADEX        ASK your doctor about these medications      Instructions   * apixaban 5mg Tabs  Commonly known as: ELIQUIS  Ask about: Should I take this medication?   Take 2 Tablets by mouth 2 times a day for 7 days.  Dose: 10 mg         * This list has 1 medication(s) that are the same as other medications prescribed for you. Read the directions carefully, and ask your doctor or other care provider to review them with you.                Allergies  Allergies   Allergen Reactions   • Penicillins Itching     Makes her feel weak    • Gluten Meal    • Hydrocodone Itching     Swelling of the tongue  Face swelling   • Iodine      Weak and dizzy     • Levaquin Itching     Swelling of the tongue  Facial swelling   • Tramadol Hives     Swelling       DIET  Orders Placed This Encounter   Procedures   • Diet Order Diet: Regular; Miscellaneous modifications: (optional): Gluten Free     Standing Status:   Standing     Number of Occurrences:   1     Order Specific Question:   Diet:     Answer:   Regular [1]     Order Specific Question:   Miscellaneous modifications: (optional)     Answer:   Gluten Free [9]       ACTIVITY  As tolerated.  Weight bearing as tolerated    CONSULTATIONS  Gynecology oncology - Leos/Garcia  Surgery - Selwyn  Thoracic Surgery - Ganser    PROCEDURES  Left thoracoscopy with evacuation of hemothorax and decortication. 8/6/2021    LABORATORY  Lab Results   Component Value Date    SODIUM 136 08/18/2021    POTASSIUM 3.6 08/18/2021    CHLORIDE 103 08/18/2021    CO2 25 08/18/2021    GLUCOSE 117 (H) 08/18/2021    BUN 9 08/18/2021    CREATININE  0.72 08/18/2021        Lab Results   Component Value Date    WBC 6.4 08/18/2021    HEMOGLOBIN 8.7 (L) 08/18/2021    HEMATOCRIT 27.5 (L) 08/18/2021    PLATELETCT 346 08/18/2021        Total time of the discharge process exceeds 40 minutes.

## 2021-08-18 NOTE — FACE TO FACE
"Face to Face Note  -  Durable Medical Equipment    Tanner Moseley M.D. - NPI: [id number]  I certify that this patient is under my care and that they had a durable medical equipment(DME)face to face encounter by myself that meets the physician DME face-to-face encounter requirements with this patient on:    Date of encounter:   Patient:                    MRN:                       YOB: 2019  Jairo Rivas  [id number]  1950     The encounter with the patient was in whole, or in part, for the following medical condition, which is the primary reason for durable medical equipment:  Other - Acute respiratory failure with hypoxia    I certify that, based on my findings, the following durable medical equipment is medically necessary:  Oxygen.    HOME O2 Saturation Measurements:(Values must be present for Home Oxygen orders)  Room air sat at rest: 92  Room air sat with amb: 87  With liters of O2: 0, O2 sat at rest with O2: 0  With Liters of O2: 3, O2 sat with amb with O2 : 94  Is the patient mobile?: Yes    My Clinical findings support the need for the above equipment due to:  Hypoxia    Supporting Symptoms: The patient requires supplemental oxygen, as the following interventions have been tried with limited or no improvement: \"Incentive spirometry    If patient feels more short of breath, they can go up to 6 liters per minute and contact healthcare provider.  "

## 2021-08-19 NOTE — DISCHARGE INSTRUCTIONS
Discharge Instructions    Discharged to home with [facility] by car with relative. Discharged via wheelchair, hospital escort: Yes.  Special equipment needed: Oxygen    Be sure to schedule a follow-up appointment with your primary care doctor or any specialists as instructed.     Discharge Plan:        I understand that a diet low in cholesterol, fat, and sodium is recommended for good health. Unless I have been given specific instructions below for another diet, I accept this instruction as my diet prescription.   Other diet: Regular diet    Special Instructions: None    · Is patient discharged on Warfarin / Coumadin?   No       Hemothorax    Hemothorax is a buildup of blood in the space between the lungs and the chest wall (pleural cavity). This can cause trouble breathing, a collapsed lung (pneumothorax), and other dangerous problems.  This condition is a medical emergency that must be treated right away in a hospital.  What are the causes?  This condition is most often caused by an injury (trauma) that causes a tear in a lung or in a blood vessel in the chest. Other possible causes include:  · Tuberculosis.  · An injury caused by placing a tube into a blood vessel in the chest (central venous catheter).  · Cancer in the chest.  · A problem with how your blood clots.  · Blood thinner (anticoagulant) medicines.  · Lung surgery or heart surgery.  What are the signs or symptoms?  Signs and symptoms may include:  · Rapid breathing.  · Difficulty breathing.  · Shortness of breath.  · Feeling light-headed.  · Anxiety.  · Restlessness.  · A rapid heart rate.  · Low blood pressure (hypotension).  · Chest pain.  · Skin that is cool, sweaty, pale, or blue.  How is this diagnosed?  This condition may be diagnosed based on:  · Your symptoms and medical history. Your health care provider may ask about any recent injuries you have had.  · A physical exam.  · A chest X-ray.  · Removal and testing of a blood sample from the  pleural cavity.  How is this treated?  This condition must be treated at the hospital. Treatment may include:  · A procedure to place a small tube into the pleural cavity (chest tube). The chest tube drains fluid, blood, and extra air. It can also be used to expand a pneumothorax, if needed.  · Surgery to open the chest and control bleeding (thoracotomy). You may need surgery if bleeding continues after you have a chest tube placed.  · IV fluids.  · Blood transfusion. You may receive:  ? Your own blood that is collected from a chest drainage device and infused back into your body (autotransfusion).  ? Blood from a donor (blood transfusion).  Follow these instructions at home:  Medicines  · Take over-the-counter and prescription medicines only as told by your health care provider.  · Do not drive or use heavy machinery while taking prescription pain medicine.  General instructions  · Return to your normal activities as told by your health care provider. Ask your health care provider what activities are safe for you.  · If a cough or pain makes it difficult for you to sleep at night, try sleeping in a semi-upright position in a recliner or by using 2 or 3 pillows.  · If you were given an incentive spirometer, use it every 1-2 hours while you are awake or as recommended by your health care provider. This device measures how well you are filling your lungs with each breath.  · If you had a chest tube and it was removed, ask your health care provider when you can remove the bandage (dressing). While the dressing is in place, do not allow it to get wet.  · Keep all follow-up visits as told by your health care provider. This is important.  · Do not use any products that contain nicotine or tobacco, such as cigarettes and e-cigarettes. If you need help quitting, ask your health care provider.  Contact a health care provider if:  · Your pain is not controlled with the medicines you were prescribed.  · You were treated with a  chest tube, and you have redness, increasing pain, or discharge at the site where it was placed.  · You have a fever.  Get help right away if:  · You have difficulty breathing.  · You begin coughing up blood.  · You have chest pain.  These symptoms may represent a serious problem that is an emergency. Do not wait to see if the symptoms will go away. Get medical help right away. Call your local emergency services (911 in the [place] Do not drive yourself to the hospital.  Summary  · Hemothorax is a buildup of blood in the space between the lungs and the chest wall (pleural cavity).  · This condition is a medical emergency that must be treated in a hospital right away.  · Hemothorax is most often caused by an injury (trauma) that causes a tear in a lung or in a blood vessel in the chest. Other possible causes include tuberculosis, cancer, blood thinner (anticoagulant) medicines, lung or heart surgery, or a problem with how your blood clots.  · Treatment includes receiving donated blood (blood transfusion) and having a small tube placed in the pleural cavity (chest tube). The tube can help drain blood, fluid, and extra air. It can also be used to expand a collapsed lung (pneumothorax).  · In some cases, surgery is needed to stop the bleeding.  This information is not intended to replace advice given to you by your health care provider. Make sure you discuss any questions you have with your health care provider.  Document Released: 09/13/2005 Document Revised: 01/14/2019 Document Reviewed: 01/14/2019  [employer] Patient Education © 2020 [employer]    Pulmonary Embolism    A pulmonary embolism (PE) is a sudden blockage or decrease of blood flow in one or both lungs. Most blockages come from a blood clot that forms in the vein of a lower leg, thigh, or arm (deep vein thrombosis, DVT) and travels to the lungs. A clot is blood that has thickened into a gel or solid. PE is a dangerous and life-threatening condition that  needs to be treated right away.  What are the causes?  This condition is usually caused by a blood clot that forms in a vein and moves to the lungs. In rare cases, it may be caused by air, fat, part of a tumor, or other tissue that moves through the veins and into the lungs.  What increases the risk?  The following factors may make you more likely to develop this condition:  · Experiencing a traumatic injury, such as breaking a hip or leg.  · Having:  ? A spinal cord injury.  ? Orthopedic surgery, especially hip or knee replacement.  ? Any major surgery.  ? A stroke.  ? DVT.  ? Blood clots or blood clotting disease.  ? Long-term (chronic) lung or heart disease.  ? Cancer treated with chemotherapy.  ? A central venous catheter.  · Taking medicines that contain estrogen. These include birth control pills and hormone replacement therapy.  · Being:  ? Pregnant.  ? In the period of time after your baby is delivered (postpartum).  ? Older than age 60.  ? Overweight.  ? A smoker, especially if you have other risks.  What are the signs or symptoms?  Symptoms of this condition usually start suddenly and include:  · Shortness of breath during activity or at rest.  · Coughing, coughing up blood, or coughing up blood-tinged mucus.  · Chest pain that is often worse with deep breaths.  · Rapid or irregular heartbeat.  · Feeling light-headed or dizzy.  · Fainting.  · Feeling anxious.  · Fever.  · Sweating.  · Pain and swelling in a leg. This is a symptom of DVT, which can lead to PE.  How is this diagnosed?  This condition may be diagnosed based on:  · Your medical history.  · A physical exam.  · Blood tests.  · CT pulmonary angiogram. This test checks blood flow in and around your lungs.  · Ventilation-perfusion scan, also called a lung VQ scan. This test measures air flow and blood flow to the lungs.  · An ultrasound of the legs.  How is this treated?  Treatment for this condition depends on many factors, such as the cause of  your PE, your risk for bleeding or developing more clots, and other medical conditions you have. Treatment aims to remove, dissolve, or stop blood clots from forming or growing larger. Treatment may include:  · Medicines, such as:  ? Blood thinning medicines (anticoagulants) to stop clots from forming.  ? Medicines that dissolve clots (thrombolytics).  · Procedures, such as:  ? Using a flexible tube to remove a blood clot (embolectomy) or to deliver medicine to destroy it (catheter-directed thrombolysis).  ? Inserting a filter into a large vein that carries blood to the heart (inferior vena cava). This filter (vena cava filter) catches blood clots before they reach the lungs.  ? Surgery to remove the clot (surgical embolectomy). This is rare.  You may need a combination of immediate, long-term (up to 3 months after diagnosis), and extended (more than 3 months after diagnosis) treatments. Your treatment may continue for several months (maintenance therapy). You and your health care provider will work together to choose the treatment program that is best for you.  Follow these instructions at home:  Medicines  · Take over-the-counter and prescription medicines only as told by your health care provider.  · If you are taking an anticoagulant medicine:  ? Take the medicine every day at the same time each day.  ? Understand what foods and drugs interact with your medicine.  ? Understand the side effects of this medicine, including excessive bruising or bleeding. Ask your health care provider or pharmacist about other side effects.  General instructions  · Wear a medical alert bracelet or carry a medical alert card that says you have had a PE and lists what medicines you take.  · Ask your health care provider when you may return to your normal activities. Avoid sitting or lying for a long time without moving.  · Maintain a healthy weight. Ask your health care provider what weight is healthy for you.  · Do not use any  products that contain nicotine or tobacco, such as cigarettes, e-cigarettes, and chewing tobacco. If you need help quitting, ask your health care provider.  · Talk with your health care provider about any travel plans. It is important to make sure that you are still able to take your medicine while on trips.  · Keep all follow-up visits as told by your health care provider. This is important.  Contact a health care provider if:  · You missed a dose of your blood thinner medicine.  Get help right away if:  · You have:  ? New or increased pain, swelling, warmth, or redness in an arm or leg.  ? Numbness or tingling in an arm or leg.  ? Shortness of breath during activity or at rest.  ? A fever.  ? Chest pain.  ? A rapid or irregular heartbeat.  ? A severe headache.  ? Vision changes.  ? A serious fall or accident, or you hit your head.  ? Stomach (abdominal) pain.  ? Blood in your vomit, stool, or urine.  ? A cut that will not stop bleeding.  · You cough up blood.  · You feel light-headed or dizzy.  · You cannot move your arms or legs.  · You are confused or have memory loss.  These symptoms may represent a serious problem that is an emergency. Do not wait to see if the symptoms will go away. Get medical help right away. Call your local emergency services (931 in the [place] Do not drive yourself to the hospital.  Summary  · A pulmonary embolism (PE) is a sudden blockage or decrease of blood flow in one or both lungs. PE is a dangerous and life-threatening condition that needs to be treated right away.  · Treatments for this condition usually include medicines to thin your blood (anticoagulants) or medicines to break apart blood clots (thrombolytics).  · If you are given blood thinners, it is important to take the medicine every day at the same time each day.  · Understand what foods and drugs interact with any medicines that you are taking.  · If you have signs of PE or DVT, call your local emergency services (911 in  the [place]  This information is not intended to replace advice given to you by your health care provider. Make sure you discuss any questions you have with your health care provider.  Document Released: 12/15/2001 Document Revised: 09/25/2019 Document Reviewed: 09/25/2019  Elsevier Patient Education © 2020 [employer]      Depression / Suicide Risk    As you are discharged from this [facility] facility, it is important to learn how to keep safe from harming yourself.    Recognize the warning signs:  · Abrupt changes in personality, positive or negative- including increase in energy   · Giving away possessions  · Change in eating patterns- significant weight changes-  positive or negative  · Change in sleeping patterns- unable to sleep or sleeping all the time   · Unwillingness or inability to communicate  · Depression  · Unusual sadness, discouragement and loneliness  · Talk of wanting to die  · Neglect of personal appearance   · Rebelliousness- reckless behavior  · Withdrawal from people/activities they love  · Confusion- inability to concentrate     If you or a loved one observes any of these behaviors or has concerns about self-harm, here's what you can do:  · Talk about it- your feelings and reasons for harming yourself  · Remove any means that you might use to hurt yourself (examples: pills, rope, extension cords, firearm)  · Get professional help from the community (Mental Health, Substance Abuse, psychological counseling)  · Do not be alone:Call your Safe Contact- someone whom you trust who will be there for you.  · Call your local CRISIS HOTLINE 245-9047 or 535-110-0273  · Call your local Children's Mobile Crisis Response Team [place] (366) 480-5623 or www.NetHooks  · Call the toll free National Suicide Prevention Hotlines   · National Suicide Prevention Lifeline 033-558-FUAU (4316)  · National Hope Line Network 800-SUICIDE (024-4118)

## 2021-08-19 NOTE — PROGRESS NOTES
D/C'd with home oxygen and [facility].  Discharge instructions provided to pt and pt's daughters at bedside.  Pt states understanding.  Pt states all questions have been answered.  Copy of discharge provided to pt.  Signed copy in chart.   Pt states that all personal belongings are in possession.  Pt escorted off unit with assistance from CNA via wheelchair without incident.

## 2021-08-23 ENCOUNTER — TELEPHONE (OUTPATIENT)
Dept: MEDICAL GROUP | Facility: LAB | Age: 71
End: 2021-08-23

## 2021-08-23 DIAGNOSIS — C54.1 ENDOMETRIAL CANCER (HCC): ICD-10-CM

## 2021-08-23 NOTE — TELEPHONE ENCOUNTER
----- Message from Jairo Rivas sent at 8/23/2021  9:29 AM PDT -----  Regarding: Authorization  Good Morning,    Yes, Laura Bernard and LLOYD Longo.Thank you.

## 2021-08-25 LAB
FUNGUS SPEC CULT: NORMAL
FUNGUS SPEC FUNGUS STN: NORMAL
SIGNIFICANT IND 70042: NORMAL
SITE SITE: NORMAL
SOURCE SOURCE: NORMAL

## 2021-08-28 NOTE — DOCUMENTATION QUERY
[facility]                                                                       Query Response Note      PATIENT:               FABI BEASLEY  ACCT #:                  [id number]  MRN:                     [id number]  :                      1950  ADMIT DATE:       2021 6:00 AM  DISCH DATE:        2021 7:06 PM  RESPONDING  PROVIDER #:        [id number]           QUERY TEXT:    Anemia is documented in the Medical Record. Please specify the underlying cause of the anemia.    NOTE:  If the appropriate response is not listed below, please respond with a new note.              The patient's Clinical Indicators include:  Clinical Indicators:   * Patient with metastatic lung cancer   *  Per consult  patient with Hemothorax on presumed malignant effusion   * Iron studies show concern for early anemia of chronic disease, but this would be unlikely to contribute to such quick decline in      hemoglobin.  *  Per consult with gyn oncology, Anemia: stable, s/p 2 units ordered today, down trending since admission, likely secondary to above: Endometrial cancer: stage IIIB,    Treatment / Monitoring:   * Follow up CT on 8/3 showed worsening pleural effusion with compressive atelectasis and increased heterogeneity across the effusion concerning for interval hemorrhage in the pleural space likely iatrogenic from the thoracentesis   * Transfusion RBC     Risk Factors:   * Patient with Acute respiratory failure with hypoxia   * Patient with PE & DVT    Respectfully submitted by,    [email or web address]  Options provided:   -- Blood loss anemia, acute   -- Blood loss anemia, chronic   -- Chronic anemia   -- Due to/in/with antineoplastic chemotherapy   -- Due to/in/with neoplastic disease   -- Iron deficiency anemia   -- Macrocytic anemia   -- Microcytic anemia   -- Normocytic anemia   -- Unable to determine      Query  created by: Latasha Lindsey on 8/25/2021 12:31 PM    RESPONSE TEXT:    Unable to determine          Electronically signed by:  EMERITA LAGUNAS MD 8/28/2021 8:14 AM

## 2021-09-08 ENCOUNTER — TELEPHONE (OUTPATIENT)
Dept: MEDICAL GROUP | Facility: LAB | Age: 71
End: 2021-09-08

## 2021-09-08 NOTE — TELEPHONE ENCOUNTER
ESTABLISHED PATIENT PRE-VISIT PLANNING     Patient was NOT contacted to complete PVP.     Note: Patient will not be contacted if there is no indication to call.     1.  Reviewed notes from the last few office visits within the medical group: Yes    2.  If any orders were placed at last visit or intended to be done for this visit (i.e. 6 mos follow-up), do we have Results/Consult Notes?         •  Labs - Labs were not ordered at last office visit.  Note: If patient appointment is for lab review and patient did not complete labs, check with provider if OK to reschedule patient until labs completed.       •  Imaging - Imaging was not ordered at last office visit.       •  Referrals - Referral ordered, patient was seen and consult notes are in chart. Care Teams updated  NO.    3. Is this appointment scheduled as a Hospital Follow-Up? Yes, visit was at [facility].     4.  Immunizations were updated in Epic using Reconcile Outside Information activity? Yes    5.  Patient is due for the following Health Maintenance Topics:   Health Maintenance Due   Topic Date Due   • HEPATITIS C SCREENING  Never done   • COVID-19 Vaccine (1) Never done   • IMM DTaP/Tdap/Td Vaccine (1 - Tdap) Never done   • IMM ZOSTER VACCINES (1 of 2) Never done   • IMM PNEUMOCOCCAL VACCINE: 65+ Years (1 of 1 - PPSV23) Never done   • COLORECTAL CANCER SCREENING  03/20/2019   • Annual Wellness Visit  12/05/2019   • IMM INFLUENZA (1) Never done   • MAMMOGRAM  09/09/2021     6.  AHA (Pulse8) form printed for Provider? Yes

## 2021-09-14 LAB
MYCOBACTERIUM SPEC CULT: NORMAL
RHODAMINE-AURAMINE STN SPEC: NORMAL
SIGNIFICANT IND 70042: NORMAL
SITE SITE: NORMAL
SOURCE SOURCE: NORMAL

## 2021-11-20 DIAGNOSIS — I10 HYPERTENSION, UNCONTROLLED: ICD-10-CM

## 2021-11-22 RX ORDER — METOPROLOL SUCCINATE 100 MG/1
100 TABLET, EXTENDED RELEASE ORAL DAILY
Qty: 90 TABLET | Refills: 3 | Status: SHIPPED | OUTPATIENT
Start: 2021-11-22 | End: 2022-02-22

## 2022-02-19 DIAGNOSIS — I10 HYPERTENSION, UNCONTROLLED: ICD-10-CM

## 2022-02-22 RX ORDER — METOPROLOL SUCCINATE 100 MG/1
TABLET, EXTENDED RELEASE ORAL
Qty: 90 TABLET | Refills: 0 | Status: SHIPPED | OUTPATIENT
Start: 2022-02-22 | End: 2022-05-31

## 2022-02-22 NOTE — TELEPHONE ENCOUNTER
Received request via: Pharmacy  5/19/2021lov  Was the patient seen in the last year in this department? Yes    Does the patient have an active prescription (recently filled or refills available) for medication(s) requested? No

## 2022-05-29 DIAGNOSIS — I10 HYPERTENSION, UNCONTROLLED: ICD-10-CM

## 2022-05-31 RX ORDER — METOPROLOL SUCCINATE 100 MG/1
TABLET, EXTENDED RELEASE ORAL
Qty: 90 TABLET | Refills: 0 | Status: SHIPPED | OUTPATIENT
Start: 2022-05-31 | End: 2022-08-16 | Stop reason: SDUPTHER

## 2022-05-31 NOTE — TELEPHONE ENCOUNTER
Received request via: Pharmacy  5/19/2021LOV  Was the patient seen in the last year in this department? No    Does the patient have an active prescription (recently filled or refills available) for medication(s) requested? No

## 2022-08-16 DIAGNOSIS — I10 HYPERTENSION, UNCONTROLLED: ICD-10-CM

## 2022-08-16 RX ORDER — METOPROLOL SUCCINATE 100 MG/1
100 TABLET, EXTENDED RELEASE ORAL DAILY
Qty: 90 TABLET | Refills: 0 | Status: SHIPPED | OUTPATIENT
Start: 2022-08-16 | End: 2022-11-08

## 2022-08-16 NOTE — TELEPHONE ENCOUNTER
Received request via: Patient    Was the patient seen in the last year in this department? No  LOV 5/19/2021    Does the patient have an active prescription (recently filled or refills available) for medication(s) requested? No

## 2022-08-24 NOTE — ASSESSMENT & PLAN NOTE
LM for pt to return call.     Pt is needing a NP appt due to heart palpitations This is a new problem to me. This has been uncontrolled. This patient went into menopause in 2010 around the time of her stroke and she started to have erratic vaginal bleeding starting in 2012. She continued to have irregular continuous bleeding and she was evaluated by a gynecologist in [place]. She stated that there was a plan for a D&C but she ended up moving to [place]. She'll like to establish with a local gynecologist. She also has iron deficiency anemia secondary to that.

## 2022-09-26 RX ORDER — LOSARTAN POTASSIUM 100 MG/1
TABLET ORAL
Qty: 100 TABLET | Refills: 1 | Status: SHIPPED | OUTPATIENT
Start: 2022-09-26 | End: 2023-05-01

## 2022-09-26 NOTE — TELEPHONE ENCOUNTER
Received request via: Pharmacy    Was the patient seen in the last year in this department? No PT informed to make FV  5/19/21  Does the patient have an active prescription (recently filled or refills available) for medication(s) requested? No

## 2022-11-08 DIAGNOSIS — I10 HYPERTENSION, UNCONTROLLED: ICD-10-CM

## 2022-11-08 RX ORDER — METOPROLOL SUCCINATE 100 MG/1
TABLET, EXTENDED RELEASE ORAL
Qty: 90 TABLET | Refills: 0 | Status: SHIPPED | OUTPATIENT
Start: 2022-11-08 | End: 2023-08-24

## 2022-11-08 RX ORDER — METOPROLOL SUCCINATE 100 MG/1
100 TABLET, EXTENDED RELEASE ORAL DAILY
Qty: 90 TABLET | Refills: 0 | Status: SHIPPED | OUTPATIENT
Start: 2022-11-08 | End: 2023-01-18

## 2022-11-08 RX ORDER — ATORVASTATIN CALCIUM 20 MG/1
20 TABLET, FILM COATED ORAL EVERY EVENING
Qty: 100 TABLET | Refills: 0 | Status: SHIPPED | OUTPATIENT
Start: 2022-11-08 | End: 2023-08-24 | Stop reason: SDUPTHER

## 2022-11-08 NOTE — TELEPHONE ENCOUNTER
Received request via: Pharmacy    Was the patient seen in the last year in this department? No  5/19/21  Does the patient have an active prescription (recently filled or refills available) for medication(s) requested? No

## 2023-01-18 DIAGNOSIS — I10 HYPERTENSION, UNCONTROLLED: ICD-10-CM

## 2023-01-18 RX ORDER — METOPROLOL SUCCINATE 100 MG/1
TABLET, EXTENDED RELEASE ORAL
Qty: 90 TABLET | Refills: 0 | Status: SHIPPED | OUTPATIENT
Start: 2023-01-18 | End: 2023-05-03

## 2023-01-24 NOTE — PATIENT INSTRUCTIONS
[facility] [address] [facility]  [address]   [place] [address]  Phone: [phone]   
24-Jan-2023 18:14

## 2023-05-01 RX ORDER — LOSARTAN POTASSIUM 100 MG/1
TABLET ORAL
Qty: 100 TABLET | Refills: 0 | Status: SHIPPED | OUTPATIENT
Start: 2023-05-01 | End: 2023-07-27

## 2023-05-01 NOTE — TELEPHONE ENCOUNTER
Received request via: Pharmacy    Was the patient seen in the last year in this department? No-5/19/21    Does the patient have an active prescription (recently filled or refills available) for medication(s) requested? No    Does the patient have prison Plus and need 100 day supply (blood pressure, diabetes and cholesterol meds only)? Patient does not have SCP

## 2023-05-03 DIAGNOSIS — I10 HYPERTENSION, UNCONTROLLED: ICD-10-CM

## 2023-05-03 RX ORDER — METOPROLOL SUCCINATE 100 MG/1
TABLET, EXTENDED RELEASE ORAL
Qty: 90 TABLET | Refills: 0 | Status: SHIPPED | OUTPATIENT
Start: 2023-05-03 | End: 2023-07-21 | Stop reason: SDUPTHER

## 2023-05-03 RX ORDER — METOPROLOL SUCCINATE 100 MG/1
100 TABLET, EXTENDED RELEASE ORAL DAILY
Qty: 90 TABLET | Refills: 0 | Status: CANCELLED | OUTPATIENT
Start: 2023-05-03

## 2023-07-21 DIAGNOSIS — I10 HYPERTENSION, UNCONTROLLED: ICD-10-CM

## 2023-07-21 NOTE — TELEPHONE ENCOUNTER
Received request via: Patient    Was the patient seen in the last year in this department? No  LOV: 5/19/2021  Does the patient have an active prescription (recently filled or refills available) for medication(s) requested? No    Does the patient have USP Plus and need 100 day supply (blood pressure, diabetes and cholesterol meds only)? Patient does not have SCP

## 2023-07-24 RX ORDER — METOPROLOL SUCCINATE 100 MG/1
100 TABLET, EXTENDED RELEASE ORAL DAILY
Qty: 90 TABLET | Refills: 0 | Status: SHIPPED | OUTPATIENT
Start: 2023-07-24 | End: 2023-08-24 | Stop reason: SDUPTHER

## 2023-07-24 NOTE — TELEPHONE ENCOUNTER
Patient has new PCP.  Please call pharmacy that is requesting prescriptions and have them reroute to her new PCP.  I am pretty sure she lives in [facility].

## 2023-07-27 RX ORDER — LOSARTAN POTASSIUM 100 MG/1
TABLET ORAL
Qty: 100 TABLET | Refills: 0 | Status: SHIPPED | OUTPATIENT
Start: 2023-07-27 | End: 2023-08-24 | Stop reason: SDUPTHER

## 2023-07-27 NOTE — TELEPHONE ENCOUNTER
Received request via: Pharmacy    Was the patient seen in the last year in this department? No  LOV: 5/19/2021  Does the patient have an active prescription (recently filled or refills available) for medication(s) requested? No    Does the patient have MCFP Plus and need 100 day supply (blood pressure, diabetes and cholesterol meds only)? Patient does not have SCP

## 2023-08-02 ENCOUNTER — APPOINTMENT (OUTPATIENT)
Dept: MEDICAL GROUP | Facility: LAB | Age: 73
End: 2023-08-02
Payer: COMMERCIAL

## 2023-08-14 ENCOUNTER — APPOINTMENT (OUTPATIENT)
Dept: MEDICAL GROUP | Facility: LAB | Age: 73
End: 2023-08-14
Payer: COMMERCIAL

## 2023-08-24 ENCOUNTER — OFFICE VISIT (OUTPATIENT)
Dept: MEDICAL GROUP | Facility: LAB | Age: 73
End: 2023-08-24
Payer: COMMERCIAL

## 2023-08-24 ENCOUNTER — HOSPITAL ENCOUNTER (OUTPATIENT)
Dept: LAB | Facility: MEDICAL CENTER | Age: 73
End: 2023-08-24
Attending: NURSE PRACTITIONER
Payer: COMMERCIAL

## 2023-08-24 VITALS
OXYGEN SATURATION: 97 % | WEIGHT: 184 LBS | HEIGHT: 64 IN | DIASTOLIC BLOOD PRESSURE: 88 MMHG | HEART RATE: 90 BPM | TEMPERATURE: 97.1 F | SYSTOLIC BLOOD PRESSURE: 154 MMHG | BODY MASS INDEX: 31.41 KG/M2 | RESPIRATION RATE: 16 BRPM

## 2023-08-24 DIAGNOSIS — R44.3 HALLUCINATIONS: ICD-10-CM

## 2023-08-24 DIAGNOSIS — M79.604 BILATERAL LEG PAIN: ICD-10-CM

## 2023-08-24 DIAGNOSIS — G89.29 CHRONIC BILATERAL LOW BACK PAIN WITH BILATERAL SCIATICA: ICD-10-CM

## 2023-08-24 DIAGNOSIS — H53.9 VISION ABNORMALITIES: ICD-10-CM

## 2023-08-24 DIAGNOSIS — M54.41 CHRONIC BILATERAL LOW BACK PAIN WITH BILATERAL SCIATICA: ICD-10-CM

## 2023-08-24 DIAGNOSIS — R42 DIZZINESS: ICD-10-CM

## 2023-08-24 DIAGNOSIS — M54.42 CHRONIC BILATERAL LOW BACK PAIN WITH BILATERAL SCIATICA: ICD-10-CM

## 2023-08-24 DIAGNOSIS — G62.9 NEUROPATHY: ICD-10-CM

## 2023-08-24 DIAGNOSIS — I10 HYPERTENSION, UNCONTROLLED: ICD-10-CM

## 2023-08-24 DIAGNOSIS — C54.1 ENDOMETRIAL CANCER (HCC): ICD-10-CM

## 2023-08-24 DIAGNOSIS — R73.01 IMPAIRED FASTING GLUCOSE: ICD-10-CM

## 2023-08-24 DIAGNOSIS — I10 ESSENTIAL HYPERTENSION: ICD-10-CM

## 2023-08-24 DIAGNOSIS — Z79.891 USE OF OPIATES FOR THERAPEUTIC PURPOSES: ICD-10-CM

## 2023-08-24 DIAGNOSIS — B35.1 ONYCHOMYCOSIS: ICD-10-CM

## 2023-08-24 DIAGNOSIS — G47.9 SLEEP DISTURBANCE: ICD-10-CM

## 2023-08-24 DIAGNOSIS — M79.605 BILATERAL LEG PAIN: ICD-10-CM

## 2023-08-24 DIAGNOSIS — C54.1 MALIGNANT NEOPLASM OF ENDOMETRIUM METASTATIC TO LUNG (HCC): ICD-10-CM

## 2023-08-24 DIAGNOSIS — E55.9 VITAMIN D DEFICIENCY: ICD-10-CM

## 2023-08-24 DIAGNOSIS — C79.31 MALIGNANT NEOPLASM METASTATIC TO BRAIN (HCC): ICD-10-CM

## 2023-08-24 DIAGNOSIS — C78.00 MALIGNANT NEOPLASM OF ENDOMETRIUM METASTATIC TO LUNG (HCC): ICD-10-CM

## 2023-08-24 PROBLEM — Z51.11 ADMISSION FOR CHEMOTHERAPY: Status: RESOLVED | Noted: 2019-09-17 | Resolved: 2023-08-24

## 2023-08-24 PROBLEM — B37.0 ORAL THRUSH: Status: RESOLVED | Noted: 2021-08-18 | Resolved: 2023-08-24

## 2023-08-24 PROBLEM — E83.42 HYPOMAGNESEMIA: Status: RESOLVED | Noted: 2021-08-17 | Resolved: 2023-08-24

## 2023-08-24 PROBLEM — R30.0 DYSURIA: Status: RESOLVED | Noted: 2021-08-18 | Resolved: 2023-08-24

## 2023-08-24 PROBLEM — R11.0 NAUSEA: Status: RESOLVED | Noted: 2019-10-02 | Resolved: 2023-08-24

## 2023-08-24 PROBLEM — J96.01 ACUTE RESPIRATORY FAILURE WITH HYPOXIA (HCC): Status: RESOLVED | Noted: 2021-08-17 | Resolved: 2023-08-24

## 2023-08-24 PROBLEM — N17.9 ACUTE KIDNEY INJURY (HCC): Status: RESOLVED | Noted: 2021-08-02 | Resolved: 2023-08-24

## 2023-08-24 PROBLEM — R07.89 OTHER CHEST PAIN: Status: RESOLVED | Noted: 2020-01-17 | Resolved: 2023-08-24

## 2023-08-24 PROBLEM — E87.6 HYPOKALEMIA: Status: RESOLVED | Noted: 2018-10-02 | Resolved: 2023-08-24

## 2023-08-24 PROBLEM — R91.8 MULTIPLE LUNG NODULES: Status: RESOLVED | Noted: 2020-08-17 | Resolved: 2023-08-24

## 2023-08-24 LAB
25(OH)D3 SERPL-MCNC: 30 NG/ML (ref 30–100)
ALBUMIN SERPL BCP-MCNC: 4.1 G/DL (ref 3.2–4.9)
ALBUMIN/GLOB SERPL: 1.3 G/DL
ALP SERPL-CCNC: 95 U/L (ref 30–99)
ALT SERPL-CCNC: 12 U/L (ref 2–50)
ANION GAP SERPL CALC-SCNC: 10 MMOL/L (ref 7–16)
AST SERPL-CCNC: 16 U/L (ref 12–45)
BASOPHILS # BLD AUTO: 0.5 % (ref 0–1.8)
BASOPHILS # BLD: 0.02 K/UL (ref 0–0.12)
BILIRUB SERPL-MCNC: 0.4 MG/DL (ref 0.1–1.5)
BUN SERPL-MCNC: 13 MG/DL (ref 8–22)
CALCIUM ALBUM COR SERPL-MCNC: 10 MG/DL (ref 8.5–10.5)
CALCIUM SERPL-MCNC: 10.1 MG/DL (ref 8.5–10.5)
CHLORIDE SERPL-SCNC: 104 MMOL/L (ref 96–112)
CO2 SERPL-SCNC: 26 MMOL/L (ref 20–33)
CREAT SERPL-MCNC: 0.74 MG/DL (ref 0.5–1.4)
EOSINOPHIL # BLD AUTO: 0.04 K/UL (ref 0–0.51)
EOSINOPHIL NFR BLD: 1 % (ref 0–6.9)
ERYTHROCYTE [DISTWIDTH] IN BLOOD BY AUTOMATED COUNT: 46.8 FL (ref 35.9–50)
EST. AVERAGE GLUCOSE BLD GHB EST-MCNC: 117 MG/DL
GFR SERPLBLD CREATININE-BSD FMLA CKD-EPI: 86 ML/MIN/1.73 M 2
GLOBULIN SER CALC-MCNC: 3.2 G/DL (ref 1.9–3.5)
GLUCOSE SERPL-MCNC: 80 MG/DL (ref 65–99)
HBA1C MFR BLD: 5.7 % (ref 4–5.6)
HCT VFR BLD AUTO: 41.4 % (ref 37–47)
HGB BLD-MCNC: 13.3 G/DL (ref 12–16)
IMM GRANULOCYTES # BLD AUTO: 0.03 K/UL (ref 0–0.11)
IMM GRANULOCYTES NFR BLD AUTO: 0.7 % (ref 0–0.9)
LYMPHOCYTES # BLD AUTO: 1.31 K/UL (ref 1–4.8)
LYMPHOCYTES NFR BLD: 32.6 % (ref 22–41)
MCH RBC QN AUTO: 31.4 PG (ref 27–33)
MCHC RBC AUTO-ENTMCNC: 32.1 G/DL (ref 32.2–35.5)
MCV RBC AUTO: 97.9 FL (ref 81.4–97.8)
MONOCYTES # BLD AUTO: 0.38 K/UL (ref 0–0.85)
MONOCYTES NFR BLD AUTO: 9.5 % (ref 0–13.4)
NEUTROPHILS # BLD AUTO: 2.24 K/UL (ref 1.82–7.42)
NEUTROPHILS NFR BLD: 55.7 % (ref 44–72)
NRBC # BLD AUTO: 0 K/UL
NRBC BLD-RTO: 0 /100 WBC (ref 0–0.2)
PLATELET # BLD AUTO: 241 K/UL (ref 164–446)
PMV BLD AUTO: 10.3 FL (ref 9–12.9)
POTASSIUM SERPL-SCNC: 3.6 MMOL/L (ref 3.6–5.5)
PROT SERPL-MCNC: 7.3 G/DL (ref 6–8.2)
RBC # BLD AUTO: 4.23 M/UL (ref 4.2–5.4)
SODIUM SERPL-SCNC: 140 MMOL/L (ref 135–145)
TSH SERPL DL<=0.005 MIU/L-ACNC: 1.22 UIU/ML (ref 0.38–5.33)
WBC # BLD AUTO: 4 K/UL (ref 4.8–10.8)

## 2023-08-24 PROCEDURE — 84443 ASSAY THYROID STIM HORMONE: CPT

## 2023-08-24 PROCEDURE — 99215 OFFICE O/P EST HI 40 MIN: CPT | Performed by: NURSE PRACTITIONER

## 2023-08-24 PROCEDURE — 82306 VITAMIN D 25 HYDROXY: CPT

## 2023-08-24 PROCEDURE — 80053 COMPREHEN METABOLIC PANEL: CPT

## 2023-08-24 PROCEDURE — 83036 HEMOGLOBIN GLYCOSYLATED A1C: CPT

## 2023-08-24 PROCEDURE — 3077F SYST BP >= 140 MM HG: CPT | Performed by: NURSE PRACTITIONER

## 2023-08-24 PROCEDURE — 3079F DIAST BP 80-89 MM HG: CPT | Performed by: NURSE PRACTITIONER

## 2023-08-24 PROCEDURE — 36415 COLL VENOUS BLD VENIPUNCTURE: CPT

## 2023-08-24 PROCEDURE — 85025 COMPLETE CBC W/AUTO DIFF WBC: CPT

## 2023-08-24 RX ORDER — ATORVASTATIN CALCIUM 20 MG/1
20 TABLET, FILM COATED ORAL EVERY EVENING
Qty: 100 TABLET | Refills: 3 | Status: ON HOLD | OUTPATIENT
Start: 2023-08-24

## 2023-08-24 RX ORDER — QUETIAPINE FUMARATE 25 MG/1
TABLET, FILM COATED ORAL
Qty: 450 TABLET | Refills: 1 | Status: SHIPPED | OUTPATIENT
Start: 2023-08-24 | End: 2024-01-11

## 2023-08-24 RX ORDER — ASPIRIN 81 MG/1
81 TABLET ORAL DAILY
Status: ON HOLD | COMMUNITY

## 2023-08-24 RX ORDER — BACLOFEN 10 MG/1
1 TABLET ORAL 3 TIMES DAILY
COMMUNITY
Start: 2023-07-27 | End: 2023-08-24 | Stop reason: SDUPTHER

## 2023-08-24 RX ORDER — BACLOFEN 10 MG/1
10 TABLET ORAL 3 TIMES DAILY
Qty: 270 TABLET | Refills: 3 | Status: SHIPPED | OUTPATIENT
Start: 2023-08-24 | End: 2023-12-21 | Stop reason: SDUPTHER

## 2023-08-24 RX ORDER — METOPROLOL SUCCINATE 100 MG/1
100 TABLET, EXTENDED RELEASE ORAL DAILY
Qty: 90 TABLET | Refills: 3 | Status: SHIPPED | OUTPATIENT
Start: 2023-08-24 | End: 2023-11-13 | Stop reason: SDUPTHER

## 2023-08-24 RX ORDER — HYDROCODONE BITARTRATE AND ACETAMINOPHEN 5; 325 MG/1; MG/1
1 TABLET ORAL EVERY 12 HOURS PRN
Qty: 60 TABLET | Refills: 0 | Status: SHIPPED | OUTPATIENT
Start: 2023-08-24 | End: 2023-09-23

## 2023-08-24 RX ORDER — QUETIAPINE FUMARATE 25 MG/1
50 TABLET, FILM COATED ORAL
COMMUNITY
Start: 2023-03-27 | End: 2023-08-24 | Stop reason: SDUPTHER

## 2023-08-24 RX ORDER — MECLIZINE HCL 12.5 MG/1
12.5-25 TABLET ORAL 3 TIMES DAILY PRN
Qty: 90 TABLET | Refills: 3 | Status: SHIPPED | OUTPATIENT
Start: 2023-08-24 | End: 2024-03-27

## 2023-08-24 RX ORDER — AMITRIPTYLINE HYDROCHLORIDE 50 MG/1
50 TABLET, FILM COATED ORAL NIGHTLY
Qty: 90 TABLET | Refills: 2 | Status: SHIPPED | OUTPATIENT
Start: 2023-08-24 | End: 2024-01-11

## 2023-08-24 RX ORDER — DONEPEZIL HYDROCHLORIDE 5 MG/1
5 TABLET, FILM COATED ORAL
COMMUNITY
Start: 2023-06-26 | End: 2024-01-11

## 2023-08-24 RX ORDER — LOSARTAN POTASSIUM 100 MG/1
100 TABLET ORAL
Qty: 100 TABLET | Refills: 2 | Status: SHIPPED | OUTPATIENT
Start: 2023-08-24 | End: 2023-08-30

## 2023-08-24 ASSESSMENT — PATIENT HEALTH QUESTIONNAIRE - PHQ9: CLINICAL INTERPRETATION OF PHQ2 SCORE: 0

## 2023-08-24 NOTE — PROGRESS NOTES
"CC  F/u    HPI:  Jairo is a 72-year-old established female here with her daughter.  I have not seen her for a few years as she moved to live with her other daughter in [facility] in order to receive treatment for metastatic uterine cancer.  Returned 8/6/2023 and living with daughter Carolyn.  Was told that she is in remission, per her daughter although in reviewing her chart it appears that she has persistent mets to her lungs and that she has met with neuropalliative care.    Her daughter tells me that she may have a new dx of Lewy body dementia - newly on donepezil 5 mg -met with neurology yesterday virtually as her neurologist is in [facility].  Was taking norco for pain but is out of these. Has pain in her back.     Taking seroquel for sleep.  Not sleeping well.    Struggling with hallucinations, particularly at night and her daughter tells me that neurology is hoping the addition of donepezil for now will help and may add on olanzapine if needed.  She does have chronic pain from neuropathy in her legs and is currently off of amitriptyline as well as out of hydrocodone.  She tells me that she has a lot of pain in her back.  Her daughter from [facility] is on the phone with her daughter who is in the room with her today stating that she was previously taking anywhere from 0-2 hydrocodone per day depending on her pain level.  She did try stopping hydrocodone and this did not improve hallucinations.    Patient's daughter and the patient tell me that she is eating well.  She still suffers from vertigo and needs a refill of meclizine.  Patient and her daughter tell me that her bowels are moving well, she is not vomiting and that she is not having any troubles with urination.        Exam:   BP (!) 154/88 (BP Location: Left arm, Patient Position: Sitting, BP Cuff Size: Large adult)   Pulse 90   Temp 36.2 °C (97.1 °F)   Resp 16   Ht 1.626 m (5' 4\")   Wt 83.5 kg (184 lb)   SpO2 97%   " "BMI 31.58 kg/m²     Gen. appears healthy in no distress   Skin appropriate for sex and age   Neck trachea is midline  Lungs unlabored breathing  Heart regular rate  Neuro she is very pleasant in the room but easily irritated with her daughter when discussing certain medications or seeing oncology here in [place].  Psych appropriate, calm, interactive, well-groomed      Assessment / Plan:  \"  1. Essential hypertension  Comp Metabolic Panel    CBC WITH DIFFERENTIAL    TSH WITH REFLEX TO FT4    atorvastatin (LIPITOR) 20 MG Tab    losartan (COZAAR) 100 MG Tab    metoprolol SR (TOPROL XL) 100 MG TABLET SR 24 HR      2. Impaired fasting glucose  HEMOGLOBIN A1C    TSH WITH REFLEX TO FT4      3. Vitamin D deficiency  VITAMIN D,25 HYDROXY (DEFICIENCY)      4. Onychomycosis  Referral to Podiatry      5. Hypertension, uncontrolled  metoprolol SR (TOPROL XL) 100 MG TABLET SR 24 HR      6. Bilateral leg pain  amitriptyline (ELAVIL) 50 MG Tab      7. Malignant neoplasm of endometrium metastatic to lung (HCC)        8. Chronic bilateral low back pain with bilateral sciatica  baclofen (LIORESAL) 10 MG Tab    HYDROcodone-acetaminophen (NORCO) 5-325 MG Tab per tablet      9. Dizziness  meclizine (ANTIVERT) 12.5 MG Tab      10. Sleep disturbance  QUEtiapine (SEROQUEL) 25 MG Tab      11. Use of opiates for therapeutic purposes  Consent for Opiate Prescription      12. Malignant neoplasm metastatic to brain (HCC)        13. Neuropathy        14. Vision abnormalities        \"  Reviewed all medications as requested by patient's daughter today.  I did renew her Norco, reviewed  profile, updated controlled substance agreement.  Will update UDS if she stays on Norco.  Renewed amitriptyline.  Tolerating donepezil.  Not currently taking olanzapine.  She has chronically been on Seroquel and her daughter plans on following up with neurology via Zoom regarding patient's hallucinations.  I did review care everywhere with the patient's daughter in " the room including notes from Dr. Robles from 1 year ago, Dr. Sanchez from July and imaging from May.  It seems that patient and her daughters are debating in terms of care here versus [facility] at this time and they will let me know if I can be of any assistance.  I did put in a referral to podiatry as patient's right great toenail is thickened and loose/causing her discomfort but does not appear infected although it appears this nail may need to be removed.    Encouraged patient's daughter to let me know what I can do to help them in addition to the multiple specialist that they are followed by in [facility].    Total face to face time 40 minutes of which over 50% of this visit is spent in counseling, education and outlining a plan of treatment and coordination of care for the above conditions. This included but was not limited to discussion of medication options and potential risks related to the medications, referral and specialty care options. All patient questions were answered

## 2023-08-25 ENCOUNTER — TELEPHONE (OUTPATIENT)
Dept: MEDICAL GROUP | Facility: LAB | Age: 73
End: 2023-08-25
Payer: COMMERCIAL

## 2023-08-25 ENCOUNTER — TELEPHONE (OUTPATIENT)
Dept: MEDICAL GROUP | Facility: LAB | Age: 73
End: 2023-08-25

## 2023-08-29 DIAGNOSIS — I10 ESSENTIAL HYPERTENSION: ICD-10-CM

## 2023-08-29 NOTE — TELEPHONE ENCOUNTER
DOCUMENTATION OF PAR STATUS:    1. Name of Medication & Dose: NORCO     2. Name of Prescription Coverage Company & phone #: C[employer] MY [employer]S [id number]    3. Date Prior Auth Submitted: 8/28/23    4. What information was given to obtain insurance decision? OV NOTES FAXED    5. Prior Auth Status? approvedPending    6. Patient Notified: N\A

## 2023-08-29 NOTE — TELEPHONE ENCOUNTER
DOCUMENTATION OF PAR STATUS:    1. Name of Medication & Dose:  amitriptyline (ELAVIL) 50 MG Tab       2. Name of Prescription Coverage Company & phone #: cover my meds/  Key: [id number] -  3. Date Prior Auth Submitted: 8/28/23    4. What information was given to obtain insurance decision? APPROVED

## 2023-08-30 RX ORDER — LOSARTAN POTASSIUM 100 MG/1
100 TABLET ORAL
Qty: 100 TABLET | Refills: 2 | Status: SHIPPED | OUTPATIENT
Start: 2023-08-30 | End: 2024-03-27

## 2023-09-29 NOTE — TELEPHONE ENCOUNTER
FINAL PRIOR AUTHORIZATION STATUS:    1.  Name of Medication & Dose:  HYDROcodone-acetaminophen (NORCO) 5-325 MG Tab per tablet    2. Prior Auth Status: Approved through 09/28/2023     3. Action Taken: Pharmacy Notified: yes Patient Notified: no

## 2023-11-13 DIAGNOSIS — I10 ESSENTIAL HYPERTENSION: ICD-10-CM

## 2023-11-13 DIAGNOSIS — I10 HYPERTENSION, UNCONTROLLED: ICD-10-CM

## 2023-11-13 RX ORDER — METOPROLOL SUCCINATE 100 MG/1
100 TABLET, EXTENDED RELEASE ORAL DAILY
Qty: 90 TABLET | Refills: 3 | Status: SHIPPED | OUTPATIENT
Start: 2023-11-13 | End: 2024-02-01 | Stop reason: SDUPTHER

## 2023-12-21 DIAGNOSIS — M54.42 CHRONIC BILATERAL LOW BACK PAIN WITH BILATERAL SCIATICA: ICD-10-CM

## 2023-12-21 DIAGNOSIS — M54.41 CHRONIC BILATERAL LOW BACK PAIN WITH BILATERAL SCIATICA: ICD-10-CM

## 2023-12-21 DIAGNOSIS — G89.29 CHRONIC BILATERAL LOW BACK PAIN WITH BILATERAL SCIATICA: ICD-10-CM

## 2023-12-22 RX ORDER — BACLOFEN 10 MG/1
10 TABLET ORAL 3 TIMES DAILY
Qty: 270 TABLET | Refills: 3 | Status: SHIPPED | OUTPATIENT
Start: 2023-12-22 | End: 2024-01-11

## 2024-01-01 ENCOUNTER — HOME CARE VISIT (OUTPATIENT)
Dept: HOSPICE | Facility: HOSPICE | Age: 74
End: 2024-01-01
Payer: MEDICARE

## 2024-01-01 ENCOUNTER — PHARMACY VISIT (OUTPATIENT)
Dept: PHARMACY | Facility: MEDICAL CENTER | Age: 74
End: 2024-01-01
Payer: COMMERCIAL

## 2024-01-01 VITALS — RESPIRATION RATE: 16 BRPM | HEART RATE: 100 BPM

## 2024-01-01 VITALS — RESPIRATION RATE: 17 BRPM | HEART RATE: 100 BPM

## 2024-01-01 VITALS — RESPIRATION RATE: 20 BRPM | HEART RATE: 120 BPM

## 2024-01-01 VITALS — HEART RATE: 116 BPM | RESPIRATION RATE: 28 BRPM

## 2024-01-01 VITALS — HEART RATE: 100 BPM | RESPIRATION RATE: 28 BRPM

## 2024-01-01 VITALS — HEART RATE: 88 BPM | RESPIRATION RATE: 20 BRPM

## 2024-01-01 VITALS — RESPIRATION RATE: 20 BRPM | HEART RATE: 100 BPM | TEMPERATURE: 98 F

## 2024-01-01 VITALS — RESPIRATION RATE: 12 BRPM | HEART RATE: 104 BPM

## 2024-01-01 VITALS — RESPIRATION RATE: 16 BRPM

## 2024-01-01 DIAGNOSIS — C54.1 ENDOMETRIAL SARCOMA (HCC): Primary | ICD-10-CM

## 2024-01-01 DIAGNOSIS — I10 ESSENTIAL HYPERTENSION: ICD-10-CM

## 2024-01-01 PROCEDURE — G0299 HHS/HOSPICE OF RN EA 15 MIN: HCPCS

## 2024-01-01 PROCEDURE — G0155 HHCP-SVS OF CSW,EA 15 MIN: HCPCS

## 2024-01-01 PROCEDURE — RXMED WILLOW AMBULATORY MEDICATION CHARGE: Performed by: STUDENT IN AN ORGANIZED HEALTH CARE EDUCATION/TRAINING PROGRAM

## 2024-01-01 PROCEDURE — RXMED WILLOW AMBULATORY MEDICATION CHARGE: Performed by: REHABILITATION PRACTITIONER

## 2024-01-01 PROCEDURE — G0156 HHCP-SVS OF AIDE,EA 15 MIN: HCPCS

## 2024-01-01 RX ORDER — LORAZEPAM 2 MG/ML
.5-1 CONCENTRATE ORAL
Qty: 360 ML | Refills: 0 | Status: SHIPPED | OUTPATIENT
Start: 2024-01-01 | End: 2024-01-01

## 2024-01-01 RX ORDER — ATORVASTATIN CALCIUM 20 MG/1
20 TABLET, FILM COATED ORAL EVERY EVENING
Qty: 100 TABLET | Refills: 3 | Status: SHIPPED | OUTPATIENT
Start: 2024-01-01 | End: 2024-01-01

## 2024-01-01 RX ORDER — METRONIDAZOLE 250 MG/1
250 TABLET ORAL EVERY 12 HOURS
Qty: 14 TABLET | Refills: 0 | Status: SHIPPED | OUTPATIENT
Start: 2024-01-01 | End: 2024-01-01

## 2024-01-01 RX ORDER — OXYCODONE HYDROCHLORIDE 100 MG/5ML
SOLUTION ORAL
Qty: 360 ML | Refills: 0 | Status: SHIPPED | OUTPATIENT
Start: 2024-01-01 | End: 2024-01-01

## 2024-01-01 RX ORDER — LIDOCAINE 30 MG/G
1 CREAM TOPICAL 2 TIMES DAILY PRN
Qty: 28.35 G | Refills: 23 | Status: SHIPPED | OUTPATIENT
Start: 2024-01-01 | End: 2025-08-08

## 2024-01-01 RX ORDER — AZITHROMYCIN 500 MG/1
500 TABLET, FILM COATED ORAL DAILY
Qty: 7 TABLET | Refills: 0 | Status: SHIPPED | OUTPATIENT
Start: 2024-01-01 | End: 2024-08-13

## 2024-01-01 RX ORDER — LACTULOSE 10 G/15ML
20 SOLUTION ORAL 2 TIMES DAILY PRN
Qty: 237 ML | Refills: 23 | Status: SHIPPED | OUTPATIENT
Start: 2024-01-01 | End: 2025-08-05

## 2024-01-01 RX ORDER — AZITHROMYCIN 100 MG/5ML
POWDER, FOR SUSPENSION ORAL
Qty: 90 ML | Refills: 0 | Status: SHIPPED | OUTPATIENT
Start: 2024-01-01 | End: 2024-01-01

## 2024-01-01 RX ORDER — METRONIDAZOLE 250 MG/1
250 TABLET ORAL EVERY 12 HOURS
Qty: 20 TABLET | Refills: 0 | Status: SHIPPED | OUTPATIENT
Start: 2024-01-01 | End: 2024-08-15

## 2024-01-01 RX ADMIN — OXYCODONE HYDROCHLORIDE 10 MG: 100 SOLUTION ORAL at 15:00

## 2024-01-01 SDOH — ECONOMIC STABILITY: HOUSING INSECURITY: EVIDENCE OF SMOKING MATERIAL: 0

## 2024-01-01 ASSESSMENT — ENCOUNTER SYMPTOMS
DECREASED ORAL INTAKE: 1
DECREASED TO NO URINARY OUTPUT: 1
LIMITED RANGE OF MOTION: 1
LAST BOWEL MOVEMENT: 67054
STOOL FREQUENCY: LESS THAN DAILY
FATIGUES EASILY: 1
INCREASED SLEEPING: 1
DECREASED ORAL INTAKE: 1
DECREASED TO NO URINARY OUTPUT: 1
LAST BOWEL MOVEMENT: 67054
DECREASED ORAL INTAKE: 1
STOOL FREQUENCY: LESS THAN DAILY
INCREASED SLEEPING: 1
LAST BOWEL MOVEMENT: 67053
INCREASED FATIGUE: 1
FATIGUES EASILY: 1
BOWEL PATTERN NORMAL: 1
INCREASED FATIGUE: 1
FATIGUE: 1
INCREASED SLEEPING: 1
INCREASED SLEEPING: 1
LAST BOWEL MOVEMENT: 67061
DECREASED ORAL INTAKE: 1
INCREASED FATIGUE: 1
MUSCLE WEAKNESS: 1
FATIGUES EASILY: 1
DECREASED BLOOD PRESSURE: 1
LAST BOWEL MOVEMENT: 67054
APNEIC BREATHING: 1
DECREASED ORAL INTAKE: 1
INCREASED SLEEPING: 1
LAST BOWEL MOVEMENT: 67054
SLEEP QUALITY: ADEQUATE
INCREASED FATIGUE: 1
DECREASED ORAL INTAKE: 1
DECREASED TO NO URINARY OUTPUT: 1
INCREASED SLEEPING: 1
DECREASED ORAL INTAKE: 1
INCREASED FATIGUE: 1
FATIGUES EASILY: 1
INCREASED SLEEPING: 1
LAST BOWEL MOVEMENT: 67052
DECREASED ORAL INTAKE: 1
APNEIC BREATHING: 1
STOOL FREQUENCY: LESS THAN DAILY
SLEEP QUALITY: ADEQUATE
FATIGUE: 1
STOOL FREQUENCY: LESS THAN DAILY
INCREASED FATIGUE: 1
INCREASED FATIGUE: 1
SLEEP QUALITY: ADEQUATE
APNEIC BREATHING: 1
UNABLE TO COMMUNICATE PAIN: 1
INCREASED FATIGUE: 1
SLEEP QUALITY: ADEQUATE
INCREASED FATIGUE: 1
INCREASED SLEEPING: 1
PERSON REPORTING PAIN: PATIENT
INCREASED SLEEPING: 1
DECREASED ORAL INTAKE: 1
FATIGUES EASILY: 1

## 2024-01-01 ASSESSMENT — PAIN SCALES - PAIN ASSESSMENT IN ADVANCED DEMENTIA (PAINAD)
TOTALSCORE: 2
BODYLANGUAGE: 0 - RELAXED.
TOTALSCORE: 1
NEGVOCALIZATION: 1 - OCCASIONAL MOAN OR GROAN. LOW-LEVEL SPEECH WITH A NEGATIVE OR DISAPPROVING QUALITY.
CONSOLABILITY: 0 - NO NEED TO CONSOLE.
BODYLANGUAGE: 0 - RELAXED.
TOTALSCORE: 2
FACIALEXPRESSION: 0 - SMILING OR INEXPRESSIVE.
NEGVOCALIZATION: 1 - OCCASIONAL MOAN OR GROAN. LOW-LEVEL SPEECH WITH A NEGATIVE OR DISAPPROVING QUALITY.
BODYLANGUAGE: 0 - RELAXED.
CONSOLABILITY: 0 - NO NEED TO CONSOLE.
CONSOLABILITY: 0 - NO NEED TO CONSOLE.
TOTALSCORE: 1
CONSOLABILITY: 0 - NO NEED TO CONSOLE.
NEGVOCALIZATION: 0 - NONE.
TOTALSCORE: 2
CONSOLABILITY: 0 - NO NEED TO CONSOLE.
CONSOLABILITY: 0 - NO NEED TO CONSOLE.
BODYLANGUAGE: 0 - RELAXED.
FACIALEXPRESSION: 0 - SMILING OR INEXPRESSIVE.
NEGVOCALIZATION: 0 - NONE.
FACIALEXPRESSION: 0 - SMILING OR INEXPRESSIVE.
BODYLANGUAGE: 0 - RELAXED.
BODYLANGUAGE: 0 - RELAXED.
FACIALEXPRESSION: 0 - SMILING OR INEXPRESSIVE.
NEGVOCALIZATION: 0 - NONE.
CONSOLABILITY: 1 - DISTRACTED OR REASSURED BY VOICE OR TOUCH.
FACIALEXPRESSION: 0 - SMILING OR INEXPRESSIVE.
BODYLANGUAGE: 0 - RELAXED.
FACIALEXPRESSION: 0 - SMILING OR INEXPRESSIVE.
BODYLANGUAGE: 0 - RELAXED.
NEGVOCALIZATION: 1 - OCCASIONAL MOAN OR GROAN. LOW-LEVEL SPEECH WITH A NEGATIVE OR DISAPPROVING QUALITY.
CONSOLABILITY: 0 - NO NEED TO CONSOLE.
FACIALEXPRESSION: 0 - SMILING OR INEXPRESSIVE.
NEGVOCALIZATION: 2 - REPEATED TROUBLE CALLING OUT. LOUD MOANING OR GROANING. CRYING.
TOTALSCORE: 4
NEGVOCALIZATION: 0 - NONE.
NEGVOCALIZATION: 1 - OCCASIONAL MOAN OR GROAN. LOW-LEVEL SPEECH WITH A NEGATIVE OR DISAPPROVING QUALITY.
TOTALSCORE: 0
CONSOLABILITY: 0 - NO NEED TO CONSOLE.
TOTALSCORE: 2
TOTALSCORE: 0
BODYLANGUAGE: 0 - RELAXED.
FACIALEXPRESSION: 0 - SMILING OR INEXPRESSIVE.
FACIALEXPRESSION: 0 - SMILING OR INEXPRESSIVE.

## 2024-01-01 ASSESSMENT — ACTIVITIES OF DAILY LIVING (ADL)
AMBULATION ASSISTANCE: NON-AMBULATORY
AMBULATION ASSISTANCE: NON-AMBULATORY
CURRENT_FUNCTION: TWO PERSON
AMBULATION ASSISTANCE: NON-AMBULATORY
CURRENT_FUNCTION: TWO PERSON

## 2024-01-10 PROBLEM — G82.20 PARAPLEGIA (HCC): Status: ACTIVE | Noted: 2024-01-10

## 2024-01-11 ENCOUNTER — TELEMEDICINE (OUTPATIENT)
Dept: MEDICAL GROUP | Facility: LAB | Age: 74
End: 2024-01-11
Payer: MEDICARE

## 2024-01-11 DIAGNOSIS — I10 ESSENTIAL HYPERTENSION: ICD-10-CM

## 2024-01-11 DIAGNOSIS — G62.9 NEUROPATHY: ICD-10-CM

## 2024-01-11 DIAGNOSIS — F02.A2 MILD LEWY BODY DEMENTIA WITH PSYCHOTIC DISTURBANCE (HCC): ICD-10-CM

## 2024-01-11 DIAGNOSIS — C54.1 ENDOMETRIAL CANCER (HCC): ICD-10-CM

## 2024-01-11 DIAGNOSIS — G31.83 MILD LEWY BODY DEMENTIA WITH PSYCHOTIC DISTURBANCE (HCC): ICD-10-CM

## 2024-01-11 DIAGNOSIS — Z87.898 HISTORY OF VERTIGO: ICD-10-CM

## 2024-01-11 DIAGNOSIS — G47.9 SLEEP DISTURBANCE: ICD-10-CM

## 2024-01-11 DIAGNOSIS — G82.20 PARAPLEGIA (HCC): ICD-10-CM

## 2024-01-11 DIAGNOSIS — Z86.73 HISTORY OF CVA (CEREBROVASCULAR ACCIDENT): ICD-10-CM

## 2024-01-11 PROCEDURE — 99214 OFFICE O/P EST MOD 30 MIN: CPT | Mod: 95 | Performed by: NURSE PRACTITIONER

## 2024-01-11 RX ORDER — MECLIZINE HYDROCHLORIDE 25 MG/1
12.5-25 TABLET ORAL 3 TIMES DAILY PRN
Qty: 90 TABLET | Refills: 2 | Status: ON HOLD | OUTPATIENT
Start: 2024-01-11

## 2024-01-11 NOTE — PROGRESS NOTES
Virtual Visit: Established Patient   This visit was conducted via Zoom using secure and encrypted videoconferencing technology.   The patient was in their home in the [facility].    The patient's identity was confirmed and verbal consent was obtained for this virtual visit.    Subjective:   CC:   F/u    HPI:  Jairo is a 73 y.o. female, joined by her daughter Carolyn, presenting for evaluation and management of multiple issues:     Hallucination:  daughter tells me that Jairo is still hallucinating.  Not taking donepezil b/c she felt dizzy with this medication.  She is not taking quetiapine per her preference.    Endometrial cancer history: Daughter is concerned re: hx of endometrial CA.  Was previously seeing Dr. Garcia but doesn't want to return to him b/c of his delivery of her mom's prognosis -willing to see his partner.  Oncology in [place] told Carolyn that CA was responding to immunotherapy which was d/c when she moved back to [place].  As far as she is concerned, she has been told that she is in remission.  Her daughter is interested in follow-up care in terms of scans for status.  Also her daughter is interested in any potential further treatment with immunotherapy.    Carolyn also needs [facility] paperwork completed to take intermittent leave to take her mom to appointments and when mom is sick.      Patient would like to have a mammogram.  Denies breast pain.    Hx of stroke and pt would like to have botox in hand to help with use.   She has heard that botox would help with hand mobility.  She is requesting a referral.    Interested in a functional medicine doctor for neuropathy.  Could like to have B12 blood test.  Tried gabapentin and it doesn't work.  Hasn't tried lyrica.      Vertigo - needs refill of meclizine which helps.  Meclizine also helps her sleep and with headaches.    HTN:  doesn't take losartan daily, only when BP is high at home - over 140 systolic.  Denies chest pain.  Takes metoprolol  nightly.    Current medicines (including changes today)  Current Outpatient Medications   Medication Sig Dispense Refill    baclofen (LIORESAL) 10 MG Tab Take 1 Tablet by mouth 3 times a day. 270 Tablet 3    metoprolol SR (TOPROL XL) 100 MG TABLET SR 24 HR Take 1 Tablet by mouth every day. 90 Tablet 3    losartan (COZAAR) 100 MG Tab TAKE 1 TABLET BY MOUTH EVERY  Tablet 2    donepezil (ARICEPT) 5 MG Tab Take 5 mg by mouth.      aspirin 81 MG EC tablet Take 81 mg by mouth every day.      QUEtiapine (SEROQUEL) 25 MG Tab 2 tablets in AM, 1 tablet 6 hours later and 2 tablets at night. 450 Tablet 1    atorvastatin (LIPITOR) 20 MG Tab Take 1 Tablet by mouth every evening. 100 Tablet 3    amitriptyline (ELAVIL) 50 MG Tab Take 1 Tablet by mouth every evening. 90 Tablet 2    meclizine (ANTIVERT) 12.5 MG Tab Take 1-2 Tablets by mouth 3 times a day as needed for Dizziness. 90 Tablet 3    baclofen (LIORESAL) 10 MG Tab Take 10 mg by mouth 3 times a day as needed.      ferrous sulfate 325 (65 Fe) MG tablet Take 1 tablet by mouth every day. 90 tablet 3    ascorbic acid (ASCORBIC ACID) 500 MG Tab Take 500 mg by mouth every evening.      Cholecalciferol (VITAMIN D3) 2000 UNIT Cap Take 2,000 Units by mouth every day.      Coenzyme Q10 (COQ10) 100 MG Cap Take 100 mg by mouth every evening.      multivitamin (THERAGRAN) Tab Take 1 Tab by mouth every day.      acetaminophen (TYLENOL) 500 MG Tab Take 1,000 mg by mouth every 6 hours as needed for Moderate Pain.      B Complex-C (SUPER B COMPLEX PO) Take 1 Tab by mouth every evening.       No current facility-administered medications for this visit.       Patient Active Problem List    Diagnosis Date Noted    Paraplegia (HCC) 01/10/2024    Malignant neoplasm metastatic to brain (HCC) 02/22/2023    DVT (deep venous thrombosis) (HCC) 08/17/2021    Anemia 08/02/2021    Pulmonary embolism (HCC) 07/29/2021    Hemothorax on left 07/29/2021    Tongue burning sensation 03/31/2020     Neuropathy 03/31/2020    Thyroid nodule - benign 12/03/2019    Vision abnormalities 02/06/2018    History of CVA (cerebrovascular accident) 02/06/2018    Essential hypertension 02/06/2018    Vitamin D deficiency 02/06/2018    Endometrial cancer (HCC) 02/06/2018    Iron deficiency anemia 02/06/2018    Obesity (BMI 30-39.9) 02/06/2018        Objective:   There were no vitals taken for this visit.    Physical Exam:  Gen. appears healthy in no distress   Skin appropriate for sex and age   Neck trachea is midline  Lungs unlabored breathing  Heart regular rate  Psych appropriate, calm, interactive, well-groomed    Assessment and Plan:   The following treatment plan was discussed:   1. Sleep disturbance  -Sleeping well with meclizine.  Prefers to refrain from any further medications for hallucination.   - meclizine (ANTIVERT) 25 MG Tab; Take 0.5-1 Tablets by mouth 3 times a day as needed for Vertigo.  Dispense: 90 Tablet; Refill: 2  - TSH WITH REFLEX TO FT4; Future    2. History of vertigo  - meclizine (ANTIVERT) 25 MG Tab; Take 0.5-1 Tablets by mouth 3 times a day as needed for Vertigo.  Dispense: 90 Tablet; Refill: 2    3. Endometrial cancer (HCC)  -Referred back to meet with Dr. Garcia's partner.  Updated labs.  Previous oncologist was Dr. Robles at [facility].  - Referral to Gynecologic Oncology  - L[facility]; Future  - TSH WITH REFLEX TO FT4; Future    4. Essential hypertension  -Stable.  Monitoring closely at home.  - CBC WITH DIFFERENTIAL; Future  - Comp Metabolic Panel; Future  - TSH WITH REFLEX TO FT4; Future    5. History of CVA (cerebrovascular accident)  - TSH WITH REFLEX TO FT4; Future    6. Paraplegia (HCC)  -She did request a referral to a hand specialist for Botox in her hands.  I told her that I will have to do further investigation regarding this when orthopedic offices open this morning to find out if any of the hand surgeons in town are doing hand Botox for hemiplegia related to history of CVA.  I will get back in touch  with patient and her daughter as soon as I hear back.    7. Neuropathy  -Persistent.  We had a discussion regarding lack of cure for neuropathy.  She is interested in seeing the [facility].  Recommend updated labs.  Declines trial of gabapentin or Lyrica, stating gabapentin was not helpful for her in the past.  - VITAMIN B12; Future  - TSH WITH REFLEX TO FT4; Future    8. 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  Prefers to refrain from medications for memory.         Discussed with her that she may schedule a screening mammogram through her [employer].  Hopefully will be able to see her here in the office or follow-up with her again via virtual visit regarding everything as above  Over the next few weeks.

## 2024-01-12 ENCOUNTER — APPOINTMENT (OUTPATIENT)
Dept: RADIOLOGY | Facility: MEDICAL CENTER | Age: 74
End: 2024-01-12
Attending: NURSE PRACTITIONER
Payer: COMMERCIAL

## 2024-01-12 DIAGNOSIS — Z12.31 VISIT FOR SCREENING MAMMOGRAM: ICD-10-CM

## 2024-01-14 DIAGNOSIS — Z86.73 HISTORY OF CVA (CEREBROVASCULAR ACCIDENT): ICD-10-CM

## 2024-01-14 DIAGNOSIS — M24.549 CONTRACTURE OF HAND: ICD-10-CM

## 2024-01-19 ENCOUNTER — TELEPHONE (OUTPATIENT)
Dept: HEALTH INFORMATION MANAGEMENT | Facility: OTHER | Age: 74
End: 2024-01-19

## 2024-02-01 DIAGNOSIS — I10 ESSENTIAL HYPERTENSION: ICD-10-CM

## 2024-02-01 DIAGNOSIS — I10 HYPERTENSION, UNCONTROLLED: ICD-10-CM

## 2024-02-01 RX ORDER — METOPROLOL SUCCINATE 100 MG/1
100 TABLET, EXTENDED RELEASE ORAL DAILY
Qty: 100 TABLET | Refills: 3 | Status: ON HOLD | OUTPATIENT
Start: 2024-02-01

## 2024-02-01 NOTE — TELEPHONE ENCOUNTER
Received request via: Pharmacy    Was the patient seen in the last year in this department? Yes    Does the patient have an active prescription (recently filled or refills available) for medication(s) requested? No    Pharmacy Name: [facility]    Does the patient have long-term Plus and need 100 day supply (blood pressure, diabetes and cholesterol meds only)? Yes, quantity updated to 100 days

## 2024-02-08 DIAGNOSIS — G47.9 SLEEP DISTURBANCE: ICD-10-CM

## 2024-02-08 RX ORDER — QUETIAPINE FUMARATE 25 MG/1
TABLET, FILM COATED ORAL
Qty: 450 TABLET | Refills: 1 | Status: SHIPPED | OUTPATIENT
Start: 2024-02-08 | End: 2024-03-06 | Stop reason: SDUPTHER

## 2024-02-08 NOTE — TELEPHONE ENCOUNTER
Received request via: Pharmacy    Was the patient seen in the last year in this department? Yes    Does the patient have an active prescription (recently filled or refills available) for medication(s) requested? No    [facility]    Does the patient have skilled nursing Plus and need 100 day supply (blood pressure, diabetes and cholesterol meds only)? Medication is not for cholesterol, blood pressure or diabetes

## 2024-02-17 ENCOUNTER — APPOINTMENT (OUTPATIENT)
Dept: RADIOLOGY | Facility: MEDICAL CENTER | Age: 74
End: 2024-02-17
Attending: NURSE PRACTITIONER
Payer: MEDICARE

## 2024-02-17 ENCOUNTER — TELEPHONE (OUTPATIENT)
Dept: RADIOLOGY | Facility: MEDICAL CENTER | Age: 74
End: 2024-02-17
Payer: MEDICARE

## 2024-02-17 DIAGNOSIS — Z12.31 VISIT FOR SCREENING MAMMOGRAM: ICD-10-CM

## 2024-03-02 ENCOUNTER — PATIENT MESSAGE (OUTPATIENT)
Dept: MEDICAL GROUP | Facility: LAB | Age: 74
End: 2024-03-02
Payer: MEDICARE

## 2024-03-06 DIAGNOSIS — G47.9 SLEEP DISTURBANCE: ICD-10-CM

## 2024-03-07 RX ORDER — HYDROXYZINE HYDROCHLORIDE 25 MG/1
25 TABLET, FILM COATED ORAL 3 TIMES DAILY PRN
Qty: 30 TABLET | Refills: 0 | Status: SHIPPED | OUTPATIENT
Start: 2024-03-07

## 2024-03-07 RX ORDER — QUETIAPINE FUMARATE 25 MG/1
TABLET, FILM COATED ORAL
Qty: 450 TABLET | Refills: 1 | Status: SHIPPED | OUTPATIENT
Start: 2024-03-07 | End: 2024-03-27

## 2024-03-07 NOTE — TELEPHONE ENCOUNTER
Received request via: Pharmacy    Was the patient seen in the last year in this department? Yes    Does the patient have an active prescription (recently filled or refills available) for medication(s) requested? No    [facility]: [facility]    Does the patient have [facility] Plus and need 100 day supply (blood pressure, diabetes and cholesterol meds only)? Medication is not for cholesterol, blood pressure or diabetes

## 2024-03-11 ENCOUNTER — TELEPHONE (OUTPATIENT)
Dept: MEDICAL GROUP | Facility: LAB | Age: 74
End: 2024-03-11
Payer: MEDICARE

## 2024-03-11 NOTE — TELEPHONE ENCOUNTER
DOCUMENTATION OF PAR STATUS:    1. Name of Medication & Dose:  QUEtiapine Fumarate 25MG tablets     2. Name of Prescription Coverage Company & phone #: COVER MY MEDS   [id number]  3. Date Prior Auth Submitted: 3/11/24    4. What information was given to obtain insurance decision? OV NOTES FAXED    Jairo Rivas Key: [id number] - PA Case ID: [id number]  Need help? Call us at [phone]  Outcome   Approvedon March 12  Request Reference Number: [id number]. QUETIAPINE TAB 25MG is approved through 12/31/2024. Your patient may now fill this prescription and it will be covered.

## 2024-03-26 ENCOUNTER — HOSPITAL ENCOUNTER (OUTPATIENT)
Dept: LAB | Facility: MEDICAL CENTER | Age: 74
End: 2024-03-26
Attending: PHYSICIAN ASSISTANT
Payer: MEDICARE

## 2024-03-26 ENCOUNTER — HOSPITAL ENCOUNTER (OUTPATIENT)
Dept: LAB | Facility: MEDICAL CENTER | Age: 74
End: 2024-03-26
Attending: NURSE PRACTITIONER
Payer: MEDICARE

## 2024-03-26 ENCOUNTER — HOSPITAL ENCOUNTER (OUTPATIENT)
Dept: RADIOLOGY | Facility: MEDICAL CENTER | Age: 74
End: 2024-03-26
Attending: PHYSICIAN ASSISTANT
Payer: MEDICARE

## 2024-03-26 DIAGNOSIS — E87.6 HYPOPOTASSEMIA: ICD-10-CM

## 2024-03-26 DIAGNOSIS — N85.00 ENDOMETRIAL HYPERPLASIA, UNSPECIFIED: ICD-10-CM

## 2024-03-26 DIAGNOSIS — G47.9 SLEEP DISTURBANCE: ICD-10-CM

## 2024-03-26 DIAGNOSIS — G62.9 NEUROPATHY: ICD-10-CM

## 2024-03-26 DIAGNOSIS — C54.1 ENDOMETRIAL CANCER (HCC): ICD-10-CM

## 2024-03-26 DIAGNOSIS — G89.3 NEOPLASM RELATED PAIN: ICD-10-CM

## 2024-03-26 DIAGNOSIS — I69.951 HEMIPLGA FOL UNSP CEREBVASC DISEASE AFF RIGHT DOMINANT SIDE (HCC): ICD-10-CM

## 2024-03-26 DIAGNOSIS — R06.02 SHORTNESS OF BREATH: ICD-10-CM

## 2024-03-26 DIAGNOSIS — C54.1 ENDOMETRIAL SARCOMA (HCC): ICD-10-CM

## 2024-03-26 DIAGNOSIS — I10 ESSENTIAL HYPERTENSION: ICD-10-CM

## 2024-03-26 DIAGNOSIS — R91.8 LUNG MASS: ICD-10-CM

## 2024-03-26 DIAGNOSIS — C78.00 MALIGNANT NEOPLASM METASTATIC TO LUNG, UNSPECIFIED LATERALITY (HCC): ICD-10-CM

## 2024-03-26 DIAGNOSIS — Z51.11 ENCOUNTER FOR ANTINEOPLASTIC CHEMOTHERAPY: ICD-10-CM

## 2024-03-26 DIAGNOSIS — J90 EXUDATIVE PLEURISY: ICD-10-CM

## 2024-03-26 DIAGNOSIS — Z51.12 ENCOUNTER FOR ANTINEOPLASTIC IMMUNOTHERAPY: ICD-10-CM

## 2024-03-26 DIAGNOSIS — C79.49 SECONDARY MALIGNANT NEOPLASM OF BRAIN AND SPINAL CORD (HCC): ICD-10-CM

## 2024-03-26 DIAGNOSIS — Z12.31 ENCOUNTER FOR MAMMOGRAM TO ESTABLISH BASELINE MAMMOGRAM: ICD-10-CM

## 2024-03-26 DIAGNOSIS — Z86.73 HISTORY OF CVA (CEREBROVASCULAR ACCIDENT): ICD-10-CM

## 2024-03-26 DIAGNOSIS — C79.31 SECONDARY MALIGNANT NEOPLASM OF BRAIN AND SPINAL CORD (HCC): ICD-10-CM

## 2024-03-26 DIAGNOSIS — R97.1 ELEVATED CANCER ANTIGEN 125 (CA 125): ICD-10-CM

## 2024-03-26 LAB
ALBUMIN SERPL BCP-MCNC: 4.3 G/DL (ref 3.2–4.9)
ALBUMIN SERPL BCP-MCNC: 4.3 G/DL (ref 3.2–4.9)
ALBUMIN/GLOB SERPL: 1.2 G/DL
ALBUMIN/GLOB SERPL: 1.3 G/DL
ALP SERPL-CCNC: 110 U/L (ref 30–99)
ALP SERPL-CCNC: 112 U/L (ref 30–99)
ALT SERPL-CCNC: 22 U/L (ref 2–50)
ALT SERPL-CCNC: 23 U/L (ref 2–50)
ANION GAP SERPL CALC-SCNC: 12 MMOL/L (ref 7–16)
ANION GAP SERPL CALC-SCNC: 13 MMOL/L (ref 7–16)
AST SERPL-CCNC: 27 U/L (ref 12–45)
AST SERPL-CCNC: 28 U/L (ref 12–45)
BASOPHILS # BLD AUTO: 0.5 % (ref 0–1.8)
BASOPHILS # BLD AUTO: 0.5 % (ref 0–1.8)
BASOPHILS # BLD: 0.02 K/UL (ref 0–0.12)
BASOPHILS # BLD: 0.02 K/UL (ref 0–0.12)
BILIRUB SERPL-MCNC: 0.5 MG/DL (ref 0.1–1.5)
BILIRUB SERPL-MCNC: 0.5 MG/DL (ref 0.1–1.5)
BUN SERPL-MCNC: 10 MG/DL (ref 8–22)
BUN SERPL-MCNC: 10 MG/DL (ref 8–22)
CALCIUM ALBUM COR SERPL-MCNC: 9.7 MG/DL (ref 8.5–10.5)
CALCIUM ALBUM COR SERPL-MCNC: 9.8 MG/DL (ref 8.5–10.5)
CALCIUM SERPL-MCNC: 10 MG/DL (ref 8.5–10.5)
CALCIUM SERPL-MCNC: 9.9 MG/DL (ref 8.5–10.5)
CHLORIDE SERPL-SCNC: 104 MMOL/L (ref 96–112)
CHLORIDE SERPL-SCNC: 104 MMOL/L (ref 96–112)
CO2 SERPL-SCNC: 26 MMOL/L (ref 20–33)
CO2 SERPL-SCNC: 26 MMOL/L (ref 20–33)
CREAT SERPL-MCNC: 0.79 MG/DL (ref 0.5–1.4)
CREAT SERPL-MCNC: 0.8 MG/DL (ref 0.5–1.4)
EOSINOPHIL # BLD AUTO: 0.04 K/UL (ref 0–0.51)
EOSINOPHIL # BLD AUTO: 0.04 K/UL (ref 0–0.51)
EOSINOPHIL NFR BLD: 1 % (ref 0–6.9)
EOSINOPHIL NFR BLD: 1 % (ref 0–6.9)
ERYTHROCYTE [DISTWIDTH] IN BLOOD BY AUTOMATED COUNT: 46 FL (ref 35.9–50)
ERYTHROCYTE [DISTWIDTH] IN BLOOD BY AUTOMATED COUNT: 46.2 FL (ref 35.9–50)
GFR SERPLBLD CREATININE-BSD FMLA CKD-EPI: 78 ML/MIN/1.73 M 2
GFR SERPLBLD CREATININE-BSD FMLA CKD-EPI: 80 ML/MIN/1.73 M 2
GLOBULIN SER CALC-MCNC: 3.4 G/DL (ref 1.9–3.5)
GLOBULIN SER CALC-MCNC: 3.5 G/DL (ref 1.9–3.5)
GLUCOSE SERPL-MCNC: 86 MG/DL (ref 65–99)
GLUCOSE SERPL-MCNC: 87 MG/DL (ref 65–99)
HCT VFR BLD AUTO: 46 % (ref 37–47)
HCT VFR BLD AUTO: 47.9 % (ref 37–47)
HGB BLD-MCNC: 15.3 G/DL (ref 12–16)
HGB BLD-MCNC: 15.4 G/DL (ref 12–16)
IMM GRANULOCYTES # BLD AUTO: 0.01 K/UL (ref 0–0.11)
IMM GRANULOCYTES # BLD AUTO: 0.01 K/UL (ref 0–0.11)
IMM GRANULOCYTES NFR BLD AUTO: 0.2 % (ref 0–0.9)
IMM GRANULOCYTES NFR BLD AUTO: 0.2 % (ref 0–0.9)
LDH SERPL L TO P-CCNC: 305 U/L (ref 107–266)
LYMPHOCYTES # BLD AUTO: 1.43 K/UL (ref 1–4.8)
LYMPHOCYTES # BLD AUTO: 1.45 K/UL (ref 1–4.8)
LYMPHOCYTES NFR BLD: 34 % (ref 22–41)
LYMPHOCYTES NFR BLD: 35.5 % (ref 22–41)
MCH RBC QN AUTO: 31.2 PG (ref 27–33)
MCH RBC QN AUTO: 31.9 PG (ref 27–33)
MCHC RBC AUTO-ENTMCNC: 32.2 G/DL (ref 32.2–35.5)
MCHC RBC AUTO-ENTMCNC: 33.3 G/DL (ref 32.2–35.5)
MCV RBC AUTO: 95.8 FL (ref 81.4–97.8)
MCV RBC AUTO: 97.2 FL (ref 81.4–97.8)
MONOCYTES # BLD AUTO: 0.34 K/UL (ref 0–0.85)
MONOCYTES # BLD AUTO: 0.35 K/UL (ref 0–0.85)
MONOCYTES NFR BLD AUTO: 8.1 % (ref 0–13.4)
MONOCYTES NFR BLD AUTO: 8.6 % (ref 0–13.4)
NEUTROPHILS # BLD AUTO: 2.22 K/UL (ref 1.82–7.42)
NEUTROPHILS # BLD AUTO: 2.37 K/UL (ref 1.82–7.42)
NEUTROPHILS NFR BLD: 54.2 % (ref 44–72)
NEUTROPHILS NFR BLD: 56.2 % (ref 44–72)
NRBC # BLD AUTO: 0 K/UL
NRBC # BLD AUTO: 0 K/UL
NRBC BLD-RTO: 0 /100 WBC (ref 0–0.2)
NRBC BLD-RTO: 0 /100 WBC (ref 0–0.2)
PLATELET # BLD AUTO: 239 K/UL (ref 164–446)
PLATELET # BLD AUTO: 256 K/UL (ref 164–446)
PMV BLD AUTO: 10.3 FL (ref 9–12.9)
PMV BLD AUTO: 10.4 FL (ref 9–12.9)
POTASSIUM SERPL-SCNC: 3.7 MMOL/L (ref 3.6–5.5)
POTASSIUM SERPL-SCNC: 3.7 MMOL/L (ref 3.6–5.5)
PROT SERPL-MCNC: 7.7 G/DL (ref 6–8.2)
PROT SERPL-MCNC: 7.8 G/DL (ref 6–8.2)
RBC # BLD AUTO: 4.8 M/UL (ref 4.2–5.4)
RBC # BLD AUTO: 4.93 M/UL (ref 4.2–5.4)
SODIUM SERPL-SCNC: 142 MMOL/L (ref 135–145)
SODIUM SERPL-SCNC: 143 MMOL/L (ref 135–145)
TSH SERPL DL<=0.005 MIU/L-ACNC: 0.92 UIU/ML (ref 0.38–5.33)
VIT B12 SERPL-MCNC: 732 PG/ML (ref 211–911)
WBC # BLD AUTO: 4.1 K/UL (ref 4.8–10.8)
WBC # BLD AUTO: 4.2 K/UL (ref 4.8–10.8)

## 2024-03-26 PROCEDURE — 80053 COMPREHEN METABOLIC PANEL: CPT

## 2024-03-26 PROCEDURE — 80053 COMPREHEN METABOLIC PANEL: CPT | Mod: 91

## 2024-03-26 PROCEDURE — 84443 ASSAY THYROID STIM HORMONE: CPT

## 2024-03-26 PROCEDURE — 85025 COMPLETE CBC W/AUTO DIFF WBC: CPT

## 2024-03-26 PROCEDURE — 85025 COMPLETE CBC W/AUTO DIFF WBC: CPT | Mod: 91

## 2024-03-26 PROCEDURE — 82607 VITAMIN B-12: CPT

## 2024-03-26 PROCEDURE — 83615 LACTATE (LD) (LDH) ENZYME: CPT

## 2024-03-26 PROCEDURE — 36415 COLL VENOUS BLD VENIPUNCTURE: CPT

## 2024-03-26 PROCEDURE — 70553 MRI BRAIN STEM W/O & W/DYE: CPT

## 2024-03-26 PROCEDURE — 700117 HCHG RX CONTRAST REV CODE 255: Performed by: PHYSICIAN ASSISTANT

## 2024-03-26 PROCEDURE — A9579 GAD-BASE MR CONTRAST NOS,1ML: HCPCS | Performed by: PHYSICIAN ASSISTANT

## 2024-03-26 RX ADMIN — GADOTERIDOL 5 ML: 279.3 INJECTION, SOLUTION INTRAVENOUS at 14:15

## 2024-03-27 ENCOUNTER — HOSPITAL ENCOUNTER (INPATIENT)
Facility: MEDICAL CENTER | Age: 74
LOS: 2 days | DRG: 081 | End: 2024-03-29
Attending: EMERGENCY MEDICINE | Admitting: INTERNAL MEDICINE
Payer: MEDICARE

## 2024-03-27 ENCOUNTER — APPOINTMENT (OUTPATIENT)
Dept: RADIOLOGY | Facility: MEDICAL CENTER | Age: 74
DRG: 081 | End: 2024-03-27
Attending: EMERGENCY MEDICINE
Payer: MEDICARE

## 2024-03-27 DIAGNOSIS — I16.0 HYPERTENSIVE URGENCY: ICD-10-CM

## 2024-03-27 DIAGNOSIS — C79.31 MALIGNANT NEOPLASM METASTATIC TO BRAIN (HCC): ICD-10-CM

## 2024-03-27 DIAGNOSIS — G62.9 NEUROPATHY: ICD-10-CM

## 2024-03-27 DIAGNOSIS — C55 MALIGNANT NEOPLASM OF UTERUS, UNSPECIFIED SITE (HCC): ICD-10-CM

## 2024-03-27 DIAGNOSIS — G93.89 BRAIN MASS: ICD-10-CM

## 2024-03-27 DIAGNOSIS — R53.1 WEAKNESS: ICD-10-CM

## 2024-03-27 PROBLEM — D68.59 HYPERCOAGULABLE STATE (HCC): Status: ACTIVE | Noted: 2024-03-27

## 2024-03-27 PROBLEM — R10.31 RIGHT LOWER QUADRANT ABDOMINAL PAIN: Status: ACTIVE | Noted: 2024-03-27

## 2024-03-27 LAB
ALBUMIN SERPL BCP-MCNC: 4.3 G/DL (ref 3.2–4.9)
ALBUMIN/GLOB SERPL: 1.2 G/DL
ALP SERPL-CCNC: 122 U/L (ref 30–99)
ALT SERPL-CCNC: 21 U/L (ref 2–50)
ANION GAP SERPL CALC-SCNC: 11 MMOL/L (ref 7–16)
AST SERPL-CCNC: 28 U/L (ref 12–45)
BASOPHILS # BLD AUTO: 0.5 % (ref 0–1.8)
BASOPHILS # BLD: 0.03 K/UL (ref 0–0.12)
BILIRUB SERPL-MCNC: 0.6 MG/DL (ref 0.1–1.5)
BUN SERPL-MCNC: 9 MG/DL (ref 8–22)
CALCIUM ALBUM COR SERPL-MCNC: 9.7 MG/DL (ref 8.5–10.5)
CALCIUM SERPL-MCNC: 9.9 MG/DL (ref 8.5–10.5)
CHLORIDE SERPL-SCNC: 103 MMOL/L (ref 96–112)
CO2 SERPL-SCNC: 26 MMOL/L (ref 20–33)
CREAT SERPL-MCNC: 0.84 MG/DL (ref 0.5–1.4)
EOSINOPHIL # BLD AUTO: 0.03 K/UL (ref 0–0.51)
EOSINOPHIL NFR BLD: 0.5 % (ref 0–6.9)
ERYTHROCYTE [DISTWIDTH] IN BLOOD BY AUTOMATED COUNT: 44.8 FL (ref 35.9–50)
GFR SERPLBLD CREATININE-BSD FMLA CKD-EPI: 74 ML/MIN/1.73 M 2
GLOBULIN SER CALC-MCNC: 3.5 G/DL (ref 1.9–3.5)
GLUCOSE SERPL-MCNC: 96 MG/DL (ref 65–99)
HCT VFR BLD AUTO: 44.1 % (ref 37–47)
HGB BLD-MCNC: 14.8 G/DL (ref 12–16)
IMM GRANULOCYTES # BLD AUTO: 0.02 K/UL (ref 0–0.11)
IMM GRANULOCYTES NFR BLD AUTO: 0.3 % (ref 0–0.9)
LYMPHOCYTES # BLD AUTO: 1.5 K/UL (ref 1–4.8)
LYMPHOCYTES NFR BLD: 24.5 % (ref 22–41)
MCH RBC QN AUTO: 31.9 PG (ref 27–33)
MCHC RBC AUTO-ENTMCNC: 33.6 G/DL (ref 32.2–35.5)
MCV RBC AUTO: 95 FL (ref 81.4–97.8)
MONOCYTES # BLD AUTO: 0.46 K/UL (ref 0–0.85)
MONOCYTES NFR BLD AUTO: 7.5 % (ref 0–13.4)
NEUTROPHILS # BLD AUTO: 4.09 K/UL (ref 1.82–7.42)
NEUTROPHILS NFR BLD: 66.7 % (ref 44–72)
NRBC # BLD AUTO: 0 K/UL
NRBC BLD-RTO: 0 /100 WBC (ref 0–0.2)
PLATELET # BLD AUTO: 251 K/UL (ref 164–446)
PMV BLD AUTO: 9.9 FL (ref 9–12.9)
POTASSIUM SERPL-SCNC: 3.6 MMOL/L (ref 3.6–5.5)
PROT SERPL-MCNC: 7.8 G/DL (ref 6–8.2)
RBC # BLD AUTO: 4.64 M/UL (ref 4.2–5.4)
SODIUM SERPL-SCNC: 140 MMOL/L (ref 135–145)
WBC # BLD AUTO: 6.1 K/UL (ref 4.8–10.8)

## 2024-03-27 PROCEDURE — 71260 CT THORAX DX C+: CPT

## 2024-03-27 PROCEDURE — 770004 HCHG ROOM/CARE - ONCOLOGY PRIVATE *

## 2024-03-27 PROCEDURE — 71045 X-RAY EXAM CHEST 1 VIEW: CPT

## 2024-03-27 PROCEDURE — 99285 EMERGENCY DEPT VISIT HI MDM: CPT

## 2024-03-27 PROCEDURE — 85025 COMPLETE CBC W/AUTO DIFF WBC: CPT

## 2024-03-27 PROCEDURE — 96375 TX/PRO/DX INJ NEW DRUG ADDON: CPT

## 2024-03-27 PROCEDURE — 96374 THER/PROPH/DIAG INJ IV PUSH: CPT

## 2024-03-27 PROCEDURE — A9270 NON-COVERED ITEM OR SERVICE: HCPCS | Performed by: INTERNAL MEDICINE

## 2024-03-27 PROCEDURE — 700111 HCHG RX REV CODE 636 W/ 250 OVERRIDE (IP): Mod: JZ | Performed by: NEUROLOGICAL SURGERY

## 2024-03-27 PROCEDURE — 36415 COLL VENOUS BLD VENIPUNCTURE: CPT

## 2024-03-27 PROCEDURE — 700111 HCHG RX REV CODE 636 W/ 250 OVERRIDE (IP): Performed by: EMERGENCY MEDICINE

## 2024-03-27 PROCEDURE — 700117 HCHG RX CONTRAST REV CODE 255: Performed by: EMERGENCY MEDICINE

## 2024-03-27 PROCEDURE — 99223 1ST HOSP IP/OBS HIGH 75: CPT | Performed by: RADIOLOGY

## 2024-03-27 PROCEDURE — 80053 COMPREHEN METABOLIC PANEL: CPT

## 2024-03-27 PROCEDURE — 99223 1ST HOSP IP/OBS HIGH 75: CPT | Mod: AI | Performed by: INTERNAL MEDICINE

## 2024-03-27 PROCEDURE — 700102 HCHG RX REV CODE 250 W/ 637 OVERRIDE(OP): Performed by: INTERNAL MEDICINE

## 2024-03-27 PROCEDURE — 700111 HCHG RX REV CODE 636 W/ 250 OVERRIDE (IP): Performed by: INTERNAL MEDICINE

## 2024-03-27 RX ORDER — DEXAMETHASONE SODIUM PHOSPHATE 4 MG/ML
4 INJECTION, SOLUTION INTRA-ARTICULAR; INTRALESIONAL; INTRAMUSCULAR; INTRAVENOUS; SOFT TISSUE ONCE
Status: COMPLETED | OUTPATIENT
Start: 2024-03-27 | End: 2024-03-27

## 2024-03-27 RX ORDER — METOPROLOL SUCCINATE 50 MG/1
100 TABLET, EXTENDED RELEASE ORAL DAILY
Status: CANCELLED | OUTPATIENT
Start: 2024-03-27

## 2024-03-27 RX ORDER — AMOXICILLIN 250 MG
2 CAPSULE ORAL EVERY EVENING
Status: CANCELLED | OUTPATIENT
Start: 2024-03-27

## 2024-03-27 RX ORDER — LOSARTAN POTASSIUM 50 MG/1
100 TABLET ORAL
Status: CANCELLED | OUTPATIENT
Start: 2024-03-27

## 2024-03-27 RX ORDER — ACETAMINOPHEN 325 MG/1
650 TABLET ORAL EVERY 6 HOURS PRN
Status: CANCELLED | OUTPATIENT
Start: 2024-03-27

## 2024-03-27 RX ORDER — ASCORBIC ACID 500 MG
500 TABLET ORAL EVERY EVENING
Status: CANCELLED | OUTPATIENT
Start: 2024-03-27

## 2024-03-27 RX ORDER — NIFEDIPINE 30 MG/1
90 TABLET, EXTENDED RELEASE ORAL
Status: DISCONTINUED | OUTPATIENT
Start: 2024-03-28 | End: 2024-03-28

## 2024-03-27 RX ORDER — LEVETIRACETAM 500 MG/5ML
500 INJECTION, SOLUTION, CONCENTRATE INTRAVENOUS EVERY 12 HOURS
Status: DISCONTINUED | OUTPATIENT
Start: 2024-03-27 | End: 2024-03-29 | Stop reason: HOSPADM

## 2024-03-27 RX ORDER — CLONIDINE HYDROCHLORIDE 0.1 MG/1
0.1 TABLET ORAL EVERY 4 HOURS PRN
Status: DISCONTINUED | OUTPATIENT
Start: 2024-03-27 | End: 2024-03-29 | Stop reason: HOSPADM

## 2024-03-27 RX ORDER — QUETIAPINE FUMARATE 25 MG/1
50 TABLET, FILM COATED ORAL DAILY
Status: CANCELLED | OUTPATIENT
Start: 2024-03-27

## 2024-03-27 RX ORDER — POLYETHYLENE GLYCOL 3350 17 G/17G
1 POWDER, FOR SOLUTION ORAL
Status: CANCELLED | OUTPATIENT
Start: 2024-03-27

## 2024-03-27 RX ORDER — ONDANSETRON 4 MG/1
4 TABLET, ORALLY DISINTEGRATING ORAL EVERY 4 HOURS PRN
Status: DISCONTINUED | OUTPATIENT
Start: 2024-03-27 | End: 2024-03-29 | Stop reason: HOSPADM

## 2024-03-27 RX ORDER — POLYETHYLENE GLYCOL 3350 17 G/17G
1 POWDER, FOR SOLUTION ORAL
Status: DISCONTINUED | OUTPATIENT
Start: 2024-03-27 | End: 2024-03-29 | Stop reason: HOSPADM

## 2024-03-27 RX ORDER — DEXAMETHASONE SODIUM PHOSPHATE 4 MG/ML
4 INJECTION, SOLUTION INTRA-ARTICULAR; INTRALESIONAL; INTRAMUSCULAR; INTRAVENOUS; SOFT TISSUE EVERY 6 HOURS
Status: DISCONTINUED | OUTPATIENT
Start: 2024-03-27 | End: 2024-03-28

## 2024-03-27 RX ORDER — LABETALOL HYDROCHLORIDE 5 MG/ML
10 INJECTION, SOLUTION INTRAVENOUS ONCE
Status: COMPLETED | OUTPATIENT
Start: 2024-03-27 | End: 2024-03-27

## 2024-03-27 RX ORDER — MECLIZINE HYDROCHLORIDE 25 MG/1
12.5 TABLET ORAL EVERY 6 HOURS PRN
Status: CANCELLED | OUTPATIENT
Start: 2024-03-27

## 2024-03-27 RX ORDER — ACETAMINOPHEN 325 MG/1
650 TABLET ORAL EVERY 6 HOURS PRN
Status: DISCONTINUED | OUTPATIENT
Start: 2024-03-27 | End: 2024-03-29 | Stop reason: HOSPADM

## 2024-03-27 RX ORDER — ATORVASTATIN CALCIUM 20 MG/1
20 TABLET, FILM COATED ORAL EVERY EVENING
Status: CANCELLED | OUTPATIENT
Start: 2024-03-27

## 2024-03-27 RX ORDER — ENALAPRILAT 1.25 MG/ML
2.5 INJECTION INTRAVENOUS EVERY 6 HOURS PRN
Status: DISCONTINUED | OUTPATIENT
Start: 2024-03-27 | End: 2024-03-29 | Stop reason: HOSPADM

## 2024-03-27 RX ORDER — GABAPENTIN 300 MG/1
300 CAPSULE ORAL 3 TIMES DAILY
Status: DISCONTINUED | OUTPATIENT
Start: 2024-03-27 | End: 2024-03-29 | Stop reason: HOSPADM

## 2024-03-27 RX ORDER — ONDANSETRON 2 MG/ML
4 INJECTION INTRAMUSCULAR; INTRAVENOUS EVERY 4 HOURS PRN
Status: CANCELLED | OUTPATIENT
Start: 2024-03-27

## 2024-03-27 RX ORDER — LOSARTAN POTASSIUM 100 MG/1
100 TABLET ORAL
COMMUNITY
End: 2024-04-01 | Stop reason: SDUPTHER

## 2024-03-27 RX ORDER — HYDRALAZINE HYDROCHLORIDE 20 MG/ML
20 INJECTION INTRAMUSCULAR; INTRAVENOUS EVERY 4 HOURS PRN
Status: CANCELLED | OUTPATIENT
Start: 2024-03-27

## 2024-03-27 RX ORDER — AMOXICILLIN 250 MG
2 CAPSULE ORAL EVERY EVENING
Status: DISCONTINUED | OUTPATIENT
Start: 2024-03-27 | End: 2024-03-29 | Stop reason: HOSPADM

## 2024-03-27 RX ORDER — LABETALOL HYDROCHLORIDE 5 MG/ML
20 INJECTION, SOLUTION INTRAVENOUS EVERY 4 HOURS PRN
Status: DISCONTINUED | OUTPATIENT
Start: 2024-03-27 | End: 2024-03-27

## 2024-03-27 RX ORDER — DEXAMETHASONE 4 MG/1
6 TABLET ORAL DAILY
Status: DISCONTINUED | OUTPATIENT
Start: 2024-03-27 | End: 2024-03-27

## 2024-03-27 RX ORDER — ONDANSETRON 4 MG/1
4 TABLET, ORALLY DISINTEGRATING ORAL EVERY 4 HOURS PRN
Status: CANCELLED | OUTPATIENT
Start: 2024-03-27

## 2024-03-27 RX ORDER — NIFEDIPINE 30 MG/1
90 TABLET, EXTENDED RELEASE ORAL ONCE
Status: COMPLETED | OUTPATIENT
Start: 2024-03-27 | End: 2024-03-27

## 2024-03-27 RX ORDER — ONDANSETRON 2 MG/ML
4 INJECTION INTRAMUSCULAR; INTRAVENOUS EVERY 4 HOURS PRN
Status: DISCONTINUED | OUTPATIENT
Start: 2024-03-27 | End: 2024-03-29 | Stop reason: HOSPADM

## 2024-03-27 RX ORDER — HYDROXYZINE HYDROCHLORIDE 25 MG/1
25 TABLET, FILM COATED ORAL 3 TIMES DAILY PRN
Status: CANCELLED | OUTPATIENT
Start: 2024-03-27

## 2024-03-27 RX ADMIN — IOHEXOL 100 ML: 350 INJECTION, SOLUTION INTRAVENOUS at 15:15

## 2024-03-27 RX ADMIN — DEXAMETHASONE SODIUM PHOSPHATE 4 MG: 4 INJECTION INTRA-ARTICULAR; INTRALESIONAL; INTRAMUSCULAR; INTRAVENOUS; SOFT TISSUE at 23:12

## 2024-03-27 RX ADMIN — ACETAMINOPHEN 325 MG: 325 TABLET, FILM COATED ORAL at 23:49

## 2024-03-27 RX ADMIN — DEXAMETHASONE SODIUM PHOSPHATE 4 MG: 4 INJECTION INTRA-ARTICULAR; INTRALESIONAL; INTRAMUSCULAR; INTRAVENOUS; SOFT TISSUE at 12:37

## 2024-03-27 RX ADMIN — DEXAMETHASONE SODIUM PHOSPHATE 4 MG: 4 INJECTION INTRA-ARTICULAR; INTRALESIONAL; INTRAMUSCULAR; INTRAVENOUS; SOFT TISSUE at 17:50

## 2024-03-27 RX ADMIN — GABAPENTIN 300 MG: 300 CAPSULE ORAL at 15:17

## 2024-03-27 RX ADMIN — ENALAPRILAT 2.5 MG: 1.25 INJECTION INTRAVENOUS at 14:18

## 2024-03-27 RX ADMIN — DOCUSATE SODIUM 50 MG AND SENNOSIDES 8.6 MG 2 TABLET: 8.6; 5 TABLET, FILM COATED ORAL at 17:50

## 2024-03-27 RX ADMIN — LABETALOL HYDROCHLORIDE 10 MG: 5 INJECTION INTRAVENOUS at 13:33

## 2024-03-27 RX ADMIN — NIFEDIPINE 90 MG: 30 TABLET, FILM COATED, EXTENDED RELEASE ORAL at 15:59

## 2024-03-27 RX ADMIN — LEVETIRACETAM 500 MG: 100 INJECTION, SOLUTION, CONCENTRATE INTRAVENOUS at 15:17

## 2024-03-27 ASSESSMENT — COGNITIVE AND FUNCTIONAL STATUS - GENERAL
DRESSING REGULAR LOWER BODY CLOTHING: A LOT
DRESSING REGULAR UPPER BODY CLOTHING: A LITTLE
DAILY ACTIVITIY SCORE: 16
HELP NEEDED FOR BATHING: A LOT
SUGGESTED CMS G CODE MODIFIER DAILY ACTIVITY: CK
TOILETING: A LOT
SUGGESTED CMS G CODE MODIFIER MOBILITY: CM
PERSONAL GROOMING: A LITTLE
WALKING IN HOSPITAL ROOM: TOTAL
STANDING UP FROM CHAIR USING ARMS: TOTAL
MOVING TO AND FROM BED TO CHAIR: TOTAL
MOBILITY SCORE: 7
MOVING FROM LYING ON BACK TO SITTING ON SIDE OF FLAT BED: TOTAL
TURNING FROM BACK TO SIDE WHILE IN FLAT BAD: A LOT
CLIMB 3 TO 5 STEPS WITH RAILING: TOTAL

## 2024-03-27 ASSESSMENT — ENCOUNTER SYMPTOMS
SENSORY CHANGE: 1
CHILLS: 0
SPUTUM PRODUCTION: 1
WHEEZING: 0
FOCAL WEAKNESS: 1
CONSTIPATION: 1
BLURRED VISION: 1
NAUSEA: 0
WEAKNESS: 1
FEVER: 0
ABDOMINAL PAIN: 1
TINGLING: 1
SHORTNESS OF BREATH: 1
VOMITING: 0
COUGH: 1
DIAPHORESIS: 0
DOUBLE VISION: 0

## 2024-03-27 ASSESSMENT — FIBROSIS 4 INDEX: FIB4 SCORE: 1.62

## 2024-03-27 ASSESSMENT — PAIN DESCRIPTION - PAIN TYPE
TYPE: ACUTE PAIN
TYPE: ACUTE PAIN
TYPE: CHRONIC PAIN

## 2024-03-27 NOTE — CT SIMULATION
PATIENT NAME Jairo Rivas   PRIMARY PHYSICIAN Kathya Scanlon MICKI [id number]   REFERRING PHYSICIAN No ref. provider found 1950     Endometrial cancer (HCC)  Staging form: Corpus Uteri - Carcinoma and Carcinosarcoma, AJCC 8th Edition  - Pathologic stage from 9/13/2019: FIGO Stage IIIB (pT3b, pN0, cM0) - Signed by Jayant EDOUARD M.D. on 9/13/2019  Histopathologic type: Endometrioid adenocarcinoma, NOS  Histologic grade (G): G2  Histologic grading system: 3 grade system  Residual tumor (R): R1 - Microscopic  Lymph-vascular invasion (LVI): LVI not present (absent)/not identified  Diagnostic confirmation: Positive histology  Specimen type: Excision  Staged by: Managing physician  - Clinical stage from 3/27/2024: Stage IVB (rcT0, cN0, pM1) - Signed by Jayant EDOUARD M.D. on 3/27/2024  Histopathologic type: Endometrioid adenocarcinoma, NOS  Stage prefix: Recurrence  Prognostic indicators: Brain Metastasis         Treatment Planning CT Simulation        Order Questions       Question Answer Comment    Is this for a new course of treatment? Yes     Is this an Addendum? No     Implanted Device/Pacemaker No     Simulation Status Initial     Planned Start Date 4/1/2024     Treatment Site Brain - Frontal lobe     Laterality Right     Treatment Pattern/Frequency Every Other Day     Number of fractions: 3     Concurrent Chemotherapy No     CT Technique 3D     Slice Thickness 1mm     Scan Extent Brain     Contrast IV 5 min delay    IV Contrast Volume Other     Volume (cc) 100     Bowel Preparation No     Treatment Device(s) SRS Mask     Patient Position Supine     Patient Orientation Head First     Arm Position Down     Chin Position Neutral     Treatment Machine TB1 - STx     Treatment Image Guidance CBCT     Image Guidance Match Bone     Treatment Planning Image Fusion CT/MR     Special Physics Consult Stereotactic     Other Orders Special Tx Procedure      Weekly Physics Check     Release to patient  Immediate

## 2024-03-27 NOTE — CONSULTS
RADIATION ONCOLOGY CONSULT    Patient name:  Jairo Rivas    Primary Physician:  ZULEYKA Lyons MRN: [id number]  [facility]: [id number]   Referring physician:  No ref. provider found : 1950, 73 y.o.       DATE OF SERVICE: 3/27/2024    IDENTIFICATION: A 73 y.o. female with   Visit Diagnoses     ICD-10-CM   1. Brain mass  G93.89   2. Weakness  R53.1   3. Malignant neoplasm of uterus, unspecified site (HCC)  C55     Endometrial cancer (HCC)  Staging form: Corpus Uteri - Carcinoma and Carcinosarcoma, AJCC 8th Edition  - Pathologic stage from 2019: FIGO Stage IIIB (pT3b, pN0, cM0) - Signed by Jayant EDOUARD M.D. on 2019  Histopathologic type: Endometrioid adenocarcinoma, NOS  Histologic grade (G): G2  Histologic grading system: 3 grade system  Residual tumor (R): R1 - Microscopic  Lymph-vascular invasion (LVI): LVI not present (absent)/not identified  Diagnostic confirmation: Positive histology  Specimen type: Excision  Staged by: Managing physician  - Clinical stage from 3/27/2024: Stage IVB (rcT0, cN0, pM1) - Signed by Jayant EDOUARD M.D. on 3/27/2024  Histopathologic type: Endometrioid adenocarcinoma, NOS  Stage prefix: Recurrence  Prognostic indicators: Brain Metastasis      She is being seen in consultation at the kind request of Dr. Poon in the ER.    HISTORY OF PRESENT ILLNESS: 73 female with metastatic endometrial cancer initially diagnosed 2019 with grade 1 endometrioid adenocarcinoma status post hysterectomy, BSO, LND and path showing stage IIIb grade 2 endometrial adenocarcinoma.  She was treated with chemoradiation therapy completed 2019 here at [facility].  She subsequently transferred her care to [facility] to live with her daughter.  Was noted to have pulmonary metastasis.  She completed 6 cycles of CarboTaxol on 2021 with recurrent disease in the lung.  She was then started on tamoxifen/Megace from 2021 to 2021, she was on  pembrolizumab from September 20, 2021 to present.  In the interim she was noted to have interval development of 2 brain metastasis in the left cerebellum and left parietal lobe and treated with stereotactic radiosurgery at [facility] on 4/8/2022.  She received 18 Gray in single fraction to both lesions.    During immunotherapy at [facility] she has been having problems with cognition, hallucinations, as well as behavioral changes.  Repeat MRI May 2023 imaging has shown no new lesions.  Patient was initially started on Seroquel later discontinued and started on donezepil.  Patient's hallucinatory symptoms were stable on the change in meds.    She transferred care back to [place].  Established with Dr. Scanlon and Dr. Garcia.  More recently she began to have increased difficulty walking and slurred speech.  Repeat MRI brain was performed on 3/26/2024.  This demonstrated an enhancing lesion measuring 20 x 30 x 26 mm right frontal opercular region with surrounding vasogenic edema.  Multiple remote infarcts noted as well.    Patient was sent to the ER.  Dexamethasone is been started.  Neurosurgery consulted as well as radiation oncology.    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)    Hypertensive urgency    Malignant neoplasm metastatic to brain (HCC)    Hypercoagulable state (HCC)    Right lower quadrant abdominal pain        PAST SURGICAL HISTORY:  Past Surgical History:   Procedure Laterality Date    NC THORACOSCOPY,DX NO BX Left 8/6/2021    Procedure:  THORACOSCOPY - HEMOTHORAX EVACUATION, CHEST TUBE PLACEMENT X 2;  Surgeon: John H Ganser, M.D.;  Location: SURGERY [facility];  Service: General    RI CYSTOSCOPY,INSERT URETERAL STENT Left 8/8/2019    Procedure: CYSTOSCOPY, WITH URETERAL STENT INSERTION;  Surgeon: Jann Garcia M.D.;  Location: [facility];  Service: Gynecology    HYSTERECTOMY ROBOTIC XI N/A 8/8/2019    Procedure: HYSTERECTOMY, ROBOT-ASSISTED, USING DA RILEY XI;  Surgeon: Jann Garcia M.D.;  Location: SURGERY [facility];  Service: Gynecology    SALPINGO OOPHORECTOMY Bilateral 8/8/2019    Procedure: SALPINGO-OOPHORECTOMY;  Surgeon: Jann Garcia M.D.;  Location: SURGERY [facility];  Service: Gynecology    NODE BIOPSY SENTINEL  8/8/2019    Procedure: BIOPSY, LYMPH NODE, SENTINEL;  Surgeon: Jann Garcia M.D.;  Location: SURGERY [facility];  Service: Gynecology    RI HYSTEROSCOPY,DX,SEP PROC  6/19/2019    Procedure: HYSTEROSCOPY, DIAGNOSTIC;  Surgeon: Anel Chicas M.D.;  Location: SURGERY [facility];  Service: Gynecology    DILATION AND CURETTAGE  6/19/2019    Procedure: DILATION AND CURETTAGE;  Surgeon: Anel Chicas M.D.;  Location: [facility];  Service: Gynecology    HYSTERECTOMY LAPAROSCOPY         CURRENT MEDICATIONS:  Current Facility-Administered Medications   Medication Dose Route Frequency Provider Last Rate Last Admin    dexamethasone (Decadron) injection 4 mg  4 mg Intravenous Q6HRS Michael Del Castillo M.D.        levETIRAcetam (Keppra) injection 500 mg  500 mg Intravenous Q12HRS Michael Del Castillo M.D.        Respiratory Therapy Consult   Nebulization Continuous RT Kell Poon M.D.        acetaminophen (Tylenol) tablet 650 mg  650 mg Oral Q6HRS PRN Kell Poon M.D.        gabapentin (Neurontin) capsule 300 mg  300 mg Oral TID Kell Poon M.D.        senna-docusate (Pericolace Or Senokot S) 8.6-50 MG per tablet 2 Tablet  2 Tablet Oral Q EVENING Kell SWEET  IVANA Poon        And    polyethylene glycol/lytes (Miralax) Packet 1 Packet  1 Packet Oral QDAY PRN Kell Poon M.D.        ondansetron (Zofran) syringe/vial injection 4 mg  4 mg Intravenous Q4HRS PRN Kell Poon M.D.        ondansetron (Zofran ODT) dispertab 4 mg  4 mg Oral Q4HRS PRN Kell Poon M.D.        enalaprilat (Vasotec) injection 2.5 mg 2 mL  2.5 mg Intravenous Q6HRS PRN Kell Poon M.D.   2.5 mg at 03/27/24 1418    [COMPLETED] iohexol (OMNIPAQUE) 350 mg/mL (IV)  100 mL Intravenous Once Charles Winchester M.D.   100 mL at 03/27/24 1515     Current Outpatient Medications   Medication Sig Dispense Refill    losartan (COZAAR) 100 MG Tab Take 100 mg by mouth 1 time a day as needed. BP > 180   Indications: High Blood Pressure Disorder      Multiple Vitamins-Minerals (CENTRUM ADULT PO) Take 1 Tablet by mouth every day.      metoprolol SR (TOPROL XL) 100 MG TABLET SR 24 HR Take 1 Tablet by mouth every day. 100 Tablet 3    meclizine (ANTIVERT) 25 MG Tab Take 0.5-1 Tablets by mouth 3 times a day as needed for Vertigo. 90 Tablet 2    baclofen (LIORESAL) 10 MG Tab Take 10 mg by mouth 3 times a day as needed.      Cholecalciferol (VITAMIN D3) 2000 UNIT Cap Take 2,000 Units by mouth every day.      acetaminophen (TYLENOL) 500 MG Tab Take 500-1,000 mg by mouth one time as needed for Moderate Pain.      hydrOXYzine HCl (ATARAX) 25 MG Tab Take 1 Tablet by mouth 3 times a day as needed for Itching or Anxiety. 30 Tablet 0    aspirin 81 MG EC tablet Take 81 mg by mouth every day.      atorvastatin (LIPITOR) 20 MG Tab Take 1 Tablet by mouth every evening. 100 Tablet 3    Coenzyme Q10 (COQ10) 100 MG Cap Take 100 mg by mouth every evening.         ALLERGIES:    Penicillins, Gluten meal, Levaquin, and Tramadol    FAMILY HISTORY:    family history includes Hypertension in her mother; No Known Problems in her brother and father.    SOCIAL HISTORY:     reports that she has never smoked.  "She has never used smokeless tobacco. She reports that she does not drink alcohol and does not use drugs.    REVIEW OF SYSTEMS:  A complete review of system taken. Pertinent items in HPI.       PHYSICAL EXAM:    PERFORMANCE STATUS:  BP (!) 225/107   Pulse 76   Temp 36.7 °C (98.1 °F) (Temporal)   Resp 16   Ht 1.626 m (5' 4\")   Wt 84.8 kg (187 lb)   SpO2 93%   BMI 32.10 kg/m²   Physical Exam  Vitals and nursing note reviewed.   Constitutional:       Appearance: She is well-developed.   HENT:      Head: Normocephalic.   Skin:     General: Skin is warm and dry.      Findings: No erythema.   Neurological:      Mental Status: She is alert and oriented to person, place, and time.      Sensory: No sensory deficit.      Motor: Weakness (Weakness in the right lower extremity difficulty in leg raise as well as flexion.  Left lower extremity 4 out of 5 strength flexion extension.) present.      Comments: Patient in hospital bed.  Did not attempt to have her stand and walk.   Psychiatric:         Behavior: Behavior normal.         Thought Content: Thought content normal.         Judgment: Judgment normal.          LABORATORY DATA:   Recent Labs     03/26/24  1249 03/26/24  1251 03/27/24  1219   WBC 4.1* 4.2* 6.1   RBC 4.93 4.80 4.64   HEMOGLOBIN 15.4 15.3 14.8   HEMATOCRIT 47.9* 46.0 44.1   MCV 97.2 95.8 95.0   MCH 31.2 31.9 31.9   MCHC 32.2 33.3 33.6   RDW 46.2 46.0 44.8   PLATELETCT 239 256 251   MPV 10.3 10.4 9.9     Recent Labs     03/26/24  1249 03/26/24  1251 03/27/24  1219   SODIUM 143 142 140   POTASSIUM 3.7 3.7 3.6   CHLORIDE 104 104 103   CO2 26 26 26   GLUCOSE 87 86 96   BUN 10 10 9   CREATININE 0.79 0.80 0.84   CALCIUM 10.0 9.9 9.9          RADIOLOGY DATA:  DX-CHEST-PORTABLE (1 VIEW)    Result Date: 3/27/2024  1. Peripheral scarring from prior infiltrate in the left lung. 2. No acute findings.    MR-BRAIN-WITH & W/O    Result Date: 3/26/2024  20 x 30 x 26 but larger enhancing mass in the right frontal " opercular region with surrounding vasogenic edema with mass effect and midline shift to the left measuring 4 mm concerning for metastatic lesion. Multiple remote infarcts noted as described above. Foci of hemosiderin staining in the bilateral cerebral hemispheres as described above without enhancement which may represent microhemorrhages or small cavernomas.       IMPRESSION:    A 73 y.o. with  Visit Diagnoses     ICD-10-CM   1. Brain mass  G93.89   2. Weakness  R53.1   3. Malignant neoplasm of uterus, unspecified site (HCC)  C55     Endometrial cancer (HCC)  Staging form: Corpus Uteri - Carcinoma and Carcinosarcoma, AJCC 8th Edition  - Pathologic stage from 9/13/2019: FIGO Stage IIIB (pT3b, pN0, cM0) - Signed by Jayant EDOUARD M.D. on 9/13/2019  Histopathologic type: Endometrioid adenocarcinoma, NOS  Histologic grade (G): G2  Histologic grading system: 3 grade system  Residual tumor (R): R1 - Microscopic  Lymph-vascular invasion (LVI): LVI not present (absent)/not identified  Diagnostic confirmation: Positive histology  Specimen type: Excision  Staged by: Managing physician  - Clinical stage from 3/27/2024: Stage IVB (rcT0, cN0, pM1) - Signed by Jayant EDOUARD M.D. on 3/27/2024  Histopathologic type: Endometrioid adenocarcinoma, NOS  Stage prefix: Recurrence  Prognostic indicators: Brain Metastasis        RECOMMENDATIONS:   73-year-old with metastatic endometrioid adenocarcinoma with prior history of 2 brain lesions involving the left side treated stereotactically [facility].  Now she [place] presents with a right frontal brain metastasis 2 x 3 x 2.6 cm in size.  Resulting in some slurred speech and difficulty with with ambulation.  She has been started on dexamethasone in the ER.    Reviewed imaging with patient and her daughter.  Lesion is amenable to stereotactic based therapy.  Considering the excellent results she previously achieved with stereotactic treatments she should do well with another course.   Recommended 3 fraction treatment delivering 30 Gray to the gross tumor volume.  She will require simulation and treatment planning prior to delivery of treatment.    If still inpatient tomorrow we will plan on bringing her down to the radiotherapy department for simulation.  Treatment delivery can occur the following week Monday Wednesday and Friday.  Treatments can be delivered outpatient if she is stable for discharge over the weekend.      Thank you for the opportunity to participate in her care.  If any questions or comments, please do not hesitate in calling.    Jayant EDOUARD M.D.  Electronically signed by: Jayant EDOUARD M.D., 3/27/2024 3:25 PM  [phone]

## 2024-03-27 NOTE — ED TRIAGE NOTES
.  Chief Complaint   Patient presents with    Sent by MD     Sent by oncology for worsening symptoms.     Weakness      Pt to triage via wheel chair for above complaints. Pt family reports difficulty walking, slurred speech and worsening weakness.    Pt has metastatic cancer, sent by oncology after MRI yesterday.    Pt educated on triage process and returned to lobby.

## 2024-03-27 NOTE — ASSESSMENT & PLAN NOTE
Now with significant metastatic disease to brain  Neurosurgery consulted radiation oncology consulted  Last Tx's in [place] 2022 Immunotherapy  Dr. Garcia contacted by admitting hospitalist with no other interventions from his standpoint at this time  Discussed with patient she wants to proceed with radiation therapy and immunotherapy rather than surgical resection

## 2024-03-27 NOTE — ASSESSMENT & PLAN NOTE
Neurosurgery and Rad onc consulted  Started Keppra 500mg BID  Starting decadron 4mg IV q6  neuro checks  Worsening weakness- PT, OT, Fall precautions

## 2024-03-27 NOTE — ED NOTES
Pt medicated per MAR , family member given update on POC for admission to hospital , family agreeable to POC , pt attached to monitor , call light in reach , water provided

## 2024-03-27 NOTE — ED NOTES
Med Rec complete per patient with daughter at bedside with med-list   Allergies reviewed  Antibiotics in the past 30 days:no  Anticoagulant in past 14 days:no  Pharmacy patient utilizes:[facility] on [address]    Per daughter pt is not compliant with most medications. List of meds pt should be on per daughter: Olanzapine 5 mg 1 HS; Donepezil 5 mg 1 HS; Quetiapine 25 mg (not on med rec). Per daughter it's been a long time since pt took these medications

## 2024-03-27 NOTE — ASSESSMENT & PLAN NOTE
Resume home dose of losartan and metoprolol  Patient started on nifedipine monitor blood pressure and adjust accordingly

## 2024-03-27 NOTE — ED NOTES
Pt wheeled back to ER 15, pt placed in room , gown on , family at bedside , ERP at bedside , PIV access attempted , US at bedside to attempt w PIV

## 2024-03-27 NOTE — H&P
"Hospital Medicine History & Physical Note    Date of Service  3/27/2024    Primary Care Physician  TERESA Lyons.    Consultants  neurosurgery and oncology(radiation)    Specialist Names: Maria T Fuentes    Code Status  Full Code Per patient    Chief Complaint  Chief Complaint   Patient presents with    Sent by MD     Sent by oncology for worsening symptoms.     Weakness       History of Presenting Illness  Jairo Rivas is a 73 y.o. female who presented 3/27/2024 with Worsening weakness.  The patient has a past medical history of endometrial cancer diagnosed and underwent hysterectomy back in 2019, she was originally seeing the [facility] however then decided to get treatment in [place] and was on immunotherapy reportedly with regression of tumor through 2022, she was supposed to follow-up in February of this year but ended up moving back appear to be with this daughter in order to give her daughter in [place] \"a break\".  Since coming up here patient has become progressively more weak.  Her appetite is poor.  Previously she was able to be fairly independent using a 4 wheeled seated walker but now she is full assist to stand and get to the bathroom etc. and is unable to ambulate.  The patient had an MRI of the brain yesterday showing significant metastatic disease and was advised to come to the hospital.  I spoke with her previous oncologist Dr. Garcia, from surgical and medical oncology standpoint they have nothing further to offer her and recommend that neurosurgery and radiation oncology be consulted..    I discussed the plan of care with patient, family, bedside RN, and oncology and ERP .  (Discussed w/ Maria T Gutierrez)    Review of Systems  Review of Systems   Constitutional:  Positive for malaise/fatigue. Negative for chills, diaphoresis and fever.   Eyes:  Positive for blurred vision. Negative for double vision.        Floaters and smoky   Respiratory:  Positive for cough, sputum " production (thick white mucous) and shortness of breath. Negative for wheezing.    Cardiovascular:  Positive for leg swelling (better than normal). Negative for chest pain.   Gastrointestinal:  Positive for abdominal pain (lower R>L- ? 2/2 disease or constipation) and constipation (last BM 2 days ago, uses miralax prn). Negative for nausea and vomiting.        States gets hungry but when has food in front of her, doesn't want it   Genitourinary:  Positive for frequency. Negative for dysuria.   Neurological:  Positive for tingling (neuropathy pain legs), sensory change, focal weakness and weakness (was able to ambulate with wheeled walker now daughter has to help her stand).       Past Medical History   has a past medical history of Anemia (2/6/2018), Back pain, Back pain, Cancer ([facility]) (2019), Cough, CVA, old, hemiparesis ([facility]) (2/6/2018), Essential hypertension (2/6/2018), Eye drainage, Fatigue, Frequent headaches, Frequent urination, Hyperlipidemia, Irregular periods, Painful joint, Palpitations, Post-menopause bleeding (2/6/2018), Pulmonary nodule, Sore muscles, Stroke ([facility]), Swelling of lower extremity, Vision abnormalities (2/6/2018), Vitamin D deficiency (2/6/2018), Weakness, and Wears glasses.    Surgical History   has a past surgical history that includes pr hysteroscopy,dx,sep proc (6/19/2019); dilation and curettage (6/19/2019); pr cystoscopy,insert ureteral stent (Left, 8/8/2019); hysterectomy robotic xi (N/A, 8/8/2019); salpingo oophorectomy (Bilateral, 8/8/2019); node biopsy sentinel (8/8/2019); hysterectomy laparoscopy; and pr thoracoscopy,dx no bx (Left, 8/6/2021).     Family History  family history includes Hypertension in her mother; No Known Problems in her brother and father.   Family history reviewed with patient. There is no family history that is pertinent to the chief complaint.   There is no FMH DM or [place]    Social History   reports that she has never smoked. She has never used smokeless  tobacco. She reports that she does not drink alcohol and does not use drugs. She is , she has 7 children. She is living with her daughter and granddaughter. She has 2 other [place]    Allergies  Allergies   Allergen Reactions    Penicillins Itching     Makes her feel weak     Gluten Meal     Levaquin Itching     Swelling of the tongue  Facial swelling    Tramadol Hives     Swelling       Medications  Prior to Admission Medications   Prescriptions Last Dose Informant Patient Reported? Taking?   Cholecalciferol (VITAMIN D3) 2000 UNIT Cap 3/26/2024 at AM Family Member, Patient Yes Yes   Sig: Take 2,000 Units by mouth every day.   Coenzyme Q10 (COQ10) 100 MG Cap 3/25/2024 at [facility] Family Member, Patient Yes No   Sig: Take 100 mg by mouth every evening.   Multiple Vitamins-Minerals (CENTRUM ADULT PO) 3/25/2024 at [facility] Family Member, Patient Yes Yes   Sig: Take 1 Tablet by mouth every day.   acetaminophen (TYLENOL) 500 MG Tab 3/27/2024 at AM Family Member, Patient Yes Yes   Sig: Take 500-1,000 mg by mouth one time as needed for Moderate Pain.   aspirin 81 MG EC tablet > 1 WEEK AGO at PRN Family Member, Patient Yes No   Sig: Take 81 mg by mouth every day.   atorvastatin (LIPITOR) 20 MG Tab 3/25/2024 at K Family Member, Patient No No   Sig: Take 1 Tablet by mouth every evening.   baclofen (LIORESAL) 10 MG Tab 3/27/2024 at AM Family Member, Patient Yes Yes   Sig: Take 10 mg by mouth 3 times a day as needed.   hydrOXYzine HCl (ATARAX) 25 MG Tab PRN at PRN Family Member, Patient No No   Sig: Take 1 Tablet by mouth 3 times a day as needed for Itching or Anxiety.   losartan (COZAAR) 100 MG Tab PRN at PRN Family Member, Patient Yes Yes   Sig: Take 100 mg by mouth 1 time a day as needed. BP > 180   Indications: High Blood Pressure Disorder   meclizine (ANTIVERT) 25 MG Tab 3/24/2024 at PRN Family Member, Patient No Yes   Sig: Take 0.5-1 Tablets by mouth 3 times a day as needed for Vertigo.    metoprolol SR (TOPROL XL) 100 MG TABLET SR 24 HR 3/27/2024 at 0900 Family Member, Patient No Yes   Sig: Take 1 Tablet by mouth every day.      Facility-Administered Medications: None       Physical Exam  Temp:  [36.7 °C (98.1 °F)] 36.7 °C (98.1 °F)  Pulse:  [68-81] 81  Resp:  [16] 16  BP: (178-238)/(105-110) 231/105  SpO2:  [94 %-97 %] 94 %  Blood Pressure : (!) 231/105   Temperature: 36.7 °C (98.1 °F)   Pulse: 81   Respiration: 16   Pulse Oximetry: 94 %       Physical Exam  Vitals and nursing note reviewed.   Constitutional:       General: She is not in acute distress.     Appearance: She is obese. She is not ill-appearing or toxic-appearing.   HENT:      Head: Normocephalic and atraumatic.      Nose: Nose normal.      Mouth/Throat:      Mouth: Mucous membranes are moist.   Eyes:      General:         Right eye: No discharge.         Left eye: No discharge.      Extraocular Movements: Extraocular movements intact.      Pupils: Pupils are equal, round, and reactive to light.   Cardiovascular:      Rate and Rhythm: Normal rate and regular rhythm.   Pulmonary:      Comments: Shallow effort and decreased breath sounds  Abdominal:      General: There is no distension (but some fullness).      Palpations: Abdomen is soft.      Tenderness: There is abdominal tenderness (mild lower abd). There is no guarding or rebound.   Musculoskeletal:      Right lower leg: No edema.      Left lower leg: Edema (trace) present.   Skin:     General: Skin is warm and dry.   Neurological:      Mental Status: She is alert and oriented to person, place, and time. Mental status is at baseline.      Cranial Nerves: No cranial nerve deficit.      Motor: Weakness (generalized and as noted focally) present.      Comments: RUE contracture and paralysis  4-5/5 strength lower extremity only slightly weaker than LLE   Psychiatric:         Mood and Affect: Mood normal.         Laboratory:  Recent Labs     03/26/24  1249 03/26/24  1251 03/27/24  1219  "  WBC 4.1* 4.2* 6.1   RBC 4.93 4.80 4.64   HEMOGLOBIN 15.4 15.3 14.8   HEMATOCRIT 47.9* 46.0 44.1   MCV 97.2 95.8 95.0   MCH 31.2 31.9 31.9   MCHC 32.2 33.3 33.6   RDW 46.2 46.0 44.8   PLATELETCT 239 256 251   MPV 10.3 10.4 9.9     Recent Labs     03/26/24  1249 03/26/24  1251 03/27/24  1219   SODIUM 143 142 140   POTASSIUM 3.7 3.7 3.6   CHLORIDE 104 104 103   CO2 26 26 26   GLUCOSE 87 86 96   BUN 10 10 9   CREATININE 0.79 0.80 0.84   CALCIUM 10.0 9.9 9.9     Recent Labs     03/26/24  1249 03/26/24  1251 03/27/24  1219   ALTSGPT 22 23 21   ASTSGOT 27 28 28   ALKPHOSPHAT 110* 112* 122*   TBILIRUBIN 0.5 0.5 0.6   GLUCOSE 87 86 96         No results for input(s): \"NTPROBNP\" in the last 72 hours.      No results for input(s): \"TROPONINT\" in the last 72 hours.    Imaging:  DX-CHEST-PORTABLE (1 VIEW)   Final Result      1. Peripheral scarring from prior infiltrate in the left lung.   2. No acute findings.      CT-CHEST,ABDOMEN,PELVIS WITH    (Results Pending)   MR-STEALTH BRAIN WITH & W/O    (Results Pending)       X-Ray:  I have personally reviewed the images and compared with prior images.  MRI:  20 x 30 x 26 but larger enhancing mass in the right frontal opercular region with surrounding vasogenic edema with mass effect and midline shift to the left measuring 4 mm concerning for metastatic lesion.       Assessment/Plan:  Justification for Admission Status  I anticipate this patient will require at least two midnights for appropriate medical management, necessitating inpatient admission because management and stabilization of brain mass    Patient will need a Med/Surg bed on ONCOLOGY service .  The need is secondary to metastatic cancer.    * Endometrial cancer (HCC)- (present on admission)  Assessment & Plan  Now with significant metastatic disease to brain  Neurosurgery consulted radiation oncology consulted  Last Tx's in [place] 2022 Immunotherapy  Per Dr. Garcia- nothing else to offer from GYN onc    Right lower quadrant " abdominal pain- (present on admission)  Assessment & Plan  Check CT Chest abd pelvis  Meds based on findings    Also poor appetite  Nutrition consultation    Hypercoagulable state (HCC)- (present on admission)  Assessment & Plan  H/o DVT, PE in past. Bleeding complications in past.    Malignant neoplasm metastatic to brain (HCC)- (present on admission)  Assessment & Plan  Neurosurgery and Rad onc consulted  Started Keppra 500mg BID  Starting decadron 4mg IV q6  neuro checks  Worsening weakness- PT, OT, Fall precautions    Hypertensive urgency- (present on admission)  Assessment & Plan  No response to 10 IV labetalol in ER  No response to 2.5mg Vasotec in ER   20mm decrease with 90mg nifedipine  Did not have Losartan this AM  Will start home meds , 90mg nifedipine  Prn clonidine      Paraplegia (HCC)- (present on admission)  Assessment & Plan  RUE>RLE  L hand dominant    Neuropathy- (present on admission)  Assessment & Plan  Wants medication for  Will try gabapantin        Obesity (BMI 30-39.9)- (present on admission)  Assessment & Plan  Impacts mobility    History of CVA (cerebrovascular accident)- (present on admission)  Assessment & Plan  Residual right sided weakness s/p stroke 2010. UE contracture>>LE weakness   Baclofen for spasm  Bled on plavix- now on ASA 81- dc'd at admission        VTE prophylaxis: SCDs/TEDs

## 2024-03-27 NOTE — ASSESSMENT & PLAN NOTE
Residual right sided weakness s/p stroke 2010. UE contracture>>LE weakness   Bled on plavix- now on ASA 81- dc'd at admission

## 2024-03-27 NOTE — ED PROVIDER NOTES
ED Provider Note    CHIEF COMPLAINT  Chief Complaint   Patient presents with    Sent by MD     Sent by oncology for worsening symptoms.     Weakness       EXTERNAL RECORDS REVIEWED  MRI from yesterday 20 x 30 x 26 but larger enhancing mass in the right frontal opercular region with surrounding vasogenic edema with mass effect and midline shift to the left measuring 4 mm concerning for metastatic lesion.     HPI/ROS  LIMITATION TO HISTORY   Select: : None  OUTSIDE HISTORIAN(S):  Family      Jairo Rivas is a 73 y.o. female who presents with difficulty walking and slurred speech.  Patient has history of metastatic uterine cancer, she was treated with immunotherapy back in 2022.  Currently under the care of Dr. Garcia.  With her symptoms she had an MRI performed yesterday that showed the above findings and was sent here for further care.  Her daughter reports that she has had increased weakness especially in the lower extremities to the point where she really has difficulty walking now as well as more slurred speech.  Patient reports no headaches, no nausea or vomiting, no visual symptoms.  No chest pain.  She does have some chronic lower abdominal pain as well.  She does have some chronic right-sided deficits    Daughter also reports she has noticed some cough over the last week or so no fevers    PAST MEDICAL HISTORY   has a past medical history of Anemia (2/6/2018), Back pain, Back pain, Cancer (HCC) (2019), Cough, CVA, old, hemiparesis (HCC) (2/6/2018), Essential hypertension (2/6/2018), Eye drainage, Fatigue, Frequent headaches, Frequent urination, Hyperlipidemia, Irregular periods, Painful joint, Palpitations, Post-menopause bleeding (2/6/2018), Pulmonary nodule, Sore muscles, Stroke (HCC), Swelling of lower extremity, Vision abnormalities (2/6/2018), Vitamin D deficiency (2/6/2018), Weakness, and Wears glasses.    SURGICAL HISTORY   has a past surgical history that includes hysteroscopy,dx,sep proc  "(6/19/2019); dilation and curettage (6/19/2019); cystoscopy,insert ureteral stent (Left, 8/8/2019); hysterectomy robotic xi (N/A, 8/8/2019); salpingo oophorectomy (Bilateral, 8/8/2019); node biopsy sentinel (8/8/2019); hysterectomy laparoscopy; and thoracoscopy,dx no bx (Left, 8/6/2021).    FAMILY HISTORY  Family History   Problem Relation Age of Onset    Hypertension Mother     No Known Problems Father     No Known Problems Brother        SOCIAL HISTORY  Social History     Tobacco Use    Smoking status: Never    Smokeless tobacco: Never    Tobacco comments:     n/a   Vaping Use    Vaping Use: Never used   Substance and Sexual Activity    Alcohol use: No    Drug use: No    Sexual activity: Never     Comment: , have 7 kids.       CURRENT MEDICATIONS  Home Medications       Reviewed by Kavita Spain (Pharmacy Tech) on 03/27/24 at 1432  Med List Status: Complete     Medication Last Dose Status   acetaminophen (TYLENOL) 500 MG Tab 3/27/2024 Active   aspirin 81 MG EC tablet > 1 WEEK AGO Active   atorvastatin (LIPITOR) 20 MG Tab 3/25/2024 Active   baclofen (LIORESAL) 10 MG Tab 3/27/2024 Active   Cholecalciferol (VITAMIN D3) 2000 UNIT Cap 3/26/2024 Active   Coenzyme Q10 (COQ10) 100 MG Cap 3/25/2024 Active   hydrOXYzine HCl (ATARAX) 25 MG Tab PRN Active   losartan (COZAAR) 100 MG Tab PRN Active   meclizine (ANTIVERT) 25 MG Tab 3/24/2024 Active   metoprolol SR (TOPROL XL) 100 MG TABLET SR 24 HR 3/27/2024 Active   Multiple Vitamins-Minerals (CENTRUM ADULT PO) 3/25/2024 Active                    ALLERGIES  Allergies   Allergen Reactions    Penicillins Itching     Makes her feel weak     Gluten Meal     Levaquin Itching     Swelling of the tongue  Facial swelling    Tramadol Hives     Swelling       PHYSICAL EXAM  VITAL SIGNS: BP (!) 231/105   Pulse 81   Temp 36.7 °C (98.1 °F) (Temporal)   Resp 16   Ht 1.626 m (5' 4\")   Wt 84.8 kg (187 lb)   SpO2 94%   BMI 32.10 kg/m²      Pulse ox interpretation: I " interpret this pulse ox as normal.  Constitutional: Alert in no apparent distress.  HENT: No signs of trauma, Bilateral external ears normal, Nose normal.   Eyes: Pupils are equal and reactive, Conjunctiva normal, Non-icteric.   Neck: Normal range of motion, No tenderness, Supple, No stridor.   Cardiovascular: Regular rate and rhythm, no murmurs.   Thorax & Lungs: Normal breath sounds, No respiratory distress, No wheezing, No chest tenderness.   Abdomen: Bowel sounds normal, Soft, No tenderness, No masses, No pulsatile masses. No peritoneal signs.  Skin: Warm, Dry, No erythema, No rash.   Back: No bony tenderness, No CVA tenderness.   Extremities: Intact distal pulses, No edema, No tenderness, No cyanosis,  Negative Shilo's sign.   Musculoskeletal: Good range of motion in all major joints. No tenderness to palpation or major deformities noted.   Neurologic: Alert , oriented x 3, appears to be old slight left facial droop speech is slurred, there is right-sided contracture noted with the upper extremity, left upper extremity with intact  strength and no drift, there is noted drift with the right lower extremity that daughter reports is chronic although worse, no drift with the left lower extremity normal motor function, Normal sensory function  Psychiatric: Affect normal, Judgment normal, Mood normal.         DIAGNOSTIC STUDIES / PROCEDURES  Labs Reviewed   COMP METABOLIC PANEL - Abnormal; Notable for the following components:       Result Value    Alkaline Phosphatase 122 (*)     All other components within normal limits   CBC WITH DIFFERENTIAL   ESTIMATED GFR         RADIOLOGY  I have independently interpreted the diagnostic imaging associated with this visit and am waiting the final reading from the radiologist.   My preliminary interpretation is as follows: No infiltrate  Radiologist interpretation:   DX-CHEST-PORTABLE (1 VIEW)   Final Result      1. Peripheral scarring from prior infiltrate in the left lung.    2. No acute findings.      CT-CHEST,ABDOMEN,PELVIS WITH    (Results Pending)   MR-STEALTH BRAIN WITH & W/O    (Results Pending)         COURSE & MEDICAL DECISION MAKING      INITIAL ASSESSMENT, COURSE AND PLAN  Care Narrative: 1220  Patient is evaluated the bedside and chart is reviewed.  Unfortunately she does appear to have new brain metastatic disease with a known history of uterine cancer.  Given her masses well as vasogenic edema and shift, will start on Decadron.  Have additionally ordered for diagnostic labs and with her cough x-ray to evaluate.  Will contact her primary oncologist, Dr. Garcia to discuss long-term goals of care and management as well    Case was discussed with Dr. Garcia from gynecologic oncology.  Plan admit the patient to the hospital service,    Case discussed with neurosurgery Dr. Rousseau for consultation on the patient         PROBLEM LIST  # Brain mass.  Patient with history of uterine cancer, now with large brain mass, likely metastatic disease.  She does have some new neurologic deficits, and on her imaging vasogenic edema, 4 mm of shift.  She started on Decadron, is hospitalized for further care.    # Hypertension, history of, noted to be hypertensive here, given labetalol, further medications and treatment as inpatient      DISPOSITION AND DISCUSSIONS  I have discussed management of the patient with the following physicians and TRINH's: Neurosurgery and gynecologic oncology as above    Patient is admitted in stable condition    FINAL DIAGNOSIS  1. Brain mass    2. Weakness    3. Malignant neoplasm of uterus, unspecified site (HCC)           Electronically signed by: Charles Winchester M.D., 3/27/2024 12:01 PM

## 2024-03-28 ENCOUNTER — APPOINTMENT (OUTPATIENT)
Dept: RADIOLOGY | Facility: MEDICAL CENTER | Age: 74
End: 2024-03-28
Attending: PHYSICIAN ASSISTANT
Payer: MEDICARE

## 2024-03-28 ENCOUNTER — HOSPITAL ENCOUNTER (OUTPATIENT)
Dept: RADIATION ONCOLOGY | Facility: MEDICAL CENTER | Age: 74
End: 2024-03-28

## 2024-03-28 PROCEDURE — 770004 HCHG ROOM/CARE - ONCOLOGY PRIVATE *

## 2024-03-28 PROCEDURE — A9270 NON-COVERED ITEM OR SERVICE: HCPCS | Performed by: INTERNAL MEDICINE

## 2024-03-28 PROCEDURE — 77290 THER RAD SIMULAJ FIELD CPLX: CPT | Mod: 26 | Performed by: RADIOLOGY

## 2024-03-28 PROCEDURE — 77470 SPECIAL RADIATION TREATMENT: CPT | Performed by: RADIOLOGY

## 2024-03-28 PROCEDURE — 77290 THER RAD SIMULAJ FIELD CPLX: CPT | Performed by: RADIOLOGY

## 2024-03-28 PROCEDURE — 700102 HCHG RX REV CODE 250 W/ 637 OVERRIDE(OP): Performed by: INTERNAL MEDICINE

## 2024-03-28 PROCEDURE — 77334 RADIATION TREATMENT AID(S): CPT | Mod: 26 | Performed by: RADIOLOGY

## 2024-03-28 PROCEDURE — 77470 SPECIAL RADIATION TREATMENT: CPT | Mod: 26 | Performed by: RADIOLOGY

## 2024-03-28 PROCEDURE — 77263 THER RADIOLOGY TX PLNG CPLX: CPT | Performed by: RADIOLOGY

## 2024-03-28 PROCEDURE — A9270 NON-COVERED ITEM OR SERVICE: HCPCS | Performed by: HOSPITALIST

## 2024-03-28 PROCEDURE — 700102 HCHG RX REV CODE 250 W/ 637 OVERRIDE(OP): Performed by: HOSPITALIST

## 2024-03-28 PROCEDURE — 77334 RADIATION TREATMENT AID(S): CPT | Performed by: RADIOLOGY

## 2024-03-28 PROCEDURE — 700111 HCHG RX REV CODE 636 W/ 250 OVERRIDE (IP): Mod: JZ | Performed by: NEUROLOGICAL SURGERY

## 2024-03-28 PROCEDURE — 99232 SBSQ HOSP IP/OBS MODERATE 35: CPT | Performed by: HOSPITALIST

## 2024-03-28 RX ORDER — LORAZEPAM 0.5 MG/1
0.5 TABLET ORAL
Status: ACTIVE | OUTPATIENT
Start: 2024-03-28 | End: 2024-03-28

## 2024-03-28 RX ORDER — LOSARTAN POTASSIUM 50 MG/1
100 TABLET ORAL DAILY
Status: DISCONTINUED | OUTPATIENT
Start: 2024-03-28 | End: 2024-03-29 | Stop reason: HOSPADM

## 2024-03-28 RX ORDER — ATORVASTATIN CALCIUM 20 MG/1
20 TABLET, FILM COATED ORAL EVERY EVENING
Status: DISCONTINUED | OUTPATIENT
Start: 2024-03-28 | End: 2024-03-29 | Stop reason: HOSPADM

## 2024-03-28 RX ORDER — NIFEDIPINE 30 MG/1
30 TABLET, EXTENDED RELEASE ORAL
Status: DISCONTINUED | OUTPATIENT
Start: 2024-03-29 | End: 2024-03-29 | Stop reason: HOSPADM

## 2024-03-28 RX ORDER — BACLOFEN 10 MG/1
10 TABLET ORAL 3 TIMES DAILY PRN
Status: DISCONTINUED | OUTPATIENT
Start: 2024-03-28 | End: 2024-03-29 | Stop reason: HOSPADM

## 2024-03-28 RX ORDER — DEXAMETHASONE SODIUM PHOSPHATE 4 MG/ML
2 INJECTION, SOLUTION INTRA-ARTICULAR; INTRALESIONAL; INTRAMUSCULAR; INTRAVENOUS; SOFT TISSUE EVERY 6 HOURS
Status: DISCONTINUED | OUTPATIENT
Start: 2024-03-28 | End: 2024-03-29 | Stop reason: HOSPADM

## 2024-03-28 RX ORDER — METOPROLOL SUCCINATE 50 MG/1
100 TABLET, EXTENDED RELEASE ORAL DAILY
Status: DISCONTINUED | OUTPATIENT
Start: 2024-03-28 | End: 2024-03-29 | Stop reason: HOSPADM

## 2024-03-28 RX ADMIN — DOCUSATE SODIUM 50 MG AND SENNOSIDES 8.6 MG 2 TABLET: 8.6; 5 TABLET, FILM COATED ORAL at 17:57

## 2024-03-28 RX ADMIN — GABAPENTIN 300 MG: 300 CAPSULE ORAL at 11:58

## 2024-03-28 RX ADMIN — DEXAMETHASONE SODIUM PHOSPHATE 2 MG: 4 INJECTION INTRA-ARTICULAR; INTRALESIONAL; INTRAMUSCULAR; INTRAVENOUS; SOFT TISSUE at 11:58

## 2024-03-28 RX ADMIN — ATORVASTATIN CALCIUM 20 MG: 20 TABLET, FILM COATED ORAL at 17:57

## 2024-03-28 RX ADMIN — DEXAMETHASONE SODIUM PHOSPHATE 2 MG: 4 INJECTION INTRA-ARTICULAR; INTRALESIONAL; INTRAMUSCULAR; INTRAVENOUS; SOFT TISSUE at 23:32

## 2024-03-28 RX ADMIN — GABAPENTIN 300 MG: 300 CAPSULE ORAL at 17:57

## 2024-03-28 RX ADMIN — DEXAMETHASONE SODIUM PHOSPHATE 4 MG: 4 INJECTION INTRA-ARTICULAR; INTRALESIONAL; INTRAMUSCULAR; INTRAVENOUS; SOFT TISSUE at 05:45

## 2024-03-28 RX ADMIN — LEVETIRACETAM 500 MG: 100 INJECTION, SOLUTION, CONCENTRATE INTRAVENOUS at 17:57

## 2024-03-28 RX ADMIN — METOPROLOL SUCCINATE 100 MG: 50 TABLET, EXTENDED RELEASE ORAL at 14:29

## 2024-03-28 RX ADMIN — DEXAMETHASONE SODIUM PHOSPHATE 2 MG: 4 INJECTION INTRA-ARTICULAR; INTRALESIONAL; INTRAMUSCULAR; INTRAVENOUS; SOFT TISSUE at 17:57

## 2024-03-28 RX ADMIN — LEVETIRACETAM 500 MG: 100 INJECTION, SOLUTION, CONCENTRATE INTRAVENOUS at 05:45

## 2024-03-28 RX ADMIN — LOSARTAN POTASSIUM 100 MG: 50 TABLET, FILM COATED ORAL at 14:29

## 2024-03-28 RX ADMIN — GABAPENTIN 300 MG: 300 CAPSULE ORAL at 05:45

## 2024-03-28 ASSESSMENT — LIFESTYLE VARIABLES
EVER HAD A DRINK FIRST THING IN THE MORNING TO STEADY YOUR NERVES TO GET RID OF A HANGOVER: NO
HAVE PEOPLE ANNOYED YOU BY CRITICIZING YOUR DRINKING: NO
AVERAGE NUMBER OF DAYS PER WEEK YOU HAVE A DRINK CONTAINING ALCOHOL: 0
CONSUMPTION TOTAL: NEGATIVE
DOES PATIENT WANT TO STOP DRINKING: NO
ALCOHOL_USE: NO
HOW MANY TIMES IN THE PAST YEAR HAVE YOU HAD 5 OR MORE DRINKS IN A DAY: 0
TOTAL SCORE: 0
ON A TYPICAL DAY WHEN YOU DRINK ALCOHOL HOW MANY DRINKS DO YOU HAVE: 0
HAVE YOU EVER FELT YOU SHOULD CUT DOWN ON YOUR DRINKING: NO
EVER FELT BAD OR GUILTY ABOUT YOUR DRINKING: NO
TOTAL SCORE: 0
TOTAL SCORE: 0

## 2024-03-28 ASSESSMENT — PAIN DESCRIPTION - PAIN TYPE
TYPE: ACUTE PAIN
TYPE: ACUTE PAIN

## 2024-03-28 ASSESSMENT — ENCOUNTER SYMPTOMS
SHORTNESS OF BREATH: 0
HEADACHES: 0
SENSORY CHANGE: 1
FOCAL WEAKNESS: 1
FEVER: 0

## 2024-03-28 NOTE — DISCHARGE PLANNING
Care Transition Team Assessment    Patient is a 73 Year-old female admitted for worsening weakness. Please see pt's H&P for prior medical history. BSW intern met with patient at bedside to complete assessment. Pt A&OX4 and able to verify the information on the face sheet. Pt lives with her daughter in a single story, Radha Rivas P) [phone] emergency contact. No AD on file. Patient reports prior to admission  pt was independent with ADLS and IADL's, but admits completing tasks was getting progressively harder. Pt does not use any DME at baseline. Pt reported that her daughter is a good support, she states the majority of her family lives in [place]. Pt reports she receives monthly SSI deposits but was not sure what the amount was. The patients PCP is Dr. Kathya Scanlon. Patients preferred pharmacy is [facility] (unable to recall the location address). Patient denies a history of SNF/IPR. Patient states she was previously on services with home health agency in [facility]. Patient denies any SA or MH concerns Patient confirmed medical coverage via [facility]. Primary oncologist, Dr. Garcia. Patient states she was previously receiving treatment in [facility], but moved back to [place] in August of 2023. Will continue to follow for discharge needs.     Information Source  Orientation Level: Oriented X4  Information Given By: Patient  Who is responsible for making decisions for patient? : Patient    Elopement Risk  Legal Hold: No  Ambulatory or Self Mobile in Wheelchair: No-Not an Elopement Risk  Elopement Risk: Not at Risk for Elopement    Interdisciplinary Discharge Planning  Lives with - Patient's Self Care Capacity: Adult Children  Patient or legal guardian wants to designate a caregiver: No (Per patient, daughter is primary caregiver already)  Support Systems: Family Member(s)  Housing / Facility: Unable To Determine At This Time  Durable Medical Equipment: Not Applicable    Discharge  Preparedness  What is your plan after discharge?: Home with help  What are your discharge supports?: Child  Prior Functional Level: Needs Assist with Activities of Daily Living  Difficulity with ADLs: None  Difficulity with IADLs: None    Functional Assesment  Prior Functional Level: Needs Assist with Activities of Daily Living    Finances  Financial Barriers to Discharge: No  Prescription Coverage: Yes              Advance Directive  Advance Directive?: None    Domestic Abuse  Have you ever been the victim of abuse or violence?: No  Physical Abuse or Sexual Abuse: No  Verbal Abuse or Emotional Abuse: No  Possible Abuse/Neglect Reported to:: Not Applicable    Psychological Assessment  History of Substance Abuse: None  History of Psychiatric Problems: No  Non-compliant with Treatment: No  Newly Diagnosed Illness: No    Discharge Risks or Barriers  Discharge risks or barriers?: Complex medical needs  Patient risk factors: Complex medical needs    Anticipated Discharge Information  Discharge Disposition: Discharged to home/self care (01)  Discharge Address: [address] [place] [address] [address] [place] [id number]  Discharge Contact Phone Number: [phone]

## 2024-03-28 NOTE — HOSPITAL COURSE
"Jairo Rivas is a 73 y.o. female who presented 3/27/2024 with Worsening weakness.  The patient has a past medical history of endometrial cancer diagnosed and underwent hysterectomy back in 2019, she was originally seeing the [facility] however then decided to get treatment in [place] and was on immunotherapy reportedly with regression of tumor through 2022, she was supposed to follow-up in February of this year but ended up moving back appear to be with this daughter in order to give her daughter in [place] \"a break\".  Since coming up here patient has become progressively more weak.  Her appetite is poor.  Previously she was able to be fairly independent using a 4 wheeled seated walker but now she is full assist to stand and get to the bathroom etc. and is unable to ambulate.  The patient had an MRI of the brain yesterday showing significant metastatic disease and was advised to come to the hospital.  I spoke with her previous oncologist Dr. Garcia, from surgical and medical oncology standpoint they have nothing further to offer her and recommend that neurosurgery and radiation oncology be consulted..   "

## 2024-03-28 NOTE — PROGRESS NOTES
Received report from [facility]. Pt transferred from ER via transport in hospital bed. PT /104. Pt complains of no pain at this time. Fall precautions in place.

## 2024-03-28 NOTE — PROGRESS NOTES
Writer able to do admit profile with patient & Brain MRI order discussed. Per patient, she was told that she would be sedated for this MRI. Daughter currently not at bedside & writer unable to get clear history due to patient being intermittently lethargic.     JANEEN Prabhakar notified - see new orders for one-time ativan.

## 2024-03-28 NOTE — PROGRESS NOTES
Writer unable to complete admit profile at this time due to patient lethargy & daughter no longer at bedside.

## 2024-03-28 NOTE — PROGRESS NOTES
4 Eyes Skin Assessment Completed by Winsome, RN and JOB Soliz.    Head WDL  Ears WDL  Nose WDL  Mouth WDL  Neck WDL  Breast/Chest Redness and Blanching  Shoulder Blades WDL  Spine WDL  (R) Arm/Elbow/Hand WDL  (L) Arm/Elbow/Hand WDL  Abdomen WDL  Groin WDL  Scrotum/Coccyx/Buttocks Redness and Blanching  (R) Leg WDL  (L) Leg WDL  (R) Heel/Foot/Toe WDL  (L) Heel/Foot/Toe WDL          Devices In Places Purewick      Interventions In Place Pressure Redistribution Mattress    Possible Skin Injury No    Pictures Uploaded Into Epic N/A  Wound Consult Placed N/A  RN Wound Prevention Protocol Ordered No

## 2024-03-28 NOTE — DISCHARGE PLANNING
HTH/SCP TCN chart review completed. Collaborated with JER Sauceda prior to meeting with the pt. The most current review of medical record, knowledge of pt's PLOF and social support, LACE+ score of 72, 6 clicks scores of 6 mobility were considered.      Pt seen at bedside. Introduced TCN program. Provided education regarding post acute levels of care. Education provided regarding case management policy for blanket SNF referrals. Discussed HTH/SCP plan benefits. Pt verbalizes understanding.     Note that pt has PT and OT consults in place and TCN will monitor for recs. However, per discussion with pt she reports that she prefers to dc to home with HH and assistance from her daughter (who pt reports was assisting with transfers PTA as well). Pt reports daughter used to be paramedic and is aware of how to assist given current deficits. She reports that she has a walker, wc, BSC and appropriate shower setup as well and that as above, daughter who works from home, would be able to assist as well. Discussed current consults that are in place which may result in recs for post acute placement and pt reports she will discuss if recommended though again, prefers to dc to home with HH, family support and caregiver resources if possible. Relayed above information to JER as well.     No additional provider requests and will monitor for PT and OT evaluation as appropriate. Given aforementioned, choice proactively obtained for HH and faxed to [facility] with JER aware. TCN will continue to follow and collaborate with discharge planning team as additional post acute needs arise. Thank you.     Completed today:  PT/OT consults in place  Choice obtained: HH ([facility]); see above  SCP with [facility] PCP. Pt has upcoming PCP appt with her PCP on 4/16; has additional upcoming outpatient appts as well

## 2024-03-28 NOTE — THERAPY
Physical Therapy Contact Note    Patient Name: Jairo Rivas  Age:  73 y.o., Sex:  female  Medical Record #: [id number]  Today's Date: 3/28/2024    PT consult received, asked for dc, chart reviewed pt pending sx decision via pt/dtr; once this decision is made will see for 'dc' given this will change pt's abilities; per BSRN difficulty with BSC transfer, prolonged course per chart review;     Teetee GOULD, PT,  [phone]

## 2024-03-28 NOTE — RADIATION PLANNING NOTES
DATE OF SERVICE: 3/28/2024    DIAGNOSIS:  Endometrial cancer (HCC)  Staging form: Corpus Uteri - Carcinoma and Carcinosarcoma, AJCC 8th Edition  - Pathologic stage from 9/13/2019: FIGO Stage IIIB (pT3b, pN0, cM0) - Signed by Jayant EDOUARD M.D. on 9/13/2019  Histopathologic type: Endometrioid adenocarcinoma, NOS  Histologic grade (G): G2  Histologic grading system: 3 grade system  Residual tumor (R): R1 - Microscopic  Lymph-vascular invasion (LVI): LVI not present (absent)/not identified  Diagnostic confirmation: Positive histology  Specimen type: Excision  Staged by: Managing physician  - Clinical stage from 3/27/2024: Stage IVB (rcT0, cN0, pM1) - Signed by Jayant EDOUARD M.D. on 3/27/2024  Histopathologic type: Endometrioid adenocarcinoma, NOS  Stage prefix: Recurrence  Prognostic indicators: Brain Metastasis       DATE OF SERVICE: 3/28/2024    TYPE OF SIMULATION: Brain    GOAL OF TREATMENT:   [] Curative  [x] Palliative  [] Oligometastatic    CONTRAST:    [x] IV Contrast*                POSITION:    [x]  Supine  [] Prone     COMPLEX:  [] Complex Blocking   [x]Arcs  [] Custom Blocks  [] >3 Sites    PROCEDURE: Patient placed Stereotactic Mask with head in neutral position. CT scan obtained at 1 mm intervals. Images viewed and approved.  Images reconstructed as need.  Image data sets transferred to Brain-Lab for planning.    I have personally reviewed the relevant data, performed the target localization, and determined all relevant factors for this patient’s simulation.    *Omnipaque 80 -100cc IVP in conjunction with 500cc NS

## 2024-03-28 NOTE — RADIATION PLANNING NOTES
Clinical Treatment Planning Note    DATE OF SERVICE: 3/28/2024    DIAGNOSIS:  Endometrial cancer (HCC)  Staging form: Corpus Uteri - Carcinoma and Carcinosarcoma, AJCC 8th Edition  - Pathologic stage from 9/13/2019: FIGO Stage IIIB (pT3b, pN0, cM0) - Signed by Jayant EDOUARD M.D. on 9/13/2019  Histopathologic type: Endometrioid adenocarcinoma, NOS  Histologic grade (G): G2  Histologic grading system: 3 grade system  Residual tumor (R): R1 - Microscopic  Lymph-vascular invasion (LVI): LVI not present (absent)/not identified  Diagnostic confirmation: Positive histology  Specimen type: Excision  Staged by: Managing physician  - Clinical stage from 3/27/2024: Stage IVB (rcT0, cN0, pM1) - Signed by Jayant EDOUARD M.D. on 3/27/2024  Histopathologic type: Endometrioid adenocarcinoma, NOS  Stage prefix: Recurrence  Prognostic indicators: Brain Metastasis         IMAGING REVIEWED:  [] CT     [x] MRI     [] PET/CT     [] BONE SCAN     [] MAMMO     [] OTHER      TREATMENT INTENT:   [] CURATIVE     [] MAINTENANCE     [x]  PALLIATIVE      []  SUPPORTIVE     []  PROPHYLACTIC     [] BENIGN     []  CONSOLIDATIVE      [] DEFINITIVE   []  OLOGIMETASTATIC      LINE OF TREATMENT:  [] ADJUVANT   [x] DEFINITIVE   [] NEOADJUVANT   [] RE-TREATMENT      TECHNIQUE PLANNED:  [] IMRT   [] 3D   [] SBRT   [x] SRS/SRT   [] HDR   [] ELECTRON       IMRT JUSTIFICATION:  []   An immediately adjacent area has been previously irradiated and abutting portals must be established with high precision.    []  Dose escalation is planned to deliver radiation doses in excess of those commonly utilized for similar tumors with conventional treatment.    []  The target volume is concave or convex, and the critical normal tissues are within or around that convexity or concavity.    []  The target volume is in close proximity to critical structures that must be protected.    []  The volume of interest must be covered with narrow margins to adequately  protect  immediately adjacent structures.      FIELDS & BLOCKING:  [] COMPLEX BLOCKS     []  = 3 TX AREAS     [x]  ARCS     []  CUSTOM SHEILD        []  SIMPLE BLOCK      CHEMOTHERAPY:  []  CONCURRENT     []  INDUCTION     [] SEQUENTIAL     []  <30 DAYS FROM XRT      NOTES:  OAR CONSTRAINTS: (GUIDELINES ONLY NOT ABSOLUTE)    Target Prescribed Coverage   PTV 95% of PTV covered by 100% (Gy) of RX Dose       HAYDEN Goal   Cord Max Dose < 13Gy   Brainstem Max Dose < 12.5Gy   Optic Chiasm Max Dose < 12Gy   R Optic Nerve Max Dose < 12Gy   L Optic Nerve Max Dose < 12Gy   R Eye Max Dose < 5-7Gy   L Eye Max Dose < 5-7Gy   R Lens Max Dose < 5-7Gy   L Lens Max Dose < 5-7Gy

## 2024-03-28 NOTE — CARE PLAN
The patient is Stable - Low risk of patient condition declining or worsening    Shift Goals  Clinical Goals: Remain free from injury or falls this shift  Patient Goals: Sleep  Family Goals: ZA    Progress made toward(s) clinical / shift goals: No acute changes this shift. Repositioning often for patient comfort; specifically RUE. ROM exercises performed to RLE. Anticipating Brain MRI.     Patient is not progressing towards the following goals:    Problem: Pain - Standard  Goal: Alleviation of pain or a reduction in pain to the patient’s comfort goal  Outcome: Not Progressing    Reporting baseline RUE & abdominal aching; PRN tylenol administered x1.

## 2024-03-28 NOTE — PROGRESS NOTES
Neurosurgery Progress Note    Subjective:  Doing well this morning, much more awake and conversant    Exam:  Right upper and lower extremity 4/5, left upper and lower extremity 5/5.  GCS 15    BP  Min: 113/57  Max: 238/103  Pulse  Av.5  Min: 68  Max: 84  Resp  Av.6  Min: 16  Max: 18  Temp  Av.5 °C (97.7 °F)  Min: 36.2 °C (97.1 °F)  Max: 36.8 °C (98.2 °F)  SpO2  Av.3 %  Min: 91 %  Max: 97 %    No data recorded    Recent Labs     24  1249 24  1251 24  1219   WBC 4.1* 4.2* 6.1   RBC 4.93 4.80 4.64   HEMOGLOBIN 15.4 15.3 14.8   HEMATOCRIT 47.9* 46.0 44.1   MCV 97.2 95.8 95.0   MCH 31.2 31.9 31.9   MCHC 32.2 33.3 33.6   RDW 46.2 46.0 44.8   PLATELETCT 239 256 251   MPV 10.3 10.4 9.9     Recent Labs     24  1249 24  1251 24  1219   SODIUM 143 142 140   POTASSIUM 3.7 3.7 3.6   CHLORIDE 104 104 103   CO2    GLUCOSE 87 86 96   BUN 10 10 9   CREATININE 0.79 0.80 0.84   CALCIUM 10.0 9.9 9.9               Intake/Output                         24 07 - 24 - 24 0659      Total  Total                 Intake    Total Intake -- -- -- -- -- --       Output    Urine  --  400 400  --  -- --    Urine Void (mL) -- 400 400 -- -- --    Total Output -- 400 400 -- -- --       Net I/O     -- -400 -400 -- -- --              Intake/Output Summary (Last 24 hours) at 3/28/2024 07  Last data filed at 3/28/2024 0624  Gross per 24 hour   Intake --   Output 400 ml   Net -400 ml             LORazepam  0.5 mg Once PRN    dexamethasone  4 mg Q6HRS    levETIRAcetam (Keppra) IV  500 mg Q12HRS    Respiratory Therapy Consult   Continuous RT    acetaminophen  650 mg Q6HRS PRN    gabapentin  300 mg TID    senna-docusate  2 Tablet Q EVENING    And    polyethylene glycol/lytes  1 Packet QDAY PRN    ondansetron  4 mg Q4HRS PRN    ondansetron  4 mg Q4HRS PRN    enalaprilat  2.5 mg Q6HRS PRN    NIFEdipine SR  90 mg Q DAY     cloNIDine  0.1 mg Q4HRS PRN       Assessment and Plan:  Hospital day #2    We discussed surgical treatment options.  She is not in favor of surgery, we would much rather proceed with radiation and immunotherapy.  I spoke with her daughter over the phone and she would like to speak with her mother before making final decision.  I will call her daughter later this afternoon, not planning on surgery at this point.  Will cancel [facility] brain MRI

## 2024-03-28 NOTE — RADIATION PLANNING NOTES
PATIENT NAME Jairo Rivas   PRIMARY PHYSICIAN Kathya Scanlon [id number]   REFERRING PHYSICIAN No ref. provider found 1950     DATE OF SERVICE: 3/28/2024    DIAGNOSIS:  Endometrial cancer (HCC)  Staging form: Corpus Uteri - Carcinoma and Carcinosarcoma, AJCC 8th Edition  - Pathologic stage from 9/13/2019: FIGO Stage IIIB (pT3b, pN0, cM0) - Signed by Jayant EDOUARD M.D. on 9/13/2019  Histopathologic type: Endometrioid adenocarcinoma, NOS  Histologic grade (G): G2  Histologic grading system: 3 grade system  Residual tumor (R): R1 - Microscopic  Lymph-vascular invasion (LVI): LVI not present (absent)/not identified  Diagnostic confirmation: Positive histology  Specimen type: Excision  Staged by: Managing physician  - Clinical stage from 3/27/2024: Stage IVB (rcT0, cN0, pM1) - Signed by Jayant EDOUARD M.D. on 3/27/2024  Histopathologic type: Endometrioid adenocarcinoma, NOS  Stage prefix: Recurrence  Prognostic indicators: Brain Metastasis         SPECIAL TREATMENT PROCEDURE NOTE:  Considerable additional effort required in the management of this case because of administration of Stereotactic Radiotherapy, which may result in increased normal tissue toxicity and require greater effort in contouring and treatment because of greater precision.

## 2024-03-28 NOTE — CARE PLAN
The patient is Stable - Low risk of patient condition declining or worsening    Shift Goals  Clinical Goals: Monitor Blood pressure, Q4 Neuro checks  Patient Goals: rest, sit up in chair  Family Goals: ZA    Progress made toward(s) clinical / shift goals:       Problem: Knowledge Deficit - Standard  Goal: Patient and family/care givers will demonstrate understanding of plan of care, disease process/condition, diagnostic tests and medications  Description: Target End Date:  1-3 days or as soon as patient condition allows    Document in Patient Education    1.  Patient and family/caregiver oriented to unit, equipment, visitation policy and means for communicating concern  2.  Complete/review Learning Assessment  3.  Assess knowledge level of disease process/condition, treatment plan, diagnostic tests and medications  4.  Explain disease process/condition, treatment plan, diagnostic tests and medications  Outcome: Progressing  Note: Patient understands the need for radiation MAPPING is needed to start radiation treatment. Patient understands the need for neuro checks.

## 2024-03-28 NOTE — PROGRESS NOTES
"Hospital Medicine Daily Progress Note    Date of Service  3/28/2024    Chief Complaint  Jairo Rivas is a 73 y.o. female admitted 3/27/2024 with weakness    Hospital Course  Jairo Rivas is a 73 y.o. female who presented 3/27/2024 with Worsening weakness.  The patient has a past medical history of endometrial cancer diagnosed and underwent hysterectomy back in 2019, she was originally seeing the [facility] however then decided to get treatment in [place] and was on immunotherapy reportedly with regression of tumor through 2022, she was supposed to follow-up in February of this year but ended up moving back appear to be with this daughter in order to give her daughter in [place] \"a break\".  Since coming up here patient has become progressively more weak.  Her appetite is poor.  Previously she was able to be fairly independent using a 4 wheeled seated walker but now she is full assist to stand and get to the bathroom etc. and is unable to ambulate.  The patient had an MRI of the brain yesterday showing significant metastatic disease and was advised to come to the hospital.  I spoke with her previous oncologist Dr. Garcia, from surgical and medical oncology standpoint they have nothing further to offer her and recommend that neurosurgery and radiation oncology be consulted..     Interval Problem Update    Patient is afebrile on room air.  I reviewed CMP electrolytes stable alk phos 122  I reviewed CBC hemoglobin 14.8  I reviewed MRI of the brain with right frontal lesion with 4 mm midline shift  I reviewed CT chest and abdomen with metastatic right lower lobe nodule increased in size from prior        I have discussed this patient's plan of care and discharge plan at IDT rounds today with Case Management, Nursing, Nursing leadership, and other members of the IDT team.    Consultants/Specialty  neurosurgery and rad onc    Code Status  Full Code    Disposition  The patient is not medically " cleared for discharge to home or a post-acute facility.      I have placed the appropriate orders for post-discharge needs.    Review of Systems  Review of Systems   Constitutional:  Negative for fever.   Respiratory:  Negative for shortness of breath.    Cardiovascular:  Negative for chest pain.   Neurological:  Positive for sensory change and focal weakness. Negative for headaches.   All other systems reviewed and are negative.       Physical Exam  Temp:  [36.2 °C (97.1 °F)-36.8 °C (98.2 °F)] 36.6 °C (97.8 °F)  Pulse:  [68-96] 96  Resp:  [18] 18  BP: (113-238)/() 141/89  SpO2:  [91 %-95 %] 94 %    Physical Exam  Vitals and nursing note reviewed.   Constitutional:       General: She is not in acute distress.  HENT:      Head: Normocephalic and atraumatic.      Nose: Nose normal. No rhinorrhea.      Mouth/Throat:      Pharynx: No oropharyngeal exudate or posterior oropharyngeal erythema.   Eyes:      General:         Right eye: No discharge.         Left eye: No discharge.   Cardiovascular:      Rate and Rhythm: Normal rate and regular rhythm.      Heart sounds: Normal heart sounds. No murmur heard.     No friction rub. No gallop.   Pulmonary:      Effort: Pulmonary effort is normal. No respiratory distress.      Breath sounds: Normal breath sounds. No stridor. No wheezing, rhonchi or rales.   Chest:      Chest wall: No tenderness.   Abdominal:      General: Bowel sounds are normal. There is no distension.      Palpations: Abdomen is soft. There is no mass.      Tenderness: There is no abdominal tenderness. There is no rebound.   Musculoskeletal:         General: No swelling or tenderness.      Cervical back: Neck supple. No rigidity.   Skin:     General: Skin is warm and dry.      Coloration: Skin is not cyanotic or jaundiced.      Nails: There is no clubbing.   Neurological:      General: No focal deficit present.      Mental Status: She is alert and oriented to person, place, and time.      Cranial Nerves:  No cranial nerve deficit.      Motor: Weakness present.      Comments: Right hemiparesis right upper extremity 3/5 right lower extremity 4/5   Psychiatric:         Mood and Affect: Mood normal.         Behavior: Behavior normal.         Fluids    Intake/Output Summary (Last 24 hours) at 3/28/2024 1216  Last data filed at 3/28/2024 0624  Gross per 24 hour   Intake --   Output 400 ml   Net -400 ml       Laboratory  Recent Labs     03/26/24  1249 03/26/24  1251 03/27/24  1219   WBC 4.1* 4.2* 6.1   RBC 4.93 4.80 4.64   HEMOGLOBIN 15.4 15.3 14.8   HEMATOCRIT 47.9* 46.0 44.1   MCV 97.2 95.8 95.0   MCH 31.2 31.9 31.9   MCHC 32.2 33.3 33.6   RDW 46.2 46.0 44.8   PLATELETCT 239 256 251   MPV 10.3 10.4 9.9     Recent Labs     03/26/24  1249 03/26/24  1251 03/27/24  1219   SODIUM 143 142 140   POTASSIUM 3.7 3.7 3.6   CHLORIDE 104 104 103   CO2 26 26 26   GLUCOSE 87 86 96   BUN 10 10 9   CREATININE 0.79 0.80 0.84   CALCIUM 10.0 9.9 9.9                   Imaging  CT-CHEST,ABDOMEN,PELVIS WITH   Final Result      1. Chronic postoperative changes in the left lung with no residual pleural effusion.   2. Nodule in the right lower lobe measures 10 mm in diameter, larger than on the prior examination, most compatible with metastasis.   3. Calcific atherosclerotic plaques in the coronary arteries.   4. Enlarged heterogeneously enhancing thyroid gland.   5. Absent uterus and ovaries.   6. Decreased bone mineralization.      DX-CHEST-PORTABLE (1 VIEW)   Final Result      1. Peripheral scarring from prior infiltrate in the left lung.   2. No acute findings.           Assessment/Plan  * Endometrial cancer (HCC)- (present on admission)  Assessment & Plan  Now with significant metastatic disease to brain  Neurosurgery consulted radiation oncology consulted  Last Tx's in [place] 2022 Immunotherapy  Dr. Garcia contacted by admitting hospitalist with no other interventions from his standpoint at this time  Discussed with patient she wants to proceed  with radiation therapy and immunotherapy rather than surgical resection      Right lower quadrant abdominal pain- (present on admission)  Assessment & Plan  Abdominal exam is benign  Denies pain this morning  Reviewed CT with no acute pathology    Hypercoagulable state (HCC)- (present on admission)  Assessment & Plan  H/o DVT, PE in past. Bleeding complications in past.    Malignant neoplasm metastatic to brain (HCC)- (present on admission)  Assessment & Plan  Neurosurgery and Rad onc consulted  Started Keppra 500mg BID  Starting decadron 4mg IV q6  neuro checks  Worsening weakness- PT, OT, Fall precautions    Hypertensive urgency- (present on admission)  Assessment & Plan  Resume home dose of losartan and metoprolol  Patient started on nifedipine monitor blood pressure and adjust accordingly    Neuropathy- (present on admission)  Assessment & Plan  Patient started on gabapentin  Outpatient follow-up        Obesity (BMI 30-39.9)- (present on admission)  Assessment & Plan  Impacts mobility    History of CVA (cerebrovascular accident)- (present on admission)  Assessment & Plan  Residual right sided weakness s/p stroke 2010. UE contracture>>LE weakness   Bled on plavix- now on ASA 81- dc'd at admission         VTE prophylaxis:   SCDs/TEDs      I have performed a physical exam and reviewed and updated ROS and Plan today (3/28/2024). In review of yesterday's note (3/27/2024), there are no changes except as documented above.

## 2024-03-28 NOTE — CONSULTS
Surgery Neurosurgery Consultation    Date of Service  3/27/2024    Referring Physician  LUIS FERNANDO Moscoso.*    Consulting Physician  Michael Del Castillo M.D.    Reason for Consultation  Right frontal brain metastasis endometrial adenocarcinoma    History of Presenting Illness  73 y.o. female who presented 3/27/2024 with generalized weakness and lethargy.  She had an MRI of the brain yesterday which showed a solitary right frontal brain mass.  She is very lethargic during interview.  Daughter at bedside who is her primary caregiver, able to give history.  Patient has not been symptomatic from the brain lesion, no significant headaches, no left-sided weakness.  She has chronic right hemiparesis due to prior CVA.  She has a previous history of stereotactic radiosurgery at [facility] 2022 for brain metastases.    Review of Systems  Review of Systems   Unable to perform ROS: Mental acuity       Past Medical History   has a past medical history of Anemia (2/6/2018), Back pain, Back pain, Cancer (HCC) (2019), Cough, CVA, old, hemiparesis ([facility]) (2/6/2018), Essential hypertension (2/6/2018), Eye drainage, Fatigue, Frequent headaches, Frequent urination, Hyperlipidemia, Irregular periods, Painful joint, Palpitations, Post-menopause bleeding (2/6/2018), Pulmonary nodule, Sore muscles, Stroke ([facility]), Swelling of lower extremity, Vision abnormalities (2/6/2018), Vitamin D deficiency (2/6/2018), Weakness, and Wears glasses.    Surgical History   has a past surgical history that includes pr hysteroscopy,dx,sep proc (6/19/2019); dilation and curettage (6/19/2019); pr cystoscopy,insert ureteral stent (Left, 8/8/2019); hysterectomy robotic xi (N/A, 8/8/2019); salpingo oophorectomy (Bilateral, 8/8/2019); node biopsy sentinel (8/8/2019); hysterectomy laparoscopy; and pr thoracoscopy,dx no bx (Left, 8/6/2021).    Family History  family history includes Hypertension in her mother; No Known Problems in her brother and father.    Social  History   reports that she has never smoked. She has never used smokeless tobacco. She reports that she does not drink alcohol and does not use drugs.    Medications  Prior to Admission Medications   Prescriptions Last Dose Informant Patient Reported? Taking?   Cholecalciferol (VITAMIN D3) 2000 UNIT Cap 3/26/2024 at AM Family Member, Patient Yes Yes   Sig: Take 2,000 Units by mouth every day.   Coenzyme Q10 (COQ10) 100 MG Cap 3/25/2024 at UNK Family Member, Patient Yes No   Sig: Take 100 mg by mouth every evening.   Multiple Vitamins-Minerals (CENTRUM ADULT PO) 3/25/2024 at K Family Member, Patient Yes Yes   Sig: Take 1 Tablet by mouth every day.   acetaminophen (TYLENOL) 500 MG Tab 3/27/2024 at AM Family Member, Patient Yes Yes   Sig: Take 500-1,000 mg by mouth one time as needed for Moderate Pain.   aspirin 81 MG EC tablet > 1 WEEK AGO at PRN Family Member, Patient Yes No   Sig: Take 81 mg by mouth every day.   atorvastatin (LIPITOR) 20 MG Tab 3/25/2024 at UNK Family Member, Patient No No   Sig: Take 1 Tablet by mouth every evening.   baclofen (LIORESAL) 10 MG Tab 3/27/2024 at AM Family Member, Patient Yes Yes   Sig: Take 10 mg by mouth 3 times a day as needed.   hydrOXYzine HCl (ATARAX) 25 MG Tab PRN at PRN Family Member, Patient No No   Sig: Take 1 Tablet by mouth 3 times a day as needed for Itching or Anxiety.   losartan (COZAAR) 100 MG Tab PRN at PRN Family Member, Patient Yes Yes   Sig: Take 100 mg by mouth 1 time a day as needed. BP > 180   Indications: High Blood Pressure Disorder   meclizine (ANTIVERT) 25 MG Tab 3/24/2024 at PRN Family Member, Patient No Yes   Sig: Take 0.5-1 Tablets by mouth 3 times a day as needed for Vertigo.   metoprolol SR (TOPROL XL) 100 MG TABLET SR 24 HR 3/27/2024 at 0900 Family Member, Patient No Yes   Sig: Take 1 Tablet by mouth every day.      Facility-Administered Medications: None       Allergies  Allergies   Allergen Reactions    Penicillins Itching     Makes her feel weak      Levaquin Itching     Swelling of the tongue  Facial swelling    Tramadol Hives     Swelling       Physical Exam  Temp:  [36.5 °C (97.7 °F)-36.7 °C (98.1 °F)] 36.5 °C (97.7 °F)  Pulse:  [68-84] 74  Resp:  [16-18] 18  BP: (168-238)/() 168/104  SpO2:  [91 %-97 %] 93 %    Physical Exam  Neurological:      Mental Status: She is lethargic.      GCS: GCS eye subscore is 3. GCS verbal subscore is 5. GCS motor subscore is 6.      Comments: Right upper and lower extremity 4/5, left upper and lower extremity 5/5.  Right facial droop noted         Fluids      Laboratory  Recent Labs     03/26/24  1249 03/26/24  1251 03/27/24  1219   WBC 4.1* 4.2* 6.1   RBC 4.93 4.80 4.64   HEMOGLOBIN 15.4 15.3 14.8   HEMATOCRIT 47.9* 46.0 44.1   MCV 97.2 95.8 95.0   MCH 31.2 31.9 31.9   MCHC 32.2 33.3 33.6   RDW 46.2 46.0 44.8   PLATELETCT 239 256 251   MPV 10.3 10.4 9.9     Recent Labs     03/26/24  1249 03/26/24  1251 03/27/24  1219   SODIUM 143 142 140   POTASSIUM 3.7 3.7 3.6   CHLORIDE 104 104 103   CO2 26 26 26   GLUCOSE 87 86 96   BUN 10 10 9   CREATININE 0.79 0.80 0.84   CALCIUM 10.0 9.9 9.9                     Imaging  CT-CHEST,ABDOMEN,PELVIS WITH   Final Result      1. Chronic postoperative changes in the left lung with no residual pleural effusion.   2. Nodule in the right lower lobe measures 10 mm in diameter, larger than on the prior examination, most compatible with metastasis.   3. Calcific atherosclerotic plaques in the coronary arteries.   4. Enlarged heterogeneously enhancing thyroid gland.   5. Absent uterus and ovaries.   6. Decreased bone mineralization.      DX-CHEST-PORTABLE (1 VIEW)   Final Result      1. Peripheral scarring from prior infiltrate in the left lung.   2. No acute findings.      MR-STEALTH BRAIN WITH & W/O    (Results Pending)       Assessment/Plan  Right frontal brain metastasis    Discussed surgical option with daughter.  Patient is a surgical candidate, however her Karnofsky score is 50-60.  She  has responded to stereotactic radiation and immunotherapy for 2 previous brain lesions.  The daughter is hopeful that she can respond to further radiation and immunotherapy without surgery.  I did discuss the possibility of surgery in some detail.  Daughter would like to discuss with her mother when she is more alert, able to communicate her wishes.  At this point, daughter does not think patient will 1 surgery.  I will check in with them tomorrow.

## 2024-03-29 ENCOUNTER — HOME HEALTH ADMISSION (OUTPATIENT)
Dept: HOME HEALTH SERVICES | Facility: HOME HEALTHCARE | Age: 74
End: 2024-03-29
Payer: MEDICARE

## 2024-03-29 VITALS
BODY MASS INDEX: 31.92 KG/M2 | DIASTOLIC BLOOD PRESSURE: 70 MMHG | HEIGHT: 64 IN | TEMPERATURE: 97.7 F | RESPIRATION RATE: 17 BRPM | OXYGEN SATURATION: 92 % | HEART RATE: 76 BPM | WEIGHT: 187 LBS | SYSTOLIC BLOOD PRESSURE: 152 MMHG

## 2024-03-29 PROCEDURE — 99239 HOSP IP/OBS DSCHRG MGMT >30: CPT | Performed by: HOSPITALIST

## 2024-03-29 PROCEDURE — 77300 RADIATION THERAPY DOSE PLAN: CPT | Performed by: RADIOLOGY

## 2024-03-29 PROCEDURE — 77334 RADIATION TREATMENT AID(S): CPT | Performed by: RADIOLOGY

## 2024-03-29 PROCEDURE — 700111 HCHG RX REV CODE 636 W/ 250 OVERRIDE (IP): Mod: JZ | Performed by: NEUROLOGICAL SURGERY

## 2024-03-29 PROCEDURE — A9270 NON-COVERED ITEM OR SERVICE: HCPCS | Performed by: HOSPITALIST

## 2024-03-29 PROCEDURE — 97163 PT EVAL HIGH COMPLEX 45 MIN: CPT

## 2024-03-29 PROCEDURE — A9270 NON-COVERED ITEM OR SERVICE: HCPCS | Performed by: INTERNAL MEDICINE

## 2024-03-29 PROCEDURE — 77295 3-D RADIOTHERAPY PLAN: CPT | Performed by: RADIOLOGY

## 2024-03-29 PROCEDURE — 77295 3-D RADIOTHERAPY PLAN: CPT | Mod: 26 | Performed by: RADIOLOGY

## 2024-03-29 PROCEDURE — 77334 RADIATION TREATMENT AID(S): CPT | Mod: 26 | Performed by: RADIOLOGY

## 2024-03-29 PROCEDURE — 700102 HCHG RX REV CODE 250 W/ 637 OVERRIDE(OP): Performed by: INTERNAL MEDICINE

## 2024-03-29 PROCEDURE — 77300 RADIATION THERAPY DOSE PLAN: CPT | Mod: 26 | Performed by: RADIOLOGY

## 2024-03-29 PROCEDURE — 700102 HCHG RX REV CODE 250 W/ 637 OVERRIDE(OP): Performed by: HOSPITALIST

## 2024-03-29 PROCEDURE — 97166 OT EVAL MOD COMPLEX 45 MIN: CPT

## 2024-03-29 RX ORDER — AMLODIPINE BESYLATE 5 MG/1
5 TABLET ORAL DAILY
Qty: 30 TABLET | Refills: 0 | Status: ON HOLD | OUTPATIENT
Start: 2024-03-29

## 2024-03-29 RX ORDER — DEXAMETHASONE 2 MG/1
TABLET ORAL
Qty: 58 TABLET | Refills: 0 | Status: ON HOLD | OUTPATIENT
Start: 2024-03-29 | End: 2024-04-20

## 2024-03-29 RX ORDER — GABAPENTIN 300 MG/1
300 CAPSULE ORAL 3 TIMES DAILY
Qty: 90 CAPSULE | Refills: 0 | Status: ON HOLD | OUTPATIENT
Start: 2024-03-29 | End: 2024-04-28

## 2024-03-29 RX ADMIN — LOSARTAN POTASSIUM 100 MG: 50 TABLET, FILM COATED ORAL at 04:39

## 2024-03-29 RX ADMIN — NIFEDIPINE 30 MG: 30 TABLET, FILM COATED, EXTENDED RELEASE ORAL at 04:40

## 2024-03-29 RX ADMIN — DEXAMETHASONE SODIUM PHOSPHATE 2 MG: 4 INJECTION INTRA-ARTICULAR; INTRALESIONAL; INTRAMUSCULAR; INTRAVENOUS; SOFT TISSUE at 04:40

## 2024-03-29 RX ADMIN — METOPROLOL SUCCINATE 100 MG: 50 TABLET, EXTENDED RELEASE ORAL at 04:40

## 2024-03-29 RX ADMIN — CLONIDINE HYDROCHLORIDE 0.1 MG: 0.1 TABLET ORAL at 08:51

## 2024-03-29 RX ADMIN — GABAPENTIN 300 MG: 300 CAPSULE ORAL at 11:41

## 2024-03-29 RX ADMIN — GABAPENTIN 300 MG: 300 CAPSULE ORAL at 04:40

## 2024-03-29 RX ADMIN — DEXAMETHASONE SODIUM PHOSPHATE 2 MG: 4 INJECTION INTRA-ARTICULAR; INTRALESIONAL; INTRAMUSCULAR; INTRAVENOUS; SOFT TISSUE at 11:41

## 2024-03-29 RX ADMIN — LEVETIRACETAM 500 MG: 100 INJECTION, SOLUTION, CONCENTRATE INTRAVENOUS at 04:40

## 2024-03-29 ASSESSMENT — GAIT ASSESSMENTS
ASSISTIVE DEVICE: FRONT WHEEL WALKER
DISTANCE (FEET): 15
GAIT LEVEL OF ASSIST: MINIMAL ASSIST
DEVIATION: BRADYKINETIC;SHUFFLED GAIT;STEP TO

## 2024-03-29 ASSESSMENT — COGNITIVE AND FUNCTIONAL STATUS - GENERAL
DRESSING REGULAR LOWER BODY CLOTHING: A LITTLE
SUGGESTED CMS G CODE MODIFIER DAILY ACTIVITY: CJ
HELP NEEDED FOR BATHING: A LITTLE
TURNING FROM BACK TO SIDE WHILE IN FLAT BAD: A LITTLE
STANDING UP FROM CHAIR USING ARMS: A LITTLE
DAILY ACTIVITIY SCORE: 21
TOILETING: A LITTLE
MOBILITY SCORE: 17
MOVING TO AND FROM BED TO CHAIR: A LITTLE
WALKING IN HOSPITAL ROOM: A LITTLE
SUGGESTED CMS G CODE MODIFIER MOBILITY: CK
MOVING FROM LYING ON BACK TO SITTING ON SIDE OF FLAT BED: A LITTLE
CLIMB 3 TO 5 STEPS WITH RAILING: A LOT

## 2024-03-29 ASSESSMENT — ACTIVITIES OF DAILY LIVING (ADL): TOILETING: INDEPENDENT

## 2024-03-29 NOTE — THERAPY
Occupational Therapy   Initial Evaluation     Patient Name: Jairo Rivas  Age:  73 y.o., Sex:  female  Medical Record #: [id number]  Today's Date: 3/29/2024     Precautions  Precautions: Fall Risk    Assessment  Patient is 73 y.o. female with a diagnosis of weakness, brain mets. Pt currently limited by decreased functional mobility, activity tolerance, strength,  balance,  which are affecting pt's ability to complete ADLs/IADLs at baseline. Pt would benefit from OT services in the acute care setting to maximize functional recovery.        Plan    Occupational Therapy Initial Treatment Plan   Treatment Interventions: (P) Self Care / Activities of Daily Living, Therapeutic Activity  Treatment Frequency: (P) 3 Times per Week  Duration: (P) Until Therapy Goals Met       Discharge Recommendations: (P) Recommend home health for continued occupational therapy services        03/29/24 0904   Prior Living Situation   Housing / Facility [facility]   Steps Into Home   (ramp)   Equipment Owned 4-Wheel Walker;Wheelchair   Lives with - Patient's Self Care Capacity Adult Children   Prior Level of ADL Function   Self Feeding Independent   Grooming / Hygiene Independent   Bathing Requires Assist   Dressing Requires Assist   Toileting Independent   ADL Assessment   Grooming Supervision   Upper Body Dressing Supervision   Lower Body Dressing Minimal Assist   Comments DTR assist with ADLs at home   Functional Mobility   Sit to Stand Contact Guard Assist   Bed, Chair, Wheelchair Transfer Minimal Assist   Short Term Goals   Short Term Goal # 1 supervised with LB dressing   Short Term Goal # 2 supervised with ADL txfs   Occupational Therapy Initial Treatment Plan    Treatment Interventions Self Care / Activities of Daily Living;Therapeutic Activity   Treatment Frequency 3 Times per Week   Duration Until Therapy Goals Met   Anticipated Discharge Equipment and Recommendations   Discharge Recommendations Recommend home health  for continued occupational therapy services

## 2024-03-29 NOTE — CARE PLAN
The patient is Watcher - Medium risk of patient condition declining or worsening    Shift Goals  Clinical Goals: no neuro changes  Patient Goals: rest  Family Goals: ZA    Progress made toward(s) clinical / shift goals:  Patient neuro status remains stable at this time. Every four hour neuro assessments in place to monitor for any changes.     Patient is not progressing towards the following goals:

## 2024-03-29 NOTE — DISCHARGE PLANNING
ATTN: Case Management  RE: Referral for Home Health    As of 3/29/2024, we have accepted the Home Health referral for the patient listed above.    A [facility] clinician will be out to see the patient within 48 hours. If you have any questions or concerns regarding the patient’s transition to Home Health, please do not hesitate to contact us at [phone].      We look forward to collaborating with you,  [facility] Team

## 2024-03-29 NOTE — DISCHARGE SUMMARY
"Discharge Summary    CHIEF COMPLAINT ON ADMISSION  Chief Complaint   Patient presents with    Sent by MD     Sent by oncology for worsening symptoms.     Weakness       Reason for Admission  Sent by MD     Admission Date  3/27/2024    CODE STATUS  Full Code    HPI & HOSPITAL COURSE    Jairo Rivas is a 73 y.o. female who presented 3/27/2024 with Worsening weakness.  The patient has a past medical history of endometrial cancer diagnosed and underwent hysterectomy back in 2019, she was originally seeing the [facility] however then decided to get treatment in [place] and was on immunotherapy reportedly with regression of tumor through 2022, she was supposed to follow-up in February of this year but ended up moving back appear to be with this daughter in order to give her daughter in [place] \"a break\".  Since coming up here patient has become progressively more weak.  Her appetite is poor.  Previously she was able to be fairly independent using a 4 wheeled seated walker but now she is full assist to stand and get to the bathroom etc. and is unable to ambulate.  The patient had an MRI of the brain yesterday showing significant metastatic disease and was advised to come to the hospital.  I spoke with her previous oncologist Dr. Garcia, from surgical and medical oncology standpoint they have nothing further to offer her and recommend that neurosurgery and radiation oncology be consulted..     The patient was admitted and started on Decadron.  She was evaluated by neurosurgery and radiation oncology and after further discussion she elected to proceed with radiation therapy and defer to neurosurgery at this time.  She was started on gabapentin for neuropathic pain and had her antihypertensive meds adjusted with addition of amlodipine.  Patient would like to return home with her daughter's assistance.  I ordered home health.  I reviewed with the patient and her daughter the treatment plan I discussed potential " side effects of gabapentin including sedation and potential for titration of therapy if needed.  I reviewed the need to have follow-up with Dr. Garcia after discharge to discuss further treatment for her metastatic endometrial CA.    Therefore, she is discharged in good and stable condition to home with organized home healthcare and close outpatient follow-up.    The patient met 2-midnight criteria for an inpatient stay at the time of discharge.    Discharge Date  3/29/2024    FOLLOW UP ITEMS POST DISCHARGE  Follow-up with radiation oncology for planned treatment on Monday  Follow-up Dr. Garcia to discuss further treatment for her metastatic endometrial CA  Follow-up with PCP for blood pressure recheck and titrate medical therapy accordingly  Patient being tapered off Decadron reevaluate long-term need      DISCHARGE DIAGNOSES  Principal Problem:    Endometrial cancer (HCC) (POA: Yes)      Overview: Menopause in 2010 and bleeding 2012 started to have irregular continuous       bleeding.       Secondary iron def anemia.      Seen by Dr. Chicas Aug 2018. Thick uterine lining on US.      Trial of medication therapy, if no improvement will go back to surgery.   Active Problems:    History of CVA (cerebrovascular accident) (POA: Yes)      Overview: Residual right sided weakness s/p stroke 2010.      Used to be on Plavix, down graded to aspirin only because of bleeding.      Simvastatin 20 mg daily.      Done with PT in [place].       Spasticity on Baclofen.     Obesity (BMI 30-39.9) (POA: Yes)    Neuropathy (POA: Yes)    Hypertensive urgency (POA: Yes)    Malignant neoplasm metastatic to brain (HCC) (POA: Yes)    Hypercoagulable state (HCC) (POA: Yes)    Right lower quadrant abdominal pain (POA: Yes)  Resolved Problems:    * No resolved hospital problems. *      FOLLOW UP  Future Appointments   Date Time Provider Department Center   4/1/2024  8:45 AM Milton Arguelles M.D. RADT None   4/3/2024  8:15 AM Wilmer Robin M.D.  [facility] None   4/5/2024  8:15 AM Wilmer Robin M.D. RAD[facility] None   4/8/2024  8:00 AM Wilmer Robin M.D. [facility] None   4/10/2024  8:15 AM Wilmer Robin M.D. RAD[facility] None   4/16/2024  8:40 AM [facility]   7/10/2024  2:20 PM Afshin Salcedo D.O. [facility] None     Kathya Scanlon, A.P.N.  [address]  [address]  [place] [id number] [phone]    Follow up      Jann Garcia M.D.  [address]  [address]  [place] [address]  [phone]    Follow up      Jayant EDOUARD M.D.  [address]  [place] [id number] [phone]    Follow up      Michael Del Castillo M.D.  [address]  [address]  [place] [id number] [phone]    Follow up        MEDICATIONS ON DISCHARGE     Medication List        START taking these medications        Instructions   amLODIPine 5 MG Tabs  Commonly known as: Norvasc   Take 1 Tablet by mouth every day.  Dose: 5 mg     dexamethasone 2 MG tablet  Start taking on: March 29, 2024  Commonly known as: Decadron   Take 1 Tablet by mouth 4 times a day for 7 days, THEN 1 Tablet 3 times a day for 5 days, THEN 1 Tablet 2 times a day for 5 days, THEN 1 Tablet every day for 5 days.     gabapentin 300 MG Caps  Commonly known as: Neurontin   Take 1 Capsule by mouth 3 times a day for 30 days.  Dose: 300 mg            CONTINUE taking these medications        Instructions   acetaminophen 500 MG Tabs  Commonly known as: Tylenol   Take 500-1,000 mg by mouth one time as needed for Moderate Pain.  Dose: 500-1,000 mg     atorvastatin 20 MG Tabs  Commonly known as: Lipitor   Take 1 Tablet by mouth every evening.  Dose: 20 mg     baclofen 10 MG Tabs  Commonly known as: Lioresal   Take 10 mg by mouth 3 times a day as needed.  Dose: 10 mg     CENTRUM ADULT PO   Take 1 Tablet by mouth every day.  Dose: 1 Tablet     CoQ10 100 MG Caps   Take 100 mg by mouth every evening.  Dose: 100 mg     hydrOXYzine HCl 25 MG Tabs  Commonly known as: Atarax   Take 1 Tablet by mouth 3  times a day as needed for Itching or Anxiety.  Dose: 25 mg     losartan 100 MG Tabs  Commonly known as: Cozaar   Take 100 mg by mouth 1 time a day as needed. BP > 180   Indications: High Blood Pressure Disorder  Dose: 100 mg     meclizine 25 MG Tabs  Commonly known as: Antivert   Take 0.5-1 Tablets by mouth 3 times a day as needed for Vertigo.  Dose: 12.5-25 mg     metoprolol  MG Tb24  Commonly known as: Toprol XL   Take 1 Tablet by mouth every day.  Dose: 100 mg     Vitamin D3 2000 UNIT Caps  Generic drug: Cholecalciferol   Take 2,000 Units by mouth every day.  Dose: 2,000 Units            STOP taking these medications      aspirin 81 MG EC tablet              Allergies  Allergies   Allergen Reactions    Penicillins Itching     Makes her feel weak     Levaquin Itching     Swelling of the tongue  Facial swelling    Tramadol Hives     Swelling       DIET  Orders Placed This Encounter   Procedures    Diet Order Diet: 2 Gram Sodium     Standing Status:   Standing     Number of Occurrences:   1     Order Specific Question:   Diet:     Answer:   2 Gram Sodium [7]       ACTIVITY  As tolerated.  Weight bearing as tolerated    CONSULTATIONS  Neurosurgery, radiation oncology        LABORATORY  Lab Results   Component Value Date    SODIUM 140 03/27/2024    POTASSIUM 3.6 03/27/2024    CHLORIDE 103 03/27/2024    CO2 26 03/27/2024    GLUCOSE 96 03/27/2024    BUN 9 03/27/2024    CREATININE 0.84 03/27/2024        Lab Results   Component Value Date    WBC 6.1 03/27/2024    HEMOGLOBIN 14.8 03/27/2024    HEMATOCRIT 44.1 03/27/2024    PLATELETCT 251 03/27/2024        Total time of the discharge process exceeds 40 minutes.

## 2024-03-29 NOTE — DISCHARGE INSTRUCTIONS
Monitor blood pressure at home.  Make a follow up with your primary care provider to follow up on high blood pressure.  Take your medications as prescribed.  Follow up with Dr. Garcia.  Go to your radiation appointments as scheduled.  
Current diet order meets estimated nutrient requirements

## 2024-03-29 NOTE — THERAPY
Physical Therapy   Initial Evaluation     Patient Name: Jairo Rivas  Age:  73 y.o., Sex:  female  Medical Record #: [id number]  Today's Date: 3/29/2024     Precautions  Precautions: Fall Risk    Assessment  Pt presents with impaired activity tolerance, dynamic balance, right LE/UE strength, and gait mechanics associated with chief complaint of increased weakness in setting of endometrial cancer with hysterectomy, neuropathy, CVA (2010) resulting in right UE/LE contracture, now found to have malignant neoplasm planned for nonop and palliative raditation and chemo. 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     Plan    Physical Therapy Initial Treatment Plan   Treatment Plan : Equipment, Bed Mobility, Gait Training, Manual Therapy, Neuro Re-Education / Balance, Self Care / Home Evaluation, Therapeutic Activities, Stair Training, Therapeutic Exercise  Treatment Frequency: 3 Times per Week  Duration: Until Therapy Goals Met    DC Equipment Recommendations: None  Discharge Recommendations: Recommend home health for continued physical therapy services       Abridged Subjective/Objective     03/29/24 0910   Prior Living Situation   Prior Services Home-Independent   Housing / Facility 1 Story House   Steps Into Home   (ramp)   Equipment Owned 4-Wheel Walker;Front-Wheel Walker;Bed Side Commode;Ramp;Hospital Bed   Lives with - Patient's Self Care Capacity Adult Children   Comments reports falls at baseline; right Ue contracted from CVA 2010; 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; provides  bump up assist if needed in w/c to take pt in/out of home   Prior Level of Functional Mobility   Bed Mobility Required Assist   Transfer Status Required Assist   Ambulation Required Assist   Ambulation Distance limited household   Assistive Devices Used 4-Wheel Walker   Wheelchair Required Assist   Stairs Required Assist   Cognition    Cognition / Consciousness X   Speech/ Communication Delayed Responses   Level of Consciousness Alert   Ability To Follow Commands 1 Step   Safety Awareness Impaired   New Learning Impaired   Initiation Impaired   Comments pleasant and cooperative; bradyphonic; from [place] originally with a thick English accent; at times off topic conversation, difficult to discern if had some visual hallucinations however following all commands;   Strength Lower Body   Lower Body Strength  X   Comments left LE: 4/5; right LE: 3-/5   Sensation Lower Body   Lower Extremity Sensation   X   Comments denies new numbness/tingling   Balance Assessment   Sitting Balance (Static) Fair +   Sitting Balance (Dynamic) Fair +   Standing Balance (Static) Fair   Standing Balance (Dynamic) Fair -   Weight Shift Sitting Good   Weight Shift Standing Fair   Comments right UE nonfunctional for weight bearing, does reports she holds on to her 4ww at home with it; takes step to gait but does weight shift to right foot;   Bed Mobility    Supine to Sit Minimal Assist   Sit to Supine Minimal Assist   Gait Analysis   Gait Level Of Assist Minimal Assist   Assistive Device Front Wheel Walker   Distance (Feet) 15   # of Times Distance was Traveled 1   Deviation Bradykinetic;Shuffled Gait;Step To   Weight Bearing Status full   Vision Deficits Impacting Mobility denies   Comments distance limited by lack of right LE stability, high stepping for DF weakness;   Functional Mobility   Sit to Stand Contact Guard Assist  (with FWW,)   Bed, Chair, Wheelchair Transfer Minimal Assist  (fww)   Patient / Family Goals    Patient / Family Goal  #1 to improve activty tolerance   Short Term Goals    Short Term Goal # 1 Pt will perform supine<>sit from raised HOB/no railing with supervision within 6 visits to reduce caregiver burden.   Short Term Goal # 2 Pt will perform sit<>stand 4WW and supervision within 6 vistis to ensure independent mobility at home.   Short Term Goal # 3 Pt will ambulate x 50ft with 4ww and supervision within 6 visits to reduce caregiver burden.   Education Group   Role of Physical Therapist Patient Response Patient;Acceptance;Explanation;Demonstration;Action Demonstration;Verbal Demonstration   Use of Assistive Device Patient Response Patient;Acceptance;Explanation;Demonstration;Handout;Action Demonstration;Verbal Demonstration;Reinforcement Needed   Exercises - Supine Patient Response Patient;Acceptance;Explanation;Demonstration;Verbal Demonstration;Action Demonstration;Reinforcement Needed   Additional Comments home health

## 2024-03-30 ENCOUNTER — PATIENT MESSAGE (OUTPATIENT)
Dept: MEDICAL GROUP | Facility: LAB | Age: 74
End: 2024-03-30
Payer: MEDICARE

## 2024-04-01 ENCOUNTER — HOSPITAL ENCOUNTER (OUTPATIENT)
Dept: RADIATION ONCOLOGY | Facility: MEDICAL CENTER | Age: 74
End: 2024-04-01
Attending: RADIOLOGY
Payer: MEDICARE

## 2024-04-01 ENCOUNTER — HOSPITAL ENCOUNTER (OUTPATIENT)
Dept: RADIATION ONCOLOGY | Facility: MEDICAL CENTER | Age: 74
End: 2024-04-01

## 2024-04-01 ENCOUNTER — PATIENT OUTREACH (OUTPATIENT)
Dept: MEDICAL GROUP | Facility: LAB | Age: 74
End: 2024-04-01
Payer: MEDICARE

## 2024-04-01 LAB
CHEMOTHERAPY INFUSION START DATE: NORMAL
CHEMOTHERAPY RECORDS: 3500
CHEMOTHERAPY RECORDS: 7
CHEMOTHERAPY RECORDS: NORMAL
CHEMOTHERAPY RX CANCER: NORMAL
DATE 1ST CHEMO CANCER: NORMAL
RAD ONC ARIA COURSE LAST TREATMENT DATE: NORMAL
RAD ONC ARIA COURSE TREATMENT ELAPSED DAYS: NORMAL
RAD ONC ARIA REFERENCE POINT DOSAGE GIVEN TO DATE: 7
RAD ONC ARIA REFERENCE POINT ID: NORMAL
RAD ONC ARIA REFERENCE POINT SESSION DOSAGE GIVEN: 7

## 2024-04-01 PROCEDURE — 77280 THER RAD SIMULAJ FIELD SMPL: CPT | Mod: 26 | Performed by: RADIOLOGY

## 2024-04-01 PROCEDURE — 77280 THER RAD SIMULAJ FIELD SMPL: CPT | Performed by: RADIOLOGY

## 2024-04-01 PROCEDURE — 77373 STRTCTC BDY RAD THER TX DLVR: CPT | Performed by: RADIOLOGY

## 2024-04-01 RX ORDER — LOSARTAN POTASSIUM 100 MG/1
100 TABLET ORAL
Qty: 100 TABLET | Refills: 3 | Status: ON HOLD | OUTPATIENT
Start: 2024-04-01 | End: 2025-01-26

## 2024-04-01 NOTE — PROGRESS NOTES
4/1: 1st attempt to reach pt for TCM outreach. LVM with contact information for pt to call back.     4/2: Transitional Care Management  TCM Outreach Date and Time: Filed (4/1/2024  8:33 AM)    Discharge Questions  Actual Discharge Date: 03/29/24  Now that you are home, how are you feeling?: Fair (per pt daughter, bp has been normal, no new symptoms.)  Did you receive any new prescriptions?: Yes  Were you able to get them filled?: Yes  Meds to Bed or Pharmacy filled?: Pharmacy  Do you have any questions about your current medications or new medications (Review Med Rec)?: No  Did you have any durable medical equipment ordered?: No  Do you have a follow up appointment scheduled with your PCP?: Yes  Appointment Date: 04/10/24  Appointment Time: 1220  Any issues or paperwork you wish to discuss with your PCP?: Yes (Please specify) (pt would like to request PT/OT, if not already ordered by time of appt)  Are you (patient) able to get to the appointment?: Yes  If Home Health was ordered, have they contacted you (Patient): Yes  Did you have enough support after your last discharge?: No  Please explain: pt feels that she did not receive adequate instructions for next steps when they were discharged from the hospital, so pt is unsure what she should be doing outside of radiation . tcm appt with pcp scheduled, HH information provided, pt has tried to contact Dr. Garcia's office with no success, requested RN contact them to assist and see if they could give pt a call to discuss if pt should be seen sooner by them. RN will contact Dr. Garcia's office and update progress note with outcome, then notify pt.  Does this patient qualify for the CCM program?: Yes    Transitional Care  Number of attempts made to contact patient: 2  Current or previous attempts completed within two business days of discharge? : Yes  Provided education regarding treatment plan, medications, self-management, ADLs?: Yes  Has patient completed an Advanced  Directive?: No  Has the Care Manager's phone number provided?: Yes  Is there anything else I can help you with?: No    Discharge Summary  Chief Complaint: Sent by MD - Sent by oncology for worsening symptoms. Weakness  Admitting Diagnosis: sent by MD  Discharge Diagnosis: endometrial cancer

## 2024-04-01 NOTE — PROCEDURES
DATE OF SERVICE: 4/1/2024    DIAGNOSIS:  Endometrial cancer (HCC)  Staging form: Corpus Uteri - Carcinoma and Carcinosarcoma, AJCC 8th Edition  - Pathologic stage from 9/13/2019: FIGO Stage IIIB (pT3b, pN0, cM0) - Signed by Jayant EDOUARD M.D. on 9/13/2019  Histopathologic type: Endometrioid adenocarcinoma, NOS  Histologic grade (G): G2  Histologic grading system: 3 grade system  Residual tumor (R): R1 - Microscopic  Lymph-vascular invasion (LVI): LVI not present (absent)/not identified  Diagnostic confirmation: Positive histology  Specimen type: Excision  Staged by: Managing physician  - Clinical stage from 3/27/2024: Stage IVB (rcT0, cN0, pM1) - Signed by Jayant EDOUARD M.D. on 3/27/2024  Histopathologic type: Endometrioid adenocarcinoma, NOS  Stage prefix: Recurrence  Prognostic indicators: Brain Metastasis       TREATMENT:  Radiation Therapy Episodes       Active Episodes       Radiation Therapy (4/1/2024)    Radiation Therapy: IMRT (9/30/2019)                   Radiation Treatments         Plan Last Treated On Elapsed Days Fractions Treated Prescribed Fraction Dose (cGy) Prescribed Total Dose (cGy)    1 [facility] SRT C3 4/1/2024 0 @ [id number] 1 of 5 700 3,500                  Reference Point Last Treated On Elapsed Days Most Recent Session Dose (cGy) Total Dose (cGy)    1 [facility] SRT C3 4/1/2024 0 @ [id number] 700 700                      Historical Treatments (Plans: 1)      Plan Last Treated On Elapsed Days Fractions Treated Prescribed Fraction Dose (cGy) Prescribed Total Dose (cGy)   Pelvis 11/6/2019 37 @ [id number] 27 of 27 180 4,860                Reference Point Last Treated On Elapsed Days Most Recent Session Dose (cGy) Total Dose (cGy)   Pelvis 11/6/2019 37 @ [id number] -- 4,860   PelvisCP 11/6/2019 37 @ [id number] -- 4,906                            STEREOTACTIC PROCEDURE NOTE:    Called by [employer] machine to verify treatment parameters including:  treatment site, treatment dose, and  treatment setup prior to stereotactic treatment..    Patient was placed in the treatment position with use of immobilization device and  laser guidance. CBCT images were acquired for target localization.  Images were reviewed in the axial, coronal, and saggital views and shifts were made as necessary to ensure that patient position matched simulation position.      Treatment delivered per  prescription.  The medical physicist was present throughout the set-up, verification and treatment delivery to oversee the procedure and ensure all parameters agreed with the computerized plan.    I have personally reviewed the relevant data, performed the target localization, and determined all relevant factors for this patient’s simulation.

## 2024-04-01 NOTE — PATIENT COMMUNICATION
Received request via: Patient    Was the patient seen in the last year in this department? Yes    Does the patient have an active prescription (recently filled or refills available) for medication(s) requested? No    Pharm[facility] Name: [facility]     Does the patient have [facility] and need 100 day supply (blood pressure, diabetes and cholesterol meds only)? Yes, quantity updated to 100 days

## 2024-04-02 ENCOUNTER — PATIENT OUTREACH (OUTPATIENT)
Dept: ONCOLOGY | Facility: MEDICAL CENTER | Age: 74
End: 2024-04-02
Payer: MEDICARE

## 2024-04-02 NOTE — PROGRESS NOTES
4/2: pt requested RN contact Dr. Garcia's office d/t multiple attempts of contacting them after discharge with no success. Pt is inquiring on next steps with their office specifically and is requesting a callback from them. RN contacted office to inform pt has been trying to reach them, but unsuccessfully and to see if someone from their office could call her back. Per office, they do not see any new calls from pt, suggest pt call their office if they have questions and will not call pt back as pt already has appt scheduled with Kristine Munguia on 4/10 @ 4577, however, this appt was made prior to pt discharge. RN was also informed by office that they have no medical record of her recent hospitalization, and that during pt's last visit, pt was instructed to follow up with neuro and pain mgmt. RN contacted pt to inform her of call outcome, and advised that she contact medical records to see if they can send those to Dr. Garcia's office so they can review them; pt declines to call medical records at this time, stating she is feeling frustrated because their office sent her to the hospital. Additionally, per pt, they have scheduled appts with both neuro and pain mgmt, though appt with neuro is not until July. Pt believes pain mgmt appt is next week. Pt also has several appts scheduled for radiation. RN scheduled TCM visit with pcp on 4/11/24 @1220, pt needed virtual visit only appt scheduled d/t limited mobility at this time. RN provided pt HH contact information. RN routed chart to CCM RN for possible CCM enrollment. Pt is taking all medications as prescribed. Pt verbalized understanding and pt was encouraged to contact PCP office with any new concerns or questions she may have.

## 2024-04-02 NOTE — PROGRESS NOTES
Oncology Nurse Navigation  Intro call to pt.  Pt's daughter Carolyn answers.  She states home health has not been out to see pt as of yet.  She states she received a call stating they were running behind.  Requested they call ONN should there be any difficulty in getting home health established.  Carolyn agrees.  She denies any transportation barriers.  She states her mom has been experiencing headaches but otherwise doing well.  She states pt is taking gabapentin, Tylenol and dexamethasone.    Briefly reviewed role of ONN and provided contact information.  Intro letter sent via My Chart.

## 2024-04-03 ENCOUNTER — HOSPITAL ENCOUNTER (OUTPATIENT)
Dept: RADIATION ONCOLOGY | Facility: MEDICAL CENTER | Age: 74
End: 2024-04-03

## 2024-04-03 LAB
CHEMOTHERAPY INFUSION START DATE: NORMAL
CHEMOTHERAPY RECORDS: 3500
CHEMOTHERAPY RECORDS: 7
CHEMOTHERAPY RECORDS: NORMAL
CHEMOTHERAPY RX CANCER: NORMAL
DATE 1ST CHEMO CANCER: NORMAL
RAD ONC ARIA COURSE LAST TREATMENT DATE: NORMAL
RAD ONC ARIA COURSE TREATMENT ELAPSED DAYS: NORMAL
RAD ONC ARIA REFERENCE POINT DOSAGE GIVEN TO DATE: 14
RAD ONC ARIA REFERENCE POINT ID: NORMAL
RAD ONC ARIA REFERENCE POINT SESSION DOSAGE GIVEN: 7

## 2024-04-03 PROCEDURE — 77280 THER RAD SIMULAJ FIELD SMPL: CPT | Performed by: RADIOLOGY

## 2024-04-03 PROCEDURE — 77280 THER RAD SIMULAJ FIELD SMPL: CPT | Mod: 26 | Performed by: RADIOLOGY

## 2024-04-03 PROCEDURE — 77373 STRTCTC BDY RAD THER TX DLVR: CPT | Performed by: RADIOLOGY

## 2024-04-03 PROCEDURE — 77435 SBRT MANAGEMENT: CPT | Performed by: RADIOLOGY

## 2024-04-03 NOTE — ON TREATMENT VISIT
ON TREATMENT  NOTE  RADIATION ONCOLOGY DEPARTMENT    Patient name:  Jairo Rivas    Primary Physician:  ZULEYKA Lyons MRN: [id number]  [facility]: [id number]   Care Team:  Patient Care Team:  ZULEYKA Lyons as PCP - General (Family Medicine)  Jann Garcia M.D. as Consulting Physician (Gynecologic Oncology)  Jayant EDOUARD M.D. as Radiation Oncologist (Radiation Oncology)  Arnulfo Minor M.D. as Consulting Physician (Interventional Cardiology)  Sparkle Mar (Inactive) as [facility]   Nestor Zaidi R.N. as Nurse Navigator    : 1950, 73 y.o.     ENCOUNTER DATE:  4/3/2024      DIAGNOSIS:  Endometrial cancer (HCC)  Staging form: Corpus Uteri - Carcinoma and Carcinosarcoma, AJCC 8th Edition  - Pathologic stage from 2019: FIGO Stage IIIB (pT3b, pN0, cM0) - Signed by Jayant EDOUARD M.D. on 2019  Histopathologic type: Endometrioid adenocarcinoma, NOS  Histologic grade (G): G2  Histologic grading system: 3 grade system  Residual tumor (R): R1 - Microscopic  Lymph-vascular invasion (LVI): LVI not present (absent)/not identified  Diagnostic confirmation: Positive histology  Specimen type: Excision  Staged by: Managing physician  - Clinical stage from 3/27/2024: Stage IVB (rcT0, cN0, pM1) - Signed by Jayant EDOUARD M.D. on 3/27/2024  Histopathologic type: Endometrioid adenocarcinoma, NOS  Stage prefix: Recurrence  Prognostic indicators: Brain Metastasis      TREATMENT SUMMARY:  Course First Treatment Date 2024  Course Last Treatment Date 2024  Radiation Therapy Episodes       Active Episodes       Radiation Therapy (2024)    Radiation Therapy: IMRT (2019)                   Radiation Treatments         Plan Last Treated On Elapsed Days Fractions Treated Prescribed Fraction Dose (cGy) Prescribed Total Dose (cGy)    1 [facility] SRT C3 4/3/2024 2 @ [id number] 2 of 5 700 3,500                  Reference  Point Last Treated On Elapsed Days Most Recent Session Dose (cGy) Total Dose (cGy)    1 [facility] SRT C3 4/3/2024 2 @ [id number] 700 1,400                      Historical Treatments (Plans: 1)      Plan Last Treated On Elapsed Days Fractions Treated Prescribed Fraction Dose (cGy) Prescribed Total Dose (cGy)   Pelvis 11/6/2019 37 @ [id number] 27 of 27 180 4,860                Reference Point Last Treated On Elapsed Days Most Recent Session Dose (cGy) Total Dose (cGy)   Pelvis 11/6/2019 37 @ [id number] -- 4,860   PelvisCP 11/6/2019 37 @ [id number] -- 4,906                               SUBJECTIVE:  Tolerating treatments well.  Reports constipation.  Reports visual field deficits.  Visual field deficits preceded treatment  VITAL SIGNS:      3/29/2024     9:54 AM 3/29/2024     8:17 AM 3/29/2024     4:38 AM 3/28/2024     7:27 PM 3/28/2024     4:11 PM 3/28/2024     2:27 PM 3/28/2024    12:02 PM   Vitals   SYSTOLIC 152 170 148 130 137 145 141   DIASTOLIC 70 90 73 64 82 81 89   Pulse  76 82 75 84  96   Temperature  36.5 °C (97.7 °F) 37.1 °C (98.8 °F) 36.2 °C (97.2 °F) 36.7 °C (98 °F)  36.6 °C (97.8 °F)   Respiration  17 16 18 18  18   Pulse Oximetry  92 % 92 % 92 % 94 %  94 %     KPS: 70, Cares for self; unable to carry on normal activity or to do active work (ECOG equivalent 1)          No data to display                   PHYSICAL EXAM:  Physical Exam  Vitals and nursing note reviewed.   Constitutional:       Appearance: She is well-developed.   HENT:      Head: Normocephalic.   Skin:     General: Skin is warm and dry.      Findings: No erythema.   Neurological:      Mental Status: She is alert and oriented to person, place, and time.   Psychiatric:         Behavior: Behavior normal.         Thought Content: Thought content normal.         Judgment: Judgment normal.              11/6/2019     2:44 PM 10/30/2019     2:33 PM 10/23/2019     2:39 PM 10/16/2019     2:38 PM 10/9/2019     4:40 PM 10/2/2019     2:24 PM    Toxicity Assessment   Toxicity Assessment Female Pelvis Female Pelvis Female Pelvis Female Pelvis Female Pelvis Female Pelvis   Fatigue (lethargy, malaise, asthenia) Moderate (e.g., decrease in performance status by 1 ECOG level or 20% [place]) or causing difficulty performing some activities Severe (e.g., decrease in performance status by 2 or more ECOG levels or 40% Karn[place]sky) or loss of ability to perform some activities Severe (e.g., decrease in performance status by 2 or more ECOG levels or 40% Karn[place]sky) or loss of ability to perform some activities Moderate (e.g., decrease in performance status by 1 ECOG level or 20% [place]) or causing difficulty performing some activities Increased fatigue over baseline, but not altering normal activities Increased fatigue over baseline, but not altering normal activities   Radiation Dermatitis Faint erythema or dry desquamation Faint erythema or dry desquamation None None None None   Anorexia Loss of appetite Loss of appetite Oral intake significantly decreased Loss of appetite Loss of appetite Loss of appetite   Colitis None None None   None   Constipation None None Requiring stool softener or dietary modification Requiring stool softener or dietary modification  Requiring stool softener or dietary modification   Dehydration None None None None None None   Diarrhea w/o Colostomy Increase of <4 stools/day over pre-treatment Increase of <4 stools/day over pre-treatment None Increase of <4 stools/day over pre-treatment None None   Flatulence None    None None   Nausea Able to eat Able to eat Oral intake significantly decreased Able to eat Able to eat Able to eat   Proctitis  None None   None   Vomiting None None None None None None   RT - Pain due to RT Moderate pain, pain or analgesics interfering with function, but not interfering with activities of daily living Mild pain not interfering with function None Moderate pain, pain or analgesics interfering with function,  but not interfering with activities of daily living None None   Tumor Pain (onset or exacerbation of tumor pain due to treatment) None Mild pain not interfering with function None None None None   Dysuria (painful urination) Mild symptoms requiring no intervention Mild symptoms requiring no intervention None None None None   Urinary Frequency Increase in frequency up to 2x normal Normal Increase in frequency up to 2x normal Normal Increase in frequency up to 2x normal Normal   Urinary Urgency  None None None Present None   Bladder Spasms   Absent Absent  Absent   Incontinence None None None None  None   Urinary Retention  Normal Normal Normal  Normal   Vaginitis (not due to infection)   None   None   Vaginal Bleeding None None None None  None   Vaginal Dryness      Normal       CURRENT MEDICATIONS:    Current Outpatient Medications:     losartan (COZAAR) 100 MG Tab, Take 1 Tablet by mouth 1 time a day as needed (Hypertension) for up to 300 days. BP > 180  Indications: High Blood Pressure Disorder, Disp: 100 Tablet, Rfl: 3    dexamethasone (DECADRON) 2 MG tablet, Take 1 Tablet by mouth 4 times a day for 7 days, THEN 1 Tablet 3 times a day for 5 days, THEN 1 Tablet 2 times a day for 5 days, THEN 1 Tablet every day for 5 days., Disp: 58 Tablet, Rfl: 0    gabapentin (NEURONTIN) 300 MG Cap, Take 1 Capsule by mouth 3 times a day for 30 days., Disp: 90 Capsule, Rfl: 0    amLODIPine (NORVASC) 5 MG Tab, Take 1 Tablet by mouth every day., Disp: 30 Tablet, Rfl: 0    Multiple Vitamins-Minerals (CENTRUM ADULT PO), Take 1 Tablet by mouth every day., Disp: , Rfl:     hydrOXYzine HCl (ATARAX) 25 MG Tab, Take 1 Tablet by mouth 3 times a day as needed for Itching or Anxiety., Disp: 30 Tablet, Rfl: 0    metoprolol SR (TOPROL XL) 100 MG TABLET SR 24 HR, Take 1 Tablet by mouth every day., Disp: 100 Tablet, Rfl: 3    meclizine (ANTIVERT) 25 MG Tab, Take 0.5-1 Tablets by mouth 3 times a day as needed for Vertigo., Disp: 90 Tablet, Rfl:  2    atorvastatin (LIPITOR) 20 MG Tab, Take 1 Tablet by mouth every evening., Disp: 100 Tablet, Rfl: 3    baclofen (LIORESAL) 10 MG Tab, Take 10 mg by mouth 3 times a day as needed., Disp: , Rfl:     Cholecalciferol (VITAMIN D3) 2000 UNIT Cap, Take 2,000 Units by mouth every day., Disp: , Rfl:     Coenzyme Q10 (COQ10) 100 MG Cap, Take 100 mg by mouth every evening., Disp: , Rfl:     acetaminophen (TYLENOL) 500 MG Tab, Take 500-1,000 mg by mouth one time as needed for Moderate Pain., Disp: , Rfl:     LABORATORY DATA:   Lab Results   Component Value Date/Time    SODIUM 140 03/27/2024 12:19 PM    POTASSIUM 3.6 03/27/2024 12:19 PM    CHLORIDE 103 03/27/2024 12:19 PM    CO2 26 03/27/2024 12:19 PM    GLUCOSE 96 03/27/2024 12:19 PM    BUN 9 03/27/2024 12:19 PM    CREATININE 0.84 03/27/2024 12:19 PM       Lab Results   Component Value Date/Time    WBC 6.1 03/27/2024 12:19 PM    RBC 4.64 03/27/2024 12:19 PM    HEMOGLOBIN 14.8 03/27/2024 12:19 PM    HEMATOCRIT 44.1 03/27/2024 12:19 PM    MCV 95.0 03/27/2024 12:19 PM    MCH 31.9 03/27/2024 12:19 PM    MCHC 33.6 03/27/2024 12:19 PM    PLATELETCT 251 03/27/2024 12:19 PM         RADIOLOGY DATA:  CT-CHEST,ABDOMEN,PELVIS WITH    Result Date: 3/27/2024  1. Chronic postoperative changes in the left lung with no residual pleural effusion. 2. Nodule in the right lower lobe measures 10 mm in diameter, larger than on the prior examination, most compatible with metastasis. 3. Calcific atherosclerotic plaques in the coronary arteries. 4. Enlarged heterogeneously enhancing thyroid gland. 5. Absent uterus and ovaries. 6. Decreased bone mineralization.    DX-CHEST-PORTABLE (1 VIEW)    Result Date: 3/27/2024  1. Peripheral scarring from prior infiltrate in the left lung. 2. No acute findings.    MR-BRAIN-WITH & W/O    Result Date: 3/26/2024  20 x 30 x 26 but larger enhancing mass in the right frontal opercular region with surrounding vasogenic edema with mass effect and midline shift to the  left measuring 4 mm concerning for metastatic lesion. Multiple remote infarcts noted as described above. Foci of hemosiderin staining in the bilateral cerebral hemispheres as described above without enhancement which may represent microhemorrhages or small cavernomas.      IMPRESSION:  Cancer Staging   Endometrial cancer (HCC)  Staging form: Corpus Uteri - Carcinoma and Carcinosarcoma, AJCC 8th Edition  - Pathologic stage from 9/13/2019: FIGO Stage IIIB (pT3b, pN0, cM0) - Signed by Jayant EDOUARD M.D. on 9/13/2019  - Clinical stage from 3/27/2024: Stage IVB (rcT0, cN0, pM1) - Signed by Jayant EDOUARD M.D. on 3/27/2024  Prognostic indicators: Brain Metastasis      PLAN:  No change in treatment plan recommended optometric evaluation.    Disposition:  Treatment plan and imaging reviewed. Questions answered. Continue therapy outlined.     Jayant EDOUARD M.D.    Orders Placed This Encounter    Rad Onc [facility]ria Session Summary

## 2024-04-03 NOTE — PROCEDURES
DATE OF SERVICE: 4/3/2024    DIAGNOSIS:  Endometrial cancer (HCC)  Staging form: Corpus Uteri - Carcinoma and Carcinosarcoma, AJCC 8th Edition  - Pathologic stage from 9/13/2019: FIGO Stage IIIB (pT3b, pN0, cM0) - Signed by Jayant EDOUARD M.D. on 9/13/2019  Histopathologic type: Endometrioid adenocarcinoma, NOS  Histologic grade (G): G2  Histologic grading system: 3 grade system  Residual tumor (R): R1 - Microscopic  Lymph-vascular invasion (LVI): LVI not present (absent)/not identified  Diagnostic confirmation: Positive histology  Specimen type: Excision  Staged by: Managing physician  - Clinical stage from 3/27/2024: Stage IVB (rcT0, cN0, pM1) - Signed by Jayant EDOUARD M.D. on 3/27/2024  Histopathologic type: Endometrioid adenocarcinoma, NOS  Stage prefix: Recurrence  Prognostic indicators: Brain Metastasis       TREATMENT:  Radiation Therapy Episodes       Active Episodes       Radiation Therapy (4/1/2024)    Radiation Therapy: IMRT (9/30/2019)                   Radiation Treatments         Plan Last Treated On Elapsed Days Fractions Treated Prescribed Fraction Dose (cGy) Prescribed Total Dose (cGy)    1 [facility] SRT C3 4/3/2024 2 @ [id number] 2 of 5 700 3,500                  Reference Point Last Treated On Elapsed Days Most Recent Session Dose (cGy) Total Dose (cGy)    1 [facility] SRT C3 4/3/2024 2 @ [id number] 700 1,400                      Historical Treatments (Plans: 1)      Plan Last Treated On Elapsed Days Fractions Treated Prescribed Fraction Dose (cGy) Prescribed Total Dose (cGy)   Pelvis 11/6/2019 37 @ [id number] 27 of 27 180 4,860                Reference Point Last Treated On Elapsed Days Most Recent Session Dose (cGy) Total Dose (cGy)   Pelvis 11/6/2019 37 @ [id number] -- 4,860   PelvisCP 11/6/2019 37 @ [id number] -- 4,906                            STEREOTACTIC PROCEDURE NOTE:    Called by [employer] machine to verify treatment parameters including:  treatment site, treatment dose, and  treatment setup prior to stereotactic treatment..    Patient was placed in the treatment position with use of immobilization device and  laser guidance. CBCT images were acquired for target localization.  Images were reviewed in the axial, coronal, and saggital views and shifts were made as necessary to ensure that patient position matched simulation position.      Treatment delivered per  prescription.  The medical physicist was present throughout the set-up, verification and treatment delivery to oversee the procedure and ensure all parameters agreed with the computerized plan.    I have personally reviewed the relevant data, performed the target localization, and determined all relevant factors for this patient’s simulation.

## 2024-04-04 ENCOUNTER — TELEPHONE (OUTPATIENT)
Dept: NEUROLOGY | Facility: MEDICAL CENTER | Age: 74
End: 2024-04-04
Payer: MEDICARE

## 2024-04-04 NOTE — TELEPHONE ENCOUNTER
NEUROLOGY PATIENT PRE-VISIT PLANNING     Patient was NOT contacted to complete PVP.  Note: Patient will not be contacted if there is no indication to call.     Patient Appointment is scheduled as: New Patient     Is visit type and length scheduled correctly? Yes    [employer] Patient is checked in Patient Demographics? Yes    3.   Is referral attached to visit? Yes    4. Were records received from referring provider? Yes    4. Patient was NOT contacted to have someone accompany them to visit.     5. Is this appointment scheduled as a Hospital Follow-Up?  No    6. Does the patient require any pre procedure or post procedure follow up? No    7. If any orders were placed at last visit or intended to be done for this visit do we have Results/Consult Notes? No  Labs - Labs were not ordered at last office visit.  Imaging - Imaging was not ordered at last office visit.  Referrals - No referrals were ordered at last office visit.  Note: If patient appointment is for lab or imaging review and patient did not complete the studies, check with provider if OK to reschedule patient until completed.    8. If patient appointment is for Botox - is order pended for provider? N/A    9. Was Plan Assessment from last Neurology Office Visit Reviewed?  Yes

## 2024-04-05 ENCOUNTER — HOSPITAL ENCOUNTER (OUTPATIENT)
Dept: RADIATION ONCOLOGY | Facility: MEDICAL CENTER | Age: 74
End: 2024-04-05

## 2024-04-05 ENCOUNTER — OFFICE VISIT (OUTPATIENT)
Dept: NEUROLOGY | Facility: MEDICAL CENTER | Age: 74
End: 2024-04-05
Attending: INTERNAL MEDICINE
Payer: MEDICARE

## 2024-04-05 VITALS
OXYGEN SATURATION: 96 % | TEMPERATURE: 97.3 F | BODY MASS INDEX: 31.76 KG/M2 | HEART RATE: 63 BPM | RESPIRATION RATE: 18 BRPM | WEIGHT: 186 LBS | SYSTOLIC BLOOD PRESSURE: 138 MMHG | HEIGHT: 64 IN | DIASTOLIC BLOOD PRESSURE: 78 MMHG

## 2024-04-05 DIAGNOSIS — C79.31 METASTASIS TO BRAIN (HCC): ICD-10-CM

## 2024-04-05 DIAGNOSIS — C54.1 ENDOMETRIAL CANCER (HCC): ICD-10-CM

## 2024-04-05 DIAGNOSIS — R44.3 HALLUCINATIONS: ICD-10-CM

## 2024-04-05 LAB
CHEMOTHERAPY INFUSION START DATE: NORMAL
CHEMOTHERAPY INFUSION START DATE: NORMAL
CHEMOTHERAPY INFUSION STOP DATE: NORMAL
CHEMOTHERAPY RECORDS: 1.8
CHEMOTHERAPY RECORDS: 3500
CHEMOTHERAPY RECORDS: 4860
CHEMOTHERAPY RECORDS: 7
CHEMOTHERAPY RECORDS: NORMAL
CHEMOTHERAPY RX CANCER: NORMAL
CHEMOTHERAPY RX CANCER: NORMAL
DATE 1ST CHEMO CANCER: NORMAL
DATE 1ST CHEMO CANCER: NORMAL
RAD ONC ARIA COURSE LAST TREATMENT DATE: NORMAL
RAD ONC ARIA COURSE LAST TREATMENT DATE: NORMAL
RAD ONC ARIA COURSE TREATMENT ELAPSED DAYS: NORMAL
RAD ONC ARIA COURSE TREATMENT ELAPSED DAYS: NORMAL
RAD ONC ARIA REFERENCE POINT DOSAGE GIVEN TO DATE: 21
RAD ONC ARIA REFERENCE POINT DOSAGE GIVEN TO DATE: 48.6
RAD ONC ARIA REFERENCE POINT DOSAGE GIVEN TO DATE: 49.06
RAD ONC ARIA REFERENCE POINT ID: NORMAL
RAD ONC ARIA REFERENCE POINT SESSION DOSAGE GIVEN: 7

## 2024-04-05 PROCEDURE — 77280 THER RAD SIMULAJ FIELD SMPL: CPT | Mod: 26 | Performed by: RADIOLOGY

## 2024-04-05 PROCEDURE — 77373 STRTCTC BDY RAD THER TX DLVR: CPT | Performed by: RADIOLOGY

## 2024-04-05 PROCEDURE — 99212 OFFICE O/P EST SF 10 MIN: CPT | Performed by: INTERNAL MEDICINE

## 2024-04-05 PROCEDURE — 3075F SYST BP GE 130 - 139MM HG: CPT | Performed by: INTERNAL MEDICINE

## 2024-04-05 PROCEDURE — 77336 RADIATION PHYSICS CONSULT: CPT | Mod: XU | Performed by: RADIOLOGY

## 2024-04-05 PROCEDURE — 77280 THER RAD SIMULAJ FIELD SMPL: CPT | Performed by: RADIOLOGY

## 2024-04-05 PROCEDURE — 3078F DIAST BP <80 MM HG: CPT | Performed by: INTERNAL MEDICINE

## 2024-04-05 PROCEDURE — 99205 OFFICE O/P NEW HI 60 MIN: CPT | Performed by: INTERNAL MEDICINE

## 2024-04-05 RX ORDER — OLANZAPINE 10 MG/1
10 TABLET ORAL NIGHTLY
Qty: 90 TABLET | Refills: 1 | Status: SHIPPED | OUTPATIENT
Start: 2024-04-05 | End: 2024-04-05

## 2024-04-05 RX ORDER — OLANZAPINE 5 MG/1
10 TABLET ORAL NIGHTLY
Qty: 180 TABLET | Refills: 1 | Status: SHIPPED | OUTPATIENT
Start: 2024-04-05 | End: 2024-04-10

## 2024-04-05 ASSESSMENT — FIBROSIS 4 INDEX: FIB4 SCORE: 1.73

## 2024-04-05 NOTE — PROCEDURES
DATE OF SERVICE: 4/5/2024    DIAGNOSIS:  Endometrial cancer (HCC)  Staging form: Corpus Uteri - Carcinoma and Carcinosarcoma, AJCC 8th Edition  - Pathologic stage from 9/13/2019: FIGO Stage IIIB (pT3b, pN0, cM0) - Signed by Jayant EDOUARD M.D. on 9/13/2019  Histopathologic type: Endometrioid adenocarcinoma, NOS  Histologic grade (G): G2  Histologic grading system: 3 grade system  Residual tumor (R): R1 - Microscopic  Lymph-vascular invasion (LVI): LVI not present (absent)/not identified  Diagnostic confirmation: Positive histology  Specimen type: Excision  Staged by: Managing physician  - Clinical stage from 3/27/2024: Stage IVB (rcT0, cN0, pM1) - Signed by Jayant EDOUARD M.D. on 3/27/2024  Histopathologic type: Endometrioid adenocarcinoma, NOS  Stage prefix: Recurrence  Prognostic indicators: Brain Metastasis       TREATMENT:  Radiation Therapy Episodes       Active Episodes       Radiation Therapy (4/1/2024)    Radiation Therapy: IMRT (9/30/2019)                   Radiation Treatments         Plan Last Treated On Elapsed Days Fractions Treated Prescribed Fraction Dose (cGy) Prescribed Total Dose (cGy)    1 [facility] SRT C3 4/5/2024 4 @ [id number] 3 of 5 700 3,500                  Reference Point Last Treated On Elapsed Days Most Recent Session Dose (cGy) Total Dose (cGy)    1 [facility] SRT C3 4/5/2024 4 @ [id number] 700 2,100                      Historical Treatments (Plans: 1)      Plan Last Treated On Elapsed Days Fractions Treated Prescribed Fraction Dose (cGy) Prescribed Total Dose (cGy)   Pelvis 11/6/2019 37 @ [id number] 27 of 27 180 4,860                Reference Point Last Treated On Elapsed Days Most Recent Session Dose (cGy) Total Dose (cGy)   Pelvis 11/6/2019 37 @ [id number] -- 4,860   PelvisCP 11/6/2019 37 @ [id number] -- 4,906                            STEREOTACTIC PROCEDURE NOTE:    Called by [employer] machine to verify treatment parameters including:  treatment site, treatment dose, and  treatment setup prior to stereotactic treatment..    Patient was placed in the treatment position with use of immobilization device and  laser guidance. CBCT images were acquired for target localization.  Images were reviewed in the axial, coronal, and saggital views and shifts were made as necessary to ensure that patient position matched simulation position.      Treatment delivered per  prescription.  The medical physicist was present throughout the set-up, verification and treatment delivery to oversee the procedure and ensure all parameters agreed with the computerized plan.    I have personally reviewed the relevant data, performed the target localization, and determined all relevant factors for this patient’s simulation.

## 2024-04-05 NOTE — PATIENT INSTRUCTIONS
Right arm spasticity  Start Botox approval process for right hand spasticity: our clinic will call to schedule once approved    Sleep and hallucinations  Start olanzapine 5mg tablets: take one tablet initially at night, and if needed can take 2 tablets at night  OK to combine with melatonin 5mg tablets nightly. Max dose 15mg.     Referral sent to palliative care    Hold donepezil and quetiapine for now
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
## 2024-04-08 ENCOUNTER — HOME CARE VISIT (OUTPATIENT)
Dept: HOME HEALTH SERVICES | Facility: HOME HEALTHCARE | Age: 74
End: 2024-04-08
Payer: MEDICARE

## 2024-04-08 ENCOUNTER — PATIENT MESSAGE (OUTPATIENT)
Dept: NEUROLOGY | Facility: MEDICAL CENTER | Age: 74
End: 2024-04-08
Payer: MEDICARE

## 2024-04-08 ENCOUNTER — HOSPITAL ENCOUNTER (OUTPATIENT)
Dept: RADIATION ONCOLOGY | Facility: MEDICAL CENTER | Age: 74
End: 2024-04-08

## 2024-04-08 ENCOUNTER — TELEPHONE (OUTPATIENT)
Dept: NEUROLOGY | Facility: MEDICAL CENTER | Age: 74
End: 2024-04-08
Payer: MEDICARE

## 2024-04-08 VITALS
RESPIRATION RATE: 16 BRPM | OXYGEN SATURATION: 96 % | SYSTOLIC BLOOD PRESSURE: 138 MMHG | TEMPERATURE: 97.3 F | HEART RATE: 75 BPM | DIASTOLIC BLOOD PRESSURE: 78 MMHG

## 2024-04-08 LAB
CHEMOTHERAPY INFUSION START DATE: NORMAL
CHEMOTHERAPY RECORDS: 3500
CHEMOTHERAPY RECORDS: 7
CHEMOTHERAPY RECORDS: NORMAL
CHEMOTHERAPY RX CANCER: NORMAL
DATE 1ST CHEMO CANCER: NORMAL
RAD ONC ARIA COURSE LAST TREATMENT DATE: NORMAL
RAD ONC ARIA COURSE TREATMENT ELAPSED DAYS: NORMAL
RAD ONC ARIA REFERENCE POINT DOSAGE GIVEN TO DATE: 28
RAD ONC ARIA REFERENCE POINT ID: NORMAL
RAD ONC ARIA REFERENCE POINT SESSION DOSAGE GIVEN: 7

## 2024-04-08 PROCEDURE — 77280 THER RAD SIMULAJ FIELD SMPL: CPT | Mod: 26 | Performed by: RADIOLOGY

## 2024-04-08 PROCEDURE — 77280 THER RAD SIMULAJ FIELD SMPL: CPT | Performed by: RADIOLOGY

## 2024-04-08 PROCEDURE — 665998 HH PPS REVENUE CREDIT

## 2024-04-08 PROCEDURE — 77373 STRTCTC BDY RAD THER TX DLVR: CPT | Performed by: RADIOLOGY

## 2024-04-08 PROCEDURE — 665001 SOC-HOME HEALTH

## 2024-04-08 PROCEDURE — 665999 HH PPS REVENUE DEBIT

## 2024-04-08 PROCEDURE — G0493 RN CARE EA 15 MIN HH/HOSPICE: HCPCS

## 2024-04-08 ASSESSMENT — ENCOUNTER SYMPTOMS
DIFFICULTY THINKING: 1
ORTHOPNEA: 1
VOMITING: DENIES
PAIN LOCATION - EXACERBATING FACTORS: ACTIVITY
HIGHEST PAIN SEVERITY IN PAST 24 HOURS: 10/10
PAIN LOCATION - RELIEVING FACTORS: REST
PAIN LOCATION - PAIN FREQUENCY: WITH ACTIVITY
LOWEST PAIN SEVERITY IN PAST 24 HOURS: 8/10
CONSTIPATION: 1
LOSS OF VISUAL FIELD: 1
PAIN LOCATION - PAIN SEVERITY: 10/10
PERSON REPORTING PAIN: PATIENT
PAIN LOCATION - PAIN DURATION: DAYS
SUBJECTIVE PAIN PROGRESSION: UNCHANGED
NAUSEA: DENIES
PAIN: 1
PAIN LOCATION - PAIN QUALITY: ACHY
PAIN LOCATION: RIGHT SIDE
LAST BOWEL MOVEMENT: 66934

## 2024-04-08 ASSESSMENT — ACTIVITIES OF DAILY LIVING (ADL): OASIS_M1830: 06

## 2024-04-08 NOTE — TELEPHONE ENCOUNTER
PA for Olanzapine 5 mg was started today 04/08/24 through [employer]. Waiting for response. Pt's caregiver (daughter) has been notified. Tram Andrews

## 2024-04-08 NOTE — PROCEDURES
DATE OF SERVICE: 4/8/2024    DIAGNOSIS:  Endometrial cancer (HCC)  Staging form: Corpus Uteri - Carcinoma and Carcinosarcoma, AJCC 8th Edition  - Pathologic stage from 9/13/2019: FIGO Stage IIIB (pT3b, pN0, cM0) - Signed by Jayant EDOUARD M.D. on 9/13/2019  Histopathologic type: Endometrioid adenocarcinoma, NOS  Histologic grade (G): G2  Histologic grading system: 3 grade system  Residual tumor (R): R1 - Microscopic  Lymph-vascular invasion (LVI): LVI not present (absent)/not identified  Diagnostic confirmation: Positive histology  Specimen type: Excision  Staged by: Managing physician  - Clinical stage from 3/27/2024: Stage IVB (rcT0, cN0, pM1) - Signed by Jayant EDOUARD M.D. on 3/27/2024  Histopathologic type: Endometrioid adenocarcinoma, NOS  Stage prefix: Recurrence  Prognostic indicators: Brain Metastasis       TREATMENT:  Radiation Therapy Episodes       Active Episodes       Radiation Therapy (4/1/2024)    Radiation Therapy: IMRT (9/30/2019)                   Radiation Treatments         Plan Last Treated On Elapsed Days Fractions Treated Prescribed Fraction Dose (cGy) Prescribed Total Dose (cGy)    1 [facility] SRT C3 4/5/2024 4 @ [id number] 3 of 5 700 3,500    1 [facility] SRT C3 4/8/2024 7 @ [id number] 4 of 5 700 3,500                  Reference Point Last Treated On Elapsed Days Most Recent Session Dose (cGy) Total Dose (cGy)    1 [facility] SRT C3 4/5/2024 4 @ [id number] 700 2,100    1 [facility] SRT C3 4/8/2024 7 @ [id number] 700 2,800                      Historical Treatments (Plans: 1)      Plan Last Treated On Elapsed Days Fractions Treated Prescribed Fraction Dose (cGy) Prescribed Total Dose (cGy)   Pelvis 11/6/2019 37 @ [id number] 27 of 27 180 4,860                Reference Point Last Treated On Elapsed Days Most Recent Session Dose (cGy) Total Dose (cGy)   Pelvis 11/6/2019 37 @ [id number] -- 4,860   PelvisCP 11/6/2019 37 @ [id number] -- 4,906                            STEREOTACTIC PROCEDURE  NOTE:    Called by [employer] machine to verify treatment parameters including:  treatment site, treatment dose, and treatment setup prior to stereotactic treatment..    Patient was placed in the treatment position with use of immobilization device and  laser guidance. CBCT images were acquired for target localization.  Images were reviewed in the axial, coronal, and saggital views and shifts were made as necessary to ensure that patient position matched simulation position.      Treatment delivered per  prescription.  The medical physicist was present throughout the set-up, verification and treatment delivery to oversee the procedure and ensure all parameters agreed with the computerized plan.    I have personally reviewed the relevant data, performed the target localization, and determined all relevant factors for this patient’s simulation.

## 2024-04-09 ENCOUNTER — DOCUMENTATION (OUTPATIENT)
Dept: CARDIOLOGY | Facility: MEDICAL CENTER | Age: 74
End: 2024-04-09
Payer: MEDICARE

## 2024-04-09 PROCEDURE — 665999 HH PPS REVENUE DEBIT

## 2024-04-09 PROCEDURE — 665998 HH PPS REVENUE CREDIT

## 2024-04-09 NOTE — PROGRESS NOTES
Medication chart review for [facility] services    Received referral from [facility].   Medications reviewed  compared with discharge summary if available.  Discharge summary date, if applicable:   3/29/24    Current medication list per [facility]     Medication list one, patient is currently taking    Current Outpatient Medications:     Melatonin, 5 mg, Oral, Nightly    magnesium hydroxide, 5 mL, Oral, QDAY PRN    OLANZapine, 10 mg, Oral, Nightly    losartan, 100 mg, Oral, QDAY PRN    dexamethasone, Take 1 Tablet by mouth 4 times a day for 7 days, THEN 1 Tablet 3 times a day for 5 days, THEN 1 Tablet 2 times a day for 5 days, THEN 1 Tablet every day for 5 days.    gabapentin, 300 mg, Oral, TID    amLODIPine, 5 mg, Oral, DAILY    Multiple Vitamins-Minerals (CENTRUM ADULT PO), 1 Tablet, Oral, QDAY    hydrOXYzine HCl, 25 mg, Oral, TID PRN    metoprolol SR, 100 mg, Oral, DAILY    meclizine, 12.5-25 mg, Oral, TID PRN    atorvastatin, 20 mg, Oral, Q EVENING    baclofen, 10 mg, Oral, TID PRN    Vitamin D3, 2,000 Units, Oral, DAILY    CoQ10, 100 mg, Oral, Q EVENING    acetaminophen, 500-1,000 mg, Oral, Once PRN      Medication list two, drugs that the patient has been prescribed or recommended to take by their healthcare provider on discharge summary  START taking these medications         Instructions   amLODIPine 5 MG Tabs  Commonly known as: Norvasc    Take 1 Tablet by mouth every day.  Dose: 5 mg      dexamethasone 2 MG tablet  Start taking on: March 29, 2024  Commonly known as: Decadron    Take 1 Tablet by mouth 4 times a day for 7 days, THEN 1 Tablet 3 times a day for 5 days, THEN 1 Tablet 2 times a day for 5 days, THEN 1 Tablet every day for 5 days.      gabapentin 300 MG Caps  Commonly known as: Neurontin    Take 1 Capsule by mouth 3 times a day for 30 days.  Dose: 300 mg                CONTINUE taking these medications         Instructions   acetaminophen 500 MG Tabs  Commonly known as: Tylenol    Take  500-1,000 mg by mouth one time as needed for Moderate Pain.  Dose: 500-1,000 mg      atorvastatin 20 MG Tabs  Commonly known as: Lipitor    Take 1 Tablet by mouth every evening.  Dose: 20 mg      baclofen 10 MG Tabs  Commonly known as: Lioresal    Take 10 mg by mouth 3 times a day as needed.  Dose: 10 mg      CENTRUM ADULT PO    Take 1 Tablet by mouth every day.  Dose: 1 Tablet      CoQ10 100 MG Caps    Take 100 mg by mouth every evening.  Dose: 100 mg      hydrOXYzine HCl 25 MG Tabs  Commonly known as: Atarax    Take 1 Tablet by mouth 3 times a day as needed for Itching or Anxiety.  Dose: 25 mg      losartan 100 MG Tabs  Commonly known as: Cozaar    Take 100 mg by mouth 1 time a day as needed. BP > 180   Indications: High Blood Pressure Disorder  Dose: 100 mg      meclizine 25 MG Tabs  Commonly known as: Antivert    Take 0.5-1 Tablets by mouth 3 times a day as needed for Vertigo.  Dose: 12.5-25 mg      metoprolol  MG Tb24  Commonly known as: Toprol XL    Take 1 Tablet by mouth every day.  Dose: 100 mg      Vitamin D3 2000 UNIT Caps  Generic drug: Cholecalciferol    Take 2,000 Units by mouth every day.  Dose: 2,000 Units                STOP taking these medications       aspirin 81 MG EC tablet          Allergies   Allergen Reactions    Penicillins Itching     Makes her feel weak     Levaquin Itching     Swelling of the tongue  Facial swelling    Tramadol Hives     Swelling       Labs     Lab Results   Component Value Date/Time    SODIUM 140 03/27/2024 12:19 PM    POTASSIUM 3.6 03/27/2024 12:19 PM    CHLORIDE 103 03/27/2024 12:19 PM    CO2 26 03/27/2024 12:19 PM    GLUCOSE 96 03/27/2024 12:19 PM    BUN 9 03/27/2024 12:19 PM    CREATININE 0.84 03/27/2024 12:19 PM     Lab Results   Component Value Date/Time    ALKPHOSPHAT 122 (H) 03/27/2024 12:19 PM    ASTSGOT 28 03/27/2024 12:19 PM    ALTSGPT 21 03/27/2024 12:19 PM    TBILIRUBIN 0.6 03/27/2024 12:19 PM    INR 1.20 (H) 08/10/2021 08:10 PM    ALBUMIN 4.3  03/27/2024 12:19 PM        Assessment for clinically significant drug interactions, drug omissions/additions, duplicative therapies.            CC   Kathya Scanlon, A.P.N.  [address] [address]  Fax: [phone]    [facility]  Phone [phone] fax [phone]    This note was created using voice recognition software (Dragon). The accuracy of the dictation is limited by the abilities of the software. I have reviewed the note prior to signing, however some errors in grammar and context are still possible. If you have any questions related to this note please do not hesitate to contact our office.

## 2024-04-10 ENCOUNTER — HOSPITAL ENCOUNTER (OUTPATIENT)
Dept: RADIATION ONCOLOGY | Facility: MEDICAL CENTER | Age: 74
End: 2024-04-10
Attending: RADIOLOGY
Payer: MEDICARE

## 2024-04-10 ENCOUNTER — HOSPITAL ENCOUNTER (EMERGENCY)
Facility: MEDICAL CENTER | Age: 74
DRG: 871 | End: 2024-04-10
Attending: EMERGENCY MEDICINE | Admitting: STUDENT IN AN ORGANIZED HEALTH CARE EDUCATION/TRAINING PROGRAM
Payer: MEDICARE

## 2024-04-10 ENCOUNTER — HOSPITAL ENCOUNTER (OUTPATIENT)
Dept: RADIATION ONCOLOGY | Facility: MEDICAL CENTER | Age: 74
End: 2024-04-10

## 2024-04-10 ENCOUNTER — APPOINTMENT (OUTPATIENT)
Dept: RADIOLOGY | Facility: MEDICAL CENTER | Age: 74
DRG: 871 | End: 2024-04-10
Attending: EMERGENCY MEDICINE
Payer: MEDICARE

## 2024-04-10 ENCOUNTER — HOME CARE VISIT (OUTPATIENT)
Dept: HOME HEALTH SERVICES | Facility: HOME HEALTHCARE | Age: 74
End: 2024-04-10
Payer: MEDICARE

## 2024-04-10 VITALS
TEMPERATURE: 98.7 F | WEIGHT: 186 LBS | RESPIRATION RATE: 14 BRPM | SYSTOLIC BLOOD PRESSURE: 178 MMHG | BODY MASS INDEX: 31.93 KG/M2 | OXYGEN SATURATION: 92 % | DIASTOLIC BLOOD PRESSURE: 104 MMHG | HEART RATE: 96 BPM

## 2024-04-10 VITALS
OXYGEN SATURATION: 95 % | DIASTOLIC BLOOD PRESSURE: 80 MMHG | TEMPERATURE: 97.1 F | SYSTOLIC BLOOD PRESSURE: 107 MMHG | HEART RATE: 77 BPM

## 2024-04-10 DIAGNOSIS — C79.31 MALIGNANT NEOPLASM METASTATIC TO BRAIN (HCC): ICD-10-CM

## 2024-04-10 PROBLEM — G93.89 BRAIN MASS: Status: ACTIVE | Noted: 2024-04-10

## 2024-04-10 LAB
ALBUMIN SERPL BCP-MCNC: 4.2 G/DL (ref 3.2–4.9)
ALBUMIN/GLOB SERPL: 1.2 G/DL
ALP SERPL-CCNC: 101 U/L (ref 30–99)
ALT SERPL-CCNC: 32 U/L (ref 2–50)
ANION GAP SERPL CALC-SCNC: 15 MMOL/L (ref 7–16)
APPEARANCE UR: CLEAR
AST SERPL-CCNC: 21 U/L (ref 12–45)
BACTERIA #/AREA URNS HPF: NEGATIVE /HPF
BASOPHILS # BLD AUTO: 0.2 % (ref 0–1.8)
BASOPHILS # BLD: 0.03 K/UL (ref 0–0.12)
BILIRUB SERPL-MCNC: 1.1 MG/DL (ref 0.1–1.5)
BILIRUB UR QL STRIP.AUTO: NEGATIVE
BUN SERPL-MCNC: 16 MG/DL (ref 8–22)
CALCIUM ALBUM COR SERPL-MCNC: 9.6 MG/DL (ref 8.5–10.5)
CALCIUM SERPL-MCNC: 9.8 MG/DL (ref 8.5–10.5)
CHEMOTHERAPY INFUSION START DATE: NORMAL
CHEMOTHERAPY INFUSION START DATE: NORMAL
CHEMOTHERAPY INFUSION STOP DATE: NORMAL
CHEMOTHERAPY RECORDS: 3500
CHEMOTHERAPY RECORDS: 3500
CHEMOTHERAPY RECORDS: 7
CHEMOTHERAPY RECORDS: 7
CHEMOTHERAPY RECORDS: NORMAL
CHEMOTHERAPY RX CANCER: NORMAL
CHEMOTHERAPY RX CANCER: NORMAL
CHLORIDE SERPL-SCNC: 97 MMOL/L (ref 96–112)
CO2 SERPL-SCNC: 26 MMOL/L (ref 20–33)
COLOR UR: YELLOW
CREAT SERPL-MCNC: 0.62 MG/DL (ref 0.5–1.4)
DATE 1ST CHEMO CANCER: NORMAL
DATE 1ST CHEMO CANCER: NORMAL
EKG IMPRESSION: NORMAL
EOSINOPHIL # BLD AUTO: 0 K/UL (ref 0–0.51)
EOSINOPHIL NFR BLD: 0 % (ref 0–6.9)
EPI CELLS #/AREA URNS HPF: NEGATIVE /HPF
ERYTHROCYTE [DISTWIDTH] IN BLOOD BY AUTOMATED COUNT: 45.3 FL (ref 35.9–50)
GFR SERPLBLD CREATININE-BSD FMLA CKD-EPI: 95 ML/MIN/1.73 M 2
GLOBULIN SER CALC-MCNC: 3.5 G/DL (ref 1.9–3.5)
GLUCOSE SERPL-MCNC: 109 MG/DL (ref 65–99)
GLUCOSE UR STRIP.AUTO-MCNC: NEGATIVE MG/DL
HCT VFR BLD AUTO: 49.6 % (ref 37–47)
HGB BLD-MCNC: 17.2 G/DL (ref 12–16)
HYALINE CASTS #/AREA URNS LPF: ABNORMAL /LPF
IMM GRANULOCYTES # BLD AUTO: 0.25 K/UL (ref 0–0.11)
IMM GRANULOCYTES NFR BLD AUTO: 1.9 % (ref 0–0.9)
KETONES UR STRIP.AUTO-MCNC: NEGATIVE MG/DL
LEUKOCYTE ESTERASE UR QL STRIP.AUTO: NEGATIVE
LYMPHOCYTES # BLD AUTO: 0.8 K/UL (ref 1–4.8)
LYMPHOCYTES NFR BLD: 6.2 % (ref 22–41)
MCH RBC QN AUTO: 32.3 PG (ref 27–33)
MCHC RBC AUTO-ENTMCNC: 34.7 G/DL (ref 32.2–35.5)
MCV RBC AUTO: 93.2 FL (ref 81.4–97.8)
MICRO URNS: ABNORMAL
MONOCYTES # BLD AUTO: 0.76 K/UL (ref 0–0.85)
MONOCYTES NFR BLD AUTO: 5.9 % (ref 0–13.4)
NEUTROPHILS # BLD AUTO: 10.99 K/UL (ref 1.82–7.42)
NEUTROPHILS NFR BLD: 85.8 % (ref 44–72)
NITRITE UR QL STRIP.AUTO: NEGATIVE
NRBC # BLD AUTO: 0.02 K/UL
NRBC BLD-RTO: 0.2 /100 WBC (ref 0–0.2)
PH UR STRIP.AUTO: 7 [PH] (ref 5–8)
PLATELET # BLD AUTO: 209 K/UL (ref 164–446)
PMV BLD AUTO: 11.5 FL (ref 9–12.9)
POTASSIUM SERPL-SCNC: 4.3 MMOL/L (ref 3.6–5.5)
PROT SERPL-MCNC: 7.7 G/DL (ref 6–8.2)
PROT UR QL STRIP: NEGATIVE MG/DL
RAD ONC ARIA COURSE LAST TREATMENT DATE: NORMAL
RAD ONC ARIA COURSE LAST TREATMENT DATE: NORMAL
RAD ONC ARIA COURSE TREATMENT ELAPSED DAYS: NORMAL
RAD ONC ARIA COURSE TREATMENT ELAPSED DAYS: NORMAL
RAD ONC ARIA REFERENCE POINT DOSAGE GIVEN TO DATE: 35
RAD ONC ARIA REFERENCE POINT DOSAGE GIVEN TO DATE: 35
RAD ONC ARIA REFERENCE POINT ID: NORMAL
RAD ONC ARIA REFERENCE POINT ID: NORMAL
RAD ONC ARIA REFERENCE POINT SESSION DOSAGE GIVEN: 7
RBC # BLD AUTO: 5.32 M/UL (ref 4.2–5.4)
RBC # URNS HPF: ABNORMAL /HPF
RBC UR QL AUTO: ABNORMAL
SODIUM SERPL-SCNC: 138 MMOL/L (ref 135–145)
SP GR UR STRIP.AUTO: 1.01
UROBILINOGEN UR STRIP.AUTO-MCNC: 1 MG/DL
WBC # BLD AUTO: 12.8 K/UL (ref 4.8–10.8)
WBC #/AREA URNS HPF: ABNORMAL /HPF

## 2024-04-10 PROCEDURE — 81001 URINALYSIS AUTO W/SCOPE: CPT

## 2024-04-10 PROCEDURE — 36415 COLL VENOUS BLD VENIPUNCTURE: CPT

## 2024-04-10 PROCEDURE — 85025 COMPLETE CBC W/AUTO DIFF WBC: CPT

## 2024-04-10 PROCEDURE — 70470 CT HEAD/BRAIN W/O & W/DYE: CPT

## 2024-04-10 PROCEDURE — 80053 COMPREHEN METABOLIC PANEL: CPT

## 2024-04-10 PROCEDURE — 99285 EMERGENCY DEPT VISIT HI MDM: CPT

## 2024-04-10 PROCEDURE — 665999 HH PPS REVENUE DEBIT

## 2024-04-10 PROCEDURE — 665998 HH PPS REVENUE CREDIT

## 2024-04-10 PROCEDURE — 96374 THER/PROPH/DIAG INJ IV PUSH: CPT

## 2024-04-10 PROCEDURE — 700117 HCHG RX CONTRAST REV CODE 255: Performed by: EMERGENCY MEDICINE

## 2024-04-10 PROCEDURE — 93005 ELECTROCARDIOGRAM TRACING: CPT | Performed by: EMERGENCY MEDICINE

## 2024-04-10 PROCEDURE — 700111 HCHG RX REV CODE 636 W/ 250 OVERRIDE (IP): Performed by: EMERGENCY MEDICINE

## 2024-04-10 PROCEDURE — 77280 THER RAD SIMULAJ FIELD SMPL: CPT | Mod: 26 | Performed by: RADIOLOGY

## 2024-04-10 PROCEDURE — 77280 THER RAD SIMULAJ FIELD SMPL: CPT | Performed by: RADIOLOGY

## 2024-04-10 PROCEDURE — 77373 STRTCTC BDY RAD THER TX DLVR: CPT | Performed by: RADIOLOGY

## 2024-04-10 RX ORDER — DEXAMETHASONE SODIUM PHOSPHATE 4 MG/ML
6 INJECTION, SOLUTION INTRA-ARTICULAR; INTRALESIONAL; INTRAMUSCULAR; INTRAVENOUS; SOFT TISSUE EVERY 6 HOURS
Status: DISCONTINUED | OUTPATIENT
Start: 2024-04-11 | End: 2024-04-11 | Stop reason: HOSPADM

## 2024-04-10 RX ORDER — DEXAMETHASONE SODIUM PHOSPHATE 4 MG/ML
6 INJECTION, SOLUTION INTRA-ARTICULAR; INTRALESIONAL; INTRAMUSCULAR; INTRAVENOUS; SOFT TISSUE ONCE
Status: COMPLETED | OUTPATIENT
Start: 2024-04-10 | End: 2024-04-10

## 2024-04-10 RX ORDER — DEXAMETHASONE 4 MG/1
6 TABLET ORAL ONCE
Status: CANCELLED | OUTPATIENT
Start: 2024-04-10 | End: 2024-04-10

## 2024-04-10 RX ORDER — OLANZAPINE 10 MG/1
10 TABLET ORAL
Status: ON HOLD | COMMUNITY
End: 2024-05-13

## 2024-04-10 RX ADMIN — IOHEXOL 80 ML: 350 INJECTION, SOLUTION INTRAVENOUS at 19:25

## 2024-04-10 RX ADMIN — DEXAMETHASONE SODIUM PHOSPHATE 6 MG: 4 INJECTION INTRA-ARTICULAR; INTRALESIONAL; INTRAMUSCULAR; INTRAVENOUS; SOFT TISSUE at 20:49

## 2024-04-10 ASSESSMENT — PAIN SCALES - GENERAL: PAINLEVEL: 9=SEVERE PAIN

## 2024-04-10 ASSESSMENT — FIBROSIS 4 INDEX: FIB4 SCORE: 2.08

## 2024-04-10 NOTE — PROCEDURES
DATE OF SERVICE: 4/10/2024    DIAGNOSIS:  Endometrial cancer (HCC)  Staging form: Corpus Uteri - Carcinoma and Carcinosarcoma, AJCC 8th Edition  - Pathologic stage from 9/13/2019: FIGO Stage IIIB (pT3b, pN0, cM0) - Signed by Jayant EDOUARD M.D. on 9/13/2019  Histopathologic type: Endometrioid adenocarcinoma, NOS  Histologic grade (G): G2  Histologic grading system: 3 grade system  Residual tumor (R): R1 - Microscopic  Lymph-vascular invasion (LVI): LVI not present (absent)/not identified  Diagnostic confirmation: Positive histology  Specimen type: Excision  Staged by: Managing physician  - Clinical stage from 3/27/2024: Stage IVB (rcT0, cN0, pM1) - Signed by Jayant EDOUARD M.D. on 3/27/2024  Histopathologic type: Endometrioid adenocarcinoma, NOS  Stage prefix: Recurrence  Prognostic indicators: Brain Metastasis       TREATMENT:  Radiation Therapy Episodes       Active Episodes       Radiation Therapy (4/1/2024)    Radiation Therapy: IMRT (9/30/2019)                   Radiation Treatments         Plan Last Treated On Elapsed Days Fractions Treated Prescribed Fraction Dose (cGy) Prescribed Total Dose (cGy)    1 [facility] SRT C3 4/5/2024 4 @ [id number] 3 of 5 700 3,500    1 [facility] SRT C3 4/10/2024 9 @ [id number] 5 of 5 700 3,500                  Reference Point Last Treated On Elapsed Days Most Recent Session Dose (cGy) Total Dose (cGy)    1 [facility] SRT C3 4/5/2024 4 @ [id number] 700 2,100    1 [facility] SRT C3 4/10/2024 9 @ [id number] 700 3,500                      Historical Treatments (Plans: 1)      Plan Last Treated On Elapsed Days Fractions Treated Prescribed Fraction Dose (cGy) Prescribed Total Dose (cGy)   Pelvis 11/6/2019 37 @ [id number] 27 of 27 180 4,860                Reference Point Last Treated On Elapsed Days Most Recent Session Dose (cGy) Total Dose (cGy)   Pelvis 11/6/2019 37 @ [id number] -- 4,860   PelvisCP 11/6/2019 37 @ [id number] -- 4,906                            STEREOTACTIC PROCEDURE  NOTE:    Called by Truebeam machine to verify treatment parameters including:  treatment site, treatment dose, and treatment setup prior to stereotactic treatment..    Patient was placed in the treatment position with use of immobilization device and  laser guidance. CBCT images were acquired for target localization.  Images were reviewed in the axial, coronal, and saggital views and shifts were made as necessary to ensure that patient position matched simulation position.      Treatment delivered per  prescription.  The medical physicist was present throughout the set-up, verification and treatment delivery to oversee the procedure and ensure all parameters agreed with the computerized plan.    I have personally reviewed the relevant data, performed the target localization, and determined all relevant factors for this patient’s simulation.

## 2024-04-10 NOTE — ADDENDUM NOTE
Encounter addended by: Jayant EDOUARD M.D. on: 4/10/2024 10:13 AM   Actions taken: Clinical Note Signed

## 2024-04-10 NOTE — ED TRIAGE NOTES
.  Chief Complaint   Patient presents with    ALOC      Pt BIB family to triage. Family is reporting that Pt is speaking in a different language and making non sensical statements. This began at 915 this morning after radiation treatment.    Weakness, drooling began today.    Pt has Hx of cancer.

## 2024-04-11 ENCOUNTER — HOME CARE VISIT (OUTPATIENT)
Dept: HOME HEALTH SERVICES | Facility: HOME HEALTHCARE | Age: 74
End: 2024-04-11
Payer: MEDICARE

## 2024-04-11 ENCOUNTER — TELEPHONE (OUTPATIENT)
Dept: MEDICAL GROUP | Facility: LAB | Age: 74
End: 2024-04-11

## 2024-04-11 ENCOUNTER — TELEMEDICINE (OUTPATIENT)
Dept: MEDICAL GROUP | Facility: LAB | Age: 74
End: 2024-04-11
Payer: MEDICARE

## 2024-04-11 DIAGNOSIS — F22 DELUSIONAL DISORDERS (HCC): ICD-10-CM

## 2024-04-11 DIAGNOSIS — G93.89 BRAIN MASS: ICD-10-CM

## 2024-04-11 DIAGNOSIS — C54.1 ENDOMETRIAL CANCER (HCC): ICD-10-CM

## 2024-04-11 DIAGNOSIS — I63.9 ISCHEMIC STROKE (HCC): ICD-10-CM

## 2024-04-11 DIAGNOSIS — C79.31 MALIGNANT NEOPLASM METASTATIC TO BRAIN (HCC): ICD-10-CM

## 2024-04-11 PROBLEM — D68.59 HYPERCOAGULABLE STATE (HCC): Status: RESOLVED | Noted: 2024-03-27 | Resolved: 2024-04-11

## 2024-04-11 PROCEDURE — 99214 OFFICE O/P EST MOD 30 MIN: CPT | Mod: 95 | Performed by: NURSE PRACTITIONER

## 2024-04-11 PROCEDURE — 665999 HH PPS REVENUE DEBIT

## 2024-04-11 PROCEDURE — 665998 HH PPS REVENUE CREDIT

## 2024-04-11 NOTE — TELEPHONE ENCOUNTER
[facility] called back and state that they do not have records from her previous oncology and have only took her case a month ago and are not able to give a prognosis of her also since her last treatment was yesterday. They can not give any advice on blood thinners and that she will need to see neurology for this. They say that there is no contraindication with the treatment for her to take this,  Cell phone number for Kathya to her to call

## 2024-04-11 NOTE — ED NOTES
Patient's daughter wanting to take patient home instead of having pt hold down in the ED, RN called [facility], discussed no more beds, RN got a hospital bed to move pt iunto, however daughter would like to patient home. Contacting hospitalist.

## 2024-04-11 NOTE — ED NOTES
Notified Hospitalisist about pts daughter wanting to take pt AMA, report OK for pt's daughter to sign AMA paperwork. Pt assisted into clothes by RN and family. Staff assisted pt into WC. Pt is a total assist. RN spoke to pt's daughter discussed if she needs resources or if she needed RN to provide resources and she decline. Discussed if any reason she feels it is too much or if anything feels like if pt is worse she can come back. Pt daughter agreed. Report they have an appointment tomorrow with the physician and will schedule an MRI.   Patient's daughter has chosen to leave the hospital against medical advice with pt. Dr. Painting has not discharged the patient. This RN has discussed with the patient's family the following: Physician has not determined patient is ready for discharge. Risks and consequences of leaving the hospital too soon and the benefit of continued hospitalization. PIV removed, gauze placed with coband.    Discharge against medical advice has been signed by pt's daughter.    Dr. Painting has been notified.      Staff assisted pt and family to vehicle and assisted pt into car.

## 2024-04-11 NOTE — PROGRESS NOTES
Verbal consent was acquired by the patient to use [employer] ambient listening note generation during this visit Yes     This evaluation was conducted via Zoom using secure and encrypted videoconferencing technology. The patient was in their home in the [facility].    The patient's identity was confirmed and verbal consent was obtained for this virtual visit.       Bean Gill is a 73 y.o. female, I am speaking with her daughter via zoom today.  Her daughter is in the hospital room with her at [facility].    History of Present Illness  She presented to [facility] yesterday with generalized weakness and fatigue.  Per the ER physician notes she had a brain MRI done on the ninth that showed recurrent metastatic cancer to her right frontal brain.  She also saw her oncologist yesterday, MAXI Carmona with Dr. Garcia.  She went to [facility] this morning and was diagnosed with a subacute ischemic stroke.  Her daughter needs help with an exact or estimated prognosis for the patient in order for the decision regarding initiation of blood thinners to be made.  She has had difficulty getting in touch with Dr. Garcia's office.  They also need a referral for enhanced home care as the daughter needs more help around the house with her mom.  A referral has been placed, by neurology, to palliative care although that is only going to provide 1 to 2 days of help.  The patient is not ready to go on hospice yet, per patient's daughter.  They are being discharged to home from [facility] as we speak.      Objective   There were no vitals taken for this visit.  Physical Exam  Gen: pt is lying in a hospital bed but is awake - her daughter is doing the talking for her as she is sleepy.   Resp: nonlabored appeared.        Results  Imaging  MRI showed a 2 cm frontal brain metastases - 3/26 and CT yesterday shows 2 cm mass.    Assessment & Plan  #1-endometrial cancer with brain metastases: I did have to leave a message with   Jose's office and sent him a Voalte -  as they were closed for lunch and was told that they would call back ASAP.  Also notified Dr. Sprague via Voalte.  Patient's daughter is anxiously awaiting a prognosis in order for a determination to be made if her mom needs to be on blood thinners due to new diagnosis of ischemic stroke given this morning at [facility].  I will call the daughter back as soon as I hear back from Dr. Garcia.  We also discussed palliative care versus hospice.  Patient is not ready for hospice yet, per patient's daughter.  Discussed that I am unsure regarding information of enhanced home health care and I will find out more for them.       Addended 72 hours later: Spoke with [facility] who cannot give a prognosis until patient meets with Dr. Garcia due to 2-year gap of treatment as patient lived in [place] for 2 years receiving treatments.  Her neurologist is aware and has agreed to contact the family.    Please note that this dictation was created using voice recognition software. I have made every reasonable attempt to correct obvious errors, but I expect that there are errors of grammar and possibly content that I did not discover before finalizing the note.

## 2024-04-11 NOTE — ED NOTES
Medication history reviewed with patient at bedside.   Med rec is complete  Allergies reviewed.   Patient has not had any outpatient antibiotics in the last 30 days.   Anticoagulants: No    Kavita Pickett

## 2024-04-11 NOTE — ED NOTES
Bedside report received from Felicia RN, assumed care of patient.  POC discussed with patient. Call light within reach, all needs addressed at this time. Patient's family member at bedside.   Fall risk interventions in place: Move the patient closer to the nurse's station, Patient's personal possessions are with in their safe reach, Place socks on patient, Place fall risk sign on patient's door, and Keep floor surfaces clean and dry (all applicable per [place] Fall risk assessment)   Continuous monitoring: Cardiac Leads, Pulse Ox, or Blood Pressure  IVF/IV medications: Not Applicable   Oxygen: Room Air  Bedside sitter: Not Applicable   Isolation: Not Applicable

## 2024-04-11 NOTE — CASE COMMUNICATION
OT spoke with client's daughter today re scheduling initial evaluation.  OT offered visit tomorrow, but daughter requested Mon 4/15.  Appt is set.

## 2024-04-11 NOTE — ED PROVIDER NOTES
ED Provider Note    CHIEF COMPLAINT  Chief Complaint   Patient presents with    ALOC       EXTERNAL RECORDS REVIEWED  Neurosurgery consult on 3/27/2024  History of Presenting Illness  73 y.o. female who presented 3/27/2024 with generalized weakness and lethargy.  She had an MRI of the brain yesterday which showed a solitary right frontal brain mass.  She is very lethargic during interview.  Daughter at bedside who is her primary caregiver, able to give history.  Patient has not been symptomatic from the brain lesion, no significant headaches, no left-sided weakness.  She has chronic right hemiparesis due to prior CVA.  She has a previous history of stereotactic radiosurgery at [facility] 2022 for brain metastases.    Assessment and plan  Discussed surgical option with daughter. Patient is a surgical candidate, however her Karnofsky score is 50-60. She has responded to stereotactic radiation and immunotherapy for 2 previous brain lesions. The daughter is hopeful that she can respond to further radiation and immunotherapy without surgery. I did discuss the possibility of surgery in some detail. Daughter would like to discuss with her mother when she is more alert, able to communicate her wishes. At this point, daughter does not think patient will 1 surgery. I will check in with them tomorrow.       HPI/ROS    OUTSIDE HISTORIAN(S):  Patient's daughter is adding to history review of systems.    Jairo Rivas is a 73 y.o. female who presents with daughter with complaint of weakness.  The patient does have a history of endometrial cancer metastasis to the brain.  She was seen here on 3/27/2024 where MRI revealed evidence of edema Decadron.  Dr. Ledesma did see the patient in restarting on radiation therapy and she is a surgical candidate.  Per the daughter who is been taking care of the patient previously for the last several weeks, the patient's had ultimately status for last 2 days increasing severity today she is  speaking in her native tongue and is confused.  She has had global weakness but no focal weakness.  The patient has stroke before in 2010 with right upper extremity weakness and has had difficulty walking secondary to her CVA.  The patient has not had an abrupt change in her condition more of a lingering type of weakness and ultimately status for 2 days.  I was called to the patient's bedside for stroke evaluation    PAST MEDICAL HISTORY   has a past medical history of Anemia (2/6/2018), Back pain, Back pain, Cancer ([facility]) (2019), Cough, CVA, old, hemiparesis ([facility]) (2/6/2018), Essential hypertension (2/6/2018), Eye drainage, Fatigue, Frequent headaches, Frequent urination, Hyperlipidemia, Irregular periods, Painful joint, Palpitations, Post-menopause bleeding (2/6/2018), Pulmonary nodule, Sore muscles, Stroke ([facility]), Swelling of lower extremity, Vision abnormalities (2/6/2018), Vitamin D deficiency (2/6/2018), Weakness, and Wears glasses.    SURGICAL HISTORY   has a past surgical history that includes hysteroscopy,dx,sep proc (6/19/2019); dilation and curettage (6/19/2019); cystoscopy,insert ureteral stent (Left, 8/8/2019); hysterectomy robotic xi (N/A, 8/8/2019); salpingo oophorectomy (Bilateral, 8/8/2019); node biopsy sentinel (8/8/2019); hysterectomy laparoscopy; and thoracoscopy,dx no bx (Left, 8/6/2021).    FAMILY HISTORY  Family History   Problem Relation Age of Onset    Hypertension Mother     No Known Problems Father     No Known Problems Brother        SOCIAL HISTORY  Social History     Tobacco Use    Smoking status: Never    Smokeless tobacco: Never    Tobacco comments:     n/a   Vaping Use    Vaping Use: Never used   Substance and Sexual Activity    Alcohol use: No    Drug use: No    Sexual activity: Never     Comment: , have 7 kids.       CURRENT MEDICATIONS  Home Medications       Reviewed by Kavita Pickett (Pharmacy Tech) on 04/10/24 at 1801  Med List Status: Complete     Medication Last Dose  Status   amLODIPine (NORVASC) 5 MG Tab 4/10/2024 Active   atorvastatin (LIPITOR) 20 MG Tab 4/9/2024 Active   baclofen (LIORESAL) 10 MG Tab 2 weeks Active   dexamethasone (DECADRON) 2 MG tablet 4/10/2024 Active   gabapentin (NEURONTIN) 300 MG Cap 4/10/2024 Active   losartan (COZAAR) 100 MG Tab 4/9/2024 Active   Melatonin 10 MG Tab 4/9/2024 Active   metoprolol SR (TOPROL XL) 100 MG TABLET SR 24 HR 4/10/2024 Active   olanzapine (ZYPREXA) 10 MG tablet 4/9/2024 Active                    ALLERGIES  Allergies   Allergen Reactions    Penicillins Itching     Makes her feel weak     Levaquin Itching and Swelling     Swelling of the tongue  Facial swelling    Tramadol Hives and Swelling     Swelling       PHYSICAL EXAM  VITAL SIGNS: BP (!) 178/104   Pulse 91   Temp 37.1 °C (98.7 °F) (Temporal)   Resp 20   Wt 84.4 kg (186 lb)   SpO2 91%   BMI 31.93 kg/m²      Nursing notes and vitals reviewed.  Constitutional: Well developed, Well nourished, No acute distress, Non-toxic appearance.   Eyes: PERRLA, EOMI, Conjunctiva normal, No discharge.   HENT: Normocephalic, Atraumatic, moist mucous membranes, no facial edema   Cardiovascular: Normal heart rate, Normal rhythm, No murmurs, No rubs, No gallops.   Thorax & Lungs: No respiratory distress, No rales, No rhonchi, No wheezing, No chest tenderness.   Abdomen: Bowel sounds normal, Soft, No tenderness, No guarding, No rebound, No masses, No pulsatile masses.   Skin: Warm, Dry, No erythema, No rash.   Extremities: No deformity, no edema, good range of motion range of motion upper lower extremes bilaterally  Neurologic: Her name, she is chronic weeks the right upper extremity left upper extremity no pain or drift, strength intact bilateral lower extremities, occasionally she breaks into a different language and her daughter states that she is confused, she is following commands intermittently, sensation and strength intact, cranials 2 through 12 are intact  Psychiatric: Affect normal  for clinical presentation.      EKG/LABS  Normal sinus rhythm on monitor  I have independently interpreted this EKG    RADIOLOGY/PROCEDURES   I have independently interpreted the diagnostic imaging associated with this visit and am waiting the final reading from the radiologist.   My preliminary interpretation is as follows: CT scan of the brain with without contrast does reveal evidence of vasogenic edema    Radiologist interpretation:  CT-HEAD WITH & W/O   Final Result      1.  2 cm right frontal brain metastasis with surrounding vasogenic edema.      2.  Multifocal areas of deep white matter infarction in the right posterior frontal parietal white matter.      3.  Mild effacement of the right lateral ventricle with minimal right to left midline shift and ventricular system.      4.  Chronic area of lacunar type infarction in the left side of the elaine and left frontal periventricular white matter extending into the left posterior basal ganglia.          COURSE & MEDICAL DECISION MAKING    ASSESSMENT, COURSE AND PLAN  Care Narrative: This is a pleasant 73-year-old female with metastatic mets to the brain with vasogenic edema.  She has had increasing altered mental status last 3 days.  CT does reveal evidence of vasogenic edema and possible areas of white matter infarction.  I discussed with Dr. Gardner, radiologist, believes are probably subacute but MRI would be more prudent to rule out the acuteness of the infarct pattern.  The patient does have confusion intermittently here with no focal weakness on the left side that would be consistent with the infarct seen on the right side.  The patient received Decadron IV here.  The patient require hospitalization, possible EEG for seizure-like activity, steroids, neurosurgical consultation and.  This was discussed with the daughter she understands the need for the patient be hospitalized.  Patient's had symptoms greater than 2 days and do not believe she is a candidate for  CVA evaluation and management such as CTA head and neck as well as thrombolytic therapy or LVO therapy.  This reason a stroke workup was not completed and MRI would be more prudent.          ADDITIONAL PROBLEMS MANAGED  Metastatic intracranial lesions with vasogenic edema subacute    DISPOSITION AND DISCUSSIONS  I have discussed management of the patient with the following physicians and TRINH's: Discussed with Dr. Nuñez who understands the patient will be here in the reconsulting, discussed with  for hospitalization.      FINAL DIAGNOSIS  Altered mental status  Metastatic brain lesion with vasogenic edema  Intracranial infarct subacute  Critical care time 35 minutes    Electronically signed by: Marvin Wooten D.O., 4/10/2024 5:03 PM

## 2024-04-12 ENCOUNTER — HOME CARE VISIT (OUTPATIENT)
Dept: HOME HEALTH SERVICES | Facility: HOME HEALTHCARE | Age: 74
End: 2024-04-12
Payer: MEDICARE

## 2024-04-12 PROCEDURE — 665998 HH PPS REVENUE CREDIT

## 2024-04-12 PROCEDURE — 665999 HH PPS REVENUE DEBIT

## 2024-04-12 NOTE — RADIATION COMPLETION NOTES
END OF TREATMENT SUMMARY    Patient name:  Jairo Rivas    Primary Physician:  ZULEYKA Lyons MRN: [id number]  CSN: [id number]   Referring physician:  No ref. provider found  : 1950, 73 y.o.       TREATMENT SUMMARY:        Course First Treatment Date 2024    Course Last Treatment Date 04/10/2024   Course Elapsed Days 9 @ [id number]   Course Intent Palliative     Radiation Therapy Episodes       Active Episodes       Radiation Therapy (2024)    Radiation Therapy: IMRT (2019)                   Radiation Treatments         Plan Last Treated On Elapsed Days Fractions Treated Prescribed Fraction Dose (cGy) Prescribed Total Dose (cGy)    1 [facility] SRT C3 2024 4 @ [id number] 3 of 5 700 3,500    1 Les SRT C3 4/10/2024 9 @ [id number] 5 of 5 700 3,500                  Reference Point Last Treated On Elapsed Days Most Recent Session Dose (cGy) Total Dose (cGy)    1 [facility] SRT C3 2024 4 @ [id number] 700 2,100    1 [facility] SRT C3 4/10/2024 9 @ [id number] -- 3,500                      Historical Treatments (Plans: 1)      Plan Last Treated On Elapsed Days Fractions Treated Prescribed Fraction Dose (cGy) Prescribed Total Dose (cGy)   Pelvis 2019 37 @ [id number] 27 of 27 180 4,860                Reference Point Last Treated On Elapsed Days Most Recent Session Dose (cGy) Total Dose (cGy)   Pelvis 2019 37 @ [id number] -- 4,860   PelvisCP 2019 37 @ [id number] -- 4,906                                     STAGE:   Endometrial cancer (HCC)  Staging form: Corpus Uteri - Carcinoma and Carcinosarcoma, AJCC 8th Edition  - Pathologic stage from 2019: FIGO Stage IIIB (pT3b, pN0, cM0) - Signed by Jayant EDOUARD M.D. on 2019  Histopathologic type: Endometrioid adenocarcinoma, NOS  Histologic grade (G): G2  Histologic grading system: 3 grade system  Residual tumor (R): R1 - Microscopic  Lymph-vascular invasion (LVI): LVI not present (absent)/not  identified  Diagnostic confirmation: Positive histology  Specimen type: Excision  Staged by: Managing physician  - Clinical stage from 3/27/2024: Stage IVB (rcT0, cN0, pM1) - Signed by Jayant EDOUARD M.D. on 3/27/2024  Histopathologic type: Endometrioid adenocarcinoma, NOS  Stage prefix: Recurrence  Prognostic indicators: Brain Metastasis       TREATMENT INDICATION:   Stereotactic treatment for right frontal brain metastasis     CONCURRENT SYSTEMIC TREATMENT:   None     RT COURSE DISCONTINUED EARLY:   No     PATIENT EXPERIENCE:       11/6/2019     2:44 PM 10/30/2019     2:33 PM 10/23/2019     2:39 PM 10/16/2019     2:38 PM 10/9/2019     4:40 PM 10/2/2019     2:24 PM   Toxicity Assessment   Toxicity Assessment Female Pelvis Female Pelvis Female Pelvis Female Pelvis Female Pelvis Female Pelvis   Fatigue (lethargy, malaise, asthenia) Moderate (e.g., decrease in performance status by 1 ECOG level or 20% Karn[place]sky) or causing difficulty performing some activities Severe (e.g., decrease in performance status by 2 or more ECOG levels or 40% Karn[place]sky) or loss of ability to perform some activities Severe (e.g., decrease in performance status by 2 or more ECOG levels or 40% Karn[place]sky) or loss of ability to perform some activities Moderate (e.g., decrease in performance status by 1 ECOG level or 20% [place]sky) or causing difficulty performing some activities Increased fatigue over baseline, but not altering normal activities Increased fatigue over baseline, but not altering normal activities   Radiation Dermatitis Faint erythema or dry desquamation Faint erythema or dry desquamation None None None None   Anorexia Loss of appetite Loss of appetite Oral intake significantly decreased Loss of appetite Loss of appetite Loss of appetite   Colitis None None None   None   Constipation None None Requiring stool softener or dietary modification Requiring stool softener or dietary modification  Requiring stool softener or dietary  modification   Dehydration None None None None None None   Diarrhea w/o Colostomy Increase of <4 stools/day over pre-treatment Increase of <4 stools/day over pre-treatment None Increase of <4 stools/day over pre-treatment None None   Flatulence None    None None   Nausea Able to eat Able to eat Oral intake significantly decreased Able to eat Able to eat Able to eat   Proctitis  None None   None   Vomiting None None None None None None   RT - Pain due to RT Moderate pain, pain or analgesics interfering with function, but not interfering with activities of daily living Mild pain not interfering with function None Moderate pain, pain or analgesics interfering with function, but not interfering with activities of daily living None None   Tumor Pain (onset or exacerbation of tumor pain due to treatment) None Mild pain not interfering with function None None None None   Dysuria (painful urination) Mild symptoms requiring no intervention Mild symptoms requiring no intervention None None None None   Urinary Frequency Increase in frequency up to 2x normal Normal Increase in frequency up to 2x normal Normal Increase in frequency up to 2x normal Normal   Urinary Urgency  None None None Present None   Bladder Spasms   Absent Absent  Absent   Incontinence None None None None  None   Urinary Retention  Normal Normal Normal  Normal   Vaginitis (not due to infection)   None   None   Vaginal Bleeding None None None None  None   Vaginal Dryness      Normal        FOLLOW-UP PLAN:   12 weeks with imaging     COMMENT:          ANATOMIC TARGET SUMMARY    ANATOMIC TARGET MODALITY TECHNIQUE   Right frontal brain   External beam, photons FSRT            COMMENT:         DIAGRAMS:      DOSE VOLUME HISTOGRAMS:          Jayant EDOUARD M.D.  Electronically signed by: Jayant EDOUARD M.D., 4/16/2024 8:38 AM  [phone]

## 2024-04-12 NOTE — ADDENDUM NOTE
Encounter addended by: Tram Martinez, Med Ass't on: 4/12/2024 12:04 PM   Actions taken: Pend clinical note

## 2024-04-13 ENCOUNTER — HOSPITAL ENCOUNTER (OUTPATIENT)
Facility: MEDICAL CENTER | Age: 74
End: 2024-04-13
Attending: NURSE PRACTITIONER | Admitting: HOSPITALIST
Payer: MEDICARE

## 2024-04-13 ENCOUNTER — HOME CARE VISIT (OUTPATIENT)
Dept: HOME HEALTH SERVICES | Facility: HOME HEALTHCARE | Age: 74
End: 2024-04-13
Payer: MEDICARE

## 2024-04-13 ENCOUNTER — APPOINTMENT (OUTPATIENT)
Dept: RADIOLOGY | Facility: MEDICAL CENTER | Age: 74
DRG: 871 | End: 2024-04-13
Attending: STUDENT IN AN ORGANIZED HEALTH CARE EDUCATION/TRAINING PROGRAM
Payer: MEDICARE

## 2024-04-13 ENCOUNTER — HOSPITAL ENCOUNTER (OUTPATIENT)
Facility: MEDICAL CENTER | Age: 74
End: 2024-04-13
Attending: NURSE PRACTITIONER
Payer: MEDICARE

## 2024-04-13 ENCOUNTER — HOSPITAL ENCOUNTER (INPATIENT)
Facility: MEDICAL CENTER | Age: 74
DRG: 871 | End: 2024-04-13
Attending: STUDENT IN AN ORGANIZED HEALTH CARE EDUCATION/TRAINING PROGRAM | Admitting: HOSPITALIST
Payer: MEDICARE

## 2024-04-13 VITALS
RESPIRATION RATE: 16 BRPM | TEMPERATURE: 98.1 F | HEART RATE: 70 BPM | OXYGEN SATURATION: 90 % | DIASTOLIC BLOOD PRESSURE: 75 MMHG | SYSTOLIC BLOOD PRESSURE: 120 MMHG

## 2024-04-13 DIAGNOSIS — R41.82 ALTERED MENTAL STATUS, UNSPECIFIED ALTERED MENTAL STATUS TYPE: ICD-10-CM

## 2024-04-13 DIAGNOSIS — R56.9 SEIZURE (HCC): ICD-10-CM

## 2024-04-13 DIAGNOSIS — E11.9 TYPE 2 DIABETES MELLITUS WITHOUT COMPLICATION, WITHOUT LONG-TERM CURRENT USE OF INSULIN (HCC): ICD-10-CM

## 2024-04-13 DIAGNOSIS — N39.0 ACUTE UTI: ICD-10-CM

## 2024-04-13 DIAGNOSIS — G93.6 VASOGENIC EDEMA (HCC): ICD-10-CM

## 2024-04-13 DIAGNOSIS — H18.9 KERATOPATHY: ICD-10-CM

## 2024-04-13 DIAGNOSIS — Z91.89 AT RISK FOR OBSTRUCTIVE SLEEP APNEA: ICD-10-CM

## 2024-04-13 DIAGNOSIS — I16.0 HYPERTENSIVE URGENCY: ICD-10-CM

## 2024-04-13 DIAGNOSIS — G93.40 ACUTE ENCEPHALOPATHY: ICD-10-CM

## 2024-04-13 DIAGNOSIS — K21.9 GASTROESOPHAGEAL REFLUX DISEASE WITHOUT ESOPHAGITIS: ICD-10-CM

## 2024-04-13 DIAGNOSIS — C79.31 MALIGNANT NEOPLASM METASTATIC TO BRAIN (HCC): ICD-10-CM

## 2024-04-13 DIAGNOSIS — K59.09 OTHER CONSTIPATION: ICD-10-CM

## 2024-04-13 DIAGNOSIS — B02.0 ZOSTER ENCEPHALITIS: ICD-10-CM

## 2024-04-13 DIAGNOSIS — J69.0 ASPIRATION PNEUMONITIS (HCC): ICD-10-CM

## 2024-04-13 DIAGNOSIS — E83.39 HYPOPHOSPHATEMIA: ICD-10-CM

## 2024-04-13 DIAGNOSIS — A41.9 SEPSIS, DUE TO UNSPECIFIED ORGANISM, UNSPECIFIED WHETHER ACUTE ORGAN DYSFUNCTION PRESENT (HCC): ICD-10-CM

## 2024-04-13 DIAGNOSIS — I63.9 ACUTE CVA (CEREBROVASCULAR ACCIDENT) (HCC): ICD-10-CM

## 2024-04-13 DIAGNOSIS — C54.1 ENDOMETRIAL CANCER (HCC): ICD-10-CM

## 2024-04-13 DIAGNOSIS — Z29.89 NEED FOR PNEUMOCYSTIS PROPHYLAXIS: ICD-10-CM

## 2024-04-13 DIAGNOSIS — R73.03 PREDIABETES: ICD-10-CM

## 2024-04-13 LAB
ALBUMIN SERPL BCP-MCNC: 3.9 G/DL (ref 3.2–4.9)
ALBUMIN/GLOB SERPL: 1.3 G/DL
ALP SERPL-CCNC: 78 U/L (ref 30–99)
ALT SERPL-CCNC: 22 U/L (ref 2–50)
ANION GAP SERPL CALC-SCNC: 14 MMOL/L (ref 7–16)
APPEARANCE UR: ABNORMAL
AST SERPL-CCNC: 18 U/L (ref 12–45)
BACTERIA #/AREA URNS HPF: ABNORMAL /HPF
BASOPHILS # BLD AUTO: 0.2 % (ref 0–1.8)
BASOPHILS # BLD: 0.04 K/UL (ref 0–0.12)
BILIRUB SERPL-MCNC: 1.3 MG/DL (ref 0.1–1.5)
BILIRUB UR QL STRIP.AUTO: NEGATIVE
BUN SERPL-MCNC: 42 MG/DL (ref 8–22)
CALCIUM ALBUM COR SERPL-MCNC: 9.8 MG/DL (ref 8.5–10.5)
CALCIUM SERPL-MCNC: 9.7 MG/DL (ref 8.5–10.5)
CHLORIDE SERPL-SCNC: 101 MMOL/L (ref 96–112)
CO2 SERPL-SCNC: 27 MMOL/L (ref 20–33)
COLOR UR: ABNORMAL
CREAT SERPL-MCNC: 1.23 MG/DL (ref 0.5–1.4)
EOSINOPHIL # BLD AUTO: 0.01 K/UL (ref 0–0.51)
EOSINOPHIL NFR BLD: 0 % (ref 0–6.9)
EPI CELLS #/AREA URNS HPF: NEGATIVE /HPF
ERYTHROCYTE [DISTWIDTH] IN BLOOD BY AUTOMATED COUNT: 47.7 FL (ref 35.9–50)
GFR SERPLBLD CREATININE-BSD FMLA CKD-EPI: 47 ML/MIN/1.73 M 2
GLOBULIN SER CALC-MCNC: 3.1 G/DL (ref 1.9–3.5)
GLUCOSE SERPL-MCNC: 165 MG/DL (ref 65–99)
GLUCOSE UR STRIP.AUTO-MCNC: NEGATIVE MG/DL
HCT VFR BLD AUTO: 51 % (ref 37–47)
HGB BLD-MCNC: 16.4 G/DL (ref 12–16)
HYALINE CASTS #/AREA URNS LPF: ABNORMAL /LPF
IMM GRANULOCYTES # BLD AUTO: 0.31 K/UL (ref 0–0.11)
IMM GRANULOCYTES NFR BLD AUTO: 1.3 % (ref 0–0.9)
KETONES UR STRIP.AUTO-MCNC: ABNORMAL MG/DL
LACTATE SERPL-SCNC: 2.2 MMOL/L (ref 0.5–2)
LACTATE SERPL-SCNC: 3 MMOL/L (ref 0.5–2)
LEUKOCYTE ESTERASE UR QL STRIP.AUTO: ABNORMAL
LYMPHOCYTES # BLD AUTO: 0.28 K/UL (ref 1–4.8)
LYMPHOCYTES NFR BLD: 1.2 % (ref 22–41)
MCH RBC QN AUTO: 31.1 PG (ref 27–33)
MCHC RBC AUTO-ENTMCNC: 32.2 G/DL (ref 32.2–35.5)
MCV RBC AUTO: 96.6 FL (ref 81.4–97.8)
MICRO URNS: ABNORMAL
MONOCYTES # BLD AUTO: 0.73 K/UL (ref 0–0.85)
MONOCYTES NFR BLD AUTO: 3 % (ref 0–13.4)
NEUTROPHILS # BLD AUTO: 22.84 K/UL (ref 1.82–7.42)
NEUTROPHILS NFR BLD: 94.3 % (ref 44–72)
NITRITE UR QL STRIP.AUTO: NEGATIVE
NRBC # BLD AUTO: 0 K/UL
NRBC BLD-RTO: 0 /100 WBC (ref 0–0.2)
PH UR STRIP.AUTO: 8 [PH] (ref 5–8)
PLATELET # BLD AUTO: 209 K/UL (ref 164–446)
PMV BLD AUTO: 9.7 FL (ref 9–12.9)
POTASSIUM SERPL-SCNC: 4.3 MMOL/L (ref 3.6–5.5)
PROT SERPL-MCNC: 7 G/DL (ref 6–8.2)
PROT UR QL STRIP: 300 MG/DL
RBC # BLD AUTO: 5.28 M/UL (ref 4.2–5.4)
RBC # URNS HPF: ABNORMAL /HPF
RBC UR QL AUTO: ABNORMAL
SODIUM SERPL-SCNC: 142 MMOL/L (ref 135–145)
SP GR UR STRIP.AUTO: 1
TRI-PHOS CRY #/AREA URNS HPF: ABNORMAL /HPF
UROBILINOGEN UR STRIP.AUTO-MCNC: 0.2 MG/DL
WBC # BLD AUTO: 24.2 K/UL (ref 4.8–10.8)
WBC #/AREA URNS HPF: ABNORMAL /HPF

## 2024-04-13 PROCEDURE — 87040 BLOOD CULTURE FOR BACTERIA: CPT | Mod: 91

## 2024-04-13 PROCEDURE — 99223 1ST HOSP IP/OBS HIGH 75: CPT | Mod: GC | Performed by: STUDENT IN AN ORGANIZED HEALTH CARE EDUCATION/TRAINING PROGRAM

## 2024-04-13 PROCEDURE — 700111 HCHG RX REV CODE 636 W/ 250 OVERRIDE (IP): Mod: JZ | Performed by: STUDENT IN AN ORGANIZED HEALTH CARE EDUCATION/TRAINING PROGRAM

## 2024-04-13 PROCEDURE — 87086 URINE CULTURE/COLONY COUNT: CPT

## 2024-04-13 PROCEDURE — G0493 RN CARE EA 15 MIN HH/HOSPICE: HCPCS

## 2024-04-13 PROCEDURE — 80053 COMPREHEN METABOLIC PANEL: CPT

## 2024-04-13 PROCEDURE — 36415 COLL VENOUS BLD VENIPUNCTURE: CPT

## 2024-04-13 PROCEDURE — 665999 HH PPS REVENUE DEBIT

## 2024-04-13 PROCEDURE — 700105 HCHG RX REV CODE 258: Performed by: STUDENT IN AN ORGANIZED HEALTH CARE EDUCATION/TRAINING PROGRAM

## 2024-04-13 PROCEDURE — 96375 TX/PRO/DX INJ NEW DRUG ADDON: CPT

## 2024-04-13 PROCEDURE — 665998 HH PPS REVENUE CREDIT

## 2024-04-13 PROCEDURE — 99285 EMERGENCY DEPT VISIT HI MDM: CPT

## 2024-04-13 PROCEDURE — 8E0ZXY6 ISOLATION: ICD-10-PCS | Performed by: STUDENT IN AN ORGANIZED HEALTH CARE EDUCATION/TRAINING PROGRAM

## 2024-04-13 PROCEDURE — 81001 URINALYSIS AUTO W/SCOPE: CPT

## 2024-04-13 PROCEDURE — 770020 HCHG ROOM/CARE - TELE (206)

## 2024-04-13 PROCEDURE — 85025 COMPLETE CBC W/AUTO DIFF WBC: CPT

## 2024-04-13 PROCEDURE — 83605 ASSAY OF LACTIC ACID: CPT

## 2024-04-13 RX ORDER — ACETAMINOPHEN 325 MG/1
650 TABLET ORAL EVERY 6 HOURS PRN
Status: DISCONTINUED | OUTPATIENT
Start: 2024-04-13 | End: 2024-04-13

## 2024-04-13 RX ORDER — HYDROMORPHONE HYDROCHLORIDE 1 MG/ML
0.25 INJECTION, SOLUTION INTRAMUSCULAR; INTRAVENOUS; SUBCUTANEOUS EVERY 4 HOURS PRN
Status: DISCONTINUED | OUTPATIENT
Start: 2024-04-13 | End: 2024-04-18

## 2024-04-13 RX ORDER — OXYCODONE HYDROCHLORIDE 5 MG/1
2.5 TABLET ORAL EVERY 4 HOURS PRN
Status: DISCONTINUED | OUTPATIENT
Start: 2024-04-13 | End: 2024-04-18

## 2024-04-13 RX ORDER — DEXAMETHASONE 4 MG/1
4 TABLET ORAL 2 TIMES DAILY
Status: DISCONTINUED | OUTPATIENT
Start: 2024-04-13 | End: 2024-04-14

## 2024-04-13 RX ORDER — VALACYCLOVIR HYDROCHLORIDE 500 MG/1
1000 TABLET, FILM COATED ORAL 3 TIMES DAILY
Status: DISCONTINUED | OUTPATIENT
Start: 2024-04-14 | End: 2024-04-16

## 2024-04-13 RX ORDER — MORPHINE SULFATE 4 MG/ML
2 INJECTION INTRAVENOUS ONCE
Status: COMPLETED | OUTPATIENT
Start: 2024-04-13 | End: 2024-04-13

## 2024-04-13 RX ORDER — SODIUM CHLORIDE 9 MG/ML
INJECTION, SOLUTION INTRAVENOUS ONCE
Status: COMPLETED | OUTPATIENT
Start: 2024-04-13 | End: 2024-04-13

## 2024-04-13 RX ORDER — ACETAMINOPHEN 500 MG
1000 TABLET ORAL EVERY 8 HOURS
Qty: 30 TABLET | Refills: 0 | Status: DISCONTINUED | OUTPATIENT
Start: 2024-04-14 | End: 2024-04-15

## 2024-04-13 RX ORDER — ATORVASTATIN CALCIUM 20 MG/1
20 TABLET, FILM COATED ORAL EVERY EVENING
Status: DISCONTINUED | OUTPATIENT
Start: 2024-04-14 | End: 2024-04-18

## 2024-04-13 RX ORDER — CEFTRIAXONE 2 G/1
2000 INJECTION, POWDER, FOR SOLUTION INTRAMUSCULAR; INTRAVENOUS ONCE
Status: COMPLETED | OUTPATIENT
Start: 2024-04-13 | End: 2024-04-13

## 2024-04-13 RX ORDER — LOSARTAN POTASSIUM 50 MG/1
100 TABLET ORAL DAILY
Status: DISCONTINUED | OUTPATIENT
Start: 2024-04-14 | End: 2024-04-18

## 2024-04-13 RX ORDER — DEXAMETHASONE 4 MG/1
2 TABLET ORAL DAILY
Status: DISCONTINUED | OUTPATIENT
Start: 2024-04-21 | End: 2024-04-14

## 2024-04-13 RX ORDER — AMLODIPINE BESYLATE 5 MG/1
5 TABLET ORAL DAILY
Status: DISCONTINUED | OUTPATIENT
Start: 2024-04-14 | End: 2024-04-18

## 2024-04-13 RX ORDER — SODIUM CHLORIDE 9 MG/ML
700 INJECTION, SOLUTION INTRAVENOUS ONCE
Status: COMPLETED | OUTPATIENT
Start: 2024-04-13 | End: 2024-04-13

## 2024-04-13 RX ORDER — OXYCODONE HYDROCHLORIDE 5 MG/1
5 TABLET ORAL EVERY 4 HOURS PRN
Status: DISCONTINUED | OUTPATIENT
Start: 2024-04-13 | End: 2024-04-18

## 2024-04-13 RX ORDER — ACETAMINOPHEN 500 MG
1000 TABLET ORAL EVERY 6 HOURS PRN
Status: DISCONTINUED | OUTPATIENT
Start: 2024-04-19 | End: 2024-04-15

## 2024-04-13 RX ORDER — SODIUM CHLORIDE, SODIUM LACTATE, POTASSIUM CHLORIDE, CALCIUM CHLORIDE 600; 310; 30; 20 MG/100ML; MG/100ML; MG/100ML; MG/100ML
INJECTION, SOLUTION INTRAVENOUS CONTINUOUS
Status: ACTIVE | OUTPATIENT
Start: 2024-04-13 | End: 2024-04-14

## 2024-04-13 RX ORDER — AMOXICILLIN 250 MG
2 CAPSULE ORAL EVERY EVENING
Status: DISCONTINUED | OUTPATIENT
Start: 2024-04-13 | End: 2024-04-14

## 2024-04-13 RX ORDER — LABETALOL HYDROCHLORIDE 5 MG/ML
10 INJECTION, SOLUTION INTRAVENOUS EVERY 4 HOURS PRN
Status: DISCONTINUED | OUTPATIENT
Start: 2024-04-13 | End: 2024-04-18

## 2024-04-13 RX ORDER — METOPROLOL SUCCINATE 25 MG/1
100 TABLET, EXTENDED RELEASE ORAL DAILY
Status: DISCONTINUED | OUTPATIENT
Start: 2024-04-14 | End: 2024-04-18

## 2024-04-13 RX ORDER — POLYETHYLENE GLYCOL 3350 17 G/17G
1 POWDER, FOR SOLUTION ORAL
Status: DISCONTINUED | OUTPATIENT
Start: 2024-04-13 | End: 2024-04-14

## 2024-04-13 RX ORDER — LACOSAMIDE 100 MG/1
150 TABLET ORAL 2 TIMES DAILY
Status: DISCONTINUED | OUTPATIENT
Start: 2024-04-13 | End: 2024-04-14

## 2024-04-13 RX ORDER — VALACYCLOVIR HYDROCHLORIDE 1 G/1
1000 TABLET, FILM COATED ORAL 3 TIMES DAILY
Status: ON HOLD | COMMUNITY
Start: 2024-04-10 | End: 2024-05-13

## 2024-04-13 RX ORDER — DEXAMETHASONE 4 MG/1
2 TABLET ORAL 2 TIMES DAILY
Status: DISCONTINUED | OUTPATIENT
Start: 2024-04-15 | End: 2024-04-14

## 2024-04-13 RX ADMIN — CEFTRIAXONE SODIUM 2000 MG: 2 INJECTION, POWDER, FOR SOLUTION INTRAMUSCULAR; INTRAVENOUS at 20:02

## 2024-04-13 RX ADMIN — MORPHINE SULFATE 2 MG: 4 INJECTION INTRAVENOUS at 20:06

## 2024-04-13 RX ADMIN — SODIUM CHLORIDE, POTASSIUM CHLORIDE, SODIUM LACTATE AND CALCIUM CHLORIDE 1000 ML: 600; 310; 30; 20 INJECTION, SOLUTION INTRAVENOUS at 22:35

## 2024-04-13 RX ADMIN — SODIUM CHLORIDE 700 ML: 9 INJECTION, SOLUTION INTRAVENOUS at 22:06

## 2024-04-13 RX ADMIN — SODIUM CHLORIDE: 9 INJECTION, SOLUTION INTRAVENOUS at 20:04

## 2024-04-13 ASSESSMENT — ACTIVITIES OF DAILY LIVING (ADL)
AMBULATION ASSISTANCE: NON-AMBULATORY
CURRENT_FUNCTION: TWO PERSON

## 2024-04-13 ASSESSMENT — ENCOUNTER SYMPTOMS
NAUSEA: DENIES
HIGHEST PAIN SEVERITY IN PAST 24 HOURS: 10/10
FORGETFULNESS: 1
PAIN SEVERITY GOAL: 0/10
AGITATION: 1
PAIN: 1
LOWEST PAIN SEVERITY IN PAST 24 HOURS: 10/10
LIMITED RANGE OF MOTION: 1
CONSTIPATION: 1
ABDOMINAL PAIN: 1
FATIGUE: 1
MUSCLE WEAKNESS: 1
VOMITING: DENIES
LAST BOWEL MOVEMENT: 66937
PERSON REPORTING PAIN: PATIENT
STOOL FREQUENCY: LESS THAN DAILY
SUBJECTIVE PAIN PROGRESSION: UNCHANGED

## 2024-04-13 ASSESSMENT — FIBROSIS 4 INDEX: FIB4 SCORE: 1.26

## 2024-04-13 ASSESSMENT — PATIENT HEALTH QUESTIONNAIRE - PHQ9: CLINICAL INTERPRETATION OF PHQ2 SCORE: 0

## 2024-04-13 ASSESSMENT — PAIN DESCRIPTION - PAIN TYPE: TYPE: ACUTE PAIN

## 2024-04-14 ENCOUNTER — APPOINTMENT (OUTPATIENT)
Dept: RADIOLOGY | Facility: MEDICAL CENTER | Age: 74
DRG: 871 | End: 2024-04-14
Attending: HOSPITALIST
Payer: MEDICARE

## 2024-04-14 PROBLEM — B02.9 SHINGLES: Status: ACTIVE | Noted: 2024-04-14

## 2024-04-14 PROBLEM — R41.82 AMS (ALTERED MENTAL STATUS): Status: ACTIVE | Noted: 2024-04-14

## 2024-04-14 PROBLEM — R13.10 DYSPHAGIA: Status: ACTIVE | Noted: 2024-04-14

## 2024-04-14 PROBLEM — K59.00 CONSTIPATION: Status: ACTIVE | Noted: 2024-04-14

## 2024-04-14 PROBLEM — G93.41 ACUTE METABOLIC ENCEPHALOPATHY: Status: ACTIVE | Noted: 2024-04-14

## 2024-04-14 PROBLEM — R56.9 SEIZURE (HCC): Status: ACTIVE | Noted: 2024-04-14

## 2024-04-14 PROBLEM — Z71.89 ADVANCE CARE PLANNING: Status: ACTIVE | Noted: 2019-09-17

## 2024-04-14 LAB
AMMONIA PLAS-SCNC: 40 UMOL/L (ref 11–45)
ANION GAP SERPL CALC-SCNC: 13 MMOL/L (ref 7–16)
BACTERIA UR CULT: NORMAL
BASE EXCESS BLDA CALC-SCNC: -2 MMOL/L (ref -4–3)
BASOPHILS # BLD AUTO: 0.4 % (ref 0–1.8)
BASOPHILS # BLD: 0.09 K/UL (ref 0–0.12)
BODY TEMPERATURE: 36.6 CENTIGRADE
BUN SERPL-MCNC: 42 MG/DL (ref 8–22)
CALCIUM SERPL-MCNC: 9.1 MG/DL (ref 8.5–10.5)
CHLORIDE SERPL-SCNC: 107 MMOL/L (ref 96–112)
CO2 SERPL-SCNC: 22 MMOL/L (ref 20–33)
CREAT SERPL-MCNC: 0.96 MG/DL (ref 0.5–1.4)
EOSINOPHIL # BLD AUTO: 0.01 K/UL (ref 0–0.51)
EOSINOPHIL NFR BLD: 0 % (ref 0–6.9)
ERYTHROCYTE [DISTWIDTH] IN BLOOD BY AUTOMATED COUNT: 48.1 FL (ref 35.9–50)
GFR SERPLBLD CREATININE-BSD FMLA CKD-EPI: 63 ML/MIN/1.73 M 2
GLUCOSE BLD STRIP.AUTO-MCNC: 188 MG/DL (ref 65–99)
GLUCOSE SERPL-MCNC: 165 MG/DL (ref 65–99)
HCO3 BLDA-SCNC: 22 MMOL/L (ref 17–25)
HCT VFR BLD AUTO: 46.2 % (ref 37–47)
HGB BLD-MCNC: 15.2 G/DL (ref 12–16)
IMM GRANULOCYTES # BLD AUTO: 0.27 K/UL (ref 0–0.11)
IMM GRANULOCYTES NFR BLD AUTO: 1.2 % (ref 0–0.9)
INHALED O2 FLOW RATE: 1 L/MIN (ref 2–10)
INHALED O2 FLOW RATE: ABNORMAL L/MIN
LACTATE SERPL-SCNC: 0.9 MMOL/L (ref 0.5–2)
LACTATE SERPL-SCNC: 2 MMOL/L (ref 0.5–2)
LYMPHOCYTES # BLD AUTO: 0.31 K/UL (ref 1–4.8)
LYMPHOCYTES NFR BLD: 1.4 % (ref 22–41)
MCH RBC QN AUTO: 31.8 PG (ref 27–33)
MCHC RBC AUTO-ENTMCNC: 32.9 G/DL (ref 32.2–35.5)
MCV RBC AUTO: 96.7 FL (ref 81.4–97.8)
MONOCYTES # BLD AUTO: 0.86 K/UL (ref 0–0.85)
MONOCYTES NFR BLD AUTO: 3.8 % (ref 0–13.4)
NEUTROPHILS # BLD AUTO: 21.08 K/UL (ref 1.82–7.42)
NEUTROPHILS NFR BLD: 93.2 % (ref 44–72)
NRBC # BLD AUTO: 0 K/UL
NRBC BLD-RTO: 0 /100 WBC (ref 0–0.2)
PCO2 BLDA: 37 MMHG (ref 26–37)
PCO2 TEMP ADJ BLDA: 36.4 MMHG (ref 26–37)
PH BLDA: 7.39 [PH] (ref 7.4–7.5)
PH TEMP ADJ BLDA: 7.4 [PH] (ref 7.4–7.5)
PLATELET # BLD AUTO: 170 K/UL (ref 164–446)
PMV BLD AUTO: 10.3 FL (ref 9–12.9)
PO2 BLDA: 96.6 MMHG (ref 64–87)
PO2 TEMP ADJ BLDA: 94.3 MMHG (ref 64–87)
POTASSIUM SERPL-SCNC: 4.7 MMOL/L (ref 3.6–5.5)
RBC # BLD AUTO: 4.78 M/UL (ref 4.2–5.4)
SAO2 % BLDA: 96.2 % (ref 93–99)
SIGNIFICANT IND 70042: NORMAL
SITE SITE: NORMAL
SODIUM SERPL-SCNC: 142 MMOL/L (ref 135–145)
SOURCE SOURCE: NORMAL
TSH SERPL DL<=0.005 MIU/L-ACNC: 0.3 UIU/ML (ref 0.38–5.33)
VIT B12 SERPL-MCNC: 1051 PG/ML (ref 211–911)
WBC # BLD AUTO: 22.6 K/UL (ref 4.8–10.8)

## 2024-04-14 PROCEDURE — 99497 ADVNCD CARE PLAN 30 MIN: CPT | Performed by: HOSPITALIST

## 2024-04-14 PROCEDURE — 83605 ASSAY OF LACTIC ACID: CPT | Mod: 91

## 2024-04-14 PROCEDURE — 95816 EEG AWAKE AND DROWSY: CPT | Performed by: STUDENT IN AN ORGANIZED HEALTH CARE EDUCATION/TRAINING PROGRAM

## 2024-04-14 PROCEDURE — 700111 HCHG RX REV CODE 636 W/ 250 OVERRIDE (IP): Mod: JZ | Performed by: HOSPITALIST

## 2024-04-14 PROCEDURE — 36415 COLL VENOUS BLD VENIPUNCTURE: CPT

## 2024-04-14 PROCEDURE — 85025 COMPLETE CBC W/AUTO DIFF WBC: CPT

## 2024-04-14 PROCEDURE — 665999 HH PPS REVENUE DEBIT

## 2024-04-14 PROCEDURE — 96365 THER/PROPH/DIAG IV INF INIT: CPT

## 2024-04-14 PROCEDURE — 82962 GLUCOSE BLOOD TEST: CPT

## 2024-04-14 PROCEDURE — 700117 HCHG RX CONTRAST REV CODE 255: Performed by: HOSPITALIST

## 2024-04-14 PROCEDURE — 96375 TX/PRO/DX INJ NEW DRUG ADDON: CPT

## 2024-04-14 PROCEDURE — 99233 SBSQ HOSP IP/OBS HIGH 50: CPT | Mod: 25 | Performed by: HOSPITALIST

## 2024-04-14 PROCEDURE — 92610 EVALUATE SWALLOWING FUNCTION: CPT

## 2024-04-14 PROCEDURE — 700105 HCHG RX REV CODE 258: Performed by: HOSPITALIST

## 2024-04-14 PROCEDURE — 95819 EEG AWAKE AND ASLEEP: CPT | Mod: 26 | Performed by: STUDENT IN AN ORGANIZED HEALTH CARE EDUCATION/TRAINING PROGRAM

## 2024-04-14 PROCEDURE — 665998 HH PPS REVENUE CREDIT

## 2024-04-14 PROCEDURE — 96376 TX/PRO/DX INJ SAME DRUG ADON: CPT

## 2024-04-14 PROCEDURE — 4A10X4Z MONITORING OF CENTRAL NERVOUS ELECTRICAL ACTIVITY, EXTERNAL APPROACH: ICD-10-PCS | Performed by: STUDENT IN AN ORGANIZED HEALTH CARE EDUCATION/TRAINING PROGRAM

## 2024-04-14 PROCEDURE — 82803 BLOOD GASES ANY COMBINATION: CPT

## 2024-04-14 PROCEDURE — 82140 ASSAY OF AMMONIA: CPT

## 2024-04-14 PROCEDURE — 71045 X-RAY EXAM CHEST 1 VIEW: CPT

## 2024-04-14 PROCEDURE — 84443 ASSAY THYROID STIM HORMONE: CPT

## 2024-04-14 PROCEDURE — 700111 HCHG RX REV CODE 636 W/ 250 OVERRIDE (IP): Performed by: STUDENT IN AN ORGANIZED HEALTH CARE EDUCATION/TRAINING PROGRAM

## 2024-04-14 PROCEDURE — 80048 BASIC METABOLIC PNL TOTAL CA: CPT

## 2024-04-14 PROCEDURE — C9254 INJECTION, LACOSAMIDE: HCPCS | Performed by: STUDENT IN AN ORGANIZED HEALTH CARE EDUCATION/TRAINING PROGRAM

## 2024-04-14 PROCEDURE — 770020 HCHG ROOM/CARE - TELE (206)

## 2024-04-14 PROCEDURE — 700101 HCHG RX REV CODE 250: Performed by: STUDENT IN AN ORGANIZED HEALTH CARE EDUCATION/TRAINING PROGRAM

## 2024-04-14 PROCEDURE — 82607 VITAMIN B-12: CPT

## 2024-04-14 RX ORDER — DEXAMETHASONE SODIUM PHOSPHATE 4 MG/ML
4 INJECTION, SOLUTION INTRA-ARTICULAR; INTRALESIONAL; INTRAMUSCULAR; INTRAVENOUS; SOFT TISSUE EVERY 6 HOURS
Status: COMPLETED | OUTPATIENT
Start: 2024-04-14 | End: 2024-04-16

## 2024-04-14 RX ORDER — SODIUM CHLORIDE 9 MG/ML
INJECTION, SOLUTION INTRAVENOUS CONTINUOUS
Status: DISCONTINUED | OUTPATIENT
Start: 2024-04-14 | End: 2024-04-15

## 2024-04-14 RX ORDER — METRONIDAZOLE 500 MG/100ML
500 INJECTION, SOLUTION INTRAVENOUS EVERY 12 HOURS
Status: DISCONTINUED | OUTPATIENT
Start: 2024-04-14 | End: 2024-04-16

## 2024-04-14 RX ORDER — DEXAMETHASONE SODIUM PHOSPHATE 4 MG/ML
4 INJECTION, SOLUTION INTRA-ARTICULAR; INTRALESIONAL; INTRAMUSCULAR; INTRAVENOUS; SOFT TISSUE EVERY 12 HOURS
Status: DISCONTINUED | OUTPATIENT
Start: 2024-04-14 | End: 2024-04-14

## 2024-04-14 RX ORDER — LACOSAMIDE 10 MG/ML
150 INJECTION, SOLUTION INTRAVENOUS 2 TIMES DAILY
Status: DISCONTINUED | OUTPATIENT
Start: 2024-04-14 | End: 2024-04-22

## 2024-04-14 RX ORDER — ENALAPRILAT 1.25 MG/ML
1.25 INJECTION INTRAVENOUS EVERY 6 HOURS PRN
Status: DISCONTINUED | OUTPATIENT
Start: 2024-04-14 | End: 2024-04-18

## 2024-04-14 RX ORDER — FAMOTIDINE 20 MG/1
20 TABLET, FILM COATED ORAL 2 TIMES DAILY
Status: DISCONTINUED | OUTPATIENT
Start: 2024-04-14 | End: 2024-04-18

## 2024-04-14 RX ORDER — AMOXICILLIN 250 MG
2 CAPSULE ORAL EVERY EVENING
Status: DISCONTINUED | OUTPATIENT
Start: 2024-04-14 | End: 2024-04-18

## 2024-04-14 RX ORDER — POLYETHYLENE GLYCOL 3350 17 G/17G
1 POWDER, FOR SOLUTION ORAL DAILY
Status: DISCONTINUED | OUTPATIENT
Start: 2024-04-14 | End: 2024-04-18

## 2024-04-14 RX ADMIN — LACOSAMIDE 150 MG: 10 INJECTION INTRAVENOUS at 16:50

## 2024-04-14 RX ADMIN — DEXAMETHASONE SODIUM PHOSPHATE 4 MG: 4 INJECTION, SOLUTION INTRA-ARTICULAR; INTRALESIONAL; INTRAMUSCULAR; INTRAVENOUS; SOFT TISSUE at 23:50

## 2024-04-14 RX ADMIN — METRONIDAZOLE 500 MG: 5 INJECTION, SOLUTION INTRAVENOUS at 12:32

## 2024-04-14 RX ADMIN — CEFTRIAXONE SODIUM 1000 MG: 10 INJECTION, POWDER, FOR SOLUTION INTRAVENOUS at 22:06

## 2024-04-14 RX ADMIN — HYDROMORPHONE HYDROCHLORIDE 0.25 MG: 1 INJECTION, SOLUTION INTRAMUSCULAR; INTRAVENOUS; SUBCUTANEOUS at 00:31

## 2024-04-14 RX ADMIN — LABETALOL HYDROCHLORIDE 10 MG: 5 INJECTION INTRAVENOUS at 10:17

## 2024-04-14 RX ADMIN — LABETALOL HYDROCHLORIDE 10 MG: 5 INJECTION INTRAVENOUS at 04:28

## 2024-04-14 RX ADMIN — LACOSAMIDE 150 MG: 10 INJECTION INTRAVENOUS at 06:21

## 2024-04-14 RX ADMIN — SODIUM CHLORIDE: 9 INJECTION, SOLUTION INTRAVENOUS at 22:05

## 2024-04-14 RX ADMIN — DEXAMETHASONE SODIUM PHOSPHATE 4 MG: 4 INJECTION, SOLUTION INTRA-ARTICULAR; INTRALESIONAL; INTRAMUSCULAR; INTRAVENOUS; SOFT TISSUE at 13:43

## 2024-04-14 RX ADMIN — DEXAMETHASONE SODIUM PHOSPHATE 4 MG: 4 INJECTION, SOLUTION INTRA-ARTICULAR; INTRALESIONAL; INTRAMUSCULAR; INTRAVENOUS; SOFT TISSUE at 16:50

## 2024-04-14 RX ADMIN — IOHEXOL 50 ML: 350 INJECTION, SOLUTION INTRAVENOUS at 12:45

## 2024-04-14 RX ADMIN — SODIUM CHLORIDE: 9 INJECTION, SOLUTION INTRAVENOUS at 13:41

## 2024-04-14 RX ADMIN — METRONIDAZOLE 500 MG: 5 INJECTION, SOLUTION INTRAVENOUS at 16:53

## 2024-04-14 RX ADMIN — DEXAMETHASONE SODIUM PHOSPHATE 4 MG: 4 INJECTION, SOLUTION INTRAMUSCULAR; INTRAVENOUS at 06:21

## 2024-04-14 ASSESSMENT — COGNITIVE AND FUNCTIONAL STATUS - GENERAL
TURNING FROM BACK TO SIDE WHILE IN FLAT BAD: TOTAL
CLIMB 3 TO 5 STEPS WITH RAILING: TOTAL
SUGGESTED CMS G CODE MODIFIER DAILY ACTIVITY: CN
WALKING IN HOSPITAL ROOM: TOTAL
DRESSING REGULAR UPPER BODY CLOTHING: TOTAL
MOBILITY SCORE: 6
SUGGESTED CMS G CODE MODIFIER MOBILITY: CN
MOVING TO AND FROM BED TO CHAIR: TOTAL
STANDING UP FROM CHAIR USING ARMS: TOTAL
TOILETING: TOTAL
PERSONAL GROOMING: TOTAL
MOVING FROM LYING ON BACK TO SITTING ON SIDE OF FLAT BED: TOTAL
EATING MEALS: TOTAL
DRESSING REGULAR LOWER BODY CLOTHING: TOTAL
DAILY ACTIVITIY SCORE: 6
HELP NEEDED FOR BATHING: TOTAL

## 2024-04-14 ASSESSMENT — LIFESTYLE VARIABLES
DOES PATIENT WANT TO STOP DRINKING: NO
HAVE YOU EVER FELT YOU SHOULD CUT DOWN ON YOUR DRINKING: NO
ON A TYPICAL DAY WHEN YOU DRINK ALCOHOL HOW MANY DRINKS DO YOU HAVE: 0
CONSUMPTION TOTAL: NEGATIVE
ALCOHOL_USE: NO
TOTAL SCORE: 0
TOTAL SCORE: 0
HAVE PEOPLE ANNOYED YOU BY CRITICIZING YOUR DRINKING: NO
AVERAGE NUMBER OF DAYS PER WEEK YOU HAVE A DRINK CONTAINING ALCOHOL: 0
EVER HAD A DRINK FIRST THING IN THE MORNING TO STEADY YOUR NERVES TO GET RID OF A HANGOVER: NO
HOW MANY TIMES IN THE PAST YEAR HAVE YOU HAD 5 OR MORE DRINKS IN A DAY: 0
TOTAL SCORE: 0
EVER FELT BAD OR GUILTY ABOUT YOUR DRINKING: NO

## 2024-04-14 ASSESSMENT — PAIN DESCRIPTION - PAIN TYPE: TYPE: ACUTE PAIN;CHRONIC PAIN

## 2024-04-14 NOTE — ASSESSMENT & PLAN NOTE
Brain mets noted during hospitalization on 3/27/2024 with vasogenic edema, MRI on 4/11 shows continued brain mets with vasogenic edema.  MRI 4/21 shows decrease in tumor size and improvement in edema.  -continue seizure prophylaxis with lacosamide 150 mg BID  -continue dexamethasone   Ophthalmology evaluated for concern of vision changes

## 2024-04-14 NOTE — ASSESSMENT & PLAN NOTE
At this time patient's family want her to be full code.  Palliative care consulted and discussed with family the goals of care. This remains the same.  -Full code  -Family goals of care is to make patient best shape to be discharged to be follow-up by his doctors in [place].  -Family updates daily with her guarded prognosis  4/29/2024    Patient present at bedside.  He is aware patient has poor prognosis.  He would like to see how she does once 14 days of acyclovir completed.

## 2024-04-14 NOTE — CONSULTS
Neurology brief note     Neurology consulted by Dr. Campoverde for AMS. 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 She presented to hospital today with urinary symptoms, abdominal pain and worsening mentation. According to her daughter, at baseline she is able to say yes, no and ask for simple requests. Wheelchair dependent.     Since admission she has been dx with UTI and started on ABX. Repeat CT head again demonstrated right frontal lobe mass, and vasogenic edema, no significant change from previous CT. EEG completed in ER negative for any breakthrough seizures, suggestive of non-specific encephalopathy. EEG and presentation consistent with a multifactorial encephalopathy in the setting of recent CVA, brain metastases and UTI. Recommend continuing Lamotrigine at home dose. Remainder of encephalopathy workup and management per IM team. No indication for acute neurological workup or interventions at this time.       JANEEN Estrada  Neurology Acute Care Services

## 2024-04-14 NOTE — PROCEDURES
INPATIENT ROUTINE VIDEO ELECTROENCEPHALOGRAM REPORT    REFERRING PROVIDER: Trina Campoverde M.d.   DOS: 04/14/24   ROOM: [address]  TOTAL RECORDING TIME: 0 hours and 24 minutes of total recording time    INDICATION:  Jairo Rivas 73 y.o. female presenting with altered mental status    RELEVANT TREATMENTS/MEDICATIONS:  lacosamide, 150 mg, BID  senna-docusate, 2 Tablet, Q EVENING   And  polyethylene glycol/lytes, 1 Packet, DAILY  dexamethasone, 4 mg, Q6HRS  metroNIDAZOLE (FLAGYL) IV, 500 mg, Q12HRS  famotidine, 20 mg, BID  cefTRIAXone (ROCEPHIN) IV, 1,000 mg, Q24HRS  amLODIPine, 5 mg, DAILY  atorvastatin, 20 mg, Q EVENING  losartan, 100 mg, DAILY  metoprolol SR, 100 mg, DAILY  valacyclovir, 1,000 mg, TID  Pharmacy Consult Request, 1 Each, PHARMACY TO DOSE  acetaminophen, 1,000 mg, Q8HR         NS, Last Rate: 100 mL/hr at 04/14/24 1341        TECHNIQUE:   Routine VEEG was set up by a Neurodiagnostic technologist who performed education to the patient and staff. A minimum of 23 electrodes and 23 channel recording was setup and performed by Neurodiagnostic technologist, in accordance with the international 10-20 system. The study was reviewed in bipolar and referential montages. The recording examined the patient in the  awake, drowsy, and sleep state(s).     DESCRIPTION OF THE RECORD:  EEG background: The background was mostly continuous, intermittently asymmetrical, and poorly defined and/or fragmented 5-6 Hz posterior dominant rhythm, with a mixture of mrfium to low voltage theta/delta with intermittenmt overriding faster frequencies . Reactivity was minimally present. Spontaneous variability was  was seen. State changes were were minimally present.    EEG Sleep: Occasional N2 sleep transients in the form of rudimentary and/or ill-defined sleep spindles fragments and vertex waves were seen in the leads over the central regions.     ICTAL AND INTERICTAL FINDINGS:   No focal or generalized epileptiform  activity noted.     No regional slowing or persistent focal asymmetries were seen.    No definite seizures.     ACTIVATION PROCEDURES:   Intermittent Photic stimulation was performed in a stepwise fashion from 1 to 30 Hz and did not elicited any abnormalities on EEG.     EKG: Sampling of the EKG recording showed sinus rhythm    EVENTS:    No clinical events were captured or reported.    INTERPRETATION:  Abnormal video EEG recording in the awake, drowsy, and sleep state(s):  -Moderate to severe background slowing suggestive of diffuse/multifocal cerebral dysfunction and consistent with a non-specific encephalopathy. Clinical correlation recommended.  -No definitive epileptiform discharges were seen.  -No seizures.   -Clinical Events: None    EEG Disclaimer : Note: This EEG does not rule out the possibility of seizures or exclude a diagnosis of epilepsy.  If the clinical suspicion remains high for seizures, a prolonged recording to capture clinical or subclinical events may be helpful.      Alfa Mckenna MD  Department of Neurology at [facility]  General Neurologist and Epileptologist  Director of [facility]'s Level III Comprehensive Epilepsy Program  Professor of Clinical Neurology, [facility].   Phone: [phone]  Fax: [phone]  E-mail: [email or web address]

## 2024-04-14 NOTE — ED NOTES
Bedside report received from off going RN/tech: JOB Uriarte assumed care of patient.  POC discussed with patient family. Call light within reach, all needs addressed at this time.       Fall risk interventions in place: Patient's personal possessions are with in their safe reach, Place fall risk sign on patient's door, Keep floor surfaces clean and dry, and Other (comment required) (all applicable per [place] Fall risk assessment) Family member remains at BS.  Continuous monitoring: Pulse Ox or Blood Pressure  IVF/IV medications: Infusion per MAR (List Med(s)) 83ml/hr  LR  Oxygen: How many liters 2L  Bedside sitter: Not Applicable   Isolation: Not Applicable

## 2024-04-14 NOTE — ASSESSMENT & PLAN NOTE
RESOLVED  Returned, Cr 1.11 and baseline around 0.7. Likely prerenal from dehydration.  -continue free water flushes  -renally dose all medications as appropriate  -avoid nephrotoxins

## 2024-04-14 NOTE — H&P
[facility] Medicine History & Physical Note    Date of Service  4/13/2024     Attending: Harry Moya M.d.  Senior Resident: Dr. Clements  Contact Number: [phone]    Primary Care Physician  ZULEYKA Lyons    Consultants  N/a    Code Status  Full Code    Chief Complaint  Chief Complaint   Patient presents with    Painful Urination    Constipation       History of Presenting Illness (HPI):   Jairo Rivas is a 73 y.o. female who presented 4/13/2024 with confusion, abdominal pain, constipation.  She has a history of endometrial cancer s/p immunotherapy, with recent brain metastases with vasogenic edema found on 03/27/2024 and was initiated on radiation therapy, CVA 2010 with residual right sided deficits and a new subacute CVA 4/11/2024, hypertension.  She presents today with worsening confusion, lethargy over the past 3 days.  Majority of history is obtained from daughter, Radha, as pt is unable to share history herself.  Daughter reports that since discharge from the hospital at the end of March her mentation was initially improving, she was started on lacosamide and dexamethasone taper following this hospitalization.  She started worsening again around 4/10, which brought them to the ED when she was having difficulty with speech, difficulty swallowing medications and worsened right sided weakness.  She had an MRI performed at [facility] which found subacute CVA with right parieto-occipiatal junction involvement and metastatic foci with surrounding edema in the right frontal, left frontal parietal and left parietal regions.  She was given norco following this ED visit for her pain.  Since 4/11 the patient has had great difficulty sleeping, reports of abdominal pain, confusion, lethargy and daughter has concerns for seizures as the patient appears to be reaching for things that aren't there and shaking frequently, denies and tongue biting or full loss of consciousness during these  episodes.  She also has not had a bowel movement in 5-7 days.    In the ED VS remarkable for tachycardia with , /94, saturating well on room air.  Labs remarkable for WBC 24.2, Cr 1.23 (baseline 0.6-0.8), lactic acid 3.0, UA with leukocyte esterase, bacteria, WBCs.  Abdominal XR showing stool in the rectal vault.  In the ED she received 1.7 L fluid bolus, ceftriaxone and 2 mg morphine.  She will be admitted for sepsis due to UTI and encephalopathy.      I discussed the plan of care with patient and family.    Review of Systems  Review of Systems   Unable to perform ROS: Mental status change       Past Medical History   has a past medical history of Anemia (2/6/2018), Back pain, Back pain, Cancer ([facility]) (2019), Cough, CVA, old, hemiparesis ([facility]) (2/6/2018), Essential hypertension (2/6/2018), Eye drainage, Fatigue, Frequent headaches, Frequent urination, Hyperlipidemia, Irregular periods, Painful joint, Palpitations, Post-menopause bleeding (2/6/2018), Pulmonary nodule, Sore muscles, Stroke ([facility]), Swelling of lower extremity, Vision abnormalities (2/6/2018), Vitamin D deficiency (2/6/2018), Weakness, and Wears glasses.    Surgical History   has a past surgical history that includes pr hysteroscopy,dx,sep proc (6/19/2019); dilation and curettage (6/19/2019); pr cystoscopy,insert ureteral stent (Left, 8/8/2019); hysterectomy robotic xi (N/A, 8/8/2019); salpingo oophorectomy (Bilateral, 8/8/2019); node biopsy sentinel (8/8/2019); hysterectomy laparoscopy; and pr thoracoscopy,dx no bx (Left, 8/6/2021).     Family History  family history includes Hypertension in her mother; No Known Problems in her brother and father.   Family history reviewed with patient.     Social History  Tobacco: never smoker  Alcohol: none  Recreational drugs (illegal or prescription): none  Primary Care Provider: Reviewed      Allergies  Allergies   Allergen Reactions    Levaquin Itching and Swelling     Swelling of the tongue and face     Penicillins Itching and Swelling    Tramadol Itching and Swelling       Medications  Prior to Admission Medications   Prescriptions Last Dose Informant Patient Reported? Taking?   HYDROcodone-acetaminophen (NORCO) 5-325 MG Tab per tablet 4/13/2024 at 1345 Family Member Yes Yes   Sig: Take 1 Tablet by mouth every four hours as needed (Severe Pain).   Lacosamide 150 MG Tab 4/13/2024 at 0700 Family Member Yes Yes   Sig: Take 150 mg by mouth 2 times a day. Indications: SEIZURE PREVENTION   Melatonin 10 MG Tab 4/12/2024 at 2200 Family Member Yes Yes   Sig: Take 10 mg by mouth at bedtime as needed (Sleep).   amLODIPine (NORVASC) 5 MG Tab 4/13/2024 at 0600 Family Member No Yes   Sig: Take 1 Tablet by mouth every day.   atorvastatin (LIPITOR) 20 MG Tab 4/12/2024 at 2000 Family Member No Yes   Sig: Take 1 Tablet by mouth every evening.   baclofen (LIORESAL) 10 MG Tab > 1 WEEK AGO at PRN Family Member Yes No   Sig: Take 10 mg by mouth 3 times a day as needed (Muscle Spasms).   dexamethasone (DECADRON) 2 MG tablet 4/13/2024 at 0600 Family Member No Yes   Sig: Take 1 Tablet by mouth 4 times a day for 7 days, THEN 1 Tablet 3 times a day for 5 days, THEN 1 Tablet 2 times a day for 5 days, THEN 1 Tablet every day for 5 days.   gabapentin (NEURONTIN) 300 MG Cap 4/13/2024 at 0600 Family Member No Yes   Sig: Take 1 Capsule by mouth 3 times a day for 30 days.   losartan (COZAAR) 100 MG Tab 4/12/2024 at 2000 Family Member No Yes   Sig: Take 1 Tablet by mouth 1 time a day as needed (Hypertension) for up to 300 days. BP > 180  Indications: High Blood Pressure Disorder   metoprolol SR (TOPROL XL) 100 MG TABLET SR 24 HR 4/13/2024 at 0600 Family Member No Yes   Sig: Take 1 Tablet by mouth every day.   olanzapine (ZYPREXA) 10 MG tablet 4/9/2024 at HS; STOPPED BY DAUGHTER Family Member Yes No   Sig: Take 10 mg by mouth at bedtime.   valacyclovir (VALTREX) 1 GM Tab 4/12/2024 at 0700 Family Member Yes Yes   Sig: Take 1,000 mg by mouth 3  times a day. 7 day course prescribed 4/10/2024.      Facility-Administered Medications: None       Physical Exam  Temp:  [36.6 °C (97.8 °F)-36.7 °C (98.1 °F)] 36.6 °C (97.8 °F)  Pulse:  [] 101  Resp:  [16-20] 19  BP: (112-144)/(63-94) 113/63  SpO2:  [90 %-96 %] 94 %  Blood Pressure : 113/63   Temperature: 36.6 °C (97.8 °F)   Pulse: (!) 101   Respiration: 19   Pulse Oximetry: 94 %       Physical Exam  Vitals and nursing note reviewed.   Constitutional:       General: She is not in acute distress.     Appearance: She is ill-appearing. She is not toxic-appearing.      Comments: Lethargic, awakes with stimuli but falls back asleep.   HENT:      Head: Normocephalic and atraumatic.      Mouth/Throat:      Mouth: Mucous membranes are moist.      Pharynx: Oropharynx is clear.   Eyes:      General: No scleral icterus.     Conjunctiva/sclera: Conjunctivae normal.   Cardiovascular:      Rate and Rhythm: Normal rate and regular rhythm.      Heart sounds: No murmur heard.     No friction rub. No gallop.   Pulmonary:      Effort: Pulmonary effort is normal. No respiratory distress.      Breath sounds: Normal breath sounds. No wheezing, rhonchi or rales.   Abdominal:      General: Abdomen is flat. There is no distension.      Palpations: Abdomen is soft.      Tenderness: There is abdominal tenderness (diffuse lower abdominal tenderness). There is no guarding or rebound.   Musculoskeletal:      Right lower leg: No edema.      Left lower leg: No edema.   Skin:     General: Skin is warm and dry.      Comments: Daughter present for exam.  Under the left breast she has vesicular lesions with areas of skin breakdown between them and small punctate lesions laterally which may be forming vesicles.     Neurological:      Comments: A+Ox2  Difficult to assess due to patient cooperation.    CN apparently intact with mild right sided facial droop, which is reported chronic.  Right arm spastic paresis.  Left arm with 5/5  strength,  "difficult to assess further due to pt cooperation.  Significant pain with movement of legs so unable to assess lower extremities for weakness.           Laboratory:  Recent Labs     04/13/24 1922   WBC 24.2*   RBC 5.28   HEMOGLOBIN 16.4*   HEMATOCRIT 51.0*   MCV 96.6   MCH 31.1   MCHC 32.2   RDW 47.7   PLATELETCT 209   MPV 9.7     Recent Labs     04/13/24 1922   SODIUM 142   POTASSIUM 4.3   CHLORIDE 101   CO2 27   GLUCOSE 165*   BUN 42*   CREATININE 1.23   CALCIUM 9.7     Recent Labs     04/13/24 1922   ALTSGPT 22   ASTSGOT 18   ALKPHOSPHAT 78   TBILIRUBIN 1.3   GLUCOSE 165*         No results for input(s): \"NTPROBNP\" in the last 72 hours.      No results for input(s): \"TROPONINT\" in the last 72 hours.    Imaging:  DX-ABDOMEN COMPLETE WITH AP OR PA CXR   Final Result      1. No acute cardiopulmonary abnormality.   2. Chronic left lower lobe scarring.   3. Nonobstructive bowel gas pattern. Small amount of stool throughout the colon.          no X-Ray or EKG requiring interpretation    Assessment/Plan:  Problem Representation:   I anticipate this patient will require at least two midnights for appropriate medical management, necessitating inpatient admission because sepsis due to UTI    Patient will need a Telemetry bed on MEDICAL service .  The need is secondary to Sepsis.    * Sepsis secondary to UTI (HCC)- (present on admission)  Assessment & Plan  This is Sepsis Present on admission  SIRS criteria identified on my evaluation include: Tachycardia, with heart rate greater than 90 BPM and Leukocytosis, with WBC greater than 12,000  Clinical indicators of end organ dysfunction include Lactic Acid greater than 2 and Toxic Metabolic Encephalopathy  Source is UTI  Sepsis protocol initiated  Crystalloid Fluid Administration: Fluid resuscitation ordered per standard protocol - 30 mL/kg per current or ideal body weight  IV antibiotics as appropriate for source of sepsis  Reassessment: I have reassessed the patient's " hemodynamic status    -ceftriaxone daily x5 days for UTI  -received sepsis bolus in ED, will continue IVF at 83ml/hr for 12 hours to complete another liter  -continue to trend lactates until normal  -follow urine and blood cultures, adjust antibiotics accordingly    Acute metabolic encephalopathy  Assessment & Plan  Etiology at this time likely multifactorial, as she has multiple possible causes including UTI, constipation, medications (recently started norco, on top of other high risk medications such as baclofen, gabapentin), recent findings of subacute CVA as well as brain mets may all be contributing.  Daughter describes some episodes that may be concerning for focal seizures.  -will hold majority of sedating medications, she does have significant cancer related pain so we will provide some PRN oxy and dilaudid, hold norco, baclofen, gabapentin for now  -daughter reports they have not been taking olanzapine  -continue lacosamide for seizure ppx  -continue dexamethasone for cerebral edema  -treatment of sepsis as above  -bladder scan  -treatment of constipation  -TID neuro checks while awake  -consider EEG if continued concerns for seizure like activity  -fall, seizure, aspiration precautions    Dysphagia  Assessment & Plan  Possibly due to CVA vs brain metastases.  -SLP evaluation  -aspiration precautions  -failed Bedside swallow study in the ED, can reassess when mentation improves    Shingles  Assessment & Plan  Noted under left breast and onto lateral chest wall.  Multiple vesicular lesions with skin breakdown.  -contact precautions  -valtrex TID x7 days    Constipation  Assessment & Plan  No BM since at least 5 days prior to admission, stool burden noted in rectal vault on imaging.    -scheduled miralax, can consider escalating to suppository or enema as appropriate    Malignant neoplasm metastatic to brain (HCC)- (present on admission)  Assessment & Plan  Brain mets noted during hospitalization on 3/27/2024  with vasogenic edema, MRI on 4/11 shows continued brain mets with vasogenic edema.    -continue seizure prophylaxis with lacosamide 150 mg BID  -continue dexamethasone taper, currently taking 4mg BID, will need to complete 2 more days and then continue taper.  Transitioned to IV due to failed bedside swallow, will need to adjust taper if mentation doesn't improve or she continues to fail swallow eval  -fall, seizure, aspiration precautions    Acute kidney injury (HCC)- (present on admission)  Assessment & Plan  Likely pre-renal in the setting of sepsis.  -continue fluid resuscitation as above  -renally dose all medications as appropriate  -avoid nephrotoxins    History of CVA (cerebrovascular accident)- (present on admission)  Assessment & Plan  Residual right sided neurodeficits form CVA in 2010.  Has RUE spasticity, RLE pain.  New subacute CVA found on 4/11 MRI at [facility].  -hold chemical DVT ppx due to proximity of CVA  -consider starting asa when appropriate  -continue atorvastatin    Obesity (BMI 30-39.9)- (present on admission)  Assessment & Plan  Can discuss when mentation improves.    Endometrial cancer (HCC)- (present on admission)  Assessment & Plan  Followed by Dr. Garcia outpatient and sees radiation oncology.  Received immunotherapy at [facility] in 2021 with initial good response.  Now return of metastatic disease, with significant cancer related pain in b/l LE and right arm.  Daughter reports concerns for bone mets and plans for PET scan outpatient.    -if pain worsens can consider further imaging to assess for bony metastases.      Essential hypertension- (present on admission)  Assessment & Plan  Home BP well controlled on amlodipine, losartan and metoprolol.    -currently NPO due to dysphagia, has PRN labetalol  -will continue to monitor and if unable to take PO in AM can transition to IV alternatives for BP.          VTE prophylaxis: SCDs/TEDs and pharmacologic prophylaxis contraindicated due to recent  CVA

## 2024-04-14 NOTE — ED TRIAGE NOTES
Vitals:    04/13/24 1800   BP: (!) 144/80   Pulse: 99   Resp: 20   Temp: 36.6 °C (97.8 °F)   SpO2: 96%     Chief Complaint   Patient presents with    Painful Urination    Constipation     Pt lives at home with her family. She had hx of a stroke with right sided deficits. Her family today is concerned because she hasn't had a bowel movement in 5 days and they're also concerned about a UTI. The  nurse came and assessed the pt and attempted to get a urine sample but there was apparently too much pus. FSBG [id number].

## 2024-04-14 NOTE — CONSULTS
Gynecologic Oncology Consultation    Date: 2024    Requesting Physician: Trina Campoverde MD    Consulting Physician: SHANA Arriaga /Jann Garcia MD.     Reason for consultation: recurrent endometrial cancer     CC: painful urination, constipation      HPI:   Mrs. Rivas is a 73 year old [id number]  female whose LMP was unknown. She has a past medical history significant for  recurrent endometrial cancer, CVA with subsequent right sided hemiplegia, iron deficiency anemia, and HTN. She presented to the ER in 2019 secondary to 3 days of heavy PMB.    She was referred to Dr. Garcia and  subsequently underwent a sentinel lymph node injection, robotic sentinel lymph node dissection, robotic hysterectomy with bilateral salpingo oophorectomy and cystoscopy with left ureteral stent insertion. She was diagnosed with stage IIIB endometrial cancer. ER is negative and MD is positive. Hypermethylation is positive. Her  was 84.9 on 19.     She was advised to undergo PORTEC 3 and she elected to proceed. She received cisplatin 40 mg/m2 on the week 1 and week 4 following the PORTEC 3 regimen. Her CT scan from 10/01/19 showed no evidence of  metastatic disease. The plan was to start chemotherapy as part of phase II of PORTEC trial, however, patient and family elected not to proceed with systemic chemotherapy due to concern that patient could not tolerate treatment. They were made aware that she has a 30- 40% risk of recurrence. She  was then monitored.      CT scan of the chest, abdomen and pelvis on 20 at [facility] showed new pulmonary nodules highly concerning for pulmonary metastases. Repeat CT scan on 10/14/2020 at [facility] which showed interval enlargement of previously seen bilateral pulmonary nodules measuring up to 13 mm in the left upper lobe.     CTguided biopsy which unfortunately confirmed recurrent endometrial adenocarcinoma. It was recommended that she proceed with systemic chemotherapy  of Taxol 175 mg and Carbo AUC 6.  After cycle #1, she developed G3 asthenia and she had a level one dose reduction to Taxol 150 and Carbo AUC 5.   She completed 6 cycles of Taxol/Carbo from 12/10/2020 to 4/7/2021.     [place] [facility] [place] a new 5.4 mm nodule and enlarging 5.5 mm nodule left upper lobe concerning for metastasis/disease progression. 8.2 mm nodule right lower lobe unchanged.     It was recommended that she be placed on hormone therapy, Tamoxifen 20 mg, 1 p.o BID x 3 weeks followed by Megace 40 mg, 2 p.o BID x 3 weeks.     She then moved to [place] and was lost to follow up.     Ms. Rivas presented 3/7/24 for reestablishment of care. She presented with her daughter/caregiver, Carolyn. In the interim, she moved to [place] and was being followed by Dr. Robles at [facility]. She underwent immunotherapy from 9/20/21 5/14/22. She was diagnosed with brain mets on 2/22/23 and underwent radiation with Dr. Charles Carter. She then moved back to [place] in August 2023. Per her daughter, her last CTCAP was 2/10/23 and last brain MRI was 5/26/23. Her daughter stated that she was doing well when she  moved back. She was able to do things on her own. But in February 2024, she seemed to have declined and was requiring more help. She was no longer able to walk around with a walker. She had been having intermittent LLQ pain for the past 2 weeks. Her bowels had also slowed. She had intermittent SOB mainly at night.  She has right sided weakness due to her h/o CVA. She had not established with a neurologist. It was recommended that she get  updated scans and she was referred to neurology.    She underwent a MRI brain on 3/26/24, results showed 20 x 30 x 26mm but larger enhancing mass in the right frontal opercular region with surrounding vasogenic edema with mass effect and midline shift to the left measuring 4mm concerning for metastatic lesion. Multiple remote infarcts noted. Foci of hemosiderin  staining in the bilateral cerebral hemispheres as described in report without enhancement which may represent microhemorrhages or small cavernomas.    She was recommended to go to ER for further management has she had mass effect and symptomatic. She was seen by  and Dr. Del Castillo for consultation. It was decided that she undergo radiation. During admission, she underwent a CT CAP on 3/27/24, results showed chronic postoperative changes in the left lung with no residual pleural effusion. Nodule in the right lower lobe measures 10 mm in diameter, larger than on the prior examination, most compatible with metastasis.     She presented to our office for treatment planning on 4/10 and had undergone radiation with Dr. Cook per her daughter. She was noted to gave global aphasia in our office; due to concern for CVA daughter was intrusted to take patient to the ER for further evaluation. She [place] [facility] [place] and was suspected to have subacute CVA, hospitalization was recommended with plan for MRI. Daughter then took patient AMA.     Daughter then brought patient to [facility] and MRI there showed subacute CVA with a right parieto-occipital junction involvement and metastatic foci with surrounding edema in the right frontal, left frontal parietal and left parietal region.     She presented to [facility] [place]13 with complaint of painful urination and constipation. She was admitted to Hospitalist service for sepsis secondary to UTI, altered mental status, HEAVEN, and shingles. Given her history of endometrial cancer with brain mets ouf service was consulted to discuss treatment options and prognosis.     Patent seen in ER with daughter at bedside. Patient currently undergoing EEG. Daughter states that since patient's last stoke her speech had been slowly improving until patient had pain with urination and abdominal pain secondary to constipation that prompted family to seek treatment in ER. Daughter  expresses concern that the medications patient is on are causing her to be confused/unable to talk. Sates that she has had prior conversations with patient about her wishes regarding her care in the face of severe or life threatening illness, and that patient would want to be aggressive and fight.        Unable to perform ROS due to altered mental status.    Past Medical History:   Diagnosis Date    Anemia 2/6/2018    Iron def, post-menopausal bleeding.    Back pain     Back pain     Cancer ([facility]) 2019    uterine & endometrial    Cough     CVA, old, hemiparesis ([facility]) 2/6/2018    Residual left sided weakness    Essential hypertension 2/6/2018    Well controlled on amlodipine 10 mg and metoprolol 100 mg bid.    Eye drainage     Fatigue     Frequent headaches     Frequent urination     Hyperlipidemia     Irregular periods     Painful joint     Palpitations     Post-menopause bleeding 2/6/2018    Menopause in 2010 and bleeding 2012 started to have irregular continuous bleeding.     Pulmonary nodule     Sore muscles     Stroke ([facility])     right sided weakness    Swelling of lower extremity     Vision abnormalities 2/6/2018    Vitamin D deficiency 2/6/2018    Taking 50,000 IU once weekly for 4 yrs.    Weakness     Wears glasses        Past Surgical History:   Procedure Laterality Date    CA THORACOSCOPY,DX NO BX Left 8/6/2021    Procedure: THORACOSCOPY - HEMOTHORAX EVACUATION, CHEST TUBE PLACEMENT X 2;  Surgeon: John H Ganser, M.D.;  Location: [facility];  Service: General    CA CYSTOSCOPY,INSERT URETERAL STENT Left 8/8/2019    Procedure: CYSTOSCOPY, WITH URETERAL STENT INSERTION;  Surgeon: Jann Garcia M.D.;  Location: [facility];  Service: Gynecology    HYSTERECTOMY ROBOTIC XI N/A 8/8/2019    Procedure: HYSTERECTOMY, ROBOT-ASSISTED, USING DA RILEY XI;  Surgeon: Jann Garcia M.D.;  Location: [facility];  Service: Gynecology    SALPINGO OOPHORECTOMY Bilateral 8/8/2019    Procedure:  SALPINGO-OOPHORECTOMY;  Surgeon: Jann Garcia M.D.;  Location: [facility];  Service: Gynecology    NODE BIOPSY SENTINEL  2019    Procedure: BIOPSY, LYMPH NODE, SENTINEL;  Surgeon: Jann Garcia M.D.;  Location: SUR[facility];  Service: Gynecology    WY HYSTEROSCOPY,DX,SEP PROC  2019    Procedure: HYSTEROSCOPY, DIAGNOSTIC;  Surgeon: Anel Chicas M.D.;  Location: SURGERY [facility];  Service: Gynecology    DILATION AND CURETTAGE  2019    Procedure: DILATION AND CURETTAGE;  Surgeon: Anel Chicas M.D.;  Location: SUR[facility]ERY [facility];  Service: Gynecology    HYSTERECTOMY LAPAROSCOPY         OB/GYN History:   LMP unknown      Current Facility-Administered Medications   Medication Dose Route Frequency Provider Last Rate Last Admin    lacosamide (Vimpat) injection 150 mg  150 mg Intravenous BID Julio Clements, D.O.   150 mg at 24 0621    senna-docusate (Pericolace Or Senokot S) 8.6-50 MG per tablet 2 Tablet  2 Tablet Oral Q EVENING Julio Clements, D.O.        And    polyethylene glycol/lytes (Miralax) Packet 1 Packet  1 Packet Oral DAILY Julio Clements D.O.        dexamethasone (Decadron) injection 4 mg  4 mg Intravenous Q6HRS Trina Campoverde M.D.        metroNIDAZOLE (Flagyl) IVPB 500 mg  500 mg Intravenous Q12HRS Trina Campoverde M.D.        famotidine (Pepcid) tablet 20 mg  20 mg Oral BID Trina Campoverde M.D.        enalaprilat (Vasotec) injection 1.25 mg 1 mL  1.25 mg Intravenous Q6HRS PRN Trnia Campoverde M.D.        NS infusion   Intravenous Continuous Trina Campoverde M.D.        labetalol (Normodyne/Trandate) injection 10 mg  10 mg Intravenous Q4HRS PRN Julio Clements, D.O.   10 mg at 24 1017    cefTRIAXone (Rocephin) syringe 1,000 mg  1,000 mg Intravenous Q24HRS Julio Clements, D.O.        amLODIPine (Norvasc) tablet 5 mg  5 mg Oral DAILY Julio Clements D.O.        atorvastatin (Lipitor) tablet 20 mg  20 mg Oral Q EVENING Julio Clements,  D.O.        losartan (Cozaar) tablet 100 mg  100 mg Oral DAILY RACHEL IrwinO.        metoprolol SR (Toprol XL) tablet 100 mg  100 mg Oral DAILY LEEANNE Irwin.O.        valACYclovir (Valtrex) caplet 1,000 mg  1,000 mg Oral TID Julio Clements D.O.        Pharmacy Consult Request ...Pain Management Review 1 Each  1 Each Other PHARMACY TO DOSE Julio Clements D.O.        acetaminophen (Tylenol) tablet 1,000 mg  1,000 mg Oral Q8HR LEEANNE Irwin.O.        Followed by    [START ON 4/19/2024] acetaminophen (Tylenol) tablet 1,000 mg  1,000 mg Oral Q6HRS PRN LEEANNE Irwin.O.        oxyCODONE immediate-release (Roxicodone) tablet 2.5 mg  2.5 mg Oral Q4HRS PRN LEEANNE Irwin.O.        Or    oxyCODONE immediate-release (Roxicodone) tablet 5 mg  5 mg Oral Q4HRS PRN LEEANNE Irwin.O.        Or    HYDROmorphone (Dilaudid) injection 0.25 mg  0.25 mg Intravenous Q4HRS PRN LEEANNE Irwin.O.   0.25 mg at 04/14/24 0031     Current Outpatient Medications   Medication Sig Dispense Refill    HYDROcodone-acetaminophen (NORCO) 5-325 MG Tab per tablet Take 1 Tablet by mouth every four hours as needed (Severe Pain).      Lacosamide 150 MG Tab Take 150 mg by mouth 2 times a day. Indications: SEIZURE PREVENTION      valacyclovir (VALTREX) 1 GM Tab Take 1,000 mg by mouth 3 times a day. 7 day course prescribed 4/10/2024.      Melatonin 10 MG Tab Take 10 mg by mouth at bedtime as needed (Sleep).      losartan (COZAAR) 100 MG Tab Take 1 Tablet by mouth 1 time a day as needed (Hypertension) for up to 300 days. BP > 180  Indications: High Blood Pressure Disorder 100 Tablet 3    dexamethasone (DECADRON) 2 MG tablet Take 1 Tablet by mouth 4 times a day for 7 days, THEN 1 Tablet 3 times a day for 5 days, THEN 1 Tablet 2 times a day for 5 days, THEN 1 Tablet every day for 5 days. 58 Tablet 0    gabapentin (NEURONTIN) 300 MG Cap Take 1 Capsule by mouth 3 times a day for 30 days. 90 Capsule 0    amLODIPine (NORVASC) 5 MG  Tab Take 1 Tablet by mouth every day. 30 Tablet 0    metoprolol SR (TOPROL XL) 100 MG TABLET SR 24 HR Take 1 Tablet by mouth every day. 100 Tablet 3    atorvastatin (LIPITOR) 20 MG Tab Take 1 Tablet by mouth every evening. 100 Tablet 3    olanzapine (ZYPREXA) 10 MG tablet Take 10 mg by mouth at bedtime.      baclofen (LIORESAL) 10 MG Tab Take 10 mg by mouth 3 times a day as needed (Muscle Spasms).         Allergies:  Levaquin, Penicillins, and Tramadol    Social History     Socioeconomic History    Marital status: Single     Spouse name: Not on file    Number of children: Not on file    Years of education: Not on file    Highest education level: Not on file   Occupational History    Not on file   Tobacco Use    Smoking status: Never    Smokeless tobacco: Never    Tobacco comments:     n/a   Vaping Use    Vaping Use: Never used   Substance and Sexual Activity    Alcohol use: No    Drug use: No    Sexual activity: Never     Comment: , have 7 kids.   Other Topics Concern     Service No    Blood Transfusions No    Caffeine Concern No    Occupational Exposure No    Hobby Hazards No    Sleep Concern Yes    Stress Concern No    Weight Concern Yes    Special Diet No    Back Care Yes    Exercise Yes    Bike Helmet No     Comment: Does Not Ride Bike    Seat Belt Yes    Self-Exams Yes   Social History Narrative    Not on file     Social Determinants of Health     Financial Resource Strain: Not on file   Food Insecurity: Not on file   Transportation Needs: Not on file   Physical Activity: Not on file   Stress: Not on file   Social Connections: Feeling Socially Integrated (4/8/2024)    OASIS : Social Isolation     Frequency of experiencing loneliness or isolation: Never   Intimate Partner Violence: Not on file   Housing Stability: Not on file       Family History   Problem Relation Age of Onset    Hypertension Mother     No Known Problems Father     No Known Problems Brother          Physical Exam:  BP (!)  "161/81   Pulse (!) 103   Temp 36.6 °C (97.8 °F) (Temporal)   Resp 20   Ht 1.626 m (5' 4\")   Wt 84.4 kg (186 lb)   SpO2 98%       Physical Exam  Constitutional:       Appearance: She is ill-appearing.   Pulmonary:      Effort: No respiratory distress.   Neurological:      Comments: Eyes closed, moaning intermittently. Currently undergoing EEG.           Labs:  Recent Labs     04/13/24 1922 04/14/24  0435   WBC 24.2* 22.6*   RBC 5.28 4.78   HEMOGLOBIN 16.4* 15.2   HEMATOCRIT 51.0* 46.2   MCV 96.6 96.7   MCH 31.1 31.8   MCHC 32.2 32.9   RDW 47.7 48.1   PLATELETCT 209 170   MPV 9.7 10.3     Recent Labs     04/13/24 1922 04/14/24  0435   SODIUM 142 142   POTASSIUM 4.3 4.7   CHLORIDE 101 107   CO2 27 22   GLUCOSE 165* 165*   BUN 42* 42*   CREATININE 1.23 0.96   CALCIUM 9.7 9.1         Recent Labs     04/13/24 1922   ASTSGOT 18   ALTSGPT 22   TBILIRUBIN 1.3   ALKPHOSPHAT 78   GLOBULIN 3.1       Radiology:    CT head w/o 4/14  1.  Redemonstration of right frontal lobe mass, highly concerning for metastasis. Associated vasogenic edema and a 2.5 mm right to left midline shift. No progressive mass effect. No herniation.  2.  Nonspecific ill-defined enhancement in the right parietal lobe.  3.  No new large vascular structure infarct. No new intracranial hemorrhage.    Assessment/Plan: This is a 73 y.o. female with a complex past medical history including recurrent endometrial cancer with brain mets and recent diagnosis of subacute CVA, who presented with  complaint of painful urination constipations found to and altered mental status and sepsis secondary to UTI.     Endometrial cancer: recurrent with metastasis to the brain and vasogenic edema. Recently underwent radiation with Dr. Cook. Found to have progressive functional decline. Case dicussed with Dr. Garcia. Given her recent CVA and poor functional status  (ECOG3) she is not a candidate for chemotherapy and has poor prognosis.    Discussed that patient has complex " medical picture with daughter at bedside. Reviewed that there are many factors likely continuing to patient's altered mental status,  including brain metastasis and that her prognosis is poor regarding her recurrent endometrial cancer.  Advised that patient is not a candidate for chemotherapy of immunotherapy given her current clinical state. Daughter expressed that she is hopeful patient will improve enough to receive future treatment  and she wishes to continue with current treatment plan.   -sizure prophylaxis with lacosamide per IM  -dexamethasone per IM   - fall, aspiration, seizure precaution.    CVA: History of and now with subacute. Management per primary team.     Altered mental status: multifactorial given recent CVA, brain metastasis, UTI, and seizure    Sepsis: secondary to UTI. Management per primary team.       Thank you for for allowing us to participate in this patient's care. Please feel free to contact us for any questions or if we can be of any further assistance.        Case reviewed with Dr. Garcia

## 2024-04-14 NOTE — ASSESSMENT & PLAN NOTE
Followed by Dr. Garcia outpatient and sees radiation oncology Dr Live.  Received immunotherapy at [facility] in 2021 with initial good response. Reached out to Dr. Live who does not believe imaging from 4/14 shows significant changes compared to MRI month prior.  Complicated by metastases to brain among other locations, likely contributing to her altered mental status. Mets re demonstrated without significant change on CTH on admission.  - Decadron for vasogenic edema  - Neurochecks every 4 hours  - Continue aspiration, fall, seizure precaution.

## 2024-04-14 NOTE — ASSESSMENT & PLAN NOTE
Multifactorial  Manage encephalopathy is  Acute stroke on MRI 4/20  Subarachnoid hemorrhage on CT  LP with CSF positive for VZV done 4/22  Continue on acyclovir  Monitor blood pressure closely,  neurochecks every 4 hours

## 2024-04-14 NOTE — TELEPHONE ENCOUNTER
Spoke with [facility] and neurology via voalte.  Neurology reaching out to patient's daughter.

## 2024-04-14 NOTE — ED NOTES
Checked on bed, connected to monitor,  with unlabored respirations. Vital signs is stable.   Denied any new complaints. No current needs identified.  Gurney in low position, side rail up for pt safety. Call light within reach.   Daughter stay all the time @ bedside.

## 2024-04-14 NOTE — PROGRESS NOTES
Hospital Medicine Daily Progress Note    Date of Service  4/14/2024    Chief Complaint  Jairo Rivas is a 73 y.o. female admitted 4/13/2024 with   Chief Complaint   Patient presents with    Painful Urination    Constipation         Hospital Course  This is  a 73-year-old female with a past medical history significant for endometrial cancer status post immunotherapy with recent brain mets complicated by vasogenic edema on 3/27 ;currently on radiation therapy, CVA in 2010 with residual right-sided deficit and a new subacute CVA on 4/11/2024, hypertension presented to the ER on 4/13/2024 with a complaint of altered mental status.  It is noted that patient has been having abdominal pain and constipation    Patient was discharged on medicine taper along with lacosamide and dexa taper. Her mentation continued to get worse and on 4/10; came to ER with dysarthria, dysphagia, worse weakness of the right side.  MRI at [facility] found to have subacute CVA with a right parieto-occipital junction involvement and metastatic foci with surrounding edema in the right frontal, left frontal parietal and left parietal region.      It was the patient noted that she continued to complain of abdominal pain, noted to have lethargic .upon presentation in ER,, she noted to be tachycardic, hypertensive, UA consistent with UTI, WBC elevated at 24.2, lactic acid 3.  Blood cultures x 2 has been ordered, patient was started on Rocephin for urinary tract infection.    CT scan of the head was obtained, noted to have pneumatization of the right frontal lobe mass highly concerning for metastasis; associated with genic edema and 2.5 mm right to left midline shift.  Patient continued to remain on Decadron.    Also GYN oncology was consulted, discussed the prognosis of the patient, stated that patient is not a candidate for any treatment.  Patient daughter wanted her to be full code at this time noted to have    Interval events:  -- Overnight,  patient went to be tachycardic, hypertensive.  Patient is confused and noted to be drooling, EEG has been ordered.  I am not able to get any information from the patient.  -- Speech therapy has evaluated, unable to keep p.o. hypertensive medication, will start IV antihypertensive medication.  -- CT scan of the head with and without contrast has been ordered,  noted to have frontal mass with vasogenic edema; continue decadron taper    -- Will continue lacosamide, continue seizure aspiration, fall precaution.    -Patient has been followed by Dr. Suazo  and has been receiving steroid taper, likely the etiology of leukocytosis.  -- Patient BUN is also increased, likely secondary to steroids, continue Pepcid    I called the patient daughter over the phone, stated that she  does not do well with dexamethasone.  She wanted her mom to be full code    Considering patient change in mentation, drooling, neurology was consulted.      I have discussed this patient's plan of care and discharge plan at IDT rounds today with Case Management, Nursing, Nursing leadership, and other members of the IDT team.    Consultants/Specialty  Gyn/ONc  Neurology    Code Status  Full Code    Disposition  The patient is not medically cleared for discharge to home or a post-acute facility.      I have placed the appropriate orders for post-discharge needs.    Review of Systems  ROS     Unable to obtain 2/2 patient mentation     Physical Exam  Temp:  [36.6 °C (97.8 °F)-37.3 °C (99.1 °F)] 36.6 °C (97.8 °F)  Pulse:  [] 101  Resp:  [16-23] 22  BP: (112-187)/(63-94) 187/84  SpO2:  [90 %-98 %] 97 %    Physical Exam  Vitals and nursing note reviewed.   Constitutional:       Appearance: She is obese.   HENT:      Head: Normocephalic and atraumatic.   Eyes:      Pupils: Pupils are equal, round, and reactive to light.   Cardiovascular:      Rate and Rhythm: Normal rate.   Pulmonary:      Comments: Poor resp effort  Abdominal:      General: There is no  distension.      Palpations: Abdomen is soft.   Musculoskeletal:      Cervical back: Neck supple.      Right lower leg: No edema.      Left lower leg: No edema.   Neurological:      Comments: Alert and oriented x 0-1.  Patient is noted to be drooling, noted to have right-sided facial droop  Weak right arm  Limited neurological status considering patient mentation.         Fluids    Intake/Output Summary (Last 24 hours) at 4/14/2024 1137  Last data filed at 4/13/2024 2234  Gross per 24 hour   Intake 1700 ml   Output --   Net 1700 ml       Laboratory  Recent Labs     04/13/24 1922 04/14/24  0435   WBC 24.2* 22.6*   RBC 5.28 4.78   HEMOGLOBIN 16.4* 15.2   HEMATOCRIT 51.0* 46.2   MCV 96.6 96.7   MCH 31.1 31.8   MCHC 32.2 32.9   RDW 47.7 48.1   PLATELETCT 209 170   MPV 9.7 10.3     Recent Labs     04/13/24 1922 04/14/24  0435   SODIUM 142 142   POTASSIUM 4.3 4.7   CHLORIDE 101 107   CO2 27 22   GLUCOSE 165* 165*   BUN 42* 42*   CREATININE 1.23 0.96   CALCIUM 9.7 9.1                   Imaging  DX-ABDOMEN COMPLETE WITH AP OR PA CXR   Final Result      1. No acute cardiopulmonary abnormality.   2. Chronic left lower lobe scarring.   3. Nonobstructive bowel gas pattern. Small amount of stool throughout the colon.      CT-HEAD WITH & W/O    (Results Pending)   DX-CHEST-LIMITED (1 VIEW)    (Results Pending)   MR-BRAIN-WITH & W/O    (Results Pending)        Assessment/Plan  * Sepsis secondary to UTI (HCC)- (present on admission)  Assessment & Plan  This is Sepsis Present on admission  SIRS criteria identified on my evaluation include: Tachycardia, with heart rate greater than 90 BPM and Leukocytosis, with WBC greater than 12,000  Clinical indicators of end organ dysfunction include Lactic Acid greater than 2 and Toxic Metabolic Encephalopathy  Source is UTI  Sepsis protocol initiated  Crystalloid Fluid Administration: Fluid resuscitation ordered per standard protocol - 30 mL/kg per current or ideal body weight  IV antibiotics  as appropriate for source of sepsis  Reassessment: I have reassessed the patient's hemodynamic status    -ceftriaxone daily x5 days for UTI  -received sepsis bolus in ED, will continue IVF with caution  - follow blood cx x 2, urine cx pending       Malignant neoplasm metastatic to brain (HCC)- (present on admission)  Assessment & Plan  Brain mets noted during hospitalization on 3/27/2024 with vasogenic edema, MRI on 4/11 shows continued brain mets with vasogenic edema.    -continue seizure prophylaxis with lacosamide 150 mg BID  -continue dexamethasone   Continue fall, aspiration, seizure precaution.  Call Dr. Jose Sarah's PA-C to discuss the prognosis, goals of care      History of CVA (cerebrovascular accident)- (present on admission)  Assessment & Plan  Residual right sided neurodeficits form CVA in 2010.  Has RUE spasticity, RLE pain.  New subacute CVA found on 4/11 MRI at [facility].  -hold chemical DVT ppx due to proximity of CVA  -consider starting asa when appropriate  -continue atorvastatin    Seizure (HCC)  Assessment & Plan  Continue vimpat   -- seizure/aspiration/ fall precaution    -- EEG  Neuro consulted    Dysphagia  Assessment & Plan  Possibly due to CVA vs brain metastases.  -SLP evaluated and its noted that she failed swallow  -- NPO now  -aspiration precautions      Acute metabolic encephalopathy  Assessment & Plan  Etiology at this time likely multifactorial, as she has multiple possible causes including UTI, constipation, medications (recently started norco, on top of other high risk medications such as baclofen, gabapentin), recent findings of subacute CVA as well as brain mets may all be contributing.    --Daughter describes some episodes that may be concerning for focal seizures.  -will hold majority of sedating medications, she does have significant cancer related pain so we will provide some PRN oxy and dilaudid, hold norco, baclofen, gabapentin for now  -continue lacosamide for seizure  ppx  -continue dexamethasone for cerebral edema  -Neuro check  every 4 hrs  -fall, seizure, aspiration precautions  EEG  CT head with/ without contrast    Shingles  Assessment & Plan  Noted under left breast and onto lateral chest wall.  Multiple vesicular lesions with skin breakdown.  -contact precautions  -valtrex TID x7 days    Constipation  Assessment & Plan  No BM since at least 5 days prior to admission, stool burden noted in rectal vault on imaging.    Suppository and enema  Po meds  when she is more alert    Acute kidney injury (HCC)- (present on admission)  Assessment & Plan  Likely pre-renal in the setting of sepsis.  -continue fluid resuscitation as above  -renally dose all medications as appropriate  -avoid nephrotoxins    Advance care planning- (present on admission)  Assessment & Plan  Patient w patient is completely confused, talk to patient's daughter over the phone per 18 minutes were present for the conversation.  I discussed advance care planning face to face with the patient's family for at least 18 minutes, including diagnosis, prognosis, plan of care, risks and benefits of any therapies that could be offered.    Patient daughter stated that her mom is confused as he has gotten dexamethasone.  She wants her mom to be full code.  She will be discussing with Dr. Garcia in regards to the prognosis of her malignancy.  At this time patient daughter wanted her to be full code    Endometrial cancer (HCC)- (present on admission)  Assessment & Plan  Followed by Dr. Garcia outpatient and sees radiation oncology Dr Live.  Received immunotherapy at [facility] in 2021 with initial good response.   -- Now return of metastatic disease, with significant cancer related pain in b/l LE and right arm.   -- Continue Decadron   Neurochecks every 4 hours, continue aspiration, fall, seizure precaution.    Discussion in regards to prognosis    Essential hypertension- (present on admission)  Assessment & Plan  Patient noted to be  hypertensive, unable to take amlodipine, losartan, metoprolol by p.o. route  IV as needed antihypertensive medication    Obesity (BMI 30-39.9)- (present on admission)  Assessment & Plan  Can discuss when mentation improves.         VTE prophylaxis: scd

## 2024-04-14 NOTE — ASSESSMENT & PLAN NOTE
MRI on 4/20 noted with right frontal brain metastasis with vasogenic edema, acute infarction.  CT head from 4/21 noted with 9X 7 mm of hemorrhage in the right temporal region.   Neurology following

## 2024-04-14 NOTE — ED PROVIDER NOTES
ED Provider Note    CHIEF COMPLAINT  Chief Complaint   Patient presents with    Painful Urination    Constipation       EXTERNAL RECORDS REVIEWED  Outpatient Notes discharge summary on 3/29/2024 for worsening weaknes .  Patient has history of metastatic endometrial cancer status post brain radiation.  Patient has a recent stroke as well 5 days ago    HPI/ROS  LIMITATION TO HISTORY   Select: Altered mental status / Confusion  OUTSIDE HISTORIAN(S):  Family patient's daughter reports that patient has been acting differently over the past 2 days.  Patient is currently recovering from a stroke that occurred 5 days ago however despite improving in terms of her mental status, being alert and oriented x 3 in the past 2 days patient became more disoriented, thrashing, moaning in pain and having dark, smelly and bloody urine    Jairo Rivas is a 73 y.o. female who presents with urinary symptoms and constipation.  Patient has been acting abnormally per family, thrashing in pain and moaning.  Patient without a cough.  Patient has residual right-sided weakness following stroke but also generalized weakness secondary to brain radiation.    PAST MEDICAL HISTORY   has a past medical history of Anemia (2/6/2018), Back pain, Back pain, Cancer ([facility]) (2019), Cough, CVA, old, hemiparesis ([facility]) (2/6/2018), Essential hypertension (2/6/2018), Eye drainage, Fatigue, Frequent headaches, Frequent urination, Hyperlipidemia, Irregular periods, Painful joint, Palpitations, Post-menopause bleeding (2/6/2018), Pulmonary nodule, Sore muscles, Stroke ([facility]), Swelling of lower extremity, Vision abnormalities (2/6/2018), Vitamin D deficiency (2/6/2018), Weakness, and Wears glasses.    SURGICAL HISTORY   has a past surgical history that includes hysteroscopy,dx,sep proc (6/19/2019); dilation and curettage (6/19/2019); cystoscopy,insert ureteral stent (Left, 8/8/2019); hysterectomy robotic xi (N/A, 8/8/2019); salpingo oophorectomy  "(Bilateral, 8/8/2019); node biopsy sentinel (8/8/2019); hysterectomy laparoscopy; and thoracoscopy,dx no bx (Left, 8/6/2021).    FAMILY HISTORY  Family History   Problem Relation Age of Onset    Hypertension Mother     No Known Problems Father     No Known Problems Brother        SOCIAL HISTORY  Social History     Tobacco Use    Smoking status: Never    Smokeless tobacco: Never    Tobacco comments:     n/a   Vaping Use    Vaping Use: Never used   Substance and Sexual Activity    Alcohol use: No    Drug use: No    Sexual activity: Never     Comment: , have 7 kids.       CURRENT MEDICATIONS  Home Medications       Reviewed by Kavita Hutson (Pharmacy Tech) on 04/13/24 at 2054  Med List Status: Complete     Medication Last Dose Status   amLODIPine (NORVASC) 5 MG Tab 4/13/2024 Active   atorvastatin (LIPITOR) 20 MG Tab 4/12/2024 Active   baclofen (LIORESAL) 10 MG Tab > 1 WEEK AGO Active   dexamethasone (DECADRON) 2 MG tablet 4/13/2024 Active   gabapentin (NEURONTIN) 300 MG Cap 4/13/2024 Active   HYDROcodone-acetaminophen (NORCO) 5-325 MG Tab per tablet 4/13/2024 Active   Lacosamide 150 MG Tab 4/13/2024 Active   losartan (COZAAR) 100 MG Tab 4/12/2024 Active   Melatonin 10 MG Tab 4/12/2024 Active   metoprolol SR (TOPROL XL) 100 MG TABLET SR 24 HR 4/13/2024 Active   olanzapine (ZYPREXA) 10 MG tablet 4/9/2024 Active   valacyclovir (VALTREX) 1 GM Tab 4/12/2024 Active                    ALLERGIES  Allergies   Allergen Reactions    Levaquin Itching and Swelling     Swelling of the tongue and face    Penicillins Itching and Swelling    Tramadol Itching and Swelling       PHYSICAL EXAM  VITAL SIGNS: /63   Pulse (!) 101   Temp 36.6 °C (97.8 °F) (Temporal)   Resp 19   Ht 1.626 m (5' 4\")   Wt 84.4 kg (186 lb)   SpO2 94%   BMI 31.93 kg/m²    Vitals and nursing note reviewed.   Constitutional:       Comments: Patient is lying in bed supine, able to respond to some questions but generally disoriented  HENT:     "  Head: Normocephalic and atraumatic.   Eyes:      Extraocular Movements: Extraocular movements intact.      Conjunctiva/sclera: Conjunctivae normal.      Pupils: Pupils are equal, round, and sluggish to light.   Cardiovascular:      Pulses: Normal pulses.      Comments: HR 99  Pulmonary:      Effort: Pulmonary effort is normal. No respiratory distress.   Abdominal:      Comments: Abdomen is soft, mild diffuse tenderness to palpation  Musculoskeletal:         General: No swelling. Normal range of motion.      Cervical back: Normal range of motion. No rigidity.   Skin:     General: Skin is warm and dry.      Capillary Refill: Capillary refill takes less than 2 seconds.   Neurological:      Mental Status: Alert.  No movement in the right upper extremity, patient able to wiggle toes in the right lower extremity, 4-5 strength in the left upper and left lower extremity.  These findings are consistent with patient's prior stroke per family.      EKG/LABS      RADIOLOGY/PROCEDURES   I have independently interpreted the diagnostic imaging associated with this visit and am waiting the final reading from the radiologist.   My preliminary interpretation is as follows: No perforation    Radiologist interpretation:  DX-ABDOMEN COMPLETE WITH AP OR PA CXR   Final Result      1. No acute cardiopulmonary abnormality.   2. Chronic left lower lobe scarring.   3. Nonobstructive bowel gas pattern. Small amount of stool throughout the colon.          COURSE & MEDICAL DECISION MAKING    ASSESSMENT, COURSE AND PLAN  Care Narrative: Septic workup pending given tachycardia and concern for urinary tract infection, lactic acid and blood cultures pending.  Chest x-ray without pneumonia, pneumothorax, pulmonary edema.  Abdominal x-ray without small bowel obstruction or perforation.  CBC to evaluate for acute anemia and leukocytosis.  CMP to evaluate for acute electrolyte abnormality, acute kidney injury, acute liver failure or dysfunction.   Urinalysis to evaluate for UTI.  IV antibiotics and IV fluids and pain control have been ordered.  Patient's neurologic symptoms and right-sided deficits, except for increased confusion, are unchanged from stroke.  Disposition pending workup, likely admission.    Electronically signed by: Pb Dunn M.D., 4/13/2024 7:04 PM    Patient with significant leukocytosis, elevated lactic acid, tachycardic, urinalysis consistent with UTI.  Patient with urosepsis, admitted to hospital medicine service for IV antibiotics.  Abdominal x-ray without evidence of obstruction or perforation.    This dictation has been created using voice recognition software. I am continuously working with the software to minimize the number of voice recognition errors and I have made every attempt to manually correct the errors within my dictation. However errors  related to this voice recognition software may still exist and should be interpreted within the appropriate context.     Electronically signed by: Pb Dunn M.D., 4/13/2024 11:49 PM      Hydration: Based on the patient's presentation of Sepsis the patient was given IV fluids. IV Hydration was used because oral hydration was not adequate alone. Upon recheck following hydration, the patient was improved.  Sepsis: Infection was suspected 8:02 PM (Time). Sepsis pathway was initiated. 30cc/kg bolus given. Antibiotics were given per protocol.    CRITICAL CARE  The very real possibilty of a deterioration of this patient's condition required the highest level of my preparedness for sudden, emergent intervention.  I provided critical care services, which included medication orders, frequent reevaluations of the patient's condition and response to treatment, ordering and reviewing test results, and discussing the case with various consultants.  The critical care time associated with the care of the patient was 35 minutes. Review chart for interventions. This time is exclusive  of any other billable procedures.              FINAL DIAGNOSIS  1. Acute UTI    2. Sepsis, due to unspecified organism, unspecified whether acute organ dysfunction present (HCC)    3. Altered mental status, unspecified altered mental status type           Electronically signed by: Pb Dunn M.D., 4/13/2024 6:59 PM

## 2024-04-14 NOTE — ASSESSMENT & PLAN NOTE
Possibly due to CVA vs brain metastases and altered mental status in setting of infection.  Patient will remain NPO as she failed SLP evaluation 4/17  NG tube placement required IRIS. Tube was removed for MRI and family refused NG tube to be replaced. Extensive discussion with family regarding nutrition options. Eventually agreed to let patient get NG tube with lorazepam sedation and IRIS. No one certified to place NG tube with IRIS available on 4/21. Vascular access also lost on 4/21 and midline unable to be placed without VATs  -aspiration precautions  -Continue lansoprazole  -[id number] glucose checks

## 2024-04-14 NOTE — PROGRESS NOTES
Pulmonary/Critical Care Medicine   Consult Note    Date of service: 4/14/2024  Time: 1300    I was consulted by the hospitalist Dr Campoverde to discuss appropriate disposition of the patient.  She is a very unfortunate 73-year-old female with metastatic endometrial cancer including mets to the brain with associated vasogenic edema on Decadron and receiving radiation therapy.  She returned to the ER with worsening mentation.  She was diagnosed with a urinary tract infection.  Her this afternoon, patient was having some drooling and less responsive with associated tachycardia and hypertension.  An EEG and a CT head has been ordered by the hospitalist and I was consulted in case patient needed transfer to [facility] for acute 2-hour neurochecks.  At the time my evaluation, patient is more awake and is protecting her airway.  Her head CT shows the tumors with vasogenic edema which is unchanged from before and there is no evidence of herniation.  Her arterial blood gas does not show hypercapnia and she is saturating well.  Her point-of-care glucose is mildly elevated at 188.    She does not require a higher level of care at this time.  Oncology has been consulted and from a medication/chemotherapy standpoint has nothing further to offer.  Radiation therapy will be consulted as well.  If there is nothing further from their standpoint either, I recommend discussions regarding hospice/comfort care.  If they feel that there is further benefit from radiation and patient needs temporizing measures, then patient may benefit from escalation to higher level of care and/or intubation if she worsens to temporize for further cancer treatment.  In the meantime, I recommend ongoing Decadron treatment for vasogenic edema and antibiotics for UTI but otherwise DNR/DNI if patient/daughter agree as placing her on a ventilator or performing CPR would be prolonging her pain and suffering.     I spent extensive time in reviewing the patient's  condition, physical examination, laboratory and imaging data, prior documentation, in discussion with oncology and hospitalist, and in formulating an assessment/plan.

## 2024-04-14 NOTE — THERAPY
"Speech Language Pathology   Clinical Swallow Evaluation     Patient Name: Jairo Rivas  AGE:  73 y.o., SEX:  female  Medical Record #: [id number]  Date of Service: 4/14/2024      History of Present Illness  72 y/o female presented 4/13 with concern for UTI and constipation. Pt has residual right-sided weakness following stroke but also generalized weakness secondary to brain radiation.    Recently left AMA from ED on 4/10 after being brought in for nonsensical speech.      CMHx: Sepsis, UTI, hx stroke, endometrial cance,r obesity, HEAVEN, malignant neoplasm to the brain, constipation, shingles, acute metabolic encephalopathy  PMHx: Lewy body dementia. No hx SLP in [facility].    CT Abdomen Complete 4/13:  \"1. No acute cardiopulmonary abnormality.  2. Chronic left lower lobe scarring.  3. Nonobstructive bowel gas pattern. Small amount of stool throughout the colon.\"    CT Head 4/10:  \"1.  2 cm right frontal brain metastasis with surrounding vasogenic edema.  2.  Multifocal areas of deep white matter infarction in the right posterior frontal parietal white matter.  3.  Mild effacement of the right lateral ventricle with minimal right to left midline shift and ventricular system.  4.  Chronic area of lacunar type infarction in the left side of the elaine and left frontal periventricular white matter extending into the left posterior basal ganglia.\"    General Information:  Vitals  Pulse Oximetry: 97 %  O2 (LPM): 2  O2 Delivery Device: Silicone Nasal Cannula  Vitals Comments: briefly tachypenic when yelling  Level of Consciousness: Alert, Awake  Patient Behaviors: Fatigue, Drowsy  Orientation: Self (stated \"Hello\" when greeted by name)  Follows Directives: No      Prior Living Situation & Level of Function:  Lives with - Patient's Self Care Capacity: Adult Children  Comments: Daughter reports no prior SLP service. Scheuled to recieve HH PT/OT per EMR. PEr EMR - patient lives with adult children; dtr is a retired EMT " "and works from home now  Communication: Unclear PLOF - lives with adult children who provide A with ADLs and IADLs  Swallowing: Daughter reports that patient does eat at home with regular liquids via straw on L side due to R side droop; reports that she has difficulty with oral excursion and bolus stripping but can chew soft solids once material is in oral cavity. She reports that patient did not recieve formal SLP service after her stroke but suspects she would benefit       Oral Mechanism Evaluation:  Dentition: Fair, Natural dentition   Facial Symmetry: Central right facial droop  Facial Sensation: Pt did not follow commands to assess     Labial Observations: Right sided weakness   Lingual Observations: Pt did not follow commands to assess  Motor Speech: Insufficient assessment, patient stated \"Hello\" but otherwise no verbalizations  Comments: R-side deficits are baseline         Laryngeal Function:  Secretion Management: Drooling  Voice Quality: Hoarse        Cough: Pt did not follow commands to assess  Comments: Limited assessment of voice - patient has verbalization x1 (\"hello\") and vocalization x2, shouting when roused by SLP/RN      Subjective  Attempted to see patient earlier this AM (0835); daughter was at bedside and answered questions about patient PLOF related to dysphagia. Daughter further reported that patient recieved meds which were negatively impacting her mentation and alertness. Returned later in AM, daughter no longer at bedside. Discussed with RN, who reported that patient recieved pain meds at 8:00 pm yesterday. Pt stated \"Hello\" and did nod in agreement to attempting PO but largely cannot maintain alertness without consistent multi-sensory stimuli.      Assessment  Current Method of Nutrition: NPO until cleared by speech pathology  Positioning: Mcclure's (60-90 degrees)  Bolus Administration: SLP  O2 (LPM): 2 O2 Delivery Device: Silicone Nasal Cannula  Factor(s) Affecting Performance: Impaired " "endurance, Impaired mental status, Impaired command following  Tracheostomy : No      Swallowing Trials:  Ice: Impaired      Comments:   Anterior loss of secretions on the R labial corner prior to PO. Patient closing mouth and licking lips to thermal-tactile stimulation along lips. Ice chips provided with poor bolus stripping (per daughter this is baseline). Patient closed mouth, which reduced anterior loss, but did not demonstrate appreciable oral swallow despite max cues.       Clinical Impressions  Pt is not maintaining alertness for PO intake and is at risk for oropharyngeal dysphagia given brain mets, UTI, and hx of stroke with at least oral phase deficits at baseline per daughter report. Discussed with RN - at this time recommend NPO with plan for SLP re-evaluation in the morning. However, should patient's alertness significantly improve to the point where she is reliably participating with staff/family, please provide limited ice chips under direct spv.      Recommendations  NPO pending SLP re-eval on 4/15   - Ice chips okay with direct spv ONLY IF patient is reliably participating with staff/family  Instrumentation: Instrumental swallow study pending clinical progress  Medication: Defer to MD  Oral Care: Q2h      SLP Treatment Plan  Treatment Plan: Dysphagia Treatment, Patient/Family/Caregiver Training  SLP Frequency: 4x Per Week  Estimated Duration: Until Therapy Goals Met      Anticipated Discharge Needs  Discharge Recommendations: Recommend post-acute placement for additional speech therapy services prior to discharge home   Therapy Recommendations Upon DC: Dysphagia Training, Patient / Family / Caregiver Education, Community Re-Integration        Patient / Family Goals  Patient / Family Goal #1: \"She eats at home\" per dtr  Short Term Goals  Short Term Goal # 1: Pt will participate in prfdg to determine readiness for PO diet vs. diagnostic evaluation.      Kathleen Benjamin, SLP   "

## 2024-04-14 NOTE — ED NOTES
Pt still cannot stay awake. Arouses to loud voice and painful stim. Does not keep eyes open. Not following commands. Baseline pt is nonverbal w/ rt sided deficits. Dr. Campoverde aware.

## 2024-04-14 NOTE — ED NOTES
OT attempted eval. Pt unable to stay awake long enough to complete eval. She will revisit pt later.

## 2024-04-14 NOTE — ED NOTES
Pt cleaned and full linen changed.  Noted bedsores upon skin assessment and both under breast rashes.

## 2024-04-14 NOTE — ED NOTES
Bedside report received from off going RN Autumn, assumed care of patient.  POC discussed with patient. Call light within reach, all needs addressed at this time.       Fall risk interventions in place: Move the patient closer to the nurse's station, Patient's personal possessions are with in their safe reach, Place socks on patient, Place fall risk sign on patient's door, and Keep floor surfaces clean and dry (all applicable per [facility] Fall risk assessment)   Continuous monitoring: Cardiac Leads, Pulse Ox, or Blood Pressure  IVF/IV medications: Not Applicable   Oxygen: Room Air  Bedside sitter: Not Applicable   Isolation: Not Applicable

## 2024-04-14 NOTE — ED NOTES
Checked on bed, connected to monitor,  with unlabored respirations. Vital signs kept monitored  Denied any new complaints. No current needs identified.  Gurney in low position, side rail up for pt safety. Call light within reach.

## 2024-04-14 NOTE — HOSPITAL COURSE
73 female with past medical history of endometrial carcinoma, brain metastasis s/p radiation therapy, CVA presented with altered mental status.  MRI on 4/20 noted with right frontal brain metastasis with vasogenic edema, acute infarction.  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  Neurology and ID consulted on the case.  CT head from 4/21 noted with 9X 7 mm of hemorrhage in the right temporal region.  Neurology recommending continue acyclovir for 14 days .  Ophthalmology evaluated for concern of vision changes.

## 2024-04-14 NOTE — ED NOTES
Med rec completed per patient's daughter at bedside.  Allergies reviewed with daughter.    Outpatient antibiotics within the last 30 days: none.    ANTICOAGULATION: none.    [facility]erred [facility].    Patient's daughter states that she has stopped patient's olanzapine after reading its black box warnings.

## 2024-04-14 NOTE — ED NOTES
Pt difficult to instruct even family helping to get the BP at least not to remove the cuff. Pt started to be agitated. Informed AMD.

## 2024-04-14 NOTE — ED NOTES
Pt moaning out at times. Trying to change her position. Pt repositioned onto left side with pillows. Pt appears more comfortable.

## 2024-04-15 ENCOUNTER — HOME CARE VISIT (OUTPATIENT)
Dept: HOME HEALTH SERVICES | Facility: HOME HEALTHCARE | Age: 74
End: 2024-04-15
Payer: MEDICARE

## 2024-04-15 ENCOUNTER — TELEPHONE (OUTPATIENT)
Dept: MEDICAL GROUP | Facility: LAB | Age: 74
End: 2024-04-15
Payer: MEDICARE

## 2024-04-15 LAB
ALBUMIN SERPL BCP-MCNC: 3 G/DL (ref 3.2–4.9)
ALBUMIN/GLOB SERPL: 0.9 G/DL
ALP SERPL-CCNC: 69 U/L (ref 30–99)
ALT SERPL-CCNC: 16 U/L (ref 2–50)
ANION GAP SERPL CALC-SCNC: 10 MMOL/L (ref 7–16)
AST SERPL-CCNC: 14 U/L (ref 12–45)
BILIRUB SERPL-MCNC: 0.5 MG/DL (ref 0.1–1.5)
BUN SERPL-MCNC: 41 MG/DL (ref 8–22)
CALCIUM ALBUM COR SERPL-MCNC: 10 MG/DL (ref 8.5–10.5)
CALCIUM SERPL-MCNC: 9.2 MG/DL (ref 8.5–10.5)
CHLORIDE SERPL-SCNC: 117 MMOL/L (ref 96–112)
CO2 SERPL-SCNC: 22 MMOL/L (ref 20–33)
CREAT SERPL-MCNC: 1.03 MG/DL (ref 0.5–1.4)
ERYTHROCYTE [DISTWIDTH] IN BLOOD BY AUTOMATED COUNT: 49.3 FL (ref 35.9–50)
GFR SERPLBLD CREATININE-BSD FMLA CKD-EPI: 58 ML/MIN/1.73 M 2
GLOBULIN SER CALC-MCNC: 3.4 G/DL (ref 1.9–3.5)
GLUCOSE BLD STRIP.AUTO-MCNC: 151 MG/DL (ref 65–99)
GLUCOSE BLD STRIP.AUTO-MCNC: 153 MG/DL (ref 65–99)
GLUCOSE SERPL-MCNC: 171 MG/DL (ref 65–99)
HCT VFR BLD AUTO: 43.2 % (ref 37–47)
HGB BLD-MCNC: 14.2 G/DL (ref 12–16)
MCH RBC QN AUTO: 31.6 PG (ref 27–33)
MCHC RBC AUTO-ENTMCNC: 32.9 G/DL (ref 32.2–35.5)
MCV RBC AUTO: 96.2 FL (ref 81.4–97.8)
PLATELET # BLD AUTO: 174 K/UL (ref 164–446)
PMV BLD AUTO: 10.2 FL (ref 9–12.9)
POTASSIUM SERPL-SCNC: 4.1 MMOL/L (ref 3.6–5.5)
PROT SERPL-MCNC: 6.4 G/DL (ref 6–8.2)
RBC # BLD AUTO: 4.49 M/UL (ref 4.2–5.4)
SODIUM SERPL-SCNC: 149 MMOL/L (ref 135–145)
WBC # BLD AUTO: 17.5 K/UL (ref 4.8–10.8)

## 2024-04-15 PROCEDURE — 36415 COLL VENOUS BLD VENIPUNCTURE: CPT

## 2024-04-15 PROCEDURE — C9254 INJECTION, LACOSAMIDE: HCPCS | Performed by: STUDENT IN AN ORGANIZED HEALTH CARE EDUCATION/TRAINING PROGRAM

## 2024-04-15 PROCEDURE — 665999 HH PPS REVENUE DEBIT

## 2024-04-15 PROCEDURE — 92526 ORAL FUNCTION THERAPY: CPT

## 2024-04-15 PROCEDURE — 700101 HCHG RX REV CODE 250: Performed by: STUDENT IN AN ORGANIZED HEALTH CARE EDUCATION/TRAINING PROGRAM

## 2024-04-15 PROCEDURE — 85027 COMPLETE CBC AUTOMATED: CPT

## 2024-04-15 PROCEDURE — 665998 HH PPS REVENUE CREDIT

## 2024-04-15 PROCEDURE — A9270 NON-COVERED ITEM OR SERVICE: HCPCS

## 2024-04-15 PROCEDURE — 700105 HCHG RX REV CODE 258: Performed by: HOSPITALIST

## 2024-04-15 PROCEDURE — 82962 GLUCOSE BLOOD TEST: CPT

## 2024-04-15 PROCEDURE — 80053 COMPREHEN METABOLIC PANEL: CPT

## 2024-04-15 PROCEDURE — 700102 HCHG RX REV CODE 250 W/ 637 OVERRIDE(OP)

## 2024-04-15 PROCEDURE — 700111 HCHG RX REV CODE 636 W/ 250 OVERRIDE (IP): Mod: JZ | Performed by: HOSPITALIST

## 2024-04-15 PROCEDURE — 700111 HCHG RX REV CODE 636 W/ 250 OVERRIDE (IP): Performed by: STUDENT IN AN ORGANIZED HEALTH CARE EDUCATION/TRAINING PROGRAM

## 2024-04-15 PROCEDURE — 700105 HCHG RX REV CODE 258

## 2024-04-15 PROCEDURE — 99232 SBSQ HOSP IP/OBS MODERATE 35: CPT | Mod: GC | Performed by: INTERNAL MEDICINE

## 2024-04-15 PROCEDURE — 770020 HCHG ROOM/CARE - TELE (206)

## 2024-04-15 PROCEDURE — 700105 HCHG RX REV CODE 258: Performed by: INTERNAL MEDICINE

## 2024-04-15 RX ORDER — SODIUM CHLORIDE, SODIUM LACTATE, POTASSIUM CHLORIDE, CALCIUM CHLORIDE 600; 310; 30; 20 MG/100ML; MG/100ML; MG/100ML; MG/100ML
INJECTION, SOLUTION INTRAVENOUS CONTINUOUS
Status: DISCONTINUED | OUTPATIENT
Start: 2024-04-15 | End: 2024-04-15

## 2024-04-15 RX ORDER — ASPIRIN 81 MG/1
81 TABLET ORAL DAILY
Status: ON HOLD | COMMUNITY

## 2024-04-15 RX ORDER — DEXTROSE MONOHYDRATE 50 MG/ML
INJECTION, SOLUTION INTRAVENOUS CONTINUOUS
Status: DISCONTINUED | OUTPATIENT
Start: 2024-04-15 | End: 2024-04-16

## 2024-04-15 RX ORDER — BISACODYL 10 MG
10 SUPPOSITORY, RECTAL RECTAL DAILY
Status: DISCONTINUED | OUTPATIENT
Start: 2024-04-15 | End: 2024-05-13 | Stop reason: HOSPADM

## 2024-04-15 RX ORDER — ACETAMINOPHEN 650 MG/1
650 SUPPOSITORY RECTAL EVERY 4 HOURS PRN
Status: DISCONTINUED | OUTPATIENT
Start: 2024-04-15 | End: 2024-04-18

## 2024-04-15 RX ADMIN — HYDROMORPHONE HYDROCHLORIDE 0.25 MG: 1 INJECTION, SOLUTION INTRAMUSCULAR; INTRAVENOUS; SUBCUTANEOUS at 21:18

## 2024-04-15 RX ADMIN — LACOSAMIDE 150 MG: 10 INJECTION INTRAVENOUS at 18:15

## 2024-04-15 RX ADMIN — METRONIDAZOLE 500 MG: 5 INJECTION, SOLUTION INTRAVENOUS at 18:00

## 2024-04-15 RX ADMIN — DEXTROSE MONOHYDRATE: 50 INJECTION, SOLUTION INTRAVENOUS at 20:49

## 2024-04-15 RX ADMIN — SODIUM CHLORIDE: 9 INJECTION, SOLUTION INTRAVENOUS at 11:58

## 2024-04-15 RX ADMIN — HYDROMORPHONE HYDROCHLORIDE 0.25 MG: 1 INJECTION, SOLUTION INTRAMUSCULAR; INTRAVENOUS; SUBCUTANEOUS at 05:50

## 2024-04-15 RX ADMIN — DEXAMETHASONE SODIUM PHOSPHATE 4 MG: 4 INJECTION, SOLUTION INTRA-ARTICULAR; INTRALESIONAL; INTRAMUSCULAR; INTRAVENOUS; SOFT TISSUE at 16:31

## 2024-04-15 RX ADMIN — BISACODYL 10 MG: 10 SUPPOSITORY RECTAL at 20:45

## 2024-04-15 RX ADMIN — DEXAMETHASONE SODIUM PHOSPHATE 4 MG: 4 INJECTION, SOLUTION INTRA-ARTICULAR; INTRALESIONAL; INTRAMUSCULAR; INTRAVENOUS; SOFT TISSUE at 12:25

## 2024-04-15 RX ADMIN — METRONIDAZOLE 500 MG: 5 INJECTION, SOLUTION INTRAVENOUS at 05:22

## 2024-04-15 RX ADMIN — LACOSAMIDE 150 MG: 10 INJECTION INTRAVENOUS at 06:24

## 2024-04-15 RX ADMIN — HYDROMORPHONE HYDROCHLORIDE 0.25 MG: 1 INJECTION, SOLUTION INTRAMUSCULAR; INTRAVENOUS; SUBCUTANEOUS at 16:31

## 2024-04-15 RX ADMIN — DEXAMETHASONE SODIUM PHOSPHATE 4 MG: 4 INJECTION, SOLUTION INTRA-ARTICULAR; INTRALESIONAL; INTRAMUSCULAR; INTRAVENOUS; SOFT TISSUE at 05:15

## 2024-04-15 RX ADMIN — CEFTRIAXONE SODIUM 1000 MG: 10 INJECTION, POWDER, FOR SOLUTION INTRAVENOUS at 20:45

## 2024-04-15 RX ADMIN — HYDROMORPHONE HYDROCHLORIDE 0.25 MG: 1 INJECTION, SOLUTION INTRAMUSCULAR; INTRAVENOUS; SUBCUTANEOUS at 12:31

## 2024-04-15 RX ADMIN — SODIUM CHLORIDE, POTASSIUM CHLORIDE, SODIUM LACTATE AND CALCIUM CHLORIDE: 600; 310; 30; 20 INJECTION, SOLUTION INTRAVENOUS at 18:27

## 2024-04-15 ASSESSMENT — FIBROSIS 4 INDEX: FIB4 SCORE: 1.6

## 2024-04-15 ASSESSMENT — PAIN DESCRIPTION - PAIN TYPE
TYPE: ACUTE PAIN
TYPE: ACUTE PAIN

## 2024-04-15 NOTE — THERAPY
Speech Language Therapy Contact Note    Patient Name: Jairo Rivas  Age:  73 y.o., Sex:  female  Medical Record #: [id number]  Today's Date: 4/15/2024    Discussed missed therapy with RN       04/15/24 0808   Treatment Variance   Reason For Missed Therapy Medical - Patient on Hold from Therapy;Medical - Patient Unarousable   Interdisciplinary Plan of Care Collaboration   IDT Collaboration with  Nursing   Collaboration Comments Attempted to see pt for dysphagia management. Per RN, pt is too lethargic to participate in session. Service will hold and re-attempt as able/appropriate.

## 2024-04-15 NOTE — CASE COMMUNICATION
"Quality Review for SOC OASIS by RACHEL Mirza RN on  April 15, 2024     Edits completed by RACHEL Mirza RN:  1.  and  diagnosis coding updated per chart review.  2. Changed  to 4/8/24 per the LSOC order  3. Changed  to early episode  4.  checked multiple hospitalizations and multiple ED visits per chart review  5. Changed  to 4 per respiratory assessment stating \"orthopnea\"  6. Changed  to 3 per r esponse to [id number] E, patient is dependent in transferring  "

## 2024-04-15 NOTE — PROGRESS NOTES
Rhythm: ST  Heart Rate: 107-113  Ectopy: PVC    CA: .15  QRS: .09  QT: .29                Per telemetry room monitor

## 2024-04-15 NOTE — DISCHARGE PLANNING
Care Transition Team Assessment    Pt recently discharged home with [facility] and family support on 3/29/24. Chart review completed to obtain the information used in this assessment. Pt lives with her dtr in a single story house that has a ramp to enter. Pt's dtr is a retired EMT and now works from home where she assists pt as needed with all ADLs and IADLs. Pt has a FWW, 4WW, wheelchair, bedside commode, and hospital bed at home. Pt receives SSI, however unsure of monthly amount. No SA or MH concerns at this time. No ACP documents on file.    Information Source  Orientation Level: Unable to assess  Information Given By: Other (Comments) (chart review)  Informant's Name: Daughter  Who is responsible for making decisions for patient? : Patient    Readmission Evaluation  Is this a readmission?: Yes - unplanned readmission    Elopement Risk  Legal Hold: No  Ambulatory or Self Mobile in Wheelchair: No-Not an Elopement Risk    Interdisciplinary Discharge Planning  Lives with - Patient's Self Care Capacity: Adult Children, Related Adult  Patient or legal guardian wants to designate a caregiver: No  Support Systems: Children, Family Member(s), Friends / Neighbors  Housing / Facility: [address] [facility]  Durable Medical Equipment: Walker, Commode, Other - Specify (wheelchair, hospital bed)    Discharge Preparedness  What is your plan after discharge?: Uncertain - pending medical team collaboration  What are your discharge supports?: Child  Prior Functional Level: Needs Assist with Activities of Daily Living, Needs Assist with Medication Management, Uses Wheelchair  Difficulity with ADLs: Bathing, Dressing, Toileting, Walking  Difficulity with IADLs: Driving, Shopping    Functional Assesment  Prior Functional Level: Needs Assist with Activities of Daily Living, Needs Assist with Medication Management, Uses Wheelchair    Finances  Financial Barriers to Discharge: No  Prescription Coverage: Yes    Vision / Hearing  Impairment  Vision Impairment : No  Hearing Impairment : No    Advance Directive  Advance Directive?: Clinically incapacitated / Inappropriate    Domestic Abuse  Have you ever been the victim of abuse or violence?: No  Physical Abuse or Sexual Abuse: No  Verbal Abuse or Emotional Abuse: No  Possible Abuse/Neglect Reported to:: Not Applicable    Psychological Assessment  History of Substance Abuse: None  History of Psychiatric Problems: No  Non-compliant with Treatment: No  Newly Diagnosed Illness: No    Discharge Risks or Barriers  Discharge risks or barriers?: No  Patient risk factors: Readmission    Anticipated Discharge Information  Discharge Disposition: D/T to SNF with Medicare cert in anticipation of skilled care (03)

## 2024-04-15 NOTE — CARE PLAN
The patient is Unstable - High likelihood or risk of patient condition declining or worsening    Shift Goals  Clinical Goals: safety and comfort, fluids and ABX, consults  Patient Goals: jayla  Family Goals: jayla    Progress made toward(s) clinical / shift goals:        Problem: Pain - Standard  Goal: Alleviation of pain or a reduction in pain to the patient’s comfort goal  Note: Give pain management medications as ordered, assess pain often, pt non verbal at this time     Problem: Knowledge Deficit - Standard  Goal: Patient and family/care givers will demonstrate understanding of plan of care, disease process/condition, diagnostic tests and medications  Note: Family educated but pt is non verbal and not responsive to verbal teaching     Problem: Skin Integrity  Goal: Skin integrity is maintained or improved  Note: Assess and monitor skin integrity often, wedges beneath ot, mepilexes in place, taps in place, hourly rounding       Patient is not progressing towards the following goals:

## 2024-04-15 NOTE — THERAPY
Speech Language Pathology   Daily Treatment     Patient Name: Jairo Rivas  AGE:  73 y.o., SEX:  female  Medical Record #: [id number]  Date of Service: 4/15/2024      Precautions:  Precautions: Fall Risk, Swallow Precautions         Subjective  Patient cleared by RN for session. Pt received asleep, intermittently roused to verbal/tactile cues. Pt needing max multimodal cues to rouse and maintain wakefulness.       Assessment  Patient seen this date for dysphagia management with focus on assessing readiness for oral diet vs diagnostic swallow study. PO trials of ice chips and water by tsp/straw assessed. Intermittent adequate oral bolus acceptance with trials of ice chips. Anterior bolus loss (>50%) with trials thins by tsp. Pt unable to achieve labial seal around straw. PO trials discontinued d/t concern for pt's safety.       Clinical Impressions  Patient presents with poor mentation which currently contraindicates initiation of oral diet. No clinical indicators of pharyngeal dysphagia this date; however, patient is at risk for such r/t decline in status/mentation, UTI, brain mets and h/o stroke. Service will follow to determine readiness for oral diet vs diagnostic swallow study.      Recommendations  Treatment Completed: Dysphagia Treatment       Dysphagia Treatment  Diet Consistency: NPO, pre-feeding trials with SLP only   -Clear for minimal ice chips/hour 1:1 w/RN, when alert, after oral care to reduce xerostomia and mitigate disuse atrophy of swallow musculature  Instrumentation: Instrumental swallow study pending clinical progress  Medication:  (Defer to MD)  Oral Care: Q2h                     SLP Treatment Plan  Treatment Plan: Dysphagia Treatment, Patient/Family/Caregiver Training  SLP Frequency: 4x Per Week  Estimated Duration: Until Therapy Goals Met      Anticipated Discharge Needs  Discharge Recommendations: Recommend post-acute placement for additional speech therapy services prior to  "discharge home  Therapy Recommendations Upon DC: Dysphagia Training, Patient / Family / Caregiver Education      Patient / Family Goals  Patient / Family Goal #1: \"She eats at home\" per dtr  Goal #1 Outcome: Progressing slower than expected  Short Term Goals  Short Term Goal # 1: Pt will participate in prfdg to determine readiness for PO diet vs. diagnostic evaluation.  Goal Outcome # 1: Progressing slower than expected      BUSHRA Yeung  "

## 2024-04-15 NOTE — DISCHARGE PLANNING
HTH/SCP TCN chart review completed. Collaborated with RENÉ Baker prior to meeting with the pt. The most current review of medical record, knowledge of pt's PLOF and social support, LACE+ score of 75 was considered.  No 6 clicks scores.  Patient is a Re-admit.  Per chart review, patient has Hx of metastatic endometrial CA with METS brain, receiving radiation therapy on Decadron.  Dx CVA on 4/10/24.  Patient now admitted with altered mentation, Dx with sepsis secondary to UTI.       Pt seen at bedside.  TCN called weston Garcia to Introduced TCN program. Provided education regarding post acute levels of care,educate and provide regarding case management policy for blanket SNF referrals and to discuss HTH/SCP plan benefits. No return call as of yet.  TCN to educate daughter on TCN program following return call.      Per chart review, patient lives at home with family and weston Leon is primary caregiver.  Awaiting return call from daughter.     TCN will continue to follow and collaborate with discharge planning team as additional post acute needs arise. Thank you.     Completed today:  ST recommendations pending.   Choice obtained: None.  See above.     Addendum:  1235- Per chart review, Palliative Consult pending.

## 2024-04-15 NOTE — PROGRESS NOTES
4 Eyes Skin Assessment Completed by JOB Winters and JOB Reyes.    Head WDL  Ears WDL  Nose WDL  Mouth WDL  Neck WDL  Breast/Chest Redness, Rash, and Discoloration, drainage  Shoulder Blades WDL  Spine WDL  (R) Arm/Elbow/Hand WDL  (L) Arm/Elbow/Hand WDL  Abdomen WDL  Groin WDL  Scrotum/Coccyx/Buttocks open skin breakdown. Red,   (R) Leg WDL  (L) Leg WDL  (R) Heel/Foot/Toe WDL  (L) Heel/Foot/Toe WDL          Devices In Places Tele Box and Pulse Ox      Interventions In Place Q2 Turns and Pressure Redistribution Mattress,       TAPS, EDEL, Gerardo boots ordered on EPIC.     Possible Skin Injury No    Pictures Uploaded Into Epic Yes  Wound Consult Placed N/A  RN Wound Prevention Protocol Ordered No

## 2024-04-15 NOTE — Clinical Note
"I agree with these changes  ----- Message -----  From: Dunia Mirza R.N.  Sent: 4/15/2024   2:22 PM PDT  To: Sima Saenz R.N.      Quality Review for SOC OASIS by RACHEL Mirza RN on  April 15, 2024     Edits completed by RACHEL Mirza RN:  1.  and  diagnosis coding updated per chart review.  2. Changed  to 4/8/24 per the LSOC order  3. Changed  to early episode  4.  checked multiple hospitalizations and multiple ED visits per chart review  5. Changed  to 4 per respiratory assessment stating \"orthopnea\"  6. Changed  to 3 per response to [id number] E, patient is dependent in transferring  "

## 2024-04-16 ENCOUNTER — HOME CARE VISIT (OUTPATIENT)
Dept: HOME HEALTH SERVICES | Facility: HOME HEALTHCARE | Age: 74
End: 2024-04-16
Payer: MEDICARE

## 2024-04-16 ENCOUNTER — APPOINTMENT (OUTPATIENT)
Dept: MEDICAL GROUP | Facility: LAB | Age: 74
End: 2024-04-16
Payer: MEDICARE

## 2024-04-16 PROBLEM — G93.6 VASOGENIC BRAIN EDEMA (HCC): Status: ACTIVE | Noted: 2024-04-16

## 2024-04-16 PROBLEM — E87.0 HYPERNATREMIA: Status: ACTIVE | Noted: 2024-04-16

## 2024-04-16 PROBLEM — R41.82 AMS (ALTERED MENTAL STATUS): Status: RESOLVED | Noted: 2024-04-14 | Resolved: 2024-04-16

## 2024-04-16 LAB
ALBUMIN SERPL BCP-MCNC: 3.1 G/DL (ref 3.2–4.9)
ALBUMIN/GLOB SERPL: 0.9 G/DL
ALP SERPL-CCNC: 77 U/L (ref 30–99)
ALT SERPL-CCNC: 17 U/L (ref 2–50)
ANION GAP SERPL CALC-SCNC: 10 MMOL/L (ref 7–16)
AST SERPL-CCNC: 13 U/L (ref 12–45)
BASOPHILS # BLD AUTO: 0.2 % (ref 0–1.8)
BASOPHILS # BLD: 0.03 K/UL (ref 0–0.12)
BILIRUB SERPL-MCNC: 0.3 MG/DL (ref 0.1–1.5)
BUN SERPL-MCNC: 35 MG/DL (ref 8–22)
CALCIUM ALBUM COR SERPL-MCNC: 10 MG/DL (ref 8.5–10.5)
CALCIUM SERPL-MCNC: 9.3 MG/DL (ref 8.5–10.5)
CHLORIDE SERPL-SCNC: 117 MMOL/L (ref 96–112)
CO2 SERPL-SCNC: 21 MMOL/L (ref 20–33)
CREAT SERPL-MCNC: 0.88 MG/DL (ref 0.5–1.4)
EOSINOPHIL # BLD AUTO: 0 K/UL (ref 0–0.51)
EOSINOPHIL NFR BLD: 0 % (ref 0–6.9)
ERYTHROCYTE [DISTWIDTH] IN BLOOD BY AUTOMATED COUNT: 50.5 FL (ref 35.9–50)
EST. AVERAGE GLUCOSE BLD GHB EST-MCNC: 143 MG/DL
GFR SERPLBLD CREATININE-BSD FMLA CKD-EPI: 70 ML/MIN/1.73 M 2
GLOBULIN SER CALC-MCNC: 3.4 G/DL (ref 1.9–3.5)
GLUCOSE BLD STRIP.AUTO-MCNC: 163 MG/DL (ref 65–99)
GLUCOSE BLD STRIP.AUTO-MCNC: 175 MG/DL (ref 65–99)
GLUCOSE BLD STRIP.AUTO-MCNC: 189 MG/DL (ref 65–99)
GLUCOSE SERPL-MCNC: 216 MG/DL (ref 65–99)
HBA1C MFR BLD: 6.6 % (ref 4–5.6)
HCT VFR BLD AUTO: 40.7 % (ref 37–47)
HGB BLD-MCNC: 13.3 G/DL (ref 12–16)
IMM GRANULOCYTES # BLD AUTO: 0.1 K/UL (ref 0–0.11)
IMM GRANULOCYTES NFR BLD AUTO: 0.7 % (ref 0–0.9)
LYMPHOCYTES # BLD AUTO: 0.17 K/UL (ref 1–4.8)
LYMPHOCYTES NFR BLD: 1.2 % (ref 22–41)
MAGNESIUM SERPL-MCNC: 3.2 MG/DL (ref 1.5–2.5)
MCH RBC QN AUTO: 31.6 PG (ref 27–33)
MCHC RBC AUTO-ENTMCNC: 32.7 G/DL (ref 32.2–35.5)
MCV RBC AUTO: 96.7 FL (ref 81.4–97.8)
MONOCYTES # BLD AUTO: 0.36 K/UL (ref 0–0.85)
MONOCYTES NFR BLD AUTO: 2.6 % (ref 0–13.4)
NEUTROPHILS # BLD AUTO: 13.22 K/UL (ref 1.82–7.42)
NEUTROPHILS NFR BLD: 95.3 % (ref 44–72)
NRBC # BLD AUTO: 0 K/UL
NRBC BLD-RTO: 0 /100 WBC (ref 0–0.2)
PHOSPHATE SERPL-MCNC: 2 MG/DL (ref 2.5–4.5)
PLATELET # BLD AUTO: 168 K/UL (ref 164–446)
PMV BLD AUTO: 10.2 FL (ref 9–12.9)
POTASSIUM SERPL-SCNC: 3.8 MMOL/L (ref 3.6–5.5)
PROT SERPL-MCNC: 6.5 G/DL (ref 6–8.2)
RBC # BLD AUTO: 4.21 M/UL (ref 4.2–5.4)
SODIUM SERPL-SCNC: 144 MMOL/L (ref 135–145)
SODIUM SERPL-SCNC: 147 MMOL/L (ref 135–145)
SODIUM SERPL-SCNC: 148 MMOL/L (ref 135–145)
WBC # BLD AUTO: 13.9 K/UL (ref 4.8–10.8)

## 2024-04-16 PROCEDURE — 700101 HCHG RX REV CODE 250: Performed by: STUDENT IN AN ORGANIZED HEALTH CARE EDUCATION/TRAINING PROGRAM

## 2024-04-16 PROCEDURE — 700105 HCHG RX REV CODE 258

## 2024-04-16 PROCEDURE — 36415 COLL VENOUS BLD VENIPUNCTURE: CPT

## 2024-04-16 PROCEDURE — 82962 GLUCOSE BLOOD TEST: CPT | Mod: 91

## 2024-04-16 PROCEDURE — 700111 HCHG RX REV CODE 636 W/ 250 OVERRIDE (IP): Mod: JZ

## 2024-04-16 PROCEDURE — 83735 ASSAY OF MAGNESIUM: CPT

## 2024-04-16 PROCEDURE — 665998 HH PPS REVENUE CREDIT

## 2024-04-16 PROCEDURE — 700105 HCHG RX REV CODE 258: Performed by: INTERNAL MEDICINE

## 2024-04-16 PROCEDURE — 84295 ASSAY OF SERUM SODIUM: CPT

## 2024-04-16 PROCEDURE — 665999 HH PPS REVENUE DEBIT

## 2024-04-16 PROCEDURE — 83036 HEMOGLOBIN GLYCOSYLATED A1C: CPT

## 2024-04-16 PROCEDURE — 700111 HCHG RX REV CODE 636 W/ 250 OVERRIDE (IP): Performed by: STUDENT IN AN ORGANIZED HEALTH CARE EDUCATION/TRAINING PROGRAM

## 2024-04-16 PROCEDURE — 80053 COMPREHEN METABOLIC PANEL: CPT

## 2024-04-16 PROCEDURE — 99222 1ST HOSP IP/OBS MODERATE 55: CPT | Mod: 25 | Performed by: STUDENT IN AN ORGANIZED HEALTH CARE EDUCATION/TRAINING PROGRAM

## 2024-04-16 PROCEDURE — 99233 SBSQ HOSP IP/OBS HIGH 50: CPT | Mod: GC | Performed by: INTERNAL MEDICINE

## 2024-04-16 PROCEDURE — 87205 SMEAR GRAM STAIN: CPT

## 2024-04-16 PROCEDURE — 87186 SC STD MICRODIL/AGAR DIL: CPT

## 2024-04-16 PROCEDURE — 99497 ADVNCD CARE PLAN 30 MIN: CPT | Performed by: STUDENT IN AN ORGANIZED HEALTH CARE EDUCATION/TRAINING PROGRAM

## 2024-04-16 PROCEDURE — 700111 HCHG RX REV CODE 636 W/ 250 OVERRIDE (IP): Mod: JZ | Performed by: HOSPITALIST

## 2024-04-16 PROCEDURE — 770020 HCHG ROOM/CARE - TELE (206)

## 2024-04-16 PROCEDURE — C9254 INJECTION, LACOSAMIDE: HCPCS | Performed by: STUDENT IN AN ORGANIZED HEALTH CARE EDUCATION/TRAINING PROGRAM

## 2024-04-16 PROCEDURE — 85025 COMPLETE CBC W/AUTO DIFF WBC: CPT

## 2024-04-16 PROCEDURE — 84100 ASSAY OF PHOSPHORUS: CPT

## 2024-04-16 PROCEDURE — 87077 CULTURE AEROBIC IDENTIFY: CPT | Mod: 91

## 2024-04-16 PROCEDURE — 87070 CULTURE OTHR SPECIMN AEROBIC: CPT

## 2024-04-16 PROCEDURE — 97602 WOUND(S) CARE NON-SELECTIVE: CPT

## 2024-04-16 RX ORDER — POTASSIUM CHLORIDE 20 MEQ/1
20 TABLET, EXTENDED RELEASE ORAL ONCE
Status: DISCONTINUED | OUTPATIENT
Start: 2024-04-16 | End: 2024-04-16

## 2024-04-16 RX ORDER — POTASSIUM CHLORIDE 7.45 MG/ML
10 INJECTION INTRAVENOUS
Status: COMPLETED | OUTPATIENT
Start: 2024-04-16 | End: 2024-04-16

## 2024-04-16 RX ORDER — DEXAMETHASONE 4 MG/1
4 TABLET ORAL EVERY 12 HOURS
Status: DISCONTINUED | OUTPATIENT
Start: 2024-04-16 | End: 2024-04-18

## 2024-04-16 RX ORDER — DEXAMETHASONE SODIUM PHOSPHATE 4 MG/ML
4 INJECTION, SOLUTION INTRA-ARTICULAR; INTRALESIONAL; INTRAMUSCULAR; INTRAVENOUS; SOFT TISSUE EVERY 6 HOURS
Status: DISCONTINUED | OUTPATIENT
Start: 2024-04-16 | End: 2024-04-16

## 2024-04-16 RX ADMIN — ACYCLOVIR SODIUM 675 MG: 50 INJECTION, SOLUTION INTRAVENOUS at 23:14

## 2024-04-16 RX ADMIN — DEXAMETHASONE SODIUM PHOSPHATE 4 MG: 4 INJECTION, SOLUTION INTRA-ARTICULAR; INTRALESIONAL; INTRAMUSCULAR; INTRAVENOUS; SOFT TISSUE at 12:18

## 2024-04-16 RX ADMIN — METRONIDAZOLE 500 MG: 5 INJECTION, SOLUTION INTRAVENOUS at 05:34

## 2024-04-16 RX ADMIN — DEXAMETHASONE SODIUM PHOSPHATE 4 MG: 4 INJECTION, SOLUTION INTRA-ARTICULAR; INTRALESIONAL; INTRAMUSCULAR; INTRAVENOUS; SOFT TISSUE at 00:16

## 2024-04-16 RX ADMIN — LACOSAMIDE 150 MG: 10 INJECTION INTRAVENOUS at 17:36

## 2024-04-16 RX ADMIN — DEXTROSE MONOHYDRATE: 50 INJECTION, SOLUTION INTRAVENOUS at 06:36

## 2024-04-16 RX ADMIN — POTASSIUM CHLORIDE 10 MEQ: 149 INJECTION, SOLUTION, CONCENTRATE INTRAVENOUS at 15:25

## 2024-04-16 RX ADMIN — POTASSIUM CHLORIDE 10 MEQ: 149 INJECTION, SOLUTION, CONCENTRATE INTRAVENOUS at 16:24

## 2024-04-16 RX ADMIN — CEFTRIAXONE SODIUM 1000 MG: 10 INJECTION, POWDER, FOR SOLUTION INTRAVENOUS at 20:38

## 2024-04-16 RX ADMIN — ACYCLOVIR SODIUM 675 MG: 50 INJECTION, SOLUTION INTRAVENOUS at 15:30

## 2024-04-16 RX ADMIN — DEXTROSE AND SODIUM CHLORIDE: 5; 450 INJECTION, SOLUTION INTRAVENOUS at 14:39

## 2024-04-16 RX ADMIN — LACOSAMIDE 150 MG: 10 INJECTION INTRAVENOUS at 05:12

## 2024-04-16 RX ADMIN — DEXAMETHASONE SODIUM PHOSPHATE 4 MG: 4 INJECTION, SOLUTION INTRA-ARTICULAR; INTRALESIONAL; INTRAMUSCULAR; INTRAVENOUS; SOFT TISSUE at 05:12

## 2024-04-16 RX ADMIN — HYDROMORPHONE HYDROCHLORIDE 0.25 MG: 1 INJECTION, SOLUTION INTRAMUSCULAR; INTRAVENOUS; SUBCUTANEOUS at 01:26

## 2024-04-16 ASSESSMENT — PAIN DESCRIPTION - PAIN TYPE
TYPE: ACUTE PAIN;CHRONIC PAIN
TYPE: ACUTE PAIN;CHRONIC PAIN
TYPE: ACUTE PAIN

## 2024-04-16 ASSESSMENT — FIBROSIS 4 INDEX: FIB4 SCORE: 1.33

## 2024-04-16 NOTE — PROGRESS NOTES
[facility] Internal Medicine Daily Progress Note    Date of Service  4/16/2024    [facility] Team: R LATESHA Angel Team   Attending: Sima Bernal M.d.  Senior Resident: Dr. Rojas  Intern:  Dr. Sarmiento  Contact Number: [phone]    Chief Complaint  Jairo Rivas is a 73 y.o. female admitted 4/13/2024 with painful urination and constipation.     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 It is noted that patient has been having abdominal pain and constipation, however, she has had 2 bowel movements since being admitted.  Her UA in the ED was consistent with UTI.  She had leukocytosis with WBC 24.  Lactic acid 3.  Blood cultures negative thus far.  Started on Rocephin for 3 days to treat UTI.     CT scan of the head was obtained, noted to have pneumatization of the right frontal lobe mass highly concerning for metastasis; associated with genic edema and 2.5 mm right to left midline shift.  Patient continued to remain on Decadron. Also GYN oncology was consulted, discussed the prognosis of the patient, stated that patient is not a candidate for any treatment.  Patient daughter wanted her to be full code at this time.  She remains n.p.o. due to concerns for dysphagia and has not had anything to eat for several days.  Her daughter insist that her mother is alert for SLP evaluation.      Interval Problem Update  Patient started on D5W to treat hypernatremia.  Switch to half-normal saline D5.  Serum sodium normalized but continue to trend every 8 hours.  Hypokalemia repleted via IV.  Patient's daughter requested that SLP return later as patient was  sleeping this  morning.  Spoke with patient's daughter again about likely needing at least NG tube soon as possible to start nutrition and p.o. meds.  She is in agreement as long as the SLP evaluation is performed when patient is more alert.  I warned her that this may not be possible given the fact that her mother has been altered for the last few days and without any food.  Additionally, she is being treated with pain medications that causes somnolence.  She has remained afebrile, heart rate 98-1 16, blood pressure within normal, saturating well on room air.  White blood cells trended down to 13.9 and remainder of CBC unremarkable. Restarted patient's Decadron.    I have discussed this patient's plan of care and discharge plan at IDT rounds today with Case Management, Nursing, Nursing leadership, and other members of the IDT team.    Consultants/Specialty  neurology and surgical oncology     Code Status  Full Code    Disposition  The patient is not medically cleared for discharge to home or a post-acute facility.      I have placed the appropriate orders for post-discharge needs.    Review of Systems  Review of Systems   Unable to perform ROS: Medical condition        Physical Exam  Temp:  [36.3 °C (97.3 °F)-36.6 °C (97.9 °F)] 36.5 °C (97.7 °F)  Pulse:  [] 111  Resp:  [19-24] 22  BP: (121-154)/(69-89) 147/87  SpO2:  [91 %-94 %] 93 %    Physical Exam  Vitals (exam limited) reviewed.   Constitutional:       General: She is sleeping.      Appearance: She is obese. She is ill-appearing.   HENT:      Head: Normocephalic and atraumatic.      Mouth/Throat:      Mouth: Mucous membranes are dry.   Abdominal:      General: There is distension.      Palpations: There is mass.   Skin:     Comments: Skin breakdown under breasts. Dark red non pustular rash on left just below her breast. Called shingles but does not appear the same as shingles rash.   Neurological:      Mental Status: She is lethargic.     Much of the physical exam was  not completed due to the patient being in such discomfort and being unable to verbally express it.     Fluids    Intake/Output Summary (Last 24 hours) at 4/16/2024 1832  Last data filed at 4/16/2024 1600  Gross per 24 hour   Intake 0 ml   Output 650 ml   Net -650 ml       Laboratory  Recent Labs     04/14/24  0435 04/15/24  1007 04/16/24  0259   WBC 22.6* 17.5* 13.9*   RBC 4.78 4.49 4.21   HEMOGLOBIN 15.2 14.2 13.3   HEMATOCRIT 46.2 43.2 40.7   MCV 96.7 96.2 96.7   MCH 31.8 31.6 31.6   MCHC 32.9 32.9 32.7   RDW 48.1 49.3 50.5*   PLATELETCT 170 174 168   MPV 10.3 10.2 10.2     Recent Labs     04/14/24  0435 04/15/24  1007 04/16/24  0259 04/16/24  1411   SODIUM 142 149* 148* 144   POTASSIUM 4.7 4.1 3.8  --    CHLORIDE 107 117* 117*  --    CO2 22 22 21  --    GLUCOSE 165* 171* 216*  --    BUN 42* 41* 35*  --    CREATININE 0.96 1.03 0.88  --    CALCIUM 9.1 9.2 9.3  --                    Imaging  CT-HEAD WITH & W/O   Final Result      1.  Redemonstration of right frontal lobe mass, highly concerning for metastasis. Associated vasogenic edema and a 2.5 mm right to left midline shift. No progressive mass effect. No herniation.   2.  Nonspecific ill-defined enhancement in the right parietal lobe.   3.  No new large vascular structure infarct. No new intracranial hemorrhage.      DX-CHEST-LIMITED (1 VIEW)   Final Result      No significant interval change.      DX-ABDOMEN COMPLETE WITH AP OR PA CXR   Final Result      1. No acute cardiopulmonary abnormality.   2. Chronic left lower lobe scarring.   3. Nonobstructive bowel gas pattern. Small amount of stool throughout the colon.           Assessment/Plan  Problem Representation:    * Sepsis secondary to UTI (HCC)- (present on admission)  Assessment & Plan  Improving  This is Sepsis Present on admission  SIRS criteria identified on my evaluation include: Tachycardia, with heart rate greater than 90 BPM and Leukocytosis, with WBC greater than 12,000  Clinical indicators of end  organ dysfunction include Lactic Acid greater than 2 and Toxic Metabolic Encephalopathy  Source is UTI  Sepsis protocol initiated  Crystalloid Fluid Administration: Fluid resuscitation ordered per standard protocol - 30 mL/kg per current or ideal body weight  IV antibiotics as appropriate for source of sepsis  Reassessment: I have reassessed the patient's hemodynamic status    -ceftriaxone daily x5 days for UTI  -received sepsis bolus in ED, will continue IVF with caution  - follow blood cx x 2, urine cx pending       Dysphagia  Assessment & Plan  Possibly due to CVA vs brain metastases and altered mental status in setting of infection.  -SLP evaluated and its noted that she failed swallow, patient's daughter requested that SLP return later on 4/16 when patient is more alert  -- NPO now.  IV fluids.  -aspiration precautions  - Will need to seriously consider NG tube tomorrow morning as patient's inability to eat has affected her electrolyte significantly and will likely lead to further deterioration of mental and physical status      Acute metabolic encephalopathy- (present on admission)  Assessment & Plan  Etiology at this time likely multifactorial, as she has multiple possible causes including UTI, constipation, medications (recently started norco, on top of other high risk medications such as baclofen, gabapentin), recent findings of subacute CVA as well as brain mets may all be contributing.    --Daughter describes some episodes that may be concerning for focal seizures.  -will hold majority of sedating medications, she does have significant cancer related pain so we will provide some PRN oxy and dilaudid, hold norco, baclofen, gabapentin for now  -continue lacosamide for seizure ppx  -continue dexamethasone for cerebral edema  -Neuro check  every 4 hrs  -fall, seizure, aspiration precautions  EEG  CT head with/ without contrast    Malignant neoplasm metastatic to brain (HCC)- (present on admission)  Assessment  & Plan  Brain mets noted during hospitalization on 3/27/2024 with vasogenic edema, MRI on 4/11 shows continued brain mets with vasogenic edema.    -continue seizure prophylaxis with lacosamide 150 mg BID  -continue dexamethasone   Continue fall, aspiration, seizure precaution.  Call Dr. Jose Sarah's PA-C to discuss the prognosis, goals of care      Advance care planning- (present on admission)  Assessment & Plan  At this time patient's daughter wants her to be full code.  Would like to involve palliative care as soon as possible to assist patient with pain control.  However, patient's family currently adamantly against this and states this is against patient's wishes.    Endometrial cancer (HCC)- (present on admission)  Assessment & Plan  Followed by Dr. Garcia outpatient and sees radiation oncology Dr Live.  Received immunotherapy at [facility] in 2021 with initial good response.   -- Now return of metastatic disease, with significant cancer related pain in b/l LE and right arm.   -- Continue Decadron   Neurochecks every 4 hours, continue aspiration, fall, seizure precaution.  -Brain mets likely contributing to her altered mental status.  Discussion in regards to prognosis    Hypernatremia  Assessment & Plan  Switch to D5 half-normal saline.  Sodium trended down to normal.  Continuing fluids and checking serum sodium every 8 hours.  Suspect this is secondary to patient not eating or drinking for several days.    Seizure (HCC)  Assessment & Plan  Continue vimpat   -- seizure/aspiration/ fall precaution    -- EEG  Neuro consulted    Shingles  Assessment & Plan  Noted under left breast and onto lateral chest wall.  On reevaluation 4/16/2024, lesions are flat and do not appear vesicular.  - Swab and testing for shingles pending  -contact precautions  -Switched to acyclovir IV in setting of dysphagia and n.p.o.    History of CVA (cerebrovascular accident)- (present on admission)  Assessment & Plan  Residual right sided  neurodeficits form CVA in 2010.  Has RUE spasticity, RLE pain.  New subacute CVA found on 4/11 [facility] at [facility].  -hold chemical DVT ppx due to proximity of CVA  -consider starting asa when appropriate  -continue atorvastatin    Constipation  Assessment & Plan  Resolved  - Bowel regimen  - Last bowel movement 4/15    Acute kidney injury (HCC)- (present on admission)  Assessment & Plan  RESOLVED  Likely pre-renal in the setting of sepsis.  -continue fluid resuscitation as above  -renally dose all medications as appropriate  -avoid nephrotoxins    Obesity (BMI 30-39.9)- (present on admission)  Assessment & Plan  Can discuss when mentation improves.    Essential hypertension- (present on admission)  Assessment & Plan  Patient noted to be hypertensive, unable to take amlodipine, losartan, metoprolol by p.o. route  IV as needed antihypertensive medication         VTE prophylaxis: SCDs/TEDs    I have performed a physical exam and reviewed and updated ROS and Plan today (4/16/2024). In review of yesterday's note (4/15/2024), there are no changes except as documented above.

## 2024-04-16 NOTE — CARE PLAN
The patient is Watcher - Medium risk of patient condition declining or worsening    Shift Goals  Clinical Goals: safety, monitor labs, neuro checks  Patient Goals: jayla  Family Goals: jayla    Progress made toward(s) clinical / shift goals:        Problem: Pain - Standard  Goal: Alleviation of pain or a reduction in pain to the patient’s comfort goal  Description: Target End Date:  Prior to discharge or change in level of care    Document on Vitals flowsheet    1.  Document pain using the appropriate pain scale per order or unit policy  2.  Educate and implement non-pharmacologic comfort measures (i.e. relaxation, distraction, massage, cold/heat therapy, etc.)  3.  Pain management medications as ordered  4.  Reassess pain after pain med administration per policy  5.  If opiods administered assess patient's response to pain medication is appropriate per POSS sedation scale  6.  Follow pain management plan developed in collaboration with patient and interdisciplinary team (including palliative care or pain specialists if applicable)  Outcome: Progressing  Note: Non-verbal scale used because patient cannot verbalize pain. Patient exhibited restless and moaning, dilaudid was given. Shortly after med was given , patient was resting comfortably,  breathing unlabored, eyes closed.     Patient is not progressing towards the following goals:      Problem: Knowledge Deficit - Standard  Goal: Patient and family/care givers will demonstrate understanding of plan of care, disease process/condition, diagnostic tests and medications  Description: Target End Date:  1-3 days or as soon as patient condition allows    Document in Patient Education    1.  Patient and family/caregiver oriented to unit, equipment, visitation policy and means for communicating concern  2.  Complete/review Learning Assessment  3.  Assess knowledge level of disease process/condition, treatment plan, diagnostic tests and medications  4.  Explain disease  process/condition, treatment plan, diagnostic tests and medications  Outcome: Not Met  Note: Patient is unable to communicate to follow commands. Family was educated on plan of care and medications.

## 2024-04-16 NOTE — THERAPY
Speech Language Therapy Contact Note    Patient Name: Jairo Rivas  Age:  73 y.o., Sex:  female  Medical Record #: [id number]  Today's Date: 4/16/2024    Discussed missed therapy with RN & MD       04/16/24 1349   Treatment Variance   Reason For Missed Therapy Non-Medical - Other (Please Comment)   Total Treatment Time   SLP Time Spent Yes   SLP Missed Visit (Mins) 7   Interdisciplinary Plan of Care Collaboration   IDT Collaboration with  Nursing;Physician;Family / Caregiver   Patient Position at End of Therapy In Bed;Bed Alarm On;Call Light within Reach;Family / Friend in Room   Collaboration Comments Attempted to see pt for dysphagia management this date. Pt's daughter at bedside, refused clinician's attempts to rouse pt. Discussed concerns re: third day of no nutrition and inquired if alternate source of nutrition/hydration/meds was in alignment with GOC. Pt's daughter did not want to discuss at this time, requesting clinician return when pt wakes independently. Pt would benefit from Palliative care consult. Service will hold and re-attempt as able/medically appropriate. RN & MD aware.

## 2024-04-16 NOTE — CASE COMMUNICATION
Quality Review for Transfer [facility]ASIS by RACHEL Mirza RN on  April 16, 2024    Edits completed by RACHEL Mirza RN:  1. Changed  C to NA and E to yes per the POC

## 2024-04-16 NOTE — WOUND TEAM
[facility]  Inpatient Services  Initial Wound and Skin Care Evaluation    Admission Date: 4/13/2024     Last order of IP CONSULT TO WOUND CARE was found on 4/14/2024 from Hospital Encounter on 4/13/2024     HPI, PMH, SH: Reviewed    Past Surgical History:   Procedure Laterality Date    MN THORACOSCOPY,DX NO BX Left 8/6/2021    Procedure: THORACOSCOPY - HEMOTHORAX EVACUATION, CHEST TUBE PLACEMENT X 2;  Surgeon: John H Ganser, M.D.;  Location: SURGERY [facility];  Service: General    MN CYSTOSCOPY,INSERT URETERAL STENT Left 8/8/2019    Procedure: CYSTOSCOPY, WITH URETERAL STENT INSERTION;  Surgeon: Jann Garcia M.D.;  Location: SURGERY [facility];  Service: Gynecology    HYSTERECTOMY ROBOTIC XI N/A 8/8/2019    Procedure: HYSTERECTOMY, ROBOT-ASSISTED, USING DA RILEY XI;  Surgeon: Jann Garcia M.D.;  Location: SURGERY [facility];  Service: Gynecology    SALPINGO OOPHORECTOMY Bilateral 8/8/2019    Procedure: SALPINGO-OOPHORECTOMY;  Surgeon: Jann Garcia M.D.;  Location: SURGERY [facility];  Service: Gynecology    NODE BIOPSY SENTINEL  8/8/2019    Procedure: BIOPSY, LYMPH NODE, SENTINEL;  Surgeon: Jann Garcia M.D.;  Location: SURGERY [facility];  Service: Gynecology    MN HYSTEROSCOPY,DX,SEP PROC  6/19/2019    Procedure: HYSTEROSCOPY, DIAGNOSTIC;  Surgeon: Anel Chicas M.D.;  Location: SURGERY [facility];  Service: Gynecology    DILATION AND CURETTAGE  6/19/2019    Procedure: DILATION AND CURETTAGE;  Surgeon: Anel Chicas M.D.;  Location: SURGERY [facility];  Service: Gynecology    HYSTERECTOMY LAPAROSCOPY       Social History     Tobacco Use    Smoking status: Never    Smokeless tobacco: Never    Tobacco comments:     n/a   Substance Use Topics    Alcohol use: No     Chief Complaint   Patient presents with    Painful Urination    Constipation     Diagnosis: Sepsis (HCC) [A41.9]  AMS (altered mental status) [R41.82]    Unit where seen by Wound Team: S155/00      WOUND CONSULT RELATED TO:  left breast area and sacrum    WOUND TEAM PLAN OF CARE - Frequency of Follow-up:   Nursing to follow dressing orders written for wound care. Contact wound team if area fails to progress, deteriorates or with any questions/concerns if something comes up before next scheduled follow up (See below as to whether wound is following and frequency of wound follow up)   Not following, consult as needed  - sacrum breast    WOUND HISTORY:   Possible shingles and moisture        WOUND ASSESSMENT/LDA  Wound 04/08/24 Other (comment) Breast Left Under Left Breast Blisters (Active)   Date First Assessed: 04/08/24   Present on Original Admission: Yes  Hand Hygiene Completed: Yes  Primary Wound Type: (c) Other (comment)  Location: Breast  Laterality: Left  Wound Description (Comments): Under Left Breast Blisters      Assessments 4/16/2024  4:00 PM   Wound Image     Site Assessment Red;Pink   Periwound Assessment Denuded   Margins Defined edges   Closure Open to air   Drainage Amount Scant   Drainage Description Serous;Serosanguineous   Treatments Cleansed;Irrigation;Nonselective debridement;Site care   Wound Cleansing Soap and Water   Dressing Status Open to Air   Dressing Changed Reinforced   Dressing Cleansing/Solutions Not Applicable   Dressing Options Interdry   Dressing Change/Treatment Frequency As Needed   Wound Team Following Not following   Non-staged Wound Description Partial thickness   Shape scatterered partial thickness   Wound Odor None       Moisture Associated Skin Damage 04/16/24 Buttock;Sacrum (Active)   First Observed Date/First Observed Time: 04/16/24 1628   Laterality: Bilateral  Wound Location : Buttock;Sacrum      Assessments 4/16/2024  4:00 PM   Wound Image     NEXT Weekly Photo (Inpatient Only) 04/24/24   Drainage Amount Scant   Drainage Description Serosanguineous   Periwound Assessment Denuded   IAD Cleansing Foam Cleanser/Washcloth   Periwound Protectant Barrier Paste   IAD  Containment Device Purewick;Interdry Cloth   WOUND NURSE ONLY - Time Spent with Patient (mins) 30        Vascular:    TAYLER:   No results found.    Lab Values:    Lab Results   Component Value Date/Time    WBC 13.9 (H) 04/16/2024 02:59 AM    RBC 4.21 04/16/2024 02:59 AM    HEMOGLOBIN 13.3 04/16/2024 02:59 AM    HEMATOCRIT 40.7 04/16/2024 02:59 AM    HBA1C 5.7 (H) 08/24/2023 11:12 AM         Culture Results show:  No results found for this or any previous visit (from the past 720 hour(s)).    Pain Level/Medicated:  None, Tolerated without pain medication       INTERVENTIONS BY WOUND TEAM:  Chart and images reviewed. Discussed with bedside RN. All areas of concern (based on picture review, LDA review and discussion with bedside RN) have been thoroughly assessed. Documentation of areas based on significant findings. This RN in to assess patient. Performed standard wound care which includes appropriate positioning, dressing removal and non-selective debridement. Pictures and measurements obtained weekly if/when required.    Wound:  Sacrum  Cleansed/Non-selectively Debrided with:  No rinse foam soap and Moist warm washcloth  Primary Dressing:  barrier paste  Secondary (Outer) Dressing: offloading measures     Wound:  Left under breast  Primary Dressing:  interdry    Advanced Wound Care Discharge Planning  Number of Clinicians necessary to complete wound care: 1  Is patient requiring IV pain medications for dressing changes:  No   Length of time for dressing change 5 min. (This does not include chart review, pre-medication time, set up, clean up or time spent charting.)    Interdisciplinary consultation: Patient, Bedside RN (Loreto), Luanne MARAVILLA (Wound RN).  Pressure injury and staging reviewed with Luanne MARAVILLA (Wound RN).    EVALUATION / RATIONALE FOR TREATMENT:     Date:  04/16/24  Wound Status:  Initial evaluation    Partial thickness open wounds to the left chest under breast and flank area, interdry applied to help with  wicking moisture.  Sacral moisture related skin breakdown, barrier paste applied. Continue with offloading measures.          Goals: Steady decrease in wound area and depth weekly.    NURSING PLAN OF CARE ORDERS:  Dressing changes: See Dressing Care orders  Skin care: See Skin Care orders  RN Prevention Protocol    NUTRITION RECOMMENDATIONS   Wound Team Recommendations:  N/A    DIET ORDERS (From admission to next 24h)       Start     Ordered    04/13/24 2142  Diet NPO Restrict to: Sips with Medications (pending bedside swallow study)  ALL MEALS        Question:  Diet NPO Restrict to:  Answer:  Sips with Medications  Comment:  pending bedside swallow study    04/13/24 2147                    PREVENTATIVE INTERVENTIONS:    Q shift Galen - performed per nursing policy  Q shift pressure point assessments - performed per nursing policy    Surface/Positioning  Standard/trauma mattress - Currently in Place  Reposition q 2 hours - Currently in Place  TAPs Turning system - Currently in Place    Offloading/Redistribution  Heel float boots (Prevalon boot) - Currently in Place  Float Heels off Bed with Pillows - Currently in Place           Respiratory  Silicone O2 tubing - Currently in Place    Containment/Moisture Prevention    Dri-reyna pad - Currently in Place  Purwick/Condom Cath - Currently in Place  Barrier wipes - Currently in Place  Barrier paste - Currently in Place    Mobilization      Unable to assess     Anticipated discharge plans:  TBD        Vac Discharge Needs:  Vac Discharge plan is purely a recommendation from wound team and not a requirement for discharge unless otherwise stated by physician.  Not Applicable Pt not on a wound vac

## 2024-04-16 NOTE — Clinical Note
I agree with these changes  Michelle Alvarado RN  ----- Message -----  From: Dunia Mirza R.N.  Sent: 4/16/2024   8:06 AM PDT  To: Michelle Alvarado R.N.      Quality Review for Transfer OASIS by RACHEL Mirza RN on  April 16, 2024    Edits completed by RACHEL Mirza RN:  1. Changed  C to NA and E to yes per the POC

## 2024-04-16 NOTE — PROGRESS NOTES
[facility] Internal Medicine Daily Progress Note    Date of Service  4/15/2024    [facility] Team: [facility] Team   Attending: Abdulkadir Mary M.d.  Senior Resident: Dr. Rojas  Intern:  Dr. Sarmiento  Contact Number: [phone]    Chief Complaint  Jairo Rivas is a 73 y.o. female admitted 4/13/2024 with painful urination and constipation.     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  It is noted that patient has been having abdominal pain and constipation     Patient was discharged on medicine taper along with lacosamide and dexa taper. Her mentation continued to get worse and on 4/10; came to ER with dysarthria, dysphagia, worse weakness of the right side.  MRI at [facility] found to have subacute CVA with a right parieto-occipital junction involvement and metastatic foci with surrounding edema in the right frontal, left frontal parietal and left parietal region.     It was the patient noted that she continued to complain of abdominal pain, noted to have lethargic .upon presentation in ER,, she noted to be tachycardic, hypertensive, UA consistent with UTI, WBC elevated at 24.2, lactic acid 3.  Blood cultures x 2 has been ordered, patient was started on Rocephin for urinary tract infection.     CT scan of the head was obtained, noted to have pneumatization of the right frontal lobe mass highly concerning for metastasis; associated with genic edema and 2.5 mm right to left midline shift.  Patient continued to remain on Decadron.     Also GYN oncology was consulted, discussed the prognosis of the patient, stated that patient is not a candidate for any treatment.   "Patient daughter wanted her to be full code at this time noted to have      Interval Problem Update  Patient's daughter and son-in-law at bedside; requesting to speak with Dr. Live regarding immunotherapy and possible clinical trials in the area. Patient's daughter requests food for the patient however patient did not pass bedside swallow, she is ok for ice chips at this time. Patient remains lethargic, drooling and does not engage in conversation. Patient's daughter concerned that patient has not had a bowel movement in a few days; abdominal xray from 4/13/24 reveals small amount of stool throughout the colon. Patient's daughter requests pain control for patient and reports she is unsure where exactly her pain is; patient will say \"head\" on occasion.   Will continue to treat UTI with ceftriaxone and shingles outbreak with valacyclovir.   I discussed the possibility of tube feeds or other methods to implement administration of nutrition with the daughter; she will think about it.     I have discussed this patient's plan of care and discharge plan at IDT rounds today with Case Management, Nursing, Nursing leadership, and other members of the IDT team.    Consultants/Specialty  neurology and surgical oncology     Code Status  Full Code    Disposition  The patient is not medically cleared for discharge to home or a post-acute facility.      I have placed the appropriate orders for post-discharge needs.    Review of Systems  Review of Systems   Unable to perform ROS: Medical condition        Physical Exam  Temp:  [36.2 °C (97.2 °F)-36.6 °C (97.9 °F)] 36.5 °C (97.7 °F)  Pulse:  [108-116] 109  Resp:  [16-20] 20  BP: (124-154)/(70-93) 134/81  SpO2:  [92 %-100 %] 93 %    Physical Exam  Vitals reviewed.   Constitutional:       Appearance: She is obese. She is ill-appearing.   HENT:      Head: Normocephalic and atraumatic.      Mouth/Throat:      Mouth: Mucous membranes are dry.   Neurological:      Mental Status: She is " lethargic.     Much of the physical exam was not completed due to the patient being in such discomfort and being unable to verbally express it.     Fluids    Intake/Output Summary (Last 24 hours) at 4/15/2024 1841  Last data filed at 4/15/2024 1600  Gross per 24 hour   Intake 0 ml   Output 700 ml   Net -700 ml       Laboratory  Recent Labs     04/13/24  1922 04/14/24  0435 04/15/24  1007   WBC 24.2* 22.6* 17.5*   RBC 5.28 4.78 4.49   HEMOGLOBIN 16.4* 15.2 14.2   HEMATOCRIT 51.0* 46.2 43.2   MCV 96.6 96.7 96.2   MCH 31.1 31.8 31.6   MCHC 32.2 32.9 32.9   RDW 47.7 48.1 49.3   PLATELETCT 209 170 174   MPV 9.7 10.3 10.2     Recent Labs     04/13/24 1922 04/14/24  0435 04/15/24  1007   SODIUM 142 142 149*   POTASSIUM 4.3 4.7 4.1   CHLORIDE 101 107 117*   CO2 27 22 22   GLUCOSE 165* 165* 171*   BUN 42* 42* 41*   CREATININE 1.23 0.96 1.03   CALCIUM 9.7 9.1 9.2                   Imaging  CT-HEAD WITH & W/O   Final Result      1.  Redemonstration of right frontal lobe mass, highly concerning for metastasis. Associated vasogenic edema and a 2.5 mm right to left midline shift. No progressive mass effect. No herniation.   2.  Nonspecific ill-defined enhancement in the right parietal lobe.   3.  No new large vascular structure infarct. No new intracranial hemorrhage.      DX-CHEST-LIMITED (1 VIEW)   Final Result      No significant interval change.      DX-ABDOMEN COMPLETE WITH AP OR PA CXR   Final Result      1. No acute cardiopulmonary abnormality.   2. Chronic left lower lobe scarring.   3. Nonobstructive bowel gas pattern. Small amount of stool throughout the colon.           Assessment/Plan  Problem Representation:    * Sepsis secondary to UTI (HCC)- (present on admission)  Assessment & Plan  This is Sepsis Present on admission  SIRS criteria identified on my evaluation include: Tachycardia, with heart rate greater than 90 BPM and Leukocytosis, with WBC greater than 12,000  Clinical indicators of end organ dysfunction  include Lactic Acid greater than 2 and Toxic Metabolic Encephalopathy  Source is UTI  Sepsis protocol initiated  Crystalloid Fluid Administration: Fluid resuscitation ordered per standard protocol - 30 mL/kg per current or ideal body weight  IV antibiotics as appropriate for source of sepsis  Reassessment: I have reassessed the patient's hemodynamic status    -ceftriaxone daily x5 days for UTI  -received sepsis bolus in ED, will continue IVF with caution  - follow blood cx x 2, urine cx pending       Seizure (HCC)  Assessment & Plan  Continue vimpat   -- seizure/aspiration/ fall precaution    -- EEG  Neuro consulted    Dysphagia  Assessment & Plan  Possibly due to CVA vs brain metastases.  -SLP evaluated and its noted that she failed swallow  -- NPO now  -aspiration precautions      Acute metabolic encephalopathy  Assessment & Plan  Etiology at this time likely multifactorial, as she has multiple possible causes including UTI, constipation, medications (recently started norco, on top of other high risk medications such as baclofen, gabapentin), recent findings of subacute CVA as well as brain mets may all be contributing.    --Daughter describes some episodes that may be concerning for focal seizures.  -will hold majority of sedating medications, she does have significant cancer related pain so we will provide some PRN oxy and dilaudid, hold norco, baclofen, gabapentin for now  -continue lacosamide for seizure ppx  -continue dexamethasone for cerebral edema  -Neuro check  every 4 hrs  -fall, seizure, aspiration precautions  EEG  CT head with/ without contrast    Shingles  Assessment & Plan  Noted under left breast and onto lateral chest wall.  Multiple vesicular lesions with skin breakdown.  -contact precautions  -valtrex TID x7 days    Constipation  Assessment & Plan  No BM since at least 5 days prior to admission, stool burden noted in rectal vault on imaging.    Suppository and enema  Po meds  when she is more  alert    Malignant neoplasm metastatic to brain (HCC)- (present on admission)  Assessment & Plan  Brain mets noted during hospitalization on 3/27/2024 with vasogenic edema, MRI on 4/11 shows continued brain mets with vasogenic edema.    -continue seizure prophylaxis with lacosamide 150 mg BID  -continue dexamethasone   Continue fall, aspiration, seizure precaution.  Call Dr. Jose Sarah's PA-C to discuss the prognosis, goals of care      Acute kidney injury (HCC)- (present on admission)  Assessment & Plan  Likely pre-renal in the setting of sepsis.  -continue fluid resuscitation as above  -renally dose all medications as appropriate  -avoid nephrotoxins    Advance care planning- (present on admission)  Assessment & Plan  At this time patient's daughter wants her to be full code    Obesity (BMI 30-39.9)- (present on admission)  Assessment & Plan  Can discuss when mentation improves.    Endometrial cancer (HCC)- (present on admission)  Assessment & Plan  Followed by Dr. Garcia outpatient and sees radiation oncology Dr Live.  Received immunotherapy at [facility] in 2021 with initial good response.   -- Now return of metastatic disease, with significant cancer related pain in b/l LE and right arm.   -- Continue Decadron   Neurochecks every 4 hours, continue aspiration, fall, seizure precaution.    Discussion in regards to prognosis    Essential hypertension- (present on admission)  Assessment & Plan  Patient noted to be hypertensive, unable to take amlodipine, losartan, metoprolol by p.o. route  IV as needed antihypertensive medication    History of CVA (cerebrovascular accident)- (present on admission)  Assessment & Plan  Residual right sided neurodeficits form CVA in 2010.  Has RUE spasticity, RLE pain.  New subacute CVA found on 4/11 MRI at [facility].  -hold chemical DVT ppx due to proximity of CVA  -consider starting asa when appropriate  -continue atorvastatin         VTE prophylaxis: SCDs/TEDs    I have performed a  physical exam and reviewed and updated ROS and Plan today (4/15/2024). In review of yesterday's note (4/14/2024), there are no changes except as documented above.

## 2024-04-16 NOTE — PROGRESS NOTES
I have reviewed the patient's chart and is well known to the patient. I agree with JANEEN Sarah assessment and the recommendation of chemotherapy or immunotherapy at this time is not recommended due to patients recent stroke. I would recommend that she recover from recent stroke and reassess in ~ 4 to 6 weeks on the response of samantha xrt which she just received. I do not recommend any systemic treatment at this time. Overall her prognosis is poor given her current stroke and recent treatment for her brain mets.     I would recommend CT scan of chest, abdomen and pelvis with oral and iv contrast to assess the extent of metastatic disease.     Will continue to follow peripherally as her main medical issue at this time is the stroke.         balance/posture/gait/muscle strength

## 2024-04-16 NOTE — DISCHARGE PLANNING
HTH/SCP TCN chart review completed. Collaborated with JER Carmona. Current discharge considerations are for SNF vs. Home with [facility] services and close outpatient f/u when medically cleared.  Sg Garcia states patient lives with her in a one level home with a ramp.  Daughter is a retired EMT who now works from home and is patients primary caregiver.  Daughter provides maximum assistance with ADL's and provides full assistance for all IADL's,  Patient is independent with set-up for G/H and toileting tasks.  Patient ambulates household distances only with use of her 4WW and has a W/C for long distances.  She has a shower chair, hospital bed, FWW and a 3-in-1 commode.      Sg Garcia requests a Palliative Consult which has already been ordered and requests patient to come home with [facility] and paid caregivers if needed.      TCN will continue to follow and collaborate with discharge planning team as additional post acute needs arise. Thank you.    Completed:  ST recommendations pending.   Choice obtained: None.  See above.

## 2024-04-16 NOTE — ADDENDUM NOTE
Encounter addended by: Jayant EDOUARD M.D. on: 4/16/2024 8:38 AM   Actions taken: Clinical Note Signed

## 2024-04-16 NOTE — CONSULTS
Inpatient Palliative Medicine Evaluation     Jairo Rivas  73 y.o. female  [id number]    Location = [facility]  Referral Source = Sima Bernal M.d.    PCP = ZULEYKA Lyons    Reason for palliative medicine consultation and/or visit: ACP         Assessment and Plan:     GOAL(S) OF CARE = LONGEVITY, a chance at [facility] for immunotherapy again (daughter Danita lives in [place])    SYMPTOMS ETIOLOGY/CAUSES = confusion & dysphagia secondary secondary to sepsis, recent stroke, and progressing metastatic endometrial adenocarcinoma to brain    PROGNOSIS = overall unfavorable & guarded, lingering delirium & encephalopathy & NPO.    CODE STATUS = FULL at this time      ADVANCE CARE PLANNING =   Relevant recent history reviewed.  Oncology history reviewed.    Anticipatory education on care philosophy and rational for limited NGT feed trial (~1wk) if family were to elect.  Informed daughter Carolyn and her  Michael about importance of caloric intake thru NGT, for family's stated goal at this time.  Educated family how limited trial of NGT can add some clarity to family, regarding how close or how far they are to achieving their goal, but without prolonging pt in an undesirable state.  Shared with family about my own professional reservation about how much calories along can change patient's trajectory, hence my recommendation for limited TF trial but no longer.  Educated family on importance of periodic reassessment and avoidance of non-beneficial measures, if refractory at reassessment.    PALLIATIVE CARE TEAM INTERVENTIONS =   1.ACP for anticipatory guidance & overall care philosophy  2. Tentative family meeting tomorrow 1400 to discuss with 2 daughter and family about limited TF trial or not, etc.  3. Will coordinate with  to see if we can find a [employer] Imam to visit & pray or not.  4. Will follow and support pt & family to our utmost in this difficult time.  5. Recommend daughter & family  "to only try feeding pt themselves in SLP presence during swallow eval, for best safety.        Summary =   Jairo Rivas is a 73 y.o.  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    Despite care so far, patient unfortunately remains encephalopathic and unable to pass SLP swallow evaluation yet. She is a [facility] patient and has been followed by Dr. Garcia since 2019. Patient unfortunately remains unfit for further cancer directed treatment at this time.  ------------------------------------------------------------------------------------------------------------------------------------------------------    Met with patient, her daughter Carolyn, and Carolyn's  Michael katy. Introduced myself, my scope of practice, and reasons for this consultation.Carolyn and Michael were amenable to proceed with this visit.    Patient were self were delirious and minimally conversive. She was able to say \"no\" when I inquired about her pain & discomfort. That was the extent of our conversations.    Patient is originally from [place]. Carolyn is her only blood relative here in [facility]. Carolyn's sister Danita resides in [place]. Thanks to the support of Danita's family, patient was able to receive cancer treatments at [facility] [phone], with fairly good response at that time, all things considered.    According to Carolyn, patient's has been a \"real tough cookie.\" One of the main reasons that led to her prior stint at [facility] oncology was disease progression & hospice discussion. Carolyn and family were amazed how patient was able to surpass expectations a few times.    Patient is a devout Episcopalian and prays regularly. Daughter is " "amenable to a Imam visiting if it can be arranged for patient's spiritual support.     Patient is said to enjoy \"shopping & casinos, and that's why she came back to [place] in AUG 2023,\" Carolyn told me.        We talked about family's outstanding goal of care, which is to try to optimize pt for immunotherapy again, if possible, be it rehab or going to [facility] again.    We discussed imminent road blocks at this time = dysphagia & NPO status, infection, continued delirium/confusion, low function. We discussed at length about a time limited (~1wk or so) TF trial to see if it is possible to get patient closer to family's goal again, and reassess frequently to make sure we do not prolong decline if not responding to treatments.    Carolyn is rightfully worried about side effects of NTG - infection, bleed, pain/discomfort, decline prolongation - and I reinforced the importance of the time-limited nature of NTG trial but no longer. Also shared my honest professional opinion that likely, all things considered, calories alone will not get patient close enough to their goal for more immunotherapy.    Carolyn has lots of thoughts. She understands the incurable nature of patient's cancer and ongoing decline over the years, as tough as patient has weathered all these challenges.    We agreed to a family meeting tomorrow 1400 to further discuss with family about roads ahead,         Medical Decision Making =  Patient is of high medical complexity with incurable disease = endometrial adenocarcinoma metastatic to brain & lung  Care complexity further complicated by secondary conditions = 2010 CVA with residual R deficits + new subacute CVA, acute on chronic baseline delirium/hallucinations, dysphagia NPO status, sepsis    Extensive data reviewed & coordination performed = recent notes, latest labs & studies, GYN past history, ONC past history, [facility] notes, etc.    High risk of morbidity from additional interventions &  studies = NPO & low " functioning & still delirious, high risk medication usage including continuous fluid        Advance care planning  =     Total time spent in ACP discussion 30 minutes, which is separate from the time spent completing the evaluation and management visit.     Thank you for allowing me the opportunity to participate in the care of Jairo Rivas     I spent a total of 60 minutes reviewing medical records, direct face-to-face time with the patient and/or family, documentation and coordination of care. This is separate from the time spent on advance care planning, which is documented above.         ILEANA (BAILEY BOLANOS DO  [facility]   [address] [place]  [id number]  P: [phone]  F: [phone]

## 2024-04-17 ENCOUNTER — PATIENT OUTREACH (OUTPATIENT)
Dept: ONCOLOGY | Facility: MEDICAL CENTER | Age: 74
End: 2024-04-17
Payer: MEDICARE

## 2024-04-17 ENCOUNTER — APPOINTMENT (OUTPATIENT)
Dept: RADIOLOGY | Facility: MEDICAL CENTER | Age: 74
DRG: 871 | End: 2024-04-17
Payer: MEDICARE

## 2024-04-17 PROBLEM — E83.41 HYPERMAGNESEMIA: Status: ACTIVE | Noted: 2024-04-17

## 2024-04-17 LAB
ALBUMIN SERPL BCP-MCNC: 3 G/DL (ref 3.2–4.9)
ALBUMIN/GLOB SERPL: 1 G/DL
ALP SERPL-CCNC: 73 U/L (ref 30–99)
ALT SERPL-CCNC: 14 U/L (ref 2–50)
ANION GAP SERPL CALC-SCNC: 12 MMOL/L (ref 7–16)
AST SERPL-CCNC: 14 U/L (ref 12–45)
BASOPHILS # BLD AUTO: 0.1 % (ref 0–1.8)
BASOPHILS # BLD: 0.01 K/UL (ref 0–0.12)
BILIRUB SERPL-MCNC: 0.3 MG/DL (ref 0.1–1.5)
BUN SERPL-MCNC: 29 MG/DL (ref 8–22)
CALCIUM ALBUM COR SERPL-MCNC: 10 MG/DL (ref 8.5–10.5)
CALCIUM SERPL-MCNC: 9.2 MG/DL (ref 8.5–10.5)
CHLORIDE SERPL-SCNC: 115 MMOL/L (ref 96–112)
CO2 SERPL-SCNC: 21 MMOL/L (ref 20–33)
CREAT SERPL-MCNC: 0.7 MG/DL (ref 0.5–1.4)
EOSINOPHIL # BLD AUTO: 0 K/UL (ref 0–0.51)
EOSINOPHIL NFR BLD: 0 % (ref 0–6.9)
ERYTHROCYTE [DISTWIDTH] IN BLOOD BY AUTOMATED COUNT: 51 FL (ref 35.9–50)
GFR SERPLBLD CREATININE-BSD FMLA CKD-EPI: 92 ML/MIN/1.73 M 2
GLOBULIN SER CALC-MCNC: 3.1 G/DL (ref 1.9–3.5)
GLUCOSE SERPL-MCNC: 171 MG/DL (ref 65–99)
GRAM STN SPEC: NORMAL
HCT VFR BLD AUTO: 38.9 % (ref 37–47)
HGB BLD-MCNC: 12.8 G/DL (ref 12–16)
IMM GRANULOCYTES # BLD AUTO: 0.08 K/UL (ref 0–0.11)
IMM GRANULOCYTES NFR BLD AUTO: 0.7 % (ref 0–0.9)
LYMPHOCYTES # BLD AUTO: 0.2 K/UL (ref 1–4.8)
LYMPHOCYTES NFR BLD: 1.7 % (ref 22–41)
MAGNESIUM SERPL-MCNC: 3 MG/DL (ref 1.5–2.5)
MCH RBC QN AUTO: 31.5 PG (ref 27–33)
MCHC RBC AUTO-ENTMCNC: 32.9 G/DL (ref 32.2–35.5)
MCV RBC AUTO: 95.8 FL (ref 81.4–97.8)
MONOCYTES # BLD AUTO: 0.37 K/UL (ref 0–0.85)
MONOCYTES NFR BLD AUTO: 3.2 % (ref 0–13.4)
NEUTROPHILS # BLD AUTO: 10.88 K/UL (ref 1.82–7.42)
NEUTROPHILS NFR BLD: 94.3 % (ref 44–72)
NRBC # BLD AUTO: 0 K/UL
NRBC BLD-RTO: 0 /100 WBC (ref 0–0.2)
PLATELET # BLD AUTO: 167 K/UL (ref 164–446)
PMV BLD AUTO: 10.2 FL (ref 9–12.9)
POTASSIUM SERPL-SCNC: 4.3 MMOL/L (ref 3.6–5.5)
PROT SERPL-MCNC: 6.1 G/DL (ref 6–8.2)
RBC # BLD AUTO: 4.06 M/UL (ref 4.2–5.4)
SIGNIFICANT IND 70042: NORMAL
SITE SITE: NORMAL
SODIUM SERPL-SCNC: 145 MMOL/L (ref 135–145)
SODIUM SERPL-SCNC: 146 MMOL/L (ref 135–145)
SODIUM SERPL-SCNC: 148 MMOL/L (ref 135–145)
SOURCE SOURCE: NORMAL
WBC # BLD AUTO: 11.5 K/UL (ref 4.8–10.8)

## 2024-04-17 PROCEDURE — 770001 HCHG ROOM/CARE - MED/SURG/GYN PRIV*

## 2024-04-17 PROCEDURE — 665998 HH PPS REVENUE CREDIT

## 2024-04-17 PROCEDURE — 700105 HCHG RX REV CODE 258

## 2024-04-17 PROCEDURE — 99497 ADVNCD CARE PLAN 30 MIN: CPT | Performed by: STUDENT IN AN ORGANIZED HEALTH CARE EDUCATION/TRAINING PROGRAM

## 2024-04-17 PROCEDURE — C9254 INJECTION, LACOSAMIDE: HCPCS | Performed by: STUDENT IN AN ORGANIZED HEALTH CARE EDUCATION/TRAINING PROGRAM

## 2024-04-17 PROCEDURE — 83735 ASSAY OF MAGNESIUM: CPT

## 2024-04-17 PROCEDURE — 85025 COMPLETE CBC W/AUTO DIFF WBC: CPT

## 2024-04-17 PROCEDURE — 700111 HCHG RX REV CODE 636 W/ 250 OVERRIDE (IP): Performed by: STUDENT IN AN ORGANIZED HEALTH CARE EDUCATION/TRAINING PROGRAM

## 2024-04-17 PROCEDURE — 84295 ASSAY OF SERUM SODIUM: CPT | Mod: 91

## 2024-04-17 PROCEDURE — 36415 COLL VENOUS BLD VENIPUNCTURE: CPT

## 2024-04-17 PROCEDURE — 99233 SBSQ HOSP IP/OBS HIGH 50: CPT | Mod: 25,GC | Performed by: INTERNAL MEDICINE

## 2024-04-17 PROCEDURE — 87798 DETECT AGENT NOS DNA AMP: CPT

## 2024-04-17 PROCEDURE — 700102 HCHG RX REV CODE 250 W/ 637 OVERRIDE(OP)

## 2024-04-17 PROCEDURE — 92526 ORAL FUNCTION THERAPY: CPT

## 2024-04-17 PROCEDURE — 99233 SBSQ HOSP IP/OBS HIGH 50: CPT | Mod: 25 | Performed by: STUDENT IN AN ORGANIZED HEALTH CARE EDUCATION/TRAINING PROGRAM

## 2024-04-17 PROCEDURE — 665999 HH PPS REVENUE DEBIT

## 2024-04-17 PROCEDURE — 99497 ADVNCD CARE PLAN 30 MIN: CPT | Performed by: INTERNAL MEDICINE

## 2024-04-17 PROCEDURE — A9270 NON-COVERED ITEM OR SERVICE: HCPCS

## 2024-04-17 PROCEDURE — 700111 HCHG RX REV CODE 636 W/ 250 OVERRIDE (IP): Mod: JZ

## 2024-04-17 PROCEDURE — 80053 COMPREHEN METABOLIC PANEL: CPT

## 2024-04-17 RX ORDER — DEXTROSE MONOHYDRATE 50 MG/ML
INJECTION, SOLUTION INTRAVENOUS CONTINUOUS
Status: DISCONTINUED | OUTPATIENT
Start: 2024-04-17 | End: 2024-04-17

## 2024-04-17 RX ADMIN — DEXTROSE MONOHYDRATE: 50 INJECTION, SOLUTION INTRAVENOUS at 08:53

## 2024-04-17 RX ADMIN — ACYCLOVIR SODIUM 675 MG: 50 INJECTION, SOLUTION INTRAVENOUS at 06:15

## 2024-04-17 RX ADMIN — LACOSAMIDE 150 MG: 10 INJECTION INTRAVENOUS at 06:00

## 2024-04-17 RX ADMIN — HYDROMORPHONE HYDROCHLORIDE 0.25 MG: 1 INJECTION, SOLUTION INTRAMUSCULAR; INTRAVENOUS; SUBCUTANEOUS at 14:08

## 2024-04-17 RX ADMIN — ACYCLOVIR SODIUM 675 MG: 50 INJECTION, SOLUTION INTRAVENOUS at 21:45

## 2024-04-17 RX ADMIN — LACOSAMIDE 150 MG: 10 INJECTION INTRAVENOUS at 20:23

## 2024-04-17 RX ADMIN — ACYCLOVIR SODIUM 675 MG: 50 INJECTION, SOLUTION INTRAVENOUS at 13:48

## 2024-04-17 RX ADMIN — BISACODYL 10 MG: 10 SUPPOSITORY RECTAL at 06:14

## 2024-04-17 ASSESSMENT — PAIN DESCRIPTION - PAIN TYPE: TYPE: ACUTE PAIN;CHRONIC PAIN

## 2024-04-17 NOTE — THERAPY
Speech Language Pathology   Daily Treatment     Patient Name: Jairo Rivas  AGE:  73 y.o., SEX:  female  Medical Record #: [id number]  Date of Service: 4/17/2024      Precautions:  Precautions: Fall Risk, Swallow Precautions         Subjective  Patient cleared by RN for session. Pt received asleep, briefly roused to verbal/tactile cues, though remained with eyes closed.          Assessment  Patient seen this date for dysphagia management with focus on assessing readiness for oral diet vs diagnostic swallow study. PO trials of ice chips x3 and water by tsp/cup/straw assessed. Impaired oral bolus acceptance and containment with trials of ice chips and water by cup/straw. Anterior bolus loss (>50%) with trials thins by tsp. Pt unable to achieve labial seal around straw. PO trials discontinued d/t concern for pt's safety.          Clinical Impressions  Patient presents with poor mentation which currently contraindicates initiation of oral diet. No clinical indicators of pharyngeal dysphagia this date; however, patient is at risk for such r/t decline in status/mentation, UTI, brain mets and h/o stroke. She is not safe for PO intake at this time due to high aspiration risk. Recommend considering a short term non oral source of nutrition/hydration (NGT) as pt is on Day 4 without nutrition. SLP will follow and reassess as appropriate.            Recommendations  Treatment Completed: Dysphagia Treatment       Dysphagia Treatment  Diet Consistency: NPO, pre-feeding trials with SLP only   -Clear for minimal ice chips/hour 1:1 w/RN, when alert, after oral care to reduce xerostomia and mitigate disuse atrophy of swallow musculature  Instrumentation: Instrumental swallow study pending clinical progress  Medication:  (Defer to MD)  Oral Care: Q2h                     SLP Treatment Plan  Treatment Plan: Dysphagia Treatment, Patient/Family/Caregiver Training  SLP Frequency: 4x Per Week  Estimated Duration: Until Therapy  "Goals Met      Anticipated Discharge Needs  Discharge Recommendations: Recommend post-acute placement for additional speech therapy services prior to discharge home  Therapy Recommendations Upon DC: Dysphagia Training, Patient / Family / Caregiver Education      Patient / Family Goals  Patient / Family Goal #1: \"She eats at home\" per dtr  Goal #1 Outcome: Goal not met  Short Term Goals  Short Term Goal # 1: Pt will participate in prfdg to determine readiness for PO diet vs. diagnostic evaluation.  Goal Outcome # 1: Progressing slower than expected      BUSHRA Yeung  "

## 2024-04-17 NOTE — DISCHARGE PLANNING
"HTH/SCP TCN chart review completed. Collaborated with JER Carmona. Current discharge considerations are anticipated for dc to home with [facility] v. outcomes of palliative discussions/dependent on GOC discussion.     Note that pt currently has impaired mentation per review/discussion with team. Pt is known to this TCN from prior admits as well. Note per this TCN from prior admit on 3/28, \"per discussion with pt she reports that she prefers to dc to home with HH and assistance from her daughter (who pt reports was assisting with transfers PTA as well). Pt reports daughter used to be paramedic and is aware of how to assist given current deficits\".  Note per prior TCN from 4/15 (see note for details re: home, equipment, etc), daughter provides maximum assistance with ADL's and provides full assistance for all IADL's and daughter Carolyn requests patient to come home with [facility] and paid caregivers if needed.      TCN will continue to follow and collaborate with discharge planning team as additional post acute needs arise. Thank you.    Completed:  Choice obtained: HH (See above)  SCP with [facility]  "

## 2024-04-17 NOTE — ASSESSMENT & PLAN NOTE
Continuing decadron taper per Dr. Live's recommendations. He also does not believe that there are any significant changes in her brain imaging from this admission that could explain her somnolence/presentation. She is on the correct medication (decadron) for treating the edema  Improvement on MRI on 4/21  - Continue decadron

## 2024-04-17 NOTE — PROGRESS NOTES
Attempted NG tube x5 by this RN and Lu [facility]. Unsuccessful due to patient being unable to swallow. MD aware.

## 2024-04-17 NOTE — CARE PLAN
The patient is Stable - Low risk of patient condition declining or worsening    Shift Goals  Clinical Goals: neuro checks, monitor labs, q2 turns  Patient Goals: ZA  Family Goals: not present      Problem: Pain - Standard  Goal: Alleviation of pain or a reduction in pain to the patient’s comfort goal  Outcome: Progressing     Problem: Hemodynamics  Goal: Patient's hemodynamics, fluid balance and neurologic status will be stable or improve  Outcome: Progressing     Problem: Fluid Volume  Goal: Fluid volume balance will be maintained  Outcome: Progressing     Problem: Respiratory  Goal: Patient will achieve/maintain optimum respiratory ventilation and gas exchange  Outcome: Progressing

## 2024-04-17 NOTE — DISCHARGE PLANNING
HTH/SCP TCN chart review completed. Collaborated with RENÉ Carmona.  Current discharge considerations are for SNF vs. Home with [facility] and close outpatient f/u when medically cleared.  Palliative Consult pending.  Daughter Carolyn requests patient to come home with [facility] and paid caregivers if needed.  Daughter is primary caregiver and states she works from home and can can provide patient caregiver services at home.  Daughter states no (POA), TCN will defer to CM for choice.  TCN will continue to follow and collaborate with discharge planning team as additional post acute needs arise. Thank you.    Completed:  ST recommendations post-acute placement  on 4/15/24.

## 2024-04-17 NOTE — PROGRESS NOTES
Returned Carolyn's call.  She states pt is still admitted with the goal of starting TF.  They would like a second opinion with a local Medical Oncologist.  Provided number for [facility] for self referral.

## 2024-04-17 NOTE — PROGRESS NOTES
Incoming call from Carolyn, Jairo's daughter to main line for ONNs. She stated that Nestor is her mother's nurse navigator. This ONN available to help at this time. Carolyn states that Jairo is under the care of Dr. Garcia at [facility]. She then stated that Jairo desires [facility] to take over her care. She has had previous treatment at [facility] as well. Carolyn states that the plan is to continue rehabilitating Jairo and then begin (or resume?) IO. ONREINALDO will confer with Nestor about this self referral.

## 2024-04-17 NOTE — PROGRESS NOTES
Assumed care of pt. Assessment completed. Unable to assess pt's mentation. Pt unresponsive. Responds to painful stimuli. Respirations are even and unlabored on room air. Pt does not show any signs of pain. Medical pt, call light and belongings are within reach. POC updated. Needs met.

## 2024-04-18 ENCOUNTER — APPOINTMENT (OUTPATIENT)
Dept: RADIOLOGY | Facility: MEDICAL CENTER | Age: 74
DRG: 871 | End: 2024-04-18
Payer: MEDICARE

## 2024-04-18 PROBLEM — E66.9 OBESITY (BMI 30-39.9): Status: RESOLVED | Noted: 2018-02-06 | Resolved: 2024-04-18

## 2024-04-18 PROBLEM — R21 RASH AND NONSPECIFIC SKIN ERUPTION: Status: ACTIVE | Noted: 2024-04-14

## 2024-04-18 PROBLEM — E11.65 TYPE 2 DIABETES MELLITUS WITH HYPERGLYCEMIA, WITHOUT LONG-TERM CURRENT USE OF INSULIN (HCC): Status: ACTIVE | Noted: 2024-04-18

## 2024-04-18 LAB
ALBUMIN SERPL BCP-MCNC: 2.9 G/DL (ref 3.2–4.9)
ALBUMIN/GLOB SERPL: 0.9 G/DL
ALP SERPL-CCNC: 65 U/L (ref 30–99)
ALT SERPL-CCNC: 18 U/L (ref 2–50)
ANION GAP SERPL CALC-SCNC: 10 MMOL/L (ref 7–16)
AST SERPL-CCNC: 17 U/L (ref 12–45)
BACTERIA BLD CULT: NORMAL
BACTERIA BLD CULT: NORMAL
BASOPHILS # BLD AUTO: 0.1 % (ref 0–1.8)
BASOPHILS # BLD: 0.01 K/UL (ref 0–0.12)
BILIRUB SERPL-MCNC: 0.5 MG/DL (ref 0.1–1.5)
BUN SERPL-MCNC: 28 MG/DL (ref 8–22)
CALCIUM ALBUM COR SERPL-MCNC: 10.2 MG/DL (ref 8.5–10.5)
CALCIUM SERPL-MCNC: 9.3 MG/DL (ref 8.5–10.5)
CHLORIDE SERPL-SCNC: 116 MMOL/L (ref 96–112)
CO2 SERPL-SCNC: 21 MMOL/L (ref 20–33)
CREAT SERPL-MCNC: 0.63 MG/DL (ref 0.5–1.4)
EOSINOPHIL # BLD AUTO: 0 K/UL (ref 0–0.51)
EOSINOPHIL NFR BLD: 0 % (ref 0–6.9)
ERYTHROCYTE [DISTWIDTH] IN BLOOD BY AUTOMATED COUNT: 51.6 FL (ref 35.9–50)
GFR SERPLBLD CREATININE-BSD FMLA CKD-EPI: 94 ML/MIN/1.73 M 2
GLOBULIN SER CALC-MCNC: 3.4 G/DL (ref 1.9–3.5)
GLUCOSE BLD STRIP.AUTO-MCNC: 135 MG/DL (ref 65–99)
GLUCOSE SERPL-MCNC: 140 MG/DL (ref 65–99)
HCT VFR BLD AUTO: 39.8 % (ref 37–47)
HGB BLD-MCNC: 13.1 G/DL (ref 12–16)
IMM GRANULOCYTES # BLD AUTO: 0.08 K/UL (ref 0–0.11)
IMM GRANULOCYTES NFR BLD AUTO: 1 % (ref 0–0.9)
LYMPHOCYTES # BLD AUTO: 0.37 K/UL (ref 1–4.8)
LYMPHOCYTES NFR BLD: 4.4 % (ref 22–41)
MAGNESIUM SERPL-MCNC: 2.8 MG/DL (ref 1.5–2.5)
MCH RBC QN AUTO: 31.9 PG (ref 27–33)
MCHC RBC AUTO-ENTMCNC: 32.9 G/DL (ref 32.2–35.5)
MCV RBC AUTO: 96.8 FL (ref 81.4–97.8)
MONOCYTES # BLD AUTO: 0.42 K/UL (ref 0–0.85)
MONOCYTES NFR BLD AUTO: 5 % (ref 0–13.4)
NEUTROPHILS # BLD AUTO: 7.5 K/UL (ref 1.82–7.42)
NEUTROPHILS NFR BLD: 89.5 % (ref 44–72)
NRBC # BLD AUTO: 0 K/UL
NRBC BLD-RTO: 0 /100 WBC (ref 0–0.2)
PHOSPHATE SERPL-MCNC: 2.2 MG/DL (ref 2.5–4.5)
PLATELET # BLD AUTO: 150 K/UL (ref 164–446)
PMV BLD AUTO: 10.3 FL (ref 9–12.9)
POTASSIUM SERPL-SCNC: 4.1 MMOL/L (ref 3.6–5.5)
PROT SERPL-MCNC: 6.3 G/DL (ref 6–8.2)
RBC # BLD AUTO: 4.11 M/UL (ref 4.2–5.4)
SIGNIFICANT IND 70042: NORMAL
SIGNIFICANT IND 70042: NORMAL
SITE SITE: NORMAL
SITE SITE: NORMAL
SODIUM SERPL-SCNC: 147 MMOL/L (ref 135–145)
SOURCE SOURCE: NORMAL
SOURCE SOURCE: NORMAL
WBC # BLD AUTO: 8.4 K/UL (ref 4.8–10.8)

## 2024-04-18 PROCEDURE — 700111 HCHG RX REV CODE 636 W/ 250 OVERRIDE (IP): Performed by: STUDENT IN AN ORGANIZED HEALTH CARE EDUCATION/TRAINING PROGRAM

## 2024-04-18 PROCEDURE — 700102 HCHG RX REV CODE 250 W/ 637 OVERRIDE(OP)

## 2024-04-18 PROCEDURE — 665999 HH PPS REVENUE DEBIT

## 2024-04-18 PROCEDURE — 700105 HCHG RX REV CODE 258

## 2024-04-18 PROCEDURE — A9270 NON-COVERED ITEM OR SERVICE: HCPCS

## 2024-04-18 PROCEDURE — 700111 HCHG RX REV CODE 636 W/ 250 OVERRIDE (IP): Performed by: HOSPITALIST

## 2024-04-18 PROCEDURE — 665998 HH PPS REVENUE CREDIT

## 2024-04-18 PROCEDURE — 770020 HCHG ROOM/CARE - TELE (206)

## 2024-04-18 PROCEDURE — 700111 HCHG RX REV CODE 636 W/ 250 OVERRIDE (IP): Mod: JZ

## 2024-04-18 PROCEDURE — 99233 SBSQ HOSP IP/OBS HIGH 50: CPT | Mod: GC | Performed by: INTERNAL MEDICINE

## 2024-04-18 PROCEDURE — C9254 INJECTION, LACOSAMIDE: HCPCS | Performed by: STUDENT IN AN ORGANIZED HEALTH CARE EDUCATION/TRAINING PROGRAM

## 2024-04-18 PROCEDURE — 80053 COMPREHEN METABOLIC PANEL: CPT

## 2024-04-18 PROCEDURE — 83735 ASSAY OF MAGNESIUM: CPT

## 2024-04-18 PROCEDURE — 700111 HCHG RX REV CODE 636 W/ 250 OVERRIDE (IP): Mod: JZ | Performed by: INTERNAL MEDICINE

## 2024-04-18 PROCEDURE — 85025 COMPLETE CBC W/AUTO DIFF WBC: CPT

## 2024-04-18 PROCEDURE — 82962 GLUCOSE BLOOD TEST: CPT

## 2024-04-18 PROCEDURE — 36415 COLL VENOUS BLD VENIPUNCTURE: CPT

## 2024-04-18 PROCEDURE — 84100 ASSAY OF PHOSPHORUS: CPT

## 2024-04-18 RX ORDER — POLYETHYLENE GLYCOL 3350 17 G/17G
1 POWDER, FOR SOLUTION ORAL DAILY
Status: DISCONTINUED | OUTPATIENT
Start: 2024-04-18 | End: 2024-05-13 | Stop reason: HOSPADM

## 2024-04-18 RX ORDER — ACETAMINOPHEN 500 MG
1000 TABLET ORAL EVERY 6 HOURS
Status: DISCONTINUED | OUTPATIENT
Start: 2024-04-18 | End: 2024-04-18

## 2024-04-18 RX ORDER — IBUPROFEN 600 MG/1
600 TABLET ORAL EVERY 6 HOURS PRN
Status: DISCONTINUED | OUTPATIENT
Start: 2024-04-18 | End: 2024-04-18

## 2024-04-18 RX ORDER — DEXAMETHASONE 4 MG/1
4 TABLET ORAL EVERY 12 HOURS
Status: DISCONTINUED | OUTPATIENT
Start: 2024-04-18 | End: 2024-05-01

## 2024-04-18 RX ORDER — LANSOPRAZOLE 30 MG/1
30 TABLET, ORALLY DISINTEGRATING, DELAYED RELEASE ORAL DAILY
Status: DISCONTINUED | OUTPATIENT
Start: 2024-04-18 | End: 2024-05-13 | Stop reason: HOSPADM

## 2024-04-18 RX ORDER — ENOXAPARIN SODIUM 100 MG/ML
40 INJECTION SUBCUTANEOUS DAILY
Status: DISCONTINUED | OUTPATIENT
Start: 2024-04-18 | End: 2024-04-25

## 2024-04-18 RX ORDER — OMEPRAZOLE 20 MG/1
20 CAPSULE, DELAYED RELEASE ORAL 2 TIMES DAILY
Status: DISCONTINUED | OUTPATIENT
Start: 2024-04-18 | End: 2024-04-18

## 2024-04-18 RX ORDER — HYDROCODONE BITARTRATE AND ACETAMINOPHEN 5; 325 MG/1; MG/1
1 TABLET ORAL EVERY 4 HOURS PRN
Status: DISCONTINUED | OUTPATIENT
Start: 2024-04-18 | End: 2024-05-13 | Stop reason: HOSPADM

## 2024-04-18 RX ORDER — VALACYCLOVIR HYDROCHLORIDE 500 MG/1
1000 TABLET, FILM COATED ORAL 3 TIMES DAILY
Status: DISCONTINUED | OUTPATIENT
Start: 2024-04-19 | End: 2024-04-20

## 2024-04-18 RX ORDER — HYDROMORPHONE HYDROCHLORIDE 1 MG/ML
0.25 INJECTION, SOLUTION INTRAMUSCULAR; INTRAVENOUS; SUBCUTANEOUS EVERY 4 HOURS PRN
Status: DISCONTINUED | OUTPATIENT
Start: 2024-04-18 | End: 2024-04-18

## 2024-04-18 RX ORDER — GABAPENTIN 300 MG/1
300 CAPSULE ORAL 3 TIMES DAILY
Status: DISCONTINUED | OUTPATIENT
Start: 2024-04-18 | End: 2024-04-18

## 2024-04-18 RX ORDER — FAMOTIDINE 20 MG/1
20 TABLET, FILM COATED ORAL 2 TIMES DAILY
Status: DISCONTINUED | OUTPATIENT
Start: 2024-04-18 | End: 2024-04-18

## 2024-04-18 RX ORDER — HYDROCODONE BITARTRATE AND ACETAMINOPHEN 5; 325 MG/1; MG/1
1 TABLET ORAL EVERY 8 HOURS
Status: DISCONTINUED | OUTPATIENT
Start: 2024-04-18 | End: 2024-04-18

## 2024-04-18 RX ORDER — ATORVASTATIN CALCIUM 20 MG/1
20 TABLET, FILM COATED ORAL EVERY EVENING
Status: DISCONTINUED | OUTPATIENT
Start: 2024-04-18 | End: 2024-05-13 | Stop reason: HOSPADM

## 2024-04-18 RX ORDER — METOPROLOL SUCCINATE 25 MG/1
100 TABLET, EXTENDED RELEASE ORAL
Status: DISCONTINUED | OUTPATIENT
Start: 2024-04-19 | End: 2024-04-18

## 2024-04-18 RX ORDER — OXYCODONE HYDROCHLORIDE 5 MG/1
5 TABLET ORAL EVERY 4 HOURS PRN
Status: DISCONTINUED | OUTPATIENT
Start: 2024-04-18 | End: 2024-04-18

## 2024-04-18 RX ORDER — BACLOFEN 10 MG/1
10 TABLET ORAL 3 TIMES DAILY
Status: DISCONTINUED | OUTPATIENT
Start: 2024-04-18 | End: 2024-04-19

## 2024-04-18 RX ORDER — OXYCODONE HYDROCHLORIDE 5 MG/1
2.5 TABLET ORAL EVERY 4 HOURS PRN
Status: DISCONTINUED | OUTPATIENT
Start: 2024-04-18 | End: 2024-04-18

## 2024-04-18 RX ORDER — LOSARTAN POTASSIUM 50 MG/1
100 TABLET ORAL DAILY
Status: DISCONTINUED | OUTPATIENT
Start: 2024-04-18 | End: 2024-05-10

## 2024-04-18 RX ORDER — GABAPENTIN 300 MG/1
300 CAPSULE ORAL 3 TIMES DAILY
Status: DISCONTINUED | OUTPATIENT
Start: 2024-04-18 | End: 2024-04-19

## 2024-04-18 RX ORDER — DEXTROSE MONOHYDRATE 25 G/50ML
25 INJECTION, SOLUTION INTRAVENOUS
Status: DISCONTINUED | OUTPATIENT
Start: 2024-04-18 | End: 2024-04-19

## 2024-04-18 RX ORDER — BACLOFEN 10 MG/1
10 TABLET ORAL 3 TIMES DAILY
Status: DISCONTINUED | OUTPATIENT
Start: 2024-04-18 | End: 2024-04-18

## 2024-04-18 RX ORDER — AMLODIPINE BESYLATE 5 MG/1
5 TABLET ORAL DAILY
Status: DISCONTINUED | OUTPATIENT
Start: 2024-04-18 | End: 2024-04-20

## 2024-04-18 RX ORDER — AMOXICILLIN 250 MG
2 CAPSULE ORAL EVERY EVENING
Status: DISCONTINUED | OUTPATIENT
Start: 2024-04-18 | End: 2024-05-13 | Stop reason: HOSPADM

## 2024-04-18 RX ORDER — OXYCODONE HYDROCHLORIDE 10 MG/1
10 TABLET ORAL EVERY 4 HOURS PRN
Status: DISCONTINUED | OUTPATIENT
Start: 2024-04-18 | End: 2024-04-18

## 2024-04-18 RX ADMIN — DIBASIC SODIUM PHOSPHATE, MONOBASIC POTASSIUM PHOSPHATE AND MONOBASIC SODIUM PHOSPHATE 500 MG: 852; 155; 130 TABLET ORAL at 23:55

## 2024-04-18 RX ADMIN — DIBASIC SODIUM PHOSPHATE, MONOBASIC POTASSIUM PHOSPHATE AND MONOBASIC SODIUM PHOSPHATE 500 MG: 852; 155; 130 TABLET ORAL at 18:16

## 2024-04-18 RX ADMIN — GABAPENTIN 300 MG: 300 CAPSULE ORAL at 18:16

## 2024-04-18 RX ADMIN — LANSOPRAZOLE 30 MG: 30 TABLET, ORALLY DISINTEGRATING ORAL at 16:58

## 2024-04-18 RX ADMIN — METOPROLOL TARTRATE 50 MG: 25 TABLET, FILM COATED ORAL at 07:44

## 2024-04-18 RX ADMIN — ACYCLOVIR SODIUM 675 MG: 50 INJECTION, SOLUTION INTRAVENOUS at 06:14

## 2024-04-18 RX ADMIN — LOSARTAN POTASSIUM 100 MG: 50 TABLET, FILM COATED ORAL at 07:45

## 2024-04-18 RX ADMIN — ACYCLOVIR SODIUM 675 MG: 500 INJECTION, SOLUTION INTRAVENOUS at 17:09

## 2024-04-18 RX ADMIN — BISACODYL 10 MG: 10 SUPPOSITORY RECTAL at 07:45

## 2024-04-18 RX ADMIN — LACOSAMIDE 150 MG: 10 INJECTION INTRAVENOUS at 16:59

## 2024-04-18 RX ADMIN — METOPROLOL TARTRATE 50 MG: 25 TABLET, FILM COATED ORAL at 16:58

## 2024-04-18 RX ADMIN — SENNOSIDES AND DOCUSATE SODIUM 2 TABLET: 50; 8.6 TABLET ORAL at 18:17

## 2024-04-18 RX ADMIN — FAMOTIDINE 20 MG: 20 TABLET, FILM COATED ORAL at 16:58

## 2024-04-18 RX ADMIN — ACYCLOVIR SODIUM 675 MG: 500 INJECTION, SOLUTION INTRAVENOUS at 23:51

## 2024-04-18 RX ADMIN — MICONAZOLE NITRATE: 20 CREAM TOPICAL at 16:57

## 2024-04-18 RX ADMIN — BACLOFEN 10 MG: 10 TABLET ORAL at 18:16

## 2024-04-18 RX ADMIN — ENALAPRILAT 1.25 MG: 1.25 INJECTION INTRAVENOUS at 16:58

## 2024-04-18 RX ADMIN — OXYCODONE 5 MG: 5 TABLET ORAL at 15:31

## 2024-04-18 RX ADMIN — ATORVASTATIN CALCIUM 20 MG: 20 TABLET, FILM COATED ORAL at 18:16

## 2024-04-18 RX ADMIN — OXYCODONE 5 MG: 5 TABLET ORAL at 07:45

## 2024-04-18 RX ADMIN — DEXAMETHASONE 4 MG: 4 TABLET ORAL at 07:46

## 2024-04-18 RX ADMIN — SENNOSIDES AND DOCUSATE SODIUM 2 TABLET: 50; 8.6 TABLET ORAL at 18:00

## 2024-04-18 RX ADMIN — DEXAMETHASONE 4 MG: 4 TABLET ORAL at 16:58

## 2024-04-18 RX ADMIN — FAMOTIDINE 20 MG: 20 TABLET, FILM COATED ORAL at 07:45

## 2024-04-18 RX ADMIN — AMLODIPINE BESYLATE 5 MG: 5 TABLET ORAL at 07:45

## 2024-04-18 RX ADMIN — LACOSAMIDE 150 MG: 10 INJECTION INTRAVENOUS at 06:12

## 2024-04-18 RX ADMIN — ENOXAPARIN SODIUM 40 MG: 100 INJECTION SUBCUTANEOUS at 18:16

## 2024-04-18 ASSESSMENT — PAIN DESCRIPTION - PAIN TYPE: TYPE: ACUTE PAIN;CHRONIC PAIN

## 2024-04-18 NOTE — PROGRESS NOTES
[facility] Internal Medicine Daily Progress Note    Date of Service  4/18/2024    [facility] Team: R LATES[facility] Angel Team   Attending: Sima Bernal M.d.  Senior Resident: Dr. Rojas  Intern:  Dr. Sarmiento  Contact Number: [phone]    Chief Complaint  Jairo Rivas is a 73 y.o. female admitted 4/13/2024 with painful urination and constipation.     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 It is noted that patient has been having abdominal pain and constipation, however, she has had 2 bowel movements since being admitted.  Her UA in the ED was consistent with UTI.  She had leukocytosis with WBC 24.  Lactic acid 3.  Blood cultures negative thus far.  Started on Rocephin for 3 days to treat UTI.     CT scan of the head was obtained, noted to have pneumatization of the right frontal lobe mass highly concerning for metastasis; associated with genic edema and 2.5 mm right to left midline shift.  Patient continued to remain on Decadron. Also GYN oncology was consulted, discussed the prognosis of the patient, stated that patient is not a candidate for any treatment.  Patient daughter wanted her to be full code at this time.  She remains n.p.o. due to concerns for dysphagia and has not had anything to eat for several days.  Her daughter insist that her mother is alert for SLP evaluation.    Pending VZV PCR for rash.    Interval Problem Update  NG tube placement complicated by need for IRIS and left wrist soft restraints needed to prevent removal by patient. Tachycardia and rising blood pressure to 150s/100s likely in part due to lack of medication for the last several  days as patient has been n.p.o.  Tube feeds initiated and patient able to have oral medications.  I reached out to Dr. Live today to see if he thought there were any significant changes on patient's brain imaging over the last month or so.  He did not think there were any significant changes and none of which could give a clear explanation for why she has altered mental status other than she is progressing in her disease.    I have discussed this patient's plan of care and discharge plan at IDT rounds today with Case Management, Nursing, Nursing leadership, and other members of the IDT team.    Consultants/Specialty  neurology and surgical oncology     Code Status  Full Code    Disposition  The patient is not medically cleared for discharge to home or a post-acute facility.      I have placed the appropriate orders for post-discharge needs.    Review of Systems  Review of Systems   Unable to perform ROS: Medical condition        Physical Exam  Temp:  [36.1 °C (97 °F)-36.4 °C (97.5 °F)] 36.2 °C (97.2 °F)  Pulse:  [102-128] 102  Resp:  [17-22] 17  BP: (117-162)/() 117/74  SpO2:  [94 %-95 %] 95 %    Physical Exam  Vitals (exam limited) reviewed.   Constitutional:       General: She is sleeping.      Appearance: She is obese. She is ill-appearing.      Interventions: She is restrained.      Comments: NG tube in place  Patient does not appear agitated this morning   HENT:      Head: Normocephalic and atraumatic.      Mouth/Throat:      Mouth: Mucous membranes are dry.   Abdominal:      General: There is distension.      Palpations: There is mass.   Skin:     Comments: Skin breakdown under breasts. Dark red non pustular rash on left just below her breast. Called shingles but does not appear the same as shingles rash.   Neurological:      Mental Status: She is lethargic.     Much of the physical exam was not completed due to the patient being in such discomfort and being unable to verbally express it.      Fluids    Intake/Output Summary (Last 24 hours) at 4/18/2024 1728  Last data filed at 4/18/2024 1445  Gross per 24 hour   Intake 180 ml   Output 925 ml   Net -745 ml       Laboratory  Recent Labs     04/16/24  0259 04/17/24  0144 04/18/24  0719   WBC 13.9* 11.5* 8.4   RBC 4.21 4.06* 4.11*   HEMOGLOBIN 13.3 12.8 13.1   HEMATOCRIT 40.7 38.9 39.8   MCV 96.7 95.8 96.8   MCH 31.6 31.5 31.9   MCHC 32.7 32.9 32.9   RDW 50.5* 51.0* 51.6*   PLATELETCT 168 167 150*   MPV 10.2 10.2 10.3     Recent Labs     04/16/24 0259 04/16/24  1411 04/17/24  0144 04/17/24  0901 04/17/24  1641 04/18/24  0719   SODIUM 148*   < > 148* 146* 145 147*   POTASSIUM 3.8  --  4.3  --   --  4.1   CHLORIDE 117*  --  115*  --   --  116*   CO2 21  --  21  --   --  21   GLUCOSE 216*  --  171*  --   --  140*   BUN 35*  --  29*  --   --  28*   CREATININE 0.88  --  0.70  --   --  0.63   CALCIUM 9.3  --  9.2  --   --  9.3    < > = values in this interval not displayed.                   Imaging  DX-ABDOMEN FOR TUBE PLACEMENT   Final Result         1.  Nonspecific bowel gas pattern in the upper abdomen.   2.  Dobbhoff tube is coiled within the stomach, the tip terminates overlying the expected location of the gastric fundus, physician similar to prior study.   3.  Left lower lobe infiltrate      DX-ABDOMEN FOR TUBE PLACEMENT   Final Result         1.  Nonspecific bowel gas pattern in the upper abdomen.   2.  Dobbhoff tube is coiled within the stomach, the tip terminates overlying the expected location of the gastric fundus.   3.  Left basilar atelectasis      DX-ABDOMEN FOR TUBE PLACEMENT   Final Result      Feeding tube looped in the stomach the distal tip in the gastric fundus.      CT-HEAD WITH & W/O   Final Result      1.  Redemonstration of right frontal lobe mass, highly concerning for metastasis. Associated vasogenic edema and a 2.5 mm right to left midline shift. No progressive mass effect. No herniation.   2.  Nonspecific ill-defined enhancement  in the right parietal lobe.   3.  No new large vascular structure infarct. No new intracranial hemorrhage.      DX-CHEST-LIMITED (1 VIEW)   Final Result      No significant interval change.      DX-ABDOMEN COMPLETE WITH AP OR PA CXR   Final Result      1. No acute cardiopulmonary abnormality.   2. Chronic left lower lobe scarring.   3. Nonobstructive bowel gas pattern. Small amount of stool throughout the colon.           Assessment/Plan  Problem Representation:    * Sepsis secondary to UTI (HCC)- (present on admission)  Assessment & Plan  RESOLVED  This is Sepsis Present on admission  SIRS criteria identified on my evaluation include: Tachycardia, with heart rate greater than 90 BPM and Leukocytosis, with WBC greater than 12,000  Clinical indicators of end organ dysfunction include Lactic Acid greater than 2 and Toxic Metabolic Encephalopathy  Source is UTI  Sepsis protocol initiated  Crystalloid Fluid Administration: Fluid resuscitation ordered per standard protocol - 30 mL/kg per current or ideal body weight  IV antibiotics as appropriate for source of sepsis  Reassessment: I have reassessed the patient's hemodynamic status  -s/p CTX x5d  -received sepsis bolus in ED, will continue IVF with caution  -No growth in blood cultures or urine cultures      Dysphagia- (present on admission)  Assessment & Plan  Possibly due to CVA vs brain metastases and altered mental status in setting of infection.  Patient will remain NPO as she failed SLP evaluation 4/17  -NG tube placed and nutrition consulted  -aspiration precautions  - PPI started  - SSI and every 6 hours glucose checks      Acute metabolic encephalopathy- (present on admission)  Assessment & Plan  Etiology at this time likely multifactorial, as she has multiple possible causes including UTI, constipation, medications (recently started norco, on top of other high risk medications such as baclofen, gabapentin), recent findings of subacute CVA as well as brain mets  may all be contributing.  EEG on 4/14 did not demonstrate any seizures but was concerning for signs of brain distress as would be expected in setting of edema. No new findings on CTH.  -will hold majority of sedating medications, she does have significant cancer related pain so we will provide some PRN oxy and dilaudid, hold norco, baclofen, gabapentin for now  -continue lacosamide for seizure ppx  -fall, seizure, aspiration precautions  -Consider discontinuing scheduled neurochecks    Malignant neoplasm metastatic to brain (HCC)- (present on admission)  Assessment & Plan  Brain mets noted during hospitalization on 3/27/2024 with vasogenic edema, MRI on 4/11 shows continued brain mets with vasogenic edema.    -continue seizure prophylaxis with lacosamide 150 mg BID  -continue dexamethasone   Continue fall, aspiration, seizure precaution.      Advance care planning- (present on admission)  Assessment & Plan  At this time patient's daughter wants her to be full code.  Palliative care consulted and discussed with family the goals of care. This remains the same.    Endometrial cancer (HCC)- (present on admission)  Assessment & Plan  Followed by Dr. Garcia outpatient and sees radiation oncology Dr Live.  Received immunotherapy at [facility] in 2021 with initial good response.   -Complicated by metastases to brain among other locations, likely contributing to her altered mental status. Mets re demonstrated without significant change on CTH on admission.  -Decadron for vasogenic edema  -Neurochecks every 4 hours, continue aspiration, fall, seizure precaution.  -Will reach out to Dr. Live for comparison of recent brain imaging and if there are any significant changes.    Hypernatremia  Assessment & Plan  Sodium oscillates above and below 145. Reacting to changes by switching from D5W and D5 half normal saline. Suspect regulation of sodium will greatly improve as patient receives fluids and nutrition via NG tube.  -Continue to  monitor with daily labs  -Consider further workup of etiology if does not balance with enteral nutrition and fluids  -Discontinued fluids, free water pushes ordered    Seizure (HCC)- (present on admission)  Assessment & Plan  EEG did not show evidence of seizure. Antiepileptic continued given multitude of risk factors.  -Continue lacosamide 150 mg IV BID    Rash and nonspecific skin eruption- (present on admission)  Assessment & Plan  Noted under left breast and onto lateral chest wall.  On reevaluation 4/16/2024, lesions are flat and do not appear vesicular.  Will continue isolation as a precaution and awaiting VZV PCR results.  - Swab and testing for shingles pending  -contact precautions  -Continue valacyclovir for total of 10 days of treatment  - Miconazole to treat intertrigo    History of CVA (cerebrovascular accident)- (present on admission)  Assessment & Plan  Residual right sided neurodeficits form CVA in 2010.  Has RUE spasticity, RLE pain.  New subacute CVA found on 4/11 MRI at [facility]  -hold chemical DVT ppx due to proximity of CVA  -consider starting asa when appropriate  -continue atorvastatin    Hypermagnesemia  Assessment & Plan  Expect improvement with enteral food and fluids after NG tube. Currently Mg 3.  -Monitor with daily labs    Vasogenic edema (HCC)- (present on admission)  Assessment & Plan  Continuing decadron taper per Dr. Live's recommendations. He also does not believe that there are any significant changes in her brain imaging from this admission that could explain her somnolence/presentation. She is on the correct medication (decadron) for treating the edema    Constipation  Assessment & Plan  Resolved  - Bowel regimen    Acute kidney injury (HCC)- (present on admission)  Assessment & Plan  RESOLVED  Likely pre-renal in the setting of sepsis.  -continue fluid resuscitation as above  -renally dose all medications as appropriate  -avoid nephrotoxins    Essential hypertension- (present  on admission)  Assessment & Plan  - Continue amlodipine 5 mg daily  - Continue losartan 100 mg daily  - Continue metoprolol 100 mg daily  - Continue atorvastatin 20 mg every evening         VTE prophylaxis: SCDs/TEDs    I have performed a physical exam and reviewed and updated ROS and Plan today (4/18/2024). In review of yesterday's note (4/17/2024), there are no changes except as documented above.

## 2024-04-18 NOTE — DOCUMENTATION QUERY
[facility]                                                                       Query Response Note      PATIENT:               FABI BEASLEY  ACCT #:                  [id number]  MRN:                     [id number]  :                      1950  ADMIT DATE:       2024 5:58 PM  DISCH DATE:          RESPONDING  PROVIDER #:        [id number]           QUERY TEXT:    Review of the initial head CT revealed vasogenic edema and a 2.5mm R to L midline shift associated with patient's known brain mets. Decadron was restarted on  in addition to lacosamide for seizure prophylaxis.     Can a diagnosis be further specified to support the clinical findings and treatment?     The patient's clinical indicators include:   HM:   - (Patient) remains very somnolent, unable to eat/drink, too lethargic to work with ST per family   - Vasogenic edema: continue Decadron wean     Head CT: Redemonstration of right frontal lobe mass, highly concerning for metastasis. Associated vasogenic edema and a 2.5 mm right to left midline shift. No progressive mass effect. No herniation.    Risk Factors: Vasogenic edema, endometrial ca with mets to brain   Treatment: Decadron BID, lacosamide IV for seizure ppx, neuro checks q4hr, fall/seizure/aspiration precautions     Thank you for your time and attention,  IGGY Santizo RN  Available via Voalte  Options provided:   -- Nontraumatic brain compression clinically significant and ruled in   -- Findings of no clinical significance   -- Other explanation, (please specify the other explanation)      Query created by: Hannah Fortune on 2024 8:45 AM    RESPONSE TEXT:    Nontraumatic brain compression clinically significant and ruled in          Electronically signed by:  TAHMINA BURCIAGA MD 2024 5:52 PM

## 2024-04-18 NOTE — CARE PLAN
The patient is Stable - Low risk of patient condition declining or worsening    Shift Goals  Clinical Goals: q2 turns; neuro checks; ng management  Patient Goals: jayla  Family Goals: start tube feed    Progress made toward(s) clinical / shift goals:        Problem: Pain - Standard  Goal: Alleviation of pain or a reduction in pain to the patient’s comfort goal  Outcome: Progressing  Flowsheets  Taken 4/18/2024 1208  OB Pain Intervention:   Medication - See MAR   Repositioned   Rest  Taken 4/18/2024 0945  Non Verbal Scale:   Calm   Sleeping   Unlabored Breathing  Note: 1. Document pain using the appropriate pain scale per order or unit policy 2. Educate and implement non-pharmacologic comfort measures (i.e. relaxation, distraction, massage, cold/heat therapy, etc.) 3. Pain management medications as ordered 4. Reassess pain after pain med administration per policy 5. If opiods administered assess patient's response to pain medication is appropriate per POSS sedation scale 6. Follow pain management plan developed in collaboration with patient and interdisciplinary team (including palliative care or pain specialists if applicable)      Problem: Safety - Medical Restraint  Goal: Remains free of injury from restraints (Restraint for Interference with Medical Device)  Outcome: Progressing  Flowsheets (Taken 4/18/2024 1208)  Addressed this shift: Remains free of injury from restraints (restraint for interference with medical device):   Determine that other, less restrictive measures have been tried or would not be effective before applying the restraint   Evaluate the patient's condition at the time of restraint application   Inform patient/family regarding the reason for restraint   Every 2 hours: Monitor safety, psychosocial status, comfort, nutrition and hydration  Note: 1. Determine that other, less restrictive measures have been tried or would not be effective before applying the restraint 2. Evaluate the patient's  condition at the time of restraint application 3. Educate patient/family regarding the reason for restraint 4. Q2H: Monitor safety, psychosocial status, comfort, circulation, respiratory status, LOC, nutrition and hydration        Patient is not progressing towards the following goals:  NA

## 2024-04-18 NOTE — DIETARY
"Nutrition Support Assessment   Day 5 of admit.  Jairo Rivas is a 73 y.o. female with admitting DX of sepsis, AMS (altered mental status)     Current problem list:  Hypermagnesemia  Hypernatremia  Vasogenic edema  Constipation  Shingles  Acute metabolic encephalopathy  Dysphagia  Seizure  Sepsis secondary to UTI  Malignant neoplasm metastatic to brain  Acute kidney injury  History of CVA  Essential hypertension  Endometrial cancer  Obesity     Assessment:  Estimated Nutritional Needs: based on:   Height: 162.6 cm (5' 4.02\")  Weight: 85.5 kg (188 lb 7.9 oz)  Weight to Use in Calculations: 85.5 kg (188 lb 7.9 oz) - most recent measured wt  Ideal Body Weight: 54.4 kg (120 lb)  Percent Ideal Body Weight: 157.1  Body mass index is 32.34 kg/m²., BMI classification: obese class I    Calculation/Equation: MSJ x 1.0 = 1357 kcals/day  Total Calories / day: 1357 - 1539 (Calories / k - 18)  Total Grams Protein / day: 86 - 103 (Grams Protein / k.0 - 1.2)     Evaluation:   Admitted with painful urination and constipation.  Hx of Stage IV endometrial cancer with mets to brain and lung, vasogenic edema; hx of CVA with residual right sided deficits, new subacute CVA 24.   Swallow evaluation completed with SLP  and 4/15, at which time it was recommended pt remain NPO. Swallow evaluation was re-attempted by SLP .  Swallow evaluation completed yesterday, . SLP recommended continued NPO d/t aspiration risk. Pt with continued poor mentation.  Pt has been NPO x 5 days.  Palliative MD met with pt and family yesterday. Family agreeable to feeding tube to provide nutrition.   Enteral tube placed and verified (gastric).  Sodium 147, Glucose 140, BUN 28, Albumin 2.9, Phos 2.2, Mg 2.8, A1c 6.6 (24).  Dulcolax, Decadron, Pepcid, Pericolace, Miralax per MAR.  Specialized tube feeding indicated to best meet pt's estimated nutrition needs.      Malnutrition Risk: No new risk identified.    "   Recommendations/Plan:  Start Promote with Fiber @ 25 mL/hr. Advance per protocol to final goal rate of 60 mL/hr, providing 1440 kcals, 90 grams of protein and 1197 mL of free water per day.  Fluids per MD.  Diet texture upgraded per SLP.    RD will follow.

## 2024-04-18 NOTE — CARE PLAN
The patient is Watcher - Medium risk of patient condition declining or worsening    Shift Goals  Clinical Goals: neuro checks, monitor labs, q2 turns  Patient Goals: ZA  Family Goals: not present    Progress made toward(s) clinical / shift goals:     N/A    Patient is not progressing towards the following goals:      Problem: Safety - Medical Restraint  Goal: Remains free of injury from restraints (Restraint for Interference with Medical Device)  Outcome: Not Progressing  Flowsheets (Taken 4/17/2024 1845)  Addressed this shift: Remains free of injury from restraints (restraint for interference with medical device):   Every 2 hours: Monitor safety, psychosocial status, comfort, nutrition and hydration   Inform patient/family regarding the reason for restraint   Evaluate the patient's condition at the time of restraint application   Determine that other, less restrictive measures have been tried or would not be effective before applying the restraint  Note: Patient is currently in restraints due to IRIS NG Tube placement at this time. Patient is attempting to remove the NG tube upon placement. Patient is in dire need for tube feeds at this time. Patient's family member at bedside and is understanding of reasoning behind the restraints being place due to tube feeding needed to be started.  Goal: Free from restraint(s) (Restraint for Interference with Medical Device)  Outcome: Not Progressing  Flowsheets (Taken 4/17/2024 1845)  Addressed this shift: Free from restraint(s) (restraint for interference with medical device): ONCE/SHIFT or MINIMUM Every 12 hours: Assess and document the continuing need for restraints  Note: Patient is currently in restraints due to IRIS NG Tube placement at this time. Patient is attempting to remove the NG tube upon placement. Patient is in dire need for tube feeds at this time. Patient's family member at bedside and is understanding of reasoning behind the restraints being place due to  tube feeding needed to be started.

## 2024-04-18 NOTE — PROGRESS NOTES
"[facility] Internal Medicine Daily Progress Note    Date of Service  4/17/2024    [facility] Team: R LATESHA Angel Team   Attending: Sima Bernal M.d.  Senior Resident: Dr. Rojas  Intern:  Dr. Sarmiento  Contact Number: [phone]    Chief Complaint  Jairo Rivas is a 73 y.o. female admitted 4/13/2024 with painful urination and constipation.     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 It is noted that patient has been having abdominal pain and constipation, however, she has had 2 bowel movements since being admitted.  Her UA in the ED was consistent with UTI.  She had leukocytosis with WBC 24.  Lactic acid 3.  Blood cultures negative thus far.  Started on Rocephin for 3 days to treat UTI.     CT scan of the head was obtained, noted to have pneumatization of the right frontal lobe mass highly concerning for metastasis; associated with genic edema and 2.5 mm right to left midline shift.  Patient continued to remain on Decadron. Also GYN oncology was consulted, discussed the prognosis of the patient, stated that patient is not a candidate for any treatment.  Patient daughter wanted her to be full code at this time.  She remains n.p.o. due to concerns for dysphagia and has not had anything to eat for several days.  Her daughter insist that her mother is alert for SLP evaluation.    Pending VZV PCR for rash.      Interval Problem Update  NAEON. Mild tachycardia and tachypnea occasionally, otherwise vitals stable. Patient has very limited responses to exam and interview. She occasionally gestures towards an area and vocalizes what sounds like \"pain\" or \"hurt\" but " does not wince with palpation. Does not follow commands.    Lengthy discussion with patient's daughter, Danita, who I met today for the first time. She reiterated the family and patient's wishes to pursue aggressive treatment. She asked many good questions and shared that her mother has been through similar crossroads before. I explained the risks involved with NG tube but the benefits it could potientially provide as well. Since the patient was unable to participate in SLP and it has been several days since she has eaten, she agreed that it was best to trial an NG tube. Unfortunately, placement of the NG tube proved very difficult and required IRIS. If this will not work, more aggressive nutrition options will need to be considered. These discussions will be had as the time comes.    I have discussed this patient's plan of care and discharge plan at IDT rounds today with Case Management, Nursing, Nursing leadership, and other members of the IDT team.    Consultants/Specialty  neurology and surgical oncology     Code Status  Full Code    Disposition  The patient is not medically cleared for discharge to home or a post-acute facility.      I have placed the appropriate orders for post-discharge needs.    Review of Systems  Review of Systems   Unable to perform ROS: Medical condition        Physical Exam  Temp:  [36.2 °C (97.2 °F)-36.9 °C (98.4 °F)] 36.2 °C (97.2 °F)  Pulse:  [107-123] 113  Resp:  [20-22] 22  BP: (123-152)/(66-91) 148/91  SpO2:  [92 %-94 %] 94 %    Physical Exam  Vitals (exam limited) reviewed.   Constitutional:       General: She is sleeping.      Appearance: She is obese. She is ill-appearing.   HENT:      Head: Normocephalic and atraumatic.      Mouth/Throat:      Mouth: Mucous membranes are dry.   Abdominal:      General: There is distension.      Palpations: There is mass.   Skin:     Comments: Skin breakdown under breasts. Dark red non pustular rash on left just below her breast. Called  shingles but does not appear the same as shingles rash.   Neurological:      Mental Status: She is lethargic.     Much of the physical exam was not completed due to the patient being in such discomfort and being unable to verbally express it.     Fluids    Intake/Output Summary (Last 24 hours) at 4/17/2024 2135  Last data filed at 4/17/2024 2035  Gross per 24 hour   Intake --   Output 825 ml   Net -825 ml       Laboratory  Recent Labs     04/15/24  1007 04/16/24  0259 04/17/24  0144   WBC 17.5* 13.9* 11.5*   RBC 4.49 4.21 4.06*   HEMOGLOBIN 14.2 13.3 12.8   HEMATOCRIT 43.2 40.7 38.9   MCV 96.2 96.7 95.8   MCH 31.6 31.6 31.5   MCHC 32.9 32.7 32.9   RDW 49.3 50.5* 51.0*   PLATELETCT 174 168 167   MPV 10.2 10.2 10.2     Recent Labs     04/15/24  1007 04/16/24  0259 04/16/24  1411 04/17/24  0144 04/17/24  0901 04/17/24  1641   SODIUM 149* 148*   < > 148* 146* 145   POTASSIUM 4.1 3.8  --  4.3  --   --    CHLORIDE 117* 117*  --  115*  --   --    CO2 22 21  --  21  --   --    GLUCOSE 171* 216*  --  171*  --   --    BUN 41* 35*  --  29*  --   --    CREATININE 1.03 0.88  --  0.70  --   --    CALCIUM 9.2 9.3  --  9.2  --   --     < > = values in this interval not displayed.                   Imaging  DX-ABDOMEN FOR TUBE PLACEMENT   Final Result      Feeding tube looped in the stomach the distal tip in the gastric fundus.      CT-HEAD WITH & W/O   Final Result      1.  Redemonstration of right frontal lobe mass, highly concerning for metastasis. Associated vasogenic edema and a 2.5 mm right to left midline shift. No progressive mass effect. No herniation.   2.  Nonspecific ill-defined enhancement in the right parietal lobe.   3.  No new large vascular structure infarct. No new intracranial hemorrhage.      DX-CHEST-LIMITED (1 VIEW)   Final Result      No significant interval change.      DX-ABDOMEN COMPLETE WITH AP OR PA CXR   Final Result      1. No acute cardiopulmonary abnormality.   2. Chronic left lower lobe scarring.    3. Nonobstructive bowel gas pattern. Small amount of stool throughout the colon.           Assessment/Plan  Problem Representation:    * Sepsis secondary to UTI (HCC)- (present on admission)  Assessment & Plan  RESOLVED  This is Sepsis Present on admission  SIRS criteria identified on my evaluation include: Tachycardia, with heart rate greater than 90 BPM and Leukocytosis, with WBC greater than 12,000  Clinical indicators of end organ dysfunction include Lactic Acid greater than 2 and Toxic Metabolic Encephalopathy  Source is UTI  Sepsis protocol initiated  Crystalloid Fluid Administration: Fluid resuscitation ordered per standard protocol - 30 mL/kg per current or ideal body weight  IV antibiotics as appropriate for source of sepsis  Reassessment: I have reassessed the patient's hemodynamic status  -s/p CTX x5d  -received sepsis bolus in ED, will continue IVF with caution  - follow blood cx x 2, urine cx pending       Dysphagia- (present on admission)  Assessment & Plan  Possibly due to CVA vs brain metastases and altered mental status in setting of infection.  Patient will remain NPO as she failed SLP evaluation 4/17  -NG tube to be placed 4/17 to provide nutrition and meds  -aspiration precautions      Acute metabolic encephalopathy- (present on admission)  Assessment & Plan  Etiology at this time likely multifactorial, as she has multiple possible causes including UTI, constipation, medications (recently started norco, on top of other high risk medications such as baclofen, gabapentin), recent findings of subacute CVA as well as brain mets may all be contributing.  EEG on 4/14 did not demonstrate any seizures but was concerning for signs of brain distress as would be expected in setting of edema. No new findings on CTH.  -will hold majority of sedating medications, she does have significant cancer related pain so we will provide some PRN oxy and dilaudid, hold norco, baclofen, gabapentin for now  -continue  lacosamide for seizure ppx  -fall, seizure, aspiration precautions  -Consider discontinuing scheduled neurochecks    Malignant neoplasm metastatic to brain (HCC)- (present on admission)  Assessment & Plan  Brain mets noted during hospitalization on 3/27/2024 with vasogenic edema, MRI on 4/11 shows continued brain mets with vasogenic edema.    -continue seizure prophylaxis with lacosamide 150 mg BID  -continue dexamethasone   Continue fall, aspiration, seizure precaution.      Advance care planning- (present on admission)  Assessment & Plan  At this time patient's daughter wants her to be full code.  Palliative care consulted and discussed with family the goals of care. This remains the same.    Endometrial cancer (HCC)- (present on admission)  Assessment & Plan  Followed by Dr. Garcia outpatient and sees radiation oncology Dr Live.  Received immunotherapy at [facility] in 2021 with initial good response.   -Complicated by metastases to brain among other locations, likely contributing to her altered mental status. Mets re demonstrated without significant change on CTH on admission.  -Decadron for vasogenic edema  -Neurochecks every 4 hours, continue aspiration, fall, seizure precaution.  -Will reach out to Dr. Live for comparison of recent brain imaging and if there are any significant changes.    Hypernatremia  Assessment & Plan  Sodium oscillates above and below 145. Reacting to changes by switching from D5W and D5 half normal saline. Suspect regulation of sodium will greatly improve as patient receives fluids and nutrition via NG tube.  -Continue to monitor with daily labs  -Consider further workup of etiology if does not balance with enteral nutrition and fluids    Seizure (HCC)  Assessment & Plan  EEG did not show evidence of seizure. Antiepileptic continued given multitude of risk factors.  -Continue lacosamide 150 mg IV BID    Shingles  Assessment & Plan  Noted under left breast and onto lateral chest wall.  On  reevaluation 4/16/2024, lesions are flat and do not appear vesicular.  - Swab and testing for shingles pending  -contact precautions  -Switched to acyclovir IV in setting of dysphagia and n.p.o.    History of CVA (cerebrovascular accident)- (present on admission)  Assessment & Plan  Residual right sided neurodeficits form CVA in 2010.  Has RUE spasticity, RLE pain.  New subacute CVA found on 4/11 MRI at [facility].  -hold chemical DVT ppx due to proximity of CVA  -consider starting asa when appropriate  -continue atorvastatin    Hypermagnesemia  Assessment & Plan  Expect improvement with enteral food and fluids after NG tube. Currently Mg 3.  -Monitor with daily labs    Vasogenic edema (HCC)- (present on admission)  Assessment & Plan  Continuing decadron taper per Dr. Live's recommendations.  -Appreciate rad onc's input on changes (if any) of brain imaging over last month    Constipation  Assessment & Plan  Resolved  - Bowel regimen    Acute kidney injury (HCC)- (present on admission)  Assessment & Plan  RESOLVED  Likely pre-renal in the setting of sepsis.  -continue fluid resuscitation as above  -renally dose all medications as appropriate  -avoid nephrotoxins    Obesity (BMI 30-39.9)- (present on admission)  Assessment & Plan  Can discuss when mentation improves.    Essential hypertension- (present on admission)  Assessment & Plan  Patient noted to be hypertensive, unable to take amlodipine, losartan, metoprolol by p.o. route  IV as needed antihypertensive medication         VTE prophylaxis: SCDs/TEDs    I have performed a physical exam and reviewed and updated ROS and Plan today (4/17/2024). In review of yesterday's note (4/16/2024), there are no changes except as documented above.

## 2024-04-18 NOTE — PROGRESS NOTES
Patient placed in restraints due to pt attempting to pull out IRIS NG Tube as soon as placed. Per patients condition it is dire that the NG tube remain in place. Restraints applied and Dr. Grover notified. MD placed order. Family at bedside understood reasoning behind placement of restraints at this time.

## 2024-04-18 NOTE — PROGRESS NOTES
Inpatient Palliative Medicine Progress Note     Jairo Rivas  73 y.o. female  [id number]    Location = [facility]  Referral Source = Sima Bernal M.d.    PCP = ZULEYKA Lyons    Reason for palliative medicine consultation and/or visit: ACP         Assessment and Plan:     GOAL(S) OF CARE = LONGEVITY, a chance at [facility] for immunotherapy again (daughter Danita lives in [place])    SYMPTOMS ETIOLOGY/CAUSES = confusion & dysphagia secondary secondary to sepsis, recent stroke, and progressing metastatic endometrial adenocarcinoma to brain    PROGNOSIS = overall unfavorable & highly guarded, lingering delirium & encephalopathy & NPO.    CODE STATUS = FULL at this time      ADVANCE CARE PLANNING =   Relevant recent history reviewed.  Oncology history reviewed.    Anticipatory education on care philosophy and rational for limited NGT feed trial (~1wk) if family were to elect.  Informed daughter Carolyn and her  Michael about importance of caloric intake thru NGT, for family's stated goal at this time.  Educated family how limited trial of NGT can add some clarity to family, regarding how close or how far they are to achieving their goal, but without prolonging pt in an undesirable state.  Shared with family about my own professional reservation about how much calories along can change patient's trajectory, hence my recommendation for limited TF trial but no longer.  Educated family on importance of periodic reassessment and avoidance of non-beneficial measures, if refractory at reassessment.    PALLIATIVE CARE TEAM INTERVENTIONS =   1.ACP for anticipatory guidance & overall care philosophy, today very much the same talk with the other daughter Danita, who drove up from [place], [place].  2. Case discussed with oncology RN navigator Isa Zaidi. Relayed family's wish for an oncology second opinion, in addition to Dr. Garcia's team.  3. Will coordinate with  to see if we can find a [employer]  Imam to visit & pray or not.  4. Will follow and support pt & family to our utmost in this difficult time.    5. Family agreeable tube feed. Provided caution again restraints, lack of improvement, etc. They understand. GOC unchanged.    6. Encouraged Radha Samayoa and their 2 [place] to visit if/when possible, as clinical outcome remains highly uncertain.      Summary =   Jairo Rivas is a 73 y.o.  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    Despite care so far, patient unfortunately remains encephalopathic and unable to pass SLP swallow evaluation yet. She is a [facility] patient and has been followed by Dr. Garcia since 2019. Patient unfortunately remains unfit for further cancer directed treatment at this time.  ------------------------------------------------------------------------------------------------------------------------------------------------------    4/17/2024  Very extensive discussion with patient's daughter Danita about patient's current care. History reviewed. Recent studies, labs, imaging reviewed together, including recent NSG notes prior to radiation therapy.    Family have decided on NGT trial to see if this can help patient improve some more. They do feel patient is more alert, although her cognition remains quite short off baseline as well.    They are certain continued aggressive care, including ICU care & life support are in line with patient and family's values. Patient has been chair bound since 2010, even while she fought her endometrial cancer, so they have been down this road before.    They are taking things a day at a time, and hoping to provide  "best care to best optimize patient, however much possible... They all feel like they have been well updated by medical team so far, and do understand the very real risk of deterioration/decline, as much as they try to remain hopeful.    Patient has always fought hard. 2 years ago, hospice was discussed with Dr. Garcia's team, but in the end patient moved down to [place] to live with Danita and was treated at [facility] with fairly good response, despite already burdened with brain mets at that time. This experience weighed heavily in family's mind. They have learned to ask good questions and leave no stones unturned.    Extensive education provided, putting on Danita's radar about various risk on the roads ahead: NGT pulling, restraints & suffering, inability to get any cancer treatment if unable to wean off life support in ICU, etc. She understood and appeared overall quite sophisticated in her thoughts and questions, and much like her sister Carolyn, with patient's best interests in mind and in alignment with their best understanding of patient's values/preferences.    Assured Danita that medical team will continue to provide the best support we can, in as good an alignment as we can with their goals and values. Informed Danita that the only thing that needs to be prevented is suffering itself and, if/when medical team or family are concerned about suffering, that will be a great time to talk more about what we are doing. Danita ackowledged understanding.    Encouraged Radha Samayoa and their 2 [place] on [place] to visit if/when possible, as clinical outcome remains highly uncertain.      4/16/2024  Met with patient, her daughter Carolyn, and Carolyn's  Michael bedside. Introduced myself, my scope of practice, and reasons for this consultation.Carolyn and Michael were amenable to proceed with this visit.    Patient were self were delirious and minimally conversive. She was able to say \"no\" when I inquired about her " "pain & discomfort. That was the extent of our conversations.    Patient is originally from [place]. Carolyn is her only blood relative here in [facility]. Carolyn's sister Danita resides in [place]. Thanks to the support of Danita's family, patient was able to receive cancer treatments at [facility] [id number], with fairly good response at that time, all things considered.    According to Carolyn, patient's has been a \"real tough cookie.\" One of the main reasons that led to her prior stint at [facility] oncology was disease progression & hospice discussion. Carolyn and family were amazed how patient was able to surpass expectations a few times.    Patient is a devout Yarsanism and prays regularly. Daughter is amenable to a Imam visiting if it can be arranged for patient's spiritual support.     Patient is said to enjoy \"shopping & casinos, and that's why she came back to [place] in AUG 2023,\" Carolyn told me.        We talked about family's outstanding goal of care, which is to try to optimize pt for immunotherapy again, if possible, be it rehab or going to [facility] again.    We discussed imminent road blocks at this time = dysphagia & NPO status, infection, continued delirium/confusion, low function. We discussed at length about a time limited (~1wk or so) TF trial to see if it is possible to get patient closer to family's goal again, and reassess frequently to make sure we do not prolong decline if not responding to treatments.    Carolyn is rightfully worried about side effects of NTG - infection, bleed, pain/discomfort, decline prolongation - and I reinforced the importance of the time-limited nature of NTG trial but no longer. Also shared my honest professional opinion that likely, all things considered, calories alone will not get patient close enough to their goal for more immunotherapy.    Carolyn has lots of thoughts. She understands the incurable nature of patient's cancer and ongoing decline over the years, as tough as patient has " weathered all these challenges.    We agreed to a family meeting tomorrow 1400 to further discuss with family about roads ahead,         Medical Decision Making =  Patient is of high medical complexity with incurable disease = endometrial adenocarcinoma metastatic to brain & lung  Care complexity further complicated by secondary conditions = 2010 CVA with residual R deficits + new subacute CVA, acute on chronic baseline delirium/hallucinations, dysphagia NPO status, sepsis    Extensive data reviewed & coordination performed = recent notes, latest labs & studies, GYN past history, ONC past history, [facility] notes, etc.    High risk of morbidity from additional interventions &  studies = NPO & low functioning & still delirious, high risk medication usage including continuous fluid        Advance care planning  =     Total time spent in ACP discussion 30 minutes, which is separate from the time spent completing the evaluation and management visit.     Thank you for allowing me the opportunity to participate in the care of Jairo Rivas     I spent a total of 50minutes reviewing medical records, direct face-to-face time with the patient and/or family, documentation and coordination of care. This is separate from the time spent on advance care planning, which is documented above.         ILEANA (JUANJOSE) DO CICI  [facility]   [address] [place] [address]  [id number]  P: [phone]  F: [phone]

## 2024-04-18 NOTE — CARE PLAN
The patient is Watcher - Medium risk of patient condition declining or worsening    Shift Goals  Clinical Goals: q2 turns; neuro checks; ng management  Patient Goals: jayla  Family Goals: start tube feed    Progress made toward(s) clinical / shift goals:    Problem: Urinary - Renal Perfusion  Goal: Ability to achieve and maintain adequate renal perfusion and functioning will improve  Outcome: Progressing     Problem: Respiratory  Goal: Patient will achieve/maintain optimum respiratory ventilation and gas exchange  Outcome: Progressing     Problem: Skin Integrity  Goal: Skin integrity is maintained or improved  Outcome: Progressing     Problem: Fall Risk  Goal: Patient will remain free from falls  Outcome: Progressing       Patient is not progressing towards the following goals:

## 2024-04-18 NOTE — ASSESSMENT & PLAN NOTE
RESOLVED  Expect improvement with enteral food and fluids after NG tube. Currently Mg 3.  -Monitor with daily labs

## 2024-04-18 NOTE — PROGRESS NOTES
Primary RN informed charge RN Michelle that gastric tube was coiled in the stomach and requested help with the situation. Contacted NAM who got a TICU nurse to come and adjust the tube. Per the TICU RN, the tube was pulled out 15cm. Xray revealed the tube was still coiled although in the gastric fundus. Informed family about this. Family requested the tube to be adjusted again. Michelle discussed with NAM and another TICU RN and received recommendation to pull the tube another 10cm. Tube was pulled out another 10cm and X ray was obtained again. Waiting on results to safely run tube feeding.

## 2024-04-18 NOTE — DISCHARGE PLANNING
"HTH/SCP TCN chart review completed. Collaborated with JER Carmona.  Note that palliative met with pt/family yesterday. Pt currently remains full code and note tube feed trial has been started as well. As prior, current discharge considerations are anticipated for dc to home with resumption of [facility] services and family support. Noted palliative DO has reached out to this TCN and reports discussion with daughters has occurred and noted no change in status or current GOC/POC.     As prior, pt is known to this TCN from prior admits as well. Note per this TCN from prior admit on 3/28, \"per discussion with pt she reports that she prefers to dc to home with HH and assistance from her daughter (who pt reports was assisting with transfers PTA as well). Pt reports daughter used to be paramedic and is aware of how to assist given current deficits\".  Note per prior TCN from 4/15 (see note for details re: home, equipment, etc), daughter provides maximum assistance with ADL's and provides full assistance for all IADL's and daughter Carolyn requests patient to come home with Resumption of [facility] and paid caregivers if needed.        TCN will continue to follow and collaborate with discharge planning team as additional post acute needs arise. Thank you.    Completed:  Choice obtained: HH (See above)  SCP with [facility]  "

## 2024-04-19 LAB
ALBUMIN SERPL BCP-MCNC: 2.8 G/DL (ref 3.2–4.9)
ALBUMIN/GLOB SERPL: 0.9 G/DL
ALP SERPL-CCNC: 62 U/L (ref 30–99)
ALT SERPL-CCNC: 15 U/L (ref 2–50)
ANION GAP SERPL CALC-SCNC: 10 MMOL/L (ref 7–16)
AST SERPL-CCNC: 12 U/L (ref 12–45)
BACTERIA WND AEROBE CULT: ABNORMAL
BASOPHILS # BLD AUTO: 0.1 % (ref 0–1.8)
BASOPHILS # BLD: 0.01 K/UL (ref 0–0.12)
BILIRUB SERPL-MCNC: 0.4 MG/DL (ref 0.1–1.5)
BUN SERPL-MCNC: 58 MG/DL (ref 8–22)
CALCIUM ALBUM COR SERPL-MCNC: 9.9 MG/DL (ref 8.5–10.5)
CALCIUM SERPL-MCNC: 8.9 MG/DL (ref 8.5–10.5)
CHLORIDE SERPL-SCNC: 118 MMOL/L (ref 96–112)
CO2 SERPL-SCNC: 22 MMOL/L (ref 20–33)
CREAT SERPL-MCNC: 1.11 MG/DL (ref 0.5–1.4)
CRP SERPL HS-MCNC: 8.34 MG/DL (ref 0–0.75)
EOSINOPHIL # BLD AUTO: 0 K/UL (ref 0–0.51)
EOSINOPHIL NFR BLD: 0 % (ref 0–6.9)
ERYTHROCYTE [DISTWIDTH] IN BLOOD BY AUTOMATED COUNT: 53.8 FL (ref 35.9–50)
GFR SERPLBLD CREATININE-BSD FMLA CKD-EPI: 53 ML/MIN/1.73 M 2
GLOBULIN SER CALC-MCNC: 3 G/DL (ref 1.9–3.5)
GLUCOSE BLD STRIP.AUTO-MCNC: 164 MG/DL (ref 65–99)
GLUCOSE BLD STRIP.AUTO-MCNC: 179 MG/DL (ref 65–99)
GLUCOSE BLD STRIP.AUTO-MCNC: 181 MG/DL (ref 65–99)
GLUCOSE BLD STRIP.AUTO-MCNC: 223 MG/DL (ref 65–99)
GLUCOSE SERPL-MCNC: 208 MG/DL (ref 65–99)
GRAM STN SPEC: ABNORMAL
HCT VFR BLD AUTO: 36.1 % (ref 37–47)
HGB BLD-MCNC: 11.7 G/DL (ref 12–16)
IMM GRANULOCYTES # BLD AUTO: 0.07 K/UL (ref 0–0.11)
IMM GRANULOCYTES NFR BLD AUTO: 0.8 % (ref 0–0.9)
LYMPHOCYTES # BLD AUTO: 0.33 K/UL (ref 1–4.8)
LYMPHOCYTES NFR BLD: 3.7 % (ref 22–41)
MAGNESIUM SERPL-MCNC: 3 MG/DL (ref 1.5–2.5)
MCH RBC QN AUTO: 31.6 PG (ref 27–33)
MCHC RBC AUTO-ENTMCNC: 32.4 G/DL (ref 32.2–35.5)
MCV RBC AUTO: 97.6 FL (ref 81.4–97.8)
MONOCYTES # BLD AUTO: 0.47 K/UL (ref 0–0.85)
MONOCYTES NFR BLD AUTO: 5.2 % (ref 0–13.4)
NEUTROPHILS # BLD AUTO: 8.08 K/UL (ref 1.82–7.42)
NEUTROPHILS NFR BLD: 90.2 % (ref 44–72)
NRBC # BLD AUTO: 0.02 K/UL
NRBC BLD-RTO: 0.2 /100 WBC (ref 0–0.2)
PHOSPHATE SERPL-MCNC: 3.3 MG/DL (ref 2.5–4.5)
PLATELET # BLD AUTO: 124 K/UL (ref 164–446)
PMV BLD AUTO: 10.6 FL (ref 9–12.9)
POTASSIUM SERPL-SCNC: 4.3 MMOL/L (ref 3.6–5.5)
PREALB SERPL-MCNC: 28.2 MG/DL (ref 18–38)
PROT SERPL-MCNC: 5.8 G/DL (ref 6–8.2)
RBC # BLD AUTO: 3.7 M/UL (ref 4.2–5.4)
SIGNIFICANT IND 70042: ABNORMAL
SITE SITE: ABNORMAL
SODIUM SERPL-SCNC: 147 MMOL/L (ref 135–145)
SODIUM SERPL-SCNC: 150 MMOL/L (ref 135–145)
SOURCE SOURCE: ABNORMAL
WBC # BLD AUTO: 9 K/UL (ref 4.8–10.8)

## 2024-04-19 PROCEDURE — 99233 SBSQ HOSP IP/OBS HIGH 50: CPT | Mod: GC | Performed by: INTERNAL MEDICINE

## 2024-04-19 PROCEDURE — 700102 HCHG RX REV CODE 250 W/ 637 OVERRIDE(OP): Performed by: INTERNAL MEDICINE

## 2024-04-19 PROCEDURE — C9254 INJECTION, LACOSAMIDE: HCPCS | Performed by: STUDENT IN AN ORGANIZED HEALTH CARE EDUCATION/TRAINING PROGRAM

## 2024-04-19 PROCEDURE — 770020 HCHG ROOM/CARE - TELE (206)

## 2024-04-19 PROCEDURE — 665998 HH PPS REVENUE CREDIT

## 2024-04-19 PROCEDURE — 665999 HH PPS REVENUE DEBIT

## 2024-04-19 PROCEDURE — A9270 NON-COVERED ITEM OR SERVICE: HCPCS | Performed by: INTERNAL MEDICINE

## 2024-04-19 PROCEDURE — 80053 COMPREHEN METABOLIC PANEL: CPT

## 2024-04-19 PROCEDURE — 99233 SBSQ HOSP IP/OBS HIGH 50: CPT | Mod: 25 | Performed by: STUDENT IN AN ORGANIZED HEALTH CARE EDUCATION/TRAINING PROGRAM

## 2024-04-19 PROCEDURE — 700111 HCHG RX REV CODE 636 W/ 250 OVERRIDE (IP): Performed by: STUDENT IN AN ORGANIZED HEALTH CARE EDUCATION/TRAINING PROGRAM

## 2024-04-19 PROCEDURE — 85025 COMPLETE CBC W/AUTO DIFF WBC: CPT

## 2024-04-19 PROCEDURE — A9270 NON-COVERED ITEM OR SERVICE: HCPCS

## 2024-04-19 PROCEDURE — 84100 ASSAY OF PHOSPHORUS: CPT

## 2024-04-19 PROCEDURE — 700102 HCHG RX REV CODE 250 W/ 637 OVERRIDE(OP)

## 2024-04-19 PROCEDURE — 86140 C-REACTIVE PROTEIN: CPT

## 2024-04-19 PROCEDURE — 99497 ADVNCD CARE PLAN 30 MIN: CPT | Performed by: STUDENT IN AN ORGANIZED HEALTH CARE EDUCATION/TRAINING PROGRAM

## 2024-04-19 PROCEDURE — 700105 HCHG RX REV CODE 258

## 2024-04-19 PROCEDURE — 700111 HCHG RX REV CODE 636 W/ 250 OVERRIDE (IP): Mod: JZ | Performed by: INTERNAL MEDICINE

## 2024-04-19 PROCEDURE — 84295 ASSAY OF SERUM SODIUM: CPT

## 2024-04-19 PROCEDURE — 83735 ASSAY OF MAGNESIUM: CPT

## 2024-04-19 PROCEDURE — 84134 ASSAY OF PREALBUMIN: CPT

## 2024-04-19 PROCEDURE — 82962 GLUCOSE BLOOD TEST: CPT | Mod: 91

## 2024-04-19 PROCEDURE — 36415 COLL VENOUS BLD VENIPUNCTURE: CPT

## 2024-04-19 RX ORDER — GABAPENTIN 100 MG/1
200 CAPSULE ORAL 3 TIMES DAILY
Status: DISCONTINUED | OUTPATIENT
Start: 2024-04-19 | End: 2024-04-22

## 2024-04-19 RX ORDER — BACLOFEN 10 MG/1
5 TABLET ORAL 3 TIMES DAILY
Status: DISCONTINUED | OUTPATIENT
Start: 2024-04-19 | End: 2024-04-22

## 2024-04-19 RX ORDER — DEXTROSE MONOHYDRATE 25 G/50ML
25 INJECTION, SOLUTION INTRAVENOUS
Status: DISCONTINUED | OUTPATIENT
Start: 2024-04-19 | End: 2024-04-19

## 2024-04-19 RX ORDER — DEXTROSE MONOHYDRATE 25 G/50ML
25 INJECTION, SOLUTION INTRAVENOUS
Status: DISCONTINUED | OUTPATIENT
Start: 2024-04-19 | End: 2024-04-23

## 2024-04-19 RX ORDER — DEXTROSE MONOHYDRATE 50 MG/ML
INJECTION, SOLUTION INTRAVENOUS CONTINUOUS
Status: DISCONTINUED | OUTPATIENT
Start: 2024-04-19 | End: 2024-04-19

## 2024-04-19 RX ADMIN — INSULIN HUMAN 1 UNITS: 100 INJECTION, SOLUTION PARENTERAL at 06:22

## 2024-04-19 RX ADMIN — BACLOFEN 10 MG: 10 TABLET ORAL at 12:07

## 2024-04-19 RX ADMIN — ENOXAPARIN SODIUM 40 MG: 100 INJECTION SUBCUTANEOUS at 17:46

## 2024-04-19 RX ADMIN — METOPROLOL TARTRATE 50 MG: 25 TABLET, FILM COATED ORAL at 17:43

## 2024-04-19 RX ADMIN — INSULIN HUMAN 1 UNITS: 100 INJECTION, SOLUTION PARENTERAL at 00:00

## 2024-04-19 RX ADMIN — VALACYCLOVIR 1000 MG: 500 TABLET, FILM COATED ORAL at 12:24

## 2024-04-19 RX ADMIN — GABAPENTIN 200 MG: 100 CAPSULE ORAL at 17:41

## 2024-04-19 RX ADMIN — DEXAMETHASONE 4 MG: 4 TABLET ORAL at 06:23

## 2024-04-19 RX ADMIN — POLYETHYLENE GLYCOL 3350 1 PACKET: 17 POWDER, FOR SOLUTION ORAL at 06:24

## 2024-04-19 RX ADMIN — DEXTROSE MONOHYDRATE: 50 INJECTION, SOLUTION INTRAVENOUS at 08:46

## 2024-04-19 RX ADMIN — INSULIN HUMAN 1 UNITS: 100 INJECTION, SOLUTION PARENTERAL at 17:58

## 2024-04-19 RX ADMIN — LACOSAMIDE 150 MG: 10 INJECTION INTRAVENOUS at 06:23

## 2024-04-19 RX ADMIN — MICONAZOLE NITRATE: 20 CREAM TOPICAL at 17:51

## 2024-04-19 RX ADMIN — LOSARTAN POTASSIUM 100 MG: 50 TABLET, FILM COATED ORAL at 06:23

## 2024-04-19 RX ADMIN — VALACYCLOVIR 1000 MG: 500 TABLET, FILM COATED ORAL at 18:02

## 2024-04-19 RX ADMIN — MICONAZOLE NITRATE: 20 CREAM TOPICAL at 06:24

## 2024-04-19 RX ADMIN — GABAPENTIN 300 MG: 300 CAPSULE ORAL at 06:20

## 2024-04-19 RX ADMIN — INSULIN HUMAN 2 UNITS: 100 INJECTION, SOLUTION PARENTERAL at 12:13

## 2024-04-19 RX ADMIN — VALACYCLOVIR 1000 MG: 500 TABLET, FILM COATED ORAL at 06:23

## 2024-04-19 RX ADMIN — LACOSAMIDE 150 MG: 10 INJECTION INTRAVENOUS at 17:39

## 2024-04-19 RX ADMIN — AMLODIPINE BESYLATE 5 MG: 5 TABLET ORAL at 06:23

## 2024-04-19 RX ADMIN — METOPROLOL TARTRATE 50 MG: 25 TABLET, FILM COATED ORAL at 06:23

## 2024-04-19 RX ADMIN — SENNOSIDES AND DOCUSATE SODIUM 2 TABLET: 50; 8.6 TABLET ORAL at 17:41

## 2024-04-19 RX ADMIN — DEXAMETHASONE 4 MG: 4 TABLET ORAL at 17:51

## 2024-04-19 RX ADMIN — GABAPENTIN 300 MG: 300 CAPSULE ORAL at 12:25

## 2024-04-19 RX ADMIN — BACLOFEN 10 MG: 10 TABLET ORAL at 06:23

## 2024-04-19 RX ADMIN — BACLOFEN 5 MG: 10 TABLET ORAL at 17:42

## 2024-04-19 RX ADMIN — ATORVASTATIN CALCIUM 20 MG: 20 TABLET, FILM COATED ORAL at 17:42

## 2024-04-19 RX ADMIN — LANSOPRAZOLE 30 MG: 30 TABLET, ORALLY DISINTEGRATING ORAL at 06:23

## 2024-04-19 NOTE — DISCHARGE PLANNING
Case Management Discharge Planning    Admission Date: 4/13/2024  GMLOS: 5.1  ALOS: 6    6-Clicks ADL Score: 6  6-Clicks Mobility Score: 6  PT and/or OT Eval ordered: No  Post-acute Referrals Ordered: No  Post-acute Choice Obtained: Yes  Has referral(s) been sent to post-acute provider:  No      Anticipated Discharge Dispo: Discharge Disposition: D/T to home under HHA care in anticipation of covered skilled care (06)    DME Needed: No    Action(s) Taken: Chart review completed and attended IDT rounds. Per pt/family choice anticipate DC home with family support and HHC once medically clear.    Escalations Completed: None    Medically Clear: No    Next Steps: Care coordination will remain available to assist with DC needs    Barriers to Discharge: Medical clearance    Is the patient up for discharge tomorrow: No

## 2024-04-19 NOTE — PALLIATIVE CARE
Spiritual Care Note    Patient's Name: Jairo Rivas   MRN: [id number]    YOB: 1950   Age and Gender: 73 y.o. female   Unit/Service Area: [place] [address] Med/Tele   Room (and Bed): S155   Ethnicity or Nationality:    Primary Language: English   Lutheran/Spiritual Preference: Restorationism   Place of Residence: [place], [place]   Medical Diagnoses/Procedures: sepsis 2/2 UTI   dysphagia  acute metabolic encephalopathy  malignant neoplasm metastatic to brain   endometrial cancer  hypernatremia  seizure   rash and nonspecific skin eruption  HEAVEN  h/o CVA   DM2        w/ hyperglycemia       w/o long-term current use of insulin   hypermagnesemia  vasogenic edema  essential hypertension   Code Status: Full Code    Date of Admission: 4/13/2024   Length of Stay: 6 days        Nature of the Visit:   Initial    Referral from/Origin of Request:   Epic Palliative Care APRN Order    Referral to:   [employer] spiritual care volunteer    Interventions:   Spoke to Ti Nj of the [facility] [facility] who will arrange for a spiritual care visitor for the patient.      Spiritual Care Provider   Chaplain KHUSHBU Florian  [phone]   [email or web address]

## 2024-04-19 NOTE — DISCHARGE PLANNING
TCN following. HTH/SCP chart reviewed. No new TCN needs identified. Please see prior TCN note from 4/18 for most recent discharge planning considerations if indicated.     Completed:  Choice obtained: HERLINDA   SCP with [facility]

## 2024-04-19 NOTE — CARE PLAN
The patient is Stable - Low risk of patient condition declining or worsening    Shift Goals  Clinical Goals: Q2 turns, Neuro checks  Patient Goals: ZA  Family Goals: Rest, for patient to get better    Progress made toward(s) clinical / shift goals:    Problem: Safety - Medical Restraint  Goal: Remains free of injury from restraints (Restraint for Interference with Medical Device)  Description: INTERVENTIONS:  1. Determine that other, less restrictive measures have been tried or would not be effective before applying the restraint  2. Evaluate the patient's condition at the time of restraint application  3. Educate patient/family regarding the reason for restraint  4. Q2H: Monitor safety, psychosocial status, comfort, circulation, respiratory status, LOC, nutrition and hydration  Outcome: Progressing   Patient still pulling at lines and show no evidence of learning this shift.

## 2024-04-19 NOTE — CARE PLAN
The patient is Stable - Low risk of patient condition declining or worsening    Shift Goals  Clinical Goals: q2 turns; neuro checks; ng management  Patient Goals: jayal  Family Goals: start tube feed    Progress made toward(s) clinical / shift goals:    Problem: Safety - Medical Restraint  Goal: Remains free of injury from restraints (Restraint for Interference with Medical Device)  4/18/2024 2329 by Rebekah East R.N.  Outcome: Progressing  Note: Patient safety maintained and remained free from injury throughout shift.   4/18/2024 2328 by Rebekah East R.N.  Outcome: Progressing  Goal: Free from restraint(s) (Restraint for Interference with Medical Device)  4/18/2024 2329 by Rebekah East R.N.  Outcome: Progressing  4/18/2024 2328 by Rebekah East, R.N.  Outcome: Progressing       Patient is not progressing towards the following goals:

## 2024-04-19 NOTE — PROGRESS NOTES
[facility] Internal Medicine Daily Progress Note    Date of Service  4/19/2024    [facility] Team: R LATES[facility] Angel Team   Attending: Sima Bernal M.d.  Senior Resident: Dr. Rojas  Intern:  Dr. Sarmiento  Contact Number: [phone]    Chief Complaint  Jairo Rivas is a 73 y.o. female admitted 4/13/2024 with painful urination and constipation.     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 It is noted that patient has been having abdominal pain and constipation, however, she has had 2 bowel movements since being admitted.  Her UA in the ED was consistent with UTI.  She had leukocytosis with WBC 24.  Lactic acid 3.  Blood cultures negative thus far.  Started on Rocephin for 3 days to treat UTI.     CT scan of the head was obtained, noted to have pneumatization of the right frontal lobe mass highly concerning for metastasis; associated with genic edema and 2.5 mm right to left midline shift.  Patient continued to remain on Decadron. Also GYN oncology was consulted, discussed the prognosis of the patient, stated that patient is not a candidate for any treatment.  Patient daughter wanted her to be full code at this time.  After several days without any food, NG tube finally placed.  Successful tube feeds.  Patient requiring restraint to prevent her from removing tube.    Pending VZV PCR for rash.    Interval Problem Update  NAEON. Afebrile, HR , stable vitals. Sodium increased to 150. Patient did not receive free water flushes yesterday or overnight. Patient does not awake to vocal or physical cues.  Other than baclofen and gabapentin, no other pain medications  or sedating medications provided prior to seeing patient.  Rash appears stable. Daughters not present in room both times I stopped by this morning. Her creatinine increased to 1.11, her baseline is around 1.7.  Adequate urine output.  Blood pressure is improved since adding back p.o. medications.  Some improvement in electrolytes since starting tube feeds.    I have discussed this patient's plan of care and discharge plan at IDT rounds today with Case Management, Nursing, Nursing leadership, and other members of the IDT team.    Consultants/Specialty  neurology and surgical oncology     Code Status  Full Code    Disposition  Medically Cleared  I have placed the appropriate orders for post-discharge needs.    Review of Systems  Review of Systems   Unable to perform ROS: Medical condition        Physical Exam  Temp:  [36.2 °C (97.2 °F)-36.6 °C (97.9 °F)] 36.5 °C (97.7 °F)  Pulse:  [] 89  Resp:  [16-18] 16  BP: (101-123)/(46-74) 123/70  SpO2:  [93 %-95 %] 93 %    Physical Exam  Vitals (exam limited) reviewed.   Constitutional:       General: She is sleeping.      Appearance: She is obese. She is ill-appearing.      Interventions: She is restrained.      Comments: NG tube in place  Patient does not appear agitated this morning   HENT:      Head: Normocephalic and atraumatic.      Mouth/Throat:      Mouth: Mucous membranes are dry.   Abdominal:      General: There is distension.      Palpations: There is mass.   Skin:     Comments: Skin breakdown under breasts. Dark red non pustular rash on left just below her breast. Called shingles but does not appear the same as shingles rash.   Neurological:      Mental Status: She is lethargic.     Much of the physical exam was not completed due to the patient being in such discomfort and being unable to verbally express it.     Fluids    Intake/Output Summary (Last 24 hours) at 4/19/2024 1455  Last data filed at 4/19/2024 1447  Gross per 24 hour   Intake 1221.67 ml   Output  851 ml   Net 370.67 ml       Laboratory  Recent Labs     04/17/24  0144 04/18/24  0719 04/19/24  0636   WBC 11.5* 8.4 9.0   RBC 4.06* 4.11* 3.70*   HEMOGLOBIN 12.8 13.1 11.7*   HEMATOCRIT 38.9 39.8 36.1*   MCV 95.8 96.8 97.6   MCH 31.5 31.9 31.6   MCHC 32.9 32.9 32.4   RDW 51.0* 51.6* 53.8*   PLATELETCT 167 150* 124*   MPV 10.2 10.3 10.6     Recent Labs     04/17/24  0144 04/17/24  0901 04/18/24  0719 04/19/24  0636 04/19/24  1139   SODIUM 148*   < > 147* 150* 147*   POTASSIUM 4.3  --  4.1 4.3  --    CHLORIDE 115*  --  116* 118*  --    CO2 21  --  21 22  --    GLUCOSE 171*  --  140* 208*  --    BUN 29*  --  28* 58*  --    CREATININE 0.70  --  0.63 1.11  --    CALCIUM 9.2  --  9.3 8.9  --     < > = values in this interval not displayed.                   Imaging  DX-ABDOMEN FOR TUBE PLACEMENT   Final Result         1.  Nonspecific bowel gas pattern in the upper abdomen.   2.  Dobbhoff tube is coiled within the stomach, the tip terminates overlying the expected location of the gastric fundus, physician similar to prior study.   3.  Left lower lobe infiltrate      DX-ABDOMEN FOR TUBE PLACEMENT   Final Result         1.  Nonspecific bowel gas pattern in the upper abdomen.   2.  Dobbhoff tube is coiled within the stomach, the tip terminates overlying the expected location of the gastric fundus.   3.  Left basilar atelectasis      DX-ABDOMEN FOR TUBE PLACEMENT   Final Result      Feeding tube looped in the stomach the distal tip in the gastric fundus.      CT-HEAD WITH & W/O   Final Result      1.  Redemonstration of right frontal lobe mass, highly concerning for metastasis. Associated vasogenic edema and a 2.5 mm right to left midline shift. No progressive mass effect. No herniation.   2.  Nonspecific ill-defined enhancement in the right parietal lobe.   3.  No new large vascular structure infarct. No new intracranial hemorrhage.      DX-CHEST-LIMITED (1 VIEW)   Final Result      No significant interval change.       DX-ABDOMEN COMPLETE WITH AP OR PA CXR   Final Result      1. No acute cardiopulmonary abnormality.   2. Chronic left lower lobe scarring.   3. Nonobstructive bowel gas pattern. Small amount of stool throughout the colon.           Assessment/Plan  Problem Representation:    * Sepsis secondary to UTI (HCC)- (present on admission)  Assessment & Plan  RESOLVED  This is Sepsis Present on admission  SIRS criteria identified on my evaluation include: Tachycardia, with heart rate greater than 90 BPM and Leukocytosis, with WBC greater than 12,000  Clinical indicators of end organ dysfunction include Lactic Acid greater than 2 and Toxic Metabolic Encephalopathy  Source is UTI  Sepsis protocol initiated  Crystalloid Fluid Administration: Fluid resuscitation ordered per standard protocol - 30 mL/kg per current or ideal body weight  IV antibiotics as appropriate for source of sepsis  Reassessment: I have reassessed the patient's hemodynamic status  -s/p CTX x5d  -received sepsis bolus in ED, will continue IVF with caution  -No growth in blood cultures or urine cultures      Dysphagia- (present on admission)  Assessment & Plan  Possibly due to CVA vs brain metastases and altered mental status in setting of infection.  Patient will remain NPO as she failed SLP evaluation 4/17  -NG tube placed and nutrition consulted  -aspiration precautions  - PPI started  - SSI and every 6 hours glucose checks      Acute metabolic encephalopathy- (present on admission)  Assessment & Plan  Etiology at this time likely multifactorial, as she has multiple possible causes including UTI, constipation, medications (recently started norco, on top of other high risk medications such as baclofen, gabapentin), recent findings of subacute CVA as well as brain mets may all be contributing.  EEG on 4/14 did not demonstrate any seizures but was concerning for signs of brain distress as would be expected in setting of edema. No new findings on CTH.  -will  hold majority of sedating medications, she does have significant cancer related pain so we will provide some PRN oxy and dilaudid, hold norco, baclofen, gabapentin for now  -continue lacosamide for seizure ppx  -fall, seizure, aspiration precautions  -Consider discontinuing scheduled neurochecks    Malignant neoplasm metastatic to brain (HCC)- (present on admission)  Assessment & Plan  Brain mets noted during hospitalization on 3/27/2024 with vasogenic edema, MRI on 4/11 shows continued brain mets with vasogenic edema.    -continue seizure prophylaxis with lacosamide 150 mg BID  -continue dexamethasone   Continue fall, aspiration, seizure precaution.      Advance care planning- (present on admission)  Assessment & Plan  At this time patient's daughter wants her to be full code.  Palliative care consulted and discussed with family the goals of care. This remains the same.    Endometrial cancer (HCC)- (present on admission)  Assessment & Plan  Followed by Dr. Garcia outpatient and sees radiation oncology Dr Live.  Received immunotherapy at [facility] in 2021 with initial good response.   -Complicated by metastases to brain among other locations, likely contributing to her altered mental status. Mets re demonstrated without significant change on CTH on admission.  -Decadron for vasogenic edema  -Neurochecks every 4 hours, continue aspiration, fall, seizure precaution.  -Will reach out to Dr. Live for comparison of recent brain imaging and if there are any significant changes.    Hypernatremia  Assessment & Plan  Sodium oscillates above and below 145. Reacting to changes by switching from D5W and D5 half normal saline. Suspect regulation of sodium will greatly improve as patient receives fluids and nutrition via NG tube.  -Continue to monitor with daily labs  -Consider further workup of etiology if does not balance with enteral nutrition and fluids  -Discontinued fluids, free water pushes ordered    Seizure (HCC)- (present  on admission)  Assessment & Plan  EEG did not show evidence of seizure. Antiepileptic continued given multitude of risk factors.  -Continue lacosamide 150 mg IV BID    Rash and nonspecific skin eruption- (present on admission)  Assessment & Plan  Noted under left breast and onto lateral chest wall.  On reevaluation 4/16/2024, lesions are flat and do not appear vesicular.  Will continue isolation as a precaution and awaiting VZV PCR results.  - Swab and testing for shingles pending  -contact precautions  -Continue valacyclovir for total of 10 days of treatment  - Miconazole to treat intertrigo    Acute kidney injury (HCC)- (present on admission)  Assessment & Plan  Returned, Cr 1.11 and baseline around 1.7. Likely prerenal from dehydration.  -continue free water flushes  -renally dose all medications as appropriate  -avoid nephrotoxins    History of CVA (cerebrovascular accident)- (present on admission)  Assessment & Plan  Residual right sided neurodeficits form CVA in 2010.  Has RUE spasticity, RLE pain.  New subacute CVA found on 4/11 [facility] at [facility].  -hold chemical DVT ppx due to proximity of CVA  -consider starting asa when appropriate  -continue atorvastatin    Type 2 diabetes mellitus with hyperglycemia, without long-term current use of insulin (HCC)  Assessment & Plan  A1c 6.6, unsure if related to likely multiple courses of steroids  On tube feeds  Glucose checks q6h     Hypermagnesemia  Assessment & Plan  Expect improvement with enteral food and fluids after NG tube. Currently Mg 3.  -Monitor with daily labs    Vasogenic edema (HCC)- (present on admission)  Assessment & Plan  Continuing decadron taper per Dr. Live's recommendations. He also does not believe that there are any significant changes in her brain imaging from this admission that could explain her somnolence/presentation. She is on the correct medication (decadron) for treating the edema    Constipation  Assessment & Plan  Resolved  - Bowel  regimen    Essential hypertension- (present on admission)  Assessment & Plan  - Continue amlodipine 5 mg daily  - Continue losartan 100 mg daily  - Continue metoprolol 100 mg daily  - Continue atorvastatin 20 mg every evening         VTE prophylaxis: SCDs/TEDs    I have performed a physical exam and reviewed and updated ROS and Plan today (4/19/2024). In review of yesterday's note (4/18/2024), there are no changes except as documented above.

## 2024-04-19 NOTE — CARE PLAN
Problem: Knowledge Deficit - Standard  Goal: Patient and family/care givers will demonstrate understanding of plan of care, disease process/condition, diagnostic tests and medications  Outcome: Not Progressing   The patient is Watcher - Medium risk of patient condition declining or worsening    Shift Goals  Clinical Goals: Rest, comfort, VSS  Patient Goals: ZA  Family Goals: Rest, for patient to get better    Progress made toward(s) clinical / shift goals:  Pt turned q2 hours while in bed.    Patient is not progressing towards the following goals: Not following commands.       Problem: Knowledge Deficit - Standard  Goal: Patient and family/care givers will demonstrate understanding of plan of care, disease process/condition, diagnostic tests and medications  Outcome: Not Progressing

## 2024-04-19 NOTE — CARE PLAN
The patient is Stable - Low risk of patient condition declining or worsening    Shift Goals  Clinical Goals: Q2 turns, Neuro checks  Patient Goals: ZA  Family Goals: Rest, for patient to get better    Progress made toward(s) clinical / shift goals:    Problem: Pain - Standard  Goal: Alleviation of pain or a reduction in pain to the patient’s comfort goal  Description: Target End Date:  Prior to discharge or change in level of care    Document on Vitals flowsheet    1.  Document pain using the appropriate pain scale per order or unit policy  2.  Educate and implement non-pharmacologic comfort measures (i.e. relaxation, distraction, massage, cold/heat therapy, etc.)  3.  Pain management medications as ordered  4.  Reassess pain after pain med administration per policy  5.  If opiods administered assess patient's response to pain medication is appropriate per POSS sedation scale  6.  Follow pain management plan developed in collaboration with patient and interdisciplinary team (including palliative care or pain specialists if applicable)  Outcome: Progressing     Problem: Knowledge Deficit - Standard  Goal: Patient and family/care givers will demonstrate understanding of plan of care, disease process/condition, diagnostic tests and medications  Description: Target End Date:  1-3 days or as soon as patient condition allows    Document in Patient Education    1.  Patient and family/caregiver oriented to unit, equipment, visitation policy and means for communicating concern  2.  Complete/review Learning Assessment  3.  Assess knowledge level of disease process/condition, treatment plan, diagnostic tests and medications  4.  Explain disease process/condition, treatment plan, diagnostic tests and medications  Outcome: Progressing     Problem: Urinary - Renal Perfusion  Goal: Ability to achieve and maintain adequate renal perfusion and functioning will improve  Description: Target End Date:  Prior to discharge or change in  level of care    Document on I/O and Assessment flowsheet    1.  Urine output will remain greater than 0.5ml/Kg/HR  2.  Monitor amount and/or characteristics of urine per order/policy. Specific gravity per order/policy  3.  Assess signs and symptoms of renal dysfunction  Outcome: Progressing       Carlene had unchanged neuro assessments this shift. Her tube feedings were increased to 50ml/hr and patient tolerated it well. Insulin was given due to high blood sugars. All needs were met and call light was within reach.

## 2024-04-19 NOTE — PROGRESS NOTES
Inpatient Palliative Medicine Progress Note     Jairo Rivas  73 y.o. female  [id number]    Location = [facility]  Referral Source = Sima Bernal M.d.    PCP = ZULEYKA Lyons    Reason for palliative medicine consultation and/or visit: ACP         Assessment and Plan:     GOAL(S) OF CARE = LONGEVITY, a chance at [facility] for immunotherapy again (daughter Danita lives in [place])    SYMPTOMS ETIOLOGY/CAUSES = confusion & dysphagia secondary secondary to sepsis, recent stroke, and progressing metastatic endometrial adenocarcinoma to brain    PROGNOSIS = overall unfavorable & highly guarded, lingering delirium & encephalopathy & NPO.    CODE STATUS = FULL at this time      ADVANCE CARE PLANNING =   Relevant recent history reviewed.  Oncology history reviewed.    Anticipatory education on care philosophy and rational for limited NGT feed trial (~1wk) if family were to elect.  Informed daughter Carolyn and her  Michael about importance of caloric intake thru NGT, for family's stated goal at this time.  Educated family how limited trial of NGT can add some clarity to family, regarding how close or how far they are to achieving their goal, but without prolonging pt in an undesirable state.  Shared with family about my own professional reservation about how much calories along can change patient's trajectory, hence my recommendation for limited TF trial but no longer.  Educated family on importance of periodic reassessment and avoidance of non-beneficial measures, if refractory at reassessment.    Medical philosophy education and the importance how FUNCTION determines ultimate treatment options.      PALLIATIVE CARE TEAM INTERVENTIONS =   1.ACP for anticipatory guidance & overall care philosophy, today very much the same talk with the other daughter Danita, who drove up from [place], [place].  2. Case discussed with oncology RN navigator Isa Zaidi. Relayed family's wish for an oncology second  opinion, in addition to Dr. Garcia's team.    3. Coordinated with  to see if we can find a [place] Imam to visit & pray, if possible.  4. Will follow and support pt & family to our utmost in this difficult time.    5. Encouraged Radha Samayoa and their 3 siblings to visit if/when possible, as pt remains complex & clinical outcome uncertain.      Summary =   Jairo Rivas is a 73 y.o.  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    Despite care so far, patient unfortunately remains encephalopathic and unable to pass SLP swallow evaluation yet. She is a [facility] patient and has been followed by Dr. Garcia since 2019. Patient unfortunately remains unfit for further cancer directed treatment at this time.    4/14/2024 CT-Head - R frontal lobe mass, with non-specific R parietal lobe enhancements    ------------------------------------------------------------------------------------------------------------------------------------------------------    4/18/2024  Met with patient's daughter Danita and also her sister Carolyn over phone today. Provided medical updates - pt more sleepy overall, with corresponding gabapentin dose reduction by primary team. No narcotic (no oxycodone, no dilaudid) for about 24 hours now. Explained to daughters that most likely opioids are not contributing to patient's increased somnolence.     Also shared with daughters my professional approval/agreement with primary team's non-narcotic management of patient's pain and discomfort. Patient did not appear distressed to me today.    We discussed about general treatment philosophy, especially how FUNCTION, above all other  studies and signs, will always remain the #1 determining factor for treatment options. We also talked briefly about the lagging nature of imaging, lagging in the sense that functional changes typically occurs first before corresponding imaging changes. Also discussed about our relative permissive generosity over glucose control for older & sicker patients, mostly for safety.    Encouraged family and siblings to visit, if/when able, as patient remains quite medically complex.    Provided a few words of encouragement to these two loving daughters who deeply care about their mother.        4/17/2024  Very extensive discussion with patient's daughter Danita about patient's current care. History reviewed. Recent studies, labs, imaging reviewed together, including recent NSG notes prior to radiation therapy.    Family have decided on NGT trial to see if this can help patient improve some more. They do feel patient is more alert, although her cognition remains quite short off baseline as well.    They are certain continued aggressive care, including ICU care & life support are in line with patient and family's values. Patient has been chair bound since 2010, even while she fought her endometrial cancer, so they have been down this road before.    They are taking things a day at a time, and hoping to provide best care to best optimize patient, however much possible... They all feel like they have been well updated by medical team so far, and do understand the very real risk of deterioration/decline, as much as they try to remain hopeful.    Patient has always fought hard. 2 years ago, hospice was discussed with Dr. Garcia's team, but in the end patient moved down to [place] to live with Danita and was treated at [facility] with fairly good response, despite already burdened with brain mets at that time. This experience weighed heavily in family's mind. They have learned to ask good questions and leave no stones unturned.    Extensive  "education provided, putting on Danita's radar about various risk on the roads ahead: NGT pulling, restraints & suffering, inability to get any cancer treatment if unable to wean off life support in ICU, etc. She understood and appeared overall quite sophisticated in her thoughts and questions, and much like her sister Carolyn, with patient's best interests in mind and in alignment with their best understanding of patient's values/preferences.    Assured Danita that medical team will continue to provide the best support we can, in as good an alignment as we can with their goals and values. Informed Danita that the only thing that needs to be prevented is suffering itself and, if/when medical team or family are concerned about suffering, that will be a great time to talk more about what we are doing. Danita ackowledged understanding.    Encouraged Radha [place]ay[place] and [place] to visit if/when possible, as clinical outcome remains highly uncertain.      4/16/2024  Met with patient, her daughter Carolyn, and Carolyn's  Michael bedside. Introduced myself, my scope of practice, and reasons for this consultation.Carolyn and Michael were amenable to proceed with this visit.    Patient were self were delirious and minimally conversive. She was able to say \"no\" when I inquired about her pain & discomfort. That was the extent of our conversations.    Patient is originally from [place]. Carolyn is her only blood relative here in [facility]. Carolyn's sister Danita resides in [place]. Thanks to the support of Danita's family, patient was able to receive cancer treatments at [facility] [id number], with fairly good response at that time, all things considered.    According to Carolyn, patient's has been a \"real tough cookie.\" One of the main reasons that led to her prior stint at [facility] oncology was disease progression & hospice discussion. Carolyn and family were amazed how patient was able to surpass expectations a " "few times.    Patient is a devout Yazidi and prays regularly. Daughter is amenable to a Imam visiting if it can be arranged for patient's spiritual support.     Patient is said to enjoy \"shopping & casinos, and that's why she came back to [place] in AUG 2023,\" Carolyn told me.        We talked about family's outstanding goal of care, which is to try to optimize pt for immunotherapy again, if possible, be it rehab or going to [facility] again.    We discussed imminent road blocks at this time = dysphagia & NPO status, infection, continued delirium/confusion, low function. We discussed at length about a time limited (~1wk or so) TF trial to see if it is possible to get patient closer to family's goal again, and reassess frequently to make sure we do not prolong decline if not responding to treatments.    Carolyn is rightfully worried about side effects of NTG - infection, bleed, pain/discomfort, decline prolongation - and I reinforced the importance of the time-limited nature of NTG trial but no longer. Also shared my honest professional opinion that likely, all things considered, calories alone will not get patient close enough to their goal for more immunotherapy.    Carolyn has lots of thoughts. She understands the incurable nature of patient's cancer and ongoing decline over the years, as tough as patient has weathered all these challenges.    We agreed to a family meeting tomorrow 1400 to further discuss with family about roads ahead,         Medical Decision Making =  Patient is of high medical complexity with incurable disease = endometrial adenocarcinoma metastatic to brain & lung  Care complexity further complicated by secondary conditions = 2010 CVA with residual R deficits + new subacute CVA, acute on chronic baseline delirium/hallucinations, dysphagia NPO status, sepsis    Extensive data reviewed & coordination performed = recent notes, latest labs & studies, GYN past history, ONC past history, [facility] notes, etc.    High risk " of morbidity from additional interventions &  studies = NPO & low functioning & still delirious, high risk medication usage including continuous fluid        Advance care planning  =     Total time spent in ACP discussion 30 minutes, which is separate from the time spent completing the evaluation and management visit.     Thank you for allowing me the opportunity to participate in the care of Jairo Rivas     I spent a total of 50minutes reviewing medical records, direct face-to-face time with the patient and/or family, documentation and coordination of care. This is separate from the time spent on advance care planning, which is documented above.         ILEANA (DO [facility]   [id number] [place]  [id number]  P: [phone]  F: [phone]

## 2024-04-20 ENCOUNTER — APPOINTMENT (OUTPATIENT)
Dept: RADIOLOGY | Facility: MEDICAL CENTER | Age: 74
DRG: 871 | End: 2024-04-20
Payer: MEDICARE

## 2024-04-20 PROBLEM — G93.40 ACUTE ENCEPHALOPATHY: Status: ACTIVE | Noted: 2024-04-14

## 2024-04-20 LAB
ANION GAP SERPL CALC-SCNC: 12 MMOL/L (ref 7–16)
BUN SERPL-MCNC: 33 MG/DL (ref 8–22)
CALCIUM SERPL-MCNC: 9.3 MG/DL (ref 8.5–10.5)
CHLORIDE SERPL-SCNC: 114 MMOL/L (ref 96–112)
CO2 SERPL-SCNC: 23 MMOL/L (ref 20–33)
CREAT SERPL-MCNC: 0.53 MG/DL (ref 0.5–1.4)
ERYTHROCYTE [DISTWIDTH] IN BLOOD BY AUTOMATED COUNT: 52.7 FL (ref 35.9–50)
GFR SERPLBLD CREATININE-BSD FMLA CKD-EPI: 98 ML/MIN/1.73 M 2
GLUCOSE BLD STRIP.AUTO-MCNC: 161 MG/DL (ref 65–99)
GLUCOSE BLD STRIP.AUTO-MCNC: 163 MG/DL (ref 65–99)
GLUCOSE BLD STRIP.AUTO-MCNC: 167 MG/DL (ref 65–99)
GLUCOSE BLD STRIP.AUTO-MCNC: 188 MG/DL (ref 65–99)
GLUCOSE SERPL-MCNC: 228 MG/DL (ref 65–99)
HCT VFR BLD AUTO: 40.2 % (ref 37–47)
HGB BLD-MCNC: 13.2 G/DL (ref 12–16)
MCH RBC QN AUTO: 31.7 PG (ref 27–33)
MCHC RBC AUTO-ENTMCNC: 32.8 G/DL (ref 32.2–35.5)
MCV RBC AUTO: 96.6 FL (ref 81.4–97.8)
PLATELET # BLD AUTO: 127 K/UL (ref 164–446)
PMV BLD AUTO: 10.8 FL (ref 9–12.9)
POTASSIUM SERPL-SCNC: 4.6 MMOL/L (ref 3.6–5.5)
RBC # BLD AUTO: 4.16 M/UL (ref 4.2–5.4)
SODIUM SERPL-SCNC: 148 MMOL/L (ref 135–145)
SODIUM SERPL-SCNC: 148 MMOL/L (ref 135–145)
SODIUM SERPL-SCNC: 149 MMOL/L (ref 135–145)
SPECIMEN SOURCE: ABNORMAL
VZV DNA SPEC QL NAA+PROBE: DETECTED
WBC # BLD AUTO: 8 K/UL (ref 4.8–10.8)

## 2024-04-20 PROCEDURE — 84295 ASSAY OF SERUM SODIUM: CPT

## 2024-04-20 PROCEDURE — 665998 HH PPS REVENUE CREDIT

## 2024-04-20 PROCEDURE — 700102 HCHG RX REV CODE 250 W/ 637 OVERRIDE(OP)

## 2024-04-20 PROCEDURE — 80048 BASIC METABOLIC PNL TOTAL CA: CPT

## 2024-04-20 PROCEDURE — 700105 HCHG RX REV CODE 258

## 2024-04-20 PROCEDURE — 770020 HCHG ROOM/CARE - TELE (206)

## 2024-04-20 PROCEDURE — 70553 MRI BRAIN STEM W/O & W/DYE: CPT

## 2024-04-20 PROCEDURE — A9270 NON-COVERED ITEM OR SERVICE: HCPCS

## 2024-04-20 PROCEDURE — 700111 HCHG RX REV CODE 636 W/ 250 OVERRIDE (IP): Mod: JZ | Performed by: INTERNAL MEDICINE

## 2024-04-20 PROCEDURE — 700117 HCHG RX CONTRAST REV CODE 255

## 2024-04-20 PROCEDURE — A9270 NON-COVERED ITEM OR SERVICE: HCPCS | Performed by: INTERNAL MEDICINE

## 2024-04-20 PROCEDURE — A9579 GAD-BASE MR CONTRAST NOS,1ML: HCPCS

## 2024-04-20 PROCEDURE — 85027 COMPLETE CBC AUTOMATED: CPT

## 2024-04-20 PROCEDURE — 700102 HCHG RX REV CODE 250 W/ 637 OVERRIDE(OP): Performed by: INTERNAL MEDICINE

## 2024-04-20 PROCEDURE — 700111 HCHG RX REV CODE 636 W/ 250 OVERRIDE (IP): Performed by: STUDENT IN AN ORGANIZED HEALTH CARE EDUCATION/TRAINING PROGRAM

## 2024-04-20 PROCEDURE — 99233 SBSQ HOSP IP/OBS HIGH 50: CPT | Mod: 25,GC | Performed by: INTERNAL MEDICINE

## 2024-04-20 PROCEDURE — 302136 NUTRITION PUMP

## 2024-04-20 PROCEDURE — 82962 GLUCOSE BLOOD TEST: CPT | Mod: 91

## 2024-04-20 PROCEDURE — 665999 HH PPS REVENUE DEBIT

## 2024-04-20 PROCEDURE — 36415 COLL VENOUS BLD VENIPUNCTURE: CPT

## 2024-04-20 PROCEDURE — 700105 HCHG RX REV CODE 258: Performed by: INTERNAL MEDICINE

## 2024-04-20 PROCEDURE — 99497 ADVNCD CARE PLAN 30 MIN: CPT | Performed by: INTERNAL MEDICINE

## 2024-04-20 PROCEDURE — C9254 INJECTION, LACOSAMIDE: HCPCS | Performed by: STUDENT IN AN ORGANIZED HEALTH CARE EDUCATION/TRAINING PROGRAM

## 2024-04-20 PROCEDURE — 700111 HCHG RX REV CODE 636 W/ 250 OVERRIDE (IP): Mod: JZ

## 2024-04-20 RX ORDER — LABETALOL HYDROCHLORIDE 5 MG/ML
10 INJECTION, SOLUTION INTRAVENOUS EVERY 4 HOURS PRN
Status: DISCONTINUED | OUTPATIENT
Start: 2024-04-20 | End: 2024-04-21

## 2024-04-20 RX ORDER — DEXTROSE MONOHYDRATE 50 MG/ML
INJECTION, SOLUTION INTRAVENOUS CONTINUOUS
Status: DISCONTINUED | OUTPATIENT
Start: 2024-04-20 | End: 2024-04-22

## 2024-04-20 RX ORDER — HYDRALAZINE HYDROCHLORIDE 20 MG/ML
20 INJECTION INTRAMUSCULAR; INTRAVENOUS EVERY 6 HOURS PRN
Status: DISCONTINUED | OUTPATIENT
Start: 2024-04-20 | End: 2024-04-20

## 2024-04-20 RX ORDER — AMLODIPINE BESYLATE 5 MG/1
5 TABLET ORAL ONCE
Status: DISCONTINUED | OUTPATIENT
Start: 2024-04-20 | End: 2024-04-20

## 2024-04-20 RX ORDER — SODIUM CHLORIDE 450 MG/100ML
INJECTION, SOLUTION INTRAVENOUS CONTINUOUS
Status: DISCONTINUED | OUTPATIENT
Start: 2024-04-20 | End: 2024-04-22

## 2024-04-20 RX ORDER — AMLODIPINE BESYLATE 5 MG/1
5 TABLET ORAL ONCE
Status: COMPLETED | OUTPATIENT
Start: 2024-04-20 | End: 2024-04-20

## 2024-04-20 RX ORDER — HYDRALAZINE HYDROCHLORIDE 20 MG/ML
10 INJECTION INTRAMUSCULAR; INTRAVENOUS EVERY 4 HOURS PRN
Status: DISCONTINUED | OUTPATIENT
Start: 2024-04-20 | End: 2024-04-25

## 2024-04-20 RX ORDER — AMLODIPINE BESYLATE 10 MG/1
10 TABLET ORAL DAILY
Status: DISCONTINUED | OUTPATIENT
Start: 2024-04-21 | End: 2024-05-10

## 2024-04-20 RX ADMIN — LOSARTAN POTASSIUM 100 MG: 50 TABLET, FILM COATED ORAL at 04:30

## 2024-04-20 RX ADMIN — ACYCLOVIR SODIUM 675 MG: 50 INJECTION, SOLUTION INTRAVENOUS at 23:44

## 2024-04-20 RX ADMIN — ENOXAPARIN SODIUM 40 MG: 100 INJECTION SUBCUTANEOUS at 17:16

## 2024-04-20 RX ADMIN — INSULIN HUMAN 1 UNITS: 100 INJECTION, SOLUTION PARENTERAL at 23:51

## 2024-04-20 RX ADMIN — MICONAZOLE NITRATE: 20 CREAM TOPICAL at 18:01

## 2024-04-20 RX ADMIN — BACLOFEN 5 MG: 10 TABLET ORAL at 04:30

## 2024-04-20 RX ADMIN — AMLODIPINE BESYLATE 5 MG: 5 TABLET ORAL at 04:30

## 2024-04-20 RX ADMIN — GABAPENTIN 200 MG: 100 CAPSULE ORAL at 17:14

## 2024-04-20 RX ADMIN — INSULIN HUMAN 1 UNITS: 100 INJECTION, SOLUTION PARENTERAL at 04:31

## 2024-04-20 RX ADMIN — SENNOSIDES AND DOCUSATE SODIUM 2 TABLET: 50; 8.6 TABLET ORAL at 17:15

## 2024-04-20 RX ADMIN — ACYCLOVIR SODIUM 675 MG: 50 INJECTION, SOLUTION INTRAVENOUS at 17:32

## 2024-04-20 RX ADMIN — AMLODIPINE BESYLATE 5 MG: 5 TABLET ORAL at 13:26

## 2024-04-20 RX ADMIN — VALACYCLOVIR 1000 MG: 500 TABLET, FILM COATED ORAL at 11:30

## 2024-04-20 RX ADMIN — HYDRALAZINE HYDROCHLORIDE 10 MG: 20 INJECTION, SOLUTION INTRAMUSCULAR; INTRAVENOUS at 23:41

## 2024-04-20 RX ADMIN — LACOSAMIDE 150 MG: 10 INJECTION INTRAVENOUS at 17:36

## 2024-04-20 RX ADMIN — GABAPENTIN 200 MG: 100 CAPSULE ORAL at 11:31

## 2024-04-20 RX ADMIN — BACLOFEN 5 MG: 10 TABLET ORAL at 17:14

## 2024-04-20 RX ADMIN — INSULIN HUMAN 1 UNITS: 100 INJECTION, SOLUTION PARENTERAL at 17:46

## 2024-04-20 RX ADMIN — DEXTROSE MONOHYDRATE: 50 INJECTION, SOLUTION INTRAVENOUS at 20:04

## 2024-04-20 RX ADMIN — DEXAMETHASONE 4 MG: 4 TABLET ORAL at 04:30

## 2024-04-20 RX ADMIN — DEXAMETHASONE 4 MG: 4 TABLET ORAL at 17:16

## 2024-04-20 RX ADMIN — INSULIN HUMAN 1 UNITS: 100 INJECTION, SOLUTION PARENTERAL at 01:10

## 2024-04-20 RX ADMIN — VALACYCLOVIR 1000 MG: 500 TABLET, FILM COATED ORAL at 04:29

## 2024-04-20 RX ADMIN — METOPROLOL TARTRATE 50 MG: 25 TABLET, FILM COATED ORAL at 04:31

## 2024-04-20 RX ADMIN — GADOTERIDOL 17 ML: 279.3 INJECTION, SOLUTION INTRAVENOUS at 19:05

## 2024-04-20 RX ADMIN — LACOSAMIDE 150 MG: 10 INJECTION INTRAVENOUS at 04:30

## 2024-04-20 RX ADMIN — LANSOPRAZOLE 30 MG: 30 TABLET, ORALLY DISINTEGRATING ORAL at 04:30

## 2024-04-20 RX ADMIN — INSULIN HUMAN 1 UNITS: 100 INJECTION, SOLUTION PARENTERAL at 11:40

## 2024-04-20 RX ADMIN — BACLOFEN 5 MG: 10 TABLET ORAL at 11:30

## 2024-04-20 RX ADMIN — SODIUM CHLORIDE: 4.5 INJECTION, SOLUTION INTRAVENOUS at 20:05

## 2024-04-20 RX ADMIN — ATORVASTATIN CALCIUM 20 MG: 20 TABLET, FILM COATED ORAL at 17:15

## 2024-04-20 RX ADMIN — MICONAZOLE NITRATE: 20 CREAM TOPICAL at 07:25

## 2024-04-20 RX ADMIN — GABAPENTIN 200 MG: 100 CAPSULE ORAL at 04:30

## 2024-04-20 RX ADMIN — METOPROLOL TARTRATE 75 MG: 25 TABLET, FILM COATED ORAL at 17:13

## 2024-04-20 ASSESSMENT — PAIN DESCRIPTION - PAIN TYPE
TYPE: ACUTE PAIN

## 2024-04-20 ASSESSMENT — FIBROSIS 4 INDEX: FIB4 SCORE: 1.73

## 2024-04-20 NOTE — CARE PLAN
The patient is Watcher - Medium risk of patient condition declining or worsening    Shift Goals  Clinical Goals: comfort, vital signs  Patient Goals: ZA  Family Goals: Rest, Vital sign management    Progress made toward(s) clinical / shift goals:    Problem: Pain - Standard  Goal: Alleviation of pain or a reduction in pain to the patient’s comfort goal  Description: Target End Date:  Prior to discharge or change in level of care    Document on Vitals flowsheet    1.  Document pain using the appropriate pain scale per order or unit policy  2.  Educate and implement non-pharmacologic comfort measures (i.e. relaxation, distraction, massage, cold/heat therapy, etc.)  3.  Pain management medications as ordered  4.  Reassess pain after pain med administration per policy  5.  If opiods administered assess patient's response to pain medication is appropriate per POSS sedation scale  6.  Follow pain management plan developed in collaboration with patient and interdisciplinary team (including palliative care or pain specialists if applicable)  Outcome: Progressing     Problem: Knowledge Deficit - Standard  Goal: Patient and family/care givers will demonstrate understanding of plan of care, disease process/condition, diagnostic tests and medications  Description: Target End Date:  1-3 days or as soon as patient condition allows    Document in Patient Education    1.  Patient and family/caregiver oriented to unit, equipment, visitation policy and means for communicating concern  2.  Complete/review Learning Assessment  3.  Assess knowledge level of disease process/condition, treatment plan, diagnostic tests and medications  4.  Explain disease process/condition, treatment plan, diagnostic tests and medications  Outcome: Progressing     Problem: Urinary - Renal Perfusion  Goal: Ability to achieve and maintain adequate renal perfusion and functioning will improve  Description: Target End Date:  Prior to discharge or change in  level of care    Document on I/O and Assessment flowsheet    1.  Urine output will remain greater than 0.5ml/Kg/HR  2.  Monitor amount and/or characteristics of urine per order/policy. Specific gravity per order/policy  3.  Assess signs and symptoms of renal dysfunction  Outcome: Progressing       Carlene had q4hr neuros completed this shift. Carlene was on RA this shift and had TF at goal. All needs were met and call light was within reach.

## 2024-04-20 NOTE — CARE PLAN
The patient is Stable - Low risk of patient condition declining or worsening    Shift Goals  Clinical Goals: Hemodynamic stability  Patient Goals: ZA  Family Goals: Rest, Vital sign management    Progress made toward(s) clinical / shift goals:    Problem: Pain - Standard  Goal: Alleviation of pain or a reduction in pain to the patient’s comfort goal  Outcome: Progressing     Problem: Knowledge Deficit - Standard  Goal: Patient and family/care givers will demonstrate understanding of plan of care, disease process/condition, diagnostic tests and medications  Outcome: Progressing     Problem: Hemodynamics  Goal: Patient's hemodynamics, fluid balance and neurologic status will be stable or improve  Outcome: Progressing     Problem: Skin Integrity  Goal: Skin integrity is maintained or improved  Outcome: Progressing     Problem: Fall Risk  Goal: Patient will remain free from falls  Outcome: Progressing

## 2024-04-20 NOTE — PROGRESS NOTES
"Patient has a history of hypertension and has been hypertensive at times during her stay at [facility], but BP has been well controled with her scheduled meds. Overnight, her BP started to climb. Daughter who stayed overnight expressed concern about the climbing systolic BP and there were no PRN meds for BP ordered. Daughter stated that at home, she gives BP meds when SBP is above 180.  Although SBP was not yet at that level, patients daughter said thet she \"knew her mom's body\" and knew that BP was going to increase. BP was taken at 0300 and was 185/103. MD was notified and decided against ordering a PRN medication due to the scheduled morning meds, which were to be given at 0400. Patient's daughter expressed explicit frustration with that decision. Before giving morning meds at 0435, BP was 196/136. Patient's daughter shook her head and took a picture of the vitals machine and the BP. BP was rechecked at 0636 and was found to be effective because the BP came back down to 154/92.       "

## 2024-04-20 NOTE — PROGRESS NOTES
[facility] Internal Medicine Daily Progress Note    Date of Service  4/20/2024    [facility] Team: [facility] Team   Attending: Sima Bernal M.d.  Senior Resident: Dr. Rojas  Intern:  Dr. Sarmiento  Contact Number: [phone]    Chief Complaint  Jairo Rivas is a 73 y.o. female admitted 4/13/2024 with painful urination and constipation.     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 It is noted that patient has been having abdominal pain and constipation, however, she has had 2 bowel movements since being admitted.  Her UA in the ED was consistent with UTI.  She had leukocytosis with WBC 24.  Lactic acid 3.  Blood cultures negative thus far.  Started on Rocephin for 3 days to treat UTI.     CT scan of the head was obtained, noted to have pneumatization of the right frontal lobe mass highly concerning for metastasis; associated with genic edema and 2.5 mm right to left midline shift.  Patient continued to remain on Decadron. Also GYN oncology was consulted, discussed the prognosis of the patient, stated that patient is not a candidate for any treatment.  Patient daughter wanted her to be full code at this time.  After several days without any food, NG tube finally placed.  Successful tube feeds.  VZV positive on skin swab.     Interval Problem Update  Blood pressure increased to 196/136 and began to decrease once scheduled meds provided. Unclear etiology of change in blood pressure control, antihypertensives augmented. VZV positive from skin fold. Obtaining MRI due to hypertension and to rule out VZV encephalitis. Patient remains obtunded. NG tube will need  to be removed for imaging as it is Dobhoff with some metal. Patient's family initially wanted to trial 24 hours off of NG tube, but will leave tube out for a few hours following imaging then attempt to replace with IRIS.     I have discussed this patient's plan of care and discharge plan at IDT rounds today with Case Management, Nursing, Nursing leadership, and other members of the IDT team.    Consultants/Specialty  neurology and surgical oncology     Code Status  Full Code    Disposition  The patient is not medically cleared for discharge to home or a post-acute facility.      I have placed the appropriate orders for post-discharge needs.    Review of Systems  Review of Systems   Unable to perform ROS: Medical condition        Physical Exam  Temp:  [36 °C (96.8 °F)-36.4 °C (97.6 °F)] 36.2 °C (97.2 °F)  Pulse:  [] 100  Resp:  [16-20] 18  BP: (154-196)/() 178/94  SpO2:  [94 %-97 %] 95 %    Physical Exam  Vitals (exam limited) reviewed.   Constitutional:       General: She is sleeping.      Appearance: She is obese. She is ill-appearing.      Interventions: She is restrained.      Comments: NG tube in place  obtunded   HENT:      Head: Normocephalic and atraumatic.      Mouth/Throat:      Mouth: Mucous membranes are dry.   Cardiovascular:      Rate and Rhythm: Normal rate and regular rhythm.      Pulses: Normal pulses.      Heart sounds: Normal heart sounds.   Pulmonary:      Effort: Pulmonary effort is normal. No respiratory distress.      Breath sounds: Normal breath sounds.   Abdominal:      General: There is distension.      Palpations: There is mass.   Musculoskeletal:      Right lower leg: No edema.      Left lower leg: No edema.   Skin:     Comments: Skin breakdown under breasts. Dark red non pustular rash on left just below her breast. Called shingles but does not appear the same as shingles rash.     Much of the physical exam was not completed due to the patient being in such discomfort and being  unable to verbally express it.     Fluids    Intake/Output Summary (Last 24 hours) at 4/20/2024 1856  Last data filed at 4/20/2024 1619  Gross per 24 hour   Intake 1500 ml   Output 2400 ml   Net -900 ml       Laboratory  Recent Labs     04/18/24  0719 04/19/24  0636 04/20/24  0208   WBC 8.4 9.0 8.0   RBC 4.11* 3.70* 4.16*   HEMOGLOBIN 13.1 11.7* 13.2   HEMATOCRIT 39.8 36.1* 40.2   MCV 96.8 97.6 96.6   MCH 31.9 31.6 31.7   MCHC 32.9 32.4 32.8   RDW 51.6* 53.8* 52.7*   PLATELETCT 150* 124* 127*   MPV 10.3 10.6 10.8     Recent Labs     04/18/24  0719 04/19/24  0636 04/19/24  1139 04/20/24  0208 04/20/24  1533   SODIUM 147* 150* 147* 149* 148*   POTASSIUM 4.1 4.3  --  4.6  --    CHLORIDE 116* 118*  --  114*  --    CO2 21 22  --  23  --    GLUCOSE 140* 208*  --  228*  --    BUN 28* 58*  --  33*  --    CREATININE 0.63 1.11  --  0.53  --    CALCIUM 9.3 8.9  --  9.3  --                    Imaging  DX-ABDOMEN FOR TUBE PLACEMENT   Final Result         1.  Nonspecific bowel gas pattern in the upper abdomen.   2.  Dobbhoff tube is coiled within the stomach, the tip terminates overlying the expected location of the gastric fundus, physician similar to prior study.   3.  Left lower lobe infiltrate      DX-ABDOMEN FOR TUBE PLACEMENT   Final Result         1.  Nonspecific bowel gas pattern in the upper abdomen.   2.  Dobbhoff tube is coiled within the stomach, the tip terminates overlying the expected location of the gastric fundus.   3.  Left basilar atelectasis      DX-ABDOMEN FOR TUBE PLACEMENT   Final Result      Feeding tube looped in the stomach the distal tip in the gastric fundus.      CT-HEAD WITH & W/O   Final Result      1.  Redemonstration of right frontal lobe mass, highly concerning for metastasis. Associated vasogenic edema and a 2.5 mm right to left midline shift. No progressive mass effect. No herniation.   2.  Nonspecific ill-defined enhancement in the right parietal lobe.   3.  No new large vascular structure  infarct. No new intracranial hemorrhage.      DX-CHEST-LIMITED (1 VIEW)   Final Result      No significant interval change.      DX-ABDOMEN COMPLETE WITH AP OR PA CXR   Final Result      1. No acute cardiopulmonary abnormality.   2. Chronic left lower lobe scarring.   3. Nonobstructive bowel gas pattern. Small amount of stool throughout the colon.      MR-BRAIN-WITH & W/O    (Results Pending)        Assessment/Plan  Problem Representation:    * Acute encephalopathy- (present on admission)  Assessment & Plan  NOT IMPROVING  Patient remains obtunded. Etiology at time of admission likely multifactorial, as she has multiple possible causes including UTI,  medications (norco, baclofen, gabapentin), recent findings of subacute CVA as well as brain mets with vasogenic edema may all be contributing.  EEG on 4/14 did not demonstrate any seizures but was concerning for signs of brain distress as would be expected in setting of edema. No new findings on CTH.  Due to patient's slow decline in mentation despite treatment of UTI and starting NG tube feeds in addition to suddenly high blood pressure, will obtain imaging to evaluate for any brain changes on 4/20  -Pending MRI, will ask neurology to weigh in 4/21  -Change to IV acyclovir + maintenance fluids in case of disseminated shingles (pending imaging)  -continue lacosamide for seizure ppx  -fall, seizure, aspiration precautions  -Holding baclofen and gabapentin due to sedating properties     Dysphagia- (present on admission)  Assessment & Plan  Possibly due to CVA vs brain metastases and altered mental status in setting of infection.  Patient will remain NPO as she failed SLP evaluation 4/17  -NG tube placed and nutrition consulted  -aspiration precautions  - PPI started  - SSI and every 6 hours glucose checks      Shingles rash- (present on admission)  Assessment & Plan  Noted under left breast and onto lateral chest wall.  Swab of rash positive for VZV. Obtaining MRI to  evaluate for disseminated shingles leading to encephalitis. No other dermatomes noted to have rash.  -contact precautions  -Continue acyclovir IV until encephalitis ruled out  -Miconazole to treat intertrigo    Malignant neoplasm metastatic to brain (HCC)- (present on admission)  Assessment & Plan  Brain mets noted during hospitalization on 3/27/2024 with vasogenic edema, MRI on 4/11 shows continued brain mets with vasogenic edema.    -continue seizure prophylaxis with lacosamide 150 mg BID  -continue dexamethasone   Continue fall, aspiration, seizure precaution.      Advance care planning- (present on admission)  Assessment & Plan  At this time patient's family want her to be full code.  Palliative care consulted and discussed with family the goals of care. This remains the same.    Essential hypertension- (present on admission)  Assessment & Plan  WORSENING  Concern for secondary exacerbation of hypertension raised on 4/20 when blood pressures increased to 190s/130s despite receiving scheduled home meds.  No evidence of volume overload  - PRN IV hydralazine   - Increased amlodipine to 10 mg daily on 4/20  - Continue losartan 100 mg daily  - Increased metoprolol tartrate to 75 mg BID on 4/20  - Continue atorvastatin 20 mg every evening    Hypernatremia  Assessment & Plan  Sodium oscillates above and below 145. Reacting to changes by switching from D5W and D5 half normal saline. Suspect regulation of sodium will greatly improve as patient receives fluids and nutrition via NG tube.  -Continue to monitor with daily labs  -Consider further workup of etiology if does not balance with enteral nutrition and fluids  -Discontinued fluids, free water pushes ordered    Seizure (HCC)- (present on admission)  Assessment & Plan  EEG did not show evidence of seizure. Antiepileptic continued given multitude of risk factors.  -Continue lacosamide 150 mg IV BID    Endometrial cancer (HCC)- (present on admission)  Assessment &  Plan  Followed by Dr. Garcia outpatient and sees radiation oncology Dr Live.  Received immunotherapy at [place] in 2021 with initial good response. Reached out to Dr. Live who does not believe imaging from 4/14 shows significant changes compared to MRI month prior.  -Complicated by metastases to brain among other locations, likely contributing to her altered mental status. Mets re demonstrated without significant change on CTH on admission.  -Decadron for vasogenic edema  -Neurochecks every 4 hours, continue aspiration, fall, seizure precaution.    History of CVA (cerebrovascular accident)- (present on admission)  Assessment & Plan  Residual right sided neurodeficits form CVA in 2010.  Has RUE spasticity, RLE pain.  New subacute CVA found on 4/11 [facility] at [facility].  -hold chemical DVT ppx due to proximity of CVA  -consider starting asa when appropriate  -continue atorvastatin    Type 2 diabetes mellitus with hyperglycemia, without long-term current use of insulin (HCC)- (present on admission)  Assessment & Plan  A1c 6.6, unsure if related to likely multiple courses of steroids  On tube feeds  Glucose checks q6h     Hypermagnesemia  Assessment & Plan  Expect improvement with enteral food and fluids after NG tube. Currently Mg 3.  -Monitor with daily labs    Vasogenic edema (HCC)- (present on admission)  Assessment & Plan  Continuing decadron taper per Dr. Live's recommendations. He also does not believe that there are any significant changes in her brain imaging from this admission that could explain her somnolence/presentation. She is on the correct medication (decadron) for treating the edema    Constipation  Assessment & Plan  Resolved  - Bowel regimen    Sepsis secondary to UTI (HCC)- (present on admission)  Assessment & Plan  RESOLVED  This is Sepsis Present on admission  SIRS criteria identified on my evaluation include: Tachycardia, with heart rate greater than 90 BPM and Leukocytosis, with WBC greater than  12,000  Clinical indicators of end organ dysfunction include Lactic Acid greater than 2 and Toxic Metabolic Encephalopathy  Source is UTI  Sepsis protocol initiated  Crystalloid Fluid Administration: Fluid resuscitation ordered per standard protocol - 30 mL/kg per current or ideal body weight  IV antibiotics as appropriate for source of sepsis  Reassessment: I have reassessed the patient's hemodynamic status  -s/p CTX x5d  -received sepsis bolus in ED, will continue IVF with caution  -No growth in blood cultures or urine cultures      Acute kidney injury (HCC)- (present on admission)  Assessment & Plan  RESOLVED  Returned, Cr 1.11 and baseline around 0.7. Likely prerenal from dehydration.  -continue free water flushes  -renally dose all medications as appropriate  -avoid nephrotoxins         VTE prophylaxis: SCDs/TEDs    I have performed a physical exam and reviewed and updated ROS and Plan today (4/20/2024). In review of yesterday's note (4/19/2024), there are no changes except as documented above.

## 2024-04-21 ENCOUNTER — APPOINTMENT (OUTPATIENT)
Dept: RADIOLOGY | Facility: MEDICAL CENTER | Age: 74
DRG: 871 | End: 2024-04-21
Attending: INTERNAL MEDICINE
Payer: MEDICARE

## 2024-04-21 PROBLEM — R73.03 PREDIABETES: Status: ACTIVE | Noted: 2024-04-18

## 2024-04-21 PROBLEM — Z78.9 PROBLEM WITH VASCULAR ACCESS: Status: ACTIVE | Noted: 2024-04-21

## 2024-04-21 LAB
ALBUMIN SERPL BCP-MCNC: 3.1 G/DL (ref 3.2–4.9)
ALBUMIN/GLOB SERPL: 1 G/DL
ALP SERPL-CCNC: 84 U/L (ref 30–99)
ALT SERPL-CCNC: 24 U/L (ref 2–50)
ANION GAP SERPL CALC-SCNC: 13 MMOL/L (ref 7–16)
AST SERPL-CCNC: 22 U/L (ref 12–45)
BASOPHILS # BLD AUTO: 0 % (ref 0–1.8)
BASOPHILS # BLD: 0 K/UL (ref 0–0.12)
BILIRUB SERPL-MCNC: 0.4 MG/DL (ref 0.1–1.5)
BUN SERPL-MCNC: 26 MG/DL (ref 8–22)
CALCIUM ALBUM COR SERPL-MCNC: 10.2 MG/DL (ref 8.5–10.5)
CALCIUM SERPL-MCNC: 9.5 MG/DL (ref 8.5–10.5)
CHLORIDE SERPL-SCNC: 111 MMOL/L (ref 96–112)
CO2 SERPL-SCNC: 25 MMOL/L (ref 20–33)
CREAT SERPL-MCNC: 0.45 MG/DL (ref 0.5–1.4)
EOSINOPHIL # BLD AUTO: 0 K/UL (ref 0–0.51)
EOSINOPHIL NFR BLD: 0 % (ref 0–6.9)
ERYTHROCYTE [DISTWIDTH] IN BLOOD BY AUTOMATED COUNT: 50.4 FL (ref 35.9–50)
GFR SERPLBLD CREATININE-BSD FMLA CKD-EPI: 102 ML/MIN/1.73 M 2
GLOBULIN SER CALC-MCNC: 3.1 G/DL (ref 1.9–3.5)
GLUCOSE BLD STRIP.AUTO-MCNC: 106 MG/DL (ref 65–99)
GLUCOSE BLD STRIP.AUTO-MCNC: 146 MG/DL (ref 65–99)
GLUCOSE BLD STRIP.AUTO-MCNC: 183 MG/DL (ref 65–99)
GLUCOSE SERPL-MCNC: 163 MG/DL (ref 65–99)
HCT VFR BLD AUTO: 40.3 % (ref 37–47)
HGB BLD-MCNC: 13.6 G/DL (ref 12–16)
LYMPHOCYTES # BLD AUTO: 0.21 K/UL (ref 1–4.8)
LYMPHOCYTES NFR BLD: 2.6 % (ref 22–41)
MAGNESIUM SERPL-MCNC: 2.4 MG/DL (ref 1.5–2.5)
MANUAL DIFF BLD: NORMAL
MCH RBC QN AUTO: 32.2 PG (ref 27–33)
MCHC RBC AUTO-ENTMCNC: 33.7 G/DL (ref 32.2–35.5)
MCV RBC AUTO: 95.3 FL (ref 81.4–97.8)
METAMYELOCYTES NFR BLD MANUAL: 0.9 %
MONOCYTES # BLD AUTO: 0.49 K/UL (ref 0–0.85)
MONOCYTES NFR BLD AUTO: 6 % (ref 0–13.4)
MORPHOLOGY BLD-IMP: NORMAL
NEUTROPHILS # BLD AUTO: 7.33 K/UL (ref 1.82–7.42)
NEUTROPHILS NFR BLD: 90.5 % (ref 44–72)
NRBC # BLD AUTO: 0.02 K/UL
NRBC BLD-RTO: 0.2 /100 WBC (ref 0–0.2)
PHOSPHATE SERPL-MCNC: 2.1 MG/DL (ref 2.5–4.5)
PLATELET # BLD AUTO: 122 K/UL (ref 164–446)
PLATELET BLD QL SMEAR: NORMAL
PMV BLD AUTO: 11.3 FL (ref 9–12.9)
POTASSIUM SERPL-SCNC: 4.4 MMOL/L (ref 3.6–5.5)
PROT SERPL-MCNC: 6.2 G/DL (ref 6–8.2)
RBC # BLD AUTO: 4.23 M/UL (ref 4.2–5.4)
RBC BLD AUTO: PRESENT
SODIUM SERPL-SCNC: 141 MMOL/L (ref 135–145)
SODIUM SERPL-SCNC: 145 MMOL/L (ref 135–145)
SODIUM SERPL-SCNC: 149 MMOL/L (ref 135–145)
TOXIC GRANULES BLD QL SMEAR: NORMAL
WBC # BLD AUTO: 8.1 K/UL (ref 4.8–10.8)
WBC TOXIC VACUOLES BLD QL SMEAR: NORMAL

## 2024-04-21 PROCEDURE — 700111 HCHG RX REV CODE 636 W/ 250 OVERRIDE (IP): Performed by: INTERNAL MEDICINE

## 2024-04-21 PROCEDURE — 770020 HCHG ROOM/CARE - TELE (206)

## 2024-04-21 PROCEDURE — 99497 ADVNCD CARE PLAN 30 MIN: CPT | Performed by: INTERNAL MEDICINE

## 2024-04-21 PROCEDURE — 84295 ASSAY OF SERUM SODIUM: CPT

## 2024-04-21 PROCEDURE — 700111 HCHG RX REV CODE 636 W/ 250 OVERRIDE (IP): Performed by: STUDENT IN AN ORGANIZED HEALTH CARE EDUCATION/TRAINING PROGRAM

## 2024-04-21 PROCEDURE — 85027 COMPLETE CBC AUTOMATED: CPT

## 2024-04-21 PROCEDURE — 99233 SBSQ HOSP IP/OBS HIGH 50: CPT | Mod: 25,GC | Performed by: INTERNAL MEDICINE

## 2024-04-21 PROCEDURE — 36415 COLL VENOUS BLD VENIPUNCTURE: CPT

## 2024-04-21 PROCEDURE — 80053 COMPREHEN METABOLIC PANEL: CPT

## 2024-04-21 PROCEDURE — 665998 HH PPS REVENUE CREDIT

## 2024-04-21 PROCEDURE — 665999 HH PPS REVENUE DEBIT

## 2024-04-21 PROCEDURE — 700111 HCHG RX REV CODE 636 W/ 250 OVERRIDE (IP)

## 2024-04-21 PROCEDURE — 82962 GLUCOSE BLOOD TEST: CPT

## 2024-04-21 PROCEDURE — 83735 ASSAY OF MAGNESIUM: CPT

## 2024-04-21 PROCEDURE — 84100 ASSAY OF PHOSPHORUS: CPT

## 2024-04-21 PROCEDURE — C9254 INJECTION, LACOSAMIDE: HCPCS | Performed by: STUDENT IN AN ORGANIZED HEALTH CARE EDUCATION/TRAINING PROGRAM

## 2024-04-21 PROCEDURE — 700105 HCHG RX REV CODE 258

## 2024-04-21 PROCEDURE — 85007 BL SMEAR W/DIFF WBC COUNT: CPT

## 2024-04-21 RX ORDER — LORAZEPAM 2 MG/ML
1 INJECTION INTRAMUSCULAR ONCE
Status: DISCONTINUED | OUTPATIENT
Start: 2024-04-21 | End: 2024-04-21

## 2024-04-21 RX ORDER — LORAZEPAM 2 MG/ML
1 INJECTION INTRAMUSCULAR
Status: COMPLETED | OUTPATIENT
Start: 2024-04-21 | End: 2024-04-21

## 2024-04-21 RX ORDER — LABETALOL HYDROCHLORIDE 5 MG/ML
10 INJECTION, SOLUTION INTRAVENOUS EVERY 4 HOURS PRN
Status: DISCONTINUED | OUTPATIENT
Start: 2024-04-21 | End: 2024-04-22

## 2024-04-21 RX ORDER — MIDAZOLAM HYDROCHLORIDE 5 MG/ML
5 INJECTION INTRAMUSCULAR; INTRAVENOUS
Status: DISCONTINUED | OUTPATIENT
Start: 2024-04-21 | End: 2024-04-22

## 2024-04-21 RX ADMIN — LACOSAMIDE 150 MG: 10 INJECTION INTRAVENOUS at 16:35

## 2024-04-21 RX ADMIN — ENOXAPARIN SODIUM 40 MG: 100 INJECTION SUBCUTANEOUS at 16:36

## 2024-04-21 RX ADMIN — ACYCLOVIR SODIUM 650 MG: 50 INJECTION, SOLUTION INTRAVENOUS at 23:27

## 2024-04-21 RX ADMIN — HYDRALAZINE HYDROCHLORIDE 10 MG: 20 INJECTION, SOLUTION INTRAMUSCULAR; INTRAVENOUS at 05:19

## 2024-04-21 RX ADMIN — MICONAZOLE NITRATE: 20 CREAM TOPICAL at 05:22

## 2024-04-21 RX ADMIN — MICONAZOLE NITRATE: 20 CREAM TOPICAL at 16:36

## 2024-04-21 RX ADMIN — LACOSAMIDE 150 MG: 10 INJECTION INTRAVENOUS at 05:02

## 2024-04-21 RX ADMIN — ACYCLOVIR SODIUM 675 MG: 50 INJECTION, SOLUTION INTRAVENOUS at 11:47

## 2024-04-21 RX ADMIN — LORAZEPAM 1 MG: 2 INJECTION INTRAMUSCULAR; INTRAVENOUS at 22:50

## 2024-04-21 ASSESSMENT — PAIN DESCRIPTION - PAIN TYPE
TYPE: ACUTE PAIN
TYPE: ACUTE PAIN

## 2024-04-21 ASSESSMENT — FIBROSIS 4 INDEX: FIB4 SCORE: 2.61

## 2024-04-21 NOTE — PROGRESS NOTES
[facility] Internal Medicine Daily Progress Note    Date of Service  4/21/2024    [facility] Team: R [facility] Team   Attending: Sima Bernal M.d.  Senior Resident: Dr. Rojas  Intern:  Dr. Sarmiento  Contact Number: [phone]    Chief Complaint  Jairo Rivas is a 73 y.o. female admitted 4/13/2024 with painful urination and constipation.     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 It is noted that patient has been having abdominal pain and constipation, however, she has had 2 bowel movements since being admitted.  Her UA in the ED was consistent with UTI.  She had leukocytosis with WBC 24.  Lactic acid 3.  Blood cultures negative thus far.  Started on Rocephin for 3 days to treat UTI.     CT scan of the head was obtained, noted to have pneumatization of the right frontal lobe mass highly concerning for metastasis; associated with genic edema and 2.5 mm right to left midline shift.  Patient continued to remain on Decadron. Also GYN oncology was consulted, discussed the prognosis of the patient, stated that patient is not a candidate for any treatment.  Patient daughter wanted her to be full code at this time.  After several days without any food, NG tube finally placed.  Successful tube feeds.  VZV positive on skin swab. MRI on 4/21/24 did not show evidence of new strokes. It did show a decrease in the size of the brain metastasis in frontotemporal region and surrounding edema.    Interval Problem Update  Patient's MRI this morning showed no new strokes.  Significant decrease in size of frontotemporal metastasis and surrounding edema.  Consulted vascular  neurology to confirm.  Today we had two lengthy meetings with patient's children, 2 of which were present via phone.  Patient's family is wondering if some of her mentation can be attributed to her met decreasing too quickly as this was something that there were neuro oncologist discussed in [place].  At this point, zoster encephalitis highest on differential but family is conflicted on whether an LP should be the next step.  They would like to see how their mother does with some mild sedation for the NG tube as well.  Infectious disease consulted, they will see her tomorrow.  Continuing empiric treatment with acyclovir.    Patient's family is also concerned that the NG tube may have contributed to their mothers altered mental status.  Her alertness decreased shortly after placing the NG tube and today she does appear more alert and responsive to physical and vocal cues after not having the tube in since before her MRI.  However, after extensively explaining the reasons why an NG tube would benefit their mother including ability to  control her blood pressure, heart rate, and pain, patient's children are amenable to trying NG tube placement again.  They request that it be done via IRIS and that their mother received some sedation prior to placement.     Increase in enoxaparin for elevated VTE protection due to metastatic cancer held until decision on LP made, hopefully tomorrow.    At the end of the day, I received a call that the patient's PIV infiltrated. The process of finding a nurse from ICU and ER has been initiated but may take time during shift change. VATs unavailable today.    I have discussed this patient's plan of care and discharge plan at IDT rounds today with Case Management, Nursing, Nursing leadership, and other members of the IDT team.    Consultants/Specialty  neurology and surgical oncology     Code Status  Full Code    Disposition  The patient is not medically cleared for discharge to home or a  post-acute facility.      I have placed the appropriate orders for post-discharge needs.    Review of Systems  Review of Systems   Unable to perform ROS: Medical condition        Physical Exam  Temp:  [35.9 °C (96.6 °F)-36.5 °C (97.7 °F)] 36 °C (96.8 °F)  Pulse:  [] 112  Resp:  [17-18] 18  BP: (126-173)/() 126/84  SpO2:  [94 %-98 %] 96 %    Physical Exam  Vitals (exam limited) reviewed.   Constitutional:       General: She is sleeping.      Appearance: She is obese. She is ill-appearing.      Comments: Obtunded but slightly more alert today. Responsive to physical and vocal cues.   HENT:      Head: Normocephalic and atraumatic.      Mouth/Throat:      Mouth: Mucous membranes are dry.   Cardiovascular:      Rate and Rhythm: Normal rate and regular rhythm.      Pulses: Normal pulses.      Heart sounds: Normal heart sounds.   Pulmonary:      Effort: Pulmonary effort is normal. No respiratory distress.      Breath sounds: Normal breath sounds.   Abdominal:      General: There is distension.      Palpations: There is mass.      Tenderness: There is abdominal tenderness.   Musculoskeletal:      Right lower leg: Edema present.      Left lower leg: Edema present.   Skin:     General: Skin is warm and dry.      Comments: Skin breakdown under breasts. Dark red non pustular rash on left just below her breast.         Fluids    Intake/Output Summary (Last 24 hours) at 4/21/2024 1930  Last data filed at 4/21/2024 1921  Gross per 24 hour   Intake 476.9 ml   Output 1550 ml   Net -1073.1 ml       Laboratory  Recent Labs     04/19/24  0636 04/20/24  0208 04/21/24  0006   WBC 9.0 8.0 8.1   RBC 3.70* 4.16* 4.23   HEMOGLOBIN 11.7* 13.2 13.6   HEMATOCRIT 36.1* 40.2 40.3   MCV 97.6 96.6 95.3   MCH 31.6 31.7 32.2   MCHC 32.4 32.8 33.7   RDW 53.8* 52.7* 50.4*   PLATELETCT 124* 127* 122*   MPV 10.6 10.8 11.3     Recent Labs     04/19/24  0636 04/19/24  1139 04/20/24  0208 04/20/24  1533 04/21/24  0006 04/21/24  0623  04/21/24  1742   SODIUM 150*   < > 149*   < > 149* 145 141   POTASSIUM 4.3  --  4.6  --  4.4  --   --    CHLORIDE 118*  --  114*  --  111  --   --    CO2 22  --  23  --  25  --   --    GLUCOSE 208*  --  228*  --  163*  --   --    BUN 58*  --  33*  --  26*  --   --    CREATININE 1.11  --  0.53  --  0.45*  --   --    CALCIUM 8.9  --  9.3  --  9.5  --   --     < > = values in this interval not displayed.                   Imaging  MR-BRAIN-WITH & W/O   Final Result      1.  Significant interval decrease in size of right frontal brain metastasis since previous exam slight interval decrease in surrounding vasogenic edema. Smaller irregular rim of increased diffusion weighted signal intensity about this treated metastasis    consistent with cytotoxic edema.      2.  New large area of acute infarction in the right posterior frontal, parietal, posterior temporal, and occipital lobes associated gyriform enhancement in the right lateral occipital lobe and right peritrigonal white matter.      3.  Smaller chronic wedge-shaped area of infarction involving the left posterior basal ganglia extending into the left posterior frontal periventricular white matter      4.  Small areas of chronic lacunar type infarction involving the left side of the elaine and left brachium pontis with surrounding gliosis.      5.  Small foci of chronic hemosiderin deposition noted in the left posterior temporal and occipital white matter, as well as in the treated right frontal brain metastasis      6.  Age-related cerebral atrophy.      DX-ABDOMEN FOR TUBE PLACEMENT   Final Result         1.  Nonspecific bowel gas pattern in the upper abdomen.   2.  Dobbhoff tube is coiled within the stomach, the tip terminates overlying the expected location of the gastric fundus, physician similar to prior study.   3.  Left lower lobe infiltrate      DX-ABDOMEN FOR TUBE PLACEMENT   Final Result         1.  Nonspecific bowel gas pattern in the upper abdomen.   2.   Dobbhoff tube is coiled within the stomach, the tip terminates overlying the expected location of the gastric fundus.   3.  Left basilar atelectasis      DX-ABDOMEN FOR TUBE PLACEMENT   Final Result      Feeding tube looped in the stomach the distal tip in the gastric fundus.      CT-HEAD WITH & W/O   Final Result      1.  Redemonstration of right frontal lobe mass, highly concerning for metastasis. Associated vasogenic edema and a 2.5 mm right to left midline shift. No progressive mass effect. No herniation.   2.  Nonspecific ill-defined enhancement in the right parietal lobe.   3.  No new large vascular structure infarct. No new intracranial hemorrhage.      DX-CHEST-LIMITED (1 VIEW)   Final Result      No significant interval change.      DX-ABDOMEN COMPLETE WITH AP OR PA CXR   Final Result      1. No acute cardiopulmonary abnormality.   2. Chronic left lower lobe scarring.   3. Nonobstructive bowel gas pattern. Small amount of stool throughout the colon.      IR-MIDLINE CATHETER INSERTION WO GUIDANCE > AGE 3    (Results Pending)        Assessment/Plan  Problem Representation:    * Acute encephalopathy- (present on admission)  Assessment & Plan  NOT IMPROVING  Patient remains obtunded with occasional alertness. Etiology at time of admission likely multifactorial, as she has multiple possible causes including UTI,  medications (norco, baclofen, gabapentin), recent findings of subacute CVA as well as brain mets with vasogenic edema, and shingles may all be contributing.  EEG on 4/14 did not demonstrate any seizures but was concerning for signs of brain distress as would be expected in setting of edema. No new findings on CTH.  Due to patient's slow decline in mentation despite treatment of UTI and starting NG tube feeds in addition to suddenly high blood pressure, MRI ordered. 4/21 vascular neurologist did not identify any new strokes and evidence of improvement in brain met and vasogenic edema.  -Consider  neurology consult  -Change to IV acyclovir + maintenance fluids in case of disseminated shingles, no PO access  -continue lacosamide for seizure ppx  -fall, seizure, aspiration precautions  -Holding baclofen and gabapentin due to sedating properties and no NG tube    Problem with vascular access  Assessment & Plan  On 4/21, patient's PIV infiltrated twice. First time, PIV placed eventually with ultrasound guidance. Second time occurred in the evening and no VATs available.  -Pending midline placement  -Readdress with family on 4/22, vascular access required for treatment of shingles, blood pressure, etc    Dysphagia- (present on admission)  Assessment & Plan  Possibly due to CVA vs brain metastases and altered mental status in setting of infection.  Patient will remain NPO as she failed SLP evaluation 4/17  NG tube placement required IRIS. Tube was removed for MRI and family refused NG tube to be replaced. Extensive discussion with family regarding nutrition options. Eventually agreed to let patient get NG tube with lorazepam sedation and IRIS. No one certified to place NG tube with IRIS available on 4/21. Vascular access also lost on 4/21 and midline unable to be placed without VATs  -aspiration precautions  - Continue lansoprazole  - Q 6 hours glucose checks      Shingles- (present on admission)  Assessment & Plan  Rash noted under left breast and onto lateral chest wall.  Swab of rash positive for VZV. MRI on 4/21 did not show new infarcts or worsening brain mets. No other dermatomes noted to have rash.  Concern for encephalitis from shingles due to minimal improvement in mentation. Holding off on LP for now per family's wishes  -ID consulted, will see patient 4/22. Recommending LP for definitive diagnosis.  -contact precautions  -Continue acyclovir IV  -Miconazole to treat intertrigo    Vasogenic edema (HCC)- (present on admission)  Assessment & Plan  Continuing decadron taper per Dr. Live's recommendations.  He also does not believe that there are any significant changes in her brain imaging from this admission that could explain her somnolence/presentation. She is on the correct medication (decadron) for treating the edema  Improvement on MRI on 4/21  -Continue decadron  -Speak with Dr. Live on 4/22 to touch base on decadron taper    Hypernatremia  Assessment & Plan  Sodium oscillates above and below 145. Reacting to changes by switching from D5W and D5 half normal saline. Suspect regulation of sodium will greatly improve as patient receives fluids and nutrition via NG tube.  -Continue to monitor with daily labs  -Consider further workup of etiology if does not balance with enteral nutrition and fluids  -Discontinued fluids, free water pushes ordered    Seizure (HCC)- (present on admission)  Assessment & Plan  EEG did not show evidence of seizure. Antiepileptic continued given multitude of risk factors.  -Continue lacosamide 150 mg IV BID    Malignant neoplasm metastatic to brain (HCC)- (present on admission)  Assessment & Plan  Brain mets noted during hospitalization on 3/27/2024 with vasogenic edema, MRI on 4/11 shows continued brain mets with vasogenic edema.  MRI 4/21 shows decrease in tumor size and improvement in edema.  -continue seizure prophylaxis with lacosamide 150 mg BID  -continue dexamethasone   Continue fall, aspiration, seizure precaution.      Advance care planning- (present on admission)  Assessment & Plan  At this time patient's family want her to be full code.  Palliative care consulted and discussed with family the goals of care. This remains the same.    Endometrial cancer (HCC)- (present on admission)  Assessment & Plan  Followed by Dr. Garcia outpatient and sees radiation oncology Dr Live.  Received immunotherapy at [facility] in 2021 with initial good response. Reached out to Dr. Live who does not believe imaging from 4/14 shows significant changes compared to MRI month prior.  -Complicated by  metastases to brain among other locations, likely contributing to her altered mental status. Mets re demonstrated without significant change on CTH on admission.  -Decadron for vasogenic edema  -Neurochecks every 4 hours, continue aspiration, fall, seizure precaution.    Essential hypertension- (present on admission)  Assessment & Plan  Concern for secondary exacerbation of hypertension raised on 4/20 when blood pressures increased to 190s/130s despite receiving scheduled home meds. MRI ordered; no evidence of worsening mets or new stroke.  As of evening of 4/21, patient's vascular access lost and NG tube not replaced. Unable to provide below medications.  - PRN IV hydralazine   - Increased amlodipine to 10 mg daily on 4/20  - Continue losartan 100 mg daily  - Increased metoprolol tartrate to 75 mg BID on 4/20  - Continue atorvastatin 20 mg every evening    History of CVA (cerebrovascular accident)- (present on admission)  Assessment & Plan  Residual right sided neurodeficits form CVA in 2010.  Has RUE spasticity, RLE pain.  New subacute CVA found on 4/11 MRI at [facility].  -hold chemical DVT ppx due to proximity of CVA  -consider starting asa when appropriate  -continue atorvastatin    Prediabetes- (present on admission)  Assessment & Plan  A1c 6.6, unsure if related to multiple courses of steroids  -Glucose checks q6h, holding SSI while no tube feeds    Hypermagnesemia  Assessment & Plan  Expect improvement with enteral food and fluids after NG tube. Currently Mg 3.  -Monitor with daily labs    Constipation  Assessment & Plan  Resolved  - Bowel regimen    Sepsis secondary to UTI (HCC)- (present on admission)  Assessment & Plan  RESOLVED  This is Sepsis Present on admission  SIRS criteria identified on my evaluation include: Tachycardia, with heart rate greater than 90 BPM and Leukocytosis, with WBC greater than 12,000  Clinical indicators of end organ dysfunction include Lactic Acid greater than 2 and Toxic Metabolic  Encephalopathy  Source is UTI  Sepsis protocol initiated  Crystalloid Fluid Administration: Fluid resuscitation ordered per standard protocol - 30 mL/kg per current or ideal body weight  IV antibiotics as appropriate for source of sepsis  Reassessment: I have reassessed the patient's hemodynamic status  -s/p CTX x5d  -received sepsis bolus in ED, will continue IVF with caution  -No growth in blood cultures or urine cultures      Acute kidney injury (HCC)- (present on admission)  Assessment & Plan  RESOLVED  Returned, Cr 1.11 and baseline around 0.7. Likely prerenal from dehydration.  -continue free water flushes  -renally dose all medications as appropriate  -avoid nephrotoxins         VTE prophylaxis: SCDs/TEDs    I have performed a physical exam and reviewed and updated ROS and Plan today (4/21/2024). In review of yesterday's note (4/20/2024), there are no changes except as documented above.

## 2024-04-21 NOTE — PROGRESS NOTES
Patient arrived back from MRI. Telemetry and SpO2 monitoring was put back on the patient. All questions were answered at this time and patient resting comfortably.

## 2024-04-21 NOTE — CONSULTS
Brief ID note:    ID consult requested by [facility] IM resident, Dr. Sarmiento for possible disseminated VZV    Chart reviewed.  73-year-old woman with a history of metastatic endometrial cancer with metastases to brain admitted secondary to lethargy.  Hospital course complicated by altered mentation.  She was noted to have vesicular rash on her left chest wall and under left breast with VZV PCR positive.  Concern for possible disseminated VZV contributing to acute encephalopathy.    -Recommend lumbar puncture.  Please send CSF fluid for routine cell count, protein, glucose, CSF cultures and meningitis/encephalitis panel PCR  -Continue IV acyclovir    Full consult to follow tomorrow    Preliminary recommendations discussed with [facility] resident Dr. Sarmiento

## 2024-04-21 NOTE — PROGRESS NOTES
Brief vascular neurology note.      73-year-old female with metastatic cancer to the brain.  Has been admitted for several days now.  Altered mental status.  Clinical shingles.  Was evaluated by the general neurology team for seizures.  On Lamictal.  CT head on April 10 shows a right posterior frontal hypodensity consistent with subacute infarct.  This is new since the March 26 MRI scan.  An MRI brain was completed overnight which shows acute infarct over the right frontal posterior region.  This appears consistent with the CT scan on the 10th.  Therefore this infarct occurred sometime between March 26 and April 10.  Does not fully explain why the patient is completely obtunded.  I would dissipate left-sided weakness with this infarct.  Usually with metastatic cancer and infarcts patients are usually hypercoagulable and we tend to treat these with anticoagulation.  I would recommend for stroke prevention using Eliquis unless there is other contraindications per the medicine team.  No other recommendations from stroke neurology.  Please reconsult if needed.

## 2024-04-21 NOTE — PROGRESS NOTES
NG tube was pulled for the MRI this evening. Family discussed with day team nurse to wait to put the NG tube in for a while due to having difficulty placing it last time. NG tube was not placed overnight to let the patient rest. MD notified and informed nurse that this will be relayed onto day team.

## 2024-04-21 NOTE — PROGRESS NOTES
Patient SBP was above 160 at the midnight check. She was given IV hydralazine, which was effective.

## 2024-04-21 NOTE — PROGRESS NOTES
Telemetry:      Rhythm: SR 1st degree block  Ectopy: PVC, coup  Rate: 75-95    0.21/0.06/0.38

## 2024-04-21 NOTE — CARE PLAN
The patient is Watcher - Medium risk of patient condition declining or worsening    Shift Goals  Clinical Goals: control BP, MRI  Patient Goals: ZA  Family Goals: vital management, MRI    Progress made toward(s) clinical / shift goals:    Problem: Pain - Standard  Goal: Alleviation of pain or a reduction in pain to the patient’s comfort goal  Description: Target End Date:  Prior to discharge or change in level of care    Document on Vitals flowsheet    1.  Document pain using the appropriate pain scale per order or unit policy  2.  Educate and implement non-pharmacologic comfort measures (i.e. relaxation, distraction, massage, cold/heat therapy, etc.)  3.  Pain management medications as ordered  4.  Reassess pain after pain med administration per policy  5.  If opiods administered assess patient's response to pain medication is appropriate per POSS sedation scale  6.  Follow pain management plan developed in collaboration with patient and interdisciplinary team (including palliative care or pain specialists if applicable)  Outcome: Progressing     Problem: Fluid Volume  Goal: Fluid volume balance will be maintained  Description: Target End Date:  Prior to discharge or change in level of care    Document on I/O flowsheet    1.  Monitor intake and output as ordered  2.  Promote oral intake as appropriate  3.  Report inadequate intake or output to physician  4.  Administer IV therapy as ordered  5.  Weights per provider order  6.  Assess for signs and symptoms of bleeding  7.  Monitor for signs of fluid overload (respiratory changes, edema, weight gain, increased abdominal girth)  8.  Monitor of signs for inadequate fluid volume (poor skin turgor, dry mucous membranes)  9.  Instruct patient on adherence to fluid restrictions  Outcome: Progressing     Problem: Urinary - Renal Perfusion  Goal: Ability to achieve and maintain adequate renal perfusion and functioning will improve  Description: Target End Date:  Prior to  discharge or change in level of care    Document on I/O and Assessment flowsheet    1.  Urine output will remain greater than 0.5ml/Kg/HR  2.  Monitor amount and/or characteristics of urine per order/policy. Specific gravity per order/policy  3.  Assess signs and symptoms of renal dysfunction  Outcome: Progressing

## 2024-04-21 NOTE — CARE PLAN
The patient is Stable - Low risk of patient condition declining or worsening    Shift Goals  Clinical Goals: control BP, MRI  Patient Goals: ZA  Family Goals: vital management, MRI    Progress made toward(s) clinical / shift goals:      Problem: Skin Integrity  Goal: Skin integrity is maintained or improved  Description: Target End Date:  Prior to discharge or change in level of care    Document interventions on Skin Risk/[place] flowsheet groups and corresponding LDA    1.  Assess and monitor skin integrity, appearance and/or temperature  2.  Assess risk factors for impaired skin integrity and/or pressures ulcers  3.  Implement precautions to protect skin integrity in collaboration with interdisciplinary team  4.  Implement pressure ulcer prevention protocol if at risk for skin breakdown  5.  Confirm wound care consult if at risk for skin breakdown  6.  Ensure patient use of pressure relieving devices  (Low air loss bed, waffle overlay, heel protectors, ROHO cushion, etc)  Outcome: Progressing     Problem: Fall Risk  Goal: Patient will remain free from falls  Description: Target End Date:  Prior to discharge or change in level of care    Document interventions on the Esperanza Grewal Fall Risk Assessment    1.  Assess for fall risk factors  2.  Implement fall precautions  Outcome: Progressing

## 2024-04-22 ENCOUNTER — APPOINTMENT (OUTPATIENT)
Dept: RADIOLOGY | Facility: MEDICAL CENTER | Age: 74
DRG: 871 | End: 2024-04-22
Payer: MEDICARE

## 2024-04-22 ENCOUNTER — APPOINTMENT (OUTPATIENT)
Dept: RADIOLOGY | Facility: MEDICAL CENTER | Age: 74
DRG: 871 | End: 2024-04-22
Attending: INTERNAL MEDICINE
Payer: MEDICARE

## 2024-04-22 LAB
ALBUMIN SERPL BCP-MCNC: 3 G/DL (ref 3.2–4.9)
ALBUMIN/GLOB SERPL: 1.1 G/DL
ALP SERPL-CCNC: 81 U/L (ref 30–99)
ALT SERPL-CCNC: 22 U/L (ref 2–50)
ANION GAP SERPL CALC-SCNC: 13 MMOL/L (ref 7–16)
APTT PPP: 26.6 SEC (ref 24.7–36)
AST SERPL-CCNC: 22 U/L (ref 12–45)
BASOPHILS # BLD AUTO: 0.3 % (ref 0–1.8)
BASOPHILS # BLD: 0.03 K/UL (ref 0–0.12)
BILIRUB SERPL-MCNC: 0.6 MG/DL (ref 0.1–1.5)
BUN SERPL-MCNC: 32 MG/DL (ref 8–22)
BURR CELLS/RBC NFR CSF MANUAL: 0 %
C GATTII+NEOFOR DNA CSF QL NAA+NON-PROBE: NOT DETECTED
CALCIUM ALBUM COR SERPL-MCNC: 9.6 MG/DL (ref 8.5–10.5)
CALCIUM SERPL-MCNC: 8.8 MG/DL (ref 8.5–10.5)
CHLORIDE SERPL-SCNC: 106 MMOL/L (ref 96–112)
CLARITY CSF: ABNORMAL
CMV DNA CSF QL NAA+NON-PROBE: NOT DETECTED
CO2 SERPL-SCNC: 23 MMOL/L (ref 20–33)
COLOR CSF: ABNORMAL
COLOR SPUN CSF: ABNORMAL
CREAT SERPL-MCNC: 1.02 MG/DL (ref 0.5–1.4)
CRP SERPL HS-MCNC: 13.12 MG/DL (ref 0–0.75)
E COLI K1 DNA CSF QL NAA+NON-PROBE: NOT DETECTED
EOSINOPHIL # BLD AUTO: 0 K/UL (ref 0–0.51)
EOSINOPHIL NFR BLD: 0 % (ref 0–6.9)
ERYTHROCYTE [DISTWIDTH] IN BLOOD BY AUTOMATED COUNT: 50.4 FL (ref 35.9–50)
EV RNA CSF QL NAA+NON-PROBE: NOT DETECTED
GFR SERPLBLD CREATININE-BSD FMLA CKD-EPI: 59 ML/MIN/1.73 M 2
GLOBULIN SER CALC-MCNC: 2.8 G/DL (ref 1.9–3.5)
GLUCOSE CSF-MCNC: 149 MG/DL (ref 40–80)
GLUCOSE SERPL-MCNC: 145 MG/DL (ref 65–99)
GP B STREP DNA CSF QL NAA+NON-PROBE: NOT DETECTED
GRAM STN SPEC: NORMAL
HAEM INFLU DNA CSF QL NAA+NON-PROBE: NOT DETECTED
HCT VFR BLD AUTO: 40.8 % (ref 37–47)
HGB BLD-MCNC: 13.5 G/DL (ref 12–16)
HHV6 DNA CSF QL NAA+NON-PROBE: NOT DETECTED
HSV1 DNA CSF QL NAA+NON-PROBE: NOT DETECTED
HSV2 DNA CSF QL NAA+NON-PROBE: NOT DETECTED
IMM GRANULOCYTES # BLD AUTO: 0.36 K/UL (ref 0–0.11)
IMM GRANULOCYTES NFR BLD AUTO: 3.4 % (ref 0–0.9)
INR PPP: 1.13 (ref 0.87–1.13)
L MONOCYTOG DNA CSF QL NAA+NON-PROBE: NOT DETECTED
LYMPHOCYTES # BLD AUTO: 0.43 K/UL (ref 1–4.8)
LYMPHOCYTES NFR BLD: 4.1 % (ref 22–41)
LYMPHOCYTES NFR CSF: 91 %
MCH RBC QN AUTO: 31.6 PG (ref 27–33)
MCHC RBC AUTO-ENTMCNC: 33.1 G/DL (ref 32.2–35.5)
MCV RBC AUTO: 95.6 FL (ref 81.4–97.8)
MONOCYTES # BLD AUTO: 0.19 K/UL (ref 0–0.85)
MONOCYTES NFR BLD AUTO: 1.8 % (ref 0–13.4)
MONOS+MACROS NFR CSF MANUAL: 6 %
N MEN DNA CSF QL NAA+NON-PROBE: NOT DETECTED
NEUTROPHILS # BLD AUTO: 9.53 K/UL (ref 1.82–7.42)
NEUTROPHILS NFR BLD: 90.4 % (ref 44–72)
NEUTROPHILS NFR CSF: 3 %
NRBC # BLD AUTO: 0.05 K/UL
NRBC BLD-RTO: 0.5 /100 WBC (ref 0–0.2)
NUC CELL # CSF: 224 CELLS/UL (ref 0–10)
PARECHOVIRUS A RNA CSF QL NAA+NON-PROBE: NOT DETECTED
PLATELET # BLD AUTO: 126 K/UL (ref 164–446)
PMV BLD AUTO: 10.9 FL (ref 9–12.9)
POTASSIUM SERPL-SCNC: 4.2 MMOL/L (ref 3.6–5.5)
PREALB SERPL-MCNC: 27.8 MG/DL (ref 18–38)
PROT CSF-MCNC: >600 MG/DL (ref 15–45)
PROT SERPL-MCNC: 5.8 G/DL (ref 6–8.2)
PROTHROMBIN TIME: 14.7 SEC (ref 12–14.6)
RBC # BLD AUTO: 4.27 M/UL (ref 4.2–5.4)
RBC # CSF: 9000 CELLS/UL
S PNEUM DNA CSF QL NAA+NON-PROBE: NOT DETECTED
SIGNIFICANT IND 70042: NORMAL
SITE SITE: NORMAL
SODIUM SERPL-SCNC: 139 MMOL/L (ref 135–145)
SODIUM SERPL-SCNC: 141 MMOL/L (ref 135–145)
SODIUM SERPL-SCNC: 142 MMOL/L (ref 135–145)
SOURCE SOURCE: NORMAL
SPECIMEN VOL CSF: 11 ML
TUBE # CSF: 3
TUBE # CSF: ABNORMAL
VZV DNA CSF QL NAA+NON-PROBE: DETECTED
WBC # BLD AUTO: 10.5 K/UL (ref 4.8–10.8)

## 2024-04-22 PROCEDURE — 85610 PROTHROMBIN TIME: CPT

## 2024-04-22 PROCEDURE — 700105 HCHG RX REV CODE 258

## 2024-04-22 PROCEDURE — 84134 ASSAY OF PREALBUMIN: CPT

## 2024-04-22 PROCEDURE — 95822 EEG COMA OR SLEEP ONLY: CPT | Mod: 26 | Performed by: PSYCHIATRY & NEUROLOGY

## 2024-04-22 PROCEDURE — C1751 CATH, INF, PER/CENT/MIDLINE: HCPCS

## 2024-04-22 PROCEDURE — 99497 ADVNCD CARE PLAN 30 MIN: CPT | Performed by: STUDENT IN AN ORGANIZED HEALTH CARE EDUCATION/TRAINING PROGRAM

## 2024-04-22 PROCEDURE — 84157 ASSAY OF PROTEIN OTHER: CPT

## 2024-04-22 PROCEDURE — 665999 HH PPS REVENUE DEBIT

## 2024-04-22 PROCEDURE — 700111 HCHG RX REV CODE 636 W/ 250 OVERRIDE (IP): Performed by: STUDENT IN AN ORGANIZED HEALTH CARE EDUCATION/TRAINING PROGRAM

## 2024-04-22 PROCEDURE — 84295 ASSAY OF SERUM SODIUM: CPT

## 2024-04-22 PROCEDURE — 89051 BODY FLUID CELL COUNT: CPT

## 2024-04-22 PROCEDURE — C9254 INJECTION, LACOSAMIDE: HCPCS | Performed by: STUDENT IN AN ORGANIZED HEALTH CARE EDUCATION/TRAINING PROGRAM

## 2024-04-22 PROCEDURE — 05HD33Z INSERTION OF INFUSION DEVICE INTO RIGHT CEPHALIC VEIN, PERCUTANEOUS APPROACH: ICD-10-PCS | Performed by: INTERNAL MEDICINE

## 2024-04-22 PROCEDURE — 99233 SBSQ HOSP IP/OBS HIGH 50: CPT | Mod: GC | Performed by: INTERNAL MEDICINE

## 2024-04-22 PROCEDURE — 700111 HCHG RX REV CODE 636 W/ 250 OVERRIDE (IP): Mod: JZ

## 2024-04-22 PROCEDURE — 700102 HCHG RX REV CODE 250 W/ 637 OVERRIDE(OP): Performed by: INTERNAL MEDICINE

## 2024-04-22 PROCEDURE — 62328 DX LMBR SPI PNXR W/FLUOR/CT: CPT

## 2024-04-22 PROCEDURE — 87483 CNS DNA AMP PROBE TYPE 12-25: CPT

## 2024-04-22 PROCEDURE — 4A10X4Z MONITORING OF CENTRAL NERVOUS ELECTRICAL ACTIVITY, EXTERNAL APPROACH: ICD-10-PCS | Performed by: PSYCHIATRY & NEUROLOGY

## 2024-04-22 PROCEDURE — 009U3ZX DRAINAGE OF SPINAL CANAL, PERCUTANEOUS APPROACH, DIAGNOSTIC: ICD-10-PCS | Performed by: RADIOLOGY

## 2024-04-22 PROCEDURE — 86140 C-REACTIVE PROTEIN: CPT

## 2024-04-22 PROCEDURE — 36415 COLL VENOUS BLD VENIPUNCTURE: CPT

## 2024-04-22 PROCEDURE — 700105 HCHG RX REV CODE 258: Performed by: INTERNAL MEDICINE

## 2024-04-22 PROCEDURE — 700102 HCHG RX REV CODE 250 W/ 637 OVERRIDE(OP)

## 2024-04-22 PROCEDURE — 99223 1ST HOSP IP/OBS HIGH 75: CPT | Performed by: INTERNAL MEDICINE

## 2024-04-22 PROCEDURE — 85730 THROMBOPLASTIN TIME PARTIAL: CPT

## 2024-04-22 PROCEDURE — 87205 SMEAR GRAM STAIN: CPT

## 2024-04-22 PROCEDURE — 665998 HH PPS REVENUE CREDIT

## 2024-04-22 PROCEDURE — A9270 NON-COVERED ITEM OR SERVICE: HCPCS

## 2024-04-22 PROCEDURE — 82945 GLUCOSE OTHER FLUID: CPT

## 2024-04-22 PROCEDURE — 85025 COMPLETE CBC W/AUTO DIFF WBC: CPT

## 2024-04-22 PROCEDURE — 87070 CULTURE OTHR SPECIMN AEROBIC: CPT

## 2024-04-22 PROCEDURE — A9270 NON-COVERED ITEM OR SERVICE: HCPCS | Performed by: INTERNAL MEDICINE

## 2024-04-22 PROCEDURE — 99233 SBSQ HOSP IP/OBS HIGH 50: CPT | Mod: 25 | Performed by: STUDENT IN AN ORGANIZED HEALTH CARE EDUCATION/TRAINING PROGRAM

## 2024-04-22 PROCEDURE — 95822 EEG COMA OR SLEEP ONLY: CPT | Performed by: PSYCHIATRY & NEUROLOGY

## 2024-04-22 PROCEDURE — 80053 COMPREHEN METABOLIC PANEL: CPT

## 2024-04-22 PROCEDURE — 770020 HCHG ROOM/CARE - TELE (206)

## 2024-04-22 PROCEDURE — 700111 HCHG RX REV CODE 636 W/ 250 OVERRIDE (IP): Performed by: INTERNAL MEDICINE

## 2024-04-22 RX ORDER — SODIUM CHLORIDE, SODIUM LACTATE, POTASSIUM CHLORIDE, CALCIUM CHLORIDE 600; 310; 30; 20 MG/100ML; MG/100ML; MG/100ML; MG/100ML
INJECTION, SOLUTION INTRAVENOUS CONTINUOUS
Status: DISCONTINUED | OUTPATIENT
Start: 2024-04-22 | End: 2024-04-26

## 2024-04-22 RX ORDER — LACOSAMIDE 100 MG/1
150 TABLET ORAL 2 TIMES DAILY
Status: DISCONTINUED | OUTPATIENT
Start: 2024-04-22 | End: 2024-05-13 | Stop reason: HOSPADM

## 2024-04-22 RX ORDER — LABETALOL HYDROCHLORIDE 5 MG/ML
10 INJECTION, SOLUTION INTRAVENOUS EVERY 4 HOURS PRN
Status: DISCONTINUED | OUTPATIENT
Start: 2024-04-22 | End: 2024-04-25

## 2024-04-22 RX ORDER — LORAZEPAM 2 MG/ML
1 INJECTION INTRAMUSCULAR
Status: COMPLETED | OUTPATIENT
Start: 2024-04-22 | End: 2024-04-22

## 2024-04-22 RX ADMIN — DEXAMETHASONE 4 MG: 4 TABLET ORAL at 06:25

## 2024-04-22 RX ADMIN — DEXAMETHASONE 4 MG: 4 TABLET ORAL at 21:30

## 2024-04-22 RX ADMIN — LORAZEPAM 1 MG: 2 INJECTION INTRAMUSCULAR; INTRAVENOUS at 15:13

## 2024-04-22 RX ADMIN — LACOSAMIDE 150 MG: 100 TABLET, FILM COATED ORAL at 21:31

## 2024-04-22 RX ADMIN — MICONAZOLE NITRATE: 20 CREAM TOPICAL at 17:50

## 2024-04-22 RX ADMIN — ACYCLOVIR SODIUM 650 MG: 50 INJECTION, SOLUTION INTRAVENOUS at 23:23

## 2024-04-22 RX ADMIN — ATORVASTATIN CALCIUM 20 MG: 20 TABLET, FILM COATED ORAL at 20:30

## 2024-04-22 RX ADMIN — ACYCLOVIR SODIUM 650 MG: 50 INJECTION, SOLUTION INTRAVENOUS at 17:49

## 2024-04-22 RX ADMIN — LACOSAMIDE 150 MG: 10 INJECTION INTRAVENOUS at 06:27

## 2024-04-22 RX ADMIN — LANSOPRAZOLE 30 MG: 30 TABLET, ORALLY DISINTEGRATING ORAL at 06:25

## 2024-04-22 RX ADMIN — SODIUM CHLORIDE, POTASSIUM CHLORIDE, SODIUM LACTATE AND CALCIUM CHLORIDE: 600; 310; 30; 20 INJECTION, SOLUTION INTRAVENOUS at 17:52

## 2024-04-22 RX ADMIN — METOPROLOL TARTRATE 75 MG: 25 TABLET, FILM COATED ORAL at 21:30

## 2024-04-22 RX ADMIN — ACYCLOVIR SODIUM 650 MG: 50 INJECTION, SOLUTION INTRAVENOUS at 06:34

## 2024-04-22 RX ADMIN — MICONAZOLE NITRATE: 20 CREAM TOPICAL at 06:25

## 2024-04-22 RX ADMIN — SENNOSIDES AND DOCUSATE SODIUM 2 TABLET: 50; 8.6 TABLET ORAL at 21:31

## 2024-04-22 RX ADMIN — POLYETHYLENE GLYCOL 3350 1 PACKET: 17 POWDER, FOR SOLUTION ORAL at 06:25

## 2024-04-22 ASSESSMENT — PAIN DESCRIPTION - PAIN TYPE: TYPE: ACUTE PAIN

## 2024-04-22 ASSESSMENT — FIBROSIS 4 INDEX: FIB4 SCORE: 2.64

## 2024-04-22 NOTE — PROGRESS NOTES
RN messaged RICU, TICU, CICU, ER and the neurology unit and no one is able to place an IRIS NG tube for the patient.

## 2024-04-22 NOTE — THERAPY
Speech Language Therapy Contact Note    Patient Name: Jairo Rivas  Age:  73 y.o., Sex:  female  Medical Record #: [id number]  Today's Date: 4/22/2024    Discussed missed therapy with RN       04/22/24 0802   Treatment Variance   Reason For Missed Therapy Medical - Patient on Hold from Therapy;Medical - Patient Unarousable;Medical - Patient not Able To Participate   Interdisciplinary Plan of Care Collaboration   IDT Collaboration with  Nursing   Collaboration Comments Attempted to see pt for dysphagia management this date. Per RN, pt is unarousable and no appropriate to participate in tx. Service will hold and re-attempt as able/medically appropriate.

## 2024-04-22 NOTE — PROGRESS NOTES
Patient second IV within 12 hours has infiltrated. Resident contacted about  2nd IV infiltration.

## 2024-04-22 NOTE — PROCEDURES
Vascular Access Team    Date of Insertion: 04/22/2024  Arm Circumference: n/a  Line Length: 10  Line Size: 20g  Vein Occupancy %: 42  Reason for Midline: access  Labs: WBC 10.5, , BUN 32, Cr 1.02, GFR 59, INR n/a    Orders confirmed, vessel patency confirmed with ultrasound. Risks and benefits of procedure explained to patient and education regarding line associated bloodstream infections provided. Questions answered.     PowerGlide Midline placed in RUE per licensed provider order with ultrasound guidance. 20g, 10 cm line placed in Cephalic vein after 1 attempt(s).  Catheter inserted with brisk blood return. Secured with 0cm external from insertion site.  Line flushed without resistance with 10 mL 0.9% normal saline.  Midline secured with Biopatch and Tegaderm.     Midline placement is confirmed by nurse using ultrasound and ability to flush and draw blood. Midline is appropriate for use at this time.  No X-ray is needed for placement confirmation. Pt tolerated procedure well.  Patient condition relayed to unit RN or ordering physician via this post procedure note in the EMR.    Ultrasound images uploaded to PACS and viewable in the EMR - yes  Ultrasound imaged printed and placed in paper chart - no      BARD PowerGlide Midline ref # [id number], Lot # [id number], Expiration Date 02/28/2025

## 2024-04-22 NOTE — PROGRESS NOTES
[facility] Internal Medicine Daily Progress Note    Date of Service  4/22/2024    [facility] Team: R [facility] Team   Attending: Sima Bernal M.d.  Senior Resident: Dr. Grover  Intern:  Dr. Pedro  Contact Number: [phone]    Chief Complaint  Jairo Rivas is a 73 y.o. female admitted 4/13/2024 with painful urination and constipation.     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 It is noted that patient has been having abdominal pain and constipation, however, she has had 2 bowel movements since being admitted.  Her UA in the ED was consistent with UTI.  She had leukocytosis with WBC 24.  Lactic acid 3.  Blood cultures negative thus far.  Started on Rocephin for 3 days to treat UTI.     CT scan of the head was obtained, noted to have pneumatization of the right frontal lobe mass highly concerning for metastasis; associated with genic edema and 2.5 mm right to left midline shift.  Patient continued to remain on Decadron. Also GYN oncology was consulted, discussed the prognosis of the patient, stated that patient is not a candidate for any treatment.  Patient daughter wanted her to be full code at this time.  After several days without any food, NG tube finally placed.  Successful tube feeds.  VZV positive on skin swab. MRI on 4/21/24 did not show evidence of new strokes. It did show a decrease in the size of the brain metastasis in frontotemporal region and surrounding edema.    Interval Problem Update  Overnight patient lost IV access but NG tube was placed.  This morning patient had his midline placed by IR.  Long discussion about LP with family about  risks and benefits was done at bedside.  Patient's family decided that between doing LP at bedside versus LP by IR they will prefer LP with IR.  Patient currently on acyclovir will probably be switched to ganciclovir tomorrow.  EEG negative for seizures for the second time.  Goals of care is to make patient best shape to be discharged to be follow-up by his doctors in [place].    Increase in enoxaparin for elevated VTE protection due to metastatic cancer held until decision on LP made, hopefully tomorrow.    At the end of the day, I received a call that the patient's PIV infiltrated. The process of finding a nurse from ICU and ER has been initiated but may take time during shift change. VATs unavailable today.    I have discussed this patient's plan of care and discharge plan at IDT rounds today with Case Management, Nursing, Nursing leadership, and other members of the IDT team.    Consultants/Specialty  neurology and surgical oncology     Code Status  Full Code    Disposition  The patient is not medically cleared for discharge to home or a post-acute facility.      I have placed the appropriate orders for post-discharge needs.    Review of Systems  Review of Systems   Unable to perform ROS: Medical condition        Physical Exam  Temp:  [36 °C (96.8 °F)-36.3 °C (97.4 °F)] 36.3 °C (97.4 °F)  Pulse:  [104-118] 104  Resp:  [18] 18  BP: ()/(53-84) 113/74  SpO2:  [95 %-97 %] 95 %    Physical Exam  Vitals (exam limited) reviewed.   Constitutional:       General: She is sleeping.      Appearance: She is obese. She is ill-appearing.      Comments: Obtunded but slightly more alert today. Responsive to physical and vocal cues.   HENT:      Head: Normocephalic and atraumatic.      Mouth/Throat:      Mouth: Mucous membranes are dry.   Cardiovascular:      Rate and Rhythm: Normal rate and regular rhythm.      Pulses: Normal pulses.      Heart sounds: Normal heart sounds.   Pulmonary:      Effort: Pulmonary effort is  normal. No respiratory distress.      Breath sounds: Normal breath sounds.   Abdominal:      General: There is distension.      Palpations: There is mass.      Tenderness: There is abdominal tenderness.   Musculoskeletal:      Right lower leg: Edema present.      Left lower leg: Edema present.   Skin:     General: Skin is warm and dry.      Comments: Skin breakdown under breasts. Dark red non pustular rash on left just below her breast.         Fluids    Intake/Output Summary (Last 24 hours) at 4/22/2024 1537  Last data filed at 4/22/2024 1537  Gross per 24 hour   Intake 2307.49 ml   Output 300 ml   Net 2007.49 ml       Laboratory  Recent Labs     04/20/24  0208 04/21/24  0006 04/22/24  0032   WBC 8.0 8.1 10.5   RBC 4.16* 4.23 4.27   HEMOGLOBIN 13.2 13.6 13.5   HEMATOCRIT 40.2 40.3 40.8   MCV 96.6 95.3 95.6   MCH 31.7 32.2 31.6   MCHC 32.8 33.7 33.1   RDW 52.7* 50.4* 50.4*   PLATELETCT 127* 122* 126*   MPV 10.8 11.3 10.9     Recent Labs     04/20/24  0208 04/20/24  1533 04/21/24  0006 04/21/24  0623 04/22/24  0032 04/22/24  0623 04/22/24  1244   SODIUM 149*   < > 149*   < > 142 141 139   POTASSIUM 4.6  --  4.4  --  4.2  --   --    CHLORIDE 114*  --  111  --  106  --   --    CO2 23  --  25  --  23  --   --    GLUCOSE 228*  --  163*  --  145*  --   --    BUN 33*  --  26*  --  32*  --   --    CREATININE 0.53  --  0.45*  --  1.02  --   --    CALCIUM 9.3  --  9.5  --  8.8  --   --     < > = values in this interval not displayed.     Recent Labs     04/22/24  1244   APTT 26.6   INR 1.13               Imaging  IR-MIDLINE CATHETER INSERTION WO GUIDANCE > AGE 3   Final Result                  Ultrasound-guided midline placement performed by qualified nursing staff    as above.          DX-ABDOMEN FOR TUBE PLACEMENT   Final Result         1.  Nonspecific bowel gas pattern in the upper abdomen.   2.  Dobbhoff tube is coiled within the stomach, the tip terminates overlying the expected location of the gastric cardia.   3.  Hazy  left lower lobe infiltrate.      MR-BRAIN-WITH & W/O   Final Result      1.  Significant interval decrease in size of right frontal brain metastasis since previous exam slight interval decrease in surrounding vasogenic edema. Smaller irregular rim of increased diffusion weighted signal intensity about this treated metastasis    consistent with cytotoxic edema.      2.  New large area of acute infarction in the right posterior frontal, parietal, posterior temporal, and occipital lobes associated gyriform enhancement in the right lateral occipital lobe and right peritrigonal white matter.      3.  Smaller chronic wedge-shaped area of infarction involving the left posterior basal ganglia extending into the left posterior frontal periventricular white matter      4.  Small areas of chronic lacunar type infarction involving the left side of the elaine and left brachium pontis with surrounding gliosis.      5.  Small foci of chronic hemosiderin deposition noted in the left posterior temporal and occipital white matter, as well as in the treated right frontal brain metastasis      6.  Age-related cerebral atrophy.      DX-ABDOMEN FOR TUBE PLACEMENT   Final Result         1.  Nonspecific bowel gas pattern in the upper abdomen.   2.  Dobbhoff tube is coiled within the stomach, the tip terminates overlying the expected location of the gastric fundus, physician similar to prior study.   3.  Left lower lobe infiltrate      DX-ABDOMEN FOR TUBE PLACEMENT   Final Result         1.  Nonspecific bowel gas pattern in the upper abdomen.   2.  Dobbhoff tube is coiled within the stomach, the tip terminates overlying the expected location of the gastric fundus.   3.  Left basilar atelectasis      DX-ABDOMEN FOR TUBE PLACEMENT   Final Result      Feeding tube looped in the stomach the distal tip in the gastric fundus.      CT-HEAD WITH & W/O   Final Result      1.  Redemonstration of right frontal lobe mass, highly concerning for  metastasis. Associated vasogenic edema and a 2.5 mm right to left midline shift. No progressive mass effect. No herniation.   2.  Nonspecific ill-defined enhancement in the right parietal lobe.   3.  No new large vascular structure infarct. No new intracranial hemorrhage.      DX-CHEST-LIMITED (1 VIEW)   Final Result      No significant interval change.      DX-ABDOMEN COMPLETE WITH AP OR PA CXR   Final Result      1. No acute cardiopulmonary abnormality.   2. Chronic left lower lobe scarring.   3. Nonobstructive bowel gas pattern. Small amount of stool throughout the colon.      DX-LUMBAR PUNCTURE FOR DIAGNOSIS    (Results Pending)        Assessment/Plan  Problem Representation:    * Acute encephalopathy- (present on admission)  Assessment & Plan  NOT IMPROVING  Patient remains obtunded with occasional alertness. Etiology at time of admission likely multifactorial, as she has multiple possible causes including UTI,  medications (norco, baclofen, gabapentin), recent findings of subacute CVA as well as brain mets with vasogenic edema, and shingles may all be contributing.  EEG on 4/14 did not demonstrate any seizures but was concerning for signs of brain distress as would be expected in setting of edema. No new findings on CTH.  Due to patient's slow decline in mentation despite treatment of UTI and starting NG tube feeds in addition to suddenly high blood pressure, MRI ordered. 4/21 vascular neurologist did not identify any new strokes and evidence of improvement in brain met and vasogenic edema.  -Consider neurology consult  -Change to IV acyclovir + maintenance fluids in case of disseminated shingles, no PO access  -Will switch Aciclovir to ganciclovir.  -continue lacosamide for seizure ppx  -fall, seizure, aspiration precautions  -Holding baclofen and gabapentin due to sedating properties   - Patient's family decided that between doing LP at bedside versus LP by IR they will prefer LP with IR on 4/22    Problem  with vascular access  Assessment & Plan  On 4/21, patient's PIV infiltrated twice. First time, PIV placed eventually with ultrasound guidance. Second time occurred in the evening and no VATs available.  -Pending midline placement  -Readdress with family on 4/22, vascular access required for treatment of shingles, blood pressure, etc    Prediabetes- (present on admission)  Assessment & Plan  A1c 6.6, unsure if related to multiple courses of steroids  -Glucose checks q6h, holding SSI while no tube feeds    Hypermagnesemia  Assessment & Plan  Expect improvement with enteral food and fluids after NG tube. Currently Mg 3.  -Monitor with daily labs    Vasogenic edema (HCC)- (present on admission)  Assessment & Plan  Continuing decadron taper per Dr. Live's recommendations. He also does not believe that there are any significant changes in her brain imaging from this admission that could explain her somnolence/presentation. She is on the correct medication (decadron) for treating the edema  Improvement on MRI on 4/21  -Continue decadron  -Speak with Dr. Live on 4/22 to touch base on decadron taper    Hypernatremia  Assessment & Plan  Sodium oscillates above and below 145. Reacting to changes by switching from D5W and D5 half normal saline. Suspect regulation of sodium will greatly improve as patient receives fluids and nutrition via NG tube.  -Continue to monitor with daily labs  -Consider further workup of etiology if does not balance with enteral nutrition and fluids  -Discontinued fluids, free water pushes ordered    Seizure (HCC)- (present on admission)  Assessment & Plan  EEG did not show evidence of seizure. Antiepileptic continued given multitude of risk factors.  -Continue lacosamide 150 mg IV BID  -EEG negative for seizures for the second time.      Dysphagia- (present on admission)  Assessment & Plan  Possibly due to CVA vs brain metastases and altered mental status in setting of infection.  Patient will  remain NPO as she failed SLP evaluation 4/17  NG tube placement required IRIS. Tube was removed for MRI and family refused NG tube to be replaced. Extensive discussion with family regarding nutrition options. Eventually agreed to let patient get NG tube with lorazepam sedation and IRIS. No one certified to place NG tube with IRIS available on 4/21. Vascular access also lost on 4/21 and midline unable to be placed without VATs  -aspiration precautions  - Continue lansoprazole  - Q 6 hours glucose checks      Shingles- (present on admission)  Assessment & Plan  Rash noted under left breast and onto lateral chest wall.  Swab of rash positive for VZV. MRI on 4/21 did not show new infarcts or worsening brain mets. No other dermatomes noted to have rash.  Concern for encephalitis from shingles due to minimal improvement in mentation. Holding off on LP for now per family's wishes  -ID consulted, will see patient 4/22. Recommending LP for definitive diagnosis.  -contact precautions  -Continue acyclovir IV, probably will be switching to ganciclovir  -Miconazole to treat intertrigo    Constipation  Assessment & Plan  Resolved  - Bowel regimen    Sepsis secondary to UTI (HCC)- (present on admission)  Assessment & Plan  RESOLVED  This is Sepsis Present on admission  SIRS criteria identified on my evaluation include: Tachycardia, with heart rate greater than 90 BPM and Leukocytosis, with WBC greater than 12,000  Clinical indicators of end organ dysfunction include Lactic Acid greater than 2 and Toxic Metabolic Encephalopathy  Source is UTI  Sepsis protocol initiated  Crystalloid Fluid Administration: Fluid resuscitation ordered per standard protocol - 30 mL/kg per current or ideal body weight  IV antibiotics as appropriate for source of sepsis  Reassessment: I have reassessed the patient's hemodynamic status  -s/p CTX x5d  -received sepsis bolus in ED, will continue IVF with caution  -No growth in blood cultures or urine  cultures      Malignant neoplasm metastatic to brain (HCC)- (present on admission)  Assessment & Plan  Brain mets noted during hospitalization on 3/27/2024 with vasogenic edema, MRI on 4/11 shows continued brain mets with vasogenic edema.  MRI 4/21 shows decrease in tumor size and improvement in edema.  -continue seizure prophylaxis with lacosamide 150 mg BID  -continue dexamethasone   Continue fall, aspiration, seizure precaution.      Acute kidney injury (HCC)- (present on admission)  Assessment & Plan  RESOLVED  Returned, Cr 1.11 and baseline around 0.7. Likely prerenal from dehydration.  -continue free water flushes  -renally dose all medications as appropriate  -avoid nephrotoxins    Advance care planning- (present on admission)  Assessment & Plan  At this time patient's family want her to be full code.  Palliative care consulted and discussed with family the goals of care. This remains the same.  -Full code  -Goals of care is to make patient best shape to be discharged to be follow-up by his doctors in [place].    Endometrial cancer (HCC)- (present on admission)  Assessment & Plan  Followed by Dr. Garcia outpatient and sees radiation oncology Dr Live.  Received immunotherapy at [facility] in 2021 with initial good response. Reached out to Dr. Live who does not believe imaging from 4/14 shows significant changes compared to MRI month prior.  -Complicated by metastases to brain among other locations, likely contributing to her altered mental status. Mets re demonstrated without significant change on CTH on admission.  -Decadron for vasogenic edema  -Neurochecks every 4 hours, continue aspiration, fall, seizure precaution.    Essential hypertension- (present on admission)  Assessment & Plan  Concern for secondary exacerbation of hypertension raised on 4/20 when blood pressures increased to 190s/130s despite receiving scheduled home meds. MRI ordered; no evidence of worsening mets or new stroke.  As of evening of  4/21, patient's vascular access lost and NG tube not replaced. Unable to provide below medications.  - PRN IV hydralazine   - Increased amlodipine to 10 mg daily on 4/20  - Continue losartan 100 mg daily  - Increased metoprolol tartrate to 75 mg BID on 4/20  - Continue atorvastatin 20 mg every evening    History of CVA (cerebrovascular accident)- (present on admission)  Assessment & Plan  Residual right sided neurodeficits form CVA in 2010.  Has RUE spasticity, RLE pain.  New subacute CVA found on 4/11 MRI at [facility].  -hold chemical DVT ppx due to proximity of CVA  -consider starting asa when appropriate  -continue atorvastatin         VTE prophylaxis: SCDs/TEDs    I have performed a physical exam and reviewed and updated ROS and Plan today (4/22/2024). In review of yesterday's note (4/21/2024), there are no changes except as documented above.      Fern Pedro MD  Internal Medicine PGY-1

## 2024-04-22 NOTE — PROCEDURES
VIDEO ELECTROENCEPHALOGRAM REPORT      Referring provider: Dr. Bernal     DOS:  04/22/24  (total recording of 24 minutes).     INDICATION:  Jairo Rivas 73 y.o. female presenting with AMS    CURRENT ANTIEPILEPTIC REGIMEN: LCM    TECHNIQUE: 30 channel video electroencephalogram (EEG) was performed in accordance with the international 10-20 system. The study was reviewed in bipolar and referential montages. The recording examined the patient during lethargic and sleep states.     DESCRIPTION OF THE RECORD:  During maximal wakefulness, the background consisted of diffuse theta range slowing up to 6-6.5 hz with intermixed delta slowing, at times in triphasic morphology.No well-formed posterior dominant rhythm or anterior-posterior gradient was seen.  As the patient enters into sleep, rudimentary vertex waves and symmetrical spindles were noted.     ACTIVATION PROCEDURE:  hyperventilation was not performed    Intermittent Photic stimulation was performed in a stepwise fashion from 1 to 30 Hz and elicited no photic driving response.     ICTAL AND/OR INTERICTAL FINDINGS:   No focal or generalized epileptiform activity noted. No regional slowing was seen during this routine study.  No clinical events or seizures were reported or recorded during the study.     EKG: sampling of the EKG recording demonstrated sinus rhythm.     EVENTS: none     INTERPRETATION:    This is an abnormal video EEG recording in the lethargic and sleep states.  1) The diffuse background slowing is consistent with moderate encephalopathy.    2) No epileptiform discharges or seizures were seen. This does not preclude a diagnosis of epilepsy.     Abhishek Millan MD  Diplomate in Neurology&Epilepsy  Office: [phone]  Fax: [phone]

## 2024-04-22 NOTE — CONSULTS
"INFECTIOUS DISEASES INPATIENT CONSULT NOTE     Date of Service:4/22/24    Consult Requested By: Sima Bernal M.D.    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 female with endometrial cancer matastatic to the brain admitted 4/13/2024 for UTI, sepsis, shingles. However, urine culture and blood culture neg  Now concern for disseminated zoster In isolation on IV acyclovir  Multiple services consulted  History also signif for CVA  From Gyn Onc \"...presented to the ER in June 2019 secondary to 3 days of heavy PMB.  She was referred to Dr. Garcia and  subsequently underwent a sentinel lymph node injection, robotic sentinel lymph node dissection, robotic hysterectomy with bilateral salpingo oophorectomy and cystoscopy with left ureteral stent insertion. She was diagnosed with stage IIIB endometrial cancer. ER is negative and DE is positive. Hypermethylation is positive. Her  was 84.9 on 7/31/19.    She was advised to undergo PORTEC 3 and she elected to proceed. She received cisplatin 40 mg/m2 on the week 1 and week 4 following the PORTEC 3 regimen. Her CT scan from 10/01/19 showed no evidence of metastatic disease. The plan was to start chemotherapy as part of phase II of PORTEC trial, however, patient and family elected not to proceed with systemic chemotherapy due to concern that patient could not tolerate treatment. They were made aware that she has a 30- 40% risk of recurrence. She  was then monitored.     CT scan of the chest, abdomen and pelvis on 7/22/20 at [facility] showed new pulmonary nodules highly concerning for pulmonary metastases. Repeat CT scan on 10/14/2020 at [facility]\"" which showed interval enlargement of previously seen bilateral pulmonary nodules measuring up to 13 mm in the left upper lobe.    CTguided biopsy which unfortunately confirmed recurrent endometrial adenocarcinoma. It was recommended that " she proceed with systemic chemotherapy of Taxol 175 mg and Carbo AUC 6.  After cycle #1, she developed G3 asthenia and she had a level one dose reduction to Taxol 150 and Carbo AUC 5. She completed 6 cycles of Taxol/Carbo from 12/10/2020 to 4/7/2021.    Post chemotherapy CT scan on 4/23/21 at [facility] showed a new 5.4 mm nodule and enlarging 5.5 mm nodule left upper lobe concerning for metastasis/disease progression. 8.2 mm nodule right lower lobe unchanged.    It was recommended that she be placed on hormone therapy, Tamoxifen 20 mg, 1 p.o BID x 3 weeks followed by Megace 40 mg, 2 p.o BID x 3 weeks.    She then moved to [place] and was lost to follow up.    Ms. Rivas presented 3/7/24 for reestablishment of care. She presented with her daughter/caregiver, Carolyn. In the interim, she moved to [place] and was being followed by Dr. Robles at [facility]. She underwent immunotherapy from 9/20/21 5/14/22. She was diagnosed with brain mets on 2/22/23 and underwent radiation with Dr. Charles Carter. She then moved back to [place] in August 2023. Per her daughter, her last CTCAP was 2/10/23 and last brain MRI was 5/26/23. Her daughter stated that she was doing well when she moved back. She was able to do things on her own. But in February 2024, she seemed to have declined and was requiring more help. She was no longer able to walk around with a walker. She had been having intermittent LLQ pain for the past 2 weeks. Her bowels had also slowed. She had intermittent SOB mainly at night.  She has right sided weakness due to her h/o CVA. She had not established with a neurologist. It was recommended that she get  updated scans and she was referred to neurology.   She underwent a MRI brain on 3/26/24, results showed 20 x 30 x 26mm but larger enhancing mass in the right frontal opercular region with surrounding vasogenic edema with mass effect and midline shift to the left measuring 4mm concerning for metastatic lesion. Multiple remote infarcts  "noted. Foci of hemosiderin staining in the bilateral cerebral hemispheres as described in report without enhancement which may represent microhemorrhages or small cavernomas.   She was recommended to go to ER for further management has she had mass effect and symptomatic. She was seen by  and Dr. Del Castillo for consultation. It was decided that she undergo radiation. During admission, she underwent a CT CAP on 3/27/24, results showed chronic postoperative changes in the left lung with no residual pleural effusion. Nodule in the right lower lobe measures 10 mm in diameter, larger than on the prior examination, most compatible with metastasis.    She presented to our office for treatment planning on 4/10 and had undergone radiation with Dr. Cook per her daughter. She was noted to gave global aphasia in our office; due to concern for CVA daughter was intrusted to take patient to the ER for further evaluation. She presented to [facility] [place] [facility]10 and was suspected to have subacute CVA, hospitalization was recommended with plan for MRI. Daughter then took patient AMA.    Daughter then brought patient to [facility] and MRI there showed subacute CVA with a right parieto-occipital junction involvement and metastatic foci with surrounding edema in the right frontal, left frontal parietal and left parietal region. \"  EEG neg for seizure    Review Of Systems:  obtunded    PMH:   Past Medical History:   Diagnosis Date    Anemia 2/6/2018    Iron def, post-menopausal bleeding.    Back pain     Back pain     Cancer (HCC) 2019    uterine & endometrial    Cough     CVA, old, hemiparesis (HCC) 2/6/2018    Residual left sided weakness    Essential hypertension 2/6/2018    Well controlled on amlodipine 10 mg and metoprolol 100 mg bid.    Eye drainage     Fatigue     Frequent headaches     Frequent urination     Hyperlipidemia     Irregular periods     Painful joint     Palpitations     Post-menopause bleeding 2/6/2018    " Menopause in 2010 and bleeding 2012 started to have irregular continuous bleeding.     Pulmonary nodule     Sore muscles     Stroke (HCC)     right sided weakness    Swelling of lower extremity     Vision abnormalities 2/6/2018    Vitamin D deficiency 2/6/2018    Taking 50,000 IU once weekly for 4 yrs.    Weakness     Wears glasses          PSH:  Past Surgical History:   Procedure Laterality Date    OK THORACOSCOPY,DX NO BX Left 8/6/2021    Procedure: THORACOSCOPY - HEMOTHORAX EVACUATION, CHEST TUBE PLACEMENT X 2;  Surgeon: John H Ganser, M.D.;  Location: SURGERY [facility];  Service: General    OK CYSTOSCOPY,INSERT URETERAL STENT Left 8/8/2019    Procedure: CYSTOSCOPY, WITH URETERAL STENT INSERTION;  Surgeon: Jann Garcia M.D.;  Location: SURGERY [facility];  Service: Gynecology    HYSTERECTOMY ROBOTIC XI N/A 8/8/2019    Procedure: HYSTERECTOMY, ROBOT-ASSISTED, USING DA RILEY XI;  Surgeon: Jann Garcia M.D.;  Location: SURGERY [facility];  Service: Gynecology    SALPINGO OOPHORECTOMY Bilateral 8/8/2019    Procedure: SALPINGO-OOPHORECTOMY;  Surgeon: Jann Garcia M.D.;  Location: SURGERY [facility];  Service: Gynecology    NODE BIOPSY SENTINEL  8/8/2019    Procedure: BIOPSY, LYMPH NODE, SENTINEL;  Surgeon: Jann Garcia M.D.;  Location: SURGERY [facility];  Service: Gynecology    OK HYSTEROSCOPY,DX,SEP PROC  6/19/2019    Procedure: HYSTEROSCOPY, DIAGNOSTIC;  Surgeon: Anel Chicas M.D.;  Location: SURGERY [facility];  Service: Gynecology    DILATION AND CURETTAGE  6/19/2019    Procedure: DILATION AND CURETTAGE;  Surgeon: Anel Chicas M.D.;  Location: SURGERY [facility];  Service: Gynecology    HYSTERECTOMY LAPAROSCOPY         FAMILY HX:  Family History   Problem Relation Age of Onset    Hypertension Mother     No Known Problems Father     No Known Problems Brother        SOCIAL HX:  Social History     Socioeconomic History    Marital status: Single     Spouse name: Not on  file    Number of children: Not on file    Years of education: Not on file    Highest education level: Not on file   Occupational History    Not on file   Tobacco Use    Smoking status: Never    Smokeless tobacco: Never    Tobacco comments:     n/a   Vaping Use    Vaping Use: Never used   Substance and Sexual Activity    Alcohol use: No    Drug use: No    Sexual activity: Never     Comment: , have 7 kids.   Other Topics Concern     Service No    Blood Transfusions No    Caffeine Concern No    Occupational Exposure No    Hobby Hazards No    Sleep Concern Yes    Stress Concern No    Weight Concern Yes    Special Diet No    Back Care Yes    Exercise Yes    Bike Helmet No     Comment: Does Not Ride Bike    Seat Belt Yes    Self-Exams Yes   Social History Narrative    Not on file     Social Determinants of Health     Financial Resource Strain: Not on file   Food Insecurity: Not on file   Transportation Needs: Not on file   Physical Activity: Not on file   Stress: Not on file   Social Connections: Feeling Socially Integrated (4/8/2024)    OASIS : Social Isolation     Frequency of experiencing loneliness or isolation: Never   Intimate Partner Violence: Not on file   Housing Stability: Not on file     Social History     Tobacco Use   Smoking Status Never   Smokeless Tobacco Never   Tobacco Comments    n/a     Social History     Substance and Sexual Activity   Alcohol Use No       Allergies/Intolerances:  Allergies   Allergen Reactions    Levaquin Itching and Swelling     Swelling of the tongue and face    Penicillins Itching and Swelling    Tramadol Itching and Swelling         Other Current Medications:    Current Facility-Administered Medications:     labetalol (Normodyne/Trandate) injection 10 mg, 10 mg, Intravenous, Q4HRS PRN, Sima Bernal M.D.    acyclovir (Zovirax) 650 mg in  mL IVPB, 650 mg, Intravenous, Q8HRS, Loreto Sarmiento M.D., Stopped at 04/22/24 0734    midazolam (Versed) 5  mg/mL (IM or IN only), 5 mg, Intramuscular, Q10 MIN PRN, Sima Bernal M.D.    amLODIPine (Norvasc) tablet 10 mg, 10 mg, Enteral Tube, DAILY, Julio C Rojas M.D.    metoprolol tartrate (Lopressor) tablet 75 mg, 75 mg, Enteral Tube, BID, Julio C Rojas M.D., 75 mg at 04/20/24 1713    1/2 NS infusion, , Intravenous, Continuous, Sima Bernal M.D., Last Rate: 50 mL/hr at 04/22/24 0059, Restarted at 04/22/24 0059    hydrALAZINE (Apresoline) injection 10 mg, 10 mg, Intravenous, Q4HRS PRN, Sima Bernal M.D., 10 mg at 04/21/24 0519    [Held by provider] insulin regular (HumuLIN R,NovoLIN R) injection, 1-6 Units, Subcutaneous, Q6HRS, 1 Units at 04/20/24 2351 **AND** POC blood glucose manual result, , , Q6H(S) **AND** NOTIFY MD and PharmD, , , Once **AND** Administer 20 grams of glucose (approximately 8 ounces of fruit juice) every 15 minutes PRN FSBG less than 70 mg/dL, , , PRN **AND** dextrose 50% (D50W) injection 25 g, 25 g, Intravenous, Q15 MIN PRN, Anand Grover M.D.    Pharmacy Consult: Enteral tube insertion - review meds/change route/product selection, 1 Each, Other, PHARMACY TO DOSE, Anand Grover M.D.    dexamethasone (Decadron) tablet 4 mg, 4 mg, Enteral Tube, Q12HRS, Loreto Sarmiento M.D., 4 mg at 04/22/24 0625    atorvastatin (Lipitor) tablet 20 mg, 20 mg, Enteral Tube, Q EVENING, Loreto Sarmiento M.D., 20 mg at 04/20/24 1715    losartan (Cozaar) tablet 100 mg, 100 mg, Enteral Tube, DAILY, Loreto Sarmiento M.D., 100 mg at 04/20/24 0430    senna-docusate (Pericolace Or Senokot S) 8.6-50 MG per tablet 2 Tablet, 2 Tablet, Enteral Tube, Q EVENING, 2 Tablet at 04/20/24 1715 **AND** polyethylene glycol/lytes (Miralax) Packet 1 Packet, 1 Packet, Enteral Tube, DAILY, Loreto Sarmiento M.D., 1 Packet at 04/22/24 0625    miconazole (Micotin) 2 % cream, , Topical, BID, Loreto Sarmiento M.D., Given at 04/22/24 0625    lansoprazole (Prevacid) solutab 30 mg, 30 mg, Enteral Tube, DAILY, Loreto Sarmiento M.D., 30 mg at  "24 0625    HYDROcodone-acetaminophen (Norco) 5-325 MG per tablet 1 Tablet, 1 Tablet, Enteral Tube, Q4HRS PRN, Loreto Sarmiento M.D.    [Held by provider] enoxaparin (Lovenox) inj 40 mg, 40 mg, Subcutaneous, DAILY AT 1800, Sima Bernal M.D., 40 mg at 24 1636    bisacodyl (Dulcolax) suppository 10 mg, 10 mg, Rectal, DAILY, Julio C Rojas M.D., 10 mg at 24 0745    lacosamide (Vimpat) injection 150 mg, 150 mg, Intravenous, BID, Julio Clements D.O., 150 mg at 24 0627    Pharmacy Consult Request ...Pain Management Review 1 Each, 1 Each, Other, PHARMACY TO DOSE, LEEANNE Irwin.O.      Most Recent Vital Signs:  BP 96/53   Pulse (!) 110   Temp 36.2 °C (97.2 °F) (Temporal)   Resp 18   Ht 1.626 m (5' 4.02\")   Wt 96.4 kg (212 lb 8.4 oz)   SpO2 97%   BMI 36.46 kg/m²   Temp  Av.4 °C (97.6 °F)  Min: 35.9 °C (96.6 °F)  Max: 37.3 °C (99.1 °F)    Physical Exam:  General: chronically ill no acute distress  HEENT: NCAT, PERRLA, sclera anicteric  Neck: supple, no lymphadenopathy  Chest: CTAB, unlabored.  Cardiac: RRR  Abdomen: non-tender, non-distended  Extremities: No cyanosis, clubbing  Skin:+rash  Neuro: somnolent ALVARADO  Psych: unable to assess    Pertinent Lab Results:  Recent Labs     24  0208 24  0006 24  0032   WBC 8.0 8.1 10.5      Recent Labs     24  0208 24  0006 24  0032   HEMOGLOBIN 13.2 13.6 13.5   HEMATOCRIT 40.2 40.3 40.8   MCV 96.6 95.3 95.6   MCH 31.7 32.2 31.6   PLATELETCT 127* 122* 126*         Recent Labs     24  0208 24  1533 24  0006 24  0623 24  1742 24  0032 24   SODIUM 149*   < > 149*   < > 141 142 141   POTASSIUM 4.6  --  4.4  --   --  4.2  --    CHLORIDE 114*  --  111  --   --  106  --    CO2 23  --  25  --   --  23  --    CREATININE 0.53  --  0.45*  --   --  1.02  --     < > = values in this interval not displayed.        Recent Labs     24  0006 24   ALBUMIN 3.1* " 3.0*        Pertinent Micro:  Results       Procedure Component Value Units Date/Time    CULTURE WOUND W/ GRAM STAIN [[id number]]  (Abnormal)  (Susceptibility) Collected: 04/16/24 1853    Order Status: Completed Specimen: Wound from Chest Updated: 04/19/24 1132     Significant Indicator POS     Source WND     Site CHEST     Culture Result Heavy growth usual skin guilherme.     Gram Stain Result Moderate WBCs.  Moderate Gram positive cocci.  Few Gram negative rods.  Few Gram positive rods.  Rare Yeast.       Culture Result Enterococcus faecalis  Heavy growth        Candida albicans  Heavy growth      Narrative:      Airborne,Contact    Susceptibility       Enterococcus faecalis (1)       Antibiotic Interpretation Microscan   Method Status    Daptomycin Sensitive 2 mcg/mL MARIOLA Final    Gent Synergy Sensitive <=500 mcg/mL MARIOLA Final    Ampicillin Sensitive <=2 mcg/mL MARIOLA Final    Linezolid Sensitive 2 mcg/mL MARIOLA Final    Vancomycin Sensitive 1 mcg/mL MARIOLA Final    Penicillin Sensitive 1 mcg/mL MARIOLA Final    Tetracycline Resistant >8 mcg/mL MARIOLA Final                       Blood Culture - Draw one from central line and one from peripheral site [[id number]] Collected: 04/13/24 2008    Order Status: Completed Specimen: Blood from Peripheral Updated: 04/18/24 2100     Significant Indicator NEG     Source BLD     Site PERIPHERAL     Culture Result No growth after 5 days of incubation.    Narrative:      Left Forearm/Arm    Blood Culture - Draw one from central line and one from peripheral site [[id number]] Collected: 04/13/24 1922    Order Status: Completed Specimen: Blood from Line Updated: 04/18/24 2100     Significant Indicator NEG     Source BLD     Site Peripheral     Culture Result No growth after 5 days of incubation.    Narrative:      Left Forearm/Arm    GRAM STAIN [[id number]] Collected: 04/16/24 1853    Order Status: Completed Specimen: Wound Updated: 04/17/24 0227     Significant Indicator .     Source WND     Site CHEST      "Gram Stain Result Moderate WBCs.  Moderate Gram positive cocci.  Few Gram negative rods.  Few Gram positive rods.  Rare Yeast.      Narrative:      Airborne,Contact          No results found for: \"BLOODCULTU\", \"BLDCULT\", \"BCHOLD\"     Studies:  IMPRESSION:     1.  Significant interval decrease in size of right frontal brain metastasis since previous exam slight interval decrease in surrounding vasogenic edema. Smaller irregular rim of increased diffusion weighted signal intensity about this treated metastasis   consistent with cytotoxic edema.     2.  New large area of acute infarction in the right posterior frontal, parietal, posterior temporal, and occipital lobes associated gyriform enhancement in the right lateral occipital lobe and right peritrigonal white matter.     3.  Smaller chronic wedge-shaped area of infarction involving the left posterior basal ganglia extending into the left posterior frontal periventricular white matter     4.  Small areas of chronic lacunar type infarction involving the left side of the elaine and left brachium pontis with surrounding gliosis.     5.  Small foci of chronic hemosiderin deposition noted in the left posterior temporal and occipital white matter, as well as in the treated right frontal brain metastasis     6.  Age-related cerebral atrophy.  IMPRESSION:   Metastatic endometrial cancer  Brain mets  Disseminated zoster  Mucosal candidiasis  CVA, mult acute and chronic  -possibly related to zoster    PLAN:   Continue IV acyclovir  Per pharmacy there is a shortage-can use IV gancyclovir as alternative  Monitor renal function  Recommend 10 day course  Midline OK to place    Topical antifungal under breasts per wound care    Plan of care discussed with LATESHA Bernal M.D.. Will continue to follow    Keisha Castle M.D.    "

## 2024-04-22 NOTE — PROGRESS NOTES
Inpatient Palliative Medicine Progress Note     Jairo Rivas  73 y.o. female  [id number]    Location = [facility]  Referral Source = Sima Bernal M.d.    PCP = ZULEYKA Lyons    Reason for palliative medicine consultation and/or visit: ACP         Assessment and Plan:     GOAL(S) OF CARE = LONGEVITY, a chance at [facility] for immunotherapy again (daughter Danita lives in [place])    SYMPTOMS ETIOLOGY/CAUSES = confusion & dysphagia secondary secondary to sepsis, recent stroke, and progressing metastatic endometrial adenocarcinoma to brain    PROGNOSIS = overall unfavorable & highly guarded, lingering delirium & encephalopathy & NPO.    CODE STATUS = FULL at this time      ADVANCE CARE PLANNING =   Relevant recent history reviewed.  Oncology history reviewed.    Anticipatory education on care philosophy and rational for limited NGT feed trial (~1wk) if family were to elect.  Informed daughter Carolyn and her  Michael about importance of caloric intake thru NGT, for family's stated goal at this time.  Educated family how limited trial of NGT can add some clarity to family, regarding how close or how far they are to achieving their goal, but without prolonging pt in an undesirable state.  Shared with family about my own professional reservation about how much calories along can change patient's trajectory, hence my recommendation for limited TF trial but no longer.  Educated family on importance of periodic reassessment and avoidance of non-beneficial measures, if refractory at reassessment.    Medical philosophy education and the importance how FUNCTION determines ultimate treatment options.  Anticipatory guidance/education on possible clinical outcomes, as studies & treatments finish.      PALLIATIVE CARE TEAM INTERVENTIONS =   1.ACP for anticipatory guidance & overall care philosophy, today very much the same talk with the other daughter Danita, who drove up from [place], [place].    2. Case  discussed with oncology RN navigator Isa Zaidi. Relayed family's wish for an oncology second opinion, in addition to Dr. Garcia's team.    3. Coordinated with  to see if we can find a [place] Imam to visit & pray, if possible.  4. Will follow and support pt & family to our utmost in this difficult time.    5. Encouraged Radha Samayoa and their 3 siblings to visit if/when possible, as pt remains complex & clinical outcome uncertain.      Summary =   Jairo Rivas is a 73 y.o.  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    Despite care so far, patient unfortunately remains encephalopathic and unable to pass SLP swallow evaluation yet. She is a [facility] patient and has been followed by Dr. Garcia since 2019. Patient unfortunately remains unfit for further cancer directed treatment at this time.    4/14/2024 CT-Head - R frontal lobe mass, with non-specific R parietal lobe enhancements    ------------------------------------------------------------------------------------------------------------------------------------------------------    4/22/2024  Met with patient's daughter Radha bedside. We reviewed the challenges so far this admission, the stagnant nature of patient's lethargy/obtund state, possible influencing factors, the LP and its role in infectious workup, etc.    We discussed again how function may determine or limit treatment options, as we did last Friday.    Family have some questions about getting patient's [facility] oncology neurology opinions. Provided family education about the logistic difficulties with this kind of consultation (state license differences, facility  privileges, etc.) Also provided daughters my own professional opinion that, as pt is in her current state the way she is, doubtful a oncology neurology service will provide much if anything additional compared to a traditional neurology team, who have already been following since admission....    Agreed with family an LP may help rule in or rule out possible contributing factors to patient's ongoing poor mentation.    Again encouraged family and siblings to visit when able, as clinical picture remains unchanged with minimal-to-no overall functional improvement.        4/19/2024  Met with patient's daughter Danita and also her sister Carolyn over phone today. Provided medical updates - pt more sleepy overall, with corresponding gabapentin dose reduction by primary team. No narcotic (no oxycodone, no dilaudid) for about 24 hours now. Explained to daughters that most likely opioids are not contributing to patient's increased somnolence.     Also shared with daughters my professional approval/agreement with primary team's non-narcotic management of patient's pain and discomfort. Patient did not appear distressed to me today.    We discussed about general treatment philosophy, especially how FUNCTION, above all other studies and signs, will always remain the #1 determining factor for treatment options. We also talked briefly about the lagging nature of imaging, lagging in the sense that functional changes typically occurs first before corresponding imaging changes. Also discussed about our relative permissive generosity over glucose control for older & sicker patients, mostly for safety.    Encouraged family and siblings to visit, if/when able, as patient remains quite medically complex.    Provided a few words of encouragement to these two loving daughters who deeply care about their mother.        4/17/2024  Very extensive discussion with patient's daughter Danita about patient's current care. History reviewed. Recent  studies, labs, imaging reviewed together, including recent NSG notes prior to radiation therapy.    Family have decided on NGT trial to see if this can help patient improve some more. They do feel patient is more alert, although her cognition remains quite short off baseline as well.    They are certain continued aggressive care, including ICU care & life support are in line with patient and family's values. Patient has been chair bound since 2010, even while she fought her endometrial cancer, so they have been down this road before.    They are taking things a day at a time, and hoping to provide best care to best optimize patient, however much possible... They all feel like they have been well updated by medical team so far, and do understand the very real risk of deterioration/decline, as much as they try to remain hopeful.    Patient has always fought hard. 2 years ago, hospice was discussed with Dr. Garcia's team, but in the end patient moved down to [place] to live with Danita and was treated at [facility] with fairly good response, despite already burdened with brain mets at that time. This experience weighed heavily in family's mind. They have learned to ask good questions and leave no stones unturned.    Extensive education provided, putting on Moredannie's radar about various risk on the roads ahead: NGT pulling, restraints & suffering, inability to get any cancer treatment if unable to wean off life support in ICU, etc. She understood and appeared overall quite sophisticated in her thoughts and questions, and much like her sister Carolyn, with patient's best interests in mind and in alignment with their best understanding of patient's values/preferences.    Assured Danita that medical team will continue to provide the best support we can, in as good an alignment as we can with their goals and values. Informed Jessicadannie that the only thing that needs to be prevented is suffering itself and, if/when medical team or family  "are concerned about suffering, that will be a great time to talk more about what we are doing. Danita ackowledged understanding.    Encouraged Radha Samayoa and [place] 2 [place] to visit if/when possible, as clinical outcome remains highly uncertain.      4/16/2024  Met with patient, her daughter Carolyn, and Carolyn's  Michael bedside. Introduced myself, my scope of practice, and reasons for this consultation.Carolyn and Michael were amenable to proceed with this visit.    Patient were self were delirious and minimally conversive. She was able to say \"no\" when I inquired about her pain & discomfort. That was the extent of our conversations.    Patient is originally from [place]. Carolyn is her only blood relative here in [facility]. Carolyn's sister Danita resides in [place]. Thanks to the support of Danita's family, patient was able to receive cancer treatments at [facility] [phone], with fairly good response at that time, all things considered.    According to Carolyn, patient's has been a \"real tough cookie.\" One of the main reasons that led to her prior stint at [facility] oncology was disease progression & hospice discussion. Carolyn and family were amazed how patient was able to surpass expectations a few times.    Patient is a devout Moravian and prays regularly. Daughter is amenable to a Imam visiting if it can be arranged for patient's spiritual support.     Patient is said to enjoy \"shopping & casinos, and that's why she came back to [place] in AUG 2023,\" Carolyn told me.        We talked about family's outstanding goal of care, which is to try to optimize pt for immunotherapy again, if possible, be it rehab or going to [facility] again.    We discussed imminent road blocks at this time = dysphagia & NPO status, infection, continued delirium/confusion, low function. We discussed at length about a time limited (~1wk or so) TF trial to see if it is possible to get patient closer to family's goal again, and reassess " frequently to make sure we do not prolong decline if not responding to treatments.    Carolyn is rightfully worried about side effects of NTG - infection, bleed, pain/discomfort, decline prolongation - and I reinforced the importance of the time-limited nature of NTG trial but no longer. Also shared my honest professional opinion that likely, all things considered, calories alone will not get patient close enough to their goal for more immunotherapy.    Carolyn has lots of thoughts. She understands the incurable nature of patient's cancer and ongoing decline over the years, as tough as patient has weathered all these challenges.    We agreed to a family meeting tomorrow 1400 to further discuss with family about roads ahead,         Medical Decision Making =  Patient is of high medical complexity with incurable disease = endometrial adenocarcinoma metastatic to brain & lung  Care complexity further complicated by secondary conditions = 2010 CVA with residual R deficits + new subacute CVA, acute on chronic baseline delirium/hallucinations, dysphagia NPO status, sepsis    Extensive data reviewed & coordination performed = recent notes, latest labs & studies, GYN past history, ONC past history, [facility] notes, etc.    High risk of morbidity from additional interventions &  studies = NPO & low functioning & still delirious, high risk medication usage including continuous fluid        Advance care planning  =     Total time spent in ACP discussion 30 minutes, which is separate from the time spent completing the evaluation and management visit.     Thank you for allowing me the opportunity to participate in the care of Jairo Rivas     I spent a total of 50minutes reviewing medical records, direct face-to-face time with the patient and/or family, documentation and coordination of care. This is separate from the time spent on advance care planning, which is documented above.         DO Jeff KHAN (TIM)  Hospice and Palliative Care   [address] [facility] [place]  [id number]  P: [phone]  F: [phone]

## 2024-04-22 NOTE — DIETARY
Nutrition Services Brief Update:    Problem: Nutritional:  Goal: Nutrition support tolerated and meeting greater than 85% of estimated needs  Outcome: Progressing slower than expected    Tube feeding assessment completed 4/18. TF started 4/18.  Tube feeding reached 50 mL/hr.  Tube removed 4/20 and IRIS tube replaced 4/21.  Tube feeding resumed.   Per ADL flow sheet, TF currently running at 25 mL/hr.    Recommend:  Advance tube feed per protocol to final goal rate of 60 mL/hr.    RD will follow.

## 2024-04-22 NOTE — CARE PLAN
The patient is Stable - Low risk of patient condition declining or worsening    Shift Goals  Clinical Goals: control BP, get access  Patient Goals: ZA  Family Goals: comfort, vital management    Progress made toward(s) clinical / shift goals:      Problem: Respiratory  Goal: Patient will achieve/maintain optimum respiratory ventilation and gas exchange  Description: Target End Date:  Prior to discharge or change in level of care    Document on Assessment flowsheet    1.  Assess and monitor rate, rhythm, depth and effort of respiration  2.  Breath sounds assessed qshift and/or as needed  3.  Assess O2 saturation, administer/titrate oxygen as ordered  4.  Position patient for maximum ventilatory efficiency  5.  Turn, cough, and deep breath with splinting to improve effectiveness  6.  Collaborate with RT to administer medication/treatments per order  7.  Encourage use of incentive spirometer and encourage patient to cough after use and utilize splinting techniques if applicable  8.  Airway suctioning  9.  Monitor sputum production for changes in color, consistency and frequency  10. Perform frequent oral hygiene  11. Alternate physical activity with rest periods  Outcome: Progressing     Problem: Skin Integrity  Goal: Skin integrity is maintained or improved  Description: Target End Date:  Prior to discharge or change in level of care    Document interventions on Skin Risk/Galen flowsheet groups and corresponding LDA    1.  Assess and monitor skin integrity, appearance and/or temperature  2.  Assess risk factors for impaired skin integrity and/or pressures ulcers  3.  Implement precautions to protect skin integrity in collaboration with interdisciplinary team  4.  Implement pressure ulcer prevention protocol if at risk for skin breakdown  5.  Confirm wound care consult if at risk for skin breakdown  6.  Ensure patient use of pressure relieving devices  (Low air loss bed, waffle overlay, heel protectors, ROHO cushion,  etc)  Outcome: Progressing     Problem: Fall Risk  Goal: Patient will remain free from falls  Description: Target End Date:  Prior to discharge or change in level of care    Document interventions on the Esperanza Grewal Fall Risk Assessment    1.  Assess for fall risk factors  2.  Implement fall precautions  Outcome: Progressing

## 2024-04-22 NOTE — PROGRESS NOTES
IRIS Placement    Tube Team verified patient name and medical record number prior to tube placement.  IRIS tube (43 inches, 10 Romanian) placed at 65 cm in left nare.  Per iris picture, tube appears to be in the stomach.      Nursing Instructions: Awaiting abdomen x-ray to confirm placement before use for medications or feeding. Once placement confirmed, flush tube with 30 ml of water and then remove and save stylet in patient medication drawer. Please contact me with any questions or concerns.

## 2024-04-22 NOTE — PROGRESS NOTES
I went in with day team nurse to talk with family and see patient. It was explained that a midline will not be placed tonight and would need an IJ. Family denied IJ for patient. They were educated on the importance of access at this time and stated, did not want anything in the neck. MD notified and asked if ICU nurses can place midlines. Nurse reached out to [facility] and NAM informed nurse only VAT team can do midlines and they are here during the week. Nurse notified nighttime doctor of this update.     Day team doctor messaged nurse on plan for patient overnight. Nurse informed MD that the family denied an IJ and a midline will be placed tomorrow. MD wants patient to have access overnight due to risk of decompensating if not. Suggested reaching out to ICU team for an IRIS. Nurse messaged rapid team about an IRIS and they will see if anyone is certified to place overnight. MD notified and suggested if an IRIS can't be placed tonight, to just place an NG. Family last night stated only want an IRIS for the NG because last time took about 5 attempts, before having success. They do not want their mom to suffer through this process again. Nurse suggested an ART line if can't have access. MD stated this is not needed at the time, but if no access overnight may need a higher level of care. Nurse stated she will reach out to rapid and see if any other options are available overnight.     Nurse went back in the room to access patient and educated daughter on importance of access overnight. Daughter was concerned about the IJ in the neck and nurse again educated family on IJ. Family still refused it at this time. Daughter was educated on an NG without the IRIS and said no to this as well. Day team MD called nurse and information was relayed to her. MD suggested having the night time doctor explain risks to family. Night time MD called family and explained the risks of not having access and the importance of an IJ overnight. He  "explained the risks that the patient may decline more or death may occur if we do not have access. The family stated they understand and still do not want anything in the neck. Day team MD updated and placed some IM medications in case a seizure occurs overnight.     Radha came in overnight to stay with her mom. She expressed frustration on the situation and didn't understand why an IV can't be placed. Nurse explained that during the day, it took a while to find a good IV and infiltrated quickly. Daughter denied this and said, \"it didn't take very long and wanted a new IV in overnight.\" Nurse messaged the rapid team and explained the situation. Rapid stated they would try for an IV and then the NG via IRIS. Rapid team was successful in getting a 20 gauge in the left wrist for the evening and placing an NG tube. The tube was verified via X-ray. IV abx and IVF were restarted this evening. Nurse also restarted the TF at 25ml/hr with q4hr 200ml flushes. Patient tolerated TF feeding overnight. Nurse talked with NAM about the midline. NAM will text IV team in the morning to see if the patient can be higher up on the list.   "

## 2024-04-22 NOTE — ASSESSMENT & PLAN NOTE
On 4/21, patient's PIV infiltrated twice. First time, PIV placed eventually with ultrasound guidance. Second time occurred in the evening and no VATs available.  -Midline placed on 4/22

## 2024-04-22 NOTE — CARE PLAN
The patient is Watcher - Medium risk of patient condition declining or worsening    Shift Goals  Clinical Goals: control BP, get access  Patient Goals: ZA  Family Goals: comfort, vital management    Progress made toward(s) clinical / shift goals:    Problem: Pain - Standard  Goal: Alleviation of pain or a reduction in pain to the patient’s comfort goal  Description: Target End Date:  Prior to discharge or change in level of care    Document on Vitals flowsheet    1.  Document pain using the appropriate pain scale per order or unit policy  2.  Educate and implement non-pharmacologic comfort measures (i.e. relaxation, distraction, massage, cold/heat therapy, etc.)  3.  Pain management medications as ordered  4.  Reassess pain after pain med administration per policy  5.  If opiods administered assess patient's response to pain medication is appropriate per POSS sedation scale  6.  Follow pain management plan developed in collaboration with patient and interdisciplinary team (including palliative care or pain specialists if applicable)  Outcome: Progressing     Problem: Fluid Volume  Goal: Fluid volume balance will be maintained  Description: Target End Date:  Prior to discharge or change in level of care    Document on I/O flowsheet    1.  Monitor intake and output as ordered  2.  Promote oral intake as appropriate  3.  Report inadequate intake or output to physician  4.  Administer IV therapy as ordered  5.  Weights per provider order  6.  Assess for signs and symptoms of bleeding  7.  Monitor for signs of fluid overload (respiratory changes, edema, weight gain, increased abdominal girth)  8.  Monitor of signs for inadequate fluid volume (poor skin turgor, dry mucous membranes)  9.  Instruct patient on adherence to fluid restrictions  Outcome: Progressing     Problem: Skin Integrity  Goal: Skin integrity is maintained or improved  Description: Target End Date:  Prior to discharge or change in level of  care    Document interventions on Skin Risk/Galen flowsheet groups and corresponding LDA    1.  Assess and monitor skin integrity, appearance and/or temperature  2.  Assess risk factors for impaired skin integrity and/or pressures ulcers  3.  Implement precautions to protect skin integrity in collaboration with interdisciplinary team  4.  Implement pressure ulcer prevention protocol if at risk for skin breakdown  5.  Confirm wound care consult if at risk for skin breakdown  6.  Ensure patient use of pressure relieving devices  (Low air loss bed, waffle overlay, heel protectors, ROHO cushion, etc)  Outcome: Progressing     Problem: Fall Risk  Goal: Patient will remain free from falls  Description: Target End Date:  Prior to discharge or change in level of care    Document interventions on the Esperanza Grewal Fall Risk Assessment    1.  Assess for fall risk factors  2.  Implement fall precautions  Outcome: Progressing

## 2024-04-22 NOTE — PROGRESS NOTES
"Patient sister was educated that patients IV infiltrated and until a midline could be placed the residents and MD was recommending and EJ. The RN began to explain what an EJ was and the daughter immediatly stated, \"absolutely not, we do not want anything in my moms neck. She already has problems in her head and we do not want to cause any other problems in that area.\" RN verbalized understanding and night RN notified the night residents.   "

## 2024-04-22 NOTE — PROGRESS NOTES
Called by nurse about lack of IV access as previous ultrasound guided IV infiltrated. Called first listed contact, Radha Addison at [phone] (no DPOA or primary medical decision maker documented in chart) regarding lack of IV access and recommendation for IJ catheter placement. Daughter noted absolutely no catheters in the neck and that this had been discussed with her other siblings. Discussed midline catheter would not  be able to be placed until the morning and concern about lack of IV access overnight including not being able to receive IV medications and this increasing mortality risk. Daughter again re-emphasized no IV catheters to be placed in the neck.

## 2024-04-23 LAB
ALBUMIN SERPL BCP-MCNC: 2.7 G/DL (ref 3.2–4.9)
ALBUMIN/GLOB SERPL: 0.9 G/DL
ALP SERPL-CCNC: 75 U/L (ref 30–99)
ALT SERPL-CCNC: 26 U/L (ref 2–50)
ANION GAP SERPL CALC-SCNC: 13 MMOL/L (ref 7–16)
AST SERPL-CCNC: 32 U/L (ref 12–45)
BASOPHILS # BLD AUTO: 0.2 % (ref 0–1.8)
BASOPHILS # BLD: 0.02 K/UL (ref 0–0.12)
BILIRUB SERPL-MCNC: 0.4 MG/DL (ref 0.1–1.5)
BUN SERPL-MCNC: 33 MG/DL (ref 8–22)
CALCIUM ALBUM COR SERPL-MCNC: 9.7 MG/DL (ref 8.5–10.5)
CALCIUM SERPL-MCNC: 8.7 MG/DL (ref 8.5–10.5)
CHLORIDE SERPL-SCNC: 105 MMOL/L (ref 96–112)
CO2 SERPL-SCNC: 21 MMOL/L (ref 20–33)
CREAT SERPL-MCNC: 0.62 MG/DL (ref 0.5–1.4)
EOSINOPHIL # BLD AUTO: 0 K/UL (ref 0–0.51)
EOSINOPHIL NFR BLD: 0 % (ref 0–6.9)
ERYTHROCYTE [DISTWIDTH] IN BLOOD BY AUTOMATED COUNT: 50.1 FL (ref 35.9–50)
GFR SERPLBLD CREATININE-BSD FMLA CKD-EPI: 95 ML/MIN/1.73 M 2
GLOBULIN SER CALC-MCNC: 3 G/DL (ref 1.9–3.5)
GLUCOSE SERPL-MCNC: 205 MG/DL (ref 65–99)
HCT VFR BLD AUTO: 35.9 % (ref 37–47)
HGB BLD-MCNC: 12.1 G/DL (ref 12–16)
IMM GRANULOCYTES # BLD AUTO: 0.19 K/UL (ref 0–0.11)
IMM GRANULOCYTES NFR BLD AUTO: 1.6 % (ref 0–0.9)
LYMPHOCYTES # BLD AUTO: 0.27 K/UL (ref 1–4.8)
LYMPHOCYTES NFR BLD: 2.3 % (ref 22–41)
MAGNESIUM SERPL-MCNC: 2.3 MG/DL (ref 1.5–2.5)
MCH RBC QN AUTO: 31.8 PG (ref 27–33)
MCHC RBC AUTO-ENTMCNC: 33.7 G/DL (ref 32.2–35.5)
MCV RBC AUTO: 94.5 FL (ref 81.4–97.8)
MONOCYTES # BLD AUTO: 0.14 K/UL (ref 0–0.85)
MONOCYTES NFR BLD AUTO: 1.2 % (ref 0–13.4)
NEUTROPHILS # BLD AUTO: 11.15 K/UL (ref 1.82–7.42)
NEUTROPHILS NFR BLD: 94.7 % (ref 44–72)
NRBC # BLD AUTO: 0.02 K/UL
NRBC BLD-RTO: 0.2 /100 WBC (ref 0–0.2)
PLATELET # BLD AUTO: 107 K/UL (ref 164–446)
PMV BLD AUTO: 12 FL (ref 9–12.9)
POTASSIUM SERPL-SCNC: 4.3 MMOL/L (ref 3.6–5.5)
PROT SERPL-MCNC: 5.7 G/DL (ref 6–8.2)
RBC # BLD AUTO: 3.8 M/UL (ref 4.2–5.4)
SODIUM SERPL-SCNC: 139 MMOL/L (ref 135–145)
WBC # BLD AUTO: 11.8 K/UL (ref 4.8–10.8)

## 2024-04-23 PROCEDURE — 700102 HCHG RX REV CODE 250 W/ 637 OVERRIDE(OP)

## 2024-04-23 PROCEDURE — 770020 HCHG ROOM/CARE - TELE (206)

## 2024-04-23 PROCEDURE — 665998 HH PPS REVENUE CREDIT

## 2024-04-23 PROCEDURE — 700105 HCHG RX REV CODE 258

## 2024-04-23 PROCEDURE — A9270 NON-COVERED ITEM OR SERVICE: HCPCS

## 2024-04-23 PROCEDURE — 700105 HCHG RX REV CODE 258: Performed by: HOSPITALIST

## 2024-04-23 PROCEDURE — 665999 HH PPS REVENUE DEBIT

## 2024-04-23 PROCEDURE — 36415 COLL VENOUS BLD VENIPUNCTURE: CPT

## 2024-04-23 PROCEDURE — 95700 EEG CONT REC W/VID EEG TECH: CPT | Performed by: PSYCHIATRY & NEUROLOGY

## 2024-04-23 PROCEDURE — 700102 HCHG RX REV CODE 250 W/ 637 OVERRIDE(OP): Performed by: INTERNAL MEDICINE

## 2024-04-23 PROCEDURE — 83735 ASSAY OF MAGNESIUM: CPT

## 2024-04-23 PROCEDURE — 700111 HCHG RX REV CODE 636 W/ 250 OVERRIDE (IP): Mod: JZ

## 2024-04-23 PROCEDURE — A9270 NON-COVERED ITEM OR SERVICE: HCPCS | Performed by: INTERNAL MEDICINE

## 2024-04-23 PROCEDURE — 99233 SBSQ HOSP IP/OBS HIGH 50: CPT | Performed by: INTERNAL MEDICINE

## 2024-04-23 PROCEDURE — 95714 VEEG EA 12-26 HR UNMNTR: CPT | Performed by: PSYCHIATRY & NEUROLOGY

## 2024-04-23 PROCEDURE — 99233 SBSQ HOSP IP/OBS HIGH 50: CPT | Mod: GC | Performed by: HOSPITALIST

## 2024-04-23 PROCEDURE — 80053 COMPREHEN METABOLIC PANEL: CPT

## 2024-04-23 PROCEDURE — 85025 COMPLETE CBC W/AUTO DIFF WBC: CPT

## 2024-04-23 RX ORDER — DEXTROSE MONOHYDRATE 25 G/50ML
25 INJECTION, SOLUTION INTRAVENOUS
Status: DISCONTINUED | OUTPATIENT
Start: 2024-04-23 | End: 2024-04-23

## 2024-04-23 RX ORDER — CARBOXYMETHYLCELLULOSE SODIUM 5 MG/ML
1 SOLUTION/ DROPS OPHTHALMIC PRN
Status: DISCONTINUED | OUTPATIENT
Start: 2024-04-23 | End: 2024-04-25

## 2024-04-23 RX ORDER — DEXTROSE MONOHYDRATE 25 G/50ML
25 INJECTION, SOLUTION INTRAVENOUS
Status: DISCONTINUED | OUTPATIENT
Start: 2024-04-23 | End: 2024-05-10

## 2024-04-23 RX ADMIN — DEXAMETHASONE 4 MG: 4 TABLET ORAL at 05:11

## 2024-04-23 RX ADMIN — DEXAMETHASONE 4 MG: 4 TABLET ORAL at 18:16

## 2024-04-23 RX ADMIN — METOPROLOL TARTRATE 75 MG: 25 TABLET, FILM COATED ORAL at 05:11

## 2024-04-23 RX ADMIN — AMLODIPINE BESYLATE 10 MG: 10 TABLET ORAL at 05:11

## 2024-04-23 RX ADMIN — ENOXAPARIN SODIUM 40 MG: 100 INJECTION SUBCUTANEOUS at 18:12

## 2024-04-23 RX ADMIN — SENNOSIDES AND DOCUSATE SODIUM 2 TABLET: 50; 8.6 TABLET ORAL at 18:11

## 2024-04-23 RX ADMIN — ACYCLOVIR SODIUM 650 MG: 50 INJECTION, SOLUTION INTRAVENOUS at 07:33

## 2024-04-23 RX ADMIN — ATORVASTATIN CALCIUM 20 MG: 20 TABLET, FILM COATED ORAL at 18:11

## 2024-04-23 RX ADMIN — SODIUM CHLORIDE, POTASSIUM CHLORIDE, SODIUM LACTATE AND CALCIUM CHLORIDE: 600; 310; 30; 20 INJECTION, SOLUTION INTRAVENOUS at 14:37

## 2024-04-23 RX ADMIN — ACYCLOVIR SODIUM 650 MG: 50 INJECTION, SOLUTION INTRAVENOUS at 14:45

## 2024-04-23 RX ADMIN — LANSOPRAZOLE 30 MG: 30 TABLET, ORALLY DISINTEGRATING ORAL at 05:12

## 2024-04-23 RX ADMIN — LOSARTAN POTASSIUM 100 MG: 50 TABLET, FILM COATED ORAL at 05:12

## 2024-04-23 RX ADMIN — LACOSAMIDE 150 MG: 100 TABLET, FILM COATED ORAL at 05:11

## 2024-04-23 RX ADMIN — CARBOXYMETHYLCELLULOSE SODIUM 1 DROP: 5 SOLUTION/ DROPS OPHTHALMIC at 14:39

## 2024-04-23 RX ADMIN — MICONAZOLE NITRATE: 20 CREAM TOPICAL at 05:12

## 2024-04-23 RX ADMIN — ACYCLOVIR SODIUM 650 MG: 50 INJECTION, SOLUTION INTRAVENOUS at 22:54

## 2024-04-23 RX ADMIN — METOPROLOL TARTRATE 75 MG: 25 TABLET, FILM COATED ORAL at 18:11

## 2024-04-23 RX ADMIN — LACOSAMIDE 150 MG: 100 TABLET, FILM COATED ORAL at 18:11

## 2024-04-23 RX ADMIN — MICONAZOLE NITRATE: 20 CREAM TOPICAL at 18:16

## 2024-04-23 ASSESSMENT — ENCOUNTER SYMPTOMS
SHORTNESS OF BREATH: 0
NAUSEA: 0
FEVER: 0
VOMITING: 0
ABDOMINAL PAIN: 0

## 2024-04-23 ASSESSMENT — PAIN DESCRIPTION - PAIN TYPE
TYPE: ACUTE PAIN;CHRONIC PAIN

## 2024-04-23 ASSESSMENT — FIBROSIS 4 INDEX: FIB4 SCORE: 2.64

## 2024-04-23 NOTE — PROGRESS NOTES
The patients daughter Carolyn called the [facility] nurses station demanding to speak to Dr. Bernal about the patient CSF protein results. The RN explained that the hospitalist do leave for the day at 5:30 pm, however, I would attempt to reach out to Dr. Bernal. RN messaged Dr. Bernal and explained to the RN that that count typically means that there is an infection or major inflammation, however, more of the cell count needs to be resulted to know more. The RN attempted to call Carolyn to explain this information, however, she did not answer so the RN left a voicemail. Rn passed along the message to night shift.

## 2024-04-23 NOTE — CARE PLAN
The patient is Watcher - Medium risk of patient condition declining or worsening    Shift Goals  Clinical Goals: Monitor BP; tube feeds  Patient Goals: ZA  Family Goals: Stay updated    Progress made toward(s) clinical / shift goals:    Problem: Fluid Volume  Goal: Fluid volume balance will be maintained  Description: Target End Date:  Prior to discharge or change in level of care    Document on I/O flowsheet    1.  Monitor intake and output as ordered  2.  Promote oral intake as appropriate  3.  Report inadequate intake or output to physician  4.  Administer IV therapy as ordered  5.  Weights per provider order  6.  Assess for signs and symptoms of bleeding  7.  Monitor for signs of fluid overload (respiratory changes, edema, weight gain, increased abdominal girth)  8.  Monitor of signs for inadequate fluid volume (poor skin turgor, dry mucous membranes)  9.  Instruct patient on adherence to fluid restrictions  Outcome: Progressing     Problem: Skin Integrity  Goal: Skin integrity is maintained or improved  Description: Target End Date:  Prior to discharge or change in level of care    Document interventions on Skin Risk/[place] flowsheet groups and corresponding LDA    1.  Assess and monitor skin integrity, appearance and/or temperature  2.  Assess risk factors for impaired skin integrity and/or pressures ulcers  3.  Implement precautions to protect skin integrity in collaboration with interdisciplinary team  4.  Implement pressure ulcer prevention protocol if at risk for skin breakdown  5.  Confirm wound care consult if at risk for skin breakdown  6.  Ensure patient use of pressure relieving devices  (Low air loss bed, waffle overlay, heel protectors, ROHO cushion, etc)  Outcome: Progressing       Patient was turned q2 hours to maintain skin integrity. Pt also had a bowel movement so a linen change was performed and perineal care was performed including the use of barrier wipes and barrier paste to the pts sacral  wound. BP started to climb, but was well controled with scheduled medications. All medications were given as ordered. Pts daughter expressed a desire to speak with the MD regarding the plan of care and RN informed her that it would be best to speak with the day team and they collaborate with other specialties as well. Patient was able to rest comfortably throughout the night.

## 2024-04-23 NOTE — DISCHARGE PLANNING
TCN following. HTH/SCP chart reviewed. No new TCN needs identified.  Please see ID and Neurology note note on 4/21/24.  Please see prior TCN note from 4/18/24 for most recent discharge planning considerations if indicated.     Completed:  Choice obtained:    SCP with [facility]

## 2024-04-23 NOTE — PROCEDURES
VIDEO ELECTROENCEPHALOGRAM  REPORT      Referring provider: Dr. Banda    DOS:   4/24/2024      INDICATION:  Consolate Ebjaime Rivas 73 y.o. female presenting with AMS    CURRENT ANTIEPILEPTIC REGIMEN: LCM     TECHNIQUE: A 30-channel, 24 hrs video electroencephalogram (VEEG) was performed in accordance with the international 10-20 system. This digital study was reviewed in bipolar and referential montages. The recording examined the patient during lethargic and sleep states.     The EEG was set up and taken down by a Neurodiagnostic technologist who performed education to patient and staff.     A minimum but not limited to 23 electrodes and 23 channel recording was setup and performed by Neurodiagnostic technologist.    Impedances, electrode integrity, and technical impressions were documented a minimum of every 2-24 hour period by a Neurodiagnostic Technologist and reviewed by Interpreting physician.     There was supervised withdrawal of the following medications: n/a    ACTIVATION PROCEDURES:   None     DESCRIPTION OF THE RECORD:  During maximal wakefulness, the background consisted of diffuse, symmetric theta range slowing at 6-7 Hz. Intermittent bursts of diffuse delta slowing with triphasic morphology was noted. No well-formed posterior dominant rhythm or anterior-posterior gradient was seen.  With clinical sleep state, there was attenuation of the background theta and delta activities and increased fast frequency. Symmetric rudimentary sleep pattern seen.      ICTAL AND/OR INTERICTAL FINDINGS:   No focal or generalized epileptiform activity was noted. No regional slowing was seen during this study.  No seizures were recorded during the study.     EVENT(S):  none     EKG: sampling review of EKG recording demonstrated sinus rhythm.       INTERPRETATION:   This is an abnormal video EEG recording in the lethargic and sleep states.  1) The diffuse background slowing is consistent with moderate encephalopathy.     2) No epileptiform discharges or seizures were seen.  No events were captured during the study. Clinical correlation is recommended.      Abhishek Millan MD  Diplomate, American Board of Psychiatry and Neurology   Diplomate, American Board of Psychiatry and Neurology in Epilepsy

## 2024-04-23 NOTE — PROGRESS NOTES
Infectious Disease Progress Note    Author: Keisha Castle M.D. 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 female with endometrial cancer matastatic to the brain admitted 2024 AF WBC 11.8 obtunded Per daughter she did wake up for her earlier  Labs Reviewed, Medications Reviewed, and Wound Reviewed.    Review of Systems:  Review of Systems   Constitutional:  Negative for fever.   Respiratory:  Negative for shortness of breath.    Gastrointestinal:  Negative for abdominal pain, nausea and vomiting.   Skin:  Positive for rash.   All other systems reviewed and are negative.      Hemodynamics:  Temp (24hrs), Av.3 °C (97.4 °F), Min:36.1 °C (97 °F), Max:36.7 °C (98.1 °F)  Temperature: 36.3 °C (97.3 °F)  Pulse  Av.5  Min: 75  Max: 128   Blood Pressure : 112/70       Physical Exam:  Physical Exam  Vitals and nursing note reviewed.   Constitutional:       General: She is not in acute distress.     Appearance: She is not ill-appearing, toxic-appearing or diaphoretic.   Eyes:      Comments: Scleral edema   Cardiovascular:      Rate and Rhythm: Tachycardia present.   Pulmonary:      Effort: Pulmonary effort is normal. No respiratory distress.      Breath sounds: No stridor.   Abdominal:      General: There is no distension.   Musculoskeletal:      Right lower leg: Edema present.      Left lower leg: Edema present.   Skin:     Findings: Bruising, lesion and rash present.   Neurological:      Comments: Obtunded  Right hemiparesis         Meds:    Current Facility-Administered Medications:     labetalol    LR    lacosamide    acyclovir (Zovirax) IV    amLODIPine    metoprolol tartrate    hydrALAZINE    [Held by provider] insulin regular **AND** POC blood glucose manual result **AND** NOTIFY MD and PharmD **AND** Administer 20 grams of glucose (approximately 8 ounces of fruit juice) every 15 minutes PRN FSBG  less than 70 mg/dL **AND** dextrose bolus    Pharmacy    dexamethasone    atorvastatin    losartan    senna-docusate **AND** polyethylene glycol/lytes    miconazole    lansoprazole    HYDROcodone-acetaminophen    [Held by provider] enoxaparin (LOVENOX) injection    bisacodyl    Pharmacy Consult Request    Labs:  Recent Labs     04/21/24  0006 04/22/24  0032 04/23/24  0443   WBC 8.1 10.5 11.8*   RBC 4.23 4.27 3.80*   HEMOGLOBIN 13.6 13.5 12.1   HEMATOCRIT 40.3 40.8 35.9*   MCV 95.3 95.6 94.5   MCH 32.2 31.6 31.8   RDW 50.4* 50.4* 50.1*   PLATELETCT 122* 126* 107*   MPV 11.3 10.9 12.0   NEUTSPOLYS 90.50* 90.40* 94.70*   LYMPHOCYTES 2.60* 4.10* 2.30*   MONOCYTES 6.00 1.80 1.20   EOSINOPHILS 0.00 0.00 0.00   BASOPHILS 0.00 0.30 0.20   RBCMORPHOLO Present  --   --      Recent Labs     04/21/24  0006 04/21/24  0623 04/22/24  0032 04/22/24  0623 04/22/24  1244 04/23/24  0443   SODIUM 149*   < > 142 141 139 139   POTASSIUM 4.4  --  4.2  --   --  4.3   CHLORIDE 111  --  106  --   --  105   CO2 25  --  23  --   --  21   GLUCOSE 163*  --  145*  --   --  205*   BUN 26*  --  32*  --   --  33*    < > = values in this interval not displayed.     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 TECHNIQUE/EXAM DESCRIPTION AND NUMBER OF VIEWS: Fluoroscopic-guided lumbar puncture. 1 fluoroscopic images obtained. Fluoroscopy time: 0.1 minutes Fluoroscopy dose(DAP): 0.618 Gy*cm^2 PROCEDURE:     Informed consent was obtained. A timeout was taken. With the patient in prone position, the lumbar region was prepped and draped in a sterile manner. Following local anesthesia with 1% lidocaine, a 22-gauge spinal needle  was advanced into the subarachnoid space at the L3-4 level.  Approximately 11 cc of CSF was collected and the specimens sent to the lab for the studies requested. The patient tolerated the procedure well with no evidence of complication. The procedure was performed by Brandy Coleman nurse practitioner under my direct supervision.     Fluoroscopic-guided lumbar puncture as described above.    IR-MIDLINE CATHETER INSERTION WO GUIDANCE > AGE 5    Result Date: 4/22/2024  HISTORY/REASON FOR EXAM:  Midline Placement   TECHNIQUE/EXAM DESCRIPTION AND NUMBER OF VIEWS: Midline insertion with ultrasound guidance.  FINDINGS: Midline insertion with Ultrasound Guidance was performed by qualified nursing staff without the assistance of a Radiologist. Midline positioning as measured by RN or as appropriate length of catheter selected.              Ultrasound-guided midline placement performed by qualified nursing staff as above.     DX-ABDOMEN FOR TUBE PLACEMENT    Result Date: 4/21/2024 4/21/2024 11:08 PM HISTORY/REASON FOR EXAM: Dobbhoff tube placement TECHNIQUE/EXAM DESCRIPTION: Single AP view of the abdomen COMPARISON:  April 18, 2024 FINDINGS: Hazy left lower lobe opacities are seen. Dobbhoff tube is seen, the tip overlies the left upper quadrant.  The bowel gas pattern in the upper abdomen appears nonspecific. The bony structures appear age-appropriate.     1.  Nonspecific bowel gas pattern in the upper abdomen. 2.  Dobbhoff tube is coiled within the stomach, the tip terminates overlying the expected location of the gastric cardia. 3.  Hazy left lower lobe infiltrate.    MR-BRAIN-WITH & W/O    Result Date: 4/21/2024 4/20/2024 6:11 PM HISTORY/REASON FOR EXAM:  Follow-up brain metastases. History of endometrial cancer. TECHNIQUE/EXAM DESCRIPTION:   MRI of the brain without and with contrast. T1 sagittal, T2 fast spin-echo axial, T1 coronal, FLAIR coronal, diffusion-weighted and apparent diffusion coefficient (ADC map) axial  images were obtained of the whole brain. T1 postcontrast axial and T1 postcontrast coronal images were obtained. The study was performed on a Anand 1.5 [employer] MRI scanner. 17 mL ProHance contrast was administered intravenously. COMPARISON:  MRI scan of the brain 3/26/2024 FINDINGS:   There has been significant interval increase in the size of the previously identified right frontal brain metastasis which has decreased from 2.6 cm in greatest diameter to 1.4 cm in greatest diameter on the current exam. This lesion has peripheral enhancement and a central nonenhancing area. Additionally there is diffuse decreased T2 and gradient echo signal intensity throughout this lesion. There is also moderate amount of surrounding increased T2 signal intensity in the right frontal white matter. Additionally, there is a larger gyriform area of enhancement in the right lateral occipital lobe which measures 3.5 x 1.3 x 3.2 cm in diameter has a moderate amount of surrounding increased T2 signal intensity in the right occipital, posterior frontal, temporal, and parietal lobes. There is also evidence of extensive new increased diffusion-weighted signal intensity in this region. There is also irregular rim of increased diffusion-weighted signal intensity about the right frontal lesion. There is also 3 irregular ovoid area of enhancement in the right peritrigonal white matter measures 1.3 cm in diameter Again redemonstrated is a wedge-shaped area of decreased T1 and increased T2 signal intensity in rising in the left posterior basal ganglia extending into the left posterior frontal periventricular white matter. This is unchanged from previous exam. There are also small wedge-shaped area of decreased T1 and increased T2 signal intensity involving the left side of the elaine and an ovoid area of decreased T1 and increased T2 signal intensity involving the left brachium pontis. There is surrounding increased T2 signal intensity about these  lesions. There is also a new patchy area of decreased T1 and increased T2 signal intensity involving the right parietal periventricular white matter which was not present on previous exam. There is a small ovoid area of decreased gradient echo signal intensity in the left parietal-occipital white matter and also in the left posterior temporal white matter. The calvariae are unremarkable. There are no extra-axial fluid collections. The ventricular system and sulci are mildly prominent. There is mild effacement of the right atrium and occipital horn..  There are no shift of midline structures. The brainstem and posterior fossa structures are unremarkable. Vascular flow voids in the vertebrobasilar and carotid arteries, [place], and dural venous sinuses are intact. The paranasal sinuses and mastoids in the field of view are unremarkable.     1.  Significant interval decrease in size of right frontal brain metastasis since previous exam slight interval decrease in surrounding vasogenic edema. Smaller irregular rim of increased diffusion weighted signal intensity about this treated metastasis consistent with cytotoxic edema. 2.  New large area of acute infarction in the right posterior frontal, parietal, posterior temporal, and occipital lobes associated gyriform enhancement in the right lateral occipital lobe and right peritrigonal white matter. 3.  Smaller chronic wedge-shaped area of infarction involving the left posterior basal ganglia extending into the left posterior frontal periventricular white matter 4.  Small areas of chronic lacunar type infarction involving the left side of the elaine and left brachium pontis with surrounding gliosis. 5.  Small foci of chronic hemosiderin deposition noted in the left posterior temporal and occipital white matter, as well as in the treated right frontal brain metastasis 6.  Age-related cerebral atrophy.    DX-ABDOMEN FOR TUBE PLACEMENT    Result Date: 4/18/2024    4/18/2024 4:39 AM HISTORY/REASON FOR EXAM: Dobbhoff tube placement TECHNIQUE/EXAM DESCRIPTION: Single AP view of the abdomen COMPARISON:  Today at 0147 FINDINGS: Hazy left lower lobe opacities are seen. Dobbhoff tube is seen, the tip overlies the left upper quadrant.  The bowel gas pattern in the upper abdomen appears nonspecific. The bony structures appear age-appropriate.     1.  Nonspecific bowel gas pattern in the upper abdomen. 2.  Dobbhoff tube is coiled within the stomach, the tip terminates overlying the expected location of the gastric fundus, physician similar to prior study. 3.  Left lower lobe infiltrate    DX-ABDOMEN FOR TUBE PLACEMENT    Result Date: 4/18/2024 4/18/2024 1:39 AM HISTORY/REASON FOR EXAM: Dobbhoff tube placement TECHNIQUE/EXAM DESCRIPTION: Single AP view of the abdomen COMPARISON:  Yesterday FINDINGS: Linear densities in the left lung base are noted. Dobbhoff tube is seen, the tip overlies the left upper quadrant.  The bowel gas pattern in the upper abdomen appears nonspecific. The bony structures appear age-appropriate.     1.  Nonspecific bowel gas pattern in the upper abdomen. 2.  Dobbhoff tube is coiled within the stomach, the tip terminates overlying the expected location of the gastric fundus. 3.  Left basilar atelectasis    DX-ABDOMEN FOR TUBE PLACEMENT    Result Date: 4/17/2024 4/17/2024 6:34 PM HISTORY/REASON FOR EXAM:  Line evaluation. TECHNIQUE/EXAM DESCRIPTION AND NUMBER OF VIEWS:  1 view(s) of the abdomen. COMPARISON:  None. FINDINGS: Enteric tube has been placed. The tip projects over the stomach. The bowel gas pattern is within normal limits.     Feeding tube looped in the stomach the distal tip in the gastric fundus.    CT-HEAD WITH & W/O    Result Date: 4/14/2024 4/14/2024 11:41 AM HISTORY/REASON FOR EXAM:  Altered mental status, history of brain mass. TECHNIQUE/EXAM DESCRIPTION AND NUMBER OF VIEWS: CT scan of the head without and with contrast. The study was  performed on a helical multidetector CT scanner. Contiguous axial sections were obtained from the skull base through the vertex both prior to and after the administration of IV contrast. 50 mL of Omnipaque 350 nonionic contrast was injected intravenously. Up to date radiation dose reduction adjustments have been utilized to meet ALARA standards for radiation dose reduction. COMPARISON:  CT dated 4/10/2024, MRI dated 3/26/2024 FINDINGS: Lateral ventricles are normal in size and symmetric. No significant atrophy. Enhancing mass in the right frontal lobe measures 1.6 x 1.1 cm, with surrounding vasogenic edema. Right to left midline shift on today's study measures 2.5 mm. Ill-defined enhancement in the right parietal lobe, nonspecific. No parietal masses were seen on the prior MRI scan. No new large vascular territory infarct. No new loss of gray-white matter differentiation. Basal cisterns are patent. No evidence for intracranial hemorrhage. Calvaria are intact. Visualized orbits are unremarkable. Visualized mastoid air cells are clear. No significant sinus disease in the visualized paranasal sinuses.     1.  Redemonstration of right frontal lobe mass, highly concerning for metastasis. Associated vasogenic edema and a 2.5 mm right to left midline shift. No progressive mass effect. No herniation. 2.  Nonspecific ill-defined enhancement in the right parietal lobe. 3.  No new large vascular structure infarct. No new intracranial hemorrhage.    DX-CHEST-LIMITED (1 VIEW)    Result Date: 4/14/2024 4/14/2024 11:10 AM HISTORY/REASON FOR EXAM:  Shortness of Breath TECHNIQUE/EXAM DESCRIPTION AND NUMBER OF VIEWS: Single portable view of the chest. COMPARISON: One day prior FINDINGS: Cardiomediastinal silhouette is normal. No focal consolidation, pleural effusion, pulmonary edema or pneumothorax. Mild left basilar atelectasis. No acute osseous abnormality.     No significant interval change.    DX-ABDOMEN COMPLETE WITH AP OR PA  CXR    Result Date: 4/13/2024 4/13/2024 6:22 PM HISTORY/REASON FOR EXAM:  Abdominal pain. TECHNIQUE/EXAM DESCRIPTION AND NUMBER OF VIEWS:  Two views of the abdomen and single view of the chest. COMPARISON: 3/27/2014. FINDINGS: LUNGS: Chronic left lower lobe scarring, similar to prior study. No new consolidation. No effusions. PNEUMOTHORAX: None. LINES AND TUBES: None. MEDIASTINUM: No cardiomegaly. ABDOMEN: Nonobstructive bowel gas pattern. Small amount of stool throughout the colon. MUSCULOSKELETAL STRUCTURES: No acute displaced fracture.     1. No acute cardiopulmonary abnormality. 2. Chronic left lower lobe scarring. 3. Nonobstructive bowel gas pattern. Small amount of stool throughout the colon.    CT-HEAD WITH & W/O    Result Date: 4/10/2024  4/10/2024 6:39 PM HISTORY/REASON FOR EXAM:  Weakness, Confusion, Brain Mass. TECHNIQUE/EXAM DESCRIPTION AND NUMBER OF VIEWS: CT scan of the head without and with contrast. The study was performed on a helical multidetector CT scanner. Contiguous axial sections were obtained from the skull base through the vertex both prior to and after the administration of IV contrast. 80 mL of Omnipaque 350 nonionic contrast was injected intravenously. Up to date radiation dose reduction adjustments have been utilized to meet ALARA standards for radiation dose reduction. COMPARISON:  I scan of brain 3/26/2024 FINDINGS: There is a 2 cm peripherally enhancing ovoid mass near the gray-white junction in the right frontal region. There is surrounding decreased attenuation in the adjacent white matter. There are multifocal areas of marked decreased attenuation in the right posterior frontal and parietal periventricular white matter. There is slight effacement of the right lateral ventricle with minimal right to left midline shift the ventricular system. Small ovoid area of decreased attenuation in the left side of the elaine  and a shaped area of decreased attenuation in the left frontal  periventricular white matter extending into the left posterior basal ganglia.. The calvariae and skull base are unremarkable. There are no extraaxial fluid collections. The ventricular system and basal cisterns are within normal limits. There are no hemorrhagic lesions The paranasal sinuses and mastoids in the field of view are unremarkable.     1.  2 cm right frontal brain metastasis with surrounding vasogenic edema. 2.  Multifocal areas of deep white matter infarction in the right posterior frontal parietal white matter. 3.  Mild effacement of the right lateral ventricle with minimal right to left midline shift and ventricular system. 4.  Chronic area of lacunar type infarction in the left side of the elaine and left frontal periventricular white matter extending into the left posterior basal ganglia.    CT-CHEST,ABDOMEN,PELVIS WITH    Result Date: 3/27/2024  3/27/2024 2:39 PM HISTORY/REASON FOR EXAM:  Metastatic cancer. TECHNIQUE/EXAM DESCRIPTION: CT scan of the chest, abdomen and pelvis with contrast. Thin-section helical scanning was obtained with intravenous contrast from the lung apices through the pubic symphysis to include the chest, abdomen and pelvis. 100 mL of Omnipaque 350 nonionic contrast was administered intravenously without complication. Low dose optimization technique was utilized for this CT exam including automated exposure control and adjustment of the mA and/or kV according to patient size. COMPARISON: CT of the chest, 8/3/2021. FINDINGS: CT Chest: Lungs: Asymmetry of the hemithoraces, smaller on the left the right, secondary to chronic postsurgical changes. Enlarging pulmonary nodule in the right lower lobe measuring 10 mm in diameter, series 301 image 59. The right upper lobe nodule is no longer visualized. No pleural effusion. Left pleural reaction. Mediastinum/Elvia: No significant adenopathy. The thyroid gland is enlarged with heterogeneous enhancement with multiple nodules. Cardiac: Heart  normal in size without pericardial effusion. Calcified atherosclerotic plaques in the coronary arteries. Vascular: Unremarkable. No filling defects in the main pulmonary arteries. Soft tissues: Unremarkable. Bones: No acute or destructive process. Decreased bone mineralization. CT Abdomen and Pelvis: Liver: Normal. Spleen: Unremarkable. Pancreas: Unremarkable. Gallbladder: No calcified stones. Biliary: Nondilated. Adrenal glands: Normal. Kidneys: Unremarkable without hydronephrosis. Bowel: No obstruction or acute inflammation. Normal appendix. Lymph nodes: No adenopathy. Vasculature: Unremarkable. Ascites: None. Pelvis: No adenopathy or free fluid. Uterus and ovaries are absent. Musculoskeletal: No acute or destructive process. Decreased bone mineralization. Bilateral facet arthrosis at L4-5 and L5-S1.     1. Chronic postoperative changes in the left lung with no residual pleural effusion. 2. Nodule in the right lower lobe measures 10 mm in diameter, larger than on the prior examination, most compatible with metastasis. 3. Calcific atherosclerotic plaques in the coronary arteries. 4. Enlarged heterogeneously enhancing thyroid gland. 5. Absent uterus and ovaries. 6. Decreased bone mineralization.    DX-CHEST-PORTABLE (1 VIEW)    Result Date: 3/27/2024  3/27/2024 12:36 PM HISTORY/REASON FOR EXAM:  Cough. TECHNIQUE/EXAM DESCRIPTION AND NUMBER OF VIEWS: Single portable view of the chest. COMPARISON: Chest radiography, 8/14/2021. FINDINGS: Lungs: Pulmonary infiltrates in the left lung have significantly improved in the interval. Residual regional parenchymal scarring. No new focal opacities are evident. No pleural effusion or visible pneumothorax. Mediastinal: Normal cardiomediastinal silhouette. Other: Interval removal of the left internal jugular central venous access catheter. The remaining visualized bones and soft tissues are stable radiographically.     1. Peripheral scarring from prior infiltrate in the left lung.  2. No acute findings.    MR-BRAIN-WITH & W/O    Result Date: 3/26/2024  3/26/2024 10:03 AM HISTORY/REASON FOR EXAM: Follow-up for brain metastatic disease from endometrial carcinoma TECHNIQUE/EXAM DESCRIPTION: T1 sagittal, T2 axial, flair coronal, T1 coronal, and diffusion-weighted axial images were obtained of the brain pre-contrast followed by T1 coronal and axial images post intravenous administration of 5 mL ProHance. COMPARISON: 7/1/2020, MR brain PET CT 10/26/2020 FINDINGS: Midline shift to the left noted measuring approximately 4 mm. Remote infarct noted in the left posterior internal capsule and adjacent corona radiata in the posterior frontoparietal region. Abnormal signal is noted in the left middle cerebellar peduncle on the T2-weighted images, new from the previous study from 2020 but without abnormal enhancement in this region. Remote left pontine lacunar infarct noted. There is a 28 x 30 x 26 mm enhancing mass in the right frontal opercular region with surrounding vasogenic edema that is new from the prior study. Gradient-echo images demonstrates minimal curvilinear hypointensity within this lesion which may represent either areas of microhemorrhage or microcalcification. Gradient echo images demonstrate a few small foci of hemosiderin staining in the left posterior temporal region, the left parietal corona radiata, the left superior parietal subcortical region, left posterior frontal subcortical region. Right parietal subcortical region. Postcontrast images do not demonstrate any other area of abnormal intracranial enhancement.There are no extra axial collections. Visualized intracranial arterial flow voids are within normal limits. Bone marrow signal in the calvarium is within normal limits. Included portions of the paranasal sinuses are within normal limits. Included portions of the mastoid air cells are within normal limits. Included portions of the orbits are within normal limits     20 x 30 x 26  but larger enhancing mass in the right frontal opercular region with surrounding vasogenic edema with mass effect and midline shift to the left measuring 4 mm concerning for metastatic lesion. Multiple remote infarcts noted as described above. Foci of hemosiderin staining in the bilateral cerebral hemispheres as described above without enhancement which may represent microhemorrhages or small cavernomas.      Micro:  Results       Procedure Component Value Units Date/Time    CSF CULTURE [[id number]] Collected: 04/22/24 1700    Order Status: No result Specimen: CSF Updated: 04/22/24 1942     Significant Indicator NEG     Source CSF     Site Tap     Culture Result -     Gram Stain Result Many WBCs.  No organisms seen.      Narrative:      Bloody specimen    GRAM STAIN [[id number]] Collected: 04/22/24 1700    Order Status: Completed Specimen: CSF Updated: 04/22/24 1942     Significant Indicator .     Source CSF     Site Tap     Gram Stain Result Many WBCs.  No organisms seen.      Narrative:      Bloody specimen    CSF Culture [[id number]]     Order Status: No result Specimen: CSF from Tap     CULTURE WOUND W/ GRAM STAIN [[id number]]  (Abnormal)  (Susceptibility) Collected: 04/16/24 1853    Order Status: Completed Specimen: Wound from Chest Updated: 04/19/24 1132     Significant Indicator POS     Source WND     Site CHEST     Culture Result Heavy growth usual skin guilherme.     Gram Stain Result Moderate WBCs.  Moderate Gram positive cocci.  Few Gram negative rods.  Few Gram positive rods.  Rare Yeast.       Culture Result Enterococcus faecalis  Heavy growth        Candida albicans  Heavy growth      Narrative:      Airborne,Contact    Susceptibility       Enterococcus faecalis (1)       Antibiotic Interpretation Microscan   Method Status    Daptomycin Sensitive 2 mcg/mL MARIOLA Final    Gent Synergy Sensitive <=500 mcg/mL MARIOLA Final    Ampicillin Sensitive <=2 mcg/mL MARIOLA Final    Linezolid Sensitive 2 mcg/mL MARIOLA Final     Vancomycin Sensitive 1 mcg/mL MARIOLA Final    Penicillin Sensitive 1 mcg/mL MARIOLA Final    Tetracycline Resistant >8 mcg/mL MARIOLA Final                       Blood Culture - Draw one from central line and one from peripheral site [[id number]] Collected: 04/13/24 2008    Order Status: Completed Specimen: Blood from Peripheral Updated: 04/18/24 2100     Significant Indicator NEG     Source BLD     Site PERIPHERAL     Culture Result No growth after 5 days of incubation.    Narrative:      Left Forearm/Arm    Blood Culture - Draw one from central line and one from peripheral site [[id number]] Collected: 04/13/24 1922    Order Status: Completed Specimen: Blood from Line Updated: 04/18/24 2100     Significant Indicator NEG     Source BLD     Site Peripheral     Culture Result No growth after 5 days of incubation.    Narrative:      Left Forearm/Arm    GRAM STAIN [[id number]] Collected: 04/16/24 1853    Order Status: Completed Specimen: Wound Updated: 04/17/24 0227     Significant Indicator .     Source WND     Site CHEST     Gram Stain Result Moderate WBCs.  Moderate Gram positive cocci.  Few Gram negative rods.  Few Gram positive rods.  Rare Yeast.      Narrative:      Airborne,Contact            Assessment:  Active Hospital Problems    Diagnosis     *Acute encephalopathy [G93.40]     Problem with vascular access [Z78.9]     Prediabetes [R73.03]     Hypermagnesemia [E83.41]     Hypernatremia [E87.0]     Vasogenic edema (HCC) [G93.6]     Constipation [K59.00]     Shingles [B02.9]     Dysphagia [R13.10]     Seizure (HCC) [R56.9]     Sepsis secondary to UTI (HCC) [A41.9, N39.0]     Malignant neoplasm metastatic to brain (HCC) [C79.31]     Acute kidney injury (HCC) [N17.9]     Advance care planning [Z71.89]     Endometrial cancer (HCC) [C54.1]     History of CVA (cerebrovascular accident) [Z86.73]     Essential hypertension [I10]    Metastatic endometrial cancer  Brain mets  Disseminated zoster  Mucosal candidiasis  CVA, mult acute  and chronic  -possibly related to zoster     PLAN:   Continue IV acyclovir  Per pharmacy there is a shortage-can use IV gancyclovir as alternative  Monitor renal function  Recommend 10 day course  Midline OK to place     Topical antifungal under breasts per wound care    DW daughter

## 2024-04-23 NOTE — DIETARY
Nutrition Services Brief Update:    Problem: Nutritional:  Goal: Nutrition support tolerated and meeting greater than 85% of estimated needs  Outcome: Met    Tube feed at goal rate: Promote with Fiber running @ 60 mL/hr.    RD following.

## 2024-04-23 NOTE — THERAPY
Speech Language Therapy Contact Note    Patient Name: Jairo Rivas  Age:  73 y.o., Sex:  female  Medical Record #: [id number]  Today's Date: 4/23/2024    Discussed missed therapy with RN       04/23/24 0808   Treatment Variance   Reason For Missed Therapy Medical - Patient on Hold from Therapy;Medical - Patient Unarousable;Medical - Patient not Able To Participate   Interdisciplinary Plan of Care Collaboration   IDT Collaboration with  Nursing   Collaboration Comments Attempted to see pt for dysphagia management this date. Per RN, pt continues to be unarousable and is not appropriate to participate in tx. Service will hold and re-attempt as able/medically appropriate.

## 2024-04-23 NOTE — CARE PLAN
The patient is Stable - Low risk of patient condition declining or worsening    Shift Goals  Clinical Goals: monitor vital, neuro status, respiratory status, labs  Patient Goals: ZA  Family Goals: support POC    Progress made toward(s) clinical / shift goals:      Problem: Pain - Standard  Goal: Alleviation of pain or a reduction in pain to the patient’s comfort goal  Description: Target End Date:  Prior to discharge or change in level of care    Document on Vitals flowsheet    1.  Document pain using the appropriate pain scale per order or unit policy  2.  Educate and implement non-pharmacologic comfort measures (i.e. relaxation, distraction, massage, cold/heat therapy, etc.)  3.  Pain management medications as ordered  4.  Reassess pain after pain med administration per policy  5.  If opiods administered assess patient's response to pain medication is appropriate per POSS sedation scale  6.  Follow pain management plan developed in collaboration with patient and interdisciplinary team (including palliative care or pain specialists if applicable)  Outcome: Progressing     Problem: Hemodynamics  Goal: Patient's hemodynamics, fluid balance and neurologic status will be stable or improve  Description: Target End Date:  Prior to discharge or change in level of care    Document on Assessment and I/O flowsheet templates    1.  Monitor vital signs, pulse oximetry and cardiac monitor per provider order and/or policy  2.  Maintain blood pressure per provider order  3.  Hemodynamic monitoring per provider order  4.  Manage IV fluids and IV infusions  5.  Monitor intake and output  6.  Daily weights per unit policy or provider order  7.  Assess peripheral pulses and capillary refill  8.  Assess color and body temperature  9.  Position patient for maximum circulation/cardiac output  10. Monitor for signs/symptoms of excessive bleeding  11. Assess mental status, restlessness and changes in level of consciousness  12. Monitor  temperature and report fever or hypothermia to provider immediately. Consideration of targeted temperature management.  Outcome: Progressing     Problem: Fluid Volume  Goal: Fluid volume balance will be maintained  Description: Target End Date:  Prior to discharge or change in level of care    Document on I/O flowsheet    1.  Monitor intake and output as ordered  2.  Promote oral intake as appropriate  3.  Report inadequate intake or output to physician  4.  Administer IV therapy as ordered  5.  Weights per provider order  6.  Assess for signs and symptoms of bleeding  7.  Monitor for signs of fluid overload (respiratory changes, edema, weight gain, increased abdominal girth)  8.  Monitor of signs for inadequate fluid volume (poor skin turgor, dry mucous membranes)  9.  Instruct patient on adherence to fluid restrictions  Outcome: Progressing     Problem: Urinary - Renal Perfusion  Goal: Ability to achieve and maintain adequate renal perfusion and functioning will improve  Description: Target End Date:  Prior to discharge or change in level of care    Document on I/O and Assessment flowsheet    1.  Urine output will remain greater than 0.5ml/Kg/HR  2.  Monitor amount and/or characteristics of urine per order/policy. Specific gravity per order/policy  3.  Assess signs and symptoms of renal dysfunction  Outcome: Progressing     Problem: Respiratory  Goal: Patient will achieve/maintain optimum respiratory ventilation and gas exchange  Description: Target End Date:  Prior to discharge or change in level of care    Document on Assessment flowsheet    1.  Assess and monitor rate, rhythm, depth and effort of respiration  2.  Breath sounds assessed qshift and/or as needed  3.  Assess O2 saturation, administer/titrate oxygen as ordered  4.  Position patient for maximum ventilatory efficiency  5.  Turn, cough, and deep breath with splinting to improve effectiveness  6.  Collaborate with RT to administer medication/treatments  per order  7.  Encourage use of incentive spirometer and encourage patient to cough after use and utilize splinting techniques if applicable  8.  Airway suctioning  9.  Monitor sputum production for changes in color, consistency and frequency  10. Perform frequent oral hygiene  11. Alternate physical activity with rest periods  Outcome: Progressing     Problem: Skin Integrity  Goal: Skin integrity is maintained or improved  Description: Target End Date:  Prior to discharge or change in level of care    Document interventions on Skin Risk/[place] flowsheet groups and corresponding LDA    1.  Assess and monitor skin integrity, appearance and/or temperature  2.  Assess risk factors for impaired skin integrity and/or pressures ulcers  3.  Implement precautions to protect skin integrity in collaboration with interdisciplinary team  4.  Implement pressure ulcer prevention protocol if at risk for skin breakdown  5.  Confirm wound care consult if at risk for skin breakdown  6.  Ensure patient use of pressure relieving devices  (Low air loss bed, waffle overlay, heel protectors, ROHO cushion, etc)  Outcome: Progressing     Problem: Fall Risk  Goal: Patient will remain free from falls  Description: Target End Date:  Prior to discharge or change in level of care    Document interventions on the Esperanza Grewal Fall Risk Assessment    1.  Assess for fall risk factors  2.  Implement fall precautions  Outcome: Progressing       Patient is not progressing towards the following goals:

## 2024-04-23 NOTE — PROGRESS NOTES
[facility] Internal Medicine Daily Progress Note    Date of Service  4/23/2024    [facility] Team: [facility] Team   Attending: KATHLEEN Greer M.d.  Senior Resident: Dr. Grover  Intern:  Dr. Ruiz  Contact Number: [phone]    Chief Complaint  Jairo Rivas is a 73 y.o. female admitted 4/13/2024 with painful urination and constipation.     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 It is noted that patient has been having abdominal pain and constipation, however, she has had 2 bowel movements since being admitted.  Her UA in the ED was consistent with UTI.  She had leukocytosis with WBC 24.  Lactic acid 3.  Blood cultures negative thus far.  Started on Rocephin for 3 days to treat UTI.     CT scan of the head was obtained, noted to have pneumatization of the right frontal lobe mass highly concerning for metastasis; associated with genic edema and 2.5 mm right to left midline shift.  Patient continued to remain on Decadron. Also GYN oncology was consulted, discussed the prognosis of the patient, stated that patient is not a candidate for any treatment.  Patient daughter wanted her to be full code at this time.  After several days without any food, NG tube finally placed.  Successful tube feeds.  VZV positive on skin swab. MRI on 4/21/24 did not show evidence of new strokes. It did show a decrease in the size of the brain metastasis in frontotemporal region and surrounding edema.    Interval Problem Update  No acute overnight events .  LP positive for VZV. Met with patient daughter who still hoping the patient to be stabilize so can take her to [place]  for second oncology opinion. Patient is unarousable.   Goals of care is to make patient best shape to be discharged to be follow-up by his doctors in [place].  -c EEG ordered  -Continue IV acyclovir  -Dry eyedrops  -Continue NG tube feeding  -Resumed lovenox for high risk of clotting in setting of metastatic cancer  -Will re-discuss goals of care again with daughter tomorrow        At the end of the day, I received a call that the patient's PIV infiltrated. The process of finding a nurse from ICU and ER has been initiated but may take time during shift change. VATs unavailable today.    I have discussed this patient's plan of care and discharge plan at IDT rounds today with Case Management, Nursing, Nursing leadership, and other members of the IDT team.    Consultants/Specialty  neurology and surgical oncology     Code Status  Full Code    Disposition  The patient is not medically cleared for discharge to home or a post-acute facility.      I have placed the appropriate orders for post-discharge needs.    Review of Systems  Review of Systems   Unable to perform ROS: Medical condition        Physical Exam  Temp:  [36.1 °C (97 °F)-36.7 °C (98.1 °F)] 36.4 °C (97.5 °F)  Pulse:  [] 102  Resp:  [16-19] 16  BP: (112-175)/(70-98) 172/95  SpO2:  [93 %-97 %] 94 %    Physical Exam  Vitals (exam limited) reviewed.   Constitutional:       General: She is sleeping.      Appearance: She is obese. She is ill-appearing.      Comments: Obtunded and unarousable.    HENT:      Head: Normocephalic and atraumatic.      Mouth/Throat:      Mouth: Mucous membranes are moist.   Eyes:      Comments: Scleral edema with clear yellowish discharge   Cardiovascular:      Rate and Rhythm: Normal rate and regular rhythm.      Pulses: Normal pulses.      Heart sounds: Normal heart sounds.   Pulmonary:      Effort: Pulmonary effort is normal. No respiratory distress.      Breath sounds: Normal breath sounds.   Abdominal:      General: There  is distension.   Musculoskeletal:      Right lower leg: Edema present.      Left lower leg: Edema present.   Skin:     General: Skin is warm and dry.      Comments: Skin breakdown under breasts. Dark red non pustular rash on left just below her breast.         Fluids    Intake/Output Summary (Last 24 hours) at 4/23/2024 1557  Last data filed at 4/23/2024 1200  Gross per 24 hour   Intake 4587.14 ml   Output 150 ml   Net 4437.14 ml       Laboratory  Recent Labs     04/21/24  0006 04/22/24  0032 04/23/24  0443   WBC 8.1 10.5 11.8*   RBC 4.23 4.27 3.80*   HEMOGLOBIN 13.6 13.5 12.1   HEMATOCRIT 40.3 40.8 35.9*   MCV 95.3 95.6 94.5   MCH 32.2 31.6 31.8   MCHC 33.7 33.1 33.7   RDW 50.4* 50.4* 50.1*   PLATELETCT 122* 126* 107*   MPV 11.3 10.9 12.0     Recent Labs     04/21/24  0006 04/21/24  0623 04/22/24  0032 04/22/24  0623 04/22/24  1244 04/23/24  0443   SODIUM 149*   < > 142 141 139 139   POTASSIUM 4.4  --  4.2  --   --  4.3   CHLORIDE 111  --  106  --   --  105   CO2 25  --  23  --   --  21   GLUCOSE 163*  --  145*  --   --  205*   BUN 26*  --  32*  --   --  33*   CREATININE 0.45*  --  1.02  --   --  0.62   CALCIUM 9.5  --  8.8  --   --  8.7    < > = values in this interval not displayed.     Recent Labs     04/22/24  1244   APTT 26.6   INR 1.13               Imaging  DX-LUMBAR PUNCTURE FOR DIAGNOSIS   Final Result      Fluoroscopic-guided lumbar puncture as described above.      IR-MIDLINE CATHETER INSERTION WO GUIDANCE > AGE 3   Final Result                  Ultrasound-guided midline placement performed by qualified nursing staff    as above.          DX-ABDOMEN FOR TUBE PLACEMENT   Final Result         1.  Nonspecific bowel gas pattern in the upper abdomen.   2.  Dobbhoff tube is coiled within the stomach, the tip terminates overlying the expected location of the gastric cardia.   3.  Hazy left lower lobe infiltrate.      MR-BRAIN-WITH & W/O   Final Result      1.  Significant interval decrease in size of right  frontal brain metastasis since previous exam slight interval decrease in surrounding vasogenic edema. Smaller irregular rim of increased diffusion weighted signal intensity about this treated metastasis    consistent with cytotoxic edema.      2.  New large area of acute infarction in the right posterior frontal, parietal, posterior temporal, and occipital lobes associated gyriform enhancement in the right lateral occipital lobe and right peritrigonal white matter.      3.  Smaller chronic wedge-shaped area of infarction involving the left posterior basal ganglia extending into the left posterior frontal periventricular white matter      4.  Small areas of chronic lacunar type infarction involving the left side of the elaine and left brachium pontis with surrounding gliosis.      5.  Small foci of chronic hemosiderin deposition noted in the left posterior temporal and occipital white matter, as well as in the treated right frontal brain metastasis      6.  Age-related cerebral atrophy.      DX-ABDOMEN FOR TUBE PLACEMENT   Final Result         1.  Nonspecific bowel gas pattern in the upper abdomen.   2.  Dobbhoff tube is coiled within the stomach, the tip terminates overlying the expected location of the gastric fundus, physician similar to prior study.   3.  Left lower lobe infiltrate      DX-ABDOMEN FOR TUBE PLACEMENT   Final Result         1.  Nonspecific bowel gas pattern in the upper abdomen.   2.  Dobbhoff tube is coiled within the stomach, the tip terminates overlying the expected location of the gastric fundus.   3.  Left basilar atelectasis      DX-ABDOMEN FOR TUBE PLACEMENT   Final Result      Feeding tube looped in the stomach the distal tip in the gastric fundus.      CT-HEAD WITH & W/O   Final Result      1.  Redemonstration of right frontal lobe mass, highly concerning for metastasis. Associated vasogenic edema and a 2.5 mm right to left midline shift. No progressive mass effect. No herniation.   2.   Nonspecific ill-defined enhancement in the right parietal lobe.   3.  No new large vascular structure infarct. No new intracranial hemorrhage.      DX-CHEST-LIMITED (1 VIEW)   Final Result      No significant interval change.      DX-ABDOMEN COMPLETE WITH AP OR PA CXR   Final Result      1. No acute cardiopulmonary abnormality.   2. Chronic left lower lobe scarring.   3. Nonobstructive bowel gas pattern. Small amount of stool throughout the colon.           Assessment/Plan  Problem Representation:    * Acute encephalopathy- (present on admission)  Assessment & Plan  NOT IMPROVING  Patient remains obtunded with occasional alertness. Etiology at time of admission likely multifactorial, as she has multiple possible causes including UTI,  medications (norco, baclofen, gabapentin), recent findings of subacute CVA as well as brain mets with vasogenic edema, and shingles may all be contributing.  EEG on 4/14 did not demonstrate any seizures but was concerning for signs of brain distress as would be expected in setting of edema. No new findings on CTH.  Due to patient's slow decline in mentation despite treatment of UTI and starting NG tube feeds in addition to suddenly high blood pressure, MRI ordered. 4/21 vascular neurologist did not identify any new strokes and evidence of improvement in brain met and vasogenic edema.  LP with CSF positive for VZV done 4/22  -Continue IV acyclovir + maintenance IV fluids  100ml/h in case of disseminated shingles, no PO access  -Will switch Aciclovir to ganciclovir.  -continue lacosamide for seizure ppx  -fall, seizure, aspiration precautions  -Holding baclofen and gabapentin due to sedating properties   - Patient's family decided that between doing LP at bedside versus LP by IR they will prefer LP with IR on 4/22    Problem with vascular access  Assessment & Plan  On 4/21, patient's PIV infiltrated twice. First time, PIV placed eventually with ultrasound guidance. Second time occurred  in the evening and no VATs available.  -Midline placed on 4/22      Prediabetes- (present on admission)  Assessment & Plan  A1c 6.6, unsure if related to multiple courses of steroids  -Glucose checks q12h  -Will consider resume ISS    Hypermagnesemia  Assessment & Plan  RESOLVED  Expect improvement with enteral food and fluids after NG tube. Currently Mg 3.  -Monitor with daily labs    Vasogenic edema (HCC)- (present on admission)  Assessment & Plan  Continuing decadron taper per Dr. Live's recommendations. He also does not believe that there are any significant changes in her brain imaging from this admission that could explain her somnolence/presentation. She is on the correct medication (decadron) for treating the edema  Improvement on MRI on 4/21  -Continue decadron      Hypernatremia  Assessment & Plan  Sodium oscillates above and below 145. Reacting to changes by switching from D5W and D5 half normal saline. Suspect regulation of sodium will greatly improve as patient receives fluids and nutrition via NG tube.  -Continue to monitor with daily labs  -Consider further workup of etiology if does not balance with enteral nutrition and fluids  -Discontinued fluids, free water pushes ordered    RESOLVED    Seizure (HCC)- (present on admission)  Assessment & Plan  EEG did not show evidence of seizure. Antiepileptic continued given multitude of risk factors.  -Continue lacosamide 150 mg IV BID  -EEG negative for seizures for the second time.      Dysphagia- (present on admission)  Assessment & Plan  Possibly due to CVA vs brain metastases and altered mental status in setting of infection.  Patient will remain NPO as she failed SLP evaluation 4/17  NG tube placement required IRIS. Tube was removed for MRI and family refused NG tube to be replaced. Extensive discussion with family regarding nutrition options. Eventually agreed to let patient get NG tube with lorazepam sedation and IRIS. No one certified to place NG  tube with IRIS available on 4/21. Vascular access also lost on 4/21 and midline unable to be placed without VATs  -aspiration precautions  -Continue lansoprazole  -Q 6 hours glucose checks      Shingles- (present on admission)  Assessment & Plan  Rash noted under left breast and onto lateral chest wall.  Swab of rash positive for VZV. MRI on 4/21 did not show new infarcts or worsening brain mets. No other dermatomes noted to have rash.  Concern for encephalitis from shingles due to minimal improvement in mentation. Holding off on LP for now per family's wishes  -ID consulted, will see patient 4/22. Recommending LP for definitive diagnosis.  -contact precautions  -Continue acyclovir IV, probably will be switching to ganciclovir  -Miconazole to treat intertrigo    Constipation  Assessment & Plan  Resolved  - Bowel regimen    Sepsis secondary to UTI (HCC)- (present on admission)  Assessment & Plan  RESOLVED  This is Sepsis Present on admission  SIRS criteria identified on my evaluation include: Tachycardia, with heart rate greater than 90 BPM and Leukocytosis, with WBC greater than 12,000  Clinical indicators of end organ dysfunction include Lactic Acid greater than 2 and Toxic Metabolic Encephalopathy  Source is UTI  Sepsis protocol initiated  Crystalloid Fluid Administration: Fluid resuscitation ordered per standard protocol - 30 mL/kg per current or ideal body weight  IV antibiotics as appropriate for source of sepsis  Reassessment: I have reassessed the patient's hemodynamic status  -s/p CTX x5d  -received sepsis bolus in ED, will continue IVF with caution  -No growth in blood cultures or urine cultures      Malignant neoplasm metastatic to brain (HCC)- (present on admission)  Assessment & Plan  Brain mets noted during hospitalization on 3/27/2024 with vasogenic edema, MRI on 4/11 shows continued brain mets with vasogenic edema.  MRI 4/21 shows decrease in tumor size and improvement in edema.  EEG x2  unremarkable  -continue seizure prophylaxis with lacosamide 150 mg BID  -continue dexamethasone   -Continue fall, aspiration, seizure precaution.  -cEEG with neuro consult ordered      Acute kidney injury (HCC)- (present on admission)  Assessment & Plan  RESOLVED  Returned, Cr 1.11 and baseline around 0.7. Likely prerenal from dehydration.  -continue free water flushes  -renally dose all medications as appropriate  -avoid nephrotoxins    Advance care planning- (present on admission)  Assessment & Plan  At this time patient's family want her to be full code.  Palliative care consulted and discussed with family the goals of care. This remains the same.  -Full code  -Goals of care is to make patient best shape to be discharged to be follow-up by his doctors in [place].    Endometrial cancer (HCC)- (present on admission)  Assessment & Plan  Followed by Dr. Garcia outpatient and sees radiation oncology Dr Live.  Received immunotherapy at [facility] in 2021 with initial good response. Reached out to Dr. Live who does not believe imaging from 4/14 shows significant changes compared to MRI month prior.  -Complicated by metastases to brain among other locations, likely contributing to her altered mental status. Mets re demonstrated without significant change on CTH on admission.  -Decadron for vasogenic edema  -Neurochecks every 4 hours  -Continue aspiration, fall, seizure precaution.    Essential hypertension- (present on admission)  Assessment & Plan  Concern for secondary exacerbation of hypertension raised on 4/20 when blood pressures increased to 190s/130s despite receiving scheduled home meds. MRI ordered; no evidence of worsening mets or new stroke.  As of evening of 4/21, patient's vascular access lost and NG tube not replaced. Unable to provide below medications.  - PRN IV hydralazine   - Increased amlodipine to 10 mg daily on 4/20  - Continue losartan 100 mg daily  - Continue metoprolol tartrate, Increased  to 75  mg BID on 4/20  - Continue atorvastatin 20 mg every evening    History of CVA (cerebrovascular accident)- (present on admission)  Assessment & Plan  Residual right sided neurodeficits from CVA in 2010.  Has RUE spasticity, RLE pain.  New subacute CVA found on 4/11 MRI at [facility].  -hold chemical DVT ppx due to proximity of CVA  -consider starting asa when appropriate  -continue atorvastatin         VTE prophylaxis: SCDs/TEDs, resumed lovenox    I have performed a physical exam and reviewed and updated ROS and Plan today (4/23/2024). In review of yesterday's note (4/22/2024), there are no changes except as documented above.

## 2024-04-24 LAB
ALBUMIN SERPL BCP-MCNC: 2.6 G/DL (ref 3.2–4.9)
ALBUMIN/GLOB SERPL: 0.7 G/DL
ALP SERPL-CCNC: 89 U/L (ref 30–99)
ALT SERPL-CCNC: 34 U/L (ref 2–50)
ANION GAP SERPL CALC-SCNC: 12 MMOL/L (ref 7–16)
AST SERPL-CCNC: 35 U/L (ref 12–45)
BASOPHILS # BLD AUTO: 0.3 % (ref 0–1.8)
BASOPHILS # BLD: 0.03 K/UL (ref 0–0.12)
BILIRUB SERPL-MCNC: 0.4 MG/DL (ref 0.1–1.5)
BUN SERPL-MCNC: 26 MG/DL (ref 8–22)
CALCIUM ALBUM COR SERPL-MCNC: 10.2 MG/DL (ref 8.5–10.5)
CALCIUM SERPL-MCNC: 9.1 MG/DL (ref 8.5–10.5)
CHLORIDE SERPL-SCNC: 107 MMOL/L (ref 96–112)
CO2 SERPL-SCNC: 22 MMOL/L (ref 20–33)
CREAT SERPL-MCNC: 0.48 MG/DL (ref 0.5–1.4)
EOSINOPHIL # BLD AUTO: 0 K/UL (ref 0–0.51)
EOSINOPHIL NFR BLD: 0 % (ref 0–6.9)
ERYTHROCYTE [DISTWIDTH] IN BLOOD BY AUTOMATED COUNT: 48 FL (ref 35.9–50)
GFR SERPLBLD CREATININE-BSD FMLA CKD-EPI: 101 ML/MIN/1.73 M 2
GLOBULIN SER CALC-MCNC: 3.5 G/DL (ref 1.9–3.5)
GLUCOSE SERPL-MCNC: 157 MG/DL (ref 65–99)
HCT VFR BLD AUTO: 32.3 % (ref 37–47)
HGB BLD-MCNC: 11.2 G/DL (ref 12–16)
IMM GRANULOCYTES # BLD AUTO: 0.3 K/UL (ref 0–0.11)
IMM GRANULOCYTES NFR BLD AUTO: 2.6 % (ref 0–0.9)
LYMPHOCYTES # BLD AUTO: 0.32 K/UL (ref 1–4.8)
LYMPHOCYTES NFR BLD: 2.8 % (ref 22–41)
MCH RBC QN AUTO: 32.2 PG (ref 27–33)
MCHC RBC AUTO-ENTMCNC: 34.7 G/DL (ref 32.2–35.5)
MCV RBC AUTO: 92.8 FL (ref 81.4–97.8)
MONOCYTES # BLD AUTO: 0.25 K/UL (ref 0–0.85)
MONOCYTES NFR BLD AUTO: 2.2 % (ref 0–13.4)
NEUTROPHILS # BLD AUTO: 10.52 K/UL (ref 1.82–7.42)
NEUTROPHILS NFR BLD: 92.1 % (ref 44–72)
NRBC # BLD AUTO: 0.02 K/UL
NRBC BLD-RTO: 0.2 /100 WBC (ref 0–0.2)
PLATELET # BLD AUTO: 123 K/UL (ref 164–446)
PMV BLD AUTO: 12.1 FL (ref 9–12.9)
POTASSIUM SERPL-SCNC: 4.3 MMOL/L (ref 3.6–5.5)
PROT SERPL-MCNC: 6.1 G/DL (ref 6–8.2)
RBC # BLD AUTO: 3.48 M/UL (ref 4.2–5.4)
SODIUM SERPL-SCNC: 141 MMOL/L (ref 135–145)
WBC # BLD AUTO: 11.4 K/UL (ref 4.8–10.8)

## 2024-04-24 PROCEDURE — 4A10X4Z MONITORING OF CENTRAL NERVOUS ELECTRICAL ACTIVITY, EXTERNAL APPROACH: ICD-10-PCS | Performed by: PSYCHIATRY & NEUROLOGY

## 2024-04-24 PROCEDURE — 665999 HH PPS REVENUE DEBIT

## 2024-04-24 PROCEDURE — 700105 HCHG RX REV CODE 258

## 2024-04-24 PROCEDURE — 99223 1ST HOSP IP/OBS HIGH 75: CPT | Performed by: PSYCHIATRY & NEUROLOGY

## 2024-04-24 PROCEDURE — 95720 EEG PHY/QHP EA INCR W/VEEG: CPT | Performed by: PSYCHIATRY & NEUROLOGY

## 2024-04-24 PROCEDURE — A9270 NON-COVERED ITEM OR SERVICE: HCPCS | Performed by: INTERNAL MEDICINE

## 2024-04-24 PROCEDURE — 665998 HH PPS REVENUE CREDIT

## 2024-04-24 PROCEDURE — 99233 SBSQ HOSP IP/OBS HIGH 50: CPT | Performed by: INTERNAL MEDICINE

## 2024-04-24 PROCEDURE — 95700 EEG CONT REC W/VID EEG TECH: CPT | Performed by: PSYCHIATRY & NEUROLOGY

## 2024-04-24 PROCEDURE — 95714 VEEG EA 12-26 HR UNMNTR: CPT | Performed by: PSYCHIATRY & NEUROLOGY

## 2024-04-24 PROCEDURE — 95716 VEEG EA 12-26HR CONT MNTR: CPT | Performed by: PSYCHIATRY & NEUROLOGY

## 2024-04-24 PROCEDURE — A9270 NON-COVERED ITEM OR SERVICE: HCPCS

## 2024-04-24 PROCEDURE — 85025 COMPLETE CBC W/AUTO DIFF WBC: CPT

## 2024-04-24 PROCEDURE — 700105 HCHG RX REV CODE 258: Performed by: HOSPITALIST

## 2024-04-24 PROCEDURE — 36415 COLL VENOUS BLD VENIPUNCTURE: CPT

## 2024-04-24 PROCEDURE — 97602 WOUND(S) CARE NON-SELECTIVE: CPT

## 2024-04-24 PROCEDURE — 700102 HCHG RX REV CODE 250 W/ 637 OVERRIDE(OP)

## 2024-04-24 PROCEDURE — 700111 HCHG RX REV CODE 636 W/ 250 OVERRIDE (IP): Mod: JZ

## 2024-04-24 PROCEDURE — 80053 COMPREHEN METABOLIC PANEL: CPT

## 2024-04-24 PROCEDURE — 700102 HCHG RX REV CODE 250 W/ 637 OVERRIDE(OP): Performed by: INTERNAL MEDICINE

## 2024-04-24 PROCEDURE — 770020 HCHG ROOM/CARE - TELE (206)

## 2024-04-24 PROCEDURE — 99233 SBSQ HOSP IP/OBS HIGH 50: CPT | Mod: GC | Performed by: HOSPITALIST

## 2024-04-24 RX ORDER — ASPIRIN 81 MG/1
81 TABLET ORAL DAILY
Status: DISCONTINUED | OUTPATIENT
Start: 2024-04-24 | End: 2024-04-25

## 2024-04-24 RX ADMIN — ACYCLOVIR SODIUM 650 MG: 50 INJECTION, SOLUTION INTRAVENOUS at 22:43

## 2024-04-24 RX ADMIN — ATORVASTATIN CALCIUM 20 MG: 20 TABLET, FILM COATED ORAL at 16:24

## 2024-04-24 RX ADMIN — ACYCLOVIR SODIUM 650 MG: 50 INJECTION, SOLUTION INTRAVENOUS at 15:37

## 2024-04-24 RX ADMIN — SENNOSIDES AND DOCUSATE SODIUM 2 TABLET: 50; 8.6 TABLET ORAL at 16:24

## 2024-04-24 RX ADMIN — ASPIRIN 81 MG: 81 TABLET, COATED ORAL at 18:57

## 2024-04-24 RX ADMIN — CARBOXYMETHYLCELLULOSE SODIUM 1 DROP: 5 SOLUTION/ DROPS OPHTHALMIC at 16:40

## 2024-04-24 RX ADMIN — CARBOXYMETHYLCELLULOSE SODIUM 1 DROP: 5 SOLUTION/ DROPS OPHTHALMIC at 13:30

## 2024-04-24 RX ADMIN — LACOSAMIDE 150 MG: 100 TABLET, FILM COATED ORAL at 05:56

## 2024-04-24 RX ADMIN — SODIUM CHLORIDE, POTASSIUM CHLORIDE, SODIUM LACTATE AND CALCIUM CHLORIDE: 600; 310; 30; 20 INJECTION, SOLUTION INTRAVENOUS at 11:34

## 2024-04-24 RX ADMIN — AMLODIPINE BESYLATE 10 MG: 10 TABLET ORAL at 05:56

## 2024-04-24 RX ADMIN — MICONAZOLE NITRATE: 20 CREAM TOPICAL at 07:58

## 2024-04-24 RX ADMIN — LANSOPRAZOLE 30 MG: 30 TABLET, ORALLY DISINTEGRATING ORAL at 05:55

## 2024-04-24 RX ADMIN — METOPROLOL TARTRATE 75 MG: 25 TABLET, FILM COATED ORAL at 05:55

## 2024-04-24 RX ADMIN — ACYCLOVIR SODIUM 650 MG: 50 INJECTION, SOLUTION INTRAVENOUS at 07:55

## 2024-04-24 RX ADMIN — DEXAMETHASONE 4 MG: 4 TABLET ORAL at 16:24

## 2024-04-24 RX ADMIN — METOPROLOL TARTRATE 75 MG: 25 TABLET, FILM COATED ORAL at 16:24

## 2024-04-24 RX ADMIN — ENOXAPARIN SODIUM 40 MG: 100 INJECTION SUBCUTANEOUS at 16:25

## 2024-04-24 RX ADMIN — MICONAZOLE NITRATE: 20 CREAM TOPICAL at 16:37

## 2024-04-24 RX ADMIN — DEXAMETHASONE 4 MG: 4 TABLET ORAL at 05:56

## 2024-04-24 RX ADMIN — LACOSAMIDE 150 MG: 100 TABLET, FILM COATED ORAL at 16:24

## 2024-04-24 RX ADMIN — LOSARTAN POTASSIUM 100 MG: 50 TABLET, FILM COATED ORAL at 06:25

## 2024-04-24 ASSESSMENT — ENCOUNTER SYMPTOMS
FEVER: 0
NAUSEA: 0
VOMITING: 0
ABDOMINAL PAIN: 0
SHORTNESS OF BREATH: 0

## 2024-04-24 ASSESSMENT — PAIN DESCRIPTION - PAIN TYPE
TYPE: ACUTE PAIN;CHRONIC PAIN
TYPE: ACUTE PAIN;CHRONIC PAIN

## 2024-04-24 ASSESSMENT — FIBROSIS 4 INDEX: FIB4 SCORE: 4.16

## 2024-04-24 NOTE — PROGRESS NOTES
"Cardiac rhythm: normal sinus  and sinus tachycardia    Ectopy: premature ventricular contractions (unifocal)  Frequency: rare    VT: 0.20  QRS: 0.10  QT: 0.34    Rate range (shift):     Summary of \"[facility] Telemetry Monitor 2 Hour flow Sheet\" report sent to unit from [facility].        "

## 2024-04-24 NOTE — PROGRESS NOTES
Infectious Disease Progress Note    Author: Keisha Castle M.D. Date & Time of service: 2024  11:29 AM    Chief Complaint:  disseminated zoster  Meningoencephalitis    Interval History:  73 y.o. 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 Nonverbal  Labs Reviewed, Medications Reviewed, and Wound Reviewed.    Review of Systems:  Review of Systems   Constitutional:  Negative for fever.   Respiratory:  Negative for shortness of breath.    Gastrointestinal:  Negative for abdominal pain, nausea and vomiting.   Skin:  Positive for rash.   All other systems reviewed and are negative.      Hemodynamics:  Temp (24hrs), Av.4 °C (97.6 °F), Min:36.2 °C (97.2 °F), Max:36.8 °C (98.2 °F)  Temperature: 36.4 °C (97.5 °F)  Pulse  Av  Min: 63  Max: 128   Blood Pressure : 134/81       Physical Exam:  Physical Exam  Vitals and nursing note reviewed.   Constitutional:       General: She is not in acute distress.     Appearance: She is not ill-appearing, toxic-appearing or diaphoretic.   Eyes:      Comments: Scleral edema   Cardiovascular:      Rate and Rhythm: Tachycardia present.   Pulmonary:      Effort: Pulmonary effort is normal. No respiratory distress.   Abdominal:      General: There is no distension.   Skin:     Findings: Bruising, lesion and rash present.   Neurological:      Comments: Obtunded  Right hemiparesis         Meds:    Current Facility-Administered Medications:     acyclovir (Zovirax) IV    carboxymethylcellulose    [Held by provider] insulin regular **AND** POC blood glucose manual result **AND** NOTIFY MD and PharmD **AND** Administer 20 grams of glucose (approximately 8 ounces of fruit juice) every 15 minutes PRN FSBG less than 70 mg/dL **AND** dextrose bolus    labetalol    LR    lacosamide    amLODIPine    metoprolol tartrate    hydrALAZINE    Pharmacy     dexamethasone    atorvastatin    losartan    senna-docusate **AND** polyethylene glycol/lytes    miconazole    lansoprazole    HYDROcodone-acetaminophen    enoxaparin (LOVENOX) injection    bisacodyl    Pharmacy Consult Request    Labs:  Recent Labs     04/22/24  0032 04/23/24  0443 04/24/24  0920   WBC 10.5 11.8* 11.4*   RBC 4.27 3.80* 3.48*   HEMOGLOBIN 13.5 12.1 11.2*   HEMATOCRIT 40.8 35.9* 32.3*   MCV 95.6 94.5 92.8   MCH 31.6 31.8 32.2   RDW 50.4* 50.1* 48.0   PLATELETCT 126* 107* 123*   MPV 10.9 12.0 12.1   NEUTSPOLYS 90.40* 94.70* 92.10*   LYMPHOCYTES 4.10* 2.30* 2.80*   MONOCYTES 1.80 1.20 2.20   EOSINOPHILS 0.00 0.00 0.00   BASOPHILS 0.30 0.20 0.30     Recent Labs     04/22/24  0032 04/22/24  0623 04/22/24  1244 04/23/24  0443 04/24/24  0713   SODIUM 142   < > 139 139 141   POTASSIUM 4.2  --   --  4.3 4.3   CHLORIDE 106  --   --  105 107   CO2 23  --   --  21 22   GLUCOSE 145*  --   --  205* 157*   BUN 32*  --   --  33* 26*    < > = values in this interval not displayed.     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 TECHNIQUE/EXAM DESCRIPTION AND NUMBER OF VIEWS: Fluoroscopic-guided lumbar puncture. 1 fluoroscopic images obtained. Fluoroscopy time: 0.1 minutes Fluoroscopy dose(DAP): 0.618 Gy*cm^2 PROCEDURE:     Informed consent was obtained. A timeout was taken. With the patient in prone position, the lumbar region was prepped and draped in a sterile manner. Following local anesthesia with 1% lidocaine, a 22-gauge spinal needle was advanced into the subarachnoid space at the L3-4 level.  Approximately 11 cc of CSF was collected and the specimens sent to the lab for the studies  requested. The patient tolerated the procedure well with no evidence of complication. The procedure was performed by Brandy Colmean nurse practitioner under my direct supervision.     Fluoroscopic-guided lumbar puncture as described above.    IR-MIDLINE CATHETER INSERTION WO GUIDANCE > AGE 5    Result Date: 4/22/2024  HISTORY/REASON FOR EXAM:  Midline Placement   TECHNIQUE/EXAM DESCRIPTION AND NUMBER OF VIEWS: Midline insertion with ultrasound guidance.  FINDINGS: Midline insertion with Ultrasound Guidance was performed by qualified nursing staff without the assistance of a Radiologist. Midline positioning as measured by RN or as appropriate length of catheter selected.              Ultrasound-guided midline placement performed by qualified nursing staff as above.     DX-ABDOMEN FOR TUBE PLACEMENT    Result Date: 4/21/2024 4/21/2024 11:08 PM HISTORY/REASON FOR EXAM: Dobbhoff tube placement TECHNIQUE/EXAM DESCRIPTION: Single AP view of the abdomen COMPARISON:  April 18, 2024 FINDINGS: Hazy left lower lobe opacities are seen. Dobbhoff tube is seen, the tip overlies the left upper quadrant.  The bowel gas pattern in the upper abdomen appears nonspecific. The bony structures appear age-appropriate.     1.  Nonspecific bowel gas pattern in the upper abdomen. 2.  Dobbhoff tube is coiled within the stomach, the tip terminates overlying the expected location of the gastric cardia. 3.  Hazy left lower lobe infiltrate.    MR-BRAIN-WITH & W/O    Result Date: 4/21/2024 4/20/2024 6:11 PM HISTORY/REASON FOR EXAM:  Follow-up brain metastases. History of endometrial cancer. TECHNIQUE/EXAM DESCRIPTION:   MRI of the brain without and with contrast. T1 sagittal, T2 fast spin-echo axial, T1 coronal, FLAIR coronal, diffusion-weighted and apparent diffusion coefficient (ADC map) axial images were obtained of the whole brain. T1 postcontrast axial and T1 postcontrast coronal images were obtained. The study was performed on a Anand 1.5  [employer] MRI scanner. 17 mL ProHance contrast was administered intravenously. COMPARISON:  MRI scan of the brain 3/26/2024 FINDINGS:   There has been significant interval increase in the size of the previously identified right frontal brain metastasis which has decreased from 2.6 cm in greatest diameter to 1.4 cm in greatest diameter on the current exam. This lesion has peripheral enhancement and a central nonenhancing area. Additionally there is diffuse decreased T2 and gradient echo signal intensity throughout this lesion. There is also moderate amount of surrounding increased T2 signal intensity in the right frontal white matter. Additionally, there is a larger gyriform area of enhancement in the right lateral occipital lobe which measures 3.5 x 1.3 x 3.2 cm in diameter has a moderate amount of surrounding increased T2 signal intensity in the right occipital, posterior frontal, temporal, and parietal lobes. There is also evidence of extensive new increased diffusion-weighted signal intensity in this region. There is also irregular rim of increased diffusion-weighted signal intensity about the right frontal lesion. There is also 3 irregular ovoid area of enhancement in the right peritrigonal white matter measures 1.3 cm in diameter Again redemonstrated is a wedge-shaped area of decreased T1 and increased T2 signal intensity in rising in the left posterior basal ganglia extending into the left posterior frontal periventricular white matter. This is unchanged from previous exam. There are also small wedge-shaped area of decreased T1 and increased T2 signal intensity involving the left side of the elaine and an ovoid area of decreased T1 and increased T2 signal intensity involving the left brachium pontis. There is surrounding increased T2 signal intensity about these lesions. There is also a new patchy area of decreased T1 and increased T2 signal intensity involving the right parietal periventricular white matter which  was not present on previous exam. There is a small ovoid area of decreased gradient echo signal intensity in the left parietal-occipital white matter and also in the left posterior temporal white matter. The calvariae are unremarkable. There are no extra-axial fluid collections. The ventricular system and sulci are mildly prominent. There is mild effacement of the right atrium and occipital horn..  There are no shift of midline structures. The brainstem and posterior fossa structures are unremarkable. Vascular flow voids in the vertebrobasilar and carotid arteries, [place], and dural venous sinuses are intact. The paranasal sinuses and mastoids in the field of view are unremarkable.     1.  Significant interval decrease in size of right frontal brain metastasis since previous exam slight interval decrease in surrounding vasogenic edema. Smaller irregular rim of increased diffusion weighted signal intensity about this treated metastasis consistent with cytotoxic edema. 2.  New large area of acute infarction in the right posterior frontal, parietal, posterior temporal, and occipital lobes associated gyriform enhancement in the right lateral occipital lobe and right peritrigonal white matter. 3.  Smaller chronic wedge-shaped area of infarction involving the left posterior basal ganglia extending into the left posterior frontal periventricular white matter 4.  Small areas of chronic lacunar type infarction involving the left side of the elaine and left brachium pontis with surrounding gliosis. 5.  Small foci of chronic hemosiderin deposition noted in the left posterior temporal and occipital white matter, as well as in the treated right frontal brain metastasis 6.  Age-related cerebral atrophy.    DX-ABDOMEN FOR TUBE PLACEMENT    Result Date: 4/18/2024 4/18/2024 4:39 AM HISTORY/REASON FOR EXAM: Dobbhoff tube placement TECHNIQUE/EXAM DESCRIPTION: Single AP view of the abdomen COMPARISON:  Today at 0147 FINDINGS:  Hazy left lower lobe opacities are seen. Dobbhoff tube is seen, the tip overlies the left upper quadrant.  The bowel gas pattern in the upper abdomen appears nonspecific. The bony structures appear age-appropriate.     1.  Nonspecific bowel gas pattern in the upper abdomen. 2.  Dobbhoff tube is coiled within the stomach, the tip terminates overlying the expected location of the gastric fundus, physician similar to prior study. 3.  Left lower lobe infiltrate    DX-ABDOMEN FOR TUBE PLACEMENT    Result Date: 4/18/2024 4/18/2024 1:39 AM HISTORY/REASON FOR EXAM: Dobbhoff tube placement TECHNIQUE/EXAM DESCRIPTION: Single AP view of the abdomen COMPARISON:  Yesterday FINDINGS: Linear densities in the left lung base are noted. Dobbhoff tube is seen, the tip overlies the left upper quadrant.  The bowel gas pattern in the upper abdomen appears nonspecific. The bony structures appear age-appropriate.     1.  Nonspecific bowel gas pattern in the upper abdomen. 2.  Dobbhoff tube is coiled within the stomach, the tip terminates overlying the expected location of the gastric fundus. 3.  Left basilar atelectasis    DX-ABDOMEN FOR TUBE PLACEMENT    Result Date: 4/17/2024 4/17/2024 6:34 PM HISTORY/REASON FOR EXAM:  Line evaluation. TECHNIQUE/EXAM DESCRIPTION AND NUMBER OF VIEWS:  1 view(s) of the abdomen. COMPARISON:  None. FINDINGS: Enteric tube has been placed. The tip projects over the stomach. The bowel gas pattern is within normal limits.     Feeding tube looped in the stomach the distal tip in the gastric fundus.    CT-HEAD WITH & W/O    Result Date: 4/14/2024 4/14/2024 11:41 AM HISTORY/REASON FOR EXAM:  Altered mental status, history of brain mass. TECHNIQUE/EXAM DESCRIPTION AND NUMBER OF VIEWS: CT scan of the head without and with contrast. The study was performed on a helical multidetector CT scanner. Contiguous axial sections were obtained from the skull base through the vertex both prior to and after the administration  of IV contrast. 50 mL of Omnipaque 350 nonionic contrast was injected intravenously. Up to date radiation dose reduction adjustments have been utilized to meet ALARA standards for radiation dose reduction. COMPARISON:  CT dated 4/10/2024, MRI dated 3/26/2024 FINDINGS: Lateral ventricles are normal in size and symmetric. No significant atrophy. Enhancing mass in the right frontal lobe measures 1.6 x 1.1 cm, with surrounding vasogenic edema. Right to left midline shift on today's study measures 2.5 mm. Ill-defined enhancement in the right parietal lobe, nonspecific. No parietal masses were seen on the prior MRI scan. No new large vascular territory infarct. No new loss of gray-white matter differentiation. Basal cisterns are patent. No evidence for intracranial hemorrhage. Calvaria are intact. Visualized orbits are unremarkable. Visualized mastoid air cells are clear. No significant sinus disease in the visualized paranasal sinuses.     1.  Redemonstration of right frontal lobe mass, highly concerning for metastasis. Associated vasogenic edema and a 2.5 mm right to left midline shift. No progressive mass effect. No herniation. 2.  Nonspecific ill-defined enhancement in the right parietal lobe. 3.  No new large vascular structure infarct. No new intracranial hemorrhage.    DX-CHEST-LIMITED (1 VIEW)    Result Date: 4/14/2024 4/14/2024 11:10 AM HISTORY/REASON FOR EXAM:  Shortness of Breath TECHNIQUE/EXAM DESCRIPTION AND NUMBER OF VIEWS: Single portable view of the chest. COMPARISON: One day prior FINDINGS: Cardiomediastinal silhouette is normal. No focal consolidation, pleural effusion, pulmonary edema or pneumothorax. Mild left basilar atelectasis. No acute osseous abnormality.     No significant interval change.    DX-ABDOMEN COMPLETE WITH AP OR PA CXR    Result Date: 4/13/2024 4/13/2024 6:22 PM HISTORY/REASON FOR EXAM:  Abdominal pain. TECHNIQUE/EXAM DESCRIPTION AND NUMBER OF VIEWS:  Two views of the abdomen and  single view of the chest. COMPARISON: 3/27/2014. FINDINGS: LUNGS: Chronic left lower lobe scarring, similar to prior study. No new consolidation. No effusions. PNEUMOTHORAX: None. LINES AND TUBES: None. MEDIASTINUM: No cardiomegaly. ABDOMEN: Nonobstructive bowel gas pattern. Small amount of stool throughout the colon. MUSCULOSKELETAL STRUCTURES: No acute displaced fracture.     1. No acute cardiopulmonary abnormality. 2. Chronic left lower lobe scarring. 3. Nonobstructive bowel gas pattern. Small amount of stool throughout the colon.    CT-HEAD WITH & W/O    Result Date: 4/10/2024  4/10/2024 6:39 PM HISTORY/REASON FOR EXAM:  Weakness, Confusion, Brain Mass. TECHNIQUE/EXAM DESCRIPTION AND NUMBER OF VIEWS: CT scan of the head without and with contrast. The study was performed on a helical multidetector CT scanner. Contiguous axial sections were obtained from the skull base through the vertex both prior to and after the administration of IV contrast. 80 mL of Omnipaque 350 nonionic contrast was injected intravenously. Up to date radiation dose reduction adjustments have been utilized to meet ALARA standards for radiation dose reduction. COMPARISON:  I scan of brain 3/26/2024 FINDINGS: There is a 2 cm peripherally enhancing ovoid mass near the gray-white junction in the right frontal region. There is surrounding decreased attenuation in the adjacent white matter. There are multifocal areas of marked decreased attenuation in the right posterior frontal and parietal periventricular white matter. There is slight effacement of the right lateral ventricle with minimal right to left midline shift the ventricular system. Small ovoid area of decreased attenuation in the left side of the elaine  and a shaped area of decreased attenuation in the left frontal periventricular white matter extending into the left posterior basal ganglia.. The calvariae and skull base are unremarkable. There are no extraaxial fluid collections. The  ventricular system and basal cisterns are within normal limits. There are no hemorrhagic lesions The paranasal sinuses and mastoids in the field of view are unremarkable.     1.  2 cm right frontal brain metastasis with surrounding vasogenic edema. 2.  Multifocal areas of deep white matter infarction in the right posterior frontal parietal white matter. 3.  Mild effacement of the right lateral ventricle with minimal right to left midline shift and ventricular system. 4.  Chronic area of lacunar type infarction in the left side of the elaine and left frontal periventricular white matter extending into the left posterior basal ganglia.    CT-CHEST,ABDOMEN,PELVIS WITH    Result Date: 3/27/2024  3/27/2024 2:39 PM HISTORY/REASON FOR EXAM:  Metastatic cancer. TECHNIQUE/EXAM DESCRIPTION: CT scan of the chest, abdomen and pelvis with contrast. Thin-section helical scanning was obtained with intravenous contrast from the lung apices through the pubic symphysis to include the chest, abdomen and pelvis. 100 mL of Omnipaque 350 nonionic contrast was administered intravenously without complication. Low dose optimization technique was utilized for this CT exam including automated exposure control and adjustment of the mA and/or kV according to patient size. COMPARISON: CT of the chest, 8/3/2021. FINDINGS: CT Chest: Lungs: Asymmetry of the hemithoraces, smaller on the left the right, secondary to chronic postsurgical changes. Enlarging pulmonary nodule in the right lower lobe measuring 10 mm in diameter, series 301 image 59. The right upper lobe nodule is no longer visualized. No pleural effusion. Left pleural reaction. Mediastinum/[place]: No significant adenopathy. The thyroid gland is enlarged with heterogeneous enhancement with multiple nodules. Cardiac: Heart normal in size without pericardial effusion. Calcified atherosclerotic plaques in the coronary arteries. Vascular: Unremarkable. No filling defects in the main pulmonary  arteries. Soft tissues: Unremarkable. Bones: No acute or destructive process. Decreased bone mineralization. CT Abdomen and Pelvis: Liver: Normal. Spleen: Unremarkable. Pancreas: Unremarkable. Gallbladder: No calcified stones. Biliary: Nondilated. Adrenal glands: Normal. Kidneys: Unremarkable without hydronephrosis. Bowel: No obstruction or acute inflammation. Normal appendix. Lymph nodes: No adenopathy. Vasculature: Unremarkable. Ascites: None. Pelvis: No adenopathy or free fluid. Uterus and ovaries are absent. Musculoskeletal: No acute or destructive process. Decreased bone mineralization. Bilateral facet arthrosis at L4-5 and L5-S1.     1. Chronic postoperative changes in the left lung with no residual pleural effusion. 2. Nodule in the right lower lobe measures 10 mm in diameter, larger than on the prior examination, most compatible with metastasis. 3. Calcific atherosclerotic plaques in the coronary arteries. 4. Enlarged heterogeneously enhancing thyroid gland. 5. Absent uterus and ovaries. 6. Decreased bone mineralization.    DX-CHEST-PORTABLE (1 VIEW)    Result Date: 3/27/2024  3/27/2024 12:36 PM HISTORY/REASON FOR EXAM:  Cough. TECHNIQUE/EXAM DESCRIPTION AND NUMBER OF VIEWS: Single portable view of the chest. COMPARISON: Chest radiography, 8/14/2021. FINDINGS: Lungs: Pulmonary infiltrates in the left lung have significantly improved in the interval. Residual regional parenchymal scarring. No new focal opacities are evident. No pleural effusion or visible pneumothorax. Mediastinal: Normal cardiomediastinal silhouette. Other: Interval removal of the left internal jugular central venous access catheter. The remaining visualized bones and soft tissues are stable radiographically.     1. Peripheral scarring from prior infiltrate in the left lung. 2. No acute findings.    MR-BRAIN-WITH & W/O    Result Date: 3/26/2024  3/26/2024 10:03 AM HISTORY/REASON FOR EXAM: Follow-up for brain metastatic disease from  endometrial carcinoma TECHNIQUE/EXAM DESCRIPTION: T1 sagittal, T2 axial, flair coronal, T1 coronal, and diffusion-weighted axial images were obtained of the brain pre-contrast followed by T1 coronal and axial images post intravenous administration of 5 mL ProHance. COMPARISON: 7/1/2020, MR brain PET CT 10/26/2020 FINDINGS: Midline shift to the left noted measuring approximately 4 mm. Remote infarct noted in the left posterior internal capsule and adjacent corona radiata in the posterior frontoparietal region. Abnormal signal is noted in the left middle cerebellar peduncle on the T2-weighted images, new from the previous study from 2020 but without abnormal enhancement in this region. Remote left pontine lacunar infarct noted. There is a 28 x 30 x 26 mm enhancing mass in the right frontal opercular region with surrounding vasogenic edema that is new from the prior study. Gradient-echo images demonstrates minimal curvilinear hypointensity within this lesion which may represent either areas of microhemorrhage or microcalcification. Gradient echo images demonstrate a few small foci of hemosiderin staining in the left posterior temporal region, the left parietal corona radiata, the left superior parietal subcortical region, left posterior frontal subcortical region. Right parietal subcortical region. Postcontrast images do not demonstrate any other area of abnormal intracranial enhancement.There are no extra axial collections. Visualized intracranial arterial flow voids are within normal limits. Bone marrow signal in the calvarium is within normal limits. Included portions of the paranasal sinuses are within normal limits. Included portions of the mastoid air cells are within normal limits. Included portions of the orbits are within normal limits     20 x 30 x 26 but larger enhancing mass in the right frontal opercular region with surrounding vasogenic edema with mass effect and midline shift to the left measuring 4 mm  concerning for metastatic lesion. Multiple remote infarcts noted as described above. Foci of hemosiderin staining in the bilateral cerebral hemispheres as described above without enhancement which may represent microhemorrhages or small cavernomas.      Micro:  Results       Procedure Component Value Units Date/Time    CSF CULTURE [[id number]] Collected: 04/22/24 1700    Order Status: Completed Specimen: CSF Updated: 04/24/24 0744     Significant Indicator NEG     Source CSF     Site Tap     Culture Result No growth at 48 hours.     Gram Stain Result Many WBCs.  No organisms seen.      Narrative:      CALL  [facility] tel. [phone],  CALLED  [facility] tel. [phone] 04/22/2024, 20:20, RB PERF. RESULTS CALLED TO: RN  [id number]  Bloody specimen    GRAM STAIN [[id number]] Collected: 04/22/24 1700    Order Status: Completed Specimen: CSF Updated: 04/22/24 1942     Significant Indicator .     Source CSF     Site Tap     Gram Stain Result Many WBCs.  No organisms seen.      Narrative:      Bloody specimen    CSF Culture [[id number]]     Order Status: No result Specimen: CSF from Tap     CULTURE WOUND W/ GRAM STAIN [[id number]]  (Abnormal)  (Susceptibility) Collected: 04/16/24 1853    Order Status: Completed Specimen: Wound from Chest Updated: 04/19/24 1132     Significant Indicator POS     Source WND     Site CHEST     Culture Result Heavy growth usual skin guilherme.     Gram Stain Result Moderate WBCs.  Moderate Gram positive cocci.  Few Gram negative rods.  Few Gram positive rods.  Rare Yeast.       Culture Result Enterococcus faecalis  Heavy growth        Candida albicans  Heavy growth      Narrative:      Airborne,Contact    Susceptibility       Enterococcus faecalis (1)       Antibiotic Interpretation Microscan   Method Status    Daptomycin Sensitive 2 mcg/mL MARIOLA Final    Gent Synergy Sensitive <=500 mcg/mL MARIOLA Final    Ampicillin Sensitive <=2 mcg/mL MARIOLA Final    Linezolid Sensitive 2 mcg/mL MARIOLA Final    Vancomycin Sensitive 1  mcg/mL MARIOLA Final    Penicillin Sensitive 1 mcg/mL MARIOLA Final    Tetracycline Resistant >8 mcg/mL MARIOLA Final                       Blood Culture - Draw one from central line and one from peripheral site [[id number]] Collected: 04/13/24 2008    Order Status: Completed Specimen: Blood from Peripheral Updated: 04/18/24 2100     Significant Indicator NEG     Source BLD     Site PERIPHERAL     Culture Result No growth after 5 days of incubation.    Narrative:      Left Forearm/Arm    Blood Culture - Draw one from central line and one from peripheral site [[id number]] Collected: 04/13/24 1922    Order Status: Completed Specimen: Blood from Line Updated: 04/18/24 2100     Significant Indicator NEG     Source BLD     Site Peripheral     Culture Result No growth after 5 days of incubation.    Narrative:      Left Forearm/Arm            Assessment:  Active Hospital Problems    Diagnosis     *Acute encephalopathy [G93.40]     Problem with vascular access [Z78.9]     Prediabetes [R73.03]     Hypermagnesemia [E83.41]     Hypernatremia [E87.0]     Vasogenic edema (HCC) [G93.6]     Constipation [K59.00]     Shingles [B02.9]     Dysphagia [R13.10]     Seizure (HCC) [R56.9]     Sepsis secondary to UTI (HCC) [A41.9, N39.0]     Malignant neoplasm metastatic to brain (HCC) [C79.31]     Acute kidney injury (HCC) [N17.9]     Advance care planning [Z71.89]     Endometrial cancer (HCC) [C54.1]     History of CVA (cerebrovascular accident) [Z86.73]     Essential hypertension [I10]    Metastatic endometrial cancer  Brain mets  Disseminated zoster  Mucosal candidiasis  CVA, mult acute and chronic  -possibly related to zoster     PLAN:   Continue IV acyclovir  Per pharmacy there is a shortage-can use IV gancyclovir as alternative  Monitor renal function-creat stable thus far  Recommend 10-14 day course-completes 10 days on 4/26  Midline OK to place if needed for IV access     Topical antifungal under breasts per wound care    Prognosis  guarded  Continue to evaluate goals of care  Will sign off  Please reconsult if needed

## 2024-04-24 NOTE — CONSULTS
Neurology Initial Consult H&P  Neurohospitalist Service, [facility]    Referring Physician: KATHLEEN Greer M.D.    Chief Complaint   Patient presents with    Painful Urination    Constipation       HPI: Jairo Rivas is a 73 y.o. female for whom neurology was consulted for stupor. 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 She has been significantly obtunded without improvement since her admission 4/13/24. She has had 2 routine EEGs revealing slowing but nothing epileptiform. cEEG ongoing currently. Her daughter notes some movements of the face, but has not seen them today. She  takes vimpat 150 mg bid.    She had a repeat MRI on 4/21/24, which revealed DWI restriction in the known distribution as seen on CT on 4/14/24. It did show a decrease in the size of the brain metastasis in frontotemporal region and surrounding edema.      Review of systems: In addition to what is detailed in the HPI above, (and scanned into the chart if and when applicable), all other systems reviewed and are negative.    Past Medical History:    has a past medical history of Anemia (2/6/2018), Back pain, Back pain, Cancer (HCC) (2019), Cough, CVA, old, hemiparesis (HCC) (2/6/2018), Essential hypertension (2/6/2018), Eye drainage, Fatigue, Frequent headaches, Frequent urination, Hyperlipidemia, Irregular periods, Painful joint, Palpitations, Post-menopause bleeding (2/6/2018), Pulmonary nodule, Sore muscles, Stroke (HCC), Swelling of lower extremity, Vision abnormalities (2/6/2018), Vitamin D deficiency (2/6/2018), Weakness, and Wears glasses.    FHx:  family history includes  Hypertension in her mother; No Known Problems in her brother and father.    SHx:   reports that she has never smoked. She has never used smokeless tobacco. She reports that she does not drink alcohol and does not use drugs.    Allergies:  Allergies   Allergen Reactions    Levaquin Itching and Swelling     Swelling of the tongue and face    Penicillins Itching and Swelling    Tramadol Itching and Swelling       Medications:    Current Facility-Administered Medications:     acyclovir (Zovirax) 650 mg in  mL IVPB, 650 mg, Intravenous, Q8HRS, Sudheer Ruiz M.D.    carboxymethylcellulose (Refresh Tears) 0.5 % ophthalmic drops 1 Drop, 1 Drop, Both Eyes, PRN, Sudheer Ruiz M.D., 1 Drop at 04/24/24 1330    [Held by provider] insulin regular (HumuLIN R,NovoLIN R) injection, 1-6 Units, Subcutaneous, Q6HRS **AND** POC blood glucose manual result, , , 2X DAILY **AND** NOTIFY MD and PharmD, , , Once **AND** Administer 20 grams of glucose (approximately 8 ounces of fruit juice) every 15 minutes PRN FSBG less than 70 mg/dL, , , PRN **AND** dextrose 50% (D50W) injection 25 g, 25 g, Intravenous, Q15 MIN PRN, Sudheer Ruiz M.D.    labetalol (Normodyne/Trandate) injection 10 mg, 10 mg, Intravenous, Q4HRS PRN, Sima Bernal M.D.    lactated ringers infusion, , Intravenous, Continuous, KATHLEEN Greer M.D., Last Rate: 100 mL/hr at 04/24/24 1134, New Bag at 04/24/24 1134    lacosamide (Vimpat) tablet 150 mg, 150 mg, Enteral Tube, BID, Sima Bernal M.D., 150 mg at 04/24/24 0556    amLODIPine (Norvasc) tablet 10 mg, 10 mg, Enteral Tube, DAILY, Julio C Rojas M.D., 10 mg at 04/24/24 0556    metoprolol tartrate (Lopressor) tablet 75 mg, 75 mg, Enteral Tube, BID, Julio C Rojas M.D., 75 mg at 04/24/24 0555    hydrALAZINE (Apresoline) injection 10 mg, 10 mg, Intravenous, Q4HRS PRN, Sima Bernal M.D., 10 mg at 04/21/24 0519    Pharmacy Consult: Enteral tube insertion - review meds/change  route/product selection, 1 Each, Other, PHARMACY TO DOSE, Anand Grover M.D.    dexamethasone (Decadron) tablet 4 mg, 4 mg, Enteral Tube, Q12HRS, Loreto Sarmiento M.D., 4 mg at 04/24/24 0556    atorvastatin (Lipitor) tablet 20 mg, 20 mg, Enteral Tube, Q EVENING, Loreto Sarmiento M.D., 20 mg at 04/23/24 1811    losartan (Cozaar) tablet 100 mg, 100 mg, Enteral Tube, DAILY, Loreto Sarmiento M.D., 100 mg at 04/24/24 0625    senna-docusate (Pericolace Or Senokot S) 8.6-50 MG per tablet 2 Tablet, 2 Tablet, Enteral Tube, Q EVENING, 2 Tablet at 04/23/24 1811 **AND** polyethylene glycol/lytes (Miralax) Packet 1 Packet, 1 Packet, Enteral Tube, DAILY, Loreto Sarmiento M.D., 1 Packet at 04/22/24 0625    miconazole (Micotin) 2 % cream, , Topical, BID, Loreto Sarmiento M.D., Given at 04/24/24 0758    lansoprazole (Prevacid) solutab 30 mg, 30 mg, Enteral Tube, DAILY, Loreto Sarmiento M.D., 30 mg at 04/24/24 0555    HYDROcodone-acetaminophen (Norco) 5-325 MG per tablet 1 Tablet, 1 Tablet, Enteral Tube, Q4HRS PRN, Loreto Sarmiento M.D.    enoxaparin (Lovenox) inj 40 mg, 40 mg, Subcutaneous, DAILY AT 1800, Sudheer Ruiz M.D., 40 mg at 04/23/24 1812    bisacodyl (Dulcolax) suppository 10 mg, 10 mg, Rectal, DAILY, Julio C Rojas M.D., 10 mg at 04/18/24 0745    Pharmacy Consult Request ...Pain Management Review 1 Each, 1 Each, Other, PHARMACY TO DOSE, Julio Clements D.O.    Physical Examination:     Vitals:    04/24/24 0005 04/24/24 0538 04/24/24 0848 04/24/24 1134   BP: 136/81 (!) 163/104 134/81 139/80   Pulse: 93 (!) 122 (!) 110 (!) 107   Resp: 16 19 20 20   Temp: 36.5 °C (97.7 °F) 36.8 °C (98.2 °F) 36.4 °C (97.5 °F) 36 °C (96.8 °F)   TempSrc: Temporal Temporal Temporal Temporal   SpO2: 96% 94% 94% 96%   Weight:  99.1 kg (218 lb 7.6 oz)     Height:           General: Patient is lying in NAD    NEUROLOGICAL EXAM:     MS: eyes closed; opens eyes to voice; no response to voice/pain  CN: PEERL, slight side to side movements - no gaze  preference, does not blink to threat, no obvious facial asymmetry; unable to test uvular lift and tongue protrusion, SCM and trap; Hearing intact grossly.  Fundus not visualized  MOT: Nl bulk and tone.  Minimal movement of the B UE to noxious stim; no movement of B LE  SENS: responds to noxious stimulation as above  DTR: 2+ and symmetric; toes down  COOR: unable to test  Gait: unable to test      Objective Data:    Labs:  Lab Results   Component Value Date/Time    PROTHROMBTM 14.7 (H) 04/22/2024 12:44 PM    INR 1.13 04/22/2024 12:44 PM      Lab Results   Component Value Date/Time    WBC 11.4 (H) 04/24/2024 09:20 AM    RBC 3.48 (L) 04/24/2024 09:20 AM    HEMOGLOBIN 11.2 (L) 04/24/2024 09:20 AM    HEMATOCRIT 32.3 (L) 04/24/2024 09:20 AM    MCV 92.8 04/24/2024 09:20 AM    MCH 32.2 04/24/2024 09:20 AM    MCHC 34.7 04/24/2024 09:20 AM    MPV 12.1 04/24/2024 09:20 AM    NEUTSPOLYS 92.10 (H) 04/24/2024 09:20 AM    LYMPHOCYTES 2.80 (L) 04/24/2024 09:20 AM    MONOCYTES 2.20 04/24/2024 09:20 AM    EOSINOPHILS 0.00 04/24/2024 09:20 AM    EOSINOPHILS 1 07/29/2021 02:30 PM    BASOPHILS 0.30 04/24/2024 09:20 AM    HYPOCHROMIA 1+ 02/26/2021 03:14 PM    ANISOCYTOSIS 1+ 03/12/2021 02:28 PM      Lab Results   Component Value Date/Time    SODIUM 141 04/24/2024 07:13 AM    POTASSIUM 4.3 04/24/2024 07:13 AM    CHLORIDE 107 04/24/2024 07:13 AM    CO2 22 04/24/2024 07:13 AM    GLUCOSE 157 (H) 04/24/2024 07:13 AM    BUN 26 (H) 04/24/2024 07:13 AM    CREATININE 0.48 (L) 04/24/2024 07:13 AM      Lab Results   Component Value Date/Time    CHOLSTRLTOT 154 05/15/2019 06:17 AM    LDL 86 05/15/2019 06:17 AM    HDL 55 05/15/2019 06:17 AM    TRIGLYCERIDE 66 05/15/2019 06:17 AM       Lab Results   Component Value Date/Time    ALKPHOSPHAT 89 04/24/2024 07:13 AM    ASTSGOT 35 04/24/2024 07:13 AM    ALTSGPT 34 04/24/2024 07:13 AM    TBILIRUBIN 0.4 04/24/2024 07:13 AM        Imaging/Testing:  MRI brain  1.  Significant interval decrease in size of right  frontal brain metastasis since previous exam slight interval decrease in surrounding vasogenic edema. Smaller irregular rim of increased diffusion weighted signal intensity about this treated metastasis   consistent with cytotoxic edema.     2.  New large area of acute infarction in the right posterior frontal, parietal, posterior temporal, and occipital lobes associated gyriform enhancement in the right lateral occipital lobe and right peritrigonal white matter.     3.  Smaller chronic wedge-shaped area of infarction involving the left posterior basal ganglia extending into the left posterior frontal periventricular white matter     4.  Small areas of chronic lacunar type infarction involving the left side of the elaine and left brachium pontis with surrounding gliosis.     5.  Small foci of chronic hemosiderin deposition noted in the left posterior temporal and occipital white matter, as well as in the treated right frontal brain metastasis     6.  Age-related cerebral atrophy.      Assessment and Plan:    Persistent encephalopathy/stupor in the setting of VZV meningitis, UTI/sepsis, recent stroke, and endometrial cancer with met to brain - suspect multifactorial encephalopathy superimposed over stroke and brain met with vasogenic edema. No clinical evidence of seizure. It is reasonable to eval for intermittent subclinical seizures with cEEG, which is unremarkable so far.    Etiology the stroke unclear, though hypercoagulable state from cancer is possible. Other potential etiologies include large vessel atherosclerotic disease, cardioembolic (from afib vs infectious endocarditis), or less likely, from post infectious vasculitis from VZV meningitis.     Depending upon how aggressive family wants to be, consider CTA head/neck, TTE and possibly ZORAIDA, the addition of ASA, and lipid panel. Agree with treating with acyclovir.     Plan:  Continue cEEG  Continue vimpat 150 mg bid    Consider stroke workup as above    Will  follow EEG    Jb Banda MD  Board Certified Neurology, ABPN

## 2024-04-24 NOTE — PROGRESS NOTES
4 Eyes Skin Assessment Completed by JOB Osborn and JOB Lacey.    Head Covered with EEG dressing  Ears WDL  Nose WDL  Mouth WDL  Neck WDL  Breast/Chest Redness under right breast, Shingles sites under left breast  Shoulder Blades WDL  Spine Redness, Scabs, Abrasions left side  (R) Arm/Elbow/Hand WDL  (L) Arm/Elbow/Hand WDL  Abdomen Scars  Groin WDL  Scrotum/Coccyx/Buttocks Open Pressure Injury on Coccyx  (R) Leg WDL  (L) Leg WDL  (R) Heel/Foot/Toe Boggy  (L) Heel/Foot/Toe Boggy          Devices In Places Tele Box, Pulse Ox, SCD's, and EEG      Interventions In Place Heel Float Boots, TAP System, Pillows, Q2 Turns, and Low Air Loss Mattress    Possible Skin Injury Yes    Pictures Uploaded Into Epic No, needs to be completed  Wound Consult Placed Yes  RN Wound Prevention Protocol Ordered Yes

## 2024-04-24 NOTE — PROGRESS NOTES
Received bedside report from Anurag night shift RN. Assumed care at 0700  Patient a & o x 0; pt responds to pain. 1/5 BUE 0/0 BLE. Q4hr neuro checks. Pt nonverbal  NPO with NGT at 65cm. Promot Fiber 60ml/hr at goal with 200ml sterile water flush q4hr. TF equipment changed.   SpO2 95% on RA  Voiding via purewick.   BM on 4/23. Abd soft, Bowel sounds active  Q2h turns. Oral care 4x/day.   Patient unable to ambulate.   Stage 2 to sacrum. Excoriation to groin. Generalized swelling. B/L heels red and boggy. Shingles sites under left breast and left flank. Dry and scabbed.   Airborne precautions  Patient and family oriented to room, surroundings and call bell. . Bed in lowest position, locked and upper side rails up. Call light within reach. Patient and family educated on hourly rounding.   Plan for today:  Skin breakdown prevention  TF  Monitor vitals/telemetry  Q4hr neuro checks.  Oral care.

## 2024-04-24 NOTE — DISCHARGE PLANNING
TCN following. HTH/SCP chart reviewed. No new TCN needs identified. Please see prior TCN note from 4/21/24 for most recent discharge planning considerations if indicated.  Please see Palliative note on 4/22/24 at 3:38 PM.  Please see ID note by Dr. Castle on 4/23/24 at 11:35 AM.      Completed:  ST recommends post-acute placement on 4/17/24.    Choice obtained:    SCP with [facility]

## 2024-04-24 NOTE — PROGRESS NOTES
STAT cEEG was unable to proceed due to lack of networking connection.   First 12 min recording diffuse background slowing. No seizure.   Discussed with neurology consult, will reschedule in the morning.     Abhishek Millan MD

## 2024-04-25 ENCOUNTER — APPOINTMENT (OUTPATIENT)
Dept: RADIOLOGY | Facility: MEDICAL CENTER | Age: 74
DRG: 871 | End: 2024-04-25
Payer: MEDICARE

## 2024-04-25 ENCOUNTER — APPOINTMENT (OUTPATIENT)
Dept: CARDIOLOGY | Facility: MEDICAL CENTER | Age: 74
DRG: 871 | End: 2024-04-25
Payer: MEDICARE

## 2024-04-25 DIAGNOSIS — R91.8 LUNG MASS: ICD-10-CM

## 2024-04-25 DIAGNOSIS — Z51.12 ENCOUNTER FOR ANTINEOPLASTIC IMMUNOTHERAPY: ICD-10-CM

## 2024-04-25 DIAGNOSIS — E87.6 HYPOPOTASSEMIA: ICD-10-CM

## 2024-04-25 DIAGNOSIS — R06.02 SHORTNESS OF BREATH: ICD-10-CM

## 2024-04-25 DIAGNOSIS — C78.00 MALIGNANT NEOPLASM METASTATIC TO LUNG, UNSPECIFIED LATERALITY (HCC): ICD-10-CM

## 2024-04-25 DIAGNOSIS — C79.31 SECONDARY MALIGNANT NEOPLASM OF BRAIN AND SPINAL CORD (HCC): ICD-10-CM

## 2024-04-25 DIAGNOSIS — R97.1 ELEVATED CANCER ANTIGEN 125 (CA 125): ICD-10-CM

## 2024-04-25 DIAGNOSIS — J90 EXUDATIVE PLEURISY: ICD-10-CM

## 2024-04-25 DIAGNOSIS — Z51.11 ENCOUNTER FOR ANTINEOPLASTIC CHEMOTHERAPY: ICD-10-CM

## 2024-04-25 DIAGNOSIS — G89.3 NEOPLASM RELATED PAIN: ICD-10-CM

## 2024-04-25 DIAGNOSIS — N85.00 ENDOMETRIAL HYPERPLASIA, UNSPECIFIED: ICD-10-CM

## 2024-04-25 DIAGNOSIS — Z12.31 ENCOUNTER FOR MAMMOGRAM TO ESTABLISH BASELINE MAMMOGRAM: ICD-10-CM

## 2024-04-25 DIAGNOSIS — C54.1 ENDOMETRIAL SARCOMA (HCC): ICD-10-CM

## 2024-04-25 DIAGNOSIS — I69.951 HEMIPLGA FOL UNSP CEREBVASC DISEASE AFF RIGHT DOMINANT SIDE (HCC): ICD-10-CM

## 2024-04-25 DIAGNOSIS — C79.49 SECONDARY MALIGNANT NEOPLASM OF BRAIN AND SPINAL CORD (HCC): ICD-10-CM

## 2024-04-25 PROBLEM — J69.0 ASPIRATION PNEUMONITIS (HCC): Status: ACTIVE | Noted: 2024-04-25

## 2024-04-25 LAB
ALBUMIN SERPL BCP-MCNC: 2.8 G/DL (ref 3.2–4.9)
ALBUMIN/GLOB SERPL: 1 G/DL
ALP SERPL-CCNC: 84 U/L (ref 30–99)
ALT SERPL-CCNC: 32 U/L (ref 2–50)
ANION GAP SERPL CALC-SCNC: 11 MMOL/L (ref 7–16)
ANION GAP SERPL CALC-SCNC: 11 MMOL/L (ref 7–16)
AST SERPL-CCNC: 30 U/L (ref 12–45)
BACTERIA CSF CULT: NORMAL
BILIRUB SERPL-MCNC: 0.4 MG/DL (ref 0.1–1.5)
BUN SERPL-MCNC: 24 MG/DL (ref 8–22)
BUN SERPL-MCNC: 27 MG/DL (ref 8–22)
CALCIUM ALBUM COR SERPL-MCNC: 9.9 MG/DL (ref 8.5–10.5)
CALCIUM SERPL-MCNC: 8.9 MG/DL (ref 8.5–10.5)
CALCIUM SERPL-MCNC: 9.2 MG/DL (ref 8.5–10.5)
CHLORIDE SERPL-SCNC: 107 MMOL/L (ref 96–112)
CHLORIDE SERPL-SCNC: 107 MMOL/L (ref 96–112)
CHOLEST SERPL-MCNC: 166 MG/DL (ref 100–199)
CO2 SERPL-SCNC: 22 MMOL/L (ref 20–33)
CO2 SERPL-SCNC: 24 MMOL/L (ref 20–33)
CREAT SERPL-MCNC: 0.41 MG/DL (ref 0.5–1.4)
CREAT SERPL-MCNC: 0.5 MG/DL (ref 0.5–1.4)
ERYTHROCYTE [DISTWIDTH] IN BLOOD BY AUTOMATED COUNT: 50.4 FL (ref 35.9–50)
GFR SERPLBLD CREATININE-BSD FMLA CKD-EPI: 100 ML/MIN/1.73 M 2
GFR SERPLBLD CREATININE-BSD FMLA CKD-EPI: 105 ML/MIN/1.73 M 2
GLOBULIN SER CALC-MCNC: 2.9 G/DL (ref 1.9–3.5)
GLUCOSE SERPL-MCNC: 161 MG/DL (ref 65–99)
GLUCOSE SERPL-MCNC: 176 MG/DL (ref 65–99)
GRAM STN SPEC: NORMAL
HCT VFR BLD AUTO: 34.3 % (ref 37–47)
HDLC SERPL-MCNC: 47 MG/DL
HGB BLD-MCNC: 11.5 G/DL (ref 12–16)
LDLC SERPL CALC-MCNC: 97 MG/DL
LV EJECT FRACT  99904: 65
LV EJECT FRACT MOD 2C 99903: 56.09
LV EJECT FRACT MOD 4C 99902: 63.18
LV EJECT FRACT MOD BP 99901: 59.96
MCH RBC QN AUTO: 31.8 PG (ref 27–33)
MCHC RBC AUTO-ENTMCNC: 33.5 G/DL (ref 32.2–35.5)
MCV RBC AUTO: 94.8 FL (ref 81.4–97.8)
PLATELET # BLD AUTO: 123 K/UL (ref 164–446)
PMV BLD AUTO: 12.1 FL (ref 9–12.9)
POTASSIUM SERPL-SCNC: 4 MMOL/L (ref 3.6–5.5)
POTASSIUM SERPL-SCNC: 4.3 MMOL/L (ref 3.6–5.5)
PROCALCITONIN SERPL-MCNC: 0.14 NG/ML
PROT SERPL-MCNC: 5.7 G/DL (ref 6–8.2)
RBC # BLD AUTO: 3.62 M/UL (ref 4.2–5.4)
SIGNIFICANT IND 70042: NORMAL
SITE SITE: NORMAL
SODIUM SERPL-SCNC: 140 MMOL/L (ref 135–145)
SODIUM SERPL-SCNC: 142 MMOL/L (ref 135–145)
SOURCE SOURCE: NORMAL
TRIGL SERPL-MCNC: 109 MG/DL (ref 0–149)
WBC # BLD AUTO: 10.1 K/UL (ref 4.8–10.8)

## 2024-04-25 PROCEDURE — 700102 HCHG RX REV CODE 250 W/ 637 OVERRIDE(OP)

## 2024-04-25 PROCEDURE — G0180 MD CERTIFICATION HHA PATIENT: HCPCS | Performed by: NURSE PRACTITIONER

## 2024-04-25 PROCEDURE — 700102 HCHG RX REV CODE 250 W/ 637 OVERRIDE(OP): Performed by: STUDENT IN AN ORGANIZED HEALTH CARE EDUCATION/TRAINING PROGRAM

## 2024-04-25 PROCEDURE — 80048 BASIC METABOLIC PNL TOTAL CA: CPT

## 2024-04-25 PROCEDURE — A9270 NON-COVERED ITEM OR SERVICE: HCPCS

## 2024-04-25 PROCEDURE — 700105 HCHG RX REV CODE 258

## 2024-04-25 PROCEDURE — 665999 HH PPS REVENUE DEBIT

## 2024-04-25 PROCEDURE — 700111 HCHG RX REV CODE 636 W/ 250 OVERRIDE (IP): Mod: JZ

## 2024-04-25 PROCEDURE — A9270 NON-COVERED ITEM OR SERVICE: HCPCS | Performed by: INTERNAL MEDICINE

## 2024-04-25 PROCEDURE — 700102 HCHG RX REV CODE 250 W/ 637 OVERRIDE(OP): Performed by: INTERNAL MEDICINE

## 2024-04-25 PROCEDURE — 99232 SBSQ HOSP IP/OBS MODERATE 35: CPT | Performed by: STUDENT IN AN ORGANIZED HEALTH CARE EDUCATION/TRAINING PROGRAM

## 2024-04-25 PROCEDURE — 700105 HCHG RX REV CODE 258: Performed by: HOSPITALIST

## 2024-04-25 PROCEDURE — 99232 SBSQ HOSP IP/OBS MODERATE 35: CPT | Performed by: PSYCHIATRY & NEUROLOGY

## 2024-04-25 PROCEDURE — 95713 VEEG 2-12 HR CONT MNTR: CPT | Performed by: PSYCHIATRY & NEUROLOGY

## 2024-04-25 PROCEDURE — 95718 EEG PHYS/QHP 2-12 HR W/VEEG: CPT | Performed by: HOSPITALIST

## 2024-04-25 PROCEDURE — 99233 SBSQ HOSP IP/OBS HIGH 50: CPT | Mod: 25 | Performed by: HOSPITALIST

## 2024-04-25 PROCEDURE — 665998 HH PPS REVENUE CREDIT

## 2024-04-25 PROCEDURE — A9270 NON-COVERED ITEM OR SERVICE: HCPCS | Performed by: STUDENT IN AN ORGANIZED HEALTH CARE EDUCATION/TRAINING PROGRAM

## 2024-04-25 PROCEDURE — 4A10X4Z MONITORING OF CENTRAL NERVOUS ELECTRICAL ACTIVITY, EXTERNAL APPROACH: ICD-10-PCS | Performed by: PSYCHIATRY & NEUROLOGY

## 2024-04-25 PROCEDURE — 80053 COMPREHEN METABOLIC PANEL: CPT

## 2024-04-25 PROCEDURE — 80061 LIPID PANEL: CPT

## 2024-04-25 PROCEDURE — 93306 TTE W/DOPPLER COMPLETE: CPT

## 2024-04-25 PROCEDURE — 84145 PROCALCITONIN (PCT): CPT

## 2024-04-25 PROCEDURE — 36415 COLL VENOUS BLD VENIPUNCTURE: CPT

## 2024-04-25 PROCEDURE — 700111 HCHG RX REV CODE 636 W/ 250 OVERRIDE (IP): Performed by: HOSPITALIST

## 2024-04-25 PROCEDURE — 700117 HCHG RX CONTRAST REV CODE 255

## 2024-04-25 PROCEDURE — 93306 TTE W/DOPPLER COMPLETE: CPT | Mod: 26 | Performed by: INTERNAL MEDICINE

## 2024-04-25 PROCEDURE — 85027 COMPLETE CBC AUTOMATED: CPT

## 2024-04-25 PROCEDURE — 770020 HCHG ROOM/CARE - TELE (206)

## 2024-04-25 RX ORDER — LABETALOL HYDROCHLORIDE 5 MG/ML
10 INJECTION, SOLUTION INTRAVENOUS EVERY 4 HOURS PRN
Status: DISCONTINUED | OUTPATIENT
Start: 2024-04-25 | End: 2024-05-13 | Stop reason: HOSPADM

## 2024-04-25 RX ORDER — METOPROLOL TARTRATE 1 MG/ML
5 INJECTION, SOLUTION INTRAVENOUS
Status: DISCONTINUED | OUTPATIENT
Start: 2024-04-25 | End: 2024-05-13

## 2024-04-25 RX ORDER — HYDRALAZINE HYDROCHLORIDE 20 MG/ML
10 INJECTION INTRAMUSCULAR; INTRAVENOUS EVERY 4 HOURS PRN
Status: DISCONTINUED | OUTPATIENT
Start: 2024-04-25 | End: 2024-05-13 | Stop reason: HOSPADM

## 2024-04-25 RX ORDER — TRAZODONE HYDROCHLORIDE 100 MG/1
50 TABLET ORAL ONCE
Status: COMPLETED | OUTPATIENT
Start: 2024-04-25 | End: 2024-04-25

## 2024-04-25 RX ORDER — CARBOXYMETHYLCELLULOSE SODIUM 5 MG/ML
2 SOLUTION/ DROPS OPHTHALMIC 2 TIMES DAILY
Status: DISCONTINUED | OUTPATIENT
Start: 2024-04-25 | End: 2024-04-27

## 2024-04-25 RX ADMIN — LACOSAMIDE 150 MG: 100 TABLET, FILM COATED ORAL at 06:36

## 2024-04-25 RX ADMIN — METOPROLOL TARTRATE 75 MG: 25 TABLET, FILM COATED ORAL at 16:51

## 2024-04-25 RX ADMIN — SENNOSIDES AND DOCUSATE SODIUM 2 TABLET: 50; 8.6 TABLET ORAL at 16:50

## 2024-04-25 RX ADMIN — POLYETHYLENE GLYCOL 3350 1 PACKET: 17 POWDER, FOR SOLUTION ORAL at 06:50

## 2024-04-25 RX ADMIN — MICONAZOLE NITRATE: 20 CREAM TOPICAL at 08:33

## 2024-04-25 RX ADMIN — HYDRALAZINE HYDROCHLORIDE 10 MG: 20 INJECTION, SOLUTION INTRAMUSCULAR; INTRAVENOUS at 21:47

## 2024-04-25 RX ADMIN — SODIUM CHLORIDE, POTASSIUM CHLORIDE, SODIUM LACTATE AND CALCIUM CHLORIDE: 600; 310; 30; 20 INJECTION, SOLUTION INTRAVENOUS at 21:53

## 2024-04-25 RX ADMIN — ACYCLOVIR SODIUM 650 MG: 50 INJECTION, SOLUTION INTRAVENOUS at 08:22

## 2024-04-25 RX ADMIN — BISACODYL 10 MG: 10 SUPPOSITORY RECTAL at 08:30

## 2024-04-25 RX ADMIN — ATORVASTATIN CALCIUM 20 MG: 20 TABLET, FILM COATED ORAL at 16:51

## 2024-04-25 RX ADMIN — ACYCLOVIR SODIUM 650 MG: 50 INJECTION, SOLUTION INTRAVENOUS at 22:58

## 2024-04-25 RX ADMIN — IOHEXOL 55 ML: 350 INJECTION, SOLUTION INTRAVENOUS at 16:30

## 2024-04-25 RX ADMIN — LOSARTAN POTASSIUM 100 MG: 50 TABLET, FILM COATED ORAL at 06:39

## 2024-04-25 RX ADMIN — DEXAMETHASONE 4 MG: 4 TABLET ORAL at 16:50

## 2024-04-25 RX ADMIN — TRAZODONE HYDROCHLORIDE 50 MG: 100 TABLET ORAL at 22:51

## 2024-04-25 RX ADMIN — DEXAMETHASONE 4 MG: 4 TABLET ORAL at 08:30

## 2024-04-25 RX ADMIN — METOPROLOL TARTRATE 75 MG: 25 TABLET, FILM COATED ORAL at 06:39

## 2024-04-25 RX ADMIN — LABETALOL HYDROCHLORIDE 10 MG: 5 INJECTION INTRAVENOUS at 18:46

## 2024-04-25 RX ADMIN — ACYCLOVIR SODIUM 650 MG: 50 INJECTION, SOLUTION INTRAVENOUS at 15:15

## 2024-04-25 RX ADMIN — IOHEXOL 80 ML: 350 INJECTION, SOLUTION INTRAVENOUS at 15:05

## 2024-04-25 RX ADMIN — ASPIRIN 81 MG: 81 TABLET, COATED ORAL at 06:39

## 2024-04-25 RX ADMIN — LACOSAMIDE 150 MG: 100 TABLET, FILM COATED ORAL at 16:51

## 2024-04-25 RX ADMIN — LANSOPRAZOLE 30 MG: 30 TABLET, ORALLY DISINTEGRATING ORAL at 08:30

## 2024-04-25 RX ADMIN — AMLODIPINE BESYLATE 10 MG: 10 TABLET ORAL at 06:37

## 2024-04-25 ASSESSMENT — FIBROSIS 4 INDEX
FIB4 SCORE: 3.06
FIB4 SCORE: 3.06

## 2024-04-25 ASSESSMENT — PAIN DESCRIPTION - PAIN TYPE: TYPE: ACUTE PAIN

## 2024-04-25 NOTE — PROGRESS NOTES
[facility] Internal Medicine Daily Progress Note    Date of Service  4/24/2024    [facility] Team: R [facility] Angel Team   Attending: KATHLEEN Greer M.d.  Senior Resident: Dr. Grover  Intern:  Dr. Ruiz  Contact Number: [phone]    Chief Complaint  Jairo Rivas is a 73 y.o. female admitted 4/13/2024 with painful urination and constipation.     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 It is noted that patient has been having abdominal pain and constipation, however, she has had 2 bowel movements since being admitted.  Her UA in the ED was consistent with UTI.  She had leukocytosis with WBC 24.  Lactic acid 3.  Blood cultures negative thus far.  Started on Rocephin for 3 days to treat UTI.     CT scan of the head was obtained, noted to have pneumatization of the right frontal lobe mass highly concerning for metastasis; associated with genic edema and 2.5 mm right to left midline shift.  Patient continued to remain on Decadron. Also GYN oncology was consulted, discussed the prognosis of the patient, stated that patient is not a candidate for any treatment.  Patient daughter wanted her to be full code at this time.  After several days without any food, NG tube finally placed.  Successful tube feeds.  VZV positive on skin swab. MRI on 4/21/24 did not show evidence of new strokes. It did show a decrease in the size of the brain metastasis in frontotemporal region and surrounding edema.    Interval Problem Update  No acute overnight events .  LP positive for VZV. Met with patient daughter who still hoping the patient to be stabilize so can take her to [place]  for second oncology opinion. Patient is unarousable.   Goals of care is to make patient best shape to be discharged to be follow-up by his doctors in [place].  -c EEG started this morning  -Neurology recommended CTA head/neck, TTE and possibly ZORAIDA, the addition of ASA, and lipid panel. In Addition to acyclovir and vimpat.   -Continue IV acyclovir for 10 -14 days per ID  -Patient current condition discussed with her daughter    I have discussed this patient's plan of care and discharge plan at IDT rounds today with Case Management, Nursing, Nursing leadership, and other members of the IDT team.    Consultants/Specialty  neurology and surgical oncology     Code Status  Full Code    Disposition  The patient is not medically cleared for discharge to home or a post-acute facility.      I have placed the appropriate orders for post-discharge needs.    Review of Systems  Review of Systems   Unable to perform ROS: Medical condition        Physical Exam  Temp:  [36 °C (96.8 °F)-36.8 °C (98.2 °F)] 36.1 °C (97 °F)  Pulse:  [] 116  Resp:  [16-20] 18  BP: (134-163)/() 146/82  SpO2:  [94 %-97 %] 95 %    Physical Exam  Vitals (exam limited) reviewed.   Constitutional:       General: She is sleeping.      Appearance: She is obese. She is ill-appearing.      Comments: Obtunded and unarousable.    HENT:      Head: Normocephalic and atraumatic.      Mouth/Throat:      Mouth: Mucous membranes are moist.   Eyes:      Extraocular Movements:      Left eye: Nystagmus present.      Comments: Scleral edema with clear yellowish discharge   Cardiovascular:      Rate and Rhythm: Normal rate and regular rhythm.      Pulses: Normal pulses.      Heart sounds: Normal heart sounds.   Pulmonary:      Effort: Pulmonary effort is normal. No respiratory distress.      Breath sounds: Normal breath sounds.   Abdominal:      General: There is distension.   Skin:     General: Skin is warm and dry.      Comments: Skin breakdown under breasts.  Dark red non pustular rash on left just below her breast.   Neurological:      Motor: Weakness present.      Comments: Obtunded, only responsive to pain stimulus with withdrawal, UE>LE B/L.         Fluids    Intake/Output Summary (Last 24 hours) at 4/24/2024 1725  Last data filed at 4/24/2024 1134  Gross per 24 hour   Intake 2784.27 ml   Output 1375 ml   Net 1409.27 ml       Laboratory  Recent Labs     04/22/24  0032 04/23/24  0443 04/24/24  0920   WBC 10.5 11.8* 11.4*   RBC 4.27 3.80* 3.48*   HEMOGLOBIN 13.5 12.1 11.2*   HEMATOCRIT 40.8 35.9* 32.3*   MCV 95.6 94.5 92.8   MCH 31.6 31.8 32.2   MCHC 33.1 33.7 34.7   RDW 50.4* 50.1* 48.0   PLATELETCT 126* 107* 123*   MPV 10.9 12.0 12.1     Recent Labs     04/22/24  0032 04/22/24  0623 04/22/24  1244 04/23/24  0443 04/24/24  0713   SODIUM 142   < > 139 139 141   POTASSIUM 4.2  --   --  4.3 4.3   CHLORIDE 106  --   --  105 107   CO2 23  --   --  21 22   GLUCOSE 145*  --   --  205* 157*   BUN 32*  --   --  33* 26*   CREATININE 1.02  --   --  0.62 0.48*   CALCIUM 8.8  --   --  8.7 9.1    < > = values in this interval not displayed.     Recent Labs     04/22/24  1244   APTT 26.6   INR 1.13               Imaging  DX-LUMBAR PUNCTURE FOR DIAGNOSIS   Final Result      Fluoroscopic-guided lumbar puncture as described above.      IR-MIDLINE CATHETER INSERTION WO GUIDANCE > AGE 3   Final Result                  Ultrasound-guided midline placement performed by qualified nursing staff    as above.          DX-ABDOMEN FOR TUBE PLACEMENT   Final Result         1.  Nonspecific bowel gas pattern in the upper abdomen.   2.  Dobbhoff tube is coiled within the stomach, the tip terminates overlying the expected location of the gastric cardia.   3.  Hazy left lower lobe infiltrate.      MR-BRAIN-WITH & W/O   Final Result      1.  Significant interval decrease in size of right frontal brain metastasis since previous exam slight interval decrease in surrounding vasogenic edema. Smaller  irregular rim of increased diffusion weighted signal intensity about this treated metastasis    consistent with cytotoxic edema.      2.  New large area of acute infarction in the right posterior frontal, parietal, posterior temporal, and occipital lobes associated gyriform enhancement in the right lateral occipital lobe and right peritrigonal white matter.      3.  Smaller chronic wedge-shaped area of infarction involving the left posterior basal ganglia extending into the left posterior frontal periventricular white matter      4.  Small areas of chronic lacunar type infarction involving the left side of the elaine and left brachium pontis with surrounding gliosis.      5.  Small foci of chronic hemosiderin deposition noted in the left posterior temporal and occipital white matter, as well as in the treated right frontal brain metastasis      6.  Age-related cerebral atrophy.      DX-ABDOMEN FOR TUBE PLACEMENT   Final Result         1.  Nonspecific bowel gas pattern in the upper abdomen.   2.  Dobbhoff tube is coiled within the stomach, the tip terminates overlying the expected location of the gastric fundus, physician similar to prior study.   3.  Left lower lobe infiltrate      DX-ABDOMEN FOR TUBE PLACEMENT   Final Result         1.  Nonspecific bowel gas pattern in the upper abdomen.   2.  Dobbhoff tube is coiled within the stomach, the tip terminates overlying the expected location of the gastric fundus.   3.  Left basilar atelectasis      DX-ABDOMEN FOR TUBE PLACEMENT   Final Result      Feeding tube looped in the stomach the distal tip in the gastric fundus.      CT-HEAD WITH & W/O   Final Result      1.  Redemonstration of right frontal lobe mass, highly concerning for metastasis. Associated vasogenic edema and a 2.5 mm right to left midline shift. No progressive mass effect. No herniation.   2.  Nonspecific ill-defined enhancement in the right parietal lobe.   3.  No new large vascular structure infarct. No  new intracranial hemorrhage.      DX-CHEST-LIMITED (1 VIEW)   Final Result      No significant interval change.      DX-ABDOMEN COMPLETE WITH AP OR PA CXR   Final Result      1. No acute cardiopulmonary abnormality.   2. Chronic left lower lobe scarring.   3. Nonobstructive bowel gas pattern. Small amount of stool throughout the colon.      EC-ECHOCARDIOGRAM COMPLETE W/O CONT    (Results Pending)        Assessment/Plan  Problem Representation:    * Acute encephalopathy- (present on admission)  Assessment & Plan  NOT IMPROVING  Patient remains obtunded with occasional alertness. Etiology at time of admission likely multifactorial, as she has multiple possible causes including UTI,  medications (norco, baclofen, gabapentin), recent findings of subacute CVA as well as brain mets with vasogenic edema, and shingles may all be contributing.  EEG on 4/14 did not demonstrate any seizures but was concerning for signs of brain distress as would be expected in setting of edema. No new findings on CTH.  Due to patient's slow decline in mentation despite treatment of UTI and starting NG tube feeds in addition to suddenly high blood pressure, MRI ordered. 4/21 vascular neurologist did not identify any new strokes and evidence of improvement in brain met and vasogenic edema.  LP with CSF positive for VZV done 4/22  -Continue IV acyclovir + maintenance IV fluids  100ml/h in case of disseminated shingles, no PO access  -Will switch Aciclovir to ganciclovir.  -continue lacosamide for seizure ppx  -fall, seizure, aspiration precautions  -Holding baclofen and gabapentin due to sedating properties       Problem with vascular access  Assessment & Plan  On 4/21, patient's PIV infiltrated twice. First time, PIV placed eventually with ultrasound guidance. Second time occurred in the evening and no VATs available.  -Midline placed on 4/22      Prediabetes- (present on admission)  Assessment & Plan  A1c 6.6, unsure if related to multiple  courses of steroids  -Glucose checks q12h  -Will consider resume ISS if glucose >200    Hypermagnesemia  Assessment & Plan  RESOLVED  Expect improvement with enteral food and fluids after NG tube. Currently Mg 3.  -Monitor with daily labs    Vasogenic edema (HCC)- (present on admission)  Assessment & Plan  Continuing decadron taper per Dr. Live's recommendations. He also does not believe that there are any significant changes in her brain imaging from this admission that could explain her somnolence/presentation. She is on the correct medication (decadron) for treating the edema  Improvement on MRI on 4/21  -Continue decadron      Hypernatremia  Assessment & Plan  Sodium oscillates above and below 145. Reacting to changes by switching from D5W and D5 half normal saline. Suspect regulation of sodium will greatly improve as patient receives fluids and nutrition via NG tube.  -Continue to monitor with daily labs  -Consider further workup of etiology if does not balance with enteral nutrition and fluids  -Discontinued fluids, free water pushes ordered    RESOLVED    Seizure (HCC)- (present on admission)  Assessment & Plan  EEG did not show evidence of seizure. Antiepileptic continued given multitude of risk factors.  -Continue lacosamide 150 mg IV BID per neurology  -EEG negative for seizures for the second time.    -cEEG started 4/24    Dysphagia- (present on admission)  Assessment & Plan  Possibly due to CVA vs brain metastases and altered mental status in setting of infection.  Patient will remain NPO as she failed SLP evaluation 4/17  NG tube placement required IRIS. Tube was removed for MRI and family refused NG tube to be replaced. Extensive discussion with family regarding nutrition options. Eventually agreed to let patient get NG tube with lorazepam sedation and IRIS. No one certified to place NG tube with IRIS available on 4/21. Vascular access also lost on 4/21 and midline unable to be placed without  VATs  -aspiration precautions  -Continue lansoprazole  -[id number] glucose checks      Shingles- (present on admission)  Assessment & Plan  Rash noted under left breast and onto lateral chest wall.  Swab of rash positive for VZV. MRI on 4/21 did not show new infarcts or worsening brain mets. No other dermatomes noted to have rash.  Concern for encephalitis from shingles due to minimal improvement in mentation. Holding off on LP for now per family's wishes  -ID consulted, will see patient 4/22. Recommending LP for definitive diagnosis.  -contact precautions  -Continue acyclovir IV, probably will be switching to ganciclovir  -Miconazole to treat intertrigo    Constipation  Assessment & Plan  Resolved  - Bowel regimen    Sepsis secondary to UTI (HCC)- (present on admission)  Assessment & Plan  RESOLVED  This is Sepsis Present on admission  SIRS criteria identified on my evaluation include: Tachycardia, with heart rate greater than 90 BPM and Leukocytosis, with WBC greater than 12,000  Clinical indicators of end organ dysfunction include Lactic Acid greater than 2 and Toxic Metabolic Encephalopathy  Source is UTI  Sepsis protocol initiated  Crystalloid Fluid Administration: Fluid resuscitation ordered per standard protocol - 30 mL/kg per current or ideal body weight  IV antibiotics as appropriate for source of sepsis  Reassessment: I have reassessed the patient's hemodynamic status  -s/p CTX x5d  -received sepsis bolus in ED, will continue IVF with caution  -No growth in blood cultures or urine cultures      Malignant neoplasm metastatic to brain (HCC)- (present on admission)  Assessment & Plan  Brain mets noted during hospitalization on 3/27/2024 with vasogenic edema, MRI on 4/11 shows continued brain mets with vasogenic edema.  MRI 4/21 shows decrease in tumor size and improvement in edema.  EEG x2 unremarkable  -continue seizure prophylaxis with lacosamide 150 mg BID  -continue dexamethasone   -Continue fall,  aspiration, seizure precaution.  -cEEG started 4/24, so far unremarkable  -c EEG started this morning  -Neurology recommended CTA head/neck, TTE and possibly ZORAIDA, the addition of ASA, and lipid panel.   -Continue IV acyclovir for 10 -14 days per ID      Acute kidney injury (HCC)- (present on admission)  Assessment & Plan  RESOLVED  Returned, Cr 1.11 and baseline around 0.7. Likely prerenal from dehydration.  -continue free water flushes  -renally dose all medications as appropriate  -avoid nephrotoxins    Advance care planning- (present on admission)  Assessment & Plan  At this time patient's family want her to be full code.  Palliative care consulted and discussed with family the goals of care. This remains the same.  -Full code  -Family goals of care is to make patient best shape to be discharged to be follow-up by his doctors in [place].    Endometrial cancer (HCC)- (present on admission)  Assessment & Plan  Followed by Dr. Garcia outpatient and sees radiation oncology Dr Live.  Received immunotherapy at [facility] in 2021 with initial good response. Reached out to Dr. Live who does not believe imaging from 4/14 shows significant changes compared to MRI month prior.  -Complicated by metastases to brain among other locations, likely contributing to her altered mental status. Mets re demonstrated without significant change on CTH on admission.  -Decadron for vasogenic edema  -Neurochecks every 4 hours  -Continue aspiration, fall, seizure precaution.    Essential hypertension- (present on admission)  Assessment & Plan  Concern for secondary exacerbation of hypertension raised on 4/20 when blood pressures increased to 190s/130s despite receiving scheduled home meds. MRI ordered; no evidence of worsening mets or new stroke.  As of evening of 4/21, patient's vascular access lost and NG tube not replaced. Unable to provide below medications.  - PRN IV hydralazine   - Increased amlodipine to 10 mg daily on 4/20  -  Continue losartan 100 mg daily  - Continue metoprolol tartrate, Increased  to 75 mg BID on 4/20  - Continue atorvastatin 20 mg every evening    History of CVA (cerebrovascular accident)- (present on admission)  Assessment & Plan  Residual right sided neurodeficits from CVA in 2010.  Has RUE spasticity, RLE pain.  New subacute CVA found on 4/11 MRI at [facility].  -hold chemical DVT ppx due to proximity of CVA  -Continue atorvastatin  -Neurology recommended CTA head/neck, TTE and possibly ZORAIDA, the addition of ASA, and lipid panel.   -Continue IV acyclovir for 10 -14 days per ID         VTE prophylaxis: SCDs/TEDs, resumed lovenox    I have performed a physical exam and reviewed and updated ROS and Plan today (4/24/2024). In review of yesterday's note (4/23/2024), there are no changes except as documented above.

## 2024-04-25 NOTE — CARE PLAN
Problem: Skin Integrity  Goal: Skin integrity is maintained or improved  4/24/2024 1950 by Anel Lala R.N.  Outcome: Progressing  4/24/2024 1949 by Anel Lala R.N.  Outcome: Progressing     Problem: Neuro Status  Goal: Neuro status will remain stable or improve  Outcome: Progressing     Problem: Infection - Standard  Goal: Patient will remain free from infection  Outcome: Progressing  Flowsheets (Taken 4/24/2024 1950)  Standard Infection Interventions:   Provided education on proper hand hygiene and infection prevention measures   Instructed patient/family on signs and symptoms of infection   Assessed for signs and symptoms of infection   Implemented standard precautions   The patient is Watcher - Medium risk of patient condition declining or worsening    Shift Goals  Clinical Goals: SKIN breakdown prevention; tube feeds  Patient Goals: jayla  Family Goals: poc    Progress made toward(s) clinical / shift goals:  Isolation Precautions:    Patient is on airborne/contact  isolation for disseminated shingles.    Patient educated on reason for isolation, how the infection may be transmitted, and how to help prevent transmission to others.     Patient educated that airborne/contact precautions involves staff and visitors wearing PPE, follow  Standard Precautions and perform meticulous hand hygiene in order to prevent transmission of infection. (Contact Precautions: gown and gloves; Special Contact Precautions: gown and gloves, with the added requirement of soap and water for hand hygiene; Droplet Precautions: surgical mask worn by staff and visitors in the room, and worn by the patient when out of the room; Airborne Precautions: involves staff wearing PPE to include an N95 Respirator or Controlled Air Purifying Respirator (CAPR).  Visitors should be limited and may wear an N95 mask).         Patient transport and mobilization on unit. Patient educated that they may leave their room, but prior to  exiting, the patient needs to have on a fresh patient gown, ensure the potentially infectious area is covered, perform appropriate hand hygiene immediately prior to exiting the room. (*For Airborne Precautions: Patient educated that time out of the room should be minimized and limited to transport to diagnostic procedures or other activities. Patient is to wear surgical mask when required to leave the patient room).      Patient is not progressing towards the following goals:    Q2hr turns, TAPs in use, barrier cream, frequent incontinence checks, wound care as ordered, wound consult, new wound orders, EDEL bed, heel float boots, purewick.     IV antiviral as ordered.     Q4hr neuro checks. See flowsheet.

## 2024-04-25 NOTE — WOUND TEAM
[facility]  Inpatient Services  Wound and Skin Care Follow-up    Admission Date: 4/13/2024     Last order of IP CONSULT TO WOUND CARE was found on 4/24/2024 from Hospital Encounter on 4/13/2024     HPI, PMH, SH: Reviewed    Past Surgical History:   Procedure Laterality Date    OH THORACOSCOPY,DX NO BX Left 8/6/2021    Procedure: THORACOSCOPY - HEMOTHORAX EVACUATION, CHEST TUBE PLACEMENT X 2;  Surgeon: John H Ganser, M.D.;  Location: SURGERY [facility];  Service: General    OH CYSTOSCOPY,INSERT URETERAL STENT Left 8/8/2019    Procedure: CYSTOSCOPY, WITH URETERAL STENT INSERTION;  Surgeon: Jann Garcia M.D.;  Location: SURGERY [facility];  Service: Gynecology    HYSTERECTOMY ROBOTIC XI N/A 8/8/2019    Procedure: HYSTERECTOMY, ROBOT-ASSISTED, USING DA RILEY XI;  Surgeon: Jann Garcia M.D.;  Location: SURGERY [facility];  Service: Gynecology    SALPINGO OOPHORECTOMY Bilateral 8/8/2019    Procedure: SALPINGO-OOPHORECTOMY;  Surgeon: Jann Garcia M.D.;  Location: SURGERY [facility];  Service: Gynecology    NODE BIOPSY SENTINEL  8/8/2019    Procedure: BIOPSY, LYMPH NODE, SENTINEL;  Surgeon: Jann Garcia M.D.;  Location: SURGERY [facility];  Service: Gynecology    OH HYSTEROSCOPY,DX,SEP PROC  6/19/2019    Procedure: HYSTEROSCOPY, DIAGNOSTIC;  Surgeon: Anel Chicas M.D.;  Location: SURGERY [facility];  Service: Gynecology    DILATION AND CURETTAGE  6/19/2019    Procedure: DILATION AND CURETTAGE;  Surgeon: Anel Chicas M.D.;  Location: SURGERY [facility];  Service: Gynecology    HYSTERECTOMY LAPAROSCOPY       Social History     Tobacco Use    Smoking status: Never    Smokeless tobacco: Never    Tobacco comments:     n/a   Substance Use Topics    Alcohol use: No     Chief Complaint   Patient presents with    Painful Urination    Constipation     Diagnosis: Sepsis (HCC) [A41.9]  AMS (altered mental status) [R41.82]    Unit where seen by Wound Team: S187/02     WOUND  FOLLOW UP RELATED TO: L sacrum    WOUND TEAM PLAN OF CARE - Frequency of Follow-up:   Nursing to follow dressing orders written for wound care. Contact wound team if area fails to progress, deteriorates or with any questions/concerns if something comes up before next scheduled follow up (See below as to whether wound is following and frequency of wound follow up)  Dressing changes by wound team:                   Weekly - L sacrum    WOUND HISTORY:     Jairo Rivas is a 73 y.o. female who presented 4/13/2024 with confusion, abdominal pain, constipation.  She has a history of endometrial cancer s/p immunotherapy, with recent brain metastases with vasogenic edema found on 03/27/2024 and was initiated on radiation therapy, CVA 2010 with residual right sided deficits and a new subacute CVA 4/11/2024, hypertension.  She presents today with worsening confusion, lethargy over the past 3 days.  Majority of history is obtained from daughter, Radha, as pt is unable to share history herself.  Daughter reports that since discharge from the hospital at the end of March her mentation was initially improving, she was started on lacosamide and dexamethasone taper following this hospitalization.  She started worsening again around 4/10, which brought them to the ED when she was having difficulty with speech, difficulty swallowing medications and worsened right sided weakness.  She had an MRI performed at [facility] which found subacute CVA with right parieto-occipiatal junction involvement and metastatic foci with surrounding edema in the right frontal, left frontal parietal and left parietal regions.  She was given norco following this ED visit for her pain.  Since 4/11 the patient has had great difficulty sleeping, reports of abdominal pain, confusion, lethargy and daughter has concerns for seizures as the patient appears to be reaching for things that aren't there and shaking frequently, denies and tongue biting or  full loss of consciousness during these episodes.  She also has not had a bowel movement in 5-7 days         WOUND ASSESSMENT/LDA  Wound 04/21/24 Full Thickness Wound Sacrum Unknown etiology (Active)   Date First Assessed/Time First Assessed: 04/21/24 1555   Present on Original Admission: Yes  Primary Wound Type: Full Thickness Wound  Location: Sacrum  Wound Description (Comments): Unknown etiology      Assessments 4/24/2024  5:00 PM   Wound Image     Site Assessment Slough;Red;Pink   Periwound Assessment Clean;Dry;Intact   Margins Defined edges;Attached edges   Closure Open to air   Drainage Amount Scant   Drainage Description Serosanguineous   Treatments Cleansed;Offloading   Wound Cleansing Foam Cleanser/Washcloth   Periwound Protectant Barrier Paste   Dressing Status Open to Air   Dressing Changed Changed   Dressing Cleansing/Solutions Not Applicable   Dressing Options Open to Air   Dressing Change/Treatment Frequency Every Shift, and As Needed   NEXT Dressing Change/Treatment Date 04/25/24   NEXT Weekly Photo (Inpatient Only) 05/01/24   Wound Team Following Weekly   Non-staged Wound Description Full thickness        Vascular:    TAYLER:   No results found.    Lab Values:    Lab Results   Component Value Date/Time    WBC 11.4 (H) 04/24/2024 09:20 AM    RBC 3.48 (L) 04/24/2024 09:20 AM    HEMOGLOBIN 11.2 (L) 04/24/2024 09:20 AM    HEMATOCRIT 32.3 (L) 04/24/2024 09:20 AM    CREACTPROT 13.12 (H) 04/22/2024 12:32 AM    HBA1C 6.6 (H) 04/16/2024 02:59 AM         Culture Results show:  Recent Results (from the past 720 hour(s))   CULTURE WOUND W/ GRAM STAIN    Collection Time: 04/16/24  6:53 PM    Specimen: Chest; Wound   Result Value Ref Range    Significant Indicator POS (POS)     Source WND     Site CHEST     Culture Result Heavy growth usual skin guilherme. (A)     Gram Stain Result       Moderate WBCs.  Moderate Gram positive cocci.  Few Gram negative rods.  Few Gram positive rods.  Rare Yeast.      Culture Result  Enterococcus faecalis  Heavy growth   (A)     Culture Result Candida albicans  Heavy growth   (A)        Susceptibility    Enterococcus faecalis - MARIOLA     Daptomycin 2 Sensitive mcg/mL     Gent Synergy <=500 Sensitive mcg/mL     Ampicillin <=2 Sensitive mcg/mL     Linezolid 2 Sensitive mcg/mL     Vancomycin 1 Sensitive mcg/mL     Penicillin 1 Sensitive mcg/mL     Tetracycline >8 Resistant mcg/mL       Pain Level/Medicated:  Patient denies pain       INTERVENTIONS BY WOUND TEAM:  Chart and images reviewed. Discussed with bedside RN. All areas of concern (based on picture review, LDA review and discussion with bedside RN) have been thoroughly assessed. Documentation of areas based on significant findings. This RN in to assess patient. Performed standard wound care which includes appropriate positioning, dressing removal and non-selective debridement. Pictures and measurements obtained weekly if/when required.    Wound:  Sacrum  Cleansed/Non-selectively Debrided with:  No rinse foam soap and Moist warm washcloth  Primary Dressing:  Barrier paste    Advanced Wound Care Discharge Planning  Number of Clinicians necessary to complete wound care: 1  Is patient requiring IV pain medications for dressing changes:  No   Length of time for dressing change 15 min. (This does not include chart review, pre-medication time, set up, clean up or time spent charting.)    Interdisciplinary consultation: Patient, Bedside RN, Luigi ELAM (Wound RN).    EVALUATION / RATIONALE FOR TREATMENT:     Date:  04/24/24  Wound Status:  Wound deteriorating    L sacral wound now full thickness, unknown etiology.  Viscopatch zinc impregnated gauze to encourage re-epithelialization of superficial 100% viable wound bed, and to provide a non-stick wound contact layer.     Date:  04/16/24  Wound Status:  Initial evaluation     Partial thickness open wounds to the left chest under breast and flank area, interdry applied to help with wicking moisture.  Sacral  moisture related skin breakdown, barrier paste applied. Continue with offloading measures.          Goals: Steady decrease in wound area and depth weekly.    NURSING PLAN OF CARE ORDERS:  Dressing changes: See Dressing Care orders    NUTRITION RECOMMENDATIONS   Wound Team Recommendations:  N/A     DIET ORDERS (From admission to next 24h)       Start     Ordered    04/18/24 0914  Diet: Diet Tube Feed; Formula: Promot; Promot: Promot Fiber RTH; Goal Rate (mL/Hour): 60; Duration: 24 HR; Tube Feed Time Unit: Hours/Day  ALL MEALS        Comments: Start at 25mL/hr. Do not advance until dietitian consult completed.   Question Answer Comment   Diet Diet Tube Feed    Formula: Promot    Promot: Promot Fiber RTH    Goal Rate (mL/Hour) 60    Route Enteral Tube    Duration 24 HR    Tube Feed Time Unit Hours/Day        04/18/24 0914 04/13/24 2142  Diet NPO Restrict to: Sips with Medications (pending bedside swallow study)  ALL MEALS        Question:  Diet NPO Restrict to:  Answer:  Sips with Medications  Comment:  pending bedside swallow study    04/13/24 2147                    PREVENTATIVE INTERVENTIONS:   Q shift Galen - performed per nursing policy  Q shift pressure point assessments - performed per nursing policy    Surface/Positioning  Standard/trauma mattress - Currently in Place  Reposition q 2 hours - Currently in Place  TAPs Turning system - Currently in Place    Offloading/Redistribution  Heel float boots (Prevalon boot) - Currently in Place    Containment/Moisture Prevention    Purwick/Condom Cath - Currently in Place  Barrier paste - Currently in Place    Anticipated discharge plans:  TBD        Vac Discharge Needs:  Vac Discharge plan is purely a recommendation from wound team and not a requirement for discharge unless otherwise stated by physician.  Not Applicable Pt not on a wound vac

## 2024-04-25 NOTE — PROGRESS NOTES
Pt daughter would like to discuss care with primary team. Daughter has concerns about film over b/l sclera edema. Drops b/l eyes given twice. Daughter also has concerns over generalized edema. Informed daughter that pt has an echo ordered as well.  [facility]) [phone] would like to be contacted with update from MD.

## 2024-04-25 NOTE — CARE PLAN
Problem: Pain - Standard  Goal: Alleviation of pain or a reduction in pain to the patient’s comfort goal  Outcome: Progressing     Problem: Knowledge Deficit - Standard  Goal: Patient and family/care givers will demonstrate understanding of plan of care, disease process/condition, diagnostic tests and medications  Outcome: Not Progressing     Problem: Hemodynamics  Goal: Patient's hemodynamics, fluid balance and neurologic status will be stable or improve  Outcome: Not Met     Problem: Fluid Volume  Goal: Fluid volume balance will be maintained  Outcome: Progressing     Problem: Urinary - Renal Perfusion  Goal: Ability to achieve and maintain adequate renal perfusion and functioning will improve  Outcome: Progressing     Problem: Respiratory  Goal: Patient will achieve/maintain optimum respiratory ventilation and gas exchange  Outcome: Progressing     Problem: Skin Integrity  Goal: Skin integrity is maintained or improved  Outcome: Progressing     Problem: Fall Risk  Goal: Patient will remain free from falls  Outcome: Progressing     Problem: Safety - Medical Restraint  Goal: Remains free of injury from restraints (Restraint for Interference with Medical Device)  Outcome: Met  Goal: Free from restraint(s) (Restraint for Interference with Medical Device)  Outcome: Met     Problem: Neuro Status  Goal: Neuro status will remain stable or improve  Outcome: Not Progressing     Problem: Infection - Standard  Goal: Patient will remain free from infection  Outcome: Progressing   The patient is Watcher - Medium risk of patient condition declining or worsening    Shift Goals  Clinical Goals: maintain skin integrity  Patient Goals: jayla  Family Goals: jayla    Progress made toward(s) clinical / shift goals:  skin and fall precautions in place. Q2 turn.    Patient is not progressing towards the following goals:unable to verbalize understanding. Does not follow commands      Problem: Knowledge Deficit - Standard  Goal: Patient and  family/care givers will demonstrate understanding of plan of care, disease process/condition, diagnostic tests and medications  Outcome: Not Progressing     Problem: Hemodynamics  Goal: Patient's hemodynamics, fluid balance and neurologic status will be stable or improve  Outcome: Not Met     Problem: Neuro Status  Goal: Neuro status will remain stable or improve  Outcome: Not Progressing

## 2024-04-25 NOTE — DISCHARGE PLANNING
"HTH/SCP TCN chart review completed. Collaborated with RENÉ Soliz.   Current discharge considerations are anticipated for dc to home with resumption of [facility] services and family support/paid caregiver if needed.  Daughter Carolyn is patients primary caregiver and provided maximum assistance with ADL's at baseline.      Per Hospital Medicine note by Dr. Ruiz on 4/23/24 at 3:41 PM, \"Continue NG tube feeding.   LP positive for VZV. Met with patient daughter who still hoping the patient to be stabilize so can take her to [place] for second oncology opinion. Patient is unarousable.   Goals of care is to make patient best shape to be discharged to be follow-up by his doctors in [place]\".      TCN will continue to follow and collaborate with discharge planning team as additional post acute needs arise. Thank you.    Completed:  Choice obtained: HH (See above)  SCP with [facility] P[facility].  "

## 2024-04-25 NOTE — DIETARY
Nutrition support weekly update:  Day 12 of admit.  Jairo Rivas is a 73 y.o. female with admitting DX of sepsis and AMS.      Tube feeding initiated on 4/18. Current TF via IRIS feeding tube is Promote with Fiber with goal rate 60 ml/hr to provide 1440 kcals, 90 gm protein, and 1197 ml free water per day.     Assessment:  Weight 4/25: 99.2 kg via bed scale. Weight is consistently up from admit weight of 85.8 kg. Pt is currently +7.4L fluids per I/O.     Evaluation:   Pt is receiving TF at goal rate.   Pt is on RA.   SLP attempted to see today, notes continues to be unarousable and is not appropriate to participate in therapy.   Pt has EEG monitoring, MD notes consistent with moderate encephalopathy.   Per MD, pt is not medically cleared for discharge,   Skin: full thickness wound to sacrum, blisters under L breast, MASD to buttock/sacrum. Generalized gerri noted, +1 mild edema to RUE/RLE and 2+ moderate edema to RLE/LLE.   Labs: Glucose 161, BUN 24, Creat 0.41, total protein 5.7, Phos 2.1 on 4/21,   Meds: lipitor, dulcolax, decadron, vimpat, prevacid, senna and miralax, LR IV  Last BM: 4/23  Standard formula remains indicated, pt did have one glucose over 200 (205) on 4/23 likely r/t steroid now stable.    Malnutrition risk: No new risk identified.     Recommendations/Plan:  Continue TF Promote with Fiber with goal rate 60 ml/hr.   Fluids per MD.   Monitor blood glucose trend.   Diet upgrades per SLP/MD.     RD following.

## 2024-04-25 NOTE — ASSESSMENT & PLAN NOTE
Patient high risk for aspiration pneumonitis/pneumonia.   Currently on room air  Monitor oxygen requirement closely

## 2024-04-25 NOTE — DISCHARGE PLANNING
TCN following. HTH/SCP chart reviewed. No new TCN needs identified. Please see prior TCN note from 4/24 for most recent discharge planning considerations if indicated. Noted per TCN note on 4/18, family can provide private caregivers if needed.     Completed:  Choice obtained:    SCP with [facility].

## 2024-04-25 NOTE — PROGRESS NOTES
Neurology Progress Note        Subjective:    Mrs. Rivas is improving according to her daughter.  Her daughter reports the patient was talking to her yesterday.  She seems to becoming more awake.  No concern for seizure activity.    Objective:  Vitals:  Vitals:    04/24/24 1530 04/24/24 2000 04/25/24 0612 04/25/24 0833   BP: (!) 146/82 (!) 137/90 (!) 154/87 (!) 179/95   Pulse: (!) 116  (!) 112 100   Resp: 18  18 18   Temp: 36.1 °C (97 °F) 36.3 °C (97.3 °F) 36.8 °C (98.2 °F) 36.5 °C (97.7 °F)   TempSrc: Temporal  Temporal Temporal   SpO2: 95% 95% 96% 95%   Weight:   99.2 kg (218 lb 11.1 oz)    Height:         MS: eyes closed; opens eyes to voice; no response to voice/pain  CN: PEERL, appears to track me, no clear facial weakness   MOT: withdraws right upper and lower extremity to noxious stimuli, grimaces on the left but no significant withdrawal.  SENS: responds to noxious stimulation as above  DTR: 2+ and symmetric; toes down  COOR: unable to test due to mental status  Gait: unable to test due to mental status    Recent Labs     04/23/24  0443 04/24/24  0920 04/25/24  0453   WBC 11.8* 11.4* 10.1   RBC 3.80* 3.48* 3.62*   HEMOGLOBIN 12.1 11.2* 11.5*   HEMATOCRIT 35.9* 32.3* 34.3*   MCV 94.5 92.8 94.8   MCH 31.8 32.2 31.8   MCHC 33.7 34.7 33.5   RDW 50.1* 48.0 50.4*   PLATELETCT 107* 123* 123*   MPV 12.0 12.1 12.1     Recent Labs     04/23/24  0443 04/24/24  0713 04/25/24  0453   SODIUM 139 141 142   POTASSIUM 4.3 4.3 4.0   CHLORIDE 105 107 107   CO2 21 22 24   GLUCOSE 205* 157* 161*   BUN 33* 26* 24*   CREATININE 0.62 0.48* 0.41*   CALCIUM 8.7 9.1 8.9     Recent Labs     04/22/24  1244   APTT 26.6   INR 1.13             Recent Labs     04/25/24  0453   TRIGLYCERIDE 109   HDL 47   LDL 97     Recent Labs     04/23/24  0443 04/24/24  0713 04/25/24  0453   SODIUM 139 141 142   POTASSIUM 4.3 4.3 4.0   CHLORIDE 105 107 107   CO2 21 22 24   GLUCOSE 205* 157* 161*   BUN 33* 26* 24*     Recent Labs     04/23/24  0443  04/24/24  0713 04/25/24  0453   SODIUM 139 141 142   POTASSIUM 4.3 4.3 4.0   CHLORIDE 105 107 107   CO2 21 22 24   BUN 33* 26* 24*   CREATININE 0.62 0.48* 0.41*   MAGNESIUM 2.3  --   --    CALCIUM 8.7 9.1 8.9     Recent Labs     04/22/24  1244   APTT 26.6   INR 1.13     No results found for this or any previous visit.           Imaging: neuroimaging reviewed and directly visualized by me  DX-CHEST-PORTABLE (1 VIEW)   Final Result      Feeding tube tip projects in the stomach.      Hazy left basilar opacity, atelectasis and/or mild pneumonitis.      DX-LUMBAR PUNCTURE FOR DIAGNOSIS   Final Result      Fluoroscopic-guided lumbar puncture as described above.      IR-MIDLINE CATHETER INSERTION WO GUIDANCE > AGE 3   Final Result                  Ultrasound-guided midline placement performed by qualified nursing staff    as above.          DX-ABDOMEN FOR TUBE PLACEMENT   Final Result         1.  Nonspecific bowel gas pattern in the upper abdomen.   2.  Dobbhoff tube is coiled within the stomach, the tip terminates overlying the expected location of the gastric cardia.   3.  Hazy left lower lobe infiltrate.      MR-BRAIN-WITH & W/O   Final Result      1.  Significant interval decrease in size of right frontal brain metastasis since previous exam slight interval decrease in surrounding vasogenic edema. Smaller irregular rim of increased diffusion weighted signal intensity about this treated metastasis    consistent with cytotoxic edema.      2.  New large area of acute infarction in the right posterior frontal, parietal, posterior temporal, and occipital lobes associated gyriform enhancement in the right lateral occipital lobe and right peritrigonal white matter.      3.  Smaller chronic wedge-shaped area of infarction involving the left posterior basal ganglia extending into the left posterior frontal periventricular white matter      4.  Small areas of chronic lacunar type infarction involving the left side of the elaine  and left brachium pontis with surrounding gliosis.      5.  Small foci of chronic hemosiderin deposition noted in the left posterior temporal and occipital white matter, as well as in the treated right frontal brain metastasis      6.  Age-related cerebral atrophy.      DX-ABDOMEN FOR TUBE PLACEMENT   Final Result         1.  Nonspecific bowel gas pattern in the upper abdomen.   2.  Dobbhoff tube is coiled within the stomach, the tip terminates overlying the expected location of the gastric fundus, physician similar to prior study.   3.  Left lower lobe infiltrate      DX-ABDOMEN FOR TUBE PLACEMENT   Final Result         1.  Nonspecific bowel gas pattern in the upper abdomen.   2.  Dobbhoff tube is coiled within the stomach, the tip terminates overlying the expected location of the gastric fundus.   3.  Left basilar atelectasis      DX-ABDOMEN FOR TUBE PLACEMENT   Final Result      Feeding tube looped in the stomach the distal tip in the gastric fundus.      CT-HEAD WITH & W/O   Final Result      1.  Redemonstration of right frontal lobe mass, highly concerning for metastasis. Associated vasogenic edema and a 2.5 mm right to left midline shift. No progressive mass effect. No herniation.   2.  Nonspecific ill-defined enhancement in the right parietal lobe.   3.  No new large vascular structure infarct. No new intracranial hemorrhage.      DX-CHEST-LIMITED (1 VIEW)   Final Result      No significant interval change.      DX-ABDOMEN COMPLETE WITH AP OR PA CXR   Final Result      1. No acute cardiopulmonary abnormality.   2. Chronic left lower lobe scarring.   3. Nonobstructive bowel gas pattern. Small amount of stool throughout the colon.      EC-ECHOCARDIOGRAM COMPLETE W/O CONT    (Results Pending)       Impression:   73-year-old woman with persistent encephalopathy and stupor.  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  There is significant vasogenic edema surrounding the right frontal  met.  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  24-hour EEG showed no concern for seizure activity.  Suspect her encephalopathy is multifactorial given underlying CNS inflammation/metastases/vasogenic edema/recent ischemic stroke.    Recent MRI does show restricted diffusion in the posterior right temporal lobe consistent with scattered recent ischemic stroke.    Recommendations:  -Continue Vimpat 150 mg twice daily  -Okay to stop EEG monitoring  -If repeat LP is considered would make sure to send off cytology    In regards to the stroke, patient and family continue to pursue full medical care would get CTA head and neck and TTE to help modify secondary stroke risk factors.  -Aspirin 81 mg daily for stroke prevention    Neurology will continue to follow    Jam Vargas MD

## 2024-04-25 NOTE — CARE PLAN
The patient is Watcher - Medium risk of patient condition declining or worsening    Shift Goals  Clinical Goals: Maintian nutrition, prevent further skin breakdown w. Q2 turn in place.  Patient Goals: Q2 turn  Family Goals: poc    Progress made toward(s) clinical / shift goals:      Problem: Pain - Standard  Goal: Alleviation of pain or a reduction in pain to the patient’s comfort goal  Description: Target End Date:  Prior to discharge or change in level of care    Document on Vitals flowsheet    1.  Document pain using the appropriate pain scale per order or unit policy  2.  Educate and implement non-pharmacologic comfort measures (i.e. relaxation, distraction, massage, cold/heat therapy, etc.)  3.  Pain management medications as ordered  4.  Reassess pain after pain med administration per policy  5.  If opiods administered assess patient's response to pain medication is appropriate per POSS sedation scale  6.  Follow pain management plan developed in collaboration with patient and interdisciplinary team (including palliative care or pain specialists if applicable)  Outcome: Progressing     Problem: Knowledge Deficit - Standard  Goal: Patient and family/care givers will demonstrate understanding of plan of care, disease process/condition, diagnostic tests and medications  Description: Target End Date:  1-3 days or as soon as patient condition allows    Document in Patient Education    1.  Patient and family/caregiver oriented to unit, equipment, visitation policy and means for communicating concern  2.  Complete/review Learning Assessment  3.  Assess knowledge level of disease process/condition, treatment plan, diagnostic tests and medications  4.  Explain disease process/condition, treatment plan, diagnostic tests and medications  Outcome: Progressing     Problem: Hemodynamics  Goal: Patient's hemodynamics, fluid balance and neurologic status will be stable or improve  Description: Target End Date:  Prior to  discharge or change in level of care    Document on Assessment and I/O flowsheet templates    1.  Monitor vital signs, pulse oximetry and cardiac monitor per provider order and/or policy  2.  Maintain blood pressure per provider order  3.  Hemodynamic monitoring per provider order  4.  Manage IV fluids and IV infusions  5.  Monitor intake and output  6.  Daily weights per unit policy or provider order  7.  Assess peripheral pulses and capillary refill  8.  Assess color and body temperature  9.  Position patient for maximum circulation/cardiac output  10. Monitor for signs/symptoms of excessive bleeding  11. Assess mental status, restlessness and changes in level of consciousness  12. Monitor temperature and report fever or hypothermia to provider immediately. Consideration of targeted temperature management.  Outcome: Progressing     Problem: Fluid Volume  Goal: Fluid volume balance will be maintained  Description: Target End Date:  Prior to discharge or change in level of care    Document on I/O flowsheet    1.  Monitor intake and output as ordered  2.  Promote oral intake as appropriate  3.  Report inadequate intake or output to physician  4.  Administer IV therapy as ordered  5.  Weights per provider order  6.  Assess for signs and symptoms of bleeding  7.  Monitor for signs of fluid overload (respiratory changes, edema, weight gain, increased abdominal girth)  8.  Monitor of signs for inadequate fluid volume (poor skin turgor, dry mucous membranes)  9.  Instruct patient on adherence to fluid restrictions  Outcome: Progressing     Problem: Urinary - Renal Perfusion  Goal: Ability to achieve and maintain adequate renal perfusion and functioning will improve  Description: Target End Date:  Prior to discharge or change in level of care    Document on I/O and Assessment flowsheet    1.  Urine output will remain greater than 0.5ml/Kg/HR  2.  Monitor amount and/or characteristics of urine per order/policy. Specific  gravity per order/policy  3.  Assess signs and symptoms of renal dysfunction  Outcome: Progressing     Problem: Respiratory  Goal: Patient will achieve/maintain optimum respiratory ventilation and gas exchange  Description: Target End Date:  Prior to discharge or change in level of care    Document on Assessment flowsheet    1.  Assess and monitor rate, rhythm, depth and effort of respiration  2.  Breath sounds assessed qshift and/or as needed  3.  Assess O2 saturation, administer/titrate oxygen as ordered  4.  Position patient for maximum ventilatory efficiency  5.  Turn, cough, and deep breath with splinting to improve effectiveness  6.  Collaborate with RT to administer medication/treatments per order  7.  Encourage use of incentive spirometer and encourage patient to cough after use and utilize splinting techniques if applicable  8.  Airway suctioning  9.  Monitor sputum production for changes in color, consistency and frequency  10. Perform frequent oral hygiene  11. Alternate physical activity with rest periods  Outcome: Progressing     Problem: Mechanical Ventilation  Goal: Safe management of artificial airway and ventilation  Description: Target End Date:  when vent discontinued    Document on VAP flowsheet and Airway LDA    1.  Daily awakening trials per provider order/policy  2.  Suctioning and care of ET/Trach tube (document on LDA)  3.  Collaborate with RT to administer medications/treatments  4.  Ambu bag at bedside and available for transport  5.  Trach patient - replacement trach at bedside  6.  Provide communication tools if applicable  Outcome: Progressing  Goal: Successful weaning off mechanical ventilator, spontaneously maintains adequate gas exchange  Description: Target End Date:  when vent discontinued    1.  Follow universal weaning protocol for patients on mechanical ventilation per order  2.  Review contraindication list.  Obtain provider order to wean in presence of contraindication.  3.   Obtain Jerman Sedation-Agitation Score  4.  Jerman Score 1-2:  sedation vacation  5.  Jerman Score 3-4:  Collaborate with provider and/or RT to determine readiness for trial  6.  Begin 2 hour trial of weaning following protocol  7.  Evaluate for fatigue parameters per protocol  8.  Fatigue parameters triggered:  Stop wean and return to previous ASV% setting or increase % minute volume to offset work or breathing  Outcome: Progressing  Goal: Patient will be able to express needs and understand communication  Description: Target End Date:  when vent discontinued    1.  Assess ability to communicate and understand  2.  Provide communication tools  3.  Collaborate with Speech Therapy for [facility]  Outcome: Progressing     Problem: Physical Regulation  Goal: Diagnostic test results will improve  Description: Target End Date:  Prior to discharge or change in level of care    1.  Monitor lactic acid levels  2.  Monitor ABG's  3.  Monitor diagnostic test results  Outcome: Progressing  Goal: Signs and symptoms of infection will decrease  Description: Target End Date:  Prior to discharge or change in level of care    1.  Remove potential routes of infection, such as central lines and urinary catheter  2.  Follow facility protocol for changing IV tubing and sites  3.  Collaborate with Infectious Disease  4.  Antibiotic therapy per provider order  5.  Note drug effects and monitor for antibiotic toxicity  Outcome: Progressing     Problem: Skin Integrity  Goal: Skin integrity is maintained or improved  Description: Target End Date:  Prior to discharge or change in level of care    Document interventions on Skin Risk/Galen flowsheet groups and corresponding LDA    1.  Assess and monitor skin integrity, appearance and/or temperature  2.  Assess risk factors for impaired skin integrity and/or pressures ulcers  3.  Implement precautions to protect skin integrity in collaboration with interdisciplinary team  4.  Implement pressure ulcer  prevention protocol if at risk for skin breakdown  5.  Confirm wound care consult if at risk for skin breakdown  6.  Ensure patient use of pressure relieving devices  (Low air loss bed, waffle overlay, heel protectors, ROHO cushion, etc)  Outcome: Progressing     Problem: Fall Risk  Goal: Patient will remain free from falls  Description: Target End Date:  Prior to discharge or change in level of care    Document interventions on the Matalanie Grewal Fall Risk Assessment    1.  Assess for fall risk factors  2.  Implement fall precautions  Outcome: Progressing     Problem: Safety - Medical Restraint  Goal: Remains free of injury from restraints (Restraint for Interference with Medical Device)  Description: INTERVENTIONS:  1. Determine that other, less restrictive measures have been tried or would not be effective before applying the restraint  2. Evaluate the patient's condition at the time of restraint application  3. Educate patient/family regarding the reason for restraint  4. Q2H: Monitor safety, psychosocial status, comfort, circulation, respiratory status, LOC, nutrition and hydration  Outcome: Progressing  Goal: Free from restraint(s) (Restraint for Interference with Medical Device)  Description: INTERVENTIONS:  1.  ONCE/SHIFT or MINIMUM Q12H: Assess and document the continuing need for restraints  2.  Q24H: Continued use of restraint requires LIP to perform face to face examination and written order  3.  Identify and implement measures to help patient regain control  4.  Educate patient/family on discontinuation criteria   5.  Assess patient's understanding and retention of education provided  6.  Assess readiness for release & initiate progressive release per protocol  7.  Identify and document criteria for restraints  Outcome: Progressing     Problem: Neuro Status  Goal: Neuro status will remain stable or improve  Description: Target End Date:  Prior to discharge or change in level of care    Document on Neuro  assessment in the Assessment flowsheet    1.  Assess and monitor neurologic status per provider order/protocol/unit policy  2.  Assess level of consciousness and orientation  3.  Assess for speech, dysarthria, dysphagia, facial symmetry  4.  Assess visual field, eye movements, gaze preference, pupil reaction and size  5.  Assess muscle strength and motor response in all four extremities  6.  Assess for sensation (numbness and tingling)  7.  Assess basic neuro reflexes (cough, gag, corneal)  8.  Identify changes in neuro status and report to provider for testing/treatment orders  Outcome: Progressing     Problem: Infection - Standard  Goal: Patient will remain free from infection  Description: Target End Date:  Prior to discharge or change in level of care    1.  Utilize Standard Precautions at all times to reduce the risk of transmission of microorganisms from both recognized and  unrecognized sources of infection  2.  Infection prevention handouts provided (general/device/diagnosis specific) and documented in Patient Education  3.  Educate patient and family/caregiver on isolation precautions if applicable  Outcome: Progressing       Patient is not progressing towards the following goals:

## 2024-04-25 NOTE — PROGRESS NOTES
[facility] Internal Medicine Daily Progress Note    Date of Service  4/25/2024    [facility] Team: R [facility] Angel Team   Attending: KATHLEEN Greer M.d.  Senior Resident: Dr. Grover  Intern:  Dr. Ruiz  Contact Number: [phone]    Chief Complaint  Jairo Rivas is a 73 y.o. female admitted 4/13/2024 with painful urination and constipation.     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 It is noted that patient has been having abdominal pain and constipation, however, she has had 2 bowel movements since being admitted.  Her UA in the ED was consistent with UTI.  She had leukocytosis with WBC 24.  Lactic acid 3.  Blood cultures negative thus far.  Started on Rocephin for 3 days to treat UTI.     CT scan of the head was obtained, noted to have pneumatization of the right frontal lobe mass highly concerning for metastasis; associated with genic edema and 2.5 mm right to left midline shift.  Patient continued to remain on Decadron. Also GYN oncology was consulted, discussed the prognosis of the patient, stated that patient is not a candidate for any treatment.  Patient daughter wanted her to be full code at this time.  After several days without any food, NG tube finally placed.  Successful tube feeds.  VZV positive on skin swab. MRI on 4/21/24 did not show evidence of new strokes. It did show a decrease in the size of the brain metastasis in frontotemporal region and surrounding edema.    Interval Problem Update  No acute overnight events . HR and BP are elevated. Patient arousal and tracking with her eyes. Remains verbally unresponsive. No respiratory distress  or tachypnea noted. cEEG is abnormal but unremarkable for epileptiform discharges or seizures. Suction showed NG tube content, CXR confirmed NG tube location in the stomach  and Hazy left basilar opacity, atelectasis and/or mild pneumonitis. Procal negative. Neurology recommended CTA head and neck, TTE, and risk factor reduction.     CTA head w/contrast demonstrated 9 x 7 mm focus of hemorrhage in the right temporal region which is probably subarachnoid, less likely intraparenchymal. Also suspected right MCA stenosis vs occlusion. CTA neck w/contrast pending.    Per discussion with Dr. Vargas (Neurology): Discontinued aspirin. Parameters for antihypertensives modified for goal SBP,130-150. Per Dr. Vargas, focus of bleed is not large enough to significantly affect her overall course and neurosurgery consult is not indicated at this time. Will follow up with CT head non-contrast at morning per their reccs to assess for any changes in size which may compel surgical evaluation.    ZORAIDA: small size left ventricle, mild LV concentric hypertrophy, LVEF 65%, Normal   regional wall motion. No thrombus noted      I have discussed this patient's plan of care and discharge plan at IDT rounds today with Case Management, Nursing, Nursing leadership, and other members of the IDT team.    Consultants/Specialty  neurology and surgical oncology     Code Status  Full Code    Disposition  The patient is not medically cleared for discharge to home or a post-acute facility.      I have placed the appropriate orders for post-discharge needs.    Review of Systems  Review of Systems   Unable to perform ROS: Medical condition        Physical Exam  Temp:  [36.1 °C (97 °F)-36.8 °C (98.2 °F)] 36.1 °C (97 °F)  Pulse:  [100-112] 105  Resp:  [18] 18  BP: (137-179)/() 159/107  SpO2:  [95 %-97 %] 97 %    Physical Exam  Vitals (exam limited) reviewed.   Constitutional:       General: She is sleeping.      Appearance: She is obese. She is  ill-appearing.      Comments: Obtunded and unarousable.    HENT:      Head: Normocephalic and atraumatic.      Mouth/Throat:      Mouth: Mucous membranes are moist.   Eyes:      Extraocular Movements:      Left eye: Nystagmus present.      Comments: Scleral edema with clear yellowish discharge   Cardiovascular:      Rate and Rhythm: Normal rate and regular rhythm.      Pulses: Normal pulses.      Heart sounds: Normal heart sounds.   Pulmonary:      Effort: Pulmonary effort is normal. No respiratory distress.      Breath sounds: Normal breath sounds.   Abdominal:      General: There is distension.   Musculoskeletal:      Right upper arm: Edema present.      Left upper arm: Edema present.      Right lower leg: Edema present.      Left lower leg: Edema present.   Skin:     General: Skin is warm and dry.      Comments: Skin breakdown under breasts. Clean Sacral wound   Neurological:      Motor: Weakness present.      Comments: Open eyes, track across midline         Fluids    Intake/Output Summary (Last 24 hours) at 4/25/2024 1650  Last data filed at 4/25/2024 1400  Gross per 24 hour   Intake 3232 ml   Output 2450 ml   Net 782 ml       Laboratory  Recent Labs     04/23/24  0443 04/24/24  0920 04/25/24  0453   WBC 11.8* 11.4* 10.1   RBC 3.80* 3.48* 3.62*   HEMOGLOBIN 12.1 11.2* 11.5*   HEMATOCRIT 35.9* 32.3* 34.3*   MCV 94.5 92.8 94.8   MCH 31.8 32.2 31.8   MCHC 33.7 34.7 33.5   RDW 50.1* 48.0 50.4*   PLATELETCT 107* 123* 123*   MPV 12.0 12.1 12.1     Recent Labs     04/24/24  0713 04/25/24  0453 04/25/24  1604   SODIUM 141 142 140   POTASSIUM 4.3 4.0 4.3   CHLORIDE 107 107 107   CO2 22 24 22   GLUCOSE 157* 161* 176*   BUN 26* 24* 27*   CREATININE 0.48* 0.41* 0.50   CALCIUM 9.1 8.9 9.2               Recent Labs     04/25/24  0453   TRIGLYCERIDE 109   HDL 47   LDL 97       Imaging  CT-CTA NECK WITH & W/O-POST PROCESSING   Final Result      No occlusion, hemodynamically significant stenosis or dissection of the neck  arteries.      CT-CTA HEAD WITH & W/O-POST PROCESS   Final Result   Addendum (preliminary) 1 of 1   Critical value called by Dr. Hector Dodson to Physician: RONAN NOGUERA   at 4/25/2024 3:48 PM.      Final      1.  9 x 7 mm focus of hemorrhage in the right temporal region which is probably subarachnoid, less likely intraparenchymal.   2.  Focal occlusion of right M2 MCA inferior division with distal opacification. This could be a focal high-grade stenosis versus vessel occlusion. It could also be vasospasm as the subarachnoid hemorrhage is present in this region.   3.  Other findings as above.      Efforts are underway to contact referring physician with results at time of dictation.            EC-ECHOCARDIOGRAM COMPLETE W/O CONT   Final Result      DX-CHEST-PORTABLE (1 VIEW)   Final Result      Feeding tube tip projects in the stomach.      Hazy left basilar opacity, atelectasis and/or mild pneumonitis.      DX-LUMBAR PUNCTURE FOR DIAGNOSIS   Final Result      Fluoroscopic-guided lumbar puncture as described above.      IR-MIDLINE CATHETER INSERTION WO GUIDANCE > AGE 3   Final Result                  Ultrasound-guided midline placement performed by qualified nursing staff    as above.          DX-ABDOMEN FOR TUBE PLACEMENT   Final Result         1.  Nonspecific bowel gas pattern in the upper abdomen.   2.  Dobbhoff tube is coiled within the stomach, the tip terminates overlying the expected location of the gastric cardia.   3.  Hazy left lower lobe infiltrate.      MR-BRAIN-WITH & W/O   Final Result      1.  Significant interval decrease in size of right frontal brain metastasis since previous exam slight interval decrease in surrounding vasogenic edema. Smaller irregular rim of increased diffusion weighted signal intensity about this treated metastasis    consistent with cytotoxic edema.      2.  New large area of acute infarction in the right posterior frontal, parietal, posterior temporal, and occipital  lobes associated gyriform enhancement in the right lateral occipital lobe and right peritrigonal white matter.      3.  Smaller chronic wedge-shaped area of infarction involving the left posterior basal ganglia extending into the left posterior frontal periventricular white matter      4.  Small areas of chronic lacunar type infarction involving the left side of the elaine and left brachium pontis with surrounding gliosis.      5.  Small foci of chronic hemosiderin deposition noted in the left posterior temporal and occipital white matter, as well as in the treated right frontal brain metastasis      6.  Age-related cerebral atrophy.      DX-ABDOMEN FOR TUBE PLACEMENT   Final Result         1.  Nonspecific bowel gas pattern in the upper abdomen.   2.  Dobbhoff tube is coiled within the stomach, the tip terminates overlying the expected location of the gastric fundus, physician similar to prior study.   3.  Left lower lobe infiltrate      DX-ABDOMEN FOR TUBE PLACEMENT   Final Result         1.  Nonspecific bowel gas pattern in the upper abdomen.   2.  Dobbhoff tube is coiled within the stomach, the tip terminates overlying the expected location of the gastric fundus.   3.  Left basilar atelectasis      DX-ABDOMEN FOR TUBE PLACEMENT   Final Result      Feeding tube looped in the stomach the distal tip in the gastric fundus.      CT-HEAD WITH & W/O   Final Result      1.  Redemonstration of right frontal lobe mass, highly concerning for metastasis. Associated vasogenic edema and a 2.5 mm right to left midline shift. No progressive mass effect. No herniation.   2.  Nonspecific ill-defined enhancement in the right parietal lobe.   3.  No new large vascular structure infarct. No new intracranial hemorrhage.      DX-CHEST-LIMITED (1 VIEW)   Final Result      No significant interval change.      DX-ABDOMEN COMPLETE WITH AP OR PA CXR   Final Result      1. No acute cardiopulmonary abnormality.   2. Chronic left lower lobe  scarring.   3. Nonobstructive bowel gas pattern. Small amount of stool throughout the colon.           Assessment/Plan  Problem Representation:    * Acute encephalopathy- (present on admission)  Assessment & Plan  NOT IMPROVING  Patient remains obtunded with occasional alertness. Etiology at time of admission likely multifactorial, as she has multiple possible causes including UTI,  medications (norco, baclofen, gabapentin), recent findings of subacute CVA as well as brain mets with vasogenic edema, and shingles may all be contributing.  EEG on 4/14 did not demonstrate any seizures but was concerning for signs of brain distress as would be expected in setting of edema. No new findings on CTH.  Due to patient's slow decline in mentation despite treatment of UTI and starting NG tube feeds in addition to suddenly high blood pressure, MRI ordered. 4/21 vascular neurologist did not identify any new strokes and evidence of improvement in brain met and vasogenic edema.  LP with CSF positive for VZV done 4/22  CTA head w/contrast 4/25 demonstrated 9 x 7 mm focus of hemorrhage in the right temporal region which is probably subarachnoid, less likely intraparenchymal. Also suspected right MCA stenosis vs occlusion. CTA neck w/contrast pending.  -Per neurology discontinued asa, parameters for anti-HTN modified to -150, f/u CT head w/o in AM, No indication for neurosurgery consult at this time as bleeding not large enough to significantly affect overall course.  -Continue IV acyclovir + maintenance IV fluids  100ml/h in case of disseminated shingles, no PO access  -continue lacosamide for seizure ppx  -fall, seizure, aspiration precautions  -Holding baclofen and gabapentin due to sedating properties       Aspiration pneumonitis (HCC)  Assessment & Plan  Patient high risk for aspiration pneumonitis/pneumonia. Suction showed tube feed residuals  No signs of respiratory compromise or hypoxia. Satting well on RA  CXR  confirmed NG tube location in the stomach  and Hazy left basilar opacity, atelectasis and/or mild pneumonitis.  Procal 0.14  -Will send residuals for gram stain and culture  -Continue aspiration precautions  -CTM    Problem with vascular access  Assessment & Plan  On 4/21, patient's PIV infiltrated twice. First time, PIV placed eventually with ultrasound guidance. Second time occurred in the evening and no VATs available.  -Midline placed on 4/22      Prediabetes- (present on admission)  Assessment & Plan  A1c 6.6, unsure if related to multiple courses of steroids  -Glucose checks q12h  -Will consider resume ISS if glucose >200    Hypermagnesemia  Assessment & Plan  RESOLVED  Expect improvement with enteral food and fluids after NG tube. Currently Mg 3.  -Monitor with daily labs    Vasogenic edema (HCC)- (present on admission)  Assessment & Plan  Continuing decadron taper per Dr. Live's recommendations. He also does not believe that there are any significant changes in her brain imaging from this admission that could explain her somnolence/presentation. She is on the correct medication (decadron) for treating the edema  Improvement on MRI on 4/21  - Continue decadron      Hypernatremia  Assessment & Plan  Sodium oscillates above and below 145. Reacting to changes by switching from D5W and D5 half normal saline. Suspect regulation of sodium will greatly improve as patient receives fluids and nutrition via NG tube.  -Continue to monitor with daily labs  -Consider further workup of etiology if does not balance with enteral nutrition and fluids  -Discontinued fluids, free water pushes ordered    RESOLVED    Seizure (HCC)- (present on admission)  Assessment & Plan  EEG did not show evidence of seizure. Antiepileptic continued given multitude of risk factors.  -Continue lacosamide 150 mg IV BID per neurology  -EEG and cEEG negative for seizures         Dysphagia- (present on admission)  Assessment & Plan  Possibly due  to CVA vs brain metastases and altered mental status in setting of infection.  Patient will remain NPO as she failed SLP evaluation 4/17  NG tube placement required IRIS. Tube was removed for MRI and family refused NG tube to be replaced. Extensive discussion with family regarding nutrition options. Eventually agreed to let patient get NG tube with lorazepam sedation and IRIS. No one certified to place NG tube with IRIS available on 4/21. Vascular access also lost on 4/21 and midline unable to be placed without VATs  -aspiration precautions  -Continue lansoprazole  -[id number] glucose checks      Shingles- (present on admission)  Assessment & Plan  Rash noted under left breast and onto lateral chest wall.  Swab of rash positive for VZV. MRI on 4/21 did not show new infarcts or worsening brain mets. No other dermatomes noted to have rash.  Concern for encephalitis from shingles due to minimal improvement in mentation. Holding off on LP for now per family's wishes  -ID consulted, will see patient 4/22. Recommending LP for definitive diagnosis.  -contact precautions  -Continue acyclovir IV for 10 -14 days per ID reccs  -Miconazole to treat intertrigo    Constipation  Assessment & Plan  Resolved  - Bowel regimen    Sepsis secondary to UTI (HCC)- (present on admission)  Assessment & Plan  RESOLVED  This is Sepsis Present on admission  SIRS criteria identified on my evaluation include: Tachycardia, with heart rate greater than 90 BPM and Leukocytosis, with WBC greater than 12,000  Clinical indicators of end organ dysfunction include Lactic Acid greater than 2 and Toxic Metabolic Encephalopathy  Source is UTI  Sepsis protocol initiated  Crystalloid Fluid Administration: Fluid resuscitation ordered per standard protocol - 30 mL/kg per current or ideal body weight  IV antibiotics as appropriate for source of sepsis  Reassessment: I have reassessed the patient's hemodynamic status  -s/p CTX x5d  -received sepsis bolus in ED,  will continue IVF with caution  -No growth in blood cultures or urine cultures      Malignant neoplasm metastatic to brain (HCC)- (present on admission)  Assessment & Plan  Brain mets noted during hospitalization on 3/27/2024 with vasogenic edema, MRI on 4/11 shows continued brain mets with vasogenic edema.  MRI 4/21 shows decrease in tumor size and improvement in edema.  EEG x2 unremarkable  -continue seizure prophylaxis with lacosamide 150 mg BID  -continue dexamethasone   -Continue fall, aspiration, seizure precaution.  -cEEG started 4/24, No epileptiform discharges or seizures were seen   -Continue IV acyclovir for 10 -14 days per ID  -See CVA for details of the plan updates      Acute kidney injury (HCC)- (present on admission)  Assessment & Plan  RESOLVED  Returned, Cr 1.11 and baseline around 0.7. Likely prerenal from dehydration.  -continue free water flushes  -renally dose all medications as appropriate  -avoid nephrotoxins    Advance care planning- (present on admission)  Assessment & Plan  At this time patient's family want her to be full code.  Palliative care consulted and discussed with family the goals of care. This remains the same.  -Full code  -Family goals of care is to make patient best shape to be discharged to be follow-up by his doctors in [place].  -Family updates daily with her guarded prognosis    Endometrial cancer (HCC)- (present on admission)  Assessment & Plan  Followed by Dr. Garcia outpatient and sees radiation oncology Dr Live.  Received immunotherapy at [facility] in 2021 with initial good response. Reached out to Dr. Live who does not believe imaging from 4/14 shows significant changes compared to MRI month prior.  Complicated by metastases to brain among other locations, likely contributing to her altered mental status. Mets re demonstrated without significant change on CTH on admission.  - Decadron for vasogenic edema  - Neurochecks every 4 hours  - Continue aspiration, fall,  seizure precaution.    Essential hypertension- (present on admission)  Assessment & Plan  Concern for secondary exacerbation of hypertension raised on 4/20 when blood pressures increased to 190s/130s despite receiving scheduled home meds. MRI ordered; no evidence of worsening mets or new stroke.  As of evening of 4/21, patient's vascular access lost and NG tube not replaced. Unable to provide below medications.  - PRN IV hydralazine for SBP >155  - Continue amlodipine 10mg, losartan 100 mg daily with holding parameters  - Continue metoprolol tartrate, Increased  to 75 mg BID on 4/20  - Continue atorvastatin 20 mg every evening    History of CVA (cerebrovascular accident)- (present on admission)  Assessment & Plan  Residual right sided neurodeficits from CVA in 2010.  Has RUE spasticity, RLE pain.  New subacute CVA found on 4/11 [facility] at [facility].  CTA head w/contrast 4/25 demonstrated 9 x 7 mm focus of hemorrhage in the right temporal region which is probably subarachnoid, less likely intraparenchymal. Also suspected right MCA stenosis vs occlusion. CTA neck w/contrast pending.  -Per neurology discontinued asa, parameters for anti-HTN modified to -150, f/u CT head w/o in AM, No indication for neurosurgery consult at this time as bleeding not large enough to significantly affect overall course.  -Discontinued lovenox, continue with SCDs  -CTA neck w/ pending  -Continue atorvastatin   -Continue IV acyclovir for 10 -14 days per ID         VTE prophylaxis: SCDs/TEDs,discontinued lovenox    I have performed a physical exam and reviewed and updated ROS and Plan today (4/25/2024). In review of yesterday's note (4/24/2024), there are no changes except as documented above.

## 2024-04-25 NOTE — PROCEDURES
VIDEO ELECTROENCEPHALOGRAM  REPORT      Referring provider: Dr. Banda    DOS:   4/25/2024 from 7 am to 10:35 am      INDICATION:  Consolate Rajesh Rivas 73 y.o. female presenting with AMS    CURRENT ANTIEPILEPTIC REGIMEN: LCM     TECHNIQUE: A 30-channel, 3 hrs video electroencephalogram (VEEG) was performed in accordance with the international 10-20 system. This digital study was reviewed in bipolar and referential montages. The recording examined the patient during lethargic and sleep states.     The EEG was set up and taken down by a Neurodiagnostic technologist who performed education to patient and staff.     A minimum but not limited to 23 electrodes and 23 channel recording was setup and performed by Neurodiagnostic technologist.    Impedances, electrode integrity, and technical impressions were documented a minimum of every 2-24 hour period by a Neurodiagnostic Technologist and reviewed by Interpreting physician.     There was supervised withdrawal of the following medications: n/a    ACTIVATION PROCEDURES:   None     DESCRIPTION OF THE RECORD:  During maximal wakefulness, the background consisted of diffuse, symmetric theta range slowing at 6-7 Hz. Intermittent bursts of diffuse delta slowing with triphasic morphology was noted. No well-formed posterior dominant rhythm or anterior-posterior gradient was seen.  With clinical sleep state, there was attenuation of the background theta and delta activities and increased fast frequency. Symmetric rudimentary sleep pattern seen.      ICTAL AND/OR INTERICTAL FINDINGS:   No focal or generalized epileptiform activity was noted. No regional slowing was seen during this study.  No seizures were recorded during the study.     EVENT(S):  none     EKG: sampling review of EKG recording demonstrated sinus rhythm.       INTERPRETATION:   This is an abnormal video EEG recording in the lethargic and sleep states.  1) The diffuse background slowing is consistent with  moderate encephalopathy.    2) No epileptiform discharges or seizures were seen.  No events were captured during the study. Clinical correlation is recommended.      Abhishek Millan MD  Diplomate, American Board of Psychiatry and Neurology   Diplomate, American Board of Psychiatry and Neurology in Epilepsy

## 2024-04-25 NOTE — THERAPY
Speech Language Therapy Contact Note    Patient Name: Jairo Rivas  Age:  73 y.o., Sex:  female  Medical Record #: [id number]  Today's Date: 4/25/2024    Discussed missed therapy with RN       04/25/24 7900   Treatment Variance   Reason For Missed Therapy Medical - Patient on Hold from Therapy;Medical - Patient Unarousable;Medical - Patient not Able To Participate   Interdisciplinary Plan of Care Collaboration   IDT Collaboration with  Nursing   Collaboration Comments Attempted to see pt for dysphagia management this date. Per RN, pt continues to be unarousable and is not appropriate to participate in tx. Service will hold and monitor for changes.

## 2024-04-25 NOTE — PROGRESS NOTES
Monitor Summary: SR/ST , TN 0.18, QRS 0.09, QT 0.34, with rare PVCs, couplet, and trigeminy per strip from monitor room.

## 2024-04-26 ENCOUNTER — APPOINTMENT (OUTPATIENT)
Dept: RADIOLOGY | Facility: MEDICAL CENTER | Age: 74
DRG: 871 | End: 2024-04-26
Payer: MEDICARE

## 2024-04-26 PROBLEM — E83.41 HYPERMAGNESEMIA: Status: RESOLVED | Noted: 2024-04-17 | Resolved: 2024-04-26

## 2024-04-26 PROBLEM — Z78.9 PROBLEM WITH VASCULAR ACCESS: Status: RESOLVED | Noted: 2024-04-21 | Resolved: 2024-04-26

## 2024-04-26 PROBLEM — I63.9 ACUTE CVA (CEREBROVASCULAR ACCIDENT) (HCC): Status: ACTIVE | Noted: 2018-02-06

## 2024-04-26 LAB
ALBUMIN SERPL BCP-MCNC: 2.9 G/DL (ref 3.2–4.9)
ALBUMIN/GLOB SERPL: 1 G/DL
ALP SERPL-CCNC: 86 U/L (ref 30–99)
ALT SERPL-CCNC: 34 U/L (ref 2–50)
ANION GAP SERPL CALC-SCNC: 13 MMOL/L (ref 7–16)
AST SERPL-CCNC: 28 U/L (ref 12–45)
BILIRUB SERPL-MCNC: 0.4 MG/DL (ref 0.1–1.5)
BUN SERPL-MCNC: 25 MG/DL (ref 8–22)
CALCIUM ALBUM COR SERPL-MCNC: 10.1 MG/DL (ref 8.5–10.5)
CALCIUM SERPL-MCNC: 9.2 MG/DL (ref 8.5–10.5)
CHLORIDE SERPL-SCNC: 106 MMOL/L (ref 96–112)
CO2 SERPL-SCNC: 22 MMOL/L (ref 20–33)
CREAT SERPL-MCNC: 0.43 MG/DL (ref 0.5–1.4)
ERYTHROCYTE [DISTWIDTH] IN BLOOD BY AUTOMATED COUNT: 50.1 FL (ref 35.9–50)
GFR SERPLBLD CREATININE-BSD FMLA CKD-EPI: 103 ML/MIN/1.73 M 2
GLOBULIN SER CALC-MCNC: 2.9 G/DL (ref 1.9–3.5)
GLUCOSE BLD STRIP.AUTO-MCNC: 158 MG/DL (ref 65–99)
GLUCOSE BLD STRIP.AUTO-MCNC: 169 MG/DL (ref 65–99)
GLUCOSE SERPL-MCNC: 182 MG/DL (ref 65–99)
HCT VFR BLD AUTO: 33.8 % (ref 37–47)
HGB BLD-MCNC: 11.5 G/DL (ref 12–16)
MCH RBC QN AUTO: 32 PG (ref 27–33)
MCHC RBC AUTO-ENTMCNC: 34 G/DL (ref 32.2–35.5)
MCV RBC AUTO: 94.2 FL (ref 81.4–97.8)
PLATELET # BLD AUTO: 137 K/UL (ref 164–446)
PMV BLD AUTO: 11.6 FL (ref 9–12.9)
POTASSIUM SERPL-SCNC: 4.1 MMOL/L (ref 3.6–5.5)
PREALB SERPL-MCNC: 29.8 MG/DL (ref 18–38)
PROT SERPL-MCNC: 5.8 G/DL (ref 6–8.2)
RBC # BLD AUTO: 3.59 M/UL (ref 4.2–5.4)
SODIUM SERPL-SCNC: 141 MMOL/L (ref 135–145)
WBC # BLD AUTO: 9.1 K/UL (ref 4.8–10.8)

## 2024-04-26 PROCEDURE — 80053 COMPREHEN METABOLIC PANEL: CPT

## 2024-04-26 PROCEDURE — 700102 HCHG RX REV CODE 250 W/ 637 OVERRIDE(OP)

## 2024-04-26 PROCEDURE — 700105 HCHG RX REV CODE 258

## 2024-04-26 PROCEDURE — 770020 HCHG ROOM/CARE - TELE (206)

## 2024-04-26 PROCEDURE — 92526 ORAL FUNCTION THERAPY: CPT

## 2024-04-26 PROCEDURE — 700102 HCHG RX REV CODE 250 W/ 637 OVERRIDE(OP): Performed by: STUDENT IN AN ORGANIZED HEALTH CARE EDUCATION/TRAINING PROGRAM

## 2024-04-26 PROCEDURE — A9270 NON-COVERED ITEM OR SERVICE: HCPCS

## 2024-04-26 PROCEDURE — 84134 ASSAY OF PREALBUMIN: CPT

## 2024-04-26 PROCEDURE — 99232 SBSQ HOSP IP/OBS MODERATE 35: CPT | Performed by: PSYCHIATRY & NEUROLOGY

## 2024-04-26 PROCEDURE — 665998 HH PPS REVENUE CREDIT

## 2024-04-26 PROCEDURE — 700111 HCHG RX REV CODE 636 W/ 250 OVERRIDE (IP): Mod: JZ | Performed by: STUDENT IN AN ORGANIZED HEALTH CARE EDUCATION/TRAINING PROGRAM

## 2024-04-26 PROCEDURE — 665999 HH PPS REVENUE DEBIT

## 2024-04-26 PROCEDURE — 82962 GLUCOSE BLOOD TEST: CPT

## 2024-04-26 PROCEDURE — 99233 SBSQ HOSP IP/OBS HIGH 50: CPT | Performed by: STUDENT IN AN ORGANIZED HEALTH CARE EDUCATION/TRAINING PROGRAM

## 2024-04-26 PROCEDURE — 36415 COLL VENOUS BLD VENIPUNCTURE: CPT

## 2024-04-26 PROCEDURE — 700111 HCHG RX REV CODE 636 W/ 250 OVERRIDE (IP): Mod: JZ

## 2024-04-26 PROCEDURE — 85027 COMPLETE CBC AUTOMATED: CPT

## 2024-04-26 PROCEDURE — 700102 HCHG RX REV CODE 250 W/ 637 OVERRIDE(OP): Performed by: INTERNAL MEDICINE

## 2024-04-26 PROCEDURE — A9270 NON-COVERED ITEM OR SERVICE: HCPCS | Performed by: STUDENT IN AN ORGANIZED HEALTH CARE EDUCATION/TRAINING PROGRAM

## 2024-04-26 PROCEDURE — A9270 NON-COVERED ITEM OR SERVICE: HCPCS | Performed by: INTERNAL MEDICINE

## 2024-04-26 RX ORDER — SULFAMETHOXAZOLE AND TRIMETHOPRIM 400; 80 MG/1; MG/1
1 TABLET ORAL DAILY
Status: DISCONTINUED | OUTPATIENT
Start: 2024-04-27 | End: 2024-04-27

## 2024-04-26 RX ORDER — ENOXAPARIN SODIUM 100 MG/ML
40 INJECTION SUBCUTANEOUS DAILY
Status: DISCONTINUED | OUTPATIENT
Start: 2024-04-26 | End: 2024-05-13 | Stop reason: HOSPADM

## 2024-04-26 RX ADMIN — CARBOXYMETHYLCELLULOSE SODIUM 2 DROP: 5 SOLUTION/ DROPS OPHTHALMIC at 20:13

## 2024-04-26 RX ADMIN — METOPROLOL TARTRATE 75 MG: 25 TABLET, FILM COATED ORAL at 16:42

## 2024-04-26 RX ADMIN — BISACODYL 10 MG: 10 SUPPOSITORY RECTAL at 06:14

## 2024-04-26 RX ADMIN — LANSOPRAZOLE 30 MG: 30 TABLET, ORALLY DISINTEGRATING ORAL at 06:24

## 2024-04-26 RX ADMIN — ACYCLOVIR SODIUM 650 MG: 50 INJECTION, SOLUTION INTRAVENOUS at 16:41

## 2024-04-26 RX ADMIN — POLYETHYLENE GLYCOL 3350 1 PACKET: 17 POWDER, FOR SOLUTION ORAL at 06:25

## 2024-04-26 RX ADMIN — LOSARTAN POTASSIUM 100 MG: 50 TABLET, FILM COATED ORAL at 06:24

## 2024-04-26 RX ADMIN — LACOSAMIDE 150 MG: 100 TABLET, FILM COATED ORAL at 06:24

## 2024-04-26 RX ADMIN — LACOSAMIDE 150 MG: 100 TABLET, FILM COATED ORAL at 16:42

## 2024-04-26 RX ADMIN — METOPROLOL TARTRATE 75 MG: 25 TABLET, FILM COATED ORAL at 06:25

## 2024-04-26 RX ADMIN — ATORVASTATIN CALCIUM 20 MG: 20 TABLET, FILM COATED ORAL at 16:43

## 2024-04-26 RX ADMIN — DEXAMETHASONE 4 MG: 4 TABLET ORAL at 16:43

## 2024-04-26 RX ADMIN — DEXAMETHASONE 4 MG: 4 TABLET ORAL at 06:25

## 2024-04-26 RX ADMIN — ENOXAPARIN SODIUM 40 MG: 100 INJECTION SUBCUTANEOUS at 17:05

## 2024-04-26 RX ADMIN — ACYCLOVIR SODIUM 650 MG: 50 INJECTION, SOLUTION INTRAVENOUS at 08:59

## 2024-04-26 RX ADMIN — AMLODIPINE BESYLATE 10 MG: 10 TABLET ORAL at 06:25

## 2024-04-26 RX ADMIN — INSULIN HUMAN 1 UNITS: 100 INJECTION, SOLUTION PARENTERAL at 16:56

## 2024-04-26 RX ADMIN — CARBOXYMETHYLCELLULOSE SODIUM 2 DROP: 5 SOLUTION/ DROPS OPHTHALMIC at 06:26

## 2024-04-26 ASSESSMENT — FIBROSIS 4 INDEX: FIB4 SCORE: 2.49

## 2024-04-26 ASSESSMENT — PAIN DESCRIPTION - PAIN TYPE
TYPE: ACUTE PAIN
TYPE: ACUTE PAIN

## 2024-04-26 NOTE — DISCHARGE PLANNING
TCN following. HTH/SCP chart reviewed. No new TCN needs identified. Please see prior TCN note from 4/24 for most recent discharge planning considerations if indicated. Noted per TCN note on 4/18, family can provide private caregivers if needed.      Completed:  SLP recommending post acute placement - 4/26  Choice obtained: HERLINDA

## 2024-04-26 NOTE — DISCHARGE PLANNING
Case Management Discharge Planning    Admission Date: 4/13/2024  GMLOS: 5.1  ALOS: 13    6-Clicks ADL Score: 6  6-Clicks Mobility Score: 6  PT and/or OT Eval ordered: Yes  Post-acute Referrals Ordered: Yes  Post-acute Choice Obtained: Yes  Has referral(s) been sent to post-acute provider:  No      Anticipated Discharge Dispo: Discharge Disposition: D/T to home under [facility] care in anticipation of covered skilled care (06)    DME Needed: No    Action(s) Taken: Updated Provider/Nurse on Discharge Plan  RN CM attended IDT rounds with the team, chart reviewed. . Pt is not medically cleared. Per Dr. Grove, Pt remains encephalopathic. Pt seen by speech today. Pt on IV Acyclovir.     Per note, DC plan is home with . Family can provide caregiver for Pt if needed.        Escalations Completed: Provider    Medically Clear: No    Next Steps: RN CM to continue to assist in Pt's discharge needs    Barriers to Discharge: Medical clearance    Is the patient up for discharge tomorrow: No

## 2024-04-26 NOTE — PROGRESS NOTES
Neurology Progress Note        Subjective:  Carlene was seen with her daughter at the bedside.  He daughter reports she did not sleep well overnight, so is now sleepy this morning.  She notes however that Carlene was very verbal yesterday afternoon and saying things that made sense.       Objective:  Vitals:  Vitals:    04/25/24 2353 04/26/24 0400 04/26/24 0613 04/26/24 0801   BP: (!) 145/80 135/83 (!) 146/89 (!) 145/86   Pulse: (!) 127 (!) 116 (!) 124 (!) 105   Resp: 20 18 20 20   Temp: 36.4 °C (97.5 °F) 36.6 °C (97.9 °F)  36.5 °C (97.7 °F)   TempSrc: Temporal Temporal  Temporal   SpO2: 98% 97% 91% 97%   Weight:       Height:         MS: Eyes open, attempts to speak, but dysarthric and I can't understand her.    CN: PEERL, appears to track me, no clear facial weakness   MOT: Right upper extremity spastic, but withdraws, moves left arm spontaneously, withdraws both legs  SENS: responds to noxious stimulation as above  DTR: 2+ and symmetric; toes down  COOR: unable to test due to mental status  Gait: unable to test due to mental status    Recent Labs     04/24/24  0920 04/25/24  0453 04/26/24  0358   WBC 11.4* 10.1 9.1   RBC 3.48* 3.62* 3.59*   HEMOGLOBIN 11.2* 11.5* 11.5*   HEMATOCRIT 32.3* 34.3* 33.8*   MCV 92.8 94.8 94.2   MCH 32.2 31.8 32.0   MCHC 34.7 33.5 34.0   RDW 48.0 50.4* 50.1*   PLATELETCT 123* 123* 137*   MPV 12.1 12.1 11.6     Recent Labs     04/25/24  0453 04/25/24  1604 04/26/24  0358   SODIUM 142 140 141   POTASSIUM 4.0 4.3 4.1   CHLORIDE 107 107 106   CO2 24 22 22   GLUCOSE 161* 176* 182*   BUN 24* 27* 25*   CREATININE 0.41* 0.50 0.43*   CALCIUM 8.9 9.2 9.2                 Recent Labs     04/25/24  0453   TRIGLYCERIDE 109   HDL 47   LDL 97     Recent Labs     04/25/24  0453 04/25/24  1604 04/26/24  0358   SODIUM 142 140 141   POTASSIUM 4.0 4.3 4.1   CHLORIDE 107 107 106   CO2 24 22 22   GLUCOSE 161* 176* 182*   BUN 24* 27* 25*     Recent Labs     04/25/24  0453 04/25/24  1604 04/26/24  0358   SODIUM  142 140 141   POTASSIUM 4.0 4.3 4.1   CHLORIDE 107 107 106   CO2 24 22 22   BUN 24* 27* 25*   CREATININE 0.41* 0.50 0.43*   CALCIUM 8.9 9.2 9.2         No results found for this or any previous visit.           Imaging: neuroimaging reviewed and directly visualized by me  CT-CTA NECK WITH & W/O-POST PROCESSING   Final Result      No occlusion, hemodynamically significant stenosis or dissection of the neck arteries.      CT-CTA HEAD WITH & W/O-POST PROCESS   Final Result   Addendum (preliminary) 1 of 1   Critical value called by Dr. Hector Dodson to Physician: RONAN NOGUERA   at 4/25/2024 3:48 PM.      Final      1.  9 x 7 mm focus of hemorrhage in the right temporal region which is probably subarachnoid, less likely intraparenchymal.   2.  Focal occlusion of right M2 MCA inferior division with distal opacification. This could be a focal high-grade stenosis versus vessel occlusion. It could also be vasospasm as the subarachnoid hemorrhage is present in this region.   3.  Other findings as above.      Efforts are underway to contact referring physician with results at time of dictation.            EC-ECHOCARDIOGRAM COMPLETE W/O CONT   Final Result      DX-CHEST-PORTABLE (1 VIEW)   Final Result      Feeding tube tip projects in the stomach.      Hazy left basilar opacity, atelectasis and/or mild pneumonitis.      DX-LUMBAR PUNCTURE FOR DIAGNOSIS   Final Result      Fluoroscopic-guided lumbar puncture as described above.      IR-MIDLINE CATHETER INSERTION WO GUIDANCE > AGE 3   Final Result                  Ultrasound-guided midline placement performed by qualified nursing staff    as above.          DX-ABDOMEN FOR TUBE PLACEMENT   Final Result         1.  Nonspecific bowel gas pattern in the upper abdomen.   2.  Dobbhoff tube is coiled within the stomach, the tip terminates overlying the expected location of the gastric cardia.   3.  Hazy left lower lobe infiltrate.      MR-BRAIN-WITH & W/O   Final Result       1.  Significant interval decrease in size of right frontal brain metastasis since previous exam slight interval decrease in surrounding vasogenic edema. Smaller irregular rim of increased diffusion weighted signal intensity about this treated metastasis    consistent with cytotoxic edema.      2.  New large area of acute infarction in the right posterior frontal, parietal, posterior temporal, and occipital lobes associated gyriform enhancement in the right lateral occipital lobe and right peritrigonal white matter.      3.  Smaller chronic wedge-shaped area of infarction involving the left posterior basal ganglia extending into the left posterior frontal periventricular white matter      4.  Small areas of chronic lacunar type infarction involving the left side of the elaine and left brachium pontis with surrounding gliosis.      5.  Small foci of chronic hemosiderin deposition noted in the left posterior temporal and occipital white matter, as well as in the treated right frontal brain metastasis      6.  Age-related cerebral atrophy.      DX-ABDOMEN FOR TUBE PLACEMENT   Final Result         1.  Nonspecific bowel gas pattern in the upper abdomen.   2.  Dobbhoff tube is coiled within the stomach, the tip terminates overlying the expected location of the gastric fundus, physician similar to prior study.   3.  Left lower lobe infiltrate      DX-ABDOMEN FOR TUBE PLACEMENT   Final Result         1.  Nonspecific bowel gas pattern in the upper abdomen.   2.  Dobbhoff tube is coiled within the stomach, the tip terminates overlying the expected location of the gastric fundus.   3.  Left basilar atelectasis      DX-ABDOMEN FOR TUBE PLACEMENT   Final Result      Feeding tube looped in the stomach the distal tip in the gastric fundus.      CT-HEAD WITH & W/O   Final Result      1.  Redemonstration of right frontal lobe mass, highly concerning for metastasis. Associated vasogenic edema and a 2.5 mm right to left midline shift.  No progressive mass effect. No herniation.   2.  Nonspecific ill-defined enhancement in the right parietal lobe.   3.  No new large vascular structure infarct. No new intracranial hemorrhage.      DX-CHEST-LIMITED (1 VIEW)   Final Result      No significant interval change.      DX-ABDOMEN COMPLETE WITH AP OR PA CXR   Final Result      1. No acute cardiopulmonary abnormality.   2. Chronic left lower lobe scarring.   3. Nonobstructive bowel gas pattern. Small amount of stool throughout the colon.      CT-HEAD W/O    (Results Pending)     Impression:   73-year-old woman with persistent encephalopathy and stupor.  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  There is significant vasogenic edema surrounding the right frontal met.  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  24-hour EEG showed no concern for seizure activity.  Suspect her encephalopathy is multifactorial given underlying CNS inflammation/metastases/vasogenic edema/recent ischemic stroke.     Recent MRI does show restricted diffusion in the posterior right temporal lobe consistent with scattered recent ischemic stroke.  CTA evaulation show right MCA branch stenosis vs. Occlusion as the likely culprit.  This vascular abnormality could be due to athero, radiation induced or related to VZV vasculopathy.     Recommendations:  #History of seizures  -continue Vimpat 150mg bid  -EEG without ongoing seizure activity    #Encephalopathy  Certainly multifactorial given brain met with increasing vasogenic edema, new stroke, ongoing meningoencephalitis  -Work to normalize day/night sleep    #Ischemic Stroke   Secondary to intracranial vasculopathy (athero vs. Radiation induced vs. VZV vasculopathy)  -Holding aspirin given yesterday CT showing small amount of hemorrhage  -continue IV  acyclovir per ID, would complete 14 day course given cannot rule out vasculopathy due to VZV  -Follow up CT scan today    #Intracranial Hemorrhage  Tiny amount of intraparenchymal vs. SAH near the right sylvian fissure.  Hemorrhagic transformation of small ischemic stroke vs. Post-radiation induced.  Clinically insignificant given its size and location.  -Follow up CT today  -Holding aspirin for now, ok to use DVT prophylaxis     Neurology will continue to follow     Jam Vargas MD

## 2024-04-26 NOTE — PROGRESS NOTES
Bedside report received from [facility]. Daughter of patient at bedside - requesting something to help her sleep tonight and a new speech eval since the patient is less obtunded. Bed alarm on. All pumps checked. No further needs at this time.

## 2024-04-26 NOTE — THERAPY
Speech Language Pathology   Daily Treatment     Patient Name: Jairo Rivas  AGE:  73 y.o., SEX:  female  Medical Record #: [id number]  Date of Service: 4/26/2024      Precautions:  Precautions: Fall Risk, Swallow Precautions, Nasogastric Tube         Subjective  RN cleared pt to work with SLP, reports family requested pt be re-evaluated as she was more awake and talking yesterday afternoon. Daughter, Carolyn, was at bedside and helpful in trying to keep pt awake for the reassessment, report she was asking for water and bread yesterday afternoon but then had a bad night and didn't sleep at all.     Assessment  Patient responded with verbal grunts and vocalizations to tactile and verbal cues to maintain arousal. She did not respond to basic questions, such as her name. Oral care was provided with bite reflex appreciated. Lingual surface was xerostomic. Pt did not follow commands for [facility]. She was given ice chips placed in frontal oral cavity with pt unable to maintain labial seal, resulting in 100% anterior bolus loss of ice chips. No other PO trials were given due to poor JUJU with pt unable to maintain adequate arousal despite ongoing verbal/tactile stimulation from both SLP and daughter.       Clinical Impressions  Inadequate JUJU for PO intake. SLP will continue to follow and reassess if/when pt is consistently able to maintain arousal, though per Palliative Care note from 4/25, her medical prognosis is highly guarded.    Recommendations  Treatment Completed: Dysphagia Treatment  Dysphagia Treatment  Diet Consistency: NPO/NGT  Instrumentation: Instrumental swallow study pending clinical progress  Medication: Non Oral  Oral Care: Q2h    SLP Treatment Plan  Treatment Plan: Dysphagia Treatment  SLP Frequency: 4x Per Week  Estimated Duration: Until Therapy Goals Met      Anticipated Discharge Needs  Discharge Recommendations: Recommend post-acute placement for additional speech therapy services prior  "to discharge home  Therapy Recommendations Upon DC: Dysphagia Training, Patient / Family / Caregiver Education      Patient / Family Goals  Patient / Family Goal #1: \"She eats at home\" per dtr  Goal #1 Outcome: Goal not met  Short Term Goals  Short Term Goal # 1: Pt will participate in prfdg to determine readiness for PO diet vs. diagnostic evaluation.  Goal Outcome # 1: Goal not met      Angeles Burdick MS,CCC-SLP  "

## 2024-04-26 NOTE — CARE PLAN
The patient is Watcher - Medium risk of patient condition declining or worsening    Shift Goals  Clinical Goals: Improved neuro status, Maintain skin integrity  Patient Goals: ZA  Family Goals: Improve, Rest    Progress made toward(s) clinical / shift goals:  Patient is Oriented to self. Educated patient and family at bedside on POC. Patient denies pain at this time. Continuous TF in place. Continuous IVF in place. Q2 hour turns. Female wick in use for voiding needs. Elbow and heel mepilex in place. Heel float boots on. Patient on EDEL bed. Isolation precautions in place. Medicated patient per MAR for sleep. HOB at 30 degrees. Bed is low and locked. Bed alarm on. Call light within reach. Hourly rounding continues.       Problem: Pain - Standard  Goal: Alleviation of pain or a reduction in pain to the patient’s comfort goal  Outcome: Progressing     Problem: Respiratory  Goal: Patient will achieve/maintain optimum respiratory ventilation and gas exchange  Outcome: Progressing       Patient is not progressing towards the following goals:      Problem: Knowledge Deficit - Standard  Goal: Patient and family/care givers will demonstrate understanding of plan of care, disease process/condition, diagnostic tests and medications  Outcome: Not Progressing  Note: Patient requires reinforced education.

## 2024-04-26 NOTE — PROGRESS NOTES
Inpatient Palliative Medicine Progress Note     Jairo Rivas  73 y.o. female  [id number]    Location = [facility]  Referral Source = KATHLEEN Greer M.d.    PCP = ZULEYKA Lyons    Reason for palliative medicine consultation and/or visit: ACP         Assessment and Plan:     GOAL(S) OF CARE = LONGEVITY, a chance at [facility] for immunotherapy again (daughter Danita lives in [place])    SYMPTOMS ETIOLOGY/CAUSES = confusion & dysphagia secondary secondary to sepsis, recent stroke, and progressing metastatic endometrial adenocarcinoma to brain    PROGNOSIS = overall unfavorable & highly guarded, lingering delirium & encephalopathy & NPO.    CODE STATUS = FULL at this time      ADVANCE CARE PLANNING =   Relevant recent history reviewed.  Oncology history reviewed.    Anticipatory education on care philosophy and rational for limited NGT feed trial (~1wk) if family were to elect.  Informed daughter Carolyn and her  Michael about importance of caloric intake thru NGT, for family's stated goal at this time.  Educated family how limited trial of NGT can add some clarity to family, regarding how close or how far they are to achieving their goal, but without prolonging pt in an undesirable state.  Shared with family about my own professional reservation about how much calories along can change patient's trajectory, hence my recommendation for limited TF trial but no longer.  Educated family on importance of periodic reassessment and avoidance of non-beneficial measures, if refractory at reassessment.    Medical philosophy education and the importance how FUNCTION determines ultimate treatment options.  Anticipatory guidance/education on possible clinical outcomes, as studies & treatments finish.      PALLIATIVE CARE TEAM INTERVENTIONS =   1.ACP for anticipatory guidance & overall care philosophy, today very much the same talk with the other daughter Danita, who drove up from [place], [place].    2. Case  discussed with oncology RN navigator Isa Zaidi. Relayed family's wish for an oncology second opinion, in addition to Dr. Garcia's team.    3. Coordinated with  to see if we can find a English Imam to visit & pray, if possible.  4. Will follow and support pt & family to our utmost in this difficult time.    5. Encouraged Radha Samayoa and their 3 siblings to visit if/when possible, as pt remains complex & clinical outcome uncertain.    6. 4/25/2024 Primary team may consider an ophthalmology eval for eye discharges, if possible.      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    Despite care so far, patient unfortunately remains encephalopathic and unable to pass SLP swallow evaluation yet. She is a [facility] patient and has been followed by Dr. Garcia since 2019. Patient unfortunately remains unfit for further cancer directed treatment at this time.    4/14/2024 CT-Head - R frontal lobe mass, with non-specific R parietal lobe enhancements    ------------------------------------------------------------------------------------------------------------------------------------------------------    4/25/2024  Patient seen bedside: lethargic, but off restraints now, on room air. Napping. Some liquid, cloudy discharges both eyes that her daughter Carolyn pointed out to me. Carolyn requested ophthalmology input, if possible. Added scheduled artificial tears eye drops for patient, to which Carolyn agreed.    Carolyn reprots that today is patient's best days far. She stated that patient was able to follow some simple commands today and said a few things to her and her   Michael about Yarsanism music, about wanting help, about wanting to drink water for thirst, about dry throat, etc.    From Carolyn's perspective, she and Michael saw some small but noticeable improvements today, a bit more so than last few days. They continue to hope patient can continue to improve to a functional level that's needed for more cancer treatment at [facility].        4/22/2024  Met with patient's daughter Radha bedside. We reviewed the challenges so far this admission, the stagnant nature of patient's lethargy/obtund state, possible influencing factors, the LP and its role in infectious workup, etc.    We discussed again how function may determine or limit treatment options, as we did last Friday.    Family have some questions about getting patient's [facility] oncology neurology opinions. Provided family education about the logistic difficulties with this kind of consultation (state license differences, facility privileges, etc.) Also provided daughters my own professional opinion that, as pt is in her current state the way she is, doubtful a oncology neurology service will provide much if anything additional compared to a traditional neurology team, who have already been following since admission....    Agreed with family an LP may help rule in or rule out possible contributing factors to patient's ongoing poor mentation.    Again encouraged family and siblings to visit when able, as clinical picture remains unchanged with minimal-to-no overall functional improvement.        4/19/2024  Met with patient's daughter Danita and also her sister Carolyn over phone today. Provided medical updates - pt more sleepy overall, with corresponding gabapentin dose reduction by primary team. No narcotic (no oxycodone, no dilaudid) for about 24 hours now. Explained to daughters that most likely opioids are not contributing to patient's increased somnolence.     Also shared with daughters my professional approval/agreement  with primary team's non-narcotic management of patient's pain and discomfort. Patient did not appear distressed to me today.    We discussed about general treatment philosophy, especially how FUNCTION, above all other studies and signs, will always remain the #1 determining factor for treatment options. We also talked briefly about the lagging nature of imaging, lagging in the sense that functional changes typically occurs first before corresponding imaging changes. Also discussed about our relative permissive generosity over glucose control for older & sicker patients, mostly for safety.    Encouraged family and siblings to visit, if/when able, as patient remains quite medically complex.    Provided a few words of encouragement to these two loving daughters who deeply care about their mother.        4/17/2024  Very extensive discussion with patient's daughter Danita about patient's current care. History reviewed. Recent studies, labs, imaging reviewed together, including recent NSG notes prior to radiation therapy.    Family have decided on NGT trial to see if this can help patient improve some more. They do feel patient is more alert, although her cognition remains quite short off baseline as well.    They are certain continued aggressive care, including ICU care & life support are in line with patient and family's values. Patient has been chair bound since 2010, even while she fought her endometrial cancer, so they have been down this road before.    They are taking things a day at a time, and hoping to provide best care to best optimize patient, however much possible... They all feel like they have been well updated by medical team so far, and do understand the very real risk of deterioration/decline, as much as they try to remain hopeful.    Patient has always fought hard. 2 years ago, hospice was discussed with Dr. Garcia's team, but in the end patient moved down to [place] to live with Danita and was treated at  "[facility] with fairly good response, despite already burdened with brain mets at that time. This experience weighed heavily in family's mind. They have learned to ask good questions and leave no stones unturned.    Extensive education provided, putting on Danita's radar about various risk on the roads ahead: NGT pulling, restraints & suffering, inability to get any cancer treatment if unable to wean off life support in ICU, etc. She understood and appeared overall quite sophisticated in her thoughts and questions, and much like her sister Carolyn, with patient's best interests in mind and in alignment with their best understanding of patient's values/preferences.    Assured Danita that medical team will continue to provide the best support we can, in as good an alignment as we can with their goals and values. Informed Danita that the only thing that needs to be prevented is suffering itself and, if/when medical team or family are concerned about suffering, that will be a great time to talk more about what we are doing. Danita ackowledged understanding.    Encouraged Radha [place] and [place] to visit if/when possible, as clinical outcome remains highly uncertain.      4/16/2024  Met with patient, her daughter Carolyn, and Carolyn's  Michael bedside. Introduced myself, my scope of practice, and reasons for this consultation.Carolyn and Michael were amenable to proceed with this visit.    Patient were self were delirious and minimally conversive. She was able to say \"no\" when I inquired about her pain & discomfort. That was the extent of our conversations.    Patient is originally from [place]. Carolyn is her only blood relative here in [facility]. Carolyn's sister aDnita resides in [place]. Thanks to the support of Danita's family, patient was able to receive cancer treatments at [facility] [phone], with fairly good response at that time, all things considered.    According to Carolyn, patient's " "has been a \"real tough cookie.\" One of the main reasons that led to her prior stint at [facility] oncology was disease progression & hospice discussion. Carolyn and family were amazed how patient was able to surpass expectations a few times.    Patient is a devout Alevism and prays regularly. Daughter is amenable to a Imam visiting if it can be arranged for patient's spiritual support.     Patient is said to enjoy \"shopping & casinos, and that's why she came back to [place] in AUG 2023,\" Carolyn told me.        We talked about family's outstanding goal of care, which is to try to optimize pt for immunotherapy again, if possible, be it rehab or going to [facility] again.    We discussed imminent road blocks at this time = dysphagia & NPO status, infection, continued delirium/confusion, low function. We discussed at length about a time limited (~1wk or so) TF trial to see if it is possible to get patient closer to family's goal again, and reassess frequently to make sure we do not prolong decline if not responding to treatments.    Carolyn is rightfully worried about side effects of NTG - infection, bleed, pain/discomfort, decline prolongation - and I reinforced the importance of the time-limited nature of NTG trial but no longer. Also shared my honest professional opinion that likely, all things considered, calories alone will not get patient close enough to their goal for more immunotherapy.    Carolyn has lots of thoughts. She understands the incurable nature of patient's cancer and ongoing decline over the years, as tough as patient has weathered all these challenges.    We agreed to a family meeting tomorrow 1400 to further discuss with family about roads ahead,         Medical Decision Making =  Patient is of high medical complexity with incurable disease = endometrial adenocarcinoma metastatic to brain & lung  Care complexity further complicated by secondary conditions = 2010 CVA with residual R deficits + new subacute CVA, acute on " chronic baseline delirium/hallucinations, dysphagia NPO status, sepsis    Extensive data reviewed & coordination performed = recent notes, latest labs & studies, GYN past history, ONC past history, [facility] notes, etc.    High risk of morbidity from additional interventions &  studies = NPO & low functioning & still delirious, high risk medication usage including continuous fluid        Advance care planning  =   Thank you for allowing me the opportunity to participate in the care of Jairo Rivas     I spent a total of 35minutes reviewing medical records, direct face-to-face time with the patient and/or family, documentation and coordination of care. This is separate from the time spent on advance care planning, which is documented above.         ILEANA (JUANJOSE) DO Percy BOLANOSUpper [facility]   [id number] [place] [address]  [id number]  P: [phone]  F: [phone]

## 2024-04-26 NOTE — PROGRESS NOTES
Hospital Medicine Daily Progress Note    Date of Service  4/26/2024    Chief Complaint  Jairo Rivas is a 73 y.o. female admitted 4/13/2024 with acute enteropathy    Hospital Course  73 female with past medical history of endometrial carcinoma, brain metastasis s/p radiation therapy, CVA presented with altered mental status.  MRI on 4/20 noted with right frontal brain metastasis with vasogenic edema, acute infarction.  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  Neurology and ID consulted on the case.  CT head from 4/21 noted with 9X 7 mm of hemorrhage in the right temporal region.  Neurology recommending continue acyclovir.    Interval Problem Update    4/26/2024  Patient seen and examined at bedside  She is disoriented  Does not follow command  She withdrew from painful stimuli  Her vitals are stable  On room air  On feeding tube    Labs reviewed normal white count, sodium 141, stable renal function.  Repeat CT head from today noted with subarachnoid hemorrhage which is stable    Close telemetry monitoring  Continue Vimpat    Okay to use DVT prophylaxis per neurosurgery  Continue to hold aspirin for subarachnoid hemorrhage  Discussed with neurology Dr. Vargas  Repeat BMP in a.m. to monitor electrolytes and renal function while on IV acyclovir  Repeat CBC in a.m. to monitor white count and hemoglobin      High risk of deterioration .  Need close monitoring  Daughter at bedside.  Updated with plan  Palliative discussed goals of care with daughter.  Family requested continue with full code.  Continue monitor mental status closely.  If she fails to improve we have to discuss further goals of care discussion with family.    Patient has a high medical complexity, complex decision making and is at high risk of complication, morbidity and mortality      I have discussed this patient's plan of care and discharge plan at IDT rounds today with Case Management, Nursing, Nursing  leadership, and other members of the IDT team.    Consultants/Specialty  neurology    Code Status  Full Code    Disposition  The patient is not medically cleared for discharge to home or a post-acute facility.      I have placed the appropriate orders for post-discharge needs.    Review of Systems  Review of Systems   Unable to perform ROS: Acuity of condition        Physical Exam  Temp:  [36.3 °C (97.3 °F)-36.6 °C (97.9 °F)] 36.3 °C (97.3 °F)  Pulse:  [] 101  Resp:  [18-20] 18  BP: (113-169)/() 113/59  SpO2:  [91 %-98 %] 96 %    Physical Exam  Constitutional:       Appearance: She is ill-appearing.   HENT:      Head:      Comments: On feeding tube  Cardiovascular:      Rate and Rhythm: Tachycardia present.      Pulses: Normal pulses.   Pulmonary:      Effort: Pulmonary effort is normal. No respiratory distress.   Abdominal:      General: Abdomen is flat.   Musculoskeletal:      Right lower leg: No edema.      Left lower leg: No edema.   Neurological:      Mental Status: She is disoriented.      Comments: She does not follow command.  Moves extremities to painful stimuli         Fluids    Intake/Output Summary (Last 24 hours) at 4/26/2024 1522  Last data filed at 4/26/2024 1200  Gross per 24 hour   Intake 2640 ml   Output 1650 ml   Net 990 ml       Laboratory  Recent Labs     04/24/24  0920 04/25/24  0453 04/26/24  0358   WBC 11.4* 10.1 9.1   RBC 3.48* 3.62* 3.59*   HEMOGLOBIN 11.2* 11.5* 11.5*   HEMATOCRIT 32.3* 34.3* 33.8*   MCV 92.8 94.8 94.2   MCH 32.2 31.8 32.0   MCHC 34.7 33.5 34.0   RDW 48.0 50.4* 50.1*   PLATELETCT 123* 123* 137*   MPV 12.1 12.1 11.6     Recent Labs     04/25/24  0453 04/25/24  1604 04/26/24  0358   SODIUM 142 140 141   POTASSIUM 4.0 4.3 4.1   CHLORIDE 107 107 106   CO2 24 22 22   GLUCOSE 161* 176* 182*   BUN 24* 27* 25*   CREATININE 0.41* 0.50 0.43*   CALCIUM 8.9 9.2 9.2             Recent Labs     04/25/24  0453   TRIGLYCERIDE 109   HDL 47   LDL 97       Imaging  CT-HEAD W/O    Final Result      1.  9 x 7 mm focus of hemorrhage in the right temporal region, suspected subarachnoid versus less likely intraparenchymal. It is stable since noncontrast head CT performed one day prior.         CT-CTA NECK WITH & W/O-POST PROCESSING   Final Result      No occlusion, hemodynamically significant stenosis or dissection of the neck arteries.      CT-CTA HEAD WITH & W/O-POST PROCESS   Final Result   Addendum (preliminary) 1 of 1   Critical value called by Dr. Hector Dodson to Physician: RONAN NOGUERA   at 4/25/2024 3:48 PM.      Final      1.  9 x 7 mm focus of hemorrhage in the right temporal region which is probably subarachnoid, less likely intraparenchymal.   2.  Focal occlusion of right M2 MCA inferior division with distal opacification. This could be a focal high-grade stenosis versus vessel occlusion. It could also be vasospasm as the subarachnoid hemorrhage is present in this region.   3.  Other findings as above.      Efforts are underway to contact referring physician with results at time of dictation.            EC-ECHOCARDIOGRAM COMPLETE W/O CONT   Final Result      DX-CHEST-PORTABLE (1 VIEW)   Final Result      Feeding tube tip projects in the stomach.      Hazy left basilar opacity, atelectasis and/or mild pneumonitis.      DX-LUMBAR PUNCTURE FOR DIAGNOSIS   Final Result      Fluoroscopic-guided lumbar puncture as described above.      IR-MIDLINE CATHETER INSERTION WO GUIDANCE > AGE 3   Final Result                  Ultrasound-guided midline placement performed by qualified nursing staff    as above.          DX-ABDOMEN FOR TUBE PLACEMENT   Final Result         1.  Nonspecific bowel gas pattern in the upper abdomen.   2.  Dobbhoff tube is coiled within the stomach, the tip terminates overlying the expected location of the gastric cardia.   3.  Hazy left lower lobe infiltrate.      MR-BRAIN-WITH & W/O   Final Result      1.  Significant interval decrease in size of right frontal  brain metastasis since previous exam slight interval decrease in surrounding vasogenic edema. Smaller irregular rim of increased diffusion weighted signal intensity about this treated metastasis    consistent with cytotoxic edema.      2.  New large area of acute infarction in the right posterior frontal, parietal, posterior temporal, and occipital lobes associated gyriform enhancement in the right lateral occipital lobe and right peritrigonal white matter.      3.  Smaller chronic wedge-shaped area of infarction involving the left posterior basal ganglia extending into the left posterior frontal periventricular white matter      4.  Small areas of chronic lacunar type infarction involving the left side of the elaine and left brachium pontis with surrounding gliosis.      5.  Small foci of chronic hemosiderin deposition noted in the left posterior temporal and occipital white matter, as well as in the treated right frontal brain metastasis      6.  Age-related cerebral atrophy.      DX-ABDOMEN FOR TUBE PLACEMENT   Final Result         1.  Nonspecific bowel gas pattern in the upper abdomen.   2.  Dobbhoff tube is coiled within the stomach, the tip terminates overlying the expected location of the gastric fundus, physician similar to prior study.   3.  Left lower lobe infiltrate      DX-ABDOMEN FOR TUBE PLACEMENT   Final Result         1.  Nonspecific bowel gas pattern in the upper abdomen.   2.  Dobbhoff tube is coiled within the stomach, the tip terminates overlying the expected location of the gastric fundus.   3.  Left basilar atelectasis      DX-ABDOMEN FOR TUBE PLACEMENT   Final Result      Feeding tube looped in the stomach the distal tip in the gastric fundus.      CT-HEAD WITH & W/O   Final Result      1.  Redemonstration of right frontal lobe mass, highly concerning for metastasis. Associated vasogenic edema and a 2.5 mm right to left midline shift. No progressive mass effect. No herniation.   2.  Nonspecific  ill-defined enhancement in the right parietal lobe.   3.  No new large vascular structure infarct. No new intracranial hemorrhage.      DX-CHEST-LIMITED (1 VIEW)   Final Result      No significant interval change.      DX-ABDOMEN COMPLETE WITH AP OR PA CXR   Final Result      1. No acute cardiopulmonary abnormality.   2. Chronic left lower lobe scarring.   3. Nonobstructive bowel gas pattern. Small amount of stool throughout the colon.           Assessment/Plan  * Acute encephalopathy- (present on admission)  Assessment & Plan  Multifactorial  Manage encephalopathy is  Acute stroke on MRI 4/20  Subarachnoid hemorrhage on CT  LP with CSF positive for VZV done 4/22  Continue on acyclovir  Monitor blood pressure closely,  neurochecks every 4 hours        Aspiration pneumonitis (HCC)  Assessment & Plan  Patient high risk for aspiration pneumonitis/pneumonia.   Currently on room air  Monitor oxygen requirement closely    Prediabetes- (present on admission)  Assessment & Plan  Monitor glucose level closely.  On insulin regimen    Vasogenic edema (HCC)- (present on admission)  Assessment & Plan  Continuing decadron taper per Dr. Live's recommendations. He also does not believe that there are any significant changes in her brain imaging from this admission that could explain her somnolence/presentation. She is on the correct medication (decadron) for treating the edema  Improvement on MRI on 4/21  - Continue decadron      Hypernatremia  Assessment & Plan  Improved    Seizure (HCC)- (present on admission)  Assessment & Plan    -Continue lacosamide 150 mg IV BID per neurology  -EEG and cEEG negative for seizures         Dysphagia- (present on admission)  Assessment & Plan  Possibly due to CVA vs brain metastases and altered mental status in setting of infection.  Patient will remain NPO as she failed SLP evaluation 4/17  NG tube placement required IRIS. Tube was removed for MRI and family refused NG tube to be replaced.  Extensive discussion with family regarding nutrition options. Eventually agreed to let patient get NG tube with lorazepam sedation and IRIS. No one certified to place NG tube with IRIS available on 4/21. Vascular access also lost on 4/21 and midline unable to be placed without VATs  -aspiration precautions  -Continue lansoprazole  -[id number] glucose checks      Shingles- (present on admission)  Assessment & Plan  Continue on acyclovir    Constipation  Assessment & Plan  Resolved  - Bowel regimen    Sepsis secondary to UTI (HCC)- (present on admission)  Assessment & Plan  Sepsis resolved    Malignant neoplasm metastatic to brain (HCC)- (present on admission)  Assessment & Plan  Brain mets noted during hospitalization on 3/27/2024 with vasogenic edema, MRI on 4/11 shows continued brain mets with vasogenic edema.  MRI 4/21 shows decrease in tumor size and improvement in edema.  -continue seizure prophylaxis with lacosamide 150 mg BID  -continue dexamethasone         Acute kidney injury (HCC)- (present on admission)  Assessment & Plan  RESOLVED  Returned, Cr 1.11 and baseline around 0.7. Likely prerenal from dehydration.  -continue free water flushes  -renally dose all medications as appropriate  -avoid nephrotoxins    Advance care planning- (present on admission)  Assessment & Plan  At this time patient's family want her to be full code.  Palliative care consulted and discussed with family the goals of care. This remains the same.  -Full code  -Family goals of care is to make patient best shape to be discharged to be follow-up by his doctors in [place].  -Family updates daily with her guarded prognosis    Endometrial cancer (HCC)- (present on admission)  Assessment & Plan  Followed by Dr. Garcia outpatient and sees radiation oncology Dr Live.  Received immunotherapy at [facility] in 2021 with initial good response. Reached out to Dr. Live who does not believe imaging from 4/14 shows significant changes compared to MRI  month prior.  Complicated by metastases to brain among other locations, likely contributing to her altered mental status. Mets re demonstrated without significant change on CTH on admission.  - Decadron for vasogenic edema  - Neurochecks every 4 hours  - Continue aspiration, fall, seizure precaution.    Essential hypertension- (present on admission)  Assessment & Plan  Monitor blood pressure closely    Acute CVA (cerebrovascular accident) (HCC)- (present on admission)  Assessment & Plan  MRI on 4/20 noted with right frontal brain metastasis with vasogenic edema, acute infarction.  CT head from 4/21 noted with 9X 7 mm of hemorrhage in the right temporal region.   Neurology following         VTE prophylaxis: lovenox    I have performed a physical exam and reviewed and updated ROS and Plan today (4/26/2024). In review of yesterday's note (4/25/2024), there are no changes except as documented above.

## 2024-04-26 NOTE — PROGRESS NOTES
Monitor summary: SR/ST, HR , OR 0.19, QRS 0.08, QT 0.32 with (R)PVCs, (R)Couplets per strip from monitor room

## 2024-04-27 ENCOUNTER — APPOINTMENT (OUTPATIENT)
Dept: RADIOLOGY | Facility: MEDICAL CENTER | Age: 74
DRG: 871 | End: 2024-04-27
Attending: STUDENT IN AN ORGANIZED HEALTH CARE EDUCATION/TRAINING PROGRAM
Payer: MEDICARE

## 2024-04-27 LAB
ANION GAP SERPL CALC-SCNC: 12 MMOL/L (ref 7–16)
BASOPHILS # BLD AUTO: 0.1 % (ref 0–1.8)
BASOPHILS # BLD: 0.01 K/UL (ref 0–0.12)
BUN SERPL-MCNC: 24 MG/DL (ref 8–22)
CALCIUM SERPL-MCNC: 8.6 MG/DL (ref 8.5–10.5)
CHLORIDE SERPL-SCNC: 103 MMOL/L (ref 96–112)
CO2 SERPL-SCNC: 23 MMOL/L (ref 20–33)
CREAT SERPL-MCNC: 0.48 MG/DL (ref 0.5–1.4)
EOSINOPHIL # BLD AUTO: 0.01 K/UL (ref 0–0.51)
EOSINOPHIL NFR BLD: 0.1 % (ref 0–6.9)
ERYTHROCYTE [DISTWIDTH] IN BLOOD BY AUTOMATED COUNT: 50.3 FL (ref 35.9–50)
GFR SERPLBLD CREATININE-BSD FMLA CKD-EPI: 101 ML/MIN/1.73 M 2
GLUCOSE BLD STRIP.AUTO-MCNC: 122 MG/DL (ref 65–99)
GLUCOSE BLD STRIP.AUTO-MCNC: 156 MG/DL (ref 65–99)
GLUCOSE BLD STRIP.AUTO-MCNC: 157 MG/DL (ref 65–99)
GLUCOSE BLD STRIP.AUTO-MCNC: 173 MG/DL (ref 65–99)
GLUCOSE SERPL-MCNC: 158 MG/DL (ref 65–99)
HCT VFR BLD AUTO: 32.3 % (ref 37–47)
HGB BLD-MCNC: 10.9 G/DL (ref 12–16)
IMM GRANULOCYTES # BLD AUTO: 0.24 K/UL (ref 0–0.11)
IMM GRANULOCYTES NFR BLD AUTO: 2.8 % (ref 0–0.9)
LYMPHOCYTES # BLD AUTO: 0.53 K/UL (ref 1–4.8)
LYMPHOCYTES NFR BLD: 6.2 % (ref 22–41)
MAGNESIUM SERPL-MCNC: 1.8 MG/DL (ref 1.5–2.5)
MCH RBC QN AUTO: 31.8 PG (ref 27–33)
MCHC RBC AUTO-ENTMCNC: 33.7 G/DL (ref 32.2–35.5)
MCV RBC AUTO: 94.2 FL (ref 81.4–97.8)
MONOCYTES # BLD AUTO: 0.44 K/UL (ref 0–0.85)
MONOCYTES NFR BLD AUTO: 5.1 % (ref 0–13.4)
NEUTROPHILS # BLD AUTO: 7.36 K/UL (ref 1.82–7.42)
NEUTROPHILS NFR BLD: 85.7 % (ref 44–72)
NRBC # BLD AUTO: 0.05 K/UL
NRBC BLD-RTO: 0.6 /100 WBC (ref 0–0.2)
PHOSPHATE SERPL-MCNC: 2.8 MG/DL (ref 2.5–4.5)
PLATELET # BLD AUTO: 137 K/UL (ref 164–446)
PMV BLD AUTO: 11.8 FL (ref 9–12.9)
POTASSIUM SERPL-SCNC: 4 MMOL/L (ref 3.6–5.5)
PROCALCITONIN SERPL-MCNC: 0.11 NG/ML
RBC # BLD AUTO: 3.43 M/UL (ref 4.2–5.4)
SODIUM SERPL-SCNC: 138 MMOL/L (ref 135–145)
WBC # BLD AUTO: 8.6 K/UL (ref 4.8–10.8)

## 2024-04-27 PROCEDURE — 700102 HCHG RX REV CODE 250 W/ 637 OVERRIDE(OP)

## 2024-04-27 PROCEDURE — 99233 SBSQ HOSP IP/OBS HIGH 50: CPT | Performed by: STUDENT IN AN ORGANIZED HEALTH CARE EDUCATION/TRAINING PROGRAM

## 2024-04-27 PROCEDURE — 36415 COLL VENOUS BLD VENIPUNCTURE: CPT

## 2024-04-27 PROCEDURE — A9270 NON-COVERED ITEM OR SERVICE: HCPCS | Performed by: STUDENT IN AN ORGANIZED HEALTH CARE EDUCATION/TRAINING PROGRAM

## 2024-04-27 PROCEDURE — 770020 HCHG ROOM/CARE - TELE (206)

## 2024-04-27 PROCEDURE — 99232 SBSQ HOSP IP/OBS MODERATE 35: CPT | Performed by: PSYCHIATRY & NEUROLOGY

## 2024-04-27 PROCEDURE — 700102 HCHG RX REV CODE 250 W/ 637 OVERRIDE(OP): Performed by: INTERNAL MEDICINE

## 2024-04-27 PROCEDURE — 700102 HCHG RX REV CODE 250 W/ 637 OVERRIDE(OP): Performed by: STUDENT IN AN ORGANIZED HEALTH CARE EDUCATION/TRAINING PROGRAM

## 2024-04-27 PROCEDURE — 84145 PROCALCITONIN (PCT): CPT

## 2024-04-27 PROCEDURE — 700111 HCHG RX REV CODE 636 W/ 250 OVERRIDE (IP): Mod: JZ

## 2024-04-27 PROCEDURE — 665998 HH PPS REVENUE CREDIT

## 2024-04-27 PROCEDURE — 83735 ASSAY OF MAGNESIUM: CPT

## 2024-04-27 PROCEDURE — 82962 GLUCOSE BLOOD TEST: CPT

## 2024-04-27 PROCEDURE — A9270 NON-COVERED ITEM OR SERVICE: HCPCS

## 2024-04-27 PROCEDURE — 84100 ASSAY OF PHOSPHORUS: CPT

## 2024-04-27 PROCEDURE — 665999 HH PPS REVENUE DEBIT

## 2024-04-27 PROCEDURE — 76937 US GUIDE VASCULAR ACCESS: CPT

## 2024-04-27 PROCEDURE — 700111 HCHG RX REV CODE 636 W/ 250 OVERRIDE (IP): Mod: JZ | Performed by: STUDENT IN AN ORGANIZED HEALTH CARE EDUCATION/TRAINING PROGRAM

## 2024-04-27 PROCEDURE — 05HD33Z INSERTION OF INFUSION DEVICE INTO RIGHT CEPHALIC VEIN, PERCUTANEOUS APPROACH: ICD-10-PCS | Performed by: STUDENT IN AN ORGANIZED HEALTH CARE EDUCATION/TRAINING PROGRAM

## 2024-04-27 PROCEDURE — 85025 COMPLETE CBC W/AUTO DIFF WBC: CPT

## 2024-04-27 PROCEDURE — 700105 HCHG RX REV CODE 258

## 2024-04-27 PROCEDURE — 80048 BASIC METABOLIC PNL TOTAL CA: CPT

## 2024-04-27 PROCEDURE — A9270 NON-COVERED ITEM OR SERVICE: HCPCS | Performed by: INTERNAL MEDICINE

## 2024-04-27 RX ORDER — SULFAMETHOXAZOLE AND TRIMETHOPRIM 400; 80 MG/1; MG/1
1 TABLET ORAL DAILY
Status: DISCONTINUED | OUTPATIENT
Start: 2024-04-28 | End: 2024-05-13 | Stop reason: HOSPADM

## 2024-04-27 RX ADMIN — POLYETHYLENE GLYCOL 3350 1 PACKET: 17 POWDER, FOR SOLUTION ORAL at 05:50

## 2024-04-27 RX ADMIN — INSULIN HUMAN 1 UNITS: 100 INJECTION, SOLUTION PARENTERAL at 00:25

## 2024-04-27 RX ADMIN — LACOSAMIDE 150 MG: 100 TABLET, FILM COATED ORAL at 17:39

## 2024-04-27 RX ADMIN — AMLODIPINE BESYLATE 10 MG: 10 TABLET ORAL at 05:50

## 2024-04-27 RX ADMIN — ACYCLOVIR SODIUM 650 MG: 50 INJECTION, SOLUTION INTRAVENOUS at 00:20

## 2024-04-27 RX ADMIN — CARBOXYMETHYLCELLULOSE SODIUM 2 DROP: 5 SOLUTION/ DROPS OPHTHALMIC at 05:49

## 2024-04-27 RX ADMIN — METOPROLOL TARTRATE 75 MG: 25 TABLET, FILM COATED ORAL at 05:50

## 2024-04-27 RX ADMIN — ENOXAPARIN SODIUM 40 MG: 100 INJECTION SUBCUTANEOUS at 17:39

## 2024-04-27 RX ADMIN — LANSOPRAZOLE 30 MG: 30 TABLET, ORALLY DISINTEGRATING ORAL at 05:50

## 2024-04-27 RX ADMIN — ACYCLOVIR SODIUM 650 MG: 50 INJECTION, SOLUTION INTRAVENOUS at 23:56

## 2024-04-27 RX ADMIN — INSULIN HUMAN 1 UNITS: 100 INJECTION, SOLUTION PARENTERAL at 11:08

## 2024-04-27 RX ADMIN — DEXAMETHASONE 4 MG: 4 TABLET ORAL at 05:50

## 2024-04-27 RX ADMIN — SULFAMETHOXAZOLE AND TRIMETHOPRIM 1 TABLET: 400; 80 TABLET ORAL at 05:49

## 2024-04-27 RX ADMIN — ACYCLOVIR SODIUM 650 MG: 50 INJECTION, SOLUTION INTRAVENOUS at 16:40

## 2024-04-27 RX ADMIN — LACOSAMIDE 150 MG: 100 TABLET, FILM COATED ORAL at 05:49

## 2024-04-27 RX ADMIN — MINERAL OIL, AND WHITE PETROLATUM 1 APPLICATION: 425; 573 OINTMENT OPHTHALMIC at 18:26

## 2024-04-27 RX ADMIN — INSULIN HUMAN 1 UNITS: 100 INJECTION, SOLUTION PARENTERAL at 17:43

## 2024-04-27 RX ADMIN — DEXAMETHASONE 4 MG: 4 TABLET ORAL at 17:39

## 2024-04-27 RX ADMIN — MINERAL OIL, AND WHITE PETROLATUM 1 APPLICATION: 425; 573 OINTMENT OPHTHALMIC at 22:08

## 2024-04-27 RX ADMIN — LOSARTAN POTASSIUM 100 MG: 50 TABLET, FILM COATED ORAL at 05:50

## 2024-04-27 RX ADMIN — BISACODYL 10 MG: 10 SUPPOSITORY RECTAL at 05:50

## 2024-04-27 RX ADMIN — METOPROLOL TARTRATE 75 MG: 25 TABLET, FILM COATED ORAL at 17:39

## 2024-04-27 RX ADMIN — ACYCLOVIR SODIUM 650 MG: 50 INJECTION, SOLUTION INTRAVENOUS at 07:27

## 2024-04-27 RX ADMIN — ATORVASTATIN CALCIUM 20 MG: 20 TABLET, FILM COATED ORAL at 17:39

## 2024-04-27 RX ADMIN — SENNOSIDES AND DOCUSATE SODIUM 2 TABLET: 50; 8.6 TABLET ORAL at 17:40

## 2024-04-27 ASSESSMENT — PAIN DESCRIPTION - PAIN TYPE: TYPE: ACUTE PAIN

## 2024-04-27 NOTE — CARE PLAN
The patient is Unstable - High likelihood or risk of patient condition declining or worsening    Shift Goals  Clinical Goals: patient will improve neuro status and remain hemodynamically stable through shift  Patient Goals: jayla  Family Goals: improve; optho consult; slp re-eval    Progress made toward(s) clinical / shift goals:      Problem: Hemodynamics  Goal: Patient's hemodynamics, fluid balance and neurologic status will be stable or improve  Outcome: Progressing  Note: VSS.     Problem: Skin Integrity  Goal: Skin integrity is maintained or improved  Outcome: Progressing  Note: Q2t. All skin protocols in place.        Patient is not progressing towards the following goals:      Problem: Knowledge Deficit - Standard  Goal: Patient and family/care givers will demonstrate understanding of plan of care, disease process/condition, diagnostic tests and medications  Outcome: Not Progressing  Note: Family at bedside through shift. , daughter, expressing that she is very sad the patient has not been seen by an eye doctor  Dr Perfecto ambrose.      Problem: Neuro Status  Goal: Neuro status will remain stable or improve  Outcome: Not Progressing  Note: Not following commands. No neuro improvement noted through shift.

## 2024-04-27 NOTE — PROGRESS NOTES
Monitor summary: SR/ST, HR , VT 0.14, QRS 0.06, QT 0.33 with (R)PVCs per strip from monitor room

## 2024-04-27 NOTE — PROGRESS NOTES
Monitor summary: SR/ST , [place] 0.15, QRS 0.09, QT 0.32 with rare PVCs and bigems, per strip from monitor room.

## 2024-04-27 NOTE — PROGRESS NOTES
Vascular Access Team    Date of Insertion: 4/27/24  Arm Circumference: 31  Internal length: 10  External Length: 0  Vein Occupancy %: 45  Reason for Midline: access  Labs: WBC 8.6 , , BUN 24, Cr 0.48,  , INR 1.13 on 4/22/24    Orders confirmed, vessel patency confirmed with ultrasound. Risks and benefits of procedure explained to patient and education regarding line associated bloodstream infections provided. Questions answered.     Power Midline placed in RUE per licensed provider order with ultrasound guidance. 4  Fr, single lumen Power Midline placed in cephalic vein after 1 attempt(s). 2 mL of 1% lidocaine injected intradermally, 21 gauge microintroducer needle was visualized entering the vein and modified Seldinger technique used. 10 cm catheter inserted with good blood return. Secured at 0 cm marker. Internal positioning stylet removed and verified to be intact. Each lumen flushed without resistance with 10 mL 0.9% normal saline. Midline secured with Biopatch and Tegaderm.     Midline placement is confirmed by nurse using ultrasound and ability to flush and draw blood. Midline is appropriate for use at this time.  Patient tolerated procedure well, without complications.  No X-ray is needed for placement confirmation.  Patient condition relayed to unit RN or ordering physician via this post procedure note in the EMR.     Ultrasound images uploaded to PACS and viewable in the EMR - yes   Ultrasound imaged printed and placed in paper chart - no     BARD Power Midline ref # [id number], Lot # [id number], Expiration Date 07/31/2025

## 2024-04-27 NOTE — CARE PLAN
"The patient is Watcher - Medium risk of patient condition declining or worsening    Shift Goals  Clinical Goals: Improve neuro status, Maintain skin integrity  Patient Goals: ZA  Family Goals: No Family Present    Progress made toward(s) clinical / shift goals:  Patient is Oriented to self. Patient able to verbalize name by end of NOC shift. Patient able to verbalize this morning \"what are you doing?\" When this RN was doing wound care. Q2 hour turns. HOB at 30 degrees. Midline dressing change completed. Bed is low and locked. Call light within reach. Hourly rounding continues.     Patient is not progressing towards the following goals:      Problem: Knowledge Deficit - Standard  Goal: Patient and family/care givers will demonstrate understanding of plan of care, disease process/condition, diagnostic tests and medications  Outcome: Not Progressing  Note: Patient requires reinforced education.      "

## 2024-04-27 NOTE — PROGRESS NOTES
Hospital Medicine Daily Progress Note    Date of Service  4/27/2024    Chief Complaint  Jairo Rivas is a 73 y.o. female admitted 4/13/2024 with acute enteropathy    Hospital Course  73 female with past medical history of endometrial carcinoma, brain metastasis s/p radiation therapy, CVA presented with altered mental status.  MRI on 4/20 noted with right frontal brain metastasis with vasogenic edema, acute infarction.  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  Neurology and ID consulted on the case.  CT head from 4/21 noted with 9X 7 mm of hemorrhage in the right temporal region.  Neurology recommending continue acyclovir.    Interval Problem Update    4/27/2024  Patient seen and examined at bedside  Mental status unchanged  She is confused.  Does not follow command  Not in acute distress  On room air saturating over 90%  A.m.'s labs reviewed normal white count, hemoglobin 10.9, BMP unremarkable, kidney function is stable    Close telemetry monitoring  Continue Vimpat  Continue to hold aspirin for subarachnoid hemorrhage  Discussed with neurology Dr. Vargas  Repeat BMP in a.m. to monitor electrolytes and renal function while on IV acyclovir  Repeat CBC in a.m. to monitor white count and hemoglobin      High risk of deterioration .  Need close monitoring  Continue monitor mental status closely.  If she fails to improve we have to discuss further goals of care discussion with family.    Patient has a high medical complexity, complex decision making and is at high risk of complication, morbidity and mortality      I have discussed this patient's plan of care and discharge plan at IDT rounds today with Case Management, Nursing, Nursing leadership, and other members of the IDT team.    Consultants/Specialty  neurology    Code Status  Full Code    Disposition  The patient is not medically cleared for discharge to home or a post-acute facility.  Anticipate discharge to: skilled  nursing facility    I have placed the appropriate orders for post-discharge needs.    Review of Systems  Review of Systems   Unable to perform ROS: Acuity of condition        Physical Exam  Temp:  [36.4 °C (97.5 °F)-36.6 °C (97.9 °F)] 36.6 °C (97.9 °F)  Pulse:  [] 102  Resp:  [16-24] 18  BP: (110-151)/(69-89) 151/87  SpO2:  [96 %-97 %] 96 %    Physical Exam  Constitutional:       Appearance: She is ill-appearing.   HENT:      Head:      Comments: On feeding tube  Cardiovascular:      Rate and Rhythm: Tachycardia present.      Pulses: Normal pulses.   Pulmonary:      Effort: Pulmonary effort is normal. No respiratory distress.   Abdominal:      General: Abdomen is flat.   Musculoskeletal:      Right lower leg: No edema.      Left lower leg: No edema.   Neurological:      Mental Status: She is disoriented.      Comments: She does not follow command.  Moves extremities to painful stimuli         Fluids    Intake/Output Summary (Last 24 hours) at 4/27/2024 1456  Last data filed at 4/27/2024 1200  Gross per 24 hour   Intake 2990 ml   Output 1000 ml   Net 1990 ml       Laboratory  Recent Labs     04/25/24  0453 04/26/24  0358 04/27/24  0251   WBC 10.1 9.1 8.6   RBC 3.62* 3.59* 3.43*   HEMOGLOBIN 11.5* 11.5* 10.9*   HEMATOCRIT 34.3* 33.8* 32.3*   MCV 94.8 94.2 94.2   MCH 31.8 32.0 31.8   MCHC 33.5 34.0 33.7   RDW 50.4* 50.1* 50.3*   PLATELETCT 123* 137* 137*   MPV 12.1 11.6 11.8     Recent Labs     04/25/24  1604 04/26/24  0358 04/27/24  0251   SODIUM 140 141 138   POTASSIUM 4.3 4.1 4.0   CHLORIDE 107 106 103   CO2 22 22 23   GLUCOSE 176* 182* 158*   BUN 27* 25* 24*   CREATININE 0.50 0.43* 0.48*   CALCIUM 9.2 9.2 8.6             Recent Labs     04/25/24  0453   TRIGLYCERIDE 109   HDL 47   LDL 97       Imaging  CT-HEAD W/O   Final Result      1.  9 x 7 mm focus of hemorrhage in the right temporal region, suspected subarachnoid versus less likely intraparenchymal. It is stable since noncontrast head CT performed one  day prior.         CT-CTA NECK WITH & W/O-POST PROCESSING   Final Result      No occlusion, hemodynamically significant stenosis or dissection of the neck arteries.      CT-CTA HEAD WITH & W/O-POST PROCESS   Final Result   Addendum (preliminary) 1 of 1   Critical value called by Dr. Hector Dodson to Physician: RONAN NOGUERA   at 4/25/2024 3:48 PM.      Final      1.  9 x 7 mm focus of hemorrhage in the right temporal region which is probably subarachnoid, less likely intraparenchymal.   2.  Focal occlusion of right M2 MCA inferior division with distal opacification. This could be a focal high-grade stenosis versus vessel occlusion. It could also be vasospasm as the subarachnoid hemorrhage is present in this region.   3.  Other findings as above.      Efforts are underway to contact referring physician with results at time of dictation.            EC-ECHOCARDIOGRAM COMPLETE W/O CONT   Final Result      DX-CHEST-PORTABLE (1 VIEW)   Final Result      Feeding tube tip projects in the stomach.      Hazy left basilar opacity, atelectasis and/or mild pneumonitis.      DX-LUMBAR PUNCTURE FOR DIAGNOSIS   Final Result      Fluoroscopic-guided lumbar puncture as described above.      IR-MIDLINE CATHETER INSERTION WO GUIDANCE > AGE 3   Final Result                  Ultrasound-guided midline placement performed by qualified nursing staff    as above.          DX-ABDOMEN FOR TUBE PLACEMENT   Final Result         1.  Nonspecific bowel gas pattern in the upper abdomen.   2.  Dobbhoff tube is coiled within the stomach, the tip terminates overlying the expected location of the gastric cardia.   3.  Hazy left lower lobe infiltrate.      MR-BRAIN-WITH & W/O   Final Result      1.  Significant interval decrease in size of right frontal brain metastasis since previous exam slight interval decrease in surrounding vasogenic edema. Smaller irregular rim of increased diffusion weighted signal intensity about this treated metastasis     consistent with cytotoxic edema.      2.  New large area of acute infarction in the right posterior frontal, parietal, posterior temporal, and occipital lobes associated gyriform enhancement in the right lateral occipital lobe and right peritrigonal white matter.      3.  Smaller chronic wedge-shaped area of infarction involving the left posterior basal ganglia extending into the left posterior frontal periventricular white matter      4.  Small areas of chronic lacunar type infarction involving the left side of the elaine and left brachium pontis with surrounding gliosis.      5.  Small foci of chronic hemosiderin deposition noted in the left posterior temporal and occipital white matter, as well as in the treated right frontal brain metastasis      6.  Age-related cerebral atrophy.      DX-ABDOMEN FOR TUBE PLACEMENT   Final Result         1.  Nonspecific bowel gas pattern in the upper abdomen.   2.  Dobbhoff tube is coiled within the stomach, the tip terminates overlying the expected location of the gastric fundus, physician similar to prior study.   3.  Left lower lobe infiltrate      DX-ABDOMEN FOR TUBE PLACEMENT   Final Result         1.  Nonspecific bowel gas pattern in the upper abdomen.   2.  Dobbhoff tube is coiled within the stomach, the tip terminates overlying the expected location of the gastric fundus.   3.  Left basilar atelectasis      DX-ABDOMEN FOR TUBE PLACEMENT   Final Result      Feeding tube looped in the stomach the distal tip in the gastric fundus.      CT-HEAD WITH & W/O   Final Result      1.  Redemonstration of right frontal lobe mass, highly concerning for metastasis. Associated vasogenic edema and a 2.5 mm right to left midline shift. No progressive mass effect. No herniation.   2.  Nonspecific ill-defined enhancement in the right parietal lobe.   3.  No new large vascular structure infarct. No new intracranial hemorrhage.      DX-CHEST-LIMITED (1 VIEW)   Final Result      No significant  interval change.      DX-ABDOMEN COMPLETE WITH AP OR PA CXR   Final Result      1. No acute cardiopulmonary abnormality.   2. Chronic left lower lobe scarring.   3. Nonobstructive bowel gas pattern. Small amount of stool throughout the colon.      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   Currently on room air  Monitor oxygen requirement closely    Prediabetes- (present on admission)  Assessment & Plan  Monitor glucose level closely.  On insulin regimen    Vasogenic edema (HCC)- (present on admission)  Assessment & Plan  Continuing decadron taper per Dr. Live's recommendations. He also does not believe that there are any significant changes in her brain imaging from this admission that could explain her somnolence/presentation. She is on the correct medication (decadron) for treating the edema  Improvement on MRI on 4/21  - Continue decadron      Hypernatremia  Assessment & Plan  Improved    Seizure (HCC)- (present on admission)  Assessment & Plan    -Continue lacosamide 150 mg IV BID per neurology  -EEG and cEEG negative for seizures         Dysphagia- (present on admission)  Assessment & Plan  Possibly due to CVA vs brain metastases and altered mental status in setting of infection.  Patient will remain NPO as she failed SLP evaluation 4/17  NG tube placement required IRIS. Tube was removed for MRI and family refused NG tube to be replaced. Extensive discussion with family regarding nutrition options. Eventually agreed to let patient get NG tube with lorazepam  sedation and IRIS. No one certified to place NG tube with IRIS available on 4/21. Vascular access also lost on 4/21 and midline unable to be placed without VATs  -aspiration precautions  -Continue lansoprazole  -[id number] glucose checks      Shingles- (present on admission)  Assessment & Plan  Continue on acyclovir    Constipation  Assessment & Plan  Resolved  - Bowel regimen    Sepsis secondary to UTI (HCC)- (present on admission)  Assessment & Plan  Sepsis resolved    Malignant neoplasm metastatic to brain (HCC)- (present on admission)  Assessment & Plan  Brain mets noted during hospitalization on 3/27/2024 with vasogenic edema, MRI on 4/11 shows continued brain mets with vasogenic edema.  MRI 4/21 shows decrease in tumor size and improvement in edema.  -continue seizure prophylaxis with lacosamide 150 mg BID  -continue dexamethasone         Acute kidney injury (HCC)- (present on admission)  Assessment & Plan  RESOLVED  Returned, Cr 1.11 and baseline around 0.7. Likely prerenal from dehydration.  -continue free water flushes  -renally dose all medications as appropriate  -avoid nephrotoxins    Advance care planning- (present on admission)  Assessment & Plan  At this time patient's family want her to be full code.  Palliative care consulted and discussed with family the goals of care. This remains the same.  -Full code  -Family goals of care is to make patient best shape to be discharged to be follow-up by his doctors in [place].  -Family updates daily with her guarded prognosis    Endometrial cancer (HCC)- (present on admission)  Assessment & Plan  Followed by Dr. Garcia outpatient and sees radiation oncology Dr Live.  Received immunotherapy at [facility] in 2021 with initial good response. Reached out to Dr. Live who does not believe imaging from 4/14 shows significant changes compared to MRI month prior.  Complicated by metastases to brain among other locations, likely contributing to her altered mental status.  Mets re demonstrated without significant change on CTH on admission.  - Decadron for vasogenic edema  - Neurochecks every 4 hours  - Continue aspiration, fall, seizure precaution.    Essential hypertension- (present on admission)  Assessment & Plan  Monitor blood pressure closely    Acute CVA (cerebrovascular accident) (HCC)- (present on admission)  Assessment & Plan  MRI on 4/20 noted with right frontal brain metastasis with vasogenic edema, acute infarction.  CT head from 4/21 noted with 9X 7 mm of hemorrhage in the right temporal region.   Neurology following         VTE prophylaxis: lovenox    I have performed a physical exam and reviewed and updated ROS and Plan today (4/27/2024). In review of yesterday's note (4/26/2024), there are no changes except as documented above.

## 2024-04-27 NOTE — CARE PLAN
The patient is Stable - Low risk of patient condition declining or worsening    Shift Goals  Clinical Goals: VSS, neuro checks, Q2 turns  Patient Goals: ZA  Family Goals: ZA- no family present    Progress made toward(s) clinical / shift goals:    Problem: Pain - Standard  Goal: Alleviation of pain or a reduction in pain to the patient’s comfort goal  Outcome: Progressing     Problem: Knowledge Deficit - Standard  Goal: Patient and family/care givers will demonstrate understanding of plan of care, disease process/condition, diagnostic tests and medications  Outcome: Progressing     Problem: Hemodynamics  Goal: Patient's hemodynamics, fluid balance and neurologic status will be stable or improve  Outcome: Progressing     Problem: Fluid Volume  Goal: Fluid volume balance will be maintained  Outcome: Progressing     Problem: Urinary - Renal Perfusion  Goal: Ability to achieve and maintain adequate renal perfusion and functioning will improve  Outcome: Progressing       Patient is not progressing towards the following goals:

## 2024-04-28 LAB
GLUCOSE BLD STRIP.AUTO-MCNC: 139 MG/DL (ref 65–99)
GLUCOSE BLD STRIP.AUTO-MCNC: 145 MG/DL (ref 65–99)
GLUCOSE BLD STRIP.AUTO-MCNC: 147 MG/DL (ref 65–99)
GLUCOSE BLD STRIP.AUTO-MCNC: 155 MG/DL (ref 65–99)

## 2024-04-28 PROCEDURE — 700102 HCHG RX REV CODE 250 W/ 637 OVERRIDE(OP): Performed by: INTERNAL MEDICINE

## 2024-04-28 PROCEDURE — A9270 NON-COVERED ITEM OR SERVICE: HCPCS | Performed by: STUDENT IN AN ORGANIZED HEALTH CARE EDUCATION/TRAINING PROGRAM

## 2024-04-28 PROCEDURE — 665998 HH PPS REVENUE CREDIT

## 2024-04-28 PROCEDURE — A9270 NON-COVERED ITEM OR SERVICE: HCPCS

## 2024-04-28 PROCEDURE — 700102 HCHG RX REV CODE 250 W/ 637 OVERRIDE(OP)

## 2024-04-28 PROCEDURE — A9270 NON-COVERED ITEM OR SERVICE: HCPCS | Performed by: INTERNAL MEDICINE

## 2024-04-28 PROCEDURE — 99233 SBSQ HOSP IP/OBS HIGH 50: CPT | Performed by: STUDENT IN AN ORGANIZED HEALTH CARE EDUCATION/TRAINING PROGRAM

## 2024-04-28 PROCEDURE — 700102 HCHG RX REV CODE 250 W/ 637 OVERRIDE(OP): Performed by: STUDENT IN AN ORGANIZED HEALTH CARE EDUCATION/TRAINING PROGRAM

## 2024-04-28 PROCEDURE — 770020 HCHG ROOM/CARE - TELE (206)

## 2024-04-28 PROCEDURE — 97602 WOUND(S) CARE NON-SELECTIVE: CPT

## 2024-04-28 PROCEDURE — 665999 HH PPS REVENUE DEBIT

## 2024-04-28 PROCEDURE — 700111 HCHG RX REV CODE 636 W/ 250 OVERRIDE (IP): Mod: JZ

## 2024-04-28 PROCEDURE — 99232 SBSQ HOSP IP/OBS MODERATE 35: CPT | Performed by: PSYCHIATRY & NEUROLOGY

## 2024-04-28 PROCEDURE — 700111 HCHG RX REV CODE 636 W/ 250 OVERRIDE (IP): Mod: JZ | Performed by: STUDENT IN AN ORGANIZED HEALTH CARE EDUCATION/TRAINING PROGRAM

## 2024-04-28 PROCEDURE — 82962 GLUCOSE BLOOD TEST: CPT

## 2024-04-28 PROCEDURE — 700105 HCHG RX REV CODE 258

## 2024-04-28 RX ADMIN — POLYETHYLENE GLYCOL 3350 1 PACKET: 17 POWDER, FOR SOLUTION ORAL at 05:40

## 2024-04-28 RX ADMIN — LOSARTAN POTASSIUM 100 MG: 50 TABLET, FILM COATED ORAL at 05:41

## 2024-04-28 RX ADMIN — AMLODIPINE BESYLATE 10 MG: 10 TABLET ORAL at 05:40

## 2024-04-28 RX ADMIN — ACYCLOVIR SODIUM 650 MG: 50 INJECTION, SOLUTION INTRAVENOUS at 15:36

## 2024-04-28 RX ADMIN — DEXAMETHASONE 4 MG: 4 TABLET ORAL at 18:05

## 2024-04-28 RX ADMIN — LACOSAMIDE 150 MG: 100 TABLET, FILM COATED ORAL at 18:06

## 2024-04-28 RX ADMIN — ACYCLOVIR SODIUM 650 MG: 50 INJECTION, SOLUTION INTRAVENOUS at 23:41

## 2024-04-28 RX ADMIN — LACOSAMIDE 150 MG: 100 TABLET, FILM COATED ORAL at 05:41

## 2024-04-28 RX ADMIN — MINERAL OIL, AND WHITE PETROLATUM 1 APPLICATION: 425; 573 OINTMENT OPHTHALMIC at 14:00

## 2024-04-28 RX ADMIN — INSULIN HUMAN 1 UNITS: 100 INJECTION, SOLUTION PARENTERAL at 18:24

## 2024-04-28 RX ADMIN — ENOXAPARIN SODIUM 40 MG: 100 INJECTION SUBCUTANEOUS at 18:06

## 2024-04-28 RX ADMIN — SENNOSIDES AND DOCUSATE SODIUM 2 TABLET: 50; 8.6 TABLET ORAL at 18:06

## 2024-04-28 RX ADMIN — ATORVASTATIN CALCIUM 20 MG: 20 TABLET, FILM COATED ORAL at 18:06

## 2024-04-28 RX ADMIN — METOPROLOL TARTRATE 75 MG: 25 TABLET, FILM COATED ORAL at 18:05

## 2024-04-28 RX ADMIN — ACYCLOVIR SODIUM 650 MG: 50 INJECTION, SOLUTION INTRAVENOUS at 08:29

## 2024-04-28 RX ADMIN — LANSOPRAZOLE 30 MG: 30 TABLET, ORALLY DISINTEGRATING ORAL at 05:41

## 2024-04-28 RX ADMIN — DEXAMETHASONE 4 MG: 4 TABLET ORAL at 05:41

## 2024-04-28 RX ADMIN — BISACODYL 10 MG: 10 SUPPOSITORY RECTAL at 05:26

## 2024-04-28 RX ADMIN — METOPROLOL TARTRATE 75 MG: 25 TABLET, FILM COATED ORAL at 05:40

## 2024-04-28 RX ADMIN — MINERAL OIL, AND WHITE PETROLATUM 1 APPLICATION: 425; 573 OINTMENT OPHTHALMIC at 22:00

## 2024-04-28 RX ADMIN — SULFAMETHOXAZOLE AND TRIMETHOPRIM 1 TABLET: 400; 80 TABLET ORAL at 05:41

## 2024-04-28 RX ADMIN — MINERAL OIL, AND WHITE PETROLATUM 1 APPLICATION: 425; 573 OINTMENT OPHTHALMIC at 05:39

## 2024-04-28 ASSESSMENT — PAIN DESCRIPTION - PAIN TYPE: TYPE: ACUTE PAIN

## 2024-04-28 ASSESSMENT — FIBROSIS 4 INDEX: FIB4 SCORE: 2.49

## 2024-04-28 NOTE — PROGRESS NOTES
Neurology Progress Note        Subjective:    Carlene remains lethargic and sleepy.  Her daughter is at the bedside and reports that her brother was staying earlier and she was more awake during that time frame.      Objective:  Vitals:  Vitals:    04/28/24 0543 04/28/24 0547 04/28/24 0800 04/28/24 1141   BP: 132/79  (!) 156/81 118/65   Pulse: (!) 117   97   Resp: 18  20 20   Temp: 36.6 °C (97.9 °F)  36.5 °C (97.7 °F) 36.2 °C (97.2 °F)   TempSrc: Temporal  Temporal Temporal   SpO2: 97%  96% 97%   Weight:  98.1 kg (216 lb 4.3 oz)     Height:         MS: Asleep but opens eyes to stimulation, does not attempt to speak to me.    CN: PEERL, appears to track me, no clear facial weakness   MOT: Right upper extremity spastic, but withdraws, moves left arm spontaneously, withdraws both legs  SENS: responds to noxious stimulation as above  DTR: 2+ and symmetric; toes downgoing  COOR: unable to test due to mental status  Gait: unable to test due to mental status    Recent Labs     04/26/24  0358 04/27/24  0251   WBC 9.1 8.6   RBC 3.59* 3.43*   HEMOGLOBIN 11.5* 10.9*   HEMATOCRIT 33.8* 32.3*   MCV 94.2 94.2   MCH 32.0 31.8   MCHC 34.0 33.7   RDW 50.1* 50.3*   PLATELETCT 137* 137*   MPV 11.6 11.8     Recent Labs     04/25/24  1604 04/26/24  0358 04/27/24  0251   SODIUM 140 141 138   POTASSIUM 4.3 4.1 4.0   CHLORIDE 107 106 103   CO2 22 22 23   GLUCOSE 176* 182* 158*   BUN 27* 25* 24*   CREATININE 0.50 0.43* 0.48*   CALCIUM 9.2 9.2 8.6                     Recent Labs     04/25/24  1604 04/26/24  0358 04/27/24  0251   SODIUM 140 141 138   POTASSIUM 4.3 4.1 4.0   CHLORIDE 107 106 103   CO2 22 22 23   GLUCOSE 176* 182* 158*   BUN 27* 25* 24*     Recent Labs     04/25/24  1604 04/26/24  0358 04/27/24  0251   SODIUM 140 141 138   POTASSIUM 4.3 4.1 4.0   CHLORIDE 107 106 103   CO2 22 22 23   BUN 27* 25* 24*   CREATININE 0.50 0.43* 0.48*   MAGNESIUM  --   --  1.8   PHOSPHORUS  --   --  2.8   CALCIUM 9.2 9.2 8.6         No results found  for this or any previous visit.           Imaging: neuroimaging reviewed and directly visualized by me  DX-CHEST-LIMITED (1 VIEW)   Final Result      1.  Hazy perihilar and lower lobe opacities represent areas of atelectasis or potentially pneumonitis.      IR-MIDLINE CATHETER INSERTION WO GUIDANCE > AGE 3   Final Result                  Ultrasound-guided midline placement performed by qualified nursing staff    as above.          CT-HEAD W/O   Final Result      1.  9 x 7 mm focus of hemorrhage in the right temporal region, suspected subarachnoid versus less likely intraparenchymal. It is stable since noncontrast head CT performed one day prior.         CT-CTA NECK WITH & W/O-POST PROCESSING   Final Result      No occlusion, hemodynamically significant stenosis or dissection of the neck arteries.      CT-CTA HEAD WITH & W/O-POST PROCESS   Final Result   Addendum (preliminary) 1 of 1   Critical value called by Dr. Hector Dodson to Physician: RONAN NOGUERA   at 4/25/2024 3:48 PM.      Final      1.  9 x 7 mm focus of hemorrhage in the right temporal region which is probably subarachnoid, less likely intraparenchymal.   2.  Focal occlusion of right M2 MCA inferior division with distal opacification. This could be a focal high-grade stenosis versus vessel occlusion. It could also be vasospasm as the subarachnoid hemorrhage is present in this region.   3.  Other findings as above.      Efforts are underway to contact referring physician with results at time of dictation.            EC-ECHOCARDIOGRAM COMPLETE W/O CONT   Final Result      DX-CHEST-PORTABLE (1 VIEW)   Final Result      Feeding tube tip projects in the stomach.      Hazy left basilar opacity, atelectasis and/or mild pneumonitis.      DX-LUMBAR PUNCTURE FOR DIAGNOSIS   Final Result      Fluoroscopic-guided lumbar puncture as described above.      IR-MIDLINE CATHETER INSERTION WO GUIDANCE > AGE 3   Final Result                  Ultrasound-guided  midline placement performed by qualified nursing staff    as above.          DX-ABDOMEN FOR TUBE PLACEMENT   Final Result         1.  Nonspecific bowel gas pattern in the upper abdomen.   2.  Dobbhoff tube is coiled within the stomach, the tip terminates overlying the expected location of the gastric cardia.   3.  Hazy left lower lobe infiltrate.      MR-BRAIN-WITH & W/O   Final Result      1.  Significant interval decrease in size of right frontal brain metastasis since previous exam slight interval decrease in surrounding vasogenic edema. Smaller irregular rim of increased diffusion weighted signal intensity about this treated metastasis    consistent with cytotoxic edema.      2.  New large area of acute infarction in the right posterior frontal, parietal, posterior temporal, and occipital lobes associated gyriform enhancement in the right lateral occipital lobe and right peritrigonal white matter.      3.  Smaller chronic wedge-shaped area of infarction involving the left posterior basal ganglia extending into the left posterior frontal periventricular white matter      4.  Small areas of chronic lacunar type infarction involving the left side of the elaine and left brachium pontis with surrounding gliosis.      5.  Small foci of chronic hemosiderin deposition noted in the left posterior temporal and occipital white matter, as well as in the treated right frontal brain metastasis      6.  Age-related cerebral atrophy.      DX-ABDOMEN FOR TUBE PLACEMENT   Final Result         1.  Nonspecific bowel gas pattern in the upper abdomen.   2.  Dobbhoff tube is coiled within the stomach, the tip terminates overlying the expected location of the gastric fundus, physician similar to prior study.   3.  Left lower lobe infiltrate      DX-ABDOMEN FOR TUBE PLACEMENT   Final Result         1.  Nonspecific bowel gas pattern in the upper abdomen.   2.  Dobbhoff tube is coiled within the stomach, the tip terminates overlying the  expected location of the gastric fundus.   3.  Left basilar atelectasis      DX-ABDOMEN FOR TUBE PLACEMENT   Final Result      Feeding tube looped in the stomach the distal tip in the gastric fundus.      CT-HEAD WITH & W/O   Final Result      1.  Redemonstration of right frontal lobe mass, highly concerning for metastasis. Associated vasogenic edema and a 2.5 mm right to left midline shift. No progressive mass effect. No herniation.   2.  Nonspecific ill-defined enhancement in the right parietal lobe.   3.  No new large vascular structure infarct. No new intracranial hemorrhage.      DX-CHEST-LIMITED (1 VIEW)   Final Result      No significant interval change.      DX-ABDOMEN COMPLETE WITH AP OR PA CXR   Final Result      1. No acute cardiopulmonary abnormality.   2. Chronic left lower lobe scarring.   3. Nonobstructive bowel gas pattern. Small amount of stool throughout the colon.          Impression:   73-year-old woman with persistent encephalopathy and stupor.  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  There is significant vasogenic edema surrounding the right frontal met.  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  24-hour EEG showed no concern for seizure activity.  Suspect her encephalopathy is multifactorial given underlying CNS inflammation/metastases/vasogenic edema/recent ischemic stroke.     Recent MRI does show restricted diffusion in the posterior right temporal lobe consistent with scattered recent ischemic stroke.  CTA evaulation show right MCA branch stenosis vs. Occlusion as the likely culprit.  This vascular abnormality could be due to athero, radiation induced or related to VZV vasculopathy.     Recommendations:  #History of seizures  -continue Vimpat 150mg bid  -EEG without ongoing seizure activity, no recent  clinical concern for seizure     #Encephalopathy  Certainly multifactorial given brain met with vasogenic edema, new stroke, chronic stroke, ongoing meningoencephalitis  -Work to normalize day/night sleep  -Consider trial of Provigil 100mg qam to see if alertness improves     #Ischemic Stroke   Secondary to intracranial vasculopathy (athero vs. Radiation induced vs. VZV vasculopathy)  -Ok to resume aspirin on 5/1/24  -continue IV acyclovir per ID, would complete 14 day course given cannot rule out vasculopathy due to VZV  -Follow up CT scan 4/26 showed stable hemorrhage     #Intracranial Hemorrhage  Tiny amount of intraparenchymal vs. SAH near the right sylvian fissure.  Hemorrhagic transformation of small ischemic stroke vs. Post-radiation induced.  Clinically insignificant given its size and location.  -Follow up CT showed stable hemorrhage  -Holding aspirin for now, would resume on 5/1, ok to use DVT prophylaxis       No further specific recommendations at this time. Neurology will follow on an as needed basis.  Please call if any further questions or concerns arise.     Jam Vargas MD

## 2024-04-28 NOTE — PROGRESS NOTES
Neurology Progress Note        Subjective:    Carlene was seen with her daughter and son at the bedside today.  Her daughter reports she has been sleeping most of the day.    Objective:  Vitals:  Vitals:    04/27/24 0547 04/27/24 0757 04/27/24 1108 04/27/24 1635   BP: (!) 141/84 (!) 150/89 (!) 151/87 128/72   Pulse: (!) 113 99 (!) 102 (!) 105   Resp: 20 20 18 18   Temp: 36.5 °C (97.7 °F) 36.4 °C (97.5 °F) 36.6 °C (97.9 °F) 36.5 °C (97.7 °F)   TempSrc: Temporal Temporal Temporal Temporal   SpO2: 96% 96% 96% 95%   Weight:       Height:           MS: Asleep but opens eyes to stimulation, does not attempt to speak to me.    CN: PEERL, appears to track me, no clear facial weakness   MOT: Right upper extremity spastic, but withdraws, moves left arm spontaneously, withdraws both legs  SENS: responds to noxious stimulation as above  DTR: 2+ and symmetric; toes down  COOR: unable to test due to mental status  Gait: unable to test due to mental status    Recent Labs     04/25/24  0453 04/26/24  0358 04/27/24  0251   WBC 10.1 9.1 8.6   RBC 3.62* 3.59* 3.43*   HEMOGLOBIN 11.5* 11.5* 10.9*   HEMATOCRIT 34.3* 33.8* 32.3*   MCV 94.8 94.2 94.2   MCH 31.8 32.0 31.8   MCHC 33.5 34.0 33.7   RDW 50.4* 50.1* 50.3*   PLATELETCT 123* 137* 137*   MPV 12.1 11.6 11.8     Recent Labs     04/25/24  1604 04/26/24  0358 04/27/24  0251   SODIUM 140 141 138   POTASSIUM 4.3 4.1 4.0   CHLORIDE 107 106 103   CO2 22 22 23   GLUCOSE 176* 182* 158*   BUN 27* 25* 24*   CREATININE 0.50 0.43* 0.48*   CALCIUM 9.2 9.2 8.6                 Recent Labs     04/25/24  0453   TRIGLYCERIDE 109   HDL 47   LDL 97     Recent Labs     04/25/24  1604 04/26/24  0358 04/27/24  0251   SODIUM 140 141 138   POTASSIUM 4.3 4.1 4.0   CHLORIDE 107 106 103   CO2 22 22 23   GLUCOSE 176* 182* 158*   BUN 27* 25* 24*     Recent Labs     04/25/24  1604 04/26/24  0358 04/27/24  0251   SODIUM 140 141 138   POTASSIUM 4.3 4.1 4.0   CHLORIDE 107 106 103   CO2 22 22 23   BUN 27* 25* 24*    CREATININE 0.50 0.43* 0.48*   MAGNESIUM  --   --  1.8   PHOSPHORUS  --   --  2.8   CALCIUM 9.2 9.2 8.6         No results found for this or any previous visit.           Imaging: neuroimaging reviewed and directly visualized by me  IR-MIDLINE CATHETER INSERTION WO GUIDANCE > AGE 3   Final Result                  Ultrasound-guided midline placement performed by qualified nursing staff    as above.          CT-HEAD W/O   Final Result      1.  9 x 7 mm focus of hemorrhage in the right temporal region, suspected subarachnoid versus less likely intraparenchymal. It is stable since noncontrast head CT performed one day prior.         CT-CTA NECK WITH & W/O-POST PROCESSING   Final Result      No occlusion, hemodynamically significant stenosis or dissection of the neck arteries.      CT-CTA HEAD WITH & W/O-POST PROCESS   Final Result   Addendum (preliminary) 1 of 1   Critical value called by Dr. Hector Dodson to Physician: RONAN NOGUERA   at 4/25/2024 3:48 PM.      Final      1.  9 x 7 mm focus of hemorrhage in the right temporal region which is probably subarachnoid, less likely intraparenchymal.   2.  Focal occlusion of right M2 MCA inferior division with distal opacification. This could be a focal high-grade stenosis versus vessel occlusion. It could also be vasospasm as the subarachnoid hemorrhage is present in this region.   3.  Other findings as above.      Efforts are underway to contact referring physician with results at time of dictation.            EC-ECHOCARDIOGRAM COMPLETE W/O CONT   Final Result      DX-CHEST-PORTABLE (1 VIEW)   Final Result      Feeding tube tip projects in the stomach.      Hazy left basilar opacity, atelectasis and/or mild pneumonitis.      DX-LUMBAR PUNCTURE FOR DIAGNOSIS   Final Result      Fluoroscopic-guided lumbar puncture as described above.      IR-MIDLINE CATHETER INSERTION WO GUIDANCE > AGE 3   Final Result                  Ultrasound-guided midline placement performed by  qualified nursing staff    as above.          DX-ABDOMEN FOR TUBE PLACEMENT   Final Result         1.  Nonspecific bowel gas pattern in the upper abdomen.   2.  Dobbhoff tube is coiled within the stomach, the tip terminates overlying the expected location of the gastric cardia.   3.  Hazy left lower lobe infiltrate.      MR-BRAIN-WITH & W/O   Final Result      1.  Significant interval decrease in size of right frontal brain metastasis since previous exam slight interval decrease in surrounding vasogenic edema. Smaller irregular rim of increased diffusion weighted signal intensity about this treated metastasis    consistent with cytotoxic edema.      2.  New large area of acute infarction in the right posterior frontal, parietal, posterior temporal, and occipital lobes associated gyriform enhancement in the right lateral occipital lobe and right peritrigonal white matter.      3.  Smaller chronic wedge-shaped area of infarction involving the left posterior basal ganglia extending into the left posterior frontal periventricular white matter      4.  Small areas of chronic lacunar type infarction involving the left side of the elaine and left brachium pontis with surrounding gliosis.      5.  Small foci of chronic hemosiderin deposition noted in the left posterior temporal and occipital white matter, as well as in the treated right frontal brain metastasis      6.  Age-related cerebral atrophy.      DX-ABDOMEN FOR TUBE PLACEMENT   Final Result         1.  Nonspecific bowel gas pattern in the upper abdomen.   2.  Dobbhoff tube is coiled within the stomach, the tip terminates overlying the expected location of the gastric fundus, physician similar to prior study.   3.  Left lower lobe infiltrate      DX-ABDOMEN FOR TUBE PLACEMENT   Final Result         1.  Nonspecific bowel gas pattern in the upper abdomen.   2.  Dobbhoff tube is coiled within the stomach, the tip terminates overlying the expected location of the gastric  fundus.   3.  Left basilar atelectasis      DX-ABDOMEN FOR TUBE PLACEMENT   Final Result      Feeding tube looped in the stomach the distal tip in the gastric fundus.      CT-HEAD WITH & W/O   Final Result      1.  Redemonstration of right frontal lobe mass, highly concerning for metastasis. Associated vasogenic edema and a 2.5 mm right to left midline shift. No progressive mass effect. No herniation.   2.  Nonspecific ill-defined enhancement in the right parietal lobe.   3.  No new large vascular structure infarct. No new intracranial hemorrhage.      DX-CHEST-LIMITED (1 VIEW)   Final Result      No significant interval change.      DX-ABDOMEN COMPLETE WITH AP OR PA CXR   Final Result      1. No acute cardiopulmonary abnormality.   2. Chronic left lower lobe scarring.   3. Nonobstructive bowel gas pattern. Small amount of stool throughout the colon.          Impression:   73-year-old woman with persistent encephalopathy and stupor.  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  There is significant vasogenic edema surrounding the right frontal met.  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  24-hour EEG showed no concern for seizure activity.  Suspect her encephalopathy is multifactorial given underlying CNS inflammation/metastases/vasogenic edema/recent ischemic stroke.     Recent MRI does show restricted diffusion in the posterior right temporal lobe consistent with scattered recent ischemic stroke.  CTA evaulation show right MCA branch stenosis vs. Occlusion as the likely culprit.  This vascular abnormality could be due to athero, radiation induced or related to VZV vasculopathy.     Recommendations:  #History of seizures  -continue Vimpat 150mg bid  -EEG without ongoing seizure activity     #Encephalopathy  Certainly multifactorial  given brain met with increasing vasogenic edema, new stroke, ongoing meningoencephalitis  -Work to normalize day/night sleep     #Ischemic Stroke   Secondary to intracranial vasculopathy (athero vs. Radiation induced vs. VZV vasculopathy)  -Holding aspirin given yesterday CT showing small amount of hemorrhage  -continue IV acyclovir per ID, would complete 14 day course given cannot rule out vasculopathy due to VZV  -Follow up CT scan 4/26 showed stable hemorrhage     #Intracranial Hemorrhage  Tiny amount of intraparenchymal vs. SAH near the right sylvian fissure.  Hemorrhagic transformation of small ischemic stroke vs. Post-radiation induced.  Clinically insignificant given its size and location.  -Follow up CT today  -Holding aspirin for now, ok to use DVT prophylaxis    #Vision changes  No clear neurological explanation for this, obviously patient's ability to participate in a vision exam is limited.  Reasonable to see if ophtho would be willing to do a dilated fundoscopic exam to rule out retinal abnormalities.     Neurology will continue to follow     Jam Vargas MD

## 2024-04-28 NOTE — PROGRESS NOTES
Hospital Medicine Daily Progress Note    Date of Service  4/28/2024    Chief Complaint  Jairo Rivas is a 73 y.o. female admitted 4/13/2024 with acute enteropathy    Hospital Course  73 female with past medical history of endometrial carcinoma, brain metastasis s/p radiation therapy, CVA presented with altered mental status.  MRI on 4/20 noted with right frontal brain metastasis with vasogenic edema, acute infarction.  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  Neurology and ID consulted on the case.  CT head from 4/21 noted with 9X 7 mm of hemorrhage in the right temporal region.  Neurology recommending continue acyclovir.    Interval Problem Update      4/28/2024  Patient seen and examined at bedside  Her vitals remained stable  Remain confused.  Does not follow command  Pupils equal and reactive to light  Labs reviewed fingerstick stable, procalcitonin negative  Repeat chest x-ray noted with patchy perihilar and lower lobe opacity areas of atelectasis or pneumonitis    Close telemetry monitoring  Continue Vimpat  Continue to hold aspirin for subarachnoid hemorrhage  Discussed with neurology Dr. Vargas  Repeat BMP in a.m. to monitor electrolytes and renal function while on IV acyclovir  Family requesting for ophthalmology evaluation recent notes.  I have discussed the case with ophthalmology Dr. Benito will evaluate patient for recommendation,  High risk of deterioration .  Need close monitoring  Patient has a high medical complexity, complex decision making and is at high risk of complication, morbidity and mortality    I had extensive discussion with patient's son regarding the current medical status, treatment plan and prognosis.  He was briefed on the patient's condition including recent changes or developments .  Updated with recent test results vital signs and symptoms.  Current treatment plan was explained in details including medications, therapies and any upcoming  procedure or intervention.  Discussed the patient prognosis  based on the condition response to treatment and any potential complication or risk.  The family was given the opportunity to ask questions, expressed concerns, and share any information they deemed relevant to the patient's care.  Based on the discussion adjustment were made to the  patient's care plan to ensure alignment with family preferences and goals.  Family expressed appreciation for the open communication and the opportunity to participate in decision making regarding the patient care.     I have discussed this patient's plan of care and discharge plan at IDT rounds today with Case Management, Nursing, Nursing leadership, and other members of the IDT team.    Consultants/Specialty  neurology    Code Status  Full Code    Disposition  The patient is not medically cleared for discharge to home or a post-acute facility.  Anticipate discharge to: skilled nursing facility    I have placed the appropriate orders for post-discharge needs.    Review of Systems  Review of Systems   Unable to perform ROS: Acuity of condition        Physical Exam  Temp:  [36.2 °C (97.2 °F)-36.6 °C (97.9 °F)] 36.2 °C (97.2 °F)  Pulse:  [] 97  Resp:  [18-24] 20  BP: (118-156)/(65-81) 118/65  SpO2:  [95 %-97 %] 97 %    Physical Exam  Constitutional:       Appearance: She is ill-appearing.   HENT:      Head:      Comments: On feeding tube  Cardiovascular:      Rate and Rhythm: Tachycardia present.      Pulses: Normal pulses.   Pulmonary:      Effort: Pulmonary effort is normal. No respiratory distress.      Breath sounds: No wheezing or rales.   Abdominal:      General: Abdomen is flat.   Musculoskeletal:      Right lower leg: No edema.      Left lower leg: No edema.   Neurological:      Mental Status: She is disoriented.      Comments: She does not follow command.  Moves extremities to painful stimuli         Fluids    Intake/Output Summary (Last 24 hours) at 4/28/2024  1154  Last data filed at 4/27/2024 1800  Gross per 24 hour   Intake 600 ml   Output --   Net 600 ml       Laboratory  Recent Labs     04/26/24  0358 04/27/24  0251   WBC 9.1 8.6   RBC 3.59* 3.43*   HEMOGLOBIN 11.5* 10.9*   HEMATOCRIT 33.8* 32.3*   MCV 94.2 94.2   MCH 32.0 31.8   MCHC 34.0 33.7   RDW 50.1* 50.3*   PLATELETCT 137* 137*   MPV 11.6 11.8     Recent Labs     04/25/24  1604 04/26/24  0358 04/27/24  0251   SODIUM 140 141 138   POTASSIUM 4.3 4.1 4.0   CHLORIDE 107 106 103   CO2 22 22 23   GLUCOSE 176* 182* 158*   BUN 27* 25* 24*   CREATININE 0.50 0.43* 0.48*   CALCIUM 9.2 9.2 8.6                     Imaging  DX-CHEST-LIMITED (1 VIEW)   Final Result      1.  Hazy perihilar and lower lobe opacities represent areas of atelectasis or potentially pneumonitis.      IR-MIDLINE CATHETER INSERTION WO GUIDANCE > AGE 3   Final Result                  Ultrasound-guided midline placement performed by qualified nursing staff    as above.          CT-HEAD W/O   Final Result      1.  9 x 7 mm focus of hemorrhage in the right temporal region, suspected subarachnoid versus less likely intraparenchymal. It is stable since noncontrast head CT performed one day prior.         CT-CTA NECK WITH & W/O-POST PROCESSING   Final Result      No occlusion, hemodynamically significant stenosis or dissection of the neck arteries.      CT-CTA HEAD WITH & W/O-POST PROCESS   Final Result   Addendum (preliminary) 1 of 1   Critical value called by Dr. Hector Dodson to Physician: RONAN NOGUERA   at 4/25/2024 3:48 PM.      Final      1.  9 x 7 mm focus of hemorrhage in the right temporal region which is probably subarachnoid, less likely intraparenchymal.   2.  Focal occlusion of right M2 MCA inferior division with distal opacification. This could be a focal high-grade stenosis versus vessel occlusion. It could also be vasospasm as the subarachnoid hemorrhage is present in this region.   3.  Other findings as above.      Efforts are  underway to contact referring physician with results at time of dictation.            EC-ECHOCARDIOGRAM COMPLETE W/O CONT   Final Result      DX-CHEST-PORTABLE (1 VIEW)   Final Result      Feeding tube tip projects in the stomach.      Hazy left basilar opacity, atelectasis and/or mild pneumonitis.      DX-LUMBAR PUNCTURE FOR DIAGNOSIS   Final Result      Fluoroscopic-guided lumbar puncture as described above.      IR-MIDLINE CATHETER INSERTION WO GUIDANCE > AGE 3   Final Result                  Ultrasound-guided midline placement performed by qualified nursing staff    as above.          DX-ABDOMEN FOR TUBE PLACEMENT   Final Result         1.  Nonspecific bowel gas pattern in the upper abdomen.   2.  Dobbhoff tube is coiled within the stomach, the tip terminates overlying the expected location of the gastric cardia.   3.  Hazy left lower lobe infiltrate.      MR-BRAIN-WITH & W/O   Final Result      1.  Significant interval decrease in size of right frontal brain metastasis since previous exam slight interval decrease in surrounding vasogenic edema. Smaller irregular rim of increased diffusion weighted signal intensity about this treated metastasis    consistent with cytotoxic edema.      2.  New large area of acute infarction in the right posterior frontal, parietal, posterior temporal, and occipital lobes associated gyriform enhancement in the right lateral occipital lobe and right peritrigonal white matter.      3.  Smaller chronic wedge-shaped area of infarction involving the left posterior basal ganglia extending into the left posterior frontal periventricular white matter      4.  Small areas of chronic lacunar type infarction involving the left side of the elaine and left brachium pontis with surrounding gliosis.      5.  Small foci of chronic hemosiderin deposition noted in the left posterior temporal and occipital white matter, as well as in the treated right frontal brain metastasis      6.  Age-related  cerebral atrophy.      DX-ABDOMEN FOR TUBE PLACEMENT   Final Result         1.  Nonspecific bowel gas pattern in the upper abdomen.   2.  Dobbhoff tube is coiled within the stomach, the tip terminates overlying the expected location of the gastric fundus, physician similar to prior study.   3.  Left lower lobe infiltrate      DX-ABDOMEN FOR TUBE PLACEMENT   Final Result         1.  Nonspecific bowel gas pattern in the upper abdomen.   2.  Dobbhoff tube is coiled within the stomach, the tip terminates overlying the expected location of the gastric fundus.   3.  Left basilar atelectasis      DX-ABDOMEN FOR TUBE PLACEMENT   Final Result      Feeding tube looped in the stomach the distal tip in the gastric fundus.      CT-HEAD WITH & W/O   Final Result      1.  Redemonstration of right frontal lobe mass, highly concerning for metastasis. Associated vasogenic edema and a 2.5 mm right to left midline shift. No progressive mass effect. No herniation.   2.  Nonspecific ill-defined enhancement in the right parietal lobe.   3.  No new large vascular structure infarct. No new intracranial hemorrhage.      DX-CHEST-LIMITED (1 VIEW)   Final Result      No significant interval change.      DX-ABDOMEN COMPLETE WITH AP OR PA CXR   Final Result      1. No acute cardiopulmonary abnormality.   2. Chronic left lower lobe scarring.   3. Nonobstructive bowel gas pattern. Small amount of stool throughout the colon.           Assessment/Plan  * Acute encephalopathy- (present on admission)  Assessment & Plan  Multifactorial  Manage encephalopathy is  Acute stroke on MRI 4/20  Subarachnoid hemorrhage on CT  LP with CSF positive for VZV done 4/22  Continue on acyclovir  Monitor blood pressure closely,  neurochecks every 4 hours        Aspiration pneumonitis (HCC)  Assessment & Plan  Patient high risk for aspiration pneumonitis/pneumonia.   Currently on room air  Monitor oxygen requirement closely    Prediabetes- (present on  admission)  Assessment & Plan  Monitor glucose level closely.  On insulin regimen    Vasogenic edema (HCC)- (present on admission)  Assessment & Plan  Continuing decadron taper per Dr. Live's recommendations. He also does not believe that there are any significant changes in her brain imaging from this admission that could explain her somnolence/presentation. She is on the correct medication (decadron) for treating the edema  Improvement on MRI on 4/21  - Continue decadron      Hypernatremia  Assessment & Plan  Improved    Seizure (HCC)- (present on admission)  Assessment & Plan    -Continue lacosamide 150 mg IV BID per neurology  -EEG and cEEG negative for seizures         Dysphagia- (present on admission)  Assessment & Plan  Possibly due to CVA vs brain metastases and altered mental status in setting of infection.  Patient will remain NPO as she failed SLP evaluation 4/17  NG tube placement required IRIS. Tube was removed for MRI and family refused NG tube to be replaced. Extensive discussion with family regarding nutrition options. Eventually agreed to let patient get NG tube with lorazepam sedation and IRIS. No one certified to place NG tube with IRIS available on 4/21. Vascular access also lost on 4/21 and midline unable to be placed without VATs  -aspiration precautions  -Continue lansoprazole  -[id number] glucose checks      Shingles- (present on admission)  Assessment & Plan  Continue on acyclovir    Constipation  Assessment & Plan  Resolved  - Bowel regimen    Sepsis secondary to UTI (HCC)- (present on admission)  Assessment & Plan  Sepsis resolved    Malignant neoplasm metastatic to brain (HCC)- (present on admission)  Assessment & Plan  Brain mets noted during hospitalization on 3/27/2024 with vasogenic edema, MRI on 4/11 shows continued brain mets with vasogenic edema.  MRI 4/21 shows decrease in tumor size and improvement in edema.  -continue seizure prophylaxis with lacosamide 150 mg BID  -continue  dexamethasone   4/28/2024  Ophthalmology consulted for concern of vision changes        Acute kidney injury (HCC)- (present on admission)  Assessment & Plan  RESOLVED  Returned, Cr 1.11 and baseline around 0.7. Likely prerenal from dehydration.  -continue free water flushes  -renally dose all medications as appropriate  -avoid nephrotoxins    Advance care planning- (present on admission)  Assessment & Plan  At this time patient's family want her to be full code.  Palliative care consulted and discussed with family the goals of care. This remains the same.  -Full code  -Family goals of care is to make patient best shape to be discharged to be follow-up by his doctors in [place].  -Family updates daily with her guarded prognosis    Endometrial cancer (HCC)- (present on admission)  Assessment & Plan  Followed by Dr. Garcia outpatient and sees radiation oncology Dr Live.  Received immunotherapy at [facility] in 2021 with initial good response. Reached out to Dr. Live who does not believe imaging from 4/14 shows significant changes compared to MRI month prior.  Complicated by metastases to brain among other locations, likely contributing to her altered mental status. Mets re demonstrated without significant change on CTH on admission.  - Decadron for vasogenic edema  - Neurochecks every 4 hours  - Continue aspiration, fall, seizure precaution.    Essential hypertension- (present on admission)  Assessment & Plan  Monitor blood pressure closely    Acute CVA (cerebrovascular accident) (HCC)- (present on admission)  Assessment & Plan  MRI on 4/20 noted with right frontal brain metastasis with vasogenic edema, acute infarction.  CT head from 4/21 noted with 9X 7 mm of hemorrhage in the right temporal region.   Neurology following         VTE prophylaxis: lovenox    I have performed a physical exam and reviewed and updated ROS and Plan today (4/28/2024). In review of yesterday's note (4/27/2024), there are no changes except as  documented above.

## 2024-04-28 NOTE — PROGRESS NOTES
Monitor summary: SR , W[place] 0.18, QRS 0.08, QT 0.38, with rare PVCs, bigems, trigems per strip from monitor room.

## 2024-04-28 NOTE — CARE PLAN
The patient is Stable - Low risk of patient condition declining or worsening    Shift Goals  Clinical Goals: VSS, maintain skin integrity  Patient Goals: ZA  Family Goals: ZA    Progress made toward(s) clinical / shift goals:    Problem: Hemodynamics  Goal: Patient's hemodynamics, fluid balance and neurologic status will be stable or improve  Outcome: Progressing     Problem: Respiratory  Goal: Patient will achieve/maintain optimum respiratory ventilation and gas exchange  Outcome: Progressing       Patient is not progressing towards the following goals:

## 2024-04-28 NOTE — WOUND TEAM
[facility]  Inpatient Services  Wound and Skin Care Follow-up    Admission Date: 4/13/2024     Last order of IP CONSULT TO WOUND CARE was found on 4/24/2024 from Hospital Encounter on 4/13/2024     HPI, PMH, SH: Reviewed    Past Surgical History:   Procedure Laterality Date    WY THORACOSCOPY,DX NO BX Left 8/6/2021    Procedure: THORACOSCOPY - HEMOTHORAX EVACUATION, CHEST TUBE PLACEMENT X 2;  Surgeon: John H Ganser, M.D.;  Location: SURGERY [facility];  Service: General    WY CYSTOSCOPY,INSERT URETERAL STENT Left 8/8/2019    Procedure: CYSTOSCOPY, WITH URETERAL STENT INSERTION;  Surgeon: Jann Garcia M.D.;  Location: SURGERY [facility];  Service: Gynecology    HYSTERECTOMY ROBOTIC XI N/A 8/8/2019    Procedure: HYSTERECTOMY, ROBOT-ASSISTED, USING DA RILEY XI;  Surgeon: Jann Garcia M.D.;  Location: SURGERY [facility];  Service: Gynecology    SALPINGO OOPHORECTOMY Bilateral 8/8/2019    Procedure: SALPINGO-OOPHORECTOMY;  Surgeon: Jann Garcia M.D.;  Location: SURGERY [facility];  Service: Gynecology    NODE BIOPSY SENTINEL  8/8/2019    Procedure: BIOPSY, LYMPH NODE, SENTINEL;  Surgeon: Jann Garcia M.D.;  Location: SURGERY [facility];  Service: Gynecology    WY HYSTEROSCOPY,DX,SEP PROC  6/19/2019    Procedure: HYSTEROSCOPY, DIAGNOSTIC;  Surgeon: Anel Chicas M.D.;  Location: SURGERY [facility];  Service: Gynecology    DILATION AND CURETTAGE  6/19/2019    Procedure: DILATION AND CURETTAGE;  Surgeon: Anel Chicas M.D.;  Location: SURGERY [facility];  Service: Gynecology    HYSTERECTOMY LAPAROSCOPY       Social History     Tobacco Use    Smoking status: Never    Smokeless tobacco: Never    Tobacco comments:     n/a   Substance Use Topics    Alcohol use: No     Chief Complaint   Patient presents with    Painful Urination    Constipation     Diagnosis: Sepsis (HCC) [A41.9]  AMS (altered mental status) [R41.82]    Unit where seen by Wound Team: S187/02     WOUND  FOLLOW UP RELATED TO:  sacrum       WOUND TEAM PLAN OF CARE - Frequency of Follow-up:   Nursing to follow dressing orders written for wound care. Contact wound team if area fails to progress, deteriorates or with any questions/concerns if something comes up before next scheduled follow up (See below as to whether wound is following and frequency of wound follow up)  Dressing changes by wound team:                   Weekly - sacrum    WOUND HISTORY:       Jairo Rivas is a 73 y.o. female who presented 4/13/2024 with confusion, abdominal pain, constipation.  She has a history of endometrial cancer s/p immunotherapy, with recent brain metastases with vasogenic edema found on 03/27/2024 and was initiated on radiation therapy, CVA 2010 with residual right sided deficits and a new subacute CVA 4/11/2024, hypertension.  She presents today with worsening confusion, lethargy over the past 3 days.  Majority of history is obtained from daughter, Radha, as pt is unable to share history herself.  Daughter reports that since discharge from the hospital at the end of March her mentation was initially improving, she was started on lacosamide and dexamethasone taper following this hospitalization.  She started worsening again around 4/10, which brought them to the ED when she was having difficulty with speech, difficulty swallowing medications and worsened right sided weakness.  She had an MRI performed at [facility] which found subacute CVA with right parieto-occipiatal junction involvement and metastatic foci with surrounding edema in the right frontal, left frontal parietal and left parietal regions.  She was given norco following this ED visit for her pain.  Since 4/11 the patient has had great difficulty sleeping, reports of abdominal pain, confusion, lethargy and daughter has concerns for seizures as the patient appears to be reaching for things that aren't there and shaking frequently, denies and tongue biting or  full loss of consciousness during these episodes.  She also has not had a bowel movement in 5-7 days         WOUND ASSESSMENT/LDA  Wound 04/08/24 Other (comment) Breast Left Under Left Breast Blisters (Active)   Date First Assessed: 04/08/24   Present on Original Admission: Yes  Hand Hygiene Completed: Yes  Primary Wound Type: (c) Other (comment)  Location: Breast  Laterality: Left  Wound Description (Comments): Under Left Breast Blisters      Assessments 4/16/2024  4:00 PM   Wound Image     Site Assessment Red;Pink   Periwound Assessment Denuded   Margins Defined edges   Closure Open to air   Drainage Amount Scant   Drainage Description Serous;Serosanguineous   Treatments Cleansed;Irrigation;Nonselective debridement;Site care   Wound Cleansing Soap and Water   Dressing Status Open to Air   Dressing Changed Reinforced   Dressing Cleansing/Solutions Not Applicable   Dressing Options Interdry   Dressing Change/Treatment Frequency As Needed   Wound Team Following Not following   Non-staged Wound Description Partial thickness   Shape scatterered partial thickness   Wound Odor None       Moisture Associated Skin Damage 04/16/24 Buttock;Sacrum (Active)   First Observed Date/First Observed Time: 04/16/24 1628   Laterality: Bilateral  Wound Location : Buttock;Sacrum      Assessments 4/16/2024  4:00 PM   Wound Image     NEXT Weekly Photo (Inpatient Only) 04/24/24   Drainage Amount Scant   Drainage Description Serosanguineous   Periwound Assessment Denuded   IAD Cleansing Foam Cleanser/Washcloth   Periwound Protectant Barrier Paste   IAD Containment Device Purewick;Interdry Cloth   WOUND NURSE ONLY - Time Spent with Patient (mins) 30       Wound 04/21/24 Full Thickness Wound Sacrum Unknown etiology (Active)   Date First Assessed/Time First Assessed: 04/21/24 1555   Present on Original Admission: Yes  Primary Wound Type: Full Thickness Wound  Location: Sacrum  Wound Description (Comments): Unknown etiology      Assessments  4/28/2024 11:00 AM   Wound Image       Site Assessment Red;Excoriated;White   Periwound Assessment Dry;Blanchable erythema   Margins Defined edges   Closure Open to air   Drainage Amount Scant   Drainage Description Serosanguineous   Treatments Cleansed;Irrigation;Nonselective debridement;Offloading   Offloading/DME Heel float boot   Wound Cleansing Foam Cleanser/Washcloth   Periwound Protectant Barrier Paste   Dressing Status Open to Air   Dressing Changed Reapplied   Dressing Cleansing/Solutions Not Applicable   Dressing Options Viscopaste   Dressing Change/Treatment Frequency Every Shift, and As Needed   Wound Team Following Weekly   Non-staged Wound Description Full thickness   Shape irregular, blanching   Wound Odor None   WOUND NURSE ONLY - Time Spent with Patient (mins) 30        Vascular:    TAYLER:   No results found.    Lab Values:    Lab Results   Component Value Date/Time    WBC 8.6 04/27/2024 02:51 AM    RBC 3.43 (L) 04/27/2024 02:51 AM    HEMOGLOBIN 10.9 (L) 04/27/2024 02:51 AM    HEMATOCRIT 32.3 (L) 04/27/2024 02:51 AM    CREACTPROT 13.12 (H) 04/22/2024 12:32 AM    HBA1C 6.6 (H) 04/16/2024 02:59 AM         Culture Results show:  Recent Results (from the past 720 hour(s))   CULTURE WOUND W/ GRAM STAIN    Collection Time: 04/16/24  6:53 PM    Specimen: Chest; Wound   Result Value Ref Range    Significant Indicator POS (POS)     Source WND     Site CHEST     Culture Result Heavy growth usual skin guilherme. (A)     Gram Stain Result       Moderate WBCs.  Moderate Gram positive cocci.  Few Gram negative rods.  Few Gram positive rods.  Rare Yeast.      Culture Result Enterococcus faecalis  Heavy growth   (A)     Culture Result Candida albicans  Heavy growth   (A)        Susceptibility    Enterococcus faecalis - MARIOLA     Daptomycin 2 Sensitive mcg/mL     Gent Synergy <=500 Sensitive mcg/mL     Ampicillin <=2 Sensitive mcg/mL     Linezolid 2 Sensitive mcg/mL     Vancomycin 1 Sensitive mcg/mL     Penicillin 1  Sensitive mcg/mL     Tetracycline >8 Resistant mcg/mL       Pain Level/Medicated:  None, Tolerated without pain medication       INTERVENTIONS BY WOUND TEAM:  Chart and images reviewed. Discussed with bedside RN. All areas of concern (based on picture review, LDA review and discussion with bedside RN) have been thoroughly assessed. Documentation of areas based on significant findings. This RN in to assess patient. Performed standard wound care which includes appropriate positioning, dressing removal and non-selective debridement. Pictures and measurements obtained weekly if/when required.    Wound:  SACRUM  Preparation for Dressing removal: Open to air  Cleansed/Non-selectively Debrided with:  No rinse foam soap and Moist warm washcloth  Tana wound: Cleansed with No rinse foam soap and Moist warm washcloth, Prepped with Barrier paste  Primary Dressing:  viscopaste patch  Secondary (Outer) Dressing: barrier paste    Advanced Wound Care Discharge Planning  Number of Clinicians necessary to complete wound care: 1  Is patient requiring IV pain medications for dressing changes:  No   Length of time for dressing change 15 min. (This does not include chart review, pre-medication time, set up, clean up or time spent charting.)    Interdisciplinary consultation: Patient, Bedside RN (Eloy),  CNA .  Pressure injury and staging reviewed with N/A.    EVALUATION / RATIONALE FOR TREATMENT:     Date:  04/28/24  Wound Status:  Wound progressing as expected    Continue with viscopaste patch.  Wound bed continue to endy through the redness.  White area at the proximal end not on bony prominence and has a kissing surface to the right surface.  Will re-assess weekly, but not likely pressure even though unable to see wound base.     Date:  04/24/24  Wound Status:  Wound deteriorating    L sacral wound now full thickness, unknown etiology.  Viscopatch zinc impregnated gauze to encourage re-epithelialization of superficial 100% viable  wound bed, and to provide a non-stick wound contact layer.     Date:  04/16/24  Wound Status:  Initial evaluation     Partial thickness open wounds to the left chest under breast and flank area, interdry applied to help with wicking moisture.  Sacral moisture related skin breakdown, barrier paste applied. Continue with offloading measures.          Goals: Steady decrease in wound area and depth weekly.    NURSING PLAN OF CARE ORDERS:  Dressing changes: See Dressing Care orders    NUTRITION RECOMMENDATIONS   Wound Team Recommendations:  Protein supplements     DIET ORDERS (From admission to next 24h)       Start     Ordered    04/18/24 0914  Diet: Diet Tube Feed; Formula: Promot; Promot: Promot Fiber RTH; Goal Rate (mL/Hour): 60; Duration: 24 HR; Tube Feed Time Unit: Hours/Day  ALL MEALS        Comments: Start at 25mL/hr. Do not advance until dietitian consult completed.   Question Answer Comment   Diet Diet Tube Feed    Formula: Promot    Promote: Promot Fiber RTH    Goal Rate (mL/Hour) 60    Route Enteral Tube    Duration 24 HR    Tube Feed Time Unit Hours/Day        04/18/24 0914 04/13/24 2142  Diet NPO Restrict to: Sips with Medications (pending bedside swallow study)  ALL MEALS        Question:  Diet NPO Restrict to:  Answer:  Sips with Medications  Comment:  pending bedside swallow study    04/13/24 2147                    PREVENTATIVE INTERVENTIONS:   Q shift Galen - performed per nursing policy  Q shift pressure point assessments - performed per nursing policy    Surface/Positioning  Low Airloss - Currently in Place  Reposition q 2 hours - Currently in Place  TAPs Turning system - Currently in Place    Offloading/Redistribution  Heel offloading dressing (Silicone dressing) - Currently in Place  Heel float boots (Prevalon boot) - Currently in Place      Respiratory  oxymask - Currently in Place    Containment/Moisture Prevention    Dri-reyna pad - Currently in Place  Purwick/Condom Cath - Currently in  Place  Barrier paste - Currently in Place  Viscopaste patch - Applied this Visit    Mobilization      Unable to assess     Anticipated discharge plans:  TBD        Vac Discharge Needs:  Vac Discharge plan is purely a recommendation from wound team and not a requirement for discharge unless otherwise stated by physician.  Not Applicable Pt not on a wound vac    WDL

## 2024-04-29 LAB
ANION GAP SERPL CALC-SCNC: 11 MMOL/L (ref 7–16)
BASOPHILS # BLD AUTO: 0.1 % (ref 0–1.8)
BASOPHILS # BLD: 0.01 K/UL (ref 0–0.12)
BUN SERPL-MCNC: 22 MG/DL (ref 8–22)
CALCIUM SERPL-MCNC: 8.7 MG/DL (ref 8.5–10.5)
CHLORIDE SERPL-SCNC: 104 MMOL/L (ref 96–112)
CO2 SERPL-SCNC: 22 MMOL/L (ref 20–33)
CREAT SERPL-MCNC: 0.45 MG/DL (ref 0.5–1.4)
CRP SERPL HS-MCNC: 0.67 MG/DL (ref 0–0.75)
EOSINOPHIL # BLD AUTO: 0 K/UL (ref 0–0.51)
EOSINOPHIL NFR BLD: 0 % (ref 0–6.9)
ERYTHROCYTE [DISTWIDTH] IN BLOOD BY AUTOMATED COUNT: 49.2 FL (ref 35.9–50)
GFR SERPLBLD CREATININE-BSD FMLA CKD-EPI: 102 ML/MIN/1.73 M 2
GLUCOSE BLD STRIP.AUTO-MCNC: 130 MG/DL (ref 65–99)
GLUCOSE BLD STRIP.AUTO-MCNC: 145 MG/DL (ref 65–99)
GLUCOSE BLD STRIP.AUTO-MCNC: 153 MG/DL (ref 65–99)
GLUCOSE BLD STRIP.AUTO-MCNC: 163 MG/DL (ref 65–99)
GLUCOSE BLD STRIP.AUTO-MCNC: 196 MG/DL (ref 65–99)
GLUCOSE SERPL-MCNC: 165 MG/DL (ref 65–99)
HCT VFR BLD AUTO: 32.3 % (ref 37–47)
HGB BLD-MCNC: 10.9 G/DL (ref 12–16)
IMM GRANULOCYTES # BLD AUTO: 0.29 K/UL (ref 0–0.11)
IMM GRANULOCYTES NFR BLD AUTO: 3.3 % (ref 0–0.9)
LYMPHOCYTES # BLD AUTO: 0.47 K/UL (ref 1–4.8)
LYMPHOCYTES NFR BLD: 5.3 % (ref 22–41)
MCH RBC QN AUTO: 32.5 PG (ref 27–33)
MCHC RBC AUTO-ENTMCNC: 33.7 G/DL (ref 32.2–35.5)
MCV RBC AUTO: 96.4 FL (ref 81.4–97.8)
MONOCYTES # BLD AUTO: 0.32 K/UL (ref 0–0.85)
MONOCYTES NFR BLD AUTO: 3.6 % (ref 0–13.4)
NEUTROPHILS # BLD AUTO: 7.79 K/UL (ref 1.82–7.42)
NEUTROPHILS NFR BLD: 87.7 % (ref 44–72)
NRBC # BLD AUTO: 0.03 K/UL
NRBC BLD-RTO: 0.3 /100 WBC (ref 0–0.2)
PLATELET # BLD AUTO: 155 K/UL (ref 164–446)
PMV BLD AUTO: 11.8 FL (ref 9–12.9)
POTASSIUM SERPL-SCNC: 4.1 MMOL/L (ref 3.6–5.5)
PREALB SERPL-MCNC: 32.4 MG/DL (ref 18–38)
RBC # BLD AUTO: 3.35 M/UL (ref 4.2–5.4)
SODIUM SERPL-SCNC: 137 MMOL/L (ref 135–145)
WBC # BLD AUTO: 8.9 K/UL (ref 4.8–10.8)

## 2024-04-29 PROCEDURE — A9270 NON-COVERED ITEM OR SERVICE: HCPCS

## 2024-04-29 PROCEDURE — 700111 HCHG RX REV CODE 636 W/ 250 OVERRIDE (IP): Mod: JZ | Performed by: STUDENT IN AN ORGANIZED HEALTH CARE EDUCATION/TRAINING PROGRAM

## 2024-04-29 PROCEDURE — 700111 HCHG RX REV CODE 636 W/ 250 OVERRIDE (IP): Mod: JZ

## 2024-04-29 PROCEDURE — 700102 HCHG RX REV CODE 250 W/ 637 OVERRIDE(OP)

## 2024-04-29 PROCEDURE — A9270 NON-COVERED ITEM OR SERVICE: HCPCS | Performed by: STUDENT IN AN ORGANIZED HEALTH CARE EDUCATION/TRAINING PROGRAM

## 2024-04-29 PROCEDURE — 99232 SBSQ HOSP IP/OBS MODERATE 35: CPT | Performed by: STUDENT IN AN ORGANIZED HEALTH CARE EDUCATION/TRAINING PROGRAM

## 2024-04-29 PROCEDURE — 86140 C-REACTIVE PROTEIN: CPT

## 2024-04-29 PROCEDURE — 700101 HCHG RX REV CODE 250: Performed by: STUDENT IN AN ORGANIZED HEALTH CARE EDUCATION/TRAINING PROGRAM

## 2024-04-29 PROCEDURE — 700102 HCHG RX REV CODE 250 W/ 637 OVERRIDE(OP): Performed by: INTERNAL MEDICINE

## 2024-04-29 PROCEDURE — 82962 GLUCOSE BLOOD TEST: CPT | Mod: 91

## 2024-04-29 PROCEDURE — A9270 NON-COVERED ITEM OR SERVICE: HCPCS | Performed by: INTERNAL MEDICINE

## 2024-04-29 PROCEDURE — 665999 HH PPS REVENUE DEBIT

## 2024-04-29 PROCEDURE — 700105 HCHG RX REV CODE 258

## 2024-04-29 PROCEDURE — 84134 ASSAY OF PREALBUMIN: CPT

## 2024-04-29 PROCEDURE — 80048 BASIC METABOLIC PNL TOTAL CA: CPT

## 2024-04-29 PROCEDURE — 85025 COMPLETE CBC W/AUTO DIFF WBC: CPT

## 2024-04-29 PROCEDURE — 700102 HCHG RX REV CODE 250 W/ 637 OVERRIDE(OP): Performed by: STUDENT IN AN ORGANIZED HEALTH CARE EDUCATION/TRAINING PROGRAM

## 2024-04-29 PROCEDURE — 665998 HH PPS REVENUE CREDIT

## 2024-04-29 PROCEDURE — 770020 HCHG ROOM/CARE - TELE (206)

## 2024-04-29 PROCEDURE — 36415 COLL VENOUS BLD VENIPUNCTURE: CPT

## 2024-04-29 RX ORDER — ERYTHROMYCIN 5 MG/G
1 OINTMENT OPHTHALMIC
Status: DISCONTINUED | OUTPATIENT
Start: 2024-04-29 | End: 2024-04-30

## 2024-04-29 RX ADMIN — ENOXAPARIN SODIUM 40 MG: 100 INJECTION SUBCUTANEOUS at 18:11

## 2024-04-29 RX ADMIN — ACYCLOVIR SODIUM 650 MG: 50 INJECTION, SOLUTION INTRAVENOUS at 15:11

## 2024-04-29 RX ADMIN — LABETALOL HYDROCHLORIDE 10 MG: 5 INJECTION INTRAVENOUS at 23:16

## 2024-04-29 RX ADMIN — INSULIN HUMAN 1 UNITS: 100 INJECTION, SOLUTION PARENTERAL at 00:04

## 2024-04-29 RX ADMIN — MINERAL OIL, AND WHITE PETROLATUM 1 APPLICATION: 425; 573 OINTMENT OPHTHALMIC at 06:00

## 2024-04-29 RX ADMIN — DEXAMETHASONE 4 MG: 4 TABLET ORAL at 06:11

## 2024-04-29 RX ADMIN — ERYTHROMYCIN 1 APPLICATION: 5 OINTMENT OPHTHALMIC at 18:12

## 2024-04-29 RX ADMIN — AMLODIPINE BESYLATE 10 MG: 10 TABLET ORAL at 06:11

## 2024-04-29 RX ADMIN — METOPROLOL TARTRATE 75 MG: 25 TABLET, FILM COATED ORAL at 18:13

## 2024-04-29 RX ADMIN — DEXAMETHASONE 4 MG: 4 TABLET ORAL at 18:14

## 2024-04-29 RX ADMIN — LANSOPRAZOLE 30 MG: 30 TABLET, ORALLY DISINTEGRATING ORAL at 06:11

## 2024-04-29 RX ADMIN — METOPROLOL TARTRATE 75 MG: 25 TABLET, FILM COATED ORAL at 06:11

## 2024-04-29 RX ADMIN — INSULIN HUMAN 1 UNITS: 100 INJECTION, SOLUTION PARENTERAL at 13:02

## 2024-04-29 RX ADMIN — ACYCLOVIR SODIUM 650 MG: 50 INJECTION, SOLUTION INTRAVENOUS at 22:42

## 2024-04-29 RX ADMIN — LACOSAMIDE 150 MG: 100 TABLET, FILM COATED ORAL at 18:12

## 2024-04-29 RX ADMIN — ACYCLOVIR SODIUM 650 MG: 50 INJECTION, SOLUTION INTRAVENOUS at 08:36

## 2024-04-29 RX ADMIN — MINERAL OIL, AND WHITE PETROLATUM 1 APPLICATION: 425; 573 OINTMENT OPHTHALMIC at 22:00

## 2024-04-29 RX ADMIN — LOSARTAN POTASSIUM 100 MG: 50 TABLET, FILM COATED ORAL at 06:10

## 2024-04-29 RX ADMIN — LACOSAMIDE 150 MG: 100 TABLET, FILM COATED ORAL at 06:10

## 2024-04-29 RX ADMIN — ATORVASTATIN CALCIUM 20 MG: 20 TABLET, FILM COATED ORAL at 18:14

## 2024-04-29 RX ADMIN — MINERAL OIL, AND WHITE PETROLATUM 1 APPLICATION: 425; 573 OINTMENT OPHTHALMIC at 14:00

## 2024-04-29 RX ADMIN — ERYTHROMYCIN 1 APPLICATION: 5 OINTMENT OPHTHALMIC at 22:50

## 2024-04-29 RX ADMIN — SULFAMETHOXAZOLE AND TRIMETHOPRIM 1 TABLET: 400; 80 TABLET ORAL at 06:11

## 2024-04-29 RX ADMIN — SENNOSIDES AND DOCUSATE SODIUM 2 TABLET: 50; 8.6 TABLET ORAL at 18:13

## 2024-04-29 RX ADMIN — INSULIN HUMAN 1 UNITS: 100 INJECTION, SOLUTION PARENTERAL at 23:01

## 2024-04-29 ASSESSMENT — FIBROSIS 4 INDEX: FIB4 SCORE: 2.2

## 2024-04-29 NOTE — CARE PLAN
The patient is Stable - Low risk of patient condition declining or worsening    Shift Goals  Clinical Goals: Stable Neuros, maintain skin integrity  Patient Goals: ZA  Family Goals: ZA    Progress made toward(s) clinical / shift goals:    Problem: Hemodynamics  Goal: Patient's hemodynamics, fluid balance and neurologic status will be stable or improve  Outcome: Progressing     Problem: Pain - Standard  Goal: Alleviation of pain or a reduction in pain to the patient’s comfort goal  Outcome: Progressing     Problem: Neuro Status  Goal: Neuro status will remain stable or improve  Outcome: Progressing       Patient is not progressing towards the following goals:

## 2024-04-29 NOTE — CONSULTS
OPHTHALMOLOGY CONSULT    CC: discharge from both eyes    HPI: Jairo Rivas is a 73 y.o. female with endometrial carcinoma s/p immunotherapy with brain metastases s/p radiation, CVA 2010 and 4/11/24, HTN presented 4/13/24 for altered mental status, and subsequently admitted for sepsis 2/2 UTI and encephalopathy. Patient had hemorrhagic CVA per CT 4/21/24 and is currently being treated for VZV encephalitis.     Daughter at bedside noticing discharge from both eyes for a few days while inpatient and requesting ophthalmology evaluation.     Ocular History: noncontributory  Ocular Meds: noncontributory    ROS:   Constitutional: negative  Skin: negative  HEENT: negative, see HPI for ocular  Respiratory: negative  Cardiovascular: negative  Gastrointestinal: negative  Endocrine: negative  Musculoskeletal: negative  Neurologic: negative    Past Medical History:   Past Medical History:   Diagnosis Date    Anemia 2/6/2018    Iron def, post-menopausal bleeding.    Back pain     Back pain     Cancer (HCC) 2019    uterine & endometrial    Cough     CVA, old, hemiparesis (HCC) 2/6/2018    Residual left sided weakness    Essential hypertension 2/6/2018    Well controlled on amlodipine 10 mg and metoprolol 100 mg bid.    Eye drainage     Fatigue     Frequent headaches     Frequent urination     Hyperlipidemia     Irregular periods     Painful joint     Palpitations     Post-menopause bleeding 2/6/2018    Menopause in 2010 and bleeding 2012 started to have irregular continuous bleeding.     Pulmonary nodule     Sore muscles     Stroke (HCC)     right sided weakness    Swelling of lower extremity     Vision abnormalities 2/6/2018    Vitamin D deficiency 2/6/2018    Taking 50,000 IU once weekly for 4 yrs.    Weakness     Wears glasses        Past Surgical History:   Past Surgical History:   Procedure Laterality Date    AK THORACOSCOPY,DX NO BX Left 8/6/2021    Procedure: THORACOSCOPY - HEMOTHORAX EVACUATION, CHEST TUBE  PLACEMENT X 2;  Surgeon: John H Ganser, M.D.;  Location: [facility];  Service: General    MO CYSTOSCOPY,INSERT URETERAL STENT Left 8/8/2019    Procedure: CYSTOSCOPY, WITH URETERAL STENT INSERTION;  Surgeon: Jann Garcia M.D.;  Location: [facility];  Service: Gynecology    HYSTERECTOMY ROBOTIC XI N/A 8/8/2019    Procedure: HYSTERECTOMY, ROBOT-ASSISTED, USING DA RILEY XI;  Surgeon: Jann Garcia M.D.;  Location: [facility];  Service: Gynecology    SALPINGO OOPHORECTOMY Bilateral 8/8/2019    Procedure: SALPINGO-OOPHORECTOMY;  Surgeon: Jann Garcia M.D.;  Location: [facility];  Service: Gynecology    NODE BIOPSY SENTINEL  8/8/2019    Procedure: BIOPSY, LYMPH NODE, SENTINEL;  Surgeon: Jann Garcia M.D.;  Location: [facility];  Service: Gynecology    MO HYSTEROSCOPY,DX,SEP PROC  6/19/2019    Procedure: HYSTEROSCOPY, DIAGNOSTIC;  Surgeon: Anel Chicas M.D.;  Location: [facility];  Service: Gynecology    DILATION AND CURETTAGE  6/19/2019    Procedure: DILATION AND CURETTAGE;  Surgeon: Anel Chicas M.D.;  Location: [facility];  Service: Gynecology    HYSTERECTOMY LAPAROSCOPY         Current Hospital Medications:     Current Facility-Administered Medications:     sulfamethoxazole-trimethoprim (Bactrim) 400-80 MG per tablet 1 Tablet, 1 Tablet, Enteral Tube, DAILY, Ronny Grove M.D., 1 Tablet at 04/29/24 0611    artificial tears (Eye Lubricant) ophth ointment 1 Application, 1 Application, Both Eyes, Q8HRS, Ronny Grove M.D., 1 Application at 04/29/24 0600    enoxaparin (Lovenox) inj 40 mg, 40 mg, Subcutaneous, DAILY AT 1800, Ronny Grove M.D., 40 mg at 04/28/24 1806    insulin regular (HumuLIN R,NovoLIN R) injection, 1-6 Units, Subcutaneous, Q6HRS, 1 Units at 04/29/24 1302 **AND** POC blood glucose manual result, , , 2X DAILY **AND** NOTIFY MD and PharmD, , , Once **AND** Administer 20 grams of glucose (approximately 8 ounces of  fruit juice) every 15 minutes PRN FSBG less than 70 mg/dL, , , PRN **AND** dextrose 50% (D50W) injection 25 g, 25 g, Intravenous, Q15 MIN PRN, Sudheer Ruiz M.D.    Metoprolol Tartrate (Lopressor) injection 5 mg, 5 mg, Intravenous, Q5 MIN PRN, Sudheer Ruiz M.D.    labetalol (Normodyne/Trandate) injection 10 mg, 10 mg, Intravenous, Q4HRS PRN, KATHLEEN Greer M.D., 10 mg at 04/25/24 1846    hydrALAZINE (Apresoline) injection 10 mg, 10 mg, Intravenous, Q4HRS PRN, KATHLEEN Greer M.D., 10 mg at 04/25/24 2147    acyclovir (Zovirax) 650 mg in  mL IVPB, 650 mg, Intravenous, Q8HRS, Vazquez Grover D.O., Stopped at 04/29/24 0936    lacosamide (Vimpat) tablet 150 mg, 150 mg, Enteral Tube, BID, Sima Bernal M.D., 150 mg at 04/29/24 0610    amLODIPine (Norvasc) tablet 10 mg, 10 mg, Enteral Tube, DAILY, Sudheer Ruiz M.D., 10 mg at 04/29/24 0611    metoprolol tartrate (Lopressor) tablet 75 mg, 75 mg, Enteral Tube, BID, Julio C Rojas M.D., 75 mg at 04/29/24 0611    Pharmacy Consult: Enteral tube insertion - review meds/change route/product selection, 1 Each, Other, PHARMACY TO DOSE, Anand Grover M.D.    dexamethasone (Decadron) tablet 4 mg, 4 mg, Enteral Tube, Q12HRS, Loreto Sarmiento M.D., 4 mg at 04/29/24 0611    atorvastatin (Lipitor) tablet 20 mg, 20 mg, Enteral Tube, Q EVENING, Loreto Sarmiento M.D., 20 mg at 04/28/24 1806    losartan (Cozaar) tablet 100 mg, 100 mg, Enteral Tube, DAILY, Sudheer Ruiz M.D., 100 mg at 04/29/24 0610    senna-docusate (Pericolace Or Senokot S) 8.6-50 MG per tablet 2 Tablet, 2 Tablet, Enteral Tube, Q EVENING, 2 Tablet at 04/28/24 1806 **AND** polyethylene glycol/lytes (Miralax) Packet 1 Packet, 1 Packet, Enteral Tube, DAILY, Loreto Sarmiento M.D., 1 Packet at 04/28/24 0540    lansoprazole (Prevacid) solutab 30 mg, 30 mg, Enteral Tube, DAILY, Loreto Sarmiento M.D., 30 mg at 04/29/24 0611    HYDROcodone-acetaminophen (Norco) 5-325 MG per tablet 1 Tablet, 1  Tablet, Enteral Tube, Q4HRS PRN, Loreto Sarmiento M.D.    bisacodyl (Dulcolax) suppository 10 mg, 10 mg, Rectal, DAILY, Julio C Rojas M.D., 10 mg at 24 0526    Pharmacy Consult Request ...Pain Management Review 1 Each, 1 Each, Other, PHARMACY TO DOSE, Julio Clements D.O.    Outpatient Medications:   Medications Prior to Admission   Medication Sig Dispense Refill Last Dose    HYDROcodone-acetaminophen (NORCO) 5-325 MG Tab per tablet Take 1 Tablet by mouth every four hours as needed (Severe Pain).   2024 at 1345    Lacosamide 150 MG Tab Take 150 mg by mouth 2 times a day. Indications: SEIZURE PREVENTION   2024 at 0700    valacyclovir (VALTREX) 1 GM Tab Take 1,000 mg by mouth 3 times a day. 7 day course prescribed 4/10/2024.   2024 at 0700    Melatonin 10 MG Tab Take 10 mg by mouth at bedtime as needed (Sleep).   2024 at 2200    losartan (COZAAR) 100 MG Tab Take 1 Tablet by mouth 1 time a day as needed (Hypertension) for up to 300 days. BP > 180  Indications: High Blood Pressure Disorder 100 Tablet 3 2024 at 2000    [] dexamethasone (DECADRON) 2 MG tablet Take 1 Tablet by mouth 4 times a day for 7 days, THEN 1 Tablet 3 times a day for 5 days, THEN 1 Tablet 2 times a day for 5 days, THEN 1 Tablet every day for 5 days. 58 Tablet 0 2024 at 0600    [] gabapentin (NEURONTIN) 300 MG Cap Take 1 Capsule by mouth 3 times a day for 30 days. 90 Capsule 0 2024 at 0600    amLODIPine (NORVASC) 5 MG Tab Take 1 Tablet by mouth every day. 30 Tablet 0 2024 at 0600    metoprolol SR (TOPROL XL) 100 MG TABLET SR 24 HR Take 1 Tablet by mouth every day. 100 Tablet 3 2024 at 0600    atorvastatin (LIPITOR) 20 MG Tab Take 1 Tablet by mouth every evening. 100 Tablet 3 2024 at 2000    aspirin 81 MG EC tablet Take 81 mg by mouth every day.       olanzapine (ZYPREXA) 10 MG tablet Take 10 mg by mouth at bedtime.   2024 at HS; STOPPED BY DAUGHTER    baclofen (LIORESAL) 10 MG  "Tab Take 10 mg by mouth 3 times a day as needed (Muscle Spasms).   > 1 WEEK AGO at PRN       Allergies:   Allergies   Allergen Reactions    Levaquin Itching and Swelling     Swelling of the tongue and face    Penicillins Itching and Swelling    Tramadol Itching and Swelling       Family History:   Family History   Problem Relation Age of Onset    Hypertension Mother     No Known Problems Father     No Known Problems Brother        Social History:   Social History     Socioeconomic History    Marital status: Single     Spouse name: Not on file    Number of children: Not on file    Years of education: Not on file    Highest education level: Not on file   Occupational History    Not on file   Tobacco Use    Smoking status: Never    Smokeless tobacco: Never    Tobacco comments:     n/a   Vaping Use    Vaping Use: Never used   Substance and Sexual Activity    Alcohol use: No    Drug use: No    Sexual activity: Never     Comment: , have 7 kids.   Other Topics Concern     Service No    Blood Transfusions No    Caffeine Concern No    Occupational Exposure No    Hobby Hazards No    Sleep Concern Yes    Stress Concern No    Weight Concern Yes    Special Diet No    Back Care Yes    Exercise Yes    Bike Helmet No     Comment: Does Not Ride Bike    Seat Belt Yes    Self-Exams Yes   Social History Narrative    Not on file     Social Determinants of Health     Financial Resource Strain: Not on file   Food Insecurity: Not on file   Transportation Needs: Not on file   Physical Activity: Not on file   Stress: Not on file   Social Connections: Feeling Socially Integrated (4/8/2024)    OASIS : Social Isolation     Frequency of experiencing loneliness or isolation: Never   Intimate Partner Violence: Not on file   Housing Stability: Not on file       Physical Exam:  AAO x 3    BP (!) 123/90   Pulse 99   Temp 36.4 °C (97.5 °F) (Temporal)   Resp (!) 32   Ht 1.626 m (5' 4.02\")   Wt 98 kg (216 lb 0.8 oz)   SpO2 95% "  Body mass index is 37.07 kg/m².    Eye Exam:   Base Eye Exam       Visual Acuity       unable: altered mental status              Tonometry (Palpation, 1:16 PM)         Right Left    Pressure NTP NTP              Pupils         Dark Light Shape React    Right 5 5 Round no    Left 5 5 Round no              Visual Fields    unable: altered mental status             Extraocular Movement    unable: altered mental status             Neuro/Psych    unable: altered mental status                 Slit Lamp and Fundus Exam       External Exam         Right Left    External Normal Normal              Pen Light Exam         Right Left    Lids/Lashes lagophthalmos lagophthalmos    Conjunctiva/Sclera melanosis; no injection melanosis; no injection    Cornea exposure keratopathy, slight mucoid discharge exposure keratopathy, slight mucoid discharge    Anterior Chamber Formed Formed    Iris Round and reactive Round and reactive    Lens Clear Clear    Anterior Vitreous Normal Normal                    Imaging: MRI 4/20/24  1.  Significant interval decrease in size of right frontal brain metastasis since previous exam slight interval decrease in surrounding vasogenic edema. Smaller irregular rim of increased diffusion weighted signal intensity about this treated metastasis   consistent with cytotoxic edema.  2.  New large area of acute infarction in the right posterior frontal, parietal, posterior temporal, and occipital lobes associated gyriform enhancement in the right lateral occipital lobe and right peritrigonal white matter.  3.  Smaller chronic wedge-shaped area of infarction involving the left posterior basal ganglia extending into the left posterior frontal periventricular white matter  4.  Small areas of chronic lacunar type infarction involving the left side of the elaine and left brachium pontis with surrounding gliosis.  5.  Small foci of chronic hemosiderin deposition noted in the left posterior temporal and occipital  white matter, as well as in the treated right frontal brain metastasis  6.  Age-related cerebral atrophy.    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 Limited ophthalmic exam secondary to altered mental status.     Recommendations:  - tape both eyes shut qhs   - start erythromycin ointment q4h while awake/during daytime for lubrication and prophylaxis against infection    Patient may call for outpatient follow up upon discharge.     The above explained to patient who understands and agrees with the plan of care. All questions answered.     Piero Benito MD  Ophthalmology   [phone]

## 2024-04-29 NOTE — PROGRESS NOTES
Monitor Summary: SR-ST , FL .17, QRS .09, QT .34 with rare PVC,PAC per strip from monitor room

## 2024-04-29 NOTE — CARE PLAN
The patient is Watcher - Medium risk of patient condition declining or worsening    Shift Goals  Clinical Goals: Maintain skin integrity, neuro status  Patient Goals: ZA  Family Goals: ZA    Progress made toward(s) clinical / shift goals:    Problem: Skin Integrity  Goal: Skin integrity is maintained or improved  Outcome: Progressing  Note: Q2 turns in place, TF on continuous feed. Wound visited patient today and included [employer] instructions for dressing changes. TAPS and float boots in place.       Patient is not progressing towards the following goals:      Problem: Neuro Status  Goal: Neuro status will remain stable or improve  Outcome: Not Progressing  Note: Patient oriented to self and place at times, patient seems to wax and wane. Daughter at bedside to help with translation. Q4 neuros in place.

## 2024-04-29 NOTE — THERAPY
Speech Language Therapy Contact Note    Patient Name: Jairo Rivas  Age:  73 y.o., Sex:  female  Medical Record #: [id number]  Today's Date: 4/29/2024    Discussed missed therapy with RN       04/29/24 4755   Treatment Variance   Reason For Missed Therapy Medical - Patient on Hold from Therapy;Medical - Patient Unarousable;Medical - Patient not Able To Participate   Interdisciplinary Plan of Care Collaboration   IDT Collaboration with  Nursing   Collaboration Comments Attempted to see patient for dysphagia managment. Per RN, pt is unable to maintain adequate arousal and not following commands. Given altered JUJU, service will hold tx this date and re-attempt as able/medically appropriate.

## 2024-04-29 NOTE — PROGRESS NOTES
Hospital Medicine Daily Progress Note    Date of Service  4/29/2024    Chief Complaint  Jairo Rivas is a 73 y.o. female admitted 4/13/2024 with acute enteropathy    Hospital Course  73 female with past medical history of endometrial carcinoma, brain metastasis s/p radiation therapy, CVA presented with altered mental status.  MRI on 4/20 noted with right frontal brain metastasis with vasogenic edema, acute infarction.  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  Neurology and ID consulted on the case.  CT head from 4/21 noted with 9X 7 mm of hemorrhage in the right temporal region.  Neurology recommending continue acyclovir for 14 days .  Ophthalmology evaluated for concern of vision changes.    Interval Problem Update    4/29/2024  Seen and examined  Vital remained stable  Her mental status remain unchanged  She does not follow,  Appears ill on exam  Labs reviewed white count 8.9, hemoglobin 10.9, BMP with sodium 137, renal function stable, glucose 196    Close telemetry monitoring  Continue Vimpat  Continue on acyclovir day 12 out of 14  Continue to hold aspirin for subarachnoid hemorrhage  Repeat BMP in a.m. to monitor electrolytes and renal function while on IV acyclovir  Ophthalmology evaluated today.  Case discussed with ophthalmology Dr. Benito    High risk of deterioration .  Need close monitoring  Patient has a high medical complexity, complex decision making and is at high risk of complication, morbidity and mortality    Patient present at bedside.  He is aware patient has poor prognosis.  He would like to see how she does once 14 days of acyclovir completed.      I have discussed this patient's plan of care and discharge plan at IDT rounds today with Case Management, Nursing, Nursing leadership, and other members of the IDT team.    Consultants/Specialty  neurology    Code Status  Full Code    Disposition  The patient is not medically cleared for discharge to home or a  post-acute facility.  Anticipate discharge to: skilled nursing facility    I have placed the appropriate orders for post-discharge needs.    Review of Systems  Review of Systems   Unable to perform ROS: Acuity of condition        Physical Exam  Temp:  [36.1 °C (97 °F)-36.6 °C (97.9 °F)] 36.1 °C (97 °F)  Pulse:  [] 109  Resp:  [18-32] 30  BP: (109-134)/(58-90) 110/70  SpO2:  [95 %-99 %] 99 %    Physical Exam  Constitutional:       Appearance: She is ill-appearing.   HENT:      Head:      Comments: On feeding tube  Cardiovascular:      Rate and Rhythm: Tachycardia present.      Pulses: Normal pulses.   Pulmonary:      Effort: Pulmonary effort is normal. No respiratory distress.      Breath sounds: No wheezing or rales.   Abdominal:      General: Abdomen is flat.   Musculoskeletal:      Right lower leg: No edema.      Left lower leg: No edema.   Neurological:      Mental Status: She is disoriented.      Comments: She does not follow command.  Moves extremities to painful stimuli         Fluids    Intake/Output Summary (Last 24 hours) at 4/29/2024 1602  Last data filed at 4/28/2024 2000  Gross per 24 hour   Intake --   Output 200 ml   Net -200 ml       Laboratory  Recent Labs     04/27/24  0251 04/29/24  0043   WBC 8.6 8.9   RBC 3.43* 3.35*   HEMOGLOBIN 10.9* 10.9*   HEMATOCRIT 32.3* 32.3*   MCV 94.2 96.4   MCH 31.8 32.5   MCHC 33.7 33.7   RDW 50.3* 49.2   PLATELETCT 137* 155*   MPV 11.8 11.8     Recent Labs     04/27/24  0251 04/29/24  0043   SODIUM 138 137   POTASSIUM 4.0 4.1   CHLORIDE 103 104   CO2 23 22   GLUCOSE 158* 165*   BUN 24* 22   CREATININE 0.48* 0.45*   CALCIUM 8.6 8.7                     Imaging  DX-CHEST-LIMITED (1 VIEW)   Final Result      1.  Hazy perihilar and lower lobe opacities represent areas of atelectasis or potentially pneumonitis.      IR-MIDLINE CATHETER INSERTION WO GUIDANCE > AGE 3   Final Result                  Ultrasound-guided midline placement performed by qualified nursing staff     as above.          CT-HEAD W/O   Final Result      1.  9 x 7 mm focus of hemorrhage in the right temporal region, suspected subarachnoid versus less likely intraparenchymal. It is stable since noncontrast head CT performed one day prior.         CT-CTA NECK WITH & W/O-POST PROCESSING   Final Result      No occlusion, hemodynamically significant stenosis or dissection of the neck arteries.      CT-CTA HEAD WITH & W/O-POST PROCESS   Final Result   Addendum (preliminary) 1 of 1   Critical value called by Dr. Hector Dodson to Physician: RONAN NOGUERA   at 4/25/2024 3:48 PM.      Final      1.  9 x 7 mm focus of hemorrhage in the right temporal region which is probably subarachnoid, less likely intraparenchymal.   2.  Focal occlusion of right M2 MCA inferior division with distal opacification. This could be a focal high-grade stenosis versus vessel occlusion. It could also be vasospasm as the subarachnoid hemorrhage is present in this region.   3.  Other findings as above.      Efforts are underway to contact referring physician with results at time of dictation.            EC-ECHOCARDIOGRAM COMPLETE W/O CONT   Final Result      DX-CHEST-PORTABLE (1 VIEW)   Final Result      Feeding tube tip projects in the stomach.      Hazy left basilar opacity, atelectasis and/or mild pneumonitis.      DX-LUMBAR PUNCTURE FOR DIAGNOSIS   Final Result      Fluoroscopic-guided lumbar puncture as described above.      IR-MIDLINE CATHETER INSERTION WO GUIDANCE > AGE 3   Final Result                  Ultrasound-guided midline placement performed by qualified nursing staff    as above.          DX-ABDOMEN FOR TUBE PLACEMENT   Final Result         1.  Nonspecific bowel gas pattern in the upper abdomen.   2.  Dobbhoff tube is coiled within the stomach, the tip terminates overlying the expected location of the gastric cardia.   3.  Hazy left lower lobe infiltrate.      MR-BRAIN-WITH & W/O   Final Result      1.  Significant interval  decrease in size of right frontal brain metastasis since previous exam slight interval decrease in surrounding vasogenic edema. Smaller irregular rim of increased diffusion weighted signal intensity about this treated metastasis    consistent with cytotoxic edema.      2.  New large area of acute infarction in the right posterior frontal, parietal, posterior temporal, and occipital lobes associated gyriform enhancement in the right lateral occipital lobe and right peritrigonal white matter.      3.  Smaller chronic wedge-shaped area of infarction involving the left posterior basal ganglia extending into the left posterior frontal periventricular white matter      4.  Small areas of chronic lacunar type infarction involving the left side of the elaine and left brachium pontis with surrounding gliosis.      5.  Small foci of chronic hemosiderin deposition noted in the left posterior temporal and occipital white matter, as well as in the treated right frontal brain metastasis      6.  Age-related cerebral atrophy.      DX-ABDOMEN FOR TUBE PLACEMENT   Final Result         1.  Nonspecific bowel gas pattern in the upper abdomen.   2.  Dobbhoff tube is coiled within the stomach, the tip terminates overlying the expected location of the gastric fundus, physician similar to prior study.   3.  Left lower lobe infiltrate      DX-ABDOMEN FOR TUBE PLACEMENT   Final Result         1.  Nonspecific bowel gas pattern in the upper abdomen.   2.  Dobbhoff tube is coiled within the stomach, the tip terminates overlying the expected location of the gastric fundus.   3.  Left basilar atelectasis      DX-ABDOMEN FOR TUBE PLACEMENT   Final Result      Feeding tube looped in the stomach the distal tip in the gastric fundus.      CT-HEAD WITH & W/O   Final Result      1.  Redemonstration of right frontal lobe mass, highly concerning for metastasis. Associated vasogenic edema and a 2.5 mm right to left midline shift. No progressive mass effect.  No herniation.   2.  Nonspecific ill-defined enhancement in the right parietal lobe.   3.  No new large vascular structure infarct. No new intracranial hemorrhage.      DX-CHEST-LIMITED (1 VIEW)   Final Result      No significant interval change.      DX-ABDOMEN COMPLETE WITH AP OR PA CXR   Final Result      1. No acute cardiopulmonary abnormality.   2. Chronic left lower lobe scarring.   3. Nonobstructive bowel gas pattern. Small amount of stool throughout the colon.           Assessment/Plan  * Acute encephalopathy- (present on admission)  Assessment & Plan  Multifactorial  Manage encephalopathy is  Acute stroke on MRI 4/20  Subarachnoid hemorrhage on CT  LP with CSF positive for VZV done 4/22  Continue on acyclovir  Monitor blood pressure closely,  neurochecks every 4 hours        Aspiration pneumonitis (HCC)  Assessment & Plan  Patient high risk for aspiration pneumonitis/pneumonia.   Currently on room air  Monitor oxygen requirement closely    Prediabetes- (present on admission)  Assessment & Plan  Monitor glucose level closely.  On insulin regimen    Vasogenic edema (HCC)- (present on admission)  Assessment & Plan  Continuing decadron taper per Dr. Live's recommendations. He also does not believe that there are any significant changes in her brain imaging from this admission that could explain her somnolence/presentation. She is on the correct medication (decadron) for treating the edema  Improvement on MRI on 4/21  - Continue decadron      Hypernatremia  Assessment & Plan  Improved    Seizure (HCC)- (present on admission)  Assessment & Plan    -Continue lacosamide 150 mg IV BID per neurology  -EEG and cEEG negative for seizures         Dysphagia- (present on admission)  Assessment & Plan  Possibly due to CVA vs brain metastases and altered mental status in setting of infection.  Patient will remain NPO as she failed SLP evaluation 4/17  NG tube placement required IRIS. Tube was removed for MRI and family  refused NG tube to be replaced. Extensive discussion with family regarding nutrition options. Eventually agreed to let patient get NG tube with lorazepam sedation and IRIS. No one certified to place NG tube with IRIS available on 4/21. Vascular access also lost on 4/21 and midline unable to be placed without VATs  -aspiration precautions  -Continue lansoprazole  -[id number] glucose checks      Shingles- (present on admission)  Assessment & Plan  Continue on acyclovir    Constipation  Assessment & Plan  Resolved  - Bowel regimen    Sepsis secondary to UTI (HCC)- (present on admission)  Assessment & Plan  Sepsis resolved    Malignant neoplasm metastatic to brain (HCC)- (present on admission)  Assessment & Plan  Brain mets noted during hospitalization on 3/27/2024 with vasogenic edema, MRI on 4/11 shows continued brain mets with vasogenic edema.  MRI 4/21 shows decrease in tumor size and improvement in edema.  -continue seizure prophylaxis with lacosamide 150 mg BID  -continue dexamethasone   Ophthalmology evaluated for concern of vision changes      Acute kidney injury (HCC)- (present on admission)  Assessment & Plan  RESOLVED  Returned, Cr 1.11 and baseline around 0.7. Likely prerenal from dehydration.  -continue free water flushes  -renally dose all medications as appropriate  -avoid nephrotoxins    Advance care planning- (present on admission)  Assessment & Plan  At this time patient's family want her to be full code.  Palliative care consulted and discussed with family the goals of care. This remains the same.  -Full code  -Family goals of care is to make patient best shape to be discharged to be follow-up by his doctors in [place].  -Family updates daily with her guarded prognosis  4/29/2024    Patient present at bedside.  He is aware patient has poor prognosis.  He would like to see how she does once 14 days of acyclovir completed.    Endometrial cancer (HCC)- (present on admission)  Assessment & Plan  Followed  by Dr. Garcia outpatient and sees radiation oncology Dr Live.  Received immunotherapy at [facility] in 2021 with initial good response. Reached out to Dr. Live who does not believe imaging from 4/14 shows significant changes compared to MRI month prior.  Complicated by metastases to brain among other locations, likely contributing to her altered mental status. Mets re demonstrated without significant change on CTH on admission.  - Decadron for vasogenic edema  - Neurochecks every 4 hours  - Continue aspiration, fall, seizure precaution.    Essential hypertension- (present on admission)  Assessment & Plan  Monitor blood pressure closely    Acute CVA (cerebrovascular accident) (HCC)- (present on admission)  Assessment & Plan  MRI on 4/20 noted with right frontal brain metastasis with vasogenic edema, acute infarction.  CT head from 4/21 noted with 9X 7 mm of hemorrhage in the right temporal region.   Neurology following         VTE prophylaxis: lovenox    I have performed a physical exam and reviewed and updated ROS and Plan today (4/29/2024). In review of yesterday's note (4/28/2024), there are no changes except as documented above.

## 2024-04-30 VITALS
SYSTOLIC BLOOD PRESSURE: 149 MMHG | DIASTOLIC BLOOD PRESSURE: 92 MMHG | BODY MASS INDEX: 37.04 KG/M2 | HEART RATE: 100 BPM | HEIGHT: 64 IN | WEIGHT: 216.93 LBS | RESPIRATION RATE: 19 BRPM | TEMPERATURE: 97.9 F | OXYGEN SATURATION: 96 %

## 2024-04-30 LAB
ANION GAP SERPL CALC-SCNC: 10 MMOL/L (ref 7–16)
BASOPHILS # BLD AUTO: 0 % (ref 0–1.8)
BASOPHILS # BLD: 0 K/UL (ref 0–0.12)
BUN SERPL-MCNC: 27 MG/DL (ref 8–22)
CALCIUM SERPL-MCNC: 8.7 MG/DL (ref 8.5–10.5)
CHLORIDE SERPL-SCNC: 105 MMOL/L (ref 96–112)
CO2 SERPL-SCNC: 23 MMOL/L (ref 20–33)
CREAT SERPL-MCNC: 0.44 MG/DL (ref 0.5–1.4)
EOSINOPHIL # BLD AUTO: 0 K/UL (ref 0–0.51)
EOSINOPHIL NFR BLD: 0 % (ref 0–6.9)
ERYTHROCYTE [DISTWIDTH] IN BLOOD BY AUTOMATED COUNT: 48.4 FL (ref 35.9–50)
GFR SERPLBLD CREATININE-BSD FMLA CKD-EPI: 103 ML/MIN/1.73 M 2
GLUCOSE BLD STRIP.AUTO-MCNC: 156 MG/DL (ref 65–99)
GLUCOSE BLD STRIP.AUTO-MCNC: 161 MG/DL (ref 65–99)
GLUCOSE BLD STRIP.AUTO-MCNC: 172 MG/DL (ref 65–99)
GLUCOSE SERPL-MCNC: 167 MG/DL (ref 65–99)
HCT VFR BLD AUTO: 28.4 % (ref 37–47)
HGB BLD-MCNC: 9.6 G/DL (ref 12–16)
IMM GRANULOCYTES # BLD AUTO: 0.16 K/UL (ref 0–0.11)
IMM GRANULOCYTES NFR BLD AUTO: 2.3 % (ref 0–0.9)
LYMPHOCYTES # BLD AUTO: 0.43 K/UL (ref 1–4.8)
LYMPHOCYTES NFR BLD: 6.1 % (ref 22–41)
MAGNESIUM SERPL-MCNC: 1.7 MG/DL (ref 1.5–2.5)
MCH RBC QN AUTO: 32.4 PG (ref 27–33)
MCHC RBC AUTO-ENTMCNC: 33.8 G/DL (ref 32.2–35.5)
MCV RBC AUTO: 95.9 FL (ref 81.4–97.8)
MONOCYTES # BLD AUTO: 0.26 K/UL (ref 0–0.85)
MONOCYTES NFR BLD AUTO: 3.7 % (ref 0–13.4)
NEUTROPHILS # BLD AUTO: 6.21 K/UL (ref 1.82–7.42)
NEUTROPHILS NFR BLD: 87.9 % (ref 44–72)
NRBC # BLD AUTO: 0.04 K/UL
NRBC BLD-RTO: 0.6 /100 WBC (ref 0–0.2)
PHOSPHATE SERPL-MCNC: 2.8 MG/DL (ref 2.5–4.5)
PLATELET # BLD AUTO: 146 K/UL (ref 164–446)
PMV BLD AUTO: 11.4 FL (ref 9–12.9)
POTASSIUM SERPL-SCNC: 4 MMOL/L (ref 3.6–5.5)
RBC # BLD AUTO: 2.96 M/UL (ref 4.2–5.4)
SODIUM SERPL-SCNC: 138 MMOL/L (ref 135–145)
WBC # BLD AUTO: 7.1 K/UL (ref 4.8–10.8)

## 2024-04-30 PROCEDURE — 99233 SBSQ HOSP IP/OBS HIGH 50: CPT | Performed by: HOSPITALIST

## 2024-04-30 PROCEDURE — A9270 NON-COVERED ITEM OR SERVICE: HCPCS

## 2024-04-30 PROCEDURE — 700102 HCHG RX REV CODE 250 W/ 637 OVERRIDE(OP): Performed by: HOSPITALIST

## 2024-04-30 PROCEDURE — A9270 NON-COVERED ITEM OR SERVICE: HCPCS | Performed by: HOSPITALIST

## 2024-04-30 PROCEDURE — A9270 NON-COVERED ITEM OR SERVICE: HCPCS | Performed by: INTERNAL MEDICINE

## 2024-04-30 PROCEDURE — 700101 HCHG RX REV CODE 250: Performed by: HOSPITALIST

## 2024-04-30 PROCEDURE — 700102 HCHG RX REV CODE 250 W/ 637 OVERRIDE(OP)

## 2024-04-30 PROCEDURE — 700105 HCHG RX REV CODE 258

## 2024-04-30 PROCEDURE — 700111 HCHG RX REV CODE 636 W/ 250 OVERRIDE (IP): Mod: JZ

## 2024-04-30 PROCEDURE — 770020 HCHG ROOM/CARE - TELE (206)

## 2024-04-30 PROCEDURE — 700102 HCHG RX REV CODE 250 W/ 637 OVERRIDE(OP): Performed by: STUDENT IN AN ORGANIZED HEALTH CARE EDUCATION/TRAINING PROGRAM

## 2024-04-30 PROCEDURE — 700101 HCHG RX REV CODE 250

## 2024-04-30 PROCEDURE — 700102 HCHG RX REV CODE 250 W/ 637 OVERRIDE(OP): Performed by: INTERNAL MEDICINE

## 2024-04-30 PROCEDURE — 700111 HCHG RX REV CODE 636 W/ 250 OVERRIDE (IP): Mod: JZ | Performed by: STUDENT IN AN ORGANIZED HEALTH CARE EDUCATION/TRAINING PROGRAM

## 2024-04-30 PROCEDURE — A9270 NON-COVERED ITEM OR SERVICE: HCPCS | Performed by: STUDENT IN AN ORGANIZED HEALTH CARE EDUCATION/TRAINING PROGRAM

## 2024-04-30 RX ORDER — ERYTHROMYCIN 5 MG/G
1 OINTMENT OPHTHALMIC EVERY 6 HOURS
Status: COMPLETED | OUTPATIENT
Start: 2024-04-30 | End: 2024-05-05

## 2024-04-30 RX ADMIN — INSULIN HUMAN 1 UNITS: 100 INJECTION, SOLUTION PARENTERAL at 11:50

## 2024-04-30 RX ADMIN — LOSARTAN POTASSIUM 100 MG: 50 TABLET, FILM COATED ORAL at 04:26

## 2024-04-30 RX ADMIN — ERYTHROMYCIN 1 APPLICATION: 5 OINTMENT OPHTHALMIC at 17:15

## 2024-04-30 RX ADMIN — ACYCLOVIR SODIUM 650 MG: 50 INJECTION, SOLUTION INTRAVENOUS at 23:28

## 2024-04-30 RX ADMIN — ERYTHROMYCIN 1 APPLICATION: 5 OINTMENT OPHTHALMIC at 11:44

## 2024-04-30 RX ADMIN — METOPROLOL TARTRATE 75 MG: 25 TABLET, FILM COATED ORAL at 04:32

## 2024-04-30 RX ADMIN — MINERAL OIL, AND WHITE PETROLATUM 1 APPLICATION: 425; 573 OINTMENT OPHTHALMIC at 06:00

## 2024-04-30 RX ADMIN — METOPROLOL 100 MG: 100 TABLET ORAL at 17:13

## 2024-04-30 RX ADMIN — ENOXAPARIN SODIUM 40 MG: 100 INJECTION SUBCUTANEOUS at 17:12

## 2024-04-30 RX ADMIN — SULFAMETHOXAZOLE AND TRIMETHOPRIM 1 TABLET: 400; 80 TABLET ORAL at 04:26

## 2024-04-30 RX ADMIN — ERYTHROMYCIN 1 APPLICATION: 5 OINTMENT OPHTHALMIC at 04:34

## 2024-04-30 RX ADMIN — INSULIN HUMAN 1 UNITS: 100 INJECTION, SOLUTION PARENTERAL at 23:25

## 2024-04-30 RX ADMIN — MINERAL OIL, AND WHITE PETROLATUM 1 APPLICATION: 425; 573 OINTMENT OPHTHALMIC at 14:15

## 2024-04-30 RX ADMIN — DEXAMETHASONE 4 MG: 4 TABLET ORAL at 17:13

## 2024-04-30 RX ADMIN — LABETALOL HYDROCHLORIDE 10 MG: 5 INJECTION INTRAVENOUS at 23:22

## 2024-04-30 RX ADMIN — AMLODIPINE BESYLATE 10 MG: 10 TABLET ORAL at 04:26

## 2024-04-30 RX ADMIN — LABETALOL HYDROCHLORIDE 10 MG: 5 INJECTION INTRAVENOUS at 14:22

## 2024-04-30 RX ADMIN — INSULIN HUMAN 1 UNITS: 100 INJECTION, SOLUTION PARENTERAL at 04:30

## 2024-04-30 RX ADMIN — INSULIN HUMAN 1 UNITS: 100 INJECTION, SOLUTION PARENTERAL at 17:25

## 2024-04-30 RX ADMIN — LACOSAMIDE 150 MG: 100 TABLET, FILM COATED ORAL at 17:13

## 2024-04-30 RX ADMIN — LACOSAMIDE 150 MG: 100 TABLET, FILM COATED ORAL at 04:26

## 2024-04-30 RX ADMIN — LANSOPRAZOLE 30 MG: 30 TABLET, ORALLY DISINTEGRATING ORAL at 04:26

## 2024-04-30 RX ADMIN — ATORVASTATIN CALCIUM 20 MG: 20 TABLET, FILM COATED ORAL at 17:13

## 2024-04-30 RX ADMIN — DEXAMETHASONE 4 MG: 4 TABLET ORAL at 04:26

## 2024-04-30 RX ADMIN — ERYTHROMYCIN 1 APPLICATION: 5 OINTMENT OPHTHALMIC at 23:28

## 2024-04-30 RX ADMIN — ACYCLOVIR SODIUM 650 MG: 50 INJECTION, SOLUTION INTRAVENOUS at 14:12

## 2024-04-30 RX ADMIN — MINERAL OIL, AND WHITE PETROLATUM 1 APPLICATION: 425; 573 OINTMENT OPHTHALMIC at 22:00

## 2024-04-30 RX ADMIN — ACYCLOVIR SODIUM 650 MG: 50 INJECTION, SOLUTION INTRAVENOUS at 07:58

## 2024-04-30 ASSESSMENT — PAIN DESCRIPTION - PAIN TYPE: TYPE: ACUTE PAIN

## 2024-04-30 ASSESSMENT — FIBROSIS 4 INDEX
FIB4 SCORE: 2.2
FIB4 SCORE: 2.34

## 2024-04-30 NOTE — PROGRESS NOTES
Monitor Summary: SR/ST , [place] 0.20, QRS 0.10, QT 0.35, with rare PACs/PVCs per strip from monitor room.

## 2024-04-30 NOTE — CARE PLAN
The patient is Watcher - Medium risk of patient condition declining or worsening    Shift Goals  Clinical Goals: Maintain skin integrity, oral care  Patient Goals: ZA  Family Goals: ZA    Progress made toward(s) clinical / shift goals:    Problem: Knowledge Deficit - Standard  Goal: Patient and family/care givers will demonstrate understanding of plan of care, disease process/condition, diagnostic tests and medications  Outcome: Progressing  Note: Patient remains oriented to self, nonverbal, retracts to pain. POC discussed with family, erythromycin ordered for patient eyes.      Problem: Skin Integrity  Goal: Skin integrity is maintained or improved  Outcome: Progressing  Note: Q2 turns, dressing change performed using viscopaste and barrier cream.        Patient is not progressing towards the following goals:

## 2024-04-30 NOTE — CARE PLAN
The patient is Stable - Low risk of patient condition declining or worsening    Shift Goals  Clinical Goals: Q2 turns, Hemodynamic stability  Patient Goals: ZA  Family Goals: comfort    Progress made toward(s) clinical / shift goals:  Patient opens her eyes when her name is called, responsive to pain stimuli. Oral care, complete bed change done. Turned very 2 hours, sacral wound cleanse and barrier cream applied. Tube feeding maintained. Hourly rounds to ensure safety and comfort.    Patient is not progressing towards the following goals:

## 2024-04-30 NOTE — CARE PLAN
Problem: Pain - Standard  Goal: Alleviation of pain or a reduction in pain to the patient’s comfort goal  Outcome: Progressing     Problem: Knowledge Deficit - Standard  Goal: Patient and family/care givers will demonstrate understanding of plan of care, disease process/condition, diagnostic tests and medications  Outcome: Not Progressing     Problem: Hemodynamics  Goal: Patient's hemodynamics, fluid balance and neurologic status will be stable or improve  Outcome: Progressing     Problem: Fluid Volume  Goal: Fluid volume balance will be maintained  Outcome: Progressing     Problem: Urinary - Renal Perfusion  Goal: Ability to achieve and maintain adequate renal perfusion and functioning will improve  Outcome: Progressing     Problem: Respiratory  Goal: Patient will achieve/maintain optimum respiratory ventilation and gas exchange  Outcome: Progressing     Problem: Skin Integrity  Goal: Skin integrity is maintained or improved  Outcome: Not Met     Problem: Fall Risk  Goal: Patient will remain free from falls  Outcome: Progressing     Problem: Neuro Status  Goal: Neuro status will remain stable or improve  Outcome: Not Met     Problem: Infection - Standard  Goal: Patient will remain free from infection  Outcome: Progressing   The patient is Watcher - Medium risk of patient condition declining or worsening    Shift Goals  Clinical Goals: maintain skin integrity  Patient Goals: jayla  Family Goals: get better    Progress made toward(s) clinical / shift goals:  skin and fall precautions in place.    Patient is not progressing towards the following goals:unable to follow commands      Problem: Knowledge Deficit - Standard  Goal: Patient and family/care givers will demonstrate understanding of plan of care, disease process/condition, diagnostic tests and medications  Outcome: Not Progressing     Problem: Skin Integrity  Goal: Skin integrity is maintained or improved  Outcome: Not Met     Problem: Neuro Status  Goal: Neuro  status will remain stable or improve  Outcome: Not Met

## 2024-04-30 NOTE — DISCHARGE PLANNING
[facility]/SCP TCN chart review completed. Collaborated with RENÉ Soliz. Discussed current discharge considerations with RENÉ noting patient discharge  plan is currently evolving given patient medical complexity (please see [facility] Medicine, Dr. Grove, note 4/29/2024 at 4:02PM). In collaboration with RENÉ, no acute TCN needs identified in patient discharge planning at this time.    TCN will continue to follow and collaborate with discharge planning team as additional post acute needs arise. Thank you.    Completed  Per review, SLP continues to follow with recommendations noted to post-acute placement on 4/26/2024  Choice obtained: HH. Per review, please see previous TCN notes (4/18) regarding most recent discharge considerations

## 2024-04-30 NOTE — PROGRESS NOTES
Monitor Summary: SR-ST , [place] -0.18, QRS -0.08, QT -0.31, with rare PVC per strip from the monitor room.

## 2024-05-01 LAB
GLUCOSE BLD STRIP.AUTO-MCNC: 106 MG/DL (ref 65–99)
GLUCOSE BLD STRIP.AUTO-MCNC: 134 MG/DL (ref 65–99)
GLUCOSE BLD STRIP.AUTO-MCNC: 143 MG/DL (ref 65–99)
GLUCOSE BLD STRIP.AUTO-MCNC: 154 MG/DL (ref 65–99)
GLUCOSE BLD STRIP.AUTO-MCNC: 160 MG/DL (ref 65–99)

## 2024-05-01 PROCEDURE — 99233 SBSQ HOSP IP/OBS HIGH 50: CPT | Performed by: HOSPITALIST

## 2024-05-01 RX ORDER — MODAFINIL 100 MG/1
100 TABLET ORAL EVERY MORNING
Status: DISCONTINUED | OUTPATIENT
Start: 2024-05-02 | End: 2024-05-13 | Stop reason: HOSPADM

## 2024-05-01 RX ORDER — ASPIRIN 81 MG/1
81 TABLET, CHEWABLE ORAL DAILY
Status: DISCONTINUED | OUTPATIENT
Start: 2024-05-01 | End: 2024-05-13 | Stop reason: HOSPADM

## 2024-05-01 RX ORDER — NYSTATIN 100000 [USP'U]/G
POWDER TOPICAL 2 TIMES DAILY
Status: COMPLETED | OUTPATIENT
Start: 2024-05-01 | End: 2024-05-08

## 2024-05-01 RX ORDER — DEXAMETHASONE 4 MG/1
2 TABLET ORAL EVERY 12 HOURS
Status: DISPENSED | OUTPATIENT
Start: 2024-05-01 | End: 2024-05-08

## 2024-05-01 RX ADMIN — LANSOPRAZOLE 30 MG: 30 TABLET, ORALLY DISINTEGRATING ORAL at 04:44

## 2024-05-01 RX ADMIN — NYSTATIN: 100000 POWDER TOPICAL at 16:43

## 2024-05-01 RX ADMIN — DEXAMETHASONE 4 MG: 4 TABLET ORAL at 04:43

## 2024-05-01 RX ADMIN — LOSARTAN POTASSIUM 100 MG: 50 TABLET, FILM COATED ORAL at 04:43

## 2024-05-01 RX ADMIN — ENOXAPARIN SODIUM 40 MG: 100 INJECTION SUBCUTANEOUS at 16:42

## 2024-05-01 RX ADMIN — ERYTHROMYCIN 1 APPLICATION: 5 OINTMENT OPHTHALMIC at 23:10

## 2024-05-01 RX ADMIN — MINERAL OIL, AND WHITE PETROLATUM 1 APPLICATION: 425; 573 OINTMENT OPHTHALMIC at 14:00

## 2024-05-01 RX ADMIN — ASPIRIN 81 MG: 81 TABLET, CHEWABLE ORAL at 14:38

## 2024-05-01 RX ADMIN — MINERAL OIL, AND WHITE PETROLATUM 1 APPLICATION: 425; 573 OINTMENT OPHTHALMIC at 21:19

## 2024-05-01 RX ADMIN — HYDROCODONE BITARTRATE AND ACETAMINOPHEN 1 TABLET: 5; 325 TABLET ORAL at 07:32

## 2024-05-01 RX ADMIN — LABETALOL HYDROCHLORIDE 10 MG: 5 INJECTION INTRAVENOUS at 21:19

## 2024-05-01 RX ADMIN — INSULIN HUMAN 1 UNITS: 100 INJECTION, SOLUTION PARENTERAL at 13:05

## 2024-05-01 RX ADMIN — AMLODIPINE BESYLATE 10 MG: 10 TABLET ORAL at 04:43

## 2024-05-01 RX ADMIN — MINERAL OIL, AND WHITE PETROLATUM 1 APPLICATION: 425; 573 OINTMENT OPHTHALMIC at 06:00

## 2024-05-01 RX ADMIN — ATORVASTATIN CALCIUM 20 MG: 20 TABLET, FILM COATED ORAL at 16:42

## 2024-05-01 RX ADMIN — ERYTHROMYCIN 1 APPLICATION: 5 OINTMENT OPHTHALMIC at 04:47

## 2024-05-01 RX ADMIN — HYDRALAZINE HYDROCHLORIDE 10 MG: 20 INJECTION, SOLUTION INTRAMUSCULAR; INTRAVENOUS at 07:32

## 2024-05-01 RX ADMIN — ERYTHROMYCIN 1 APPLICATION: 5 OINTMENT OPHTHALMIC at 16:45

## 2024-05-01 RX ADMIN — SENNOSIDES AND DOCUSATE SODIUM 2 TABLET: 50; 8.6 TABLET ORAL at 16:43

## 2024-05-01 RX ADMIN — METOPROLOL 100 MG: 100 TABLET ORAL at 16:42

## 2024-05-01 RX ADMIN — SULFAMETHOXAZOLE AND TRIMETHOPRIM 1 TABLET: 400; 80 TABLET ORAL at 04:42

## 2024-05-01 RX ADMIN — LACOSAMIDE 150 MG: 100 TABLET, FILM COATED ORAL at 16:42

## 2024-05-01 RX ADMIN — LACOSAMIDE 150 MG: 100 TABLET, FILM COATED ORAL at 04:41

## 2024-05-01 RX ADMIN — LABETALOL HYDROCHLORIDE 10 MG: 5 INJECTION INTRAVENOUS at 04:38

## 2024-05-01 RX ADMIN — METOPROLOL 100 MG: 100 TABLET ORAL at 04:43

## 2024-05-01 RX ADMIN — DEXAMETHASONE 2 MG: 4 TABLET ORAL at 17:52

## 2024-05-01 RX ADMIN — ERYTHROMYCIN 1 APPLICATION: 5 OINTMENT OPHTHALMIC at 12:53

## 2024-05-01 ASSESSMENT — COGNITIVE AND FUNCTIONAL STATUS - GENERAL
PERSONAL GROOMING: TOTAL
MOVING TO AND FROM BED TO CHAIR: TOTAL
DRESSING REGULAR LOWER BODY CLOTHING: TOTAL
MOBILITY SCORE: 6
TOILETING: TOTAL
SUGGESTED CMS G CODE MODIFIER MOBILITY: CN
MOVING FROM LYING ON BACK TO SITTING ON SIDE OF FLAT BED: TOTAL
STANDING UP FROM CHAIR USING ARMS: TOTAL
TURNING FROM BACK TO SIDE WHILE IN FLAT BAD: TOTAL
EATING MEALS: TOTAL
HELP NEEDED FOR BATHING: TOTAL
WALKING IN HOSPITAL ROOM: TOTAL
CLIMB 3 TO 5 STEPS WITH RAILING: TOTAL
DAILY ACTIVITIY SCORE: 6
SUGGESTED CMS G CODE MODIFIER DAILY ACTIVITY: CN
DRESSING REGULAR UPPER BODY CLOTHING: TOTAL

## 2024-05-01 ASSESSMENT — GAIT ASSESSMENTS: GAIT LEVEL OF ASSIST: UNABLE TO PARTICIPATE

## 2024-05-01 ASSESSMENT — ACTIVITIES OF DAILY LIVING (ADL): TOILETING: REQUIRES ASSIST

## 2024-05-01 ASSESSMENT — PAIN DESCRIPTION - PAIN TYPE: TYPE: ACUTE PAIN

## 2024-05-01 ASSESSMENT — FIBROSIS 4 INDEX: FIB4 SCORE: 2.34

## 2024-05-01 NOTE — PROGRESS NOTES
Monitor Summary: SR/ST , [place] 0.20, QRS 0.10, QT 0.33, with rare PVCs per strip from monitor room.

## 2024-05-01 NOTE — PROGRESS NOTES
Monitor Summary: SR/ST  [place] 0.18 QRS 0.08 QT 0.28 with rare PVCs per strip from monitor room.

## 2024-05-01 NOTE — DISCHARGE PLANNING
"TCN following. HTH/SCP chart reviewed. No new TCN needs identified. Please see prior TCN note from 4/18 for more details re: recent discharge planning considerations if indicated. Would also note from MD note from 4/30 \"Family goals of care is to make patient best shape to be discharged to be follow-up by his doctors in [place]\".    Completed:  Choice obtained:    SCP with [facility] PCP  "

## 2024-05-01 NOTE — THERAPY
Speech Language Pathology   Daily Treatment     Patient Name: Jairo Rivas  AGE:  73 y.o., SEX:  female  Medical Record #: [id number]  Date of Service: 5/1/2024      Precautions:  Precautions: Fall Risk, Swallow Precautions, Nasogastric Tube         Subjective  RN cleared pt for session. Session completed with OT and PT d/t disciplines able to assist pt to sit EOB. Pt not able to follow commands, did not attempt to verbalize, intermittently responding with verbal grunts.       Assessment  Patient seen this date for dysphagia management. OT and PT assisted in transferring pt to EOB. Pt responded with verbal grunts, intermittently closing eyes during session. Oral care provided via suction toothbrush w/ bite reflex appreciated. PO trials of ice chips and thin liquids by tsp/straw assessed. Ice chips were placed in oral cavity by clinician w/ pt unable to achieve labial assimilation resulting in anterior bolus loss with 3/3 trials. Pt unable to accept PO by tsp or achieve labial seal/suction with use of straw. PO trials discontinued d/t poor JUJU and concern for pt's swallow safety.       Clinical Impressions  Patient continues to demonstrate inadequate JUJU for PO intake. SLP will continue to follow and reassess if/when pt is consistently able to maintain arousal, though per Palliative Care note from 4/25, her medical prognosis is highly guarded.       Recommendations  Treatment Completed: Dysphagia Treatment       Dysphagia Treatment  Diet Consistency: NPO/NGT  Instrumentation: Instrumental swallow study pending clinical progress  Medication: Non Oral  Oral Care: Q2h                     SLP Treatment Plan  Treatment Plan: Dysphagia Treatment, Patient/Family/Caregiver Training  SLP Frequency: 2x Per Week  Estimated Duration: Until Therapy Goals Met      Anticipated Discharge Needs  Discharge Recommendations: Recommend post-acute placement for additional speech therapy services prior to discharge  "home  Therapy Recommendations Upon DC: Dysphagia Training, Community Re-Integration, Patient / Family / Caregiver Education      Patient / Family Goals  Patient / Family Goal #1: \"She eats at home\" per dtr  Goal #1 Outcome: Progressing slower than expected  Short Term Goals  Short Term Goal # 1: Pt will participate in prfdg to determine readiness for PO diet vs. diagnostic evaluation.  Goal Outcome # 1: Progressing slower than expected      BUSHRA Yeung  "

## 2024-05-01 NOTE — CARE PLAN
The patient is Stable - Low risk of patient condition declining or worsening    Shift Goals  Clinical Goals: Monitor neuro status/ skin integrity  Patient Goals: jayla  Family Goals: comfort    Progress made toward(s) clinical / shift goals:  Patient is alert ,localizing pain and saying incomprehensible words. Turned every 2 hours, barrier cream applied. Mouth care done. Partial bed bath and Sacral wound cleansed , barrier cream applied. Maintained a restful environment. Hourly rounds to ensure safety and comfort .    Patient is not progressing towards the following goals:

## 2024-05-01 NOTE — THERAPY
Physical Therapy   Initial Evaluation     Patient Name: Jairo Rivas  Age:  73 y.o., Sex:  female  Medical Record #: [id number]  Today's Date: 5/1/2024     Precautions  Precautions: Fall Risk;Nasogastric Tube;Other (See Comments);Swallow Precautions  Comments: HOB > 30 degree; Airborne & Contact precautions for Herpes Zoster; Seizure precautions    Assessment  Patient is 73 y.o. female who presented 4/13/2024 with confusion, abdominal pain, constipation.    Currently been managed for acute encephalopathy, aspiration pneumonitis, vasogenic edema, hypernatremia, dysphagia, shingles, sepsis, UTI, HEAVEN     MRI Brain 4/20: right frontal brain metastasis with vasogenic edema, large area of acute infarction.   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    PMH: endometrial carcinoma, brain metastasis s/p radiation therapy, CVA     Patient seen for PT evaluation. Able to participate with limited mobility as detailed below. Currently requiring total assist for bed mobility, Max A for sitting EOB. Patient appears to have poor rehab potential at this time, however if patient able to participate more with therapy, may consider SNF placement. At this time, recommend returning home with  PT, family/ caregiver training and full time assistance.     Plan    Physical Therapy Initial Treatment Plan   Treatment Plan : Bed Mobility, Neuro Re-Education / Balance, Therapeutic Activities, Therapeutic Exercise, Equipment, Family / Caregiver Training, Self Care / Home Evaluation  Treatment Frequency: 1 Time per Week (F/U for family training or if patient is able to participate more)  Duration: Until Therapy Goals Met    DC Equipment Recommendations: Unable to determine at this time (Possibly jerel lift to encourage OOB to chair if needed)  Discharge Recommendations: Recommend home health for continued physical therapy services (With full time assistance  from family members or caregivers)    Objective     05/01/24 1009   Initial Contact Note    Initial Contact Note Order Received and Verified, Physical Therapy Evaluation in Progress with Full Report to Follow.   Precautions   Precautions Fall Risk;Nasogastric Tube;Other (See Comments);Swallow Precautions   Comments HOB > 30 degree; Airborne & Contact precautions for Herpes Zoster; Seizure precautions   Vitals   Pulse 91   Patient BP Position Mcclure's Position  (Prior to activity)   Blood Pressure  124/71   Pulse Oximetry 97 %   O2 Delivery Device None - Room Air   Pain   Pain Scales Non Verbal Scale   Intervention Repositioned;Rest   Non Verbal Descriptors   Non Verbal Scale  Moaning;Unlabored Breathing;Sleeping   Prior Living Situation   Prior Services Continuous (24 Hour) Care Giving Family   Housing / Facility 1 Story House   Steps Into Home 0   Steps In Home 0   Equipment Owned Front-Wheel Walker;4-Wheel Walker;Wheelchair;Ramp;Hospital Bed;Bed Side Commode   Lives with - Patient's Self Care Capacity Adult Children   Comments Per CM Note, patient lives with her daughter who is a retired EMT and works from home. Daughter was able to provide assistance as needed. Unclear if family can still continue to provide the required assistance, considering patients current level of function.   Prior Level of Functional Mobility   Bed Mobility Required Assist   Transfer Status Required Assist   Ambulation   (Non-ambulatory?)   Wheelchair Required Assist   Comments Per CM note, patient was using WC. Unclear if patient was ambulatory.   Cognition    Cognition / Consciousness X   Speech/ Communication Unable to Communicate   Orientation Level Not Oriented to Time;Not Oriented to Place;Not Oriented to Reason   Level of Consciousness Responds to voice   Ability To Follow Commands Unable to Follow 1 Step Commands   Safety Awareness Impaired   New Learning Impaired   Attention Impaired   Sequencing Impaired   Initiation Impaired    Comments Patient had her eyes closed most of the session, would keep her eyes open for brief time. Flat affect, no attempts to verbalize.   Passive ROM Lower Body   Passive ROM Lower Body WDL   Active ROM Lower Body    Active ROM Lower Body  X   Comments Patient did not move her LE on command, no functional or purposeful movement seen during the session   Strength Lower Body   Lower Body Strength  X   Comments Unable to assess due to cognition   Sensation Lower Body   Lower Extremity Sensation   X   Comments Unable to assess due to cognition; Patient did not react to painful stimulus to BLE   Lower Body Muscle Tone   Comments No increase or decrease in muscle tone at this time   Neurological Concerns   Neurological Concerns Yes   Equilibrium Reaction Impaired   Comments Seated EOB   Coordination Lower Body    Coordination Lower Body  X   Comments Unable to assess due to cognition   Vision   Vision Comments While in the bed and while seated EOB, patient tends to favor L side   Other Treatments   Other Treatments Provided Patient was assisted with appropriate positioning in bed at end of session-use of wedges to reposition to R, donning & doffing of moon boots/ pressure relief boots, pillows underneath BUE, use of towel roll and pillow for appropriate position of head/neck   Balance Assessment   Sitting Balance (Static) Trace   Sitting Balance (Dynamic) Dependent   Weight Shift Sitting Absent   Comments Seated EOB: Patient required Max A for balance; tends to keep her head/neck in flexion; loss of balance-multi-directional. Able to hold upright position for <3 seconds x 2 occasions   Bed Mobility    Supine to Sit Total Assist   Sit to Supine Total Assist   Scooting Total Assist   Rolling Total Assist to Rt.   Comments HOB raised, x2 person assist   Gait Analysis   Gait Level Of Assist Unable to Participate   Functional Mobility   Sit to Stand Unable to Participate   Bed, Chair, Wheelchair Transfer Unable to  Participate   Mobility Bed-EOB-bed   6 Clicks Assessment - How much HELP from from another person do you currently need... (If the patient hasn't done an activity recently, how much help from another person do you think he/she would need if he/she tried?)   Turning from your back to your side while in a flat bed without using bedrails? 1   Moving from lying on your back to sitting on the side of a flat bed without using bedrails? 1   Moving to and from a bed to a chair (including a wheelchair)? 1   Standing up from a chair using your arms (e.g., wheelchair, or bedside chair)? 1   Walking in hospital room? 1   Climbing 3-5 steps with a railing? 1   6 clicks Mobility Score 6   Patient / Family Goals    Patient / Family Goal #1 None stated; Per medical records, family would like to take patient home   Short Term Goals    Short Term Goal # 1 Patient will initiate R and L side roll with use of bed rail with Max A in 6 visits to assist caregiver with bed mobility   Short Term Goal # 2 Patient will initiate supine-sit, sit-supine with HOB raised, use of bed rails with Max A in 6 visits to assist caregivers with bed mobility   Short Term Goal # 3 Patient will tolerate sitting EOB with poor balance, with Min A in 6 visits to improve upright sitting   Education Group   Education Provided Role of Physical Therapist   Role of Physical Therapist Patient Response Patient;Explanation;No Learning Evidence   Physical Therapy Initial Treatment Plan    Treatment Plan  Bed Mobility;Neuro Re-Education / Balance;Therapeutic Activities;Therapeutic Exercise;Equipment;Family / Caregiver Training;Self Care / Home Evaluation   Treatment Frequency 1 Time per Week  (F/U for family training or if patient is able to participate more)   Duration Until Therapy Goals Met   Problem List    Problems Pain;Impaired Bed Mobility;Impaired Transfers;Impaired Ambulation;Functional ROM Deficit;Functional Strength Deficit;Impaired Balance;Impaired  Coordination;Impaired Vision;Decreased Activity Tolerance;Safety Awareness Deficits / Cognition;Motor Planning / Sequencing   Anticipated Discharge Equipment and Recommendations   DC Equipment Recommendations Unable to determine at this time  (Possibly jerel lift to encourage OOB to chair if needed)   Discharge Recommendations Recommend home health for continued physical therapy services  (With full time assistance from family members or caregivers)   Interdisciplinary Plan of Care Collaboration   IDT Collaboration with  Nursing;Occupational Therapist;Speech Therapist   Patient Position at End of Therapy In Bed;Call Light within Reach   Session Information   Date / Session Number  5/1-1(1/1, 5/7)

## 2024-05-01 NOTE — PROGRESS NOTES
Hospital Medicine Daily Progress Note    Date of Service  4/30/2024    Chief Complaint  Jairo Rivas is a 73 y.o. female admitted 4/13/2024 with acute enteropathy    Hospital Course  73 female with past medical history of endometrial carcinoma, brain metastasis s/p radiation therapy, CVA presented with altered mental status.  MRI on 4/20 noted with right frontal brain metastasis with vasogenic edema, acute infarction.  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  Neurology and ID consulted on the case.  CT head from 4/21 noted with 9X 7 mm of hemorrhage in the right temporal region.  Neurology recommending continue acyclovir for 14 days .  Ophthalmology evaluated for concern of vision changes.    Interval Problem Update    4/29/2024  Seen and examined  Vital remained stable  Her mental status remain unchanged  She does not follow,  Appears ill on exam  Labs reviewed white count 8.9, hemoglobin 10.9, BMP with sodium 137, renal function stable, glucose 196    Close telemetry monitoring  Continue Vimpat  Continue on acyclovir day 12 out of 14  Continue to hold aspirin for subarachnoid hemorrhage  Repeat BMP in a.m. to monitor electrolytes and renal function while on IV acyclovir  Ophthalmology evaluated today.  Case discussed with ophthalmology Dr. Benito    High risk of deterioration .  Need close monitoring  Patient has a high medical complexity, complex decision making and is at high risk of complication, morbidity and mortality  Patient's family present at bedside.  He is aware patient has poor prognosis.  He would like to see how she does once 14 days of acyclovir completed.  4/30:    Patient does not track with eyes.  Does not obey commands.  She remains on NG tube feeds.  No visible response to acyclovir ending dose tonight after 14 days.  Remains sinus tachycardic.  Increase metoprolol to from  twice daily.      I have discussed this patient's plan of care and discharge  plan at IDT rounds today with Case Management, Nursing, Nursing leadership, and other members of the IDT team.    Consultants/Specialty  neurology    Code Status  Full Code    Disposition  Medically Cleared  I have placed the appropriate orders for post-discharge needs.    Review of Systems  Review of Systems   Unable to perform ROS: Acuity of condition        Physical Exam  Temp:  [36.4 °C (97.5 °F)-37.1 °C (98.7 °F)] 36.4 °C (97.5 °F)  Pulse:  [] 95  Resp:  [18-20] 18  BP: (109-168)/(59-95) 109/59  SpO2:  [94 %-98 %] 96 %    Physical Exam  Constitutional:       Appearance: She is ill-appearing.   HENT:      Head:      Comments: On feeding tube  Cardiovascular:      Rate and Rhythm: Tachycardia present.      Pulses: Normal pulses.   Pulmonary:      Effort: Pulmonary effort is normal. No respiratory distress.      Breath sounds: No wheezing or rales.   Abdominal:      General: Abdomen is flat.   Musculoskeletal:      Right lower leg: No edema.      Left lower leg: No edema.   Neurological:      Mental Status: She is disoriented.      Comments: She does not follow command.  Moves extremities to painful stimuli         Fluids    Intake/Output Summary (Last 24 hours) at 4/30/2024 1938  Last data filed at 4/30/2024 1700  Gross per 24 hour   Intake 1320 ml   Output 650 ml   Net 670 ml       Laboratory  Recent Labs     04/29/24  0043 04/30/24  0657   WBC 8.9 7.1   RBC 3.35* 2.96*   HEMOGLOBIN 10.9* 9.6*   HEMATOCRIT 32.3* 28.4*   MCV 96.4 95.9   MCH 32.5 32.4   MCHC 33.7 33.8   RDW 49.2 48.4   PLATELETCT 155* 146*   MPV 11.8 11.4     Recent Labs     04/29/24  0043 04/30/24  0657   SODIUM 137 138   POTASSIUM 4.1 4.0   CHLORIDE 104 105   CO2 22 23   GLUCOSE 165* 167*   BUN 22 27*   CREATININE 0.45* 0.44*   CALCIUM 8.7 8.7                     Imaging  DX-CHEST-LIMITED (1 VIEW)   Final Result      1.  Hazy perihilar and lower lobe opacities represent areas of atelectasis or potentially pneumonitis.      IR-MIDLINE  CATHETER INSERTION WO GUIDANCE > AGE 3   Final Result                  Ultrasound-guided midline placement performed by qualified nursing staff    as above.          CT-HEAD W/O   Final Result      1.  9 x 7 mm focus of hemorrhage in the right temporal region, suspected subarachnoid versus less likely intraparenchymal. It is stable since noncontrast head CT performed one day prior.         CT-CTA NECK WITH & W/O-POST PROCESSING   Final Result      No occlusion, hemodynamically significant stenosis or dissection of the neck arteries.      CT-CTA HEAD WITH & W/O-POST PROCESS   Final Result   Addendum (preliminary) 1 of 1   Critical value called by Dr. Hector Dodson to Physician: RONAN NOGUERA   at 4/25/2024 3:48 PM.      Final      1.  9 x 7 mm focus of hemorrhage in the right temporal region which is probably subarachnoid, less likely intraparenchymal.   2.  Focal occlusion of right M2 MCA inferior division with distal opacification. This could be a focal high-grade stenosis versus vessel occlusion. It could also be vasospasm as the subarachnoid hemorrhage is present in this region.   3.  Other findings as above.      Efforts are underway to contact referring physician with results at time of dictation.            EC-ECHOCARDIOGRAM COMPLETE W/O CONT   Final Result      DX-CHEST-PORTABLE (1 VIEW)   Final Result      Feeding tube tip projects in the stomach.      Hazy left basilar opacity, atelectasis and/or mild pneumonitis.      DX-LUMBAR PUNCTURE FOR DIAGNOSIS   Final Result      Fluoroscopic-guided lumbar puncture as described above.      IR-MIDLINE CATHETER INSERTION WO GUIDANCE > AGE 3   Final Result                  Ultrasound-guided midline placement performed by qualified nursing staff    as above.          DX-ABDOMEN FOR TUBE PLACEMENT   Final Result         1.  Nonspecific bowel gas pattern in the upper abdomen.   2.  Dobbhoff tube is coiled within the stomach, the tip terminates overlying the  expected location of the gastric cardia.   3.  Hazy left lower lobe infiltrate.      MR-BRAIN-WITH & W/O   Final Result      1.  Significant interval decrease in size of right frontal brain metastasis since previous exam slight interval decrease in surrounding vasogenic edema. Smaller irregular rim of increased diffusion weighted signal intensity about this treated metastasis    consistent with cytotoxic edema.      2.  New large area of acute infarction in the right posterior frontal, parietal, posterior temporal, and occipital lobes associated gyriform enhancement in the right lateral occipital lobe and right peritrigonal white matter.      3.  Smaller chronic wedge-shaped area of infarction involving the left posterior basal ganglia extending into the left posterior frontal periventricular white matter      4.  Small areas of chronic lacunar type infarction involving the left side of the elaine and left brachium pontis with surrounding gliosis.      5.  Small foci of chronic hemosiderin deposition noted in the left posterior temporal and occipital white matter, as well as in the treated right frontal brain metastasis      6.  Age-related cerebral atrophy.      DX-ABDOMEN FOR TUBE PLACEMENT   Final Result         1.  Nonspecific bowel gas pattern in the upper abdomen.   2.  Dobbhoff tube is coiled within the stomach, the tip terminates overlying the expected location of the gastric fundus, physician similar to prior study.   3.  Left lower lobe infiltrate      DX-ABDOMEN FOR TUBE PLACEMENT   Final Result         1.  Nonspecific bowel gas pattern in the upper abdomen.   2.  Dobbhoff tube is coiled within the stomach, the tip terminates overlying the expected location of the gastric fundus.   3.  Left basilar atelectasis      DX-ABDOMEN FOR TUBE PLACEMENT   Final Result      Feeding tube looped in the stomach the distal tip in the gastric fundus.      CT-HEAD WITH & W/O   Final Result      1.  Redemonstration of right  frontal lobe mass, highly concerning for metastasis. Associated vasogenic edema and a 2.5 mm right to left midline shift. No progressive mass effect. No herniation.   2.  Nonspecific ill-defined enhancement in the right parietal lobe.   3.  No new large vascular structure infarct. No new intracranial hemorrhage.      DX-CHEST-LIMITED (1 VIEW)   Final Result      No significant interval change.      DX-ABDOMEN COMPLETE WITH AP OR PA CXR   Final Result      1. No acute cardiopulmonary abnormality.   2. Chronic left lower lobe scarring.   3. Nonobstructive bowel gas pattern. Small amount of stool throughout the colon.           Assessment/Plan  * Acute encephalopathy- (present on admission)  Assessment & Plan  Multifactorial  Manage encephalopathy is  Acute stroke on MRI 4/20  Subarachnoid hemorrhage on CT  LP with CSF positive for VZV done 4/22  Continue on acyclovir  Monitor blood pressure closely,  neurochecks every 4 hours        Aspiration pneumonitis (HCC)  Assessment & Plan  Patient high risk for aspiration pneumonitis/pneumonia.   Currently on room air  Monitor oxygen requirement closely    Prediabetes- (present on admission)  Assessment & Plan  Monitor glucose level closely.  On insulin regimen    Vasogenic edema (HCC)- (present on admission)  Assessment & Plan  Continuing decadron taper per Dr. Live's recommendations. He also does not believe that there are any significant changes in her brain imaging from this admission that could explain her somnolence/presentation. She is on the correct medication (decadron) for treating the edema  Improvement on MRI on 4/21  - Continue decadron      Hypernatremia  Assessment & Plan  Improved    Seizure (HCC)- (present on admission)  Assessment & Plan    -Continue lacosamide 150 mg IV BID per neurology  -EEG and cEEG negative for seizures         Dysphagia- (present on admission)  Assessment & Plan  Possibly due to CVA vs brain metastases and altered mental status in  setting of infection.  Patient will remain NPO as she failed SLP evaluation 4/17  NG tube placement required IRIS. Tube was removed for MRI and family refused NG tube to be replaced. Extensive discussion with family regarding nutrition options. Eventually agreed to let patient get NG tube with lorazepam sedation and IRIS. No one certified to place NG tube with IRIS available on 4/21. Vascular access also lost on 4/21 and midline unable to be placed without VATs  -aspiration precautions  -Continue lansoprazole  -[id number] glucose checks      Shingles- (present on admission)  Assessment & Plan  Continue on acyclovir    Constipation  Assessment & Plan  Resolved  - Bowel regimen    Sepsis secondary to UTI (HCC)- (present on admission)  Assessment & Plan  Sepsis resolved    Malignant neoplasm metastatic to brain (HCC)- (present on admission)  Assessment & Plan  Brain mets noted during hospitalization on 3/27/2024 with vasogenic edema, MRI on 4/11 shows continued brain mets with vasogenic edema.  MRI 4/21 shows decrease in tumor size and improvement in edema.  -continue seizure prophylaxis with lacosamide 150 mg BID  -continue dexamethasone   Ophthalmology evaluated for concern of vision changes      Acute kidney injury (HCC)- (present on admission)  Assessment & Plan  RESOLVED  Returned, Cr 1.11 and baseline around 0.7. Likely prerenal from dehydration.  -continue free water flushes  -renally dose all medications as appropriate  -avoid nephrotoxins    Advance care planning- (present on admission)  Assessment & Plan  At this time patient's family want her to be full code.  Palliative care consulted and discussed with family the goals of care. This remains the same.  -Full code  -Family goals of care is to make patient best shape to be discharged to be follow-up by his doctors in [place].  -Family updates daily with her guarded prognosis  4/29/2024    Patient present at bedside.  He is aware patient has poor prognosis.   He would like to see how she does once 14 days of acyclovir completed.    Endometrial cancer (HCC)- (present on admission)  Assessment & Plan  Followed by Dr. Garcia outpatient and sees radiation oncology Dr Live.  Received immunotherapy at [facility] in 2021 with initial good response. Reached out to Dr. Live who does not believe imaging from 4/14 shows significant changes compared to MRI month prior.  Complicated by metastases to brain among other locations, likely contributing to her altered mental status. Mets re demonstrated without significant change on CTH on admission.  - Decadron for vasogenic edema  - Neurochecks every 4 hours  - Continue aspiration, fall, seizure precaution.    Essential hypertension- (present on admission)  Assessment & Plan  Monitor blood pressure closely    Acute CVA (cerebrovascular accident) (HCC)- (present on admission)  Assessment & Plan  MRI on 4/20 noted with right frontal brain metastasis with vasogenic edema, acute infarction.  CT head from 4/21 noted with 9X 7 mm of hemorrhage in the right temporal region.   Neurology following         VTE prophylaxis: lovenox    I have performed a physical exam and reviewed and updated ROS and Plan today (4/30/2024). In review of yesterday's note (4/29/2024), there are no changes except as documented above.

## 2024-05-01 NOTE — THERAPY
Occupational Therapy   Initial Evaluation     Patient Name: Jairo Rivas  Age:  73 y.o., Sex:  female  Medical Record #: [id number]  Today's Date: 5/1/2024     Precautions  Precautions: Fall Risk, Nasogastric Tube, Other (See Comments), Swallow Precautions  Comments: HOB > 30 degree; Airborne & Contact precautions for Herpes Zoster; Seizure precautions    Assessment  Patient is 73 y.o. female admitted for dense R CVA, R ICH vs SAH, acute encephalopathy, herpes Zoster infection, vision changes, aspiration pneumonitis, hypernatremia, seizure, dysphagia, HEAVEN, and sepsis 2/2 UTI. PMHx endometrial CA w/ R frontal brain mets w/ vasogenic edema and R to L midline shift, as well as chronic B infarcts. Per chart, pt lives with dtr who is a retired EMT and works from home; can assist PRN. Per chart, pt had a caregiver PTA and got assistance with ADL/txfs, and was dependent for IADLs. Unclear if pt's family can provide the 24/7 heavy physical assistance pt will require at [facility]. Currently limited by decreased functional mobility, activity tolerance, cognition, sensation, strength, AROM, coordination, balance, vision, and pain which are currently affecting pt's ability to complete ADLs/IADLs at baseline. Will continue to follow.     Plan    Occupational Therapy Initial Treatment Plan   Treatment Interventions: Self Care / Activities of Daily Living, Adaptive Equipment, Cognitive Skill Development, Neuro Re-Education / Balance, Therapeutic Exercises, Therapeutic Activity, Manual Therapy Techniques, Orthotics Treatment, Family / Caregiver Training  Treatment Frequency: 3 Times per Week  Duration: Until Therapy Goals Met    DC Equipment Recommendations: Unable to determine at this time  Discharge Recommendations: Recommend post-acute placement for additional occupational therapy services prior to discharge home     Objective     05/01/24 0935   Prior Living Situation   Prior Services Continuous (24 Hour) Care Giving  Family   Housing / Facility 1 Story House   Steps Into Home 0   Equipment Owned Front-Wheel Walker;4-Wheel Walker;Wheelchair;Ramp;Hospital Bed;Bed Side Commode   Lives with - Patient's Self Care Capacity Adult Children   Comments Per chart, pt lives with dtr who is a retired EMT and works from home; can assist PRN. Per chart, pt had a caregiver PTA and got assistance with ADL/txfs, and was dependent to IADLs. Unclear if pt's family can provide the 24/7 heavy physical assistance pt with require at [facility].   Prior Level of ADL Function   Self Feeding Requires Assist   Grooming / Hygiene Requires Assist   Bathing Requires Assist   Dressing Requires Assist   Toileting Requires Assist   Prior Level of IADL Function   Medication Management Dependent   Laundry Dependent   Kitchen Mobility Dependent   Finances Dependent   Home Management Dependent   Shopping Dependent   Prior Level Of Mobility Uses Wheel Chair for in Home Mobility   Precautions   Precautions Fall Risk;Nasogastric Tube;Swallow Precautions;Other (See Comments)   Comments HOB >30 degrees, Herpes Zoster, seizure precautions   Vitals   Blood Pressure  121/74   O2 Delivery Device None - Room Air   Pain 0 - 10 Group   Therapist Pain Assessment During Activity;Nurse Notified  (reaction to noxious stimuli; delayed)   Cognition    Cognition / Consciousness X   Speech/ Communication Unable to Communicate   Level of Consciousness Responds to pain   Ability To Follow Commands Unable to Follow 1 Step Commands   Safety Awareness Impaired   New Learning Impaired   Attention Impaired   Sequencing Impaired   Initiation Impaired   Rancho Scale Level 2   Comments grossly delayed reactions to pain and visual threat. groaned and intentional movement in LUE only in reaction to pain (head, neck, LUE), but not purposeful. unable to follow commands and eyes closed through much of session, flat affect   Passive ROM Upper Body   Passive ROM Upper Body X   Comments RUE limited by flexor  tone in hand/wrist/elbow/shoulder and reaction to pain; WFL for LUE   Active ROM Upper Body   Active ROM Upper Body  X   Comments observed some extensor AROM at L elbow/shoulder/wrist in reaction to noxious stimuli/pain, but unable to fully assess d/t cognition. none in RUE   Strength Upper Body   Upper Body Strength  X   Comments unable to fully assess d/t cognition   Sensation Upper Body   Upper Extremity Sensation  X   Comments no reaction to noxious stimuli on RUE, LUE delayed withdrawal   Upper Body Muscle Tone   Upper Body Muscle Tone  X   Rt Upper Extremity Muscle Tone Non Functional;Hypertonic;Spastic  (5-8 beats of clonus at R wrist/elbow)   Lt Upper Extremity Muscle Tone Non Functional;Hypotonic   Comments Pt with 1 finger sublux in R shoulder and 1.5 finger sublux in L shoulder; difficult to approximate joint (in supine/sitting) d/t intermittent extension   Neurological Concerns   Neurological Concerns Yes   Sitting Posture During ADL's Posterior Lean;Lateral Lean Right;Lateral Lean Left;Pushes to the Right  (slight push to R with LUE)   Rt Upper Extremity Functional Use Absent   Lt Upper Extremity Functional Use Absent   Shoulder Subluxation Mild Right;Mild Left   Coordination Upper Body   Coordination X   Comments unable to fully assess d/t cognition   Balance Assessment   Sitting Balance (Static) Trace   Sitting Balance (Dynamic) Dependent   Weight Shift Sitting Absent   Comments EOB only with Max A; head/neck in flexed position req assist for head control. x2 able to hold midline for <3 secs once placed there   Bed Mobility    Supine to Sit Total Assist   Sit to Supine Total Assist   Scooting Total Assist   Rolling Total Assist to Rt.;Total Assist to Lt.   Comments HOB elevated, x2 assist   ADL Assessment   Eating Total Assist   Grooming Total Assist;Seated   Lower Body Dressing Total Assist   Toileting Total Assist   mRS Prior to admission   Prior to admission mRS 0   Modified Stendal (mRS)   Modified  Tomasz Score 5   Functional Mobility   Sit to Stand Unable to Participate   Toilet Transfers Unable to Participate   Mobility EOB only   Visual Perception   Visual Perception  X   Comments pt unable to track at all or make eye contact, kept eyes closed throughout session req v/cs and assist to keep eyes open   Edema / Skin Assessment   Edema / Skin  Not Assessed   Activity Tolerance   Comments limited by cognition, fatigue, weakness   Patient / Family Goals   Patient / Family Goal #1 to go home   Short Term Goals   Short Term Goal # 1 will sit EOB unsupported for 1min in prep for seated ADLs   Short Term Goal # 2 seated g/h with mod A; [place] PRN   Short Term Goal # 3 pt will follow 25% of commands during session in prep for ADLs   Short Term Goal # 4 pt will keep eyes open throughout session w/o v/cs in prep for ADLs   Education Group   Education Provided Role of Occupational Therapist   Role of Occupational Therapist Patient Response Patient;Acceptance;Explanation;Reinforcement Needed;No Learning Evidence   Occupational Therapy Initial Treatment Plan    Treatment Interventions Self Care / Activities of Daily Living;Adaptive Equipment;Cognitive Skill Development;Neuro Re-Education / Balance;Therapeutic Exercises;Therapeutic Activity;Manual Therapy Techniques;Orthotics Treatment;Family / Caregiver Training   Treatment Frequency 3 Times per Week   Duration Until Therapy Goals Met   Problem List   Problem List Decreased Active Daily Living Skills;Decreased Homemaking Skills;Decreased Upper Extremity Strength Right;Decreased Upper Extremity Strength Left;Decreased Upper Extremity AROM Right;Decreased Upper Extremity AROM Left;Decreased Upper Extremity PROM Right;Decreased Upper Extremity PROM Left;Decreased Activity Tolerance;Decreased Functional Mobility;Safety Awareness Deficits / Cognition;Impaired Posture / Trunk Alignment;Impaired Coordination Right Upper Extremity;Impaired Coordination Left Upper Extremity;Impaired  Sensation Right Upper Extremity;Impaired Sensation Left Upper Extremity;Impaired Cognitive Function;Impaired Upper Extremity Tone Right;Impaired Upper Extremity Tone Left;Impaired Vision;Impaired Postural Control / Balance       David completed w/ SLP and PT. D/t pt's complex diagnosis and deficits she required 2 sets of skilled hands to complete evaluation. D/t pt being more alert sitting EOB than in supine, SLP present to assess for PO intake as has been too lethargic in supine to even attempt.

## 2024-05-01 NOTE — PROGRESS NOTES
Hospital Medicine Daily Progress Note    Date of Service  5/1/2024    Chief Complaint  Jairo Rivas is a 73 y.o. female admitted 4/13/2024 with acute enteropathy    Hospital Course  73 female with past medical history of endometrial carcinoma, brain metastasis s/p radiation therapy, CVA presented with altered mental status.  MRI on 4/20 noted with right frontal brain metastasis with vasogenic edema, acute infarction.  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  Neurology and ID consulted on the case.  CT head from 4/21 noted with 9X 7 mm of hemorrhage in the right temporal region.  Neurology recommending continue acyclovir for 14 days .  Ophthalmology evaluated for concern of vision changes.    Interval Problem Update    4/29/2024  Seen and examined  Vital remained stable  Her mental status remain unchanged  She does not follow,  Appears ill on exam  Labs reviewed white count 8.9, hemoglobin 10.9, BMP with sodium 137, renal function stable, glucose 196    Close telemetry monitoring  Continue Vimpat  Continue on acyclovir day 12 out of 14  Continue to hold aspirin for subarachnoid hemorrhage  Repeat BMP in a.m. to monitor electrolytes and renal function while on IV acyclovir  Ophthalmology evaluated today.  Case discussed with ophthalmology Dr. Benito    High risk of deterioration .  Need close monitoring  Patient has a high medical complexity, complex decision making and is at high risk of complication, morbidity and mortality  Patient's family present at bedside.  He is aware patient has poor prognosis.  He would like to see how she does once 14 days of acyclovir completed.  4/30:    Patient does not track with eyes.  Does not obey commands.  She remains on NG tube feeds.  No visible response to acyclovir ending dose tonight after 14 days.  Remains sinus tachycardic.  Increase metoprolol to from  twice daily.  5/1: Met with daughter Carolyn at bedside.  Patient did seem more  responsive to daughter.  She is not verbal in conversation but did squeeze her hand and shake her head yes.  She is able to close her eyes.  She is able to track her daughter as well as look towards me when discussing her care.  Her daughter has requested that I transfer patient to [facility] for neuro oncology consultation stating that she has been treated previously by Dr. Charles sanches a neuro oncologist which we do not have at [facility].  I have formally placed a consult with [facility] today.  Examined left under breast zoster lesion, small escoration remaining, no blisters.  Evidence of moisture, erythema c/w yeast.  Nystatin powder ordered. Modafanil ordered 100mg daily to help with alertness.  Tapered decadron 4 bid (14 days) to 2mg bid.      I have discussed this patient's plan of care and discharge plan at IDT rounds today with Case Management, Nursing, Nursing leadership, and other members of the IDT team.    Consultants/Specialty  neurology    Code Status  Full Code    Disposition  The patient is medically cleared for discharge to home or a post-acute facility.  Anticipate discharge to: skilled nursing facility    I have placed the appropriate orders for post-discharge needs.    Review of Systems  Review of Systems   Unable to perform ROS: Acuity of condition        Physical Exam  Temp:  [36.4 °C (97.5 °F)-36.6 °C (97.9 °F)] 36.4 °C (97.5 °F)  Pulse:  [] 90  Resp:  [18-19] 18  BP: (119-162)/(71-95) 153/95  SpO2:  [95 %-98 %] 97 %    Physical Exam  Constitutional:       Appearance: She is ill-appearing.   HENT:      Head:      Comments: On feeding tube  Cardiovascular:      Rate and Rhythm: Tachycardia present.      Pulses: Normal pulses.   Pulmonary:      Effort: Pulmonary effort is normal. No respiratory distress.      Breath sounds: No wheezing or rales.   Abdominal:      General: Abdomen is flat.   Musculoskeletal:      Right lower leg: No edema.      Left lower leg: No edema.    Neurological:      General: No focal deficit present.      Mental Status: She is alert.      Motor: Weakness present.      Comments: She is following to voice, squeezing daughter's hand purposefully, tracking daughter when spoken to.  Mild improvement noted.         Fluids    Intake/Output Summary (Last 24 hours) at 5/1/2024 1705  Last data filed at 5/1/2024 1554  Gross per 24 hour   Intake 900 ml   Output --   Net 900 ml       Laboratory  Recent Labs     04/29/24  0043 04/30/24  0657   WBC 8.9 7.1   RBC 3.35* 2.96*   HEMOGLOBIN 10.9* 9.6*   HEMATOCRIT 32.3* 28.4*   MCV 96.4 95.9   MCH 32.5 32.4   MCHC 33.7 33.8   RDW 49.2 48.4   PLATELETCT 155* 146*   MPV 11.8 11.4     Recent Labs     04/29/24  0043 04/30/24  0657   SODIUM 137 138   POTASSIUM 4.1 4.0   CHLORIDE 104 105   CO2 22 23   GLUCOSE 165* 167*   BUN 22 27*   CREATININE 0.45* 0.44*   CALCIUM 8.7 8.7                     Imaging  DX-CHEST-LIMITED (1 VIEW)   Final Result      1.  Hazy perihilar and lower lobe opacities represent areas of atelectasis or potentially pneumonitis.      IR-MIDLINE CATHETER INSERTION WO GUIDANCE > AGE 3   Final Result                  Ultrasound-guided midline placement performed by qualified nursing staff    as above.          CT-HEAD W/O   Final Result      1.  9 x 7 mm focus of hemorrhage in the right temporal region, suspected subarachnoid versus less likely intraparenchymal. It is stable since noncontrast head CT performed one day prior.         CT-CTA NECK WITH & W/O-POST PROCESSING   Final Result      No occlusion, hemodynamically significant stenosis or dissection of the neck arteries.      CT-CTA HEAD WITH & W/O-POST PROCESS   Final Result   Addendum (preliminary) 1 of 1   Critical value called by Dr. Hector Dodson to Physician: RONAN NOGUERA   at 4/25/2024 3:48 PM.      Final      1.  9 x 7 mm focus of hemorrhage in the right temporal region which is probably subarachnoid, less likely intraparenchymal.   2.  Focal  occlusion of right M2 MCA inferior division with distal opacification. This could be a focal high-grade stenosis versus vessel occlusion. It could also be vasospasm as the subarachnoid hemorrhage is present in this region.   3.  Other findings as above.      Efforts are underway to contact referring physician with results at time of dictation.            EC-ECHOCARDIOGRAM COMPLETE W/O CONT   Final Result      DX-CHEST-PORTABLE (1 VIEW)   Final Result      Feeding tube tip projects in the stomach.      Hazy left basilar opacity, atelectasis and/or mild pneumonitis.      DX-LUMBAR PUNCTURE FOR DIAGNOSIS   Final Result      Fluoroscopic-guided lumbar puncture as described above.      IR-MIDLINE CATHETER INSERTION WO GUIDANCE > AGE 3   Final Result                  Ultrasound-guided midline placement performed by qualified nursing staff    as above.          DX-ABDOMEN FOR TUBE PLACEMENT   Final Result         1.  Nonspecific bowel gas pattern in the upper abdomen.   2.  Dobbhoff tube is coiled within the stomach, the tip terminates overlying the expected location of the gastric cardia.   3.  Hazy left lower lobe infiltrate.      MR-BRAIN-WITH & W/O   Final Result      1.  Significant interval decrease in size of right frontal brain metastasis since previous exam slight interval decrease in surrounding vasogenic edema. Smaller irregular rim of increased diffusion weighted signal intensity about this treated metastasis    consistent with cytotoxic edema.      2.  New large area of acute infarction in the right posterior frontal, parietal, posterior temporal, and occipital lobes associated gyriform enhancement in the right lateral occipital lobe and right peritrigonal white matter.      3.  Smaller chronic wedge-shaped area of infarction involving the left posterior basal ganglia extending into the left posterior frontal periventricular white matter      4.  Small areas of chronic lacunar type infarction involving the  left side of the elaine and left brachium pontis with surrounding gliosis.      5.  Small foci of chronic hemosiderin deposition noted in the left posterior temporal and occipital white matter, as well as in the treated right frontal brain metastasis      6.  Age-related cerebral atrophy.      DX-ABDOMEN FOR TUBE PLACEMENT   Final Result         1.  Nonspecific bowel gas pattern in the upper abdomen.   2.  Dobbhoff tube is coiled within the stomach, the tip terminates overlying the expected location of the gastric fundus, physician similar to prior study.   3.  Left lower lobe infiltrate      DX-ABDOMEN FOR TUBE PLACEMENT   Final Result         1.  Nonspecific bowel gas pattern in the upper abdomen.   2.  Dobbhoff tube is coiled within the stomach, the tip terminates overlying the expected location of the gastric fundus.   3.  Left basilar atelectasis      DX-ABDOMEN FOR TUBE PLACEMENT   Final Result      Feeding tube looped in the stomach the distal tip in the gastric fundus.      CT-HEAD WITH & W/O   Final Result      1.  Redemonstration of right frontal lobe mass, highly concerning for metastasis. Associated vasogenic edema and a 2.5 mm right to left midline shift. No progressive mass effect. No herniation.   2.  Nonspecific ill-defined enhancement in the right parietal lobe.   3.  No new large vascular structure infarct. No new intracranial hemorrhage.      DX-CHEST-LIMITED (1 VIEW)   Final Result      No significant interval change.      DX-ABDOMEN COMPLETE WITH AP OR PA CXR   Final Result      1. No acute cardiopulmonary abnormality.   2. Chronic left lower lobe scarring.   3. Nonobstructive bowel gas pattern. Small amount of stool throughout the colon.           Assessment/Plan  * Acute encephalopathy- (present on admission)  Assessment & Plan  Multifactorial  Manage encephalopathy is  Acute stroke on MRI 4/20  Subarachnoid hemorrhage on CT  LP with CSF positive for VZV done 4/22  Continue on acyclovir  Monitor  blood pressure closely,  neurochecks every 4 hours        Aspiration pneumonitis (HCC)  Assessment & Plan  Patient high risk for aspiration pneumonitis/pneumonia.   Currently on room air  Monitor oxygen requirement closely    Prediabetes- (present on admission)  Assessment & Plan  Monitor glucose level closely.  On insulin regimen    Vasogenic edema (HCC)- (present on admission)  Assessment & Plan  Continuing decadron taper per Dr. Live's recommendations. He also does not believe that there are any significant changes in her brain imaging from this admission that could explain her somnolence/presentation. She is on the correct medication (decadron) for treating the edema  Improvement on MRI on 4/21  - Continue decadron      Hypernatremia  Assessment & Plan  Improved    Seizure (HCC)- (present on admission)  Assessment & Plan    -Continue lacosamide 150 mg IV BID per neurology  -EEG and cEEG negative for seizures         Dysphagia- (present on admission)  Assessment & Plan  Possibly due to CVA vs brain metastases and altered mental status in setting of infection.  Patient will remain NPO as she failed SLP evaluation 4/17  NG tube placement required IRIS. Tube was removed for MRI and family refused NG tube to be replaced. Extensive discussion with family regarding nutrition options. Eventually agreed to let patient get NG tube with lorazepam sedation and IRIS. No one certified to place NG tube with IRIS available on 4/21. Vascular access also lost on 4/21 and midline unable to be placed without VATs  -aspiration precautions  -Continue lansoprazole  -[id number] glucose checks      Shingles- (present on admission)  Assessment & Plan  Continue on acyclovir    Constipation  Assessment & Plan  Resolved  - Bowel regimen    Sepsis secondary to UTI (HCC)- (present on admission)  Assessment & Plan  Sepsis resolved    Malignant neoplasm metastatic to brain (HCC)- (present on admission)  Assessment & Plan  Brain mets noted  during hospitalization on 3/27/2024 with vasogenic edema, MRI on 4/11 shows continued brain mets with vasogenic edema.  MRI 4/21 shows decrease in tumor size and improvement in edema.  -continue seizure prophylaxis with lacosamide 150 mg BID  -continue dexamethasone   Ophthalmology evaluated for concern of vision changes      Acute kidney injury (HCC)- (present on admission)  Assessment & Plan  RESOLVED  Returned, Cr 1.11 and baseline around 0.7. Likely prerenal from dehydration.  -continue free water flushes  -renally dose all medications as appropriate  -avoid nephrotoxins    Advance care planning- (present on admission)  Assessment & Plan  At this time patient's family want her to be full code.  Palliative care consulted and discussed with family the goals of care. This remains the same.  -Full code  -Family goals of care is to make patient best shape to be discharged to be follow-up by his doctors in [place].  -Family updates daily with her guarded prognosis  4/29/2024    Patient present at bedside.  He is aware patient has poor prognosis.  He would like to see how she does once 14 days of acyclovir completed.    Endometrial cancer (HCC)- (present on admission)  Assessment & Plan  Followed by Dr. Garcia outpatient and sees radiation oncology Dr Live.  Received immunotherapy at [facility] in 2021 with initial good response. Reached out to Dr. Live who does not believe imaging from 4/14 shows significant changes compared to MRI month prior.  Complicated by metastases to brain among other locations, likely contributing to her altered mental status. Mets re demonstrated without significant change on CTH on admission.  - Decadron for vasogenic edema  - Neurochecks every 4 hours  - Continue aspiration, fall, seizure precaution.    Essential hypertension- (present on admission)  Assessment & Plan  Monitor blood pressure closely    Acute CVA (cerebrovascular accident) (HCC)- (present on admission)  Assessment &  Plan  MRI on 4/20 noted with right frontal brain metastasis with vasogenic edema, acute infarction.  CT head from 4/21 noted with 9X 7 mm of hemorrhage in the right temporal region.   Neurology following         VTE prophylaxis: lovenox    I have performed a physical exam and reviewed and updated ROS and Plan today (5/1/2024). In review of yesterday's note (4/30/2024), there are no changes except as documented above.

## 2024-05-02 ENCOUNTER — APPOINTMENT (OUTPATIENT)
Dept: RADIOLOGY | Facility: MEDICAL CENTER | Age: 74
DRG: 871 | End: 2024-05-02
Attending: HOSPITALIST
Payer: MEDICARE

## 2024-05-02 LAB
ALBUMIN SERPL BCP-MCNC: 3 G/DL (ref 3.2–4.9)
ALBUMIN/GLOB SERPL: 1.2 G/DL
ALP SERPL-CCNC: 85 U/L (ref 30–99)
ALT SERPL-CCNC: 31 U/L (ref 2–50)
ANION GAP SERPL CALC-SCNC: 11 MMOL/L (ref 7–16)
AST SERPL-CCNC: 20 U/L (ref 12–45)
BASOPHILS # BLD AUTO: 0.3 % (ref 0–1.8)
BASOPHILS # BLD: 0.02 K/UL (ref 0–0.12)
BILIRUB SERPL-MCNC: 0.4 MG/DL (ref 0.1–1.5)
BUN SERPL-MCNC: 24 MG/DL (ref 8–22)
CALCIUM ALBUM COR SERPL-MCNC: 9.6 MG/DL (ref 8.5–10.5)
CALCIUM SERPL-MCNC: 8.8 MG/DL (ref 8.5–10.5)
CHLORIDE SERPL-SCNC: 102 MMOL/L (ref 96–112)
CO2 SERPL-SCNC: 22 MMOL/L (ref 20–33)
CREAT SERPL-MCNC: 0.38 MG/DL (ref 0.5–1.4)
EOSINOPHIL # BLD AUTO: 0 K/UL (ref 0–0.51)
EOSINOPHIL NFR BLD: 0 % (ref 0–6.9)
ERYTHROCYTE [DISTWIDTH] IN BLOOD BY AUTOMATED COUNT: 48.7 FL (ref 35.9–50)
EXTRA TUBE BLU BLU: NORMAL
GFR SERPLBLD CREATININE-BSD FMLA CKD-EPI: 107 ML/MIN/1.73 M 2
GLOBULIN SER CALC-MCNC: 2.5 G/DL (ref 1.9–3.5)
GLUCOSE BLD STRIP.AUTO-MCNC: 113 MG/DL (ref 65–99)
GLUCOSE BLD STRIP.AUTO-MCNC: 116 MG/DL (ref 65–99)
GLUCOSE BLD STRIP.AUTO-MCNC: 117 MG/DL (ref 65–99)
GLUCOSE BLD STRIP.AUTO-MCNC: 156 MG/DL (ref 65–99)
GLUCOSE SERPL-MCNC: 166 MG/DL (ref 65–99)
HCT VFR BLD AUTO: 31.3 % (ref 37–47)
HGB BLD-MCNC: 10.6 G/DL (ref 12–16)
IMM GRANULOCYTES # BLD AUTO: 0.15 K/UL (ref 0–0.11)
IMM GRANULOCYTES NFR BLD AUTO: 2.1 % (ref 0–0.9)
LYMPHOCYTES # BLD AUTO: 0.44 K/UL (ref 1–4.8)
LYMPHOCYTES NFR BLD: 6.2 % (ref 22–41)
MCH RBC QN AUTO: 32.3 PG (ref 27–33)
MCHC RBC AUTO-ENTMCNC: 33.9 G/DL (ref 32.2–35.5)
MCV RBC AUTO: 95.4 FL (ref 81.4–97.8)
MONOCYTES # BLD AUTO: 0.29 K/UL (ref 0–0.85)
MONOCYTES NFR BLD AUTO: 4.1 % (ref 0–13.4)
NEUTROPHILS # BLD AUTO: 6.22 K/UL (ref 1.82–7.42)
NEUTROPHILS NFR BLD: 87.3 % (ref 44–72)
NRBC # BLD AUTO: 0.07 K/UL
NRBC BLD-RTO: 1 /100 WBC (ref 0–0.2)
PLATELET # BLD AUTO: 195 K/UL (ref 164–446)
PMV BLD AUTO: 11 FL (ref 9–12.9)
POTASSIUM SERPL-SCNC: 4 MMOL/L (ref 3.6–5.5)
PROT SERPL-MCNC: 5.5 G/DL (ref 6–8.2)
RBC # BLD AUTO: 3.28 M/UL (ref 4.2–5.4)
SODIUM SERPL-SCNC: 135 MMOL/L (ref 135–145)
WBC # BLD AUTO: 7.1 K/UL (ref 4.8–10.8)

## 2024-05-02 PROCEDURE — 99233 SBSQ HOSP IP/OBS HIGH 50: CPT | Performed by: HOSPITALIST

## 2024-05-02 RX ADMIN — METOPROLOL 100 MG: 100 TABLET ORAL at 18:30

## 2024-05-02 RX ADMIN — ATORVASTATIN CALCIUM 20 MG: 20 TABLET, FILM COATED ORAL at 18:31

## 2024-05-02 RX ADMIN — AMLODIPINE BESYLATE 10 MG: 10 TABLET ORAL at 04:33

## 2024-05-02 RX ADMIN — NYSTATIN: 100000 POWDER TOPICAL at 18:32

## 2024-05-02 RX ADMIN — LOSARTAN POTASSIUM 100 MG: 50 TABLET, FILM COATED ORAL at 04:31

## 2024-05-02 RX ADMIN — LANSOPRAZOLE 30 MG: 30 TABLET, ORALLY DISINTEGRATING ORAL at 04:31

## 2024-05-02 RX ADMIN — ERYTHROMYCIN 1 APPLICATION: 5 OINTMENT OPHTHALMIC at 22:14

## 2024-05-02 RX ADMIN — MINERAL OIL, AND WHITE PETROLATUM 1 APPLICATION: 425; 573 OINTMENT OPHTHALMIC at 22:14

## 2024-05-02 RX ADMIN — NYSTATIN: 100000 POWDER TOPICAL at 04:35

## 2024-05-02 RX ADMIN — MINERAL OIL, AND WHITE PETROLATUM 1 APPLICATION: 425; 573 OINTMENT OPHTHALMIC at 04:33

## 2024-05-02 RX ADMIN — ASPIRIN 81 MG: 81 TABLET, CHEWABLE ORAL at 04:33

## 2024-05-02 RX ADMIN — METOPROLOL 100 MG: 100 TABLET ORAL at 04:31

## 2024-05-02 RX ADMIN — SULFAMETHOXAZOLE AND TRIMETHOPRIM 1 TABLET: 400; 80 TABLET ORAL at 04:31

## 2024-05-02 RX ADMIN — LACOSAMIDE 150 MG: 100 TABLET, FILM COATED ORAL at 18:31

## 2024-05-02 RX ADMIN — ERYTHROMYCIN 1 APPLICATION: 5 OINTMENT OPHTHALMIC at 18:31

## 2024-05-02 RX ADMIN — LACOSAMIDE 150 MG: 100 TABLET, FILM COATED ORAL at 04:32

## 2024-05-02 RX ADMIN — MINERAL OIL, AND WHITE PETROLATUM 1 APPLICATION: 425; 573 OINTMENT OPHTHALMIC at 13:58

## 2024-05-02 RX ADMIN — ERYTHROMYCIN 1 APPLICATION: 5 OINTMENT OPHTHALMIC at 13:59

## 2024-05-02 RX ADMIN — MODAFINIL 100 MG: 100 TABLET ORAL at 05:53

## 2024-05-02 RX ADMIN — INSULIN HUMAN 1 UNITS: 100 INJECTION, SOLUTION PARENTERAL at 14:02

## 2024-05-02 RX ADMIN — ERYTHROMYCIN 1 APPLICATION: 5 OINTMENT OPHTHALMIC at 04:35

## 2024-05-02 RX ADMIN — LABETALOL HYDROCHLORIDE 10 MG: 5 INJECTION INTRAVENOUS at 04:26

## 2024-05-02 RX ADMIN — ENOXAPARIN SODIUM 40 MG: 100 INJECTION SUBCUTANEOUS at 18:31

## 2024-05-02 RX ADMIN — DEXAMETHASONE 2 MG: 4 TABLET ORAL at 18:31

## 2024-05-02 RX ADMIN — DEXAMETHASONE 2 MG: 4 TABLET ORAL at 04:32

## 2024-05-02 ASSESSMENT — FIBROSIS 4 INDEX: FIB4 SCORE: 2.34

## 2024-05-02 ASSESSMENT — PAIN DESCRIPTION - PAIN TYPE: TYPE: ACUTE PAIN

## 2024-05-02 NOTE — CARE PLAN
The patient is Watcher - Medium risk of patient condition declining or worsening    Shift Goals  Clinical Goals: nuero, safety, vss, skin integrity, q 2hr tunrs  Patient Goals: jayla  Family Goals: jayla    Progress made toward(s) clinical / shift goals:    Problem: Urinary - Renal Perfusion  Goal: Ability to achieve and maintain adequate renal perfusion and functioning will improve  Outcome: Progressing    Pt. Is astrid to pee. Incontinent, purewick in place    Patient is not progressing towards the following goals:      Problem: Neuro Status  Goal: Neuro status will remain stable or improve  Outcome: Not Progressing     Pt. Is able to blink her eyes, incomprehensible sounds. Pt. Withdraws from pain.

## 2024-05-02 NOTE — PROGRESS NOTES
Monitor summary: SR 83-95, MI 0.24, QRS 0.08, QT 0.33, in first degree heart blocks with rare PVCs per strip from monitor room.

## 2024-05-02 NOTE — THERAPY
"Speech Language Pathology   Daily Treatment     Patient Name: Jairo Rivas  AGE:  73 y.o., SEX:  female  Medical Record #: [id number]  Date of Service: 5/2/2024      Precautions:  Precautions: Fall Risk, Nasogastric Tube, Swallow Precautions, Other (See Comments)         Subjective  RN cleared patient for swallowing dysfunction treatment with SLP. Patient's daughter present at bedside throughout and served as a historical informant.    Assessment  Attempted pre-feeding trials of ice chips by spoon with anterior loss of bolus for all trials. Patient repositioned to attempt various postures; patient remained unable to maintain oral containment of PO ice chip trials. Attempted tactile presentations of ice chips to the Patient's labial surfaces which was not successful to cue for oral acceptance. PO trials discontinued d/t poor JUJU.     Clinical Impressions  Patient continues to present with JUJU that is inadequate for PO intake. SLP will continue to follow for pre-feeding trials to determine patient's arousal for PO intake.      Recommendations    Dysphagia Treatment  Diet Consistency: NPO/NGT  Instrumentation: Instrumental swallow study pending clinical progress  Medication: Non Oral  Oral Care: Q2h    SLP Treatment Plan  Treatment Plan: Dysphagia Treatment, Patient/Family/Caregiver Training  SLP Frequency: 2x Per Week  Estimated Duration: Until Therapy Goals Met      Anticipated Discharge Needs  Discharge Recommendations: Recommend post-acute placement for additional speech therapy services prior to discharge home  Therapy Recommendations Upon DC: Dysphagia Training, Community Re-Integration, Patient / Family / Caregiver Education      Patient / Family Goals  Patient / Family Goal #1: \"She eats at home\" per dtr  Goal #1 Outcome: Progressing slower than expected  Short Term Goals  Short Term Goal # 1: Pt will participate in prfdg to determine readiness for PO diet vs. diagnostic evaluation.  Goal Outcome # " 1: Progressing slower than expected      Issa Valadez, SLP

## 2024-05-02 NOTE — PROGRESS NOTES
Monitor summary: SR 82-95, NE 0.22, QRS 0.10, QT 0.31, occasional/frquent PVCs, rare big per strip from monitor room.

## 2024-05-02 NOTE — CARE PLAN
The patient is Stable - Low risk of patient condition declining or worsening    Shift Goals  Clinical Goals: Stable neuro status, continue to tolerate TF  Patient Goals: ZA  Family Goals: Stay updated on POC    Progress made toward(s) clinical / shift goals: Neuro status remains unchanged, pt tolerating tube feed at goal rate.      Problem: Pain - Standard  Goal: Alleviation of pain or a reduction in pain to the patient’s comfort goal  Outcome: Progressing     Problem: Knowledge Deficit - Standard  Goal: Patient and family/care givers will demonstrate understanding of plan of care, disease process/condition, diagnostic tests and medications  Outcome: Progressing     Problem: Hemodynamics  Goal: Patient's hemodynamics, fluid balance and neurologic status will be stable or improve  Outcome: Progressing     Problem: Fluid Volume  Goal: Fluid volume balance will be maintained  Outcome: Progressing     Problem: Urinary - Renal Perfusion  Goal: Ability to achieve and maintain adequate renal perfusion and functioning will improve  Outcome: Progressing     Problem: Respiratory  Goal: Patient will achieve/maintain optimum respiratory ventilation and gas exchange  Outcome: Progressing     Problem: Mechanical Ventilation  Goal: Safe management of artificial airway and ventilation  Outcome: Progressing  Goal: Successful weaning off mechanical ventilator, spontaneously maintains adequate gas exchange  Outcome: Progressing  Goal: Patient will be able to express needs and understand communication  Outcome: Progressing     Problem: Physical Regulation  Goal: Diagnostic test results will improve  Outcome: Progressing  Goal: Signs and symptoms of infection will decrease  Outcome: Progressing     Problem: Skin Integrity  Goal: Skin integrity is maintained or improved  Outcome: Progressing     Problem: Fall Risk  Goal: Patient will remain free from falls  Outcome: Progressing     Problem: Neuro Status  Goal: Neuro status will remain  stable or improve  Outcome: Progressing     Problem: Infection - Standard  Goal: Patient will remain free from infection  Outcome: Progressing       Patient is not progressing towards the following goals:

## 2024-05-02 NOTE — PROGRESS NOTES
Hospital Medicine Daily Progress Note    Date of Service  5/2/2024    Chief Complaint  Jairo Rivas is a 73 y.o. female admitted 4/13/2024 with acute enteropathy    Hospital Course  73 female with past medical history of endometrial carcinoma, brain metastasis s/p radiation therapy, CVA presented with altered mental status.  MRI on 4/20 noted with right frontal brain metastasis with vasogenic edema, acute infarction.  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  Neurology and ID consulted on the case.  CT head from 4/21 noted with 9X 7 mm of hemorrhage in the right temporal region.  Neurology recommending continue acyclovir for 14 days .  Ophthalmology evaluated for concern of vision changes.    Interval Problem Update  4/30:  Patient does not track with eyes.  Does not obey commands.  She remains on NG tube feeds.  No visible response to acyclovir ending dose tonight after 14 days.  Remains sinus tachycardic.  Increase metoprolol to from  twice daily.  5/1: Met with daughter Carolyn at bedside.  Patient did seem more responsive to daughter.  She is not verbal in conversation but did squeeze her hand and shake her head yes.  She is able to close her eyes.  She is able to track her daughter as well as look towards me when discussing her care.  Her daughter has requested that I transfer patient to [facility] for neuro oncology consultation stating that she has been treated previously by Dr. Charles sanches a neuro oncologist which we do not have at [facility].  I have formally placed a consult with [facility] today.  Examined left under breast zoster lesion, small escoration remaining, no blisters.  Evidence of moisture, erythema c/w yeast.  Nystatin powder ordered. Modafanil ordered 100mg daily to help with alertness.  Tapered decadron 4 bid (14 days) to 2mg bid.  5/2: Patient is able to follow with her eyes.  She attempts to speak, however it is an audible for me to hear  as well as to the daughter.  She is able to squeeze her left hand  on command.  The patient still has swollen conjunctiva noted.  Despite well moisturized.  I have ordered a repeat head CT to ensure there is no increased brain edema.  I have discussed with daughter options now that her acyclovir has completed.  daughter prefers in this order: 1[facility] for neuro oncologist.  2 skilled nursing facility is acceptable to the daughter   3 home with home health and home equipment which would include hospital bed and Nirav lift.    4. consideration for hospice care if no other treatment options remain.  I did speak with [facility] to inform that due to the insurance change, [facility] is the only higher level of care for [facility] and to contact them for consideration of transfer to neuro-oncologist service.      I have discussed this patient's plan of care and discharge plan at IDT rounds today with Case Management, Nursing, Nursing leadership, and other members of the IDT team.    Consultants/Specialty  neurology    Code Status  Full Code    Disposition  The patient is not medically cleared for discharge to home or a post-acute facility.  Anticipate discharge to: skilled nursing facility    I have placed the appropriate orders for post-discharge needs.    Review of Systems  Review of Systems   Unable to perform ROS: Acuity of condition        Physical Exam  Temp:  [36.4 °C (97.5 °F)-36.8 °C (98.2 °F)] 36.8 °C (98.2 °F)  Pulse:  [89-90] 89  Resp:  [18] 18  BP: (129-160)/(70-95) 129/70  SpO2:  [93 %-97 %] 96 %    Physical Exam  Constitutional:       Appearance: She is ill-appearing.   HENT:      Head:      Comments: On feeding tube  Cardiovascular:      Rate and Rhythm: Tachycardia present.      Pulses: Normal pulses.   Pulmonary:      Effort: Pulmonary effort is normal. No respiratory distress.      Breath sounds: No  wheezing or rales.   Abdominal:      General: Abdomen is flat.   Musculoskeletal:      Right lower leg: No edema.      Left lower leg: No edema.   Neurological:      General: No focal deficit present.      Mental Status: She is alert.      Motor: Weakness present.      Comments: She is following to voice, squeezing daughter's hand purposefully, tracking daughter when spoken to.  Mild improvement noted.         Fluids    Intake/Output Summary (Last 24 hours) at 5/2/2024 1512  Last data filed at 5/2/2024 0407  Gross per 24 hour   Intake 300 ml   Output 2100 ml   Net -1800 ml       Laboratory  Recent Labs     04/30/24  0657 05/02/24  1050   WBC 7.1 7.1   RBC 2.96* 3.28*   HEMOGLOBIN 9.6* 10.6*   HEMATOCRIT 28.4* 31.3*   MCV 95.9 95.4   MCH 32.4 32.3   MCHC 33.8 33.9   RDW 48.4 48.7   PLATELETCT 146* 195   MPV 11.4 11.0     Recent Labs     04/30/24  0657 05/02/24  1050   SODIUM 138 135   POTASSIUM 4.0 4.0   CHLORIDE 105 102   CO2 23 22   GLUCOSE 167* 166*   BUN 27* 24*   CREATININE 0.44* 0.38*   CALCIUM 8.7 8.8                     Imaging  DX-CHEST-LIMITED (1 VIEW)   Final Result      1.  Hazy perihilar and lower lobe opacities represent areas of atelectasis or potentially pneumonitis.      IR-MIDLINE CATHETER INSERTION WO GUIDANCE > AGE 3   Final Result                  Ultrasound-guided midline placement performed by qualified nursing staff    as above.          CT-HEAD W/O   Final Result      1.  9 x 7 mm focus of hemorrhage in the right temporal region, suspected subarachnoid versus less likely intraparenchymal. It is stable since noncontrast head CT performed one day prior.         CT-CTA NECK WITH & W/O-POST PROCESSING   Final Result      No occlusion, hemodynamically significant stenosis or dissection of the neck arteries.      CT-CTA HEAD WITH & W/O-POST PROCESS   Final Result   Addendum (preliminary) 1 of 1   Critical value called by Dr. Hector Dodson to Physician: RONAN NOGUERA   at 4/25/2024 3:48 PM.       Final      1.  9 x 7 mm focus of hemorrhage in the right temporal region which is probably subarachnoid, less likely intraparenchymal.   2.  Focal occlusion of right M2 MCA inferior division with distal opacification. This could be a focal high-grade stenosis versus vessel occlusion. It could also be vasospasm as the subarachnoid hemorrhage is present in this region.   3.  Other findings as above.      Efforts are underway to contact referring physician with results at time of dictation.            EC-ECHOCARDIOGRAM COMPLETE W/O CONT   Final Result      DX-CHEST-PORTABLE (1 VIEW)   Final Result      Feeding tube tip projects in the stomach.      Hazy left basilar opacity, atelectasis and/or mild pneumonitis.      DX-LUMBAR PUNCTURE FOR DIAGNOSIS   Final Result      Fluoroscopic-guided lumbar puncture as described above.      IR-MIDLINE CATHETER INSERTION WO GUIDANCE > AGE 3   Final Result                  Ultrasound-guided midline placement performed by qualified nursing staff    as above.          DX-ABDOMEN FOR TUBE PLACEMENT   Final Result         1.  Nonspecific bowel gas pattern in the upper abdomen.   2.  Dobbhoff tube is coiled within the stomach, the tip terminates overlying the expected location of the gastric cardia.   3.  Hazy left lower lobe infiltrate.      MR-BRAIN-WITH & W/O   Final Result      1.  Significant interval decrease in size of right frontal brain metastasis since previous exam slight interval decrease in surrounding vasogenic edema. Smaller irregular rim of increased diffusion weighted signal intensity about this treated metastasis    consistent with cytotoxic edema.      2.  New large area of acute infarction in the right posterior frontal, parietal, posterior temporal, and occipital lobes associated gyriform enhancement in the right lateral occipital lobe and right peritrigonal white matter.      3.  Smaller chronic wedge-shaped area of infarction involving the left posterior basal  ganglia extending into the left posterior frontal periventricular white matter      4.  Small areas of chronic lacunar type infarction involving the left side of the elaine and left brachium pontis with surrounding gliosis.      5.  Small foci of chronic hemosiderin deposition noted in the left posterior temporal and occipital white matter, as well as in the treated right frontal brain metastasis      6.  Age-related cerebral atrophy.      DX-ABDOMEN FOR TUBE PLACEMENT   Final Result         1.  Nonspecific bowel gas pattern in the upper abdomen.   2.  Dobbhoff tube is coiled within the stomach, the tip terminates overlying the expected location of the gastric fundus, physician similar to prior study.   3.  Left lower lobe infiltrate      DX-ABDOMEN FOR TUBE PLACEMENT   Final Result         1.  Nonspecific bowel gas pattern in the upper abdomen.   2.  Dobbhoff tube is coiled within the stomach, the tip terminates overlying the expected location of the gastric fundus.   3.  Left basilar atelectasis      DX-ABDOMEN FOR TUBE PLACEMENT   Final Result      Feeding tube looped in the stomach the distal tip in the gastric fundus.      CT-HEAD WITH & W/O   Final Result      1.  Redemonstration of right frontal lobe mass, highly concerning for metastasis. Associated vasogenic edema and a 2.5 mm right to left midline shift. No progressive mass effect. No herniation.   2.  Nonspecific ill-defined enhancement in the right parietal lobe.   3.  No new large vascular structure infarct. No new intracranial hemorrhage.      DX-CHEST-LIMITED (1 VIEW)   Final Result      No significant interval change.      DX-ABDOMEN COMPLETE WITH AP OR PA CXR   Final Result      1. No acute cardiopulmonary abnormality.   2. Chronic left lower lobe scarring.   3. Nonobstructive bowel gas pattern. Small amount of stool throughout the colon.      CT-HEAD W/O    (Results Pending)        Assessment/Plan  * Acute encephalopathy- (present on  admission)  Assessment & Plan  Multifactorial  Manage encephalopathy is  Acute stroke on MRI 4/20  Subarachnoid hemorrhage on CT  LP with CSF positive for VZV done 4/22  Continue on acyclovir  Monitor blood pressure closely,  neurochecks every 4 hours        Aspiration pneumonitis (HCC)- (present on admission)  Assessment & Plan  Patient high risk for aspiration pneumonitis/pneumonia.   Currently on room air  Monitor oxygen requirement closely    Prediabetes- (present on admission)  Assessment & Plan  Monitor glucose level closely.  On insulin regimen    Vasogenic edema (HCC)- (present on admission)  Assessment & Plan  Continuing decadron taper per Dr. Live's recommendations. He also does not believe that there are any significant changes in her brain imaging from this admission that could explain her somnolence/presentation. She is on the correct medication (decadron) for treating the edema  Improvement on MRI on 4/21  - 5/1:  tapering  decadron 4mg IV bid x 13 days to 2mg IV bid.  5/2 CT head w/o contrast      Hypernatremia  Assessment & Plan  Improved    Seizure (HCC)- (present on admission)  Assessment & Plan    -Continue lacosamide 150 mg IV BID per neurology  -EEG and cEEG negative for seizures         Dysphagia- (present on admission)  Assessment & Plan  Possibly due to CVA vs brain metastases and altered mental status in setting of infection.  Patient will remain NPO as she failed SLP evaluation 4/17  NG tube placement required IRIS. Tube was removed for MRI and family refused NG tube to be replaced. Extensive discussion with family regarding nutrition options. Eventually agreed to let patient get NG tube with lorazepam sedation and IRIS. No one certified to place NG tube with IRIS available on 4/21. Vascular access also lost on 4/21 and midline unable to be placed without VATs  -aspiration precautions  -Continue lansoprazole  -A67yrvuv glucose checks      Shingles- (present on admission)  Assessment &  Plan  Continue on acyclovir    Constipation- (present on admission)  Assessment & Plan  Resolved  - Bowel regimen    Sepsis secondary to UTI (HCC)- (present on admission)  Assessment & Plan  Sepsis resolved    Malignant neoplasm metastatic to brain (HCC)- (present on admission)  Assessment & Plan  Brain mets noted during hospitalization on 3/27/2024 with vasogenic edema, MRI on 4/11 shows continued brain mets with vasogenic edema.  MRI 4/21 shows decrease in tumor size and improvement in edema.  -continue seizure prophylaxis with lacosamide 150 mg BID  -tapering dexamethasone   Ophthalmology evaluated for concern of vision changes      Acute kidney injury (HCC)- (present on admission)  Assessment & Plan  RESOLVED  Returned, Cr 1.11 and baseline around 0.7. Likely prerenal from dehydration.  -continue free water flushes  -renally dose all medications as appropriate  -avoid nephrotoxins    Advance care planning- (present on admission)  Assessment & Plan  At this time patient's family want her to be full code.  Palliative care consulted and discussed with family the goals of care. This remains the same.  -Full code  -Family goals of care is to make patient best shape to be discharged to be follow-up by his doctors in [place].  -Family updates daily with her guarded prognosis  4/29/2024  Patient present at bedside.  He is aware patient has poor prognosis.  He would like to see how she does once 14 days of acyclovir completed.  5/1: I have discussed with daughter at bedside options now that her acyclovir has completed.  daughter prefers in this order:   1. higher level of care, [facility] transfer center is looking into transfer to [facility] for neuro oncologist.   2 skilled nursing facility is acceptable to the daughter   3 home with home health and home equipment which would include hospital bed and Nirav lift.    4. consideration for hospice care if no other treatment options remain.    Endometrial cancer  (HCC)- (present on admission)  Assessment & Plan  Followed by Dr. Garcia outpatient and sees radiation oncology Dr Live.  Received immunotherapy at [facility] in 2021 with initial good response. Reached out to Dr. Live who does not believe imaging from 4/14 shows significant changes compared to MRI month prior.  Complicated by metastases to brain among other locations, likely contributing to her altered mental status. Mets re demonstrated without significant change on CTH on admission.  - Decadron for vasogenic edema  - Neurochecks every 4 hours  - Continue aspiration, fall, seizure precaution.    Essential hypertension- (present on admission)  Assessment & Plan  Monitor blood pressure closely    Acute CVA (cerebrovascular accident) (HCC)- (present on admission)  Assessment & Plan  MRI on 4/20 noted with right frontal brain metastasis with vasogenic edema, acute infarction.  CT head from 4/21 noted with 9X 7 mm of hemorrhage in the right temporal region.   Neurology following         VTE prophylaxis: lovenox    I have performed a physical exam and reviewed and updated ROS and Plan today (5/2/2024). In review of yesterday's note (5/1/2024), there are no changes except as documented above.

## 2024-05-02 NOTE — DISCHARGE PLANNING
Case Management Discharge Planning    Admission Date: 4/13/2024  GMLOS: 5.1  ALOS: 19    6-Clicks ADL Score: 6  6-Clicks Mobility Score: 6        Anticipated Discharge Dispo: Discharge Disposition: D/T to short term gen hosp for IP care (02)    DME Needed: No    Action(s) Taken: Updated Provider/Nurse on Discharge Plan  0830- RN CM attended initial morning rounds, per Dr. Camacho, she reached out to [facility] for PT to transfer in [facility] per daughter's request.    0900-RN RENÉ received a communication from [facility] that Pt's insurance ([employer]) is not in network with [facility] and that Pt's insurance is not doing JUJU. Pt needs to go to in network provider. RTOC asked RN RENÉ if Pt's daughter is agreeable for Pt to go to [facility] since Pt''s insurance is in network with them.     0910- RN CM spoke to Pt's daughter and was updated, per Pt's daughter, she needs to talk to her siblings before making a decision and will let RN RENÉ know. [facility] informed.    1218- RN RENÉ spoke to Carolyn, Pt's daughter, per Carolyn she is still waiting for her siblings response regarding the transfer. RN RENÉ informed Carolyn that we just need their final decision within today so [facility] can start the process.     RN CM also discussed with Carolyn the plan B if Pt's transfer will not push through. Per Carolyn, plan will be dc to home with HH and she will hire care givers to help her. List of caregiver provided and sent to Carolyn's email ([email or web address] as requested.    1530- RN CM received a communication drom Dr. Camacho that Pt's daughter is agreeable with Post acute placement as another option. Pt's daughter is still waiting for her siblings decision for transfer per MD.     SNF referral sent to [facility].      Escalations Completed: Provider    Medically Clear: No    Next Steps: RN CM to continue to assist in Pt's discharge needs.     Barriers to Discharge: Medical clearance    Is the patient up for discharge tomorrow: No

## 2024-05-02 NOTE — DISCHARGE PLANNING
"HTH/SCP TCN chart review completed. Collaborated with RENÉ Soliz and [facility] as well as [facility]  and concurrent RN for dc planning. Current discharge considerations are unknown. Possible dc to home with family assist and HH v possible transfer to alternate acute/higher level of care. See RENÉ Soliz's note from today 5/2 for dc planning considerations as well. See prior TCN notes for details re: initial dc planning considerations. Noted discussed from MD re: possible transfer to [facility] for neuro oncologist. However [facility] is OON for SCP and reached out to management, [facility] and hospital CM re: considerations/concerns as unclear if this would be authorized given OON status. Note that per discussion with CM, family is now aware that [facility] is OON and pt would likely need to consider [facility] if higher level/transfer is indicated. Per CM, family to discuss and reach back out to CM once decision made. CM will reach out to TCN once decision is made. Note that pt would need accepting MD at [facility] and then [facility] leadership would likely need to review for auth, etc. Would also note as prior, from MD note from 4/30 \"Family goals of care is to make patient best shape to be discharged to be follow-up by his doctors in [place]\"  TCN will continue to follow and collaborate with discharge planning team as additional post acute needs arise. Thank you.    Completed:  Choice obtained: HERLINDA ; see above  [facility]P with [facility]    Update: noted in PM, per MD note from today \" I have discussed with daughter options now that her acyclovir has completed.  daughter prefers in this order: 1. higher level of care, [facility] transfer center is looking into transfer to [facility] for neuro oncologist.  2 skilled nursing facility is acceptable to the daughter   3 home with home health and home equipment which would include hospital bed and Nirav lift.    4. consideration for hospice care if no other treatment options " "remain\"    Note that SNF referrals have been sent per CM policy, though currently none accepting (ie denials), though does have 4 remaining pending at time of update @4:27PM  "

## 2024-05-02 NOTE — DIETARY
"Nutrition support weekly update:  Day 19 of admit.  Jairo Rivas is a 73 y.o. female with admitting DX of Pyelonephritis, AMS, and sepsis.      Tube feeding initiated on 4/18. Current TF via IRIS feeding tube is Promote with Fiber at goal rate 60 ml/hr to provide 1440 kcals, 90 gm protein, and 1197 ml free water per day. Current FWF of 200 ml Q 4 hours.     Assessment:  Weight 5/1: 98.7 kg via bed scale.   Height: 5'4\" (162.6 cm), BMI: 37.33 (obesity class I)    Evaluation:   Pt is receiving TF at goal rate.   SLP saw today and recommends NPO with NGT.   Skin: Full thickness of sacrum, L breast blisters, MASD of sacrum. General edema noted to face, moderate edema to both eyes, trace perineal edema, generalized edema to RUE, +1 mild edema to RLE/LLE.   Labs: Glucose 166, BUN 24, Creat 0.38, Total protein 5.5.   Meds: lipitor, dulcoax, decadron, SSI, vimpat, prevacid, senna and miralax  Last BM: 5/2  Standard formula is indicated to meet estimated needs.     Malnutrition risk: No new risk.     Recommendations/Plan:  Continue TF Promote with Fiber at goal rate 60 ml/hr.   Fluids per MD.   Diet upgrades per SLP/MD.     RD following.                    "

## 2024-05-02 NOTE — PROGRESS NOTES
Monitor summary: SR 78-98, IA 0.21, QRS 0.09, QT 0.32 with rare PVCs, PACs, in first degree HB, per strip from monitor room.

## 2024-05-02 NOTE — CARE PLAN
The patient is Stable - Low risk of patient condition declining or worsening    Shift Goals  Clinical Goals: Monitor neuro status, skin integrity  Patient Goals: jayla  Family Goals: jayla    Progress made toward(s) clinical / shift goals:  Patient is alert , she does say some of her native language randomly. Turned every 2 hours. Oral care done. Blood pressure monitored, prn medication given. Hourly rounds to ensure safety and comfort.    Patient is not progressing towards the following goals:

## 2024-05-03 ENCOUNTER — APPOINTMENT (OUTPATIENT)
Dept: RADIOLOGY | Facility: MEDICAL CENTER | Age: 74
DRG: 871 | End: 2024-05-03
Attending: HOSPITALIST
Payer: MEDICARE

## 2024-05-03 LAB
GLUCOSE BLD STRIP.AUTO-MCNC: 102 MG/DL (ref 65–99)
GLUCOSE BLD STRIP.AUTO-MCNC: 107 MG/DL (ref 65–99)
GLUCOSE BLD STRIP.AUTO-MCNC: 94 MG/DL (ref 65–99)

## 2024-05-03 PROCEDURE — 99233 SBSQ HOSP IP/OBS HIGH 50: CPT | Performed by: HOSPITALIST

## 2024-05-03 RX ORDER — MORPHINE SULFATE 4 MG/ML
4 INJECTION INTRAVENOUS
Status: ACTIVE | OUTPATIENT
Start: 2024-05-03 | End: 2024-05-04

## 2024-05-03 RX ORDER — LORAZEPAM 2 MG/ML
1 INJECTION INTRAMUSCULAR
Status: COMPLETED | OUTPATIENT
Start: 2024-05-03 | End: 2024-05-03

## 2024-05-03 RX ADMIN — DEXAMETHASONE 2 MG: 4 TABLET ORAL at 16:52

## 2024-05-03 RX ADMIN — ERYTHROMYCIN 1 APPLICATION: 5 OINTMENT OPHTHALMIC at 16:49

## 2024-05-03 RX ADMIN — MINERAL OIL, AND WHITE PETROLATUM 1 APPLICATION: 425; 573 OINTMENT OPHTHALMIC at 16:49

## 2024-05-03 RX ADMIN — NYSTATIN: 100000 POWDER TOPICAL at 16:57

## 2024-05-03 RX ADMIN — ERYTHROMYCIN 1 APPLICATION: 5 OINTMENT OPHTHALMIC at 05:54

## 2024-05-03 RX ADMIN — LORAZEPAM 1 MG: 2 INJECTION INTRAMUSCULAR; INTRAVENOUS at 14:37

## 2024-05-03 RX ADMIN — ATORVASTATIN CALCIUM 20 MG: 20 TABLET, FILM COATED ORAL at 16:55

## 2024-05-03 RX ADMIN — MINERAL OIL, AND WHITE PETROLATUM 1 APPLICATION: 425; 573 OINTMENT OPHTHALMIC at 05:54

## 2024-05-03 RX ADMIN — NYSTATIN: 100000 POWDER TOPICAL at 05:54

## 2024-05-03 RX ADMIN — LACOSAMIDE 150 MG: 100 TABLET, FILM COATED ORAL at 16:51

## 2024-05-03 RX ADMIN — LABETALOL HYDROCHLORIDE 10 MG: 5 INJECTION INTRAVENOUS at 05:51

## 2024-05-03 RX ADMIN — ENOXAPARIN SODIUM 40 MG: 100 INJECTION SUBCUTANEOUS at 16:51

## 2024-05-03 RX ADMIN — METOPROLOL 100 MG: 100 TABLET ORAL at 16:53

## 2024-05-03 RX ADMIN — ERYTHROMYCIN 1 APPLICATION: 5 OINTMENT OPHTHALMIC at 12:00

## 2024-05-03 ASSESSMENT — FIBROSIS 4 INDEX: FIB4 SCORE: 1.31

## 2024-05-03 NOTE — CARE PLAN
The patient is Stable - Low risk of patient condition declining or worsening    Shift Goals  Clinical Goals: Monitor neuro status/skin integrity  Patient Goals: ZA  Family Goals: comfort    Progress made toward(s) clinical / shift goals:  No acute change neurologically . Bed bath and perineal care done. Turned every 2 hours. Blood pressure and glucose monitored. Facilitated repeat head Ct scan . Hourly rounds to ensure safety and comfort.    Patient is not progressing towards the following goals:

## 2024-05-03 NOTE — DISCHARGE PLANNING
0807  [facility] blanket [place] area.   Initial

## 2024-05-03 NOTE — DISCHARGE PLANNING
"[facility] TCN chart review completed. Collaborated with CM Heydi, [facility] , and concurrent RN. Current discharge considerations are unknown as prior.     Would also note as prior, \"per discussion with CM, family is now aware that [facility] is OON and pt would likely need to consider [facility] if higher level/transfer is indicated. Per CM, family to discuss and reach back out to CM once decision made. CM will reach out to TCN once final decision is made. Note that pt would need accepting MD at [facility] and then [facility] leadership would likely need to review for auth, etc\".     Note that SNF referrals were expanded, though pt does not currently have accepting facility (currently all local SNFs have declined with 3 [facility] SNFs pending)  and this would also need authorization from [facility] as well (unclear that pt would currently meet criteria for post acute placement per discussion/review with [facility] management/team).     As prior, per MD note from 5/2 re: dc planning, \" I have discussed with daughter options now that her acyclovir has completed.  daughter prefers in this order: 1. higher level of care, [facility] transfer [facility] is looking into transfer to [facility] for neuro oncologist.  2 skilled nursing facility is acceptable to the daughter   3 home with home health and home equipment which would include hospital bed and Nirav lift.    4. consideration for hospice care if no other treatment options remain\".    Discussed aforementioned with CM as well. TCN will continue to follow and collaborate with discharge planning team as additional post acute needs arise. Thank you.    Completed:  Choice obtained: [facility] ; see above as this is likely a complex dc at this time  [facility]P with [facility] PCP  "

## 2024-05-03 NOTE — PROGRESS NOTES
"I met w/ Carlene and Carolyn at bedside this afternoon.  Carlene was found resting supine in bed, eyes closed and snoring slightly.  Carolyn and I had met prior, but I re-introduced myself and the role of our team.  I asked for Carolyn's understanding of her mother's condition and she provided a succinct and accurate update.  I asked for her thoughts on what her mother's overall trajectory and she shared that she was grateful that her mother was a little more awake yesterday and earlier today w/ the initiation of modafinil.  She was hopeful that more time awake would mean increased ability to work w/ therapies and a better chance at convalescence.      I inquired about what Carlene would like to see for her mother.  She emphasized that she would like to \"respect mom's wishes to keep fighting.\"  She discussed that she would like to give Carlene a chance to convalesce to the point where she could attend appointments for her cancer care at [facility] (and not [place] where she had been previously); she expressed a strong desire to continue the current treatment plan/course, including placement of a PEG, if needed for nutritional support, given her mother's previously stated goals of longevity.  She discussed that this \"chance\" would ideally occur in some sort of nursing facility upon D/C here, but that insurance barriers have hindered this process thus far.  She shared considerable anxiety regarding what would happen if insurance did not cover stay at a facility; in that case, she would accept caring for her mother at home, but would require robust resources and education.     I asked Carolyn what would be a sign that \"the chance\" given to Carlene was no longer working, in other words when should a transition to a focus on comfort be considered.  Carolyn shared that if her mother was no longer waking up/opening her eyes or interacting w/ her environment, that would be \"the sign.\"  She does not feel that her mother is at that point at this time.  " "She asked how to refer to hospice in that instance and I reviewed that process w/ her.      Recommendations:  - Goals remain longevity and to give Carlene a \"chance\" at convalescence to the point where cancer treatment could be discussed w/ local oncologic teams; continue current treatment plan (of note, Carolyn deems placement of PEG, if needed, to be acceptable)  - Resultantly, Carolyn discussed preference for placement into nursing facility upon D/C here  - Discussed signs that would indicated this \"chance\" is no longer working; Carolyn identified that, if/when her mother is no longer waking up, looking around, or interacting w/ her environment, that would be an indication to focus on comfort (eg, enrollment in hospice)  - Carolyn requested that I pass along to primary team a desire for outpatient ophthalmology referral upon D/C    "

## 2024-05-03 NOTE — CARE PLAN
Problem: Pain - Standard  Goal: Alleviation of pain or a reduction in pain to the patient’s comfort goal  Outcome: Progressing     Problem: Knowledge Deficit - Standard  Goal: Patient and family/care givers will demonstrate understanding of plan of care, disease process/condition, diagnostic tests and medications  Outcome: Progressing     Problem: Hemodynamics  Goal: Patient's hemodynamics, fluid balance and neurologic status will be stable or improve  Outcome: Progressing     Problem: Skin Integrity  Goal: Skin integrity is maintained or improved  Outcome: Progressing     Problem: Fall Risk  Goal: Patient will remain free from falls  Outcome: Progressing     Problem: Infection - Standard  Goal: Patient will remain free from infection  Outcome: Progressing   The patient is Stable - Low risk of patient condition declining or worsening    Shift Goals  Clinical Goals: maintain skin integrity  Patient Goals: jayla  Family Goals: jayla    Progress made toward(s) clinical / shift goals:  skin precautions in place. Stable neuro status. No s/sx of pain.    Patient is not progressing towards the following goals:unable to verbalize understanding

## 2024-05-03 NOTE — PROGRESS NOTES
Iris Placement    Tube Team verified patient name and medical record number prior to tube placement. Iris feeding tube (43 inches, 12 Mexican) placed at 53cm in right nare. Per Iris picture, tube appears to be in the stomach.    Nursing Instructions: Await KUB to confirm placement before use for medications or feeding. Stylet, may be removed, please place in labeled bag with insufflation bulb and save in patient medication drawer.

## 2024-05-03 NOTE — PROGRESS NOTES
Monitor summary: SR 76-96, MO 0.22, QRS 0.08, QT 0.34, with occasional PVC, rare PAC per strip from monitor room.

## 2024-05-04 LAB
GLUCOSE BLD STRIP.AUTO-MCNC: 125 MG/DL (ref 65–99)
GLUCOSE BLD STRIP.AUTO-MCNC: 144 MG/DL (ref 65–99)
GLUCOSE BLD STRIP.AUTO-MCNC: 145 MG/DL (ref 65–99)
GLUCOSE BLD STRIP.AUTO-MCNC: 155 MG/DL (ref 65–99)

## 2024-05-04 PROCEDURE — 99233 SBSQ HOSP IP/OBS HIGH 50: CPT | Performed by: HOSPITALIST

## 2024-05-04 RX ORDER — DEXAMETHASONE 1 MG
1 TABLET ORAL DAILY
Status: DISCONTINUED | OUTPATIENT
Start: 2024-05-16 | End: 2024-05-13

## 2024-05-04 RX ORDER — DEXAMETHASONE 4 MG/1
2 TABLET ORAL DAILY
Status: DISCONTINUED | OUTPATIENT
Start: 2024-05-09 | End: 2024-05-13

## 2024-05-04 RX ADMIN — ERYTHROMYCIN 1 APPLICATION: 5 OINTMENT OPHTHALMIC at 00:00

## 2024-05-04 RX ADMIN — POLYETHYLENE GLYCOL 3350 1 PACKET: 17 POWDER, FOR SOLUTION ORAL at 04:17

## 2024-05-04 RX ADMIN — LABETALOL HYDROCHLORIDE 10 MG: 5 INJECTION INTRAVENOUS at 12:23

## 2024-05-04 RX ADMIN — NYSTATIN: 100000 POWDER TOPICAL at 04:36

## 2024-05-04 RX ADMIN — LANSOPRAZOLE 30 MG: 30 TABLET, ORALLY DISINTEGRATING ORAL at 04:37

## 2024-05-04 RX ADMIN — MINERAL OIL, AND WHITE PETROLATUM 1 APPLICATION: 425; 573 OINTMENT OPHTHALMIC at 00:30

## 2024-05-04 RX ADMIN — SULFAMETHOXAZOLE AND TRIMETHOPRIM 1 TABLET: 400; 80 TABLET ORAL at 04:36

## 2024-05-04 RX ADMIN — ATORVASTATIN CALCIUM 20 MG: 20 TABLET, FILM COATED ORAL at 17:16

## 2024-05-04 RX ADMIN — LOSARTAN POTASSIUM 100 MG: 50 TABLET, FILM COATED ORAL at 07:52

## 2024-05-04 RX ADMIN — ERYTHROMYCIN 1 APPLICATION: 5 OINTMENT OPHTHALMIC at 04:36

## 2024-05-04 RX ADMIN — METOPROLOL 100 MG: 100 TABLET ORAL at 17:16

## 2024-05-04 RX ADMIN — ERYTHROMYCIN 1 APPLICATION: 5 OINTMENT OPHTHALMIC at 23:55

## 2024-05-04 RX ADMIN — ASPIRIN 81 MG: 81 TABLET, CHEWABLE ORAL at 04:17

## 2024-05-04 RX ADMIN — MINERAL OIL, AND WHITE PETROLATUM 1 APPLICATION: 425; 573 OINTMENT OPHTHALMIC at 23:54

## 2024-05-04 RX ADMIN — NYSTATIN: 100000 POWDER TOPICAL at 17:17

## 2024-05-04 RX ADMIN — MODAFINIL 100 MG: 100 TABLET ORAL at 04:17

## 2024-05-04 RX ADMIN — ERYTHROMYCIN 1 APPLICATION: 5 OINTMENT OPHTHALMIC at 17:17

## 2024-05-04 RX ADMIN — MINERAL OIL, AND WHITE PETROLATUM 1 APPLICATION: 425; 573 OINTMENT OPHTHALMIC at 04:36

## 2024-05-04 RX ADMIN — DEXAMETHASONE 2 MG: 4 TABLET ORAL at 17:15

## 2024-05-04 RX ADMIN — MINERAL OIL, AND WHITE PETROLATUM 1 APPLICATION: 425; 573 OINTMENT OPHTHALMIC at 15:30

## 2024-05-04 RX ADMIN — ERYTHROMYCIN 1 APPLICATION: 5 OINTMENT OPHTHALMIC at 11:43

## 2024-05-04 RX ADMIN — LACOSAMIDE 150 MG: 100 TABLET, FILM COATED ORAL at 17:16

## 2024-05-04 RX ADMIN — AMLODIPINE BESYLATE 10 MG: 10 TABLET ORAL at 07:53

## 2024-05-04 RX ADMIN — INSULIN HUMAN 1 UNITS: 100 INJECTION, SOLUTION PARENTERAL at 11:45

## 2024-05-04 RX ADMIN — LACOSAMIDE 150 MG: 100 TABLET, FILM COATED ORAL at 04:17

## 2024-05-04 RX ADMIN — INSULIN HUMAN 1 UNITS: 100 INJECTION, SOLUTION PARENTERAL at 23:54

## 2024-05-04 RX ADMIN — DEXAMETHASONE 2 MG: 4 TABLET ORAL at 04:17

## 2024-05-04 RX ADMIN — METOPROLOL 100 MG: 100 TABLET ORAL at 04:18

## 2024-05-04 RX ADMIN — ENOXAPARIN SODIUM 40 MG: 100 INJECTION SUBCUTANEOUS at 17:16

## 2024-05-04 ASSESSMENT — PATIENT HEALTH QUESTIONNAIRE - PHQ9
SUM OF ALL RESPONSES TO PHQ9 QUESTIONS 1 AND 2: 0
1. LITTLE INTEREST OR PLEASURE IN DOING THINGS: NOT AT ALL

## 2024-05-04 ASSESSMENT — FIBROSIS 4 INDEX: FIB4 SCORE: 1.31

## 2024-05-04 NOTE — CARE PLAN
The patient is Watcher - Medium risk of patient condition declining or worsening    Shift Goals  Clinical Goals: Promote wound healing, manage BP,  Patient Goals: ZA  Family Goals: Updates on any changes    Progress made toward(s) clinical / shift goals:    See below on wound care  BP management in place with PRN medications as well as scheduled medications.     Problem: Pain - Standard  Goal: Alleviation of pain or a reduction in pain to the patient’s comfort goal  Outcome: Progressing     Problem: Knowledge Deficit - Standard  Goal: Patient and family/care givers will demonstrate understanding of plan of care, disease process/condition, diagnostic tests and medications  Outcome: Progressing  Note: Family education on patient diagnosis continually being provided.      Problem: Skin Integrity  Goal: Skin integrity is maintained or improved  Outcome: Progressing  Note: Q2 turns are in place, barrier wipes, and barrier cream in use along with viscopaste, pt is on a EDEL, and has heel float boots on.

## 2024-05-04 NOTE — PROGRESS NOTES
Monitor summary: SR, HR 71-92, MS 0.22, QRS 0.09, QT 0.39 with (R)PVCs &(R)PACs with a BBB per strip from monitor room

## 2024-05-04 NOTE — CARE PLAN
The patient is Stable - Low risk of patient condition declining or worsening    Shift Goals  Clinical Goals: Maintain skin integ, neuros  Patient Goals: jayla  Family Goals: updates for any changes    Progress made toward(s) clinical / shift goals:    Problem: Pain - Standard  Goal: Alleviation of pain or a reduction in pain to the patient’s comfort goal  Outcome: Progressing     Problem: Knowledge Deficit - Standard  Goal: Patient and family/care givers will demonstrate understanding of plan of care, disease process/condition, diagnostic tests and medications  Outcome: Progressing     Problem: Skin Integrity  Goal: Skin integrity is maintained or improved  Outcome: Progressing     Problem: Fall Risk  Goal: Patient will remain free from falls  Outcome: Progressing       Patient is not progressing towards the following goals:

## 2024-05-04 NOTE — PROGRESS NOTES
Hospital Medicine Daily Progress Note    Date of Service  5/3/2024    Chief Complaint  Jairo Rivas is a 73 y.o. female admitted 4/13/2024 with acute enteropathy    Hospital Course  73 female with past medical history of endometrial carcinoma, brain metastasis s/p radiation therapy, CVA presented with altered mental status.  MRI on 4/20 noted with right frontal brain metastasis with vasogenic edema, acute infarction.  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  Neurology and ID consulted on the case.  CT head from 4/21 noted with 9X 7 mm of hemorrhage in the right temporal region.  Neurology recommending continue acyclovir for 14 days .  Ophthalmology evaluated for concern of vision changes.    Interval Problem Update  4/30:  Patient does not track with eyes.  Does not obey commands.  She remains on NG tube feeds.  No visible response to acyclovir ending dose tonight after 14 days.  Remains sinus tachycardic.  Increase metoprolol to from  twice daily.  5/1: Met with daughter Carolyn at bedside.  Patient did seem more responsive to daughter.  She is not verbal in conversation but did squeeze her hand and shake her head yes.  She is able to close her eyes.  She is able to track her daughter as well as look towards me when discussing her care.  Her daughter has requested that I transfer patient to [facility] for neuro oncology consultation stating that she has been treated previously by Dr. Charles sanches a neuro oncologist which we do not have at [facility].  I have formally placed a consult with [facility] today.  Examined left under breast zoster lesion, small escoration remaining, no blisters.  Evidence of moisture, erythema c/w yeast.  Nystatin powder ordered. Modafanil ordered 100mg daily to help with alertness.  Tapered decadron 4 bid (14 days) to 2mg bid.  5/2: Patient is able to follow with her eyes.  She attempts to speak, however it is inaudible for me to hear  as well as to the daughter.  She is able to squeeze her left hand  on command.  The patient still has swollen conjunctiva noted.  Despite well moisturized.  I have ordered a repeat head CT to ensure there is no increased brain edema.  I have discussed with daughter options now that her acyclovir has completed.  daughter prefers in this order: 1. higher level of care, [facility] for neuro oncologist.  2 skilled nursing facility is acceptable to the daughter   3 home with home health and home equipment which would include hospital bed and Nirav lift.    4. consideration for hospice care if no other treatment options remain.  I did speak with [facility] to inform that due to the insurance change, [facility] is the only higher level of care for [facility] and to contact them for consideration of transfer to neuro-oncologist service.  5/3:  patient unable to open eyes on exam, manually lifted eyelids to examine conjunctiva, decreased edema noted, more moisture noted.  Patient appears comfortable. Daughter at bedside and aware of inability to transfer to higher level of care, no accepting facilities.  She is aware that insurance is not approving rehab or SNF due to lack of participation for therapies. Therefore, discussed needs going home which included HH, hospital bed, Nirav lift, WC, PEG tube with bolus tube feeds.  We also discussed option of hospice to provide more assistance and that patient could outlive the hospice care.  She wants to give her mother a chance to improve before hospice, but is open to hospice if no improvement. Appreciate palliative care discussion, reiterated similar outcome/daughter decision.        I have discussed this patient's plan of care and discharge plan at IDT rounds today with Case Management, Nursing, Nursing leadership, and other members of the IDT team.    Consultants/Specialty  neurology    Code Status  Full  Code    Disposition  Home with daughter and home health, home supplies including hospital bed, Nirav lift, WC, PEG tube feeds, may need oxygen.  I have placed the appropriate orders for post-discharge needs.    Review of Systems  Review of Systems   Unable to perform ROS: Acuity of condition        Physical Exam  Temp:  [36.3 °C (97.3 °F)-36.6 °C (97.9 °F)] 36.3 °C (97.3 °F)  Pulse:  [83-91] 85  Resp:  [16-18] 18  BP: ()/(59-84) 118/65  SpO2:  [95 %-99 %] 98 %    Physical Exam  Constitutional:       Appearance: She is ill-appearing.   HENT:      Head:      Comments: Feeding tube nares out, to be replaced.  Cardiovascular:      Rate and Rhythm: Tachycardia present.      Pulses: Normal pulses.   Pulmonary:      Effort: Pulmonary effort is normal. No respiratory distress.      Breath sounds: No wheezing or rales.   Abdominal:      General: Abdomen is flat.   Musculoskeletal:      Right lower leg: No edema.      Left lower leg: No edema.   Neurological:      General: No focal deficit present.      Mental Status: She is alert.      Motor: Weakness present.      Comments: Not awakening to voice or exam.         Fluids    Intake/Output Summary (Last 24 hours) at 5/3/2024 1914  Last data filed at 5/3/2024 0810  Gross per 24 hour   Intake --   Output 850 ml   Net -850 ml       Laboratory  Recent Labs     05/02/24  1050   WBC 7.1   RBC 3.28*   HEMOGLOBIN 10.6*   HEMATOCRIT 31.3*   MCV 95.4   MCH 32.3   MCHC 33.9   RDW 48.7   PLATELETCT 195   MPV 11.0     Recent Labs     05/02/24  1050   SODIUM 135   POTASSIUM 4.0   CHLORIDE 102   CO2 22   GLUCOSE 166*   BUN 24*   CREATININE 0.38*   CALCIUM 8.8                     Imaging  DX-ABDOMEN FOR TUBE PLACEMENT   Final Result      Feeding tube terminates within the gastric fundus.      DX-ABDOMEN FOR TUBE PLACEMENT   Final Result         1.  Nonspecific bowel gas pattern in the upper abdomen.   2.  Dobbhoff tube coiled within the esophagus with tip projecting over the expected  location of the gastric antrum. Recommend withdrawal and replacement.   3.  Hazy lung base opacity suggesting edema and/or infiltrates.      CT-HEAD W/O   Final Result   Addendum (preliminary) 1 of 1   Addendum: There is trace bilateral dependent intraventricular hemorrhages    identified, likely representing redistribution of previously visualized    subarachnoid hemorrhage.      Final         1.  No acute intracranial abnormality is identified, there are nonspecific white matter changes, commonly associated with small vessel ischemic disease.  Associated mild cerebral atrophy is noted.         DX-CHEST-LIMITED (1 VIEW)   Final Result      1.  Hazy perihilar and lower lobe opacities represent areas of atelectasis or potentially pneumonitis.      IR-MIDLINE CATHETER INSERTION WO GUIDANCE > AGE 3   Final Result                  Ultrasound-guided midline placement performed by qualified nursing staff    as above.          CT-HEAD W/O   Final Result      1.  9 x 7 mm focus of hemorrhage in the right temporal region, suspected subarachnoid versus less likely intraparenchymal. It is stable since noncontrast head CT performed one day prior.         CT-CTA NECK WITH & W/O-POST PROCESSING   Final Result      No occlusion, hemodynamically significant stenosis or dissection of the neck arteries.      CT-CTA HEAD WITH & W/O-POST PROCESS   Final Result   Addendum (preliminary) 1 of 1   Critical value called by Dr. Hector Dodson to Physician: RONAN NOGUERA   at 4/25/2024 3:48 PM.      Final      1.  9 x 7 mm focus of hemorrhage in the right temporal region which is probably subarachnoid, less likely intraparenchymal.   2.  Focal occlusion of right M2 MCA inferior division with distal opacification. This could be a focal high-grade stenosis versus vessel occlusion. It could also be vasospasm as the subarachnoid hemorrhage is present in this region.   3.  Other findings as above.      Efforts are underway to contact  referring physician with results at time of dictation.            EC-ECHOCARDIOGRAM COMPLETE W/O CONT   Final Result      DX-CHEST-PORTABLE (1 VIEW)   Final Result      Feeding tube tip projects in the stomach.      Hazy left basilar opacity, atelectasis and/or mild pneumonitis.      DX-LUMBAR PUNCTURE FOR DIAGNOSIS   Final Result      Fluoroscopic-guided lumbar puncture as described above.      IR-MIDLINE CATHETER INSERTION WO GUIDANCE > AGE 3   Final Result                  Ultrasound-guided midline placement performed by qualified nursing staff    as above.          DX-ABDOMEN FOR TUBE PLACEMENT   Final Result         1.  Nonspecific bowel gas pattern in the upper abdomen.   2.  Dobbhoff tube is coiled within the stomach, the tip terminates overlying the expected location of the gastric cardia.   3.  Hazy left lower lobe infiltrate.      MR-BRAIN-WITH & W/O   Final Result      1.  Significant interval decrease in size of right frontal brain metastasis since previous exam slight interval decrease in surrounding vasogenic edema. Smaller irregular rim of increased diffusion weighted signal intensity about this treated metastasis    consistent with cytotoxic edema.      2.  New large area of acute infarction in the right posterior frontal, parietal, posterior temporal, and occipital lobes associated gyriform enhancement in the right lateral occipital lobe and right peritrigonal white matter.      3.  Smaller chronic wedge-shaped area of infarction involving the left posterior basal ganglia extending into the left posterior frontal periventricular white matter      4.  Small areas of chronic lacunar type infarction involving the left side of the elaine and left brachium pontis with surrounding gliosis.      5.  Small foci of chronic hemosiderin deposition noted in the left posterior temporal and occipital white matter, as well as in the treated right frontal brain metastasis      6.  Age-related cerebral atrophy.       DX-ABDOMEN FOR TUBE PLACEMENT   Final Result         1.  Nonspecific bowel gas pattern in the upper abdomen.   2.  Dobbhoff tube is coiled within the stomach, the tip terminates overlying the expected location of the gastric fundus, physician similar to prior study.   3.  Left lower lobe infiltrate      DX-ABDOMEN FOR TUBE PLACEMENT   Final Result         1.  Nonspecific bowel gas pattern in the upper abdomen.   2.  Dobbhoff tube is coiled within the stomach, the tip terminates overlying the expected location of the gastric fundus.   3.  Left basilar atelectasis      DX-ABDOMEN FOR TUBE PLACEMENT   Final Result      Feeding tube looped in the stomach the distal tip in the gastric fundus.      CT-HEAD WITH & W/O   Final Result      1.  Redemonstration of right frontal lobe mass, highly concerning for metastasis. Associated vasogenic edema and a 2.5 mm right to left midline shift. No progressive mass effect. No herniation.   2.  Nonspecific ill-defined enhancement in the right parietal lobe.   3.  No new large vascular structure infarct. No new intracranial hemorrhage.      DX-CHEST-LIMITED (1 VIEW)   Final Result      No significant interval change.      DX-ABDOMEN COMPLETE WITH AP OR PA CXR   Final Result      1. No acute cardiopulmonary abnormality.   2. Chronic left lower lobe scarring.   3. Nonobstructive bowel gas pattern. Small amount of stool throughout the colon.           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   Currently on room air  Monitor oxygen requirement closely    Prediabetes- (present on  admission)  Assessment & Plan  Monitor glucose level closely.  On insulin regimen    Vasogenic edema (HCC)- (present on admission)  Assessment & Plan  Continuing decadron taper per Dr. Live's recommendations. He also does not believe that there are any significant changes in her brain imaging from this admission that could explain her somnolence/presentation. She is on the correct medication (decadron) for treating the edema  Improvement on MRI on 4/21  - 5/1:  tapering  decadron 4mg IV bid x 13 days to 2mg IV bid.  5/2 CT head w/o contrast improved right frontal lobe edema.      Hypernatremia  Assessment & Plan  Improved    Seizure (HCC)- (present on admission)  Assessment & Plan    -Continue lacosamide 150 mg IV BID per neurology  -EEG and cEEG negative for seizures         Dysphagia- (present on admission)  Assessment & Plan  Possibly due to CVA vs brain metastases and altered mental status in setting of infection.  Patient will remain NPO as she failed SLP evaluation 4/17  NG tube placement required IRIS. Tube was removed for MRI and family refused NG tube to be replaced. Extensive discussion with family regarding nutrition options. Eventually agreed to let patient get NG tube with lorazepam sedation and IRIS. No one certified to place NG tube with IRIS available on 4/21. Vascular access also lost on 4/21 and midline unable to be placed without VATs  -aspiration precautions  -Continue lansoprazole  -[id number] glucose checks  5/3:  order for PEG tube placed for 5/6 by IR.  In order for patient to go home with daughter and HH.      Shingles- (present on admission)  Assessment & Plan  Continue on acyclovir    Constipation- (present on admission)  Assessment & Plan  Resolved  - Bowel regimen    Sepsis secondary to UTI (HCC)- (present on admission)  Assessment & Plan  Sepsis resolved    Malignant neoplasm metastatic to brain (HCC)- (present on admission)  Assessment & Plan  Brain mets noted during hospitalization  on 3/27/2024 with vasogenic edema, MRI on 4/11 shows continued brain mets with vasogenic edema.  MRI 4/21 shows decrease in tumor size and improvement in edema.  -continue seizure prophylaxis with lacosamide 150 mg BID  -tapering dexamethasone   Ophthalmology evaluated for concern of vision changes      Acute kidney injury (HCC)- (present on admission)  Assessment & Plan  RESOLVED  Returned, Cr 1.11 and baseline around 0.7. Likely prerenal from dehydration.  -continue free water flushes  -renally dose all medications as appropriate  -avoid nephrotoxins    Advance care planning- (present on admission)  Assessment & Plan  At this time patient's family want her to be full code.  Palliative care consulted and discussed with family the goals of care. This remains the same.  -Full code  -Family goals of care is to make patient best shape to be discharged to be follow-up by his doctors in [place].  -Family updates daily with her guarded prognosis  4/29/2024  Patient present at bedside.  He is aware patient has poor prognosis.  He would like to see how she does once 14 days of acyclovir completed.  5/1: I have discussed with daughter at bedside options now that her acyclovir has completed.  daughter prefers in this order:   1. higher level of care, [facility] transfer center is looking into transfer to [facility] for neuro oncologist.   2 skilled nursing facility is acceptable to the daughter   3 home with home health and home equipment which would include hospital bed and Nirav lift.    4. consideration for hospice care if no other treatment options remain.  5/3:  unable to transfer to [facility], no accepting SNF and insurance will not approve due to patient unable to participate with PT/OT.  Plan for  home with philip and HERLINDA, home equipment and PEG tube feeds.      Endometrial cancer (HCC)- (present on admission)  Assessment & Plan  Followed by Dr. Garcia outpatient and sees radiation oncology Dr Live.  Received  immunotherapy at [facility] in 2021 with initial good response. Reached out to Dr. Live who does not believe imaging from 4/14 shows significant changes compared to MRI month prior.  Complicated by metastases to brain among other locations, likely contributing to her altered mental status. Mets re demonstrated without significant change on CTH on admission.  - Decadron for vasogenic edema  - Neurochecks every 4 hours  - Continue aspiration, fall, seizure precaution.    Essential hypertension- (present on admission)  Assessment & Plan  Monitor blood pressure closely    Acute CVA (cerebrovascular accident) (HCC)- (present on admission)  Assessment & Plan  MRI on 4/20 noted with right frontal brain metastasis with vasogenic edema, acute infarction.  CT head from 4/21 noted with 9X 7 mm of hemorrhage in the right temporal region.   Neurology following         VTE prophylaxis: lovenox    I have performed a physical exam and reviewed and updated ROS and Plan today (5/3/2024). In review of yesterday's note (5/2/2024), there are no changes except as documented above.

## 2024-05-04 NOTE — FACE TO FACE
"Face to Face Note  -  Durable Medical Equipment    Michelle Camacho M.D. - NPI: [id number]  I certify that this patient is under my care and that they have had a durable medical equipment(DME)face to face encounter by myself that meets the physician DME face-to-face encounter requirements with this patient on:    Date of encounter:   Patient:                    MRN:                       YOB: 2022  Jairo Rivas  [id number]  1950     The encounter with the patient was in whole, or in part, for the following medical condition, which is the primary reason for durable medical equipment:  Other - endometrial cancer metastatic to brain, CVA, post radiation, varicela encephalitis.    I certify that, based on my findings, the following durable medical equipment is medically necessary:  Oxygen   HOME O2 Saturation Measurements:(Values must be present for Home Oxygen orders)         ,     ,       If patient feels more short of breath, they can go up to 6 liters per minute and contact healthcare provider.    Supporting Symptoms: The patient requires supplemental oxygen, as the following interventions have been tried with limited or no improvement: \"Oral and/or IV steroids, \"Ambulation with oximetry, and \"Incentive spirometry  , Wheelchair   Patient needs manual wheelchair for use inside the home based on the above diagnosis. Per guidelines patient meets criteria in the following ways:   A.  Patient has significant impairment in the following Toileting, Feeding, Dressing, Grooming, and Bathing and is is unable to complete these tasks in a reasonable timeframe.   B.  The patient's mobility limitations cannot be sufficiently resolved by use of  fitted cane or walker.   C.  The patient reports his home provides adequate access between rooms,  maneuvering space, and surfaces for use of the manual wheelchair that is  provided.   D.  The use of the manual wheelchair will significantly improve the " patient's  ability to participate in MRADLs and the patient will use it on a regular basis in  the home.   E.  The patient has not expressed an unwillingness to use the manual  wheelchair.   F. The patient has limitations of strength, endurance, range of motion, or coordination per OT notes:    , Beds, Enteral Feedings/Supplies/Pumps, and Other DME Equipment - Nirav lift.        ------------------------------------------------------------------------------------------------------------------    Face to Face Supporting Documentation - Home Health    The encounter with this patient was in whole or in part the primary reason for home health admission.    Date of encounter:   Patient:                    MRN:                       YOB: 2022  Jairo Rivas  [id number]  1950     Home health to see patient for:  Skilled Nursing care for assessment, interventions & education, Wound Care, Registered dietitian consult, Medical social work consult, Home health aide, Physical Therapy evaluation and treatment, Occupational therapy evaluation and treatment, and Speech Language Pathology evaluation and treatment    Skilled need for:  Recent Deterioration of Health Status metastatic endometrial cancer to brain, radiation treatment, acute right CVA, herpes encephalitis.    Skilled nursing interventions to include:  Line/Drain/Airway education and care    Homebound evidenced status by:  Need the aid of supportive devices such as crutches, canes, wheelchairs or walkers. Leaving home must require a considerable and taxing effort. There must exist a normal inability to leave the home.    Community Physician to provide follow up care: ZULEYKA Lyons     Optional Interventions    Wound information & treatment:    Home Infusion Therapy orders:    Line/Drain/Airway:    I certify the face to face encounter for this home care referral meets the CMS requirements and the encounter/clinical  assessment with the patient was, in whole, or in part, for the medical condition(s) listed above, which is the primary reason for home health care. Based on my clinical findings: the service(s) are medically necessary, support the need for home health care, and the homebound criteria are met.  I certify that this patient has had a face to face encounter by myself.  Michelle Camacho M.D. - NPI: [id number]    *Debility, frailty and advanced age in the absence of an acute deterioration or exacerbation of a condition do not qualify a patient for home health.

## 2024-05-04 NOTE — PROGRESS NOTES
Monitor summary: SR, HR 77-86, TX .17, QRS .10, QT .31 with rare pvc per strip from monitor room

## 2024-05-05 ENCOUNTER — APPOINTMENT (OUTPATIENT)
Dept: RADIOLOGY | Facility: MEDICAL CENTER | Age: 74
DRG: 871 | End: 2024-05-05
Attending: HOSPITALIST
Payer: MEDICARE

## 2024-05-05 PROBLEM — H18.9 KERATOPATHY: Status: ACTIVE | Noted: 2024-05-05

## 2024-05-05 LAB
GLUCOSE BLD STRIP.AUTO-MCNC: 132 MG/DL (ref 65–99)
GLUCOSE BLD STRIP.AUTO-MCNC: 142 MG/DL (ref 65–99)
GLUCOSE BLD STRIP.AUTO-MCNC: 149 MG/DL (ref 65–99)
GLUCOSE BLD STRIP.AUTO-MCNC: 159 MG/DL (ref 65–99)

## 2024-05-05 PROCEDURE — 93970 EXTREMITY STUDY: CPT | Mod: 26 | Performed by: INTERNAL MEDICINE

## 2024-05-05 PROCEDURE — 99233 SBSQ HOSP IP/OBS HIGH 50: CPT | Performed by: HOSPITALIST

## 2024-05-05 PROCEDURE — 93971 EXTREMITY STUDY: CPT | Mod: 26,XS,LT | Performed by: INTERNAL MEDICINE

## 2024-05-05 RX ADMIN — DEXAMETHASONE 2 MG: 4 TABLET ORAL at 04:27

## 2024-05-05 RX ADMIN — POLYETHYLENE GLYCOL 3350 1 PACKET: 17 POWDER, FOR SOLUTION ORAL at 04:29

## 2024-05-05 RX ADMIN — MINERAL OIL, AND WHITE PETROLATUM 1 APPLICATION: 425; 573 OINTMENT OPHTHALMIC at 22:23

## 2024-05-05 RX ADMIN — NYSTATIN: 100000 POWDER TOPICAL at 17:40

## 2024-05-05 RX ADMIN — LACOSAMIDE 150 MG: 100 TABLET, FILM COATED ORAL at 04:39

## 2024-05-05 RX ADMIN — ATORVASTATIN CALCIUM 20 MG: 20 TABLET, FILM COATED ORAL at 17:39

## 2024-05-05 RX ADMIN — METOPROLOL 100 MG: 100 TABLET ORAL at 04:28

## 2024-05-05 RX ADMIN — ERYTHROMYCIN 1 APPLICATION: 5 OINTMENT OPHTHALMIC at 12:40

## 2024-05-05 RX ADMIN — LANSOPRAZOLE 30 MG: 30 TABLET, ORALLY DISINTEGRATING ORAL at 04:40

## 2024-05-05 RX ADMIN — SENNOSIDES AND DOCUSATE SODIUM 2 TABLET: 50; 8.6 TABLET ORAL at 17:39

## 2024-05-05 RX ADMIN — MINERAL OIL, AND WHITE PETROLATUM 1 APPLICATION: 425; 573 OINTMENT OPHTHALMIC at 17:41

## 2024-05-05 RX ADMIN — BISACODYL 10 MG: 10 SUPPOSITORY RECTAL at 04:27

## 2024-05-05 RX ADMIN — LACOSAMIDE 150 MG: 100 TABLET, FILM COATED ORAL at 17:39

## 2024-05-05 RX ADMIN — SULFAMETHOXAZOLE AND TRIMETHOPRIM 1 TABLET: 400; 80 TABLET ORAL at 04:28

## 2024-05-05 RX ADMIN — ERYTHROMYCIN 1 APPLICATION: 5 OINTMENT OPHTHALMIC at 04:29

## 2024-05-05 RX ADMIN — AMLODIPINE BESYLATE 10 MG: 10 TABLET ORAL at 04:28

## 2024-05-05 RX ADMIN — DEXAMETHASONE 2 MG: 4 TABLET ORAL at 17:39

## 2024-05-05 RX ADMIN — METOPROLOL 100 MG: 100 TABLET ORAL at 17:39

## 2024-05-05 RX ADMIN — MINERAL OIL, AND WHITE PETROLATUM 1 APPLICATION: 425; 573 OINTMENT OPHTHALMIC at 04:29

## 2024-05-05 RX ADMIN — LOSARTAN POTASSIUM 100 MG: 50 TABLET, FILM COATED ORAL at 04:28

## 2024-05-05 RX ADMIN — MODAFINIL 100 MG: 100 TABLET ORAL at 04:29

## 2024-05-05 RX ADMIN — ASPIRIN 81 MG: 81 TABLET, CHEWABLE ORAL at 04:28

## 2024-05-05 RX ADMIN — NYSTATIN: 100000 POWDER TOPICAL at 04:30

## 2024-05-05 ASSESSMENT — PAIN DESCRIPTION - PAIN TYPE
TYPE: ACUTE PAIN
TYPE: ACUTE PAIN

## 2024-05-05 NOTE — PROGRESS NOTES
Monitor summary: SR, HR 79-91, RI 0.22, QRS 0.09, QT 0.31 with (R)PVCs per strip from monitor room

## 2024-05-05 NOTE — PROGRESS NOTES
"Hospital Medicine Daily Progress Note    Date of Service  5/5/2024    Chief Complaint  Confusion.    Hospital Course  Jairo Rivas is a 74yo F with hx of endometrial carcinoma, brain metastasis w/p radiation tx, and prior CVA.  An MRI from 4/29 frontal brain metastases with vasogenic edema and acute infarction.  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  Neurology and infectious disease consulted.  A CT head 4/21 noted a 9 x 7 mm hemorrhage in the right temporal region.  Ophthalmology was consulted for vision changes.    Interval Problem Update  5/5: Patient not responsive to questions.  She did say \"no\" when I attempted to move her arms.  Daughter is at bedside and we reviewed her mother's goals of care. Daughter, Carolyn, stated she was still wanting everything done and CPR/Intubation if needed.  Carolyn was concerned that her mothers eyes looked swollen and seemed dissatisfied with her eyes.  I alerted her that the bigger picture is that her mother is currently obtunded with metastatic brain lesions, stroke, hemorrhage and a HSV encephalitis.  I addressed that we are continuing care in hopes she has improved mentation but that I was concerned that it may be possible she does not get any better.  I alerted Carolyn, I would see what additional treatment could be done for her mother's eyes.      I have discussed this patient's plan of care and discharge plan at IDT rounds today with Case Management, Nursing, Nursing leadership, and other members of the IDT team.    Consultants/Specialty  infectious disease, neurology, ophthalmology, and gynecology    Code Status  Full Code    Disposition  The patient is not medically cleared for discharge to home or a post-acute facility.      I have placed the appropriate orders for post-discharge needs.    Review of Systems  Review of Systems   Unable to perform ROS: Patient unresponsive        Physical Exam  Temp:  [36.3 °C (97.3 °F)-37.3 °C (99.2 °F)] 37.3 °C " (99.2 °F)  Pulse:  [82-93] 92  Resp:  [17-18] 18  BP: (125-143)/(75-85) 125/76  SpO2:  [97 %-99 %] 97 %    Physical Exam  Vitals reviewed.   Constitutional:       Appearance: She is obese. She is ill-appearing.   HENT:      Head: Normocephalic.      Nose:      Comments: Enteral tube in nare     Mouth/Throat:      Mouth: Mucous membranes are dry.      Pharynx: Oropharyngeal exudate present.   Eyes:      Comments: Left eye corneal edema.  Some lubrication on cornea.  Left eyelid does not appear to close as well as the right.   Cardiovascular:      Rate and Rhythm: Normal rate and regular rhythm.      Heart sounds: No murmur heard.  Pulmonary:      Effort: No respiratory distress.      Breath sounds: No wheezing.   Abdominal:      General: There is no distension.      Palpations: Abdomen is soft.   Musculoskeletal:      Right lower leg: Edema present.      Left lower leg: Edema present.      Comments: Right leg with more edema than left. Right arm chronic flexion contraction.   Lymphadenopathy:      Cervical: No cervical adenopathy.   Neurological:      Mental Status: She is lethargic.      Cranial Nerves: Cranial nerve deficit present.      Motor: Weakness present.      Coordination: Coordination abnormal.         Fluids    Intake/Output Summary (Last 24 hours) at 5/5/2024 1639  Last data filed at 5/5/2024 0356  Gross per 24 hour   Intake 600 ml   Output 1400 ml   Net -800 ml       Laboratory                            Imaging  US-EXTREMITY VENOUS LOWER BILAT         US-EXTREMITY VENOUS UPPER UNILAT LEFT   Final Result      DX-ABDOMEN FOR TUBE PLACEMENT   Final Result      Feeding tube terminates within the gastric fundus.      DX-ABDOMEN FOR TUBE PLACEMENT   Final Result         1.  Nonspecific bowel gas pattern in the upper abdomen.   2.  Dobbhoff tube coiled within the esophagus with tip projecting over the expected location of the gastric antrum. Recommend withdrawal and replacement.   3.  Hazy lung base opacity  suggesting edema and/or infiltrates.      CT-HEAD W/O   Final Result   Addendum (preliminary) 1 of 1   Addendum: There is trace bilateral dependent intraventricular hemorrhages    identified, likely representing redistribution of previously visualized    subarachnoid hemorrhage.      Final         1.  No acute intracranial abnormality is identified, there are nonspecific white matter changes, commonly associated with small vessel ischemic disease.  Associated mild cerebral atrophy is noted.         DX-CHEST-LIMITED (1 VIEW)   Final Result      1.  Hazy perihilar and lower lobe opacities represent areas of atelectasis or potentially pneumonitis.      IR-MIDLINE CATHETER INSERTION WO GUIDANCE > AGE 3   Final Result                  Ultrasound-guided midline placement performed by qualified nursing staff    as above.          CT-HEAD W/O   Final Result      1.  9 x 7 mm focus of hemorrhage in the right temporal region, suspected subarachnoid versus less likely intraparenchymal. It is stable since noncontrast head CT performed one day prior.         CT-CTA NECK WITH & W/O-POST PROCESSING   Final Result      No occlusion, hemodynamically significant stenosis or dissection of the neck arteries.      CT-CTA HEAD WITH & W/O-POST PROCESS   Final Result   Addendum (preliminary) 1 of 1   Critical value called by Dr. Hector Dodson to Physician: RONAN NOGUERA   at 4/25/2024 3:48 PM.      Final      1.  9 x 7 mm focus of hemorrhage in the right temporal region which is probably subarachnoid, less likely intraparenchymal.   2.  Focal occlusion of right M2 MCA inferior division with distal opacification. This could be a focal high-grade stenosis versus vessel occlusion. It could also be vasospasm as the subarachnoid hemorrhage is present in this region.   3.  Other findings as above.      Efforts are underway to contact referring physician with results at time of dictation.            EC-ECHOCARDIOGRAM COMPLETE W/O CONT    Final Result      DX-CHEST-PORTABLE (1 VIEW)   Final Result      Feeding tube tip projects in the stomach.      Hazy left basilar opacity, atelectasis and/or mild pneumonitis.      DX-LUMBAR PUNCTURE FOR DIAGNOSIS   Final Result      Fluoroscopic-guided lumbar puncture as described above.      IR-MIDLINE CATHETER INSERTION WO GUIDANCE > AGE 3   Final Result                  Ultrasound-guided midline placement performed by qualified nursing staff    as above.          DX-ABDOMEN FOR TUBE PLACEMENT   Final Result         1.  Nonspecific bowel gas pattern in the upper abdomen.   2.  Dobbhoff tube is coiled within the stomach, the tip terminates overlying the expected location of the gastric cardia.   3.  Hazy left lower lobe infiltrate.      MR-BRAIN-WITH & W/O   Final Result      1.  Significant interval decrease in size of right frontal brain metastasis since previous exam slight interval decrease in surrounding vasogenic edema. Smaller irregular rim of increased diffusion weighted signal intensity about this treated metastasis    consistent with cytotoxic edema.      2.  New large area of acute infarction in the right posterior frontal, parietal, posterior temporal, and occipital lobes associated gyriform enhancement in the right lateral occipital lobe and right peritrigonal white matter.      3.  Smaller chronic wedge-shaped area of infarction involving the left posterior basal ganglia extending into the left posterior frontal periventricular white matter      4.  Small areas of chronic lacunar type infarction involving the left side of the elaine and left brachium pontis with surrounding gliosis.      5.  Small foci of chronic hemosiderin deposition noted in the left posterior temporal and occipital white matter, as well as in the treated right frontal brain metastasis      6.  Age-related cerebral atrophy.      DX-ABDOMEN FOR TUBE PLACEMENT   Final Result         1.  Nonspecific bowel gas pattern in the upper  abdomen.   2.  Dobbhoff tube is coiled within the stomach, the tip terminates overlying the expected location of the gastric fundus, physician similar to prior study.   3.  Left lower lobe infiltrate      DX-ABDOMEN FOR TUBE PLACEMENT   Final Result         1.  Nonspecific bowel gas pattern in the upper abdomen.   2.  Dobbhoff tube is coiled within the stomach, the tip terminates overlying the expected location of the gastric fundus.   3.  Left basilar atelectasis      DX-ABDOMEN FOR TUBE PLACEMENT   Final Result      Feeding tube looped in the stomach the distal tip in the gastric fundus.      CT-HEAD WITH & W/O   Final Result      1.  Redemonstration of right frontal lobe mass, highly concerning for metastasis. Associated vasogenic edema and a 2.5 mm right to left midline shift. No progressive mass effect. No herniation.   2.  Nonspecific ill-defined enhancement in the right parietal lobe.   3.  No new large vascular structure infarct. No new intracranial hemorrhage.      DX-CHEST-LIMITED (1 VIEW)   Final Result      No significant interval change.      DX-ABDOMEN COMPLETE WITH AP OR PA CXR   Final Result      1. No acute cardiopulmonary abnormality.   2. Chronic left lower lobe scarring.   3. Nonobstructive bowel gas pattern. Small amount of stool throughout the colon.           Assessment/Plan  * Acute encephalopathy- (present on admission)  Assessment & Plan  Multifactorial  Manage encephalopathy is  Acute stroke on MRI 4/20  Subarachnoid hemorrhage on CT  LP with CSF positive for VZV done 4/22  Continue on acyclovir  Monitor blood pressure closely,  neurochecks every 4 hours    Keratopathy  Assessment & Plan  Exposure from not closing her eyes completely  Ophthalmology consulted during stay.  Eye drops and lubrications  Tape eyes closed when sleeping.    Aspiration pneumonitis (HCC)- (present on admission)  Assessment & Plan  Patient high risk for aspiration pneumonitis/pneumonia.   Currently on room  air  Monitor oxygen requirement closely    Prediabetes- (present on admission)  Assessment & Plan  Monitor glucose level closely.  Had been on decadron  On insulin regimen    Vasogenic edema (HCC)- (present on admission)  Assessment & Plan  Continuing decadron taper per Dr. Live's recommendations. He also does not believe that there are any significant changes in her brain imaging from this admission that could explain her somnolence/presentation. She is on the correct medication (decadron) for treating the edema  Improvement on MRI on 4/21  - 5/1:  tapering  decadron 4mg IV bid x 13 days to 2mg IV bid x 7 days, then 2mg daily x 7 days, then 1 mg daily x 7 days.  5/2 CT head w/o contrast improved right frontal lobe edema.      Hypernatremia  Assessment & Plan  Improved    Seizure (HCC)- (present on admission)  Assessment & Plan  Continue lacosamide 150 mg IV BID per neurology  EEG and cEEG negative for seizures     Neurology consulted during admit.    Dysphagia- (present on admission)  Assessment & Plan  Possibly due to CVA vs brain metastases and altered mental status in setting of infection.  Patient will remain NPO as she failed SLP evaluation 4/17  NG tube placement required IRIS. Tube was removed for MRI and family refused NG tube to be replaced. Extensive discussion with family regarding nutrition options. Eventually agreed to let patient get NG tube with lorazepam sedation and IRIS. No one certified to place NG tube with IRIS available on 4/21. Vascular access also lost on 4/21 and midline unable to be placed without VATs  -aspiration precautions  -Continue lansoprazole  -[id number] glucose checks  5/3:  order for PEG tube placed for 5/6 by IR.  In order for patient to go home with daughter and HH.      Shingles- (present on admission)  Assessment & Plan  S/p acyclovir x14 days.    Constipation- (present on admission)  Assessment & Plan  Resolved  - Bowel regimen    Sepsis secondary to UTI (HCC)- (present on  admission)  Assessment & Plan  Sepsis resolved    Malignant neoplasm metastatic to brain (HCC)- (present on admission)  Assessment & Plan  Brain mets noted during hospitalization on 3/27/2024 with vasogenic edema, MRI on 4/11 shows continued brain mets with vasogenic edema.  MRI 4/21 shows decrease in tumor size and improvement in edema.  -continue seizure prophylaxis with lacosamide 150 mg BID  -tapering dexamethasone   Ophthalmology evaluated for concern of vision changes      Acute kidney injury (HCC)- (present on admission)  Assessment & Plan  RESOLVED  Returned, Cr 1.11 and baseline around 0.7. Likely prerenal from dehydration.  -continue free water flushes  -renally dose all medications as appropriate  -avoid nephrotoxins    Advance care planning- (present on admission)  Assessment & Plan  At this time patient's family want her to be full code.  Palliative care consulted and discussed with family the goals of care. This remains the same.  -Full code  -Family goals of care is to make patient best shape to be discharged to be follow-up by his doctors in [place].  -Family updates daily with her guarded prognosis  5/1: there was discussion with daughter at bedside options now that her acyclovir has completed.  daughter prefers in this order:   1. higher level of care, [facility] transfer center is looking into transfer to [facility] for neuro oncologist.   2 skilled nursing facility is acceptable to the daughter   3 home with home health and home equipment which would include hospital bed and Nirav lift.    4. consideration for hospice care if no other treatment options remain.  5/3:  unable to transfer to [facility], no accepting SNF and insurance will not approve due to patient unable to participate with PT/OT.  Plan for  home with philip and HERLINDA, home equipment and PEG tube feeds.      Endometrial cancer (HCC)- (present on admission)  Assessment & Plan  Followed by Dr. Garcia outpatient and sees radiation  oncology Dr Live.  Received immunotherapy at [facility] in 2021 with initial good response. Reached out to Dr. Live who does not believe imaging from 4/14 shows significant changes compared to MRI month prior.  Complicated by metastases to brain among other locations, likely contributing to her altered mental status. Mets re demonstrated without significant change on CTH on admission.  - Decadron for vasogenic edema  - Neurochecks every 4 hours  - Continue aspiration, fall, seizure precaution.    Essential hypertension- (present on admission)  Assessment & Plan  Monitor blood pressure closely    Acute CVA (cerebrovascular accident) (HCC)- (present on admission)  Assessment & Plan  MRI on 4/20 noted with right frontal brain metastasis with vasogenic edema, acute infarction.  CT head from 4/21 noted with 9X 7 mm of hemorrhage in the right temporal region.   Neurology consult appreciated.         VTE prophylaxis:    enoxaparin ppx      I have performed a physical exam and reviewed and updated ROS and Plan today (5/5/2024). In review of yesterday's note (5/4/2024), there are no changes except as documented above.

## 2024-05-05 NOTE — PROGRESS NOTES
4 Eyes Skin Assessment Completed by JOB Braov and JOB Welch.    Head WDL  Ears WDL  Nose WDL  Mouth WDL  Neck Redness  Breast/Chest Redness and Discoloration  Shoulder Blades Redness  Spine Redness and Blanching  (R) Arm/Elbow/Hand Swelling and Discoloration  (L) Arm/Elbow/Hand Swelling and Discoloration  Abdomen Redness  Groin Redness and Excoriation  Scrotum/Coccyx/Buttocks Redness and excoriation  (R) Leg Swelling  (L) Leg Swelling  (R) Heel/Foot/Toe Redness, Blanching, and Swelling  (L) Heel/Foot/Toe Redness, Blanching, and Swelling          Devices In Places Pulse Ox, SCD's, Cortrak, and Nasal Cannula      Interventions In Place Gray Ear Foams, InterDry, Heel Float Boots, Pillows, Q2 Turns, Low Air Loss Mattress, and Barrier Cream    Possible Skin Injury Yes    Pictures Uploaded Into Epic Yes  Wound Consult Placed Yes- existing placed consult for same wound  RN Wound Prevention Protocol Ordered Yes- existing orders placed for same wound

## 2024-05-05 NOTE — PROGRESS NOTES
Received report on this patient from day shift RN, and updated on plan of care during shift change. Patient not yet arrived on unit.

## 2024-05-05 NOTE — PROGRESS NOTES
"Patient arrived to unit at 1945.     Unable to assess patient's orientation level at this time. Patient has been nonverbal with RN but family states she sometimes speaks in native dialect. Patient responds to painful stimuli only. Patient was abler to briefly grasp RN's hand with L hand, but shows no strength or movement in other extremities. Family at bedside. Assessment completed.  Patient has NG tube and 2L nasal cannula in place  Hourly rounding and fall precautions in place. Bed locked and in lowest position. All questions answered. No other needs indicated from family at this time.    Most recent vital signs  /85   Pulse 82   Temp 36.6 °C (97.8 °F) (Temporal)   Resp 18   Ht 1.626 m (5' 4.02\")   Wt 95.8 kg (211 lb 3.2 oz)   SpO2 98%   BMI 36.23 kg/m²     "

## 2024-05-05 NOTE — PROGRESS NOTES
Hospital Medicine Daily Progress Note    Date of Service  5/4/2024    Chief Complaint  Jairo Rivas is a 73 y.o. female admitted 4/13/2024 with acute enteropathy.    Hospital Course  73 female with past medical history of endometrial carcinoma, brain metastasis s/p radiation therapy, CVA presented with altered mental status.  MRI on 4/20 noted with right frontal brain metastasis with vasogenic edema, acute infarction.  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  Neurology and ID consulted on the case.  CT head from 4/21 noted with 9X 7 mm of hemorrhage in the right temporal region.  Neurology recommending continue acyclovir for 14 days .  Ophthalmology evaluated for concern of vision changes.    Interval Problem Update  4/30:  Patient does not track with eyes.  Does not obey commands.  She remains on NG tube feeds.  No visible response to acyclovir ending dose tonight after 14 days.  Remains sinus tachycardic.  Increase metoprolol to from  twice daily.  5/1: Met with daughter Carolyn at bedside.  Patient did seem more responsive to daughter.  She is not verbal in conversation but did squeeze her hand and shake her head yes.  She is able to close her eyes.  She is able to track her daughter as well as look towards me when discussing her care.  Her daughter has requested that I transfer patient to [facility] for neuro oncology consultation stating that she has been treated previously by Dr. Charles sanches a neuro oncologist which we do not have at [facility].  I have formally placed a consult with [facility] today.  Examined left under breast zoster lesion, small escoration remaining, no blisters.  Evidence of moisture, erythema c/w yeast.  Nystatin powder ordered. Modafanil ordered 100mg daily to help with alertness.  Tapered decadron 4 bid (14 days) to 2mg bid.  5/2: Patient is able to follow with her eyes.  She attempts to speak, however it is inaudible for me to hear  as well as to the daughter.  She is able to squeeze her left hand  on command.  The patient still has swollen conjunctiva noted.  Despite well moisturized.  I have ordered a repeat head CT to ensure there is no increased brain edema.  I have discussed with daughter options now that her acyclovir has completed.  daughter prefers in this order: 1. higher level of care, [facility] is looking into transfer to [facility] for neuro oncologist.  2 skilled nursing facility is acceptable to the daughter   3 home with home health and home equipment which would include hospital bed and Nirav lift.    4. consideration for hospice care if no other treatment options remain.  I did speak with [facility] to inform that due to the insurance change, [facility] is the only higher level of care for [facility] and to contact them for consideration of transfer to neuro-oncologist service.  5/3:  patient unable to open eyes on exam, manually lifted eyelids to examine conjunctiva, decreased edema noted, more moisture noted.  Patient appears comfortable. Daughter at bedside and aware of inability to transfer to higher level of care, no accepting facilities.  She is aware that insurance is not approving rehab or SNF due to lack of participation for therapies. Therefore, discussed needs going home which included HH, hospital bed, Nirav lift, WC, PEG tube with bolus tube feeds.  We also discussed option of hospice to provide more assistance and that patient could outlive the hospice care.  She wants to give her mother a chance to improve before hospice, but is open to hospice if no improvement. Appreciate palliative care discussion, reiterated similar outcome/daughter decision.  5/4:  daughter requesting repeat CT chest abdomen and pelvis to check the endometrial cancer. I told her that her oncologist would need to monitor the cancer.  Patient is more vocal today, speaking in [place]  dialect.   LUE more swollen today, ordered us LUE, rule out DVT.         I have discussed this patient's plan of care and discharge plan at IDT rounds today with Case Management, Nursing, Nursing leadership, and other members of the IDT team.    Consultants/Specialty  neurology  Palliative care  Gynecology oncology    Code Status  Full Code    Disposition  Home with daughter and home health, home supplies including hospital bed, Nirav lift, WC, PEG tube feeds, may need oxygen.  I have placed the appropriate orders for post-discharge needs.    Review of Systems  Review of Systems   Unable to perform ROS: Acuity of condition        Physical Exam  Temp:  [36.2 °C (97.2 °F)-36.7 °C (98.1 °F)] 36.4 °C (97.5 °F)  Pulse:  [80-95] 95  Resp:  [18-28] 20  BP: (115-176)/(59-95) 158/93  SpO2:  [94 %-99 %] 99 %    Physical Exam  Constitutional:       Appearance: She is ill-appearing.   HENT:      Head:      Comments: Feeding tube nares out, to be replaced.  Cardiovascular:      Rate and Rhythm: Tachycardia present.      Pulses: Normal pulses.   Pulmonary:      Effort: Pulmonary effort is normal. No respiratory distress.      Breath sounds: No wheezing or rales.   Abdominal:      General: Abdomen is flat.   Musculoskeletal:      Right lower leg: No edema.      Left lower leg: No edema.   Neurological:      General: No focal deficit present.      Mental Status: She is alert. Mental status is at baseline.      Motor: Weakness present.      Comments: Awake, alert, speaking in [place], not following commands though.           Fluids    Intake/Output Summary (Last 24 hours) at 5/4/2024 1708  Last data filed at 5/4/2024 1619  Gross per 24 hour   Intake 900 ml   Output 1100 ml   Net -200 ml       Laboratory  Recent Labs     05/02/24  1050   WBC 7.1   RBC 3.28*   HEMOGLOBIN 10.6*   HEMATOCRIT 31.3*   MCV 95.4   MCH 32.3   MCHC 33.9   RDW 48.7   PLATELETCT 195   MPV 11.0     Recent Labs     05/02/24  1050   SODIUM 135   POTASSIUM 4.0   CHLORIDE 102    CO2 22   GLUCOSE 166*   BUN 24*   CREATININE 0.38*   CALCIUM 8.8                     Imaging  DX-ABDOMEN FOR TUBE PLACEMENT   Final Result      Feeding tube terminates within the gastric fundus.      DX-ABDOMEN FOR TUBE PLACEMENT   Final Result         1.  Nonspecific bowel gas pattern in the upper abdomen.   2.  Dobbhoff tube coiled within the esophagus with tip projecting over the expected location of the gastric antrum. Recommend withdrawal and replacement.   3.  Hazy lung base opacity suggesting edema and/or infiltrates.      CT-HEAD W/O   Final Result   Addendum (preliminary) 1 of 1   Addendum: There is trace bilateral dependent intraventricular hemorrhages    identified, likely representing redistribution of previously visualized    subarachnoid hemorrhage.      Final         1.  No acute intracranial abnormality is identified, there are nonspecific white matter changes, commonly associated with small vessel ischemic disease.  Associated mild cerebral atrophy is noted.         DX-CHEST-LIMITED (1 VIEW)   Final Result      1.  Hazy perihilar and lower lobe opacities represent areas of atelectasis or potentially pneumonitis.      IR-MIDLINE CATHETER INSERTION WO GUIDANCE > AGE 3   Final Result                  Ultrasound-guided midline placement performed by qualified nursing staff    as above.          CT-HEAD W/O   Final Result      1.  9 x 7 mm focus of hemorrhage in the right temporal region, suspected subarachnoid versus less likely intraparenchymal. It is stable since noncontrast head CT performed one day prior.         CT-CTA NECK WITH & W/O-POST PROCESSING   Final Result      No occlusion, hemodynamically significant stenosis or dissection of the neck arteries.      CT-CTA HEAD WITH & W/O-POST PROCESS   Final Result   Addendum (preliminary) 1 of 1   Critical value called by Dr. Hector Dodson to Physician: RONAN NOGUERA   at 4/25/2024 3:48 PM.      Final      1.  9 x 7 mm focus of hemorrhage in  the right temporal region which is probably subarachnoid, less likely intraparenchymal.   2.  Focal occlusion of right M2 MCA inferior division with distal opacification. This could be a focal high-grade stenosis versus vessel occlusion. It could also be vasospasm as the subarachnoid hemorrhage is present in this region.   3.  Other findings as above.      Efforts are underway to contact referring physician with results at time of dictation.            EC-ECHOCARDIOGRAM COMPLETE W/O CONT   Final Result      DX-CHEST-PORTABLE (1 VIEW)   Final Result      Feeding tube tip projects in the stomach.      Hazy left basilar opacity, atelectasis and/or mild pneumonitis.      DX-LUMBAR PUNCTURE FOR DIAGNOSIS   Final Result      Fluoroscopic-guided lumbar puncture as described above.      IR-MIDLINE CATHETER INSERTION WO GUIDANCE > AGE 3   Final Result                  Ultrasound-guided midline placement performed by qualified nursing staff    as above.          DX-ABDOMEN FOR TUBE PLACEMENT   Final Result         1.  Nonspecific bowel gas pattern in the upper abdomen.   2.  Dobbhoff tube is coiled within the stomach, the tip terminates overlying the expected location of the gastric cardia.   3.  Hazy left lower lobe infiltrate.      MR-BRAIN-WITH & W/O   Final Result      1.  Significant interval decrease in size of right frontal brain metastasis since previous exam slight interval decrease in surrounding vasogenic edema. Smaller irregular rim of increased diffusion weighted signal intensity about this treated metastasis    consistent with cytotoxic edema.      2.  New large area of acute infarction in the right posterior frontal, parietal, posterior temporal, and occipital lobes associated gyriform enhancement in the right lateral occipital lobe and right peritrigonal white matter.      3.  Smaller chronic wedge-shaped area of infarction involving the left posterior basal ganglia extending into the left posterior frontal  periventricular white matter      4.  Small areas of chronic lacunar type infarction involving the left side of the elaine and left brachium pontis with surrounding gliosis.      5.  Small foci of chronic hemosiderin deposition noted in the left posterior temporal and occipital white matter, as well as in the treated right frontal brain metastasis      6.  Age-related cerebral atrophy.      DX-ABDOMEN FOR TUBE PLACEMENT   Final Result         1.  Nonspecific bowel gas pattern in the upper abdomen.   2.  Dobbhoff tube is coiled within the stomach, the tip terminates overlying the expected location of the gastric fundus, physician similar to prior study.   3.  Left lower lobe infiltrate      DX-ABDOMEN FOR TUBE PLACEMENT   Final Result         1.  Nonspecific bowel gas pattern in the upper abdomen.   2.  Dobbhoff tube is coiled within the stomach, the tip terminates overlying the expected location of the gastric fundus.   3.  Left basilar atelectasis      DX-ABDOMEN FOR TUBE PLACEMENT   Final Result      Feeding tube looped in the stomach the distal tip in the gastric fundus.      CT-HEAD WITH & W/O   Final Result      1.  Redemonstration of right frontal lobe mass, highly concerning for metastasis. Associated vasogenic edema and a 2.5 mm right to left midline shift. No progressive mass effect. No herniation.   2.  Nonspecific ill-defined enhancement in the right parietal lobe.   3.  No new large vascular structure infarct. No new intracranial hemorrhage.      DX-CHEST-LIMITED (1 VIEW)   Final Result      No significant interval change.      DX-ABDOMEN COMPLETE WITH AP OR PA CXR   Final Result      1. No acute cardiopulmonary abnormality.   2. Chronic left lower lobe scarring.   3. Nonobstructive bowel gas pattern. Small amount of stool throughout the colon.      US-EXTREMITY VENOUS UPPER UNILAT LEFT    (Results Pending)        Assessment/Plan  * Acute encephalopathy- (present on admission)  Assessment &  Plan  Multifactorial  Manage encephalopathy is  Acute stroke on MRI 4/20  Subarachnoid hemorrhage on CT  LP with CSF positive for VZV done 4/22  Continue on acyclovir  Monitor blood pressure closely,  neurochecks every 4 hours        Aspiration pneumonitis (HCC)- (present on admission)  Assessment & Plan  Patient high risk for aspiration pneumonitis/pneumonia.   Currently on room air  Monitor oxygen requirement closely    Prediabetes- (present on admission)  Assessment & Plan  Monitor glucose level closely.  On insulin regimen    Vasogenic edema (HCC)- (present on admission)  Assessment & Plan  Continuing decadron taper per Dr. Live's recommendations. He also does not believe that there are any significant changes in her brain imaging from this admission that could explain her somnolence/presentation. She is on the correct medication (decadron) for treating the edema  Improvement on MRI on 4/21  - 5/1:  tapering  decadron 4mg IV bid x 13 days to 2mg IV bid x 7 days, then 2mg daily x 7 days, then 1 mg daily x 7 days.  5/2 CT head w/o contrast improved right frontal lobe edema.      Hypernatremia  Assessment & Plan  Improved    Seizure (HCC)- (present on admission)  Assessment & Plan    -Continue lacosamide 150 mg IV BID per neurology  -EEG and cEEG negative for seizures         Dysphagia- (present on admission)  Assessment & Plan  Possibly due to CVA vs brain metastases and altered mental status in setting of infection.  Patient will remain NPO as she failed SLP evaluation 4/17  NG tube placement required IRIS. Tube was removed for MRI and family refused NG tube to be replaced. Extensive discussion with family regarding nutrition options. Eventually agreed to let patient get NG tube with lorazepam sedation and IRIS. No one certified to place NG tube with IRIS available on 4/21. Vascular access also lost on 4/21 and midline unable to be placed without VATs  -aspiration precautions  -Continue lansoprazole  -[id number]  glucose checks  5/3:  order for PEG tube placed for 5/6 by IR.  In order for patient to go home with daughter and HH.      Shingles- (present on admission)  Assessment & Plan  Continue on acyclovir    Constipation- (present on admission)  Assessment & Plan  Resolved  - Bowel regimen    Sepsis secondary to UTI (HCC)- (present on admission)  Assessment & Plan  Sepsis resolved    Malignant neoplasm metastatic to brain (HCC)- (present on admission)  Assessment & Plan  Brain mets noted during hospitalization on 3/27/2024 with vasogenic edema, MRI on 4/11 shows continued brain mets with vasogenic edema.  MRI 4/21 shows decrease in tumor size and improvement in edema.  -continue seizure prophylaxis with lacosamide 150 mg BID  -tapering dexamethasone   Ophthalmology evaluated for concern of vision changes      Acute kidney injury (HCC)- (present on admission)  Assessment & Plan  RESOLVED  Returned, Cr 1.11 and baseline around 0.7. Likely prerenal from dehydration.  -continue free water flushes  -renally dose all medications as appropriate  -avoid nephrotoxins    Advance care planning- (present on admission)  Assessment & Plan  At this time patient's family want her to be full code.  Palliative care consulted and discussed with family the goals of care. This remains the same.  -Full code  -Family goals of care is to make patient best shape to be discharged to be follow-up by his doctors in [place].  -Family updates daily with her guarded prognosis  4/29/2024  Patient present at bedside.  He is aware patient has poor prognosis.  He would like to see how she does once 14 days of acyclovir completed.  5/1: I have discussed with daughter at bedside options now that her acyclovir has completed.  daughter prefers in this order:   1. higher level of care, [facility] transfer center is looking into transfer to [facility] for neuro oncologist.   2 skilled nursing facility is acceptable to the daughter   3 home with home health  and home equipment which would include hospital bed and Nirav lift.    4. consideration for hospice care if no other treatment options remain.  5/3:  unable to transfer to [facility], no accepting SNF and insurance will not approve due to patient unable to participate with PT/OT.  Plan for  home with philip and HERLINDA, home equipment and PEG tube feeds.      Endometrial cancer (HCC)- (present on admission)  Assessment & Plan  Followed by Dr. Garcia outpatient and sees radiation oncology Dr Live.  Received immunotherapy at [facility] in 2021 with initial good response. Reached out to Dr. Live who does not believe imaging from 4/14 shows significant changes compared to MRI month prior.  Complicated by metastases to brain among other locations, likely contributing to her altered mental status. Mets re demonstrated without significant change on CTH on admission.  - Decadron for vasogenic edema  - Neurochecks every 4 hours  - Continue aspiration, fall, seizure precaution.    Essential hypertension- (present on admission)  Assessment & Plan  Monitor blood pressure closely    Acute CVA (cerebrovascular accident) (HCC)- (present on admission)  Assessment & Plan  MRI on 4/20 noted with right frontal brain metastasis with vasogenic edema, acute infarction.  CT head from 4/21 noted with 9X 7 mm of hemorrhage in the right temporal region.   Neurology consult appreciated.         VTE prophylaxis: lovenox    I have performed a physical exam and reviewed and updated ROS and Plan today (5/4/2024). In review of yesterday's note (5/3/2024), there are no changes except as documented above.

## 2024-05-05 NOTE — CARE PLAN
The patient is Watcher - Medium risk of patient condition declining or worsening    Shift Goals  Clinical Goals: promote mobility and safety  Patient Goals: jayla  Family Goals: monitor oxygen levels      Problem: Knowledge Deficit - Standard  Goal: Patient and family/care givers will demonstrate understanding of plan of care, disease process/condition, diagnostic tests and medications  Outcome: Progressing  Note: Patient's family will demonstrate understanding of plan of care after education.     Problem: Skin Integrity  Goal: Skin integrity is maintained or improved  Outcome: Progressing  Note: Care team will keep skin clean and dry throughout shift to prevent further skin breakdown.

## 2024-05-05 NOTE — ASSESSMENT & PLAN NOTE
5/12/2024  Exposure from not closing her eyes completely  Ophthalmology consulted during stay.  Eye drops and lubrications  Tape eyes closed when sleeping.

## 2024-05-06 ENCOUNTER — ANESTHESIA EVENT (OUTPATIENT)
Dept: RADIOLOGY | Facility: MEDICAL CENTER | Age: 74
DRG: 871 | End: 2024-05-06
Payer: MEDICARE

## 2024-05-06 LAB
CRP SERPL HS-MCNC: 0.38 MG/DL (ref 0–0.75)
GLUCOSE BLD STRIP.AUTO-MCNC: 101 MG/DL (ref 65–99)
GLUCOSE BLD STRIP.AUTO-MCNC: 114 MG/DL (ref 65–99)
GLUCOSE BLD STRIP.AUTO-MCNC: 125 MG/DL (ref 65–99)
GLUCOSE BLD STRIP.AUTO-MCNC: 93 MG/DL (ref 65–99)
PREALB SERPL-MCNC: 29 MG/DL (ref 18–38)

## 2024-05-06 PROCEDURE — 99232 SBSQ HOSP IP/OBS MODERATE 35: CPT | Performed by: HOSPITALIST

## 2024-05-06 RX ADMIN — METOPROLOL 100 MG: 100 TABLET ORAL at 05:23

## 2024-05-06 RX ADMIN — LACOSAMIDE 150 MG: 100 TABLET, FILM COATED ORAL at 17:58

## 2024-05-06 RX ADMIN — NYSTATIN: 100000 POWDER TOPICAL at 05:26

## 2024-05-06 RX ADMIN — HYDROCODONE BITARTRATE AND ACETAMINOPHEN 1 TABLET: 5; 325 TABLET ORAL at 17:45

## 2024-05-06 RX ADMIN — DEXAMETHASONE 2 MG: 4 TABLET ORAL at 17:46

## 2024-05-06 RX ADMIN — LANSOPRAZOLE 30 MG: 30 TABLET, ORALLY DISINTEGRATING ORAL at 05:24

## 2024-05-06 RX ADMIN — LOSARTAN POTASSIUM 100 MG: 50 TABLET, FILM COATED ORAL at 05:24

## 2024-05-06 RX ADMIN — METOPROLOL 100 MG: 100 TABLET ORAL at 17:46

## 2024-05-06 RX ADMIN — MODAFINIL 100 MG: 100 TABLET ORAL at 05:24

## 2024-05-06 RX ADMIN — NYSTATIN: 100000 POWDER TOPICAL at 17:47

## 2024-05-06 RX ADMIN — AMLODIPINE BESYLATE 10 MG: 10 TABLET ORAL at 05:23

## 2024-05-06 RX ADMIN — DEXAMETHASONE 2 MG: 4 TABLET ORAL at 05:24

## 2024-05-06 RX ADMIN — SULFAMETHOXAZOLE AND TRIMETHOPRIM 1 TABLET: 400; 80 TABLET ORAL at 05:24

## 2024-05-06 RX ADMIN — MINERAL OIL, AND WHITE PETROLATUM 1 APPLICATION: 425; 573 OINTMENT OPHTHALMIC at 14:00

## 2024-05-06 RX ADMIN — LACOSAMIDE 150 MG: 100 TABLET, FILM COATED ORAL at 05:24

## 2024-05-06 RX ADMIN — ATORVASTATIN CALCIUM 20 MG: 20 TABLET, FILM COATED ORAL at 17:46

## 2024-05-06 RX ADMIN — MINERAL OIL, AND WHITE PETROLATUM 1 APPLICATION: 425; 573 OINTMENT OPHTHALMIC at 21:13

## 2024-05-06 RX ADMIN — MINERAL OIL, AND WHITE PETROLATUM 1 APPLICATION: 425; 573 OINTMENT OPHTHALMIC at 05:23

## 2024-05-06 ASSESSMENT — PAIN DESCRIPTION - PAIN TYPE
TYPE: ACUTE PAIN

## 2024-05-06 NOTE — DISCHARGE PLANNING
Case Management Discharge Planning    Admission Date: 4/13/2024  GMLOS: 5.1  ALOS: 23    6-Clicks ADL Score: 6  6-Clicks Mobility Score: 6  PT and/or OT Eval ordered: Yes  Post-acute Referrals Ordered: Yes  Post-acute Choice Obtained: Yes  Has referral(s) been sent to post-acute provider:  Yes      Anticipated Discharge Dispo: Discharge Disposition: D/T to short term gen hosp for IP care (02)    DME Needed: Yes    DME Ordered: Yes    Action(s) Taken: Discussed with Hospitalist, pt has been declined from all local/[facility]. Plan for PEG tube placement tomorrow, obtained choice for DME. DME order placed by hospitalist yesterday. Plan for pt will be home with PEG tube and DME and HH.     Escalations Completed: None    Medically Clear: No    Next Steps: Pending PEG tube.     Barriers to Discharge: Medical clearance    Is the patient up for discharge tomorrow: No

## 2024-05-06 NOTE — DISCHARGE PLANNING
TCN following. HTH/SCP chart reviewed. Collaborated with CM. No new TCN needs identified in patient discharge planning at this time. Please see prior TCN note from 5/3/2024 for most recent discharge planning considerations if indicated. Thank you.     Completed  Choice obtained: [facility] ; see above as this is likely a complex dc at this time  SCP with [facility] PCP

## 2024-05-06 NOTE — PROGRESS NOTES
"Hospital Medicine Daily Progress Note    Date of Service  5/6/2024    Chief Complaint  Confusion.    Hospital Course  Jairo Rivas is a 74yo F with hx of endometrial carcinoma, brain metastasis w/p radiation tx, and prior CVA.  An MRI from 4/29 frontal brain metastases with vasogenic edema and acute infarction.  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  Neurology and infectious disease consulted.  A CT head 4/21 noted a 9 x 7 mm hemorrhage in the right temporal region.  Ophthalmology was consulted for vision changes.    Interval Problem Update  5/6: Asleep, eyes nearly closed bilaterally. Some facial edema noted with nasal canula causing slight swelling along side face where it is resting against her skin.  Non responsive to touch/voice.  Daughter, Carolyn, and Carolyn's tabithaanceMichael at bedside.  PEG tube to be placed 5/7.  Discussed with case management to see if home equipment is available to possible send patient home with family.  Doubt LTAC needs per my colleagues notes no accepting SNF.      5/5: Patient not responsive to questions.  She did say \"no\" when I attempted to move her arms.  Daughter is at bedside and we reviewed her mother's goals of care. Daughter, Carolyn, stated she was still wanting everything done and CPR/Intubation if needed.  Carolyn was concerned that her mothers eyes looked swollen and seemed dissatisfied with her eyes.  I alerted her that the bigger picture is that her mother is currently obtunded with metastatic brain lesions, stroke, hemorrhage and a HSV encephalitis.  I addressed that we are continuing care in hopes she has improved mentation but that I was concerned that it may be possible she does not get any better.  I alerted Carolyn, I would see what additional treatment could be done for her mother's eyes.      I have discussed this patient's plan of care and discharge plan at IDT rounds today with Case Management, Nursing, Nursing leadership, and other members of " the IDT team.    Consultants/Specialty  infectious disease, neurology, ophthalmology, and gynecology    Code Status  Full Code    Disposition  The patient is not medically cleared for discharge to home or a post-acute facility.  Anticipate discharge to: home with organized home healthcare and close outpatient follow-up    I have placed the appropriate orders for post-discharge needs.    Review of Systems  Review of Systems   Unable to perform ROS: Patient unresponsive        Physical Exam  Temp:  [36.1 °C (96.9 °F)-36.4 °C (97.6 °F)] 36.1 °C (97 °F)  Pulse:  [61-81] 76  Resp:  [15-18] 18  BP: (102-141)/(68-82) 129/78  SpO2:  [98 %-99 %] 99 %    Physical Exam  Vitals reviewed.   Constitutional:       General: She is sleeping.      Appearance: She is obese. She is ill-appearing.   HENT:      Head: Normocephalic.      Nose:      Comments: Enteral tube in nare     Mouth/Throat:      Mouth: Mucous membranes are dry.      Pharynx: Oropharyngeal exudate present.   Eyes:      Comments: Left eye corneal edema.  Some lubrication on cornea.  Left eyelid does not appear to close as well as the right.   Cardiovascular:      Rate and Rhythm: Normal rate and regular rhythm.      Heart sounds: No murmur heard.  Pulmonary:      Effort: No respiratory distress.      Breath sounds: No wheezing.   Abdominal:      General: There is no distension.      Palpations: Abdomen is soft.   Musculoskeletal:      Right lower leg: Edema present.      Left lower leg: Edema present.      Comments: Right leg with more edema than left. Right arm chronic flexion contraction.   Lymphadenopathy:      Cervical: No cervical adenopathy.   Neurological:      Mental Status: She is lethargic.      Cranial Nerves: Cranial nerve deficit present.      Motor: Weakness present.      Coordination: Coordination abnormal.         Fluids    Intake/Output Summary (Last 24 hours) at 5/6/2024 1607  Last data filed at 5/6/2024 1219  Gross per 24 hour   Intake 680 ml    Output --   Net 680 ml       Laboratory                            Imaging  US-EXTREMITY VENOUS LOWER BILAT   Final Result      US-EXTREMITY VENOUS UPPER UNILAT LEFT   Final Result      DX-ABDOMEN FOR TUBE PLACEMENT   Final Result      Feeding tube terminates within the gastric fundus.      DX-ABDOMEN FOR TUBE PLACEMENT   Final Result         1.  Nonspecific bowel gas pattern in the upper abdomen.   2.  Dobbhoff tube coiled within the esophagus with tip projecting over the expected location of the gastric antrum. Recommend withdrawal and replacement.   3.  Hazy lung base opacity suggesting edema and/or infiltrates.      CT-HEAD W/O   Final Result   Addendum (preliminary) 1 of 1   Addendum: There is trace bilateral dependent intraventricular hemorrhages    identified, likely representing redistribution of previously visualized    subarachnoid hemorrhage.      Final         1.  No acute intracranial abnormality is identified, there are nonspecific white matter changes, commonly associated with small vessel ischemic disease.  Associated mild cerebral atrophy is noted.         DX-CHEST-LIMITED (1 VIEW)   Final Result      1.  Hazy perihilar and lower lobe opacities represent areas of atelectasis or potentially pneumonitis.      IR-MIDLINE CATHETER INSERTION WO GUIDANCE > AGE 3   Final Result                  Ultrasound-guided midline placement performed by qualified nursing staff    as above.          CT-HEAD W/O   Final Result      1.  9 x 7 mm focus of hemorrhage in the right temporal region, suspected subarachnoid versus less likely intraparenchymal. It is stable since noncontrast head CT performed one day prior.         CT-CTA NECK WITH & W/O-POST PROCESSING   Final Result      No occlusion, hemodynamically significant stenosis or dissection of the neck arteries.      CT-CTA HEAD WITH & W/O-POST PROCESS   Final Result   Addendum (preliminary) 1 of 1   Critical value called by Dr. Hector Dodson to Physician:  RONAN NOGUERA   at 4/25/2024 3:48 PM.      Final      1.  9 x 7 mm focus of hemorrhage in the right temporal region which is probably subarachnoid, less likely intraparenchymal.   2.  Focal occlusion of right M2 MCA inferior division with distal opacification. This could be a focal high-grade stenosis versus vessel occlusion. It could also be vasospasm as the subarachnoid hemorrhage is present in this region.   3.  Other findings as above.      Efforts are underway to contact referring physician with results at time of dictation.            EC-ECHOCARDIOGRAM COMPLETE W/O CONT   Final Result      DX-CHEST-PORTABLE (1 VIEW)   Final Result      Feeding tube tip projects in the stomach.      Hazy left basilar opacity, atelectasis and/or mild pneumonitis.      DX-LUMBAR PUNCTURE FOR DIAGNOSIS   Final Result      Fluoroscopic-guided lumbar puncture as described above.      IR-MIDLINE CATHETER INSERTION WO GUIDANCE > AGE 3   Final Result                  Ultrasound-guided midline placement performed by qualified nursing staff    as above.          DX-ABDOMEN FOR TUBE PLACEMENT   Final Result         1.  Nonspecific bowel gas pattern in the upper abdomen.   2.  Dobbhoff tube is coiled within the stomach, the tip terminates overlying the expected location of the gastric cardia.   3.  Hazy left lower lobe infiltrate.      MR-BRAIN-WITH & W/O   Final Result      1.  Significant interval decrease in size of right frontal brain metastasis since previous exam slight interval decrease in surrounding vasogenic edema. Smaller irregular rim of increased diffusion weighted signal intensity about this treated metastasis    consistent with cytotoxic edema.      2.  New large area of acute infarction in the right posterior frontal, parietal, posterior temporal, and occipital lobes associated gyriform enhancement in the right lateral occipital lobe and right peritrigonal white matter.      3.  Smaller chronic wedge-shaped area of  infarction involving the left posterior basal ganglia extending into the left posterior frontal periventricular white matter      4.  Small areas of chronic lacunar type infarction involving the left side of the elaine and left brachium pontis with surrounding gliosis.      5.  Small foci of chronic hemosiderin deposition noted in the left posterior temporal and occipital white matter, as well as in the treated right frontal brain metastasis      6.  Age-related cerebral atrophy.      DX-ABDOMEN FOR TUBE PLACEMENT   Final Result         1.  Nonspecific bowel gas pattern in the upper abdomen.   2.  Dobbhoff tube is coiled within the stomach, the tip terminates overlying the expected location of the gastric fundus, physician similar to prior study.   3.  Left lower lobe infiltrate      DX-ABDOMEN FOR TUBE PLACEMENT   Final Result         1.  Nonspecific bowel gas pattern in the upper abdomen.   2.  Dobbhoff tube is coiled within the stomach, the tip terminates overlying the expected location of the gastric fundus.   3.  Left basilar atelectasis      DX-ABDOMEN FOR TUBE PLACEMENT   Final Result      Feeding tube looped in the stomach the distal tip in the gastric fundus.      CT-HEAD WITH & W/O   Final Result      1.  Redemonstration of right frontal lobe mass, highly concerning for metastasis. Associated vasogenic edema and a 2.5 mm right to left midline shift. No progressive mass effect. No herniation.   2.  Nonspecific ill-defined enhancement in the right parietal lobe.   3.  No new large vascular structure infarct. No new intracranial hemorrhage.      DX-CHEST-LIMITED (1 VIEW)   Final Result      No significant interval change.      DX-ABDOMEN COMPLETE WITH AP OR PA CXR   Final Result      1. No acute cardiopulmonary abnormality.   2. Chronic left lower lobe scarring.   3. Nonobstructive bowel gas pattern. Small amount of stool throughout the colon.      IR-GASTROSTOMY PLACEMENT    (Results Pending)         Assessment/Plan  * Acute encephalopathy- (present on admission)  Assessment & Plan  Multifactorial  Manage encephalopathy is  Acute stroke on MRI 4/20  Subarachnoid hemorrhage on CT  LP with CSF positive for VZV done 4/22  Continue on acyclovir  Monitor blood pressure closely,  neurochecks every 4 hours    Keratopathy  Assessment & Plan  Exposure from not closing her eyes completely  Ophthalmology consulted during stay.  Eye drops and lubrications  Tape eyes closed when sleeping.    Aspiration pneumonitis (HCC)- (present on admission)  Assessment & Plan  Patient high risk for aspiration pneumonitis/pneumonia.   Currently on room air  Monitor oxygen requirement closely    Prediabetes- (present on admission)  Assessment & Plan  Monitor glucose level closely.  Had been on decadron  On insulin regimen    Vasogenic edema (HCC)- (present on admission)  Assessment & Plan  Continuing decadron taper per Dr. Live's recommendations. He also does not believe that there are any significant changes in her brain imaging from this admission that could explain her somnolence/presentation. She is on the correct medication (decadron) for treating the edema  Improvement on MRI on 4/21  - 5/1:  tapering  decadron 4mg IV bid x 13 days to 2mg IV bid x 7 days, then 2mg daily x 7 days, then 1 mg daily x 7 days.  5/2 CT head w/o contrast improved right frontal lobe edema.      Hypernatremia  Assessment & Plan  Improved    Seizure (HCC)- (present on admission)  Assessment & Plan  Continue lacosamide 150 mg IV BID per neurology  EEG and cEEG negative for seizures     Neurology consulted during admit.    Dysphagia- (present on admission)  Assessment & Plan  Possibly due to CVA vs brain metastases and altered mental status in setting of infection.  Patient will remain NPO as she failed SLP evaluation 4/17  NG tube placement required IRIS. Tube was removed for MRI and family refused NG tube to be replaced. Extensive discussion with family  regarding nutrition options. Eventually agreed to let patient get NG tube with lorazepam sedation and IRIS. No one certified to place NG tube with IRIS available on 4/21. Vascular access also lost on 4/21 and midline unable to be placed without VATs  -aspiration precautions  -Continue lansoprazole  -[id number] glucose checks  5/3:  order for PEG tube placed for 5/7 by IR.  In order for patient to go home with daughter and HH.      Shingles- (present on admission)  Assessment & Plan  S/p acyclovir x14 days.    Constipation- (present on admission)  Assessment & Plan  Resolved  - Bowel regimen    Sepsis secondary to UTI (HCC)- (present on admission)  Assessment & Plan  Sepsis resolved    Malignant neoplasm metastatic to brain (HCC)- (present on admission)  Assessment & Plan  Brain mets noted during hospitalization on 3/27/2024 with vasogenic edema, MRI on 4/11 shows continued brain mets with vasogenic edema.  MRI 4/21 shows decrease in tumor size and improvement in edema.  -continue seizure prophylaxis with lacosamide 150 mg BID  -tapering dexamethasone   Ophthalmology evaluated for concern of vision changes      Acute kidney injury (HCC)- (present on admission)  Assessment & Plan  RESOLVED  Returned, Cr 1.11 and baseline around 0.7. Likely prerenal from dehydration.  -continue free water flushes  -renally dose all medications as appropriate  -avoid nephrotoxins    Advance care planning- (present on admission)  Assessment & Plan  At this time patient's family want her to be full code.  Palliative care consulted and discussed with family the goals of care. This remains the same.  -Full code  -Family goals of care is to make patient best shape to be discharged to be follow-up by his doctors in [place].  -Family updates daily with her guarded prognosis  5/1: there was discussion with daughter at bedside options now that her acyclovir has completed.  daughter prefers in this order:   1. higher level of care, [facility] is looking into transfer to [facility] for neuro oncologist.   2 skilled nursing facility is acceptable to the daughter   3 home with home health and home equipment which would include hospital bed and Nirav lift.    4. consideration for hospice care if no other treatment options remain.  5/3:  unable to transfer to [facility], no accepting SNF and insurance will not approve due to patient unable to participate with PT/OT.  Plan for  home with philip and , home equipment and PEG tube feeds.      Endometrial cancer (HCC)- (present on admission)  Assessment & Plan  Followed by Dr. Garcia outpatient and sees radiation oncology Dr Live.  Received immunotherapy at [facility] in 2021 with initial good response. Reached out to Dr. Live who does not believe imaging from 4/14 shows significant changes compared to MRI month prior.  Complicated by metastases to brain among other locations, likely contributing to her altered mental status. Mets re demonstrated without significant change on CTH on admission.  - Decadron for vasogenic edema  - Neurochecks every 4 hours  - Continue aspiration, fall, seizure precaution.    Essential hypertension- (present on admission)  Assessment & Plan  Monitor blood pressure closely    Acute CVA (cerebrovascular accident) (HCC)- (present on admission)  Assessment & Plan  MRI on 4/20 noted with right frontal brain metastasis with vasogenic edema, acute infarction.  CT head from 4/21 noted with 9X 7 mm of hemorrhage in the right temporal region.   Neurology consult appreciated.         VTE prophylaxis:    enoxaparin ppx      I have performed a physical exam and reviewed and updated ROS and Plan today (5/6/2024). In review of yesterday's note (5/5/2024), there are no changes except as documented above.

## 2024-05-06 NOTE — CARE PLAN
The patient is Watcher - Medium risk of patient condition declining or worsening    Shift Goals  Clinical Goals: Skin intgerity, TF management, Q4 neuro checks, and monitor BP  Patient Goals: ZA  Family Goals: monitor oxygen levels    Progress made toward(s) clinical / shift goals: Skin integrity maintained with Q2 turns TAPS, barrier paste/ visco paste, and mepilex. Intermittently monitoring TF and pt to be NPO at 0000. Q4 neuro checks in place. Monitoring SBP to be less than 145.     Problem: Hemodynamics  Goal: Patient's hemodynamics, fluid balance and neurologic status will be stable or improve  Outcome: Progressing     Problem: Skin Integrity  Goal: Skin integrity is maintained or improved  Outcome: Progressing

## 2024-05-06 NOTE — THERAPY
Speech Language Therapy Contact Note    Patient Name: Jairo Rivas  Age:  73 y.o., Sex:  female  Medical Record #: [id number]  Today's Date: 5/6/2024 05/06/24 0844   Treatment Variance   Reason For Missed Therapy Medical - Other (Please Comment)   Interdisciplinary Plan of Care Collaboration   IDT Collaboration with  Other (See Comments)  (EMR)   Collaboration Comments Per EMR, pt is NPO pending PEG tube placement. SLP to hold and follow-up post operatively as appropriate/able.

## 2024-05-06 NOTE — PROGRESS NOTES
Bedside report received, assessment completed    A&O x  ZA, pt does not call   Mobility: Up with max assist, Q2 turns  Fall Risk Assessment: High, bed alarm on, door notifications in use  Pain Assessment / Reassessment completed, medication provided per MAR  Diet: NPO, R nare IRIS, Promote at 60 (goal)  LDA:   IV Access: R upper midline, CDI/ flushed/ SL  Iris: R nare, CDI    GI/: + void, + flatus, 5/5 BM  DVT Prophylaxis: Lovenox, SCD refused    Reviewed plan of care with patient, bed in lowest position and locked, pt resting comfortably now, call light within reach, all needs met at this time. Interventions will be executed per plan of care

## 2024-05-06 NOTE — DISCHARGE PLANNING
Received choice form @: 1507  Agency/Facility name: [facility]  Sent referral per choice form @: 2222

## 2024-05-07 ENCOUNTER — APPOINTMENT (OUTPATIENT)
Dept: RADIOLOGY | Facility: MEDICAL CENTER | Age: 74
DRG: 871 | End: 2024-05-07
Attending: HOSPITALIST
Payer: MEDICARE

## 2024-05-07 ENCOUNTER — ANESTHESIA (OUTPATIENT)
Dept: RADIOLOGY | Facility: MEDICAL CENTER | Age: 74
DRG: 871 | End: 2024-05-07
Payer: MEDICARE

## 2024-05-07 ENCOUNTER — TELEMEDICINE (OUTPATIENT)
Dept: MEDICAL GROUP | Facility: LAB | Age: 74
End: 2024-05-07
Payer: MEDICARE

## 2024-05-07 DIAGNOSIS — G93.40 ACUTE ENCEPHALOPATHY: ICD-10-CM

## 2024-05-07 DIAGNOSIS — C79.31 MALIGNANT NEOPLASM METASTATIC TO BRAIN (HCC): ICD-10-CM

## 2024-05-07 DIAGNOSIS — I63.9 ACUTE CVA (CEREBROVASCULAR ACCIDENT) (HCC): ICD-10-CM

## 2024-05-07 DIAGNOSIS — C54.1 ENDOMETRIAL CANCER (HCC): ICD-10-CM

## 2024-05-07 DIAGNOSIS — B02.7 DISSEMINATED HERPES ZOSTER: ICD-10-CM

## 2024-05-07 DIAGNOSIS — G93.89 BRAIN MASS: ICD-10-CM

## 2024-05-07 DIAGNOSIS — H18.9 KERATOPATHY: ICD-10-CM

## 2024-05-07 DIAGNOSIS — R13.10 DYSPHAGIA, UNSPECIFIED TYPE: ICD-10-CM

## 2024-05-07 PROBLEM — N17.9 ACUTE KIDNEY INJURY (HCC): Status: RESOLVED | Noted: 2021-08-02 | Resolved: 2024-05-07

## 2024-05-07 LAB
GLUCOSE BLD STRIP.AUTO-MCNC: 109 MG/DL (ref 65–99)
GLUCOSE BLD STRIP.AUTO-MCNC: 145 MG/DL (ref 65–99)
GLUCOSE BLD STRIP.AUTO-MCNC: 149 MG/DL (ref 65–99)
GLUCOSE BLD STRIP.AUTO-MCNC: 96 MG/DL (ref 65–99)
INR PPP: 1.04 (ref 0.87–1.13)
PROTHROMBIN TIME: 13.7 SEC (ref 12–14.6)

## 2024-05-07 PROCEDURE — 99233 SBSQ HOSP IP/OBS HIGH 50: CPT | Performed by: INTERNAL MEDICINE

## 2024-05-07 PROCEDURE — 99214 OFFICE O/P EST MOD 30 MIN: CPT | Mod: 95 | Performed by: NURSE PRACTITIONER

## 2024-05-07 PROCEDURE — 0DH63UZ INSERTION OF FEEDING DEVICE INTO STOMACH, PERCUTANEOUS APPROACH: ICD-10-PCS | Performed by: RADIOLOGY

## 2024-05-07 RX ORDER — SODIUM CHLORIDE, SODIUM GLUCONATE, SODIUM ACETATE, POTASSIUM CHLORIDE AND MAGNESIUM CHLORIDE 526; 502; 368; 37; 30 MG/100ML; MG/100ML; MG/100ML; MG/100ML; MG/100ML
INJECTION, SOLUTION INTRAVENOUS
Status: DISCONTINUED | OUTPATIENT
Start: 2024-05-07 | End: 2024-05-07 | Stop reason: SURG

## 2024-05-07 RX ORDER — LIDOCAINE HYDROCHLORIDE 20 MG/ML
INJECTION, SOLUTION EPIDURAL; INFILTRATION; INTRACAUDAL; PERINEURAL PRN
Status: DISCONTINUED | OUTPATIENT
Start: 2024-05-07 | End: 2024-05-07 | Stop reason: SURG

## 2024-05-07 RX ORDER — METOPROLOL TARTRATE 1 MG/ML
1 INJECTION, SOLUTION INTRAVENOUS
Status: DISCONTINUED | OUTPATIENT
Start: 2024-05-07 | End: 2024-05-07 | Stop reason: HOSPADM

## 2024-05-07 RX ORDER — ONDANSETRON 2 MG/ML
4 INJECTION INTRAMUSCULAR; INTRAVENOUS
Status: DISCONTINUED | OUTPATIENT
Start: 2024-05-07 | End: 2024-05-07 | Stop reason: HOSPADM

## 2024-05-07 RX ORDER — DEXAMETHASONE SODIUM PHOSPHATE 4 MG/ML
INJECTION, SOLUTION INTRA-ARTICULAR; INTRALESIONAL; INTRAMUSCULAR; INTRAVENOUS; SOFT TISSUE PRN
Status: DISCONTINUED | OUTPATIENT
Start: 2024-05-07 | End: 2024-05-07 | Stop reason: SURG

## 2024-05-07 RX ORDER — SODIUM CHLORIDE, SODIUM LACTATE, POTASSIUM CHLORIDE, CALCIUM CHLORIDE 600; 310; 30; 20 MG/100ML; MG/100ML; MG/100ML; MG/100ML
INJECTION, SOLUTION INTRAVENOUS CONTINUOUS
Status: DISCONTINUED | OUTPATIENT
Start: 2024-05-07 | End: 2024-05-07 | Stop reason: HOSPADM

## 2024-05-07 RX ORDER — OXYCODONE HCL 5 MG/5 ML
5 SOLUTION, ORAL ORAL
Status: DISCONTINUED | OUTPATIENT
Start: 2024-05-07 | End: 2024-05-07 | Stop reason: HOSPADM

## 2024-05-07 RX ORDER — ONDANSETRON 2 MG/ML
INJECTION INTRAMUSCULAR; INTRAVENOUS PRN
Status: DISCONTINUED | OUTPATIENT
Start: 2024-05-07 | End: 2024-05-07 | Stop reason: SURG

## 2024-05-07 RX ORDER — HALOPERIDOL 5 MG/ML
1 INJECTION INTRAMUSCULAR
Status: DISCONTINUED | OUTPATIENT
Start: 2024-05-07 | End: 2024-05-07 | Stop reason: HOSPADM

## 2024-05-07 RX ORDER — HYDROMORPHONE HYDROCHLORIDE 1 MG/ML
0.4 INJECTION, SOLUTION INTRAMUSCULAR; INTRAVENOUS; SUBCUTANEOUS
Status: DISCONTINUED | OUTPATIENT
Start: 2024-05-07 | End: 2024-05-07 | Stop reason: HOSPADM

## 2024-05-07 RX ORDER — HYDROMORPHONE HYDROCHLORIDE 1 MG/ML
0.1 INJECTION, SOLUTION INTRAMUSCULAR; INTRAVENOUS; SUBCUTANEOUS
Status: DISCONTINUED | OUTPATIENT
Start: 2024-05-07 | End: 2024-05-07 | Stop reason: HOSPADM

## 2024-05-07 RX ORDER — LABETALOL HYDROCHLORIDE 5 MG/ML
5 INJECTION, SOLUTION INTRAVENOUS
Status: DISCONTINUED | OUTPATIENT
Start: 2024-05-07 | End: 2024-05-07 | Stop reason: HOSPADM

## 2024-05-07 RX ORDER — EPHEDRINE SULFATE 50 MG/ML
5 INJECTION, SOLUTION INTRAVENOUS
Status: DISCONTINUED | OUTPATIENT
Start: 2024-05-07 | End: 2024-05-07 | Stop reason: HOSPADM

## 2024-05-07 RX ORDER — DIPHENHYDRAMINE HYDROCHLORIDE 50 MG/ML
12.5 INJECTION INTRAMUSCULAR; INTRAVENOUS
Status: DISCONTINUED | OUTPATIENT
Start: 2024-05-07 | End: 2024-05-07 | Stop reason: HOSPADM

## 2024-05-07 RX ORDER — CEFAZOLIN SODIUM 1 G/3ML
INJECTION, POWDER, FOR SOLUTION INTRAMUSCULAR; INTRAVENOUS PRN
Status: DISCONTINUED | OUTPATIENT
Start: 2024-05-07 | End: 2024-05-07 | Stop reason: SURG

## 2024-05-07 RX ORDER — OXYCODONE HCL 5 MG/5 ML
10 SOLUTION, ORAL ORAL
Status: DISCONTINUED | OUTPATIENT
Start: 2024-05-07 | End: 2024-05-07 | Stop reason: HOSPADM

## 2024-05-07 RX ORDER — HYDRALAZINE HYDROCHLORIDE 20 MG/ML
5 INJECTION INTRAMUSCULAR; INTRAVENOUS
Status: DISCONTINUED | OUTPATIENT
Start: 2024-05-07 | End: 2024-05-07 | Stop reason: HOSPADM

## 2024-05-07 RX ORDER — HYDROMORPHONE HYDROCHLORIDE 1 MG/ML
0.2 INJECTION, SOLUTION INTRAMUSCULAR; INTRAVENOUS; SUBCUTANEOUS
Status: DISCONTINUED | OUTPATIENT
Start: 2024-05-07 | End: 2024-05-07 | Stop reason: HOSPADM

## 2024-05-07 RX ADMIN — SULFAMETHOXAZOLE AND TRIMETHOPRIM 1 TABLET: 400; 80 TABLET ORAL at 04:59

## 2024-05-07 RX ADMIN — DEXAMETHASONE 2 MG: 4 TABLET ORAL at 05:05

## 2024-05-07 RX ADMIN — LIDOCAINE HYDROCHLORIDE 100 MG: 20 INJECTION, SOLUTION EPIDURAL; INFILTRATION; INTRACAUDAL at 08:45

## 2024-05-07 RX ADMIN — LACOSAMIDE 150 MG: 100 TABLET, FILM COATED ORAL at 17:35

## 2024-05-07 RX ADMIN — GLUCAGON 1 MG: 1 INJECTION, POWDER, LYOPHILIZED, FOR SOLUTION INTRAMUSCULAR; INTRAVENOUS at 09:15

## 2024-05-07 RX ADMIN — METOPROLOL 100 MG: 100 TABLET ORAL at 17:40

## 2024-05-07 RX ADMIN — PROPOFOL 20 MG: 10 INJECTION, EMULSION INTRAVENOUS at 09:29

## 2024-05-07 RX ADMIN — ATORVASTATIN CALCIUM 20 MG: 20 TABLET, FILM COATED ORAL at 17:35

## 2024-05-07 RX ADMIN — SUGAMMADEX 200 MG: 100 INJECTION, SOLUTION INTRAVENOUS at 09:29

## 2024-05-07 RX ADMIN — PHENYLEPHRINE HYDROCHLORIDE 100 MCG: 10 INJECTION INTRAVENOUS at 08:50

## 2024-05-07 RX ADMIN — MINERAL OIL, AND WHITE PETROLATUM 1 APPLICATION: 425; 573 OINTMENT OPHTHALMIC at 14:00

## 2024-05-07 RX ADMIN — AMLODIPINE BESYLATE 10 MG: 10 TABLET ORAL at 04:58

## 2024-05-07 RX ADMIN — HYDROCODONE BITARTRATE AND ACETAMINOPHEN 1 TABLET: 5; 325 TABLET ORAL at 17:35

## 2024-05-07 RX ADMIN — SODIUM CHLORIDE, SODIUM GLUCONATE, SODIUM ACETATE, POTASSIUM CHLORIDE AND MAGNESIUM CHLORIDE: 526; 502; 368; 37; 30 INJECTION, SOLUTION INTRAVENOUS at 08:38

## 2024-05-07 RX ADMIN — ENOXAPARIN SODIUM 40 MG: 100 INJECTION SUBCUTANEOUS at 17:35

## 2024-05-07 RX ADMIN — DEXAMETHASONE SODIUM PHOSPHATE 4 MG: 4 INJECTION INTRA-ARTICULAR; INTRALESIONAL; INTRAMUSCULAR; INTRAVENOUS; SOFT TISSUE at 08:52

## 2024-05-07 RX ADMIN — IOHEXOL 20 ML: 300 INJECTION, SOLUTION INTRAVENOUS at 10:00

## 2024-05-07 RX ADMIN — DEXAMETHASONE 2 MG: 4 TABLET ORAL at 17:35

## 2024-05-07 RX ADMIN — MODAFINIL 100 MG: 100 TABLET ORAL at 04:58

## 2024-05-07 RX ADMIN — SENNOSIDES AND DOCUSATE SODIUM 2 TABLET: 50; 8.6 TABLET ORAL at 17:40

## 2024-05-07 RX ADMIN — PHENYLEPHRINE HYDROCHLORIDE 100 MCG: 10 INJECTION INTRAVENOUS at 09:29

## 2024-05-07 RX ADMIN — LACOSAMIDE 150 MG: 100 TABLET, FILM COATED ORAL at 04:59

## 2024-05-07 RX ADMIN — MINERAL OIL, AND WHITE PETROLATUM 1 APPLICATION: 425; 573 OINTMENT OPHTHALMIC at 05:07

## 2024-05-07 RX ADMIN — LANSOPRAZOLE 30 MG: 30 TABLET, ORALLY DISINTEGRATING ORAL at 04:58

## 2024-05-07 RX ADMIN — NYSTATIN: 100000 POWDER TOPICAL at 05:05

## 2024-05-07 RX ADMIN — PHENYLEPHRINE HYDROCHLORIDE 100 MCG: 10 INJECTION INTRAVENOUS at 08:59

## 2024-05-07 RX ADMIN — ASPIRIN 81 MG: 81 TABLET, CHEWABLE ORAL at 04:58

## 2024-05-07 RX ADMIN — ROCURONIUM BROMIDE 50 MG: 10 INJECTION, SOLUTION INTRAVENOUS at 08:45

## 2024-05-07 RX ADMIN — LOSARTAN POTASSIUM 100 MG: 50 TABLET, FILM COATED ORAL at 04:58

## 2024-05-07 RX ADMIN — METOPROLOL 100 MG: 100 TABLET ORAL at 04:58

## 2024-05-07 RX ADMIN — CEFAZOLIN 2 G: 1 INJECTION, POWDER, FOR SOLUTION INTRAMUSCULAR; INTRAVENOUS at 09:15

## 2024-05-07 RX ADMIN — PHENYLEPHRINE HYDROCHLORIDE 100 MCG: 10 INJECTION INTRAVENOUS at 09:10

## 2024-05-07 RX ADMIN — NYSTATIN: 100000 POWDER TOPICAL at 18:00

## 2024-05-07 RX ADMIN — PHENYLEPHRINE HYDROCHLORIDE 100 MCG: 10 INJECTION INTRAVENOUS at 09:19

## 2024-05-07 RX ADMIN — PROPOFOL 130 MG: 10 INJECTION, EMULSION INTRAVENOUS at 08:45

## 2024-05-07 RX ADMIN — ONDANSETRON 4 MG: 2 INJECTION INTRAMUSCULAR; INTRAVENOUS at 09:28

## 2024-05-07 ASSESSMENT — PAIN DESCRIPTION - PAIN TYPE
TYPE: ACUTE PAIN

## 2024-05-07 NOTE — ANESTHESIA PROCEDURE NOTES
Airway    Date/Time: 5/7/2024 8:48 AM    Performed by: Michael Lovett D.O.  Authorized by: Michael Lovett D.O.    Location:  OR  Urgency:  Elective  Difficult Airway: No    Indications for Airway Management:  Anesthesia      Spontaneous Ventilation: absent    Sedation Level:  Deep  Preoxygenated: Yes    Patient Position:  Sniffing  MILS Maintained Throughout: No    Mask Difficulty Assessment:  0 - not attempted  Final Airway Type:  Endotracheal airway  Final Endotracheal Airway:  ETT  Cuffed: Yes    Technique Used for Successful ETT Placement:  Direct laryngoscopy    Insertion Site:  Oral  Blade Type:  Richa  Laryngoscope Blade/Videolaryngoscope Blade Size:  3  ETT Size (mm):  6.5  Measured from:  Teeth  ETT to Teeth (cm):  20  Placement Verified by: auscultation and capnometry    Cormack-Lehane Classification:  Grade I - full view of glottis  Number of Attempts at Approach:  1  Ventilation Between Attempts:  None  Number of Other Approaches Attempted:  0

## 2024-05-07 NOTE — ANESTHESIA TIME REPORT
Anesthesia Start and Stop Event Times       Date Time Event    5/7/2024 0834 Ready for Procedure     0835 Anesthesia Start     0948 Anesthesia Stop          Responsible Staff  05/07/24      Name Role Begin End    Michael Lovett D.O. Anesth 0835 0948          Overtime Reason:  no overtime (within assigned shift)    Comments:

## 2024-05-07 NOTE — PROGRESS NOTES
"Hospital Medicine Daily Progress Note    Date of Service  5/7/2024    Chief Complaint  Confusion.    Hospital Course  Jairo Rivas is a 74yo F with hx of endometrial carcinoma, brain metastasis w/p radiation tx, and prior CVA.  An MRI from 4/29 frontal brain metastases with vasogenic edema and acute infarction.  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  Neurology and infectious disease consulted.  A CT head 4/21 noted a 9 x 7 mm hemorrhage in the right temporal region.  Ophthalmology was consulted for vision changes.    Interval Problem Update  5/6: Asleep, eyes nearly closed bilaterally. Some facial edema noted with nasal canula causing slight swelling along side face where it is resting against her skin.  Non responsive to touch/voice.  Daughter, Carolyn, and Carolyn's tabithaanceMichael at bedside.  PEG tube to be placed 5/7.  Discussed with case management to see if home equipment is available to possible send patient home with family.  Doubt LTAC needs per my colleagues notes no accepting SNF.      5/5: Patient not responsive to questions.  She did say \"no\" when I attempted to move her arms.  Daughter is at bedside and we reviewed her mother's goals of care. Daughter, Carolyn, stated she was still wanting everything done and CPR/Intubation if needed.  Carolyn was concerned that her mothers eyes looked swollen and seemed dissatisfied with her eyes.  I alerted her that the bigger picture is that her mother is currently obtunded with metastatic brain lesions, stroke, hemorrhage and a HSV encephalitis.  I addressed that we are continuing care in hopes she has improved mentation but that I was concerned that it may be possible she does not get any better.  I alerted Carolyn, I would see what additional treatment could be done for her mother's eyes.      Patient was seen and examined at bedside.  I have personally reviewed and interpreted vitals, labs, and imaging.    5/7.  Afebrile.  Intermittent " hypotension.  On 1-2 L nasal cannula.  Patient is obtunded.  Minimally responsive.  PEG tube was placed this morning.  No family currently at bedside.  Plan for goals of care conference on Thursday.    I have discussed this patient's plan of care and discharge plan at IDT rounds today with Case Management, Nursing, Nursing leadership, and other members of the IDT team.    Consultants/Specialty  infectious disease, neurology, ophthalmology, and gynecology    Code Status  Full Code    Disposition  The patient is not medically cleared for discharge to home or a post-acute facility.  Anticipate discharge to: home with organized home healthcare and close outpatient follow-up    I have placed the appropriate orders for post-discharge needs.    Review of Systems  Review of Systems   Unable to perform ROS: Patient unresponsive        Physical Exam  Temp:  [36 °C (96.8 °F)-36.4 °C (97.6 °F)] 36.1 °C (96.9 °F)  Pulse:  [66-88] 70  Resp:  [11-18] 15  BP: ()/(54-79) 109/69  SpO2:  [97 %-100 %] 98 %    Physical Exam  Vitals reviewed.   Constitutional:       General: She is sleeping.      Appearance: She is obese. She is ill-appearing.   HENT:      Head: Normocephalic.      Nose:      Comments: Enteral tube in nare     Mouth/Throat:      Mouth: Mucous membranes are dry.      Pharynx: Oropharyngeal exudate present.   Eyes:      Comments: Left eye corneal edema.  Some lubrication on cornea.  Left eyelid does not appear to close as well as the right.   Cardiovascular:      Rate and Rhythm: Normal rate and regular rhythm.      Heart sounds: No murmur heard.  Pulmonary:      Effort: No respiratory distress.      Breath sounds: No wheezing.   Abdominal:      General: There is no distension.      Palpations: Abdomen is soft.   Musculoskeletal:      Right lower leg: Edema present.      Left lower leg: Edema present.      Comments: Right leg with more edema than left. Right arm chronic flexion contraction.   Lymphadenopathy:       Cervical: No cervical adenopathy.   Neurological:      Mental Status: She is lethargic.      Cranial Nerves: Cranial nerve deficit present.      Motor: Weakness present.      Coordination: Coordination abnormal.         Fluids    Intake/Output Summary (Last 24 hours) at 5/7/2024 1336  Last data filed at 5/7/2024 1212  Gross per 24 hour   Intake 950 ml   Output 800 ml   Net 150 ml       Laboratory              Recent Labs     05/07/24  0816   INR 1.04               Imaging  IR-GASTROSTOMY PLACEMENT   Final Result      1.  Fluoroscopic guided percutaneous gastrostomy with placement of an 18-Irish MARIOLA balloon gastrostomy catheter.         2. The gastrostomy tube may be used for tube feeds 18 hours after placement. Thereafter, liquid diet orders per the referring physician. Saline flush daily as per protocol.      3. In 10-14 days, the anchor button suture or sutures should be cut, releasing the anchor or anchors into the stomach. The suture anchors will then pass through the GI tract as desired. This can be performed in the interventional radiology department if    desired. Call [phone] any weekday [id number] hours to make an appointment.      US-EXTREMITY VENOUS LOWER BILAT   Final Result      US-EXTREMITY VENOUS UPPER UNILAT LEFT   Final Result      DX-ABDOMEN FOR TUBE PLACEMENT   Final Result      Feeding tube terminates within the gastric fundus.      DX-ABDOMEN FOR TUBE PLACEMENT   Final Result         1.  Nonspecific bowel gas pattern in the upper abdomen.   2.  Dobbhoff tube coiled within the esophagus with tip projecting over the expected location of the gastric antrum. Recommend withdrawal and replacement.   3.  Hazy lung base opacity suggesting edema and/or infiltrates.      CT-HEAD W/O   Final Result   Addendum (preliminary) 1 of 1   Addendum: There is trace bilateral dependent intraventricular hemorrhages    identified, likely representing redistribution of previously visualized    subarachnoid hemorrhage.       Final         1.  No acute intracranial abnormality is identified, there are nonspecific white matter changes, commonly associated with small vessel ischemic disease.  Associated mild cerebral atrophy is noted.         DX-CHEST-LIMITED (1 VIEW)   Final Result      1.  Hazy perihilar and lower lobe opacities represent areas of atelectasis or potentially pneumonitis.      IR-MIDLINE CATHETER INSERTION WO GUIDANCE > AGE 3   Final Result                  Ultrasound-guided midline placement performed by qualified nursing staff    as above.          CT-HEAD W/O   Final Result      1.  9 x 7 mm focus of hemorrhage in the right temporal region, suspected subarachnoid versus less likely intraparenchymal. It is stable since noncontrast head CT performed one day prior.         CT-CTA NECK WITH & W/O-POST PROCESSING   Final Result      No occlusion, hemodynamically significant stenosis or dissection of the neck arteries.      CT-CTA HEAD WITH & W/O-POST PROCESS   Final Result   Addendum (preliminary) 1 of 1   Critical value called by Dr. Hector Dodson to Physician: RONAN NOGUERA   at 4/25/2024 3:48 PM.      Final      1.  9 x 7 mm focus of hemorrhage in the right temporal region which is probably subarachnoid, less likely intraparenchymal.   2.  Focal occlusion of right M2 MCA inferior division with distal opacification. This could be a focal high-grade stenosis versus vessel occlusion. It could also be vasospasm as the subarachnoid hemorrhage is present in this region.   3.  Other findings as above.      Efforts are underway to contact referring physician with results at time of dictation.            EC-ECHOCARDIOGRAM COMPLETE W/O CONT   Final Result      DX-CHEST-PORTABLE (1 VIEW)   Final Result      Feeding tube tip projects in the stomach.      Hazy left basilar opacity, atelectasis and/or mild pneumonitis.      DX-LUMBAR PUNCTURE FOR DIAGNOSIS   Final Result      Fluoroscopic-guided lumbar puncture as  described above.      IR-MIDLINE CATHETER INSERTION WO GUIDANCE > AGE 3   Final Result                  Ultrasound-guided midline placement performed by qualified nursing staff    as above.          DX-ABDOMEN FOR TUBE PLACEMENT   Final Result         1.  Nonspecific bowel gas pattern in the upper abdomen.   2.  Dobbhoff tube is coiled within the stomach, the tip terminates overlying the expected location of the gastric cardia.   3.  Hazy left lower lobe infiltrate.      MR-BRAIN-WITH & W/O   Final Result      1.  Significant interval decrease in size of right frontal brain metastasis since previous exam slight interval decrease in surrounding vasogenic edema. Smaller irregular rim of increased diffusion weighted signal intensity about this treated metastasis    consistent with cytotoxic edema.      2.  New large area of acute infarction in the right posterior frontal, parietal, posterior temporal, and occipital lobes associated gyriform enhancement in the right lateral occipital lobe and right peritrigonal white matter.      3.  Smaller chronic wedge-shaped area of infarction involving the left posterior basal ganglia extending into the left posterior frontal periventricular white matter      4.  Small areas of chronic lacunar type infarction involving the left side of the elaine and left brachium pontis with surrounding gliosis.      5.  Small foci of chronic hemosiderin deposition noted in the left posterior temporal and occipital white matter, as well as in the treated right frontal brain metastasis      6.  Age-related cerebral atrophy.      DX-ABDOMEN FOR TUBE PLACEMENT   Final Result         1.  Nonspecific bowel gas pattern in the upper abdomen.   2.  Dobbhoff tube is coiled within the stomach, the tip terminates overlying the expected location of the gastric fundus, physician similar to prior study.   3.  Left lower lobe infiltrate      DX-ABDOMEN FOR TUBE PLACEMENT   Final Result         1.  Nonspecific  bowel gas pattern in the upper abdomen.   2.  Dobbhoff tube is coiled within the stomach, the tip terminates overlying the expected location of the gastric fundus.   3.  Left basilar atelectasis      DX-ABDOMEN FOR TUBE PLACEMENT   Final Result      Feeding tube looped in the stomach the distal tip in the gastric fundus.      CT-HEAD WITH & W/O   Final Result      1.  Redemonstration of right frontal lobe mass, highly concerning for metastasis. Associated vasogenic edema and a 2.5 mm right to left midline shift. No progressive mass effect. No herniation.   2.  Nonspecific ill-defined enhancement in the right parietal lobe.   3.  No new large vascular structure infarct. No new intracranial hemorrhage.      DX-CHEST-LIMITED (1 VIEW)   Final Result      No significant interval change.      DX-ABDOMEN COMPLETE WITH AP OR PA CXR   Final Result      1. No acute cardiopulmonary abnormality.   2. Chronic left lower lobe scarring.   3. Nonobstructive bowel gas pattern. Small amount of stool throughout the colon.           Assessment/Plan  * Acute encephalopathy- (present on admission)  Assessment & Plan  5/7/2024  Multifactorial   Manage encephalopathy is  Acute stroke on MRI 4/20  Subarachnoid hemorrhage on CT  LP with CSF positive for VZV done 4/22  Continue on acyclovir  Monitor blood pressure closely,  neurochecks every 4 hours    Keratopathy  Assessment & Plan  5/7/2024  Exposure from not closing her eyes completely  Ophthalmology consulted during stay.  Eye drops and lubrications  Tape eyes closed when sleeping.    Aspiration pneumonitis (HCC)- (present on admission)  Assessment & Plan  5/7/2024  Patient high risk for aspiration pneumonitis/pneumonia.   Currently on room air  Monitor oxygen requirement closely    Prediabetes- (present on admission)  Assessment & Plan  5/7/2024  Monitor glucose level closely.  Had been on decadron  On insulin regimen    Vasogenic edema (HCC)- (present on admission)  Assessment &  Plan  5/7/2024  Continuing decadron taper per Dr. Live's recommendations. He also does not believe that there are any significant changes in her brain imaging from this admission that could explain her somnolence/presentation. She is on the correct medication (decadron) for treating the edema  Improvement on MRI on 4/21  - 5/1:  tapering  decadron 4mg IV bid x 13 days to 2mg IV bid x 7 days, then 2mg daily x 7 days, then 1 mg daily x 7 days.  5/2 CT head w/o contrast improved right frontal lobe edema.    Hypernatremia  Assessment & Plan  5/7/2024  Improved    Seizure (HCC)- (present on admission)  Assessment & Plan  5/7/2024  Continue lacosamide 150 mg BID per neurology  EEG and cEEG negative for seizures     Neurology consulted during admit.    Dysphagia- (present on admission)  Assessment & Plan  5/7/2024  Possibly due to CVA vs brain metastases and altered mental status in setting of infection.  Patient will remain NPO as she failed SLP evaluation 4/17  NG tube placement required IRIS. Tube was removed for MRI and family refused NG tube to be replaced. Extensive discussion with family regarding nutrition options. Eventually agreed to let patient get NG tube with lorazepam sedation and IRIS. No one certified to place NG tube with IRIS available on 4/21. Vascular access also lost on 4/21 and midline unable to be placed without VATs  -aspiration precautions  -Continue lansoprazole  -[id number] glucose checks  5/3:  order for PEG tube placed for 5/7 by IR.  In order for patient to go home with daughter and HH.    Shingles- (present on admission)  Assessment & Plan  5/7/2024  S/p acyclovir x14 days.    Constipation- (present on admission)  Assessment & Plan  5/7/2024  Resolved  - Bowel regimen    Sepsis secondary to UTI (HCC)- (present on admission)  Assessment & Plan  Sepsis resolved    Malignant neoplasm metastatic to brain (HCC)- (present on admission)  Assessment & Plan  5/7/2024  Brain mets noted during  hospitalization on 3/27/2024 with vasogenic edema, MRI on 4/11 shows continued brain mets with vasogenic edema.  MRI 4/21 shows decrease in tumor size and improvement in edema.  -continue seizure prophylaxis with lacosamide 150 mg BID  -tapering dexamethasone   Ophthalmology evaluated for concern of vision changes    Acute kidney injury (HCC)- (present on admission)  Assessment & Plan  5/7/2024  RESOLVED  Returned, Cr 1.11 and baseline around 0.7. Likely prerenal from dehydration.  -continue free water flushes  -renally dose all medications as appropriate  -avoid nephrotoxins    Advance care planning- (present on admission)  Assessment & Plan  5/7/2024  At this time patient's family want her to be full code.  Palliative care consulted and discussed with family the goals of care. This remains the same.  -Full code  -Family goals of care is to make patient best shape to be discharged to be follow-up by his doctors in [place].  -Family updates daily with her guarded prognosis  5/1: there was discussion with daughter at bedside options now that her acyclovir has completed.  daughter prefers in this order:   1. higher level of care, [facility] transfer center is looking into transfer to [facility] for neuro oncologist.   2 skilled nursing facility is acceptable to the daughter   3 home with home health and home equipment which would include hospital bed and Nirav lift.    4. consideration for hospice care if no other treatment options remain.  5/3:  unable to transfer to [facility], no accepting SNF and insurance will not approve due to patient unable to participate with PT/OT.  Plan for  home with philip and , home equipment and PEG tube feeds.    Obesity (BMI 30-39.9)- (present on admission)  Assessment & Plan  Can discuss when mentation improves.    Endometrial cancer (HCC)- (present on admission)  Assessment & Plan  5/7/2024  Followed by Dr. Garcia outpatient and sees radiation oncology Dr Live.  Received  immunotherapy at [facility] in 2021 with initial good response. Reached out to Dr. Live who does not believe imaging from 4/14 shows significant changes compared to MRI month prior.  Complicated by metastases to brain among other locations, likely contributing to her altered mental status. Mets re demonstrated without significant change on CTH on admission.  - Decadron for vasogenic edema  - Neurochecks every 4 hours  - Continue aspiration, fall, seizure precaution.    Essential hypertension- (present on admission)  Assessment & Plan  5/7/2024  Monitor blood pressure closely    Acute CVA (cerebrovascular accident) (HCC)- (present on admission)  Assessment & Plan  5/7/2024  MRI on 4/20 noted with right frontal brain metastasis with vasogenic edema, acute infarction.  CT head from 4/25 noted with 9X 7 mm of hemorrhage in the right temporal region.   Neurology consult appreciated.          VTE prophylaxis: Lovenox    I have performed a physical exam and reviewed and updated ROS and Plan today (5/7/2024). In review of yesterday's note (5/6/2024), there are no changes except as documented above.    Greater than 51 minutes spent prepping to see patient (e.g. review of tests) obtaining and/or reviewing separately obtained history. Performing a medically appropriate examination and/ evaluation.  Counseling and educating the patient/family/caregiver.  Ordering medications, tests, or procedures.  Referring and communicating with other health care professionals.  Documenting clinical information in EPIC.  Independently interpreting results and communicating results to patient/family/caregiver.  Care coordination.

## 2024-05-07 NOTE — ANESTHESIA PREPROCEDURE EVALUATION
Date/Time: 05/07/24 0830    Scheduled providers: Dinesh Sosa M.D.; Michael Lovett D.O.    Procedure: IR-GASTROSTOMY PLACEMENT    Diagnosis:             Sepsis (HCC) [A41.9]      AMS (altered mental status) [R41.82]            Sepsis (HCC) [A41.9]      AMS (altered mental status) [R41.82]    Indications:       Dysphagia      patient will be transitioning to home care, remains with NGT.  will need PEG at [facility].    Location: [facility]          Anes H&P:  PAST MEDICAL HISTORY:   73 y.o. female who presents for Procedure(s):  IR-GASTROSTOMY PLACEMENT-Dr. Sosa-Anesthesia.  She has current and past medical problems significant for:    Past Medical History:   Diagnosis Date    Anemia 2/6/2018    Iron def, post-menopausal bleeding.    Back pain     Back pain     Cancer (HCC) 2019    uterine & endometrial    Cough     CVA, old, hemiparesis (HCC) 2/6/2018    Residual left sided weakness    Essential hypertension 2/6/2018    Well controlled on amlodipine 10 mg and metoprolol 100 mg bid.    Eye drainage     Fatigue     Frequent headaches     Frequent urination     Hyperlipidemia     Irregular periods     Painful joint     Palpitations     Post-menopause bleeding 2/6/2018    Menopause in 2010 and bleeding 2012 started to have irregular continuous bleeding.     Pulmonary nodule     Sore muscles     Stroke (HCC)     right sided weakness    Swelling of lower extremity     Vision abnormalities 2/6/2018    Vitamin D deficiency 2/6/2018    Taking 50,000 IU once weekly for 4 yrs.    Weakness     Wears glasses        SMOKING/ALCOHOL/RECREATIONAL DRUG USE:  Social History     Tobacco Use    Smoking status: Never    Smokeless tobacco: Never    Tobacco comments:     n/a   Vaping Use    Vaping Use: Never used   Substance Use Topics    Alcohol use: No    Drug use: No     Social History     Substance and Sexual Activity   Drug Use No       PAST SURGICAL HISTORY:  Past Surgical History:    Procedure Laterality Date    NJ THORACOSCOPY,DX NO BX Left 8/6/2021    Procedure: THORACOSCOPY - HEMOTHORAX EVACUATION, CHEST TUBE PLACEMENT X 2;  Surgeon: John H Ganser, M.D.;  Location: SURGERY [facility];  Service: General    NJ CYSTOSCOPY,INSERT URETERAL STENT Left 8/8/2019    Procedure: CYSTOSCOPY, WITH URETERAL STENT INSERTION;  Surgeon: Jann Garcia M.D.;  Location: SURGERY [facility];  Service: Gynecology    HYSTERECTOMY ROBOTIC XI N/A 8/8/2019    Procedure: HYSTERECTOMY, ROBOT-ASSISTED, USING DA RILEY XI;  Surgeon: Jann Garcia M.D.;  Location: SURGERY [facility];  Service: Gynecology    SALPINGO OOPHORECTOMY Bilateral 8/8/2019    Procedure: SALPINGO-OOPHORECTOMY;  Surgeon: Jann Garcia M.D.;  Location: SURGERY [facility];  Service: Gynecology    NODE BIOPSY SENTINEL  8/8/2019    Procedure: BIOPSY, LYMPH NODE, SENTINEL;  Surgeon: Jann Garcia M.D.;  Location: SURGERY [facility];  Service: Gynecology    NJ HYSTEROSCOPY,DX,SEP PROC  6/19/2019    Procedure: HYSTEROSCOPY, DIAGNOSTIC;  Surgeon: Anel Chicas M.D.;  Location: SURGERY [facility];  Service: Gynecology    DILATION AND CURETTAGE  6/19/2019    Procedure: DILATION AND CURETTAGE;  Surgeon: Anel Chicas M.D.;  Location: SURGERY [facility];  Service: Gynecology    HYSTERECTOMY LAPAROSCOPY         ALLERGIES:   Allergies   Allergen Reactions    Levaquin Itching and Swelling     Swelling of the tongue and face    Penicillins Itching and Swelling    Tramadol Itching and Swelling       MEDICATIONS:  No current facility-administered medications on file prior to encounter.     Current Outpatient Medications on File Prior to Encounter   Medication Sig Dispense Refill    HYDROcodone-acetaminophen (NORCO) 5-325 MG Tab per tablet Take 1 Tablet by mouth every four hours as needed (Severe Pain).      Lacosamide 150 MG Tab Take 150 mg by mouth 2 times a day. Indications: SEIZURE PREVENTION      valacyclovir (VALTREX) 1 GM  Tab Take 1,000 mg by mouth 3 times a day. 7 day course prescribed 4/10/2024.      Melatonin 10 MG Tab Take 10 mg by mouth at bedtime as needed (Sleep).      losartan (COZAAR) 100 MG Tab Take 1 Tablet by mouth 1 time a day as needed (Hypertension) for up to 300 days. BP > 180  Indications: High Blood Pressure Disorder 100 Tablet 3    amLODIPine (NORVASC) 5 MG Tab Take 1 Tablet by mouth every day. 30 Tablet 0    metoprolol SR (TOPROL XL) 100 MG TABLET SR 24 HR Take 1 Tablet by mouth every day. 100 Tablet 3    atorvastatin (LIPITOR) 20 MG Tab Take 1 Tablet by mouth every evening. 100 Tablet 3    olanzapine (ZYPREXA) 10 MG tablet Take 10 mg by mouth at bedtime.      baclofen (LIORESAL) 10 MG Tab Take 10 mg by mouth 3 times a day as needed (Muscle Spasms).         LABS:  Lab Results   Component Value Date/Time    HEMOGLOBIN 10.6 (L) 05/02/2024 1050    HEMATOCRIT 31.3 (L) 05/02/2024 1050    WBC 7.1 05/02/2024 1050     Lab Results   Component Value Date/Time    SODIUM 135 05/02/2024 1050    POTASSIUM 4.0 05/02/2024 1050    CHLORIDE 102 05/02/2024 1050    CO2 22 05/02/2024 1050    GLUCOSE 166 (H) 05/02/2024 1050    BUN 24 (H) 05/02/2024 1050    CALCIUM 8.8 05/02/2024 1050         PREVIOUS ANESTHETICS: See EMR  __________________________________________    Relevant Problems   NEURO   (positive) Acute CVA (cerebrovascular accident) (HCC)   (positive) Seizure (HCC)      CARDIAC   (positive) DVT (deep venous thrombosis) (HCC)   (positive) Essential hypertension   (positive) Hypertensive urgency   (positive) Pulmonary embolism (HCC)         (positive) Acute kidney injury (HCC)      Other   (positive) Acute encephalopathy   (positive) Endometrial cancer (HCC)   (positive) Malignant neoplasm metastatic to brain (HCC)   (positive) Vasogenic edema (HCC)       Physical Exam    Airway   Mallampati: II  TM distance: >3 FB  Neck ROM: full       Cardiovascular - normal exam  Rhythm: regular  Rate: normal  (-) murmur     Dental      Unable to assess dental       Pulmonary - normal exam  Breath sounds clear to auscultation     Abdominal    Neurological - abnormal exam    Comments: Somnolent               Anesthesia Plan    ASA 4   ASA physical status 4 criteria: CVA or TIA - recent (< 3 months)    Plan - general       Airway plan will be ETT          Induction: intravenous    Postoperative Plan: Postoperative administration of opioids is intended.    Pertinent diagnostic labs and testing reviewed    Informed Consent:    Anesthetic plan and risks discussed with patient (Daughter).    Use of blood products discussed with: patient whom consented to blood products.

## 2024-05-07 NOTE — DISCHARGE PLANNING
HTH/SCP TCN chart review completed. Collaborated with RENÉ Sauceda. Discussed current discharge considerations currently noted to home with home healthcare. Appreciate CM following for patient DME needs at discharge.     No new TCN needs identified in patient discharge planning this day. Per review and collaboration with CM, noted patient is scheduled for PEG tube placement today. Please see previous TCN  note 5/3/2024 for further details regarding patient discharge planning. TCN will continue to follow and collaborate with discharge planning team as additional post acute needs arise. Thank you.    Completed  Choice obtained: HH ; see above as this is likely a complex dc at this time  SCP with [facility]

## 2024-05-07 NOTE — ANESTHESIA POSTPROCEDURE EVALUATION
Patient: Jairo Rivas    Procedure Summary       Date: 05/07/24 Room / Location: [facility] Interventional - [facility]    Anesthesia Start: 0835 Anesthesia Stop: 0948    Procedure: IR-GASTROSTOMY PLACEMENT Diagnosis:             Sepsis (HCC)      AMS (altered mental status)            Sepsis (HCC)      AMS (altered mental status)      (Dysphagia)      (patient will be transitioning to home care, remains with [facility].  will need PEG at hospital [place].)    Scheduled Providers: Dinesh Sosa M.D.; Michael Lovett D.O. Responsible Provider: Michael Lovett D.O.    Anesthesia Type: general ASA Status: 4            Final Anesthesia Type: general  Last vitals  BP   Blood Pressure : 129/79    Temp   36.1 °C (96.9 °F)    Pulse   74   Resp   16    SpO2   98 %      Anesthesia Post Evaluation    Patient location during evaluation: PACU  Patient participation: complete - patient participated  Level of consciousness: obtunded/minimal responses    Airway patency: patent  Anesthetic complications: no  Cardiovascular status: hemodynamically stable  Respiratory status: acceptable  Hydration status: euvolemic    PONV: none          No notable events documented.     Nurse Pain Score: 0 (NPRS)

## 2024-05-07 NOTE — DISCHARGE PLANNING
Agency/Facility Name: [facility]  Spoke To: Moiz  Outcome: Nirav lift will come from [place] and will take 2 days.  Bed and wheelchair can be delivered this afternoon.  CAROLINA will give Moiz a call back with the pt's discharge date.    Note:  [facility] does not provide pulse oximeters for adults.    @1526  Agency/Facility Name: [facility]  Outcome: DPA left a voice message for Yomaira [phone].  Awaiting a call back.    @1532  Agency/Facility Name: [facility]  Spoke To: Yomaira  Outcome: Bed and wheelchair can be delivered on Thursday.  [facility] will coordinate with [employer] on transferring the nirav lift and then setting up a   Delivery date.  CAROLINA will find out what address the DME is being delivered to and give Yomaira a call back.

## 2024-05-07 NOTE — DISCHARGE PLANNING
Case Management Discharge Planning    Admission Date: 4/13/2024  GMLOS: 5.1  ALOS: 24    6-Clicks ADL Score: 6  6-Clicks Mobility Score: 6  PT and/or OT Eval ordered: No  Post-acute Referrals Ordered: No  Post-acute Choice Obtained: No  Has referral(s) been sent to post-acute provider:  No      Anticipated Discharge Dispo: Discharge Disposition: D/T to home under HHA care in anticipation of covered skilled care (06)    DME Needed: No    Action(s) Taken: Discussed in IDT rounds, pt would benefit from care conference. Discussed with Palliative and Hospitalist, care conference arranged for Thursday @ 0900.     Pt had PEG tube placed, will trial feedings tomorrow. DME will be delivered Thursday, confirmed with daughter someone will be home to receive DME. Discussed [facility] for tube feeds once RD makes recs.     Escalations Completed: None    Medically Clear: No    Next Steps: Pending Care Conference.     Barriers to Discharge: Medical clearance    Is the patient up for discharge tomorrow: No

## 2024-05-07 NOTE — PROGRESS NOTES
0944: Pt arrived from IR post G-tube placement under anesthesia. Pt is asleep with OPA present. G-tube sight with medipore tape is CDI. Cardiac rhythm appears to be SR.    1020: Pt able to open eyes to RN voice. Doesn't follow commands; withdraws to painful stimuli.     1039: Report to JOB Ching.     1055: Pt back to room via bed with transport. Pt on 1L; tank is 3/4 full.

## 2024-05-07 NOTE — PROGRESS NOTES
"Virtual Visit: Established Patient   This visit was conducted via Zoom using secure and encrypted videoconferencing technology.   The patient was in their home in the [facility].    The patient's identity was confirmed and verbal consent was obtained for this virtual visit.    Subjective:   CC:   F/u    HPI:  Jairo Rivas is a 73 y.o. female presenting for evaluation and management of:    Patient's current situation in the hospital.  Daughter is a bit frustrated with her care and lack of answers.  Daughter speaking for pt.  Known metastatic cancer to her brain, CVA and most recently encephalitis from shingles.  Currently in hospital and receiving antivirals.  Now bedbound, nonverbal and sleeping \"all the time.\"  Concerned about care moving forward and pt wants to be at home.  Has peg tube in place.  Dx recently with keratopathy b/c of \"not closing eyes when sleeping.\"  Likely being discharged in the next 1 to 2 days.  Daughter will be caretaker at home with home health nursing coming.  Not ready for hospice.  Unable to do exam, patient she had finished radiation before hospitalization in April.  Rad oncology- Dr. Live.   Dr. Garcia's team has said that they can't help.      Current medicines (including changes today)  No current facility-administered medications for this visit.     No current outpatient medications on file.     Facility-Administered Medications Ordered in Other Visits   Medication Dose Route Frequency Provider Last Rate Last Admin    FENTANYL CITRATE (PF) 0.05 MG/ML INJ SOLN (WRAPPED)             [START ON 5/9/2024] dexamethasone (Decadron) tablet 2 mg  2 mg Enteral Tube DAILY Michelle Camacho M.D.        [START ON 5/16/2024] dexamethasone (Decadron) tablet 1 mg  1 mg Enteral Tube DAILY Michelle Camacho M.D.        aspirin (Asa) chewable tab 81 mg  81 mg Enteral Tube DAILY Michelle Camacho M.D.   81 mg at 05/07/24 0450    nystatin (Mycostatin) powder   Topical BID Michelle Camacho M.D.   " Given at 05/07/24 0505    modafinil (Provigil) tablet 100 mg  100 mg Enteral Tube QAM Michelle Camacho M.D.   100 mg at 05/07/24 0458    dexamethasone (Decadron) tablet 2 mg  2 mg Enteral Tube Q12HRS Michelle Camacho M.D.   2 mg at 05/07/24 0505    metoprolol tartrate (Lopressor) tablet 100 mg  100 mg Enteral Tube BID Michelle Camacho M.D.   100 mg at 05/07/24 0458    sulfamethoxazole-trimethoprim (Bactrim) 400-80 MG per tablet 1 Tablet  1 Tablet Enteral Tube DAILY Ronny Grove M.D.   1 Tablet at 05/07/24 0459    artificial tears (Eye Lubricant) ophth ointment 1 Application  1 Application Both Eyes Q8HRS Ronny Grove M.D.   1 Application at 05/07/24 1400    enoxaparin (Lovenox) inj 40 mg  40 mg Subcutaneous DAILY AT 1800 Ronny Grove M.D.   40 mg at 05/04/24 1716    insulin regular (HumuLIN R,NovoLIN R) injection  1-6 Units Subcutaneous Q6HRS Ronny Grove M.D.   1 Units at 05/04/24 2354    And    dextrose 50% (D50W) injection 25 g  25 g Intravenous Q15 MIN PRN Sudheer Ruiz M.D.        Metoprolol Tartrate (Lopressor) injection 5 mg  5 mg Intravenous Q5 MIN PRN Sudheer Ruiz M.D.        labetalol (Normodyne/Trandate) injection 10 mg  10 mg Intravenous Q4HRS PRN KATHLEEN Greer M.D.   10 mg at 05/04/24 1223    hydrALAZINE (Apresoline) injection 10 mg  10 mg Intravenous Q4HRS PRN KATHLEEN Greer M.D.   10 mg at 05/01/24 0732    lacosamide (Vimpat) tablet 150 mg  150 mg Enteral Tube BID Sima Bernal M.D.   150 mg at 05/07/24 0459    amLODIPine (Norvasc) tablet 10 mg  10 mg Enteral Tube DAILY Sudheer Ruiz M.D.   10 mg at 05/07/24 0458    Pharmacy Consult: Enteral tube insertion - review meds/change route/product selection  1 Each Other PHARMACY TO DOSE Anand Grover M.D.        atorvastatin (Lipitor) tablet 20 mg  20 mg Enteral Tube Q EVENING Loreto Sarmiento M.D.   20 mg at 05/06/24 1746    losartan (Cozaar) tablet 100 mg  100 mg Enteral Tube DAILY Sudheer Ruiz M.D.   100 mg at 05/07/24  0458    senna-docusate (Pericolace Or Senokot S) 8.6-50 MG per tablet 2 Tablet  2 Tablet Enteral Tube Q EVENING Loreto Sarmiento M.D.   2 Tablet at 05/05/24 1739    And    polyethylene glycol/lytes (Miralax) Packet 1 Packet  1 Packet Enteral Tube DAILY Loreto Sarmiento M.D.   1 Packet at 05/05/24 0429    lansoprazole (Prevacid) solutab 30 mg  30 mg Enteral Tube DAILY Loreto Sarmiento M.D.   30 mg at 05/07/24 0458    HYDROcodone-acetaminophen (Norco) 5-325 MG per tablet 1 Tablet  1 Tablet Enteral Tube Q4HRS PRN Loreto Sarmiento M.D.   1 Tablet at 05/06/24 1745    bisacodyl (Dulcolax) suppository 10 mg  10 mg Rectal DAILY Julio C Rojas M.D.   10 mg at 05/05/24 0427    Pharmacy Consult Request ...Pain Management Review 1 Each  1 Each Other PHARMACY TO DOSE Julio Clements D.O.           Patient Active Problem List    Diagnosis Date Noted    Keratopathy 05/05/2024    Aspiration pneumonitis (HCC) 04/25/2024    Prediabetes 04/18/2024    Hypernatremia 04/16/2024    Vasogenic edema (HCC) 04/16/2024    Constipation 04/14/2024    Shingles 04/14/2024    Acute encephalopathy 04/14/2024    Dysphagia 04/14/2024    Seizure (HCC) 04/14/2024    Sepsis secondary to UTI (HCC) 04/13/2024    Delusional disorders (HCC) 04/11/2024    Brain mass 04/10/2024    Hypertensive urgency 03/27/2024    Malignant neoplasm metastatic to brain (HCC) 03/27/2024    Right lower quadrant abdominal pain 03/27/2024    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) 01/11/2024    DVT (deep venous thrombosis) (HCC) 08/17/2021    Acute kidney injury (HCC) 08/02/2021    Anemia 08/02/2021    Pulmonary embolism (HCC) 07/29/2021    Hemothorax on left 07/29/2021    Tongue burning sensation 03/31/2020    Neuropathy 03/31/2020    Thyroid nodule - benign 12/03/2019    Advance care planning 09/17/2019    Vision abnormalities 02/06/2018    Acute CVA (cerebrovascular accident) (HCC) 02/06/2018    Essential hypertension 02/06/2018    Vitamin D deficiency 02/06/2018  "   Endometrial cancer (HCC) 02/06/2018    Iron deficiency anemia 02/06/2018    Obesity (BMI 30-39.9) 02/06/2018        Objective:   There were no vitals taken for this visit.    Physical Exam:  Patient sleeping and nonverbal.  Discussed everything with patient's daughter on Zoom.    Assessment and Plan:   The following treatment plan was discussed:   \"  1. Acute CVA (cerebrovascular accident) (HCC)        2. Acute encephalopathy        3. Brain mass        4. Endometrial cancer (HCC)        5. Keratopathy        6. Malignant neoplasm metastatic to brain (HCC)        7. Disseminated herpes zoster        \"  Discussed patient's imaging, labs, current hospitalization with the daughter in depth.  I encouraged her to discuss her concerns and frustrations with the current hospitalist, neurologist, radiation oncologist.  Patient and daughter are not ready for the patient to be on hospice as daughter/patient feel encouraged that brain mass has decreased in size.  Discussed evidence of large infarct.  Daughter does feel that she can care for patient at home and is waiting on hospital bed, lift and equipment to help with PEG tube feeding.  Daughter will let me know if she changes her mind regarding hospice care.    Total face to face time over 25 minutes of which over 50% of this visit is spent in counseling, education and outlining a plan of treatment and coordination of care for the above conditions. This included but was not limited to discussion of medication options and potential risks related to the medications, referral and specialty care options. All patient questions were answered        Follow-up: No follow-ups on file.         "

## 2024-05-07 NOTE — WOUND TEAM
[facility]  Inpatient Services  Wound and Skin Care Follow-up    Admission Date: 4/13/2024     Last order of IP CONSULT TO WOUND CARE was found on 4/24/2024 from Hospital Encounter on 4/13/2024     HPI, PMH, SH: Reviewed    Past Surgical History:   Procedure Laterality Date    MN THORACOSCOPY,DX NO BX Left 8/6/2021    Procedure: THORACOSCOPY - HEMOTHORAX EVACUATION, CHEST TUBE PLACEMENT X 2;  Surgeon: John H Ganser, M.D.;  Location: SURGERY [facility];  Service: General    MN CYSTOSCOPY,INSERT URETERAL STENT Left 8/8/2019    Procedure: CYSTOSCOPY, WITH URETERAL STENT INSERTION;  Surgeon: Jann Garcia M.D.;  Location: SURGERY [facility];  Service: Gynecology    HYSTERECTOMY ROBOTIC XI N/A 8/8/2019    Procedure: HYSTERECTOMY, ROBOT-ASSISTED, USING DA RILEY XI;  Surgeon: Jann Garcia M.D.;  Location: SURGERY [facility];  Service: Gynecology    SALPINGO OOPHORECTOMY Bilateral 8/8/2019    Procedure: SALPINGO-OOPHORECTOMY;  Surgeon: Jann Garcia M.D.;  Location: SURGERY [facility];  Service: Gynecology    NODE BIOPSY SENTINEL  8/8/2019    Procedure: BIOPSY, LYMPH NODE, SENTINEL;  Surgeon: Jann Garcia M.D.;  Location: SURGERY [facility];  Service: Gynecology    MN HYSTEROSCOPY,DX,SEP PROC  6/19/2019    Procedure: HYSTEROSCOPY, DIAGNOSTIC;  Surgeon: Anel Chicas M.D.;  Location: SURGERY [facility];  Service: Gynecology    DILATION AND CURETTAGE  6/19/2019    Procedure: DILATION AND CURETTAGE;  Surgeon: Anel Chicas M.D.;  Location: SURGERY [facility];  Service: Gynecology    HYSTERECTOMY LAPAROSCOPY       Social History     Tobacco Use    Smoking status: Never    Smokeless tobacco: Never    Tobacco comments:     n/a   Substance Use Topics    Alcohol use: No     Chief Complaint   Patient presents with    Painful Urination    Constipation     Diagnosis: Sepsis (HCC) [A41.9]  AMS (altered mental status) [R41.82]    Unit where seen by Wound Team: R325/00     WOUND  FOLLOW UP RELATED TO:  sacrum       WOUND TEAM PLAN OF CARE - Frequency of Follow-up:   Nursing to follow dressing orders written for wound care. Contact wound team if area fails to progress, deteriorates or with any questions/concerns if something comes up before next scheduled follow up (See below as to whether wound is following and frequency of wound follow up)  Dressing changes by wound team:                   Weekly - sacrum    WOUND HISTORY:             WOUND ASSESSMENT/LDA    Wound 04/21/24 Full Thickness Wound Sacrum Unknown etiology (Active)   Wound Image    05/07/24 1530   Site Assessment Red;Pale;Pink;Yellow 05/07/24 1530   Periwound Assessment Pink;Intact 05/07/24 1530   Margins Attached edges;Defined edges 05/07/24 1530   Closure Secondary intention 05/07/24 1530   Drainage Amount None 05/07/24 1530   Drainage Description Serosanguineous 05/06/24 2000   Treatments Cleansed;Site care 05/07/24 1530   Offloading/DME Other (comment) 05/02/24 0800   Wound Cleansing Foam Cleanser/Washcloth 05/07/24 1530   Periwound Protectant Barrier Paste;Viscopaste 05/07/24 1530   Dressing Status Clean;Dry;Intact 05/07/24 1530   Dressing Changed Changed 05/07/24 1530   Dressing Cleansing/Solutions Not Applicable 05/07/24 1530   Dressing Options Viscopaste 05/07/24 1530   Dressing Change/Treatment Frequency Every Shift, and As Needed 05/07/24 1530   NEXT Dressing Change/Treatment Date 05/07/24 05/07/24 1530   NEXT Weekly Photo (Inpatient Only) 05/14/24 05/07/24 1530   Wound Team Following Weekly 05/07/24 1530   Non-staged Wound Description Full thickness 05/07/24 1530   Shape irregular, blanching 04/28/24 1100   Wound Odor None 04/28/24 1100   WOUND NURSE ONLY - Time Spent with Patient (mins) 30 04/28/24 1100     Vascular:    TAYLER:   No results found.    Lab Values:    Lab Results   Component Value Date/Time    WBC 7.1 05/02/2024 10:50 AM    RBC 3.28 (L) 05/02/2024 10:50 AM    HEMOGLOBIN 10.6 (L) 05/02/2024 10:50 AM     HEMATOCRIT 31.3 (L) 05/02/2024 10:50 AM    CREACTPROT 0.38 05/06/2024 03:23 AM    HBA1C 6.6 (H) 04/16/2024 02:59 AM         Culture Results show:  Recent Results (from the past 720 hour(s))   CULTURE WOUND W/ GRAM STAIN    Collection Time: 04/16/24  6:53 PM    Specimen: Chest; Wound   Result Value Ref Range    Significant Indicator POS (POS)     Source WND     Site CHEST     Culture Result Heavy growth usual skin guilherme. (A)     Gram Stain Result       Moderate WBCs.  Moderate Gram positive cocci.  Few Gram negative rods.  Few Gram positive rods.  Rare Yeast.      Culture Result Enterococcus faecalis  Heavy growth   (A)     Culture Result Candida albicans  Heavy growth   (A)        Susceptibility    Enterococcus faecalis - MARIOLA     Daptomycin 2 Sensitive mcg/mL     Gent Synergy <=500 Sensitive mcg/mL     Ampicillin <=2 Sensitive mcg/mL     Linezolid 2 Sensitive mcg/mL     Vancomycin 1 Sensitive mcg/mL     Penicillin 1 Sensitive mcg/mL     Tetracycline >8 Resistant mcg/mL       Pain Level/Medicated:  None, Tolerated without pain medication       INTERVENTIONS BY WOUND TEAM:  Chart and images reviewed. Discussed with bedside RN. All areas of concern (based on picture review, LDA review and discussion with bedside RN) have been thoroughly assessed. Documentation of areas based on significant findings. This RN in to assess patient. Performed standard wound care which includes appropriate positioning, dressing removal and non-selective debridement. Pictures and measurements obtained weekly if/when required.    Wound:  SACRUM  Preparation for Dressing removal: Open to air  Cleansed/Non-selectively Debrided with:  No rinse foam soap, Perineal Wipes (Barrier wipes), and Moist warm washcloth  Tana wound: Cleansed with No rinse foam soap, Perineal Wipes (Barrier wipes), and Moist warm washcloth, Prepped with Barrier paste  Primary Dressing:  viscopaste patch  Secondary (Outer) Dressing: barrier paste    Advanced Wound Care  Discharge Planning  Number of Clinicians necessary to complete wound care: 1  Is patient requiring IV pain medications for dressing changes:  No   Length of time for dressing change 5 min. (This does not include chart review, pre-medication time, set up, clean up or time spent charting.)    Interdisciplinary consultation: Patient, Bedside RN, Luanne MARAVILLA (Wound RN).  Pressure injury and staging reviewed with N/A.    EVALUATION / RATIONALE FOR TREATMENT:     Date:  05/07/24  Wound Status:  No change in wound     Wound bed continues to be the same as previous week.  Wounds are not reducing in size, but are not getting worse.  The sacrum continues to just have the few open areas.  Continue with barrier paste, viscopaste patch and barrier paste to help with moisture management, keeping off the moisture from the sacrum.         Goals: Steady decrease in wound area and depth weekly.    NURSING PLAN OF CARE ORDERS:  No new orders this visit    NUTRITION RECOMMENDATIONS   Wound Team Recommendations:  N/A     DIET ORDERS (From admission to next 24h)       Start     Ordered    05/07/24 1124  Diet NPO Restrict to: Strict  ALL MEALS        Question:  Diet NPO Restrict to:  Answer:  Strict    05/07/24 1123    04/18/24 0914  Diet: Diet Tube Feed; Formula: Promot; Promot: Promot Fiber RTH; Goal Rate (mL/Hour): 60; Duration: 24 HR; Tube Feed Time Unit: Hours/Day  ALL MEALS        Comments: Start at 25mL/hr. Do not advance until dietitian consult completed.   Question Answer Comment   Diet Diet Tube Feed    Formula: Promot    Promote: Promot Fiber RTH    Goal Rate (mL/Hour) 60    Route Enteral Tube    Duration 24 HR    Tube Feed Time Unit Hours/Day        04/18/24 0914                    PREVENTATIVE INTERVENTIONS:   Q shift Galen - performed per nursing policy  Q shift pressure point assessments - performed per nursing policy    Surface/Positioning  Low Airloss - Currently in Place  Reposition q 2 hours - Currently in Place  TAPs  Turning system - Currently in Place    Offloading/Redistribution  Heel offloading dressing (Silicone dressing) - Currently in Place  Heel float boots (Prevalon boot) - Currently in Place      Respiratory  Silicone O2 tubing - Currently in Place  Gray Foam Ear protectors - Currently in Place    Containment/Moisture Prevention    Dri-reyna pad - Currently in Place  Purwick/Condom Cath - Currently in Place  Barrier paste - Currently in Place    Mobilization      Unable to assess     Anticipated discharge plans:  TBD        Vac Discharge Needs:  Vac Discharge plan is purely a recommendation from wound team and not a requirement for discharge unless otherwise stated by physician.  Not Applicable Pt not on a wound vac

## 2024-05-07 NOTE — PROGRESS NOTES
Pt presents to IR2. Patient was consented by MD at bedside, confirmed by this RN and consent at bedside. Pt transferred to IR table in supine position. Patient underwent a gastrotomy tube placement by Dr. Sosa. Procedure site was marked by MD and verified using imaging guidance. Pt placed on monitor, prepped and draped in a sterile fashion. Vitals were taken every 5 minutes and remained stable during procedure (see doc flow sheet for results). CO2 waveform capnography was monitored and remained WNL throughout procedure. Report called to [facility] RN. Pt transported by stretcher with RN and anesthesia to [facility].         German MARIOLA Bolus Gastrostomy Feeding Tube 18Fr  REF: [id number]  LOT: [id number]  EXP: 08/15/2026

## 2024-05-07 NOTE — CARE PLAN
The patient is Stable - Low risk of patient condition declining or worsening    Shift Goals  Clinical Goals: q2 turns. NPO at 0000; iris management  Patient Goals: jayla  Family Goals: jayla    Progress made toward(s) clinical / shift goals:    Problem: Hemodynamics  Goal: Patient's hemodynamics, fluid balance and neurologic status will be stable or improve  Outcome: Progressing     Problem: Fluid Volume  Goal: Fluid volume balance will be maintained  Outcome: Progressing     Problem: Urinary - Renal Perfusion  Goal: Ability to achieve and maintain adequate renal perfusion and functioning will improve  Outcome: Progressing       Patient is not progressing towards the following goals:

## 2024-05-08 LAB
ANION GAP SERPL CALC-SCNC: 9 MMOL/L (ref 7–16)
BUN SERPL-MCNC: 27 MG/DL (ref 8–22)
CALCIUM SERPL-MCNC: 9 MG/DL (ref 8.5–10.5)
CHLORIDE SERPL-SCNC: 101 MMOL/L (ref 96–112)
CO2 SERPL-SCNC: 26 MMOL/L (ref 20–33)
CREAT SERPL-MCNC: 0.36 MG/DL (ref 0.5–1.4)
ERYTHROCYTE [DISTWIDTH] IN BLOOD BY AUTOMATED COUNT: 54.1 FL (ref 35.9–50)
GFR SERPLBLD CREATININE-BSD FMLA CKD-EPI: 108 ML/MIN/1.73 M 2
GLUCOSE BLD STRIP.AUTO-MCNC: 120 MG/DL (ref 65–99)
GLUCOSE BLD STRIP.AUTO-MCNC: 124 MG/DL (ref 65–99)
GLUCOSE BLD STRIP.AUTO-MCNC: 140 MG/DL (ref 65–99)
GLUCOSE BLD STRIP.AUTO-MCNC: 184 MG/DL (ref 65–99)
GLUCOSE SERPL-MCNC: 144 MG/DL (ref 65–99)
HCT VFR BLD AUTO: 33 % (ref 37–47)
HGB BLD-MCNC: 11 G/DL (ref 12–16)
MAGNESIUM SERPL-MCNC: 2 MG/DL (ref 1.5–2.5)
MCH RBC QN AUTO: 32.5 PG (ref 27–33)
MCHC RBC AUTO-ENTMCNC: 33.3 G/DL (ref 32.2–35.5)
MCV RBC AUTO: 97.6 FL (ref 81.4–97.8)
PHOSPHATE SERPL-MCNC: 2.6 MG/DL (ref 2.5–4.5)
PLATELET # BLD AUTO: 183 K/UL (ref 164–446)
PMV BLD AUTO: 10.2 FL (ref 9–12.9)
POTASSIUM SERPL-SCNC: 4.4 MMOL/L (ref 3.6–5.5)
RBC # BLD AUTO: 3.38 M/UL (ref 4.2–5.4)
SODIUM SERPL-SCNC: 136 MMOL/L (ref 135–145)
TSH SERPL DL<=0.005 MIU/L-ACNC: 1.49 UIU/ML (ref 0.38–5.33)
WBC # BLD AUTO: 4.6 K/UL (ref 4.8–10.8)

## 2024-05-08 PROCEDURE — 99233 SBSQ HOSP IP/OBS HIGH 50: CPT | Performed by: INTERNAL MEDICINE

## 2024-05-08 RX ADMIN — METOPROLOL 100 MG: 100 TABLET ORAL at 17:48

## 2024-05-08 RX ADMIN — SULFAMETHOXAZOLE AND TRIMETHOPRIM 1 TABLET: 400; 80 TABLET ORAL at 04:57

## 2024-05-08 RX ADMIN — LACOSAMIDE 150 MG: 100 TABLET, FILM COATED ORAL at 04:56

## 2024-05-08 RX ADMIN — DEXAMETHASONE 2 MG: 4 TABLET ORAL at 04:55

## 2024-05-08 RX ADMIN — MODAFINIL 100 MG: 100 TABLET ORAL at 04:56

## 2024-05-08 RX ADMIN — INSULIN HUMAN 1 UNITS: 100 INJECTION, SOLUTION PARENTERAL at 00:46

## 2024-05-08 RX ADMIN — ENOXAPARIN SODIUM 40 MG: 100 INJECTION SUBCUTANEOUS at 17:49

## 2024-05-08 RX ADMIN — ATORVASTATIN CALCIUM 20 MG: 20 TABLET, FILM COATED ORAL at 17:48

## 2024-05-08 RX ADMIN — NYSTATIN: 100000 POWDER TOPICAL at 04:57

## 2024-05-08 RX ADMIN — SENNOSIDES AND DOCUSATE SODIUM 2 TABLET: 50; 8.6 TABLET ORAL at 17:49

## 2024-05-08 RX ADMIN — ASPIRIN 81 MG: 81 TABLET, CHEWABLE ORAL at 04:55

## 2024-05-08 RX ADMIN — LANSOPRAZOLE 30 MG: 30 TABLET, ORALLY DISINTEGRATING ORAL at 05:13

## 2024-05-08 RX ADMIN — MINERAL OIL, AND WHITE PETROLATUM 1 APPLICATION: 425; 573 OINTMENT OPHTHALMIC at 06:00

## 2024-05-08 RX ADMIN — MINERAL OIL, AND WHITE PETROLATUM 1 APPLICATION: 425; 573 OINTMENT OPHTHALMIC at 14:00

## 2024-05-08 RX ADMIN — LACOSAMIDE 150 MG: 100 TABLET, FILM COATED ORAL at 20:43

## 2024-05-08 RX ADMIN — MINERAL OIL, AND WHITE PETROLATUM 1 APPLICATION: 425; 573 OINTMENT OPHTHALMIC at 22:00

## 2024-05-08 RX ADMIN — METOPROLOL 100 MG: 100 TABLET ORAL at 04:56

## 2024-05-08 RX ADMIN — DIBASIC SODIUM PHOSPHATE, MONOBASIC POTASSIUM PHOSPHATE AND MONOBASIC SODIUM PHOSPHATE 500 MG: 852; 155; 130 TABLET ORAL at 09:16

## 2024-05-08 ASSESSMENT — COGNITIVE AND FUNCTIONAL STATUS - GENERAL
EATING MEALS: TOTAL
DRESSING REGULAR UPPER BODY CLOTHING: TOTAL
MOBILITY SCORE: 6
TURNING FROM BACK TO SIDE WHILE IN FLAT BAD: TOTAL
TOILETING: TOTAL
DAILY ACTIVITIY SCORE: 6
HELP NEEDED FOR BATHING: TOTAL
SUGGESTED CMS G CODE MODIFIER MOBILITY: CN
MOVING FROM LYING ON BACK TO SITTING ON SIDE OF FLAT BED: TOTAL
PERSONAL GROOMING: TOTAL
MOVING TO AND FROM BED TO CHAIR: TOTAL
WALKING IN HOSPITAL ROOM: TOTAL
DRESSING REGULAR LOWER BODY CLOTHING: TOTAL
SUGGESTED CMS G CODE MODIFIER DAILY ACTIVITY: CN
STANDING UP FROM CHAIR USING ARMS: TOTAL
CLIMB 3 TO 5 STEPS WITH RAILING: TOTAL

## 2024-05-08 ASSESSMENT — GAIT ASSESSMENTS: GAIT LEVEL OF ASSIST: UNABLE TO PARTICIPATE

## 2024-05-08 ASSESSMENT — PAIN DESCRIPTION - PAIN TYPE
TYPE: ACUTE PAIN
TYPE: ACUTE PAIN

## 2024-05-08 NOTE — DISCHARGE PLANNING
Case Management Discharge Planning    Admission Date: 4/13/2024  GMLOS: 5.1  ALOS: 25    6-Clicks ADL Score: 6  6-Clicks Mobility Score: 6  PT and/or OT Eval ordered: Yes  Post-acute Referrals Ordered: Yes  Post-acute Choice Obtained: Yes  Has referral(s) been sent to post-acute provider:  Yes      Anticipated Discharge Dispo: Discharge Disposition: D/T to home under HHA care in anticipation of covered skilled care (06)    DME Needed: Yes    DME Ordered: Yes    Action(s) Taken: Discussed in IDT rounds, requested order for glucometer, HH, and [facility] tube feeds order. Family requesting second opinion from [facility], referral placed for OP oncology follow up. Plan for care conference tomorrow at 0900.    Escalations Completed: None    Medically Clear: No    Next Steps: Pending DME     Barriers to Discharge: Medical clearance and DME    Is the patient up for discharge tomorrow: No

## 2024-05-08 NOTE — CARE PLAN
The patient is Unstable - High likelihood or risk of patient condition declining or worsening    Shift Goals  Clinical Goals: remove iris, q2 turns, tolerate tube feeds  Patient Goals: ZA  Family Goals: recovery    Progress made toward(s) clinical / shift goals:  Pt on 1L NC tolerating well, SpO2 96%    Patient is not progressing towards the following goals:.    Unable to assess pts orientation status, pt responds to pain only. Q4 nuero checks in place. Pt iris removed, tube feeds started on PEG tube, peg tube site bleeding dressing changed, IR MD aware.Q2 turns in place.     Problem: Knowledge Deficit - Standard  Goal: Patient and family/care givers will demonstrate understanding of plan of care, disease process/condition, diagnostic tests and medications  Outcome: Not Progressing     Problem: Hemodynamics  Goal: Patient's hemodynamics, fluid balance and neurologic status will be stable or improve  Outcome: Not Progressing     Problem: Fluid Volume  Goal: Fluid volume balance will be maintained  Outcome: Not Progressing

## 2024-05-08 NOTE — THERAPY
Physical Therapy   Daily Treatment     Patient Name: Jairo Rivas  Age:  73 y.o., Sex:  female  Medical Record #: [id number]  Today's Date: 5/8/2024     Precautions  Precautions: Fall Risk;Nasogastric Tube;PEG Tube  Comments: PEG placed 5/7; still with NG in    Assessment  Pt known to this therapist; now much below her baseline, last admit declined neurosx intervention for brain met; now essentially nonverbal though had difficulty speaking last session; followed no tracking or mobility commands and resisted all movement in UEs/left LE, right LE flaccid this session with edema noted; now post PEG tube but NG still in place; no tracking to right on command and eye lids remain open if not assisted; 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 Will follow at low frequency to assess change/family training if needed;     Plan    Treatment Plan Status: Continue Current Treatment Plan  Type of Treatment: Bed Mobility, Neuro Re-Education / Balance, Therapeutic Activities, Therapeutic Exercise, Equipment, Family / Caregiver Training, Self Care / Home Evaluation  Treatment Frequency: 1 Time per Week (F/U for family training or if patient is able to participate more)  Treatment Duration: Until Therapy Goals Met    DC Equipment Recommendations:  hospital bed, jerel lift  Discharge Recommendations: Other - (per medical POC/family POC will need 24/7 assist in next setting and possible need for PT for family training)      Subjective/Objective     05/08/24 1125   Cognition    Cognition / Consciousness X   Level of Consciousness Responds to pain   Ability To Follow Commands Unable to Follow 1 Step Commands   Safety Awareness Impaired   Sequencing Impaired   Initiation Impaired   Comments pt moaning, no response to right side baseline CVA related deficits; known to this therapist; completely  different arousal and abilities; ;ast admission dtr declined neurosurgery/brain op   Vision   Vision Comments no tracking to right with command or cold stimuli wiht cloth in bed or EOB   Balance   Sitting Balance (Static) Trace   Sitting Balance (Dynamic) Dependent   Comments no righting reaction when left hand placed to side, does spontaneously flex but otherwise resisting all movement;   Bed Mobility    Supine to Sit Total Assist   Sit to Supine Total Assist   Gait Analysis   Gait Level Of Assist Unable to Participate   Functional Mobility   Sit to Stand Unable to Participate   Bed, Chair, Wheelchair Transfer Unable to Participate   Short Term Goals    Short Term Goal # 1 Pt/family will demonstrate independent rolling for home management within 6 visits.   Short Term Goal # 2 Pt/family will demonstrate independence supine<>sit from raised HOB wihtin 6 visits to reduce caregiver burden.   Short Term Goal # 3 Pt/family will verbalize understandnig of dependent lift/jerel usage wihtin 6 visits for OOB mobility.   Education Group   Role of Physical Therapist Patient Response Patient;Explanation;No Learning Evidence

## 2024-05-08 NOTE — FACE TO FACE
Face to Face Note  -  Durable Medical Equipment    Josue Reyes D.O. - NPI: [id number]  I certify that this patient is under my care and that they had a durable medical equipment(DME)face to face encounter by myself that meets the physician DME face-to-face encounter requirements with this patient on:    Date of encounter:   Patient:                    MRN:                       YOB: 2022  Jairo Rivas  [id number]  1950     The encounter with the patient was in whole, or in part, for the following medical condition, which is the primary reason for durable medical equipment:  CVA    I certify that, based on my findings, the following durable medical equipment is medically necessary:    Enteral Feedings/Supplies/Pumps.    Formula rate ml/feed: Fibersource HN or equivalent via syringe bolus, 5 cartons per day (1 carton at meals and at bedtime) which provides 15,000 kcal and 30 mL free water after each bolus with an additional 120 mL flush 4 times daily     Length of need greater than 90 days    My Clinical findings support the need for the above equipment due to:  Dysphagia

## 2024-05-08 NOTE — DISCHARGE PLANNING
TCN following. HTH/SCP chart reviewed. Noted patient is s/p PEG tube placement. Collaborated with RNEÉ Sauceda. No new TCN needs identified in discharge planning at this time. Per CM, noted goals of care conference is scheduled for tomorrow 5/9 at 9AM with TCN attendance requested. Please see prior TCN note from 5/3/2024 for most recent discharge planning considerations if indicated.     TCN will continue to follow and collaborate with discharge planning team as additional post acute needs arise. Thank you.     Completed  Choice obtained: HH ; see above as this is likely a complex dc at this time. Appreciate CM following for patient DME needs at discharge (please see CM notes 5/6 and 5/7)  SCP with [facility]

## 2024-05-08 NOTE — PROGRESS NOTES
Radiology Progress Note   Author: SHANA Reeves Date & Time created: 5/8/2024  1:17 PM   Date of admission  4/13/2024  Note to reader: this note follows the APSO format rather than the historical SOAP format. Assessment and plan located at the top of the note for ease of use.    Chief Complaint  73 y.o. 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  Hospital course has been complicated by increased altered mentation and she was subsequently diagnosed with varicella-zoster meningitis.  Patient remained encephalopathic despite antiviral therapy.  Most recent head CT from 05/02/2024 shows stable right temporal region hemorrhage with no acute changes.  Family has declined hospice services and would like to continue to treat patient.  IR was consulted to place G-tube for dysphagia and patient underwent G-tube placement with IR Dr. Sosa on 05/07/2024.     Interval History:   05/08/2024 - Bandage surrounding G-tube insertion site partially saturated with blood.  Bandage replaced by bedside RN.    Assessment/Plan     Principal Problem:    Acute encephalopathy  Active Problems:    Acute CVA (cerebrovascular accident) (HCC)    Essential hypertension    Endometrial cancer (HCC)    Obesity (BMI 30-39.9)    Advance care planning    Malignant neoplasm metastatic to brain (HCC)    Sepsis secondary to UTI (HCC)    Constipation    Shingles    Dysphagia    Seizure (HCC)    Hypernatremia    Vasogenic edema (HCC)    Prediabetes    Aspiration pneumonitis (HCC)    Keratopathy      Plan IR  - Slight oozing at G-tube insertion site.  Bandage changes as needed.  - [place] button sutures x 2 will need to be  cut in 10 to 14 days.  Our office will call to schedule; however patient may not be able to return to clinic due to acuity of condition.  There is a possibility that the button sutures will release on their own in which case follow-up clinic appointment is not necessary.   -  -Thank you for allowing Interventional Radiology team to participate in the patients care, if any additional care or requests are needed in the future please do not hesitate to call or place IR order   [phone]           Review of Systems  Physical Exam   Review of Systems   Reason unable to perform ROS: Acuity of condition.      Vitals:    05/08/24 1203   BP: 108/67   Pulse: 83   Resp: 18   Temp: 36.1 °C (96.9 °F)   SpO2: 99%        Physical Exam  Vitals and nursing note reviewed.   Constitutional:       Appearance: She is ill-appearing.   Cardiovascular:      Rate and Rhythm: Normal rate.   Pulmonary:      Effort: No respiratory distress.   Abdominal:      Palpations: Abdomen is soft.      Comments: G-tube to upper abdomen   Skin:     General: Skin is warm and dry.   Neurological:      Mental Status: She is lethargic.      Comments: Opens eyes and mumbles incoherently when name is called  Not following commands             Labs    Recent Labs     05/08/24  0508   WBC 4.6*   RBC 3.38*   HEMOGLOBIN 11.0*   HEMATOCRIT 33.0*   MCV 97.6   MCH 32.5   MCHC 33.3   RDW 54.1*   PLATELETCT 183   MPV 10.2     Recent Labs     05/08/24  0508   SODIUM 136   POTASSIUM 4.4   CHLORIDE 101   CO2 26   GLUCOSE 144*   BUN 27*   CREATININE 0.36*   CALCIUM 9.0     Recent Labs     05/08/24  0508   CREATININE 0.36*     IR-GASTROSTOMY PLACEMENT   Final Result      1.  Fluoroscopic guided percutaneous gastrostomy with placement of an 18-Prydeinig MARIOLA balloon gastrostomy catheter.         2. The gastrostomy tube may be used for tube feeds 18 hours after placement. Thereafter, liquid diet orders per the referring physician. Saline flush daily as per protocol.      3. In  10-14 days, the anchor button suture or sutures should be cut, releasing the anchor or anchors into the stomach. The suture anchors will then pass through the GI tract as desired. This can be performed in the interventional radiology department if    desired. Call [phone] any weekday [phone] hours to make an appointment.      US-EXTREMITY VENOUS LOWER BILAT   Final Result      US-EXTREMITY VENOUS UPPER UNILAT LEFT   Final Result      DX-ABDOMEN FOR TUBE PLACEMENT   Final Result      Feeding tube terminates within the gastric fundus.      DX-ABDOMEN FOR TUBE PLACEMENT   Final Result         1.  Nonspecific bowel gas pattern in the upper abdomen.   2.  Dobbhoff tube coiled within the esophagus with tip projecting over the expected location of the gastric antrum. Recommend withdrawal and replacement.   3.  Hazy lung base opacity suggesting edema and/or infiltrates.      CT-HEAD W/O   Final Result   Addendum (preliminary) 1 of 1   Addendum: There is trace bilateral dependent intraventricular hemorrhages    identified, likely representing redistribution of previously visualized    subarachnoid hemorrhage.      Final         1.  No acute intracranial abnormality is identified, there are nonspecific white matter changes, commonly associated with small vessel ischemic disease.  Associated mild cerebral atrophy is noted.         DX-CHEST-LIMITED (1 VIEW)   Final Result      1.  Hazy perihilar and lower lobe opacities represent areas of atelectasis or potentially pneumonitis.      IR-MIDLINE CATHETER INSERTION WO GUIDANCE > AGE 3   Final Result                  Ultrasound-guided midline placement performed by qualified nursing staff    as above.          CT-HEAD W/O   Final Result      1.  9 x 7 mm focus of hemorrhage in the right temporal region, suspected subarachnoid versus less likely intraparenchymal. It is stable since noncontrast head CT performed one day prior.         CT-CTA NECK WITH & W/O-POST PROCESSING   Final  Result      No occlusion, hemodynamically significant stenosis or dissection of the neck arteries.      CT-CTA HEAD WITH & W/O-POST PROCESS   Final Result   Addendum (preliminary) 1 of 1   Critical value called by Dr. Hector Dodson to Physician: RONAN NOGUERA   at 4/25/2024 3:48 PM.      Final      1.  9 x 7 mm focus of hemorrhage in the right temporal region which is probably subarachnoid, less likely intraparenchymal.   2.  Focal occlusion of right M2 MCA inferior division with distal opacification. This could be a focal high-grade stenosis versus vessel occlusion. It could also be vasospasm as the subarachnoid hemorrhage is present in this region.   3.  Other findings as above.      Efforts are underway to contact referring physician with results at time of dictation.            EC-ECHOCARDIOGRAM COMPLETE W/O CONT   Final Result      DX-CHEST-PORTABLE (1 VIEW)   Final Result      Feeding tube tip projects in the stomach.      Hazy left basilar opacity, atelectasis and/or mild pneumonitis.      DX-LUMBAR PUNCTURE FOR DIAGNOSIS   Final Result      Fluoroscopic-guided lumbar puncture as described above.      IR-MIDLINE CATHETER INSERTION WO GUIDANCE > AGE 3   Final Result                  Ultrasound-guided midline placement performed by qualified nursing staff    as above.          DX-ABDOMEN FOR TUBE PLACEMENT   Final Result         1.  Nonspecific bowel gas pattern in the upper abdomen.   2.  Dobbhoff tube is coiled within the stomach, the tip terminates overlying the expected location of the gastric cardia.   3.  Hazy left lower lobe infiltrate.      MR-BRAIN-WITH & W/O   Final Result      1.  Significant interval decrease in size of right frontal brain metastasis since previous exam slight interval decrease in surrounding vasogenic edema. Smaller irregular rim of increased diffusion weighted signal intensity about this treated metastasis    consistent with cytotoxic edema.      2.  New large area of  acute infarction in the right posterior frontal, parietal, posterior temporal, and occipital lobes associated gyriform enhancement in the right lateral occipital lobe and right peritrigonal white matter.      3.  Smaller chronic wedge-shaped area of infarction involving the left posterior basal ganglia extending into the left posterior frontal periventricular white matter      4.  Small areas of chronic lacunar type infarction involving the left side of the elaine and left brachium pontis with surrounding gliosis.      5.  Small foci of chronic hemosiderin deposition noted in the left posterior temporal and occipital white matter, as well as in the treated right frontal brain metastasis      6.  Age-related cerebral atrophy.      DX-ABDOMEN FOR TUBE PLACEMENT   Final Result         1.  Nonspecific bowel gas pattern in the upper abdomen.   2.  Dobbhoff tube is coiled within the stomach, the tip terminates overlying the expected location of the gastric fundus, physician similar to prior study.   3.  Left lower lobe infiltrate      DX-ABDOMEN FOR TUBE PLACEMENT   Final Result         1.  Nonspecific bowel gas pattern in the upper abdomen.   2.  Dobbhoff tube is coiled within the stomach, the tip terminates overlying the expected location of the gastric fundus.   3.  Left basilar atelectasis      DX-ABDOMEN FOR TUBE PLACEMENT   Final Result      Feeding tube looped in the stomach the distal tip in the gastric fundus.      CT-HEAD WITH & W/O   Final Result      1.  Redemonstration of right frontal lobe mass, highly concerning for metastasis. Associated vasogenic edema and a 2.5 mm right to left midline shift. No progressive mass effect. No herniation.   2.  Nonspecific ill-defined enhancement in the right parietal lobe.   3.  No new large vascular structure infarct. No new intracranial hemorrhage.      DX-CHEST-LIMITED (1 VIEW)   Final Result      No significant interval change.      DX-ABDOMEN COMPLETE WITH AP OR PA CXR  "  Final Result      1. No acute cardiopulmonary abnormality.   2. Chronic left lower lobe scarring.   3. Nonobstructive bowel gas pattern. Small amount of stool throughout the colon.        INR   Date Value Ref Range Status   05/07/2024 1.04 0.87 - 1.13 Final     Comment:     INR - Non-therapeutic Reference Range: 0.87-1.13  INR - Therapeutic Reference Range: 2.0-4.0       No results found for: \"POCINR\"     Intake/Output Summary (Last 24 hours) at 5/8/2024 1317  Last data filed at 5/8/2024 0440  Gross per 24 hour   Intake 220 ml   Output 950 ml   Net -730 ml      I have personally reviewed the above labs and imaging      I have performed a physical exam and reviewed and updated ROS and Plan today (5/8/2024).     50 minutes in directly providing and coordinating care and extensive data review.  No time overlap and excludes procedures.  "

## 2024-05-08 NOTE — PROGRESS NOTES
"Hospital Medicine Daily Progress Note    Date of Service  5/8/2024    Chief Complaint  Confusion.    Hospital Course  Jairo Rivas is a 74yo F with hx of endometrial carcinoma, brain metastasis w/p radiation tx, and prior CVA.  An MRI from 4/29 frontal brain metastases with vasogenic edema and acute infarction.  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  Neurology and infectious disease consulted.  A CT head 4/21 noted a 9 x 7 mm hemorrhage in the right temporal region.  Ophthalmology was consulted for vision changes.    Interval Problem Update  5/6: Asleep, eyes nearly closed bilaterally. Some facial edema noted with nasal canula causing slight swelling along side face where it is resting against her skin.  Non responsive to touch/voice.  Daughter, Carolyn, and Carolyn's tabithaanceMichael at bedside.  PEG tube to be placed 5/7.  Discussed with case management to see if home equipment is available to possible send patient home with family.  Doubt LTAC needs per my colleagues notes no accepting SNF.      5/5: Patient not responsive to questions.  She did say \"no\" when I attempted to move her arms.  Daughter is at bedside and we reviewed her mother's goals of care. Daughter, Carolyn, stated she was still wanting everything done and CPR/Intubation if needed.  Carolyn was concerned that her mothers eyes looked swollen and seemed dissatisfied with her eyes.  I alerted her that the bigger picture is that her mother is currently obtunded with metastatic brain lesions, stroke, hemorrhage and a HSV encephalitis.  I addressed that we are continuing care in hopes she has improved mentation but that I was concerned that it may be possible she does not get any better.  I alerted Carolyn, I would see what additional treatment could be done for her mother's eyes.      Patient was seen and examined at bedside.  I have personally reviewed and interpreted vitals, labs, and imaging.    5/7.  Afebrile.  Intermittent " hypotension.  On 1-2 L nasal cannula.  Patient is obtunded.  Minimally responsive.  PEG tube was placed this morning.  No family currently at bedside.  Plan for goals of care conference on Thursday.  5/8.  Afebrile.  Stable vitals.  On 1-2L NC.  Patient is obtunded.  Does not follow commands.  Developing contractures.  Discussed with social work, dietary.  Plan for goals of care meeting tomorrow.  Current plan is for home health with DME  Wbc 4.6  Hgb 11  P 183    I have discussed this patient's plan of care and discharge plan at IDT rounds today with Case Management, Nursing, Nursing leadership, and other members of the IDT team.    Consultants/Specialty  infectious disease, neurology, ophthalmology, and gynecology    Code Status  Full Code    Disposition  The patient is not medically cleared for discharge to home or a post-acute facility.  Anticipate discharge to: home with organized home healthcare and close outpatient follow-up    I have placed the appropriate orders for post-discharge needs.    Review of Systems  Review of Systems   Unable to perform ROS: Patient unresponsive        Physical Exam  Temp:  [35.9 °C (96.7 °F)-36.2 °C (97.2 °F)] 36.2 °C (97.2 °F)  Pulse:  [66-87] 87  Resp:  [11-18] 16  BP: ()/(54-89) 121/78  SpO2:  [97 %-100 %] 98 %    Physical Exam  Vitals reviewed.   Constitutional:       General: She is sleeping.      Appearance: She is obese. She is ill-appearing.   HENT:      Head: Normocephalic.      Nose:      Comments: Enteral tube in nare     Mouth/Throat:      Mouth: Mucous membranes are dry.      Pharynx: Oropharyngeal exudate present.   Eyes:      Comments: Left eye corneal edema.  Some lubrication on cornea.  Left eyelid does not appear to close as well as the right.   Cardiovascular:      Rate and Rhythm: Normal rate and regular rhythm.      Heart sounds: No murmur heard.  Pulmonary:      Effort: No respiratory distress.      Breath sounds: No wheezing.   Abdominal:       General: There is no distension.      Palpations: Abdomen is soft.   Musculoskeletal:      Right lower leg: Edema present.      Left lower leg: Edema present.      Comments: Right leg with more edema than left. Right arm chronic flexion contraction.   Lymphadenopathy:      Cervical: No cervical adenopathy.   Neurological:      Mental Status: She is lethargic.      Cranial Nerves: Cranial nerve deficit present.      Motor: Weakness present.      Coordination: Coordination abnormal.         Fluids    Intake/Output Summary (Last 24 hours) at 5/8/2024 0538  Last data filed at 5/8/2024 0019  Gross per 24 hour   Intake 1020 ml   Output --   Net 1020 ml       Laboratory  Recent Labs     05/08/24  0508   WBC 4.6*   RBC 3.38*   HEMOGLOBIN 11.0*   HEMATOCRIT 33.0*   MCV 97.6   MCH 32.5   MCHC 33.3   RDW 54.1*   PLATELETCT 183   MPV 10.2             Recent Labs     05/07/24  0816   INR 1.04               Imaging  IR-GASTROSTOMY PLACEMENT   Final Result      1.  Fluoroscopic guided percutaneous gastrostomy with placement of an 18-[place] MARIOLA balloon gastrostomy catheter.         2. The gastrostomy tube may be used for tube feeds 18 hours after placement. Thereafter, liquid diet orders per the referring physician. Saline flush daily as per protocol.      3. In 10-14 days, the anchor button suture or sutures should be cut, releasing the anchor or anchors into the stomach. The suture anchors will then pass through the GI tract as desired. This can be performed in the interventional radiology department if    desired. Call [phone] any weekday [phone] hours to make an appointment.      US-EXTREMITY VENOUS LOWER BILAT   Final Result      US-EXTREMITY VENOUS UPPER UNILAT LEFT   Final Result      DX-ABDOMEN FOR TUBE PLACEMENT   Final Result      Feeding tube terminates within the gastric fundus.      DX-ABDOMEN FOR TUBE PLACEMENT   Final Result         1.  Nonspecific bowel gas pattern in the upper abdomen.   2.  Dobbhoff tube coiled  within the esophagus with tip projecting over the expected location of the gastric antrum. Recommend withdrawal and replacement.   3.  Hazy lung base opacity suggesting edema and/or infiltrates.      CT-HEAD W/O   Final Result   Addendum (preliminary) 1 of 1   Addendum: There is trace bilateral dependent intraventricular hemorrhages    identified, likely representing redistribution of previously visualized    subarachnoid hemorrhage.      Final         1.  No acute intracranial abnormality is identified, there are nonspecific white matter changes, commonly associated with small vessel ischemic disease.  Associated mild cerebral atrophy is noted.         DX-CHEST-LIMITED (1 VIEW)   Final Result      1.  Hazy perihilar and lower lobe opacities represent areas of atelectasis or potentially pneumonitis.      IR-MIDLINE CATHETER INSERTION WO GUIDANCE > AGE 3   Final Result                  Ultrasound-guided midline placement performed by qualified nursing staff    as above.          CT-HEAD W/O   Final Result      1.  9 x 7 mm focus of hemorrhage in the right temporal region, suspected subarachnoid versus less likely intraparenchymal. It is stable since noncontrast head CT performed one day prior.         CT-CTA NECK WITH & W/O-POST PROCESSING   Final Result      No occlusion, hemodynamically significant stenosis or dissection of the neck arteries.      CT-CTA HEAD WITH & W/O-POST PROCESS   Final Result   Addendum (preliminary) 1 of 1   Critical value called by Dr. Hector Dodson to Physician: RONAN NOGUERA   at 4/25/2024 3:48 PM.      Final      1.  9 x 7 mm focus of hemorrhage in the right temporal region which is probably subarachnoid, less likely intraparenchymal.   2.  Focal occlusion of right M2 MCA inferior division with distal opacification. This could be a focal high-grade stenosis versus vessel occlusion. It could also be vasospasm as the subarachnoid hemorrhage is present in this region.   3.  Other  findings as above.      Efforts are underway to contact referring physician with results at time of dictation.            EC-ECHOCARDIOGRAM COMPLETE W/O CONT   Final Result      DX-CHEST-PORTABLE (1 VIEW)   Final Result      Feeding tube tip projects in the stomach.      Hazy left basilar opacity, atelectasis and/or mild pneumonitis.      DX-LUMBAR PUNCTURE FOR DIAGNOSIS   Final Result      Fluoroscopic-guided lumbar puncture as described above.      IR-MIDLINE CATHETER INSERTION WO GUIDANCE > AGE 3   Final Result                  Ultrasound-guided midline placement performed by qualified nursing staff    as above.          DX-ABDOMEN FOR TUBE PLACEMENT   Final Result         1.  Nonspecific bowel gas pattern in the upper abdomen.   2.  Dobbhoff tube is coiled within the stomach, the tip terminates overlying the expected location of the gastric cardia.   3.  Hazy left lower lobe infiltrate.      MR-BRAIN-WITH & W/O   Final Result      1.  Significant interval decrease in size of right frontal brain metastasis since previous exam slight interval decrease in surrounding vasogenic edema. Smaller irregular rim of increased diffusion weighted signal intensity about this treated metastasis    consistent with cytotoxic edema.      2.  New large area of acute infarction in the right posterior frontal, parietal, posterior temporal, and occipital lobes associated gyriform enhancement in the right lateral occipital lobe and right peritrigonal white matter.      3.  Smaller chronic wedge-shaped area of infarction involving the left posterior basal ganglia extending into the left posterior frontal periventricular white matter      4.  Small areas of chronic lacunar type infarction involving the left side of the elaine and left brachium pontis with surrounding gliosis.      5.  Small foci of chronic hemosiderin deposition noted in the left posterior temporal and occipital white matter, as well as in the treated right frontal brain  metastasis      6.  Age-related cerebral atrophy.      DX-ABDOMEN FOR TUBE PLACEMENT   Final Result         1.  Nonspecific bowel gas pattern in the upper abdomen.   2.  Dobbhoff tube is coiled within the stomach, the tip terminates overlying the expected location of the gastric fundus, physician similar to prior study.   3.  Left lower lobe infiltrate      DX-ABDOMEN FOR TUBE PLACEMENT   Final Result         1.  Nonspecific bowel gas pattern in the upper abdomen.   2.  Dobbhoff tube is coiled within the stomach, the tip terminates overlying the expected location of the gastric fundus.   3.  Left basilar atelectasis      DX-ABDOMEN FOR TUBE PLACEMENT   Final Result      Feeding tube looped in the stomach the distal tip in the gastric fundus.      CT-HEAD WITH & W/O   Final Result      1.  Redemonstration of right frontal lobe mass, highly concerning for metastasis. Associated vasogenic edema and a 2.5 mm right to left midline shift. No progressive mass effect. No herniation.   2.  Nonspecific ill-defined enhancement in the right parietal lobe.   3.  No new large vascular structure infarct. No new intracranial hemorrhage.      DX-CHEST-LIMITED (1 VIEW)   Final Result      No significant interval change.      DX-ABDOMEN COMPLETE WITH AP OR PA CXR   Final Result      1. No acute cardiopulmonary abnormality.   2. Chronic left lower lobe scarring.   3. Nonobstructive bowel gas pattern. Small amount of stool throughout the colon.           Assessment/Plan  * Acute encephalopathy- (present on admission)  Assessment & Plan  5/8/2024  Multifactorial   Manage encephalopathy is  Acute stroke on MRI 4/20  Subarachnoid hemorrhage on CT  LP with CSF positive for VZV done 4/22  Continue on acyclovir  Monitor blood pressure closely,  neurochecks every 4 hours    Keratopathy  Assessment & Plan  5/8/2024  Exposure from not closing her eyes completely  Ophthalmology consulted during stay.  Eye drops and lubrications  Tape eyes closed  when sleeping.    Aspiration pneumonitis (HCC)- (present on admission)  Assessment & Plan  5/8/2024  Patient high risk for aspiration pneumonitis/pneumonia.   Currently on room air  Monitor oxygen requirement closely    Prediabetes- (present on admission)  Assessment & Plan  5/8/2024  Monitor glucose level closely.  Had been on decadron  On insulin regimen    Vasogenic edema (HCC)- (present on admission)  Assessment & Plan  5/8/2024  Continuing decadron taper per Dr. Live's recommendations. He also does not believe that there are any significant changes in her brain imaging from this admission that could explain her somnolence/presentation. She is on the correct medication (decadron) for treating the edema  Improvement on MRI on 4/21  - 5/1:  tapering  decadron 4mg IV bid x 13 days to 2mg IV bid x 7 days, then 2mg daily x 7 days, then 1 mg daily x 7 days.  5/2 CT head w/o contrast improved right frontal lobe edema.    Hypernatremia  Assessment & Plan  5/8/2024  Improved    Seizure (HCC)- (present on admission)  Assessment & Plan  5/8/2024  Continue lacosamide 150 mg BID per neurology  EEG and cEEG negative for seizures     Neurology consulted during admit.    Dysphagia- (present on admission)  Assessment & Plan  5/8/2024  Possibly due to CVA vs brain metastases and altered mental status in setting of infection.  Patient will remain NPO as she failed SLP evaluation 4/17  NG tube placement required IRIS. Tube was removed for MRI and family refused NG tube to be replaced. Extensive discussion with family regarding nutrition options. Eventually agreed to let patient get NG tube with lorazepam sedation and IRIS. No one certified to place NG tube with IRIS available on 4/21. Vascular access also lost on 4/21 and midline unable to be placed without VATs  -aspiration precautions  -Continue lansoprazole  -[id number] glucose checks  5/3:  order for PEG tube placed for 5/7 by IR.  In order for patient to go home with daughter  and HH.    Shingles- (present on admission)  Assessment & Plan  5/8/2024  S/p acyclovir x14 days.    Constipation- (present on admission)  Assessment & Plan  5/8/2024  Resolved  - Bowel regimen    Sepsis secondary to UTI (HCC)- (present on admission)  Assessment & Plan  Sepsis resolved    Malignant neoplasm metastatic to brain (HCC)- (present on admission)  Assessment & Plan  5/8/2024  Brain mets noted during hospitalization on 3/27/2024 with vasogenic edema, MRI on 4/11 shows continued brain mets with vasogenic edema.  MRI 4/21 shows decrease in tumor size and improvement in edema.  -continue seizure prophylaxis with lacosamide 150 mg BID  -tapering dexamethasone   Ophthalmology evaluated for concern of vision changes    Advance care planning- (present on admission)  Assessment & Plan  5/8/2024  At this time patient's family want her to be full code.  Palliative care consulted and discussed with family the goals of care. This remains the same.  -Full code  -Family goals of care is to make patient best shape to be discharged to be follow-up by his doctors in [place].  -Family updates daily with her guarded prognosis  5/1: there was discussion with daughter at bedside options now that her acyclovir has completed.  daughter prefers in this order:   1. higher level of care, [facility] transfer center is looking into transfer to [facility] for neuro oncologist.   2 skilled nursing facility is acceptable to the daughter   3 home with home health and home equipment which would include hospital bed and Nirav lift.    4. consideration for hospice care if no other treatment options remain.  5/3:  unable to transfer to [facility], no accepting SNF and insurance will not approve due to patient unable to participate with PT/OT.  Plan for  home with duaghter and HH, home equipment and PEG tube feeds.    Obesity (BMI 30-39.9)- (present on admission)  Assessment & Plan  Can discuss when mentation improves.    Endometrial cancer  (HCC)- (present on admission)  Assessment & Plan  5/8/2024  Followed by Dr. Garcia outpatient and sees radiation oncology Dr Live.  Received immunotherapy at [facility] in 2021 with initial good response. Reached out to Dr. Live who does not believe imaging from 4/14 shows significant changes compared to MRI month prior.  Complicated by metastases to brain among other locations, likely contributing to her altered mental status. Mets re demonstrated without significant change on CTH on admission.  - Decadron for vasogenic edema  - Neurochecks every 4 hours  - Continue aspiration, fall, seizure precaution.    Essential hypertension- (present on admission)  Assessment & Plan  5/8/2024  Monitor blood pressure closely    Acute CVA (cerebrovascular accident) (HCC)- (present on admission)  Assessment & Plan  5/8/2024  MRI on 4/20 noted with right frontal brain metastasis with vasogenic edema, acute infarction.  CT head from 4/25 noted with 9X 7 mm of hemorrhage in the right temporal region.   Neurology consult appreciated.          VTE prophylaxis: Lovenox    I have performed a physical exam and reviewed and updated ROS and Plan today (5/8/2024). In review of yesterday's note (5/7/2024), there are no changes except as documented above.    Greater than 50 minutes spent prepping to see patient (e.g. review of tests) obtaining and/or reviewing separately obtained history. Performing a medically appropriate examination and/ evaluation.  Counseling and educating the patient/family/caregiver.  Ordering medications, tests, or procedures.  Referring and communicating with other health care professionals.  Documenting clinical information in EPIC.  Independently interpreting results and communicating results to patient/family/caregiver.  Care coordination.

## 2024-05-08 NOTE — THERAPY
Occupational Therapy  Daily Treatment     Patient Name: Jairo Rivas  Age:  73 y.o., Sex:  female  Medical Record #: [id number]  Today's Date: 5/8/2024     Precautions  Precautions: Fall Risk, Nasogastric Tube, PEG Tube  Comments: PEG placed 5/7; still with NG in    Assessment    Pt currently limited by decreased functional mobility, activity tolerance, cognition, sensation, strength, AROM, coordination, balance,  which are affecting pt's ability to complete ADLs/IADLs at baseline. Pt would benefit from OT services in the acute care setting to maximize functional recovery.      Plan    Treatment Plan Status: (P) Continue Current Treatment Plan  Type of Treatment: Self Care / Activities of Daily Living, Adaptive Equipment, Cognitive Skill Development, Neuro Re-Education / Balance, Therapeutic Exercises, Therapeutic Activity, Manual Therapy Techniques, Orthotics Treatment, Family / Caregiver Training  Treatment Frequency: 3 Times per Week  Treatment Duration: Until Therapy Goals Met    DC Equipment Recommendations: Unable to determine at this time  Discharge Recommendations: (P) Recommend post-acute placement for additional occupational therapy services prior to discharge home       05/08/24 1118   Passive ROM Upper Body   Comments R UE in flexor tone, noted increased tone L UE as well   Upper Body Muscle Tone   Upper Body Muscle Tone  X   Rt Upper Extremity Muscle Tone Non Functional   Lt Upper Extremity Muscle Tone Non Functional   Sitting Upper Body Exercises   Sitting Upper Body Exercises Yes   Comments scap shldr mobs B UEs   Hand Strengthening   Comment hand/finger mobes   Balance   Sitting Balance (Static) Trace   Sitting Balance (Dynamic) Dependent   Standing Balance (Static) Trace   Weight Shift Sitting Absent   Activities of Daily Living   Eating Total Assist   Grooming Total Assist   Bathing Total Assist   Upper Body Dressing Total Assist   Lower Body Dressing Total Assist   Toileting Total  Assist   Functional Mobility   Sit to Stand Unable to Participate   Bed, Chair, Wheelchair Transfer Unable to Participate   Short Term Goals   Short Term Goal # 1 will sit EOB unsupported for 1min in prep for seated ADLs   Goal Outcome # 1 Progressing slower than expected   Short Term Goal # 2 seated g/h with mod A; [place] PRN   Goal Outcome # 2 Progressing slower than expected   Short Term Goal # 3 pt will follow 25% of commands during session in prep for ADLs   Goal Outcome # 3 Progressing slower than expected   Short Term Goal # 4 pt will keep eyes open throughout session w/o v/cs in prep for ADLs   Goal Outcome # 4 Progressing slower than expected   Occupational Therapy Treatment Plan    O.T. Treatment Plan Continue Current Treatment Plan   Anticipated Discharge Equipment and Recommendations   Discharge Recommendations Recommend post-acute placement for additional occupational therapy services prior to discharge home

## 2024-05-08 NOTE — THERAPY
Speech Language Pathology   Daily Treatment     Patient Name: Jairo Rivas  AGE:  73 y.o., SEX:  female  Medical Record #: [id number]  Date of Service: 5/8/2024    Precautions: Fall Risk, Swallow Precautions, PEG Tube on 5/7     Subjective  Pt cleared by RN for dysphagia tx. Per RN, pt obtunded- only responding to pain. Pt was received alert, eyes open. On 1L via NC. Pt appreciated to moan/vocalize when positioned into [place]'s. Attempts to vocalize throughout tx- nonsensical.     Assessment  Pt appreciated to follow directions to open mouth with max cues and 1:1 model. SLP provided oral care removing marked dried secretions from labial surface of teeth. Notable white/frothy secretions present on tongue. As oral care progressed, pt appreciated to bite down on toothbrush requiring SLP to manually remove from the oral cavity. With max cues, pt w/ adequate oral acceptance for ice chip trials. Pt appreciated to blow bubbles- expectorating +7/8 ice chips trialed. Prolonged bolus holding appreciated on single trial not expectorated. Suspect reflexive swallow of melted ice chips to delayed HLE palpation.     Clinical Impressions  Pt w/ what appears to be improved JUJU as evidenced via increased moaning/vocalizations and expectoration of ice chips on this date. Despite increased JUJU, pt w/ severe oral phase deficits. Not appropriate for an instrumental swallow study at this time. Given complex medical course, swallow prognosis is highly guarded. Would recommend NPO w/ PEG for nutrition/hydration. Risk of aspiration or related sequelae can be mitigated with frequent, thorough, oral care. Family care conference planned for tomorrow (5/9). SLP to monitor for POC. RN updated and aware.     Recommendations  Treatment Completed: Dysphagia Treatment  Diet Consistency: NPO/PEG  Instrumentation: Instrumental swallow study pending clinical progress  Medication: Non Oral  Oral Care: Q2h    SLP Treatment Plan  Treatment  "Plan: Dysphagia Treatment  SLP Frequency: 2x Per Week  Estimated Duration: Until Therapy Goals Met    Anticipated Discharge Needs  Discharge Recommendations:  (at [facility], family conference scheduled for 5/9)  Therapy Recommendations Upon DC: Dysphagia Training, Patient / Family / Caregiver Education, Community Re-Integration    Patient / Family Goals  Patient / Family Goal #1: \"She eats at home\" per dtr  Goal #1 Outcome: Progressing slower than expected  Short Term Goals  Short Term Goal # 1: Pt will participate in prfdg to determine readiness for PO diet vs. diagnostic evaluation.  Goal Outcome # 1: Progressing slower than expected    Makeda Madden, SLP  "

## 2024-05-08 NOTE — CARE PLAN
The patient is Watcher - Medium risk of patient condition declining or worsening    Shift Goals  Clinical Goals: tube feeding, q2 turns  Patient Goals: ZA  Family Goals: ZA    Progress made toward(s) clinical / shift goals:    Problem: Skin Integrity  Goal: Skin integrity is maintained or improved  Outcome: Progressing  Note: Skin assessed, heel boots applied, wound dressing completed. Pt q2 turns        Patient is not progressing towards the following goals:

## 2024-05-09 ENCOUNTER — APPOINTMENT (OUTPATIENT)
Dept: RADIOLOGY | Facility: MEDICAL CENTER | Age: 74
DRG: 871 | End: 2024-05-09
Attending: NURSE PRACTITIONER
Payer: MEDICARE

## 2024-05-09 LAB
GLUCOSE BLD STRIP.AUTO-MCNC: 115 MG/DL (ref 65–99)
GLUCOSE BLD STRIP.AUTO-MCNC: 122 MG/DL (ref 65–99)
GLUCOSE BLD STRIP.AUTO-MCNC: 129 MG/DL (ref 65–99)
GLUCOSE BLD STRIP.AUTO-MCNC: 130 MG/DL (ref 65–99)
GLUCOSE BLD STRIP.AUTO-MCNC: 138 MG/DL (ref 65–99)
GLUCOSE BLD STRIP.AUTO-MCNC: 99 MG/DL (ref 65–99)

## 2024-05-09 PROCEDURE — 99233 SBSQ HOSP IP/OBS HIGH 50: CPT | Mod: 25 | Performed by: INTERNAL MEDICINE

## 2024-05-09 PROCEDURE — 99497 ADVNCD CARE PLAN 30 MIN: CPT | Performed by: INTERNAL MEDICINE

## 2024-05-09 PROCEDURE — 99497 ADVNCD CARE PLAN 30 MIN: CPT | Mod: GC | Performed by: STUDENT IN AN ORGANIZED HEALTH CARE EDUCATION/TRAINING PROGRAM

## 2024-05-09 PROCEDURE — 99233 SBSQ HOSP IP/OBS HIGH 50: CPT | Mod: GC,25 | Performed by: STUDENT IN AN ORGANIZED HEALTH CARE EDUCATION/TRAINING PROGRAM

## 2024-05-09 RX ADMIN — MINERAL OIL, AND WHITE PETROLATUM 1 APPLICATION: 425; 573 OINTMENT OPHTHALMIC at 22:00

## 2024-05-09 RX ADMIN — POLYETHYLENE GLYCOL 3350 1 PACKET: 17 POWDER, FOR SOLUTION ORAL at 06:28

## 2024-05-09 RX ADMIN — METOPROLOL 100 MG: 100 TABLET ORAL at 06:28

## 2024-05-09 RX ADMIN — LACOSAMIDE 150 MG: 100 TABLET, FILM COATED ORAL at 18:34

## 2024-05-09 RX ADMIN — SULFAMETHOXAZOLE AND TRIMETHOPRIM 1 TABLET: 400; 80 TABLET ORAL at 06:29

## 2024-05-09 RX ADMIN — METOPROLOL 100 MG: 100 TABLET ORAL at 18:04

## 2024-05-09 RX ADMIN — DIBASIC SODIUM PHOSPHATE, MONOBASIC POTASSIUM PHOSPHATE AND MONOBASIC SODIUM PHOSPHATE 500 MG: 852; 155; 130 TABLET ORAL at 08:45

## 2024-05-09 RX ADMIN — ASPIRIN 81 MG: 81 TABLET, CHEWABLE ORAL at 06:29

## 2024-05-09 RX ADMIN — LANSOPRAZOLE 30 MG: 30 TABLET, ORALLY DISINTEGRATING ORAL at 06:29

## 2024-05-09 RX ADMIN — SENNOSIDES AND DOCUSATE SODIUM 2 TABLET: 50; 8.6 TABLET ORAL at 18:04

## 2024-05-09 RX ADMIN — DEXAMETHASONE 2 MG: 4 TABLET ORAL at 06:29

## 2024-05-09 RX ADMIN — MINERAL OIL, AND WHITE PETROLATUM 1 APPLICATION: 425; 573 OINTMENT OPHTHALMIC at 08:45

## 2024-05-09 RX ADMIN — LACOSAMIDE 150 MG: 100 TABLET, FILM COATED ORAL at 06:29

## 2024-05-09 RX ADMIN — ATORVASTATIN CALCIUM 20 MG: 20 TABLET, FILM COATED ORAL at 18:04

## 2024-05-09 RX ADMIN — ENOXAPARIN SODIUM 40 MG: 100 INJECTION SUBCUTANEOUS at 18:04

## 2024-05-09 RX ADMIN — MODAFINIL 100 MG: 100 TABLET ORAL at 06:29

## 2024-05-09 RX ADMIN — MINERAL OIL, AND WHITE PETROLATUM 1 APPLICATION: 425; 573 OINTMENT OPHTHALMIC at 15:10

## 2024-05-09 ASSESSMENT — PAIN DESCRIPTION - PAIN TYPE
TYPE: ACUTE PAIN
TYPE: ACUTE PAIN

## 2024-05-09 NOTE — PROGRESS NOTES
"Family meeting was held w/ re: Carlene's plan of care.  Present from our team were myself and Dr. Vieira (HPM attending).  Also present for this meeting were Dr. Reyes (hospitalist team), Komal Vegas (case management), and a [facility] representative.  Carolyn (patient's daughter) was present in-person; patient's other daughter (Antoinette) was available via speakerphone.     We discussed that the agenda of this meeting was to review how to give Carlene \"her best chance\" at convalescence and meaningful time w/ loved ones \"in this last part of her life.\"  We began by asking Carolyn her assessment of her mother's overall clinical status; she noted improvement in her level of alertness and speaking abilities (eg, was speaking in both English and native dialect last PM).  She and her sister agree that the overarching goal is to provide their mother an opportunity to be evaluated by an outpatient medical oncology team for a second opinion and to review treatment options, specifically immunotherapy given her positive response to this treatment in the past.  We discussed our juan concerns that immunotherapy may be the only treatment option available and that neurosurgery would very likely be precluded d/t the severity of Carlene's condition, which her family understood.    The conversation started to focus on the more logistical side of Carlene's outpatient care including the need for OOP expenses to cover medical transportation to appointments, the need for DME (eg, hospital bed, Nirav lift, supplemental oxygen, glucometer), and the type and amount of care to be provided by home health.  Antoinette and Carolyn asked several important questions regarding who would be spear-heading follow-up lab orders, the utility of neuro-oncology involvement, and the difference between services rendered in [place] versus [place].  We discussed the limitation of Carolyn's current insurance plan as well as our inability to follow-up/resume care for Carlene on " "an outpatient basis d/t our inpatient jurisdiction.     Carolyn and Antoinette advocated for re-evaluation by infectious disease and ophthalmology prior to D/C, which we assured were reasonable requests that we would pass along to the primary team.       Recommendations  Jairo Rivas (\"Carlene\") is a 72 yo F who has been followed inpatient by the palliative medicine team for assistance w/ advance care planning and goals of care.  This is occurring in the setting of a PMH of endometrial carcinoma w/ brain metastases (previously managed by Dr. 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  Neurology, infectious disease, and ophthalmology have also been consulted on her case.  She underwent PEG tube placement (5/7/2024) for supplemental nutrition and Carlene's family has noted a marginal difference in their mother's overall clinical status, though her prognosis certainly remains quite grim.    - Appreciate CM assistance in coordinating the many complex needs required in Carlene's care and D/C planning   - CM to have financial assistance team screen Carlene for eligibility re: Medicaid   - Discussed w/ hospitalist the request for repeated ophthalmology and infectious disease assessment prior to D/C  - Also reviewed need for careful hand-off between inpatient team and PCP on D/C  - Our team will continue to be available to support Carlene and her family's needs   "

## 2024-05-09 NOTE — DIETARY
"Nutrition support weekly update:  Day 26 of admit.  Jairo Rivas is a 73 y.o. female with admitting DX of pyelonephritis, AMS, and sepsis.     Tube feeding initiated on 4/18. Current TF via PEG is promote with Fiber at goal rate of 60 mL/hr to provide 1440 kcal, 90 grams protein, and 1197 mL free water per day.    Assessment:  Height: 162.6 cam (5'4\")  Weight: 95.8 kg (211 lb 3.2 oz) taken via bed scale on 5/4  Weight up ~10 kg since admit, however net I/O +10.5 L  Re-estimate of nutritional needs not indicated at this time    Evaluation:   PEG placed 5/7 and continuous feeds continued  Pt continues to be seen by SLP for dysphagia tx, determined still not appropriate for oral diet.  Labs: POC glucose 130, BUN 27, creatinine 0.36  Meds: Lipitor, Lovenox, SSI, Prevacid, Lopressor, phosphorous, BM protocol  Last BM: 5/6  Current feeding remains appropriate to meet pt's nutritional needs    Malnutrition risk: no new risk identified    Recommendations/Plan:  Continue Promote with Fiber at goal rate of 60 mL/hr.  Fluids per MD  Monitor weight   At-home bolus feed recommendations:  Formula: Fibersource HN  Volume goal: 5 cartons/day (total of 1250 mL)  Administration: Give ½ container of Fibersource HN via PEG at 1st feeding. Advance each feeding ½ container until goal is reached.  Goal of 5 cartons per day (such as 1 at breakfast, 2 at lunch, 1 at dinner, and 1 HS) will provide 1500 kcal,  68 grams protein and 1010 ml free water per day.  Free water flushes: 30 mL free water after each bolus (120 mL) with an ADDITIONAL 120 mL flush QID (480 mL)        RD following    "

## 2024-05-09 NOTE — DISCHARGE PLANNING
@0199  Agency/Facility Name: [facility]  Outcome: DPA left a voice message for Yomaira [phone].  Awaiting a call back.     @6585  Agency/Facility Name: [facility]  Spoke To: Yomaira [phone].  Outcome: Yom[facility]a was not in yesterday and did not get the delivery set up.  Yomaira will call the daughter and set up a time and give this DPA a call back with the date and time.    @5364  Agency/Facility Name: [facility]  Spoke To: Yomaira [phone].  Outcome: Yomaira spoke with the pt's daughter and delivery of the bed, jerel lift, and wheelchair is tomorrow.  [facility] will call the daughter in the morning with an estimated delivery time.

## 2024-05-09 NOTE — PROGRESS NOTES
"Hospital Medicine Daily Progress Note    Date of Service  5/9/2024    Chief Complaint  Confusion.    Hospital Course  Jairo Rivas is a 74yo F with hx of endometrial carcinoma, brain metastasis w/p radiation tx, and prior CVA.  An MRI from 4/29 frontal brain metastases with vasogenic edema and acute infarction.  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  Neurology and infectious disease consulted.  A CT head 4/21 noted a 9 x 7 mm hemorrhage in the right temporal region.  Ophthalmology was consulted for vision changes.    Interval Problem Update  5/6: Asleep, eyes nearly closed bilaterally. Some facial edema noted with nasal canula causing slight swelling along side face where it is resting against her skin.  Non responsive to touch/voice.  Daughter, Carolyn, and Carolyn's tabithaanceMichael at bedside.  PEG tube to be placed 5/7.  Discussed with case management to see if home equipment is available to possible send patient home with family.  Doubt LTAC needs per my colleagues notes no accepting SNF.      5/5: Patient not responsive to questions.  She did say \"no\" when I attempted to move her arms.  Daughter is at bedside and we reviewed her mother's goals of care. Daughter, Carloyn, stated she was still wanting everything done and CPR/Intubation if needed.  Carolyn was concerned that her mothers eyes looked swollen and seemed dissatisfied with her eyes.  I alerted her that the bigger picture is that her mother is currently obtunded with metastatic brain lesions, stroke, hemorrhage and a HSV encephalitis.  I addressed that we are continuing care in hopes she has improved mentation but that I was concerned that it may be possible she does not get any better.  I alerted Carolyn, I would see what additional treatment could be done for her mother's eyes.      Patient was seen and examined at bedside.  I have personally reviewed and interpreted vitals, labs, and imaging.    5/7.  Afebrile.  Intermittent " hypotension.  On 1-2 L nasal cannula.  Patient is obtunded.  Minimally responsive.  PEG tube was placed this morning.  No family currently at bedside.  Plan for goals of care conference on Thursday.  5/8.  Afebrile.  Stable vitals.  On 1-2L NC.  Patient is obtunded.  Does not follow commands.  Developing contractures.  Discussed with social work, dietary.  Plan for goals of care meeting tomorrow.  Current plan is for home health with DME  Wbc 4.6  Hgb 11  P 183  5/9.  Afebrile.  Stable vitals.  On 1L NC.  Patient is obtunded.  Had care conference with daughter, palliative, social work, nursing.  Family still very hopeful patient can make meaningful recovery with goal of being more responsive and able to interact.  They would like to pursue immunotherapy.  Prognosis remains guarded.  Reached out to ID.  Will check varicella PCR.    I have discussed this patient's plan of care and discharge plan at IDT rounds today with Case Management, Nursing, Nursing leadership, and other members of the IDT team.    Consultants/Specialty  infectious disease, neurology, ophthalmology, and gynecology    Code Status  Full Code    Disposition  The patient is not medically cleared for discharge to home or a post-acute facility.  Anticipate discharge to: home with organized home healthcare and close outpatient follow-up    I have placed the appropriate orders for post-discharge needs.    Review of Systems  Review of Systems   Unable to perform ROS: Patient unresponsive        Physical Exam  Temp:  [35.8 °C (96.4 °F)-36.1 °C (97 °F)] 35.8 °C (96.4 °F)  Pulse:  [73-83] 73  Resp:  [18] 18  BP: (102-124)/(62-79) 115/70  SpO2:  [97 %-99 %] 99 %    Physical Exam  Vitals reviewed.   Constitutional:       General: She is sleeping.      Appearance: She is obese. She is ill-appearing.   HENT:      Head: Normocephalic.      Nose:      Comments: Enteral tube in nare     Mouth/Throat:      Mouth: Mucous membranes are dry.      Pharynx: Oropharyngeal  exudate present.   Eyes:      Comments: Left eye corneal edema.  Some lubrication on cornea.  Left eyelid does not appear to close as well as the right.   Cardiovascular:      Rate and Rhythm: Normal rate and regular rhythm.      Heart sounds: No murmur heard.  Pulmonary:      Effort: No respiratory distress.      Breath sounds: No wheezing.   Abdominal:      General: There is no distension.      Palpations: Abdomen is soft.   Musculoskeletal:      Right lower leg: Edema present.      Left lower leg: Edema present.      Comments: Right leg with more edema than left. Right arm chronic flexion contraction.   Lymphadenopathy:      Cervical: No cervical adenopathy.   Neurological:      Mental Status: She is lethargic.      Cranial Nerves: Cranial nerve deficit present.      Motor: Weakness present.      Coordination: Coordination abnormal.         Fluids    Intake/Output Summary (Last 24 hours) at 5/9/2024 0546  Last data filed at 5/8/2024 1854  Gross per 24 hour   Intake 1180 ml   Output 800 ml   Net 380 ml       Laboratory  Recent Labs     05/08/24  0508   WBC 4.6*   RBC 3.38*   HEMOGLOBIN 11.0*   HEMATOCRIT 33.0*   MCV 97.6   MCH 32.5   MCHC 33.3   RDW 54.1*   PLATELETCT 183   MPV 10.2       Recent Labs     05/08/24  0508   SODIUM 136   POTASSIUM 4.4   CHLORIDE 101   CO2 26   GLUCOSE 144*   BUN 27*   CREATININE 0.36*   CALCIUM 9.0       Recent Labs     05/07/24  0816   INR 1.04               Imaging  IR-GASTROSTOMY PLACEMENT   Final Result      1.  Fluoroscopic guided percutaneous gastrostomy with placement of an 18-Cambodian MARIOLA balloon gastrostomy catheter.         2. The gastrostomy tube may be used for tube feeds 18 hours after placement. Thereafter, liquid diet orders per the referring physician. Saline flush daily as per protocol.      3. In 10-14 days, the anchor button suture or sutures should be cut, releasing the anchor or anchors into the stomach. The suture anchors will then pass through the GI tract as  desired. This can be performed in the interventional radiology department if    desired. Call [phone] any weekday [phone] hours to make an appointment.      US-EXTREMITY VENOUS LOWER BILAT   Final Result      US-EXTREMITY VENOUS UPPER UNILAT LEFT   Final Result      DX-ABDOMEN FOR TUBE PLACEMENT   Final Result      Feeding tube terminates within the gastric fundus.      DX-ABDOMEN FOR TUBE PLACEMENT   Final Result         1.  Nonspecific bowel gas pattern in the upper abdomen.   2.  Dobbhoff tube coiled within the esophagus with tip projecting over the expected location of the gastric antrum. Recommend withdrawal and replacement.   3.  Hazy lung base opacity suggesting edema and/or infiltrates.      CT-HEAD W/O   Final Result   Addendum (preliminary) 1 of 1   Addendum: There is trace bilateral dependent intraventricular hemorrhages    identified, likely representing redistribution of previously visualized    subarachnoid hemorrhage.      Final         1.  No acute intracranial abnormality is identified, there are nonspecific white matter changes, commonly associated with small vessel ischemic disease.  Associated mild cerebral atrophy is noted.         DX-CHEST-LIMITED (1 VIEW)   Final Result      1.  Hazy perihilar and lower lobe opacities represent areas of atelectasis or potentially pneumonitis.      IR-MIDLINE CATHETER INSERTION WO GUIDANCE > AGE 3   Final Result                  Ultrasound-guided midline placement performed by qualified nursing staff    as above.          CT-HEAD W/O   Final Result      1.  9 x 7 mm focus of hemorrhage in the right temporal region, suspected subarachnoid versus less likely intraparenchymal. It is stable since noncontrast head CT performed one day prior.         CT-CTA NECK WITH & W/O-POST PROCESSING   Final Result      No occlusion, hemodynamically significant stenosis or dissection of the neck arteries.      CT-CTA HEAD WITH & W/O-POST PROCESS   Final Result   Addendum  (preliminary) 1 of 1   Critical value called by Dr. Hector Dodson to Physician: RONAN NOGUERA   at 4/25/2024 3:48 PM.      Final      1.  9 x 7 mm focus of hemorrhage in the right temporal region which is probably subarachnoid, less likely intraparenchymal.   2.  Focal occlusion of right M2 MCA inferior division with distal opacification. This could be a focal high-grade stenosis versus vessel occlusion. It could also be vasospasm as the subarachnoid hemorrhage is present in this region.   3.  Other findings as above.      Efforts are underway to contact referring physician with results at time of dictation.            EC-ECHOCARDIOGRAM COMPLETE W/O CONT   Final Result      DX-CHEST-PORTABLE (1 VIEW)   Final Result      Feeding tube tip projects in the stomach.      Hazy left basilar opacity, atelectasis and/or mild pneumonitis.      DX-LUMBAR PUNCTURE FOR DIAGNOSIS   Final Result      Fluoroscopic-guided lumbar puncture as described above.      IR-MIDLINE CATHETER INSERTION WO GUIDANCE > AGE 3   Final Result                  Ultrasound-guided midline placement performed by qualified nursing staff    as above.          DX-ABDOMEN FOR TUBE PLACEMENT   Final Result         1.  Nonspecific bowel gas pattern in the upper abdomen.   2.  Dobbhoff tube is coiled within the stomach, the tip terminates overlying the expected location of the gastric cardia.   3.  Hazy left lower lobe infiltrate.      MR-BRAIN-WITH & W/O   Final Result      1.  Significant interval decrease in size of right frontal brain metastasis since previous exam slight interval decrease in surrounding vasogenic edema. Smaller irregular rim of increased diffusion weighted signal intensity about this treated metastasis    consistent with cytotoxic edema.      2.  New large area of acute infarction in the right posterior frontal, parietal, posterior temporal, and occipital lobes associated gyriform enhancement in the right lateral occipital lobe and  right peritrigonal white matter.      3.  Smaller chronic wedge-shaped area of infarction involving the left posterior basal ganglia extending into the left posterior frontal periventricular white matter      4.  Small areas of chronic lacunar type infarction involving the left side of the elaine and left brachium pontis with surrounding gliosis.      5.  Small foci of chronic hemosiderin deposition noted in the left posterior temporal and occipital white matter, as well as in the treated right frontal brain metastasis      6.  Age-related cerebral atrophy.      DX-ABDOMEN FOR TUBE PLACEMENT   Final Result         1.  Nonspecific bowel gas pattern in the upper abdomen.   2.  Dobbhoff tube is coiled within the stomach, the tip terminates overlying the expected location of the gastric fundus, physician similar to prior study.   3.  Left lower lobe infiltrate      DX-ABDOMEN FOR TUBE PLACEMENT   Final Result         1.  Nonspecific bowel gas pattern in the upper abdomen.   2.  Dobbhoff tube is coiled within the stomach, the tip terminates overlying the expected location of the gastric fundus.   3.  Left basilar atelectasis      DX-ABDOMEN FOR TUBE PLACEMENT   Final Result      Feeding tube looped in the stomach the distal tip in the gastric fundus.      CT-HEAD WITH & W/O   Final Result      1.  Redemonstration of right frontal lobe mass, highly concerning for metastasis. Associated vasogenic edema and a 2.5 mm right to left midline shift. No progressive mass effect. No herniation.   2.  Nonspecific ill-defined enhancement in the right parietal lobe.   3.  No new large vascular structure infarct. No new intracranial hemorrhage.      DX-CHEST-LIMITED (1 VIEW)   Final Result      No significant interval change.      DX-ABDOMEN COMPLETE WITH AP OR PA CXR   Final Result      1. No acute cardiopulmonary abnormality.   2. Chronic left lower lobe scarring.   3. Nonobstructive bowel gas pattern. Small amount of stool throughout  the colon.           Assessment/Plan  * Acute encephalopathy- (present on admission)  Assessment & Plan  5/9/2024  Multifactorial   Manage encephalopathy is  Acute stroke on MRI 4/20  Subarachnoid hemorrhage on CT  LP with CSF positive for VZV done 4/22  Continue on acyclovir.  Completed course.  Monitor blood pressure closely,  neurochecks every 4 hours    Keratopathy  Assessment & Plan  5/9/2024  Exposure from not closing her eyes completely  Ophthalmology consulted during stay.  Eye drops and lubrications  Tape eyes closed when sleeping.    Aspiration pneumonitis (HCC)- (present on admission)  Assessment & Plan  5/9/2024  Patient high risk for aspiration pneumonitis/pneumonia.   Currently on room air  Monitor oxygen requirement closely    Prediabetes- (present on admission)  Assessment & Plan  5/9/2024  Monitor glucose level closely.  Had been on decadron  On insulin regimen    Vasogenic edema (HCC)- (present on admission)  Assessment & Plan  5/9/2024  Continuing decadron taper per Dr. Live's recommendations. He also does not believe that there are any significant changes in her brain imaging from this admission that could explain her somnolence/presentation. She is on the correct medication (decadron) for treating the edema  Improvement on MRI on 4/21  - 5/1:  tapering  decadron 4mg IV bid x 13 days to 2mg IV bid x 7 days, then 2mg daily x 7 days, then 1 mg daily x 7 days.  5/2 CT head w/o contrast improved right frontal lobe edema.    Hypernatremia  Assessment & Plan  5/9/2024  Improved    Seizure (HCC)- (present on admission)  Assessment & Plan  5/9/2024  Continue lacosamide 150 mg BID per neurology  EEG and cEEG negative for seizures     Neurology consulted during admit.    Dysphagia- (present on admission)  Assessment & Plan  5/9/2024  Possibly due to CVA vs brain metastases and altered mental status in setting of infection.  Patient will remain NPO as she failed SLP evaluation 4/17  NG tube placement  required IRIS. Tube was removed for MRI and family refused NG tube to be replaced. Extensive discussion with family regarding nutrition options. Eventually agreed to let patient get NG tube with lorazepam sedation and IRIS. No one certified to place NG tube with IRIS available on 4/21. Vascular access also lost on 4/21 and midline unable to be placed without VATs  -aspiration precautions  -Continue lansoprazole  -[id number] glucose checks  5/3:  order for PEG tube placed for 5/7 by IR.  In order for patient to go home with daughter and HH.    Shingles- (present on admission)  Assessment & Plan  5/9/2024  S/p acyclovir x14 days.    Constipation- (present on admission)  Assessment & Plan  5/9/2024  Resolved  - Bowel regimen    Sepsis secondary to UTI (HCC)- (present on admission)  Assessment & Plan  Sepsis resolved    Malignant neoplasm metastatic to brain (HCC)- (present on admission)  Assessment & Plan  5/9/2024  Brain mets noted during hospitalization on 3/27/2024 with vasogenic edema, MRI on 4/11 shows continued brain mets with vasogenic edema.  MRI 4/21 shows decrease in tumor size and improvement in edema.  -continue seizure prophylaxis with lacosamide 150 mg BID  -tapering dexamethasone   Ophthalmology evaluated for concern of vision changes    Advance care planning- (present on admission)  Assessment & Plan  5/9/2024  At this time patient's family want her to be full code.  unable to transfer to [facility], no accepting SNF and insurance will not approve due to patient unable to participate with PT/OT.  Plan for  home with philip and HH, home equipment and PEG tube feeds.    Patient was obtunded and not able to participated in the conversation.  Daughter were present for the conversation another daughter on the phone.  I discussed advance care planning face to face with the patient and family for at least 30 minutes, including diagnosis, prognosis, plan of care, risks and benefits of any therapies that could be  offered, as well as alternatives including palliation and hospice, as appropriate.  Forms were completed and placed in the chart.  A hospice consult was placed.      Obesity (BMI 30-39.9)- (present on admission)  Assessment & Plan  Can discuss when mentation improves.    Endometrial cancer (HCC)- (present on admission)  Assessment & Plan  5/9/2024  Followed by Dr. Garcia outpatient and sees radiation oncology Dr Live.  Received immunotherapy [facility] in 2021 with initial good response. Reached out to Dr. Live who does not believe imaging from 4/14 shows significant changes compared to MRI month prior.  Complicated by metastases to brain among other locations, likely contributing to her altered mental status. Mets re demonstrated without significant change on CTH on admission.  - Decadron for vasogenic edema  - Neurochecks every 4 hours  - Continue aspiration, fall, seizure precaution.    Essential hypertension- (present on admission)  Assessment & Plan  5/9/2024  Monitor blood pressure closely  Continue metoprolol, losartan, amlodipine    Acute CVA (cerebrovascular accident) (HCC)- (present on admission)  Assessment & Plan  5/9/2024  MRI on 4/20 noted with right frontal brain metastasis with vasogenic edema, acute infarction.  CT head from 4/25 noted with 9X 7 mm of hemorrhage in the right temporal region.   Neurology consult appreciated.   Continue aspirin and atorvastatin for secondary prevention         VTE prophylaxis: Lovenox    I have performed a physical exam and reviewed and updated ROS and Plan today (5/9/2024). In review of yesterday's note (5/8/2024), there are no changes except as documented above.    Greater than 52 minutes spent prepping to see patient (e.g. review of tests) obtaining and/or reviewing separately obtained history. Performing a medically appropriate examination and/ evaluation.  Counseling and educating the patient/family/caregiver.  Ordering medications, tests, or procedures.   Referring and communicating with other health care professionals.  Documenting clinical information in EPIC.  Independently interpreting results and communicating results to patient/family/caregiver.  Care coordination.

## 2024-05-09 NOTE — DISCHARGE PLANNING
Received Choice form at 2563  Agency/Facility Name: [facility]  Referral sent per Choice form @ 6050

## 2024-05-09 NOTE — CARE PLAN
The patient is Stable - Low risk of patient condition declining or worsening    Shift Goals  Clinical Goals: Neuro Checks, Monitor Glucose  Patient Goals: ZA  Family Goals: Determine plan of care    Progress made toward(s) clinical / shift goals:       Problem: Knowledge Deficit - Standard  Goal: Patient and family/care givers will demonstrate understanding of plan of care, disease process/condition, diagnostic tests and medications  Description: Target End Date:  1-3 days or as soon as patient condition allows    Document in Patient Education    1.  Patient and family/caregiver oriented to unit, equipment, visitation policy and means for communicating concern  2.  Complete/review Learning Assessment  3.  Assess knowledge level of disease process/condition, treatment plan, diagnostic tests and medications  4.  Explain disease process/condition, treatment plan, diagnostic tests and medications  Outcome: Progressing  Note: Family understands the need for continued monitoring of vitals. Family understand the importance in determining plan of care.

## 2024-05-09 NOTE — DISCHARGE PLANNING
Thank you for alerting [facility]  to this referral. Notes state patient has a PEG tube. Please provide infusion orders. Referral will be placed on hold.

## 2024-05-09 NOTE — DISCHARGE PLANNING
Case Management Discharge Planning    Admission Date: 4/13/2024  GMLOS: 5.1  ALOS: 26    6-Clicks ADL Score: 6  6-Clicks Mobility Score: 6  PT and/or OT Eval ordered: Yes  Post-acute Referrals Ordered: Yes  Post-acute Choice Obtained: Yes  Has referral(s) been sent to post-acute provider:  Yes      Anticipated Discharge Dispo: Discharge Disposition: D/T to home under HHA care in anticipation of covered skilled care (06)    DME Needed: Yes    DME Ordered: Yes    Action(s) Taken: Attended care conference with Palliative team and SCP representative. Family would like pt to be able to have her vision back and speak prior to her end of life. Discussed in length family's concerns regarding caregiving services and referrals to see specialists. Plan for pt is to discharge home next week with [facility]- tube feeds, [facility]- [facility], and referral to OP [facility]  for second opinion. Discussed transportation options and family having to pay out of pocket for transportation to appointments.     Family's goal is for pt to discharge home with all OP services and have second opinion with [facility] in the hopes of receiving immunotherapy.     [facility] referral sent, awaiting acceptance.     [facility] has referral and [facility] RN to run benefits and complete teaching, [facility] requesting length of need in MD note, requested Dr. Reyes to add length of need to his progress note today.     DME to be delivered tomorrow- Bed, jerel lift, and wheelchair. Pt is on 1L of O2 at 99%, will need O2 eval for O2 prior to discharge per family's request.     Requested PFA to screen pt for Medicaid.     Family requested in care conference to have ID and ophthalmology re consulted, requested Hospitalist to re consult.    PCP virtual visit scheduled for 5/20/24 @ 2:40pm, sent in basket message to PCP team to notify them of hospitalization.     [facility] referral placed for second opinion, referral received by [facility],  pended until pt discharges.     Requested OP Palliative consult during care conference, per Palliative team [facility] is primarily used more for symptom management. Requested Dr. Reyes to place Home Palliative referral.      Pt receiving oxycodone for symptom management, will need bridge prescription until OP palliative can see pt to continue to monitor symptoms.     Requested Tien be placed, order placed.     Escalations Completed: None    Medically Clear: No    Next Steps: Pending DME, Peg tube supplies, Tien.     Barriers to Discharge: Medical clearance, DME, and Outpatient referrals pending    Is the patient up for discharge tomorrow: No

## 2024-05-09 NOTE — CARE PLAN
The patient is Watcher - Medium risk of patient condition declining or worsening    Shift Goals  Clinical Goals: pt will tolerate tube feeding  Patient Goals: ZA  Family Goals: not at bedside    Progress made toward(s) clinical / shift goals:    Pt obtunded and toleration tube feeding at this time.     Patient is not progressing towards the following goals:      Problem: Neuro Status  Goal: Neuro status will remain stable or improve  Outcome: Not Met  Flowsheets (Taken 5/9/2024 0403)  Level of Consciousness: Obtunded  Note: Pt obtunded and only responds to pain. ZA mental status.

## 2024-05-09 NOTE — PROGRESS NOTES
Radiology Progress Note   Author: SHANA Reeves Date & Time created: 5/9/2024  2:55 PM   Date of admission  4/13/2024  Note to reader: this note follows the APSO format rather than the historical SOAP format. Assessment and plan located at the top of the note for ease of use.    Chief Complaint  73 y.o. 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  Hospital course has been complicated by increased altered mentation and she was subsequently diagnosed with varicella-zoster meningitis.  Patient remained encephalopathic despite antiviral therapy.  Most recent head CT from 05/02/2024 shows stable right temporal region hemorrhage with no acute changes.  Family has declined hospice services and would like to continue to treat patient.  IR was consulted to place G-tube for dysphagia and patient underwent G-tube placement with IR Dr. Sosa on 05/07/2024.     Interval History:   05/08/2024 - Bandage surrounding G-tube insertion site partially saturated with blood.  Bandage replaced by bedside RN.    05/09/24 - Bandage to G-tube insertion site CDI.  No further bleeding noted.      Assessment/Plan     Principal Problem:    Acute encephalopathy  Active Problems:    Acute CVA (cerebrovascular accident) (HCC)    Essential hypertension    Endometrial cancer (HCC)    Obesity (BMI 30-39.9)    Advance care planning    Malignant neoplasm metastatic to brain (HCC)    Sepsis secondary to UTI (HCC)    Constipation    Shingles    Dysphagia    Seizure (HCC)    Hypernatremia    Vasogenic edema (HCC)    Prediabetes    Aspiration pneumonitis (HCC)    Keratopathy      Plan IR  -Continue bandage changes as  needed.  - Winslow button sutures x 2 will need to be cut in 7 to 14 days.  Our office will call to schedule; however patient may not be able to return to clinic due to acuity of condition.  There is a possibility that the button sutures will release on their own in which case follow-up clinic appointment is not necessary.  Please notify IR prior to discharge and we may be able to cut them if enough time has elapsed.  -IR signing off    -Thank you for allowing Interventional Radiology team to participate in the patients care, if any additional care or requests are needed in the future please do not hesitate to call or place IR order   [phone]           Review of Systems  Physical Exam   Review of Systems   Reason unable to perform ROS: Acuity of condition.      Vitals:    05/09/24 0707   BP: 104/53   Pulse: 74   Resp: 14   Temp: 35.9 °C (96.6 °F)   SpO2: 98%        Physical Exam  Vitals and nursing note reviewed.   Constitutional:       Appearance: She is ill-appearing.   Cardiovascular:      Rate and Rhythm: Normal rate.   Pulmonary:      Effort: No respiratory distress.   Abdominal:      Palpations: Abdomen is soft.      Comments: G-tube to upper abdomen   Skin:     General: Skin is warm and dry.   Neurological:      Mental Status: She is lethargic.      Comments: Opens eyes and mumbles incoherently when name is called  Not following commands             Labs    Recent Labs     05/08/24  0508   WBC 4.6*   RBC 3.38*   HEMOGLOBIN 11.0*   HEMATOCRIT 33.0*   MCV 97.6   MCH 32.5   MCHC 33.3   RDW 54.1*   PLATELETCT 183   MPV 10.2     Recent Labs     05/08/24  0508   SODIUM 136   POTASSIUM 4.4   CHLORIDE 101   CO2 26   GLUCOSE 144*   BUN 27*   CREATININE 0.36*   CALCIUM 9.0     Recent Labs     05/08/24  0508   CREATININE 0.36*     IR-GASTROSTOMY PLACEMENT   Final Result      1.  Fluoroscopic guided percutaneous gastrostomy with placement of an 18-Portuguese MARIOLA balloon gastrostomy catheter.         2. The gastrostomy tube  may be used for tube feeds 18 hours after placement. Thereafter, liquid diet orders per the referring physician. Saline flush daily as per protocol.      3. In 10-14 days, the anchor button suture or sutures should be cut, releasing the anchor or anchors into the stomach. The suture anchors will then pass through the GI tract as desired. This can be performed in the interventional radiology department if    desired. Call [phone] any weekday [phone] hours to make an appointment.      US-EXTREMITY VENOUS LOWER BILAT   Final Result      US-EXTREMITY VENOUS UPPER UNILAT LEFT   Final Result      DX-ABDOMEN FOR TUBE PLACEMENT   Final Result      Feeding tube terminates within the gastric fundus.      DX-ABDOMEN FOR TUBE PLACEMENT   Final Result         1.  Nonspecific bowel gas pattern in the upper abdomen.   2.  Dobbhoff tube coiled within the esophagus with tip projecting over the expected location of the gastric antrum. Recommend withdrawal and replacement.   3.  Hazy lung base opacity suggesting edema and/or infiltrates.      CT-HEAD W/O   Final Result   Addendum (preliminary) 1 of 1   Addendum: There is trace bilateral dependent intraventricular hemorrhages    identified, likely representing redistribution of previously visualized    subarachnoid hemorrhage.      Final         1.  No acute intracranial abnormality is identified, there are nonspecific white matter changes, commonly associated with small vessel ischemic disease.  Associated mild cerebral atrophy is noted.         DX-CHEST-LIMITED (1 VIEW)   Final Result      1.  Hazy perihilar and lower lobe opacities represent areas of atelectasis or potentially pneumonitis.      IR-MIDLINE CATHETER INSERTION WO GUIDANCE > AGE 3   Final Result                  Ultrasound-guided midline placement performed by qualified nursing staff    as above.          CT-HEAD W/O   Final Result      1.  9 x 7 mm focus of hemorrhage in the right temporal region, suspected  subarachnoid versus less likely intraparenchymal. It is stable since noncontrast head CT performed one day prior.         CT-CTA NECK WITH & W/O-POST PROCESSING   Final Result      No occlusion, hemodynamically significant stenosis or dissection of the neck arteries.      CT-CTA HEAD WITH & W/O-POST PROCESS   Final Result   Addendum (preliminary) 1 of 1   Critical value called by Dr. Hector Dodson to Physician: RONAN NOGUERA   at 4/25/2024 3:48 PM.      Final      1.  9 x 7 mm focus of hemorrhage in the right temporal region which is probably subarachnoid, less likely intraparenchymal.   2.  Focal occlusion of right M2 MCA inferior division with distal opacification. This could be a focal high-grade stenosis versus vessel occlusion. It could also be vasospasm as the subarachnoid hemorrhage is present in this region.   3.  Other findings as above.      Efforts are underway to contact referring physician with results at time of dictation.            EC-ECHOCARDIOGRAM COMPLETE W/O CONT   Final Result      DX-CHEST-PORTABLE (1 VIEW)   Final Result      Feeding tube tip projects in the stomach.      Hazy left basilar opacity, atelectasis and/or mild pneumonitis.      DX-LUMBAR PUNCTURE FOR DIAGNOSIS   Final Result      Fluoroscopic-guided lumbar puncture as described above.      IR-MIDLINE CATHETER INSERTION WO GUIDANCE > AGE 3   Final Result                  Ultrasound-guided midline placement performed by qualified nursing staff    as above.          DX-ABDOMEN FOR TUBE PLACEMENT   Final Result         1.  Nonspecific bowel gas pattern in the upper abdomen.   2.  Dobbhoff tube is coiled within the stomach, the tip terminates overlying the expected location of the gastric cardia.   3.  Hazy left lower lobe infiltrate.      MR-BRAIN-WITH & W/O   Final Result      1.  Significant interval decrease in size of right frontal brain metastasis since previous exam slight interval decrease in surrounding vasogenic edema.  Smaller irregular rim of increased diffusion weighted signal intensity about this treated metastasis    consistent with cytotoxic edema.      2.  New large area of acute infarction in the right posterior frontal, parietal, posterior temporal, and occipital lobes associated gyriform enhancement in the right lateral occipital lobe and right peritrigonal white matter.      3.  Smaller chronic wedge-shaped area of infarction involving the left posterior basal ganglia extending into the left posterior frontal periventricular white matter      4.  Small areas of chronic lacunar type infarction involving the left side of the elaine and left brachium pontis with surrounding gliosis.      5.  Small foci of chronic hemosiderin deposition noted in the left posterior temporal and occipital white matter, as well as in the treated right frontal brain metastasis      6.  Age-related cerebral atrophy.      DX-ABDOMEN FOR TUBE PLACEMENT   Final Result         1.  Nonspecific bowel gas pattern in the upper abdomen.   2.  Dobbhoff tube is coiled within the stomach, the tip terminates overlying the expected location of the gastric fundus, physician similar to prior study.   3.  Left lower lobe infiltrate      DX-ABDOMEN FOR TUBE PLACEMENT   Final Result         1.  Nonspecific bowel gas pattern in the upper abdomen.   2.  Dobbhoff tube is coiled within the stomach, the tip terminates overlying the expected location of the gastric fundus.   3.  Left basilar atelectasis      DX-ABDOMEN FOR TUBE PLACEMENT   Final Result      Feeding tube looped in the stomach the distal tip in the gastric fundus.      CT-HEAD WITH & W/O   Final Result      1.  Redemonstration of right frontal lobe mass, highly concerning for metastasis. Associated vasogenic edema and a 2.5 mm right to left midline shift. No progressive mass effect. No herniation.   2.  Nonspecific ill-defined enhancement in the right parietal lobe.   3.  No new large vascular structure  "infarct. No new intracranial hemorrhage.      DX-CHEST-LIMITED (1 VIEW)   Final Result      No significant interval change.      DX-ABDOMEN COMPLETE WITH AP OR PA CXR   Final Result      1. No acute cardiopulmonary abnormality.   2. Chronic left lower lobe scarring.   3. Nonobstructive bowel gas pattern. Small amount of stool throughout the colon.        INR   Date Value Ref Range Status   05/07/2024 1.04 0.87 - 1.13 Final     Comment:     INR - Non-therapeutic Reference Range: 0.87-1.13  INR - Therapeutic Reference Range: 2.0-4.0       No results found for: \"POCINR\"     Intake/Output Summary (Last 24 hours) at 5/8/2024 1317  Last data filed at 5/8/2024 0440  Gross per 24 hour   Intake 220 ml   Output 950 ml   Net -730 ml      I have personally reviewed the above labs and imaging      I have performed a physical exam and reviewed and updated ROS and Plan today (5/9/2024).     50 minutes in directly providing and coordinating care and extensive data review.  No time overlap and excludes procedures.  "

## 2024-05-10 ENCOUNTER — APPOINTMENT (OUTPATIENT)
Dept: RADIOLOGY | Facility: MEDICAL CENTER | Age: 74
DRG: 871 | End: 2024-05-10
Payer: MEDICARE

## 2024-05-10 LAB
ALBUMIN SERPL BCP-MCNC: 2.8 G/DL (ref 3.2–4.9)
ALBUMIN/GLOB SERPL: 1.1 G/DL
ALP SERPL-CCNC: 114 U/L (ref 30–99)
ALT SERPL-CCNC: 64 U/L (ref 2–50)
ANION GAP SERPL CALC-SCNC: 11 MMOL/L (ref 7–16)
ANISOCYTOSIS BLD QL SMEAR: ABNORMAL
AST SERPL-CCNC: 42 U/L (ref 12–45)
BASOPHILS # BLD AUTO: 0 % (ref 0–1.8)
BASOPHILS # BLD: 0 K/UL (ref 0–0.12)
BILIRUB SERPL-MCNC: 0.3 MG/DL (ref 0.1–1.5)
BUN SERPL-MCNC: 22 MG/DL (ref 8–22)
CALCIUM ALBUM COR SERPL-MCNC: 9.8 MG/DL (ref 8.5–10.5)
CALCIUM SERPL-MCNC: 8.8 MG/DL (ref 8.5–10.5)
CHLORIDE SERPL-SCNC: 100 MMOL/L (ref 96–112)
CO2 SERPL-SCNC: 26 MMOL/L (ref 20–33)
CREAT SERPL-MCNC: 0.28 MG/DL (ref 0.5–1.4)
EOSINOPHIL # BLD AUTO: 0 K/UL (ref 0–0.51)
EOSINOPHIL NFR BLD: 0 % (ref 0–6.9)
ERYTHROCYTE [DISTWIDTH] IN BLOOD BY AUTOMATED COUNT: 59.7 FL (ref 35.9–50)
GFR SERPLBLD CREATININE-BSD FMLA CKD-EPI: 115 ML/MIN/1.73 M 2
GLOBULIN SER CALC-MCNC: 2.5 G/DL (ref 1.9–3.5)
GLUCOSE BLD STRIP.AUTO-MCNC: 131 MG/DL (ref 65–99)
GLUCOSE BLD STRIP.AUTO-MCNC: 131 MG/DL (ref 65–99)
GLUCOSE BLD STRIP.AUTO-MCNC: 138 MG/DL (ref 65–99)
GLUCOSE SERPL-MCNC: 120 MG/DL (ref 65–99)
HCT VFR BLD AUTO: 31.4 % (ref 37–47)
HGB BLD-MCNC: 10 G/DL (ref 12–16)
LACTATE SERPL-SCNC: 1 MMOL/L (ref 0.5–2)
LYMPHOCYTES # BLD AUTO: 0.25 K/UL (ref 1–4.8)
LYMPHOCYTES NFR BLD: 5.2 % (ref 22–41)
MACROCYTES BLD QL SMEAR: ABNORMAL
MAGNESIUM SERPL-MCNC: 1.9 MG/DL (ref 1.5–2.5)
MANUAL DIFF BLD: NORMAL
MCH RBC QN AUTO: 32.4 PG (ref 27–33)
MCHC RBC AUTO-ENTMCNC: 31.8 G/DL (ref 32.2–35.5)
MCV RBC AUTO: 101.6 FL (ref 81.4–97.8)
MONOCYTES # BLD AUTO: 0.43 K/UL (ref 0–0.85)
MONOCYTES NFR BLD AUTO: 8.8 % (ref 0–13.4)
MORPHOLOGY BLD-IMP: NORMAL
MYELOCYTES NFR BLD MANUAL: 0.9 %
NEUTROPHILS # BLD AUTO: 4.17 K/UL (ref 1.82–7.42)
NEUTROPHILS NFR BLD: 84.2 % (ref 44–72)
NEUTS BAND NFR BLD MANUAL: 0.9 % (ref 0–10)
NRBC # BLD AUTO: 0.02 K/UL
NRBC BLD-RTO: 0.4 /100 WBC (ref 0–0.2)
OVALOCYTES BLD QL SMEAR: NORMAL
PHOSPHATE SERPL-MCNC: 3.1 MG/DL (ref 2.5–4.5)
PLATELET # BLD AUTO: 155 K/UL (ref 164–446)
PLATELET BLD QL SMEAR: NORMAL
PMV BLD AUTO: 10 FL (ref 9–12.9)
POIKILOCYTOSIS BLD QL SMEAR: NORMAL
POTASSIUM SERPL-SCNC: 4.3 MMOL/L (ref 3.6–5.5)
PROT SERPL-MCNC: 5.3 G/DL (ref 6–8.2)
RBC # BLD AUTO: 3.09 M/UL (ref 4.2–5.4)
RBC BLD AUTO: PRESENT
SODIUM SERPL-SCNC: 137 MMOL/L (ref 135–145)
WBC # BLD AUTO: 4.9 K/UL (ref 4.8–10.8)

## 2024-05-10 PROCEDURE — 99498 ADVNCD CARE PLAN ADDL 30 MIN: CPT | Performed by: INTERNAL MEDICINE

## 2024-05-10 PROCEDURE — 99497 ADVNCD CARE PLAN 30 MIN: CPT | Performed by: INTERNAL MEDICINE

## 2024-05-10 PROCEDURE — 99233 SBSQ HOSP IP/OBS HIGH 50: CPT | Mod: GC | Performed by: STUDENT IN AN ORGANIZED HEALTH CARE EDUCATION/TRAINING PROGRAM

## 2024-05-10 PROCEDURE — 99233 SBSQ HOSP IP/OBS HIGH 50: CPT | Mod: 25 | Performed by: INTERNAL MEDICINE

## 2024-05-10 PROCEDURE — 99223 1ST HOSP IP/OBS HIGH 75: CPT | Performed by: INTERNAL MEDICINE

## 2024-05-10 RX ORDER — LOSARTAN POTASSIUM 50 MG/1
50 TABLET ORAL DAILY
Status: DISCONTINUED | OUTPATIENT
Start: 2024-05-11 | End: 2024-05-13 | Stop reason: HOSPADM

## 2024-05-10 RX ORDER — SCOLOPAMINE TRANSDERMAL SYSTEM 1 MG/1
1 PATCH, EXTENDED RELEASE TRANSDERMAL
Status: DISCONTINUED | OUTPATIENT
Start: 2024-05-10 | End: 2024-05-13 | Stop reason: HOSPADM

## 2024-05-10 RX ORDER — DEXTROSE MONOHYDRATE 25 G/50ML
25 INJECTION, SOLUTION INTRAVENOUS
Status: DISCONTINUED | OUTPATIENT
Start: 2024-05-10 | End: 2024-05-13

## 2024-05-10 RX ADMIN — MINERAL OIL, AND WHITE PETROLATUM 1 APPLICATION: 425; 573 OINTMENT OPHTHALMIC at 20:31

## 2024-05-10 RX ADMIN — ASPIRIN 81 MG: 81 TABLET, CHEWABLE ORAL at 04:57

## 2024-05-10 RX ADMIN — LACOSAMIDE 150 MG: 100 TABLET, FILM COATED ORAL at 17:40

## 2024-05-10 RX ADMIN — SCOPOLAMINE 1 PATCH: 1.5 PATCH, EXTENDED RELEASE TRANSDERMAL at 20:31

## 2024-05-10 RX ADMIN — LANSOPRAZOLE 30 MG: 30 TABLET, ORALLY DISINTEGRATING ORAL at 04:56

## 2024-05-10 RX ADMIN — DIBASIC SODIUM PHOSPHATE, MONOBASIC POTASSIUM PHOSPHATE AND MONOBASIC SODIUM PHOSPHATE 500 MG: 852; 155; 130 TABLET ORAL at 04:53

## 2024-05-10 RX ADMIN — MODAFINIL 100 MG: 100 TABLET ORAL at 04:54

## 2024-05-10 RX ADMIN — ATORVASTATIN CALCIUM 20 MG: 20 TABLET, FILM COATED ORAL at 17:40

## 2024-05-10 RX ADMIN — SULFAMETHOXAZOLE AND TRIMETHOPRIM 1 TABLET: 400; 80 TABLET ORAL at 04:54

## 2024-05-10 RX ADMIN — AMLODIPINE BESYLATE 10 MG: 10 TABLET ORAL at 06:00

## 2024-05-10 RX ADMIN — DEXAMETHASONE 2 MG: 4 TABLET ORAL at 06:00

## 2024-05-10 RX ADMIN — LACOSAMIDE 150 MG: 100 TABLET, FILM COATED ORAL at 04:55

## 2024-05-10 RX ADMIN — MINERAL OIL, AND WHITE PETROLATUM 1 APPLICATION: 425; 573 OINTMENT OPHTHALMIC at 06:00

## 2024-05-10 RX ADMIN — ENOXAPARIN SODIUM 40 MG: 100 INJECTION SUBCUTANEOUS at 17:40

## 2024-05-10 RX ADMIN — SENNOSIDES AND DOCUSATE SODIUM 2 TABLET: 50; 8.6 TABLET ORAL at 17:40

## 2024-05-10 RX ADMIN — METOPROLOL 100 MG: 100 TABLET ORAL at 04:54

## 2024-05-10 RX ADMIN — MINERAL OIL, AND WHITE PETROLATUM 1 APPLICATION: 425; 573 OINTMENT OPHTHALMIC at 13:07

## 2024-05-10 RX ADMIN — LOSARTAN POTASSIUM 100 MG: 50 TABLET, FILM COATED ORAL at 04:54

## 2024-05-10 RX ADMIN — POLYETHYLENE GLYCOL 3350 1 PACKET: 17 POWDER, FOR SOLUTION ORAL at 04:53

## 2024-05-10 ASSESSMENT — PAIN DESCRIPTION - PAIN TYPE: TYPE: ACUTE PAIN

## 2024-05-10 NOTE — PROGRESS NOTES
Diabetes education: Attempted to meet with pt's daughter at bedside but she was not available. Please see consult note.  Plan: CDE will attempt to meet with pt's daughter tomorrow when she is at bedside to discuss finger sticks and insulin pens. Please send text to this CDE when pt's daughter is at bedside.   If unable to get response on meter, MD can order a One touch verio flex meter, strips and lancets to [facility] at discharge. Pharmacy will reach out to MD to correct if needed.  CDE needs to confirm education on finger sticks and insulin pens are needed or not ( per chart, pt's daughter is a retired EMT).

## 2024-05-10 NOTE — DISCHARGE PLANNING
TCN following. HTH/SCP chart reviewed. No new TCN needs identified. Please see prior TCN note from 5/9 for most recent discharge planning considerations if indicated.     Completed:  Choice obtained: HH; as this is likely a complex dc at this time TCN to defer to CM for additional discharge planning needs. Appreciate CM following for patient DME and PEG tube feeding needs at discharge (please see CM notes 5/9)  SCP with [facility]. As discussed in GOC meeting this day, TCN has previously placed call to assist in scheduling virtual after hospital visit follow up with primary care. Virtual primary care appointment scheduled for Monday, May 20th at 2:40PM

## 2024-05-10 NOTE — CONSULTS
Ethics Subcommittee Meeting:    Patient: Jairo Rivas  Gender: Female  Age: 74 YO  MRN: [id number]    Ethics Request: The family of a 74 YO female with poor prognosis and acute clinical decline over the past 24 hours in the setting of metastatic endometrial cancer is requesting full code status along with additional consultations for ongoing treatment.    Clinical Summary: Jairo Rivas is a 74yo F with history of endometrial carcinoma diagnosed in 2014 with brain metastasis s/p radiation treatment and CVA in April 2024.  She presented to the hospital on 4/13/24 with complaints of worsening confusion, lethargy, and abdominal pain.  An MRI was done on admission which showed a decrease in the size of her brain mass.  A lumbar puncture was done and was positive for Varicella Zoster for which she received a course of IV acyclovir.  On 4/25 she was found to have an acute infarct with hemorrhagic conversion.  Ophthalmology saw the patient on 4/29 due to discharge being noted in both eyes.  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  Treatment included taping the eyes shut nightly and the application of ointment during the daytime for lubrication and infection prophylaxis.   A percutaneous gastrostomy tube was placed on 5/7.  She is currently obtunded and unresponsive.  The patient's daughter is an EMT and has been the patient's caregiver.  She has told the provider that there have been instances during the admission where her mother has woken up, spoke and had episodes of coherence, although this has not been witnessed by the clinical team.  The Hospital Care Management team is arranging for the family to take the patient home in the coming days.  Last night (5/9) the patient's medical condition began to worsen.  She is now experiencing hypotension, continues to be obtunded, is demonstrating Cheyne-Fritz breathing pattern, fixed pupils, a Glascow-Coma Scale of 3 and pitting  edema in her right lower extremity up to knee level.      Question: Is it ethical to change the patients code status to DNR/DNI, obtain oncology consult for trial of immunotherapy and ophthalmology consult for ocular pressure test per the family's request.    Discussion: The patient's family is still hopeful that the patient will be able to recover to a point of being able to see and talk prior to her passing.  They have requested that she remain full code while at the same time stating that they understand she is nearing the end of her life.  Multiple conversations have been held with the family with a variety of providers regarding the patient's poor prognosis.   Today the family is requesting consults from ophthalmology for an ocular pressure test, which the attending provider does not believe to be medically indicated.  The family is also requesting a consult by [facility] for consideration of immunotherapy treatment.  They are also requesting an additional varicella test which has been ordered.  The committee discussed code status and agreed that should the patient code and require intubation and/or CPR, she would likely experience additional pain and suffering.    Recommendations:    Await results of PCR varicella test  The providers should further discuss with the family that an ophthalmology consult is not medically indicted.  Consult [facility] per the family's request while inpatient.  Update the code status to DNR/DNI.  Continue with discharge planning for the patient to return home with family along with the support of home health, outpatient palliative care, PEG tube/feeding supplies and appropriate DME.      Please reach out with any questions or for further clarification    Ethics Committee Members: Soumya Garces, Lloyd Tuttle, Sara Ojeda, Dr. Teddy Lema    Also present: Torito Walters, Komal Vegas, Krystal Haskins, Dr. Josue Reyes, Dr. Shreyas Campoverde, Francia Agudelo    Discussed case in detail  with Dr. Josue Reyes and provided recommendations.

## 2024-05-10 NOTE — THERAPY
Speech Language Therapy Contact Note    Patient Name: Jairo Rivas  Age:  73 y.o., Sex:  female  Medical Record #: [id number]  Today's Date: 5/10/2024    Discussed missed therapy with RN       05/10/24 0606   Treatment Variance   Reason For Missed Therapy Medical - Patient on Hold from Therapy;Medical - Patient not Able To Participate   Interdisciplinary Plan of Care Collaboration   IDT Collaboration with  Nursing   Collaboration Comments Attempted to see pt for dysphagia managment. Per EMR and discussion with RN, pt with acute medical decline this date. Pt is not appropriate to participate in session. Will re-attempt as able/medically appropriate.

## 2024-05-10 NOTE — CONSULTS
Diabetes education: Pt was admitted on 4/13/24, with acute encephalopathy. Order for diabetes education received 5/9/24 for eval on which meter and insulin covered by [facility]. Blood sugar on admit was 165 with Hga1c of 6.6% from 4/16/24. Pt is currently on tube feedings via peg tube, on decadron 2 mg via enteral tube daily, and regular insulin per sliding scale coverage every six hours. Blood sugar are 138, 130, and 115.   CDE attempted to meet with pt' daughter ( pt not appropriate for education), but she was no longer at bedside.  CDE placed called with pharmacy liaison, Michelle, asking to confirm new meter coverage ( [facility] changed meters as of 2024). CDE was able to confirm coverage for Lantus pens as well as Humalog and Novolog pens ( used to be only Novolog).  Plan: CDE will attempt to meet with pt's daughter tomorrow when she is at bedside to discuss finger sticks and insulin pens. Please send text to this CDE when pt's daughter is at bedside.   If unable to get response on meter, MD can order a One touch verio flex meter, strips and lancets to [facility] at discharge. Pharmacy will reach out to MD to correct if needed.  CDE needs to confirm education on finger sticks and insulin pens are needed or not ( per chart, pt's daughter is a retired EMT).

## 2024-05-10 NOTE — DISCHARGE PLANNING
Case Management Discharge Planning    Admission Date: 4/13/2024  GMLOS: 5.1  ALOS: 27    6-Clicks ADL Score: 6  6-Clicks Mobility Score: 6  PT and/or OT Eval ordered: Yes  Post-acute Referrals Ordered: Yes  Post-acute Choice Obtained: Yes  Has referral(s) been sent to post-acute provider:  Yes      Anticipated Discharge Dispo: Discharge Disposition: D/T to home under HHA care in anticipation of covered skilled care (06)    DME Needed: Yes    DME Ordered: Yes    Action(s) Taken: Ethics committee was at 1300. Patient code status has changed to DNR/DNI. Plan is to discharge home with HH and tube feeds on Monday. DME from [facility] will be delivered today. RNCM informed MD to place tube feed order and length of need in progress note. [facility] will follow up on Monday. [facility] will accept once tube feeds orders are placed.     Escalations Completed: None    Medically Clear: No    Next Steps: pending HH acceptance, tube feeds and medical clearance    Barriers to Discharge: Medical clearance    Is the patient up for discharge tomorrow: No

## 2024-05-10 NOTE — CONSULTS
Hematology & Oncology Consultation    Date of Service  5/10/2024    Referring Physician  Josue Reyes D.O.    Consulting Physician  Charles Napoles M.D.    Reason for Consultation  Metastatic endometrial cancer    History of Presenting Illness  73 y.o. female who presented 4/13/2024 with AMS. She has been evaluated by GynOnc with recommendation for best supportive care given poor performance status. There was previous radiation to brain metastases. Patient has been followed by palliative care and there was also an ethics consult. Currently code status is DNR/DNI, but daughter is hoping that there will be improvement in functional status.     Review of Systems  Review of Systems   Unable to perform ROS: Mental acuity       Past Medical History   has a past medical history of Anemia (2/6/2018), Back pain, Back pain, Cancer (HCC) (2019), Cough, CVA, old, hemiparesis (HCC) (2/6/2018), Essential hypertension (2/6/2018), Eye drainage, Fatigue, Frequent headaches, Frequent urination, Hyperlipidemia, Irregular periods, Painful joint, Palpitations, Post-menopause bleeding (2/6/2018), Pulmonary nodule, Sore muscles, Stroke ([facility]), Swelling of lower extremity, Vision abnormalities (2/6/2018), Vitamin D deficiency (2/6/2018), Weakness, and Wears glasses.    Surgical History   has a past surgical history that includes pr hysteroscopy,dx,sep proc (6/19/2019); dilation and curettage (6/19/2019); pr cystoscopy,insert ureteral stent (Left, 8/8/2019); hysterectomy robotic xi (N/A, 8/8/2019); salpingo oophorectomy (Bilateral, 8/8/2019); node biopsy sentinel (8/8/2019); hysterectomy laparoscopy; and pr thoracoscopy,dx no bx (Left, 8/6/2021).    Family History  family history includes Hypertension in her mother; No Known Problems in her brother and father.    Social History   reports that she has never smoked. She has never used smokeless tobacco. She reports that she does not drink alcohol and does not use  drugs.    Medications  Prior to Admission Medications   Prescriptions Last Dose Informant Patient Reported? Taking?   HYDROcodone-acetaminophen (NORCO) 5-325 MG Tab per tablet 4/13/2024 at 1345 Family Member Yes Yes   Sig: Take 1 Tablet by mouth every four hours as needed (Severe Pain).   Lacosamide 150 MG Tab 4/13/2024 at 0700 Family Member Yes Yes   Sig: Take 150 mg by mouth 2 times a day. Indications: SEIZURE PREVENTION   Melatonin 10 MG Tab 4/12/2024 at 2200 Family Member Yes Yes   Sig: Take 10 mg by mouth at bedtime as needed (Sleep).   amLODIPine (NORVASC) 5 MG Tab 4/13/2024 at 0600 Family Member No Yes   Sig: Take 1 Tablet by mouth every day.   aspirin 81 MG EC tablet   Yes No   Sig: Take 81 mg by mouth every day.   atorvastatin (LIPITOR) 20 MG Tab 4/12/2024 at 2000 Family Member No Yes   Sig: Take 1 Tablet by mouth every evening.   baclofen (LIORESAL) 10 MG Tab > 1 WEEK AGO at PRN Family Member Yes No   Sig: Take 10 mg by mouth 3 times a day as needed (Muscle Spasms).   dexamethasone (DECADRON) 2 MG tablet 4/13/2024 at 0600 Family Member No Yes   Sig: Take 1 Tablet by mouth 4 times a day for 7 days, THEN 1 Tablet 3 times a day for 5 days, THEN 1 Tablet 2 times a day for 5 days, THEN 1 Tablet every day for 5 days.   gabapentin (NEURONTIN) 300 MG Cap 4/13/2024 at 0600 Family Member No Yes   Sig: Take 1 Capsule by mouth 3 times a day for 30 days.   losartan (COZAAR) 100 MG Tab 4/12/2024 at 2000 Family Member No Yes   Sig: Take 1 Tablet by mouth 1 time a day as needed (Hypertension) for up to 300 days. BP > 180  Indications: High Blood Pressure Disorder   metoprolol SR (TOPROL XL) 100 MG TABLET SR 24 HR 4/13/2024 at 0600 Family Member No Yes   Sig: Take 1 Tablet by mouth every day.   olanzapine (ZYPREXA) 10 MG tablet 4/9/2024 at HS; STOPPED BY DAUGHTER Family Member Yes No   Sig: Take 10 mg by mouth at bedtime.   valacyclovir (VALTREX) 1 GM Tab 4/12/2024 at 0700 Family Member Yes Yes   Sig: Take 1,000 mg by  mouth 3 times a day. 7 day course prescribed 4/10/2024.      Facility-Administered Medications: None       Allergies  Allergies   Allergen Reactions    Levaquin Itching and Swelling     Swelling of the tongue and face    Penicillins Itching and Swelling    Tramadol Itching and Swelling       Physical Exam  Temp:  [35.8 °C (96.5 °F)-36.7 °C (98 °F)] 36.1 °C (97 °F)  Pulse:  [76-94] 91  Resp:  [19-22] 20  BP: ()/(54-77) 121/73  SpO2:  [97 %-99 %] 98 %    Physical Exam  Constitutional:       Appearance: She is obese. She is ill-appearing.   HENT:      Head: Normocephalic and atraumatic.      Right Ear: External ear normal.      Left Ear: External ear normal.      Nose: Nose normal.      Mouth/Throat:      Mouth: Mucous membranes are moist.      Pharynx: Oropharynx is clear.   Eyes:      Extraocular Movements: Extraocular movements intact.      Conjunctiva/sclera: Conjunctivae normal.      Pupils: Pupils are equal, round, and reactive to light.   Cardiovascular:      Rate and Rhythm: Normal rate and regular rhythm.      Pulses: Normal pulses.      Heart sounds: Normal heart sounds.   Pulmonary:      Effort: Pulmonary effort is normal.      Breath sounds: Normal breath sounds.   Abdominal:      General: Abdomen is flat. Bowel sounds are normal.      Palpations: Abdomen is soft.   Musculoskeletal:         General: Normal range of motion.      Cervical back: Normal range of motion and neck supple.   Skin:     General: Skin is warm and dry.   Neurological:      Sensory: Sensory deficit present.         Fluids  Date 05/10/24 0700 - 05/11/24 0659   Shift 8708-1866 8749-9151 9698-2063 24 Hour Total   INTAKE   Enteral 60   60   Shift Total 60   60   OUTPUT   Shift Total       Weight (kg) 95.8 95.8 95.8 95.8       Laboratory  Recent Labs     05/08/24  0508 05/10/24  1033   WBC 4.6* 4.9   RBC 3.38* 3.09*   HEMOGLOBIN 11.0* 10.0*   HEMATOCRIT 33.0* 31.4*   MCV 97.6 101.6*   MCH 32.5 32.4   MCHC 33.3 31.8*   RDW 54.1* 59.7*    PLATELETCT 183 155*   MPV 10.2 10.0     Recent Labs     05/08/24  0508 05/10/24  1033   SODIUM 136 137   POTASSIUM 4.4 4.3   CHLORIDE 101 100   CO2 26 26   GLUCOSE 144* 120*   BUN 27* 22   CREATININE 0.36* 0.28*   CALCIUM 9.0 8.8                     Imaging  CT-HEAD W/O   Final Result         1.  No acute intracranial abnormality is identified, there are nonspecific white matter changes, commonly associated with small vessel ischemic disease.  Associated mild cerebral atrophy is noted.         DX-CHEST-PORTABLE (1 VIEW)   Final Result         1.  Hazy left lower lobe infiltrate.      IR-GASTROSTOMY PLACEMENT   Final Result      1.  Fluoroscopic guided percutaneous gastrostomy with placement of an 18-[place] MARIOLA balloon gastrostomy catheter.         2. The gastrostomy tube may be used for tube feeds 18 hours after placement. Thereafter, liquid diet orders per the referring physician. Saline flush daily as per protocol.      3. In 10-14 days, the anchor button suture or sutures should be cut, releasing the anchor or anchors into the stomach. The suture anchors will then pass through the GI tract as desired. This can be performed in the interventional radiology department if    desired. Call [phone] any weekday [phone] hours to make an appointment.      US-EXTREMITY VENOUS LOWER BILAT   Final Result      US-EXTREMITY VENOUS UPPER UNILAT LEFT   Final Result      DX-ABDOMEN FOR TUBE PLACEMENT   Final Result      Feeding tube terminates within the gastric fundus.      DX-ABDOMEN FOR TUBE PLACEMENT   Final Result         1.  Nonspecific bowel gas pattern in the upper abdomen.   2.  Dobbhoff tube coiled within the esophagus with tip projecting over the expected location of the gastric antrum. Recommend withdrawal and replacement.   3.  Hazy lung base opacity suggesting edema and/or infiltrates.      CT-HEAD W/O   Final Result   Addendum (preliminary) 1 of 1   Addendum: There is trace bilateral dependent intraventricular  hemorrhages    identified, likely representing redistribution of previously visualized    subarachnoid hemorrhage.      Final         1.  No acute intracranial abnormality is identified, there are nonspecific white matter changes, commonly associated with small vessel ischemic disease.  Associated mild cerebral atrophy is noted.         DX-CHEST-LIMITED (1 VIEW)   Final Result      1.  Hazy perihilar and lower lobe opacities represent areas of atelectasis or potentially pneumonitis.      IR-MIDLINE CATHETER INSERTION WO GUIDANCE > AGE 3   Final Result                  Ultrasound-guided midline placement performed by qualified nursing staff    as above.          CT-HEAD W/O   Final Result      1.  9 x 7 mm focus of hemorrhage in the right temporal region, suspected subarachnoid versus less likely intraparenchymal. It is stable since noncontrast head CT performed one day prior.         CT-CTA NECK WITH & W/O-POST PROCESSING   Final Result      No occlusion, hemodynamically significant stenosis or dissection of the neck arteries.      CT-CTA HEAD WITH & W/O-POST PROCESS   Final Result   Addendum (preliminary) 1 of 1   Critical value called by Dr. Hector Dodson to Physician: RONAN NOGUERA   at 4/25/2024 3:48 PM.      Final      1.  9 x 7 mm focus of hemorrhage in the right temporal region which is probably subarachnoid, less likely intraparenchymal.   2.  Focal occlusion of right M2 MCA inferior division with distal opacification. This could be a focal high-grade stenosis versus vessel occlusion. It could also be vasospasm as the subarachnoid hemorrhage is present in this region.   3.  Other findings as above.      Efforts are underway to contact referring physician with results at time of dictation.            EC-ECHOCARDIOGRAM COMPLETE W/O CONT   Final Result      DX-CHEST-PORTABLE (1 VIEW)   Final Result      Feeding tube tip projects in the stomach.      Hazy left basilar opacity, atelectasis and/or mild  pneumonitis.      DX-LUMBAR PUNCTURE FOR DIAGNOSIS   Final Result      Fluoroscopic-guided lumbar puncture as described above.      IR-MIDLINE CATHETER INSERTION WO GUIDANCE > AGE 3   Final Result                  Ultrasound-guided midline placement performed by qualified nursing staff    as above.          DX-ABDOMEN FOR TUBE PLACEMENT   Final Result         1.  Nonspecific bowel gas pattern in the upper abdomen.   2.  Dobbhoff tube is coiled within the stomach, the tip terminates overlying the expected location of the gastric cardia.   3.  Hazy left lower lobe infiltrate.      MR-BRAIN-WITH & W/O   Final Result      1.  Significant interval decrease in size of right frontal brain metastasis since previous exam slight interval decrease in surrounding vasogenic edema. Smaller irregular rim of increased diffusion weighted signal intensity about this treated metastasis    consistent with cytotoxic edema.      2.  New large area of acute infarction in the right posterior frontal, parietal, posterior temporal, and occipital lobes associated gyriform enhancement in the right lateral occipital lobe and right peritrigonal white matter.      3.  Smaller chronic wedge-shaped area of infarction involving the left posterior basal ganglia extending into the left posterior frontal periventricular white matter      4.  Small areas of chronic lacunar type infarction involving the left side of the elaine and left brachium pontis with surrounding gliosis.      5.  Small foci of chronic hemosiderin deposition noted in the left posterior temporal and occipital white matter, as well as in the treated right frontal brain metastasis      6.  Age-related cerebral atrophy.      DX-ABDOMEN FOR TUBE PLACEMENT   Final Result         1.  Nonspecific bowel gas pattern in the upper abdomen.   2.  Dobbhoff tube is coiled within the stomach, the tip terminates overlying the expected location of the gastric fundus, physician similar to prior study.    3.  Left lower lobe infiltrate      DX-ABDOMEN FOR TUBE PLACEMENT   Final Result         1.  Nonspecific bowel gas pattern in the upper abdomen.   2.  Dobbhoff tube is coiled within the stomach, the tip terminates overlying the expected location of the gastric fundus.   3.  Left basilar atelectasis      DX-ABDOMEN FOR TUBE PLACEMENT   Final Result      Feeding tube looped in the stomach the distal tip in the gastric fundus.      CT-HEAD WITH & W/O   Final Result      1.  Redemonstration of right frontal lobe mass, highly concerning for metastasis. Associated vasogenic edema and a 2.5 mm right to left midline shift. No progressive mass effect. No herniation.   2.  Nonspecific ill-defined enhancement in the right parietal lobe.   3.  No new large vascular structure infarct. No new intracranial hemorrhage.      DX-CHEST-LIMITED (1 VIEW)   Final Result      No significant interval change.      DX-ABDOMEN COMPLETE WITH AP OR PA CXR   Final Result      1. No acute cardiopulmonary abnormality.   2. Chronic left lower lobe scarring.   3. Nonobstructive bowel gas pattern. Small amount of stool throughout the colon.          Assessment/Plan  Metastatic endometrial cancer - recommend best supportive care, daughter aware that patient is not a candidate for treatment of her terminal cancer given her performance status. We discussed that since disease is incurable, would only be getting side effects with poor quality of life. If there is a miraculous recovery, they will call and make appointment with [facility].   CVA with hemorrhagic transformation - poor prognosis    Case discussed with daughter at bedside.    Thank you very much for the consult, please call with any questions.    Charles Napoles M.D.

## 2024-05-10 NOTE — PROGRESS NOTES
"NOC HOSPITALIST CROSS COVER    Notified by RN regarding patient with deteriorating mental status, patient has at best minimally responsive pupils, GCS is 3.  Spoke with daughter at bedside who also had additional family members on the phone.  I again conveyed the seriousness of the patient's condition and her very grave prognosis.  Based on chart review it does appear that many clinicians have had similar conversations with her in the past.  I did explain the concept of comfort care should they choose to pursue that option.  Daughter told me that she would take some time to discuss options with her family members before coming to decision.  After a brief discussion with her family they decided that they wish to pursue further diagnostic testing and I placed an order for head CT.  I did inform patient's family members that I did not expect that any intervenable disease process would be identified with imaging however I was willing to pursue further diagnostic testing if they so wish.  Family did agree to wait for results of any scans before pursuing further interventions such as escalation of level of care.  I again asked if they would like to keep the status of full code at this time.  Per daughter \"I think that is what she would want\".  Will maintain full CODE STATUS at this time.  Patient's prognosis is grave.        -----------------------------------------------------------------------------------------------------------    Electronically signed by:  RACHEL Thorpe PA-C  Hospitalist Services         "

## 2024-05-10 NOTE — PROGRESS NOTES
Inpatient Palliative Medicine Progress Note     Jairo Rivas  73 y.o. female  [id number]    Location = [facility]  Referral Source = Josue Reyes D.o.    PCP = ZULEYKA Lyons    Reason for palliative medicine consultation and/or visit: ACP         Assessment and Plan:     GOAL(S) OF CARE = LONGEVITY, a chance at [facility] for immunotherapy again (daughter Danita lives in [place])    SYMPTOMS ETIOLOGY/CAUSES = confusion & dysphagia secondary secondary to sepsis, recent stroke, and progressing metastatic endometrial adenocarcinoma to brain    PROGNOSIS = overall unfavorable & highly guarded, lingering delirium & encephalopathy & NPO.    CODE STATUS = FULL ==> DNAR/DNI per ethics consult judgement      ADVANCE CARE PLANNING =   Relevant recent history reviewed.  Oncology history reviewed.    Anticipatory education on care philosophy and rational for limited NGT feed trial (~1wk) if family were to elect.  Informed daughter Carolyn and her  Michael about importance of caloric intake thru NGT, for family's stated goal at this time.  Educated family how limited trial of NGT can add some clarity to family, regarding how close or how far they are to achieving their goal, but without prolonging pt in an undesirable state.  Shared with family about my own professional reservation about how much calories along can change patient's trajectory, hence my recommendation for limited TF trial but no longer.  Educated family on importance of periodic reassessment and avoidance of non-beneficial measures, if refractory at reassessment.    Medical philosophy education and the importance how FUNCTION determines ultimate treatment options.  Anticipatory guidance/education on possible clinical outcomes, as studies & treatments finish.      PALLIATIVE CARE TEAM INTERVENTIONS =   1.ACP for anticipatory guidance & overall care philosophy, today very much the same talk with the other daughter Danita, who drove [place] from  [place].    2. Case discussed with oncology RN navigator Isa Zaidi. Relayed family's wish for an oncology second opinion, in addition to Dr. Garcia's team.    3. Coordinated with  to see if we can find a [place] Imam to visit & pray, if possible.  4. Will follow and support pt & family to our utmost in this difficult time.    5. Encouraged Radha Samayoa and their 3 siblings to visit if/when possible, as pt remains complex & clinical outcome uncertain.    6. 4/25/2024 Primary team may consider an ophthalmology eval for eye discharges, if possible.    7. 5/10/2024 Acute clinical decline concerning for fast decline & impending death. Family will be visiting. Will attempt to meet with them later to make some important care decisions.    1530: Difficult meeting with patient's daughter Carolyn & son Alphonso over phone to review ethics consult decisions. Extensive explanations provided about the reasons/indications for ethics consults, and the same concern for futile care shared by entire medical team, as reflected in the DNAR DNI recommendations.    Extensive education provided about the lack of any perceivable benefits of emergency resuscitation procedures for family's short term & long term goals, which unfortunately are largely unattainable now.     Extensive education provided to illustrate the fact that losing the options of CPR/VENTILATION is not a loss of any treatment useful for pt's unfortunate terminal & unimproved clinical status.    Family emotional but remained cordial in our communication.    It is abundantly clear to me, professionally & personally, no amount of explanation will make this day any easier for family.    This was a difficult conversation and our colleague/[employer] HPM Fellow got tearful in this charged meeting as well.    An very unfortunate but entirely predictable turn of event as medical team navigate patient's unfortunate terminal status, while trying to balance family's  expressed wishes against the high risks of unnecessary trauma & harms for no perceivable gain.          Summary =   Jairo Rivas is a 73 y.o.  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    Despite care so far, patient unfortunately remains encephalopathic and unable to pass SLP swallow evaluation yet. She is a [facility] patient and has been followed by Dr. Garcia since 2019. Patient unfortunately remains unfit for further cancer directed treatment at this time.    2024 CT-Head - R frontal lobe mass, with non-specific R parietal lobe enhancements    --------------------------------------------------------------------------------------------------------------------------------------------------    5/10/2024  [employer] HPM Fellow Margi Hahn,  and I followed up around 1030.    Acute event earlier this morning. Patient's alertness dramatically worsened now basically obtund. Her breathing was irregular in Cheyne Fritz Pattern. Her eyes were bilaterally fixed and non-reactive to light, consistent with prior mentions by a few other medical team members. There was pitting edema in RLE up to roughly knee level, much worse than LLE.Bilateral radial pulses and dorsalis pedis pulses remained strong and palpable, on 1LPM supplemental O2 thru nasal cannula.    Discussed these very concerning findings with daughter Carolyn who was bedside. Carolyn informed me that her sister Brenda is en route and other family members will be visiting over weekend too.    Carolyn appeared tired and emotional, tears flickering in the corners of her otherwise stoic stare. We were able to openly discuss the followin) Imaging and labs so far  show no dramatic changes that would explain pt's acute deterioration.  2) Cause unknown. Could be a bad aspiration that led to all this. However, given patient's poor neurological findings, aspiration will not be able to be prevented.  3) Clinical findings are poor concerning for fast decline and impending death.  4) My growing professional worry & skepticism whether at all the goal of getting immunotherapy anywhere is attainable.  5) The clinical outcome is likely already determined. We need to think about quality of life more if family are willing - how we make patient more comfortable to get to the same outcome.      Carolyn understand there are some important decisions to be made when family are here. These are not decisions she can unilaterally make.        4/25/2024  Patient seen bedside: lethargic, but off restraints now, on room air. Napping. Some liquid, cloudy discharges both eyes that her daughter Carolyn pointed out to me. Carolyn requested ophthalmology input, if possible. Added scheduled artificial tears eye drops for patient, to which Carolyn agreed.    Carolyn reprots that today is patient's best days far. She stated that patient was able to follow some simple commands today and said a few things to her and her  Michael about Hol[employer] music, about wanting help, about wanting to drink water for thirst, about dry throat, etc.    From Carolyn's perspective, she and Michael saw some small but noticeable improvements today, a bit more so than last few days. They continue to hope patient can continue to improve to a functional level that's needed for more cancer treatment at [facility].        4/22/2024  Met with patient's daughter Radha bedside. We reviewed the challenges so far this admission, the stagnant nature of patient's lethargy/obtund state, possible influencing factors, the LP and its role in infectious workup, etc.    We discussed again how function may determine or limit treatment options, as we did last  Friday.    Family have some questions about getting patient's [facility] oncology neurology opinions. Provided family education about the logistic difficulties with this kind of consultation (state license differences, facility privileges, etc.) Also provided daughters my own professional opinion that, as pt is in her current state the way she is, doubtful a oncology neurology service will provide much if anything additional compared to a traditional neurology team, who have already been following since admission....    Agreed with family an LP may help rule in or rule out possible contributing factors to patient's ongoing poor mentation.    Again encouraged family and siblings to visit when able, as clinical picture remains unchanged with minimal-to-no overall functional improvement.        4/19/2024  Met with patient's daughter Danita and also her sister Carolyn over phone today. Provided medical updates - pt more sleepy overall, with corresponding gabapentin dose reduction by primary team. No narcotic (no oxycodone, no dilaudid) for about 24 hours now. Explained to daughters that most likely opioids are not contributing to patient's increased somnolence.     Also shared with daughters my professional approval/agreement with primary team's non-narcotic management of patient's pain and discomfort. Patient did not appear distressed to me today.    We discussed about general treatment philosophy, especially how FUNCTION, above all other studies and signs, will always remain the #1 determining factor for treatment options. We also talked briefly about the lagging nature of imaging, lagging in the sense that functional changes typically occurs first before corresponding imaging changes. Also discussed about our relative permissive generosity over glucose control for older & sicker patients, mostly for safety.    Encouraged family and siblings to visit, if/when able, as patient remains quite medically complex.    Provided a  few words of encouragement to these two loving daughters who deeply care about their mother.        4/17/2024  Very extensive discussion with patient's daughter Danita about patient's current care. History reviewed. Recent studies, labs, imaging reviewed together, including recent NSG notes prior to radiation therapy.    Family have decided on NGT trial to see if this can help patient improve some more. They do feel patient is more alert, although her cognition remains quite short off baseline as well.    They are certain continued aggressive care, including ICU care & life support are in line with patient and family's values. Patient has been chair bound since 2010, even while she fought her endometrial cancer, so they have been down this road before.    They are taking things a day at a time, and hoping to provide best care to best optimize patient, however much possible... They all feel like they have been well updated by medical team so far, and do understand the very real risk of deterioration/decline, as much as they try to remain hopeful.    Patient has always fought hard. 2 years ago, hospice was discussed with Dr. Garcia's team, but in the end patient moved down to [place] to live with Danita and was treated at [facility] with fairly good response, despite already burdened with brain mets at that time. This experience weighed heavily in family's mind. They have learned to ask good questions and leave no stones unturned.    Extensive education provided, putting on Danita's radar about various risk on the roads ahead: NGT pulling, restraints & suffering, inability to get any cancer treatment if unable to wean off life support in ICU, etc. She understood and appeared overall quite sophisticated in her thoughts and questions, and much like her sister Carolyn, with patient's best interests in mind and in alignment with their best understanding of patient's values/preferences.    Assured Jessicacynthiaashley that medical team will  "continue to provide the best support we can, in as good an alignment as we can with their goals and values. Informed Danita that the only thing that needs to be prevented is suffering itself and, if/when medical team or family are concerned about suffering, that will be a great time to talk more about what we are doing. Danita ackowledged understanding.    Encouraged Radha [place] and [place] to [place] if/when possible, as clinical outcome remains highly uncertain.      4/16/2024  Met with patient, her daughter Carolyn, and Carolyn's  Michael bedside. Introduced myself, my scope of practice, and reasons for this consultation.Carolyn and Michael were amenable to proceed with this visit.    Patient were self were delirious and minimally conversive. She was able to say \"no\" when I inquired about her pain & discomfort. That was the extent of our conversations.    Patient is originally from [place]. Carolyn is her only blood relative here in [place]. Carolyn's sister Danita resides in [place]. Thanks to the support of Danita's family, patient was able to receive cancer treatments at [facility] [id number], with fairly good response at that time, all things considered.    According to Carolyn, patient's has been a \"real tough cookie.\" One of the main reasons that led to her prior stint at [facility] oncology was disease progression & hospice discussion. Carolyn and family were amazed how patient was able to surpass expectations a few times.    Patient is a devout [employer] and prays regularly. Daughter is amenable to a Imam visiting if it can be arranged for patient's spiritual support.     Patient is said to enjoy \"shopping & casinos, and that's why she came back to [employer] in AUG 2023,\" Carolyn told me.        We talked about family's outstanding goal of care, which is to try to optimize pt for immunotherapy again, if possible, be it rehab or going to [facility] again.    We discussed imminent road blocks at this time = " dysphagia & NPO status, infection, continued delirium/confusion, low function. We discussed at length about a time limited (~1wk or so) TF trial to see if it is possible to get patient closer to family's goal again, and reassess frequently to make sure we do not prolong decline if not responding to treatments.    Carolyn is rightfully worried about side effects of NTG - infection, bleed, pain/discomfort, decline prolongation - and I reinforced the importance of the time-limited nature of NTG trial but no longer. Also shared my honest professional opinion that likely, all things considered, calories alone will not get patient close enough to their goal for more immunotherapy.    Carolyn has lots of thoughts. She understands the incurable nature of patient's cancer and ongoing decline over the years, as tough as patient has weathered all these challenges.    We agreed to a family meeting tomorrow 1400 to further discuss with family about roads ahead,         Medical Decision Making =  Patient is of high medical complexity with incurable disease = endometrial adenocarcinoma metastatic to brain & lung  Care complexity further complicated by secondary conditions = 2010 CVA with residual R deficits + new subacute CVA, acute on chronic baseline delirium/hallucinations, dysphagia NPO status, sepsis    Extensive data reviewed & coordination performed = recent notes, latest labs & studies, GYN past history, ONC past history, [facility] notes, etc.    High risk of morbidity from additional interventions &  studies = NPO & low functioning & still delirious, high risk medication usage including continuous fluid        Advance care planning  =   Thank you for allowing me the opportunity to participate in the care of Jairo Rivas     I spent a total of 50minutes reviewing medical records, direct face-to-face time with the patient and/or family, documentation and coordination of care. This is separate from the time spent on  advance care planning, which is documented above.         ILEANA (JUANJOSE) DO [facility]   [address] [place]  [id number]  P: [phone]  F: [phone]

## 2024-05-10 NOTE — PROGRESS NOTES
Hospital Medicine Daily Progress Note    Date of Service  5/10/2024    Chief Complaint  Confusion.    Hospital Course  Jairo Rivas is a 74yo F with hx of endometrial carcinoma, brain metastasis w/p radiation tx, and prior CVA.  An MRI from 4/29 frontal brain metastases with vasogenic edema and acute infarction.  Patient was started on dexamethasone for edema and lacosamide for seizure prophylaxis.  EEG was negative for epileptiform activity.  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  Neurology and infectious disease consulted.  Patient completed a course of acyclovir.  A CT head 4/21 noted a 9 x 7 mm hemorrhage in the right temporal region.  Ophthalmology was consulted for vision changes.  Family requested transfer to [place] for neuro-oncology consultation.  All other facilities are out of network for insurance.  [facility] was at capacity.    An echocardiogram show ejection fraction 65%.  Mild concentric left ventricular perjury.    A PEG tube was placed on 5/7.  Patient was tolerating tube feeds.  Palliative care was following for symptom management as well as advance care planning.  Multiple goals of care discussions were held with family and insisted on patient being full code despite poor prognosis.    A [facility] Ethics committee meeting was held on 5/10 with the CMO, myself, and Dr. Lema.  Was felt that the patient is obtunded and nonresponsive.  It was felt that if the patient did go into cardiac arrest resuscitating her and putting her on life support would cause more harm and discomfort to the patient rather than any medical benefit and is thus not indicated.  Patient was changed to DNR/DNI.    Patient was also previously followed by Dr. Garcia for endometrial cancer.  Dr. Garcia had recommended hospice and no further treatment.  Family requested a second opinion in hopes of another course of immunotherapy.  Case was discussed with oncology Dr. Napoles.  Because of the patient's poor  performance status and prognosis she is currently not a candidate for immunotherapy.  Her cancer was felt to be terminal and incurable and treatment would only bring side effects with poor quality of life.  If the patient were to improve and make recovery she can follow-up with [facility] for immunotherapy.    Interval Problem Update  Patient was seen and examined at bedside.  I have personally reviewed and interpreted vitals, labs, and imaging.    5/7.  Afebrile.  Intermittent hypotension.  On 1-2 L nasal cannula.  Patient is obtunded.  Minimally responsive.  PEG tube was placed this morning.  No family currently at bedside.  Plan for goals of care conference on Thursday.  5/8.  Afebrile.  Stable vitals.  On 1-2L NC.  Patient is obtunded.  Does not follow commands.  Developing contractures.  Discussed with social work, dietary.  Plan for goals of care meeting tomorrow.  Current plan is for home health with DME  Wbc 4.6  Hgb 11  P 183  5/9.  Afebrile.  Stable vitals.  On 1L NC.  Patient is obtunded.  Had care conference with daughter, palliative, social work, nursing.  Family still very hopeful patient can make meaningful recovery with goal of being more responsive and able to interact.  They would like to pursue immunotherapy.  Prognosis remains guarded.  Reached out to ID.  Will check varicella PCR.  5/10.  Afebrile.  Stable vitals.  On 1L NC.  Overnight patient was felt to be less responsive and then became hypotensive this morning with irregular breathing pattern.  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  Blood work was drawn.  Lactic acid within normal limits.  CT of the head shows no acute intracranial disease.  Chest x-ray also appears stable.  Discussed at Ethics committee meeting.  Also discussed with intensivist, oncology, palliative care.  It was felt that if the patient were to decompensate further and go into cardiac arrest that resuscitation and life support will be  medically futile and not change her outcome.  This father resuscitation would only cause more harm and discomfort and her changes of survival or not good.  Resuscitation was felt to be not medically indicated and patient was changed to DNR/DNI.  Per family request I did order a varicella PCR to make sure encephalitis was completely treated.  I also spoke with oncology Dr. Napoles and patient is currently not a candidate for immunotherapy.  Ophthalmology recommends outpatient follow-up.  Working to get patient home with home health with DME    I have discussed this patient's plan of care and discharge plan at IDT rounds today with Case Management, Nursing, Nursing leadership, and other members of the IDT team.    Consultants/Specialty  infectious disease, neurology, ophthalmology, and gynecology    Code Status  Full Code    Disposition  The patient is not medically cleared for discharge to home or a post-acute facility.  Anticipate discharge to: home with organized home healthcare and close outpatient follow-up    I have placed the appropriate orders for post-discharge needs.    Review of Systems  Review of Systems   Unable to perform ROS: Patient unresponsive        Physical Exam  Temp:  [35.8 °C (96.5 °F)-36.7 °C (98 °F)] 36.7 °C (98 °F)  Pulse:  [74-94] 94  Resp:  [14-20] 19  BP: (104-148)/(53-77) 141/76  SpO2:  [98 %-99 %] 98 %    Physical Exam  Vitals reviewed.   Constitutional:       General: She is sleeping.      Appearance: She is obese. She is ill-appearing.   HENT:      Head: Normocephalic.      Nose:      Comments: Enteral tube in nare     Mouth/Throat:      Mouth: Mucous membranes are dry.      Pharynx: Oropharyngeal exudate present.   Eyes:      Comments: Left eye corneal edema.  Some lubrication on cornea.  Left eyelid does not appear to close as well as the right.   Cardiovascular:      Rate and Rhythm: Normal rate and regular rhythm.   Pulmonary:      Effort: Tachypnea and accessory muscle usage  present.   Abdominal:      General: There is no distension.      Palpations: Abdomen is soft.      Comments: PEG tube in place   Musculoskeletal:      Right lower leg: Edema present.      Left lower leg: Edema present.      Comments: Right leg with more edema than left. Right arm chronic flexion contraction.   Neurological:      Mental Status: She is unresponsive.      GCS: GCS eye subscore is 1. GCS verbal subscore is 1. GCS motor subscore is 1.      Cranial Nerves: Cranial nerve deficit present.      Motor: Weakness present.      Coordination: Coordination abnormal.         Fluids    Intake/Output Summary (Last 24 hours) at 5/10/2024 0548  Last data filed at 5/10/2024 0443  Gross per 24 hour   Intake 180 ml   Output 1350 ml   Net -1170 ml       Laboratory  Recent Labs     05/08/24  0508   WBC 4.6*   RBC 3.38*   HEMOGLOBIN 11.0*   HEMATOCRIT 33.0*   MCV 97.6   MCH 32.5   MCHC 33.3   RDW 54.1*   PLATELETCT 183   MPV 10.2       Recent Labs     05/08/24  0508   SODIUM 136   POTASSIUM 4.4   CHLORIDE 101   CO2 26   GLUCOSE 144*   BUN 27*   CREATININE 0.36*   CALCIUM 9.0       Recent Labs     05/07/24  0816   INR 1.04               Imaging  DX-CHEST-PORTABLE (1 VIEW)   Final Result         1.  Hazy left lower lobe infiltrate.      IR-GASTROSTOMY PLACEMENT   Final Result      1.  Fluoroscopic guided percutaneous gastrostomy with placement of an 18-[place] MARIOLA balloon gastrostomy catheter.         2. The gastrostomy tube may be used for tube feeds 18 hours after placement. Thereafter, liquid diet orders per the referring physician. Saline flush daily as per protocol.      3. In 10-14 days, the anchor button suture or sutures should be cut, releasing the anchor or anchors into the stomach. The suture anchors will then pass through the GI tract as desired. This can be performed in the interventional radiology department if    desired. Call [phone] any weekday [phone] hours to make an appointment.      US-EXTREMITY VENOUS  LOWER BILAT   Final Result      US-EXTREMITY VENOUS UPPER UNILAT LEFT   Final Result      DX-ABDOMEN FOR TUBE PLACEMENT   Final Result      Feeding tube terminates within the gastric fundus.      DX-ABDOMEN FOR TUBE PLACEMENT   Final Result         1.  Nonspecific bowel gas pattern in the upper abdomen.   2.  Dobbhoff tube coiled within the esophagus with tip projecting over the expected location of the gastric antrum. Recommend withdrawal and replacement.   3.  Hazy lung base opacity suggesting edema and/or infiltrates.      CT-HEAD W/O   Final Result   Addendum (preliminary) 1 of 1   Addendum: There is trace bilateral dependent intraventricular hemorrhages    identified, likely representing redistribution of previously visualized    subarachnoid hemorrhage.      Final         1.  No acute intracranial abnormality is identified, there are nonspecific white matter changes, commonly associated with small vessel ischemic disease.  Associated mild cerebral atrophy is noted.         DX-CHEST-LIMITED (1 VIEW)   Final Result      1.  Hazy perihilar and lower lobe opacities represent areas of atelectasis or potentially pneumonitis.      IR-MIDLINE CATHETER INSERTION WO GUIDANCE > AGE 3   Final Result                  Ultrasound-guided midline placement performed by qualified nursing staff    as above.          CT-HEAD W/O   Final Result      1.  9 x 7 mm focus of hemorrhage in the right temporal region, suspected subarachnoid versus less likely intraparenchymal. It is stable since noncontrast head CT performed one day prior.         CT-CTA NECK WITH & W/O-POST PROCESSING   Final Result      No occlusion, hemodynamically significant stenosis or dissection of the neck arteries.      CT-CTA HEAD WITH & W/O-POST PROCESS   Final Result   Addendum (preliminary) 1 of 1   Critical value called by Dr. Hector Dodson to Physician: RONAN NOGUERA   at 4/25/2024 3:48 PM.      Final      1.  9 x 7 mm focus of hemorrhage in the  right temporal region which is probably subarachnoid, less likely intraparenchymal.   2.  Focal occlusion of right M2 MCA inferior division with distal opacification. This could be a focal high-grade stenosis versus vessel occlusion. It could also be vasospasm as the subarachnoid hemorrhage is present in this region.   3.  Other findings as above.      Efforts are underway to contact referring physician with results at time of dictation.            EC-ECHOCARDIOGRAM COMPLETE W/O CONT   Final Result      DX-CHEST-PORTABLE (1 VIEW)   Final Result      Feeding tube tip projects in the stomach.      Hazy left basilar opacity, atelectasis and/or mild pneumonitis.      DX-LUMBAR PUNCTURE FOR DIAGNOSIS   Final Result      Fluoroscopic-guided lumbar puncture as described above.      IR-MIDLINE CATHETER INSERTION WO GUIDANCE > AGE 3   Final Result                  Ultrasound-guided midline placement performed by qualified nursing staff    as above.          DX-ABDOMEN FOR TUBE PLACEMENT   Final Result         1.  Nonspecific bowel gas pattern in the upper abdomen.   2.  Dobbhoff tube is coiled within the stomach, the tip terminates overlying the expected location of the gastric cardia.   3.  Hazy left lower lobe infiltrate.      MR-BRAIN-WITH & W/O   Final Result      1.  Significant interval decrease in size of right frontal brain metastasis since previous exam slight interval decrease in surrounding vasogenic edema. Smaller irregular rim of increased diffusion weighted signal intensity about this treated metastasis    consistent with cytotoxic edema.      2.  New large area of acute infarction in the right posterior frontal, parietal, posterior temporal, and occipital lobes associated gyriform enhancement in the right lateral occipital lobe and right peritrigonal white matter.      3.  Smaller chronic wedge-shaped area of infarction involving the left posterior basal ganglia extending into the left posterior frontal  periventricular white matter      4.  Small areas of chronic lacunar type infarction involving the left side of the elaine and left brachium pontis with surrounding gliosis.      5.  Small foci of chronic hemosiderin deposition noted in the left posterior temporal and occipital white matter, as well as in the treated right frontal brain metastasis      6.  Age-related cerebral atrophy.      DX-ABDOMEN FOR TUBE PLACEMENT   Final Result         1.  Nonspecific bowel gas pattern in the upper abdomen.   2.  Dobbhoff tube is coiled within the stomach, the tip terminates overlying the expected location of the gastric fundus, physician similar to prior study.   3.  Left lower lobe infiltrate      DX-ABDOMEN FOR TUBE PLACEMENT   Final Result         1.  Nonspecific bowel gas pattern in the upper abdomen.   2.  Dobbhoff tube is coiled within the stomach, the tip terminates overlying the expected location of the gastric fundus.   3.  Left basilar atelectasis      DX-ABDOMEN FOR TUBE PLACEMENT   Final Result      Feeding tube looped in the stomach the distal tip in the gastric fundus.      CT-HEAD WITH & W/O   Final Result      1.  Redemonstration of right frontal lobe mass, highly concerning for metastasis. Associated vasogenic edema and a 2.5 mm right to left midline shift. No progressive mass effect. No herniation.   2.  Nonspecific ill-defined enhancement in the right parietal lobe.   3.  No new large vascular structure infarct. No new intracranial hemorrhage.      DX-CHEST-LIMITED (1 VIEW)   Final Result      No significant interval change.      DX-ABDOMEN COMPLETE WITH AP OR PA CXR   Final Result      1. No acute cardiopulmonary abnormality.   2. Chronic left lower lobe scarring.   3. Nonobstructive bowel gas pattern. Small amount of stool throughout the colon.      CT-HEAD W/O    (Results Pending)        Assessment/Plan  * Acute encephalopathy- (present on admission)  Assessment & Plan  5/10/2024  Multifactorial   Manage  encephalopathy is  Acute stroke on MRI 4/20  Subarachnoid hemorrhage on CT  LP with CSF positive for VZV done 4/22  Continue on acyclovir.  Completed course.  Monitor blood pressure closely,  neurochecks every 4 hours    Keratopathy  Assessment & Plan  5/10/2024  Exposure from not closing her eyes completely  Ophthalmology consulted during stay.  Eye drops and lubrications  Tape eyes closed when sleeping.    Aspiration pneumonitis (HCC)- (present on admission)  Assessment & Plan  5/10/2024  Patient high risk for aspiration pneumonitis/pneumonia.   Currently on room air  Monitor oxygen requirement closely    Prediabetes- (present on admission)  Assessment & Plan  5/10/2024  Monitor glucose level closely.  Had been on decadron  On insulin regimen    Vasogenic edema (HCC)- (present on admission)  Assessment & Plan  5/10/2024  Continuing decadron taper per Dr. Live's recommendations. He also does not believe that there are any significant changes in her brain imaging from this admission that could explain her somnolence/presentation. She is on the correct medication (decadron) for treating the edema  Improvement on MRI on 4/21  - 5/1:  tapering  decadron 4mg IV bid x 13 days to 2mg IV bid x 7 days, then 2mg daily x 7 days, then 1 mg daily x 7 days.  5/2 CT head w/o contrast improved right frontal lobe edema.    Hypernatremia  Assessment & Plan  5/10/2024  Resolved    Seizure (HCC)- (present on admission)  Assessment & Plan  5/10/2024  Continue lacosamide 150 mg BID per neurology  EEG and cEEG negative for seizures     Neurology consulted during admit.    Dysphagia- (present on admission)  Assessment & Plan  5/10/2024  Possibly due to CVA vs brain metastases and altered mental status in setting of infection.  Patient will remain NPO as she failed SLP evaluation 4/17  NG tube placement required IRIS. Tube was removed for MRI and family refused NG tube to be replaced. Extensive discussion with family regarding nutrition  options. Eventually agreed to let patient get NG tube with lorazepam sedation and IRIS. No one certified to place NG tube with IRIS available on 4/21. Vascular access also lost on 4/21 and midline unable to be placed without VATs  -aspiration precautions  -Continue lansoprazole  -[id number] glucose checks  5/3:  order for PEG tube placed for 5/7 by IR.  In order for patient to go home with daughter and HH.    Shingles- (present on admission)  Assessment & Plan  5/10/2024  S/p acyclovir x14 days.    Constipation- (present on admission)  Assessment & Plan  5/10/2024  Resolved  - Bowel regimen    Sepsis secondary to UTI (HCC)- (present on admission)  Assessment & Plan  Sepsis resolved    Malignant neoplasm metastatic to brain (HCC)- (present on admission)  Assessment & Plan  5/10/2024  Brain mets noted during hospitalization on 3/27/2024 with vasogenic edema, MRI on 4/11 shows continued brain mets with vasogenic edema.  MRI 4/21 shows decrease in tumor size and improvement in edema.  -continue seizure prophylaxis with lacosamide 150 mg BID  -tapering dexamethasone   Ophthalmology evaluated for concern of vision changes    Advance care planning- (present on admission)  Assessment & Plan  5/10/2024  At this time patient's family want her to be full code.  unable to transfer to [facility], no accepting SNF and insurance will not approve due to patient unable to participate with PT/OT.  Plan for  home with maryhter and HH, home equipment and PEG tube feeds.    Patient was obtunded and not able to participated in the conversation.  Daughter were present for the conversation another daughter on the phone.  I discussed advance care planning face to face with the patient and family for at least 30 minutes, including diagnosis, prognosis, plan of care, risks and benefits of any therapies that could be offered, as well as alternatives including palliation and hospice, as appropriate.  Forms were completed and placed in the  chart.    Ethics committee was held on 5/10.  Patient was made DNR/DNI.  Had long discussion with daughter and family over the phone.  Time spent 60 minutes    Obesity (BMI 30-39.9)- (present on admission)  Assessment & Plan  Can discuss when mentation improves.    Endometrial cancer (HCC)- (present on admission)  Assessment & Plan  5/10/2024  Followed by Dr. Garcia outpatient and sees radiation oncology Dr Live.  Received immunotherapy at [facility] in 2021 with initial good response. Reached out to Dr. Live who does not believe imaging from 4/14 shows significant changes compared to MRI month prior.  Complicated by metastases to brain among other locations, likely contributing to her altered mental status. Mets re demonstrated without significant change on CTH on admission.  - Decadron for vasogenic edema  - Neurochecks every 4 hours  - Continue aspiration, fall, seizure precaution.    Essential hypertension- (present on admission)  Assessment & Plan  5/10/2024  Episode of hypotension  Discontinue amlodipine  Change parameters to metoprolol and losartan    Acute CVA (cerebrovascular accident) (HCC)- (present on admission)  Assessment & Plan  5/10/2024  MRI on 4/20 noted with right frontal brain metastasis with vasogenic edema, acute infarction.  CT head from 4/25 noted with 9X 7 mm of hemorrhage in the right temporal region.   Neurology consult appreciated.   Continue aspirin and atorvastatin for secondary prevention         VTE prophylaxis: Lovenox    I have performed a physical exam and reviewed and updated ROS and Plan today (5/10/2024). In review of yesterday's note (5/9/2024), there are no changes except as documented above.    Greater than 59 minutes spent prepping to see patient (e.g. review of tests) obtaining and/or reviewing separately obtained history. Performing a medically appropriate examination and/ evaluation.  Counseling and educating the patient/family/caregiver.  Ordering medications, tests, or  procedures.  Referring and communicating with other health care professionals.  Documenting clinical information in EPIC.  Independently interpreting results and communicating results to patient/family/caregiver.  Care coordination.

## 2024-05-10 NOTE — PROGRESS NOTES
At about 2155 Kary Ureña was informed  on change of condition of this pt. Pt respiration were abnormal. Pt is having like Cheyen- stroke respirations.   Hospitailst order chest x-ray for now.     At about 0440 I informed Hospitalist of pt having fixated pupils. Hospitalist assessed pt and had  a discussion on POC with daughter. Hospital;ist order a head CT.

## 2024-05-11 LAB
GLUCOSE BLD STRIP.AUTO-MCNC: 118 MG/DL (ref 65–99)
GLUCOSE BLD STRIP.AUTO-MCNC: 121 MG/DL (ref 65–99)
GLUCOSE BLD STRIP.AUTO-MCNC: 126 MG/DL (ref 65–99)
GLUCOSE BLD STRIP.AUTO-MCNC: 130 MG/DL (ref 65–99)

## 2024-05-11 PROCEDURE — 99233 SBSQ HOSP IP/OBS HIGH 50: CPT | Mod: 25 | Performed by: INTERNAL MEDICINE

## 2024-05-11 PROCEDURE — 99233 SBSQ HOSP IP/OBS HIGH 50: CPT | Performed by: INTERNAL MEDICINE

## 2024-05-11 PROCEDURE — 99497 ADVNCD CARE PLAN 30 MIN: CPT | Performed by: INTERNAL MEDICINE

## 2024-05-11 RX ADMIN — LACOSAMIDE 150 MG: 100 TABLET, FILM COATED ORAL at 17:12

## 2024-05-11 RX ADMIN — LANSOPRAZOLE 30 MG: 30 TABLET, ORALLY DISINTEGRATING ORAL at 06:31

## 2024-05-11 RX ADMIN — ENOXAPARIN SODIUM 40 MG: 100 INJECTION SUBCUTANEOUS at 17:11

## 2024-05-11 RX ADMIN — MINERAL OIL, AND WHITE PETROLATUM 1 APPLICATION: 425; 573 OINTMENT OPHTHALMIC at 06:30

## 2024-05-11 RX ADMIN — LACOSAMIDE 150 MG: 100 TABLET, FILM COATED ORAL at 06:30

## 2024-05-11 RX ADMIN — MODAFINIL 100 MG: 100 TABLET ORAL at 06:31

## 2024-05-11 RX ADMIN — MINERAL OIL, AND WHITE PETROLATUM 1 APPLICATION: 425; 573 OINTMENT OPHTHALMIC at 20:58

## 2024-05-11 RX ADMIN — ATORVASTATIN CALCIUM 20 MG: 20 TABLET, FILM COATED ORAL at 17:12

## 2024-05-11 RX ADMIN — DIBASIC SODIUM PHOSPHATE, MONOBASIC POTASSIUM PHOSPHATE AND MONOBASIC SODIUM PHOSPHATE 500 MG: 852; 155; 130 TABLET ORAL at 06:31

## 2024-05-11 RX ADMIN — METOPROLOL TARTRATE 50 MG: 25 TABLET, FILM COATED ORAL at 17:11

## 2024-05-11 RX ADMIN — POLYETHYLENE GLYCOL 3350 1 PACKET: 17 POWDER, FOR SOLUTION ORAL at 06:30

## 2024-05-11 RX ADMIN — DEXAMETHASONE 2 MG: 4 TABLET ORAL at 06:30

## 2024-05-11 RX ADMIN — SENNOSIDES AND DOCUSATE SODIUM 2 TABLET: 50; 8.6 TABLET ORAL at 17:12

## 2024-05-11 RX ADMIN — LOSARTAN POTASSIUM 50 MG: 50 TABLET, FILM COATED ORAL at 06:31

## 2024-05-11 RX ADMIN — SULFAMETHOXAZOLE AND TRIMETHOPRIM 1 TABLET: 400; 80 TABLET ORAL at 06:30

## 2024-05-11 RX ADMIN — MINERAL OIL, AND WHITE PETROLATUM 1 APPLICATION: 425; 573 OINTMENT OPHTHALMIC at 13:48

## 2024-05-11 RX ADMIN — METOPROLOL TARTRATE 50 MG: 25 TABLET, FILM COATED ORAL at 06:30

## 2024-05-11 RX ADMIN — ASPIRIN 81 MG: 81 TABLET, CHEWABLE ORAL at 06:00

## 2024-05-11 ASSESSMENT — PAIN DESCRIPTION - PAIN TYPE
TYPE: ACUTE PAIN
TYPE: ACUTE PAIN

## 2024-05-11 NOTE — PROGRESS NOTES
Hospital Medicine Daily Progress Note    Date of Service  5/11/2024    Chief Complaint  Confusion.    Hospital Course  Jairo Rivas is a 74yo F with hx of endometrial carcinoma, brain metastasis w/p radiation tx, and prior CVA.  An MRI from 4/29 frontal brain metastases with vasogenic edema and acute infarction.  Patient was started on dexamethasone for edema and lacosamide for seizure prophylaxis.  EEG was negative for epileptiform activity.  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  Neurology and infectious disease consulted.  Patient completed a course of acyclovir.  A CT head 4/21 noted a 9 x 7 mm hemorrhage in the right temporal region.  Ophthalmology was consulted for vision changes.  Family requested transfer to [place] for neuro-oncology consultation.  All other facilities are out of network for insurance.  [facility] was at capacity.    An echocardiogram show ejection fraction 65%.  Mild concentric left ventricular perjury.    A PEG tube was placed on 5/7.  Patient was tolerating tube feeds.  Palliative care was following for symptom management as well as advance care planning.  Multiple goals of care discussions were held with family and insisted on patient being full code despite poor prognosis.    A [facility] Ethics committee meeting was held on 5/10 with the CMO, myself, and Dr. Lema.  Was felt that the patient is obtunded and nonresponsive.  It was felt that if the patient did go into cardiac arrest resuscitating her and putting her on life support would cause more harm and discomfort to the patient rather than any medical benefit and is thus not indicated.  Patient was changed to DNR/DNI.    Patient was also previously followed by Dr. Garcia for endometrial cancer.  Dr. Garcia had recommended hospice and no further treatment.  Family requested a second opinion in hopes of another course of immunotherapy.  Case was discussed with oncology Dr. Napoles.  Because of the patient's poor  performance status and prognosis she is currently not a candidate for immunotherapy.  Her cancer was felt to be terminal and incurable and treatment would only bring side effects with poor quality of life.  If the patient were to improve and make recovery she can follow-up with [facility] for immunotherapy.    Interval Problem Update  Patient was seen and examined at bedside.  I have personally reviewed and interpreted vitals, labs, and imaging.    5/7.  Afebrile.  Intermittent hypotension.  On 1-2 L nasal cannula.  Patient is obtunded.  Minimally responsive.  PEG tube was placed this morning.  No family currently at bedside.  Plan for goals of care conference on Thursday.  5/8.  Afebrile.  Stable vitals.  On 1-2L NC.  Patient is obtunded.  Does not follow commands.  Developing contractures.  Discussed with social work, dietary.  Plan for goals of care meeting tomorrow.  Current plan is for home health with DME  Wbc 4.6  Hgb 11  P 183  5/9.  Afebrile.  Stable vitals.  On 1L NC.  Patient is obtunded.  Had care conference with daughter, palliative, social work, nursing.  Family still very hopeful patient can make meaningful recovery with goal of being more responsive and able to interact.  They would like to pursue immunotherapy.  Prognosis remains guarded.  Reached out to ID.  Will check varicella PCR.  5/10.  Afebrile.  Stable vitals.  On 1L NC.  Overnight patient was felt to be less responsive and then became hypotensive this morning with irregular breathing pattern.  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  Blood work was drawn.  Lactic acid within normal limits.  CT of the head shows no acute intracranial disease.  Chest x-ray also appears stable.  Discussed at Ethics committee meeting.  Also discussed with intensivist, oncology, palliative care.  It was felt that if the patient were to decompensate further and go into cardiac arrest that resuscitation and life support will be  medically futile and not change her outcome.  This father resuscitation would only cause more harm and discomfort and her changes of survival or not good.  Resuscitation was felt to be not medically indicated and patient was changed to DNR/DNI.  Per family request I did order a varicella PCR to make sure encephalitis was completely treated.  I also spoke with oncology Dr. Napoles and patient is currently not a candidate for immunotherapy.  Ophthalmology recommends outpatient follow-up.  Working to get patient home with home health with DME  5/11.  Afebrile.  Intermittent tachycardia, tachypnea.  Episode of hypotension resolved.  On 1 L nasal cannula.  Patient is obtunded.  Daughter is concerned about intermittent cough.  Family at bedside is convinced about patient is having purposeful eye movements and turned to looking grandkids at bedside.  Staff has not observed purposeful movements.  It is hard to tell and patient does not track well.  It may be spontaneous eye movements or intermittent tracking/following.  Pupils are not reactive to light.  She does not respond to pain.  Had a long discussion with two daughters again about how the Ethics committee feels that should the patient decompensate further and go into cardiac arrest the trauma and harm caused by resuscitation, intubation, other procedures outweigh the benefits.  Patient does not have a good chance at meaningful recovery and it would be even less so should she require with resuscitation intubation.  Assured family that ethics committee has patient's best interest in mind and would be causing significant trauma and pain and likely more complications with resuscitation and intubation, while it would offer only minimal clinical benefit.  Repeat varicella PCR still pending.  Appreciate second opinion from [facility] Dr. Napoles.  The family is requesting another opinion from either a GYN oncologist or neuro oncologist.  Counseled him that this is not  available here.  Dr. Garcia is one of the few GYN oncologist in [facility] and is already recommended hospice with no further treatment.  Earlier during admission attempted transfer to [facility] for neuro oncologist was attempted but they were at capacity and patient's insurance was contracted with few institutions.  Daughter wanted patient to be followed by neurology consistently every day as she had a stroke.  Counseled her that neurology was following for acute stroke.  Patient is on aspirin and atorvastatin for secondary prevention and blood pressure meds are being titrated as well.  They are also following for seizure prophylaxis with brain mets.  Counseled family that once patient is on a stable regimen for stroke and seizure prevention neurology will sign off and it is not realistic to have neurology follow-up every day.  If something changes I be more than happy to reconsult them or they can follow-up with neurology outpatient.  Plan is to go home with home health with daughter on Monday    I have discussed this patient's plan of care and discharge plan at IDT rounds today with Case Management, Nursing, Nursing leadership, and other members of the IDT team.    Consultants/Specialty  infectious disease, neurology, ophthalmology, and gynecology    Code Status  DNAR/DNI    Disposition  The patient is not medically cleared for discharge to home or a post-acute facility.  Anticipate discharge to: home with organized home healthcare and close outpatient follow-up    I have placed the appropriate orders for post-discharge needs.    Review of Systems  Review of Systems   Unable to perform ROS: Patient unresponsive        Physical Exam  Temp:  [36 °C (96.8 °F)-36.6 °C (97.8 °F)] 36.3 °C (97.3 °F)  Pulse:  [] 88  Resp:  [17-24] 17  BP: ()/(54-87) 91/61  SpO2:  [97 %-99 %] 99 %    Physical Exam  Vitals reviewed.   Constitutional:       General: She is sleeping.      Appearance: She is obese. She is  ill-appearing.   HENT:      Head: Normocephalic.      Mouth/Throat:      Mouth: Mucous membranes are dry.      Pharynx: Oropharyngeal exudate present.   Eyes:      Comments: Left eye corneal edema.  Some lubrication on cornea.  Left eyelid does not appear to close as well as the right.   Cardiovascular:      Rate and Rhythm: Normal rate and regular rhythm.   Pulmonary:      Effort: Tachypnea and accessory muscle usage present.   Abdominal:      General: There is no distension.      Palpations: Abdomen is soft.      Comments: PEG tube in place   Musculoskeletal:      Right lower leg: Edema present.      Left lower leg: Edema present.      Comments: Right leg with more edema than left. Right arm chronic flexion contraction.   Neurological:      Mental Status: She is unresponsive.      GCS: GCS eye subscore is 1. GCS verbal subscore is 1. GCS motor subscore is 1.      Cranial Nerves: Cranial nerve deficit present.      Coordination: Coordination abnormal.         Fluids    Intake/Output Summary (Last 24 hours) at 5/11/2024 0827  Last data filed at 5/11/2024 0019  Gross per 24 hour   Intake 980 ml   Output 0 ml   Net 980 ml       Laboratory  Recent Labs     05/10/24  1033   WBC 4.9   RBC 3.09*   HEMOGLOBIN 10.0*   HEMATOCRIT 31.4*   .6*   MCH 32.4   MCHC 31.8*   RDW 59.7*   PLATELETCT 155*   MPV 10.0       Recent Labs     05/10/24  1033   SODIUM 137   POTASSIUM 4.3   CHLORIDE 100   CO2 26   GLUCOSE 120*   BUN 22   CREATININE 0.28*   CALCIUM 8.8                       Imaging  CT-HEAD W/O   Final Result         1.  No acute intracranial abnormality is identified, there are nonspecific white matter changes, commonly associated with small vessel ischemic disease.  Associated mild cerebral atrophy is noted.         DX-CHEST-PORTABLE (1 VIEW)   Final Result         1.  Hazy left lower lobe infiltrate.      IR-GASTROSTOMY PLACEMENT   Final Result      1.  Fluoroscopic guided percutaneous gastrostomy with placement of an  18-Hebrew MARIOLA balloon gastrostomy catheter.         2. The gastrostomy tube may be used for tube feeds 18 hours after placement. Thereafter, liquid diet orders per the referring physician. Saline flush daily as per protocol.      3. In 10-14 days, the anchor button suture or sutures should be cut, releasing the anchor or anchors into the stomach. The suture anchors will then pass through the GI tract as desired. This can be performed in the interventional radiology department if    desired. Call [phone] any weekday [phone] hours to make an appointment.      US-EXTREMITY VENOUS LOWER BILAT   Final Result      US-EXTREMITY VENOUS UPPER UNILAT LEFT   Final Result      DX-ABDOMEN FOR TUBE PLACEMENT   Final Result      Feeding tube terminates within the gastric fundus.      DX-ABDOMEN FOR TUBE PLACEMENT   Final Result         1.  Nonspecific bowel gas pattern in the upper abdomen.   2.  Dobbhoff tube coiled within the esophagus with tip projecting over the expected location of the gastric antrum. Recommend withdrawal and replacement.   3.  Hazy lung base opacity suggesting edema and/or infiltrates.      CT-HEAD W/O   Final Result   Addendum (preliminary) 1 of 1   Addendum: There is trace bilateral dependent intraventricular hemorrhages    identified, likely representing redistribution of previously visualized    subarachnoid hemorrhage.      Final         1.  No acute intracranial abnormality is identified, there are nonspecific white matter changes, commonly associated with small vessel ischemic disease.  Associated mild cerebral atrophy is noted.         DX-CHEST-LIMITED (1 VIEW)   Final Result      1.  Hazy perihilar and lower lobe opacities represent areas of atelectasis or potentially pneumonitis.      IR-MIDLINE CATHETER INSERTION WO GUIDANCE > AGE 3   Final Result                  Ultrasound-guided midline placement performed by qualified nursing staff    as above.          CT-HEAD W/O   Final Result      1.  9  x 7 mm focus of hemorrhage in the right temporal region, suspected subarachnoid versus less likely intraparenchymal. It is stable since noncontrast head CT performed one day prior.         CT-CTA NECK WITH & W/O-POST PROCESSING   Final Result      No occlusion, hemodynamically significant stenosis or dissection of the neck arteries.      CT-CTA HEAD WITH & W/O-POST PROCESS   Final Result   Addendum (preliminary) 1 of 1   Critical value called by Dr. Hector Dodson to Physician: RONAN NOGUERA   at 4/25/2024 3:48 PM.      Final      1.  9 x 7 mm focus of hemorrhage in the right temporal region which is probably subarachnoid, less likely intraparenchymal.   2.  Focal occlusion of right M2 MCA inferior division with distal opacification. This could be a focal high-grade stenosis versus vessel occlusion. It could also be vasospasm as the subarachnoid hemorrhage is present in this region.   3.  Other findings as above.      Efforts are underway to contact referring physician with results at time of dictation.            EC-ECHOCARDIOGRAM COMPLETE W/O CONT   Final Result      DX-CHEST-PORTABLE (1 VIEW)   Final Result      Feeding tube tip projects in the stomach.      Hazy left basilar opacity, atelectasis and/or mild pneumonitis.      DX-LUMBAR PUNCTURE FOR DIAGNOSIS   Final Result      Fluoroscopic-guided lumbar puncture as described above.      IR-MIDLINE CATHETER INSERTION WO GUIDANCE > AGE 3   Final Result                  Ultrasound-guided midline placement performed by qualified nursing staff    as above.          DX-ABDOMEN FOR TUBE PLACEMENT   Final Result         1.  Nonspecific bowel gas pattern in the upper abdomen.   2.  Dobbhoff tube is coiled within the stomach, the tip terminates overlying the expected location of the gastric cardia.   3.  Hazy left lower lobe infiltrate.      MR-BRAIN-WITH & W/O   Final Result      1.  Significant interval decrease in size of right frontal brain metastasis since  previous exam slight interval decrease in surrounding vasogenic edema. Smaller irregular rim of increased diffusion weighted signal intensity about this treated metastasis    consistent with cytotoxic edema.      2.  New large area of acute infarction in the right posterior frontal, parietal, posterior temporal, and occipital lobes associated gyriform enhancement in the right lateral occipital lobe and right peritrigonal white matter.      3.  Smaller chronic wedge-shaped area of infarction involving the left posterior basal ganglia extending into the left posterior frontal periventricular white matter      4.  Small areas of chronic lacunar type infarction involving the left side of the elaine and left brachium pontis with surrounding gliosis.      5.  Small foci of chronic hemosiderin deposition noted in the left posterior temporal and occipital white matter, as well as in the treated right frontal brain metastasis      6.  Age-related cerebral atrophy.      DX-ABDOMEN FOR TUBE PLACEMENT   Final Result         1.  Nonspecific bowel gas pattern in the upper abdomen.   2.  Dobbhoff tube is coiled within the stomach, the tip terminates overlying the expected location of the gastric fundus, physician similar to prior study.   3.  Left lower lobe infiltrate      DX-ABDOMEN FOR TUBE PLACEMENT   Final Result         1.  Nonspecific bowel gas pattern in the upper abdomen.   2.  Dobbhoff tube is coiled within the stomach, the tip terminates overlying the expected location of the gastric fundus.   3.  Left basilar atelectasis      DX-ABDOMEN FOR TUBE PLACEMENT   Final Result      Feeding tube looped in the stomach the distal tip in the gastric fundus.      CT-HEAD WITH & W/O   Final Result      1.  Redemonstration of right frontal lobe mass, highly concerning for metastasis. Associated vasogenic edema and a 2.5 mm right to left midline shift. No progressive mass effect. No herniation.   2.  Nonspecific ill-defined enhancement  in the right parietal lobe.   3.  No new large vascular structure infarct. No new intracranial hemorrhage.      DX-CHEST-LIMITED (1 VIEW)   Final Result      No significant interval change.      DX-ABDOMEN COMPLETE WITH AP OR PA CXR   Final Result      1. No acute cardiopulmonary abnormality.   2. Chronic left lower lobe scarring.   3. Nonobstructive bowel gas pattern. Small amount of stool throughout the colon.           Assessment/Plan  * Acute encephalopathy- (present on admission)  Assessment & Plan  5/11/2024  Multifactorial   Manage encephalopathy is  Acute stroke on MRI 4/20  Subarachnoid hemorrhage on CT  LP with CSF positive for VZV done 4/22  Continue on acyclovir.  Completed course.  Monitor blood pressure closely,  neurochecks every 4 hours    Keratopathy  Assessment & Plan  5/11/2024  Exposure from not closing her eyes completely  Ophthalmology consulted during stay.  Eye drops and lubrications  Tape eyes closed when sleeping.    Aspiration pneumonitis (HCC)- (present on admission)  Assessment & Plan  5/11/2024  Patient high risk for aspiration pneumonitis/pneumonia.   Currently on room air  Monitor oxygen requirement closely    Prediabetes- (present on admission)  Assessment & Plan  5/11/2024  Monitor glucose level closely.  Had been on decadron  On insulin regimen    Vasogenic edema (HCC)- (present on admission)  Assessment & Plan  5/11/2024  Continuing decadron taper per Dr. Live's recommendations. He also does not believe that there are any significant changes in her brain imaging from this admission that could explain her somnolence/presentation. She is on the correct medication (decadron) for treating the edema  Improvement on MRI on 4/21  - 5/1:  tapering  decadron 4mg IV bid x 13 days to 2mg IV bid x 7 days, then 2mg daily x 7 days, then 1 mg daily x 7 days.  5/2 CT head w/o contrast improved right frontal lobe edema.    Hypernatremia  Assessment & Plan  5/11/2024  Resolved    Seizure (HCC)-  (present on admission)  Assessment & Plan  5/11/2024  Continue lacosamide 150 mg BID per neurology  EEG and cEEG negative for seizures     Neurology consulted during admit.    Dysphagia- (present on admission)  Assessment & Plan  5/11/2024  Possibly due to CVA vs brain metastases and altered mental status in setting of infection.  Patient will remain NPO as she failed SLP evaluation 4/17  NG tube placement required IRIS. Tube was removed for MRI and family refused NG tube to be replaced. Extensive discussion with family regarding nutrition options. Eventually agreed to let patient get NG tube with lorazepam sedation and IRIS. No one certified to place NG tube with IRIS available on 4/21. Vascular access also lost on 4/21 and midline unable to be placed without VATs  -aspiration precautions  -Continue lansoprazole  -[id number] glucose checks  5/3:  order for PEG tube placed for 5/7 by IR.  In order for patient to go home with daughter and HH.    Shingles- (present on admission)  Assessment & Plan  5/11/2024  S/p acyclovir x14 days.    Constipation- (present on admission)  Assessment & Plan  5/11/2024  Resolved  - Bowel regimen    Sepsis secondary to UTI (HCC)- (present on admission)  Assessment & Plan  Sepsis resolved    Malignant neoplasm metastatic to brain (HCC)- (present on admission)  Assessment & Plan  5/11/2024  Brain mets noted during hospitalization on 3/27/2024 with vasogenic edema, MRI on 4/11 shows continued brain mets with vasogenic edema.  MRI 4/21 shows decrease in tumor size and improvement in edema.  -continue seizure prophylaxis with lacosamide 150 mg BID  -tapering dexamethasone   Ophthalmology evaluated for concern of vision changes    Advance care planning- (present on admission)  Assessment & Plan  5/11/2024  At this time patient's family want her to be full code.  unable to transfer to [facility], no accepting SNF and insurance will not approve due to patient unable to participate with PT/OT.  Plan for   home with philip and , home equipment and PEG tube feeds.    Patient was obtunded and not able to participated in the conversation.  Daughter were present for the conversation another daughter on the phone.  I discussed advance care planning face to face with the patient and family for at least 30 minutes, including diagnosis, prognosis, plan of care, risks and benefits of any therapies that could be offered, as well as alternatives including palliation and hospice, as appropriate.  Forms were completed and placed in the chart.    Ethics committee was held on 5/10.  Patient was made DNR/DNI.  Had long discussion with daughter and family over the phone.    Spoke again with family about why patient was made DNR/DNI, what would happen with/mechanical ventilation and however risks outweigh benefits.  Counseled what it would be like to take patient home with home health and the care that she would need, poor prognosis.  Time spent 30 minutes    Obesity (BMI 30-39.9)- (present on admission)  Assessment & Plan  Can discuss when mentation improves.    Endometrial cancer (HCC)- (present on admission)  Assessment & Plan  5/11/2024  Followed by Dr. Garcia outpatient and sees radiation oncology Dr Live.  Received immunotherapy at [facility] in 2021 with initial good response. Reached out to Dr. Live who does not believe imaging from 4/14 shows significant changes compared to MRI month prior.  Complicated by metastases to brain among other locations, likely contributing to her altered mental status. Mets re demonstrated without significant change on CTH on admission.  - Decadron for vasogenic edema  - Neurochecks every 4 hours  - Continue aspiration, fall, seizure precaution.    Essential hypertension- (present on admission)  Assessment & Plan  5/11/2024  Episode of hypotension  Discontinue amlodipine  Change parameters for metoprolol and losartan    Acute CVA (cerebrovascular accident) (HCC)- (present on  admission)  Assessment & Plan  5/11/2024  MRI on 4/20 noted with right frontal brain metastasis with vasogenic edema, acute infarction.  CT head from 4/25 noted with 9X 7 mm of hemorrhage in the right temporal region.   Neurology consult appreciated.   Continue aspirin and atorvastatin for secondary prevention         VTE prophylaxis: Lovenox    I have performed a physical exam and reviewed and updated ROS and Plan today (5/11/2024). In review of yesterday's note (5/10/2024), there are no changes except as documented above.    Greater than 61 minutes spent prepping to see patient (e.g. review of tests) obtaining and/or reviewing separately obtained history. Performing a medically appropriate examination and/ evaluation.  Counseling and educating the patient/family/caregiver.  Ordering medications, tests, or procedures.  Referring and communicating with other health care professionals.  Documenting clinical information in EPIC.  Independently interpreting results and communicating results to patient/family/caregiver.  Care coordination.

## 2024-05-11 NOTE — CARE PLAN
The patient is Unstable - High likelihood or risk of patient condition declining or worsening    Shift Goals  Clinical Goals: Neuro Checks, Monitor Vitals  Patient Goals: ZA  Family Goals:Speak with Ethics    Progress made toward(s) clinical / shift goals:       Problem: Knowledge Deficit - Standard  Goal: Patient and family/care givers will demonstrate understanding of plan of care, disease process/condition, diagnostic tests and medications  Description: Target End Date:  1-3 days or as soon as patient condition allows    Document in Patient Education    1.  Patient and family/caregiver oriented to unit, equipment, visitation policy and means for communicating concern  2.  Complete/review Learning Assessment  3.  Assess knowledge level of disease process/condition, treatment plan, diagnostic tests and medications  4.  Explain disease process/condition, treatment plan, diagnostic tests and medications  Outcome: Progressing  Note: Family understands the need for continued monitoring of neuro status and vitals.

## 2024-05-11 NOTE — PROGRESS NOTES
Oncology/Hematology Progress Note               Author: Charles Napoles M.D. Date & Time created: 5/11/2024  10:17 AM     Interval History:  Different daughter at bedside today. She is aware that no treatment is recommended for her cancer.    Review of Systems:  Review of Systems   Unable to perform ROS: Medical condition       Physical Exam:  Physical Exam  Constitutional:       Appearance: She is ill-appearing.   HENT:      Head: Normocephalic and atraumatic.      Right Ear: External ear normal.      Left Ear: External ear normal.      Nose: Nose normal.      Mouth/Throat:      Mouth: Mucous membranes are moist.      Pharynx: Oropharynx is clear.   Eyes:      Extraocular Movements: Extraocular movements intact.      Conjunctiva/sclera: Conjunctivae normal.   Cardiovascular:      Rate and Rhythm: Normal rate.   Pulmonary:      Breath sounds: Normal breath sounds.   Abdominal:      General: Bowel sounds are normal.      Palpations: Abdomen is soft.   Musculoskeletal:         General: Normal range of motion.      Cervical back: Normal range of motion and neck supple.   Skin:     General: Skin is warm and dry.   Neurological:      Mental Status: She is disoriented.      Coordination: Coordination abnormal.         Labs:          Recent Labs     05/10/24  1033   SODIUM 137   POTASSIUM 4.3   CHLORIDE 100   CO2 26   BUN 22   CREATININE 0.28*   MAGNESIUM 1.9   PHOSPHORUS 3.1   CALCIUM 8.8     Recent Labs     05/10/24  1033   ALTSGPT 64*   ASTSGOT 42   ALKPHOSPHAT 114*   TBILIRUBIN 0.3   GLUCOSE 120*     Recent Labs     05/10/24  1033   RBC 3.09*   HEMOGLOBIN 10.0*   HEMATOCRIT 31.4*   PLATELETCT 155*     Recent Labs     05/10/24  1033   WBC 4.9   NEUTSPOLYS 84.20*   LYMPHOCYTES 5.20*   MONOCYTES 8.80   EOSINOPHILS 0.00   BASOPHILS 0.00   ASTSGOT 42   ALTSGPT 64*   ALKPHOSPHAT 114*   TBILIRUBIN 0.3     Recent Labs     05/10/24  1033   SODIUM 137   POTASSIUM 4.3   CHLORIDE 100   CO2 26   GLUCOSE 120*   BUN 22   CREATININE  0.28*   CALCIUM 8.8     Hemodynamics:  Temp (24hrs), Av.3 °C (97.3 °F), Min:36.1 °C (96.9 °F), Max:36.6 °C (97.8 °F)  Temperature: 36.3 °C (97.3 °F)  Pulse  Av  Min: 61  Max: 128   Blood Pressure : 91/61     Respiratory:    Respiration: 17, Pulse Oximetry: 99 %     Work Of Breathing / Effort: Increased Work of Breathing;Moderate;Shallow;Tachypnea  RUL Breath Sounds: Diminished, RML Breath Sounds: Diminished, RLL Breath Sounds: Diminished, HANK Breath Sounds: Diminished, LLL Breath Sounds: Diminished  Fluids:    Intake/Output Summary (Last 24 hours) at 2024 1017  Last data filed at 2024 0019  Gross per 24 hour   Intake 920 ml   Output --   Net 920 ml        GI/Nutrition:  Orders Placed This Encounter   Procedures    Diet: Diet Tube Feed; Formula: Promot; Promot: Promot Fiber RTH; Goal Rate (mL/Hour): 60; Duration: 24 HR; Tube Feed Time Unit: Hours/Day     Start at 25mL/hr. Do not advance until dietitian consult completed.     Standing Status:   Standing     Number of Occurrences:   1     Order Specific Question:   Diet     Answer:   Diet Tube Feed [35]     Order Specific Question:   Formula:     Answer:   Promot     Order Specific Question:   Promote:     Answer:   Promot Fiber RTH     Order Specific Question:   Goal Rate (mL/Hour)     Answer:   60     Order Specific Question:   Route     Answer:   Enteral Tube     Order Specific Question:   Duration     Answer:   24 HR     Order Specific Question:   Tube Feed Time Unit     Answer:   Hours/Day    Diet NPO Restrict to: Strict     Standing Status:   Standing     Number of Occurrences:   1     Order Specific Question:   Diet NPO Restrict to:     Answer:   Strict [1]     Medical Decision Making, by Problem:  Active Hospital Problems    Diagnosis     *Acute encephalopathy [G93.40]     Keratopathy [H18.9]     Aspiration pneumonitis (HCC) [J69.0]     Prediabetes [R73.03]     Hypernatremia [E87.0]     Vasogenic edema (HCC) [G93.6]     Constipation [K59.00]      Shingles [B02.9]     Dysphagia [R13.10]     Seizure (HCC) [R56.9]     Sepsis secondary to UTI (HCC) [A41.9, N39.0]     Malignant neoplasm metastatic to brain (HCC) [C79.31]     Advance care planning [Z71.89]     Endometrial cancer (HCC) [C54.1]     Acute CVA (cerebrovascular accident) (HCC) [I63.9]     Essential hypertension [I10]     Obesity (BMI 30-39.9) [E66.9]        Plan:  Metastatic endometrial cancer - recommend best supportive care, both daughters aware that patient is not a candidate for treatment of her terminal cancer given her performance status. We discussed that since disease is incurable, would only be getting side effects with poor quality of life. If there is a miraculous recovery, they will call and make appointment with [facility].   CVA with hemorrhagic transformation - poor prognosis    Quality-Core Measures   DVT prophylaxis pharmacological::  Contraindicated - High bleeding risk  Assessed for rehabilitation services:  Patient unable to tolerate rehabilitation therapeutic regimen    Charles Napoles M.D.

## 2024-05-11 NOTE — CARE PLAN
The patient is Unstable - High likelihood or risk of patient condition declining or worsening    Shift Goals  Clinical Goals: Q4 neuro checks, patient safety  Patient Goals: ZA  Family Goals: Further workup on why patient deteriorated overnight; Contact information for patient advocasy    Progress made toward(s) clinical / shift goals:     Problem: Pain - Standard  Goal: Alleviation of pain or a reduction in pain to the patient’s comfort goal  Outcome: Progressing     Problem: Knowledge Deficit - Standard  Goal: Patient and family/care givers will demonstrate understanding of plan of care, disease process/condition, diagnostic tests and medications  Outcome: Progressing       Patient is not progressing towards the following goals:      Problem: Respiratory  Goal: Patient will achieve/maintain optimum respiratory ventilation and gas exchange  Outcome: Not Progressing  Note: Patient having Cheyne-Fritz respiratory pattern, having audible secretions

## 2024-05-12 ENCOUNTER — APPOINTMENT (OUTPATIENT)
Dept: RADIOLOGY | Facility: MEDICAL CENTER | Age: 74
DRG: 871 | End: 2024-05-12
Attending: INTERNAL MEDICINE
Payer: MEDICARE

## 2024-05-12 LAB
GLUCOSE BLD STRIP.AUTO-MCNC: 127 MG/DL (ref 65–99)
GLUCOSE BLD STRIP.AUTO-MCNC: 138 MG/DL (ref 65–99)
GLUCOSE BLD STRIP.AUTO-MCNC: 139 MG/DL (ref 65–99)
SPECIMEN SOURCE: NORMAL
VZV DNA SPEC QL NAA+PROBE: NOT DETECTED

## 2024-05-12 PROCEDURE — 99232 SBSQ HOSP IP/OBS MODERATE 35: CPT | Performed by: INTERNAL MEDICINE

## 2024-05-12 PROCEDURE — 99233 SBSQ HOSP IP/OBS HIGH 50: CPT | Performed by: INTERNAL MEDICINE

## 2024-05-12 RX ADMIN — LACOSAMIDE 150 MG: 100 TABLET, FILM COATED ORAL at 06:38

## 2024-05-12 RX ADMIN — POLYETHYLENE GLYCOL 3350 1 PACKET: 17 POWDER, FOR SOLUTION ORAL at 06:39

## 2024-05-12 RX ADMIN — SENNOSIDES AND DOCUSATE SODIUM 2 TABLET: 50; 8.6 TABLET ORAL at 18:20

## 2024-05-12 RX ADMIN — MINERAL OIL, AND WHITE PETROLATUM 1 APPLICATION: 425; 573 OINTMENT OPHTHALMIC at 12:27

## 2024-05-12 RX ADMIN — METOPROLOL TARTRATE 50 MG: 25 TABLET, FILM COATED ORAL at 06:38

## 2024-05-12 RX ADMIN — MINERAL OIL, AND WHITE PETROLATUM 1 APPLICATION: 425; 573 OINTMENT OPHTHALMIC at 21:00

## 2024-05-12 RX ADMIN — ASPIRIN 81 MG: 81 TABLET, CHEWABLE ORAL at 06:39

## 2024-05-12 RX ADMIN — SULFAMETHOXAZOLE AND TRIMETHOPRIM 1 TABLET: 400; 80 TABLET ORAL at 06:39

## 2024-05-12 RX ADMIN — LOSARTAN POTASSIUM 50 MG: 50 TABLET, FILM COATED ORAL at 06:38

## 2024-05-12 RX ADMIN — ENOXAPARIN SODIUM 40 MG: 100 INJECTION SUBCUTANEOUS at 18:19

## 2024-05-12 RX ADMIN — METOPROLOL TARTRATE 50 MG: 25 TABLET, FILM COATED ORAL at 18:20

## 2024-05-12 RX ADMIN — LACOSAMIDE 150 MG: 100 TABLET, FILM COATED ORAL at 18:19

## 2024-05-12 RX ADMIN — DEXAMETHASONE 2 MG: 4 TABLET ORAL at 06:38

## 2024-05-12 RX ADMIN — MODAFINIL 100 MG: 100 TABLET ORAL at 06:39

## 2024-05-12 RX ADMIN — LANSOPRAZOLE 30 MG: 30 TABLET, ORALLY DISINTEGRATING ORAL at 06:38

## 2024-05-12 RX ADMIN — MINERAL OIL, AND WHITE PETROLATUM 1 APPLICATION: 425; 573 OINTMENT OPHTHALMIC at 06:37

## 2024-05-12 RX ADMIN — ATORVASTATIN CALCIUM 20 MG: 20 TABLET, FILM COATED ORAL at 18:19

## 2024-05-12 RX ADMIN — DIBASIC SODIUM PHOSPHATE, MONOBASIC POTASSIUM PHOSPHATE AND MONOBASIC SODIUM PHOSPHATE 500 MG: 852; 155; 130 TABLET ORAL at 06:38

## 2024-05-12 ASSESSMENT — PAIN DESCRIPTION - PAIN TYPE: TYPE: ACUTE PAIN

## 2024-05-12 NOTE — PROGRESS NOTES
Hospital Medicine Daily Progress Note    Date of Service  5/12/2024    Chief Complaint  Confusion.    Hospital Course  Jairo Rivas is a 72yo F with hx of endometrial carcinoma, brain metastasis w/p radiation tx, and prior CVA.  An MRI from 4/29 frontal brain metastases with vasogenic edema and acute infarction.  Patient was started on dexamethasone for edema and lacosamide for seizure prophylaxis.  EEG was negative for epileptiform activity.  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  Neurology and infectious disease consulted.  Patient completed a course of acyclovir.  A CT head 4/21 noted a 9 x 7 mm hemorrhage in the right temporal region.  Ophthalmology was consulted for vision changes.  Family requested transfer to [place] for neuro-oncology consultation.  All other facilities are out of network for insurance.  [facility] was at capacity.    An echocardiogram show ejection fraction 65%.  Mild concentric left ventricular perjury.    A PEG tube was placed on 5/7.  Patient was tolerating tube feeds.  Palliative care was following for symptom management as well as advance care planning.  Multiple goals of care discussions were held with family and insisted on patient being full code despite poor prognosis.    A [facility] Ethics committee meeting was held on 5/10 with the CMO, myself, and Dr. Lema.  Was felt that the patient is obtunded and nonresponsive.  It was felt that if the patient did go into cardiac arrest resuscitating her and putting her on life support would cause more harm and discomfort to the patient rather than any medical benefit and is thus not indicated.  Patient was changed to DNR/DNI.    Patient was also previously followed by Dr. Garcia for endometrial cancer.  Dr. Garcia had recommended hospice and no further treatment.  Family requested a second opinion in hopes of another course of immunotherapy.  Case was discussed with oncology Dr. Napoles.  Because of the patient's poor  performance status and prognosis she is currently not a candidate for immunotherapy.  Her cancer was felt to be terminal and incurable and treatment would only bring side effects with poor quality of life.  If the patient were to improve and make recovery she can follow-up with [facility] for immunotherapy.    Interval Problem Update  Patient was seen and examined at bedside.  I have personally reviewed and interpreted vitals, labs, and imaging.    5/7.  Afebrile.  Intermittent hypotension.  On 1-2 L nasal cannula.  Patient is obtunded.  Minimally responsive.  PEG tube was placed this morning.  No family currently at bedside.  Plan for goals of care conference on Thursday.  5/8.  Afebrile.  Stable vitals.  On 1-2L NC.  Patient is obtunded.  Does not follow commands.  Developing contractures.  Discussed with social work, dietary.  Plan for goals of care meeting tomorrow.  Current plan is for home health with DME  Wbc 4.6  Hgb 11  P 183  5/9.  Afebrile.  Stable vitals.  On 1L NC.  Patient is obtunded.  Had care conference with daughter, palliative, social work, nursing.  Family still very hopeful patient can make meaningful recovery with goal of being more responsive and able to interact.  They would like to pursue immunotherapy.  Prognosis remains guarded.  Reached out to ID.  Will check varicella PCR.  5/10.  Afebrile.  Stable vitals.  On 1L NC.  Overnight patient was felt to be less responsive and then became hypotensive this morning with irregular breathing pattern.  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  Blood work was drawn.  Lactic acid within normal limits.  CT of the head shows no acute intracranial disease.  Chest x-ray also appears stable.  Discussed at Ethics committee meeting.  Also discussed with intensivist, oncology, palliative care.  It was felt that if the patient were to decompensate further and go into cardiac arrest that resuscitation and life support will be  medically futile and not change her outcome.  This father resuscitation would only cause more harm and discomfort and her changes of survival or not good.  Resuscitation was felt to be not medically indicated and patient was changed to DNR/DNI.  Per family request I did order a varicella PCR to make sure encephalitis was completely treated.  I also spoke with oncology Dr. Napoles and patient is currently not a candidate for immunotherapy.  Ophthalmology recommends outpatient follow-up.  Working to get patient home with home health with DME  5/11.  Afebrile.  Intermittent tachycardia, tachypnea.  Episode of hypotension resolved.  On 1 L nasal cannula.  Patient is obtunded.  Daughter is concerned about intermittent cough.  Family at bedside is convinced about patient is having purposeful eye movements and turned to looking grandkids at bedside.  Staff has not observed purposeful movements.  It is hard to tell and patient does not track well.  It may be spontaneous eye movements or intermittent tracking/following.  Pupils are not reactive to light.  She does not respond to pain.  Had a long discussion with two daughters again about how the Ethics committee feels that should the patient decompensate further and go into cardiac arrest the trauma and harm caused by resuscitation, intubation, other procedures outweigh the benefits.  Patient does not have a good chance at meaningful recovery and it would be even less so should she require with resuscitation intubation.  Assured family that ethics committee has patient's best interest in mind and would be causing significant trauma and pain and likely more complications with resuscitation and intubation, while it would offer only minimal clinical benefit.  Repeat varicella PCR still pending.  Appreciate second opinion from [facility] Dr. Napoles.  The family is requesting another opinion from either a GYN oncologist or neuro oncologist.  Counseled him that this is not  available here.  Dr. Garcia is one of the few GYN oncologist in [facility] and is already recommended hospice with no further treatment.  Earlier during admission attempted transfer to [facility] for neuro oncologist was attempted but they were at capacity and patient's insurance was contracted with few institutions.  Daughter wanted patient to be followed by neurology consistently every day as she had a stroke.  Counseled her that neurology was following for acute stroke.  Patient is on aspirin and atorvastatin for secondary prevention and blood pressure meds are being titrated as well.  They are also following for seizure prophylaxis with brain mets.  Counseled family that once patient is on a stable regimen for stroke and seizure prevention neurology will sign off and it is not realistic to have neurology follow-up every day.  If something changes I be more than happy to reconsult them or they can follow-up with neurology outpatient.  Plan is to go home with home health with daughter on Monday 5/12.  Afebrile.  Intermittent tachycardia, tachypnea.  On 1L NC.  Patient is obtunded.  Nonresponsive.  Patient is worried about cough and request chest x-ray to assess for aspiration.  Chest x-ray stable from prior, some left lower lobe atelectasis.  Lungs clear on exam.  Patient on minimal oxygen.  No obvious signs of aspiration.  Requested an abdominal x-ray to confirm placement of PEG tube.  Family also requested a second opinion from ophthalmology spoke with ophthalmology on-call Dr. Alfa Sy.  He agrees with previous assessment from Dr. Benito.  Patient does not need tonometer and further ophthalmologic exam while inpatient.  Recommended to continue with eye ointment lubrication.  Patient can follow-up outpatient with Dr. Benito or Dr. Sy if they wish.    I have discussed this patient's plan of care and discharge plan at IDT rounds today with Case Management, Nursing, Nursing leadership, and other members of  the IDT team.    Consultants/Specialty  infectious disease, neurology, ophthalmology, and gynecology    Code Status  DNAR/DNI    Disposition  The patient is not medically cleared for discharge to home or a post-acute facility.  Anticipate discharge to: home with organized home healthcare and close outpatient follow-up    I have placed the appropriate orders for post-discharge needs.    Review of Systems  Review of Systems   Unable to perform ROS: Patient unresponsive        Physical Exam  Temp:  [36.3 °C (97.3 °F)-37.1 °C (98.8 °F)] 36.5 °C (97.7 °F)  Pulse:  [] 94  Resp:  [17-20] 18  BP: ()/(61-76) 132/76  SpO2:  [98 %-99 %] 98 %    Physical Exam  Vitals reviewed.   Constitutional:       General: She is sleeping.      Appearance: She is obese. She is ill-appearing.   HENT:      Head: Normocephalic.      Mouth/Throat:      Mouth: Mucous membranes are dry.      Pharynx: Oropharyngeal exudate present.   Eyes:      Comments: Left eye corneal edema.  Some lubrication on cornea.  Left eyelid does not appear to close as well as the right.   Cardiovascular:      Rate and Rhythm: Normal rate and regular rhythm.   Pulmonary:      Effort: Tachypnea and accessory muscle usage present.   Abdominal:      General: There is no distension.      Palpations: Abdomen is soft.      Comments: PEG tube in place   Musculoskeletal:      Right lower leg: Edema present.      Left lower leg: Edema present.      Comments: Right leg with more edema than left. Right arm chronic flexion contraction.   Neurological:      Mental Status: She is unresponsive.      GCS: GCS eye subscore is 1. GCS verbal subscore is 1. GCS motor subscore is 1.      Cranial Nerves: Cranial nerve deficit present.      Coordination: Coordination abnormal.         Fluids    Intake/Output Summary (Last 24 hours) at 5/12/2024 0619  Last data filed at 5/12/2024 0019  Gross per 24 hour   Intake 580 ml   Output --   Net 580 ml       Laboratory  Recent Labs      05/10/24  1033   WBC 4.9   RBC 3.09*   HEMOGLOBIN 10.0*   HEMATOCRIT 31.4*   .6*   MCH 32.4   MCHC 31.8*   RDW 59.7*   PLATELETCT 155*   MPV 10.0       Recent Labs     05/10/24  1033   SODIUM 137   POTASSIUM 4.3   CHLORIDE 100   CO2 26   GLUCOSE 120*   BUN 22   CREATININE 0.28*   CALCIUM 8.8                       Imaging  CT-HEAD W/O   Final Result         1.  No acute intracranial abnormality is identified, there are nonspecific white matter changes, commonly associated with small vessel ischemic disease.  Associated mild cerebral atrophy is noted.         DX-CHEST-PORTABLE (1 VIEW)   Final Result         1.  Hazy left lower lobe infiltrate.      IR-GASTROSTOMY PLACEMENT   Final Result      1.  Fluoroscopic guided percutaneous gastrostomy with placement of an 18-Polish MARIOLA balloon gastrostomy catheter.         2. The gastrostomy tube may be used for tube feeds 18 hours after placement. Thereafter, liquid diet orders per the referring physician. Saline flush daily as per protocol.      3. In 10-14 days, the anchor button suture or sutures should be cut, releasing the anchor or anchors into the stomach. The suture anchors will then pass through the GI tract as desired. This can be performed in the interventional radiology department if    desired. Call [phone] any weekday [id number] hours to make an appointment.      US-EXTREMITY VENOUS LOWER BILAT   Final Result      US-EXTREMITY VENOUS UPPER UNILAT LEFT   Final Result      DX-ABDOMEN FOR TUBE PLACEMENT   Final Result      Feeding tube terminates within the gastric fundus.      DX-ABDOMEN FOR TUBE PLACEMENT   Final Result         1.  Nonspecific bowel gas pattern in the upper abdomen.   2.  Dobbhoff tube coiled within the esophagus with tip projecting over the expected location of the gastric antrum. Recommend withdrawal and replacement.   3.  Hazy lung base opacity suggesting edema and/or infiltrates.      CT-HEAD W/O   Final Result   Addendum (preliminary)  1 of 1   Addendum: There is trace bilateral dependent intraventricular hemorrhages    identified, likely representing redistribution of previously visualized    subarachnoid hemorrhage.      Final         1.  No acute intracranial abnormality is identified, there are nonspecific white matter changes, commonly associated with small vessel ischemic disease.  Associated mild cerebral atrophy is noted.         DX-CHEST-LIMITED (1 VIEW)   Final Result      1.  Hazy perihilar and lower lobe opacities represent areas of atelectasis or potentially pneumonitis.      IR-MIDLINE CATHETER INSERTION WO GUIDANCE > AGE 3   Final Result                  Ultrasound-guided midline placement performed by qualified nursing staff    as above.          CT-HEAD W/O   Final Result      1.  9 x 7 mm focus of hemorrhage in the right temporal region, suspected subarachnoid versus less likely intraparenchymal. It is stable since noncontrast head CT performed one day prior.         CT-CTA NECK WITH & W/O-POST PROCESSING   Final Result      No occlusion, hemodynamically significant stenosis or dissection of the neck arteries.      CT-CTA HEAD WITH & W/O-POST PROCESS   Final Result   Addendum (preliminary) 1 of 1   Critical value called by Dr. Hector Dodson to Physician: RONAN NOGUERA   at 4/25/2024 3:48 PM.      Final      1.  9 x 7 mm focus of hemorrhage in the right temporal region which is probably subarachnoid, less likely intraparenchymal.   2.  Focal occlusion of right M2 MCA inferior division with distal opacification. This could be a focal high-grade stenosis versus vessel occlusion. It could also be vasospasm as the subarachnoid hemorrhage is present in this region.   3.  Other findings as above.      Efforts are underway to contact referring physician with results at time of dictation.            EC-ECHOCARDIOGRAM COMPLETE W/O CONT   Final Result      DX-CHEST-PORTABLE (1 VIEW)   Final Result      Feeding tube tip projects in  the stomach.      Hazy left basilar opacity, atelectasis and/or mild pneumonitis.      DX-LUMBAR PUNCTURE FOR DIAGNOSIS   Final Result      Fluoroscopic-guided lumbar puncture as described above.      IR-MIDLINE CATHETER INSERTION WO GUIDANCE > AGE 3   Final Result                  Ultrasound-guided midline placement performed by qualified nursing staff    as above.          DX-ABDOMEN FOR TUBE PLACEMENT   Final Result         1.  Nonspecific bowel gas pattern in the upper abdomen.   2.  Dobbhoff tube is coiled within the stomach, the tip terminates overlying the expected location of the gastric cardia.   3.  Hazy left lower lobe infiltrate.      MR-BRAIN-WITH & W/O   Final Result      1.  Significant interval decrease in size of right frontal brain metastasis since previous exam slight interval decrease in surrounding vasogenic edema. Smaller irregular rim of increased diffusion weighted signal intensity about this treated metastasis    consistent with cytotoxic edema.      2.  New large area of acute infarction in the right posterior frontal, parietal, posterior temporal, and occipital lobes associated gyriform enhancement in the right lateral occipital lobe and right peritrigonal white matter.      3.  Smaller chronic wedge-shaped area of infarction involving the left posterior basal ganglia extending into the left posterior frontal periventricular white matter      4.  Small areas of chronic lacunar type infarction involving the left side of the elaine and left brachium pontis with surrounding gliosis.      5.  Small foci of chronic hemosiderin deposition noted in the left posterior temporal and occipital white matter, as well as in the treated right frontal brain metastasis      6.  Age-related cerebral atrophy.      DX-ABDOMEN FOR TUBE PLACEMENT   Final Result         1.  Nonspecific bowel gas pattern in the upper abdomen.   2.  Dobbhoff tube is coiled within the stomach, the tip terminates overlying the  expected location of the gastric fundus, physician similar to prior study.   3.  Left lower lobe infiltrate      DX-ABDOMEN FOR TUBE PLACEMENT   Final Result         1.  Nonspecific bowel gas pattern in the upper abdomen.   2.  Dobbhoff tube is coiled within the stomach, the tip terminates overlying the expected location of the gastric fundus.   3.  Left basilar atelectasis      DX-ABDOMEN FOR TUBE PLACEMENT   Final Result      Feeding tube looped in the stomach the distal tip in the gastric fundus.      CT-HEAD WITH & W/O   Final Result      1.  Redemonstration of right frontal lobe mass, highly concerning for metastasis. Associated vasogenic edema and a 2.5 mm right to left midline shift. No progressive mass effect. No herniation.   2.  Nonspecific ill-defined enhancement in the right parietal lobe.   3.  No new large vascular structure infarct. No new intracranial hemorrhage.      DX-CHEST-LIMITED (1 VIEW)   Final Result      No significant interval change.      DX-ABDOMEN COMPLETE WITH AP OR PA CXR   Final Result      1. No acute cardiopulmonary abnormality.   2. Chronic left lower lobe scarring.   3. Nonobstructive bowel gas pattern. Small amount of stool throughout the colon.           Assessment/Plan  * Acute encephalopathy- (present on admission)  Assessment & Plan  5/12/2024  Multifactorial   Manage encephalopathy is  Acute stroke on MRI 4/20  Subarachnoid hemorrhage on CT  LP with CSF positive for VZV done 4/22  Continue on acyclovir.  Completed course.  Monitor blood pressure closely,  neurochecks every 4 hours    Keratopathy  Assessment & Plan  5/12/2024  Exposure from not closing her eyes completely  Ophthalmology consulted during stay.  Eye drops and lubrications  Tape eyes closed when sleeping.    Aspiration pneumonitis (HCC)- (present on admission)  Assessment & Plan  5/12/2024  Patient high risk for aspiration pneumonitis/pneumonia.   Currently on room air  Monitor oxygen requirement  closely    Prediabetes- (present on admission)  Assessment & Plan  5/12/2024  Monitor glucose level closely.  Had been on decadron  On insulin regimen    Vasogenic edema (HCC)- (present on admission)  Assessment & Plan  5/12/2024  Continuing decadron taper per Dr. Live's recommendations. He also does not believe that there are any significant changes in her brain imaging from this admission that could explain her somnolence/presentation. She is on the correct medication (decadron) for treating the edema  Improvement on MRI on 4/21  - 5/1:  tapering  decadron 4mg IV bid x 13 days to 2mg IV bid x 7 days, then 2mg daily x 7 days, then 1 mg daily x 7 days.  5/2 CT head w/o contrast improved right frontal lobe edema.    Hypernatremia  Assessment & Plan  5/12/2024  Resolved    Seizure (HCC)- (present on admission)  Assessment & Plan  5/12/2024  Continue lacosamide 150 mg BID per neurology  EEG and cEEG negative for seizures     Neurology consulted during admit.    Dysphagia- (present on admission)  Assessment & Plan  5/12/2024  Possibly due to CVA vs brain metastases and altered mental status in setting of infection.  Patient will remain NPO as she failed SLP evaluation 4/17  NG tube placement required IRIS. Tube was removed for MRI and family refused NG tube to be replaced. Extensive discussion with family regarding nutrition options. Eventually agreed to let patient get NG tube with lorazepam sedation and IRIS. No one certified to place NG tube with IRIS available on 4/21. Vascular access also lost on 4/21 and midline unable to be placed without VATs  -aspiration precautions  -Continue lansoprazole  -[id number] glucose checks  5/3:  order for PEG tube placed for 5/7 by IR.  In order for patient to go home with daughter and HH.    Shingles- (present on admission)  Assessment & Plan  5/12/2024  S/p acyclovir x14 days.    Constipation- (present on admission)  Assessment & Plan  5/12/2024  Resolved  - Bowel  regimen    Sepsis secondary to UTI (HCC)- (present on admission)  Assessment & Plan  Sepsis resolved    Malignant neoplasm metastatic to brain (HCC)- (present on admission)  Assessment & Plan  5/12/2024  Brain mets noted during hospitalization on 3/27/2024 with vasogenic edema, MRI on 4/11 shows continued brain mets with vasogenic edema.  MRI 4/21 shows decrease in tumor size and improvement in edema.  -continue seizure prophylaxis with lacosamide 150 mg BID  -tapering dexamethasone   Ophthalmology evaluated for concern of vision changes    Advance care planning- (present on admission)  Assessment & Plan  5/12/2024  At this time patient's family want her to be full code.  unable to transfer to [facility], no accepting SNF and insurance will not approve due to patient unable to participate with PT/OT.  Plan for  home with philip and , home equipment and PEG tube feeds.    Patient was obtunded and not able to participated in the conversation.  Daughter were present for the conversation another daughter on the phone.  I discussed advance care planning face to face with the patient and family for at least 30 minutes, including diagnosis, prognosis, plan of care, risks and benefits of any therapies that could be offered, as well as alternatives including palliation and hospice, as appropriate.  Forms were completed and placed in the chart.    Ethics committee was held on 5/10.  Patient was made DNR/DNI.  Had long discussion with daughter and family over the phone.    Spoke again with family about why patient was made DNR/DNI, what would happen with/mechanical ventilation and however risks outweigh benefits.  Counseled what it would be like to take patient home with home health and the care that she would need, poor prognosis.  Time spent 30 minutes    Obesity (BMI 30-39.9)- (present on admission)  Assessment & Plan  Can discuss when mentation improves.    Endometrial cancer (HCC)- (present on admission)  Assessment &  Plan  5/12/2024  Followed by Dr. Garcia outpatient and sees radiation oncology Dr Live.  Received immunotherapy at [facility] in 2021 with initial good response. Reached out to Dr. Live who does not believe imaging from 4/14 shows significant changes compared to MRI month prior.  Complicated by metastases to brain among other locations, likely contributing to her altered mental status. Mets re demonstrated without significant change on CTH on admission.  - Decadron for vasogenic edema  - Neurochecks every 4 hours  - Continue aspiration, fall, seizure precaution.    Essential hypertension- (present on admission)  Assessment & Plan  5/12/2024  Episode of hypotension  Discontinue amlodipine  Change parameters for metoprolol and losartan    Acute CVA (cerebrovascular accident) (HCC)- (present on admission)  Assessment & Plan  5/12/2024  MRI on 4/20 noted with right frontal brain metastasis with vasogenic edema, acute infarction.  CT head from 4/25 noted with 9X 7 mm of hemorrhage in the right temporal region.   Neurology consult appreciated.   Continue aspirin and atorvastatin for secondary prevention         VTE prophylaxis: Lovenox    I have performed a physical exam and reviewed and updated ROS and Plan today (5/12/2024). In review of yesterday's note (5/11/2024), there are no changes except as documented above.    Greater than 51 minutes spent prepping to see patient (e.g. review of tests) obtaining and/or reviewing separately obtained history. Performing a medically appropriate examination and/ evaluation.  Counseling and educating the patient/family/caregiver.  Ordering medications, tests, or procedures.  Referring and communicating with other health care professionals.  Documenting clinical information in EPIC.  Independently interpreting results and communicating results to patient/family/caregiver.  Care coordination.

## 2024-05-12 NOTE — CARE PLAN
The patient is Unstable - High likelihood or risk of patient condition declining or worsening    Shift Goals  Clinical Goals: Neuro checks, FSBG, tube feed  Patient Goals: ZA  Family Goals: Further workup on why patient deteriorated overnight; Contact information for patient advocasy    Progress made toward(s) clinical / shift goals:     Problem: Knowledge Deficit - Standard  Goal: Patient and family/care givers will demonstrate understanding of plan of care, disease process/condition, diagnostic tests and medications  Outcome: Progressing     Problem: Respiratory  Goal: Patient will achieve/maintain optimum respiratory ventilation and gas exchange  Outcome: Progressing     Problem: Physical Regulation  Goal: Signs and symptoms of infection will decrease  Outcome: Progressing     Problem: Skin Integrity  Goal: Skin integrity is maintained or improved  Outcome: Progressing     Problem: Fall Risk  Goal: Patient will remain free from falls  Outcome: Progressing

## 2024-05-12 NOTE — PROGRESS NOTES
Oncology/Hematology Progress Note               Author: Charles Napoles M.D. Date & Time created: 2024  11:18 AM     Interval History:  Patient remains unresponsive.    Review of Systems:  Review of Systems   Unable to perform ROS: Medical condition       Physical Exam:  Physical Exam  Constitutional:       Appearance: She is ill-appearing.   HENT:      Head: Normocephalic and atraumatic.      Right Ear: External ear normal.      Left Ear: External ear normal.      Nose: Nose normal.      Mouth/Throat:      Mouth: Mucous membranes are moist.      Pharynx: Oropharynx is clear.   Eyes:      Extraocular Movements: Extraocular movements intact.      Conjunctiva/sclera: Conjunctivae normal.   Cardiovascular:      Rate and Rhythm: Normal rate.   Pulmonary:      Breath sounds: Normal breath sounds.   Abdominal:      General: Bowel sounds are normal.      Palpations: Abdomen is soft.   Musculoskeletal:         General: Normal range of motion.      Cervical back: Normal range of motion and neck supple.   Skin:     General: Skin is warm and dry.   Neurological:      Mental Status: She is disoriented.      Coordination: Coordination abnormal.         Labs:          Recent Labs     05/10/24  1033   SODIUM 137   POTASSIUM 4.3   CHLORIDE 100   CO2 26   BUN 22   CREATININE 0.28*   MAGNESIUM 1.9   PHOSPHORUS 3.1   CALCIUM 8.8     Recent Labs     05/10/24  1033   ALTSGPT 64*   ASTSGOT 42   ALKPHOSPHAT 114*   TBILIRUBIN 0.3   GLUCOSE 120*     Recent Labs     05/10/24  1033   RBC 3.09*   HEMOGLOBIN 10.0*   HEMATOCRIT 31.4*   PLATELETCT 155*     Recent Labs     05/10/24  1033   WBC 4.9   NEUTSPOLYS 84.20*   LYMPHOCYTES 5.20*   MONOCYTES 8.80   EOSINOPHILS 0.00   BASOPHILS 0.00   ASTSGOT 42   ALTSGPT 64*   ALKPHOSPHAT 114*   TBILIRUBIN 0.3     Recent Labs     05/10/24  1033   SODIUM 137   POTASSIUM 4.3   CHLORIDE 100   CO2 26   GLUCOSE 120*   BUN 22   CREATININE 0.28*   CALCIUM 8.8     Hemodynamics:  Temp (24hrs), Av.6 °C (97.9  °F), Min:36.3 °C (97.4 °F), Max:37.1 °C (98.8 °F)  Temperature: 36.3 °C (97.4 °F)  Pulse  Av  Min: 61  Max: 128   Blood Pressure : 93/61     Respiratory:    Respiration: 20, Pulse Oximetry: 99 %     Work Of Breathing / Effort: Increased Work of Breathing;Moderate;Shallow;Tachypnea  RUL Breath Sounds: Clear;Diminished, RML Breath Sounds: Clear;Diminished, RLL Breath Sounds: Clear;Diminished, HANK Breath Sounds: Clear;Diminished, LLL Breath Sounds: Clear;Diminished  Fluids:    Intake/Output Summary (Last 24 hours) at 2024 1017  Last data filed at 2024 0019  Gross per 24 hour   Intake 920 ml   Output --   Net 920 ml        GI/Nutrition:  Orders Placed This Encounter   Procedures    Diet: Diet Tube Feed; Formula: Promot; Promot: Promot Fiber RTH; Goal Rate (mL/Hour): 60; Duration: 24 HR; Tube Feed Time Unit: Hours/Day     Start at 25mL/hr. Do not advance until dietitian consult completed.     Standing Status:   Standing     Number of Occurrences:   1     Order Specific Question:   Diet     Answer:   Diet Tube Feed [35]     Order Specific Question:   Formula:     Answer:   Promot     Order Specific Question:   Promote:     Answer:   Promot Fiber RTH     Order Specific Question:   Goal Rate (mL/Hour)     Answer:   60     Order Specific Question:   Route     Answer:   Enteral Tube     Order Specific Question:   Duration     Answer:   24 HR     Order Specific Question:   Tube Feed Time Unit     Answer:   Hours/Day    Diet NPO Restrict to: Strict     Standing Status:   Standing     Number of Occurrences:   1     Order Specific Question:   Diet NPO Restrict to:     Answer:   Strict [1]     Medical Decision Making, by Problem:  Active Hospital Problems    Diagnosis     *Acute encephalopathy [G93.40]     Keratopathy [H18.9]     Aspiration pneumonitis (HCC) [J69.0]     Prediabetes [R73.03]     Hypernatremia [E87.0]     Vasogenic edema (HCC) [G93.6]     Constipation [K59.00]     Shingles [B02.9]     Dysphagia  [R13.10]     Seizure (HCC) [R56.9]     Sepsis secondary to UTI (HCC) [A41.9, N39.0]     Malignant neoplasm metastatic to brain (HCC) [C79.31]     Advance care planning [Z71.89]     Endometrial cancer (HCC) [C54.1]     Acute CVA (cerebrovascular accident) (HCC) [I63.9]     Essential hypertension [I10]     Obesity (BMI 30-39.9) [E66.9]        Plan:  Metastatic endometrial cancer - recommend best supportive care, both daughters aware that patient is not a candidate for treatment of her terminal cancer given her performance status. We discussed that since disease is incurable, would only be getting side effects with poor quality of life. If there is a miraculous recovery, they will call and make appointment with [facility].   CVA with hemorrhagic transformation - poor prognosis      Quality-Core Measures   DVT prophylaxis pharmacological::  Contraindicated - High bleeding risk  Assessed for rehabilitation services:  Patient unable to tolerate rehabilitation therapeutic regimen    Charles Napoles M.D.

## 2024-05-12 NOTE — CARE PLAN
The patient is Unstable - High likelihood or risk of patient condition declining or worsening    Shift Goals  Clinical Goals: Patient's neuro status will remain stable or improve; Q2 turns  Patient Goals: ZA  Family Goals: CT to assess for aspiration    Progress made toward(s) clinical / shift goals:    Problem: Pain - Standard  Goal: Alleviation of pain or a reduction in pain to the patient’s comfort goal  Outcome: Progressing     Problem: Hemodynamics  Goal: Patient's hemodynamics, fluid balance and neurologic status will be stable or improve  Outcome: Progressing     Problem: Fluid Volume  Goal: Fluid volume balance will be maintained  Outcome: Progressing     Problem: Urinary - Renal Perfusion  Goal: Ability to achieve and maintain adequate renal perfusion and functioning will improve  Outcome: Progressing     Problem: Respiratory  Goal: Patient will achieve/maintain optimum respiratory ventilation and gas exchange  Outcome: Progressing     Problem: Physical Regulation  Goal: Diagnostic test results will improve  Outcome: Progressing  Goal: Signs and symptoms of infection will decrease  Outcome: Progressing     Problem: Skin Integrity  Goal: Skin integrity is maintained or improved  Outcome: Progressing     Problem: Fall Risk  Goal: Patient will remain free from falls  Outcome: Progressing     Problem: Neuro Status  Goal: Neuro status will remain stable or improve  Outcome: Progressing     Problem: Infection - Standard  Goal: Patient will remain free from infection  Outcome: Progressing       Patient is not progressing towards the following goals:      Problem: Knowledge Deficit - Standard  Goal: Patient and family/care givers will demonstrate understanding of plan of care, disease process/condition, diagnostic tests and medications  Outcome: Not Progressing     Problem: Mechanical Ventilation  Goal: Patient will be able to express needs and understand communication  Outcome: Not Progressing

## 2024-05-13 ENCOUNTER — PHARMACY VISIT (OUTPATIENT)
Dept: PHARMACY | Facility: MEDICAL CENTER | Age: 74
End: 2024-05-13
Payer: COMMERCIAL

## 2024-05-13 VITALS
OXYGEN SATURATION: 98 % | SYSTOLIC BLOOD PRESSURE: 139 MMHG | HEART RATE: 88 BPM | HEIGHT: 64 IN | BODY MASS INDEX: 36.06 KG/M2 | WEIGHT: 211.2 LBS | TEMPERATURE: 97 F | RESPIRATION RATE: 16 BRPM | DIASTOLIC BLOOD PRESSURE: 79 MMHG

## 2024-05-13 PROBLEM — E87.0 HYPERNATREMIA: Status: RESOLVED | Noted: 2024-04-16 | Resolved: 2024-05-13

## 2024-05-13 PROBLEM — G93.5 BRAIN COMPRESSION (HCC): Status: ACTIVE | Noted: 2024-05-13

## 2024-05-13 PROBLEM — N39.0 SEPSIS SECONDARY TO UTI (HCC): Status: RESOLVED | Noted: 2024-04-13 | Resolved: 2024-05-13

## 2024-05-13 PROBLEM — A41.9 SEPSIS SECONDARY TO UTI (HCC): Status: RESOLVED | Noted: 2024-04-13 | Resolved: 2024-05-13

## 2024-05-13 PROBLEM — E83.39 HYPOPHOSPHATEMIA: Status: ACTIVE | Noted: 2024-05-13

## 2024-05-13 PROBLEM — K21.9 GERD (GASTROESOPHAGEAL REFLUX DISEASE): Status: ACTIVE | Noted: 2024-05-13

## 2024-05-13 PROBLEM — Z29.89 NEED FOR PNEUMOCYSTIS PROPHYLAXIS: Status: ACTIVE | Noted: 2024-05-13

## 2024-05-13 PROBLEM — B02.9 SHINGLES: Status: RESOLVED | Noted: 2024-04-14 | Resolved: 2024-05-13

## 2024-05-13 LAB
CRP SERPL HS-MCNC: 0.55 MG/DL (ref 0–0.75)
GLUCOSE BLD STRIP.AUTO-MCNC: 109 MG/DL (ref 65–99)
GLUCOSE BLD STRIP.AUTO-MCNC: 110 MG/DL (ref 65–99)
GLUCOSE BLD STRIP.AUTO-MCNC: 117 MG/DL (ref 65–99)
GLUCOSE BLD STRIP.AUTO-MCNC: 170 MG/DL (ref 65–99)
PREALB SERPL-MCNC: 27 MG/DL (ref 18–38)

## 2024-05-13 PROCEDURE — RXMED WILLOW AMBULATORY MEDICATION CHARGE: Performed by: INTERNAL MEDICINE

## 2024-05-13 PROCEDURE — 99239 HOSP IP/OBS DSCHRG MGMT >30: CPT | Performed by: INTERNAL MEDICINE

## 2024-05-13 PROCEDURE — 99232 SBSQ HOSP IP/OBS MODERATE 35: CPT | Performed by: INTERNAL MEDICINE

## 2024-05-13 RX ORDER — DEXAMETHASONE 1 MG
TABLET ORAL
Qty: 121 TABLET | Refills: 0 | Status: SHIPPED | OUTPATIENT
Start: 2024-05-13 | End: 2024-06-11

## 2024-05-13 RX ORDER — METOPROLOL TARTRATE 50 MG/1
50 TABLET, FILM COATED ORAL 2 TIMES DAILY
Qty: 60 TABLET | Refills: 0 | Status: SHIPPED | OUTPATIENT
Start: 2024-05-13 | End: 2024-05-28

## 2024-05-13 RX ORDER — DEXAMETHASONE 4 MG/1
2 TABLET ORAL DAILY
Status: DISCONTINUED | OUTPATIENT
Start: 2024-05-21 | End: 2024-05-13 | Stop reason: HOSPADM

## 2024-05-13 RX ORDER — DEXAMETHASONE 1 MG
1 TABLET ORAL DAILY
Status: DISCONTINUED | OUTPATIENT
Start: 2024-05-28 | End: 2024-05-13 | Stop reason: HOSPADM

## 2024-05-13 RX ORDER — HYDROCODONE BITARTRATE AND ACETAMINOPHEN 5; 325 MG/1; MG/1
1 TABLET ORAL EVERY 6 HOURS PRN
Qty: 20 TABLET | Refills: 0 | Status: SHIPPED | OUTPATIENT
Start: 2024-05-13 | End: 2024-05-18

## 2024-05-13 RX ORDER — DEXAMETHASONE 1 MG
0.5 TABLET ORAL DAILY
Status: DISCONTINUED | OUTPATIENT
Start: 2024-06-04 | End: 2024-05-13 | Stop reason: HOSPADM

## 2024-05-13 RX ORDER — LOSARTAN POTASSIUM 50 MG/1
50 TABLET ORAL DAILY
Qty: 30 TABLET | Refills: 0 | Status: SHIPPED | OUTPATIENT
Start: 2024-05-14 | End: 2024-05-28

## 2024-05-13 RX ORDER — DIPHENHYDRAMINE HYDROCHLORIDE 25 MG/1
CAPSULE, LIQUID FILLED ORAL
Qty: 1 KIT | Refills: 0 | Status: SHIPPED | OUTPATIENT
Start: 2024-05-13

## 2024-05-13 RX ORDER — DEXTROSE MONOHYDRATE 25 G/50ML
25 INJECTION, SOLUTION INTRAVENOUS
Status: DISCONTINUED | OUTPATIENT
Start: 2024-05-13 | End: 2024-05-13 | Stop reason: HOSPADM

## 2024-05-13 RX ORDER — DEXAMETHASONE 4 MG/1
4 TABLET ORAL EVERY 6 HOURS
Status: DISCONTINUED | OUTPATIENT
Start: 2024-05-13 | End: 2024-05-13 | Stop reason: HOSPADM

## 2024-05-13 RX ORDER — GLUCOSAMINE HCL/CHONDROITIN SU 500-400 MG
CAPSULE ORAL
Qty: 100 EACH | Refills: 0 | Status: SHIPPED | OUTPATIENT
Start: 2024-05-13

## 2024-05-13 RX ORDER — LANSOPRAZOLE 30 MG/1
30 TABLET, ORALLY DISINTEGRATING, DELAYED RELEASE ORAL DAILY
Qty: 30 TABLET | Refills: 0 | Status: SHIPPED | OUTPATIENT
Start: 2024-05-14

## 2024-05-13 RX ORDER — ATORVASTATIN CALCIUM 20 MG/1
20 TABLET, FILM COATED ORAL EVERY EVENING
Qty: 30 TABLET | Refills: 0 | Status: SHIPPED | OUTPATIENT
Start: 2024-05-13

## 2024-05-13 RX ORDER — SULFAMETHOXAZOLE AND TRIMETHOPRIM 400; 80 MG/1; MG/1
1 TABLET ORAL DAILY
Qty: 30 TABLET | Refills: 0 | Status: ACTIVE | OUTPATIENT
Start: 2024-05-14 | End: 2024-06-13

## 2024-05-13 RX ORDER — MODAFINIL 100 MG/1
100 TABLET ORAL EVERY MORNING
Qty: 30 TABLET | Refills: 0 | Status: SHIPPED | OUTPATIENT
Start: 2024-05-14 | End: 2024-06-13

## 2024-05-13 RX ORDER — INSULIN LISPRO 100 [IU]/ML
1-6 INJECTION, SOLUTION INTRAVENOUS; SUBCUTANEOUS
Qty: 15 ML | Refills: 0 | Status: ACTIVE | OUTPATIENT
Start: 2024-05-13

## 2024-05-13 RX ORDER — AMOXICILLIN 250 MG
2 CAPSULE ORAL EVERY EVENING
Qty: 30 TABLET | Refills: 0 | Status: SHIPPED | OUTPATIENT
Start: 2024-05-13

## 2024-05-13 RX ORDER — LACOSAMIDE 150 MG/1
150 TABLET ORAL 2 TIMES DAILY
Qty: 60 TABLET | Refills: 0 | Status: SHIPPED | OUTPATIENT
Start: 2024-05-13 | End: 2024-06-12

## 2024-05-13 RX ORDER — LANCETS 30 GAUGE
EACH MISCELLANEOUS
Qty: 100 EACH | Refills: 0 | Status: SHIPPED | OUTPATIENT
Start: 2024-05-13

## 2024-05-13 RX ORDER — DEXAMETHASONE 4 MG/1
2 TABLET ORAL DAILY
Status: CANCELLED | OUTPATIENT
Start: 2024-05-18 | End: 2024-05-19

## 2024-05-13 RX ORDER — DEXAMETHASONE 4 MG/1
4 TABLET ORAL EVERY 12 HOURS
Status: DISCONTINUED | OUTPATIENT
Start: 2024-05-16 | End: 2024-05-13 | Stop reason: HOSPADM

## 2024-05-13 RX ORDER — DEXAMETHASONE SODIUM PHOSPHATE 4 MG/ML
4 INJECTION, SOLUTION INTRA-ARTICULAR; INTRALESIONAL; INTRAMUSCULAR; INTRAVENOUS; SOFT TISSUE ONCE
Status: COMPLETED | OUTPATIENT
Start: 2024-05-13 | End: 2024-05-13

## 2024-05-13 RX ORDER — ASPIRIN 81 MG/1
81 TABLET, CHEWABLE ORAL DAILY
Qty: 100 TABLET | Refills: 0 | Status: SHIPPED | OUTPATIENT
Start: 2024-05-14

## 2024-05-13 RX ADMIN — POLYETHYLENE GLYCOL 3350 1 PACKET: 17 POWDER, FOR SOLUTION ORAL at 06:38

## 2024-05-13 RX ADMIN — ENOXAPARIN SODIUM 40 MG: 100 INJECTION SUBCUTANEOUS at 18:01

## 2024-05-13 RX ADMIN — LANSOPRAZOLE 30 MG: 30 TABLET, ORALLY DISINTEGRATING ORAL at 06:39

## 2024-05-13 RX ADMIN — METOPROLOL TARTRATE 50 MG: 25 TABLET, FILM COATED ORAL at 18:02

## 2024-05-13 RX ADMIN — MINERAL OIL, AND WHITE PETROLATUM 1 APPLICATION: 425; 573 OINTMENT OPHTHALMIC at 13:37

## 2024-05-13 RX ADMIN — ASPIRIN 81 MG: 81 TABLET, CHEWABLE ORAL at 06:39

## 2024-05-13 RX ADMIN — METOPROLOL TARTRATE 50 MG: 25 TABLET, FILM COATED ORAL at 06:40

## 2024-05-13 RX ADMIN — DEXAMETHASONE 2 MG: 4 TABLET ORAL at 06:40

## 2024-05-13 RX ADMIN — DEXAMETHASONE 4 MG: 4 TABLET ORAL at 18:01

## 2024-05-13 RX ADMIN — ATORVASTATIN CALCIUM 20 MG: 20 TABLET, FILM COATED ORAL at 18:01

## 2024-05-13 RX ADMIN — LACOSAMIDE 150 MG: 100 TABLET, FILM COATED ORAL at 06:39

## 2024-05-13 RX ADMIN — DEXAMETHASONE SODIUM PHOSPHATE 4 MG: 4 INJECTION INTRA-ARTICULAR; INTRALESIONAL; INTRAMUSCULAR; INTRAVENOUS; SOFT TISSUE at 13:38

## 2024-05-13 RX ADMIN — MINERAL OIL, AND WHITE PETROLATUM 1 APPLICATION: 425; 573 OINTMENT OPHTHALMIC at 06:38

## 2024-05-13 RX ADMIN — LACOSAMIDE 150 MG: 100 TABLET, FILM COATED ORAL at 18:01

## 2024-05-13 RX ADMIN — SULFAMETHOXAZOLE AND TRIMETHOPRIM 1 TABLET: 400; 80 TABLET ORAL at 06:39

## 2024-05-13 RX ADMIN — SCOPOLAMINE 1 PATCH: 1.5 PATCH, EXTENDED RELEASE TRANSDERMAL at 18:02

## 2024-05-13 RX ADMIN — DIBASIC SODIUM PHOSPHATE, MONOBASIC POTASSIUM PHOSPHATE AND MONOBASIC SODIUM PHOSPHATE 500 MG: 852; 155; 130 TABLET ORAL at 06:39

## 2024-05-13 RX ADMIN — MODAFINIL 100 MG: 100 TABLET ORAL at 06:39

## 2024-05-13 RX ADMIN — LOSARTAN POTASSIUM 50 MG: 50 TABLET, FILM COATED ORAL at 06:39

## 2024-05-13 ASSESSMENT — PAIN DESCRIPTION - PAIN TYPE: TYPE: ACUTE PAIN

## 2024-05-13 NOTE — PROGRESS NOTES
Oncology/Hematology Progress Note               Author: Charles Napoles M.D. Date & Time created: 5/13/2024  7:40 AM     Interval History:  Patient remains unresponsive, daughter concerned about her eyesight.    Review of Systems:  Review of Systems   Unable to perform ROS: Medical condition       Physical Exam:  Physical Exam  Constitutional:       Appearance: She is ill-appearing.   HENT:      Head: Normocephalic and atraumatic.      Right Ear: External ear normal.      Left Ear: External ear normal.      Nose: Nose normal.      Mouth/Throat:      Mouth: Mucous membranes are moist.      Pharynx: Oropharynx is clear.   Eyes:      Extraocular Movements: Extraocular movements intact.      Conjunctiva/sclera: Conjunctivae normal.   Cardiovascular:      Rate and Rhythm: Normal rate.   Pulmonary:      Breath sounds: Normal breath sounds.   Abdominal:      General: Bowel sounds are normal.      Palpations: Abdomen is soft.   Musculoskeletal:         General: Normal range of motion.      Cervical back: Normal range of motion and neck supple.   Skin:     General: Skin is warm and dry.   Neurological:      Mental Status: She is disoriented.      Coordination: Coordination abnormal.         Labs:          Recent Labs     05/10/24  1033   SODIUM 137   POTASSIUM 4.3   CHLORIDE 100   CO2 26   BUN 22   CREATININE 0.28*   MAGNESIUM 1.9   PHOSPHORUS 3.1   CALCIUM 8.8     Recent Labs     05/10/24  1033 05/13/24  0016   ALTSGPT 64*  --    ASTSGOT 42  --    ALKPHOSPHAT 114*  --    TBILIRUBIN 0.3  --    PREALBUMIN  --  27.0   GLUCOSE 120*  --      Recent Labs     05/10/24  1033   RBC 3.09*   HEMOGLOBIN 10.0*   HEMATOCRIT 31.4*   PLATELETCT 155*     Recent Labs     05/10/24  1033   WBC 4.9   NEUTSPOLYS 84.20*   LYMPHOCYTES 5.20*   MONOCYTES 8.80   EOSINOPHILS 0.00   BASOPHILS 0.00   ASTSGOT 42   ALTSGPT 64*   ALKPHOSPHAT 114*   TBILIRUBIN 0.3     Recent Labs     05/10/24  1033   SODIUM 137   POTASSIUM 4.3   CHLORIDE 100   CO2 26    GLUCOSE 120*   BUN 22   CREATININE 0.28*   CALCIUM 8.8     Hemodynamics:  Temp (24hrs), Av.4 °C (97.5 °F), Min:36.3 °C (97.3 °F), Max:36.5 °C (97.7 °F)  Temperature: 36.5 °C (97.7 °F)  Pulse  Av  Min: 61  Max: 128   Blood Pressure : (!) 143/76     Respiratory:    Respiration: 16, Pulse Oximetry: 96 %     Work Of Breathing / Effort: Grunting;Increased Work of Breathing;Moderate;Shallow;Tachypnea  RUL Breath Sounds: Diminished, RML Breath Sounds: Diminished, RLL Breath Sounds: Diminished, HANK Breath Sounds: Diminished, LLL Breath Sounds: Diminished  Fluids:    Intake/Output Summary (Last 24 hours) at 2024 1017  Last data filed at 2024 0019  Gross per 24 hour   Intake 920 ml   Output --   Net 920 ml        GI/Nutrition:  Orders Placed This Encounter   Procedures    Diet: Diet Tube Feed; Formula: Promot; Promot: Promot Fiber RTH; Goal Rate (mL/Hour): 60; Duration: 24 HR; Tube Feed Time Unit: Hours/Day     Start at 25mL/hr. Do not advance until dietitian consult completed.     Standing Status:   Standing     Number of Occurrences:   1     Order Specific Question:   Diet     Answer:   Diet Tube Feed [35]     Order Specific Question:   Formula:     Answer:   Promot     Order Specific Question:   Promote:     Answer:   Promot Fiber RTH     Order Specific Question:   Goal Rate (mL/Hour)     Answer:   60     Order Specific Question:   Route     Answer:   Enteral Tube     Order Specific Question:   Duration     Answer:   24 HR     Order Specific Question:   Tube Feed Time Unit     Answer:   Hours/Day    Diet NPO Restrict to: Strict     Standing Status:   Standing     Number of Occurrences:   1     Order Specific Question:   Diet NPO Restrict to:     Answer:   Strict [1]     Medical Decision Making, by Problem:  Active Hospital Problems    Diagnosis     *Acute encephalopathy [G93.40]     Keratopathy [H18.9]     Aspiration pneumonitis (HCC) [J69.0]     Prediabetes [R73.03]     Hypernatremia [E87.0]      Vasogenic edema (HCC) [G93.6]     Constipation [K59.00]     Shingles [B02.9]     Dysphagia [R13.10]     Seizure (HCC) [R56.9]     Sepsis secondary to UTI (HCC) [A41.9, N39.0]     Malignant neoplasm metastatic to brain (HCC) [C79.31]     Advance care planning [Z71.89]     Endometrial cancer (HCC) [C54.1]     Acute CVA (cerebrovascular accident) (HCC) [I63.9]     Essential hypertension [I10]     Obesity (BMI 30-39.9) [E66.9]        Plan:  Metastatic endometrial cancer - recommend best supportive care, both daughters aware that patient is not a candidate for treatment of her terminal cancer given her performance status. We discussed that since disease is incurable, would only be getting side effects with poor quality of life. If there is a miraculous recovery, they will call and make appointment with [facility].   CVA with hemorrhagic transformation - poor prognosis, input from neurology on vision testing?      Quality-Core Measures   DVT prophylaxis pharmacological::  Contraindicated - High bleeding risk  Assessed for rehabilitation services:  Patient unable to tolerate rehabilitation therapeutic regimen    Charles Napoles M.D.

## 2024-05-13 NOTE — DIETARY
Brief Nutrition Note:  Met with patient and daughter at bedside and reviewed goals for enteral feeds, bolus schedule options and goals as well as free fluids.  RD available PRN.

## 2024-05-13 NOTE — DISCHARGE PLANNING
ATTN: Case Management  RE: Referral for Home Health    As of 5.13.24, we have accepted the Home Health referral for the patient listed above.    A [facility] clinician will be out to see the patient within 48 hours. If you have any questions or concerns regarding the patient’s transition to Home Health, please do not hesitate to contact us at [phone].      We look forward to collaborating with you,  [facility] Team

## 2024-05-13 NOTE — CARE PLAN
The patient is Watcher - Medium risk of patient condition declining or worsening    Shift Goals  Clinical Goals: Q4 neuro checks, FSBG, skin integrity  Patient Goals: ZA      Progress made toward(s) clinical / shift goals:     Problem: Pain - Standard  Goal: Alleviation of pain or a reduction in pain to the patient’s comfort goal  Outcome: Progressing     Problem: Knowledge Deficit - Standard  Goal: Patient and family/care givers will demonstrate understanding of plan of care, disease process/condition, diagnostic tests and medications  Outcome: Progressing     Problem: Respiratory  Goal: Patient will achieve/maintain optimum respiratory ventilation and gas exchange  Outcome: Progressing     Problem: Skin Integrity  Goal: Skin integrity is maintained or improved  Outcome: Progressing     Problem: Fall Risk  Goal: Patient will remain free from falls  Outcome: Progressing

## 2024-05-13 NOTE — DISCHARGE PLANNING
Case Management Discharge Planning    Admission Date: 4/13/2024  GMLOS: 5.1  ALOS: 30    6-Clicks ADL Score: 6  6-Clicks Mobility Score: 6      Anticipated Discharge Dispo: Discharge Disposition: D/T to home under [facility]HA care in anticipation of covered skilled care (06)    DME Needed: Yes    DME Ordered: Yes    Action(s) Taken: Discussed in IDT rounds, Dr. Lema and [facility] leadership met with family at bedside, plan is for pt to dc home today.   Pt is on service with [facility], [facility], and all DME family requested has been delivered to family's home. Pt to dc with steroids and meds to beds.     [facility] referral placed and information given to daughters to follow up with OP.     [facility] aware of pt and will follow pt OP once discharged.     [facility] will see pt after discharge.     Provided family with [facility] phone number for medicaid screening, [facility], and [facility].     Family stating pt needs O2, pt cannot complete walking O2 eval, had RN do room air challenge, requested MD to place O2 order, will follow up with [place] regarding if they will provide O2 when pt does not qualify for it.     IMM given to family at 1245, family deciding if they are appealing discharge or not.     Escalations Completed: None    Medically Clear: Yes    Next Steps: Transportation arranged     Barriers to Discharge: None    Is the patient up for discharge tomorrow: Yes    Is transport arranged for discharge disposition: Yes

## 2024-05-13 NOTE — PROGRESS NOTES
Pharmacy Pharmacotherapy Consult for LOS >30 days    Admit Date: 4/13/2024      Medications were reviewed for appropriateness and ongoing need.     Current Facility-Administered Medications   Medication Dose Route Frequency Provider Last Rate Last Admin    insulin regular (HumuLIN R,NovoLIN R) injection  1-6 Units Subcutaneous Q6HRS Josue Reyes D.O.        And    dextrose 50% (D50W) injection 25 g  25 g Intravenous Q15 MIN PRN Josue Reyes D.O.        losartan (Cozaar) tablet 50 mg  50 mg Enteral Tube DAILY RACHEL LepeOIraida   50 mg at 05/13/24 0639    scopolamine (Transderm-Scop) patch 1 Patch  1 Patch Transdermal Q72HRS RACHEL LepeOIraida   1 Patch at 05/10/24 2031    metoprolol tartrate (Lopressor) tablet 50 mg  50 mg Enteral Tube BID RACHEL LepeOIraida   50 mg at 05/13/24 0640    phosphorus (K-Phos-Neutral) per tablet 500 mg  2 Tablet Enteral Tube DAILY RACHEL LepeOIraida   500 mg at 05/13/24 0639    dexamethasone (Decadron) tablet 2 mg  2 mg Enteral Tube DAILY RACHEL LepeO.   2 mg at 05/13/24 0640    [START ON 5/16/2024] dexamethasone (Decadron) tablet 1 mg  1 mg Enteral Tube DAILY RACHEL LepeOIraida        aspirin (Asa) chewable tab 81 mg  81 mg Enteral Tube DAILY RACHEL LepeOIraida   81 mg at 05/13/24 0639    modafinil (Provigil) tablet 100 mg  100 mg Enteral Tube QAM LEEANNE Lepe.O.   100 mg at 05/13/24 0639    sulfamethoxazole-trimethoprim (Bactrim) 400-80 MG per tablet 1 Tablet  1 Tablet Enteral Tube DAILY RACHEL LepeO.   1 Tablet at 05/13/24 0639    artificial tears (Eye Lubricant) ophth ointment 1 Application  1 Application Both Eyes Q8HRS LEEANNE Lepe.O.   1 Application at 05/13/24 0638    enoxaparin (Lovenox) inj 40 mg  40 mg Subcutaneous DAILY AT 1800 RACHEL LepeOIraida   40 mg at 05/12/24 1819    labetalol (Normodyne/Trandate) injection 10 mg  10 mg Intravenous Q4HRS PRN RACHEL LepeOIraida   10 mg at 05/04/24 1223    hydrALAZINE  (Apresoline) injection 10 mg  10 mg Intravenous Q4HRS PRN RACHEL LepeOIraida   10 mg at 05/01/24 0732    lacosamide (Vimpat) tablet 150 mg  150 mg Enteral Tube BID RACHEL LepeOIraida   150 mg at 05/13/24 0639    Pharmacy Consult: Enteral tube insertion - review meds/change route/product selection  1 Each Other PHARMACY TO DOSE Josue Reyes D.O.        atorvastatin (Lipitor) tablet 20 mg  20 mg Enteral Tube Q EVENING RACHEL LepeOIraida   20 mg at 05/12/24 1819    senna-docusate (Pericolace Or Senokot S) 8.6-50 MG per tablet 2 Tablet  2 Tablet Enteral Tube Q EVENING Josue Reyes D.O.   2 Tablet at 05/12/24 1820    And    polyethylene glycol/lytes (Miralax) Packet 1 Packet  1 Packet Enteral Tube DAILY Josue Reyes D.O.   1 Packet at 05/13/24 0638    lansoprazole (Prevacid) solutab 30 mg  30 mg Enteral Tube DAILY RACHEL LepeOIraida   30 mg at 05/13/24 0639    HYDROcodone-acetaminophen (Norco) 5-325 MG per tablet 1 Tablet  1 Tablet Enteral Tube Q4HRS PRN Josue Reyes D.O.   1 Tablet at 05/07/24 1735    bisacodyl (Dulcolax) suppository 10 mg  10 mg Rectal DAILY Josue Reyes D.O.   10 mg at 05/05/24 0427    Pharmacy Consult Request ...Pain Management Review 1 Each  1 Each Other PHARMACY TO DOSE Josue Reyes D.O.           Recommendations:  None at current time    RACHEL Downs PharmIraidaD.

## 2024-05-13 NOTE — DISCHARGE PLANNING
Spoke with Amy at [facility], they have no order for oxygen.  Per Amy if the family wants to request oxygen they would need to cash pay for each month. Patient would not qualifiers for insurance covered do to current O2 status.  RENÉ Sauceda advised.    [id number]  Updated referral has been sent to [facility] with notes on  formulary orders,

## 2024-05-13 NOTE — DISCHARGE INSTR - CASE MGT
[facility] ph# [phone].  Screening for Medicaid ph#[phone].   [facility] ph # [phone].  [facility] ph# [phone].

## 2024-05-13 NOTE — DISCHARGE SUMMARY
Discharge Summary    CHIEF COMPLAINT ON ADMISSION  Chief Complaint   Patient presents with    Painful Urination    Constipation       Reason for Admission  ems     Admission Date  4/13/2024    CODE STATUS  Full Code    HPI & HOSPITAL COURSE  Jairo Rivas is a 72yo F with hx of endometrial carcinoma, brain metastasis w/p radiation tx, and prior CVA.  An MRI from 4/20 frontal brain metastases with vasogenic edema and acute infarction.  Patient was started on dexamethasone for edema and lacosamide for seizure prophylaxis.  EEG was negative for epileptiform activity.  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  Neurology and infectious disease consulted.  Patient completed a course of acyclovir.  A CT head 4/25 noted a 9 x 7 mm hemorrhage in the right temporal region.  Ophthalmology was consulted for vision changes.  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  Recommended to tape eye shut and use antibiotic lubricating ophthalmic ointments.  Family requested transfer to [place] for neuro-oncology consultation.  All other facilities are out of network for insurance.  [facility] was at capacity.     An echocardiogram show ejection fraction 65%.  Mild concentric left ventricular perjury.     A PEG tube was placed on 5/7.  Patient was tolerating tube feeds.  Palliative care was following for symptom management as well as advance care planning.  Multiple goals of care discussions were held with family and insisted on patient being full code despite poor prognosis.     A [facility] Ethics committee meeting was held on 5/10 with the CMO, myself, and Dr. Lema.  Patient is obtunded and nonresponsive.  It was felt that if the patient did go into cardiac arrest resuscitating her and putting her on life support would cause more harm and discomfort to the patient rather than any medical benefit and is thus not indicated.  Patient was changed to DNR/DNI.     Patient was also  previously followed by Dr. Garcia for endometrial cancer.  Dr. Garcia had recommended hospice and no further treatment.  Family requested a second opinion in hopes of another course of immunotherapy.  Case was discussed with oncology Dr. Napoles.  Because of the patient's poor performance status and prognosis she is currently not a candidate for immunotherapy.  Her cancer was felt to be terminal and incurable and treatment would only bring side effects with poor quality of life.  If the patient were to improve and make recovery she can follow-up with [facility] for immunotherapy.     Patient family also requested a second opinion and evaluation from ophthalmology now that the patient was no longer in contact precautions and are concerned about the patient's eyesight.  Dr. Benito is as well as Dr. Sy recommended that there was not much more they could do for the patient.  The patient is obtunded and unable to follow commands and not to participate with full ophthalmologic exam.  Recommended outpatient follow-up.  Continue lubricating ophthalmic ointments.  Dr. Lema hospitalist sat with the patient's family and school today images showing them why the patient had poor vision because of stroke and metastasis, vasogenic edema.  Prior to discharge patient requested another high-dose IV steroids and a longer dexamethasone taper as outpatient     Patient cannot qualify for home oxygen.  Requested sleep study.  Outpatient referral was placed.  Will be discharged with glucometer and insulin pens in addition to tube feeding, her left, hospital bed.     Patient's family is very upset about ethics committee decision to change patient's CODE STATUS to DNR/DNI and wanted to file a complaint.  After discussion with Dr. Campoverde who was part of ethics committee the proper procedures were not followed and patient will be changed back to full code prior to discharge.  It was made clear to the family that myself and multiple other  physicians recommended DNR/DNI.     Patient is made stable to discharge home with home health.  Follow-up with primary care, oncology, pulmonary or sleep study, ophthalmology as outpatient.    Therefore, she is discharged in fair and stable condition to home with organized home healthcare and close outpatient follow-up.    The patient met 2-midnight criteria for an inpatient stay at the time of discharge.    Discharge Date  5/13/2024      FOLLOW UP ITEMS POST DISCHARGE  None    DISCHARGE DIAGNOSES  Principal Problem:    Acute encephalopathy (POA: Yes)  Active Problems:    Acute CVA (cerebrovascular accident) (HCC) (POA: Yes)      Overview: Residual right sided weakness s/p stroke 2010.      Used to be on Plavix, down graded to aspirin only because of bleeding.      Simvastatin 20 mg daily.      Done with PT in [place].       Spasticity on Baclofen.     Essential hypertension (POA: Yes)      Overview: Well controlled on amlodipine 5 mg and metoprolol 100 mg bid. Valsartan       160 mg switched to losartan 100 mg 2/2 nation wide shortage.    Endometrial cancer (HCC) (POA: Yes)      Overview: Menopause in 2010 and bleeding 2012 started to have irregular continuous       bleeding.       Secondary iron def anemia.      Seen by Dr. Chicas Aug 2018. Thick uterine lining on US.      Trial of medication therapy, if no improvement will go back to surgery.     Obesity (BMI 30-39.9) (POA: Yes)    Advance care planning (POA: Yes)    Malignant neoplasm metastatic to brain (HCC) (POA: Yes)    Constipation (POA: Yes)    Dysphagia (POA: Yes)    Seizure (HCC) (POA: Yes)    Vasogenic edema (HCC) (POA: Yes)    Prediabetes (POA: Yes)    Aspiration pneumonitis (HCC) (POA: Yes)    Keratopathy (POA: Yes)    Brain compression (HCC) (POA: Yes)    Need for pneumocystis prophylaxis (POA: Yes)    Hypophosphatemia (POA: Yes)    GERD (gastroesophageal reflux disease) (POA: Yes)  Resolved Problems:    Acute kidney injury (HCC) (POA: Yes)    Sepsis  secondary to UTI (HCC) (POA: Yes)    Shingles (POA: Yes)    AMS (altered mental status) (POA: Yes)    Hypernatremia (POA: No)      FOLLOW UP  Future Appointments   Date Time Provider Department Center   5/20/2024  2:40 PM [facility]   6/3/2024  2:00 PM [address] [facility] [address]   6/4/2024  3:30 PM IVANA De La Paz None     No follow-up provider specified.    MEDICATIONS ON DISCHARGE     Medication List        START taking these medications        Instructions   Alcohol Swabs   Doctor's comments: Per formulary preference. ICD-10 code: E11.65 Uncontrolled type 2 Diabetes Mellitus  Wipe site with prep pad prior to injection.     artificial tears Oint ophth ointment  Commonly known as: Eye Lubricant   Apply 1 Application to both eyes every 8 hours for 30 days.  Dose: 1 Application     aspirin 81 MG Chew chewable tablet  Start taking on: May 14, 2024  Commonly known as: Asa  Replaces: aspirin 81 MG EC tablet   1 Tablet by Enteral Tube route every day.  Dose: 81 mg     Blood Glucose Monitor System w/Device Kit   Doctor's comments: Or per formulary preference. ICD-10 code: E11.65 Uncontrolled type 2 Diabetes Mellitus  Test blood sugar as recommended by provider. One Touch Verio Flex blood glucose monitoring kit.     glucose blood strip   Doctor's comments: Or per formulary preference. ICD-10 code: E11.65 Uncontrolled type 2 Diabetes Mellitus  Use one One Touch Verio Flex strip to test blood sugar three times daily before meals.     insulin lispro 100 UNIT/ML Sopn injection PEN  Commonly known as: HumaLOG/AdmeLOG   Inject 1-6 Units under the skin 3 times a day before meals. 70 - 150 mg/dL = 0 Units  151 - 200 mg/dL = 1 Units  201 - 250 mg/dL = 2 Units  251 - 300 mg/dL = 3 Units  301 - 350 mg/dL = 4 Units  351 - 400 mg/dL = 5 Units  Over 400 mg/dL = 6 Units  Dose: 1-6 Units     Insulin Pen Needle 32 G x 4 mm   Doctor's comments: Per patient/formulary preference. ICD-10  code: E11.65 Uncontrolled type 2 Diabetes Mellitus  Use one pen needle in pen device to inject insulin three times daily.     lansoprazole 30 MG Tbdd  Start taking on: May 14, 2024  Commonly known as: Prevacid   1 Tablet by Enteral Tube route every day.  Dose: 30 mg     metoprolol tartrate 50 MG Tabs  Commonly known as: Lopressor   1 Tablet by Enteral Tube route 2 times a day.  Dose: 50 mg     modafinil 100 MG Tabs  Start taking on: May 14, 2024  Commonly known as: Provigil   1 Tablet by Enteral Tube route every morning for 30 days.  Dose: 100 mg     OneTouch Delica Plus [id number] [facility] [place]   Doctor's comments: Or per formulary preference. ICD-10 code: E11.65 Uncontrolled type 2 Diabetes Mellitus  Use one lancet to test blood sugar three times daily before meals.     phosphorus 250 MG tablet  Start taking on: May 14, 2024  Commonly known as: K-Phos-Neutral   2 Tablets by Enteral Tube route every day.  Dose: 2 Tablet     senna-docusate 8.6-50 MG Tabs  Commonly known as: Pericolace Or Senokot S   2 Tablets by Enteral Tube route every evening.  Dose: 2 Tablet     sulfamethoxazole-trimethoprim 400-80 MG Tabs  Start taking on: May 14, 2024  Commonly known as: Bactrim   1 Tablet by Enteral Tube route every day for 30 days.  Dose: 1 Tablet            CHANGE how you take these medications        Instructions   atorvastatin 20 MG Tabs  What changed: how to take this  Commonly known as: Lipitor   1 Tablet by Enteral Tube route every evening. Indications: Acute Heart Attack  Dose: 20 mg     dexamethasone 0.5 MG Tabs  Start taking on: May 13, 2024  What changed:   medication strength  See the new instructions.  Commonly known as: Decadron   8 Tablets every 6 hours for 4 days, THEN 8 Tablets every 12 hours for 4 days, THEN 4 Tablets every day for 7 days, THEN 2 Tablets every day for 7 days, THEN 1 Tablet every day for 7 days.     HYDROcodone-acetaminophen 5-325 MG Tabs per tablet  What changed:   how to take this  when to take  this  reasons to take this  Commonly known as: Norco   1 Tablet by Enteral Tube route every 6 hours as needed (Severe pain) for up to 5 days.  Dose: 1 Tablet     Lacosamide 150 MG Tabs  What changed: how to take this   1 tablet (150 mg) by Enteral Tube route 2 times a day for 30 days.  Dose: 150 mg     losartan 50 MG Tabs  Start taking on: May 14, 2024  What changed:   medication strength  how much to take  how to take this  when to take this  reasons to take this  additional instructions  Commonly known as: Cozaar   1 Tablet by Enteral Tube route every day. Indications: High Blood Pressure Disorder  Dose: 50 mg            STOP taking these medications      amLODIPine 5 MG Tabs  Commonly known as: Norvasc     aspirin 81 MG EC tablet  Replaced by: aspirin 81 MG Chew chewable tablet     baclofen 10 MG Tabs  Commonly known as: Lioresal     gabapentin 300 MG Caps  Commonly known as: Neurontin     Melatonin 10 MG Tabs     metoprolol  MG Tb24  Commonly known as: Toprol XL     olanzapine 10 MG tablet  Commonly known as: ZyPREXA     valacyclovir 1 GM Tabs  Commonly known as: Valtrex              Allergies  Allergies   Allergen Reactions    Levaquin Itching and Swelling     Swelling of the tongue and face    Penicillins Itching and Swelling    Tramadol Itching and Swelling       DIET  Orders Placed This Encounter   Procedures    Diet: Diet Tube Feed; Formula: Promot; Promot: Promot Fiber RTH; Goal Rate (mL/Hour): 60; Duration: 24 HR; Tube Feed Time Unit: Hours/Day     Start at 25mL/hr. Do not advance until dietitian consult completed.     Standing Status:   Standing     Number of Occurrences:   1     Order Specific Question:   Diet     Answer:   Diet Tube Feed [35]     Order Specific Question:   Formula:     Answer:   Promot     Order Specific Question:   Promote:     Answer:   Promot Fiber RTH     Order Specific Question:   Goal Rate (mL/Hour)     Answer:   60     Order Specific Question:   Route     Answer:   Enteral  Tube     Order Specific Question:   Duration     Answer:   24 HR     Order Specific Question:   Tube Feed Time Unit     Answer:   Hours/Day    Diet NPO Restrict to: Strict     Standing Status:   Standing     Number of Occurrences:   1     Order Specific Question:   Diet NPO Restrict to:     Answer:   Strict [1]       ACTIVITY  As tolerated.  Weight bearing as tolerated    CONSULTATIONS  infectious disease, neurology, ophthalmology, and gynecology     PROCEDURES  PEG tube    LABORATORY  Lab Results   Component Value Date    SODIUM 137 05/10/2024    POTASSIUM 4.3 05/10/2024    CHLORIDE 100 05/10/2024    CO2 26 05/10/2024    GLUCOSE 120 (H) 05/10/2024    BUN 22 05/10/2024    CREATININE 0.28 (L) 05/10/2024        Lab Results   Component Value Date    WBC 4.9 05/10/2024    HEMOGLOBIN 10.0 (L) 05/10/2024    HEMATOCRIT 31.4 (L) 05/10/2024    PLATELETCT 155 (L) 05/10/2024        I discussed medications and side effects with the patient.  I discussed prognosis and importance of medical compliance with the patient.  I counseled the patient about diet, exercise, weight loss, smoking cessation, and life style modifications.  All questions and concerns have been addressed.  Total time of the discharge process was 39 minutes.

## 2024-05-13 NOTE — DISCHARGE PLANNING
[facility] needs a copy of the fomulary orders provided to [facility] that include formula type, rate, dose, frequency an free water flush rate

## 2024-05-13 NOTE — DISCHARGE PLANNING
DC Transport Scheduled    Transport Company Scheduled:  [facility]  Spoke with Hortencia at [facility] to schedule transport.    Scheduled Date: 5/13/2024  Scheduled Time: 1800    Destination: Family home   Destination address: [facility] [address] [place] [address]      Notified care team of scheduled transport via [employer].     If there are any changes needed to the DC transportation scheduled, please contact [facility] at ext. [phone] between the hours of 5499-6812 Mon-Fri. If outside those hours, contact the ED Case Manager at ext. [phone].

## 2024-05-14 ENCOUNTER — HOME CARE VISIT (OUTPATIENT)
Dept: HOME HEALTH SERVICES | Facility: HOME HEALTHCARE | Age: 74
End: 2024-05-14
Payer: MEDICARE

## 2024-05-14 ENCOUNTER — PATIENT OUTREACH (OUTPATIENT)
Dept: MEDICAL GROUP | Facility: LAB | Age: 74
End: 2024-05-14
Payer: MEDICARE

## 2024-05-14 VITALS
SYSTOLIC BLOOD PRESSURE: 116 MMHG | RESPIRATION RATE: 22 BRPM | TEMPERATURE: 97.6 F | OXYGEN SATURATION: 97 % | DIASTOLIC BLOOD PRESSURE: 60 MMHG | HEART RATE: 73 BPM

## 2024-05-14 SDOH — ECONOMIC STABILITY: HOUSING INSECURITY: EVIDENCE OF SMOKING MATERIAL: 0

## 2024-05-14 ASSESSMENT — PAIN SCALES - PAIN ASSESSMENT IN ADVANCED DEMENTIA (PAINAD)
FACIALEXPRESSION: 0 - SMILING OR INEXPRESSIVE.
CONSOLABILITY: 0 - NO NEED TO CONSOLE.
TOTALSCORE: 3
NEGVOCALIZATION: 1 - OCCASIONAL MOAN OR GROAN. LOW-LEVEL SPEECH WITH A NEGATIVE OR DISAPPROVING QUALITY.
BODYLANGUAGE: 0 - RELAXED.

## 2024-05-14 ASSESSMENT — ENCOUNTER SYMPTOMS
LAST BOWEL MOVEMENT: 66973
NAUSEA: UNABLE TO ANSWER
BOWEL PATTERN NORMAL: 1
VOMITING: UNABLE TO ANSWER

## 2024-05-14 ASSESSMENT — ACTIVITIES OF DAILY LIVING (ADL): OASIS_M1830: 06

## 2024-05-14 NOTE — CARE PLAN
The patient is Unstable - High likelihood or risk of patient condition declining or worsening    Shift Goals  Clinical Goals: Educate family on PEG tube care and feedings, blood sugar checks and insulin administration, wound care and skin presevation for discharge  Patient Goals: ZA  Family Goals: Opthamology consult and interventions; Education for discharge    Progress made toward(s) clinical / shift goals:    Problem: Pain - Standard  Goal: Alleviation of pain or a reduction in pain to the patient’s comfort goal  Outcome: Progressing     Problem: Hemodynamics  Goal: Patient's hemodynamics, fluid balance and neurologic status will be stable or improve  Outcome: Progressing     Problem: Fluid Volume  Goal: Fluid volume balance will be maintained  Outcome: Progressing     Problem: Urinary - Renal Perfusion  Goal: Ability to achieve and maintain adequate renal perfusion and functioning will improve  Outcome: Progressing     Problem: Respiratory  Goal: Patient will achieve/maintain optimum respiratory ventilation and gas exchange  Outcome: Progressing     Problem: Physical Regulation  Goal: Diagnostic test results will improve  Outcome: Progressing  Goal: Signs and symptoms of infection will decrease  Outcome: Progressing     Problem: Skin Integrity  Goal: Skin integrity is maintained or improved  Outcome: Progressing     Problem: Fall Risk  Goal: Patient will remain free from falls  Outcome: Progressing     Problem: Neuro Status  Goal: Neuro status will remain stable or improve  Outcome: Progressing     Problem: Infection - Standard  Goal: Patient will remain free from infection  Outcome: Progressing       Patient is not progressing towards the following goals:      Problem: Knowledge Deficit - Standard  Goal: Patient and family/care givers will demonstrate understanding of plan of care, disease process/condition, diagnostic tests and medications  Outcome: Not Progressing

## 2024-05-14 NOTE — PROGRESS NOTES
5/14: 1st attempt to reach pt for TCM outreach. LVM with contact information for pt to call back.     5/15: 2nd attempt to reach pt for TCM outreach. LVM with contact information for pt to call back.

## 2024-05-14 NOTE — DISCHARGE INSTRUCTIONS
Discharge Instructions    Discharged to home by ambulance with relative. Discharged via ambulance, hospital escort: Yes.  Special equipment needed: all DME delivered to home per patient's family     Be sure to schedule a follow-up appointment with your primary care doctor or any specialists as instructed.     Discharge Plan:   Diet Plan: Discussed  Activity Level: Discussed  Confirmed Follow up Appointment: Appointment Scheduled  Confirmed Symptoms Management: Discussed  Medication Reconciliation Updated: Yes    I understand that a diet low in cholesterol, fat, and sodium is recommended for good health. Unless I have been given specific instructions below for another diet, I accept this instruction as my diet prescription.   Other diet: Tube feeding per orders    Special Instructions: None    -Is this patient being discharged with medication to prevent blood clots?  No    Is patient discharged on Warfarin / Coumadin?   No

## 2024-05-14 NOTE — PROGRESS NOTES
Diabetes education: Met with pt's daughter for education before discharge. Reviewed what Diabetes was, what effects blood sugar, goal for blood sugars, insulin, storage, shelf life, storage, and insulin pens. Pt's daughter practiced with saline pens and practice device.   Pt's daughter was able to do a fingerstick with hospital meter, with finger stick of 110.  Pt got supplies, One touch verio  and strips. Daughter was taught to use meter and lancet service.  Questions answered.

## 2024-05-15 ENCOUNTER — HOME CARE VISIT (OUTPATIENT)
Dept: HOME HEALTH SERVICES | Facility: HOME HEALTHCARE | Age: 74
End: 2024-05-15
Payer: MEDICARE

## 2024-05-16 ENCOUNTER — DOCUMENTATION (OUTPATIENT)
Dept: MEDICAL GROUP | Facility: PHYSICIAN GROUP | Age: 74
End: 2024-05-16
Payer: MEDICARE

## 2024-05-16 ENCOUNTER — HOME CARE VISIT (OUTPATIENT)
Dept: HOME HEALTH SERVICES | Facility: HOME HEALTHCARE | Age: 74
End: 2024-05-16
Payer: MEDICARE

## 2024-05-16 VITALS
OXYGEN SATURATION: 92 % | RESPIRATION RATE: 18 BRPM | DIASTOLIC BLOOD PRESSURE: 90 MMHG | SYSTOLIC BLOOD PRESSURE: 150 MMHG | HEART RATE: 80 BPM | TEMPERATURE: 97.5 F

## 2024-05-16 SDOH — ECONOMIC STABILITY: HOUSING INSECURITY: EVIDENCE OF SMOKING MATERIAL: 0

## 2024-05-16 ASSESSMENT — ENCOUNTER SYMPTOMS
STOOL FREQUENCY: LESS THAN DAILY
DIFFICULTY THINKING: 1
LOSS OF VISUAL FIELD: 1
LAST BOWEL MOVEMENT: 66974
EYE WATERING: 1
BOWEL INCONTINENCE: 1

## 2024-05-16 ASSESSMENT — ACTIVITIES OF DAILY LIVING (ADL)
BATHING_REQUIRES_ASSISTANCE: 1
PHYSICAL_TRANSFER_REQUIRES_ASSISTANCE: 1
EATING_REQUIRES_ASSISTANCE: 1
TOILETING_REQUIRES_ASSISTANCE: 1
GROOMING_REQUIRES_ASSISTANCE: 1
SHOPPING_REQUIRES_ASSISTANCE: 1
AMBULATION ASSISTANCE: NON-AMBULATORY
LAUNDRY_REQUIRES_ASSISTANCE: 1
AMBULATION_REQUIRES_ASSISTANCE: 1
DRESSING_REQUIRES_ASSISTANCE: 1

## 2024-05-16 NOTE — PROGRESS NOTES
Medication chart review for [facility] services    Received referral from [facility].   Medications reviewed  compared with discharge summary if available.  Discharge summary date, if applicable:   5/13    Current medication list per [facility]     Medication list one, patient is currently taking    Current Outpatient Medications:     Home Care Oxygen, 2 L/min, Inhalation, Continuous    aspirin, 81 mg, Enteral Tube, DAILY    atorvastatin, 20 mg, Enteral Tube, Q EVENING    losartan, 50 mg, Enteral Tube, DAILY    lacosamide, 150 mg, Enteral Tube, BID    HYDROcodone-acetaminophen, 1 Tablet, Enteral Tube, Q6HRS PRN    artificial tears, 1 Application, Both Eyes, Q8HRS    lansoprazole, 30 mg, Enteral Tube, DAILY    modafinil, 100 mg, Enteral Tube, QAM    phosphorus, 2 Tablet, Enteral Tube, DAILY    senna-docusate, 2 Tablet, Enteral Tube, Q EVENING    sulfamethoxazole-trimethoprim, 1 Tablet, Enteral Tube, DAILY    metoprolol tartrate, 50 mg, Enteral Tube, BID    Blood Glucose Monitor System, Test blood sugar as recommended by provider. One Touch Verio Flex blood glucose monitoring kit.    glucose blood, Use one One Touch Verio Flex strip to test blood sugar three times daily before meals.    OneTouch Delica Plus [id number], Use one lancet to test blood sugar three times daily before meals.    Alcohol Swabs, Wipe site with prep pad prior to injection.    Insulin Pen Needle 32 G x 4 mm, Use one pen needle in pen device to inject insulin three times daily.    insulin lispro, 1-6 Units, Subcutaneous, TID AC    dexamethasone, 4 Tablets every 6 hours for 4 days, THEN 4 Tablets every 12 hours for 4 days, THEN 2 Tablets every day for 7 days, THEN 1 Tablet every day for 7 days, THEN 0.5 Tablets every day for 7 days.      Medication list two, drugs that the patient has been prescribed or recommended to take by their healthcare provider on discharge summary  START taking these medications         Instructions   Alcohol  Swabs    Doctor's comments: Per formulary preference. ICD-10 code: E11.65 Uncontrolled type 2 Diabetes Mellitus  Wipe site with prep pad prior to injection.      artificial tears Oint ophth ointment  Commonly known as: Eye Lubricant    Apply 1 Application to both eyes every 8 hours for 30 days.  Dose: 1 Application      aspirin 81 MG Chew chewable tablet  Start taking on: May 14, 2024  Commonly known as: Asa  Replaces: aspirin 81 MG EC tablet    1 Tablet by Enteral Tube route every day.  Dose: 81 mg      Blood Glucose Monitor System w/Device Kit    Doctor's comments: Or per formulary preference. ICD-10 code: E11.65 Uncontrolled type 2 Diabetes Mellitus  Test blood sugar as recommended by provider. One Touch Verio Flex blood glucose monitoring kit.      glucose blood strip    Doctor's comments: Or per formulary preference. ICD-10 code: E11.65 Uncontrolled type 2 Diabetes Mellitus  Use one One Touch Verio Flex strip to test blood sugar three times daily before meals.      insulin lispro 100 UNIT/ML Sopn injection PEN  Commonly known as: HumaLOG/AdmeLOG    Inject 1-6 Units under the skin 3 times a day before meals. 70 - 150 mg/dL = 0 Units  151 - 200 mg/dL = 1 Units  201 - 250 mg/dL = 2 Units  251 - 300 mg/dL = 3 Units  301 - 350 mg/dL = 4 Units  351 - 400 mg/dL = 5 Units  Over 400 mg/dL = 6 Units  Dose: 1-6 Units      Insulin Pen Needle 32 G x 4 mm    Doctor's comments: Per patient/formulary preference. ICD-10 code: E11.65 Uncontrolled type 2 Diabetes Mellitus  Use one pen needle in pen device to inject insulin three times daily.      lansoprazole 30 MG Tbdd  Start taking on: May 14, 2024  Commonly known as: Prevacid    1 Tablet by Enteral Tube route every day.  Dose: 30 mg      metoprolol tartrate 50 MG Tabs  Commonly known as: Lopressor    1 Tablet by Enteral Tube route 2 times a day.  Dose: 50 mg      modafinil 100 MG Tabs  Start taking on: May 14, 2024  Commonly known as: Provigil    1 Tablet by Enteral Tube route  every morning for 30 days.  Dose: 100 mg      OneTouch Delica Plus [id number] [facility]    Doctor's comments: Or per formulary preference. ICD-10 code: E11.65 Uncontrolled type 2 Diabetes Mellitus  Use one lancet to test blood sugar three times daily before meals.      phosphorus 250 MG tablet  Start taking on: May 14, 2024  Commonly known as: K-Phos-Neutral    2 Tablets by Enteral Tube route every day.  Dose: 2 Tablet      senna-docusate 8.6-50 MG Tabs  Commonly known as: Pericolace Or Senokot S    2 Tablets by Enteral Tube route every evening.  Dose: 2 Tablet      sulfamethoxazole-trimethoprim 400-80 MG Tabs  Start taking on: May 14, 2024  Commonly known as: Bactrim    1 Tablet by Enteral Tube route every day for 30 days.  Dose: 1 Tablet                CHANGE how you take these medications         Instructions   atorvastatin 20 MG Tabs  What changed: how to take this  Commonly known as: Lipitor    1 Tablet by Enteral Tube route every evening. Indications: Acute Heart Attack  Dose: 20 mg      dexamethasone 0.5 MG Tabs  Start taking on: May 13, 2024  What changed:   medication strength  See the new instructions.  Commonly known as: Decadron    8 Tablets every 6 hours for 4 days, THEN 8 Tablets every 12 hours for 4 days, THEN 4 Tablets every day for 7 days, THEN 2 Tablets every day for 7 days, THEN 1 Tablet every day for 7 days.      HYDROcodone-acetaminophen 5-325 MG Tabs per tablet  What changed:   how to take this  when to take this  reasons to take this  Commonly known as: Norco    1 Tablet by Enteral Tube route every 6 hours as needed (Severe pain) for up to 5 days.  Dose: 1 Tablet      Lacosamide 150 MG Tabs  What changed: how to take this    1 tablet (150 mg) by Enteral Tube route 2 times a day for 30 days.  Dose: 150 mg      losartan 50 MG Tabs  Start taking on: May 14, 2024  What changed:   medication strength  how much to take  how to take this  when to take this  reasons to take this  additional  instructions  Commonly known as: Cozaar    1 Tablet by Enteral Tube route every day. Indications: High Blood Pressure Disorder  Dose: 50 mg                STOP taking these medications       amLODIPine 5 MG Tabs  Commonly known as: Norvasc      aspirin 81 MG EC tablet  Replaced by: aspirin 81 MG Chew chewable tablet      baclofen 10 MG Tabs  Commonly known as: Lioresal      gabapentin 300 MG Caps  Commonly known as: Neurontin      Melatonin 10 MG Tabs      metoprolol  MG Tb24  Commonly known as: Toprol XL      olanzapine 10 MG tablet  Commonly known as: ZyPREXA      valacyclovir 1 GM Tabs  Commonly known as: Valtrex          Allergies   Allergen Reactions    Levaquin Itching and Swelling     Swelling of the tongue and face    Penicillins Itching and Swelling    Tramadol Itching and Swelling       Labs     Lab Results   Component Value Date/Time    SODIUM 137 05/10/2024 10:33 AM    POTASSIUM 4.3 05/10/2024 10:33 AM    CHLORIDE 100 05/10/2024 10:33 AM    CO2 26 05/10/2024 10:33 AM    GLUCOSE 120 (H) 05/10/2024 10:33 AM    BUN 22 05/10/2024 10:33 AM    CREATININE 0.28 (L) 05/10/2024 10:33 AM     Lab Results   Component Value Date/Time    ALKPHOSPHAT 114 (H) 05/10/2024 10:33 AM    ASTSGOT 42 05/10/2024 10:33 AM    ALTSGPT 64 (H) 05/10/2024 10:33 AM    TBILIRUBIN 0.3 05/10/2024 10:33 AM    INR 1.04 05/07/2024 08:16 AM    ALBUMIN 2.8 (L) 05/10/2024 10:33 AM        Assessment for clinically significant drug interactions, drug omissions/additions, duplicative therapies.            CC   Kathya Scanlon, A.P.N.  [id number] [place] [address]  Fax: [phone]    [facility]  Phone [phone] fax [phone]    This note was created using voice recognition software (Dragon). The accuracy of the dictation is limited by the abilities of the software. I have reviewed the note prior to signing, however some errors in grammar and context are still possible. If you  have any questions related to this note please do not hesitate to contact our office.

## 2024-05-17 ENCOUNTER — HOME CARE VISIT (OUTPATIENT)
Dept: HOME HEALTH SERVICES | Facility: HOME HEALTHCARE | Age: 74
End: 2024-05-17
Payer: MEDICARE

## 2024-05-20 ENCOUNTER — HOME CARE VISIT (OUTPATIENT)
Dept: HOME HEALTH SERVICES | Facility: HOME HEALTHCARE | Age: 74
End: 2024-05-20
Payer: MEDICARE

## 2024-05-20 ENCOUNTER — TELEMEDICINE (OUTPATIENT)
Dept: MEDICAL GROUP | Facility: LAB | Age: 74
End: 2024-05-20
Payer: MEDICARE

## 2024-05-20 VITALS
TEMPERATURE: 98.4 F | OXYGEN SATURATION: 97 % | SYSTOLIC BLOOD PRESSURE: 124 MMHG | DIASTOLIC BLOOD PRESSURE: 80 MMHG | RESPIRATION RATE: 18 BRPM | HEART RATE: 70 BPM

## 2024-05-20 VITALS
OXYGEN SATURATION: 94 % | RESPIRATION RATE: 16 BRPM | TEMPERATURE: 96.9 F | DIASTOLIC BLOOD PRESSURE: 72 MMHG | HEART RATE: 72 BPM | SYSTOLIC BLOOD PRESSURE: 108 MMHG

## 2024-05-20 VITALS
SYSTOLIC BLOOD PRESSURE: 129 MMHG | HEART RATE: 91 BPM | RESPIRATION RATE: 18 BRPM | TEMPERATURE: 97.9 F | DIASTOLIC BLOOD PRESSURE: 78 MMHG | OXYGEN SATURATION: 93 %

## 2024-05-20 DIAGNOSIS — G47.30 SLEEP APNEA, UNSPECIFIED TYPE: ICD-10-CM

## 2024-05-20 DIAGNOSIS — R09.02 HYPOXIA: ICD-10-CM

## 2024-05-20 DIAGNOSIS — Z93.1 GASTROSTOMY TUBE PRESENT (HCC): ICD-10-CM

## 2024-05-20 DIAGNOSIS — I63.9 ACUTE CVA (CEREBROVASCULAR ACCIDENT) (HCC): ICD-10-CM

## 2024-05-20 DIAGNOSIS — C79.31 MALIGNANT NEOPLASM METASTATIC TO BRAIN (HCC): ICD-10-CM

## 2024-05-20 DIAGNOSIS — C54.1 ENDOMETRIAL CANCER (HCC): ICD-10-CM

## 2024-05-20 PROBLEM — E66.01 MORBID (SEVERE) OBESITY DUE TO EXCESS CALORIES (HCC): Status: ACTIVE | Noted: 2024-05-20

## 2024-05-20 PROBLEM — E83.39 HYPOPHOSPHATEMIA: Status: RESOLVED | Noted: 2024-05-13 | Resolved: 2024-05-20

## 2024-05-20 PROBLEM — R10.31 RIGHT LOWER QUADRANT ABDOMINAL PAIN: Status: RESOLVED | Noted: 2024-03-27 | Resolved: 2024-05-20

## 2024-05-20 PROBLEM — I16.0 HYPERTENSIVE URGENCY: Status: RESOLVED | Noted: 2024-03-27 | Resolved: 2024-05-20

## 2024-05-20 PROCEDURE — 99214 OFFICE O/P EST MOD 30 MIN: CPT | Mod: 95 | Performed by: NURSE PRACTITIONER

## 2024-05-20 SDOH — ECONOMIC STABILITY: HOUSING INSECURITY: EVIDENCE OF SMOKING MATERIAL: 0

## 2024-05-20 ASSESSMENT — ACTIVITIES OF DAILY LIVING (ADL)
DRESSING_REQUIRES_ASSISTANCE: 1
PHYSICAL_TRANSFER_REQUIRES_ASSISTANCE: 1
BATHING_REQUIRES_ASSISTANCE: 1
GROOMING_REQUIRES_ASSISTANCE: 1
SHOPPING_REQUIRES_ASSISTANCE: 1
TOILETING_REQUIRES_ASSISTANCE: 1
EATING_REQUIRES_ASSISTANCE: 1
ADLS_COMMENTS: DEPENDENT
LAUNDRY_REQUIRES_ASSISTANCE: 1
TRANSPORTATION COMMENTS: BED BOUND
AMBULATION_REQUIRES_ASSISTANCE: 1
AMBULATION ASSISTANCE: NON-AMBULATORY

## 2024-05-20 ASSESSMENT — ENCOUNTER SYMPTOMS
DIFFICULTY THINKING: 1
SKIN LESIONS: 1
POOR JUDGMENT: 1
LIMITED RANGE OF MOTION: 1

## 2024-05-20 NOTE — PROGRESS NOTES
Virtual Visit: Established Patient   This visit was conducted via Zoom using secure and encrypted videoconferencing technology.   The patient was in their home in the [facility].    The patient's identity was confirmed and verbal consent was obtained for this virtual visit.    Subjective:   CC:   F/u    History of Present Illness  The patient is a 73-year-old established female and her daughter meeting virtually today.    Her daughter is concerned that she has sleep apnea.  Family has witnessed episodes in which she stops breathing, particularly at night or when she is sleeping.  They are wondering if she can have a sleep study.  They are monitoring her oxygen level and today on 1 L of nasal cannula oxygen she is at 97%.  She does not have a previous diagnosis of sleep apnea.    She has been home from the hospital Since May 13 and home health is coming regularly.  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  Her daughter is her caretaker and there is other supportive family around.  They feel well taking care of right now.  She does have a G-tube in place that needs stitches removed and needs an order for home health nursing to remove stitches.  Daughter denies any surrounding redness to the G-tube site.  She is administering feedings 3-5 times per day and flushing the tube before and after.  Patient has light to darkish yellow urine.      Current medicines (including changes today)  Current Outpatient Medications   Medication Sig Dispense Refill    Home Care Oxygen Inhale 2 L/min continuous. Oxygen dose range: 1 to 2 L/min  Respiratory route via: Nasal Cannula   Oxygen supplier: Preferred      aspirin (ASA) 81 MG Chew Tab chewable tablet 1 Tablet by Enteral Tube route every day. 100 Tablet 0    atorvastatin (LIPITOR) 20 MG Tab 1 Tablet by Enteral Tube route every evening. Indications: Acute Heart Attack 30 Tablet 0    losartan (COZAAR) 50 MG Tab  1 Tablet by Enteral Tube route every day. Indications: High Blood Pressure Disorder 30 Tablet 0    Lacosamide 150 MG Tab 1 tablet (150 mg) by Enteral Tube route 2 times a day for 30 days. 60 Tablet 0    artificial tears (EYE LUBRICANT) Ointment ophth ointment Apply 1 Application to both eyes every 8 hours for 30 days. 3.5 g 0    lansoprazole (PREVACID) 30 MG Tablet Delayed Release Dispersible 1 Tablet by Enteral Tube route every day. 30 Tablet 0    modafinil (PROVIGIL) 100 MG Tab 1 Tablet by Enteral Tube route every morning for 30 days. 30 Tablet 0    phosphorus (K-PHOS-NEUTRAL) 250 MG tablet 2 Tablets by Enteral Tube route every day. 60 Tablet 0    senna-docusate (PERICOLACE OR SENOKOT S) 8.6-50 MG Tab 2 Tablets by Enteral Tube route every evening. 30 Tablet 0    sulfamethoxazole-trimethoprim (BACTRIM) 400-80 MG Tab 1 Tablet by Enteral Tube route every day for 30 days. 30 Tablet 0    metoprolol tartrate (LOPRESSOR) 50 MG Tab 1 Tablet by Enteral Tube route 2 times a day. 60 Tablet 0    Blood Glucose Monitoring Suppl (BLOOD GLUCOSE MONITOR SYSTEM) w/Device Kit Test blood sugar as recommended by provider. One Touch Verio Flex blood glucose monitoring kit. 1 Kit 0    glucose blood strip Use one One Touch Verio Flex strip to test blood sugar three times daily before meals. 100 Strip 0    Lancets (ONETOUCH DELICA PLUS [id number]) Misc Use one lancet to test blood sugar three times daily before meals. 100 Each 0    Alcohol Swabs Wipe site with prep pad prior to injection. 100 Each 0    Insulin Pen Needle 32 G x 4 mm Use one pen needle in pen device to inject insulin three times daily. 100 Each 0    insulin lispro 100 UNIT/ML SC SOPN injection PEN Inject 1-6 Units under the skin 3 times a day before meals. 70 - 150 mg/dL = 0 Units  151 - 200 mg/dL = 1 Units  201 - 250 mg/dL = 2 Units  251 - 300 mg/dL = 3 Units  301 - 350 mg/dL = 4 Units  351 - 400 mg/dL = 5 Units  Over 400 mg/dL = 6 Units 15 mL 0    dexamethasone (DECADRON)  1 MG Tab 4 Tablets every 6 hours for 4 days, THEN 4 Tablets every 12 hours for 4 days, THEN 2 Tablets every day for 7 days, THEN 1 Tablet every day for 7 days, THEN 0.5 Tablets every day for 7 days. 121 Tablet 0     No current facility-administered medications for this visit.       Patient Active Problem List    Diagnosis Date Noted    Brain compression (HCC) 05/13/2024    Need for pneumocystis prophylaxis 05/13/2024    GERD (gastroesophageal reflux disease) 05/13/2024    Keratopathy 05/05/2024    Aspiration pneumonitis ([facility]) 04/25/2024    Prediabetes 04/18/2024    Vasogenic edema ([facility]) 04/16/2024    Constipation 04/14/2024    Acute encephalopathy 04/14/2024    Dysphagia 04/14/2024    Seizure ([facility]) 04/14/2024    Delusional disorders ([facility]) 04/11/2024    Brain mass 04/10/2024    Malignant neoplasm metastatic to brain ([facility]) 03/27/2024    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 ([facility]) 01/11/2024    DVT (deep venous thrombosis) ([facility]) 08/17/2021    Anemia 08/02/2021    Pulmonary embolism ([facility]) 07/29/2021    Hemothorax on left 07/29/2021    Tongue burning sensation 03/31/2020    Neuropathy 03/31/2020    Thyroid nodule - benign 12/03/2019    Advance care planning 09/17/2019    Vision abnormalities 02/06/2018    Acute CVA (cerebrovascular accident) ([facility]) 02/06/2018    Essential hypertension 02/06/2018    Vitamin D deficiency 02/06/2018    Endometrial cancer ([facility]) 02/06/2018    Iron deficiency anemia 02/06/2018    Obesity (BMI 30-39.9) 02/06/2018        Objective:   There were no vitals taken for this visit.    Physical Exam:  Gen: Patient is lying in a bed asleep, patient's daughter states that she recently received pain medication.  Resp: nonlabored appearing via Zoom.      Assessment and Plan:   The following treatment plan was discussed:     1. Sleep apnea, unspecified type  - Overnight Home Sleep Study; Future    2. Gastrostomy tube present ([facility])  - Suture Removal    3. Acute CVA (cerebrovascular accident)  (HCC)    4. Endometrial cancer (HCC)    5. Malignant neoplasm metastatic to brain (HCC)    6. Hypoxia    Home sleep study ordered.  Patient's daughter also requested home oxygen although upon reviewing discharge summary from hospitalist, patient did not qualify for oxygen.  She is 97% on 1 L at home.  Patient's daughter will keep an eye on pulse ox at home and let me know if readings are falling below 88%.  Order placed in [facility] for home health nursing to remove stitches from G-tube site.  She is being well cared for in regards to her pain level.  We discussed care of the G-tube in regards to appropriate amounts of fluid, flushes and feedings.  Stop bang score of 4.        Follow-up: prn

## 2024-05-21 ENCOUNTER — TELEPHONE (OUTPATIENT)
Dept: PALLIATIVE MEDICINE | Facility: HOSPICE | Age: 74
End: 2024-05-21
Payer: MEDICARE

## 2024-05-21 ASSESSMENT — ENCOUNTER SYMPTOMS
BOWEL PATTERN NORMAL: 1
LAST BOWEL MOVEMENT: 66981
TROUBLE SWALLOWING: 1
PERSON REPORTING PAIN: DIRECT OBSERVATION
STOOL FREQUENCY: DAILY
FATIGUES EASILY: 1
UNABLE TO COMMUNICATE PAIN: 1

## 2024-05-21 ASSESSMENT — PAIN SCALES - PAIN ASSESSMENT IN ADVANCED DEMENTIA (PAINAD)
CONSOLABILITY: 0 - NO NEED TO CONSOLE.
TOTALSCORE: 0
FACIALEXPRESSION: 0 - SMILING OR INEXPRESSIVE.
NEGVOCALIZATION: 0 - NONE.
BODYLANGUAGE: 0 - RELAXED.

## 2024-05-21 NOTE — TELEPHONE ENCOUNTER
"Spoke to Carolyn. Introduction to palliative care and scheduled appointment. Reviewed goals including \"taking good care\" of Carlene, and keeping her comfortable.  Reviewed signs of dehydration vs. Fluid volume excess.  Carolyn has palliative provider contact information.  Appointment 5/28.  "

## 2024-05-23 ENCOUNTER — HOME CARE VISIT (OUTPATIENT)
Dept: HOME HEALTH SERVICES | Facility: HOME HEALTHCARE | Age: 74
End: 2024-05-23
Payer: MEDICARE

## 2024-05-23 ENCOUNTER — HOSPITAL ENCOUNTER (OUTPATIENT)
Facility: MEDICAL CENTER | Age: 74
End: 2024-05-23
Attending: NURSE PRACTITIONER
Payer: MEDICARE

## 2024-05-23 ENCOUNTER — TELEMEDICINE (OUTPATIENT)
Dept: MEDICAL GROUP | Facility: LAB | Age: 74
End: 2024-05-23
Payer: MEDICARE

## 2024-05-23 DIAGNOSIS — Z93.1: ICD-10-CM

## 2024-05-23 DIAGNOSIS — Z86.73 HISTORY OF CVA (CEREBROVASCULAR ACCIDENT): ICD-10-CM

## 2024-05-23 DIAGNOSIS — Z71.89 ADVANCE CARE PLANNING: ICD-10-CM

## 2024-05-23 DIAGNOSIS — C79.31 MALIGNANT NEOPLASM METASTATIC TO BRAIN (HCC): ICD-10-CM

## 2024-05-23 DIAGNOSIS — C54.1 ENDOMETRIAL CANCER (HCC): ICD-10-CM

## 2024-05-23 DIAGNOSIS — G93.89 BRAIN MASS: ICD-10-CM

## 2024-05-23 DIAGNOSIS — R39.89 ABNORMAL URINE COLOR: ICD-10-CM

## 2024-05-23 PROBLEM — G93.40 ACUTE ENCEPHALOPATHY: Status: RESOLVED | Noted: 2024-04-14 | Resolved: 2024-05-23

## 2024-05-23 PROBLEM — I63.9 ACUTE CVA (CEREBROVASCULAR ACCIDENT) (HCC): Status: RESOLVED | Noted: 2018-02-06 | Resolved: 2024-05-23

## 2024-05-23 LAB
ALBUMIN SERPL BCP-MCNC: 3.1 G/DL (ref 3.2–4.9)
ALBUMIN/GLOB SERPL: 1.2 G/DL
ALP SERPL-CCNC: 135 U/L (ref 30–99)
ALT SERPL-CCNC: 40 U/L (ref 2–50)
ANION GAP SERPL CALC-SCNC: 11 MMOL/L (ref 7–16)
ANISOCYTOSIS BLD QL SMEAR: ABNORMAL
APPEARANCE UR: CLEAR
AST SERPL-CCNC: 26 U/L (ref 12–45)
BACTERIA #/AREA URNS HPF: ABNORMAL /HPF
BASOPHILS # BLD AUTO: 0.3 % (ref 0–1.8)
BASOPHILS # BLD: 0.02 K/UL (ref 0–0.12)
BILIRUB SERPL-MCNC: 0.4 MG/DL (ref 0.1–1.5)
BILIRUB UR QL STRIP.AUTO: NEGATIVE
BUN SERPL-MCNC: 15 MG/DL (ref 8–22)
BURR CELLS BLD QL SMEAR: NORMAL
CALCIUM ALBUM COR SERPL-MCNC: 9.6 MG/DL (ref 8.5–10.5)
CALCIUM SERPL-MCNC: 8.9 MG/DL (ref 8.5–10.5)
CHLORIDE SERPL-SCNC: 101 MMOL/L (ref 96–112)
CO2 SERPL-SCNC: 28 MMOL/L (ref 20–33)
COLOR UR: ABNORMAL
COMMENT 1642: NORMAL
CREAT SERPL-MCNC: 0.29 MG/DL (ref 0.5–1.4)
EOSINOPHIL # BLD AUTO: 0 K/UL (ref 0–0.51)
EOSINOPHIL NFR BLD: 0 % (ref 0–6.9)
EPI CELLS #/AREA URNS HPF: ABNORMAL /HPF
ERYTHROCYTE [DISTWIDTH] IN BLOOD BY AUTOMATED COUNT: 67.7 FL (ref 35.9–50)
GFR SERPLBLD CREATININE-BSD FMLA CKD-EPI: 114 ML/MIN/1.73 M 2
GLOBULIN SER CALC-MCNC: 2.6 G/DL (ref 1.9–3.5)
GLUCOSE SERPL-MCNC: 101 MG/DL (ref 65–99)
GLUCOSE UR STRIP.AUTO-MCNC: NEGATIVE MG/DL
HCT VFR BLD AUTO: 32 % (ref 37–47)
HGB BLD-MCNC: 10 G/DL (ref 12–16)
HYALINE CASTS #/AREA URNS LPF: ABNORMAL /LPF
IMM GRANULOCYTES # BLD AUTO: 0.1 K/UL (ref 0–0.11)
IMM GRANULOCYTES NFR BLD AUTO: 1.4 % (ref 0–0.9)
KETONES UR STRIP.AUTO-MCNC: NEGATIVE MG/DL
LEUKOCYTE ESTERASE UR QL STRIP.AUTO: ABNORMAL
LYMPHOCYTES # BLD AUTO: 0.59 K/UL (ref 1–4.8)
LYMPHOCYTES NFR BLD: 8.1 % (ref 22–41)
MACROCYTES BLD QL SMEAR: ABNORMAL
MCH RBC QN AUTO: 32.9 PG (ref 27–33)
MCHC RBC AUTO-ENTMCNC: 31.3 G/DL (ref 32.2–35.5)
MCV RBC AUTO: 105.3 FL (ref 81.4–97.8)
MICRO URNS: ABNORMAL
MONOCYTES # BLD AUTO: 0.45 K/UL (ref 0–0.85)
MONOCYTES NFR BLD AUTO: 6.2 % (ref 0–13.4)
MORPHOLOGY BLD-IMP: NORMAL
NEUTROPHILS # BLD AUTO: 6.11 K/UL (ref 1.82–7.42)
NEUTROPHILS NFR BLD: 84 % (ref 44–72)
NITRITE UR QL STRIP.AUTO: NEGATIVE
NRBC # BLD AUTO: 0.03 K/UL
NRBC BLD-RTO: 0.4 /100 WBC (ref 0–0.2)
OVALOCYTES BLD QL SMEAR: NORMAL
PH UR STRIP.AUTO: 7 [PH] (ref 5–8)
PLATELET # BLD AUTO: 134 K/UL (ref 164–446)
PLATELET BLD QL SMEAR: NORMAL
PMV BLD AUTO: 11 FL (ref 9–12.9)
POIKILOCYTOSIS BLD QL SMEAR: NORMAL
POTASSIUM SERPL-SCNC: 3.8 MMOL/L (ref 3.6–5.5)
PROT SERPL-MCNC: 5.7 G/DL (ref 6–8.2)
PROT UR QL STRIP: 30 MG/DL
RBC # BLD AUTO: 3.04 M/UL (ref 4.2–5.4)
RBC # URNS HPF: ABNORMAL /HPF
RBC BLD AUTO: PRESENT
RBC UR QL AUTO: NEGATIVE
SODIUM SERPL-SCNC: 140 MMOL/L (ref 135–145)
SP GR UR STRIP.AUTO: 1.02
UROBILINOGEN UR STRIP.AUTO-MCNC: 1 MG/DL
WBC # BLD AUTO: 7.3 K/UL (ref 4.8–10.8)
WBC #/AREA URNS HPF: ABNORMAL /HPF
YEAST BUDDING URNS QL: PRESENT /HPF

## 2024-05-23 PROCEDURE — 99214 OFFICE O/P EST MOD 30 MIN: CPT | Mod: 95 | Performed by: NURSE PRACTITIONER

## 2024-05-24 ENCOUNTER — TELEPHONE (OUTPATIENT)
Dept: HEALTH INFORMATION MANAGEMENT | Facility: OTHER | Age: 74
End: 2024-05-24
Payer: MEDICARE

## 2024-05-24 ENCOUNTER — HOME CARE VISIT (OUTPATIENT)
Dept: HOME HEALTH SERVICES | Facility: HOME HEALTHCARE | Age: 74
End: 2024-05-24
Payer: MEDICARE

## 2024-05-24 VITALS
SYSTOLIC BLOOD PRESSURE: 102 MMHG | TEMPERATURE: 97.7 F | HEART RATE: 98 BPM | OXYGEN SATURATION: 97 % | DIASTOLIC BLOOD PRESSURE: 64 MMHG | RESPIRATION RATE: 16 BRPM

## 2024-05-24 VITALS
OXYGEN SATURATION: 98 % | RESPIRATION RATE: 16 BRPM | DIASTOLIC BLOOD PRESSURE: 68 MMHG | SYSTOLIC BLOOD PRESSURE: 110 MMHG | HEART RATE: 99 BPM | TEMPERATURE: 97.6 F

## 2024-05-24 SDOH — ECONOMIC STABILITY: HOUSING INSECURITY: EVIDENCE OF SMOKING MATERIAL: 0

## 2024-05-24 ASSESSMENT — PAIN SCALES - PAIN ASSESSMENT IN ADVANCED DEMENTIA (PAINAD)
CONSOLABILITY: 0 - NO NEED TO CONSOLE.
BODYLANGUAGE: 0 - RELAXED.
NEGVOCALIZATION: 1 - OCCASIONAL MOAN OR GROAN. LOW-LEVEL SPEECH WITH A NEGATIVE OR DISAPPROVING QUALITY.
TOTALSCORE: 3
FACIALEXPRESSION: 2 - FACIAL GRIMACING.

## 2024-05-24 ASSESSMENT — ACTIVITIES OF DAILY LIVING (ADL)
CURRENT_FUNCTION: TWO PERSON
AMBULATION ASSISTANCE: NON-AMBULATORY

## 2024-05-24 ASSESSMENT — ENCOUNTER SYMPTOMS
LIMITED RANGE OF MOTION: 1
BOWEL PATTERN NORMAL: 1
LAST BOWEL MOVEMENT: 66983
PERSON REPORTING PAIN: CAREGIVER
NAUSEA: PT UNABLE TO ANSWER
MUSCLE WEAKNESS: 1
PAIN: 1
STOOL FREQUENCY: LESS THAN DAILY

## 2024-05-25 VITALS
DIASTOLIC BLOOD PRESSURE: 72 MMHG | HEART RATE: 72 BPM | RESPIRATION RATE: 16 BRPM | TEMPERATURE: 96.9 F | SYSTOLIC BLOOD PRESSURE: 108 MMHG | OXYGEN SATURATION: 94 %

## 2024-05-25 SDOH — ECONOMIC STABILITY: HOUSING INSECURITY: EVIDENCE OF SMOKING MATERIAL: 0

## 2024-05-25 ASSESSMENT — PAIN SCALES - PAIN ASSESSMENT IN ADVANCED DEMENTIA (PAINAD)
FACIALEXPRESSION: 0 - SMILING OR INEXPRESSIVE.
TOTALSCORE: 1
CONSOLABILITY: 1 - DISTRACTED OR REASSURED BY VOICE OR TOUCH.
BODYLANGUAGE: 0 - RELAXED.
NEGVOCALIZATION: 0 - NONE.

## 2024-05-25 ASSESSMENT — ACTIVITIES OF DAILY LIVING (ADL): TRANSPORTATION COMMENTS: BED BOUND

## 2024-05-25 ASSESSMENT — ENCOUNTER SYMPTOMS
UNABLE TO COMMUNICATE PAIN: 1
DIFFICULTY THINKING: 1

## 2024-05-27 ENCOUNTER — HOME CARE VISIT (OUTPATIENT)
Dept: HOME HEALTH SERVICES | Facility: HOME HEALTHCARE | Age: 74
End: 2024-05-27
Payer: MEDICARE

## 2024-05-27 VITALS
RESPIRATION RATE: 16 BRPM | TEMPERATURE: 97.6 F | HEART RATE: 87 BPM | DIASTOLIC BLOOD PRESSURE: 66 MMHG | OXYGEN SATURATION: 94 % | SYSTOLIC BLOOD PRESSURE: 122 MMHG

## 2024-05-27 VITALS
OXYGEN SATURATION: 99 % | TEMPERATURE: 97.6 F | DIASTOLIC BLOOD PRESSURE: 56 MMHG | HEART RATE: 86 BPM | SYSTOLIC BLOOD PRESSURE: 104 MMHG

## 2024-05-27 ASSESSMENT — ENCOUNTER SYMPTOMS
LIMITED RANGE OF MOTION: 1
NAUSEA: NONE NOTED
FATIGUES EASILY: 1
SKIN LESIONS: 1
STOOL FREQUENCY: DAILY
VOMITING: NONE NOTED
UNABLE TO COMMUNICATE PAIN: 1

## 2024-05-27 ASSESSMENT — PAIN SCALES - PAIN ASSESSMENT IN ADVANCED DEMENTIA (PAINAD)
CONSOLABILITY: 0 - NO NEED TO CONSOLE.
TOTALSCORE: 1
FACIALEXPRESSION: 0 - SMILING OR INEXPRESSIVE.
NEGVOCALIZATION: 1 - OCCASIONAL MOAN OR GROAN. LOW-LEVEL SPEECH WITH A NEGATIVE OR DISAPPROVING QUALITY.
BODYLANGUAGE: 0 - RELAXED.

## 2024-05-28 ENCOUNTER — OFF SITE VISIT (OUTPATIENT)
Dept: PALLIATIVE MEDICINE | Facility: HOSPICE | Age: 74
End: 2024-05-28
Payer: MEDICARE

## 2024-05-28 DIAGNOSIS — C79.31 MALIGNANT NEOPLASM METASTATIC TO BRAIN (HCC): ICD-10-CM

## 2024-05-28 DIAGNOSIS — Z93.1 GASTROSTOMY TUBE PRESENT (HCC): ICD-10-CM

## 2024-05-28 DIAGNOSIS — Z71.89 ADVANCE CARE PLANNING: ICD-10-CM

## 2024-05-28 DIAGNOSIS — G93.5 BRAIN COMPRESSION (HCC): ICD-10-CM

## 2024-05-28 PROCEDURE — 99497 ADVNCD CARE PLAN 30 MIN: CPT

## 2024-05-28 PROCEDURE — 99350 HOME/RES VST EST HIGH MDM 60: CPT | Mod: 25

## 2024-05-28 PROCEDURE — G0318 PR PROLONG HOME E&M ADDL 15 MIN: HCPCS

## 2024-05-28 RX ORDER — FLUCONAZOLE 100 MG/1
100 TABLET ORAL DAILY
Qty: 7 TABLET | Refills: 0 | Status: SHIPPED | OUTPATIENT
Start: 2024-05-28 | End: 2024-06-04

## 2024-05-28 NOTE — PROGRESS NOTES
"In-Home Palliative Medicine Evaluation      Jairo Rivas  73 y.o.  Female  MRN [id number]  PCP ZULEYKA Lyons  Referral Source: [facility]  Location: Ms. Rivas's daughter's home    Reason for palliative medicine consultation and/or visit: advance care planning    Assessment and Plan:    Summary:  Initial visit, spent most visit with Carolyn, Jairo's daughter.  Carolyn is the family spokesperson and the immediate decision maker.  She explains that she runs every bid decision by her siblings.  Reviewed tube feeding schedule and recommended discussing further with the home health dietician to help set up continuous feedings as the current system is unmanageable with 12 separate feedings a day.  POLST was introduced and reviewed.  Discussed hospice and recommended hospice.  Carolyn states that her goal is to help her mother have improved quality of life.  No changes to medications, though recommended discontinuing atorvastatin, and modafinil as they may be contributing to discomfort, though Jairo is mostly sleeping.  Reviewed signs of discomfort, distress, and signs of dying.  Will follow-up in two weeks, and instructed to call if any changes, and if deciding on hospice.    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    Prognosis: As discussed with patient and family, PPS 20% functional scoring indicates that if the disease process were to run it's natural course, survival is likely less than 2 months    Physical aspects of care:  Dependend in all ADLs, and all IADLs  Bed Bound  Bedbound - in hospital bed at home since hospitalization, being cared for by family and paid caregiver, turning every hour to two hours, protective dressings on heels and feet.  G-tube feeding only -some resistance with feeding, passing this information to Home Health Dietician  Bed sore on sacrum - \"healing\"  Pain - no signs of pain reported, or distress unless if tube feeding not " tolerated  New orders/recommendations:  Meet with dieticiain to discuss feeding schedule and possibly continuous feeding  Consider Hospice Referral  Review POLST with family  Read Gone From My Sight booklet    Psychological aspects of care:  Unable to interview.  Carolyn describes her as a kind woman.    Social aspects of care:  Ms. Rivas lives with her daughter Carolyn, and Carolyn's family.  Carolyn works from home, as does her .  Carolyn has a young daughter who goes to school daily.  Ms. Ho  is undergoing his own health issues and is being cared for by Carolyn's sister in [place].    Spiritual aspects of care:  Ms. Rivas is Evangelical, and Carolyn is Confucianism.  Carolyn states that she doesn't know the customs her mom would like in this process.    Goals of care:  Jairo Rivas is minimally responsive, lacks clarity of mind, so lacks capacity to make her own medical decisions.  Carolyn is currently her designated surrogate decision maker, in consensus with her siblings.   Their goal is to uphold Jairo's wishes to keep her living as long as she is able, and not to bypass any chances for improvement.  CPR, and FULL Treatment have been the priority.  This encounter, the signs of end-of-life were reviewed, along with POLST, hospice, and other care options including curative focused care.  Carolyn states that she will discuss this with her family and if their plans change, they will update goals.    Advance care planning:  The patient and/or legal decision maker has provided voluntary consent to discuss advance care planning. We discussed current condition, hospitalizations, advance care planning documents including POLST, curative care, palliative care, signs of dying process, hospice care, and prolonging life. Total time spent in ACP discussion 45 minutes, which is separate from the time spent completing the evaluation and management visit.     Pertinent Physical Exam Findings:  Vital Signs: HR 98, RR 14, O2  92%  Constitutional: ill appearing, bedbound, unable to clear secreations  HEENT: unable to clear secretions without promptiming  Neck: deferred  Pulm/Chest: diminished breath sounds, only able to auscultate anterior chest  CVS: 1+ BLEE, Bilateral hand edema  Abdomen: distended, tender to touch, g-tube in place  : did not assess  MSK: global wekaness, no  strength  Neuro: minimally responsive, only to touch  Skin: intact other than wound on sacrum that was not assessed this encounter  Psych: deferred        Other Pertinent Medical History OR Surgery Not Listed Above:   Aspiration pneumonitis (HCC) Pulmonary embolism (HCC) Hemothorax on left Essential hypertension     Current Medications:    Current Outpatient Medications:     fluconazole (DIFLUCAN) 100 MG Tab, Take 1 Tablet by mouth every day for 7 days., Disp: 7 Tablet, Rfl: 0    NON SPECIFIED, HOME HEALTH RN:  PLEASE REMOVE G-TUBE BUTTON SUTURE / STAPLE. THANK YOU, Disp: 1 Each, Rfl: 1    Home Care Oxygen, Inhale 2 L/min continuous. Oxygen dose range: 1 to 2 L/min Respiratory route via: Nasal Cannula  Oxygen supplier: Preferred    Indications: SOB, Disp: , Rfl:     aspirin (ASA) 81 MG Chew Tab chewable tablet, 1 Tablet by Enteral Tube route every day., Disp: 100 Tablet, Rfl: 0    atorvastatin (LIPITOR) 20 MG Tab, 1 Tablet by Enteral Tube route every evening. Indications: Acute Heart Attack, Disp: 30 Tablet, Rfl: 0    Lacosamide 150 MG Tab, 1 tablet (150 mg) by Enteral Tube route 2 times a day for 30 days., Disp: 60 Tablet, Rfl: 0    artificial tears (EYE LUBRICANT) Ointment ophth ointment, Apply 1 Application to both eyes every 8 hours for 30 days., Disp: 3.5 g, Rfl: 0    lansoprazole (PREVACID) 30 MG Tablet Delayed Release Dispersible, 1 Tablet by Enteral Tube route every day., Disp: 30 Tablet, Rfl: 0    modafinil (PROVIGIL) 100 MG Tab, 1 Tablet by Enteral Tube route every morning for 30 days., Disp: 30 Tablet, Rfl: 0    phosphorus (K-PHOS-NEUTRAL) 250 MG  tablet, 2 Tablets by Enteral Tube route every day., Disp: 60 Tablet, Rfl: 0    senna-docusate (PERICOLACE OR SENOKOT S) 8.6-50 MG Tab, 2 Tablets by Enteral Tube route every evening., Disp: 30 Tablet, Rfl: 0    sulfamethoxazole-trimethoprim (BACTRIM) 400-80 MG Tab, 1 Tablet by Enteral Tube route every day for 30 days., Disp: 30 Tablet, Rfl: 0    Blood Glucose Monitoring Suppl (BLOOD GLUCOSE MONITOR SYSTEM) w/Device Kit, Test blood sugar as recommended by provider. One Touch Verio Flex blood glucose monitoring kit., Disp: 1 Kit, Rfl: 0    glucose blood strip, Use one One Touch Verio Flex strip to test blood sugar three times daily before meals., Disp: 100 Strip, Rfl: 0    Lancets (ONETOUCH DELICA PLUS [id number]) Misc, Use one lancet to test blood sugar three times daily before meals., Disp: 100 Each, Rfl: 0    Alcohol Swabs, Wipe site with prep pad prior to injection., Disp: 100 Each, Rfl: 0    Insulin Pen Needle 32 G x 4 mm, Use one pen needle in pen device to inject insulin three times daily., Disp: 100 Each, Rfl: 0    insulin lispro 100 UNIT/ML SC SOPN injection PEN, Inject 1-6 Units under the skin 3 times a day before meals. 70 - 150 mg/dL = 0 Units 151 - 200 mg/dL = 1 Units 201 - 250 mg/dL = 2 Units 251 - 300 mg/dL = 3 Units 301 - 350 mg/dL = 4 Units 351 - 400 mg/dL = 5 Units Over 400 mg/dL = 6 Units, Disp: 15 mL, Rfl: 0    dexamethasone (DECADRON) 1 MG Tab, 4 Tablets every 6 hours for 4 days, THEN 4 Tablets every 12 hours for 4 days, THEN 2 Tablets every day for 7 days, THEN 1 Tablet every day for 7 days, THEN 0.5 Tablets every day for 7 days., Disp: 121 Tablet, Rfl: 0    Medication Allergies:  Levaquin, Penicillins, and Tramadol    Thank you for allowing me the opportunity to participate in the care of Jairo Rivas    I spent a total of 120 minutes reviewing medical records, direct face-to-face time with the patient and/or family, documentation and coordination of care. This is separate from the  time spent on advance care planning, which is documented above.    Indira VERNON  [facility]   P - [phone]   C - [phone]

## 2024-05-29 ENCOUNTER — HOME CARE VISIT (OUTPATIENT)
Dept: HOME HEALTH SERVICES | Facility: HOME HEALTHCARE | Age: 74
End: 2024-05-29
Payer: MEDICARE

## 2024-05-29 NOTE — CASE COMMUNICATION
Quality Review for [facility] by RACHEL Mirza RN on  May 29, 2024     Edits completed by RACHEL Mirza RN:  1.  and  diagnosis coding updated per chart review.  2. Completed [facility] 10 per chart review  3. Checked indication noted for high risk drug classes in  per the mar  4. Functional limitations, checked incontinence and dyspnea with minimal exertion

## 2024-05-30 ENCOUNTER — HOME CARE VISIT (OUTPATIENT)
Dept: HOME HEALTH SERVICES | Facility: HOME HEALTHCARE | Age: 74
End: 2024-05-30
Payer: MEDICARE

## 2024-05-30 VITALS
DIASTOLIC BLOOD PRESSURE: 90 MMHG | TEMPERATURE: 97.5 F | HEART RATE: 87 BPM | OXYGEN SATURATION: 98 % | RESPIRATION RATE: 16 BRPM | SYSTOLIC BLOOD PRESSURE: 160 MMHG

## 2024-05-30 PROCEDURE — G0270 MNT SUBS TX FOR CHANGE DX: HCPCS

## 2024-05-30 SDOH — ECONOMIC STABILITY: HOUSING INSECURITY: EVIDENCE OF SMOKING MATERIAL: 0

## 2024-05-30 ASSESSMENT — PAIN SCALES - PAIN ASSESSMENT IN ADVANCED DEMENTIA (PAINAD)
TOTALSCORE: 0
BODYLANGUAGE: 0 - RELAXED.
CONSOLABILITY: 0 - NO NEED TO CONSOLE.
FACIALEXPRESSION: 0 - SMILING OR INEXPRESSIVE.
NEGVOCALIZATION: 0 - NONE.

## 2024-05-30 ASSESSMENT — ENCOUNTER SYMPTOMS
LAST BOWEL MOVEMENT: 66990
STOOL FREQUENCY: LESS THAN DAILY
BOWEL INCONTINENCE: 1
VOMITING: PT DENIES ANY EMESIS AT THIS TIME

## 2024-05-31 ENCOUNTER — HOME CARE VISIT (OUTPATIENT)
Dept: HOME HEALTH SERVICES | Facility: HOME HEALTHCARE | Age: 74
End: 2024-05-31
Payer: MEDICARE

## 2024-05-31 PROCEDURE — G0156 HHCP-SVS OF AIDE,EA 15 MIN: HCPCS

## 2024-06-01 VITALS
DIASTOLIC BLOOD PRESSURE: 60 MMHG | RESPIRATION RATE: 16 BRPM | TEMPERATURE: 98.4 F | SYSTOLIC BLOOD PRESSURE: 100 MMHG | OXYGEN SATURATION: 95 % | HEART RATE: 85 BPM

## 2024-06-03 ENCOUNTER — HOSPITAL ENCOUNTER (OUTPATIENT)
Dept: RADIOLOGY | Facility: MEDICAL CENTER | Age: 74
End: 2024-06-03
Attending: RADIOLOGY
Payer: MEDICARE

## 2024-06-03 ENCOUNTER — HOME CARE VISIT (OUTPATIENT)
Dept: HOME HEALTH SERVICES | Facility: HOME HEALTHCARE | Age: 74
End: 2024-06-03
Payer: MEDICARE

## 2024-06-03 VITALS
DIASTOLIC BLOOD PRESSURE: 70 MMHG | OXYGEN SATURATION: 98 % | SYSTOLIC BLOOD PRESSURE: 110 MMHG | TEMPERATURE: 98.3 F | HEART RATE: 82 BPM | RESPIRATION RATE: 16 BRPM | BODY MASS INDEX: 36.25 KG/M2 | HEIGHT: 64 IN

## 2024-06-03 PROCEDURE — G0299 HHS/HOSPICE OF RN EA 15 MIN: HCPCS

## 2024-06-03 PROCEDURE — A6250 SKIN SEAL PROTECT MOISTURIZR: HCPCS

## 2024-06-03 ASSESSMENT — ENCOUNTER SYMPTOMS
VOMITING: NO
NAUSEA: NO
BOWEL INCONTINENCE: 1
LIMITED RANGE OF MOTION: 1

## 2024-06-03 ASSESSMENT — PAIN SCALES - PAIN ASSESSMENT IN ADVANCED DEMENTIA (PAINAD)
BODYLANGUAGE: 0 - RELAXED.
FACIALEXPRESSION: 2 - FACIAL GRIMACING.
TOTALSCORE: 2
NEGVOCALIZATION: 0 - NONE.
CONSOLABILITY: 0 - NO NEED TO CONSOLE.

## 2024-06-03 ASSESSMENT — ACTIVITIES OF DAILY LIVING (ADL): AMBULATION ASSISTANCE: NON-AMBULATORY

## 2024-06-04 ENCOUNTER — HOSPITAL ENCOUNTER (OUTPATIENT)
Dept: RADIATION ONCOLOGY | Facility: MEDICAL CENTER | Age: 74
End: 2024-06-04
Attending: RADIOLOGY
Payer: MEDICARE

## 2024-06-04 ENCOUNTER — HOME CARE VISIT (OUTPATIENT)
Dept: HOME HEALTH SERVICES | Facility: HOME HEALTHCARE | Age: 74
End: 2024-06-04
Payer: MEDICARE

## 2024-06-05 ENCOUNTER — HOME CARE VISIT (OUTPATIENT)
Dept: HOME HEALTH SERVICES | Facility: HOME HEALTHCARE | Age: 74
End: 2024-06-05
Payer: MEDICARE

## 2024-06-05 PROCEDURE — G0270 MNT SUBS TX FOR CHANGE DX: HCPCS

## 2024-06-06 ENCOUNTER — HOME CARE VISIT (OUTPATIENT)
Dept: HOME HEALTH SERVICES | Facility: HOME HEALTHCARE | Age: 74
End: 2024-06-06
Payer: MEDICARE

## 2024-06-06 ENCOUNTER — DOCUMENTATION (OUTPATIENT)
Dept: MEDICAL GROUP | Facility: PHYSICIAN GROUP | Age: 74
End: 2024-06-06

## 2024-06-06 VITALS
DIASTOLIC BLOOD PRESSURE: 70 MMHG | TEMPERATURE: 97.6 F | SYSTOLIC BLOOD PRESSURE: 106 MMHG | RESPIRATION RATE: 16 BRPM | HEART RATE: 84 BPM | OXYGEN SATURATION: 96 %

## 2024-06-06 PROCEDURE — G0299 HHS/HOSPICE OF RN EA 15 MIN: HCPCS

## 2024-06-06 PROCEDURE — G0179 MD RECERTIFICATION HHA PT: HCPCS | Performed by: NURSE PRACTITIONER

## 2024-06-06 SDOH — ECONOMIC STABILITY: HOUSING INSECURITY
HOME SAFETY: OXYGEN SAFETY RISK ASSESSMENT PERFORMED. PATIENT DOES HAVE A NO SMOKING SIGN POSTED IN THE HOME. PATIENT DOES HAVE A WORKING FIRE EXTINGUISHER PRESENT IN THE HOME. SMOKE ALARMS ARE PRESENT AND FUNCTIONAL ON EACH LEVEL OF THE HOME. PATIENT DOES HAVE A

## 2024-06-06 SDOH — ECONOMIC STABILITY: HOUSING INSECURITY
HOME SAFETY: FIRE ESCAPE PLAN DEVELOPED. PATIENT DOES NOT HAVE FLAMMABLE MATERIALS PRESENT IN THE HOME PRESENTING A FIRE HAZARD. NO EVIDENCE FOUND OF SMOKING MATERIALS PRESENT IN THE HOME.

## 2024-06-06 SDOH — ECONOMIC STABILITY: HOUSING INSECURITY: EVIDENCE OF SMOKING MATERIAL: 0

## 2024-06-06 ASSESSMENT — PAIN SCALES - PAIN ASSESSMENT IN ADVANCED DEMENTIA (PAINAD)
CONSOLABILITY: 0 - NO NEED TO CONSOLE.
TOTALSCORE: 2
FACIALEXPRESSION: 2 - FACIAL GRIMACING.
BODYLANGUAGE: 0 - RELAXED.
NEGVOCALIZATION: 0 - NONE.

## 2024-06-06 ASSESSMENT — ENCOUNTER SYMPTOMS
UNABLE TO COMMUNICATE PAIN: 1
DRY SKIN: 1
EYE DISCHARGE: 1

## 2024-06-06 ASSESSMENT — ACTIVITIES OF DAILY LIVING (ADL): OASIS_M1830: 06

## 2024-06-06 ASSESSMENT — PATIENT HEALTH QUESTIONNAIRE - PHQ9: CLINICAL INTERPRETATION OF PHQ2 SCORE: 0

## 2024-06-06 NOTE — PROGRESS NOTES
Medication chart review for [facility] services    Received referral from [facility].   Medications reviewed  compared with discharge summary if available.  Discharge summary date, if applicable:   5/13    Current medication list per [facility]     Medication list one, patient is currently taking    Current Outpatient Medications:     HYDROcodone-acetaminophen, 1 Tablet, Enteral Tube, Q6HRS PRN    losartan, 50 mg, Enteral Tube, DAILY    metoprolol tartrate, 50 mg, Enteral Tube, BID    Home Care Oxygen, 2 L/min, Inhalation, Continuous    aspirin, 81 mg, Enteral Tube, DAILY    atorvastatin, 20 mg, Enteral Tube, Q EVENING    lacosamide, 150 mg, Enteral Tube, BID    artificial tears, 1 Application, Both Eyes, Q8HRS    lansoprazole, 30 mg, Enteral Tube, DAILY    modafinil, 100 mg, Enteral Tube, QAM    phosphorus, 2 Tablet, Enteral Tube, DAILY    senna-docusate, 2 Tablet, Enteral Tube, Q EVENING    sulfamethoxazole-trimethoprim, 1 Tablet, Enteral Tube, DAILY    Blood Glucose Monitor System, Test blood sugar as recommended by provider. One Touch Verio Flex blood glucose monitoring kit.    glucose blood, Use one One Touch Verio Flex strip to test blood sugar three times daily before meals.    OneTouch Delica Plus [id number], Use one lancet to test blood sugar three times daily before meals.    Alcohol Swabs, Wipe site with prep pad prior to injection.    Insulin Pen Needle 32 G x 4 mm, Use one pen needle in pen device to inject insulin three times daily.    insulin lispro, 1-6 Units, Subcutaneous, TID AC    dexamethasone, 4 Tablets every 6 hours for 4 days, THEN 4 Tablets every 12 hours for 4 days, THEN 2 Tablets every day for 7 days, THEN 1 Tablet every day for 7 days, THEN 0.5 Tablets every day for 7 days.      Medication list two, drugs that the patient has been prescribed or recommended to take by their healthcare provider on discharge summary  START taking these medications         Instructions   Alcohol  Swabs    Doctor's comments: Per formulary preference. ICD-10 code: E11.65 Uncontrolled type 2 Diabetes Mellitus  Wipe site with prep pad prior to injection.      artificial tears Oint ophth ointment  Commonly known as: Eye Lubricant    Apply 1 Application to both eyes every 8 hours for 30 days.  Dose: 1 Application      aspirin 81 MG Chew chewable tablet  Start taking on: May 14, 2024  Commonly known as: Asa  Replaces: aspirin 81 MG EC tablet    1 Tablet by Enteral Tube route every day.  Dose: 81 mg      Blood Glucose Monitor System w/Device Kit    Doctor's comments: Or per formulary preference. ICD-10 code: E11.65 Uncontrolled type 2 Diabetes Mellitus  Test blood sugar as recommended by provider. One Touch Verio Flex blood glucose monitoring kit.      glucose blood strip    Doctor's comments: Or per formulary preference. ICD-10 code: E11.65 Uncontrolled type 2 Diabetes Mellitus  Use one One Touch Verio Flex strip to test blood sugar three times daily before meals.      insulin lispro 100 UNIT/ML Sopn injection PEN  Commonly known as: HumaLOG/AdmeLOG    Inject 1-6 Units under the skin 3 times a day before meals. 70 - 150 mg/dL = 0 Units  151 - 200 mg/dL = 1 Units  201 - 250 mg/dL = 2 Units  251 - 300 mg/dL = 3 Units  301 - 350 mg/dL = 4 Units  351 - 400 mg/dL = 5 Units  Over 400 mg/dL = 6 Units  Dose: 1-6 Units      Insulin Pen Needle 32 G x 4 mm    Doctor's comments: Per patient/formulary preference. ICD-10 code: E11.65 Uncontrolled type 2 Diabetes Mellitus  Use one pen needle in pen device to inject insulin three times daily.      lansoprazole 30 MG Tbdd  Start taking on: May 14, 2024  Commonly known as: Prevacid    1 Tablet by Enteral Tube route every day.  Dose: 30 mg      metoprolol tartrate 50 MG Tabs  Commonly known as: Lopressor    1 Tablet by Enteral Tube route 2 times a day.  Dose: 50 mg      modafinil 100 MG Tabs  Start taking on: May 14, 2024  Commonly known as: Provigil    1 Tablet by Enteral Tube route  every morning for 30 days.  Dose: 100 mg      OneTouch Delica Plus [id number] [facility]    Doctor's comments: Or per formulary preference. ICD-10 code: E11.65 Uncontrolled type 2 Diabetes Mellitus  Use one lancet to test blood sugar three times daily before meals.      phosphorus 250 MG tablet  Start taking on: May 14, 2024  Commonly known as: K-Phos-Neutral    2 Tablets by Enteral Tube route every day.  Dose: 2 Tablet      senna-docusate 8.6-50 MG Tabs  Commonly known as: Pericolace Or Senokot S    2 Tablets by Enteral Tube route every evening.  Dose: 2 Tablet      sulfamethoxazole-trimethoprim 400-80 MG Tabs  Start taking on: May 14, 2024  Commonly known as: Bactrim    1 Tablet by Enteral Tube route every day for 30 days.  Dose: 1 Tablet                CHANGE how you take these medications         Instructions   atorvastatin 20 MG Tabs  What changed: how to take this  Commonly known as: Lipitor    1 Tablet by Enteral Tube route every evening. Indications: Acute Heart Attack  Dose: 20 mg      dexamethasone 0.5 MG Tabs  Start taking on: May 13, 2024  What changed:   medication strength  See the new instructions.  Commonly known as: Decadron    8 Tablets every 6 hours for 4 days, THEN 8 Tablets every 12 hours for 4 days, THEN 4 Tablets every day for 7 days, THEN 2 Tablets every day for 7 days, THEN 1 Tablet every day for 7 days.      HYDROcodone-acetaminophen 5-325 MG Tabs per tablet  What changed:   how to take this  when to take this  reasons to take this  Commonly known as: Norco    1 Tablet by Enteral Tube route every 6 hours as needed (Severe pain) for up to 5 days.  Dose: 1 Tablet      Lacosamide 150 MG Tabs  What changed: how to take this    1 tablet (150 mg) by Enteral Tube route 2 times a day for 30 days.  Dose: 150 mg      losartan 50 MG Tabs  Start taking on: May 14, 2024  What changed:   medication strength  how much to take  how to take this  when to take this  reasons to take this  additional  instructions  Commonly known as: Cozaar    1 Tablet by Enteral Tube route every day. Indications: High Blood Pressure Disorder  Dose: 50 mg                STOP taking these medications       amLODIPine 5 MG Tabs  Commonly known as: Norvasc      aspirin 81 MG EC tablet  Replaced by: aspirin 81 MG Chew chewable tablet      baclofen 10 MG Tabs  Commonly known as: Lioresal      gabapentin 300 MG Caps  Commonly known as: Neurontin      Melatonin 10 MG Tabs      metoprolol  MG Tb24  Commonly known as: Toprol XL      olanzapine 10 MG tablet  Commonly known as: ZyPREXA      valacyclovir 1 GM Tabs  Commonly known as: Valtrex          Allergies   Allergen Reactions    Levaquin Itching and Swelling     Swelling of the tongue and face    Penicillins Itching and Swelling    Tramadol Itching and Swelling       Labs     Lab Results   Component Value Date/Time    SODIUM 140 05/23/2024 02:00 PM    POTASSIUM 3.8 05/23/2024 02:00 PM    CHLORIDE 101 05/23/2024 02:00 PM    CO2 28 05/23/2024 02:00 PM    GLUCOSE 101 (H) 05/23/2024 02:00 PM    BUN 15 05/23/2024 02:00 PM    CREATININE 0.29 (L) 05/23/2024 02:00 PM     Lab Results   Component Value Date/Time    ALKPHOSPHAT 135 (H) 05/23/2024 02:00 PM    ASTSGOT 26 05/23/2024 02:00 PM    ALTSGPT 40 05/23/2024 02:00 PM    TBILIRUBIN 0.4 05/23/2024 02:00 PM    INR 1.04 05/07/2024 08:16 AM    ALBUMIN 3.1 (L) 05/23/2024 02:00 PM        Assessment for clinically significant drug interactions, drug omissions/additions, duplicative therapies.            CC   Kathya Scanlon, A.P.N.  [address] [place] [address]  Fax: [phone]    [facility]  Phone [phone] fax [phone]    This note was created using voice recognition software (Dragon). The accuracy of the dictation is limited by the abilities of the software. I have reviewed the note prior to signing, however some errors in grammar and context are still possible. If you have  any questions related to this note please do not hesitate to contact our office.

## 2024-06-07 PROCEDURE — 665998 HH PPS REVENUE CREDIT

## 2024-06-07 PROCEDURE — 665999 HH PPS REVENUE DEBIT

## 2024-06-08 ENCOUNTER — HOME CARE VISIT (OUTPATIENT)
Dept: HOME HEALTH SERVICES | Facility: HOME HEALTHCARE | Age: 74
End: 2024-06-08
Payer: MEDICARE

## 2024-06-08 PROCEDURE — 665998 HH PPS REVENUE CREDIT

## 2024-06-08 PROCEDURE — 665999 HH PPS REVENUE DEBIT

## 2024-06-08 NOTE — Clinical Note
Noted  Michelle Alvarado RN  ----- Message -----  From: Beatriz Chang R.N.  Sent: 6/9/2024   7:36 AM PDT  To: Michelle Alvarado R.N.      called on call, patient sounds congested, sats 96% , daughter will try to get OTC cough medicine for G tube. notes for visiting JOB Duran sent to [facility]

## 2024-06-09 ENCOUNTER — PATIENT MESSAGE (OUTPATIENT)
Dept: MEDICAL GROUP | Facility: LAB | Age: 74
End: 2024-06-09
Payer: MEDICARE

## 2024-06-09 DIAGNOSIS — I63.9 ACUTE CVA (CEREBROVASCULAR ACCIDENT) (HCC): ICD-10-CM

## 2024-06-09 DIAGNOSIS — E83.39 HYPOPHOSPHATEMIA: ICD-10-CM

## 2024-06-09 DIAGNOSIS — G93.40 ACUTE ENCEPHALOPATHY: ICD-10-CM

## 2024-06-09 DIAGNOSIS — H18.9 KERATOPATHY: ICD-10-CM

## 2024-06-09 DIAGNOSIS — K21.9 GASTROESOPHAGEAL REFLUX DISEASE WITHOUT ESOPHAGITIS: ICD-10-CM

## 2024-06-09 PROCEDURE — 665999 HH PPS REVENUE DEBIT

## 2024-06-09 PROCEDURE — 665998 HH PPS REVENUE CREDIT

## 2024-06-09 NOTE — CASE COMMUNICATION
called on call, patient sounds congested, sats 96% , daughter will try to get OTC cough medicine for G tube. notes for visiting RN Renee sent to [facility].

## 2024-06-10 ENCOUNTER — HOME CARE VISIT (OUTPATIENT)
Dept: HOME HEALTH SERVICES | Facility: HOME HEALTHCARE | Age: 74
End: 2024-06-10
Payer: MEDICARE

## 2024-06-10 ENCOUNTER — TELEPHONE (OUTPATIENT)
Dept: PALLIATIVE MEDICINE | Facility: HOSPICE | Age: 74
End: 2024-06-10
Payer: MEDICARE

## 2024-06-10 VITALS
SYSTOLIC BLOOD PRESSURE: 130 MMHG | TEMPERATURE: 99 F | OXYGEN SATURATION: 99 % | HEART RATE: 72 BPM | DIASTOLIC BLOOD PRESSURE: 80 MMHG | RESPIRATION RATE: 16 BRPM

## 2024-06-10 VITALS
SYSTOLIC BLOOD PRESSURE: 132 MMHG | RESPIRATION RATE: 16 BRPM | OXYGEN SATURATION: 96 % | DIASTOLIC BLOOD PRESSURE: 82 MMHG | TEMPERATURE: 98.5 F | HEART RATE: 76 BPM

## 2024-06-10 PROCEDURE — 665003 FOLLOW UP-HOME HEALTH

## 2024-06-10 PROCEDURE — 665998 HH PPS REVENUE CREDIT

## 2024-06-10 PROCEDURE — 665999 HH PPS REVENUE DEBIT

## 2024-06-10 PROCEDURE — A4927 NON-STERILE GLOVES: HCPCS

## 2024-06-10 PROCEDURE — G0299 HHS/HOSPICE OF RN EA 15 MIN: HCPCS

## 2024-06-10 PROCEDURE — A6234 HYDROCOLLD DRG <=16 W/O BDR: HCPCS

## 2024-06-10 RX ORDER — MODAFINIL 100 MG/1
100 TABLET ORAL EVERY MORNING
Qty: 30 TABLET | Refills: 0 | Status: SHIPPED | OUTPATIENT
Start: 2024-06-10 | End: 2024-06-13 | Stop reason: SDUPTHER

## 2024-06-10 RX ORDER — METOPROLOL TARTRATE 50 MG/1
50 TABLET, FILM COATED ORAL 2 TIMES DAILY
Qty: 60 TABLET | Refills: 0 | Status: ON HOLD | OUTPATIENT
Start: 2024-06-10

## 2024-06-10 RX ORDER — LOSARTAN POTASSIUM 50 MG/1
50 TABLET ORAL DAILY
Qty: 30 TABLET | Refills: 0 | Status: ON HOLD | OUTPATIENT
Start: 2024-06-10

## 2024-06-10 RX ORDER — LANSOPRAZOLE 30 MG/1
30 TABLET, ORALLY DISINTEGRATING, DELAYED RELEASE ORAL DAILY
Qty: 30 TABLET | Refills: 0 | Status: SHIPPED | OUTPATIENT
Start: 2024-06-10 | End: 2024-06-28 | Stop reason: SDUPTHER

## 2024-06-10 RX ORDER — ATORVASTATIN CALCIUM 20 MG/1
20 TABLET, FILM COATED ORAL EVERY EVENING
Qty: 30 TABLET | Refills: 0 | Status: ON HOLD | OUTPATIENT
Start: 2024-06-10

## 2024-06-10 SDOH — ECONOMIC STABILITY: HOUSING INSECURITY: HOME SAFETY: WEARING 1 L NC.   NO FALLS.  DAUGHTER REPORTS NO CHANGES TO MEDICATIONS. DID HAVE RN VISIT THIS MORNING.

## 2024-06-10 SDOH — ECONOMIC STABILITY: HOUSING INSECURITY: EVIDENCE OF SMOKING MATERIAL: 0

## 2024-06-10 ASSESSMENT — PAIN SCALES - PAIN ASSESSMENT IN ADVANCED DEMENTIA (PAINAD)
NEGVOCALIZATION: 0 - NONE.
FACIALEXPRESSION: 0 - SMILING OR INEXPRESSIVE.
TOTALSCORE: 0
TOTALSCORE: 0
CONSOLABILITY: 0 - NO NEED TO CONSOLE.
BODYLANGUAGE: 0 - RELAXED.
NEGVOCALIZATION: 0 - NONE.
BODYLANGUAGE: 0 - RELAXED.
FACIALEXPRESSION: 0 - SMILING OR INEXPRESSIVE.
CONSOLABILITY: 0 - NO NEED TO CONSOLE.

## 2024-06-10 ASSESSMENT — ENCOUNTER SYMPTOMS
LAST BOWEL MOVEMENT: 67001
NAUSEA: NO
STOOL FREQUENCY: DAILY
POOR JUDGMENT: 1
DIFFICULTY THINKING: 1
LIMITED RANGE OF MOTION: 1
VOMITING: NO

## 2024-06-10 ASSESSMENT — ACTIVITIES OF DAILY LIVING (ADL): AMBULATION ASSISTANCE: NON-AMBULATORY

## 2024-06-10 NOTE — TELEPHONE ENCOUNTER
Left message for Carolyn regarding Connies current condition and to confirm palliative care appointment for 6/11 @ 9am.

## 2024-06-11 ENCOUNTER — OFF SITE VISIT (OUTPATIENT)
Dept: PALLIATIVE MEDICINE | Facility: HOSPICE | Age: 74
End: 2024-06-11
Payer: MEDICARE

## 2024-06-11 VITALS
TEMPERATURE: 97.8 F | SYSTOLIC BLOOD PRESSURE: 111 MMHG | HEART RATE: 88 BPM | OXYGEN SATURATION: 100 % | RESPIRATION RATE: 17 BRPM | DIASTOLIC BLOOD PRESSURE: 72 MMHG

## 2024-06-11 DIAGNOSIS — Z71.89 ADVANCE CARE PLANNING: ICD-10-CM

## 2024-06-11 DIAGNOSIS — R56.9 SEIZURE (HCC): ICD-10-CM

## 2024-06-11 DIAGNOSIS — C79.31 MALIGNANT NEOPLASM METASTATIC TO BRAIN (HCC): ICD-10-CM

## 2024-06-11 PROCEDURE — 665998 HH PPS REVENUE CREDIT

## 2024-06-11 PROCEDURE — 99350 HOME/RES VST EST HIGH MDM 60: CPT | Mod: 25

## 2024-06-11 PROCEDURE — 665999 HH PPS REVENUE DEBIT

## 2024-06-11 PROCEDURE — 99497 ADVNCD CARE PLAN 30 MIN: CPT

## 2024-06-11 RX ORDER — HYDROCODONE BITARTRATE AND ACETAMINOPHEN 5; 325 MG/1; MG/1
1 TABLET ORAL EVERY 6 HOURS PRN
Qty: 45 TABLET | Refills: 0 | Status: SHIPPED | OUTPATIENT
Start: 2024-06-11 | End: 2024-06-26

## 2024-06-11 RX ORDER — LORAZEPAM 2 MG/ML
1-2 CONCENTRATE ORAL
Qty: 30 ML | Refills: 0 | Status: SHIPPED | OUTPATIENT
Start: 2024-06-11 | End: 2024-06-28 | Stop reason: SDUPTHER

## 2024-06-11 RX ORDER — LACOSAMIDE 150 MG/1
150 TABLET ORAL 2 TIMES DAILY
Qty: 60 TABLET | Refills: 2 | Status: ON HOLD | OUTPATIENT
Start: 2024-06-11 | End: 2024-09-09

## 2024-06-11 SDOH — ECONOMIC STABILITY: HOUSING INSECURITY: EVIDENCE OF SMOKING MATERIAL: 0

## 2024-06-11 SDOH — ECONOMIC STABILITY: HOUSING INSECURITY: HOME SAFETY: WEARING 1 L OF OXYGEN  NO FALLS  REPORTS NO CHANGES TO MEDICATIONS

## 2024-06-11 ASSESSMENT — PAIN SCALES - PAIN ASSESSMENT IN ADVANCED DEMENTIA (PAINAD)
CONSOLABILITY: 0 - NO NEED TO CONSOLE.
NEGVOCALIZATION: 0 - NONE.
FACIALEXPRESSION: 0 - SMILING OR INEXPRESSIVE.
BODYLANGUAGE: 0 - RELAXED.
TOTALSCORE: 0

## 2024-06-11 ASSESSMENT — ENCOUNTER SYMPTOMS: DIFFICULTY THINKING: 1

## 2024-06-11 NOTE — PROGRESS NOTES
"In-Home Palliative Medicine Evaluation      Jairo Rivas  73 y.o.  Female  MRN [id number]  PCP ZULEYKA Lyons  Referral Source: [facility]  Location: Ms. Rivas's daughter's home    Reason for palliative medicine consultation and/or visit: advance care planning    Assessment and Plan:    Summary:  Met with Jairo Leon's daughter.  Carolyn is the primary caregiver of Jairo, and Jairo lives in Carolyn's home.  Carolyn is the family spokesperson and the immediate decision maker.  No significant changes observed since last encounter, aside from reduced swelling in right lower leg.  Medication changes include again recommendation to discontinue atorvastatin, refilled hydrocodone, and lacosamide, added lorazepam for prn seizure activity or agitation. Reviewed signs of discomfort, distress, and signs of dying.  Reviewed hospice, POLST.  Will follow-up in two weeks, and instructed to call if any changes, and if deciding on hospice.    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    Prognosis: As discussed with patient and family, PPS 20% functional scoring indicates that if the disease process were to run it's natural course, survival is likely less than 2 months    Physical aspects of care:  Dependend in all ADLs, and all IADLs  Bed Bound  Bedbound - in hospital bed at home since hospitalization, being cared for by family and paid caregiver, turning every hour to two hours  G-tube feeding only -using pump, and managed by Home Health Dietician  Bed sore on sacrum - \"healing\"  Pain - no signs of pain reported, or distress unless if tube feeding not tolerated  New orders/recommendations:  Lorazepam 2mg/ml, 0.5mg-1-mg as needed for agitation, or seizure activity  Consider Hospice Referral  Review POLST with family  Read [facility] booklet    Psychological aspects of care:  Unable to interview.  Carolyn describes her as a kind woman.    Social aspects of care:  MsIraida " Evelyn lives with her daughter Carolyn, and Carolyn's family.  Carolyn works from home, as does her .  Carolyn has a young daughter who goes to school daily.  Ms. Ho  is undergoing his own health issues and is being cared for by Carolyn's sister in [place].    Spiritual aspects of care:  Ms. Rivas is Moravian, and Carolyn is Druze.  Carolyn states that she doesn't know the customs her mom would like in this process.    Goals of care:  Jairo Rivas is minimally responsive, lacks clarity of mind, so lacks capacity to make her own medical decisions.  Carolyn is currently her designated surrogate decision maker, in consensus with her siblings.   Their goal is to uphold Jairo's wishes to keep her living as long as she is able, and not to bypass any chances for improvement.  CPR, and FULL Treatment have been the priority.  This encounter, the signs of end-of-life were reviewed, along with POLST, hospice, and other care options including curative focused care.  Carolyn states that she will discuss this with her family and if their plans change, they will update goals.    Advance care planning:  The patient and/or legal decision maker has provided voluntary consent to discuss advance care planning. We discussed current condition, hospitalizations, advance care planning documents including POLST, curative care, palliative care, signs of dying process, hospice care, and prolonging life. Total time spent in ACP discussion 20 minutes, which is separate from the time spent completing the evaluation and management visit.     Pertinent Physical Exam Findings:  Vital Signs: HR 78, RR 14, O2 98% 1 liter NC  Constitutional: ill appearing, bedbound, unable to clear secreations  HEENT: unable to clear secretions without promptiming  Pulm/Chest: diminished breath sounds, only able to auscultate anterior chest  CVS: no edema  Abdomen: distended, tender to touch, g-tube in place  : did not assess  MSK: global wekaness, no   strength  Neuro: minimally responsive, only to touch  Skin: intact other than wound on sacrum that was not assessed this encounter  Psych: deferred        Other Pertinent Medical History OR Surgery Not Listed Above:   Aspiration pneumonitis (HCC) Pulmonary embolism (HCC) Hemothorax on left Essential hypertension       Current Medications:    Current Outpatient Medications:     lansoprazole (PREVACID) 30 MG Tablet Delayed Release Dispersible, 1 Tablet by Enteral Tube route every day. Indications: Gastroesophageal Reflux Disease, Disp: 30 Tablet, Rfl: 0    modafinil (PROVIGIL) 100 MG Tab, 1 Tablet by Enteral Tube route every morning for 30 days. Indications: Cancer Related Fatigue, Major Depressive Disorder, Disp: 30 Tablet, Rfl: 0    phosphorus (K-PHOS-NEUTRAL) 250 MG tablet, 2 Tablets by Enteral Tube route every day. Indications: Low Amount of Phosphate in the Blood, Disp: 60 Tablet, Rfl: 0    artificial tears (EYE LUBRICANT) Ointment ophth ointment, Apply 1 Application to both eyes every 8 hours for 30 days. Indications: Dry eye, Disp: 3.5 g, Rfl: 0    atorvastatin (LIPITOR) 20 MG Tab, 1 Tablet by Enteral Tube route every evening. Indications: Acute Heart Attack, Disp: 30 Tablet, Rfl: 0    losartan (COZAAR) 50 MG Tab, 1 Tablet by Enteral Tube route every day. Indications: High Blood Pressure Disorder, Disp: 30 Tablet, Rfl: 0    metoprolol tartrate (LOPRESSOR) 50 MG Tab, 1 Tablet by Enteral Tube route 2 times a day. Indications: High Blood Pressure Disorder, Disp: 60 Tablet, Rfl: 0    HYDROcodone-acetaminophen (NORCO) 5-325 MG Tab per tablet, 1 Tablet by Enteral Tube route every 6 hours as needed (Pain). Indications: Pain, Disp: , Rfl:     Home Care Oxygen, Inhale 2 L/min continuous. Oxygen dose range: 1 to 2 L/min Respiratory route via: Nasal Cannula  Oxygen supplier: Preferred    Indications: SOB, Disp: , Rfl:     aspirin (ASA) 81 MG Chew Tab chewable tablet, 1 Tablet by Enteral Tube route every day., Disp: 100  Tablet, Rfl: 0    Lacosamide 150 MG Tab, 1 tablet (150 mg) by Enteral Tube route 2 times a day for 30 days., Disp: 60 Tablet, Rfl: 0    senna-docusate (PERICOLACE OR SENOKOT S) 8.6-50 MG Tab, 2 Tablets by Enteral Tube route every evening., Disp: 30 Tablet, Rfl: 0    sulfamethoxazole-trimethoprim (BACTRIM) 400-80 MG Tab, 1 Tablet by Enteral Tube route every day for 30 days., Disp: 30 Tablet, Rfl: 0    Blood Glucose Monitoring Suppl (BLOOD GLUCOSE MONITOR SYSTEM) w/Device Kit, Test blood sugar as recommended by provider. One Touch Verio Flex blood glucose monitoring kit., Disp: 1 Kit, Rfl: 0    glucose blood strip, Use one One Touch Verio Flex strip to test blood sugar three times daily before meals., Disp: 100 Strip, Rfl: 0    Lancets (ONETOUCH DELICA PLUS [id number]) Misc, Use one lancet to test blood sugar three times daily before meals., Disp: 100 Each, Rfl: 0    Alcohol Swabs, Wipe site with prep pad prior to injection., Disp: 100 Each, Rfl: 0    Insulin Pen Needle 32 G x 4 mm, Use one pen needle in pen device to inject insulin three times daily., Disp: 100 Each, Rfl: 0    insulin lispro 100 UNIT/ML SC SOPN injection PEN, Inject 1-6 Units under the skin 3 times a day before meals. 70 - 150 mg/dL = 0 Units 151 - 200 mg/dL = 1 Units 201 - 250 mg/dL = 2 Units 251 - 300 mg/dL = 3 Units 301 - 350 mg/dL = 4 Units 351 - 400 mg/dL = 5 Units Over 400 mg/dL = 6 Units, Disp: 15 mL, Rfl: 0    dexamethasone (DECADRON) 1 MG Tab, 4 Tablets every 6 hours for 4 days, THEN 4 Tablets every 12 hours for 4 days, THEN 2 Tablets every day for 7 days, THEN 1 Tablet every day for 7 days, THEN 0.5 Tablets every day for 7 days., Disp: 121 Tablet, Rfl: 0    Medication Allergies:  Levaquin, Penicillins, and Tramadol    Thank you for allowing me the opportunity to participate in the care of Jairo Rivas    I spent a total of 90 minutes reviewing medical records, direct face-to-face time with the patient and/or family,  documentation and coordination of care. This is separate from the time spent on advance care planning, which is documented above.    Indira VERNON  [facility]   P - [phone]   C - [phone]

## 2024-06-12 ENCOUNTER — HOME CARE VISIT (OUTPATIENT)
Dept: HOME HEALTH SERVICES | Facility: HOME HEALTHCARE | Age: 74
End: 2024-06-12
Payer: MEDICARE

## 2024-06-12 VITALS
SYSTOLIC BLOOD PRESSURE: 120 MMHG | HEART RATE: 77 BPM | DIASTOLIC BLOOD PRESSURE: 62 MMHG | RESPIRATION RATE: 16 BRPM | TEMPERATURE: 97.4 F | OXYGEN SATURATION: 98 %

## 2024-06-12 PROCEDURE — G0270 MNT SUBS TX FOR CHANGE DX: HCPCS

## 2024-06-12 PROCEDURE — 665998 HH PPS REVENUE CREDIT

## 2024-06-12 PROCEDURE — G0153 HHCP-SVS OF S/L PATH,EA 15MN: HCPCS

## 2024-06-12 PROCEDURE — 665999 HH PPS REVENUE DEBIT

## 2024-06-12 PROCEDURE — G0299 HHS/HOSPICE OF RN EA 15 MIN: HCPCS

## 2024-06-12 ASSESSMENT — ENCOUNTER SYMPTOMS
DIFFICULTY THINKING: 1
POOR JUDGMENT: 1

## 2024-06-12 ASSESSMENT — PAIN SCALES - PAIN ASSESSMENT IN ADVANCED DEMENTIA (PAINAD)
FACIALEXPRESSION: 0 - SMILING OR INEXPRESSIVE.
NEGVOCALIZATION: 0 - NONE.
CONSOLABILITY: 0 - NO NEED TO CONSOLE.
TOTALSCORE: 0
BODYLANGUAGE: 0 - RELAXED.

## 2024-06-13 ENCOUNTER — HOME CARE VISIT (OUTPATIENT)
Dept: HOME HEALTH SERVICES | Facility: HOME HEALTHCARE | Age: 74
End: 2024-06-13
Payer: MEDICARE

## 2024-06-13 VITALS
SYSTOLIC BLOOD PRESSURE: 120 MMHG | OXYGEN SATURATION: 98 % | DIASTOLIC BLOOD PRESSURE: 62 MMHG | HEART RATE: 77 BPM | RESPIRATION RATE: 16 BRPM | TEMPERATURE: 97.4 F

## 2024-06-13 VITALS
DIASTOLIC BLOOD PRESSURE: 60 MMHG | TEMPERATURE: 98.3 F | OXYGEN SATURATION: 99 % | SYSTOLIC BLOOD PRESSURE: 112 MMHG | RESPIRATION RATE: 16 BRPM | HEART RATE: 76 BPM

## 2024-06-13 DIAGNOSIS — H18.9 KERATOPATHY: ICD-10-CM

## 2024-06-13 DIAGNOSIS — G93.40 ACUTE ENCEPHALOPATHY: ICD-10-CM

## 2024-06-13 PROCEDURE — G0156 HHCP-SVS OF AIDE,EA 15 MIN: HCPCS

## 2024-06-13 PROCEDURE — 665999 HH PPS REVENUE DEBIT

## 2024-06-13 PROCEDURE — 665998 HH PPS REVENUE CREDIT

## 2024-06-13 RX ORDER — MODAFINIL 100 MG/1
100 TABLET ORAL EVERY MORNING
Qty: 30 TABLET | Refills: 0 | Status: SHIPPED | OUTPATIENT
Start: 2024-06-13 | End: 2024-06-24

## 2024-06-13 ASSESSMENT — ENCOUNTER SYMPTOMS
VOMITING: DENIES
BOWEL PATTERN NORMAL: 1
MUSCLE WEAKNESS: 1
NAUSEA: DENIES
UNABLE TO COMMUNICATE PAIN: 1

## 2024-06-13 ASSESSMENT — PAIN SCALES - PAIN ASSESSMENT IN ADVANCED DEMENTIA (PAINAD)
FACIALEXPRESSION: 0 - SMILING OR INEXPRESSIVE.
TOTALSCORE: 0
NEGVOCALIZATION: 0 - NONE.
BODYLANGUAGE: 0 - RELAXED.
CONSOLABILITY: 0 - NO NEED TO CONSOLE.

## 2024-06-13 NOTE — TELEPHONE ENCOUNTER
Received request via: Pharmacy    Was the patient seen in the last year in this department? Yes    Does the patient have an active prescription (recently filled or refills available) for medication(s) requested? No    Pharmacy Name: [facility], [address]     Does the patient have custodial Plus and need 100 day supply (blood pressure, diabetes and cholesterol meds only)? Medication is not for cholesterol, blood pressure or diabetes

## 2024-06-14 ENCOUNTER — HOME CARE VISIT (OUTPATIENT)
Dept: HOME HEALTH SERVICES | Facility: HOME HEALTHCARE | Age: 74
End: 2024-06-14
Payer: MEDICARE

## 2024-06-14 VITALS
HEART RATE: 76 BPM | DIASTOLIC BLOOD PRESSURE: 60 MMHG | RESPIRATION RATE: 16 BRPM | TEMPERATURE: 97.5 F | SYSTOLIC BLOOD PRESSURE: 110 MMHG | OXYGEN SATURATION: 96 %

## 2024-06-14 PROCEDURE — 665998 HH PPS REVENUE CREDIT

## 2024-06-14 PROCEDURE — 665999 HH PPS REVENUE DEBIT

## 2024-06-14 PROCEDURE — G0299 HHS/HOSPICE OF RN EA 15 MIN: HCPCS

## 2024-06-14 SDOH — ECONOMIC STABILITY: HOUSING INSECURITY: EVIDENCE OF SMOKING MATERIAL: 0

## 2024-06-14 ASSESSMENT — ENCOUNTER SYMPTOMS
LAST BOWEL MOVEMENT: 67004
BOWEL INCONTINENCE: 1

## 2024-06-14 ASSESSMENT — PAIN SCALES - PAIN ASSESSMENT IN ADVANCED DEMENTIA (PAINAD)
TOTALSCORE: 4
FACIALEXPRESSION: 2 - FACIAL GRIMACING.
CONSOLABILITY: 1 - DISTRACTED OR REASSURED BY VOICE OR TOUCH.
NEGVOCALIZATION: 1 - OCCASIONAL MOAN OR GROAN. LOW-LEVEL SPEECH WITH A NEGATIVE OR DISAPPROVING QUALITY.
BODYLANGUAGE: 0 - RELAXED.

## 2024-06-15 PROCEDURE — 665998 HH PPS REVENUE CREDIT

## 2024-06-15 PROCEDURE — 665999 HH PPS REVENUE DEBIT

## 2024-06-16 PROCEDURE — 665998 HH PPS REVENUE CREDIT

## 2024-06-16 PROCEDURE — 665999 HH PPS REVENUE DEBIT

## 2024-06-17 ENCOUNTER — TELEPHONE (OUTPATIENT)
Dept: MEDICAL GROUP | Facility: LAB | Age: 74
End: 2024-06-17
Payer: MEDICARE

## 2024-06-17 ENCOUNTER — HOME CARE VISIT (OUTPATIENT)
Dept: HOME HEALTH SERVICES | Facility: HOME HEALTHCARE | Age: 74
End: 2024-06-17
Payer: MEDICARE

## 2024-06-17 VITALS
SYSTOLIC BLOOD PRESSURE: 102 MMHG | HEART RATE: 73 BPM | RESPIRATION RATE: 16 BRPM | TEMPERATURE: 97.8 F | OXYGEN SATURATION: 97 % | DIASTOLIC BLOOD PRESSURE: 70 MMHG

## 2024-06-17 PROCEDURE — G0299 HHS/HOSPICE OF RN EA 15 MIN: HCPCS

## 2024-06-17 PROCEDURE — G0156 HHCP-SVS OF AIDE,EA 15 MIN: HCPCS

## 2024-06-17 PROCEDURE — 665998 HH PPS REVENUE CREDIT

## 2024-06-17 PROCEDURE — 665999 HH PPS REVENUE DEBIT

## 2024-06-17 SDOH — ECONOMIC STABILITY: HOUSING INSECURITY: EVIDENCE OF SMOKING MATERIAL: 0

## 2024-06-17 ASSESSMENT — ACTIVITIES OF DAILY LIVING (ADL)
AMBULATION ASSISTANCE: NON-AMBULATORY
CURRENT_FUNCTION: TWO PERSON

## 2024-06-17 ASSESSMENT — PAIN SCALES - PAIN ASSESSMENT IN ADVANCED DEMENTIA (PAINAD)
CONSOLABILITY: 0 - NO NEED TO CONSOLE.
FACIALEXPRESSION: 2 - FACIAL GRIMACING.
BODYLANGUAGE: 0 - RELAXED.
TOTALSCORE: 2
NEGVOCALIZATION: 0 - NONE.

## 2024-06-17 ASSESSMENT — ENCOUNTER SYMPTOMS
BOWEL PATTERN NORMAL: 1
PERSON REPORTING PAIN: FAMILY
NAUSEA: UNABLE TO ANSWER
LAST BOWEL MOVEMENT: 67007
STOOL FREQUENCY: DAILY
DIFFICULTY THINKING: 1
PAIN: 1

## 2024-06-17 NOTE — TELEPHONE ENCOUNTER
DOCUMENTATION OF PAR STATUS:    1. Name of Medication & Dose:  Modafinil 100MG tablets     2. Name of Prescription Coverage Company & phone #: [id number]     3. Date Prior Auth Submitted: 6/17/24    OV NOTES FAXED  DENIED

## 2024-06-18 VITALS
DIASTOLIC BLOOD PRESSURE: 70 MMHG | HEART RATE: 73 BPM | RESPIRATION RATE: 16 BRPM | OXYGEN SATURATION: 97 % | TEMPERATURE: 97.8 F | SYSTOLIC BLOOD PRESSURE: 122 MMHG

## 2024-06-18 PROCEDURE — 665998 HH PPS REVENUE CREDIT

## 2024-06-18 PROCEDURE — 665999 HH PPS REVENUE DEBIT

## 2024-06-19 ENCOUNTER — HOME CARE VISIT (OUTPATIENT)
Dept: HOME HEALTH SERVICES | Facility: HOME HEALTHCARE | Age: 74
End: 2024-06-19
Payer: MEDICARE

## 2024-06-19 ENCOUNTER — TELEPHONE (OUTPATIENT)
Dept: MEDICAL GROUP | Facility: LAB | Age: 74
End: 2024-06-19
Payer: MEDICARE

## 2024-06-19 VITALS
TEMPERATURE: 97.5 F | OXYGEN SATURATION: 98 % | RESPIRATION RATE: 19 BRPM | HEART RATE: 82 BPM | DIASTOLIC BLOOD PRESSURE: 70 MMHG | SYSTOLIC BLOOD PRESSURE: 114 MMHG

## 2024-06-19 VITALS
TEMPERATURE: 97.5 F | SYSTOLIC BLOOD PRESSURE: 114 MMHG | DIASTOLIC BLOOD PRESSURE: 70 MMHG | OXYGEN SATURATION: 98 % | RESPIRATION RATE: 19 BRPM | HEART RATE: 82 BPM

## 2024-06-19 VITALS
DIASTOLIC BLOOD PRESSURE: 70 MMHG | RESPIRATION RATE: 19 BRPM | HEART RATE: 82 BPM | TEMPERATURE: 97.8 F | OXYGEN SATURATION: 98 % | SYSTOLIC BLOOD PRESSURE: 110 MMHG

## 2024-06-19 DIAGNOSIS — R39.9 UTI SYMPTOMS: ICD-10-CM

## 2024-06-19 PROCEDURE — G0270 MNT SUBS TX FOR CHANGE DX: HCPCS

## 2024-06-19 PROCEDURE — 665998 HH PPS REVENUE CREDIT

## 2024-06-19 PROCEDURE — 665999 HH PPS REVENUE DEBIT

## 2024-06-19 PROCEDURE — G0299 HHS/HOSPICE OF RN EA 15 MIN: HCPCS

## 2024-06-19 PROCEDURE — G0152 HHCP-SERV OF OT,EA 15 MIN: HCPCS

## 2024-06-19 SDOH — ECONOMIC STABILITY: HOUSING INSECURITY: EVIDENCE OF SMOKING MATERIAL: 0

## 2024-06-19 ASSESSMENT — PAIN SCALES - PAIN ASSESSMENT IN ADVANCED DEMENTIA (PAINAD)
TOTALSCORE: 1
BODYLANGUAGE: 0 - RELAXED.
FACIALEXPRESSION: 0 - SMILING OR INEXPRESSIVE.
CONSOLABILITY: 0 - NO NEED TO CONSOLE.
NEGVOCALIZATION: 1 - OCCASIONAL MOAN OR GROAN. LOW-LEVEL SPEECH WITH A NEGATIVE OR DISAPPROVING QUALITY.

## 2024-06-19 ASSESSMENT — ACTIVITIES OF DAILY LIVING (ADL)
CURRENT_FUNCTION: TWO PERSON
AMBULATION ASSISTANCE: NON-AMBULATORY

## 2024-06-19 ASSESSMENT — ENCOUNTER SYMPTOMS
LAST BOWEL MOVEMENT: 67010
NAUSEA: PATIENT REPORTS NO NAUSEA
DRY SKIN: 1
BOWEL INCONTINENCE: 1
UNABLE TO COMMUNICATE PAIN: 1
STOOL FREQUENCY: DAILY
LIMITED RANGE OF MOTION: 1

## 2024-06-19 NOTE — Clinical Note
I agree with these changes.  Thank you,  Ileana Wilkes RN  ----- Message -----  From: Laury Lopez R.N.  Sent: 6/19/2024   6:13 AM PDT  To: Ileana Wilkes R.N.      Quality Review for 6.6.24 Recert OASIS performed on by DELMI Lopez RN on 6.19.2024:    Edits completed by DELMI Lopez RN:  1.  and  dx coding updated per chart review.

## 2024-06-19 NOTE — TELEPHONE ENCOUNTER
Received a call from [email or web address] [facility]. Patient has yellow congestion. She also have dark urine and now has an odor. Wants to know if you would like to place an order for a urine test?  [place] [phone]

## 2024-06-19 NOTE — CASE COMMUNICATION
Quality Review for 6.6.24 [facility] OASIS performed on by DELMI Lopez RN on 6.19.2024:    Edits completed by DELMI Lopez RN:  1.  and  dx coding updated per chart review.

## 2024-06-20 ENCOUNTER — HOSPITAL ENCOUNTER (OUTPATIENT)
Facility: MEDICAL CENTER | Age: 74
End: 2024-06-20
Attending: PHYSICIAN ASSISTANT
Payer: MEDICARE

## 2024-06-20 LAB
APPEARANCE UR: ABNORMAL
BACTERIA #/AREA URNS HPF: ABNORMAL /HPF
BILIRUB UR QL STRIP.AUTO: NEGATIVE
COLOR UR: YELLOW
EPI CELLS #/AREA URNS HPF: NEGATIVE /HPF
GLUCOSE UR STRIP.AUTO-MCNC: NEGATIVE MG/DL
HYALINE CASTS #/AREA URNS LPF: ABNORMAL /LPF
KETONES UR STRIP.AUTO-MCNC: NEGATIVE MG/DL
LEUKOCYTE ESTERASE UR QL STRIP.AUTO: ABNORMAL
MICRO URNS: ABNORMAL
NITRITE UR QL STRIP.AUTO: POSITIVE
PH UR STRIP.AUTO: 8.5 [PH] (ref 5–8)
PROT UR QL STRIP: 30 MG/DL
RBC # URNS HPF: ABNORMAL /HPF
RBC UR QL AUTO: NEGATIVE
SP GR UR STRIP.AUTO: 1.02
UROBILINOGEN UR STRIP.AUTO-MCNC: 1 MG/DL
WBC #/AREA URNS HPF: ABNORMAL /HPF

## 2024-06-20 PROCEDURE — 665998 HH PPS REVENUE CREDIT

## 2024-06-20 PROCEDURE — 81001 URINALYSIS AUTO W/SCOPE: CPT

## 2024-06-20 PROCEDURE — 665999 HH PPS REVENUE DEBIT

## 2024-06-20 ASSESSMENT — ENCOUNTER SYMPTOMS
DIFFICULTY THINKING: 1
PERSON REPORTING PAIN: PATIENT
DENIES PAIN: 1

## 2024-06-21 ENCOUNTER — HOME CARE VISIT (OUTPATIENT)
Dept: HOME HEALTH SERVICES | Facility: HOME HEALTHCARE | Age: 74
End: 2024-06-21
Payer: MEDICARE

## 2024-06-21 VITALS
DIASTOLIC BLOOD PRESSURE: 76 MMHG | OXYGEN SATURATION: 99 % | TEMPERATURE: 99.2 F | RESPIRATION RATE: 18 BRPM | HEART RATE: 71 BPM | SYSTOLIC BLOOD PRESSURE: 110 MMHG

## 2024-06-21 DIAGNOSIS — R39.9 UTI SYMPTOMS: ICD-10-CM

## 2024-06-21 PROCEDURE — G0299 HHS/HOSPICE OF RN EA 15 MIN: HCPCS

## 2024-06-21 PROCEDURE — 665998 HH PPS REVENUE CREDIT

## 2024-06-21 PROCEDURE — 665999 HH PPS REVENUE DEBIT

## 2024-06-21 RX ORDER — NITROFURANTOIN 25; 75 MG/1; MG/1
100 CAPSULE ORAL 2 TIMES DAILY
Qty: 14 CAPSULE | Refills: 0 | Status: SHIPPED | OUTPATIENT
Start: 2024-06-21 | End: 2024-06-28

## 2024-06-21 SDOH — ECONOMIC STABILITY: HOUSING INSECURITY: EVIDENCE OF SMOKING MATERIAL: 0

## 2024-06-21 ASSESSMENT — PAIN SCALES - PAIN ASSESSMENT IN ADVANCED DEMENTIA (PAINAD)
BODYLANGUAGE: 0 - RELAXED.
NEGVOCALIZATION: 0 - NONE.
FACIALEXPRESSION: 0 - SMILING OR INEXPRESSIVE.
CONSOLABILITY: 0 - NO NEED TO CONSOLE.
TOTALSCORE: 0

## 2024-06-21 ASSESSMENT — ENCOUNTER SYMPTOMS
LAST BOWEL MOVEMENT: 67011
BOWEL INCONTINENCE: 1
STOOL FREQUENCY: LESS THAN DAILY

## 2024-06-22 PROCEDURE — 665999 HH PPS REVENUE DEBIT

## 2024-06-22 PROCEDURE — 665998 HH PPS REVENUE CREDIT

## 2024-06-23 PROCEDURE — 665999 HH PPS REVENUE DEBIT

## 2024-06-23 PROCEDURE — 665998 HH PPS REVENUE CREDIT

## 2024-06-24 ENCOUNTER — HOME CARE VISIT (OUTPATIENT)
Dept: HOME HEALTH SERVICES | Facility: HOME HEALTHCARE | Age: 74
End: 2024-06-24
Payer: MEDICARE

## 2024-06-24 VITALS
OXYGEN SATURATION: 99 % | DIASTOLIC BLOOD PRESSURE: 79 MMHG | TEMPERATURE: 97.7 F | RESPIRATION RATE: 18 BRPM | HEART RATE: 76 BPM | SYSTOLIC BLOOD PRESSURE: 106 MMHG

## 2024-06-24 VITALS
RESPIRATION RATE: 18 BRPM | TEMPERATURE: 97.7 F | DIASTOLIC BLOOD PRESSURE: 79 MMHG | SYSTOLIC BLOOD PRESSURE: 106 MMHG | HEART RATE: 76 BPM | OXYGEN SATURATION: 99 %

## 2024-06-24 PROCEDURE — G0156 HHCP-SVS OF AIDE,EA 15 MIN: HCPCS

## 2024-06-24 PROCEDURE — 665999 HH PPS REVENUE DEBIT

## 2024-06-24 PROCEDURE — 665998 HH PPS REVENUE CREDIT

## 2024-06-24 PROCEDURE — G0299 HHS/HOSPICE OF RN EA 15 MIN: HCPCS

## 2024-06-24 SDOH — ECONOMIC STABILITY: HOUSING INSECURITY: EVIDENCE OF SMOKING MATERIAL: 0

## 2024-06-24 ASSESSMENT — PAIN SCALES - PAIN ASSESSMENT IN ADVANCED DEMENTIA (PAINAD)
FACIALEXPRESSION: 0 - SMILING OR INEXPRESSIVE.
BODYLANGUAGE: 0 - RELAXED.
NEGVOCALIZATION: 0 - NONE.
CONSOLABILITY: 0 - NO NEED TO CONSOLE.
TOTALSCORE: 0

## 2024-06-24 ASSESSMENT — ENCOUNTER SYMPTOMS
STOOL FREQUENCY: DAILY
LAST BOWEL MOVEMENT: 67013
BOWEL INCONTINENCE: 1
DENIES PAIN: 1

## 2024-06-24 ASSESSMENT — ACTIVITIES OF DAILY LIVING (ADL): AMBULATION ASSISTANCE: NON-AMBULATORY

## 2024-06-24 NOTE — CASE COMMUNICATION
Daughter is very concerned with how dry patients skin is. Skin is peeling on feet and various areas of her body. Encouraged more free water and skin moisturizer.   Please advise    Thank you  Michelle Alvarado RN

## 2024-06-25 ENCOUNTER — HOME CARE VISIT (OUTPATIENT)
Dept: HOME HEALTH SERVICES | Facility: HOME HEALTHCARE | Age: 74
End: 2024-06-25
Payer: MEDICARE

## 2024-06-25 VITALS
DIASTOLIC BLOOD PRESSURE: 60 MMHG | OXYGEN SATURATION: 98 % | SYSTOLIC BLOOD PRESSURE: 102 MMHG | TEMPERATURE: 98.4 F | RESPIRATION RATE: 15 BRPM | HEART RATE: 75 BPM

## 2024-06-25 PROCEDURE — 665999 HH PPS REVENUE DEBIT

## 2024-06-25 PROCEDURE — 665998 HH PPS REVENUE CREDIT

## 2024-06-25 PROCEDURE — G0152 HHCP-SERV OF OT,EA 15 MIN: HCPCS

## 2024-06-26 ENCOUNTER — HOME CARE VISIT (OUTPATIENT)
Dept: HOME HEALTH SERVICES | Facility: HOME HEALTHCARE | Age: 74
End: 2024-06-26
Payer: MEDICARE

## 2024-06-26 VITALS
TEMPERATURE: 9.8 F | HEART RATE: 64 BPM | OXYGEN SATURATION: 100 % | SYSTOLIC BLOOD PRESSURE: 118 MMHG | DIASTOLIC BLOOD PRESSURE: 70 MMHG | RESPIRATION RATE: 16 BRPM

## 2024-06-26 PROCEDURE — A6234 HYDROCOLLD DRG <=16 W/O BDR: HCPCS

## 2024-06-26 PROCEDURE — 665999 HH PPS REVENUE DEBIT

## 2024-06-26 PROCEDURE — G0299 HHS/HOSPICE OF RN EA 15 MIN: HCPCS

## 2024-06-26 PROCEDURE — 665998 HH PPS REVENUE CREDIT

## 2024-06-26 ASSESSMENT — ENCOUNTER SYMPTOMS
LAST BOWEL MOVEMENT: 67015
ENDOCRINE COMMENTS: LETHARGIC
NAUSEA: NO
STOOL FREQUENCY: DAILY
VOMITING: NO
BOWEL INCONTINENCE: 1
CONSTIPATION: 1
LIMITED RANGE OF MOTION: 1
DIFFICULTY THINKING: 1

## 2024-06-26 ASSESSMENT — PAIN SCALES - PAIN ASSESSMENT IN ADVANCED DEMENTIA (PAINAD)
TOTALSCORE: 0
NEGVOCALIZATION: 0 - NONE.
FACIALEXPRESSION: 0 - SMILING OR INEXPRESSIVE.
CONSOLABILITY: 0 - NO NEED TO CONSOLE.
BODYLANGUAGE: 0 - RELAXED.

## 2024-06-26 ASSESSMENT — ACTIVITIES OF DAILY LIVING (ADL): AMBULATION ASSISTANCE: NON-AMBULATORY

## 2024-06-27 ENCOUNTER — HOME CARE VISIT (OUTPATIENT)
Dept: HOME HEALTH SERVICES | Facility: HOME HEALTHCARE | Age: 74
End: 2024-06-27
Payer: MEDICARE

## 2024-06-27 VITALS
DIASTOLIC BLOOD PRESSURE: 62 MMHG | TEMPERATURE: 98.4 F | HEART RATE: 78 BPM | RESPIRATION RATE: 16 BRPM | SYSTOLIC BLOOD PRESSURE: 122 MMHG | OXYGEN SATURATION: 99 %

## 2024-06-27 VITALS
TEMPERATURE: 97.9 F | SYSTOLIC BLOOD PRESSURE: 122 MMHG | DIASTOLIC BLOOD PRESSURE: 8 MMHG | OXYGEN SATURATION: 98 % | HEART RATE: 75 BPM | RESPIRATION RATE: 20 BRPM

## 2024-06-27 PROCEDURE — 665999 HH PPS REVENUE DEBIT

## 2024-06-27 PROCEDURE — 665998 HH PPS REVENUE CREDIT

## 2024-06-27 PROCEDURE — G0270 MNT SUBS TX FOR CHANGE DX: HCPCS

## 2024-06-27 PROCEDURE — G0156 HHCP-SVS OF AIDE,EA 15 MIN: HCPCS

## 2024-06-27 PROCEDURE — G0299 HHS/HOSPICE OF RN EA 15 MIN: HCPCS

## 2024-06-27 SDOH — ECONOMIC STABILITY: HOUSING INSECURITY: EVIDENCE OF SMOKING MATERIAL: 0

## 2024-06-27 ASSESSMENT — ENCOUNTER SYMPTOMS
LAST BOWEL MOVEMENT: 67014
CONSTIPATION: 1
UNABLE TO COMMUNICATE PAIN: 1
NAUSEA: UNABLE TO ASSESS
PERSON REPORTING PAIN: DIRECT OBSERVATION
DIFFICULTY THINKING: 1
DIARRHEA: 1
STOOL FREQUENCY: LESS THAN DAILY
EYE WATERING: 1
DENIES PAIN: 1
BOWEL INCONTINENCE: 1

## 2024-06-27 ASSESSMENT — PAIN SCALES - PAIN ASSESSMENT IN ADVANCED DEMENTIA (PAINAD)
FACIALEXPRESSION: 0 - SMILING OR INEXPRESSIVE.
TOTALSCORE: 0
CONSOLABILITY: 0 - NO NEED TO CONSOLE.
BODYLANGUAGE: 0 - RELAXED.
NEGVOCALIZATION: 0 - NONE.

## 2024-06-27 ASSESSMENT — ACTIVITIES OF DAILY LIVING (ADL)
AMBULATION ASSISTANCE: TWO PERSON
CURRENT_FUNCTION: TWO PERSON

## 2024-06-28 ENCOUNTER — HOME CARE VISIT (OUTPATIENT)
Dept: HOME HEALTH SERVICES | Facility: HOME HEALTHCARE | Age: 74
End: 2024-06-28
Payer: MEDICARE

## 2024-06-28 ENCOUNTER — HOME CARE VISIT (OUTPATIENT)
Dept: HOSPICE | Facility: HOSPICE | Age: 74
End: 2024-06-28
Payer: MEDICARE

## 2024-06-28 ENCOUNTER — HOSPICE ADMISSION (OUTPATIENT)
Dept: HOSPICE | Facility: HOSPICE | Age: 74
End: 2024-06-28
Payer: MEDICARE

## 2024-06-28 ENCOUNTER — TELEPHONE (OUTPATIENT)
Dept: HOSPICE | Facility: HOSPICE | Age: 74
End: 2024-06-28
Payer: MEDICARE

## 2024-06-28 DIAGNOSIS — R56.9 SEIZURE (HCC): ICD-10-CM

## 2024-06-28 DIAGNOSIS — K21.9 GASTROESOPHAGEAL REFLUX DISEASE WITHOUT ESOPHAGITIS: ICD-10-CM

## 2024-06-28 DIAGNOSIS — C79.31 MALIGNANT NEOPLASM METASTATIC TO BRAIN (HCC): ICD-10-CM

## 2024-06-28 DIAGNOSIS — Z71.89 ADVANCE CARE PLANNING: ICD-10-CM

## 2024-06-28 PROCEDURE — 665998 HH PPS REVENUE CREDIT

## 2024-06-28 PROCEDURE — 665999 HH PPS REVENUE DEBIT

## 2024-06-28 RX ORDER — LANSOPRAZOLE 30 MG/1
30 TABLET, ORALLY DISINTEGRATING, DELAYED RELEASE ORAL DAILY
Qty: 30 TABLET | Refills: 0 | Status: ON HOLD | OUTPATIENT
Start: 2024-06-28

## 2024-06-28 RX ORDER — LORAZEPAM 2 MG/ML
1-2 CONCENTRATE ORAL
Qty: 30 ML | Refills: 0 | Status: ON HOLD | OUTPATIENT
Start: 2024-06-28 | End: 2024-09-26

## 2024-06-28 ASSESSMENT — ACTIVITIES OF DAILY LIVING (ADL)
CONTINENCE_REQUIRES_ASSISTANCE: 1
EATING_REQUIRES_ASSISTANCE: 1
PHYSICAL_TRANSFER_REQUIRES_ASSISTANCE: 1
AMBULATION_REQUIRES_ASSISTANCE: 1
DRESSING_REQUIRES_ASSISTANCE: 1
BATHING_REQUIRES_ASSISTANCE: 1

## 2024-06-28 NOTE — PROGRESS NOTES
Spoke to Carolyn at length.  Reviewed patient condition, medication uses, when to call 911, and reviewed hospice.

## 2024-06-28 NOTE — TELEPHONE ENCOUNTER
Called daughter Carolyn [phone]. No answer. Left message on voicemail to return call to [facility] [phone] to review hospice benefit.

## 2024-06-29 PROCEDURE — 665998 HH PPS REVENUE CREDIT

## 2024-06-29 PROCEDURE — 665999 HH PPS REVENUE DEBIT

## 2024-06-30 ENCOUNTER — HOME CARE VISIT (OUTPATIENT)
Dept: HOME HEALTH SERVICES | Facility: HOME HEALTHCARE | Age: 74
End: 2024-06-30
Payer: MEDICARE

## 2024-06-30 ENCOUNTER — HOSPITAL ENCOUNTER (INPATIENT)
Facility: MEDICAL CENTER | Age: 74
DRG: 640 | End: 2024-06-30
Attending: EMERGENCY MEDICINE | Admitting: STUDENT IN AN ORGANIZED HEALTH CARE EDUCATION/TRAINING PROGRAM
Payer: MEDICARE

## 2024-06-30 ENCOUNTER — APPOINTMENT (OUTPATIENT)
Dept: RADIOLOGY | Facility: MEDICAL CENTER | Age: 74
DRG: 640 | End: 2024-06-30
Attending: EMERGENCY MEDICINE
Payer: MEDICARE

## 2024-06-30 VITALS
TEMPERATURE: 97 F | BODY MASS INDEX: 29.62 KG/M2 | RESPIRATION RATE: 16 BRPM | WEIGHT: 200 LBS | SYSTOLIC BLOOD PRESSURE: 153 MMHG | DIASTOLIC BLOOD PRESSURE: 85 MMHG | OXYGEN SATURATION: 100 % | HEIGHT: 69 IN | HEART RATE: 82 BPM

## 2024-06-30 DIAGNOSIS — E86.0 DEHYDRATION: ICD-10-CM

## 2024-06-30 DIAGNOSIS — C79.31 MALIGNANT NEOPLASM METASTATIC TO BRAIN (HCC): ICD-10-CM

## 2024-06-30 DIAGNOSIS — E87.0 HYPERNATREMIA: ICD-10-CM

## 2024-06-30 DIAGNOSIS — C54.1 ENDOMETRIAL CANCER (HCC): ICD-10-CM

## 2024-06-30 DIAGNOSIS — Z93.1 GASTROSTOMY TUBE PRESENT (HCC): ICD-10-CM

## 2024-06-30 DIAGNOSIS — G93.5 BRAIN COMPRESSION (HCC): ICD-10-CM

## 2024-06-30 DIAGNOSIS — Z86.73 HISTORY OF CVA (CEREBROVASCULAR ACCIDENT): ICD-10-CM

## 2024-06-30 DIAGNOSIS — G93.89 BRAIN MASS: ICD-10-CM

## 2024-06-30 LAB
ALBUMIN SERPL BCP-MCNC: 3.1 G/DL (ref 3.2–4.9)
ALBUMIN/GLOB SERPL: 1 G/DL
ALP SERPL-CCNC: 182 U/L (ref 30–99)
ALT SERPL-CCNC: 9 U/L (ref 2–50)
ANION GAP SERPL CALC-SCNC: 15 MMOL/L (ref 7–16)
ANISOCYTOSIS BLD QL SMEAR: ABNORMAL
AST SERPL-CCNC: 16 U/L (ref 12–45)
BASOPHILS # BLD AUTO: 0.5 % (ref 0–1.8)
BASOPHILS # BLD: 0.03 K/UL (ref 0–0.12)
BILIRUB SERPL-MCNC: 0.5 MG/DL (ref 0.1–1.5)
BUN SERPL-MCNC: 7 MG/DL (ref 8–22)
CALCIUM ALBUM COR SERPL-MCNC: 10.9 MG/DL (ref 8.5–10.5)
CALCIUM SERPL-MCNC: 10.2 MG/DL (ref 8.5–10.5)
CHLORIDE SERPL-SCNC: 105 MMOL/L (ref 96–112)
CO2 SERPL-SCNC: 34 MMOL/L (ref 20–33)
COMMENT 1642: NORMAL
CREAT SERPL-MCNC: 0.31 MG/DL (ref 0.5–1.4)
EOSINOPHIL # BLD AUTO: 0 K/UL (ref 0–0.51)
EOSINOPHIL NFR BLD: 0 % (ref 0–6.9)
ERYTHROCYTE [DISTWIDTH] IN BLOOD BY AUTOMATED COUNT: 65.8 FL (ref 35.9–50)
GFR SERPLBLD CREATININE-BSD FMLA CKD-EPI: 112 ML/MIN/1.73 M 2
GLOBULIN SER CALC-MCNC: 3.2 G/DL (ref 1.9–3.5)
GLUCOSE SERPL-MCNC: 94 MG/DL (ref 65–99)
HCT VFR BLD AUTO: 33.8 % (ref 37–47)
HGB BLD-MCNC: 10.5 G/DL (ref 12–16)
IMM GRANULOCYTES # BLD AUTO: 0.06 K/UL (ref 0–0.11)
IMM GRANULOCYTES NFR BLD AUTO: 1 % (ref 0–0.9)
LG PLATELETS BLD QL SMEAR: NORMAL
LYMPHOCYTES # BLD AUTO: 1 K/UL (ref 1–4.8)
LYMPHOCYTES NFR BLD: 16.3 % (ref 22–41)
MACROCYTES BLD QL SMEAR: ABNORMAL
MCH RBC QN AUTO: 34.2 PG (ref 27–33)
MCHC RBC AUTO-ENTMCNC: 31.1 G/DL (ref 32.2–35.5)
MCV RBC AUTO: 110.1 FL (ref 81.4–97.8)
MONOCYTES # BLD AUTO: 0.6 K/UL (ref 0–0.85)
MONOCYTES NFR BLD AUTO: 9.8 % (ref 0–13.4)
MORPHOLOGY BLD-IMP: NORMAL
NEUTROPHILS # BLD AUTO: 4.45 K/UL (ref 1.82–7.42)
NEUTROPHILS NFR BLD: 72.4 % (ref 44–72)
NRBC # BLD AUTO: 0 K/UL
NRBC BLD-RTO: 0 /100 WBC (ref 0–0.2)
PLATELET # BLD AUTO: 224 K/UL (ref 164–446)
PLATELET BLD QL SMEAR: NORMAL
PMV BLD AUTO: 10.6 FL (ref 9–12.9)
POTASSIUM SERPL-SCNC: 3.2 MMOL/L (ref 3.6–5.5)
PROT SERPL-MCNC: 6.3 G/DL (ref 6–8.2)
RBC # BLD AUTO: 3.07 M/UL (ref 4.2–5.4)
RBC BLD AUTO: PRESENT
SODIUM SERPL-SCNC: 154 MMOL/L (ref 135–145)
WBC # BLD AUTO: 6.1 K/UL (ref 4.8–10.8)

## 2024-06-30 PROCEDURE — 700111 HCHG RX REV CODE 636 W/ 250 OVERRIDE (IP): Mod: JZ | Performed by: STUDENT IN AN ORGANIZED HEALTH CARE EDUCATION/TRAINING PROGRAM

## 2024-06-30 PROCEDURE — 700102 HCHG RX REV CODE 250 W/ 637 OVERRIDE(OP): Performed by: STUDENT IN AN ORGANIZED HEALTH CARE EDUCATION/TRAINING PROGRAM

## 2024-06-30 PROCEDURE — 700105 HCHG RX REV CODE 258: Performed by: EMERGENCY MEDICINE

## 2024-06-30 PROCEDURE — 99223 1ST HOSP IP/OBS HIGH 75: CPT | Performed by: STUDENT IN AN ORGANIZED HEALTH CARE EDUCATION/TRAINING PROGRAM

## 2024-06-30 PROCEDURE — 96372 THER/PROPH/DIAG INJ SC/IM: CPT

## 2024-06-30 PROCEDURE — 770020 HCHG ROOM/CARE - TELE (206)

## 2024-06-30 PROCEDURE — 700117 HCHG RX CONTRAST REV CODE 255: Performed by: EMERGENCY MEDICINE

## 2024-06-30 PROCEDURE — 700105 HCHG RX REV CODE 258: Performed by: STUDENT IN AN ORGANIZED HEALTH CARE EDUCATION/TRAINING PROGRAM

## 2024-06-30 PROCEDURE — 96365 THER/PROPH/DIAG IV INF INIT: CPT

## 2024-06-30 PROCEDURE — A9270 NON-COVERED ITEM OR SERVICE: HCPCS | Performed by: STUDENT IN AN ORGANIZED HEALTH CARE EDUCATION/TRAINING PROGRAM

## 2024-06-30 PROCEDURE — 85025 COMPLETE CBC W/AUTO DIFF WBC: CPT

## 2024-06-30 PROCEDURE — 80053 COMPREHEN METABOLIC PANEL: CPT

## 2024-06-30 PROCEDURE — 71045 X-RAY EXAM CHEST 1 VIEW: CPT

## 2024-06-30 PROCEDURE — 49465 FLUORO EXAM OF G/COLON TUBE: CPT

## 2024-06-30 PROCEDURE — 99285 EMERGENCY DEPT VISIT HI MDM: CPT

## 2024-06-30 PROCEDURE — 99497 ADVNCD CARE PLAN 30 MIN: CPT | Mod: 25 | Performed by: STUDENT IN AN ORGANIZED HEALTH CARE EDUCATION/TRAINING PROGRAM

## 2024-06-30 PROCEDURE — 96366 THER/PROPH/DIAG IV INF ADDON: CPT

## 2024-06-30 PROCEDURE — 36415 COLL VENOUS BLD VENIPUNCTURE: CPT

## 2024-06-30 RX ORDER — ATORVASTATIN CALCIUM 20 MG/1
20 TABLET, FILM COATED ORAL EVERY EVENING
Status: DISCONTINUED | OUTPATIENT
Start: 2024-06-30 | End: 2024-07-08 | Stop reason: HOSPADM

## 2024-06-30 RX ORDER — NITROFURANTOIN 25; 75 MG/1; MG/1
100 CAPSULE ORAL 2 TIMES DAILY
Status: ON HOLD | COMMUNITY
Start: 2024-06-21 | End: 2024-07-06

## 2024-06-30 RX ORDER — ASPIRIN 81 MG/1
81 TABLET, CHEWABLE ORAL DAILY
Status: DISCONTINUED | OUTPATIENT
Start: 2024-07-01 | End: 2024-07-08 | Stop reason: HOSPADM

## 2024-06-30 RX ORDER — HYDROCODONE BITARTRATE AND ACETAMINOPHEN 5; 325 MG/1; MG/1
1 TABLET ORAL EVERY 6 HOURS PRN
Status: ON HOLD | COMMUNITY
End: 2024-07-06

## 2024-06-30 RX ORDER — SULFAMETHOXAZOLE AND TRIMETHOPRIM 400; 80 MG/1; MG/1
1 TABLET ORAL
Status: ON HOLD | COMMUNITY
Start: 2024-05-13 | End: 2024-07-06

## 2024-06-30 RX ORDER — FLUCONAZOLE 100 MG/1
100 TABLET ORAL DAILY
Status: ON HOLD | COMMUNITY
Start: 2024-05-28 | End: 2024-07-06

## 2024-06-30 RX ORDER — LOSARTAN POTASSIUM 50 MG/1
50 TABLET ORAL DAILY
Status: DISCONTINUED | OUTPATIENT
Start: 2024-06-30 | End: 2024-07-08 | Stop reason: HOSPADM

## 2024-06-30 RX ORDER — MODAFINIL 100 MG/1
100 TABLET ORAL DAILY
Status: ON HOLD | COMMUNITY
End: 2024-07-06

## 2024-06-30 RX ORDER — LANSOPRAZOLE 30 MG/1
30 TABLET, ORALLY DISINTEGRATING, DELAYED RELEASE ORAL DAILY
Status: DISCONTINUED | OUTPATIENT
Start: 2024-06-30 | End: 2024-07-08 | Stop reason: HOSPADM

## 2024-06-30 RX ORDER — ENOXAPARIN SODIUM 100 MG/ML
40 INJECTION SUBCUTANEOUS DAILY
Status: DISCONTINUED | OUTPATIENT
Start: 2024-06-30 | End: 2024-07-08 | Stop reason: HOSPADM

## 2024-06-30 RX ORDER — DEXTROSE MONOHYDRATE AND SODIUM CHLORIDE 5; .45 G/100ML; G/100ML
INJECTION, SOLUTION INTRAVENOUS ONCE
Status: COMPLETED | OUTPATIENT
Start: 2024-06-30 | End: 2024-06-30

## 2024-06-30 RX ORDER — SODIUM CHLORIDE, SODIUM LACTATE, POTASSIUM CHLORIDE, CALCIUM CHLORIDE 600; 310; 30; 20 MG/100ML; MG/100ML; MG/100ML; MG/100ML
INJECTION, SOLUTION INTRAVENOUS CONTINUOUS
Status: ACTIVE | OUTPATIENT
Start: 2024-06-30 | End: 2024-07-01

## 2024-06-30 RX ORDER — POTASSIUM CHLORIDE 7.45 MG/ML
10 INJECTION INTRAVENOUS
Status: COMPLETED | OUTPATIENT
Start: 2024-06-30 | End: 2024-06-30

## 2024-06-30 RX ORDER — METOPROLOL TARTRATE 50 MG/1
50 TABLET, FILM COATED ORAL 2 TIMES DAILY
Status: DISCONTINUED | OUTPATIENT
Start: 2024-06-30 | End: 2024-07-02

## 2024-06-30 RX ORDER — LORAZEPAM 2 MG/ML
1 CONCENTRATE ORAL
Status: DISCONTINUED | OUTPATIENT
Start: 2024-06-30 | End: 2024-07-08 | Stop reason: HOSPADM

## 2024-06-30 RX ADMIN — DEXTROSE AND SODIUM CHLORIDE: 5; 450 INJECTION, SOLUTION INTRAVENOUS at 14:11

## 2024-06-30 RX ADMIN — LOSARTAN POTASSIUM 50 MG: 50 TABLET, FILM COATED ORAL at 15:58

## 2024-06-30 RX ADMIN — LANSOPRAZOLE 30 MG: 30 TABLET, ORALLY DISINTEGRATING ORAL at 18:09

## 2024-06-30 RX ADMIN — METOPROLOL TARTRATE 50 MG: 50 TABLET, FILM COATED ORAL at 17:38

## 2024-06-30 RX ADMIN — POTASSIUM CHLORIDE 10 MEQ: 10 INJECTION, SOLUTION INTRAVENOUS at 17:02

## 2024-06-30 RX ADMIN — ENOXAPARIN SODIUM 40 MG: 100 INJECTION SUBCUTANEOUS at 17:37

## 2024-06-30 RX ADMIN — POTASSIUM CHLORIDE 10 MEQ: 10 INJECTION, SOLUTION INTRAVENOUS at 16:01

## 2024-06-30 RX ADMIN — IOHEXOL 30 ML: 350 INJECTION, SOLUTION INTRAVENOUS at 14:15

## 2024-06-30 RX ADMIN — POTASSIUM CHLORIDE 10 MEQ: 10 INJECTION, SOLUTION INTRAVENOUS at 18:59

## 2024-06-30 RX ADMIN — POTASSIUM CHLORIDE 10 MEQ: 10 INJECTION, SOLUTION INTRAVENOUS at 17:59

## 2024-06-30 RX ADMIN — MINERAL OIL, AND WHITE PETROLATUM 1 APPLICATION: 425; 573 OINTMENT OPHTHALMIC at 16:03

## 2024-06-30 RX ADMIN — SODIUM CHLORIDE, POTASSIUM CHLORIDE, SODIUM LACTATE AND CALCIUM CHLORIDE: 600; 310; 30; 20 INJECTION, SOLUTION INTRAVENOUS at 18:09

## 2024-06-30 RX ADMIN — ATORVASTATIN CALCIUM 20 MG: 20 TABLET, FILM COATED ORAL at 17:43

## 2024-06-30 SDOH — ECONOMIC STABILITY: TRANSPORTATION INSECURITY
IN THE PAST 12 MONTHS, HAS THE LACK OF TRANSPORTATION KEPT YOU FROM MEDICAL APPOINTMENTS OR FROM GETTING MEDICATIONS?: NO

## 2024-06-30 SDOH — ECONOMIC STABILITY: TRANSPORTATION INSECURITY
IN THE PAST 12 MONTHS, HAS LACK OF RELIABLE TRANSPORTATION KEPT YOU FROM MEDICAL APPOINTMENTS, MEETINGS, WORK OR FROM GETTING THINGS NEEDED FOR DAILY LIVING?: NO

## 2024-06-30 ASSESSMENT — COGNITIVE AND FUNCTIONAL STATUS - GENERAL
PERSONAL GROOMING: TOTAL
TOILETING: TOTAL
MOBILITY SCORE: 6
TURNING FROM BACK TO SIDE WHILE IN FLAT BAD: TOTAL
DRESSING REGULAR UPPER BODY CLOTHING: TOTAL
SUGGESTED CMS G CODE MODIFIER MOBILITY: CN
STANDING UP FROM CHAIR USING ARMS: TOTAL
EATING MEALS: TOTAL
WALKING IN HOSPITAL ROOM: TOTAL
MOVING FROM LYING ON BACK TO SITTING ON SIDE OF FLAT BED: TOTAL
MOVING TO AND FROM BED TO CHAIR: TOTAL
CLIMB 3 TO 5 STEPS WITH RAILING: TOTAL
HELP NEEDED FOR BATHING: TOTAL
DRESSING REGULAR LOWER BODY CLOTHING: TOTAL
DAILY ACTIVITIY SCORE: 6
SUGGESTED CMS G CODE MODIFIER DAILY ACTIVITY: CN

## 2024-06-30 ASSESSMENT — LIFESTYLE VARIABLES
EVER HAD A DRINK FIRST THING IN THE MORNING TO STEADY YOUR NERVES TO GET RID OF A HANGOVER: NO
HAVE YOU EVER FELT YOU SHOULD CUT DOWN ON YOUR DRINKING: NO
HOW MANY TIMES IN THE PAST YEAR HAVE YOU HAD 5 OR MORE DRINKS IN A DAY: 0
TOTAL SCORE: 0
CONSUMPTION TOTAL: NEGATIVE
EVER FELT BAD OR GUILTY ABOUT YOUR DRINKING: NO
DOES PATIENT WANT TO STOP DRINKING: NO
ALCOHOL_USE: NO
TOTAL SCORE: 0
ON A TYPICAL DAY WHEN YOU DRINK ALCOHOL HOW MANY DRINKS DO YOU HAVE: 0
HAVE PEOPLE ANNOYED YOU BY CRITICIZING YOUR DRINKING: NO
AVERAGE NUMBER OF DAYS PER WEEK YOU HAVE A DRINK CONTAINING ALCOHOL: 0
TOTAL SCORE: 0

## 2024-06-30 ASSESSMENT — SOCIAL DETERMINANTS OF HEALTH (SDOH)
WITHIN THE LAST YEAR, HAVE YOU BEEN HUMILIATED OR EMOTIONALLY ABUSED IN OTHER WAYS BY YOUR PARTNER OR EX-PARTNER?: NO
IN THE PAST 12 MONTHS, HAS THE ELECTRIC, GAS, OIL, OR WATER COMPANY THREATENED TO SHUT OFF SERVICE IN YOUR HOME?: NO
WITHIN THE LAST YEAR, HAVE TO BEEN RAPED OR FORCED TO HAVE ANY KIND OF SEXUAL ACTIVITY BY YOUR PARTNER OR EX-PARTNER?: NO
WITHIN THE LAST YEAR, HAVE TO BEEN RAPED OR FORCED TO HAVE ANY KIND OF SEXUAL ACTIVITY BY YOUR PARTNER OR EX-PARTNER?: NO
WITHIN THE PAST 12 MONTHS, YOU WORRIED THAT YOUR FOOD WOULD RUN OUT BEFORE YOU GOT THE MONEY TO BUY MORE: NEVER TRUE
WITHIN THE LAST YEAR, HAVE YOU BEEN HUMILIATED OR EMOTIONALLY ABUSED IN OTHER WAYS BY YOUR PARTNER OR EX-PARTNER?: NO
WITHIN THE LAST YEAR, HAVE YOU BEEN KICKED, HIT, SLAPPED, OR OTHERWISE PHYSICALLY HURT BY YOUR PARTNER OR EX-PARTNER?: NO
WITHIN THE LAST YEAR, HAVE YOU BEEN AFRAID OF YOUR PARTNER OR EX-PARTNER?: NO
WITHIN THE LAST YEAR, HAVE YOU BEEN KICKED, HIT, SLAPPED, OR OTHERWISE PHYSICALLY HURT BY YOUR PARTNER OR EX-PARTNER?: NO
WITHIN THE LAST YEAR, HAVE YOU BEEN AFRAID OF YOUR PARTNER OR EX-PARTNER?: NO
WITHIN THE PAST 12 MONTHS, THE FOOD YOU BOUGHT JUST DIDN'T LAST AND YOU DIDN'T HAVE MONEY TO GET MORE: NEVER TRUE

## 2024-06-30 ASSESSMENT — FIBROSIS 4 INDEX: FIB4 SCORE: 2.18

## 2024-06-30 NOTE — ED NOTES
Med Rec complete per pt's daughter at bedside with med list   Allergies reviewed  Antibiotics in the past 30 days:yes  Anticoagulant in past 14 days:no  Pharmacy patient utilizes:[facility] [address]    Daughter states pt has 2 days remaining on Macrobid 7 day course    Daughter states she attempted to give some medications this morning but then it came out via enteral tube    Daughter states pt completed Dexamethasone 1 mg taper dose that was dispensed on 05/13/24    Daughter states she hasn't had to give Insulin nor Losartan to patient since pt has been discharged on 05/13/2024 from [facility]

## 2024-06-30 NOTE — H&P
[facility] Medicine History & Physical Note    Date of Service  6/30/2024    Primary Care Physician  ZULEYKA Lyons    Code Status  Full Code    Chief Complaint  Chief Complaint   Patient presents with    Feeding Tube Problem     X1wk per daughter when she gives feeding liquid comes back up to mouth , pt started coughing       History of Presenting Illness  Jairo Rivas is a 73 y.o. female who presented 6/30/2024 with difficulty with feeding tube.  Patient has history of endometrial carcinoma, brain metastasis and CVA, frontal brain metastases and vasogenic edema, right-sided hemiparesis, aphasia, lives at home with family and home health but is nonverbal at baseline.  She was brought in by family due to concern for aspiration.  She receives all of her nutrition and pills etc. through her G-tube and it sounded like she was choking and gurgling on what she was receiving.  In the ED she was found to be hypernatremic with sodium 154, hypokalemic with potassium 3.2, x-ray showed appropriate position of G-tube.    I discussed the plan of care with patient and family.    Review of Systems  Review of Systems   Unable to perform ROS: Acuity of condition       Past Medical History   has a past medical history of Anemia (2/6/2018), Back pain, Back pain, Cancer (HCC) (2019), Cough, CVA, old, hemiparesis (HCC) (2/6/2018), Essential hypertension (2/6/2018), Eye drainage, Fatigue, Frequent headaches, Frequent urination, Hyperlipidemia, Irregular periods, Painful joint, Palpitations, Post-menopause bleeding (2/6/2018), Pulmonary nodule, Sore muscles, Stroke (HCC), Swelling of lower extremity, Vision abnormalities (2/6/2018), Vitamin D deficiency (2/6/2018), Weakness, and Wears glasses.    Surgical History   has a past surgical history that includes pr hysteroscopy,dx,sep proc (6/19/2019); dilation and curettage (6/19/2019); pr cystoscopy,insert ureteral stent (Left, 8/8/2019); hysterectomy robotic xi  (N/A, 8/8/2019); salpingo oophorectomy (Bilateral, 8/8/2019); node biopsy sentinel (8/8/2019); hysterectomy laparoscopy; and pr thoracoscopy,dx no bx (Left, 8/6/2021).     Family History  family history includes Hypertension in her mother; No Known Problems in her brother and father.   Family history reviewed with patient. There is no family history that is pertinent to the chief complaint.     Social History   reports that she has never smoked. She has never used smokeless tobacco. She reports that she does not drink alcohol and does not use drugs.    Allergies  Allergies   Allergen Reactions    Levaquin Itching and Swelling     Swelling of the tongue and face    Penicillins Itching and Swelling    Tramadol Itching and Swelling       Medications  Prior to Admission Medications   Prescriptions Last Dose Informant Patient Reported? Taking?   Alcohol Swabs SUPPLY at SUPPLY Family Member No No   Sig: Wipe site with prep pad prior to injection.   Blood Glucose Monitoring Suppl (BLOOD GLUCOSE MONITOR SYSTEM) w/Device Kit SUPPLY at SUPPLY Family Member No No   Sig: Test blood sugar as recommended by provider. One Touch Verio Flex blood glucose monitoring kit.   HYDROcodone-acetaminophen (NORCO) 5-325 MG Tab per tablet 6/28/2024 at PRN Family Member Yes Yes   Sig: Take 1 Tablet by mouth every 6 hours as needed (pain).   Home Care Oxygen CONTINUOUS at CONTINUOUS Family Member Yes No   Sig: Inhale 1-2 L/min continuous. Oxygen dose range: 1 to 2 L/min  Respiratory route via: Nasal Cannula   Oxygen supplier: Preferred      Indications: SOB   Insulin Pen Needle 32 G x 4 mm SUPPLY at SUPPLY Family Member No No   Sig: Use one pen needle in pen device to inject insulin three times daily.   LORazepam (ATIVAN) 2 MG/ML Conc NEW RX at NOT STARTED Family Member No No   Sig: Take 0.5-1 mL by mouth every 2 hours as needed (for agitation or seizure like activity) for up to 90 days. This is a patient of [facility]  and has a terminal condition.  Indications: agitation/ seizure like activity   Lacosamide 150 MG Tab 2024 at PM Family Member No Yes   Si tablet (150 mg) by Enteral Tube route 2 times a day for 30 days.   Lancets (ONETOUCH DELICA PLUS [id number]) Misc SUPPLY at SUPPLY Family Member No No   Sig: Use one lancet to test blood sugar three times daily before meals.   artificial tears (EYE LUBRICANT) Ointment ophth ointment NEW RX at NOT STARTED Family Member No No   Sig: Apply 1 Application to both eyes every 8 hours for 60 days. Indications: Dry eye   aspirin (ASA) 81 MG Chew Tab chewable tablet 2024 at AM Family Member No Yes   Si Tablet by Enteral Tube route every day.   atorvastatin (LIPITOR) 20 MG Tab 2024 at PM Family Member No Yes   Si Tablet by Enteral Tube route every evening. Indications: Acute Heart Attack   bisacodyl (DULCOLAX) 10 MG Suppos 2024 at PRN Family Member Yes No   Sig: Insert 10 mg into the rectum as needed (constipation). Indications: Constipation   fluconazole (DIFLUCAN) 100 MG Tab 2024 at COMPLETE Family Member Yes Yes   Sig: Take 100 mg by mouth every day. X days   glucose blood strip SUPPLY at SUPPLY Family Member No No   Sig: Use one One Touch Verio Flex strip to test blood sugar three times daily before meals.   insulin lispro 100 UNIT/ML SC SOPN injection PEN >2 WEEKS AGO at PRN Family Member No No   Sig: Inject 1-6 Units under the skin 3 times a day before meals. 70 - 150 mg/dL = 0 Units  151 - 200 mg/dL = 1 Units  201 - 250 mg/dL = 2 Units  251 - 300 mg/dL = 3 Units  301 - 350 mg/dL = 4 Units  351 - 400 mg/dL = 5 Units  Over 400 mg/dL = 6 Units   lansoprazole (PREVACID) 30 MG Tablet Delayed Release Dispersible 1 WEEK AGO at OUT Family Member No No   Si Tablet by Enteral Tube route every day. Indications: Gastroesophageal Reflux Disease   losartan (COZAAR) 50 MG Tab > 2 WEEKS AGO at PRN Family Member No No   Si Tablet by Enteral Tube route every  day. Indications: High Blood Pressure Disorder   metoprolol tartrate (LOPRESSOR) 50 MG Tab 2024 at PM Family Member No Yes   Si Tablet by Enteral Tube route 2 times a day. Indications: High Blood Pressure Disorder   modafinil (PROVIGIL) 100 MG Tab 2024 at AM Family Member Yes Yes   Sig: Take 100 mg by mouth every day.   nitrofurantoin (MACROBID) 100 MG Cap 2024 at PM Family Member Yes Yes   Sig: Take 100 mg by mouth 2 times a day. X 7 days   phosphorus (K-PHOS-NEUTRAL) 250 MG tablet 2024 at PM Family Member Yes Yes   Sig: Take 1 Tablet by mouth 2 times a day.   senna-docusate (PERICOLACE OR SENOKOT S) 8.6-50 MG Tab 2024 at PM Family Member No Yes   Si Tablets by Enteral Tube route every evening.   sulfamethoxazole-trimethoprim (BACTRIM) 400-80 MG Tab 2024 at COMPLETE Family Member Yes Yes   Sig: Take 1 Tablet by mouth every day. X 30 days      Facility-Administered Medications: None       Physical Exam  Temp:  [36 °C (96.8 °F)] 36 °C (96.8 °F)  Pulse:  [79-80] 80  Resp:  [20] 20  BP: (138-139)/(79-82) 138/82  SpO2:  [96 %-99 %] 99 %  Blood Pressure : 138/82   Temperature: 36 °C (96.8 °F)   Pulse: 80   Respiration: 20   Pulse Oximetry: 99 %       Physical Exam  Constitutional:       General: She is not in acute distress.     Appearance: She is not toxic-appearing.   HENT:      Head: Normocephalic and atraumatic.      Nose: Nose normal.      Mouth/Throat:      Mouth: Mucous membranes are dry.      Pharynx: Oropharynx is clear.   Eyes:      Extraocular Movements: Extraocular movements intact.      Conjunctiva/sclera: Conjunctivae normal.   Cardiovascular:      Rate and Rhythm: Normal rate and regular rhythm.      Pulses: Normal pulses.      Heart sounds: Normal heart sounds.   Pulmonary:      Effort: Pulmonary effort is normal.      Breath sounds: Normal breath sounds.   Abdominal:      General: There is no distension.      Palpations: Abdomen is soft.      Tenderness: There is no  "abdominal tenderness.   Musculoskeletal:         General: No swelling or deformity.      Cervical back: Neck supple. No rigidity.   Skin:     General: Skin is warm and dry.   Neurological:      Mental Status: Mental status is at baseline.         Laboratory:  Recent Labs     06/30/24  1305   WBC 6.1   RBC 3.07*   HEMOGLOBIN 10.5*   HEMATOCRIT 33.8*   .1*   MCH 34.2*   MCHC 31.1*   RDW 65.8*   PLATELETCT 224   MPV 10.6     Recent Labs     06/30/24  1305   SODIUM 154*   POTASSIUM 3.2*   CHLORIDE 105   CO2 34*   GLUCOSE 94   BUN 7*   CREATININE 0.31*   CALCIUM 10.2     Recent Labs     06/30/24  1305   ALTSGPT 9   ASTSGOT 16   ALKPHOSPHAT 182*   TBILIRUBIN 0.5   GLUCOSE 94         No results for input(s): \"NTPROBNP\" in the last 72 hours.      No results for input(s): \"TROPONINT\" in the last 72 hours.    Imaging:  DX-G.I. TUBE INJECTION, ANY TYPE   Final Result      Intraluminal position of gastrostomy catheter.      DX-ABDOMEN FOR TUBE PLACEMENT   Final Result      Gastrostomy tube projects over the expected position of the stomach assuming the gastrostomy is appropriately located      DX-CHEST-PORTABLE (1 VIEW)   Final Result      Mild volume loss and linear opacities in the left mid and lower lung which may be scarring versus mild infiltrates.          Assessment/Plan:  Justification for Admission Status  I anticipate this patient will require at least two midnights for appropriate medical management, necessitating inpatient admission because hyponatremia    Patient will need a Telemetry bed on MEDICAL service .  The need is secondary to above.    * Hypernatremia- (present on admission)  Assessment & Plan  Sodium 154  Given NS in ED  Will continue with LR to gradually lower sodium  Closely monitor  Caution not to overcorrect    GERD (gastroesophageal reflux disease)- (present on admission)  Assessment & Plan  G-tube in place  Continue PPI    Hypokalemia- (present on admission)  Assessment & Plan  Mild, 3.2, " replacement ordered    Essential hypertension- (present on admission)  Assessment & Plan  Continue losartan and metoprolol via G-tube        VTE prophylaxis: SCDs/TEDs and enoxaparin ppx

## 2024-06-30 NOTE — ED PROVIDER NOTES
ED PHYSICIAN NOTE    CHIEF COMPLAINT  Chief Complaint   Patient presents with    Feeding Tube Problem     X1wk per daughter when she gives feeding liquid comes back up to mouth , pt started coughing       EXTERNAL RECORDS REVIEWED  Inpatient Notes most recent hospitalization with encephalopathy.  She had constipation and urinary tract infection.    HPI/ROS    OUTSIDE HISTORIAN(S):  Daughter provides history as noted below    Jairo Rivas is a 73 y.o. female with a past history of endometrial carcinoma with brain metastasis status postradiation, CVA, zoster encephalitis and associated chronic debility presents for feeding tube evaluation.  Over the last 1 week it seems like the patient has higher than normal residuals and if the patient is fed normal volume that she regurgitates and cough.  She has not had anything yet today.  Daughter reports that the patient was constipated but over the last 2 days she had bowel movements one of them being very large.  Nonbloody stool.  No abnormal urination still making urine.  She has not complained of pain although she will only occasionally grunt and respond to questions.  He has not had a fever.  No difficulty breathing.  No coughing other than.    Patient has a PEG tube.  This was placed on 5/7/2024.     PAST MEDICAL HISTORY  Past Medical History:   Diagnosis Date    Anemia 2/6/2018    Iron def, post-menopausal bleeding.    Back pain     Back pain     Cancer (HCC) 2019    uterine & endometrial    Cough     CVA, old, hemiparesis (HCC) 2/6/2018    Residual left sided weakness    Essential hypertension 2/6/2018    Well controlled on amlodipine 10 mg and metoprolol 100 mg bid.    Eye drainage     Fatigue     Frequent headaches     Frequent urination     Hyperlipidemia     Irregular periods     Painful joint     Palpitations     Post-menopause bleeding 2/6/2018    Menopause in 2010 and bleeding 2012 started to have irregular continuous bleeding.     Pulmonary nodule  "    Sore muscles     Stroke (HCC)     right sided weakness    Swelling of lower extremity     Vision abnormalities 2/6/2018    Vitamin D deficiency 2/6/2018    Taking 50,000 IU once weekly for 4 yrs.    Weakness     Wears glasses        SOCIAL HISTORY  Social History     Tobacco Use    Smoking status: Never    Smokeless tobacco: Never    Tobacco comments:     n/a   Vaping Use    Vaping status: Never Used   Substance Use Topics    Alcohol use: No    Drug use: No       CURRENT MEDICATIONS  Home Medications    **Home medications have not yet been reviewed for this encounter**         ALLERGIES  Allergies   Allergen Reactions    Levaquin Itching and Swelling     Swelling of the tongue and face    Penicillins Itching and Swelling    Tramadol Itching and Swelling       PHYSICAL EXAM  VITAL SIGNS: /79   Pulse 79   Temp 36 °C (96.8 °F) (Temporal)   Resp 20   Ht 1.753 m (5' 9\")   Wt 90.7 kg (200 lb)   SpO2 96%   BMI 29.53 kg/m²    Constitutional: Sleeping she will open her eyes chronically ill-appearing  HENT: Normal inspection  Cardiovascular: Normal heart rate  Thorax & Lungs: Decreased breath sounds.  Abdomen: G-tube left abdomen.  Appears to be in place.  Flushes without difficulty.  Abdomen nontender.  Skin: No rash.  Extremities: Muscle contraction  Neurologic: Minimally responsive.    DIAGNOSTIC STUDIES / PROCEDURES  LABS/EKG  Results for orders placed or performed during the hospital encounter of 06/30/24   CBC WITH DIFFERENTIAL   Result Value Ref Range    WBC 6.1 4.8 - 10.8 K/uL    RBC 3.07 (L) 4.20 - 5.40 M/uL    Hemoglobin 10.5 (L) 12.0 - 16.0 g/dL    Hematocrit 33.8 (L) 37.0 - 47.0 %    .1 (H) 81.4 - 97.8 fL    MCH 34.2 (H) 27.0 - 33.0 pg    MCHC 31.1 (L) 32.2 - 35.5 g/dL    RDW 65.8 (H) 35.9 - 50.0 fL    Platelet Count 224 164 - 446 K/uL    MPV 10.6 9.0 - 12.9 fL    Neutrophils-Polys 72.40 (H) 44.00 - 72.00 %    Lymphocytes 16.30 (L) 22.00 - 41.00 %    Monocytes 9.80 0.00 - 13.40 %    " Eosinophils 0.00 0.00 - 6.90 %    Basophils 0.50 0.00 - 1.80 %    Immature Granulocytes 1.00 (H) 0.00 - 0.90 %    Nucleated RBC 0.00 0.00 - 0.20 /100 WBC    Neutrophils (Absolute) 4.45 1.82 - 7.42 K/uL    Lymphs (Absolute) 1.00 1.00 - 4.80 K/uL    Monos (Absolute) 0.60 0.00 - 0.85 K/uL    Eos (Absolute) 0.00 0.00 - 0.51 K/uL    Baso (Absolute) 0.03 0.00 - 0.12 K/uL    Immature Granulocytes (abs) 0.06 0.00 - 0.11 K/uL    NRBC (Absolute) 0.00 K/uL    Anisocytosis 1+     Macrocytosis 1+    COMP METABOLIC PANEL   Result Value Ref Range    Sodium 154 (H) 135 - 145 mmol/L    Potassium 3.2 (L) 3.6 - 5.5 mmol/L    Chloride 105 96 - 112 mmol/L    Co2 34 (H) 20 - 33 mmol/L    Anion Gap 15.0 7.0 - 16.0    Glucose 94 65 - 99 mg/dL    Bun 7 (L) 8 - 22 mg/dL    Creatinine 0.31 (L) 0.50 - 1.40 mg/dL    Calcium 10.2 8.5 - 10.5 mg/dL    Correct Calcium 10.9 (H) 8.5 - 10.5 mg/dL    AST(SGOT) 16 12 - 45 U/L    ALT(SGPT) 9 2 - 50 U/L    Alkaline Phosphatase 182 (H) 30 - 99 U/L    Total Bilirubin 0.5 0.1 - 1.5 mg/dL    Albumin 3.1 (L) 3.2 - 4.9 g/dL    Total Protein 6.3 6.0 - 8.2 g/dL    Globulin 3.2 1.9 - 3.5 g/dL    A-G Ratio 1.0 g/dL   ESTIMATED GFR   Result Value Ref Range    GFR (CKD-EPI) 112 >60 mL/min/1.73 m 2   PLATELET ESTIMATE   Result Value Ref Range    Plt Estimation Normal    MORPHOLOGY   Result Value Ref Range    RBC Morphology Present     Large Platelets 1+    PERIPHERAL SMEAR REVIEW   Result Value Ref Range    Peripheral Smear Review see below    DIFFERENTIAL COMMENT   Result Value Ref Range    Comments-Diff see below           RADIOLOGY  I have independently interpreted the diagnostic imaging associated with this visit and am waiting the final reading from the radiologist.   My preliminary interpretation is as follows: G-tube in proper position.  Contrast intraluminal.  Chest x-ray negative for focal infiltrate    Radiologist interpretation:   DX-CHEST-PORTABLE (1 VIEW)    (Results Pending)   DX-ABDOMEN FOR TUBE  PLACEMENT    (Results Pending)         COURSE & MEDICAL DECISION MAKING    INITIAL ASSESSMENT, COURSE AND PLAN  Care Narrative: Patient presents with question of feeding tube dysfunction.  She has had cough with feeds.  Will need to rule out aspiration order x-ray.  Will obtain x-rays with G-tube contrast injection.  Has not been receiving feeds.  Obtain laboratory data.    Laboratory data shows remarkable hypernatremia.  Ordered D5 half-normal saline infusion.  Imaging returned negative.    Patient has difficulty with adequate hydration via G-tube.  Will need to be admitted for correction of electrolyte disturbances.  Paged hospitalist        DISPOSITION AND DISCUSSIONS  I have discussed management of the patient with the following physicians and TRINH's:  as noted above        FINAL IMPRESSION  1.  Hypernatremia  2.  Hyponatremia  3.  Anemia  4.  G-tube dysfunction, unclear etiology    This dictation was created using voice recognition software. The accuracy of the dictation is limited to the abilities of the software. I expect there may be some errors of grammar and possibly content. The nursing notes were reviewed and certain aspects of this information were incorporated into this note.    Electronically signed by: Juanito Mathew M.D., 6/30/2024

## 2024-06-30 NOTE — ED NOTES
Chief Complaint   Patient presents with    Feeding Tube Problem     X1wk per daughter when she gives feeding liquid comes back up to mouth , pt started coughing     Pt bib ems from home daughter caretaker said that she has been having problem with tube feeding x1wk. She reports every time she gives feeding she noticed liquid would come out of patient's mouth and had to suction.   Pt has history of stroke uses 2L nc at baseline

## 2024-06-30 NOTE — ASSESSMENT & PLAN NOTE
Sodium 154  Given NS in ED  Will continue with LR to gradually lower sodium  Closely monitor  Caution not to overcorrect

## 2024-07-01 ENCOUNTER — HOME CARE VISIT (OUTPATIENT)
Dept: HOME HEALTH SERVICES | Facility: HOME HEALTHCARE | Age: 74
End: 2024-07-01
Payer: MEDICARE

## 2024-07-01 VITALS
DIASTOLIC BLOOD PRESSURE: 70 MMHG | TEMPERATURE: 97.8 F | SYSTOLIC BLOOD PRESSURE: 130 MMHG | RESPIRATION RATE: 18 BRPM | OXYGEN SATURATION: 96 % | HEART RATE: 72 BPM

## 2024-07-01 LAB
ALBUMIN SERPL BCP-MCNC: 2.9 G/DL (ref 3.2–4.9)
ALBUMIN/GLOB SERPL: 0.9 G/DL
ALP SERPL-CCNC: 171 U/L (ref 30–99)
ALT SERPL-CCNC: 7 U/L (ref 2–50)
ANION GAP SERPL CALC-SCNC: 12 MMOL/L (ref 7–16)
AST SERPL-CCNC: 14 U/L (ref 12–45)
BILIRUB SERPL-MCNC: 0.5 MG/DL (ref 0.1–1.5)
BUN SERPL-MCNC: 5 MG/DL (ref 8–22)
CALCIUM ALBUM COR SERPL-MCNC: 10.7 MG/DL (ref 8.5–10.5)
CALCIUM SERPL-MCNC: 9.8 MG/DL (ref 8.5–10.5)
CHLORIDE SERPL-SCNC: 107 MMOL/L (ref 96–112)
CO2 SERPL-SCNC: 33 MMOL/L (ref 20–33)
CREAT SERPL-MCNC: 0.17 MG/DL (ref 0.5–1.4)
GFR SERPLBLD CREATININE-BSD FMLA CKD-EPI: 129 ML/MIN/1.73 M 2
GLOBULIN SER CALC-MCNC: 3.1 G/DL (ref 1.9–3.5)
GLUCOSE SERPL-MCNC: 111 MG/DL (ref 65–99)
POTASSIUM SERPL-SCNC: 3.6 MMOL/L (ref 3.6–5.5)
PROT SERPL-MCNC: 6 G/DL (ref 6–8.2)
SODIUM SERPL-SCNC: 149 MMOL/L (ref 135–145)
SODIUM SERPL-SCNC: 152 MMOL/L (ref 135–145)
SODIUM SERPL-SCNC: 152 MMOL/L (ref 135–145)

## 2024-07-01 PROCEDURE — A9270 NON-COVERED ITEM OR SERVICE: HCPCS | Performed by: STUDENT IN AN ORGANIZED HEALTH CARE EDUCATION/TRAINING PROGRAM

## 2024-07-01 PROCEDURE — 36415 COLL VENOUS BLD VENIPUNCTURE: CPT

## 2024-07-01 PROCEDURE — G0378 HOSPITAL OBSERVATION PER HR: HCPCS

## 2024-07-01 PROCEDURE — 700105 HCHG RX REV CODE 258: Performed by: STUDENT IN AN ORGANIZED HEALTH CARE EDUCATION/TRAINING PROGRAM

## 2024-07-01 PROCEDURE — 99233 SBSQ HOSP IP/OBS HIGH 50: CPT | Performed by: INTERNAL MEDICINE

## 2024-07-01 PROCEDURE — 700111 HCHG RX REV CODE 636 W/ 250 OVERRIDE (IP): Mod: JZ | Performed by: STUDENT IN AN ORGANIZED HEALTH CARE EDUCATION/TRAINING PROGRAM

## 2024-07-01 PROCEDURE — 700105 HCHG RX REV CODE 258: Performed by: INTERNAL MEDICINE

## 2024-07-01 PROCEDURE — 96376 TX/PRO/DX INJ SAME DRUG ADON: CPT | Mod: XU

## 2024-07-01 PROCEDURE — 80053 COMPREHEN METABOLIC PANEL: CPT

## 2024-07-01 PROCEDURE — 96375 TX/PRO/DX INJ NEW DRUG ADDON: CPT | Mod: XU

## 2024-07-01 PROCEDURE — 96372 THER/PROPH/DIAG INJ SC/IM: CPT | Mod: XU

## 2024-07-01 PROCEDURE — 700102 HCHG RX REV CODE 250 W/ 637 OVERRIDE(OP): Performed by: STUDENT IN AN ORGANIZED HEALTH CARE EDUCATION/TRAINING PROGRAM

## 2024-07-01 PROCEDURE — 700111 HCHG RX REV CODE 636 W/ 250 OVERRIDE (IP): Mod: JZ | Performed by: INTERNAL MEDICINE

## 2024-07-01 PROCEDURE — 84295 ASSAY OF SERUM SODIUM: CPT

## 2024-07-01 RX ORDER — METOCLOPRAMIDE HYDROCHLORIDE 5 MG/ML
10 INJECTION INTRAMUSCULAR; INTRAVENOUS EVERY 6 HOURS
Status: DISCONTINUED | OUTPATIENT
Start: 2024-07-01 | End: 2024-07-06

## 2024-07-01 RX ORDER — SODIUM CHLORIDE 450 MG/100ML
INJECTION, SOLUTION INTRAVENOUS CONTINUOUS
Status: DISCONTINUED | OUTPATIENT
Start: 2024-07-01 | End: 2024-07-02

## 2024-07-01 RX ADMIN — METOPROLOL TARTRATE 50 MG: 50 TABLET, FILM COATED ORAL at 17:37

## 2024-07-01 RX ADMIN — METOCLOPRAMIDE 10 MG: 5 INJECTION, SOLUTION INTRAMUSCULAR; INTRAVENOUS at 17:36

## 2024-07-01 RX ADMIN — MINERAL OIL, AND WHITE PETROLATUM 1 APPLICATION: 425; 573 OINTMENT OPHTHALMIC at 00:12

## 2024-07-01 RX ADMIN — ASPIRIN 81 MG: 81 TABLET, CHEWABLE ORAL at 05:33

## 2024-07-01 RX ADMIN — METOCLOPRAMIDE 10 MG: 5 INJECTION, SOLUTION INTRAMUSCULAR; INTRAVENOUS at 23:38

## 2024-07-01 RX ADMIN — METOCLOPRAMIDE 10 MG: 5 INJECTION, SOLUTION INTRAMUSCULAR; INTRAVENOUS at 11:49

## 2024-07-01 RX ADMIN — LANSOPRAZOLE 30 MG: 30 TABLET, ORALLY DISINTEGRATING ORAL at 05:34

## 2024-07-01 RX ADMIN — FAMOTIDINE 20 MG: 10 INJECTION, SOLUTION INTRAVENOUS at 17:36

## 2024-07-01 RX ADMIN — METOPROLOL TARTRATE 50 MG: 50 TABLET, FILM COATED ORAL at 05:33

## 2024-07-01 RX ADMIN — ATORVASTATIN CALCIUM 20 MG: 20 TABLET, FILM COATED ORAL at 17:37

## 2024-07-01 RX ADMIN — MINERAL OIL, AND WHITE PETROLATUM 1 APPLICATION: 425; 573 OINTMENT OPHTHALMIC at 14:00

## 2024-07-01 RX ADMIN — SODIUM CHLORIDE: 4.5 INJECTION, SOLUTION INTRAVENOUS at 21:54

## 2024-07-01 RX ADMIN — ENOXAPARIN SODIUM 40 MG: 100 INJECTION SUBCUTANEOUS at 17:36

## 2024-07-01 RX ADMIN — LOSARTAN POTASSIUM 50 MG: 50 TABLET, FILM COATED ORAL at 05:33

## 2024-07-01 RX ADMIN — FAMOTIDINE 20 MG: 10 INJECTION, SOLUTION INTRAVENOUS at 11:49

## 2024-07-01 RX ADMIN — SODIUM CHLORIDE: 4.5 INJECTION, SOLUTION INTRAVENOUS at 11:55

## 2024-07-01 RX ADMIN — SODIUM CHLORIDE, POTASSIUM CHLORIDE, SODIUM LACTATE AND CALCIUM CHLORIDE: 600; 310; 30; 20 INJECTION, SOLUTION INTRAVENOUS at 01:47

## 2024-07-01 RX ADMIN — MINERAL OIL, AND WHITE PETROLATUM 1 APPLICATION: 425; 573 OINTMENT OPHTHALMIC at 23:38

## 2024-07-01 RX ADMIN — MINERAL OIL, AND WHITE PETROLATUM 1 APPLICATION: 425; 573 OINTMENT OPHTHALMIC at 05:34

## 2024-07-01 SDOH — ECONOMIC STABILITY: HOUSING INSECURITY
HOME SAFETY: 1L OXYGEN.  NEEDS NEW PRINTED MEDICATION LIST. UPDATED CURRENT ONE BY HAND AND IN COMPUTER. LET RN KNOW WHO IS VISITING LATER TODAY.  NO FALLS.

## 2024-07-01 SDOH — ECONOMIC STABILITY: HOUSING INSECURITY: HOME SAFETY: 1 L OXYGEN.  MEDICATION LIST UPDATED.  NO FALLS.

## 2024-07-01 SDOH — ECONOMIC STABILITY: HOUSING INSECURITY: HOME SAFETY: NO FALLS.  GAVE UPDATED MEDICATION LIST. NEEDS PRINTED ONE AS SOME MEDICATIONS UPDATED BY HAND.

## 2024-07-01 SDOH — ECONOMIC STABILITY: HOUSING INSECURITY: EVIDENCE OF SMOKING MATERIAL: 0

## 2024-07-01 ASSESSMENT — PAIN SCALES - PAIN ASSESSMENT IN ADVANCED DEMENTIA (PAINAD)
TOTALSCORE: 1
NEGVOCALIZATION: 0 - NONE.
TOTALSCORE: 0
FACIALEXPRESSION: 0 - SMILING OR INEXPRESSIVE.
FACIALEXPRESSION: 0 - SMILING OR INEXPRESSIVE.
BODYLANGUAGE: 0 - RELAXED.
BODYLANGUAGE: 0 - RELAXED.
CONSOLABILITY: 0 - NO NEED TO CONSOLE.
NEGVOCALIZATION: 0 - NONE.
FACIALEXPRESSION: 0 - SMILING OR INEXPRESSIVE.
CONSOLABILITY: 0 - NO NEED TO CONSOLE.
NEGVOCALIZATION: 0 - NONE.
TOTALSCORE: 0
CONSOLABILITY: 0 - NO NEED TO CONSOLE.
BODYLANGUAGE: 0 - RELAXED.

## 2024-07-01 ASSESSMENT — FIBROSIS 4 INDEX: FIB4 SCORE: 1.68

## 2024-07-01 ASSESSMENT — ENCOUNTER SYMPTOMS: POOR JUDGMENT: 1

## 2024-07-01 NOTE — ASSESSMENT & PLAN NOTE
Patient has G tube but as per family she was regurgitation up the food from tube feed  She is back on tube feedings but continues to intermittently groan, her abdomen is firm but KUB only shows contrast from previous study no clear evidence of stool, continue to monitor    7/7/2024  Continue tube feeds per daughter's request

## 2024-07-01 NOTE — DISCHARGE PLANNING
*ATTN Discharge Planning team: This patient is currently on service with [facility]. Please submit a referral order and face-to-face prior to discharging the patient home. If you have any questions, contact our Transitional Care Specialist team at [phone].

## 2024-07-01 NOTE — PROGRESS NOTES
4 Eyes Skin Assessment Completed by JOB RICHTER and JOB RASCON.    Head WDL  Ears WDL  Nose WDL  Mouth WDL  Neck WDL  Breast/Chest WDL  Shoulder Blades WDL  Spine WDL  (R) Arm/Elbow/Hand WDL  (L) Arm/Elbow/Hand WDL  Abdomen WDL  Groin Redness and Blanching  Scrotum/Coccyx/Buttocks Redness, Non-Blanching, and Excoriation  (R) Leg WDL  (L) Leg WDL  (R) Heel/Foot/Toe WDL  (L) Heel/Foot/Toe WDL          Devices In Places ECG, Tele Box, Blood Pressure Cuff, Pulse Ox, and SCD's      Interventions In Place Pillows    Possible Skin Injury Yes    Pictures Uploaded Into Epic Yes  Wound Consult Placed Yes  RN Wound Prevention Protocol Ordered Yes

## 2024-07-01 NOTE — PROGRESS NOTES
[facility] Medicine Daily Progress Note    Date of Service  7/1/2024    Chief Complaint  Jairo Rivas is a 73 y.o. female admitted 6/30/2024 with feeding tube problem     Hospital Course  This is a 3 y.o. female who presented 6/30/2024 with difficulty with feeding tube.  Patient has history of endometrial carcinoma, brain metastasis and CVA, frontal brain metastases and vasogenic edema, right-sided hemiparesis, aphasia, lives at home with family and home health but is nonverbal at baseline.  She was brought in by family due to concern for aspiration.  She receives all of her nutrition and pills etc. through her G-tube and it sounded like sounded like she was choking and gurgling on what she was receiving    In the ED she was found to be hypernatremic with sodium 154, hypokalemic with potassium 3.2, x-ray showed appropriate position of G-tube.   Patient admitted for further treatment     Interval Problem Update  Regarding her hypernatremia starting  patient on IV fluid 1/2 NS will monitor her sodium every 6 hrs   Regarding her tube feed nutrition consult and will start on low rate initially will also start patient on IV Reglan      I have discussed this patient's plan of care and discharge plan at IDT rounds today with Case Management, Nursing, Nursing leadership, and other members of the IDT team.    Consultants/Specialty  None     Code Status  Full Code    Disposition  The patient is not medically cleared for discharge to home or a post-acute facility.    I have placed the appropriate orders for post-discharge needs.    Review of Systems  Review of Systems   Unable to perform ROS: Acuity of condition        Physical Exam  Temp:  [35.9 °C (96.6 °F)-36.5 °C (97.7 °F)] 35.9 °C (96.6 °F)  Pulse:  [68-84] 76  Resp:  [12-18] 13  BP: (102-153)/(55-85) 136/69  SpO2:  [99 %-100 %] 100 %    Physical Exam  Constitutional:       General: She is not in acute distress.     Appearance: She is not toxic-appearing.    HENT:      Head: Normocephalic and atraumatic.      Nose: Nose normal.      Mouth/Throat:      Mouth: Mucous membranes are dry.      Pharynx: Oropharynx is clear.   Eyes:      Extraocular Movements: Extraocular movements intact.      Conjunctiva/sclera: Conjunctivae normal.   Cardiovascular:      Rate and Rhythm: Normal rate and regular rhythm.      Pulses: Normal pulses.      Heart sounds: Normal heart sounds.   Pulmonary:      Effort: Pulmonary effort is normal.      Breath sounds: Normal breath sounds.   Abdominal:      General: There is no distension.      Palpations: Abdomen is soft.      Tenderness: There is no abdominal tenderness.   Musculoskeletal:         General: No swelling or deformity.      Cervical back: Neck supple. No rigidity.   Skin:     General: Skin is warm and dry.   Neurological:      Mental Status: Mental status is at baseline.         Fluids    Intake/Output Summary (Last 24 hours) at 7/1/2024 1431  Last data filed at 7/1/2024 0500  Gross per 24 hour   Intake 50 ml   Output 500 ml   Net -450 ml       Laboratory  Recent Labs     06/30/24  1305   WBC 6.1   RBC 3.07*   HEMOGLOBIN 10.5*   HEMATOCRIT 33.8*   .1*   MCH 34.2*   MCHC 31.1*   RDW 65.8*   PLATELETCT 224   MPV 10.6     Recent Labs     06/30/24  1305 07/01/24  0237   SODIUM 154* 152*   POTASSIUM 3.2* 3.6   CHLORIDE 105 107   CO2 34* 33   GLUCOSE 94 111*   BUN 7* 5*   CREATININE 0.31* 0.17*   CALCIUM 10.2 9.8                   Imaging  DX-G.I. TUBE INJECTION, ANY TYPE   Final Result      Intraluminal position of gastrostomy catheter.      DX-ABDOMEN FOR TUBE PLACEMENT   Final Result      Gastrostomy tube projects over the expected position of the stomach assuming the gastrostomy is appropriately located      DX-CHEST-PORTABLE (1 VIEW)   Final Result      Mild volume loss and linear opacities in the left mid and lower lung which may be scarring versus mild infiltrates.           Assessment/Plan  * Hypernatremia- (present on  "admission)  Assessment & Plan  Sodium 154  Given NS in ED  Will start patient on1/2 ns  Will monitor sodium every 6 hrs     GERD (gastroesophageal reflux disease)- (present on admission)  Assessment & Plan  G-tube in place  Continue PPI    Dysphagia- (present on admission)  Assessment & Plan  Patient has G tube but as per family she was regurgitation up the food from tube feed  Will start on tube feed back on low rate   Will start patient on reglan and PPI     Advance care planning- (present on admission)  Assessment & Plan  Discussed with family with the patient's wishes would be in terms of resuscitation, after lengthy discussion they concluded that \"she wants to live\" and would like to be full code.  Advance care planning time spent 18 minutes    Hypokalemia- (present on admission)  Assessment & Plan  Mild, 3.2, replacement ordered    Essential hypertension- (present on admission)  Assessment & Plan  Continue losartan and metoprolol via G-tube         VTE prophylaxis:  Lovenox ppx    Greater than 51 minutes spent prepping to see patient (e.g. review of tests) obtaining and/or reviewing separately obtained history. Performing a medically appropriate examination and/ evaluation.  Counseling and educating the patient/family/caregiver.  Ordering medications, tests, or procedures.  Referring and communicating with other health care professionals.  Documenting clinical information in EPIC.  Independently interpreting results and communicating results to patient/family/caregiver.  Care coordination.     I have performed a physical exam and reviewed and updated ROS and Plan today (7/1/2024). In review of yesterday's note (6/30/2024), there are no changes except as documented above.        "

## 2024-07-01 NOTE — PROGRESS NOTES
Report received from [facility] RN. Patient is nonverbal and unable to follow commands. Patient on 2L nasal cannula, which is her baseline. Patient's vitals stable. Assessment complete. Q2 turns in place. Bed is locked in lowest position. All fall precautions in place. Care ongoing.

## 2024-07-01 NOTE — DISCHARGE PLANNING
Patient rolled back to observation/outpatient status per attending MD determination,  Dr. Ray Nguyễn, and IPA MD secondary reviewer, Dr. Makeda Walsh. Condition code 44 completed.

## 2024-07-01 NOTE — DIETARY
"Nutrition Services: Initial Assessment  Day 1 of admit.  Jairo Rivas is a 73 y.o. female with admitting DX of hypernatremia.      Consult received for tube feeding.     Current hospital problems list:  Hypernatremia  GERD  Advance care planning  Hypokalemia  Essential HTN     Nutrition Assessment:   Height: 175.3 cm (5' 9\")  Weight: 90.7 kg (199 lb 15.3 oz) stated weight  Ideal Body Weight: 65.8 kg (145 lb)  %IBW: 138%  Body mass index is 29.53 kg/m². BMI classification: overweight  Wt Readings from Last 5 Encounters:   07/01/24 90.7 kg (199 lb 15.3 oz)   05/04/24 95.8 kg (211 lb 3.2 oz)   04/10/24 84.4 kg (186 lb)   04/05/24 84.4 kg (186 lb)   03/27/24 84.8 kg (187 lb)     Calculation/Equation:   REE per MSJ = 1488 kcal x 1.2 = 1786 kcal    Total Calories/Day: 1450 - 1650 (22 - 25 kcal/kg IBW)  Total Grams Protein/Day: 91 - 109 (1 - 1.2 g/kg ABW)     Objective:   Pt presented to ED w/ c/o higher than normal residuals and regurgitation with tube feeds x 1 week.  Pertinent medical hx: endometrial carcinoma, frontal brain metastases, vasogenic edema, right-sided hemiparesis, aphasia, PEG  Pt with pre-existing PEG placed 5/7/24  Pertinent labs: sodium 152, glucose 111, BUN 5, creatinine 0.17  Pertinent meds: 1/2 NS @100 mL/hr, Lipitor, Pepcid, lansoprazole, losartan, Reglan  Skin/wounds: full thickness wound to sacrum   Last BM: PTA  During last [facility] admit when pt was started on TF, pt was receiving Promote with Fiber formula. Pt discharged home on equivalent, standard, fiber-containing formula Fibersource HN.   A standard, fiber containing formula remains appropriate to meet pt's nutritional needs.    Subjective:   Nutrition Focused Physical Exam (NFPE)   Weight loss: Assuming current stated wt is correct, pt with a ~12lb wt loss from 5/4 to this admit, a 5.7% loss of body weight in 2 months. This is notable but not clinically significant.      Based on RD assessment at this time, pt does not meet " criteria in congruence with ASPEN/Academy guidelines for malnutrition.        Nutrition Interventions:   Initiate Promote with Fiber 1.0 @25 mL/hr, progress per protocol to goal rate of 65 mL/hr to provide 1560 kcal, 98 g protein, and 1296 mL free water per day  Recommended starting with continuous feeds to promote tolerance given recent regurgitation.    Nutrition Monitoring and Evaluation:   Monitor nutrition POC  Additional fluids per MD/DO  Monitor vital signs pertinent to nutrition   Monitor weight         RD following

## 2024-07-01 NOTE — DISCHARGE PLANNING
"HTH/SCP TCN chart review completed. Collaborated with RENÉ Brennan prior to meeting with the pt. The most current review of medical record, knowledge of pt's PLOF and social support, LACE+ score of 75, 6 clicks scores of 6 ADL and 6 mobility were considered. Per review, noted patient was followed by [facility] and OP Palliative care prior to acute hospitalization. Per review, patient noted to be \"nonverbal and unable to follow commands\" as such TCN met with patient daughter who is patient primary caregiver at bedside.     Introduced TCN program. Daughter stated familiarity with TCN and TCN program from previous acute hospitalization (4/13-5/13/2024). Discussed current discharge considerations with daughter stating her anticipated dc plan for patient is return home to her care with with resumption of home healthcare and OP palliative services. Choice proactively obtained for HH (resumption of [facility]) and resumption of tube feedings through [facility]. Daughter requested consult from dietary or attending physician addressing concerns regarding current tube feedings and GERD.     Discussed HTH/SCP plan benefits. Daughter verbalized understanding.     Collaborated with CM following visit with patient at bedside with choices faxed to [facility] and given to CM. Advised of patient daughter request for dietary consult/ physician to address concern regarding  GERD. Daughter was amendable to TCN assistance in scheduling virtual after hospital visit follow up with primary care.     TCN will continue to follow and collaborate with discharge planning team as additional post acute needs arise. Thank you.     Completed today:  Choice obtained: HH (resumption of [facility]) and resumption of tube feedings through [facility]  SCP with [facility] PCP. discussed possible outpatient transitional PCP follow up if indicated and pt in agreement; sent information to assist in scheduling   "

## 2024-07-01 NOTE — PROGRESS NOTES
Picked patient up from ER. Unresponsive. In no apparent distress. On 2LNC. Daughter at bedside and provided answers to admission questions. Vss. Tele on and heart rhythm and rate verified with monitor tech.    Plan of care - antibiotics, monitor lab values and maintain safety.    Safety precautions in place are side rails up x 3, bed to lowest level and alarms on. Will round hourly and as needed.

## 2024-07-01 NOTE — CARE PLAN
The patient is Stable - Low risk of patient condition declining or worsening    Shift Goals  Clinical Goals: MONITOR NA, TURN Q 2 HOURS  Patient Goals: ZA  Family Goals: GO HOME    Progress made toward(s) clinical / shift goals:      Problem: Knowledge Deficit - Standard  Goal: Patient and family/care givers will demonstrate understanding of plan of care, disease process/condition, diagnostic tests and medications  Description: Target End Date:  1-3 days or as soon as patient condition allows    Document in Patient Education    1.  Patient and family/caregiver oriented to unit, equipment, visitation policy and means for communicating concern  2.  Complete/review Learning Assessment  3.  Assess knowledge level of disease process/condition, treatment plan, diagnostic tests and medications  4.  Explain disease process/condition, treatment plan, diagnostic tests and medications  Outcome: Progressing     Problem: Skin Integrity  Goal: Skin integrity is maintained or improved  Description: Target End Date:  Prior to discharge or change in level of care    Document interventions on Skin Risk/[place] flowsheet groups and corresponding LDA    1.  Assess and monitor skin integrity, appearance and/or temperature  2.  Assess risk factors for impaired skin integrity and/or pressures ulcers  3.  Implement precautions to protect skin integrity in collaboration with interdisciplinary team  4.  Implement pressure ulcer prevention protocol if at risk for skin breakdown  5.  Confirm wound care consult if at risk for skin breakdown  6.  Ensure patient use of pressure relieving devices  (Low air loss bed, waffle overlay, heel protectors, ROHO cushion, etc)  Outcome: Progressing       Patient is not progressing towards the following goals:

## 2024-07-01 NOTE — ED NOTES
Changed patients bedding with clean linen  Cleaned and set up a purewick  Took photo and removed/placed a new dressing (mepilex) on coccyx  Q2 turns on right - pillow under patients LLE to prevent pressure sores on heels  Patients family at bedside

## 2024-07-01 NOTE — DISCHARGE PLANNING
Case Management Discharge Planning    Admission Date: 6/30/2024  GMLOS: 2.6  ALOS: 0    6-Clicks ADL Score: 6  6-Clicks Mobility Score: 6  PT and/or OT Eval ordered: No  Post-acute Referrals Ordered: No  Post-acute Choice Obtained: No  Has referral(s) been sent to post-acute provider:  No      Anticipated Discharge Dispo: Discharge Disposition: D/T to home under HHA care in anticipation of covered skilled care (06)    DME Needed: No    Action(s) Taken: Updated Provider/Nurse on Discharge Plan and DC Assessment Complete (See below)    1315, RN RENÉ discussed with Pt's daughter Radha discharge planning. Radha said that they want to their mother to be home with home health and she said she they are able to provide support at home. She mentioned she also has caregivers that comes.   RN CM provided Observation Outpatient Information for [facility] to Pt's daughter Radha.    Escalations Completed: None    Medically Clear: No    Next Steps: CM will continue to assist Pt with discharge needs.      Barriers to Discharge: Medical clearance    Is the patient up for discharge tomorrow: No      Care Transition Team Assessment  RN RENÉ met with Pt's weston Garcia at bedside to obtain assessment informations. Demographics verified. RN RENÉ introduced self and purpose of visit.   Pt lives with daughter in a 1 story house with no steps to main entrance.  Pt has  daughter and paid caregivers for support.  Pt has RENITA MCLEOD* for PCP  Pt needs assistance with ADLs prior to hospitalization.  Pt has Wheelchair, Portable Inogen O2 concentrator, O2 concentrator. Pt uses 1 LPM O2 at home.      Information Source  Orientation Level: Unable to assess  Information Given By: Relative  Informant's Name: Radha (Daughter)         Elopement Risk  Legal Hold: No  Ambulatory or Self Mobile in Wheelchair: No-Not an Elopement Risk  Elopement Risk: Not at Risk for Elopement    Interdisciplinary Discharge Planning  Primary Care  Physician: RENITA MCLEOD*  Lives with - Patient's Self Care Capacity: Adult Children  Patient or legal guardian wants to designate a caregiver: No  Support Systems: Children  Housing / Facility: 1 Story House (no steps to main entrance)  Durable Medical Equipment: Home Oxygen, Other - Specify (Wheelchair, Portable Inogen O2 concentrator, O2 concentrator. Pt uses 1 LPM O2 at home.)    Discharge Preparedness  What is your plan after discharge?: Home health care  What are your discharge supports?: Child  Prior Functional Level: Needs Assist with Activities of Daily Living, Needs Assist with Medication Management    Functional Assesment  Prior Functional Level: Needs Assist with Activities of Daily Living, Needs Assist with Medication Management    Finances  Financial Barriers to Discharge: No  Prescription Coverage: Yes    Vision / Hearing Impairment  Vision Impairment : Yes  Right Eye Vision: Other (Comments) (ZA)  Left Eye Vision: Other (Comments) (ZA)  Hearing Impairment : No              Domestic Abuse  Have you ever been the victim of abuse or violence?: No  Possible Abuse/Neglect Reported to:: Not Applicable    Psychological Assessment  History of Substance Abuse: None  History of Psychiatric Problems: No         Anticipated Discharge Information  Discharge Disposition: D/T to home under HHA care in anticipation of covered skilled care (06)

## 2024-07-01 NOTE — ASSESSMENT & PLAN NOTE
I have discussed with patient daughter today regarding her MRI results and regarding risk of bleeding if started on eliquis. Daughter has discussed with other family members and decision has been made by family to make patient DNR/DNI and also would like to talk with hospice  I have placed hospice referral. Time spent on goals of care is 17 min   Above per previous hospitalist     7/6: discussed with daughter. She is anxious for patient to return home with or without hospice services in place. We are working to arrange for transport home tomorrow am.     7/7/2024  [facility] hospice staff not available today to facilitate discharge to home.

## 2024-07-01 NOTE — CARE PLAN
The patient is Watcher - Medium risk of patient condition declining or worsening    Shift Goals  Clinical Goals: monitor labs, hemodynamic stability, Q2 turns  Patient Goals: ZA  Family Goals: not present    Progress made toward(s) clinical / shift goals:      Problem: Knowledge Deficit - Standard  Goal: Patient and family/care givers will demonstrate understanding of plan of care, disease process/condition, diagnostic tests and medications  Outcome: Progressing     Problem: Skin Integrity  Goal: Skin integrity is maintained or improved  Outcome: Progressing     Problem: Fall Risk  Goal: Patient will remain free from falls  Outcome: Progressing     Problem: Pain - Standard  Goal: Alleviation of pain or a reduction in pain to the patient’s comfort goal  Outcome: Progressing       Patient is nonverbal at baseline and unable to follow commands. Patient's daughter educated on condition, treatment plan, and medications being administered. Q2 turns in place, heel meiplexes, and heel float boots on to help maintain skin integrity. Nutrition consult is to be completed today so that we are able to start the proper tube feedings on this patient. Reglan and famotidine being administered to help with GERD and nausea.

## 2024-07-02 ENCOUNTER — APPOINTMENT (OUTPATIENT)
Dept: RADIOLOGY | Facility: MEDICAL CENTER | Age: 74
DRG: 640 | End: 2024-07-02
Attending: INTERNAL MEDICINE
Payer: MEDICARE

## 2024-07-02 ENCOUNTER — HOME CARE VISIT (OUTPATIENT)
Dept: HOME HEALTH SERVICES | Facility: HOME HEALTHCARE | Age: 74
End: 2024-07-02
Payer: MEDICARE

## 2024-07-02 LAB
D DIMER PPP IA.FEU-MCNC: 2.27 UG/ML (FEU) (ref 0–0.5)
ERYTHROCYTE [DISTWIDTH] IN BLOOD BY AUTOMATED COUNT: 65.7 FL (ref 35.9–50)
HCT VFR BLD AUTO: 35.3 % (ref 37–47)
HGB BLD-MCNC: 10.6 G/DL (ref 12–16)
MCH RBC QN AUTO: 33.2 PG (ref 27–33)
MCHC RBC AUTO-ENTMCNC: 30 G/DL (ref 32.2–35.5)
MCV RBC AUTO: 110.7 FL (ref 81.4–97.8)
NT-PROBNP SERPL IA-MCNC: 208 PG/ML (ref 0–125)
PLATELET # BLD AUTO: 242 K/UL (ref 164–446)
PMV BLD AUTO: 11.2 FL (ref 9–12.9)
RBC # BLD AUTO: 3.19 M/UL (ref 4.2–5.4)
SODIUM SERPL-SCNC: 141 MMOL/L (ref 135–145)
SODIUM SERPL-SCNC: 142 MMOL/L (ref 135–145)
SODIUM SERPL-SCNC: 144 MMOL/L (ref 135–145)
SODIUM SERPL-SCNC: 146 MMOL/L (ref 135–145)
TROPONIN T SERPL-MCNC: 38 NG/L (ref 6–19)
WBC # BLD AUTO: 5.8 K/UL (ref 4.8–10.8)

## 2024-07-02 PROCEDURE — 84295 ASSAY OF SERUM SODIUM: CPT | Mod: 91

## 2024-07-02 PROCEDURE — 96372 THER/PROPH/DIAG INJ SC/IM: CPT | Mod: XU

## 2024-07-02 PROCEDURE — 85027 COMPLETE CBC AUTOMATED: CPT

## 2024-07-02 PROCEDURE — 85379 FIBRIN DEGRADATION QUANT: CPT

## 2024-07-02 PROCEDURE — 96375 TX/PRO/DX INJ NEW DRUG ADDON: CPT | Mod: XU

## 2024-07-02 PROCEDURE — 74018 RADEX ABDOMEN 1 VIEW: CPT

## 2024-07-02 PROCEDURE — 84484 ASSAY OF TROPONIN QUANT: CPT

## 2024-07-02 PROCEDURE — 700111 HCHG RX REV CODE 636 W/ 250 OVERRIDE (IP): Mod: JZ | Performed by: INTERNAL MEDICINE

## 2024-07-02 PROCEDURE — 700111 HCHG RX REV CODE 636 W/ 250 OVERRIDE (IP): Mod: JZ | Performed by: STUDENT IN AN ORGANIZED HEALTH CARE EDUCATION/TRAINING PROGRAM

## 2024-07-02 PROCEDURE — A9270 NON-COVERED ITEM OR SERVICE: HCPCS | Performed by: STUDENT IN AN ORGANIZED HEALTH CARE EDUCATION/TRAINING PROGRAM

## 2024-07-02 PROCEDURE — 80048 BASIC METABOLIC PNL TOTAL CA: CPT

## 2024-07-02 PROCEDURE — 99233 SBSQ HOSP IP/OBS HIGH 50: CPT | Performed by: INTERNAL MEDICINE

## 2024-07-02 PROCEDURE — 36415 COLL VENOUS BLD VENIPUNCTURE: CPT

## 2024-07-02 PROCEDURE — 700102 HCHG RX REV CODE 250 W/ 637 OVERRIDE(OP): Performed by: STUDENT IN AN ORGANIZED HEALTH CARE EDUCATION/TRAINING PROGRAM

## 2024-07-02 PROCEDURE — 700117 HCHG RX CONTRAST REV CODE 255: Performed by: INTERNAL MEDICINE

## 2024-07-02 PROCEDURE — 700105 HCHG RX REV CODE 258: Performed by: INTERNAL MEDICINE

## 2024-07-02 PROCEDURE — 96376 TX/PRO/DX INJ SAME DRUG ADON: CPT | Mod: XU

## 2024-07-02 PROCEDURE — 71275 CT ANGIOGRAPHY CHEST: CPT

## 2024-07-02 PROCEDURE — 700102 HCHG RX REV CODE 250 W/ 637 OVERRIDE(OP): Performed by: INTERNAL MEDICINE

## 2024-07-02 PROCEDURE — 83880 ASSAY OF NATRIURETIC PEPTIDE: CPT

## 2024-07-02 PROCEDURE — A9270 NON-COVERED ITEM OR SERVICE: HCPCS | Performed by: INTERNAL MEDICINE

## 2024-07-02 PROCEDURE — G0378 HOSPITAL OBSERVATION PER HR: HCPCS

## 2024-07-02 RX ORDER — DIPHENHYDRAMINE HYDROCHLORIDE 50 MG/ML
50 INJECTION INTRAMUSCULAR; INTRAVENOUS ONCE
Status: COMPLETED | OUTPATIENT
Start: 2024-07-02 | End: 2024-07-02

## 2024-07-02 RX ORDER — METHYLPREDNISOLONE SODIUM SUCCINATE 125 MG/2ML
125 INJECTION, POWDER, LYOPHILIZED, FOR SOLUTION INTRAMUSCULAR; INTRAVENOUS ONCE
Status: COMPLETED | OUTPATIENT
Start: 2024-07-02 | End: 2024-07-02

## 2024-07-02 RX ADMIN — LOSARTAN POTASSIUM 50 MG: 50 TABLET, FILM COATED ORAL at 05:08

## 2024-07-02 RX ADMIN — ASPIRIN 81 MG: 81 TABLET, CHEWABLE ORAL at 05:07

## 2024-07-02 RX ADMIN — MINERAL OIL, AND WHITE PETROLATUM 1 APPLICATION: 425; 573 OINTMENT OPHTHALMIC at 13:40

## 2024-07-02 RX ADMIN — SODIUM CHLORIDE: 4.5 INJECTION, SOLUTION INTRAVENOUS at 09:38

## 2024-07-02 RX ADMIN — METOPROLOL TARTRATE 100 MG: 50 TABLET, FILM COATED ORAL at 18:02

## 2024-07-02 RX ADMIN — METOPROLOL TARTRATE 50 MG: 50 TABLET, FILM COATED ORAL at 05:07

## 2024-07-02 RX ADMIN — IOHEXOL 50 ML: 350 INJECTION, SOLUTION INTRAVENOUS at 23:10

## 2024-07-02 RX ADMIN — MINERAL OIL, AND WHITE PETROLATUM 1 APPLICATION: 425; 573 OINTMENT OPHTHALMIC at 22:45

## 2024-07-02 RX ADMIN — MINERAL OIL, AND WHITE PETROLATUM 1 APPLICATION: 425; 573 OINTMENT OPHTHALMIC at 05:07

## 2024-07-02 RX ADMIN — ENOXAPARIN SODIUM 40 MG: 100 INJECTION SUBCUTANEOUS at 18:02

## 2024-07-02 RX ADMIN — FAMOTIDINE 20 MG: 10 INJECTION, SOLUTION INTRAVENOUS at 18:02

## 2024-07-02 RX ADMIN — DIPHENHYDRAMINE HYDROCHLORIDE 50 MG: 50 INJECTION, SOLUTION INTRAMUSCULAR; INTRAVENOUS at 20:18

## 2024-07-02 RX ADMIN — METOCLOPRAMIDE 10 MG: 5 INJECTION, SOLUTION INTRAMUSCULAR; INTRAVENOUS at 05:08

## 2024-07-02 RX ADMIN — METOCLOPRAMIDE 10 MG: 5 INJECTION, SOLUTION INTRAMUSCULAR; INTRAVENOUS at 18:02

## 2024-07-02 RX ADMIN — LANSOPRAZOLE 30 MG: 30 TABLET, ORALLY DISINTEGRATING ORAL at 05:07

## 2024-07-02 RX ADMIN — METOCLOPRAMIDE 10 MG: 5 INJECTION, SOLUTION INTRAMUSCULAR; INTRAVENOUS at 13:40

## 2024-07-02 RX ADMIN — ATORVASTATIN CALCIUM 20 MG: 20 TABLET, FILM COATED ORAL at 18:02

## 2024-07-02 RX ADMIN — FAMOTIDINE 20 MG: 10 INJECTION, SOLUTION INTRAVENOUS at 05:09

## 2024-07-02 RX ADMIN — METHYLPREDNISOLONE SODIUM SUCCINATE 125 MG: 125 INJECTION, POWDER, FOR SOLUTION INTRAMUSCULAR; INTRAVENOUS at 20:17

## 2024-07-02 ASSESSMENT — FIBROSIS 4 INDEX: FIB4 SCORE: 1.55

## 2024-07-02 NOTE — PROGRESS NOTES
Bedside report received from [facility]. Pt assessment complete. Pt nonverbal and unable to follow commands. Reviewed plan of care with pts family at bedside. Pt tele monitored. Chart and labs reviewed. Bed in lowest position, and 3 side rails up. All fall precautions in place. Hourly rounding in place

## 2024-07-02 NOTE — PROGRESS NOTES
Received report from [facility] shift RN. Patient is resting and sleeping in bed, non-verbal at baseline, VSS, no signs of distress. Bed in lowest and locked position, call light in reach, bed alarm on, plan of care ongoing.

## 2024-07-02 NOTE — PROGRESS NOTES
[facility] Medicine Daily Progress Note    Date of Service  7/2/2024    Chief Complaint  Jairo Rivas is a 73 y.o. female admitted 6/30/2024 with regurgitation of tube feeding and hypernatremia    Hospital Course  Patient presented with regurgitation of tube feeding, per daughter she had been constipated for time but was given a laxative and had multiple bowel movements but since is still unable to tolerate tube feeding at home, presented with sodium of 154 which improved with half-normal saline and increased free water per G-tube    Interval Problem Update  Patient continues to grunt intermittently and grimaces when I palpate her abdomen which is somewhat firm, KUB shows contrast throughout the bowel but no obvious stool retention, her sodium is down to 141, adjusting free water and IV fluid.    Patient discussed with daughter at bedside who is her main caregiver    I have discussed this patient's plan of care and discharge plan at IDT rounds today with Case Management, Nursing, Nursing leadership, and other members of the IDT team.    Consultants/Specialty  None    Code Status  Full Code    Disposition  The patient is not medically cleared for discharge to home or a post-acute facility.  Anticipate discharge to: home with organized home healthcare and close outpatient follow-up  [facility] orders placed    I have placed the appropriate orders for post-discharge needs.    Review of Systems  Review of Systems   Unable to perform ROS: Patient nonverbal        Physical Exam  Temp:  [35.9 °C (96.6 °F)-36.9 °C (98.4 °F)] 36.8 °C (98.2 °F)  Pulse:  [] 119  Resp:  [12-17] 15  BP: (107-155)/(59-86) 107/68  SpO2:  [97 %-100 %] 97 %    Physical Exam  Vitals and nursing note reviewed.   Constitutional:       General: She is not in acute distress.     Appearance: She is not ill-appearing.   HENT:      Head: Normocephalic and atraumatic.   Eyes:      Pupils: Pupils are equal, round, and reactive to  light.   Cardiovascular:      Rate and Rhythm: Regular rhythm. Tachycardia present.   Pulmonary:      Effort: Pulmonary effort is normal.      Breath sounds: Normal breath sounds.   Abdominal:      General: There is distension (Firm, grimaces).      Tenderness: There is abdominal tenderness (?).      Comments: Decreased bowel sounds   Skin:     Comments: Some edema left hand greater than right, no lower extremity edema   Neurological:      Mental Status: She is alert. Mental status is at baseline.      Cranial Nerves: No cranial nerve deficit.   Psychiatric:      Comments: Unable to determine         Fluids    Intake/Output Summary (Last 24 hours) at 7/2/2024 1530  Last data filed at 7/2/2024 1200  Gross per 24 hour   Intake 840 ml   Output --   Net 840 ml       Laboratory  Recent Labs     06/30/24  1305 07/02/24  0017   WBC 6.1 5.8   RBC 3.07* 3.19*   HEMOGLOBIN 10.5* 10.6*   HEMATOCRIT 33.8* 35.3*   .1* 110.7*   MCH 34.2* 33.2*   MCHC 31.1* 30.0*   RDW 65.8* 65.7*   PLATELETCT 224 242   MPV 10.6 11.2     Recent Labs     06/30/24  1305 07/01/24  0237 07/01/24  1450 07/02/24  0017 07/02/24  0613 07/02/24  1217   SODIUM 154* 152*   < > 146* 144 141   POTASSIUM 3.2* 3.6  --   --   --   --    CHLORIDE 105 107  --   --   --   --    CO2 34* 33  --   --   --   --    GLUCOSE 94 111*  --   --   --   --    BUN 7* 5*  --   --   --   --    CREATININE 0.31* 0.17*  --   --   --   --    CALCIUM 10.2 9.8  --   --   --   --     < > = values in this interval not displayed.                   Imaging  [id number] VIEW   Final Result      1.  No evidence of bowel obstruction.   2.  Contrast present throughout the colon.   3.  Supportive tubing as described.      DX-G.I. TUBE INJECTION, ANY TYPE   Final Result      Intraluminal position of gastrostomy catheter.      DX-ABDOMEN FOR TUBE PLACEMENT   Final Result      Gastrostomy tube projects over the expected position of the stomach assuming the gastrostomy is appropriately  "located      DX-CHEST-PORTABLE (1 VIEW)   Final Result      Mild volume loss and linear opacities in the left mid and lower lung which may be scarring versus mild infiltrates.           Assessment/Plan  * Hypernatremia- (present on admission)  Assessment & Plan  Sodium 154  Now down to 141, changing IV fluids and free water  Patient was not tolerating any intake through her G-tube due to regurgitation so likely became volume depleted    GERD (gastroesophageal reflux disease)- (present on admission)  Assessment & Plan  G-tube in place  Continue PPI    Dysphagia- (present on admission)  Assessment & Plan  Patient has G tube but as per family she was regurgitation up the food from tube feed  She is back on tube feedings but continues to intermittently groan, her abdomen is firm but KUB only shows contrast from previous study no clear evidence of stool, continue to monitor    Advance care planning- (present on admission)  Assessment & Plan  Discussed with family with the patient's wishes would be in terms of resuscitation, after lengthy discussion they concluded that \"she wants to live\" and would like to be full code.  Advance care planning time spent 18 minutes    Hypokalemia- (present on admission)  Assessment & Plan  Mild, 3.2, replacement ordered    Essential hypertension- (present on admission)  Assessment & Plan  Continue losartan and metoprolol via G-tube         VTE prophylaxis:    enoxaparin ppx      I have performed a physical exam and reviewed and updated ROS and Plan today (7/2/2024). In review of yesterday's note (7/1/2024), there are no changes except as documented above.        "

## 2024-07-02 NOTE — CARE PLAN
The patient is Watcher - Medium risk of patient condition declining or worsening    Shift Goals  Clinical Goals: monitor labs, hemodynamic stability, q2 turns  Patient Goals: jayla  Family Goals: updates    Progress made toward(s) clinical / shift goals:  labs reviewed, vital signs per flowsheets. Q2 turns per flowsheets. Family updated prior to leaving for the night      Problem: Knowledge Deficit - Standard  Goal: Patient and family/care givers will demonstrate understanding of plan of care, disease process/condition, diagnostic tests and medications  Outcome: Progressing  Note: Pt family members at bedside this evening, updated on plan of care     Problem: Skin Integrity  Goal: Skin integrity is maintained or improved  Outcome: Progressing  Note: Q2 turns per flowsheets, using taps system with wedges. Female wick in place       Patient is not progressing towards the following goals:

## 2024-07-02 NOTE — FACE TO FACE
Face to Face Supporting Documentation - Home Health    The encounter with this patient was in whole or in part the primary reason for home health admission.    Date of encounter:   Patient:                    MRN:                       YOB: 2022  Jairo Rivas  [id number]  1950     Home health to see patient for:  Skilled Nursing care for assessment, interventions & education    Skilled need for:  Recent Deterioration of Health Status hypernatremia, reflux    Skilled nursing interventions to include:  Comment: Tube feeding, lab monitoring    Homebound status evidenced by:  Need the aid of supportive devices such as crutches, canes, wheelchairs or walkers or Needs the assistance of another person in order to leave the home. Leaving home requires a considerable and taxing effort. There is a normal inability to leave the home.    Community Physician to provide follow up care: ZULEYKA Lyons     Optional Interventions? No      I certify the face to face encounter for this home health care referral meets the CMS requirements and the encounter/clinical assessment with the patient was, in whole, or in part, for the medical condition(s) listed above, which is the primary reason for home health care. Based on my clinical findings: the service(s) are medically necessary, support the need for home health care, and the homebound criteria are met.  I certify that this patient has had a face to face encounter by myself.  Kell Poon M.D. - NPI: [id number]

## 2024-07-02 NOTE — DISCHARGE PLANNING
TCN following. HTH/SCP chart reviewed. Collaborated with RENÉ Brennan. No new TCN needs identified in discharge planning at this time as choice previously obtained for current anticipated discharge plan of return home with resumption of [facility], OP Palliative and other services as appropriate. Please see prior TCN and CM note from 7/1/2024 for most recent discharge planning considerations if indicated.     TCN will continue to follow and collaborate with discharge planning team as appropriate. Thank you.     Completed:  Choice obtained: HH (resumption of [facility]) and resumption of tube feedings through [facility]  SCP with [facility]. Per review, patient is scheduled for Virtual Video Established Patient  with Nurse Practitioner ZULEYKA Stock Wednesday Jul 10, 2024 2:25 PM

## 2024-07-03 ENCOUNTER — HOME CARE VISIT (OUTPATIENT)
Dept: HOME HEALTH SERVICES | Facility: HOME HEALTHCARE | Age: 74
End: 2024-07-03
Payer: MEDICARE

## 2024-07-03 ENCOUNTER — APPOINTMENT (OUTPATIENT)
Dept: RADIOLOGY | Facility: MEDICAL CENTER | Age: 74
DRG: 640 | End: 2024-07-03
Attending: INTERNAL MEDICINE
Payer: MEDICARE

## 2024-07-03 LAB
ANION GAP SERPL CALC-SCNC: 12 MMOL/L (ref 7–16)
ANION GAP SERPL CALC-SCNC: 18 MMOL/L (ref 7–16)
BUN SERPL-MCNC: 13 MG/DL (ref 8–22)
BUN SERPL-MCNC: 5 MG/DL (ref 8–22)
CALCIUM SERPL-MCNC: 9 MG/DL (ref 8.5–10.5)
CALCIUM SERPL-MCNC: 9.5 MG/DL (ref 8.5–10.5)
CHLORIDE SERPL-SCNC: 102 MMOL/L (ref 96–112)
CHLORIDE SERPL-SCNC: 104 MMOL/L (ref 96–112)
CO2 SERPL-SCNC: 25 MMOL/L (ref 20–33)
CO2 SERPL-SCNC: 28 MMOL/L (ref 20–33)
CREAT SERPL-MCNC: 0.31 MG/DL (ref 0.5–1.4)
CREAT SERPL-MCNC: 0.35 MG/DL (ref 0.5–1.4)
GFR SERPLBLD CREATININE-BSD FMLA CKD-EPI: 109 ML/MIN/1.73 M 2
GFR SERPLBLD CREATININE-BSD FMLA CKD-EPI: 112 ML/MIN/1.73 M 2
GLUCOSE SERPL-MCNC: 112 MG/DL (ref 65–99)
GLUCOSE SERPL-MCNC: 288 MG/DL (ref 65–99)
POTASSIUM SERPL-SCNC: 3.9 MMOL/L (ref 3.6–5.5)
POTASSIUM SERPL-SCNC: 4 MMOL/L (ref 3.6–5.5)
PROCALCITONIN SERPL-MCNC: 0.46 NG/ML
SODIUM SERPL-SCNC: 141 MMOL/L (ref 135–145)
SODIUM SERPL-SCNC: 142 MMOL/L (ref 135–145)
SODIUM SERPL-SCNC: 147 MMOL/L (ref 135–145)

## 2024-07-03 PROCEDURE — 700102 HCHG RX REV CODE 250 W/ 637 OVERRIDE(OP): Performed by: STUDENT IN AN ORGANIZED HEALTH CARE EDUCATION/TRAINING PROGRAM

## 2024-07-03 PROCEDURE — 84295 ASSAY OF SERUM SODIUM: CPT

## 2024-07-03 PROCEDURE — 700102 HCHG RX REV CODE 250 W/ 637 OVERRIDE(OP): Performed by: INTERNAL MEDICINE

## 2024-07-03 PROCEDURE — A9270 NON-COVERED ITEM OR SERVICE: HCPCS | Performed by: STUDENT IN AN ORGANIZED HEALTH CARE EDUCATION/TRAINING PROGRAM

## 2024-07-03 PROCEDURE — 99232 SBSQ HOSP IP/OBS MODERATE 35: CPT | Performed by: INTERNAL MEDICINE

## 2024-07-03 PROCEDURE — 700111 HCHG RX REV CODE 636 W/ 250 OVERRIDE (IP): Mod: JZ | Performed by: STUDENT IN AN ORGANIZED HEALTH CARE EDUCATION/TRAINING PROGRAM

## 2024-07-03 PROCEDURE — 96372 THER/PROPH/DIAG INJ SC/IM: CPT

## 2024-07-03 PROCEDURE — G0378 HOSPITAL OBSERVATION PER HR: HCPCS

## 2024-07-03 PROCEDURE — A9270 NON-COVERED ITEM OR SERVICE: HCPCS | Performed by: INTERNAL MEDICINE

## 2024-07-03 PROCEDURE — 700111 HCHG RX REV CODE 636 W/ 250 OVERRIDE (IP): Mod: JZ | Performed by: INTERNAL MEDICINE

## 2024-07-03 PROCEDURE — 36415 COLL VENOUS BLD VENIPUNCTURE: CPT

## 2024-07-03 PROCEDURE — 84145 PROCALCITONIN (PCT): CPT

## 2024-07-03 PROCEDURE — 96376 TX/PRO/DX INJ SAME DRUG ADON: CPT

## 2024-07-03 PROCEDURE — 80048 BASIC METABOLIC PNL TOTAL CA: CPT

## 2024-07-03 RX ORDER — POLYETHYLENE GLYCOL 3350 17 G/17G
1 POWDER, FOR SOLUTION ORAL 3 TIMES DAILY
Status: DISPENSED | OUTPATIENT
Start: 2024-07-03 | End: 2024-07-04

## 2024-07-03 RX ORDER — CEFUROXIME AXETIL 500 MG/1
500 TABLET ORAL EVERY 12 HOURS
Status: DISCONTINUED | OUTPATIENT
Start: 2024-07-03 | End: 2024-07-08 | Stop reason: HOSPADM

## 2024-07-03 RX ADMIN — MINERAL OIL, AND WHITE PETROLATUM 1 APPLICATION: 425; 573 OINTMENT OPHTHALMIC at 13:15

## 2024-07-03 RX ADMIN — ASPIRIN 81 MG: 81 TABLET, CHEWABLE ORAL at 05:54

## 2024-07-03 RX ADMIN — MINERAL OIL, AND WHITE PETROLATUM 1 APPLICATION: 425; 573 OINTMENT OPHTHALMIC at 05:54

## 2024-07-03 RX ADMIN — METOCLOPRAMIDE 10 MG: 5 INJECTION, SOLUTION INTRAMUSCULAR; INTRAVENOUS at 16:59

## 2024-07-03 RX ADMIN — METOPROLOL TARTRATE 100 MG: 50 TABLET, FILM COATED ORAL at 16:59

## 2024-07-03 RX ADMIN — CEFUROXIME AXETIL 500 MG: 500 TABLET ORAL at 22:05

## 2024-07-03 RX ADMIN — METOPROLOL TARTRATE 100 MG: 50 TABLET, FILM COATED ORAL at 05:54

## 2024-07-03 RX ADMIN — POLYETHYLENE GLYCOL 3350 1 PACKET: 17 POWDER, FOR SOLUTION ORAL at 17:00

## 2024-07-03 RX ADMIN — ENOXAPARIN SODIUM 40 MG: 100 INJECTION SUBCUTANEOUS at 16:59

## 2024-07-03 RX ADMIN — METOCLOPRAMIDE 10 MG: 5 INJECTION, SOLUTION INTRAMUSCULAR; INTRAVENOUS at 13:15

## 2024-07-03 RX ADMIN — METOCLOPRAMIDE 10 MG: 5 INJECTION, SOLUTION INTRAMUSCULAR; INTRAVENOUS at 05:54

## 2024-07-03 RX ADMIN — CEFUROXIME AXETIL 500 MG: 500 TABLET ORAL at 13:37

## 2024-07-03 RX ADMIN — LOSARTAN POTASSIUM 50 MG: 50 TABLET, FILM COATED ORAL at 05:54

## 2024-07-03 RX ADMIN — FAMOTIDINE 20 MG: 10 INJECTION, SOLUTION INTRAVENOUS at 05:54

## 2024-07-03 RX ADMIN — ATORVASTATIN CALCIUM 20 MG: 20 TABLET, FILM COATED ORAL at 16:59

## 2024-07-03 RX ADMIN — METOCLOPRAMIDE 10 MG: 5 INJECTION, SOLUTION INTRAMUSCULAR; INTRAVENOUS at 00:44

## 2024-07-03 RX ADMIN — LANSOPRAZOLE 30 MG: 30 TABLET, ORALLY DISINTEGRATING ORAL at 05:54

## 2024-07-03 RX ADMIN — MINERAL OIL, AND WHITE PETROLATUM 1 APPLICATION: 425; 573 OINTMENT OPHTHALMIC at 22:05

## 2024-07-03 RX ADMIN — FAMOTIDINE 20 MG: 10 INJECTION, SOLUTION INTRAVENOUS at 16:59

## 2024-07-03 RX ADMIN — POLYETHYLENE GLYCOL 3350 1 PACKET: 17 POWDER, FOR SOLUTION ORAL at 13:15

## 2024-07-03 ASSESSMENT — PAIN DESCRIPTION - PAIN TYPE: TYPE: ACUTE PAIN

## 2024-07-03 ASSESSMENT — FIBROSIS 4 INDEX: FIB4 SCORE: 1.55

## 2024-07-03 NOTE — CARE PLAN
The patient is Watcher - Medium risk of patient condition declining or worsening    Shift Goals  Clinical Goals: CT scan, q2 turns  Patient Goals: jayla  Family Goals: updates    Progress made toward(s) clinical / shift goals:  CT scan completed, q2 turns per flowsheets. Pt family updated on plan of care      Problem: Knowledge Deficit - Standard  Goal: Patient and family/care givers will demonstrate understanding of plan of care, disease process/condition, diagnostic tests and medications  Outcome: Progressing  Note: Discussed plan of care with pts family at bedside, family has questions which were escalated to hospitalist. All questions addressed.      Problem: Skin Integrity  Goal: Skin integrity is maintained or improved  Outcome: Progressing  Note: Q2 turns per flowsheets. Pt cleaned up and kept dry, applied barrier paste.        Patient is not progressing towards the following goals:

## 2024-07-03 NOTE — DISCHARGE PLANNING
ATTN: Case Management  RE: Referral for Home Health    As of 7/3, we have accepted the Home Health referral for the patient listed above.    A [facility] clinician will be out to see the patient within 48 hours. If you have any questions or concerns regarding the patient's transition to Home Health, please do not hesitate to contact us at [phone].      We look forward to collaborating with you,  [facility] Home Health Team

## 2024-07-03 NOTE — DIETARY
Nutrition Services Brief Update:    Problem: Nutritional:  Goal: Nutrition support tolerated and meeting greater than 85% of estimated needs  Outcome: Met.     TF running at 65ml/hr which provides 1560kcals, 98g protein, 1296ml free water.     Per RN, pt tolerating TF..    RD following.

## 2024-07-03 NOTE — PROGRESS NOTES
NOC HOSPITALIST CROSS COVER    Notified by RN regarding family wishing to speak with provider. Spoke with daughter regarding elevated d-dimer which does not appear to be of clinical significance in the setting of CTA-PE which is unremarkable. Daughter also expressed concern regarding her mother's missed outpatient head CT scan. Will defer decision for inpatient head CT to primary team during the day. Patient did have head CT in May which did not identify acute abnormality at that time.       -----------------------------------------------------------------------------------------------------------    Electronically signed by:  RACHEL Thorpe PA-C  Hospitalist Services

## 2024-07-03 NOTE — DISCHARGE PLANNING
Case Management Discharge Planning    Admission Date: 6/30/2024  GMLOS: 2.6  ALOS: 0    6-Clicks ADL Score: 6  6-Clicks Mobility Score: 6  PT and/or OT Eval ordered: No  Post-acute Referrals Ordered: Yes  Post-acute Choice Obtained: Yes  Has referral(s) been sent to post-acute provider:  Yes      Anticipated Discharge Dispo: Discharge Disposition: D/T to home under [facility]HA care in anticipation of covered skilled care (06)    DME Needed: No    Action(s) Taken: Updated Provider/Nurse on Discharge Plan, Choice obtained, and Referral(s) sent    1100, Pt was discussed in IDT rounds. Pt not medically cleared. Home health order placed by Dr. Poon.    Escalations Completed: None    Medically Clear: No    Next Steps: CM will continue to assist Pt with discharge needs.      Barriers to Discharge: Medical clearance    Is the patient up for discharge tomorrow: No

## 2024-07-03 NOTE — DISCHARGE PLANNING
TCN following. HTH/SCP chart reviewed. Collaborated with RENÉ Brennan. No new TCN needs identified in discharge planning at this time as choice previously obtained for current anticipated discharge plan of return home with resumption of [facility], OP Palliative and other services as appropriate. Per review, noted patient acceptance to [facility]. Please see prior TCN and CM note from 7/1/2024 for most recent discharge planning considerations if indicated.      TCN will continue to follow and collaborate with discharge planning team as appropriate. Thank you.      Completed:  [facility] accepted  Choice obtained: HH (resumption of [facility]) and resumption of tube feedings through [facility]  SCP with [facility]. Per review, patient is scheduled for Virtual Video Established Patient  with Nurse Practitioner ZULEYKA Stock Wednesday Jul 10, 2024 2:25 PM

## 2024-07-03 NOTE — PROGRESS NOTES
Bedside report received from [facility]. Pt assessment complete. Pt is nonverbal and unable to follow commands. Reviewed plan of care with pts family at bedside. Pt tele monitored. Chart and labs reviewed. Bed in lowest position, and 3 side rails up. Pt educated on call lights, call light within reach. Hourly rounding in place

## 2024-07-03 NOTE — CARE PLAN
The patient is Watcher - Medium risk of patient condition declining or worsening    Shift Goals  Clinical Goals: DC planning  Patient Goals: jayla  Family Goals: updates      Problem: Fall Risk  Goal: Patient will remain free from falls  Outcome: Progressing       Problem: Knowledge Deficit - Standard  Goal: Patient and family/care givers will demonstrate understanding of plan of care, disease process/condition, diagnostic tests and medications  Outcome: Not Met

## 2024-07-04 ENCOUNTER — APPOINTMENT (OUTPATIENT)
Dept: RADIOLOGY | Facility: MEDICAL CENTER | Age: 74
DRG: 640 | End: 2024-07-04
Attending: INTERNAL MEDICINE
Payer: MEDICARE

## 2024-07-04 ENCOUNTER — HOME CARE VISIT (OUTPATIENT)
Dept: HOSPICE | Facility: HOSPICE | Age: 74
End: 2024-07-04
Payer: MEDICARE

## 2024-07-04 PROCEDURE — 96372 THER/PROPH/DIAG INJ SC/IM: CPT

## 2024-07-04 PROCEDURE — A9270 NON-COVERED ITEM OR SERVICE: HCPCS | Performed by: INTERNAL MEDICINE

## 2024-07-04 PROCEDURE — G0378 HOSPITAL OBSERVATION PER HR: HCPCS

## 2024-07-04 PROCEDURE — A9270 NON-COVERED ITEM OR SERVICE: HCPCS | Performed by: STUDENT IN AN ORGANIZED HEALTH CARE EDUCATION/TRAINING PROGRAM

## 2024-07-04 PROCEDURE — 99233 SBSQ HOSP IP/OBS HIGH 50: CPT | Mod: 25 | Performed by: INTERNAL MEDICINE

## 2024-07-04 PROCEDURE — 700102 HCHG RX REV CODE 250 W/ 637 OVERRIDE(OP): Performed by: INTERNAL MEDICINE

## 2024-07-04 PROCEDURE — 700117 HCHG RX CONTRAST REV CODE 255: Mod: JZ | Performed by: INTERNAL MEDICINE

## 2024-07-04 PROCEDURE — 700111 HCHG RX REV CODE 636 W/ 250 OVERRIDE (IP): Mod: JZ | Performed by: STUDENT IN AN ORGANIZED HEALTH CARE EDUCATION/TRAINING PROGRAM

## 2024-07-04 PROCEDURE — 700111 HCHG RX REV CODE 636 W/ 250 OVERRIDE (IP): Mod: JZ | Performed by: INTERNAL MEDICINE

## 2024-07-04 PROCEDURE — 99215 OFFICE O/P EST HI 40 MIN: CPT | Performed by: PSYCHIATRY & NEUROLOGY

## 2024-07-04 PROCEDURE — 70553 MRI BRAIN STEM W/O & W/DYE: CPT

## 2024-07-04 PROCEDURE — 96376 TX/PRO/DX INJ SAME DRUG ADON: CPT

## 2024-07-04 PROCEDURE — 700102 HCHG RX REV CODE 250 W/ 637 OVERRIDE(OP): Performed by: STUDENT IN AN ORGANIZED HEALTH CARE EDUCATION/TRAINING PROGRAM

## 2024-07-04 PROCEDURE — 99497 ADVNCD CARE PLAN 30 MIN: CPT | Performed by: INTERNAL MEDICINE

## 2024-07-04 PROCEDURE — A9579 GAD-BASE MR CONTRAST NOS,1ML: HCPCS | Mod: JZ | Performed by: INTERNAL MEDICINE

## 2024-07-04 RX ORDER — OXYCODONE HCL 20 MG/ML
5 CONCENTRATE, ORAL ORAL EVERY 4 HOURS PRN
Status: DISCONTINUED | OUTPATIENT
Start: 2024-07-04 | End: 2024-07-04

## 2024-07-04 RX ORDER — LORAZEPAM 2 MG/ML
0.5 INJECTION INTRAMUSCULAR ONCE
Status: ACTIVE | OUTPATIENT
Start: 2024-07-04 | End: 2024-07-05

## 2024-07-04 RX ORDER — OXYCODONE HYDROCHLORIDE 5 MG/1
5 TABLET ORAL EVERY 4 HOURS PRN
Status: DISCONTINUED | OUTPATIENT
Start: 2024-07-04 | End: 2024-07-04

## 2024-07-04 RX ORDER — OXYCODONE HCL 20 MG/ML
5 CONCENTRATE, ORAL ORAL EVERY 4 HOURS PRN
Status: DISCONTINUED | OUTPATIENT
Start: 2024-07-04 | End: 2024-07-07

## 2024-07-04 RX ADMIN — FAMOTIDINE 20 MG: 10 INJECTION, SOLUTION INTRAVENOUS at 16:49

## 2024-07-04 RX ADMIN — MINERAL OIL, AND WHITE PETROLATUM 1 APPLICATION: 425; 573 OINTMENT OPHTHALMIC at 22:00

## 2024-07-04 RX ADMIN — ASPIRIN 81 MG: 81 TABLET, CHEWABLE ORAL at 05:04

## 2024-07-04 RX ADMIN — MINERAL OIL, AND WHITE PETROLATUM 1 APPLICATION: 425; 573 OINTMENT OPHTHALMIC at 05:04

## 2024-07-04 RX ADMIN — METOPROLOL TARTRATE 100 MG: 50 TABLET, FILM COATED ORAL at 16:50

## 2024-07-04 RX ADMIN — METOPROLOL TARTRATE 100 MG: 50 TABLET, FILM COATED ORAL at 05:04

## 2024-07-04 RX ADMIN — FAMOTIDINE 20 MG: 10 INJECTION, SOLUTION INTRAVENOUS at 05:04

## 2024-07-04 RX ADMIN — CEFUROXIME AXETIL 500 MG: 500 TABLET ORAL at 08:36

## 2024-07-04 RX ADMIN — METOCLOPRAMIDE 10 MG: 5 INJECTION, SOLUTION INTRAMUSCULAR; INTRAVENOUS at 13:03

## 2024-07-04 RX ADMIN — OXYCODONE HYDROCHLORIDE 5 MG: 20 SOLUTION ORAL at 23:39

## 2024-07-04 RX ADMIN — METOCLOPRAMIDE 10 MG: 5 INJECTION, SOLUTION INTRAMUSCULAR; INTRAVENOUS at 23:39

## 2024-07-04 RX ADMIN — METOCLOPRAMIDE 10 MG: 5 INJECTION, SOLUTION INTRAMUSCULAR; INTRAVENOUS at 00:58

## 2024-07-04 RX ADMIN — METOCLOPRAMIDE 10 MG: 5 INJECTION, SOLUTION INTRAMUSCULAR; INTRAVENOUS at 16:49

## 2024-07-04 RX ADMIN — ENOXAPARIN SODIUM 40 MG: 100 INJECTION SUBCUTANEOUS at 16:49

## 2024-07-04 RX ADMIN — OXYCODONE HYDROCHLORIDE 5 MG: 20 SOLUTION ORAL at 15:04

## 2024-07-04 RX ADMIN — ATORVASTATIN CALCIUM 20 MG: 20 TABLET, FILM COATED ORAL at 16:50

## 2024-07-04 RX ADMIN — CEFUROXIME AXETIL 500 MG: 500 TABLET ORAL at 20:53

## 2024-07-04 RX ADMIN — MINERAL OIL, AND WHITE PETROLATUM 1 APPLICATION: 425; 573 OINTMENT OPHTHALMIC at 13:03

## 2024-07-04 RX ADMIN — LANSOPRAZOLE 30 MG: 30 TABLET, ORALLY DISINTEGRATING ORAL at 05:03

## 2024-07-04 RX ADMIN — METOCLOPRAMIDE 10 MG: 5 INJECTION, SOLUTION INTRAMUSCULAR; INTRAVENOUS at 05:04

## 2024-07-04 RX ADMIN — GADOTERIDOL 15 ML: 279.3 INJECTION, SOLUTION INTRAVENOUS at 07:00

## 2024-07-04 RX ADMIN — LOSARTAN POTASSIUM 50 MG: 50 TABLET, FILM COATED ORAL at 05:04

## 2024-07-04 ASSESSMENT — FIBROSIS 4 INDEX: FIB4 SCORE: 1.55

## 2024-07-04 ASSESSMENT — PAIN DESCRIPTION - PAIN TYPE
TYPE: ACUTE PAIN
TYPE: ACUTE PAIN

## 2024-07-04 NOTE — PROGRESS NOTES
Hospital Medicine Daily Progress Note    Date of Service  7/3/2024    Chief Complaint  Jairo Rivas is a 73 y.o. female admitted 6/30/2024 with regurgitation of tube feeding and hypernatremia    Hospital Course  Patient presented with regurgitation of tube feeding, per daughter she had been constipated for time but was given a laxative and had multiple bowel movements but since is still unable to tolerate tube feeding at home, presented with sodium of 154 which improved with half-normal saline and increased free water per G-tube    Interval Problem Update  not much stool on the KUB, predominantly located in the right lower quadrant but patient has been started on MiraLAX to see if we can get that to move.  She is tolerating her tube feeding thus far.  CTA negative for PE.  Heart rate overall improved but she does occasionally get over 100 but for the most part has been 80s to 90s.  Patient is awake when seen but remains nonverbal.    I passed by the room multiple times throughout the day and daughter was never present.    Apparently they are requesting MRI for follow-up on her stroke and her metastatic disease and this will be ordered.  Her previous record was reviewed, apparently at 1 point patient was made DNR/DNI during that stay and family became very upset and she was changed back to full code.    Perhaps if MRI shows progression of her cancer we can revisit the issue of advanced directives.      I have discussed this patient's plan of care and discharge plan at IDT rounds today with Case Management, Nursing, Nursing leadership, and other members of the IDT team.    Consultants/Specialty  None    Code Status  Full Code    Disposition  The patient is not medically cleared for discharge to home or a post-acute facility.    [facility] orders placed    I have placed the appropriate orders for post-discharge needs.    Review of Systems  Review of Systems   Unable to perform ROS: Patient  nonverbal        Physical Exam  Temp:  [36.6 °C (97.9 °F)-37.2 °C (99 °F)] 36.6 °C (97.9 °F)  Pulse:  [] 103  Resp:  [12] 12  BP: (101-142)/(54-67) 129/66  SpO2:  [93 %-100 %] 93 %    Physical Exam  Vitals and nursing note reviewed.   Constitutional:       General: She is not in acute distress.     Appearance: She is not ill-appearing (Chronically only).   HENT:      Head: Normocephalic and atraumatic.   Eyes:      Pupils: Pupils are equal, round, and reactive to light.   Cardiovascular:      Rate and Rhythm: Normal rate and regular rhythm.   Pulmonary:      Effort: Pulmonary effort is normal.      Breath sounds: Normal breath sounds.   Abdominal:      General: There is distension (Firm not changed).      Tenderness: There is no abdominal tenderness (Does not grimace today).      Comments: Decreased bowel sounds   Musculoskeletal:      Comments: Variable amounts of edema depending on the limb   Neurological:      Mental Status: She is alert. Mental status is at baseline.      Cranial Nerves: No cranial nerve deficit.      Comments: Eyes open and she does appear to look at me   Psychiatric:      Comments: Unable to determine         Fluids    Intake/Output Summary (Last 24 hours) at 7/3/2024 1952  Last data filed at 7/3/2024 1800  Gross per 24 hour   Intake 200 ml   Output 500 ml   Net -300 ml       Laboratory  Recent Labs     07/02/24  0017   WBC 5.8   RBC 3.19*   HEMOGLOBIN 10.6*   HEMATOCRIT 35.3*   .7*   MCH 33.2*   MCHC 30.0*   RDW 65.7*   PLATELETCT 242   MPV 11.2     Recent Labs     07/01/24  0237 07/01/24  1450 07/02/24  0017 07/02/24  0613 07/02/24  1752 07/03/24  0018 07/03/24  0615   SODIUM 152*   < > 147*  146*   < > 142 142 141   POTASSIUM 3.6  --  3.9  --   --  4.0  --    CHLORIDE 107  --  104  --   --  102  --    CO2 33  --  25  --   --  28  --    GLUCOSE 111*  --  112*  --   --  288*  --    BUN 5*  --  5*  --   --  13  --    CREATININE 0.17*  --  0.31*  --   --  0.35*  --    CALCIUM 9.8   "--  9.5  --   --  9.0  --     < > = values in this interval not displayed.                   Imaging  CT-CTA CHEST PULMONARY ARTERY W/ RECONS   Final Result      1.  No evidence of pulmonary was.   2.  Trace bilateral pleural effusions with associated compressive atelectasis and/or consolidation. Underlying infection is possible.   3.  Coronary artery disease.            [id number] VIEW   Final Result      1.  No evidence of bowel obstruction.   2.  Contrast present throughout the colon.   3.  Supportive tubing as described.      DX-G.I. TUBE INJECTION, ANY TYPE   Final Result      Intraluminal position of gastrostomy catheter.      DX-ABDOMEN FOR TUBE PLACEMENT   Final Result      Gastrostomy tube projects over the expected position of the stomach assuming the gastrostomy is appropriately located      DX-CHEST-PORTABLE (1 VIEW)   Final Result      Mild volume loss and linear opacities in the left mid and lower lung which may be scarring versus mild infiltrates.      MR-BRAIN-WITH & W/O    (Results Pending)        Assessment/Plan  * Hypernatremia- (present on admission)  Assessment & Plan  Sodium 154  Now down to 141, changing IV fluids and free water  Patient was not tolerating any intake through her G-tube due to regurgitation so likely became volume depleted    GERD (gastroesophageal reflux disease)- (present on admission)  Assessment & Plan  G-tube in place  Continue PPI    Dysphagia- (present on admission)  Assessment & Plan  Patient has G tube but as per family she was regurgitation up the food from tube feed  She is back on tube feedings but continues to intermittently groan, her abdomen is firm but KUB only shows contrast from previous study no clear evidence of stool, continue to monitor    Advance care planning- (present on admission)  Assessment & Plan  Discussed with family with the patient's wishes would be in terms of resuscitation, after lengthy discussion they concluded that \"she wants to live\" and " would like to be full code.  Advance care planning time spent 18 minutes    Hypokalemia- (present on admission)  Assessment & Plan  Mild, 3.2, replacement ordered    Essential hypertension- (present on admission)  Assessment & Plan  Continue losartan and metoprolol via G-tube         VTE prophylaxis:    enoxaparin ppx      I have performed a physical exam and reviewed and updated ROS and Plan today (7/3/2024). In review of yesterday's note (7/2/2024), there are no changes except as documented above.

## 2024-07-04 NOTE — CONSULTS
Neurology Initial Consult H&P  Neurohospitalist Service, [facility]    Referring Physician: Ray Nguyễn M.D.    Chief Complaint   Patient presents with    Feeding Tube Problem     X1wk per daughter when she gives feeding liquid comes back up to mouth , pt started coughing       HPI: Jairo Rivas is a 73 year old woman with metastatic endometrial cancer, including to brain and meninges, G-tube dependent, baseline aphasia and R side weakness secondary to prior stroke. She is currently admitted to the Medicine service since 6/30 after presenting to ER with confusion, hypernatremia and feeding tube difficulties.  Family requested MRI brain given worsening neurologic decline, and it revealed new non-obstructive hydrocephalus combined with new acute infarct burden in the left cerebellum and left cerebellar peduncle.   Stroke Neurology consult requested to assist with further management.  Patient is essentially obtunded, and unable to participate in history taking. On chart review, she has been on ASA, statin therapy, and anti-hypertensives.  She had stroke burden on previous MRI in April 2024, and Stroke Neurology at that time suggested transition to anticoagulation and it does not appear this happened due to evidence of hemorrhagic sequelae in the brain, as well as multiple ongoing procedures.  Unable to complete ROS at this time.    Review of systems: In addition to what is detailed in the HPI above, all other systems reviewed and are negative.    Past Medical History:    has a past medical history of Anemia (2/6/2018), Back pain, Back pain, Cancer (HCC) (2019), Cough, CVA, old, hemiparesis (HCC) (2/6/2018), Essential hypertension (2/6/2018), Eye drainage, Fatigue, Frequent headaches, Frequent urination, Hyperlipidemia, Irregular periods, Painful joint, Palpitations, Post-menopause bleeding (2/6/2018), Pulmonary nodule, Sore muscles, Stroke (HCC), Swelling of lower extremity, Vision  abnormalities (2/6/2018), Vitamin D deficiency (2/6/2018), Weakness, and Wears glasses.    FHx:  family history includes Hypertension in her mother; No Known Problems in her brother and father.    SHx:   reports that she has never smoked. She has never used smokeless tobacco. She reports that she does not drink alcohol and does not use drugs.    Allergies:  Allergies   Allergen Reactions    Levaquin Itching and Swelling     Swelling of the tongue and face    Penicillins Itching and Swelling    Tramadol Itching and Swelling       Medications:    Current Facility-Administered Medications:     LORazepam (Ativan) injection 0.5 mg, 0.5 mg, Intravenous, Once, Ray Nguyễn M.D.    gadoteridol (Prohance) injection 15 mL, 15 mL, Intravenous, Once, Kell Poon M.D.    oxyCODONE 20 MG/ML concentrate 5 mg, 5 mg, Oral, Q4HRS PRN, Ray Nguyễn M.D.    polyethylene glycol/lytes (Miralax) Packet 1 Packet, 1 Packet, Enteral Tube, TID, Kell Poon M.D., 1 Packet at 07/03/24 1700    cefUROXime (Ceftin) tablet 500 mg, 500 mg, Enteral Tube, Q12HRS, Kell Poon M.D., 500 mg at 07/04/24 0836    metoprolol tartrate (Lopressor) tablet 100 mg, 100 mg, Enteral Tube, BID, Kell Poon M.D., 100 mg at 07/04/24 0504    metoclopramide (Reglan) injection 10 mg, 10 mg, Intravenous, Q6HRS, Ray Nguyễn M.D., 10 mg at 07/04/24 0504    famotidine (Pepcid) injection 20 mg, 20 mg, Intravenous, BID, Ray Nguyễn M.D., 20 mg at 07/04/24 0504    enoxaparin (Lovenox) inj 40 mg, 40 mg, Subcutaneous, DAILY AT 1800, Eloy Painting M.D., 40 mg at 07/03/24 1659    artificial tears (Eye Lubricant) ophth ointment 1 Application, 1 Application, Both Eyes, Q8HRS, Eloy Painting M.D., 1 Application at 07/04/24 0504    aspirin (Asa) chewable tab 81 mg, 81 mg, Enteral Tube, DAILY, Eloy Painting M.D., 81 mg at 07/04/24 0504    atorvastatin (Lipitor) tablet 20 mg, 20 mg, Enteral Tube, Q EVENING, Eloy Painting M.D., 20 mg at  "07/03/24 1659    lansoprazole (Prevacid) solutab 30 mg, 30 mg, Enteral Tube, DAILY, Eloy Painting M.D., 30 mg at 07/04/24 0503    LORazepam (Ativan) 2 MG/ML oral conc 1 mg, 1 mg, Oral, Q2HRS PRN, Eloy Painting M.D.    losartan (Cozaar) tablet 50 mg, 50 mg, Enteral Tube, DAILY, Eloy Painting M.D., 50 mg at 07/04/24 0504    Physical Examination:     General: Patient islobtunded  Eye: Examination of optic disks not indicated at this time given acuity of consult  CV: regular rate     NEUROLOGICAL EXAM:     /58   Pulse 95   Temp 36.9 °C (98.5 °F) (Temporal)   Resp 18   Ht 1.753 m (5' 9\")   Wt 82.5 kg (181 lb 14.1 oz)   SpO2 94%   BMI 26.86 kg/m²       Mental status:  Obtunded non interactive  Speech and language: Non-verbal, no command following  Cranial nerve exam:  Eyes closed, face grossly symmetric  Motor exam: RUE is in flexion contracture.  LUE with flexion response to tactile, bilateral LE with no movement to tactile  Sensory exam: As above  Coordination: No significant movement to test  Gait: Deferred due to patient preference      NIHSS: National Institutes of Health Stroke Scale    [2] 1a:Level of Consciousness    0-alert 1-drowsy   2-stupor   3-coma  [2] 1b:LOC Questions                  0-both  1-one      2-neither  [2] 1c:LOC Commands                   0-both  1-one      2-neither  [0] 2: Best Gaze                     0-nl    1-partial  2-forced  [0] 3: Visual Fields                   0-nl    1-partial  2-complete 3-bilat  [0] 4: Facial Paresis                0-nl    1-minor    2-partial  3-full  MOTOR                       0-nl  [3] 5: Right Arm           1-drift  [2] 6: Left Arm             2-some effort vs gravity  [3] 7: Right Leg           3-no effort vs gravity  [3] 8: Left Leg             4-no movement                             x-untestable  [0] 9: Limb Ataxia                    0-abs   1-1_limb   2-2+_limbs       x-untestable  [0] 10:Sensory                        0-nl   "  1-partial  2-dense  [3] 11:Best Language/Aphasia         0-nl    1-mild/mod 2-severe   3-mute  [x] 12:Dysarthria                     0-nl    1-mild/mod 2-severe       x-untestable  [0] 13:Neglect/Inattention            0-none  1-partial  2-complete  [20] TOTAL      Objective Data:    Labs:  Lab Results   Component Value Date/Time    PROTHROMBTM 13.7 05/07/2024 08:16 AM    INR 1.04 05/07/2024 08:16 AM      Lab Results   Component Value Date/Time    WBC 5.8 07/02/2024 12:17 AM    RBC 3.19 (L) 07/02/2024 12:17 AM    HEMOGLOBIN 10.6 (L) 07/02/2024 12:17 AM    HEMATOCRIT 35.3 (L) 07/02/2024 12:17 AM    .7 (H) 07/02/2024 12:17 AM    MCH 33.2 (H) 07/02/2024 12:17 AM    MCHC 30.0 (L) 07/02/2024 12:17 AM    MPV 11.2 07/02/2024 12:17 AM    NEUTSPOLYS 72.40 (H) 06/30/2024 01:05 PM    LYMPHOCYTES 16.30 (L) 06/30/2024 01:05 PM    MONOCYTES 9.80 06/30/2024 01:05 PM    EOSINOPHILS 0.00 06/30/2024 01:05 PM    EOSINOPHILS 1 07/29/2021 02:30 PM    BASOPHILS 0.50 06/30/2024 01:05 PM    HYPOCHROMIA 1+ 02/26/2021 03:14 PM    ANISOCYTOSIS 1+ 06/30/2024 01:05 PM      Lab Results   Component Value Date/Time    SODIUM 141 07/03/2024 06:15 AM    POTASSIUM 4.0 07/03/2024 12:18 AM    CHLORIDE 102 07/03/2024 12:18 AM    CO2 28 07/03/2024 12:18 AM    GLUCOSE 288 (H) 07/03/2024 12:18 AM    BUN 13 07/03/2024 12:18 AM    CREATININE 0.35 (L) 07/03/2024 12:18 AM      Lab Results   Component Value Date/Time    CHOLSTRLTOT 166 04/25/2024 04:53 AM    LDL 97 04/25/2024 04:53 AM    HDL 47 04/25/2024 04:53 AM    TRIGLYCERIDE 109 04/25/2024 04:53 AM       Lab Results   Component Value Date/Time    ALKPHOSPHAT 171 (H) 07/01/2024 02:37 AM    ASTSGOT 14 07/01/2024 02:37 AM    ALTSGPT 7 07/01/2024 02:37 AM    TBILIRUBIN 0.5 07/01/2024 02:37 AM        Imaging/Testing:    I interpreted and/or reviewed the patient's neuroimaging    MR-BRAIN-WITH & W/O   Final Result      1.  Interval development of marked hydrocephalus with dilatation of the lateral  ventricles, third ventricle, and fourth ventricle with transependymal edema. Meningeal enhancement at the prepontine cistern which appears to extend into the right IAC is    highly suspicious for meningeal carcinomatosis which may be pertinent to the development of hydrocephalus.   2.  Encephalomalacic changes consistent with prior infarction right frontal convexity, right parietal-occipital lobe, left subinsular white matter, left posterior limb internal capsule, left posterior-lateral thalamus.   3.  Subcentimeter focus of acute infarction at the right anterior hippocampus.   4.  Treated metastatic deposit at the right frontal convexity with further interval decrease in enhancement. Hemosiderin deposition again noted. No evidence of local recurrence/progression.   5.  Foci of hemosiderin deposition left posterior temporal lobe and left parietal lobe also most consistent with treated metastatic deposits. These foci of hemosiderin deposition were also present on prior MRI study.   6.  No new enhancing intra-axial metastatic deposits in the brain parenchyma in the supratentorial or infratentorial compartment.   7.  Old lacunar infarct left side of the elanie.   8.  New acute infarction at the left middle cerebellar peduncle and lateral left cerebellar hemisphere. No hemorrhagic transformation.   9.  A Voalte message was sent to ALVIN NORRIS at 10:42 AM 7/4/2024.      CT-CTA CHEST PULMONARY ARTERY W/ RECONS   Final Result      1.  No evidence of pulmonary was.   2.  Trace bilateral pleural effusions with associated compressive atelectasis and/or consolidation. Underlying infection is possible.   3.  Coronary artery disease.            [id number] VIEW   Final Result      1.  No evidence of bowel obstruction.   2.  Contrast present throughout the colon.   3.  Supportive tubing as described.      DX-G.I. TUBE INJECTION, ANY TYPE   Final Result      Intraluminal position of gastrostomy catheter.      DX-ABDOMEN  FOR TUBE PLACEMENT   Final Result      Gastrostomy tube projects over the expected position of the stomach assuming the gastrostomy is appropriately located      DX-CHEST-PORTABLE (1 VIEW)   Final Result      Mild volume loss and linear opacities in the left mid and lower lung which may be scarring versus mild infiltrates.          Assessment and Plan:  Jairo Rivas is a 73 year old woman with metastatic cancer to the brain, with interval development of recurrent strokes and non-obstructive hydrocephalus.  I suspect that these are complications related to the her cancer.  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  These processes are progressive and irreversible.  Switching ASA to apixaban therapy may slightly decrease risk for recurrent strokes, however exposes her to potential bleeding diathesis.    Given baseline poor functional status, combined with clear evidence of declining clinical trajectory despite medical supportive therapies, Carlene is in the process of actively dying.  She remains at high risk for imminent catastrophic events.  This has been communicated by primary service to the family.    Problem list:   Recurrent strokes   Metastatic cancer   Failure to thrive    Plan:   - I do not recommend any additional neurodiagnostics   - may stop ASA/lovenox SQ and transition to apixaban 5mg BID   - please reconsult Stroke Neurology with any additional questions or concerns    The evaluation of the patient, and recommended management, was discussed with Dr. Nguyễn, attending hospitalist.     Milton Mcdowell MD  Vascular Neurology

## 2024-07-04 NOTE — CARE PLAN
The patient is Watcher - Medium risk of patient condition declining or worsening    Shift Goals  Clinical Goals: monitor HR, q2 turns  Patient Goals: comfort  Family Goals: updates    Progress made toward(s) clinical / shift goals:  vital signs per flowsheets. Q2 turns per flowsheets. Pt repositioned to promote comfort. Family provided with updates.       Problem: Knowledge Deficit - Standard  Goal: Patient and family/care givers will demonstrate understanding of plan of care, disease process/condition, diagnostic tests and medications  Outcome: Progressing  Note: Pt family members participating in plan of care with questions for care team. All questions addressed     Problem: Skin Integrity  Goal: Skin integrity is maintained or improved  Outcome: Progressing  Note: Q2 turns per flowsheets, barrier paste applied, patient cleaned with multiple linen changes.        Patient is not progressing towards the following goals:

## 2024-07-04 NOTE — DISCHARGE PLANNING
Referral Management Team    Hospice referral received via [facility].  I called patient's daughter Kary to discuss hospice and choice. She stated that [facility] did an evaluation on her mom couple of days ago and she would like to proceed with [facility].    Referral sent to [facility] per choice

## 2024-07-04 NOTE — PROGRESS NOTES
Bedside report received from [facility]. Pt assessment complete. Pt nonverbal and unable to follow commands. Reviewed plan of care with pts family at bedside. Pt tele monitored. Chart and labs reviewed. Bed in lowest position, and 3 side rails up. Pt educated on call lights, call light within reach. Hourly rounding in place

## 2024-07-04 NOTE — PROGRESS NOTES
[facility] Medicine Daily Progress Note    Date of Service  7/4/2024    Chief Complaint  Jairo Rivas is a 73 y.o. female admitted 6/30/2024 with regurgitation of tube feeding and hypernatremia    Hospital Course  Patient presented with regurgitation of tube feeding, per daughter she had been constipated for time but was given a laxative and had multiple bowel movements but since is still unable to tolerate tube feeding at home, presented with sodium of 154 which improved with half-normal saline and increased free water per G-tube    Interval Problem Update  not much stool on the KUB, predominantly located in the right lower quadrant but patient has been started on MiraLAX to see if we can get that to move.  She is tolerating her tube feeding thus far.  CTA negative for PE.  Heart rate overall improved but she does occasionally get over 100 but for the most part has been 80s to 90s.  Patient is awake when seen but remains nonverbal.    I passed by the room multiple times throughout the day and daughter was never present.    Apparently they are requesting MRI for follow-up on her stroke and her metastatic disease and this will be ordered.  Her previous record was reviewed, apparently at 1 point patient was made DNR/DNI during that stay and family became very upset and she was changed back to full code.    Perhaps if MRI shows progression of her cancer we can revisit the issue of advanced directives.    7/4: Patient seen and examined, she had an MRI showing worsening hydrocephalus and due to her meningeal carcinoma also new stroke   I consulted neurology and discussed case. 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 These processes are progressive and irreversible. As per neurology switching her Asa to eliquis may slightly decrease risk for recurrent strokes but she is high risk of havin a intracranial bleed.  I have  discussed with patient daughter today regarding her MRI results and regarding risk of bleeding if started on eliquis. Daughter has discussed with other family members and decision has been made by family to make patient DNR/DNI and also would like to talk with hospice  I have placed hospice referral   I have discussed this patient's plan of care and discharge plan at IDT rounds today with Case Management, Nursing, Nursing leadership, and other members of the IDT team.    Consultants/Specialty  None    Code Status  DNAR/DNI    Disposition  [facility] placed    I have placed the appropriate orders for post-discharge needs.    Review of Systems  Review of Systems   Unable to perform ROS: Patient nonverbal        Physical Exam  Temp:  [36.6 °C (97.9 °F)-36.9 °C (98.5 °F)] 36.9 °C (98.4 °F)  Pulse:  [] 92  Resp:  [16-18] 18  BP: ()/(55-68) 124/55  SpO2:  [93 %-97 %] 93 %    Physical Exam  Vitals and nursing note reviewed.   Constitutional:       General: She is not in acute distress.     Appearance: She is not ill-appearing (Chronically only).   HENT:      Head: Normocephalic and atraumatic.   Eyes:      Pupils: Pupils are equal, round, and reactive to light.   Cardiovascular:      Rate and Rhythm: Normal rate and regular rhythm.   Pulmonary:      Effort: Pulmonary effort is normal.      Breath sounds: Normal breath sounds.   Abdominal:      General: There is distension (Firm not changed).      Tenderness: There is no abdominal tenderness (Does not grimace today).      Comments: Decreased bowel sounds   Musculoskeletal:      Comments: Variable amounts of edema depending on the limb   Neurological:      Mental Status: She is alert. Mental status is at baseline.      Cranial Nerves: No cranial nerve deficit.      Comments: Eyes open and she does appear to look at me   Psychiatric:      Comments: Unable to determine         Fluids    Intake/Output Summary (Last 24 hours) at  7/4/2024 1640  Last data filed at 7/4/2024 1200  Gross per 24 hour   Intake 150 ml   Output --   Net 150 ml       Laboratory  Recent Labs     07/02/24  0017   WBC 5.8   RBC 3.19*   HEMOGLOBIN 10.6*   HEMATOCRIT 35.3*   .7*   MCH 33.2*   MCHC 30.0*   RDW 65.7*   PLATELETCT 242   MPV 11.2     Recent Labs     07/02/24  0017 07/02/24  0613 07/02/24  1752 07/03/24  0018 07/03/24  0615   SODIUM 147*  146*   < > 142 142 141   POTASSIUM 3.9  --   --  4.0  --    CHLORIDE 104  --   --  102  --    CO2 25  --   --  28  --    GLUCOSE 112*  --   --  288*  --    BUN 5*  --   --  13  --    CREATININE 0.31*  --   --  0.35*  --    CALCIUM 9.5  --   --  9.0  --     < > = values in this interval not displayed.                   Imaging  MR-BRAIN-WITH & W/O   Final Result      1.  Interval development of marked hydrocephalus with dilatation of the lateral ventricles, third ventricle, and fourth ventricle with transependymal edema. Meningeal enhancement at the prepontine cistern which appears to extend into the right IAC is    highly suspicious for meningeal carcinomatosis which may be pertinent to the development of hydrocephalus.   2.  Encephalomalacic changes consistent with prior infarction right frontal convexity, right parietal-occipital lobe, left subinsular white matter, left posterior limb internal capsule, left posterior-lateral thalamus.   3.  Subcentimeter focus of acute infarction at the right anterior hippocampus.   4.  Treated metastatic deposit at the right frontal convexity with further interval decrease in enhancement. Hemosiderin deposition again noted. No evidence of local recurrence/progression.   5.  Foci of hemosiderin deposition left posterior temporal lobe and left parietal lobe also most consistent with treated metastatic deposits. These foci of hemosiderin deposition were also present on prior MRI study.   6.  No new enhancing intra-axial metastatic deposits in the brain parenchyma in the supratentorial  or infratentorial compartment.   7.  Old lacunar infarct left side of the elaine.   8.  New acute infarction at the left middle cerebellar peduncle and lateral left cerebellar hemisphere. No hemorrhagic transformation.   9.  A Voalte message was sent to ALVIN NORRIS at 10:42 AM 7/4/2024.      CT-CTA CHEST PULMONARY ARTERY W/ RECONS   Final Result      1.  No evidence of pulmonary was.   2.  Trace bilateral pleural effusions with associated compressive atelectasis and/or consolidation. Underlying infection is possible.   3.  Coronary artery disease.            [id number] VIEW   Final Result      1.  No evidence of bowel obstruction.   2.  Contrast present throughout the colon.   3.  Supportive tubing as described.      DX-G.I. TUBE INJECTION, ANY TYPE   Final Result      Intraluminal position of gastrostomy catheter.      DX-ABDOMEN FOR TUBE PLACEMENT   Final Result      Gastrostomy tube projects over the expected position of the stomach assuming the gastrostomy is appropriately located      DX-CHEST-PORTABLE (1 VIEW)   Final Result      Mild volume loss and linear opacities in the left mid and lower lung which may be scarring versus mild infiltrates.           Assessment/Plan  * Hypernatremia- (present on admission)  Assessment & Plan  Sodium 154  Now down to 141, changing IV fluids and free water  Patient was not tolerating any intake through her G-tube due to regurgitation so likely became volume depleted    GERD (gastroesophageal reflux disease)- (present on admission)  Assessment & Plan  G-tube in place  Continue PPI    Dysphagia- (present on admission)  Assessment & Plan  Patient has G tube but as per family she was regurgitation up the food from tube feed  She is back on tube feedings but continues to intermittently groan, her abdomen is firm but KUB only shows contrast from previous study no clear evidence of stool, continue to monitor    Advance care planning- (present on admission)  Assessment &  Plan  I have discussed with patient daughter today regarding her MRI results and regarding risk of bleeding if started on eliquis. Daughter has discussed with other family members and decision has been made by family to make patient DNR/DNI and also would like to talk with hospice  I have placed hospice referral   Time spent on goals of care is 17 min     Hypokalemia- (present on admission)  Assessment & Plan  Mild, 3.2, replacement ordered    Essential hypertension- (present on admission)  Assessment & Plan  Continue losartan and metoprolol via G-tube         VTE prophylaxis: scd    Greater than 51 minutes spent prepping to see patient (e.g. review of tests) obtaining and/or reviewing separately obtained history. Performing a medically appropriate examination and/ evaluation.  Counseling and educating the patient/family/caregiver.  Ordering medications, tests, or procedures.  Referring and communicating with other health care professionals.  Documenting clinical information in EPIC.  Independently interpreting results and communicating results to patient/family/caregiver.  Care coordination.      I have performed a physical exam and reviewed and updated ROS and Plan today (7/4/2024). In review of yesterday's note (7/3/2024), there are no changes except as documented above.

## 2024-07-04 NOTE — CARE PLAN
The patient is Stable - Low risk of patient condition declining or worsening    Shift Goals  Clinical Goals: Brain MRI, monitor HR  Patient Goals: comfort  Family Goals: ZA    Progress made toward(s) clinical / shift goals:      Patient is not progressing towards the following goals:      Problem: Knowledge Deficit - Standard  Goal: Patient and family/care givers will demonstrate understanding of plan of care, disease process/condition, diagnostic tests and medications  Outcome: Progressing     Problem: Skin Integrity  Goal: Skin integrity is maintained or improved  Outcome: Progressing     Problem: Fall Risk  Goal: Patient will remain free from falls  Outcome: Progressing     Problem: Pain - Standard  Goal: Alleviation of pain or a reduction in pain to the patient’s comfort goal  Outcome: Progressing

## 2024-07-05 ENCOUNTER — HOME CARE VISIT (OUTPATIENT)
Dept: HOSPICE | Facility: HOSPICE | Age: 74
End: 2024-07-05
Payer: MEDICARE

## 2024-07-05 PROCEDURE — 700102 HCHG RX REV CODE 250 W/ 637 OVERRIDE(OP): Performed by: INTERNAL MEDICINE

## 2024-07-05 PROCEDURE — 99223 1ST HOSP IP/OBS HIGH 75: CPT | Mod: 25 | Performed by: STUDENT IN AN ORGANIZED HEALTH CARE EDUCATION/TRAINING PROGRAM

## 2024-07-05 PROCEDURE — G0378 HOSPITAL OBSERVATION PER HR: HCPCS

## 2024-07-05 PROCEDURE — 99232 SBSQ HOSP IP/OBS MODERATE 35: CPT | Performed by: STUDENT IN AN ORGANIZED HEALTH CARE EDUCATION/TRAINING PROGRAM

## 2024-07-05 PROCEDURE — A9270 NON-COVERED ITEM OR SERVICE: HCPCS | Performed by: INTERNAL MEDICINE

## 2024-07-05 PROCEDURE — 700111 HCHG RX REV CODE 636 W/ 250 OVERRIDE (IP): Mod: JZ | Performed by: STUDENT IN AN ORGANIZED HEALTH CARE EDUCATION/TRAINING PROGRAM

## 2024-07-05 PROCEDURE — 96376 TX/PRO/DX INJ SAME DRUG ADON: CPT

## 2024-07-05 PROCEDURE — A9270 NON-COVERED ITEM OR SERVICE: HCPCS | Performed by: STUDENT IN AN ORGANIZED HEALTH CARE EDUCATION/TRAINING PROGRAM

## 2024-07-05 PROCEDURE — 99497 ADVNCD CARE PLAN 30 MIN: CPT | Performed by: STUDENT IN AN ORGANIZED HEALTH CARE EDUCATION/TRAINING PROGRAM

## 2024-07-05 PROCEDURE — 700111 HCHG RX REV CODE 636 W/ 250 OVERRIDE (IP): Mod: JZ | Performed by: INTERNAL MEDICINE

## 2024-07-05 PROCEDURE — 96372 THER/PROPH/DIAG INJ SC/IM: CPT

## 2024-07-05 PROCEDURE — 700102 HCHG RX REV CODE 250 W/ 637 OVERRIDE(OP): Performed by: STUDENT IN AN ORGANIZED HEALTH CARE EDUCATION/TRAINING PROGRAM

## 2024-07-05 RX ORDER — CALCIUM CARBONATE 500 MG/1
1000 TABLET, CHEWABLE ORAL 3 TIMES DAILY PRN
Status: DISCONTINUED | OUTPATIENT
Start: 2024-07-05 | End: 2024-07-08 | Stop reason: HOSPADM

## 2024-07-05 RX ADMIN — FAMOTIDINE 20 MG: 10 INJECTION, SOLUTION INTRAVENOUS at 05:47

## 2024-07-05 RX ADMIN — METOCLOPRAMIDE 10 MG: 5 INJECTION, SOLUTION INTRAMUSCULAR; INTRAVENOUS at 16:30

## 2024-07-05 RX ADMIN — LANSOPRAZOLE 30 MG: 30 TABLET, ORALLY DISINTEGRATING ORAL at 05:52

## 2024-07-05 RX ADMIN — METOPROLOL TARTRATE 100 MG: 50 TABLET, FILM COATED ORAL at 05:52

## 2024-07-05 RX ADMIN — METOCLOPRAMIDE 10 MG: 5 INJECTION, SOLUTION INTRAMUSCULAR; INTRAVENOUS at 05:47

## 2024-07-05 RX ADMIN — MINERAL OIL, AND WHITE PETROLATUM 1 APPLICATION: 425; 573 OINTMENT OPHTHALMIC at 12:00

## 2024-07-05 RX ADMIN — METOPROLOL TARTRATE 100 MG: 50 TABLET, FILM COATED ORAL at 16:30

## 2024-07-05 RX ADMIN — LOSARTAN POTASSIUM 50 MG: 50 TABLET, FILM COATED ORAL at 05:52

## 2024-07-05 RX ADMIN — ASPIRIN 81 MG: 81 TABLET, CHEWABLE ORAL at 05:52

## 2024-07-05 RX ADMIN — ATORVASTATIN CALCIUM 20 MG: 20 TABLET, FILM COATED ORAL at 16:30

## 2024-07-05 RX ADMIN — ENOXAPARIN SODIUM 40 MG: 100 INJECTION SUBCUTANEOUS at 16:29

## 2024-07-05 RX ADMIN — OXYCODONE HYDROCHLORIDE 5 MG: 20 SOLUTION ORAL at 16:30

## 2024-07-05 RX ADMIN — MINERAL OIL, AND WHITE PETROLATUM 1 APPLICATION: 425; 573 OINTMENT OPHTHALMIC at 20:12

## 2024-07-05 RX ADMIN — CEFUROXIME AXETIL 500 MG: 500 TABLET ORAL at 20:10

## 2024-07-05 RX ADMIN — METOCLOPRAMIDE 10 MG: 5 INJECTION, SOLUTION INTRAMUSCULAR; INTRAVENOUS at 11:48

## 2024-07-05 RX ADMIN — CEFUROXIME AXETIL 500 MG: 500 TABLET ORAL at 07:51

## 2024-07-05 RX ADMIN — OXYCODONE HYDROCHLORIDE 5 MG: 20 SOLUTION ORAL at 11:49

## 2024-07-05 RX ADMIN — FAMOTIDINE 20 MG: 10 INJECTION, SOLUTION INTRAVENOUS at 16:30

## 2024-07-05 RX ADMIN — MINERAL OIL, AND WHITE PETROLATUM 1 APPLICATION: 425; 573 OINTMENT OPHTHALMIC at 06:00

## 2024-07-05 ASSESSMENT — PAIN DESCRIPTION - PAIN TYPE: TYPE: ACUTE PAIN

## 2024-07-05 ASSESSMENT — FIBROSIS 4 INDEX
FIB4 SCORE: 1.55
FIB4 SCORE: 1.55

## 2024-07-05 NOTE — THERAPY
Occupational Therapy Contact Note    Patient Name: Jairo Rivas  Age:  73 y.o., Sex:  female  Medical Record #: [id number]  Today's Date: 7/5/2024 07/05/24 1521   Treatment Variance   Reason For Missed Therapy Medical - Other (Please Comment)  (awaiting GOC)   Interdisciplinary Plan of Care Collaboration   IDT Collaboration with  Family / Caregiver;Nursing   Collaboration Comments OT consult rec'd. Pt awaiting GOC, hospice meeting completed today with family. Discussed with daughter who would like to keep therapies following until final GOC established. Will continue to follow. Prior admit pt was total care for all ADLs and all mobility.   Session Information   Date / Session Number  7/5, HOLD. awaiting GOC

## 2024-07-05 NOTE — CARE PLAN
The patient is Watcher - Medium risk of patient condition declining or worsening    Shift Goals  Clinical Goals: monitor HR, q2 turns  Patient Goals: comfort  Family Goals: updates    Progress made toward(s) clinical / shift goals:      Patient is not progressing towards the following goals:      Problem: Knowledge Deficit - Standard  Goal: Patient and family/care givers will demonstrate understanding of plan of care, disease process/condition, diagnostic tests and medications  Outcome: Progressing     Problem: Skin Integrity  Goal: Skin integrity is maintained or improved  Outcome: Progressing     Problem: Fall Risk  Goal: Patient will remain free from falls  Outcome: Progressing     Problem: Pain - Standard  Goal: Alleviation of pain or a reduction in pain to the patient’s comfort goal  Outcome: Progressing

## 2024-07-05 NOTE — PROGRESS NOTES
Hospital Medicine Daily Progress Note    Date of Service  7/5/2024    Chief Complaint  Jairo Rivas is a 73 y.o. female admitted 6/30/2024 with tube feeding concern.    Hospital Course  Jairo Rivas is a 73-year-old female with PMHx GERD, HTN, endometrial carcinoma with brain metastases and subsequent vasogenic edema, history of CVA, tube feed dependent.  Admitted 6/30 for aspiration and tube feeding concerns.    G-tube evaluation showing appropriate placement.    Given patient's history of stroke and metastatic disease family was requesting MRI.  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  Acute infarct of right anterior right campus.  New metastatic lesions noted.    Goals of care conversation were had with patient and her family.  They have elected to return home with hospice.    Interval Problem Update  7/5: Vital stable overnight.  Intermittent tachycardia is present.  Patient remains on 1 L/min supplemental O2.  Patient is resting comfortably in bed.  Hospice services are being arranged.    I have discussed this patient's plan of care and discharge plan at IDT rounds today with Case Management, Nursing, Nursing leadership, and other members of the IDT team.    Consultants/Specialty  neurology    Code Status  DNAR/DNI    Disposition  The patient is medically cleared for discharge to home or a post-acute facility.  Anticipate discharge to: hospice    I have placed the appropriate orders for post-discharge needs.    Review of Systems  Review of Systems   Unable to perform ROS: Medical condition        Physical Exam  Temp:  [36.8 °C (98.2 °F)-37.2 °C (99 °F)] 37.2 °C (99 °F)  Pulse:  [] 82  Resp:  [18] 18  BP: (107-113)/(61-67) 107/66  SpO2:  [93 %-97 %] 93 %    Physical Exam  Vitals and nursing note reviewed.   Constitutional:       General: She is not in acute distress.     Appearance: She is ill-appearing.      Comments:  Resting comfortably    Cardiovascular:      Rate and Rhythm: Normal rate and regular rhythm.   Pulmonary:      Effort: Pulmonary effort is normal.      Breath sounds: Normal breath sounds.   Skin:     General: Skin is warm and dry.   Neurological:      Mental Status: Mental status is at baseline.   Psychiatric:         Mood and Affect: Mood normal.         Behavior: Behavior normal.         Fluids    Intake/Output Summary (Last 24 hours) at 7/5/2024 1612  Last data filed at 7/5/2024 1158  Gross per 24 hour   Intake 150 ml   Output 1300 ml   Net -1150 ml       Laboratory      Recent Labs     07/02/24  1752 07/03/24  0018 07/03/24  0615   SODIUM 142 142 141   POTASSIUM  --  4.0  --    CHLORIDE  --  102  --    CO2  --  28  --    GLUCOSE  --  288*  --    BUN  --  13  --    CREATININE  --  0.35*  --    CALCIUM  --  9.0  --                    Imaging  MR-BRAIN-WITH & W/O   Final Result      1.  Interval development of marked hydrocephalus with dilatation of the lateral ventricles, third ventricle, and fourth ventricle with transependymal edema. Meningeal enhancement at the prepontine cistern which appears to extend into the right IAC is    highly suspicious for meningeal carcinomatosis which may be pertinent to the development of hydrocephalus.   2.  Encephalomalacic changes consistent with prior infarction right frontal convexity, right parietal-occipital lobe, left subinsular white matter, left posterior limb internal capsule, left posterior-lateral thalamus.   3.  Subcentimeter focus of acute infarction at the right anterior hippocampus.   4.  Treated metastatic deposit at the right frontal convexity with further interval decrease in enhancement. Hemosiderin deposition again noted. No evidence of local recurrence/progression.   5.  Foci of hemosiderin deposition left posterior temporal lobe and left parietal lobe also most consistent with treated metastatic deposits. These foci of hemosiderin deposition were also  present on prior MRI study.   6.  No new enhancing intra-axial metastatic deposits in the brain parenchyma in the supratentorial or infratentorial compartment.   7.  Old lacunar infarct left side of the elaine.   8.  New acute infarction at the left middle cerebellar peduncle and lateral left cerebellar hemisphere. No hemorrhagic transformation.   9.  A Voalte message was sent to ALVIN NORRIS at 10:42 AM 7/4/2024.      CT-CTA CHEST PULMONARY ARTERY W/ RECONS   Final Result      1.  No evidence of pulmonary was.   2.  Trace bilateral pleural effusions with associated compressive atelectasis and/or consolidation. Underlying infection is possible.   3.  Coronary artery disease.            [id number] VIEW   Final Result      1.  No evidence of bowel obstruction.   2.  Contrast present throughout the colon.   3.  Supportive tubing as described.      DX-G.I. TUBE INJECTION, ANY TYPE   Final Result      Intraluminal position of gastrostomy catheter.      DX-ABDOMEN FOR TUBE PLACEMENT   Final Result      Gastrostomy tube projects over the expected position of the stomach assuming the gastrostomy is appropriately located      DX-CHEST-PORTABLE (1 VIEW)   Final Result      Mild volume loss and linear opacities in the left mid and lower lung which may be scarring versus mild infiltrates.           Assessment/Plan  * Advance care planning- (present on admission)  Assessment & Plan  I have discussed with patient daughter today regarding her MRI results and regarding risk of bleeding if started on eliquis. Daughter has discussed with other family members and decision has been made by family to make patient DNR/DNI and also would like to talk with hospice  I have placed hospice referral. Time spent on goals of care is 17 min   Above per previous hospitalist     Hospice care is being arranged with family. Waiting for final confirmation for discharge planning.     Hypernatremia- (present on admission)  Assessment &  Plan  Sodium 154  Now down to 141, changing IV fluids and free water  Patient was not tolerating any intake through her G-tube due to regurgitation so likely became volume depleted    GERD (gastroesophageal reflux disease)- (present on admission)  Assessment & Plan  G-tube in place  Continue PPI    Dysphagia- (present on admission)  Assessment & Plan  Patient has G tube but as per family she was regurgitation up the food from tube feed  She is back on tube feedings but continues to intermittently groan, her abdomen is firm but KUB only shows contrast from previous study no clear evidence of stool, continue to monitor    Hypokalemia- (present on admission)  Assessment & Plan  Mild, 3.2, replacement ordered    Essential hypertension- (present on admission)  Assessment & Plan  Continue losartan and metoprolol via G-tube         VTE prophylaxis:    enoxaparin ppx      I have performed a physical exam and reviewed and updated ROS and Plan today (7/5/2024). In review of yesterday's note (7/4/2024), there are no changes except as documented above.

## 2024-07-05 NOTE — HOSPITAL COURSE
Jairo Rivas is a 73-year-old female with PMHx GERD, HTN, endometrial carcinoma with brain metastases and subsequent vasogenic edema, history of CVA, tube feed dependent.  Admitted 6/30 for aspiration and tube feeding concerns.    G-tube evaluation showing appropriate placement.    Given patient's history of stroke and metastatic disease family was requesting MRI.  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  Acute infarct of right anterior right campus.  New metastatic lesions noted.    Goals of care conversation were had with patient and her family.  They have elected to return home with hospice.

## 2024-07-05 NOTE — DISCHARGE PLANNING
TCN following. HTH/SCP chart reviewed. Collaborated with CM who advised referral sent to hospice and per review noted [facility] is following. As GOC still being established with all children TCN will continue to follow and collaborate with CM.     No new TCN needs identified in discharge planning at this time. Please see prior TCN note from 7/3 for most recent discharge planning considerations if indicated.     Completed:  [facility] accepted  Choice obtained: HH (resumption of [facility]) and resumption of tube feedings through [facility]   SCP with [facility] PCP. Per review, patient is scheduled for Virtual Video Established Patient  with Nurse Practitioner ZULEYKA Stock Wednesday Jul 10, 2024 2:25 PM

## 2024-07-05 NOTE — THERAPY
Physical Therapy Contact Note    Patient Name: Jairo Rivas  Age:  73 y.o., Sex:  female  Medical Record #: [id number]  Today's Date: 7/5/2024 07/05/24 1528   Initial Contact Note    Initial Contact Note Order Received and Verified, Physical Therapy Evaluation in Progress with Full Report to Follow.   Interdisciplinary Plan of Care Collaboration   IDT Collaboration with  Occupational Therapist   Collaboration Comments PT order received.  Family requested a hospice consult.  Per OT, family had consult and is requesting therapy to follow.  PT will follow up at the next available date.   Session Information   Date / Session Number  7/5- needs eval completed  (EVAL)

## 2024-07-05 NOTE — DISCHARGE PLANNING
Case Management Discharge Planning    Admission Date: 6/30/2024  GMLOS: 2.6  ALOS: 0    6-Clicks ADL Score: 6  6-Clicks Mobility Score: 6  PT and/or OT Eval ordered: No  Post-acute Referrals Ordered: Yes  Post-acute Choice Obtained: Yes  Has referral(s) been sent to post-acute provider:  Yes      Anticipated Discharge Dispo: Discharge Disposition: D/T to home under [facility]HA care in anticipation of covered skilled care (06)    DME Needed: No    Action(s) Taken: Updated Provider/Nurse on Discharge Plan    0845, Pt was discussed in IDT rounds for discharge to Home with Hospice.    0940, RN CM sent message to Kia from Hospice referral management team to follow up on updates if Pt can discharge today.    Escalations Completed: None    Medically Clear: Yes for Home with Hospice    Next Steps: CM will continue to assist Pt with discharge needs.      Barriers to Discharge: Pending Placement    Is the patient up for discharge tomorrow: No

## 2024-07-05 NOTE — DISCHARGE PLANNING
Referral Management Team     [facility].   Family was not bedside when they arrived. Call was placed to [facility].   GOC are still being established with all children

## 2024-07-05 NOTE — PROGRESS NOTES
Received bedside report from RN, pt care assumed, VSS, pt assessment complete. Pt resting comfortably in bed. AAOx0, with no physiological signs of pain. No signs of acute distress noted at this time. Bed locked/in lowest position, bed alarm on, call light within reach, hourly rounding initiated.

## 2024-07-05 NOTE — CARE PLAN
The patient is Stable - Low risk of patient condition declining or worsening    Shift Goals  Clinical Goals: q2h turns, monitor HR  Patient Goals: comfort  Family Goals: updates, comfort    Progress made toward(s) clinical / shift goals:    Problem: Knowledge Deficit - Standard  Goal: Patient and family/care givers will demonstrate understanding of plan of care, disease process/condition, diagnostic tests and medications  Outcome: Progressing  Note: Family updated on POC. No questions or concerns     Problem: Skin Integrity  Goal: Skin integrity is maintained or improved  Outcome: Progressing  Note: Patient being turned Q2h      Problem: Fall Risk  Goal: Patient will remain free from falls  Outcome: Progressing     Problem: Pain - Standard  Goal: Alleviation of pain or a reduction in pain to the patient’s comfort goal  Outcome: Progressing  Note: Medicated as needed for pain throughout the shift       Patient is not progressing towards the following goals:

## 2024-07-05 NOTE — DISCHARGE PLANNING
Referral Management Team    Family is requesting for patient to continue tube feedings upon discharge with hospice.    [facility] nurse is meeting with patient and family today at 1100 to discuss discharge goals.

## 2024-07-06 ENCOUNTER — HOME CARE VISIT (OUTPATIENT)
Dept: HOSPICE | Facility: HOSPICE | Age: 74
End: 2024-07-06
Payer: MEDICARE

## 2024-07-06 PROCEDURE — 700111 HCHG RX REV CODE 636 W/ 250 OVERRIDE (IP): Mod: JZ | Performed by: INTERNAL MEDICINE

## 2024-07-06 PROCEDURE — 700111 HCHG RX REV CODE 636 W/ 250 OVERRIDE (IP): Mod: JZ | Performed by: STUDENT IN AN ORGANIZED HEALTH CARE EDUCATION/TRAINING PROGRAM

## 2024-07-06 PROCEDURE — 700102 HCHG RX REV CODE 250 W/ 637 OVERRIDE(OP): Performed by: INTERNAL MEDICINE

## 2024-07-06 PROCEDURE — A9270 NON-COVERED ITEM OR SERVICE: HCPCS | Performed by: INTERNAL MEDICINE

## 2024-07-06 PROCEDURE — 96376 TX/PRO/DX INJ SAME DRUG ADON: CPT

## 2024-07-06 PROCEDURE — A9270 NON-COVERED ITEM OR SERVICE: HCPCS | Performed by: STUDENT IN AN ORGANIZED HEALTH CARE EDUCATION/TRAINING PROGRAM

## 2024-07-06 PROCEDURE — 96372 THER/PROPH/DIAG INJ SC/IM: CPT

## 2024-07-06 PROCEDURE — 700102 HCHG RX REV CODE 250 W/ 637 OVERRIDE(OP): Performed by: STUDENT IN AN ORGANIZED HEALTH CARE EDUCATION/TRAINING PROGRAM

## 2024-07-06 PROCEDURE — 99232 SBSQ HOSP IP/OBS MODERATE 35: CPT | Performed by: STUDENT IN AN ORGANIZED HEALTH CARE EDUCATION/TRAINING PROGRAM

## 2024-07-06 PROCEDURE — G0378 HOSPITAL OBSERVATION PER HR: HCPCS

## 2024-07-06 RX ADMIN — MINERAL OIL, AND WHITE PETROLATUM 1 APPLICATION: 425; 573 OINTMENT OPHTHALMIC at 05:08

## 2024-07-06 RX ADMIN — OXYCODONE HYDROCHLORIDE 5 MG: 20 SOLUTION ORAL at 14:27

## 2024-07-06 RX ADMIN — FAMOTIDINE 20 MG: 10 INJECTION, SOLUTION INTRAVENOUS at 17:16

## 2024-07-06 RX ADMIN — CEFUROXIME AXETIL 500 MG: 500 TABLET ORAL at 09:55

## 2024-07-06 RX ADMIN — METOCLOPRAMIDE 10 MG: 5 INJECTION, SOLUTION INTRAMUSCULAR; INTRAVENOUS at 05:08

## 2024-07-06 RX ADMIN — LANSOPRAZOLE 30 MG: 30 TABLET, ORALLY DISINTEGRATING ORAL at 05:08

## 2024-07-06 RX ADMIN — METOPROLOL TARTRATE 100 MG: 50 TABLET, FILM COATED ORAL at 17:16

## 2024-07-06 RX ADMIN — ENOXAPARIN SODIUM 40 MG: 100 INJECTION SUBCUTANEOUS at 17:16

## 2024-07-06 RX ADMIN — OXYCODONE HYDROCHLORIDE 5 MG: 20 SOLUTION ORAL at 22:48

## 2024-07-06 RX ADMIN — METOPROLOL TARTRATE 100 MG: 50 TABLET, FILM COATED ORAL at 05:08

## 2024-07-06 RX ADMIN — CEFUROXIME AXETIL 500 MG: 500 TABLET ORAL at 22:04

## 2024-07-06 RX ADMIN — ASPIRIN 81 MG: 81 TABLET, CHEWABLE ORAL at 05:08

## 2024-07-06 RX ADMIN — METOCLOPRAMIDE 10 MG: 5 INJECTION, SOLUTION INTRAMUSCULAR; INTRAVENOUS at 00:10

## 2024-07-06 RX ADMIN — MINERAL OIL, AND WHITE PETROLATUM 1 APPLICATION: 425; 573 OINTMENT OPHTHALMIC at 14:27

## 2024-07-06 RX ADMIN — FAMOTIDINE 20 MG: 10 INJECTION, SOLUTION INTRAVENOUS at 05:08

## 2024-07-06 RX ADMIN — ATORVASTATIN CALCIUM 20 MG: 20 TABLET, FILM COATED ORAL at 17:16

## 2024-07-06 RX ADMIN — OXYCODONE HYDROCHLORIDE 5 MG: 20 SOLUTION ORAL at 00:09

## 2024-07-06 RX ADMIN — LOSARTAN POTASSIUM 50 MG: 50 TABLET, FILM COATED ORAL at 05:08

## 2024-07-06 ASSESSMENT — PAIN DESCRIPTION - PAIN TYPE: TYPE: ACUTE PAIN

## 2024-07-06 NOTE — CARE PLAN
The patient is Stable - Low risk of patient condition declining or worsening    Shift Goals  Clinical Goals: comfort, q2h turns  Patient Goals: comfort, rest  Family Goals: updates, comfort    Progress made toward(s) clinical / shift goals:    Problem: Knowledge Deficit - Standard  Goal: Patient and family/care givers will demonstrate understanding of plan of care, disease process/condition, diagnostic tests and medications  Outcome: Progressing  Note: Updated family on POC. No concerns.     Problem: Skin Integrity  Goal: Skin integrity is maintained or improved  Outcome: Progressing  Note: Pt turned q2h throughout the shift     Problem: Fall Risk  Goal: Patient will remain free from falls  Outcome: Progressing  Note: Fall precautions in place     Problem: Pain - Standard  Goal: Alleviation of pain or a reduction in pain to the patient’s comfort goal  Outcome: Progressing  Note: Pt displaying no physiological signs of pain       Patient is not progressing towards the following goals:

## 2024-07-06 NOTE — HOSPICE
Left messages today for dtr, Carolyn to follow up if they have finally decided to pursue home hospice. No answer, awaiting call back. No family at bedside either. Per hospice discussion yesterday, Carolyn is trying to get hold of her brother to make the final decision. As far as [facility] is concerned, patient has been pre-approved although no hospice consents were signed yet. In case family decides to take her home tomorrow, Sunday (7/7/2024), [facility] has no capacity for a tomorrow, Sunday admission. Team will follow up on Monday, 7/8/2024 if still needed.

## 2024-07-06 NOTE — HOSPICE
Communication details: Carlene is nonresponsive to voice and touch. Carlene is on a Jevity tube feeding diet via a PEG tube in the upper left quadrant while at the hospital. Daughter Carolyn relates Carlene has some regurgitation after tube feedings. Presently, the tube feedings are paid by Medicare and payment would end once on hospice. Discussion with Carolyn that our supervisor was asked about possibly providing it but that we do not cover it. She stated understanding. Educated on the role of Hospice, for comfort and not treatment. Discussed different scenarios and let her know that we would treat with comfort medications and not a hospital visit. Asked her that if the patient were to get aspiration pneumonia, or her Gtube pulled out if she understood that we would just treat at home, no treatment at the hospital. She stated that she understood. No IV medications, no hydration treatments. Explained that dehydration is actually a important part of the dying process. Carolyn was going to discuss with her siblings to verify that they still want Hospice. She and her sister say yes, she is trying to get ahold of her brother.     Carlene's right arm is contractured. Her left arm has limited ROM and has 1+ edema in her hand. Carlene has been a full code. Family agreed to change to DNR today per hospital records. There is no DPOA. The family has designated Carolyn to be the family decision maker for Carlene.     Social: Carlene lives with Carolyn. Huang live in [place]. Carolyn has hired a paid caregiver.

## 2024-07-06 NOTE — PROGRESS NOTES
Handoff bedside report from RN, pt care assumed, VSS, pt assessment completed. Pt resting comfortably in bed. AAOx0, with no physiological signs of pain. No signs of acute distress noted at this time. Bed locked/in lowest position, bed alarm on, call light within reach, hourly rounding initiated.

## 2024-07-06 NOTE — CARE PLAN
The patient is Watcher - Medium risk of patient condition declining or worsening    Shift Goals  Clinical Goals: Q@ turns, comfort  Patient Goals: ZA  Family Goals: updates, comfort    Progress made toward(s) clinical / shift goals:    Problem: Knowledge Deficit - Standard  Goal: Patient and family/care givers will demonstrate understanding of plan of care, disease process/condition, diagnostic tests and medications  Outcome: Progressing     Problem: Skin Integrity  Goal: Skin integrity is maintained or improved  Outcome: Progressing   Family verbalized an understanding of disease process and POC. Patient tolerated Q2 turns and maintained skin integrity    Patient is not progressing towards the following goals:

## 2024-07-06 NOTE — PROGRESS NOTES
Hospital Medicine Daily Progress Note    Date of Service  7/6/2024    Chief Complaint  Jairo Rivas is a 73 y.o. female admitted 6/30/2024 with tube feeding concern.    Hospital Course  Jairo Rivas is a 73-year-old female with PMHx GERD, HTN, endometrial carcinoma with brain metastases and subsequent vasogenic edema, history of CVA, tube feed dependent.  Admitted 6/30 for aspiration and tube feeding concerns.    G-tube evaluation showing appropriate placement.    Given patient's history of stroke and metastatic disease family was requesting MRI.  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  Acute infarct of right anterior right campus.  New metastatic lesions noted.    Goals of care conversation were had with patient and her family.  They have elected to return home with hospice.    Interval Problem Update  7/5: Vital stable overnight.  Intermittent tachycardia is present.  Patient remains on 1 L/min supplemental O2.  Patient is resting comfortably in bed.  Hospice services are being arranged.     7/6: Vitals notable for intermittent tachycardia.  Patient remains on 1 L/min.  Hospice care is being arranged. Discussed with daughter. She would like the patient to return home as soon as possible, with or without hospice services in place. Plan for AM discharge. Transport is being arranged.     I have discussed this patient's plan of care and discharge plan at IDT rounds today with Case Management, Nursing, Nursing leadership, and other members of the IDT team.    Consultants/Specialty  neurology    Code Status  DNAR/DNI    Disposition  The patient is medically cleared for discharge to home or a post-acute facility.  Anticipate discharge to: hospice    I have placed the appropriate orders for post-discharge needs.    Review of Systems  Review of Systems   Unable to perform ROS: Medical condition        Physical Exam  Temp:  [36.4 °C (97.5  °F)-37.2 °C (99 °F)] 36.4 °C (97.5 °F)  Pulse:  [] 98  Resp:  [15-18] 15  BP: (103-127)/(57-67) 126/63  SpO2:  [93 %-99 %] 97 %    Physical Exam  Vitals and nursing note reviewed.   Constitutional:       General: She is not in acute distress.     Appearance: She is ill-appearing.      Comments: Resting comfortably    Cardiovascular:      Rate and Rhythm: Normal rate and regular rhythm.   Pulmonary:      Effort: Pulmonary effort is normal.      Breath sounds: Normal breath sounds.   Skin:     General: Skin is warm and dry.   Neurological:      Mental Status: Mental status is at baseline.   Psychiatric:         Mood and Affect: Mood normal.         Behavior: Behavior normal.         Fluids    Intake/Output Summary (Last 24 hours) at 7/6/2024 1509  Last data filed at 7/6/2024 1200  Gross per 24 hour   Intake 200 ml   Output 320 ml   Net -120 ml       Laboratory                          Imaging  MR-BRAIN-WITH & W/O   Final Result      1.  Interval development of marked hydrocephalus with dilatation of the lateral ventricles, third ventricle, and fourth ventricle with transependymal edema. Meningeal enhancement at the prepontine cistern which appears to extend into the right IAC is    highly suspicious for meningeal carcinomatosis which may be pertinent to the development of hydrocephalus.   2.  Encephalomalacic changes consistent with prior infarction right frontal convexity, right parietal-occipital lobe, left subinsular white matter, left posterior limb internal capsule, left posterior-lateral thalamus.   3.  Subcentimeter focus of acute infarction at the right anterior hippocampus.   4.  Treated metastatic deposit at the right frontal convexity with further interval decrease in enhancement. Hemosiderin deposition again noted. No evidence of local recurrence/progression.   5.  Foci of hemosiderin deposition left posterior temporal lobe and left parietal lobe also most consistent with treated metastatic deposits.  These foci of hemosiderin deposition were also present on prior MRI study.   6.  No new enhancing intra-axial metastatic deposits in the brain parenchyma in the supratentorial or infratentorial compartment.   7.  Old lacunar infarct left side of the elaine.   8.  New acute infarction at the left middle cerebellar peduncle and lateral left cerebellar hemisphere. No hemorrhagic transformation.   9.  A Voalte message was sent to ALVIN MICKI SONIYATAJ at 10:42 AM 7/4/2024.      CT-CTA CHEST PULMONARY ARTERY W/ RECONS   Final Result      1.  No evidence of pulmonary was.   2.  Trace bilateral pleural effusions with associated compressive atelectasis and/or consolidation. Underlying infection is possible.   3.  Coronary artery disease.            [id number] VIEW   Final Result      1.  No evidence of bowel obstruction.   2.  Contrast present throughout the colon.   3.  Supportive tubing as described.      DX-G.I. TUBE INJECTION, ANY TYPE   Final Result      Intraluminal position of gastrostomy catheter.      DX-ABDOMEN FOR TUBE PLACEMENT   Final Result      Gastrostomy tube projects over the expected position of the stomach assuming the gastrostomy is appropriately located      DX-CHEST-PORTABLE (1 VIEW)   Final Result      Mild volume loss and linear opacities in the left mid and lower lung which may be scarring versus mild infiltrates.           Assessment/Plan  * Advance care planning- (present on admission)  Assessment & Plan  I have discussed with patient daughter today regarding her MRI results and regarding risk of bleeding if started on eliquis. Daughter has discussed with other family members and decision has been made by family to make patient DNR/DNI and also would like to talk with hospice  I have placed hospice referral. Time spent on goals of care is 17 min   Above per previous hospitalist     7/6: discussed with daughter. She is anxious for patient to return home with or without hospice services in place. We  are working to arrange for transport home tomorrow am.     Hypernatremia- (present on admission)  Assessment & Plan  Sodium 154  Now down to 141, changing IV fluids and free water  Patient was not tolerating any intake through her G-tube due to regurgitation so likely became volume depleted    GERD (gastroesophageal reflux disease)- (present on admission)  Assessment & Plan  G-tube in place  Continue PPI    Dysphagia- (present on admission)  Assessment & Plan  Patient has G tube but as per family she was regurgitation up the food from tube feed  She is back on tube feedings but continues to intermittently groan, her abdomen is firm but KUB only shows contrast from previous study no clear evidence of stool, continue to monitor    Hypokalemia- (present on admission)  Assessment & Plan  Discontinue daily labs; home with hospice    Essential hypertension- (present on admission)  Assessment & Plan  Continue losartan and metoprolol via G-tube    DC at discharge given that patient will transition to hospice care.         VTE prophylaxis:   SCDs/TEDs   enoxaparin ppx      I have performed a physical exam and reviewed and updated ROS and Plan today (7/6/2024). In review of yesterday's note (7/5/2024), there are no changes except as documented above.

## 2024-07-06 NOTE — CARE PLAN
The patient is Stable - Low risk of patient condition declining or worsening    Shift Goals  Clinical Goals: comfort  Patient Goals: comfort  Family Goals: ZA    Progress made toward(s) clinical / shift goals:      Patient is not progressing towards the following goals:      Problem: Knowledge Deficit - Standard  Goal: Patient and family/care givers will demonstrate understanding of plan of care, disease process/condition, diagnostic tests and medications  7/5/2024 1946 by Blade Matthews R.N.  Outcome: Progressing  7/5/2024 1851 by Blade Matthews R.N.  Outcome: Progressing     Problem: Skin Integrity  Goal: Skin integrity is maintained or improved  7/5/2024 1946 by Blade Matthews R.N.  Outcome: Progressing  7/5/2024 1851 by Blade Matthews R.N.  Outcome: Progressing     Problem: Fall Risk  Goal: Patient will remain free from falls  7/5/2024 1946 by Blade Matthews R.N.  Outcome: Progressing  7/5/2024 1851 by Blade Matthews R.N.  Outcome: Progressing     Problem: Pain - Standard  Goal: Alleviation of pain or a reduction in pain to the patient’s comfort goal  7/5/2024 1946 by Blade Matthews R.N.  Outcome: Progressing  7/5/2024 1851 by Blade Matthews R.N.  Outcome: Progressing

## 2024-07-06 NOTE — CONSULTS
Inpatient Palliative Medicine Evaluation     Jairo Rivas  73 y.o. female  [id number]    Location = [facility]  Referral Source = Alayna Lux M.d.    PCP = ZULEYKA Lyons      Reason for palliative medicine consultation and/or visit: ACP         Assessment and Plan:     GOAL(S) OF CARE = LONGEVITY    SYMPTOMS ETIOLOGY/CAUSES = encephalopathy & dysphagia secondary secondary to recent strokes and progressing metastatic endometrial adenocarcinoma to brain     PROGNOSIS = POOR (and objectively clinically worse than prior admission), likely within last few weeks-to-months of life despite all active interventions  - [X] hospice-eligible = metastatic cancer with heavy & worsening disease burden, PPS=20% & tube feed dependent, no longer capacitated nor verbal, no longer healthy enough to undergo cancer treatments, etc.  - NOT yet hospice-appropriate = Jarret and other 7 siblings still actively discussing hospice transition among themselves, without a final decision yet. Jarret did speak with hospice referral team earlier today and had all her questions answered.    CODE STATUS = DNAR DNI        ADVANCE CARE PLANNING =  Relevant history reviewed.  Medical updates.  Hospice philosophy education.       PALLIATIVE CARE TEAM INTERVENTIONS =   ACP    Daughter Jarret did discuss with hospice referral team today 7/5/2024- she and other 7 siblings are still actively discussing & deciding on hospice transition, not yet without a final decision.    GERD medications reviewed ==> Added PRN Tums 2 tabs BID PRN for active reflux, if still refractory to already potent regimen of lansoprazole + pepcid + scheduled reglan.    Jarret expressed her preference/wish to dc pt home with home health services again, if no new interventions are being considered and her 7 siblings still need more time to finalize ultimate hospice transition decision.         Jarret is amenable to completing the hospice referral process  outpatient at home later, when she and her siblings all reach a collective agreement.          Summary =   Jairo Rivas is a 73 y.o.  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     Despite care so far, patient unfortunately remains encephalopathic and unable to pass SLP swallow evaluation yet. She is a [facility] patient and has been followed by Dr. Garcia since 2019. Patient unfortunately remains unfit for further cancer directed treatment at this time.     Patient was last [facility] to [facility] 4/13/2024 - 5/13/2024, during which time she was followed by multiple specialties, was treated for disseminated varicella, underwent PEG tube install, and explored all cancer treatment options, all of which unfortunately patient was no longer a candidate for. Hospice and end-of-life care transition were discussed multiple time by our team and other services, due to patient's apparent poor and deteriorating function. Family ultimately were not amenable to comfort-oriented care, and patient was discharged home with home health services    Patient was readmitted again 06/30/2024 with a CC of reflux/indigestion/intolerance during tube feeding.    7/3/2024 MRI Brain W/WO Contrast - increased hydrocephalus with dilated lateral ventricles, encephalomalacia in multiple areas due to prior infarction, acute infarction at right anterior hippocampus, new acute infarction at left middle cerebellar peduncle and lateral left cerebellar hemisphere, etc. Among other findings.        Met with patient and her daughter Temmy bedside. Jarret recognized me from our many interactions last admission. Patient remained non-verbal, bed-bound,  "and completely ADL-dependent.    I inquired interval functional changes. Jarret expressed that patient no longer was able to speak the few words that she could like last admission. Patient would occasionally mumble and mutter, but she is no longer intelligible to Jarret any more.Patient has remained bed-bound & tube-feed dependent otherwise, and her stool & urine incontinence haven't improved either.    All together, Jarret agreed with me that patient's function had further deteriorated since last admission, even though the change was not dramatic, due to her already very low function last admission (hence not a whole lot of room to lose/change).    Jarret did speak with hospice referral team earlier today and had all her hospice-related questions answered. She expressed hope/wish that somehow, someway tube feed could be provided as an exception by the hospice agency. Jarret was happy to find out when I informed her that fortunately affordable tube feed options are generally available and relatively easy to acquire.    Jarret and her 7 siblings are still actively discussing among themselves about hospice transition. They don't have a final decision yet.     Barak stated that it might possibly still take some more time for all her siblings to agree to hospice transition. If patient is already optimized and no further interventions are being considered, Jarret would be willing to take patient home on home health services like last admission, while she continues the hospice conversation with her siblings outpatient and finishes the referral process outpatient at home, if/when she and her siblings all come to an agreement.    \"Mom doesn't sleep well here.\"    Luckily patient seems to have improved since admission. Jarret still observes signs of discomfort/pain when patient regurgitates. She inquired for more meds to try to help with acid reflux related issues, if possible.          Medical Decision Making =  Patient is of high " medical complexity with incurable disease = endometrial adenocarcinoma metastatic to brain & lung  Care complexity further complicated by secondary conditions = 2010 CVA with residual R deficits + new subacute CVA, acute on chronic baseline delirium/hallucinations, dysphagia NPO status, sepsis     Extensive data reviewed & coordination performed = recent notes, latest labs & studies, GYN past history, ONC past history, [facility] notes, etc.     High risk of morbidity from additional interventions &  studies = NPO & low functioning & still delirious & non-verbal, high risk medication use         Advance care planning  =     Total time spent in ACP discussion 30 minutes, which is separate from the time spent completing the evaluation and management visit.     Thank you for allowing me the opportunity to participate in the care of Jairo Rivas     I spent a total of 80 minutes reviewing medical records, direct face-to-face time with the patient and/or family, documentation and coordination of care. This is separate from the time spent on advance care planning, which is documented above.         DO Jeff KHAN ([facility]) [facility]   [address] [place]  [id number]  P: [phone]  F: [phone]

## 2024-07-06 NOTE — PROGRESS NOTES
Received bedside report from RN. Patient is obtunded and only responsive to painful stimuli from the nail bed. VSS. Physical assessment completed. Fall preventions interventions are in place. Bed is locked and in lowest position. POC is ongoing.

## 2024-07-07 PROBLEM — C79.9 METASTATIC DISEASE (HCC): Status: ACTIVE | Noted: 2024-07-07

## 2024-07-07 PROBLEM — C79.49 MENINGEAL CARCINOMATOSIS (HCC): Status: ACTIVE | Noted: 2024-07-07

## 2024-07-07 PROBLEM — R40.20 COMA (HCC): Status: ACTIVE | Noted: 2024-07-07

## 2024-07-07 PROBLEM — C80.1 MENINGEAL CARCINOMATOSIS (HCC): Status: ACTIVE | Noted: 2024-07-07

## 2024-07-07 PROBLEM — I63.9 ACUTE STROKE DUE TO ISCHEMIA (HCC): Status: ACTIVE | Noted: 2024-07-07

## 2024-07-07 PROBLEM — I63.542: Status: ACTIVE | Noted: 2024-07-07

## 2024-07-07 PROCEDURE — 700102 HCHG RX REV CODE 250 W/ 637 OVERRIDE(OP): Performed by: INTERNAL MEDICINE

## 2024-07-07 PROCEDURE — 700111 HCHG RX REV CODE 636 W/ 250 OVERRIDE (IP): Mod: JZ | Performed by: STUDENT IN AN ORGANIZED HEALTH CARE EDUCATION/TRAINING PROGRAM

## 2024-07-07 PROCEDURE — 99233 SBSQ HOSP IP/OBS HIGH 50: CPT | Performed by: STUDENT IN AN ORGANIZED HEALTH CARE EDUCATION/TRAINING PROGRAM

## 2024-07-07 PROCEDURE — 770004 HCHG ROOM/CARE - ONCOLOGY PRIVATE *

## 2024-07-07 PROCEDURE — 700102 HCHG RX REV CODE 250 W/ 637 OVERRIDE(OP): Performed by: STUDENT IN AN ORGANIZED HEALTH CARE EDUCATION/TRAINING PROGRAM

## 2024-07-07 PROCEDURE — A9270 NON-COVERED ITEM OR SERVICE: HCPCS | Performed by: INTERNAL MEDICINE

## 2024-07-07 PROCEDURE — 700111 HCHG RX REV CODE 636 W/ 250 OVERRIDE (IP): Mod: JZ | Performed by: INTERNAL MEDICINE

## 2024-07-07 PROCEDURE — A9270 NON-COVERED ITEM OR SERVICE: HCPCS | Performed by: STUDENT IN AN ORGANIZED HEALTH CARE EDUCATION/TRAINING PROGRAM

## 2024-07-07 PROCEDURE — 96376 TX/PRO/DX INJ SAME DRUG ADON: CPT

## 2024-07-07 RX ORDER — OXYCODONE HCL 20 MG/ML
5 CONCENTRATE, ORAL ORAL EVERY 4 HOURS
Status: DISCONTINUED | OUTPATIENT
Start: 2024-07-07 | End: 2024-07-08 | Stop reason: HOSPADM

## 2024-07-07 RX ORDER — ACETAMINOPHEN 325 MG/1
650 TABLET ORAL EVERY 6 HOURS PRN
Status: DISCONTINUED | OUTPATIENT
Start: 2024-07-07 | End: 2024-07-08 | Stop reason: HOSPADM

## 2024-07-07 RX ORDER — ACETAMINOPHEN 160 MG/5ML
650 SUSPENSION ORAL EVERY 6 HOURS PRN
Status: DISCONTINUED | OUTPATIENT
Start: 2024-07-07 | End: 2024-07-07

## 2024-07-07 RX ORDER — HALOPERIDOL 5 MG/ML
2 INJECTION INTRAMUSCULAR ONCE
Status: COMPLETED | OUTPATIENT
Start: 2024-07-08 | End: 2024-07-08

## 2024-07-07 RX ADMIN — LOSARTAN POTASSIUM 50 MG: 50 TABLET, FILM COATED ORAL at 05:17

## 2024-07-07 RX ADMIN — METOPROLOL TARTRATE 100 MG: 50 TABLET, FILM COATED ORAL at 05:17

## 2024-07-07 RX ADMIN — MINERAL OIL, AND WHITE PETROLATUM 1 APPLICATION: 425; 573 OINTMENT OPHTHALMIC at 22:04

## 2024-07-07 RX ADMIN — ATORVASTATIN CALCIUM 20 MG: 20 TABLET, FILM COATED ORAL at 17:48

## 2024-07-07 RX ADMIN — CEFUROXIME AXETIL 500 MG: 500 TABLET ORAL at 09:12

## 2024-07-07 RX ADMIN — MINERAL OIL, AND WHITE PETROLATUM 1 APPLICATION: 425; 573 OINTMENT OPHTHALMIC at 06:00

## 2024-07-07 RX ADMIN — CEFUROXIME AXETIL 500 MG: 500 TABLET ORAL at 21:45

## 2024-07-07 RX ADMIN — METOPROLOL TARTRATE 100 MG: 50 TABLET, FILM COATED ORAL at 17:48

## 2024-07-07 RX ADMIN — OXYCODONE HYDROCHLORIDE 5 MG: 20 SOLUTION ORAL at 03:21

## 2024-07-07 RX ADMIN — OXYCODONE HYDROCHLORIDE 5 MG: 20 SOLUTION ORAL at 14:58

## 2024-07-07 RX ADMIN — OXYCODONE HYDROCHLORIDE 5 MG: 20 SOLUTION ORAL at 21:45

## 2024-07-07 RX ADMIN — FAMOTIDINE 20 MG: 10 INJECTION, SOLUTION INTRAVENOUS at 05:17

## 2024-07-07 RX ADMIN — OXYCODONE HYDROCHLORIDE 5 MG: 20 SOLUTION ORAL at 18:00

## 2024-07-07 RX ADMIN — LANSOPRAZOLE 30 MG: 30 TABLET, ORALLY DISINTEGRATING ORAL at 05:17

## 2024-07-07 RX ADMIN — ENOXAPARIN SODIUM 40 MG: 100 INJECTION SUBCUTANEOUS at 17:49

## 2024-07-07 RX ADMIN — ASPIRIN 81 MG: 81 TABLET, CHEWABLE ORAL at 05:17

## 2024-07-07 ASSESSMENT — COGNITIVE AND FUNCTIONAL STATUS - GENERAL
MOVING FROM LYING ON BACK TO SITTING ON SIDE OF FLAT BED: TOTAL
DRESSING REGULAR LOWER BODY CLOTHING: TOTAL
HELP NEEDED FOR BATHING: TOTAL
SUGGESTED CMS G CODE MODIFIER MOBILITY: CN
CLIMB 3 TO 5 STEPS WITH RAILING: TOTAL
TURNING FROM BACK TO SIDE WHILE IN FLAT BAD: TOTAL
TOILETING: TOTAL
PERSONAL GROOMING: TOTAL
STANDING UP FROM CHAIR USING ARMS: TOTAL
DAILY ACTIVITIY SCORE: 6
MOBILITY SCORE: 6
EATING MEALS: TOTAL
MOVING TO AND FROM BED TO CHAIR: TOTAL
WALKING IN HOSPITAL ROOM: TOTAL
DRESSING REGULAR UPPER BODY CLOTHING: TOTAL
SUGGESTED CMS G CODE MODIFIER DAILY ACTIVITY: CN

## 2024-07-07 ASSESSMENT — PAIN DESCRIPTION - PAIN TYPE
TYPE: ACUTE PAIN

## 2024-07-07 NOTE — CARE PLAN
The patient is Stable - Low risk of patient condition declining or worsening    Shift Goals  Clinical Goals: Q2T. Patient will remain free from S/S of aspiration-HOB 30 degrees or haigher  Patient Goals: ZA  Family Goals: Comfort    Patient responds to pain stimuli only; opens eyes, no verbal response. Does not have new signs of aspiration or tube feed intolerance; continuous tube feeds at goal rate through PEG tube, HOB at 30 degrees or higher.     Progress made toward(s) clinical / shift goals:    Problem: Knowledge Deficit - Standard  Goal: Patient and family/care givers will demonstrate understanding of plan of care, disease process/condition, diagnostic tests and medications  7/7/2024 1618 by Savannah Spencer R.N.  Outcome: Progressing  Note: Discussed POC and shift goals with family and primary team. All questions answered     Problem: Skin Integrity  Goal: Skin integrity is maintained or improved  7/7/2024 1618 by Savannah Spencer, R.N.  Outcome: Progressing  Note: Q2T and TAPs in place. Purewick for risk of  incontinence associated skin break down.      Problem: Fall Risk  Goal: Patient will remain free from falls  7/7/2024 1618 by Savannah Spencer, R.N.  Outcome: Progressing  Note: Patient immobile; all extremities flaccid this shift. Fall precautions in place.      Problem: Pain - Standard  Goal: Alleviation of pain or a reduction in pain to the patient’s comfort goal  7/7/2024 1618 by Savannah Spencer, R.N.  Outcome: Progressing  Note: Patient unable to verbalize needs. Non-verbal pain scale in use. Patient does not demonstrates signs of pain or discomfort.        Patient is not progressing towards the following goals: NA

## 2024-07-07 NOTE — PROGRESS NOTES
Patient being transferred to [facility]. Report given to JOB Bach. Family members called and notified of patient's transfer.

## 2024-07-07 NOTE — DISCHARGE PLANNING
Case Management Discharge Planning    Admission Date: 6/30/2024  GMLOS: 2.6  ALOS: 0    6-Clicks ADL Score: 6  6-Clicks Mobility Score: 6  PT and/or OT Eval ordered: Yes  Post-acute Referrals Ordered: Yes  Post-acute Choice Obtained: Yes  Has referral(s) been sent to post-acute provider:  Yes      Anticipated Discharge Dispo: Discharge Disposition: D/T to hospice home (50)    DME Needed: No    Action(s) Taken: Updated Provider/Nurse on Discharge Plan    This RN CM spoke with Carolyn , Pt s Daughter.   Per Carolyn she was contacted by Charles Willis RN of [facility] last week . Pt s family including Carolyn/Daughter and Emergency Contact already decided to take Pt home on Hospice. Charles Willis is  not on board today .    This RN CM poke with JOB Davidson at [facility]  at [facility]. Per Lety they do not have the capacity to admit Pt today.   Per Lety , based on what Pt  needs she would rather wait tomorrow to make sure they can order home meds/supplies first.    This RN CM spoke with Carolyn again.  Pt has  a hospital bed already. Family to buy Pt s tube feeds supplies as it was explained to her that  once Pt is on hospice, Pt's Insurance will not cover it .   Explained this to  JOB Davidson.    This RN CM spoke with Carolyn again and provided her this update.    Per Carolyn Pt wound need  with transport set up to home tomorrow.  Informed JOB Nuñez, Charge JOB Lubin and Dr Lema  of this update via voalte.     Escalations Completed: None    Medically Clear: Yes for Home on Hospice    Next Steps:   CM to continue to assist Pt with discharge as needed    Barriers to Discharge:   Pending transition to Hospice/DMEs /Meds   Transport to home     Is the patient up for discharge tomorrow: Yes

## 2024-07-07 NOTE — CARE PLAN
The patient is Watcher - Medium risk of patient condition declining or worsening    Shift Goals  Clinical Goals: Patient safety, pain control  Patient Goals: ZA  Family Goals: patient comfort    Progress made toward(s) clinical / shift goals:  Patient obtunded, only responsive to pain at this time. Patient medicated per MAR. TF infusing at goal rate of 65 with HOB at 45 degrees.    Problem: Skin Integrity  Goal: Skin integrity is maintained or improved  Outcome: Progressing     Problem: Fall Risk  Goal: Patient will remain free from falls  Outcome: Progressing     Problem: Pain - Standard  Goal: Alleviation of pain or a reduction in pain to the patient’s comfort goal  Outcome: Progressing       Patient is not progressing towards the following goals:

## 2024-07-07 NOTE — PROGRESS NOTES
Bedside report recieved and patient care assumed. Pt is calm and resting in bed, A&O 0 with non labored breathing. Patient repositioned. Patient is medical, all fall precautions are in place, belongings at bedisde table.

## 2024-07-08 ENCOUNTER — HOME CARE VISIT (OUTPATIENT)
Dept: HOSPICE | Facility: HOSPICE | Age: 74
End: 2024-07-08
Payer: MEDICARE

## 2024-07-08 ENCOUNTER — PHARMACY VISIT (OUTPATIENT)
Dept: PHARMACY | Facility: MEDICAL CENTER | Age: 74
End: 2024-07-08
Payer: COMMERCIAL

## 2024-07-08 ENCOUNTER — HOME CARE VISIT (OUTPATIENT)
Dept: HOME HEALTH SERVICES | Facility: HOME HEALTHCARE | Age: 74
End: 2024-07-08
Payer: MEDICARE

## 2024-07-08 VITALS
HEART RATE: 97 BPM | SYSTOLIC BLOOD PRESSURE: 89 MMHG | BODY MASS INDEX: 27.27 KG/M2 | HEIGHT: 69 IN | WEIGHT: 184.08 LBS | DIASTOLIC BLOOD PRESSURE: 49 MMHG | TEMPERATURE: 98.9 F | OXYGEN SATURATION: 97 % | RESPIRATION RATE: 18 BRPM

## 2024-07-08 DIAGNOSIS — Z51.5 HOSPICE CARE: ICD-10-CM

## 2024-07-08 DIAGNOSIS — C54.1 ENDOMETRIAL CARCINOMA (HCC): Primary | ICD-10-CM

## 2024-07-08 DIAGNOSIS — K59.09 OTHER CONSTIPATION: ICD-10-CM

## 2024-07-08 DIAGNOSIS — H18.9 KERATOPATHY: ICD-10-CM

## 2024-07-08 DIAGNOSIS — C79.31 MALIGNANT NEOPLASM METASTATIC TO BRAIN (HCC): ICD-10-CM

## 2024-07-08 DIAGNOSIS — I63.9 ACUTE CVA (CEREBROVASCULAR ACCIDENT) (HCC): ICD-10-CM

## 2024-07-08 DIAGNOSIS — C79.49 MENINGEAL CARCINOMATOSIS (HCC): ICD-10-CM

## 2024-07-08 DIAGNOSIS — Z71.89 ADVANCE CARE PLANNING: ICD-10-CM

## 2024-07-08 DIAGNOSIS — C80.1 MENINGEAL CARCINOMATOSIS (HCC): ICD-10-CM

## 2024-07-08 DIAGNOSIS — R56.9 SEIZURE (HCC): ICD-10-CM

## 2024-07-08 PROBLEM — E87.0 HYPERNATREMIA: Status: RESOLVED | Noted: 2024-06-30 | Resolved: 2024-07-08

## 2024-07-08 PROBLEM — E87.6 HYPOKALEMIA: Status: RESOLVED | Noted: 2018-10-02 | Resolved: 2024-07-08

## 2024-07-08 PROCEDURE — 700102 HCHG RX REV CODE 250 W/ 637 OVERRIDE(OP): Performed by: INTERNAL MEDICINE

## 2024-07-08 PROCEDURE — A9270 NON-COVERED ITEM OR SERVICE: HCPCS | Performed by: STUDENT IN AN ORGANIZED HEALTH CARE EDUCATION/TRAINING PROGRAM

## 2024-07-08 PROCEDURE — A9270 NON-COVERED ITEM OR SERVICE: HCPCS | Performed by: NURSE PRACTITIONER

## 2024-07-08 PROCEDURE — 700102 HCHG RX REV CODE 250 W/ 637 OVERRIDE(OP): Performed by: NURSE PRACTITIONER

## 2024-07-08 PROCEDURE — G0299 HHS/HOSPICE OF RN EA 15 MIN: HCPCS

## 2024-07-08 PROCEDURE — S9126 HOSPICE CARE, IN THE HOME, P: HCPCS

## 2024-07-08 PROCEDURE — 99239 HOSP IP/OBS DSCHRG MGMT >30: CPT | Performed by: STUDENT IN AN ORGANIZED HEALTH CARE EDUCATION/TRAINING PROGRAM

## 2024-07-08 PROCEDURE — 665036 HSPC NOTICE OF ELECTION NOE

## 2024-07-08 PROCEDURE — 51798 US URINE CAPACITY MEASURE: CPT

## 2024-07-08 PROCEDURE — 700102 HCHG RX REV CODE 250 W/ 637 OVERRIDE(OP): Performed by: STUDENT IN AN ORGANIZED HEALTH CARE EDUCATION/TRAINING PROGRAM

## 2024-07-08 PROCEDURE — A9270 NON-COVERED ITEM OR SERVICE: HCPCS | Performed by: INTERNAL MEDICINE

## 2024-07-08 PROCEDURE — RXMED WILLOW AMBULATORY MEDICATION CHARGE: Performed by: STUDENT IN AN ORGANIZED HEALTH CARE EDUCATION/TRAINING PROGRAM

## 2024-07-08 PROCEDURE — 700111 HCHG RX REV CODE 636 W/ 250 OVERRIDE (IP): Mod: JZ

## 2024-07-08 PROCEDURE — 700105 HCHG RX REV CODE 258

## 2024-07-08 RX ORDER — METOPROLOL TARTRATE 100 MG/1
100 TABLET ORAL 2 TIMES DAILY
Qty: 60 TABLET | Refills: 11 | Status: SHIPPED | OUTPATIENT
Start: 2024-07-08

## 2024-07-08 RX ORDER — AMOXICILLIN 250 MG
2 CAPSULE ORAL EVERY EVENING
Qty: 60 TABLET | Refills: 11 | Status: SHIPPED | OUTPATIENT
Start: 2024-07-08 | End: 2024-07-29 | Stop reason: SDUPTHER

## 2024-07-08 RX ORDER — ACETAMINOPHEN 650 MG/1
650 SUPPOSITORY RECTAL EVERY 6 HOURS PRN
Qty: 5 SUPPOSITORY | Refills: 10 | Status: SHIPPED | OUTPATIENT
Start: 2024-07-08

## 2024-07-08 RX ORDER — OMEPRAZOLE 40 MG/1
40 CAPSULE, DELAYED RELEASE ORAL DAILY
Qty: 30 CAPSULE | Refills: 11 | Status: SHIPPED | OUTPATIENT
Start: 2024-07-08 | End: 2024-07-19

## 2024-07-08 RX ORDER — LORAZEPAM 2 MG/ML
2 CONCENTRATE ORAL
Qty: 360 ML | Refills: 0 | Status: SHIPPED | OUTPATIENT
Start: 2024-07-08 | End: 2025-07-08

## 2024-07-08 RX ORDER — SODIUM CHLORIDE, SODIUM LACTATE, POTASSIUM CHLORIDE, AND CALCIUM CHLORIDE .6; .31; .03; .02 G/100ML; G/100ML; G/100ML; G/100ML
1000 INJECTION, SOLUTION INTRAVENOUS ONCE
Status: COMPLETED | OUTPATIENT
Start: 2024-07-08 | End: 2024-07-08

## 2024-07-08 RX ORDER — ACETAMINOPHEN 500 MG
1000 TABLET ORAL EVERY 6 HOURS PRN
Qty: 30 TABLET | Refills: 10 | Status: SHIPPED | OUTPATIENT
Start: 2024-07-08 | End: 2025-07-08

## 2024-07-08 RX ORDER — CEFUROXIME AXETIL 500 MG/1
500 TABLET ORAL 2 TIMES DAILY
Qty: 3 TABLET | Refills: 0 | Status: SHIPPED | OUTPATIENT
Start: 2024-07-08 | End: 2024-07-10

## 2024-07-08 RX ORDER — ONDANSETRON 4 MG/1
4 TABLET, ORALLY DISINTEGRATING ORAL EVERY 6 HOURS PRN
Qty: 15 TABLET | Refills: 10 | Status: SHIPPED | OUTPATIENT
Start: 2024-07-08 | End: 2025-07-08

## 2024-07-08 RX ORDER — BISACODYL 10 MG
10 SUPPOSITORY, RECTAL RECTAL PRN
Qty: 10 SUPPOSITORY | Refills: 11 | Status: SHIPPED | OUTPATIENT
Start: 2024-07-08

## 2024-07-08 RX ORDER — ASPIRIN 81 MG/1
81 TABLET, CHEWABLE ORAL DAILY
Qty: 30 TABLET | Refills: 11 | Status: SHIPPED | OUTPATIENT
Start: 2024-07-08

## 2024-07-08 RX ORDER — LOSARTAN POTASSIUM 50 MG/1
50 TABLET ORAL DAILY
Qty: 30 TABLET | Refills: 11 | Status: SHIPPED | OUTPATIENT
Start: 2024-07-08

## 2024-07-08 RX ORDER — OXYCODONE HYDROCHLORIDE 100 MG/5ML
SOLUTION ORAL
Qty: 240 ML | Refills: 0 | Status: SHIPPED | OUTPATIENT
Start: 2024-07-08 | End: 2024-07-29 | Stop reason: SDUPTHER

## 2024-07-08 RX ADMIN — CEFUROXIME AXETIL 500 MG: 500 TABLET ORAL at 08:16

## 2024-07-08 RX ADMIN — ACETAMINOPHEN 650 MG: 325 TABLET, FILM COATED ORAL at 06:12

## 2024-07-08 RX ADMIN — LANSOPRAZOLE 30 MG: 30 TABLET, ORALLY DISINTEGRATING ORAL at 06:00

## 2024-07-08 RX ADMIN — OXYCODONE HYDROCHLORIDE 5 MG: 20 SOLUTION ORAL at 05:58

## 2024-07-08 RX ADMIN — OXYCODONE HYDROCHLORIDE 5 MG: 20 SOLUTION ORAL at 10:04

## 2024-07-08 RX ADMIN — METOPROLOL TARTRATE 100 MG: 50 TABLET, FILM COATED ORAL at 06:00

## 2024-07-08 RX ADMIN — OXYCODONE HYDROCHLORIDE 5 MG: 20 SOLUTION ORAL at 03:01

## 2024-07-08 RX ADMIN — LOSARTAN POTASSIUM 50 MG: 50 TABLET, FILM COATED ORAL at 06:00

## 2024-07-08 RX ADMIN — ASPIRIN 81 MG: 81 TABLET, CHEWABLE ORAL at 06:00

## 2024-07-08 RX ADMIN — SODIUM CHLORIDE, POTASSIUM CHLORIDE, SODIUM LACTATE AND CALCIUM CHLORIDE 1000 ML: 600; 310; 30; 20 INJECTION, SOLUTION INTRAVENOUS at 08:42

## 2024-07-08 RX ADMIN — MINERAL OIL, AND WHITE PETROLATUM 1 APPLICATION: 425; 573 OINTMENT OPHTHALMIC at 06:01

## 2024-07-08 RX ADMIN — HALOPERIDOL LACTATE 2 MG: 5 INJECTION, SOLUTION INTRAMUSCULAR at 00:06

## 2024-07-08 SDOH — ECONOMIC STABILITY: HOUSING INSECURITY: EVIDENCE OF SMOKING MATERIAL: 0

## 2024-07-08 SDOH — ECONOMIC STABILITY: GENERAL

## 2024-07-08 ASSESSMENT — PAIN SCALES - PAIN ASSESSMENT IN ADVANCED DEMENTIA (PAINAD)
BODYLANGUAGE: 1 - TENSE. DISTRESSED PACING. FIDGETING.
FACIALEXPRESSION: 2 - FACIAL GRIMACING.
CONSOLABILITY: 2 - UNABLE TO CONSOLE, DISTRACT OR REASSURE.
NEGVOCALIZATION: 0 - NONE.
TOTALSCORE: 6

## 2024-07-08 ASSESSMENT — ACTIVITIES OF DAILY LIVING (ADL)
AMBULATION_REQUIRES_ASSISTANCE: 1
DRESSING_REQUIRES_ASSISTANCE: 1
AMBULATION ASSISTANCE: TWO PERSON
PHYSICAL_TRANSFER_REQUIRES_ASSISTANCE: 1
BATHING_REQUIRES_ASSISTANCE: 1
EATING_REQUIRES_ASSISTANCE: 1
CURRENT_FUNCTION: TWO PERSON
CONTINENCE_REQUIRES_ASSISTANCE: 1
MONEY MANAGEMENT (EXPENSES/BILLS): TOTALLY DEPENDENT

## 2024-07-08 ASSESSMENT — ENCOUNTER SYMPTOMS
SLEEP QUALITY: ADEQUATE
SHORTNESS OF BREATH: 1
CONSTIPATION: 1
STOOL FREQUENCY: LESS THAN DAILY
VOMITING: 1
MUSCLE WEAKNESS: 1
INDIGESTION: 1
CHANGE IN LEVEL OF CONSCIOUSNESS: 1
INCREASED FATIGUE: 1
INCREASED SLEEPING: 1
FATIGUE: 1
DECREASED TO NO URINARY OUTPUT: 1
DYSPNEA ACTIVITY LEVEL: AT REST
DESCRIPTION OF MEMORY LOSS: IMMEDIATE
LIMITED RANGE OF MOTION: 1
LAST BOWEL MOVEMENT: 67027
DECREASED ORAL INTAKE: 1
FATIGUES EASILY: 1
DESCRIPTION OF MEMORY LOSS: SHORT TERM

## 2024-07-08 ASSESSMENT — PATIENT HEALTH QUESTIONNAIRE - PHQ9: CLINICAL INTERPRETATION OF PHQ2 SCORE: 0

## 2024-07-08 ASSESSMENT — SOCIAL DETERMINANTS OF HEALTH (SDOH): ACTIVE STRESSOR - NO STRESS FACTORS: 1

## 2024-07-08 ASSESSMENT — PAIN DESCRIPTION - PAIN TYPE
TYPE: ACUTE PAIN

## 2024-07-08 NOTE — ASSESSMENT & PLAN NOTE
7/7/2024  Increased T2 signal on MRI consistent with vasogenic cerebral edema likely from metastatic disease

## 2024-07-08 NOTE — ASSESSMENT & PLAN NOTE
"7/7/2024  As per MRI:  \"1.  Interval development of marked hydrocephalus with dilatation of the lateral ventricles, third ventricle, and fourth ventricle with transependymal edema. Meningeal enhancement at the prepontine cistern which appears to extend into the right IAC is   highly suspicious for meningeal carcinomatosis which may be pertinent to the development of hydrocephalus.\"    MRI per my read shows significant hydrocephalus.  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    "

## 2024-07-08 NOTE — DISCHARGE PLANNING
DC Transport Scheduled    Transport Company Scheduled:  [facility]  Spoke with Hortencia at [facility] to schedule transport.    Scheduled Date: 7/8/2024  Scheduled Time: 1000    Destination: HOME   Destination address: [address] [place] [address] [address] [place] [address]    Notified care team of scheduled transport via Voalte.     If there are any changes needed to the DC transportation scheduled, please contact [facility] at ext. [phone] between the hours of 2366-2592 Mon-Fri. If outside those hours, contact the ED Case Manager at ext. [phone].

## 2024-07-08 NOTE — DISCHARGE PLANNING
Referral Management Team    Patient discharging home today under the care of [facility].Discharge transport is scheduled at 1000 via rideline to   [address] [facility] [address] [place] [address]    Per daughter Carolyn she don't need any DME.     Family and medical team notified

## 2024-07-08 NOTE — DISCHARGE SUMMARY
Discharge Summary    CHIEF COMPLAINT ON ADMISSION  Chief Complaint   Patient presents with    Feeding Tube Problem     X1wk per daughter when she gives feeding liquid comes back up to mouth , pt started coughing       Reason for Admission  Hypernatremia    Admission Date  6/30/2024    CODE STATUS  Full Code    HPI & HOSPITAL COURSE  Jairo Rivas is a 73-year-old female who presented on 6/30/2024 with difficulty with tube feedings.  This is a pleasant woman with a history of endometrial carcinoma with brain metastases, stroke, resultant right-sided hemiparesis, and aphasia.  Patient was admitted due to sodium of 154.  There was concerns about her tube feedings and aspiration.  G-tube evaluation showed appropriate placement.    Given patient's history of stroke and metastatic disease family was requesting MRI.  MRI showed worsening hydrocephalus, meningeal enhancement highly suspicious for meningeal carcinomatosis.  Acute infarct of right anterior right campus as well as new acute infarction at the left middle cerebral peduncle and lateral left cerebellar hemisphere consistent with anterior inferior cerebral artery stroke.  New metastatic lesions were noted.  Neurology was consulted and due to recurrent strokes recommended discontinuation of aspirin and lieu of apixaban 5 mg twice daily.    Following further goals of care discussion with the patient's family, patient was transition to comfort care with plans to return home with hospice.  They wanted to continue her medications as well as tube feedings.  Patient's blood pressure started to decrease down to 89/49 and she was given additional fluid bolus.  She continued to require 1 LPM oxygen by nasal cannula.    On the night prior to discharge to home, patient's family requested to transition back to full code in hopes that the patient will be able to get back home.  She was retaining greater than 1 L of urine requiring Bonilla catheter placement.  Patient was  accepted to [facility] hospice.    Therefore, she is discharged in guarded and stable condition to hospice.    The patient met 2-midnight criteria for an inpatient stay at the time of discharge.    Discharge Date  7/8/2024    FOLLOW UP ITEMS POST DISCHARGE  -As per [facility].    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 Valsartan       160 mg switched to losartan 100 mg 2/2 nation wide shortage.    Endometrial cancer (HCC) (POA: Yes)      Overview: Menopause in 2010 and bleeding 2012 started to have irregular continuous       bleeding.       Secondary iron def anemia.      Seen by Dr. Chicas Aug 2018. Thick uterine lining on US.      Trial of medication therapy, if no improvement will go back to surgery.     Iron deficiency anemia (POA: Yes)      Overview: Iron def, post-menopausal bleeding.      On oral iron, FIT negative, seeing Dr. Dunn for evaluation.    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 Taking OTC K supplements.     Hypernatremia (POA: Yes)      FOLLOW UP  Future Appointments   Date Time Provider Department Center   7/8/2024 11:00 AM Tal Thorpe R.N. [facility] None   7/8/2024 To Be Determined [facility]THEW [facility] None   7/10/2024 To Be Determined Ileana Wilkes  R.N. [facility] None   7/10/2024  2:40 PM ZULEYKA Lyons [facility] [place]   7/11/2024 To Be Determined SHELTON LamaA. [facility] None   7/12/2024 To Be Determined SHY ZunigaN. [facility] None   7/15/2024 To Be Determined [facility] TEAM MERL[facility] None   7/15/2024 To Be Determined SHELTON LamaA. [facility] None   7/16/2024 To Be Determined Caitlin Munguia, OT [facility] None   7/17/2024 To Be Determined [facility] TEAM [facility] None   7/18/2024 To Be Determined SHELTON LamaA. [facility] None   7/19/2024 To Be Determined Michelle Alvarado R.N. [facility] None   7/22/2024 To Be Determined Michelle Alvarado R.N. [facility] None   7/22/2024 To Be Determined SHELTON LamaA. [facility] None   7/23/2024  1:00 PM [facility]EP [facility]IC [facility] None   7/24/2024 To Be Determined Michelle Alvarado R.N. [facility] None   7/25/2024 To Be Determined SHELTON LamaA. [facility] None   7/25/2024 12:15 PM [address]   7/26/2024 To Be Determined Michelle Alvarado R.N. [facility] None   7/29/2024 To Be Determined Michelle Alvarado R.N. [facility] None   7/29/2024 To Be Determined Heydi Harry C.N.A. [facility] None   7/31/2024 To Be Determined Michelle Alvarado R.N. [facility] None   8/1/2024 To Be Determined Heydi Harry C.N.A. [facility] None   8/2/2024 To Be Determined Michelle Alvarado R.N. [facility] None   8/5/2024 To Be Determined Heydi Harry C.N.A. [facility] None   11/5/2024  2:00 PM Alejandro Pérez M.D. [facility] None     [place] [address] [id number] [place] [id number]  [place] [address]  [phone]  Follow up        MEDICATIONS ON DISCHARGE     Medication List        CONTINUE taking these medications        Instructions   Alcohol Swabs Pads   Doctor's comments: Per formulary preference. ICD-10 code: E11.65 Uncontrolled type 2 Diabetes Mellitus  Wipe site with prep pad prior to injection.     artificial tears Oint ophth ointment  Commonly known as: Eye Lubricant   Apply 1  Application to both eyes every 8 hours for 60 days. Indications: Dry eye  Dose: 1 Application     BD Pen Needle Denisa U/F  Generic drug: Insulin Pen Needle 32 G x 4 mm   Doctor's comments: Per patient/formulary preference. ICD-10 code: E11.65 Uncontrolled type 2 Diabetes Mellitus  Use one pen needle in pen device to inject insulin three times daily.     bisacodyl 10 MG Supp  Commonly known as: Dulcolax   Insert 10 mg into the rectum as needed (constipation). Indications: Constipation  Dose: 10 mg     LORazepam 2 MG/ML Conc  Commonly known as: Ativan   Take 0.5-1 mL by mouth every 2 hours as needed (for agitation or seizure like activity) for up to 90 days. This is a patient of [facility] and has a terminal condition.  Indications: agitation/ seizure like activity  Dose: 1-2 mg     OneTouch Delica Plus [id number] [facility]   Doctor's comments: Or per formulary preference. ICD-10 code: E11.65 Uncontrolled type 2 Diabetes Mellitus  Use one lancet to test blood sugar three times daily before meals.     OneTouch Verio Flex System w/Device Kit   Doctor's comments: Or per formulary preference. ICD-10 code: E11.65 Uncontrolled type 2 Diabetes Mellitus  Test blood sugar as recommended by provider. One Touch Verio Flex blood glucose monitoring kit.     Stimulant Laxative 8.6-50 MG Tabs  Generic drug: senna-docusate   2 Tablets by Enteral Tube route every evening.  Dose: 2 Tablet            STOP taking these medications      Aspirin Low Dose 81 MG Chew chewable tablet  Generic drug: aspirin     atorvastatin 20 MG Tabs  Commonly known as: Lipitor     fluconazole 100 MG Tabs  Commonly known as: Diflucan     Home Care Oxygen     HumaLOG KwikPen 100 UNIT/ML Sopn injection PEN  Generic drug: insulin lispro     HYDROcodone-acetaminophen 5-325 MG Tabs per tablet  Commonly known as: Norco     Lacosamide 150 MG Tabs     lansoprazole 30 MG Tbdd  Commonly known as: Prevacid     losartan 50 MG Tabs  Commonly known as:  Cozaar     metoprolol tartrate 50 MG Tabs  Commonly known as: Lopressor     modafinil 100 MG Tabs  Commonly known as: Provigil     nitrofurantoin 100 MG Caps  Commonly known as: Macrobid     OneTouch Verio strip  Generic drug: glucose blood     phosphorus 250 MG tablet  Commonly known as: K-Phos-Neutral     sulfamethoxazole-trimethoprim 400-80 MG Tabs  Commonly known as: Bactrim              Allergies  Allergies   Allergen Reactions    Levaquin Itching and Swelling     Swelling of the tongue and face    Penicillins Itching and Swelling    Tramadol Itching and Swelling       DIET  Orders Placed This Encounter   Procedures    Diet NPO Restrict to: Strict     Standing Status:   Standing     Number of Occurrences:   1     Order Specific Question:   Diet NPO Restrict to:     Answer:   Strict [1]    Diet: Diet Tube Feed; Formula: Promote; Promote: Promote Fiber RTH; Goal Rate (mL/Hour): 65; Duration: 24 HR     Standing Status:   Standing     Number of Occurrences:   1     Order Specific Question:   Diet     Answer:   Diet Tube Feed [35]     Order Specific Question:   Formula:     Answer:   Promote     Order Specific Question:   Promote:     Answer:   Promote Fiber RTH     Order Specific Question:   Goal Rate (mL/Hour)     Answer:   65     Order Specific Question:   Route     Answer:   Gtube/PEG     Order Specific Question:   Duration     Answer:   24 HR    Discontinue Diet Tray     Standing Status:   Standing     Number of Occurrences:   1       ACTIVITY  As tolerated.  Weight bearing as tolerated    CONSULTATIONS  Palliative medicine  Neurology    PROCEDURES  None    LABORATORY  Lab Results   Component Value Date    SODIUM 141 07/03/2024    POTASSIUM 4.0 07/03/2024    CHLORIDE 102 07/03/2024    CO2 28 07/03/2024    GLUCOSE 288 (H) 07/03/2024    BUN 13 07/03/2024    CREATININE 0.35 (L) 07/03/2024        Lab Results   Component Value Date    WBC 5.8 07/02/2024    HEMOGLOBIN 10.6 (L) 07/02/2024    HEMATOCRIT 35.3 (L)  07/02/2024    PLATELETCT 242 07/02/2024        Total time of the discharge process 32 minutes.

## 2024-07-08 NOTE — CARE PLAN
The patient is Watcher - Medium risk of patient condition declining or worsening    Shift Goals  Clinical Goals: HOB greater than 30 degrees, monitor for s/sx of pain/agitation  Patient Goals: ZA  Family Goals: Not present    Progress made toward(s) clinical / shift goals:    Problem: Fall Risk  Goal: Patient will remain free from falls  Outcome: Progressing     Problem: Pain - Standard  Goal: Alleviation of pain or a reduction in pain to the patient’s comfort goal  Outcome: Progressing       Patient is not progressing towards the following goals:

## 2024-07-08 NOTE — ASSESSMENT & PLAN NOTE
"7/7/2024  As per MRI:  \"New acute infarction at the left middle cerebellar peduncle and lateral left cerebellar hemisphere.\"  Possibly due to brain herniation from hydrocephalus.    Continue aspirin and statin  "

## 2024-07-08 NOTE — PROGRESS NOTES
Hospital Medicine Daily Progress Note    Date of Service  7/7/2024    Chief Complaint  Jairo Rivas is a 73 y.o. female admitted 6/30/2024 with tube feeding concern.    Hospital Course  Jairo Rivas is a 73-year-old female with PMHx GERD, HTN, endometrial carcinoma with brain metastases and subsequent vasogenic edema, history of CVA, tube feed dependent.  Admitted 6/30 for aspiration and tube feeding concerns.    G-tube evaluation showing appropriate placement.    Given patient's history of stroke and metastatic disease family was requesting MRI.  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  Acute infarct of right anterior right campus.  New metastatic lesions noted.    Goals of care conversation were had with patient and her family.  They have elected to return home with hospice.    Interval Problem Update  7/5: Vital stable overnight.  Intermittent tachycardia is present.  Patient remains on 1 L/min supplemental O2.  Patient is resting comfortably in bed.  Hospice services are being arranged.     7/6: Vitals notable for intermittent tachycardia.  Patient remains on 1 L/min.  Hospice care is being arranged. Discussed with daughter. She would like the patient to return home as soon as possible, with or without hospice services in place. Plan for AM discharge. Transport is being arranged.     Above per previous hospitalist.    7/7/2024  Fever of 103.9  1 LPM  Non response to pain.  Oxycodone scheduled as per patient's daughter's request.  Continuing BP meds.  Controlled.  Requesting to continue all medications and tube feedings.    Enrolling patient ro inpatient status due to findings of acute stroke on MRI.  Continuing discharge planning while continuing medical treatment including tube feedings and blood pressure medications.  Secondary stroke prevention medications.        I have discussed this patient's plan of care and discharge  plan at IDT rounds today with Case Management, Nursing, Nursing leadership, and other members of the IDT team.    Consultants/Specialty  neurology    Code Status  DNAR/DNI    Disposition  The patient is not medically cleared for discharge to home or a post-acute facility.  Anticipate discharge to: hospice    I have placed the appropriate orders for post-discharge needs.    Review of Systems  Review of Systems   Unable to perform ROS: Patient unresponsive        Physical Exam  Temp:  [36.8 °C (98.2 °F)-39.9 °C (103.9 °F)] 37.2 °C (98.9 °F)  Pulse:  [] 97  Resp:  [16-19] 18  BP: (103-113)/(59-70) 109/63  SpO2:  [95 %-100 %] 100 %    Physical Exam  Vitals and nursing note reviewed. Exam conducted with a chaperone present.   Constitutional:       General: She is not in acute distress.     Appearance: She is normal weight. She is ill-appearing. She is not diaphoretic.   HENT:      Head: Normocephalic and atraumatic.      Mouth/Throat:      Mouth: Mucous membranes are moist.      Pharynx: Oropharynx is clear. No oropharyngeal exudate.   Eyes:      General: No scleral icterus.        Right eye: No discharge.         Left eye: No discharge.   Cardiovascular:      Rate and Rhythm: Normal rate and regular rhythm.      Pulses: Normal pulses.      Heart sounds: Normal heart sounds. No murmur heard.  Pulmonary:      Effort: Pulmonary effort is normal. No respiratory distress.      Breath sounds: Normal breath sounds.   Abdominal:      General: Abdomen is flat. Bowel sounds are normal. There is no distension.      Palpations: Abdomen is soft.      Tenderness: There is no abdominal tenderness.      Comments: G-tube in place   Musculoskeletal:      Cervical back: Neck supple. No tenderness.      Right lower leg: No edema.      Left lower leg: No edema.      Comments: Right upper extremity contracture   Skin:     Coloration: Skin is pale.   Neurological:      Motor: Weakness present.      Comments: Intermittently trying to  open eyes, minimal response to stimuli.  Deviated eye gaze   Psychiatric:      Comments: Unable to assess         Fluids    Intake/Output Summary (Last 24 hours) at 7/7/2024 2036  Last data filed at 7/7/2024 1800  Gross per 24 hour   Intake 510 ml   Output --   Net 510 ml       Laboratory                          Imaging  MR-BRAIN-WITH & W/O   Final Result      1.  Interval development of marked hydrocephalus with dilatation of the lateral ventricles, third ventricle, and fourth ventricle with transependymal edema. Meningeal enhancement at the prepontine cistern which appears to extend into the right IAC is    highly suspicious for meningeal carcinomatosis which may be pertinent to the development of hydrocephalus.   2.  Encephalomalacic changes consistent with prior infarction right frontal convexity, right parietal-occipital lobe, left subinsular white matter, left posterior limb internal capsule, left posterior-lateral thalamus.   3.  Subcentimeter focus of acute infarction at the right anterior hippocampus.   4.  Treated metastatic deposit at the right frontal convexity with further interval decrease in enhancement. Hemosiderin deposition again noted. No evidence of local recurrence/progression.   5.  Foci of hemosiderin deposition left posterior temporal lobe and left parietal lobe also most consistent with treated metastatic deposits. These foci of hemosiderin deposition were also present on prior MRI study.   6.  No new enhancing intra-axial metastatic deposits in the brain parenchyma in the supratentorial or infratentorial compartment.   7.  Old lacunar infarct left side of the elaine.   8.  New acute infarction at the left middle cerebellar peduncle and lateral left cerebellar hemisphere. No hemorrhagic transformation.   9.  A Voalte message was sent to ALVIN NORRIS at 10:42 AM 7/4/2024.      CT-CTA CHEST PULMONARY ARTERY W/ RECONS   Final Result      1.  No evidence of pulmonary was.   2.  Trace  "bilateral pleural effusions with associated compressive atelectasis and/or consolidation. Underlying infection is possible.   3.  Coronary artery disease.            [id number] VIEW   Final Result      1.  No evidence of bowel obstruction.   2.  Contrast present throughout the colon.   3.  Supportive tubing as described.      DX-G.I. TUBE INJECTION, ANY TYPE   Final Result      Intraluminal position of gastrostomy catheter.      DX-ABDOMEN FOR TUBE PLACEMENT   Final Result      Gastrostomy tube projects over the expected position of the stomach assuming the gastrostomy is appropriately located      DX-CHEST-PORTABLE (1 VIEW)   Final Result      Mild volume loss and linear opacities in the left mid and lower lung which may be scarring versus mild infiltrates.           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    Continue aspirin and statin    Coma (HCC)  Assessment & Plan  7/7/2024  Likely due to worsening hydrocephalus.    Meningeal carcinomatosis (HCC)- (present on admission)  Assessment & Plan  7/7/2024  As per MRI:  \"1.  Interval development of marked hydrocephalus with dilatation of the lateral ventricles, third ventricle, and fourth ventricle with transependymal edema. Meningeal enhancement at the prepontine cistern which appears to extend into the right IAC is   highly suspicious for meningeal carcinomatosis which may be pertinent to the development of hydrocephalus.\"    MRI per my read shows significant hydrocephalus.  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      Metastatic disease (HCC)- (present on admission)  Assessment & Plan  7/7/2024  As per MRI brain.  Hx endometrial cancer.      Hypernatremia- (present on " admission)  Assessment & Plan  Sodium 154  Now down to 141, changing IV fluids and free water  Patient was not tolerating any intake through her G-tube due to regurgitation so likely became volume depleted    History of CVA (cerebrovascular accident)- (present on admission)  Assessment & Plan  7/7/2024  Prior stroke now with new.  Continue aspirin and statin    Gastrostomy tube present (HCC)- (present on admission)  Assessment & Plan  7/7/2024  As per history    GERD (gastroesophageal reflux disease)- (present on admission)  Assessment & Plan  G-tube in place  Continue PPI    Vasogenic edema (HCC)- (present on admission)  Assessment & Plan  7/7/2024  Increased T2 signal on MRI consistent with vasogenic cerebral edema likely from metastatic disease    Dysphagia- (present on admission)  Assessment & Plan  Patient has G tube but as per family she was regurgitation up the food from tube feed  She is back on tube feedings but continues to intermittently groan, her abdomen is firm but KUB only shows contrast from previous study no clear evidence of stool, continue to monitor    7/7/2024  Continue tube feeds per daughter's request    Malignant neoplasm metastatic to brain (HCC)- (present on admission)  Assessment & Plan  7/7/2024  As per MRI     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)- (present on admission)  Assessment & Plan  7/7/2024  As per history    Advance care planning- (present on admission)  Assessment & Plan  I have discussed with patient daughter today regarding her MRI results and regarding risk of bleeding if started on eliquis. Daughter has discussed with other family members and decision has been made by family to make patient DNR/DNI and also would like to talk with hospice  I have placed hospice referral. Time spent on goals of care is 17 min   Above per previous hospitalist     7/6: discussed with daughter. She is anxious for patient to return home with or without hospice services in  place. We are working to arrange for transport home tomorrow am.     7/7/2024  [facility] hospice staff not available today to facilitate discharge to home.    Hypokalemia- (present on admission)  Assessment & Plan  Discontinue daily labs; home with hospice    Iron deficiency anemia- (present on admission)  Assessment & Plan  7/7/2024  As per history.    Endometrial cancer (HCC)- (present on admission)  Assessment & Plan  7/7/2024  As per history.  Now metastatic.    Essential hypertension- (present on admission)  Assessment & Plan  Continue losartan and metoprolol via G-tube    DC at discharge given that patient will transition to hospice care.    7/7/2024  Stable  Continue metoprolol, losartan as per daughter's request.         VTE prophylaxis:   SCDs/TEDs   enoxaparin ppx      I have performed a physical exam and reviewed and updated ROS and Plan today (7/7/2024). In review of yesterday's note (7/6/2024), there are no changes except as documented above.      Greater than 51 minutes spent prepping to see patient (e.g. review of tests) obtaining and/or reviewing separately obtained history. Performing a medically appropriate examination and evaluation.  Counseling and educating the patient/family/caregiver.  Ordering medications, tests, or procedures.  Referring and communicating with other health care professionals.  Documenting clinical information in EPIC.  Independently interpreting results and communicating results to patient/family/caregiver.  Care coordination.

## 2024-07-08 NOTE — DISCHARGE PLANNING
TCN following. HTH/SCP chart review completed. Collaborated with CM. Discussed anticipated discharge plan currently noted to home with hospice today. TCN to defer any additional discharge planning needs to CM and will not actively be involved given comfort care status/hospice plan. Thank you.

## 2024-07-08 NOTE — PROGRESS NOTES
NOC HOSPITALIST CROSS COVER    Notified by RN regarding patient's daughter requesting to speak to a provider at bedside. Per the daughter, she wants the patient to be full code until she goes home on hospice today. Expressed that this is not in the best interest of the patient due to her very grave prognosis. The daughter verbalizes understanding that her mother is unlikely to improve, but would like everything done in the meantime until she goes home on hospice. The patient is currently hemodynamically stable. Recommended against full code status as the patient has very little chance of survival in the event of cardiopulmonary arrest, which would likely cause more harm than benefit, but the daughter again verbalized understanding and requested code status be changed to FULL code status.     The daughter is also concerned that the patient's urinary retention may be secondary to her pain medications, but is also requesting a strong dose of medications be given as her mother appears very uncomfortable. She developed severe urinary retention, with a bladder scan of > 1 L for which a blankenship catheter was placed. Verbalized that I could order IV pain medications, but the daughter was uncertain if this is what she would like done.     Vitals:    07/07/24 1918   BP: 109/63   Pulse: 97   Resp: 18   Temp: 37.2 °C (98.9 °F)   SpO2: 100%        -----------------------------------------------------------------------------------------------------------    Electronically signed by:  Sam Chu, KATHERINE, JANEEN, [id number]  Hospitalist Services

## 2024-07-09 ENCOUNTER — HOME CARE VISIT (OUTPATIENT)
Dept: HOSPICE | Facility: HOSPICE | Age: 74
End: 2024-07-09
Payer: MEDICARE

## 2024-07-09 PROCEDURE — G0155 HHCP-SVS OF CSW,EA 15 MIN: HCPCS

## 2024-07-09 PROCEDURE — S9126 HOSPICE CARE, IN THE HOME, P: HCPCS

## 2024-07-09 PROCEDURE — G0299 HHS/HOSPICE OF RN EA 15 MIN: HCPCS

## 2024-07-09 SDOH — ECONOMIC STABILITY: HOUSING INSECURITY: EVIDENCE OF SMOKING MATERIAL: 0

## 2024-07-09 ASSESSMENT — ACTIVITIES OF DAILY LIVING (ADL)
CURRENT_FUNCTION: TWO PERSON
DRESSING_REQUIRES_ASSISTANCE: 1
AMBULATION ASSISTANCE: NON-AMBULATORY
GROOMING_REQUIRES_ASSISTANCE: 1
PHYSICAL_TRANSFER_REQUIRES_ASSISTANCE: 1
LAUNDRY_REQUIRES_ASSISTANCE: 1
AMBULATION_REQUIRES_ASSISTANCE: 1
BATHING_REQUIRES_ASSISTANCE: 1
TOILETING_REQUIRES_ASSISTANCE: 1
SHOPPING_REQUIRES_ASSISTANCE: 1
EATING_REQUIRES_ASSISTANCE: 1

## 2024-07-09 ASSESSMENT — ENCOUNTER SYMPTOMS
MUSCLE WEAKNESS: 1
LIMITED RANGE OF MOTION: 1
CONSTIPATION: 1
INCREASED SLEEPING: 1
DECREASED ORAL INTAKE: 1
FATIGUES EASILY: 1
APNEIC BREATHING: 1
DENIES PAIN: 1
PERSON REPORTING PAIN: DIRECT OBSERVATION
LAST BOWEL MOVEMENT: 67027
INCREASED FATIGUE: 1
SHORTNESS OF BREATH: 1
DYSPNEA ACTIVITY LEVEL: AT REST
STOOL FREQUENCY: LESS THAN DAILY

## 2024-07-09 ASSESSMENT — PAIN SCALES - PAIN ASSESSMENT IN ADVANCED DEMENTIA (PAINAD)
CONSOLABILITY: 0 - NO NEED TO CONSOLE.
TOTALSCORE: 1
NEGVOCALIZATION: 0 - NONE.
BODYLANGUAGE: 0 - RELAXED.
FACIALEXPRESSION: 0 - SMILING OR INEXPRESSIVE.

## 2024-07-10 ENCOUNTER — APPOINTMENT (OUTPATIENT)
Dept: MEDICAL GROUP | Facility: LAB | Age: 74
End: 2024-07-10
Payer: MEDICARE

## 2024-07-10 ENCOUNTER — HOME CARE VISIT (OUTPATIENT)
Dept: HOSPICE | Facility: HOSPICE | Age: 74
End: 2024-07-10
Payer: MEDICARE

## 2024-07-10 VITALS — RESPIRATION RATE: 12 BRPM | HEART RATE: 100 BPM

## 2024-07-10 PROCEDURE — S9126 HOSPICE CARE, IN THE HOME, P: HCPCS

## 2024-07-10 PROCEDURE — G0299 HHS/HOSPICE OF RN EA 15 MIN: HCPCS

## 2024-07-10 ASSESSMENT — PAIN SCALES - PAIN ASSESSMENT IN ADVANCED DEMENTIA (PAINAD)
BODYLANGUAGE: 0 - RELAXED.
TOTALSCORE: 2
FACIALEXPRESSION: 0 - SMILING OR INEXPRESSIVE.
NEGVOCALIZATION: 0 - NONE.
CONSOLABILITY: 0 - NO NEED TO CONSOLE.

## 2024-07-10 ASSESSMENT — ENCOUNTER SYMPTOMS
APNEIC BREATHING: 1
DECREASED TO NO URINARY OUTPUT: 1
INCREASED FATIGUE: 1
INCREASED SLEEPING: 1
DECREASED ORAL INTAKE: 1
DENIES PAIN: 1
PERSON REPORTING PAIN: DIRECT OBSERVATION

## 2024-07-11 ENCOUNTER — HOME CARE VISIT (OUTPATIENT)
Dept: HOSPICE | Facility: HOSPICE | Age: 74
End: 2024-07-11
Payer: MEDICARE

## 2024-07-11 PROCEDURE — S9126 HOSPICE CARE, IN THE HOME, P: HCPCS

## 2024-07-12 ENCOUNTER — HOME CARE VISIT (OUTPATIENT)
Dept: HOSPICE | Facility: HOSPICE | Age: 74
End: 2024-07-12
Payer: MEDICARE

## 2024-07-12 VITALS — RESPIRATION RATE: 20 BRPM

## 2024-07-12 PROCEDURE — G0299 HHS/HOSPICE OF RN EA 15 MIN: HCPCS

## 2024-07-12 PROCEDURE — S9126 HOSPICE CARE, IN THE HOME, P: HCPCS

## 2024-07-12 SDOH — ECONOMIC STABILITY: HOUSING INSECURITY: EVIDENCE OF SMOKING MATERIAL: 0

## 2024-07-12 ASSESSMENT — PAIN SCALES - PAIN ASSESSMENT IN ADVANCED DEMENTIA (PAINAD)
BODYLANGUAGE: 0 - RELAXED.
FACIALEXPRESSION: 0 - SMILING OR INEXPRESSIVE.
NEGVOCALIZATION: 0 - NONE.
TOTALSCORE: 1
CONSOLABILITY: 0 - NO NEED TO CONSOLE.

## 2024-07-12 ASSESSMENT — ENCOUNTER SYMPTOMS
INCREASED SLEEPING: 1
DECREASED BLOOD PRESSURE: 1
FATIGUES EASILY: 1
INCREASED FATIGUE: 1
DECREASED ORAL INTAKE: 1

## 2024-07-13 ENCOUNTER — PHARMACY VISIT (OUTPATIENT)
Dept: PHARMACY | Facility: MEDICAL CENTER | Age: 74
End: 2024-07-13
Payer: COMMERCIAL

## 2024-07-13 ENCOUNTER — HOME CARE VISIT (OUTPATIENT)
Dept: HOSPICE | Facility: HOSPICE | Age: 74
End: 2024-07-13
Payer: MEDICARE

## 2024-07-13 VITALS — RESPIRATION RATE: 16 BRPM | HEART RATE: 104 BPM

## 2024-07-13 PROCEDURE — RXMED WILLOW AMBULATORY MEDICATION CHARGE

## 2024-07-13 PROCEDURE — G0299 HHS/HOSPICE OF RN EA 15 MIN: HCPCS

## 2024-07-13 PROCEDURE — RXMED WILLOW AMBULATORY MEDICATION CHARGE: Performed by: STUDENT IN AN ORGANIZED HEALTH CARE EDUCATION/TRAINING PROGRAM

## 2024-07-13 PROCEDURE — S9126 HOSPICE CARE, IN THE HOME, P: HCPCS

## 2024-07-13 RX ORDER — SCOLOPAMINE TRANSDERMAL SYSTEM 1 MG/1
PATCH, EXTENDED RELEASE TRANSDERMAL
Qty: 4 PATCH | Refills: 3 | OUTPATIENT
Start: 2024-07-13

## 2024-07-13 ASSESSMENT — PAIN SCALES - PAIN ASSESSMENT IN ADVANCED DEMENTIA (PAINAD)
CONSOLABILITY: 0 - NO NEED TO CONSOLE.
TOTALSCORE: 2
NEGVOCALIZATION: 0 - NONE.
BODYLANGUAGE: 0 - RELAXED.
FACIALEXPRESSION: 0 - SMILING OR INEXPRESSIVE.

## 2024-07-13 ASSESSMENT — ENCOUNTER SYMPTOMS
STOOL FREQUENCY: LESS THAN DAILY
DECREASED BLOOD PRESSURE: 1
INCREASED SLEEPING: 1
LIMITED RANGE OF MOTION: 1
LAST BOWEL MOVEMENT: 67033
DECREASED ORAL INTAKE: 1
MUSCLE WEAKNESS: 1
FATIGUES EASILY: 1
INCREASED FATIGUE: 1

## 2024-07-13 ASSESSMENT — ACTIVITIES OF DAILY LIVING (ADL)
CURRENT_FUNCTION: TWO PERSON
AMBULATION ASSISTANCE: NON-AMBULATORY

## 2024-07-14 ENCOUNTER — HOME CARE VISIT (OUTPATIENT)
Dept: HOSPICE | Facility: HOSPICE | Age: 74
End: 2024-07-14
Payer: MEDICARE

## 2024-07-14 PROCEDURE — G0299 HHS/HOSPICE OF RN EA 15 MIN: HCPCS

## 2024-07-14 PROCEDURE — S9126 HOSPICE CARE, IN THE HOME, P: HCPCS

## 2024-07-15 ENCOUNTER — HOME CARE VISIT (OUTPATIENT)
Dept: HOSPICE | Facility: HOSPICE | Age: 74
End: 2024-07-15
Payer: MEDICARE

## 2024-07-15 VITALS — HEART RATE: 90 BPM | RESPIRATION RATE: 16 BRPM

## 2024-07-15 VITALS — HEART RATE: 112 BPM | RESPIRATION RATE: 16 BRPM

## 2024-07-15 PROCEDURE — S9126 HOSPICE CARE, IN THE HOME, P: HCPCS

## 2024-07-15 PROCEDURE — G0299 HHS/HOSPICE OF RN EA 15 MIN: HCPCS

## 2024-07-15 ASSESSMENT — ENCOUNTER SYMPTOMS
MUSCLE WEAKNESS: 1
INCREASED FATIGUE: 1
LIMITED RANGE OF MOTION: 1
FATIGUES EASILY: 1
LAST BOWEL MOVEMENT: 67034
INCREASED SLEEPING: 1
STOOL FREQUENCY: DAILY
PERSON REPORTING PAIN: DIRECT OBSERVATION
STOOL FREQUENCY: LESS THAN DAILY
FATIGUES EASILY: 1
MUSCLE WEAKNESS: 1
DECREASED BLOOD PRESSURE: 1
BOWEL INCONTINENCE: 1
DECREASED ORAL INTAKE: 1
DENIES PAIN: 1
INCREASED SLEEPING: 1
APNEIC BREATHING: 1
DECREASED TO NO URINARY OUTPUT: 1
LIMITED RANGE OF MOTION: 1
LAST BOWEL MOVEMENT: 67033

## 2024-07-15 ASSESSMENT — PAIN SCALES - PAIN ASSESSMENT IN ADVANCED DEMENTIA (PAINAD)
TOTALSCORE: 2
CONSOLABILITY: 0 - NO NEED TO CONSOLE.
BODYLANGUAGE: 0 - RELAXED.
BODYLANGUAGE: 0 - RELAXED.
FACIALEXPRESSION: 0 - SMILING OR INEXPRESSIVE.
TOTALSCORE: 1
CONSOLABILITY: 0 - NO NEED TO CONSOLE.
NEGVOCALIZATION: 0 - NONE.
NEGVOCALIZATION: 0 - NONE.
FACIALEXPRESSION: 0 - SMILING OR INEXPRESSIVE.

## 2024-07-15 ASSESSMENT — ACTIVITIES OF DAILY LIVING (ADL)
CURRENT_FUNCTION: TWO PERSON
CURRENT_FUNCTION: ONE PERSON
AMBULATION ASSISTANCE: NON-AMBULATORY
AMBULATION ASSISTANCE: NON-AMBULATORY

## 2024-07-16 ENCOUNTER — HOME CARE VISIT (OUTPATIENT)
Dept: HOSPICE | Facility: HOSPICE | Age: 74
End: 2024-07-16
Payer: MEDICARE

## 2024-07-16 VITALS — RESPIRATION RATE: 20 BRPM | HEART RATE: 88 BPM

## 2024-07-16 PROCEDURE — G0156 HHCP-SVS OF AIDE,EA 15 MIN: HCPCS

## 2024-07-16 PROCEDURE — G0299 HHS/HOSPICE OF RN EA 15 MIN: HCPCS

## 2024-07-16 PROCEDURE — S9126 HOSPICE CARE, IN THE HOME, P: HCPCS

## 2024-07-16 ASSESSMENT — ENCOUNTER SYMPTOMS
DENIES PAIN: 1
INCREASED FATIGUE: 1
DECREASED TO NO URINARY OUTPUT: 1
INCREASED SLEEPING: 1
PERSON REPORTING PAIN: DIRECT OBSERVATION

## 2024-07-16 ASSESSMENT — PAIN SCALES - PAIN ASSESSMENT IN ADVANCED DEMENTIA (PAINAD)
TOTALSCORE: 1
CONSOLABILITY: 0 - NO NEED TO CONSOLE.
FACIALEXPRESSION: 0 - SMILING OR INEXPRESSIVE.
BODYLANGUAGE: 0 - RELAXED.
NEGVOCALIZATION: 0 - NONE.

## 2024-07-17 ENCOUNTER — HOME CARE VISIT (OUTPATIENT)
Dept: HOSPICE | Facility: HOSPICE | Age: 74
End: 2024-07-17
Payer: MEDICARE

## 2024-07-17 VITALS — RESPIRATION RATE: 20 BRPM | HEART RATE: 90 BPM

## 2024-07-17 PROCEDURE — G0299 HHS/HOSPICE OF RN EA 15 MIN: HCPCS

## 2024-07-17 PROCEDURE — S9126 HOSPICE CARE, IN THE HOME, P: HCPCS

## 2024-07-17 SDOH — ECONOMIC STABILITY: HOUSING INSECURITY: EVIDENCE OF SMOKING MATERIAL: 0

## 2024-07-17 ASSESSMENT — PAIN SCALES - PAIN ASSESSMENT IN ADVANCED DEMENTIA (PAINAD)
BODYLANGUAGE: 0 - RELAXED.
CONSOLABILITY: 1 - DISTRACTED OR REASSURED BY VOICE OR TOUCH.
TOTALSCORE: 3
FACIALEXPRESSION: 0 - SMILING OR INEXPRESSIVE.
NEGVOCALIZATION: 1 - OCCASIONAL MOAN OR GROAN. LOW-LEVEL SPEECH WITH A NEGATIVE OR DISAPPROVING QUALITY.

## 2024-07-17 ASSESSMENT — ENCOUNTER SYMPTOMS
LAST BOWEL MOVEMENT: 67036
LIMITED RANGE OF MOTION: 1
MUSCLE WEAKNESS: 1
BOWEL INCONTINENCE: 1
INCREASED SLEEPING: 1
INCREASED FATIGUE: 1
DECREASED TO NO URINARY OUTPUT: 1
FATIGUES EASILY: 1
PERSON REPORTING PAIN: DIRECT OBSERVATION
STOOL FREQUENCY: LESS THAN DAILY
PAIN: 1

## 2024-07-17 ASSESSMENT — ACTIVITIES OF DAILY LIVING (ADL)
AMBULATION ASSISTANCE: NON-AMBULATORY
CURRENT_FUNCTION: ONE PERSON

## 2024-07-18 ENCOUNTER — HOME CARE VISIT (OUTPATIENT)
Dept: HOSPICE | Facility: HOSPICE | Age: 74
End: 2024-07-18
Payer: MEDICARE

## 2024-07-18 VITALS — DIASTOLIC BLOOD PRESSURE: 42 MMHG | SYSTOLIC BLOOD PRESSURE: 76 MMHG | RESPIRATION RATE: 20 BRPM | HEART RATE: 100 BPM

## 2024-07-18 PROCEDURE — S9126 HOSPICE CARE, IN THE HOME, P: HCPCS

## 2024-07-18 PROCEDURE — G0299 HHS/HOSPICE OF RN EA 15 MIN: HCPCS

## 2024-07-18 SDOH — ECONOMIC STABILITY: HOUSING INSECURITY: EVIDENCE OF SMOKING MATERIAL: 0

## 2024-07-18 ASSESSMENT — ENCOUNTER SYMPTOMS
BOWEL INCONTINENCE: 1
DENIES PAIN: 1
PERSON REPORTING PAIN: PATIENT
DECREASED BLOOD PRESSURE: 1
FATIGUES EASILY: 1
STOOL FREQUENCY: DAILY
LAST BOWEL MOVEMENT: 67038
INCREASED FATIGUE: 1
INCREASED SLEEPING: 1
LIMITED RANGE OF MOTION: 1
SLEEP QUALITY: ADEQUATE
MUSCLE WEAKNESS: 1

## 2024-07-18 ASSESSMENT — ACTIVITIES OF DAILY LIVING (ADL)
AMBULATION ASSISTANCE: NON-AMBULATORY
CURRENT_FUNCTION: TWO PERSON

## 2024-07-18 ASSESSMENT — PAIN SCALES - PAIN ASSESSMENT IN ADVANCED DEMENTIA (PAINAD)
NEGVOCALIZATION: 0 - NONE.
CONSOLABILITY: 1 - DISTRACTED OR REASSURED BY VOICE OR TOUCH.
TOTALSCORE: 2
FACIALEXPRESSION: 0 - SMILING OR INEXPRESSIVE.
BODYLANGUAGE: 0 - RELAXED.

## 2024-07-19 ENCOUNTER — PHARMACY VISIT (OUTPATIENT)
Dept: PHARMACY | Facility: MEDICAL CENTER | Age: 74
End: 2024-07-19
Payer: COMMERCIAL

## 2024-07-19 ENCOUNTER — HOME CARE VISIT (OUTPATIENT)
Dept: HOSPICE | Facility: HOSPICE | Age: 74
End: 2024-07-19
Payer: MEDICARE

## 2024-07-19 VITALS — HEART RATE: 80 BPM | RESPIRATION RATE: 20 BRPM

## 2024-07-19 PROCEDURE — S9126 HOSPICE CARE, IN THE HOME, P: HCPCS

## 2024-07-19 PROCEDURE — G0156 HHCP-SVS OF AIDE,EA 15 MIN: HCPCS

## 2024-07-19 PROCEDURE — RXMED WILLOW AMBULATORY MEDICATION CHARGE: Performed by: STUDENT IN AN ORGANIZED HEALTH CARE EDUCATION/TRAINING PROGRAM

## 2024-07-19 PROCEDURE — G0299 HHS/HOSPICE OF RN EA 15 MIN: HCPCS

## 2024-07-19 RX ORDER — OMEPRAZOLE 40 MG/1
40 CAPSULE, DELAYED RELEASE ORAL 2 TIMES DAILY
Qty: 28 CAPSULE | Refills: 6 | Status: SHIPPED | OUTPATIENT
Start: 2024-07-19 | End: 2024-07-22

## 2024-07-19 SDOH — ECONOMIC STABILITY: HOUSING INSECURITY: EVIDENCE OF SMOKING MATERIAL: 0

## 2024-07-19 ASSESSMENT — ENCOUNTER SYMPTOMS
INCREASED FATIGUE: 1
BOWEL PATTERN NORMAL: 1
INCREASED SLEEPING: 1
LIMITED RANGE OF MOTION: 1
DECREASED TO NO URINARY OUTPUT: 1
FATIGUES EASILY: 1
STOOL FREQUENCY: LESS THAN DAILY
DECREASED ORAL INTAKE: 1
MUSCLE WEAKNESS: 1

## 2024-07-19 ASSESSMENT — ACTIVITIES OF DAILY LIVING (ADL)
CURRENT_FUNCTION: TWO PERSON
AMBULATION ASSISTANCE: NON-AMBULATORY

## 2024-07-19 ASSESSMENT — PAIN SCALES - PAIN ASSESSMENT IN ADVANCED DEMENTIA (PAINAD)
TOTALSCORE: 2
NEGVOCALIZATION: 0 - NONE.
CONSOLABILITY: 0 - NO NEED TO CONSOLE.
FACIALEXPRESSION: 0 - SMILING OR INEXPRESSIVE.
BODYLANGUAGE: 0 - RELAXED.

## 2024-07-20 ENCOUNTER — HOME CARE VISIT (OUTPATIENT)
Dept: HOSPICE | Facility: HOSPICE | Age: 74
End: 2024-07-20
Payer: MEDICARE

## 2024-07-20 VITALS — RESPIRATION RATE: 26 BRPM

## 2024-07-20 PROCEDURE — S9126 HOSPICE CARE, IN THE HOME, P: HCPCS

## 2024-07-20 PROCEDURE — G0299 HHS/HOSPICE OF RN EA 15 MIN: HCPCS

## 2024-07-20 SDOH — ECONOMIC STABILITY: HOUSING INSECURITY: EVIDENCE OF SMOKING MATERIAL: 0

## 2024-07-20 ASSESSMENT — PAIN SCALES - PAIN ASSESSMENT IN ADVANCED DEMENTIA (PAINAD)
BODYLANGUAGE: 0 - RELAXED.
CONSOLABILITY: 1 - DISTRACTED OR REASSURED BY VOICE OR TOUCH.
NEGVOCALIZATION: 1 - OCCASIONAL MOAN OR GROAN. LOW-LEVEL SPEECH WITH A NEGATIVE OR DISAPPROVING QUALITY.
FACIALEXPRESSION: 0 - SMILING OR INEXPRESSIVE.
TOTALSCORE: 3

## 2024-07-20 ASSESSMENT — ENCOUNTER SYMPTOMS
INCREASED FATIGUE: 1
SLEEP QUALITY: ADEQUATE
INCREASED SLEEPING: 1
DECREASED TO NO URINARY OUTPUT: 1
LAST BOWEL MOVEMENT: 67040
FATIGUES EASILY: 1
APNEIC BREATHING: 1

## 2024-07-21 ENCOUNTER — HOME CARE VISIT (OUTPATIENT)
Dept: HOSPICE | Facility: HOSPICE | Age: 74
End: 2024-07-21
Payer: MEDICARE

## 2024-07-21 VITALS — HEART RATE: 92 BPM | RESPIRATION RATE: 20 BRPM

## 2024-07-21 PROCEDURE — S9126 HOSPICE CARE, IN THE HOME, P: HCPCS

## 2024-07-21 PROCEDURE — G0299 HHS/HOSPICE OF RN EA 15 MIN: HCPCS

## 2024-07-21 SDOH — ECONOMIC STABILITY: HOUSING INSECURITY: EVIDENCE OF SMOKING MATERIAL: 0

## 2024-07-21 ASSESSMENT — PAIN SCALES - PAIN ASSESSMENT IN ADVANCED DEMENTIA (PAINAD)
CONSOLABILITY: 0 - NO NEED TO CONSOLE.
NEGVOCALIZATION: 0 - NONE.
TOTALSCORE: 2
FACIALEXPRESSION: 0 - SMILING OR INEXPRESSIVE.
BODYLANGUAGE: 0 - RELAXED.

## 2024-07-21 ASSESSMENT — ENCOUNTER SYMPTOMS
LIMITED RANGE OF MOTION: 1
INCREASED SLEEPING: 1
DECREASED ORAL INTAKE: 1
STOOL FREQUENCY: LESS THAN DAILY
MUSCLE WEAKNESS: 1
INCREASED FATIGUE: 1
APNEIC BREATHING: 1
LAST BOWEL MOVEMENT: 67042
FATIGUES EASILY: 1

## 2024-07-21 ASSESSMENT — ACTIVITIES OF DAILY LIVING (ADL)
CURRENT_FUNCTION: TWO PERSON
AMBULATION ASSISTANCE: NON-AMBULATORY

## 2024-07-22 ENCOUNTER — HOME CARE VISIT (OUTPATIENT)
Dept: HOSPICE | Facility: HOSPICE | Age: 74
End: 2024-07-22
Payer: MEDICARE

## 2024-07-22 ENCOUNTER — PHARMACY VISIT (OUTPATIENT)
Dept: PHARMACY | Facility: MEDICAL CENTER | Age: 74
End: 2024-07-22
Payer: COMMERCIAL

## 2024-07-22 VITALS — RESPIRATION RATE: 14 BRPM

## 2024-07-22 PROCEDURE — RXMED WILLOW AMBULATORY MEDICATION CHARGE: Performed by: STUDENT IN AN ORGANIZED HEALTH CARE EDUCATION/TRAINING PROGRAM

## 2024-07-22 PROCEDURE — RXMED WILLOW AMBULATORY MEDICATION CHARGE: Performed by: REHABILITATION PRACTITIONER

## 2024-07-22 PROCEDURE — G0299 HHS/HOSPICE OF RN EA 15 MIN: HCPCS

## 2024-07-22 PROCEDURE — RXMED WILLOW AMBULATORY MEDICATION CHARGE

## 2024-07-22 PROCEDURE — S9126 HOSPICE CARE, IN THE HOME, P: HCPCS

## 2024-07-22 RX ORDER — PANTOPRAZOLE SODIUM 40 MG/1
40 TABLET, DELAYED RELEASE ORAL 2 TIMES DAILY
Qty: 30 TABLET | Refills: 23 | Status: SHIPPED | OUTPATIENT
Start: 2024-07-22 | End: 2025-07-22

## 2024-07-22 SDOH — ECONOMIC STABILITY: HOUSING INSECURITY: EVIDENCE OF SMOKING MATERIAL: 0

## 2024-07-22 ASSESSMENT — ENCOUNTER SYMPTOMS
LIMITED RANGE OF MOTION: 1
MUSCLE WEAKNESS: 1
LAST BOWEL MOVEMENT: 67043
DYSPNEA ON EXERTION: 1
DECREASED BLOOD PRESSURE: 1
STOOL FREQUENCY: DAILY
INCREASED FATIGUE: 1
FATIGUES EASILY: 1
INCREASED SLEEPING: 1
DECREASED ORAL INTAKE: 1
SLEEP QUALITY: ADEQUATE

## 2024-07-22 ASSESSMENT — ACTIVITIES OF DAILY LIVING (ADL)
AMBULATION ASSISTANCE: NON-AMBULATORY
CURRENT_FUNCTION: TWO PERSON

## 2024-07-22 ASSESSMENT — PAIN SCALES - PAIN ASSESSMENT IN ADVANCED DEMENTIA (PAINAD)
NEGVOCALIZATION: 1 - OCCASIONAL MOAN OR GROAN. LOW-LEVEL SPEECH WITH A NEGATIVE OR DISAPPROVING QUALITY.
FACIALEXPRESSION: 0 - SMILING OR INEXPRESSIVE.
BODYLANGUAGE: 0 - RELAXED.
CONSOLABILITY: 1 - DISTRACTED OR REASSURED BY VOICE OR TOUCH.
TOTALSCORE: 3

## 2024-07-23 ENCOUNTER — HOME CARE VISIT (OUTPATIENT)
Dept: HOSPICE | Facility: HOSPICE | Age: 74
End: 2024-07-23
Payer: MEDICARE

## 2024-07-23 ENCOUNTER — PHARMACY VISIT (OUTPATIENT)
Dept: PHARMACY | Facility: MEDICAL CENTER | Age: 74
End: 2024-07-23
Payer: COMMERCIAL

## 2024-07-23 VITALS — HEART RATE: 100 BPM | RESPIRATION RATE: 24 BRPM

## 2024-07-23 PROCEDURE — G0156 HHCP-SVS OF AIDE,EA 15 MIN: HCPCS

## 2024-07-23 PROCEDURE — RXMED WILLOW AMBULATORY MEDICATION CHARGE: Performed by: REHABILITATION PRACTITIONER

## 2024-07-23 PROCEDURE — G0299 HHS/HOSPICE OF RN EA 15 MIN: HCPCS

## 2024-07-23 PROCEDURE — S9126 HOSPICE CARE, IN THE HOME, P: HCPCS

## 2024-07-23 RX ORDER — SUCRALFATE 1 G/1
1 TABLET ORAL 4 TIMES DAILY
Qty: 120 TABLET | Refills: 11 | OUTPATIENT
Start: 2024-07-23

## 2024-07-23 SDOH — ECONOMIC STABILITY: HOUSING INSECURITY: EVIDENCE OF SMOKING MATERIAL: 0

## 2024-07-23 ASSESSMENT — ENCOUNTER SYMPTOMS
LIMITED RANGE OF MOTION: 1
DECREASED ORAL INTAKE: 1
INCREASED FATIGUE: 1
MUSCLE WEAKNESS: 1
LAST BOWEL MOVEMENT: 67043
INCREASED SLEEPING: 1
SLEEP QUALITY: ADEQUATE
FATIGUES EASILY: 1
DYSPNEA ON EXERTION: 1

## 2024-07-23 ASSESSMENT — PAIN SCALES - PAIN ASSESSMENT IN ADVANCED DEMENTIA (PAINAD)
NEGVOCALIZATION: 1 - OCCASIONAL MOAN OR GROAN. LOW-LEVEL SPEECH WITH A NEGATIVE OR DISAPPROVING QUALITY.
FACIALEXPRESSION: 0 - SMILING OR INEXPRESSIVE.
TOTALSCORE: 3
CONSOLABILITY: 1 - DISTRACTED OR REASSURED BY VOICE OR TOUCH.
BODYLANGUAGE: 0 - RELAXED.

## 2024-07-23 ASSESSMENT — ACTIVITIES OF DAILY LIVING (ADL)
AMBULATION ASSISTANCE: NON-AMBULATORY
CURRENT_FUNCTION: TWO PERSON

## 2024-07-24 ENCOUNTER — PHARMACY VISIT (OUTPATIENT)
Dept: PHARMACY | Facility: MEDICAL CENTER | Age: 74
End: 2024-07-24
Payer: COMMERCIAL

## 2024-07-24 ENCOUNTER — HOME CARE VISIT (OUTPATIENT)
Dept: HOSPICE | Facility: HOSPICE | Age: 74
End: 2024-07-24
Payer: MEDICARE

## 2024-07-24 VITALS — RESPIRATION RATE: 24 BRPM | HEART RATE: 84 BPM

## 2024-07-24 PROCEDURE — G0299 HHS/HOSPICE OF RN EA 15 MIN: HCPCS

## 2024-07-24 PROCEDURE — RXMED WILLOW AMBULATORY MEDICATION CHARGE: Performed by: STUDENT IN AN ORGANIZED HEALTH CARE EDUCATION/TRAINING PROGRAM

## 2024-07-24 PROCEDURE — S9126 HOSPICE CARE, IN THE HOME, P: HCPCS

## 2024-07-24 SDOH — ECONOMIC STABILITY: HOUSING INSECURITY: EVIDENCE OF SMOKING MATERIAL: 0

## 2024-07-24 ASSESSMENT — ENCOUNTER SYMPTOMS
DYSPNEA ON EXERTION: 1
LAST BOWEL MOVEMENT: 67045
INCREASED SLEEPING: 1
INCREASED FATIGUE: 1
DECREASED ORAL INTAKE: 1
SLEEP QUALITY: ADEQUATE
FATIGUES EASILY: 1
FATIGUE: 1
STOOL FREQUENCY: DAILY

## 2024-07-24 ASSESSMENT — PAIN SCALES - PAIN ASSESSMENT IN ADVANCED DEMENTIA (PAINAD)
NEGVOCALIZATION: 1 - OCCASIONAL MOAN OR GROAN. LOW-LEVEL SPEECH WITH A NEGATIVE OR DISAPPROVING QUALITY.
FACIALEXPRESSION: 0 - SMILING OR INEXPRESSIVE.
BODYLANGUAGE: 0 - RELAXED.
TOTALSCORE: 3
CONSOLABILITY: 1 - DISTRACTED OR REASSURED BY VOICE OR TOUCH.

## 2024-07-25 ENCOUNTER — HOME CARE VISIT (OUTPATIENT)
Dept: HOSPICE | Facility: HOSPICE | Age: 74
End: 2024-07-25
Payer: MEDICARE

## 2024-07-25 VITALS — HEART RATE: 96 BPM | RESPIRATION RATE: 18 BRPM

## 2024-07-25 PROCEDURE — S9126 HOSPICE CARE, IN THE HOME, P: HCPCS

## 2024-07-25 PROCEDURE — G0299 HHS/HOSPICE OF RN EA 15 MIN: HCPCS

## 2024-07-25 SDOH — ECONOMIC STABILITY: HOUSING INSECURITY: EVIDENCE OF SMOKING MATERIAL: 0

## 2024-07-25 ASSESSMENT — ENCOUNTER SYMPTOMS
DECREASED ORAL INTAKE: 1
INCREASED SLEEPING: 1
LAST BOWEL MOVEMENT: 67045
STOOL FREQUENCY: DAILY
INCREASED FATIGUE: 1

## 2024-07-25 ASSESSMENT — PAIN SCALES - PAIN ASSESSMENT IN ADVANCED DEMENTIA (PAINAD)
CONSOLABILITY: 1 - DISTRACTED OR REASSURED BY VOICE OR TOUCH.
NEGVOCALIZATION: 0 - NONE.
BODYLANGUAGE: 0 - RELAXED.
FACIALEXPRESSION: 0 - SMILING OR INEXPRESSIVE.
TOTALSCORE: 1

## 2024-07-26 ENCOUNTER — HOME CARE VISIT (OUTPATIENT)
Dept: HOSPICE | Facility: HOSPICE | Age: 74
End: 2024-07-26
Payer: MEDICARE

## 2024-07-26 VITALS — HEART RATE: 96 BPM | RESPIRATION RATE: 10 BRPM

## 2024-07-26 PROCEDURE — G0156 HHCP-SVS OF AIDE,EA 15 MIN: HCPCS

## 2024-07-26 PROCEDURE — S9126 HOSPICE CARE, IN THE HOME, P: HCPCS

## 2024-07-26 PROCEDURE — G0299 HHS/HOSPICE OF RN EA 15 MIN: HCPCS

## 2024-07-26 SDOH — ECONOMIC STABILITY: HOUSING INSECURITY: EVIDENCE OF SMOKING MATERIAL: 0

## 2024-07-26 ASSESSMENT — ENCOUNTER SYMPTOMS
APNEIC BREATHING: 1
DECREASED ORAL INTAKE: 1
INCREASED SLEEPING: 1
INCREASED FATIGUE: 1

## 2024-07-26 ASSESSMENT — PAIN SCALES - PAIN ASSESSMENT IN ADVANCED DEMENTIA (PAINAD)
BODYLANGUAGE: 0 - RELAXED.
TOTALSCORE: 3
NEGVOCALIZATION: 1 - OCCASIONAL MOAN OR GROAN. LOW-LEVEL SPEECH WITH A NEGATIVE OR DISAPPROVING QUALITY.
CONSOLABILITY: 1 - DISTRACTED OR REASSURED BY VOICE OR TOUCH.
FACIALEXPRESSION: 0 - SMILING OR INEXPRESSIVE.

## 2024-07-27 ENCOUNTER — HOME CARE VISIT (OUTPATIENT)
Dept: HOSPICE | Facility: HOSPICE | Age: 74
End: 2024-07-27
Payer: MEDICARE

## 2024-07-27 VITALS — HEART RATE: 92 BPM | RESPIRATION RATE: 20 BRPM

## 2024-07-27 PROCEDURE — S9126 HOSPICE CARE, IN THE HOME, P: HCPCS

## 2024-07-27 PROCEDURE — G0299 HHS/HOSPICE OF RN EA 15 MIN: HCPCS

## 2024-07-27 SDOH — ECONOMIC STABILITY: HOUSING INSECURITY: EVIDENCE OF SMOKING MATERIAL: 0

## 2024-07-27 ASSESSMENT — ENCOUNTER SYMPTOMS
INCREASED SLEEPING: 1
LAST BOWEL MOVEMENT: 67046
FATIGUES EASILY: 1
STOOL FREQUENCY: LESS THAN DAILY
DECREASED ORAL INTAKE: 1
LIMITED RANGE OF MOTION: 1
MUSCLE WEAKNESS: 1
INCREASED FATIGUE: 1

## 2024-07-27 ASSESSMENT — ACTIVITIES OF DAILY LIVING (ADL)
AMBULATION ASSISTANCE: NON-AMBULATORY
CURRENT_FUNCTION: TWO PERSON

## 2024-07-27 ASSESSMENT — PAIN SCALES - PAIN ASSESSMENT IN ADVANCED DEMENTIA (PAINAD)
CONSOLABILITY: 0 - NO NEED TO CONSOLE.
BODYLANGUAGE: 0 - RELAXED.
FACIALEXPRESSION: 0 - SMILING OR INEXPRESSIVE.
NEGVOCALIZATION: 0 - NONE.
TOTALSCORE: 0

## 2024-07-28 ENCOUNTER — HOME CARE VISIT (OUTPATIENT)
Dept: HOSPICE | Facility: HOSPICE | Age: 74
End: 2024-07-28
Payer: MEDICARE

## 2024-07-28 VITALS — HEART RATE: 76 BPM | RESPIRATION RATE: 16 BRPM

## 2024-07-28 PROCEDURE — G0299 HHS/HOSPICE OF RN EA 15 MIN: HCPCS

## 2024-07-28 PROCEDURE — S9126 HOSPICE CARE, IN THE HOME, P: HCPCS

## 2024-07-28 SDOH — ECONOMIC STABILITY: HOUSING INSECURITY: EVIDENCE OF SMOKING MATERIAL: 0

## 2024-07-28 ASSESSMENT — ENCOUNTER SYMPTOMS
SLEEP QUALITY: ADEQUATE
LAST BOWEL MOVEMENT: 67046
STOOL FREQUENCY: LESS THAN DAILY

## 2024-07-28 ASSESSMENT — PAIN SCALES - PAIN ASSESSMENT IN ADVANCED DEMENTIA (PAINAD)
BODYLANGUAGE: 0 - RELAXED.
FACIALEXPRESSION: 0 - SMILING OR INEXPRESSIVE.
NEGVOCALIZATION: 0 - NONE.

## 2024-07-29 ENCOUNTER — HOME CARE VISIT (OUTPATIENT)
Dept: HOSPICE | Facility: HOSPICE | Age: 74
End: 2024-07-29
Payer: MEDICARE

## 2024-07-29 VITALS — HEART RATE: 97 BPM | RESPIRATION RATE: 24 BRPM

## 2024-07-29 DIAGNOSIS — Z51.5 HOSPICE CARE: ICD-10-CM

## 2024-07-29 DIAGNOSIS — C54.1 ENDOMETRIAL CARCINOMA (HCC): ICD-10-CM

## 2024-07-29 DIAGNOSIS — C80.1 MENINGEAL CARCINOMATOSIS (HCC): ICD-10-CM

## 2024-07-29 DIAGNOSIS — C79.49 MENINGEAL CARCINOMATOSIS (HCC): ICD-10-CM

## 2024-07-29 DIAGNOSIS — I63.9 ACUTE CVA (CEREBROVASCULAR ACCIDENT) (HCC): ICD-10-CM

## 2024-07-29 PROCEDURE — G0299 HHS/HOSPICE OF RN EA 15 MIN: HCPCS

## 2024-07-29 PROCEDURE — S9126 HOSPICE CARE, IN THE HOME, P: HCPCS

## 2024-07-29 RX ORDER — AMOXICILLIN 250 MG
2 CAPSULE ORAL 3 TIMES DAILY
Qty: 180 TABLET | Refills: 11 | Status: SHIPPED | OUTPATIENT
Start: 2024-07-29

## 2024-07-29 RX ORDER — OXYCODONE HYDROCHLORIDE 100 MG/5ML
SOLUTION ORAL
Qty: 360 ML | Refills: 0 | Status: SHIPPED | OUTPATIENT
Start: 2024-07-29 | End: 2025-07-29

## 2024-07-29 SDOH — ECONOMIC STABILITY: HOUSING INSECURITY: EVIDENCE OF SMOKING MATERIAL: 0

## 2024-07-29 ASSESSMENT — ENCOUNTER SYMPTOMS
DECREASED ORAL INTAKE: 1
INCREASED FATIGUE: 1
FATIGUES EASILY: 1
INCREASED SLEEPING: 1
LIMITED RANGE OF MOTION: 1
STOOL FREQUENCY: LESS THAN DAILY
BOWEL PATTERN NORMAL: 1
DRY SKIN: 1
MUSCLE WEAKNESS: 1
LAST BOWEL MOVEMENT: 67050
SLEEP QUALITY: ADEQUATE

## 2024-07-29 ASSESSMENT — ACTIVITIES OF DAILY LIVING (ADL)
AMBULATION ASSISTANCE: NON-AMBULATORY
CURRENT_FUNCTION: TWO PERSON

## 2024-07-29 ASSESSMENT — PAIN SCALES - PAIN ASSESSMENT IN ADVANCED DEMENTIA (PAINAD)
BODYLANGUAGE: 0 - RELAXED.
NEGVOCALIZATION: 1 - OCCASIONAL MOAN OR GROAN. LOW-LEVEL SPEECH WITH A NEGATIVE OR DISAPPROVING QUALITY.
CONSOLABILITY: 1 - DISTRACTED OR REASSURED BY VOICE OR TOUCH.
FACIALEXPRESSION: 0 - SMILING OR INEXPRESSIVE.
TOTALSCORE: 2

## 2024-07-30 ENCOUNTER — HOME CARE VISIT (OUTPATIENT)
Dept: HOSPICE | Facility: HOSPICE | Age: 74
End: 2024-07-30
Payer: MEDICARE

## 2024-07-30 VITALS — RESPIRATION RATE: 16 BRPM | HEART RATE: 92 BPM

## 2024-07-30 PROCEDURE — G0156 HHCP-SVS OF AIDE,EA 15 MIN: HCPCS

## 2024-07-30 PROCEDURE — G0299 HHS/HOSPICE OF RN EA 15 MIN: HCPCS

## 2024-07-30 PROCEDURE — S9126 HOSPICE CARE, IN THE HOME, P: HCPCS

## 2024-07-30 SDOH — ECONOMIC STABILITY: HOUSING INSECURITY: EVIDENCE OF SMOKING MATERIAL: 0

## 2024-07-30 ASSESSMENT — PAIN SCALES - PAIN ASSESSMENT IN ADVANCED DEMENTIA (PAINAD)
CONSOLABILITY: 1 - DISTRACTED OR REASSURED BY VOICE OR TOUCH.
TOTALSCORE: 3
BODYLANGUAGE: 0 - RELAXED.
FACIALEXPRESSION: 0 - SMILING OR INEXPRESSIVE.
NEGVOCALIZATION: 1 - OCCASIONAL MOAN OR GROAN. LOW-LEVEL SPEECH WITH A NEGATIVE OR DISAPPROVING QUALITY.

## 2024-07-30 ASSESSMENT — ENCOUNTER SYMPTOMS
INCREASED FATIGUE: 1
SLEEP QUALITY: ADEQUATE
BOWEL INCONTINENCE: 1
LAST BOWEL MOVEMENT: 67051
STOOL FREQUENCY: LESS THAN DAILY
CONSTIPATION: 1
DECREASED ORAL INTAKE: 1
INCREASED SLEEPING: 1

## 2024-07-31 ENCOUNTER — HOME CARE VISIT (OUTPATIENT)
Dept: HOSPICE | Facility: HOSPICE | Age: 74
End: 2024-07-31
Payer: MEDICARE

## 2024-07-31 VITALS — RESPIRATION RATE: 13 BRPM | HEART RATE: 100 BPM

## 2024-07-31 PROCEDURE — G0299 HHS/HOSPICE OF RN EA 15 MIN: HCPCS

## 2024-07-31 PROCEDURE — S9126 HOSPICE CARE, IN THE HOME, P: HCPCS

## 2024-07-31 SDOH — ECONOMIC STABILITY: HOUSING INSECURITY: EVIDENCE OF SMOKING MATERIAL: 0

## 2024-07-31 ASSESSMENT — ENCOUNTER SYMPTOMS
STOOL FREQUENCY: LESS THAN DAILY
APNEIC BREATHING: 1
DECREASED ORAL INTAKE: 1
LAST BOWEL MOVEMENT: 67051
INCREASED SLEEPING: 1
DECREASED TO NO URINARY OUTPUT: 1
INCREASED FATIGUE: 1

## 2024-07-31 ASSESSMENT — PAIN SCALES - PAIN ASSESSMENT IN ADVANCED DEMENTIA (PAINAD)
FACIALEXPRESSION: 0 - SMILING OR INEXPRESSIVE.
BODYLANGUAGE: 0 - RELAXED.
TOTALSCORE: 2
CONSOLABILITY: 0 - NO NEED TO CONSOLE.
NEGVOCALIZATION: 1 - OCCASIONAL MOAN OR GROAN. LOW-LEVEL SPEECH WITH A NEGATIVE OR DISAPPROVING QUALITY.

## 2024-08-01 PROCEDURE — S9126 HOSPICE CARE, IN THE HOME, P: HCPCS

## 2024-08-02 PROCEDURE — S9126 HOSPICE CARE, IN THE HOME, P: HCPCS

## 2024-08-02 NOTE — TELEPHONE ENCOUNTER
Received request via: Pharmacy    Was the patient seen in the last year in this department? Yes    Does the patient have an active prescription (recently filled or refills available) for medication(s) requested? No    Pharmacy Name: [facility]    Does the patient have [facility] Plus and need 100 day supply (blood pressure, diabetes and cholesterol meds only)? Yes, quantity updated to 100 days

## 2024-08-02 NOTE — PROGRESS NOTES
Patient discussed with Tanisha Borjas RN. Patient having increased secretions with heavy phlegm, difficulty coughing up secretions. Family requesting to medicate patients with antibiotics.   Patient continues on tube feeding which is likely causing regurgitation, aspiration. Family resistant to decreasing or removing tube feeding despite education regarding possible increased discomfort and increased risk of infection.

## 2024-08-03 PROCEDURE — S9126 HOSPICE CARE, IN THE HOME, P: HCPCS

## 2024-08-04 PROCEDURE — S9126 HOSPICE CARE, IN THE HOME, P: HCPCS

## 2024-08-05 PROCEDURE — S9126 HOSPICE CARE, IN THE HOME, P: HCPCS

## 2024-08-06 PROCEDURE — S9126 HOSPICE CARE, IN THE HOME, P: HCPCS

## 2024-08-06 NOTE — PROGRESS NOTES
Patient allergic to PCN.    Discussed with Jae, inpatient pharmacist at [facility].   Patient has tolerated cephalosporins in the past but oral cephalosporins are not likely to help if pt has aspiration pneumonia.    New order to continue azithromycin X 7 days.    Message left for patient's daughter, Carolyn. Patient should continue Flagyl as well.

## 2024-08-07 PROCEDURE — S9126 HOSPICE CARE, IN THE HOME, P: HCPCS

## 2024-08-08 PROCEDURE — S9126 HOSPICE CARE, IN THE HOME, P: HCPCS

## 2024-08-09 PROCEDURE — S9126 HOSPICE CARE, IN THE HOME, P: HCPCS

## 2024-08-10 PROCEDURE — S9126 HOSPICE CARE, IN THE HOME, P: HCPCS

## (undated) DEVICE — GLOVE BIOGEL PI INDICATOR SZ 8.0 SURGICAL PF LF -(50/BX 4BX/CA)

## (undated) DEVICE — SOLUTION NORMOSOL-4 INJ 1000ML

## (undated) DEVICE — SET LEADWIRE 5 LEAD BEDSIDE DISPOSABLE ECG (1SET OF 5/EA)

## (undated) DEVICE — GLOVE BIOGEL PI ORTHO SZ 7 PF LF (40PR/BX)

## (undated) DEVICE — SET SUCTION/IRRIGATION WITH DISPOSABLE TIP (6/CA )PART #0250-070-520 IS A SUB

## (undated) DEVICE — BAG RETRIEVAL 5MM (10EA/BX)

## (undated) DEVICE — SPONGE GAUZESTER. 2X2 4-PL - (2/PK 50PK/BX 30BX/CS)

## (undated) DEVICE — SENSOR SPO2 NEO LNCS ADHESIVE (20/BX) SEE USER NOTES

## (undated) DEVICE — SEAL 5MM-8MM UNIVERSAL  BOX OF 10

## (undated) DEVICE — SYRINGE 10 ML CONTROL LL (25EA/BX 4BX/CA)

## (undated) DEVICE — SLEEVE, VASO, THIGH, MED

## (undated) DEVICE — LACTATED RINGERS INJ 1000 ML - (14EA/CA 60CA/PF)

## (undated) DEVICE — WIRE GUIDE .038 X 150 FLEX - .

## (undated) DEVICE — MASK ANESTHESIA ADULT  - (100/CA)

## (undated) DEVICE — SPECIMEN BAG ENDOCATCH II15MM 15MM SPECIMEN RETRIEVAL (3EA/CA)

## (undated) DEVICE — GOWN WARMING STANDARD FLEX - (30/CA)

## (undated) DEVICE — SODIUM CHL IRRIGATION 0.9% 1000ML (12EA/CA)

## (undated) DEVICE — WATER IRRIGATION STERILE 1000ML (12EA/CA)

## (undated) DEVICE — KIT ROOM DECONTAMINATION

## (undated) DEVICE — SYSTEM CHEST DRAIN ADULT/PEDS W/AUTO TRANSFUSION CAPABILITY SAHARA (6EA/CA)

## (undated) DEVICE — DRESSING NON-ADHERING 8 X 3 - (50/BX)

## (undated) DEVICE — TUBING CLEARLINK DUO-VENT - C-FLO (48EA/CA)

## (undated) DEVICE — NEPTUNE 4 PORT MANIFOLD - (20/PK)

## (undated) DEVICE — CANISTER SUCTION 3000ML MECHANICAL FILTER AUTO SHUTOFF MEDI-VAC NONSTERILE LF DISP  (40EA/CA)

## (undated) DEVICE — GLOVE SZ 6.5 BIOGEL PI MICRO - PF LF (50PR/BX)

## (undated) DEVICE — FORCEPS MARYLAND BIPOLAR DA VINCI 10X'S REUSABLE

## (undated) DEVICE — SUTURE QUILL 0 PDO 14X14 - (12/BX)

## (undated) DEVICE — ELECTRODE DUAL RETURN W/ CORD - (50/PK)

## (undated) DEVICE — GLOVE BIOGEL PI INDICATOR SZ 6.5 SURGICAL PF LF - (50/BX 4BX/CA)

## (undated) DEVICE — CATHETER THORACIC 28FR - W/ SENTINEL EYE (10/CA)

## (undated) DEVICE — BAG RETRIEVAL 10ML (10EA/BX)

## (undated) DEVICE — SUTURE 4-0 VICRYL PLUS FS-2 - 27 INCH (36/BX)

## (undated) DEVICE — WATER IRRIG. STER. 1500 ML - (9/CA)

## (undated) DEVICE — SUTURE 2-0 VICRYL PLUS SH - 27 INCH (36/BX)

## (undated) DEVICE — TRAY SRGPRP PVP IOD WT PRP - (20/CA)

## (undated) DEVICE — SPONGE DRAIN 4 X 4IN 6-PLY - (2/PK25PK/BX12BX/CS)

## (undated) DEVICE — SEALER VESSEL EXTEND FROM DAVINCI ENERGY (6EA/BX)

## (undated) DEVICE — DRAPE CHEST/BREAST - (12EA/CA)

## (undated) DEVICE — GLOVE BIOGEL SZ 7.5 SURGICAL PF LTX - (50PR/BX 4BX/CA)

## (undated) DEVICE — SUCTION INSTRUMENT YANKAUER BULBOUS TIP W/O VENT (50EA/CA)

## (undated) DEVICE — ELECTRODE 850 FOAM ADHESIVE - HYDROGEL RADIOTRNSPRNT (50/PK)

## (undated) DEVICE — DRAPESURG STERI-DRAPE LONG - (10/BX 4BX/CA)

## (undated) DEVICE — GOWN SURGEONS X-LARGE - DISP. (30/CA)

## (undated) DEVICE — SYRINGE TOOMEY (50EA/CA)

## (undated) DEVICE — FORCEPS PROGRASP DA VINCI 10X'S REUSABLE

## (undated) DEVICE — GUIDE TRACHE TUBE INTUBATING STYLET 5.0-10.0MM 14FR (20EA/PK)

## (undated) DEVICE — SUTURE 3-0 MONOCRYL PLUS PS-1 - 27 INCH (36/BX)

## (undated) DEVICE — KIT ANESTHESIA W/CIRCUIT & 3/LT BAG W/FILTER (20EA/CA)

## (undated) DEVICE — ENDO PEANUT 5MM DEVICE (12EA/BX)

## (undated) DEVICE — SUTURE 2-0 VICRYL PLUS CT-2 - 27 INCH (36/BX)

## (undated) DEVICE — SET EXTENSION WITH 2 PORTS (48EA/CA) ***PART #2C8610 IS A SUBSTITUTE*****

## (undated) DEVICE — SYRINGE ASEPTO - (50EA/CA

## (undated) DEVICE — SYRINGE SAFETY 10 ML 18 GA X 1 1/2 BLUNT LL (100/BX 4BX/CA)

## (undated) DEVICE — DRAPE VAGINAL BIB W/ POUCH (10EA/CA)

## (undated) DEVICE — PACK GYN DAVINCI (2EA/CA)

## (undated) DEVICE — ARMREST CRADLE FOAM - (2PR/PK 12PR/CA)

## (undated) DEVICE — GLOVE SZ 6 BIOGEL PI MICRO - PF LF (50PR/BX 4BX/CA)

## (undated) DEVICE — PAD OR TABLE DA VINCI 2IN X 20IN X 72IN - (12EA/CA)

## (undated) DEVICE — SYRINGE 30 ML LS (56/BX)

## (undated) DEVICE — TUBE CHEST 32FR. RIGHT ANGLED (10EA/CA)

## (undated) DEVICE — SUTURE GENERAL

## (undated) DEVICE — COVER MAYO STAND X-LG - (22EA/CA)

## (undated) DEVICE — GLOVE BIOGEL SZ 6.5 SURGICAL PF LTX (50PR/BX 4BX/CA)

## (undated) DEVICE — CONNECTOR Y TBG CRTY 5 IN 1 STERILE (50EA/CA)

## (undated) DEVICE — COVER TIP ENDOWRIST HOT SHEAR - (10EA/BX) DA VINCI

## (undated) DEVICE — NEEDLE SAFETY 18 GA X 1 1/2 IN (100EA/BX)

## (undated) DEVICE — TUBING OUTFLOW HYSTEROSCPY (10EA/BX)

## (undated) DEVICE — BRIEF STRETCH MATERNITY M/L - FITS 20-60IN (5EA/BG 20BG/CA)

## (undated) DEVICE — TOWEL STOP TIMEOUT SAFETY FLAG (40EA/CA)

## (undated) DEVICE — ROBOTIC SURGERY SERVICES

## (undated) DEVICE — CHLORAPREP 26 ML APPLICATOR - ORANGE TINT(25/CA)

## (undated) DEVICE — NEEDLE NON SAFETY HYPO 22 GA X 1 1/2 IN (100/BX)

## (undated) DEVICE — PROTECTOR ULNA NERVE - (36PR/CA)

## (undated) DEVICE — TRAY CATHETER FOLEY URINE METER W/STATLOCK 350ML (10EA/CA)

## (undated) DEVICE — CATHETER FOLEY 22 FR 30CC  - (12EA/CA)

## (undated) DEVICE — SPATULA PERMANENT CAUTERY DA VINCI 10X'S REUSABLE

## (undated) DEVICE — GLOVE BIOGEL INDICATOR SZ 8 SURGICAL PF LTX - (50/BX 4BX/CA)

## (undated) DEVICE — PACK LAP CHOLE OR - (2EA/CA)

## (undated) DEVICE — BLADE SURGICAL #10 - (50/BX)

## (undated) DEVICE — SET IRRIGATION CYSTOSCOPY TUBE L80 IN (20EA/CA)

## (undated) DEVICE — SUTURE 0 SILK CT-1 (36PK/BX)

## (undated) DEVICE — Device

## (undated) DEVICE — GLOVE BIOGEL M SZ 8 SURGICAL PF LTX - (50/BX 4BX/CA)

## (undated) DEVICE — SUTURE 0 VICRYL PLUS CT-2 - 27 INCH (36/BX)

## (undated) DEVICE — PACK TRENGUARD 450 PROCEDURE (12EA/CA)

## (undated) DEVICE — DRAPE COLUMN  BOX OF 20

## (undated) DEVICE — NEEDLE DRIVER MEGA SUTURECUT DA VINCI 15X'S REUSABLE

## (undated) DEVICE — DRESSING TRANSPARENT FILM TEGADERM 2.375 X 2.75"  (100EA/BX)"

## (undated) DEVICE — DRAPE STRLE REG TOWEL 18X24 - (10/BX 4BX/CA)"

## (undated) DEVICE — DRAPE IOBAN II INCISE 23X17 - (10EA/BX 4BX/CA)

## (undated) DEVICE — DRAPE UNDER BUTTOCKS FLUID - (20/CA)

## (undated) DEVICE — SUTURE 4-0 VICRYL PLUS RB-1 - 27 INCH (36/BX)

## (undated) DEVICE — TUBE E-T HI-LO CUFF 7.5MM (10EA/PK)

## (undated) DEVICE — NEEDLE SPINAL NON-SAFETY 18 GA X 3 IN (25EA/BX)

## (undated) DEVICE — PAD SANITARY 11IN MAXI IND WRAPPED  (12EA/PK 24PK/CA)

## (undated) DEVICE — TROCAR Z THREAD 12 X 100 - BLADED (6/BX)

## (undated) DEVICE — TROCAR 12MM THORACOPORT (6EA/BX)

## (undated) DEVICE — HEAD HOLDER JUNIOR/ADULT

## (undated) DEVICE — SYRINGE 30 ML LL (56/BX)

## (undated) DEVICE — CLIP HEMOLOCK PURPLE - (14/BX)

## (undated) DEVICE — GLOVE BIOGEL SZ 6 PF LATEX - (50EA/BX 4BX/CA)

## (undated) DEVICE — TUBING INFLOW HYSTEROSCOPY (10EA/CA)

## (undated) DEVICE — SOLUTION SORBITOL 3000ML (4/CA)

## (undated) DEVICE — GLOVE BIOGEL PI INDICATOR SZ 7.5 SURGICAL PF LF -(50/BX 4BX/CA)

## (undated) DEVICE — NEEDLE INSUFFLATION FOR STEP - (12/BX)

## (undated) DEVICE — TUBE CONNECT SUCTION CLEAR 120 X 1/4" (50EA/CA)"

## (undated) DEVICE — DRAPE ARM  BOX OF 20

## (undated) DEVICE — SOD. CHL. INJ. 0.9% 1000 ML - (14EA/CA 60CA/PF)